# Patient Record
Sex: MALE | Race: WHITE | NOT HISPANIC OR LATINO | Employment: FULL TIME | URBAN - METROPOLITAN AREA
[De-identification: names, ages, dates, MRNs, and addresses within clinical notes are randomized per-mention and may not be internally consistent; named-entity substitution may affect disease eponyms.]

---

## 2017-01-02 ENCOUNTER — OFFICE VISIT (OUTPATIENT)
Dept: ENDOCRINOLOGY | Facility: CLINIC | Age: 42
End: 2017-01-02
Payer: COMMERCIAL

## 2017-01-02 VITALS
DIASTOLIC BLOOD PRESSURE: 66 MMHG | BODY MASS INDEX: 20.45 KG/M2 | SYSTOLIC BLOOD PRESSURE: 98 MMHG | HEART RATE: 98 BPM | WEIGHT: 134.92 LBS | HEIGHT: 68 IN

## 2017-01-02 DIAGNOSIS — E27.40 ADRENAL INSUFFICIENCY (H): Primary | ICD-10-CM

## 2017-01-02 PROCEDURE — 99205 OFFICE O/P NEW HI 60 MIN: CPT | Performed by: INTERNAL MEDICINE

## 2017-01-02 NOTE — NURSING NOTE
"Nico Morales's goals for this visit include: Cystis Fibrosis Diabetes  He requests these members of his care team be copied on today's visit information: YES    PCP: Jaye Machado    Referring Provider:  No referring provider defined for this encounter.    Chief Complaint   Patient presents with     Cystic Fibrosis     Diabetes       Initial Ht 1.727 m (5' 8\")  Wt 61.2 kg (134 lb 14.7 oz)  BMI 20.52 kg/m2 Estimated body mass index is 20.52 kg/(m^2) as calculated from the following:    Height as of this encounter: 1.727 m (5' 8\").    Weight as of this encounter: 61.2 kg (134 lb 14.7 oz).  BP completed using cuff size: regular    Do you need any medication refills at today's visit? NO    "

## 2017-01-02 NOTE — PROGRESS NOTES
CF Endocrinology Return Consultation:  Diabetes  :   Patient: Nico Morales MRN# [id number]   YOB: 1975 Age: 41 year old   Date of Visit: 1/2/2017  Dear Dr. Jaye Machado:    I had the pleasure of seeing your patient, Nico Morales in the [facility], [facility] , on 1/2/2017 for consultation regarding CFRD, adrenal insufficiency and low bone density.           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  Verbal overview and written information provided.          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     Priority: Medium     Encounter for long-term (current) use of antibiotics 05/20/2013     Priority: Medium     Prostatitis 05/20/2013     Priority: Medium     updating diagnosis code for icd10 cutover       Pseudomonas infection      Priority: Medium     Cystic fibrosis (H) 11/24/2010     Priority: Medium     Sweat Test   Date: 4/25/78    Lab: [facility]   Sample #1:  110mmol  Sample #2:  106mmol    Genetics  Date: 4/9/90  U369wkg/621+1G->T         CARDIOVASCULAR SCREENING; LDL GOAL LESS THAN 160 10/31/2010     Priority: Medium            HPI:   Nico is a 41 year old male with Cystic Fibrosis Related Diabetes Mellitus (CFRD).    I have reviewed the available past laboratory evaluations, imaging studies, and medical records available to me at this visit. I have reviewed  Nico'height and weight.    History was obtained from the patient and the medical record.  I have reviewed the notes of the  pulmonary care team.    I have read and interpreted the data from the patient glucose downloads..    Overall average: 119 mg/dL     Patient is currently testing 0-2 time per day.  Per our standard of care I am recommending and prescribing glucose testing 6 times per day.    A1c:  recent hemoglobin A1c: 8.3%  Previous two HbA1c results: A1C      8.3   12/15/2016  A1C      8.1   6/28/2016   Result was discussed at today's visit.     Current insulin regimen:   Injectable Insulin: Glargine (Lantus): 5 units  at bedtime   Novolog 1 unit / 15 gm of carb, 3-4 time  Daily. Novolog dose ranges b/w 3-7 units  Tube feed at night, 2 nights on 1 night off, tube feed take 2-3 hours to compelete    Insulin administration site(s): abd or thigh    Family history and social history were reviewed     He was also diagnosed with adrenal insufficiency based on inadequate response on a ACTH stim test. Adrenal insufficiency in thuoght to be related to  itraconazole and Symbicort use.  He is off itraconazole at this time. On prednisone 2.5 mg bid.     Noted to have significant decline in bone density on recent DEXA. On vitamin D and multivitmain supplements.           Past Medical History:     Past Medical History   Diagnosis Date     CF (cystic fibrosis) (H)      Exocrine pancreatic insufficiency      S/P gastrostomy (H) 2002     Nocardia infection      Pseudomonas infection             Past Surgical History:     Past Surgical History   Procedure Laterality Date     Gastrostomy tube  2002               Social History:     Social History     Social History Narrative    Kilo lives with wife in a house in [place], [place].  He works as a .        March 2016. He works independently as a . Works out 5x/week and walks the dog daily.        June 2016. No changes. Describes himself as a creature of habit.              Family History:     Family History   Problem Relation Age of Onset     HEART DISEASE Maternal  Grandmother      HEART DISEASE Maternal Grandfather      HEART DISEASE Paternal Grandmother      DIABETES No family hx of             Allergies:     Allergies   Allergen Reactions     Ceftazidime      Pulmozyme [Dornase Trever] Other (See Comments)     Chest pain and fever     Zosyn      Levaquin Rash             Medications:     Current Outpatient Rx   Name  Route  Sig  Dispense  Refill     amylase-lipase-protease (CREON) 24281 UNITS CPEP per EC capsule        Take 7-8 caps with meals (3 meals/day) and 2 caps with snacks (3 snacks/day)    900 capsule    0       blood glucose monitoring (MARTINA CONTOUR NEXT MONITOR) meter device kit        Use to test blood sugar 6 times daily or as directed.    1 kit    2       Patient needs to change to provider Ameer Rosa f ...       blood glucose monitoring (MARTINA CONTOUR NEXT) test strip        Use to test blood sugar 6 times daily or as directed.    200 each    2       Patient needs to change to provider Ameer Rosa f ...       blood glucose monitoring (MARTINA MICROLET) lancets        Use to test blood sugar 6 times daily or as directed.    2 Box    2       Patient needs to change to provider Ameer Rosa f ...       aztreonam lysine (CAYSTON) 75 MG SOLR    Nebulization    Take 1 mL (75 mg) by nebulization 3 times daily Alternating every other month with BETHANY.    84 mL    5       oseltamivir (TAMIFLU) 75 MG capsule    Oral    Take 1 capsule (75 mg) by mouth 2 times daily    10 capsule    1       rimantadine (FLUMADINE) 100 MG tablet    Oral    Take 1 tablet (100 mg) by mouth 2 times daily Take from Oct. 1 thru March 31.    60 tablet    5       sodium chloride inhalant 7 % NEBU        Nebulize 4mL twice daily    240 mL    11       insulin pen needle (BD OMI U/F) 32G X 4 MM        Use 6 daily or as directed.    200 each    11       insulin glargine (LANTUS SOLOSTAR) 100 UNIT/ML PEN        5 units daily    15 mL    6       tobramycin, PF, (BETHANY) 300 MG/5ML nebulizer solution     "Nebulization    Take 5 mLs (300 mg) by nebulization 2 times daily 28 days on, 28 days off.    280 mL    2       acetylcysteine (MUCOMYST) 10 % nebulizer solution        Nebulize 4ml qid    480 mL    6       sodium chloride, Inhalant, 0.9 % AERS    Inhalation    Inhale 3 mLs into the lungs 3 times daily    270 mL    11       insulin aspart (NOVOLOG PENFILL) 100 UNIT/ML Cartridge        1 per 30 grams of carbohydrate at lunch and supper  Patient taking differently: 1 per 15 grams of carbohydrate at lunch and supper    15 mL    12       Patient using ~25 units/day       budesonide-formoterol (SYMBICORT) 160-4.5 MCG/ACT inhaler    Inhalation    Inhale 2 puffs into the lungs 2 times daily    1 Inhaler    12       levalbuterol (XOPENEX HFA) 45 MCG/ACT inhaler    Inhalation    Inhale 2 puffs into the lungs every 6 hours as needed for shortness of breath / dyspnea    3 Inhaler    4       polyethylene glycol (MIRALAX) powder        Use 17 gms po tid x 3 days then do it daily x 1 month    442 g    1       levalbuterol (XOPENEX CONCENTRATE) 1.25 MG/0.5ML NEBU    Nebulization    Take 0.5 mLs (1.25 mg) by nebulization 4 times daily    120 each    11       [facility] INFUSION MANAGED PATIENT        Contact [facility] for patient specific medication information at [phone] on admission and discharge from the hospital.  Phones are answered 24 hours a day 7 days a week 365 days a year.    Providers - Choose \"CONTINUE HOME MED (no script)\" at discharge if patient treatment with home infusion will continue.               Nebulizers (EFLOW [facility] NEBULIZER HANDSET) MISC    Does not apply    1 each 3 times daily.    1 each    6       Supply as needed with [place] as [employer] handset       AMITRIPTYLINE HCL        30 mg At Bedtime.               Calcium Citrate-Vitamin D (CALCIUM CITRATE + D PO)    Oral    Take 1 tablet by mouth 2 times daily.               Ferrous Fumarate-Vitamin C (VITRON-C) 200-125 MG TABS    Oral    " "Take 1 tablet by mouth 2 times daily.               MEPHYTON 5 MG PO TABS    Oral    1 TABLET DAILY               MULTIVITAMIN OR    Oral    1 tablet daily               VITAMIN D 1000 UNIT PO CAPS    Oral    1 CAPSULE DAILY               VITAMIN E 400 UNIT PO CAPS    Oral    1 CAPSULE DAILY               ACE NOT PRESCRIBED, INTENTIONAL,    Does not apply    1 each continuous prn. ACE Inhibitor not prescribed due to Risk for drug interaction    0 each    0               Review of Systems:     Comprehensive ROS negative other than the symptoms noted above in the HPI.          Physical Exam:   Blood pressure 98/66, pulse 98, height 1.727 m (5' 8\"), weight 61.2 kg (134 lb 14.7 oz).  Normalized stature-for-age data available only for age 0 to 20 years.  Height: 5' 8\", Normalized stature-for-age data available only for age 0 to 20 years.  Weight: 134 lbs 14.74 oz, Normalized weight-for-age data available only for age 0 to 20 years.  BMI: Body mass index is 20.52 kg/(m^2)., Normalized BMI data available only for age 2 to 20 years.      CONSTITUTIONAL:   Awake, alert, and in no apparent distress.  HEAD: Normocephalic, without obvious abnormality.  EYES: Lids and lashes normal, sclera clear, conjunctiva normal.  ENT: external ears without lesions, nares clear, oral pharynx with moist mucus membranes.  NECK: Supple, symmetrical, trachea midline.  THYROID: symmetric, not enlarged and no tenderness.  HEMATOLOGIC/LYMPHATIC: No cervical lymphadenopathy.  LUNGS: No increased work of breathing, clear to auscultation  with good air entry  CARDIOVASCULAR: Regular rate and rhythm, no murmurs.  ABDOMEN: Soft, non-distended, non-tender, no masses palpated, no hepatosplenomegally.  NEUROLOGIC:No focal deficits noted.   PSYCHIATRIC: Cooperative, no agitation.  SKIN: Insulin administration sites intact without lipohypertrophy. No acanthosis nigricans.  MUSCULOSKELETAL:  Full range of motion noted.  Motor strength and tone are " normal.  FEET:  Normal, monofilament exam normal         Laboratory results:     TSH   Date Value Ref Range Status   12/15/2016 1.36 0.40 - 4.00 mU/L Final     TRIIODOTHYRONINE (T3)   Date Value Ref Range Status   06/25/2010 123 60 - 181 ng/dL Final     TESTOSTERONE TOTAL   Date Value Ref Range Status   12/15/2016 779 240 - 950 ng/dL Final     Comment:     This test was developed and its performance characteristics determined by the   [facility],  Special Chemistry Laboratory. It has   not been cleared or approved by the FDA. The laboratory is regulated under   CLIA   as qualified to perform high-complexity testing. This test is used for   clinical   purposes. It should not be regarded as investigational or for research.       CHOLESTEROL   Date Value Ref Range Status   12/15/2016 158 <200 mg/dL Final     ALBUMIN URINE MG/L   Date Value Ref Range Status   12/15/2016 55 mg/L Final     TRIGLYCERIDES   Date Value Ref Range Status   12/15/2016 173* <150 mg/dL Final     Comment:     Borderline high:  150-199 mg/dl   High:             200-499 mg/dl   Very high:       >499 mg/dl       HDL CHOLESTEROL   Date Value Ref Range Status   12/15/2016 32* >39 mg/dL Final     LDL CHOLESTEROL CALCULATED   Date Value Ref Range Status   12/15/2016 92 <100 mg/dL Final     Comment:     Desirable:       <100 mg/dl     CHOLESTEROL/HDL RATIO   Date Value Ref Range Status   06/26/2013 4.4 0.0 - 5.0 Final     NON HDL CHOLESTEROL   Date Value Ref Range Status   12/15/2016 126 <130 mg/dL Final     A1C      8.3   12/15/2016  A1C      8.1   6/28/2016  A1C      7.5   4/13/2016  A1C      7.1   3/8/2016  A1C      7.4   12/16/2015 No results found for this basename: hemoglobina1        CF  Diabetes Health Maintenance    Date of Diabetes Diagnosis: 12/2015    Date Last Eye Exam: Jan 2015     Date Last Dental Appointment: >2 years    Dates of Episodes Severe* Hypoglycemia (month/year, cumulative, ongoing, assess each visit):  never              *Severe=patient unconscious, seizure, unable to help self    Last 25-Vitamin D (every year): 33 on 12/2016    Last DXA, lowest Z-score (every 2 years): -2.6 right femoral necl       ?Bisphosphonates (yes/no):       Last Urine Microalbumin (every year):      No results found for: MICROALBUMIN     ENDO DIABETES Latest Ref Rng 12/15/2016   ALBUMIN URINE MG/L  55   ALBUMIN URINE MG/G CR 0 - 17 mg/g Cr 37.28 (H)     Last Testosterone:   TESTOSTERONE TOTAL   Date Value Ref Range Status   12/15/2016 779 240 - 950 ng/dL Final     Comment:     This test was developed and its performance characteristics determined by the   [facility],  Special Chemistry Laboratory. It has   not been cleared or approved by the FDA. The laboratory is regulated under   CLIA   as qualified to perform high-complexity testing. This test is used for   clinical   purposes. It should not be regarded as investigational or for research.        On testosterone therapy (yes/no)? No     Date of Last Diabetes Visit: June 2016         Assessment and Plan:   Nico is a 41 year old male with CFRD    CFRD: suboptimal control, not checking BG regularly, increase lantus to 7 units daily. Reports having low when dose was increased to 10 units in the past. Check BG regularly and send log for review b/w visits.  No reactive hypoglycemia     Diabetes is a complicated and dangerous illness which requires intensive monitoring and treatment to prevent both short-term and long-term consequences to various organs. Insulin therapy is life-saving, but is also associated with life-threatening toxicity (hypoglycemia).  Careful and continuous attention to balancing glucose levels, activity, diet and insulin dosage is necessary.    Low bone density: Vitamin D, TSH and testosterone were ok. Start pamidronate 30 mg IV every 3 months. Side effects including rare risk of ONJ and atypical fracture of femur reviewed.    Adrenal  insuffiencey:  Check AM cortisol/ACTH after hold evening dose of prednisone.       Patient Instructions   It was nice to see you today romina!    As we discussed today, diabetes control is not at goal    -Increase lantus to 7 units daily.    -Check glucose more frequently, this will help us with adjusting insulin dose.  -our nurse (Olivia) will contact you to schedule pamidronate infusion.  - as discussed have cortisol level checked in  the morning after holding the evening dose the night before.     Thank you for choosing the [facility], Department of Endocrinology. It has been a pleasure servicing you today. Please contact us at [phone] with any questions. If you need to speak to an Endocrinologist and it is after 5:00 pm or on a weekend, please call the On-Call Endocrinologist at [phone].          total time 60 minutes > 50% counseling patient on above and reviewing records.     RTC in 3 months    Thank you for allowing me to participate in the care of your patient.  Please do not hesitate to call with questions or concerns.    Sincerely,    JULIETA Tate  [facility]    TYLER REYES

## 2017-01-02 NOTE — Clinical Note
1/2/2017      RE: Nico Morales  [address] [facility] [address]  [facility] [address]     Dear Colleague,    Thank you for referring your patient, Nico Morales, to the [facility]. Please see a copy of my visit note below.    CF Endocrinology Return Consultation:  Diabetes  :   Patient: Nico Morales MRN# [id number]   YOB: 1975 Age: 41 year old   Date of Visit: 1/2/2017  Dear Dr. Jaye Machado:    I had the pleasure of seeing your patient, Nico Morales in the [facility], [facility] , on 1/2/2017 for consultation regarding CFRD, adrenal insufficiency and low bone density.           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  Verbal overview and written information provided.          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     Priority: Medium     Encounter for long-term (current) use of antibiotics 05/20/2013     Priority: Medium     Prostatitis 05/20/2013     Priority: Medium     updating diagnosis code for icd10 cutover       Pseudomonas infection      Priority: Medium     Cystic fibrosis (H) 11/24/2010     Priority: Medium     Sweat Test   Date: 4/25/78    Lab: [facility]   Sample #1:  110mmol  Sample #2:  106mmol    Genetics  Date: 4/9/90  [id number]+[id number]->T         CARDIOVASCULAR SCREENING; LDL GOAL LESS THAN 160 10/31/2010     Priority: Medium            HPI:   Nico is a 41 year old male with Cystic Fibrosis Related Diabetes Mellitus (CFRD).    I have reviewed the  available past laboratory evaluations, imaging studies, and medical records available to me at this visit. I have reviewed  Nico'height and weight.    History was obtained from the patient and the medical record.  I have reviewed the notes of the pulmonary care team.    I have read and interpreted the data from the patient glucose downloads..    Overall average: 119 mg/dL     Patient is currently testing 0-2 time per day.  Per our standard of care I am recommending and prescribing glucose testing 6 times per day.    A1c:  recent hemoglobin A1c: 8.3%  Previous two HbA1c results: A1C      8.3   12/15/2016  A1C      8.1   6/28/2016   Result was discussed at today's visit.     Current insulin regimen:   Injectable Insulin: Glargine (Lantus): 5 units  at bedtime   Novolog 1 unit / 15 gm of carb, 3-4 time  Daily. Novolog dose ranges b/w 3-7 units  Tube feed at night, 2 nights on 1 night off, tube feed take 2-3 hours to compelete    Insulin administration site(s): abd or thigh    Family history and social history were reviewed     He was also diagnosed with adrenal insufficiency based on inadequate response on a ACTH stim test. Adrenal insufficiency in thuoght to be related to  itraconazole and Symbicort use.  He is off itraconazole at this time. On prednisone 2.5 mg bid.     Noted to have significant decline in bone density on recent DEXA. On vitamin D and multivitmain supplements.           Past Medical History:     Past Medical History   Diagnosis Date     CF (cystic fibrosis) (H)      Exocrine pancreatic insufficiency      S/P gastrostomy (H) 2002     Nocardia infection      Pseudomonas infection             Past Surgical History:     Past Surgical History   Procedure Laterality Date     Gastrostomy tube  2002               Social History:     Social History     Social History Narrative    Kilo lives with wife in a house in [place], [place].  He works as a .        March 2016. He works independently as  a . Works out 5x/week and walks the dog daily.        June 2016. No changes. Describes himself as a creature of habit.              Family History:     Family History   Problem Relation Age of Onset     HEART DISEASE Maternal Grandmother      HEART DISEASE Maternal Grandfather      HEART DISEASE Paternal Grandmother      DIABETES No family hx of             Allergies:     Allergies   Allergen Reactions     Ceftazidime      Pulmozyme [Dornase Trever] Other (See Comments)     Chest pain and fever     Zosyn      Levaquin Rash             Medications:     Current Outpatient Rx   Name  Route  Sig  Dispense  Refill     amylase-lipase-protease (CREON) 76287 UNITS CPEP per EC capsule        Take 7-8 caps with meals (3 meals/day) and 2 caps with snacks (3 snacks/day)    900 capsule    0       blood glucose monitoring (MARTINA CONTOUR NEXT MONITOR) meter device kit        Use to test blood sugar 6 times daily or as directed.    1 kit    2       Patient needs to change to provider Ameepooja Lee f ...       blood glucose monitoring (MARTINA CONTOUR NEXT) test strip        Use to test blood sugar 6 times daily or as directed.    200 each    2       Patient needs to change to provider Ameer Rosa f ...       blood glucose monitoring (MARTINA MICROLET) lancets        Use to test blood sugar 6 times daily or as directed.    2 Box    2       Patient needs to change to provider Amvalerie Lee f ...       aztreonam lysine (CAYSTON) 75 MG SOLR    Nebulization    Take 1 mL (75 mg) by nebulization 3 times daily Alternating every other month with BETHANY.    84 mL    5       oseltamivir (TAMIFLU) 75 MG capsule    Oral    Take 1 capsule (75 mg) by mouth 2 times daily    10 capsule    1       rimantadine (FLUMADINE) 100 MG tablet    Oral    Take 1 tablet (100 mg) by mouth 2 times daily Take from Oct. 1 thru March 31.    60 tablet    5       sodium chloride inhalant 7 % NEBU        Nebulize 4mL twice daily    240 mL    11       insulin  "pen needle (BD OMI U/F) 32G X 4 MM        Use 6 daily or as directed.    200 each    11       insulin glargine (LANTUS SOLOSTAR) 100 UNIT/ML PEN        5 units daily    15 mL    6       tobramycin, PF, (BETHANY) 300 MG/5ML nebulizer solution    Nebulization    Take 5 mLs (300 mg) by nebulization 2 times daily 28 days on, 28 days off.    280 mL    2       acetylcysteine (MUCOMYST) 10 % nebulizer solution        Nebulize 4ml qid    480 mL    6       sodium chloride, Inhalant, 0.9 % AERS    Inhalation    Inhale 3 mLs into the lungs 3 times daily    270 mL    11       insulin aspart (NOVOLOG PENFILL) 100 UNIT/ML Cartridge        1 per 30 grams of carbohydrate at lunch and supper  Patient taking differently: 1 per 15 grams of carbohydrate at lunch and supper    15 mL    12       Patient using ~25 units/day       budesonide-formoterol (SYMBICORT) 160-4.5 MCG/ACT inhaler    Inhalation    Inhale 2 puffs into the lungs 2 times daily    1 Inhaler    12       levalbuterol (XOPENEX HFA) 45 MCG/ACT inhaler    Inhalation    Inhale 2 puffs into the lungs every 6 hours as needed for shortness of breath / dyspnea    3 Inhaler    4       polyethylene glycol (MIRALAX) powder        Use 17 gms po tid x 3 days then do it daily x 1 month    442 g    1       levalbuterol (XOPENEX CONCENTRATE) 1.25 MG/0.5ML NEBU    Nebulization    Take 0.5 mLs (1.25 mg) by nebulization 4 times daily    120 each    11       [facility] INFUSION MANAGED PATIENT        Contact [facility] for patient specific medication information at [phone] on admission and discharge from the hospital.  Phones are answered 24 hours a day 7 days a week 365 days a year.    Providers - Choose \"CONTINUE HOME MED (no script)\" at discharge if patient treatment with home infusion will continue.               Nebulizers (EFLOW [facility] NEBULIZER HANDSET) MISC    Does not apply    1 each 3 times daily.    1 each    6       Supply as needed with [place] as [employer] " "handset       AMITRIPTYLINE HCL        30 mg At Bedtime.               Calcium Citrate-Vitamin D (CALCIUM CITRATE + D PO)    Oral    Take 1 tablet by mouth 2 times daily.               Ferrous Fumarate-Vitamin C (VITRON-C) 200-125 MG TABS    Oral    Take 1 tablet by mouth 2 times daily.               MEPHYTON 5 MG PO TABS    Oral    1 TABLET DAILY               MULTIVITAMIN OR    Oral    1 tablet daily               VITAMIN D 1000 UNIT PO CAPS    Oral    1 CAPSULE DAILY               VITAMIN E 400 UNIT PO CAPS    Oral    1 CAPSULE DAILY               ACE NOT PRESCRIBED, INTENTIONAL,    Does not apply    1 each continuous prn. ACE Inhibitor not prescribed due to Risk for drug interaction    0 each    0               Review of Systems:     Comprehensive ROS negative other than the symptoms noted above in the HPI.          Physical Exam:   Blood pressure 98/66, pulse 98, height 1.727 m (5' 8\"), weight 61.2 kg (134 lb 14.7 oz).  Normalized stature-for-age data available only for age 0 to 20 years.  Height: 5' 8\", Normalized stature-for-age data available only for age 0 to 20 years.  Weight: 134 lbs 14.74 oz, Normalized weight-for-age data available only for age 0 to 20 years.  BMI: Body mass index is 20.52 kg/(m^2)., Normalized BMI data available only for age 2 to 20 years.      CONSTITUTIONAL:   Awake, alert, and in no apparent distress.  HEAD: Normocephalic, without obvious abnormality.  EYES: Lids and lashes normal, sclera clear, conjunctiva normal.  ENT: external ears without lesions, nares clear, oral pharynx with moist mucus membranes.  NECK: Supple, symmetrical, trachea midline.  THYROID: symmetric, not enlarged and no tenderness.  HEMATOLOGIC/LYMPHATIC: No cervical lymphadenopathy.  LUNGS: No increased work of breathing, clear to auscultation  with good air entry  CARDIOVASCULAR: Regular rate and rhythm, no murmurs.  ABDOMEN: Soft, non-distended, non-tender, no masses palpated, no " hepatosplenomegally.  NEUROLOGIC:No focal deficits noted.   PSYCHIATRIC: Cooperative, no agitation.  SKIN: Insulin administration sites intact without lipohypertrophy. No acanthosis nigricans.  MUSCULOSKELETAL:  Full range of motion noted.  Motor strength and tone are normal.  FEET:  Normal, monofilament exam normal         Laboratory results:     TSH   Date Value Ref Range Status   12/15/2016 1.36 0.40 - 4.00 mU/L Final     TRIIODOTHYRONINE (T3)   Date Value Ref Range Status   06/25/2010 123 60 - 181 ng/dL Final     TESTOSTERONE TOTAL   Date Value Ref Range Status   12/15/2016 779 240 - 950 ng/dL Final     Comment:     This test was developed and its performance characteristics determined by the   [facility],  Special Chemistry Laboratory. It has   not been cleared or approved by the FDA. The laboratory is regulated under   CLIA   as qualified to perform high-complexity testing. This test is used for   clinical   purposes. It should not be regarded as investigational or for research.       CHOLESTEROL   Date Value Ref Range Status   12/15/2016 158 <200 mg/dL Final     ALBUMIN URINE MG/L   Date Value Ref Range Status   12/15/2016 55 mg/L Final     TRIGLYCERIDES   Date Value Ref Range Status   12/15/2016 173* <150 mg/dL Final     Comment:     Borderline high:  150-199 mg/dl   High:             200-499 mg/dl   Very high:       >499 mg/dl       HDL CHOLESTEROL   Date Value Ref Range Status   12/15/2016 32* >39 mg/dL Final     LDL CHOLESTEROL CALCULATED   Date Value Ref Range Status   12/15/2016 92 <100 mg/dL Final     Comment:     Desirable:       <100 mg/dl     CHOLESTEROL/HDL RATIO   Date Value Ref Range Status   06/26/2013 4.4 0.0 - 5.0 Final     NON HDL CHOLESTEROL   Date Value Ref Range Status   12/15/2016 126 <130 mg/dL Final     A1C      8.3   12/15/2016  A1C      8.1   6/28/2016  A1C      7.5   4/13/2016  A1C      7.1   3/8/2016  A1C      7.4   12/16/2015 No results found for this  basename: hemoglobina1        CF  Diabetes Health Maintenance    Date of Diabetes Diagnosis: 12/2015    Date Last Eye Exam: Jan 2015     Date Last Dental Appointment: >2 years    Dates of Episodes Severe* Hypoglycemia (month/year, cumulative, ongoing, assess each visit): never              *Severe=patient unconscious, seizure, unable to help self    Last 25-Vitamin D (every year): 33 on 12/2016    Last DXA, lowest Z-score (every 2 years): -2.6 right femoral necl       ?Bisphosphonates (yes/no):       Last Urine Microalbumin (every year):      No results found for: MICROALBUMIN     ENDO DIABETES Latest Ref Rng 12/15/2016   ALBUMIN URINE MG/L  55   ALBUMIN URINE MG/G CR 0 - 17 mg/g Cr 37.28 (H)     Last Testosterone:   TESTOSTERONE TOTAL   Date Value Ref Range Status   12/15/2016 779 240 - 950 ng/dL Final     Comment:     This test was developed and its performance characteristics determined by the   [facility],  Special Chemistry Laboratory. It has   not been cleared or approved by the FDA. The laboratory is regulated under   CLIA   as qualified to perform high-complexity testing. This test is used for   clinical   purposes. It should not be regarded as investigational or for research.        On testosterone therapy (yes/no)? No     Date of Last Diabetes Visit: June 2016         Assessment and Plan:   Nico is a 41 year old male with CFRD    CFRD: suboptimal control, not checking BG regularly, increase lantus to 7 units daily. Reports having low when dose was increased to 10 units in the past. Check BG regularly and send log for review b/w visits.  No reactive hypoglycemia     Diabetes is a complicated and dangerous illness which requires intensive monitoring and treatment to prevent both short-term and long-term consequences to various organs. Insulin therapy is life-saving, but is also associated with life-threatening toxicity (hypoglycemia).  Careful and continuous attention to  balancing glucose levels, activity, diet and insulin dosage is necessary.    Low bone density: Vitamin D, TSH and testosterone were ok. Start pamidronate 30 mg IV every 3 months. Side effects including rare risk of ONJ and atypical fracture of femur reviewed.    Adrenal insuffiencey:  Check AM cortisol/ACTH after hold evening dose of prednisone.       Patient Instructions   It was nice to see you today romina!    As we discussed today, diabetes control is not at goal    -Increase lantus to 7 units daily.    -Check glucose more frequently, this will help us with adjusting insulin dose.  -our nurse (Olivia) will contact you to schedule pamidronate infusion.  - as discussed have cortisol level checked in  the morning after holding the evening dose the night before.     Thank you for choosing the [facility], Department of Endocrinology. It has been a pleasure servicing you today. Please contact us at [phone] with any questions. If you need to speak to an Endocrinologist and it is after 5:00 pm or on a weekend, please call the On-Call Endocrinologist at [phone].          total time 60 minutes > 50% counseling patient on above and reviewing records.     RTC in 3 months    Thank you for allowing me to participate in the care of your patient.  Please do not hesitate to call with questions or concerns.    Sincerely,    JULIETA Tate  [facility]    TYLER REYES              Again, thank you for allowing me to participate in the care of your patient.      Sincerely,    Diana Lee MD

## 2017-01-02 NOTE — MR AVS SNAPSHOT
After Visit Summary   1/2/2017    Nico Morales    MRN: [id number]           Patient Information     Date Of Birth          1975        Visit Information        Provider Department      1/2/2017 10:30 AM Diana Lee MD [facility]        Today's Diagnoses     Adrenal insufficiency (H)    -  1       Care Instructions    It was nice to see you today romina!    As we discussed today, diabetes control is not at goal    -Increase lantus to 7 units daily.    -Check glucose more frequently, this will help us with adjusting insulin dose.  -our nurse (Olivia) will contact you to schedule pamidronate infusion.  - as discussed have cortisol level checked in  the morning after holding the evening dose the night before.     Thank you for choosing the [facility] Department of Endocrinology. It has been a pleasure servicing you today. Please contact us at [phone] with any questions. If you need to speak to an Endocrinologist and it is after 5:00 pm or on a weekend, please call the On-Call Endocrinologist at [phone].              Follow-ups after your visit        Your next 10 appointments already scheduled     Mar 14, 2017 10:45 AM   Lab with  LAB    Health Lab ([facility])    [address] [place] [address] [id number] [phone]            Mar 14, 2017 11:00 AM   CF LOOP with  PFL CF   [facility] Pulmonary Function Testing ([facility])    [address] [place] [address] [id number] [phone]            Mar 14, 2017 11:20 AM   (Arrive by 11:05 AM)   RETURN CYSTIC FIBROSIS VISIT with Jaye Machado MD   [facility] ([facility])    [address] [place] [address] [id number] [phone]            Apr 10, 2017 11:15 AM   Return Visit with Diana Lee MD, [facility] NURSE   M  "[facility] ([facility])    [address] [address] [place] [id number] [phone]              Who to contact     If you have questions or need follow up information about today's clinic visit or your schedule please contact [facility] directly at [phone].  Normal or non-critical lab and imaging results will be communicated to you by MyChart, letter or phone within 4 business days after the clinic has received the results. If you do not hear from us within 7 days, please contact the clinic through [employer] or phone. If you have a critical or abnormal lab result, we will notify you by phone as soon as possible.  Submit refill requests through [employer] or call your pharmacy and they will forward the refill request to us. Please allow 3 business days for your refill to be completed.          Additional Information About Your Visit        [employer] Information     [employer] gives you secure access to your electronic health record. If you see a primary care provider, you can also send messages to your care team and make appointments. If you have questions, please call your primary care clinic.  If you do not have a primary care provider, please call [phone] and they will assist you.      [employer] is an electronic gateway that provides easy, online access to your medical records. With [employer], you can request a clinic appointment, read your test results, renew a prescription or communicate with your care team.     To access your existing account, please contact your [facility] or call [phone] for assistance.        Your Vitals Were     Pulse Height BMI (Body Mass Index)             98 1.727 m (5' 8\") 20.52 kg/m2          Blood Pressure from Last 3 Encounters:   01/02/17 98/66   12/15/16 105/73   12/01/16 106/72    Weight from Last 3 Encounters:   01/02/17 61.2 kg (134 lb 14.7 oz)   12/15/16 59.8 kg (131 lb 13.4 oz)   12/01/16 " 59.7 kg (131 lb 9.8 oz)              Today, you had the following     No orders found for display         Today's Medication Changes          These changes are accurate as of: 1/2/17 11:31 AM.  If you have any questions, ask your nurse or doctor.               These medicines have changed or have updated prescriptions.        Dose/Directions    insulin aspart 100 UNIT/ML injection   Commonly known as:  NovoLOG PENFILL   This may have changed:  additional instructions   Used for:  Diabetes mellitus related to CF (cystic fibrosis) (H)        1 per 30 grams of carbohydrate at lunch and supper   Quantity:  15 mL   Refills:  12                Primary Care Provider Office Phone # Fax #    Jaye Machado [phone]        PHYSICIANS [address] [facility] [address]  [facility] [address]        Thank you!     Thank you for choosing [facility]  for your care. Our goal is always to provide you with excellent care. Hearing back from our patients is one way we can continue to improve our services. Please take a few minutes to complete the written survey that you may receive in the mail after your visit with us. Thank you!             Your Updated Medication List - Protect others around you: Learn how to safely use, store and throw away your medicines at www.disposemymeds.org.          This list is accurate as of: 1/2/17 11:31 AM.  Always use your most recent med list.                   Brand Name Dispense Instructions for use    ACE NOT PRESCRIBED (INTENTIONAL)     0 each    1 each continuous prn. ACE Inhibitor not prescribed due to Risk for drug interaction       acetylcysteine 10 % injection    MUCOMYST    480 mL    Nebulize 4ml qid       AMITRIPTYLINE HCL      30 mg At Bedtime.       amylase-lipase-protease 63503 UNITS Cpep per EC capsule    CREON    900 capsule    Take 7-8 caps with meals (3 meals/day) and 2 caps with snacks (3 snacks/day)       aztreonam lysine 75 MG Solr    CAYSTON  "   84 mL    Take 1 mL (75 mg) by nebulization 3 times daily Alternating every other month with BETHANY.       blood glucose monitoring lancets     2 Box    Use to test blood sugar 6 times daily or as directed.       blood glucose monitoring meter device kit     1 kit    Use to test blood sugar 6 times daily or as directed.       blood glucose monitoring test strip    MARTINA CONTOUR NEXT    200 each    Use to test blood sugar 6 times daily or as directed.       budesonide-formoterol 160-4.5 MCG/ACT Inhaler    SYMBICORT    1 Inhaler    Inhale 2 puffs into the lungs 2 times daily       CALCIUM CITRATE + D PO      Take 1 tablet by mouth 2 times daily.       EFLOW SCF NEBULIZER HANDSET Misc     1 each    1 each 3 times daily.       [facility] HOME INFUSION MANAGED PATIENT      Contact [facility] for patient specific medication information at [phone] on admission and discharge from the hospital.  Phones are answered 24 hours a day 7 days a week 365 days a year.  Providers - Choose \"CONTINUE HOME MED (no script)\" at discharge if patient treatment with home infusion will continue.       insulin aspart 100 UNIT/ML injection    NovoLOG PENFILL    15 mL    1 per 30 grams of carbohydrate at lunch and supper       insulin glargine 100 UNIT/ML injection    LANTUS SOLOSTAR    15 mL    5 units daily       insulin pen needle 32G X 4 MM    BD OMI U/F    200 each    Use 6 daily or as directed.       levalbuterol 1.25 MG/0.5ML Nebu neb solution    XOPENEX CONCENTRATE    120 each    Take 0.5 mLs (1.25 mg) by nebulization 4 times daily       levalbuterol 45 MCG/ACT Inhaler    XOPENEX HFA    3 Inhaler    Inhale 2 puffs into the lungs every 6 hours as needed for shortness of breath / dyspnea       MEPHYTON 5 MG tablet   Generic drug:  phytonadione      1 TABLET DAILY       MULTIVITAMIN PO      1 tablet daily       oseltamivir 75 MG capsule    TAMIFLU    10 capsule    Take 1 capsule (75 mg) by mouth 2 times daily       " polyethylene glycol powder    MIRALAX    442 g    Use 17 gms po tid x 3 days then do it daily x 1 month       rimantadine 100 MG tablet    FLUMADINE    60 tablet    Take 1 tablet (100 mg) by mouth 2 times daily Take from Oct. 1 thru March 31.       * sodium chloride (Inhalant) 0.9 % Aers     270 mL    Inhale 3 mLs into the lungs 3 times daily       * sodium chloride inhalant 7 % Nebu neb solution     240 mL    Nebulize 4mL twice daily       tobramycin (PF) 300 MG/5ML neb solution    BETHANY    280 mL    Take 5 mLs (300 mg) by nebulization 2 times daily 28 days on, 28 days off.       vitamin D 1000 UNITS capsule      1 CAPSULE DAILY       vitamin E 400 UNIT capsule      1 CAPSULE DAILY       VITRON-C 200-125 MG Tabs   Generic drug:  ferrous fumarate 65 mg ([place]. FE)-Vitamin C 125 mg      Take 1 tablet by mouth 2 times daily.       * Notice:  This list has 2 medication(s) that are the same as other medications prescribed for you. Read the directions carefully, and ask your doctor or other care provider to review them with you.

## 2017-01-02 NOTE — PATIENT INSTRUCTIONS
It was nice to see you today romina!    As we discussed today, diabetes control is not at goal    -Increase lantus to 7 units daily.    -Check glucose more frequently, this will help us with adjusting insulin dose.  -our nurse (Olivia) will contact you to schedule pamidronate infusion.  - as discussed have cortisol level checked in  the morning after holding the evening dose the night before.     Thank you for choosing the [facility], Department of Endocrinology. It has been a pleasure servicing you today. Please contact us at [phone] with any questions. If you need to speak to an Endocrinologist and it is after 5:00 pm or on a weekend, please call the On-Call Endocrinologist at [phone].

## 2017-01-03 DIAGNOSIS — E08.9 DIABETES MELLITUS DUE TO CYSTIC FIBROSIS (H): Primary | ICD-10-CM

## 2017-01-03 DIAGNOSIS — E84.0 CYSTIC FIBROSIS WITH PULMONARY MANIFESTATIONS (H): Primary | ICD-10-CM

## 2017-01-03 DIAGNOSIS — E84.9 DIABETES MELLITUS DUE TO CYSTIC FIBROSIS (H): Primary | ICD-10-CM

## 2017-01-03 PROBLEM — M85.9 LOW BONE DENSITY: Status: ACTIVE | Noted: 2017-01-03

## 2017-01-05 DIAGNOSIS — E84.0 CYSTIC FIBROSIS WITH PULMONARY MANIFESTATIONS (H): Primary | ICD-10-CM

## 2017-01-06 DIAGNOSIS — E84.0 CYSTIC FIBROSIS WITH PULMONARY MANIFESTATIONS (H): Primary | ICD-10-CM

## 2017-01-06 RX ORDER — PANCRELIPASE 24000; 76000; 120000 [USP'U]/1; [USP'U]/1; [USP'U]/1
CAPSULE, DELAYED RELEASE PELLETS ORAL
Qty: 2700 CAPSULE | Refills: 3 | Status: SHIPPED | OUTPATIENT
Start: 2017-01-06 | End: 2017-01-09

## 2017-01-09 ENCOUNTER — TELEPHONE (OUTPATIENT)
Dept: ENDOCRINOLOGY | Facility: CLINIC | Age: 42
End: 2017-01-09

## 2017-01-09 DIAGNOSIS — E84.0 CYSTIC FIBROSIS WITH PULMONARY MANIFESTATIONS (H): Primary | ICD-10-CM

## 2017-01-09 RX ORDER — PANCRELIPASE 24000; 76000; 120000 [USP'U]/1; [USP'U]/1; [USP'U]/1
CAPSULE, DELAYED RELEASE PELLETS ORAL
Qty: 2700 CAPSULE | Refills: 3 | Status: SHIPPED | OUTPATIENT
Start: 2017-01-09 | End: 2018-01-30

## 2017-01-09 NOTE — TELEPHONE ENCOUNTER
Received staff message from Dr. Lee as follows:    Jeffrey Baker   I have ordered therapy plan for Pamidronate 30 mg IV every 3 months. Please help schedule.   thanks         Contacted patient to review. Patient verbalizes understanding and agrees to plan. Provided patient with [facility] direct number for scheduling. Patient will call back with any questions or concerns.         Olivia Moore RN  Endocrine Care Coordinator  [facility]

## 2017-01-10 DIAGNOSIS — E84.9 CF (CYSTIC FIBROSIS) (H): ICD-10-CM

## 2017-01-10 DIAGNOSIS — E84.9 CYSTIC FIBROSIS (H): Primary | ICD-10-CM

## 2017-01-10 DIAGNOSIS — E84.0 CYSTIC FIBROSIS WITH PULMONARY MANIFESTATIONS (H): ICD-10-CM

## 2017-01-10 RX ORDER — LEVALBUTEROL 1.25 MG/.5ML
1.25 SOLUTION, CONCENTRATE RESPIRATORY (INHALATION) 4 TIMES DAILY
Qty: 120 EACH | Refills: 11 | Status: SHIPPED | OUTPATIENT
Start: 2017-01-10 | End: 2017-01-11 | Stop reason: DRUGHIGH

## 2017-01-11 DIAGNOSIS — E84.0 CYSTIC FIBROSIS WITH PULMONARY MANIFESTATIONS (H): Primary | ICD-10-CM

## 2017-01-11 RX ORDER — LEVALBUTEROL INHALATION SOLUTION 1.25 MG/3ML
1 SOLUTION RESPIRATORY (INHALATION) 4 TIMES DAILY
Qty: 360 ML | Refills: 11 | COMMUNITY
Start: 2017-01-11 | End: 2018-02-12

## 2017-01-13 DIAGNOSIS — E84.0 CYSTIC FIBROSIS WITH PULMONARY MANIFESTATIONS (H): Primary | ICD-10-CM

## 2017-01-13 RX ORDER — TOBRAMYCIN INHALATION SOLUTION 300 MG/5ML
300 INHALANT RESPIRATORY (INHALATION) 2 TIMES DAILY
Qty: 280 ML | Refills: 2 | Status: SHIPPED | OUTPATIENT
Start: 2017-01-13 | End: 2017-09-06

## 2017-01-19 DIAGNOSIS — E84.9 CYSTIC FIBROSIS (H): Primary | ICD-10-CM

## 2017-01-19 RX ORDER — LEVALBUTEROL TARTRATE 45 UG/1
2 AEROSOL, METERED ORAL EVERY 6 HOURS PRN
Qty: 3 INHALER | Refills: 4 | Status: SHIPPED | OUTPATIENT
Start: 2017-01-19 | End: 2017-11-21

## 2017-03-02 ENCOUNTER — TELEPHONE (OUTPATIENT)
Dept: ENDOCRINOLOGY | Facility: CLINIC | Age: 42
End: 2017-03-02

## 2017-03-03 DIAGNOSIS — E27.40 ADRENAL INSUFFICIENCY (H): ICD-10-CM

## 2017-03-03 LAB
ACTH PLAS-MCNC: 47 PG/ML
CORTIS SERPL-MCNC: 13.9 UG/DL (ref 4–22)

## 2017-03-03 PROCEDURE — 82533 TOTAL CORTISOL: CPT | Performed by: INTERNAL MEDICINE

## 2017-03-03 PROCEDURE — 36415 COLL VENOUS BLD VENIPUNCTURE: CPT | Performed by: INTERNAL MEDICINE

## 2017-03-03 PROCEDURE — 82024 ASSAY OF ACTH: CPT | Performed by: INTERNAL MEDICINE

## 2017-03-14 ENCOUNTER — OFFICE VISIT (OUTPATIENT)
Dept: PULMONOLOGY | Facility: CLINIC | Age: 42
End: 2017-03-14
Attending: INTERNAL MEDICINE
Payer: COMMERCIAL

## 2017-03-14 VITALS
OXYGEN SATURATION: 95 % | BODY MASS INDEX: 20.31 KG/M2 | HEART RATE: 98 BPM | RESPIRATION RATE: 16 BRPM | SYSTOLIC BLOOD PRESSURE: 97 MMHG | DIASTOLIC BLOOD PRESSURE: 64 MMHG | WEIGHT: 133.6 LBS

## 2017-03-14 DIAGNOSIS — E84.9 DIABETES MELLITUS DUE TO CYSTIC FIBROSIS (H): ICD-10-CM

## 2017-03-14 DIAGNOSIS — E84.9 CYSTIC FIBROSIS (H): ICD-10-CM

## 2017-03-14 DIAGNOSIS — E84.9 CF (CYSTIC FIBROSIS) (H): ICD-10-CM

## 2017-03-14 DIAGNOSIS — E84.0 CYSTIC FIBROSIS WITH PULMONARY MANIFESTATIONS (H): ICD-10-CM

## 2017-03-14 DIAGNOSIS — E08.9 DIABETES MELLITUS DUE TO CYSTIC FIBROSIS (H): ICD-10-CM

## 2017-03-14 DIAGNOSIS — Z79.2 ENCOUNTER FOR LONG-TERM (CURRENT) USE OF ANTIBIOTICS: ICD-10-CM

## 2017-03-14 LAB
ALBUMIN SERPL-MCNC: 3 G/DL (ref 3.4–5)
ALBUMIN UR-MCNC: NEGATIVE MG/DL
ALP SERPL-CCNC: 199 U/L (ref 40–150)
ALT SERPL W P-5'-P-CCNC: 36 U/L (ref 0–70)
APPEARANCE UR: ABNORMAL
AST SERPL W P-5'-P-CCNC: 28 U/L (ref 0–45)
BILIRUB DIRECT SERPL-MCNC: <0.1 MG/DL (ref 0–0.2)
BILIRUB SERPL-MCNC: 0.3 MG/DL (ref 0.2–1.3)
BILIRUB UR QL STRIP: NEGATIVE
COLOR UR AUTO: ABNORMAL
EXPTIME-PRE: 17.22 SEC
FEF2575-%PRED-PRE: 10 %
FEF2575-PRE: 0.43 L/SEC
FEF2575-PRED: 3.94 L/SEC
FEFMAX-%PRED-PRE: 67 %
FEFMAX-PRE: 6.58 L/SEC
FEFMAX-PRED: 9.81 L/SEC
FEV1-%PRED-PRE: 37 %
FEV1-PRE: 1.51 L
FEV1FEV6-PRE: 51 %
FEV1FEV6-PRED: 82 %
FEV1FVC-PRE: 43 %
FEV1FVC-PRED: 81 %
FIFMAX-PRE: 4.65 L/SEC
FVC-%PRED-PRE: 70 %
FVC-PRE: 3.48 L
FVC-PRED: 4.96 L
GLUCOSE UR STRIP-MCNC: NEGATIVE MG/DL
HGB UR QL STRIP: NEGATIVE
IRON SERPL-MCNC: 30 UG/DL (ref 35–180)
KETONES UR STRIP-MCNC: NEGATIVE MG/DL
LEUKOCYTE ESTERASE UR QL STRIP: NEGATIVE
MUCOUS THREADS #/AREA URNS LPF: PRESENT /LPF
NITRATE UR QL: NEGATIVE
PH UR STRIP: 6 PH (ref 5–7)
PROT SERPL-MCNC: 8 G/DL (ref 6.8–8.8)
RBC #/AREA URNS AUTO: 1 /HPF (ref 0–2)
SP GR UR STRIP: 1.02 (ref 1–1.03)
URN SPEC COLLECT METH UR: ABNORMAL
UROBILINOGEN UR STRIP-MCNC: 0 MG/DL (ref 0–2)
WBC #/AREA URNS AUTO: <1 /HPF (ref 0–2)

## 2017-03-14 PROCEDURE — 80076 HEPATIC FUNCTION PANEL: CPT | Performed by: PHYSICIAN ASSISTANT

## 2017-03-14 PROCEDURE — 87186 SC STD MICRODIL/AGAR DIL: CPT | Performed by: INTERNAL MEDICINE

## 2017-03-14 PROCEDURE — 94375 RESPIRATORY FLOW VOLUME LOOP: CPT | Mod: ZF | Performed by: INTERNAL MEDICINE

## 2017-03-14 PROCEDURE — 36415 COLL VENOUS BLD VENIPUNCTURE: CPT | Performed by: PHYSICIAN ASSISTANT

## 2017-03-14 PROCEDURE — 87070 CULTURE OTHR SPECIMN AEROBIC: CPT | Performed by: INTERNAL MEDICINE

## 2017-03-14 PROCEDURE — 87077 CULTURE AEROBIC IDENTIFY: CPT | Performed by: INTERNAL MEDICINE

## 2017-03-14 PROCEDURE — 99212 OFFICE O/P EST SF 10 MIN: CPT | Mod: ZF

## 2017-03-14 PROCEDURE — 83540 ASSAY OF IRON: CPT | Performed by: PHYSICIAN ASSISTANT

## 2017-03-14 PROCEDURE — 81001 URINALYSIS AUTO W/SCOPE: CPT | Performed by: PHYSICIAN ASSISTANT

## 2017-03-14 RX ORDER — DOXYCYCLINE 100 MG/1
100 CAPSULE ORAL 2 TIMES DAILY
Qty: 60 CAPSULE | Refills: 1 | Status: SHIPPED | OUTPATIENT
Start: 2017-03-14 | End: 2017-04-26

## 2017-03-14 RX ORDER — AMITRIPTYLINE HYDROCHLORIDE 10 MG/1
10 TABLET ORAL AT BEDTIME
Qty: 90 TABLET | Refills: 3 | Status: SHIPPED | OUTPATIENT
Start: 2017-03-14 | End: 2018-01-13

## 2017-03-14 ASSESSMENT — PAIN SCALES - GENERAL: PAINLEVEL: NO PAIN (0)

## 2017-03-14 NOTE — MR AVS SNAPSHOT
After Visit Summary   3/14/2017    Nico Morales    MRN: [id number]           Patient Information     Date Of Birth          1975        Visit Information        Provider Department      3/14/2017 11:20 AM Jaye Machado MD [facility]        Today's Diagnoses     Cystic fibrosis (H)          Care Instructions    Cystic Fibrosis Self-Care Plan       MRN: [id number]   Clinic Date: March 14, 2017   Patient: Nico Morales     Annual Studies:   IGG   Date Value Ref Range Status   12/15/2016 1770 (H) 695 - 1620 mg/dL Final     Insulin   Date Value Ref Range Status   07/26/2011 30 mU/L Final     Comment:     Reference Range:  0-20  +120     There are no preventive care reminders to display for this patient.      Pulmonary Function Tests  FEV1: amount of air you can blow out in 1 second  FVC: total amount of air you can take in and blow out    Your Goals:         PFT Latest Ref Rng & Units 3/14/2017   FVC L 3.48   FEV1 L 1.51   FVC% % 70   FEV1% % 37          Airway Clearance: The Most Important Way to Keep Your Lungs Healthy  Vest Settings:    Hill-Rom Frequencies: 8, 9, 10 Pressure 10 Then, Frequencies 18, 19, 20 Pressure 6      RespirTech: Quick Start with Pressure of     Do each frequency for 5 minutes; Deflate vest after each frequency & cough 3 times before beginning the next setting.    Vest and Neb Therapy should be done 2 times/day.    Good Nutrition Can Improve Lung Function and Overall Health     Take ALL of your vitamins with food     Take 1/2 of your enzymes before EVERY meal/snack and the other 1/2 mid-meal/snack    Wt Readings from Last 3 Encounters:   03/14/17 60.6 kg (133 lb 9.6 oz)   01/02/17 61.2 kg (134 lb 14.7 oz)   12/15/16 59.8 kg (131 lb 13.4 oz)       Body mass index is 20.31 kg/(m^2).         National CF Foundation Recommendations for BMI in CF Adults: Women: at least 22 Men: at least 23        Controlling Blood Sugars Helps  Prevent Lung Infections & Improves Nutrition  Test blood sugar:     In the morning before eating (goal is )     2 hours after a meal (goal is less than 150)     When pre-meal glucose is greater than 150 add correction     At bedtime (if less than 100 eat a snack with 15 grams of carbohydrates  Last A1C Results:   Hemoglobin A1C   Date Value Ref Range Status   12/15/2016 8.3 (H) 4.3 - 6.0 % Final         If diabetic, measure A1C every 6 months. Goal: Under 7%    Staying Healthy    Research:  If you are interested in learning about research opportunities or have questions, please contact Temi Dodge at [phone] or [email or web address].      CF Foundation:  Compass is a personalized resource service to help you with the insurance, financial, legal and other issues you are facing.  It's free, confidential and available to anyone with CF.  Ask your  for more information or contact [employer] directly at 800-[facility] (964-5374) or compass@cff.org, or learn more at cff.org/compass.          RECOMMENDATIONS: Doxycycline one tablet twice daily for one month.  Go ahead and schedule the stim test.  Will have Ivory talk to you about bone health and calcium.  Will discuss the use of azithromycin at next visit.    YOUR GOAL: Stay well.      CF Nurse Line:  Jose David Henderson: [phone]   Felicitas Banuelos, RT: [phone]     Zeina Pierre: [phone] or Ivory Bonilla, Dieticians: [phone]   Lou Lassiter, Diabetes Nurse: [phone]      Lety Golden: [phone] or Marisa Urbina [phone], Social Workers   [email or web address]                 Follow-ups after your visit        Follow-up notes from your care team     Return 4 to 6 weeks.      Your next 10 appointments already scheduled     Apr 10, 2017 11:15 AM CDT   Return Visit with Diana Lee MD, [facility] ENDO NURSE   [facility] ([facility])    [address] [address] [place] [phone]    [phone]            Apr 26, 2017 11:00 AM CDT   CF LOOP with UC PFL CF   [facility] Pulmonary Function Testing ([facility])    [address] [address] [phone]            Apr 26, 2017 11:10 AM CDT   (Arrive by 10:55 AM)   RETURN CYSTIC FIBROSIS VISIT with Regina Herman PA-C   [facility] ([facility])    [address] [address] [phone]              Future tests that were ordered for you today     Open Future Orders        Priority Expected Expires Ordered    Cystic Fibrosis Culture Aerob Bacterial Routine  3/14/2018 3/14/2017            Who to contact     If you have questions or need follow up information about today's clinic visit or your schedule please contact [facility] directly at [phone].  Normal or non-critical lab and imaging results will be communicated to you by [employer], letter or phone within 4 business days after the clinic has received the results. If you do not hear from us within 7 days, please contact the clinic through [employer] or phone. If you have a critical or abnormal lab result, we will notify you by phone as soon as possible.  Submit refill requests through [employer] or call your pharmacy and they will forward the refill request to us. Please allow 3 business days for your refill to be completed.          Additional Information About Your Visit        [employer] Information     [employer] gives you secure access to your electronic health record. If you see a primary care provider, you can also send messages to your care team and make appointments. If you have questions, please call your primary care clinic.  If you do not have a primary care provider, please call [phone] and they will assist you.        Care EveryWhere ID     This is your Care EveryWhere ID. This could be used by other  organizations to access your [facility] medical records  [id number]        Your Vitals Were     Pulse Respirations Pulse Oximetry BMI (Body Mass Index)          98 16 95% 20.31 kg/m2         Blood Pressure from Last 3 Encounters:   03/14/17 97/64   01/02/17 98/66   12/15/16 105/73    Weight from Last 3 Encounters:   03/14/17 60.6 kg (133 lb 9.6 oz)   01/02/17 61.2 kg (134 lb 14.7 oz)   12/15/16 59.8 kg (131 lb 13.4 oz)              We Performed the Following     RESPIRATORY FLOW VOLUME LOOP          Today's Medication Changes          These changes are accurate as of: 3/14/17  2:59 PM.  If you have any questions, ask your nurse or doctor.               Start taking these medicines.        Dose/Directions    doxycycline 100 MG capsule   Commonly known as:  VIBRAMYCIN   Used for:  Cystic fibrosis (H)   Started by:  Jaye Machado MD        Dose:  100 mg   Take 1 capsule (100 mg) by mouth 2 times daily   Quantity:  60 capsule   Refills:  1         These medicines have changed or have updated prescriptions.        Dose/Directions    * amitriptyline 10 MG tablet   Commonly known as:  ELAVIL   This may have changed:  You were already taking a medication with the same name, and this prescription was added. Make sure you understand how and when to take each.   Used for:  Cystic fibrosis (H)   Changed by:  Jaye Machado MD        Dose:  10 mg   Take 1 tablet (10 mg) by mouth At Bedtime Take one to three tablets at bedtime.   Quantity:  90 tablet   Refills:  3       * AMITRIPTYLINE HCL   This may have changed:  Another medication with the same name was added. Make sure you understand how and when to take each.   Used for:  Cystic fibrosis (H)   Changed by:  Jaye Machado MD        Dose:  30 mg   30 mg At Bedtime.   Refills:  0       insulin aspart 100 UNIT/ML injection   Commonly known as:  NovoLOG PENFILL   This may have changed:  additional instructions   Used for:  Diabetes mellitus  related to CF (cystic fibrosis) (H)        1 per 30 grams of carbohydrate at lunch and supper   Quantity:  15 mL   Refills:  12       * Notice:  This list has 2 medication(s) that are the same as other medications prescribed for you. Read the directions carefully, and ask your doctor or other care provider to review them with you.         Where to get your medicines      These medications were sent to [facility] [facility] - [place], [place] - [address]  [address], [place] [address]     Phone:  [phone]     amitriptyline 10 MG tablet    doxycycline 100 MG capsule                Primary Care Provider Office Phone # Fax #    Jaye Machado [phone]        PHYSICIANS [address] [facility] [address]  [facility] [address]        Thank you!     Thank you for choosing [facility]  for your care. Our goal is always to provide you with excellent care. Hearing back from our patients is one way we can continue to improve our services. Please take a few minutes to complete the written survey that you may receive in the mail after your visit with us. Thank you!             Your Updated Medication List - Protect others around you: Learn how to safely use, store and throw away your medicines at www.disposemymeds.org.          This list is accurate as of: 3/14/17  2:59 PM.  Always use your most recent med list.                   Brand Name Dispense Instructions for use    ACE NOT PRESCRIBED (INTENTIONAL)     0 each    1 each continuous prn. ACE Inhibitor not prescribed due to Risk for drug interaction       acetylcysteine 10 % injection    MUCOMYST    480 mL    Nebulize 4ml qid       * amitriptyline 10 MG tablet    ELAVIL    90 tablet    Take 1 tablet (10 mg) by mouth At Bedtime Take one to three tablets at bedtime.       * AMITRIPTYLINE HCL      30 mg At Bedtime.       aztreonam lysine 75 MG Solr    CAYSTON    84 mL    Take 1 mL (75 mg) by nebulization  "3 times daily Alternating every other month with BETHANY.       blood glucose monitoring lancets     2 Box    Use to test blood sugar 6 times daily or as directed.       blood glucose monitoring meter device kit     1 kit    Use to test blood sugar 6 times daily or as directed.       blood glucose monitoring test strip    MARTINA CONTOUR NEXT    200 each    Use to test blood sugar 6 times daily or as directed.       budesonide-formoterol 160-4.5 MCG/ACT Inhaler    SYMBICORT    1 Inhaler    Inhale 2 puffs into the lungs 2 times daily       CALCIUM CITRATE + D PO      Take 1 tablet by mouth 2 times daily.       CREON 66475 UNITS Cpep per EC capsule   Generic drug:  amylase-lipase-protease     2700 capsule    Take 7-8 caps with meals (3 meals/day) and 2 caps with snacks (3 snacks/day)       doxycycline 100 MG capsule    VIBRAMYCIN    60 capsule    Take 1 capsule (100 mg) by mouth 2 times daily       EFLOW SCF NEBULIZER HANDSET Misc     1 each    1 each 3 times daily.       [facility] HOME INFUSION MANAGED PATIENT      Contact [facility] for patient specific medication information at [phone] on admission and discharge from the hospital.  Phones are answered 24 hours a day 7 days a week 365 days a year.  Providers - Choose \"CONTINUE HOME MED (no script)\" at discharge if patient treatment with home infusion will continue.       insulin aspart 100 UNIT/ML injection    NovoLOG PENFILL    15 mL    1 per 30 grams of carbohydrate at lunch and supper       insulin glargine 100 UNIT/ML injection    LANTUS SOLOSTAR    15 mL    5 units daily       insulin pen needle 32G X 4 MM    BD OMI U/F    200 each    Use 6 daily or as directed.       levalbuterol 1.25 MG/3ML neb solution    XOPENEX    360 mL    Take 3 mLs (1.25 mg) by nebulization 4 times daily       levalbuterol 45 MCG/ACT Inhaler    XOPENEX HFA    3 Inhaler    Inhale 2 puffs into the lungs every 6 hours as needed for shortness of breath / dyspnea       MEPHYTON " 5 MG tablet   Generic drug:  phytonadione      1 TABLET DAILY       MULTIVITAMIN PO      1 tablet daily       oseltamivir 75 MG capsule    TAMIFLU    10 capsule    Take 1 capsule (75 mg) by mouth 2 times daily       polyethylene glycol powder    MIRALAX    442 g    Use 17 gms po tid x 3 days then do it daily x 1 month       rimantadine 100 MG tablet    FLUMADINE    60 tablet    Take 1 tablet (100 mg) by mouth 2 times daily Take from Oct. 1 thru March 31.       * sodium chloride (Inhalant) 0.9 % Aers     270 mL    Inhale 3 mLs into the lungs 3 times daily       * sodium chloride inhalant 7 % Nebu neb solution     240 mL    Nebulize 4mL twice daily       tobramycin (PF) 300 MG/5ML neb solution    BETHANY    280 mL    Take 5 mLs (300 mg) by nebulization 2 times daily 28 days on, 28 days off.       vitamin D 1000 UNITS capsule      1 CAPSULE DAILY       vitamin E 400 UNIT capsule      1 CAPSULE DAILY       VITRON-C 200-125 MG Tabs   Generic drug:  ferrous fumarate 65 mg ([place]. FE)-Vitamin C 125 mg      Take 1 tablet by mouth 2 times daily.       * Notice:  This list has 4 medication(s) that are the same as other medications prescribed for you. Read the directions carefully, and ask your doctor or other care provider to review them with you.

## 2017-03-14 NOTE — PROGRESS NOTES
[facility]  March 14, 2017         Assessment and Plan:   Nico Morales is a 41 year old male with cystic fibrosis.    1. CF lung disease with severe obstruction:Kilo has had a decline in his pulmonary function but symptomatically does feel quite good.  He was disappointed by these numbers.  It certainly could be secondary to him having weaned his prednisone down to a much lower dose.  He is concerned with these numbers and would like to try and improve them.  He felt that he did very well on doxycycline and that it caused an even better improvement in the way he felt.  I have recommended that he reinitiate doxycycline 100 mg p.o. b.i.d. for 30 days.  He does continue to show evidence of Pseudomonas in his sputum.  He should continue to be aggressive with his bronchial drainage and nebulized therapies.   2.  Decreased bone mineral density.  Kilo was seen by Dr. Fulton in Endocrinology.  The plan is to initiate pamidronate.  We will rediscuss this at his next visit in 3 months.  For now, Kilo would like to optimize his vitamin D and calcium supplementation.    3.  Adrenal insufficiency.  Kilo had adrenal insufficiency as a complication of itraconazole and Symbicort use.  It is recommended that he repeat a cosyntropin stim test which will need to be arranged sometime in the next month.  He remains on prednisone 2.5 mg b.i.d. for now.   4.  Cystic fibrosis-related diabetes.  Kilo is currently again being followed by Endocrinology regarding this.    5.  Psychosocial.  Kilo is now  from his wife.  He reports that things between them are still difficult.  He reports, though, that overall his mood is improved and work is going well.  He continues to work as a .    6.  Headaches.  Kilo has a history of headaches with visual disturbance.  He remains on amitriptyline each night.  I have renewed this for him today and given that it  has been a chronic stable dose, I feel comfortable doing this.   7. Pancreatic Insufficiency:  The patient has no new symptoms consistent with worsening malabsorption.  The plan is to continue the present dose of pancreatic enzymes and vitamin supplementation.    Jaye Machado MD MPH   of Medicine  Pulmonary, Allergy, Critical Care and Sleep Medicine      Interval History:     Kilo is disappointed in her PFTs.  He has had little change in cough frequency and sputum volume.  He performs 2 to 3 vest therapies per day.       Review of Systems:     CONSTITUTIONAL: no fever, no chills, no change in weight, no change in energy, improvement in appetite    INTEGUMENTARY/SKIN: no rash, no obvious new lesions    ENT/MOUTH: no sore throat, no new sinus pain, no new nasal drainage, no hearing loss, no ear ringing     RESPIRATORY: see interval history    CV: no chest pain, no palpitations, no peripheral edema, no orthopnea, no PND    GI: no nausea, no vomiting, no change in stools, no fatty stools, no GERD, no abdominal pain    : no dysuria, no urinary frequency, no incontinence    MUSCULOSKELETAL: no myalgias, no arthralgias    ENDOCRINE: no excessive thirst, blood sugars with adequate control    NEURO:  No headache, no numbness, no tingling    SLEEP: no issues    PSYCHIATRIC: mood stable          Past Medical and Surgical History:     Past Medical History   Diagnosis Date     CF (cystic fibrosis) (H)      Exocrine pancreatic insufficiency      Nocardia infection      Pseudomonas infection      S/P gastrostomy (H) 2002     Past Surgical History   Procedure Laterality Date     Gastrostomy tube  2002           Family History:     Family History   Problem Relation Age of Onset     HEART DISEASE Maternal Grandmother      HEART DISEASE Maternal Grandfather      HEART DISEASE Paternal Grandmother      DIABETES No family hx of             Social History:     Social History     Social History     Marital  status:      Spouse name: N/A     Number of children: 0     Years of education: N/A     Occupational History     financial [employer]     Social History Main Topics     Smoking status: Never Smoker     Smokeless tobacco: Never Used     Alcohol use No     Drug use: No     Sexual activity: Yes     Partners: Female     Birth control/ protection: Pill     Other Topics Concern     Blood Transfusions No     Exercise No     Social History Narrative    Kilo lives with wife in a house in [place], [place].  He works as a .        March 2016. He works independently as a . Works out 5x/week and walks the dog daily.        June 2016. No changes. Describes himself as a creature of habit.            Medications:     Current Outpatient Prescriptions   Medication     doxycycline (VIBRAMYCIN) 100 MG capsule     amitriptyline (ELAVIL) 10 MG tablet     levalbuterol (XOPENEX HFA) 45 MCG/ACT Inhaler     tobramycin, PF, (BETHANY) 300 MG/5ML neb solution     levalbuterol (XOPENEX) 1.25 MG/3ML neb solution     CREON 87189 UNITS CPEP per EC capsule     blood glucose monitoring (MARTINA CONTOUR NEXT MONITOR) meter device kit     blood glucose monitoring (MARTINA CONTOUR NEXT) test strip     blood glucose monitoring (MARTINA MICROLET) lancets     aztreonam lysine (CAYSTON) 75 MG SOLR     oseltamivir (TAMIFLU) 75 MG capsule     rimantadine (FLUMADINE) 100 MG tablet     sodium chloride inhalant 7 % NEBU     insulin pen needle (BD OMI U/F) 32G X 4 MM     insulin glargine (LANTUS SOLOSTAR) 100 UNIT/ML PEN     acetylcysteine (MUCOMYST) 10 % nebulizer solution     sodium chloride, Inhalant, 0.9 % AERS     insulin aspart (NOVOLOG PENFILL) 100 UNIT/ML Cartridge     budesonide-formoterol (SYMBICORT) 160-4.5 MCG/ACT inhaler     polyethylene glycol (MIRALAX) powder     [facility] INFUSION MANAGED PATIENT     ACE NOT PRESCRIBED, INTENTIONAL,     Nebulizers ([facility] NEBULIZER HANDSET) MISC     AMITRIPTYLINE  HCL     Calcium Citrate-Vitamin D (CALCIUM CITRATE + D PO)     Ferrous Fumarate-Vitamin C (VITRON-C) 200-125 MG TABS     MEPHYTON 5 MG PO TABS     MULTIVITAMIN OR     VITAMIN D 1000 UNIT PO CAPS     VITAMIN E 400 UNIT PO CAPS     No current facility-administered medications for this visit.             Physical Exam:   BP 97/64 (BP Location: Right arm, Patient Position: Chair, Cuff Size: Adult Regular)  Pulse 98  Resp 16  Wt 60.6 kg (133 lb 9.6 oz)  SpO2 95%  BMI 20.31 kg/m2    Constitutional:   Awake, alert and in no apparent distress     Eyes:   nonicteric     ENT:   oral mucosa moist without lesions, normal tm bilaterally, bilateral mucosal edema and erythema     Neck:   Supple without supraclavicular or cervical lymphadenopathy     Lungs:   Good air flow.  No crackles. No rhonchi.  No wheezes.     Cardiovascular:   Normal S1 and S2.  RRR.  No murmur, gallop or rub.     Abdomen:   NABS, soft, nontender, nondistended.  g tube     Musculoskeletal:   No edema, digital clubbing present     Neurologic:   Alert and conversant.     Skin:   Warm, dry.  No rash on limited exam.             Data:   All laboratory and imaging data reviewed.    Cystic Fibrosis Culture  Specimen Description   Date Value Ref Range Status   03/14/2017 Sputum  Final   12/15/2016 Sputum  Final   12/01/2016 Sputum  Final    Culture Micro   Date Value Ref Range Status   03/14/2017 (A)  Final    Heavy growth Normal santi  Moderate growth Mucoid strain Non lactose fermenting gram negative rods  Culture in progress     12/15/2016 (A)  Final    Moderate growth Normal santi  Moderate growth Mucoid strain Pseudomonas aeruginosa     12/01/2016 (A)  Final    Moderate growth Normal santi  Moderate growth Pseudomonas aeruginosa, mucoid strain          Recent Results (from the past 168 hour(s))   Hepatic panel    Collection Time: 03/14/17 11:04 AM   Result Value Ref Range    Bilirubin Direct <0.1 0.0 - 0.2 mg/dL    Bilirubin Total 0.3 0.2 - 1.3 mg/dL     Albumin 3.0 (L) 3.4 - 5.0 g/dL    Protein Total 8.0 6.8 - 8.8 g/dL    Alkaline Phosphatase 199 (H) 40 - 150 U/L    ALT 36 0 - 70 U/L    AST 28 0 - 45 U/L   Iron    Collection Time: 03/14/17 11:04 AM   Result Value Ref Range    Iron 30 (L) 35 - 180 ug/dL   Routine UA with micro reflex to culture    Collection Time: 03/14/17 11:10 AM   Result Value Ref Range    Color Urine Shanika     Appearance Urine Slightly Cloudy     Glucose Urine Negative NEG mg/dL    Bilirubin Urine Negative NEG    Ketones Urine Negative NEG mg/dL    Specific Gravity Urine 1.024 1.003 - 1.035    Blood Urine Negative NEG    pH Urine 6.0 5.0 - 7.0 pH    Protein Albumin Urine Negative NEG mg/dL    Urobilinogen mg/dL 0.0 0.0 - 2.0 mg/dL    Nitrite Urine Negative NEG    Leukocyte Esterase Urine Negative NEG    Source Midstream Urine     WBC Urine <1 0 - 2 /HPF    RBC Urine 1 0 - 2 /HPF    Mucous Urine Present (A) NEG /LPF   General PFT Lab (Please always keep checked)    Collection Time: 03/14/17 11:18 AM   Result Value Ref Range    FVC-Pred 4.96 L    FVC-Pre 3.48 L    FVC-%Pred-Pre 70 %    FEV1-Pre 1.51 L    FEV1-%Pred-Pre 37 %    FEV1FVC-Pred 81 %    FEV1FVC-Pre 43 %    FEFMax-Pred 9.81 L/sec    FEFMax-Pre 6.58 L/sec    FEFMax-%Pred-Pre 67 %    FEF2575-Pred 3.94 L/sec    FEF2575-Pre 0.43 L/sec    ZIT4705-%Pred-Pre 10 %    ExpTime-Pre 17.22 sec    FIFMax-Pre 4.65 L/sec    FEV1FEV6-Pred 82 %    FEV1FEV6-Pre 51 %   Cystic Fibrosis Culture Aerob Bacterial    Collection Time: 03/14/17 12:00 PM   Result Value Ref Range    Specimen Description Sputum     Culture Micro (A)      Heavy growth Normal santi  Moderate growth Mucoid strain Non lactose fermenting gram negative rods  Culture in progress      Micro Report Status Pending        PFT: Severe obstructive lung disease.  When compared to 12/15/16, the FEV1 and FVC have decreased.  The decrease in FVC may represent air trapping v. restrictive physiology.  Lung volumes would be necessary to determine.

## 2017-03-14 NOTE — PATIENT INSTRUCTIONS
Cystic Fibrosis Self-Care Plan       MRN: [id number]   Clinic Date: March 14, 2017   Patient: Nico Morales     Annual Studies:   IGG   Date Value Ref Range Status   12/15/2016 1770 (H) 695 - 1620 mg/dL Final     Insulin   Date Value Ref Range Status   07/26/2011 30 mU/L Final     Comment:     Reference Range:  0-20  +120     There are no preventive care reminders to display for this patient.      Pulmonary Function Tests  FEV1: amount of air you can blow out in 1 second  FVC: total amount of air you can take in and blow out    Your Goals:         PFT Latest Ref Rng & Units 3/14/2017   FVC L 3.48   FEV1 L 1.51   FVC% % 70   FEV1% % 37          Airway Clearance: The Most Important Way to Keep Your Lungs Healthy  Vest Settings:    Hill-Rom Frequencies: 8, 9, 10 Pressure 10 Then, Frequencies 18, 19, 20 Pressure 6      RespirTech: Quick Start with Pressure of     Do each frequency for 5 minutes; Deflate vest after each frequency & cough 3 times before beginning the next setting.    Vest and Neb Therapy should be done 2 times/day.    Good Nutrition Can Improve Lung Function and Overall Health     Take ALL of your vitamins with food     Take 1/2 of your enzymes before EVERY meal/snack and the other 1/2 mid-meal/snack    Wt Readings from Last 3 Encounters:   03/14/17 60.6 kg (133 lb 9.6 oz)   01/02/17 61.2 kg (134 lb 14.7 oz)   12/15/16 59.8 kg (131 lb 13.4 oz)       Body mass index is 20.31 kg/(m^2).         National CF Foundation Recommendations for BMI in CF Adults: Women: at least 22 Men: at least 23        Controlling Blood Sugars Helps Prevent Lung Infections & Improves Nutrition  Test blood sugar:     In the morning before eating (goal is )     2 hours after a meal (goal is less than 150)     When pre-meal glucose is greater than 150 add correction     At bedtime (if less than 100 eat a snack with 15 grams of carbohydrates  Last A1C Results:   Hemoglobin A1C   Date Value Ref Range Status   12/15/2016  8.3 (H) 4.3 - 6.0 % Final         If diabetic, measure A1C every 6 months. Goal: Under 7%    Staying Healthy    Research:  If you are interested in learning about research opportunities or have questions, please contact Temi Dodge at [phone] or [email or web address].      CF Foundation:  Compass is a personalized resource service to help you with the insurance, financial, legal and other issues you are facing.  It's free, confidential and available to anyone with CF.  Ask your  for more information or contact Compass directly at 386-COMPASS ([phone]) or compass@cff.org, or learn more at cff.org/compass.          RECOMMENDATIONS: Doxycycline one tablet twice daily for one month.  Go ahead and schedule the stim test.  Will have Ivory talk to you about bone health and calcium.  Will discuss the use of azithromycin at next visit.    YOUR GOAL: Stay well.      CF Nurse Line:  Jose David Henderson: [phone]   Felicitas Banuelos, RT: [phone]     Zeina Pierre: [phone] or Ivory Bonilla, Dieticians: [phone]   Lou Lassiter, Diabetes Nurse: [phone]      Lety Golden: [phone] or Marisa Urbina [phone], Social Workers   [email or web address]

## 2017-03-14 NOTE — LETTER
3/14/2017       RE: Nico Morales  [address] [address]  [place] [address]     Dear Colleague,    Thank you for referring your patient, Nico Morales, to the [facility] at [facility]. Please see a copy of my visit note below.    Nutrition Note    Reason for Visit:  Calcium and vitamin D intake/status     Pt now under care of endocrinology for his suboptimal bone density (Dr. Lee), would like to discuss how he can also help his bone density from a nutrition perspective.  Currently he is taking Calcium Citrate 1 tablet BID and Vitamin D 1000 units.  His last vitamin D level was 33 on 12/15/16.  He is also on daily vitamin K replacement with 5 mg Mephyton.     Interventions/Recommendations:  1) Encouraged pt to continue with calcium supplementation in addition to good oral/enteral calcium intake.  Pt receives 500 mg of calcium from his daily TF (2 cans TwoCal HN), 300 mg from 1 carton Ensure Plus per day plus additional intake from 2-3 16-oz glasses of whole milk (1000 mg calcium or more).    2) As vitamin D level is low/normal we will increase his daily supplementation to 2000 units (by mouth).     Will follow.       Ivory Bonilla MS, RD, LD (pager [phone])  Cystic Fibrosis/Lung Transplant Dietitian      -Available Tues-Fri 8 AM-4:30 PM. On Mondays please contact pager [phone] (Zeina Pierre RD) and on weekends/holidays contact coverage dietitian at pager [phone] (inpatient use only).       [facility]  March 14, 2017         Assessment and Plan:   Nico Morales is a 41 year old male with cystic fibrosis.    1. CF lung disease with severe obstruction:Kilo has had a decline in his pulmonary function but symptomatically does feel quite good.  He was disappointed by these numbers.  It certainly could be secondary to him having weaned his prednisone down to a much  lower dose.  He is concerned with these numbers and would like to try and improve them.  He felt that he did very well on doxycycline and that it caused an even better improvement in the way he felt.  I have recommended that he reinitiate doxycycline 100 mg p.o. b.i.d. for 30 days.  He does continue to show evidence of Pseudomonas in his sputum.  He should continue to be aggressive with his bronchial drainage and nebulized therapies.   2.  Decreased bone mineral density.  Kilo was seen by Dr. Fulton in Endocrinology.  The plan is to initiate pamidronate.  We will rediscuss this at his next visit in 3 months.  For now, Kilo would like to optimize his vitamin D and calcium supplementation.    3.  Adrenal insufficiency.  Kilo had adrenal insufficiency as a complication of itraconazole and Symbicort use.  It is recommended that he repeat a cosyntropin stim test which will need to be arranged sometime in the next month.  He remains on prednisone 2.5 mg b.i.d. for now.   4.  Cystic fibrosis-related diabetes.  Kilo is currently again being followed by Endocrinology regarding this.    5.  Psychosocial.  Kilo is now  from his wife.  He reports that things between them are still difficult.  He reports, though, that overall his mood is improved and work is going well.  He continues to work as a .    6.  Headaches.  Kilo has a history of headaches with visual disturbance.  He remains on amitriptyline each night.  I have renewed this for him today and given that it has been a chronic stable dose, I feel comfortable doing this.   7. Pancreatic Insufficiency:  The patient has no new symptoms consistent with worsening malabsorption.  The plan is to continue the present dose of pancreatic enzymes and vitamin supplementation.    Jaye Machado MD MPH   of Medicine  Pulmonary, Allergy, Critical Care and Sleep Medicine      Interval History:     Kilo is disappointed in her PFTs.   He has had little change in cough frequency and sputum volume.  He performs 2 to 3 vest therapies per day.       Review of Systems:     CONSTITUTIONAL: no fever, no chills, no change in weight, no change in energy, improvement in appetite    INTEGUMENTARY/SKIN: no rash, no obvious new lesions    ENT/MOUTH: no sore throat, no new sinus pain, no new nasal drainage, no hearing loss, no ear ringing     RESPIRATORY: see interval history    CV: no chest pain, no palpitations, no peripheral edema, no orthopnea, no PND    GI: no nausea, no vomiting, no change in stools, no fatty stools, no GERD, no abdominal pain    : no dysuria, no urinary frequency, no incontinence    MUSCULOSKELETAL: no myalgias, no arthralgias    ENDOCRINE: no excessive thirst, blood sugars with adequate control    NEURO:  No headache, no numbness, no tingling    SLEEP: no issues    PSYCHIATRIC: mood stable          Past Medical and Surgical History:     Past Medical History   Diagnosis Date     CF (cystic fibrosis) (H)      Exocrine pancreatic insufficiency      Nocardia infection      Pseudomonas infection      S/P gastrostomy (H) 2002     Past Surgical History   Procedure Laterality Date     Gastrostomy tube  2002           Family History:     Family History   Problem Relation Age of Onset     HEART DISEASE Maternal Grandmother      HEART DISEASE Maternal Grandfather      HEART DISEASE Paternal Grandmother      DIABETES No family hx of             Social History:     Social History     Social History     Marital status:      Spouse name: N/A     Number of children: 0     Years of education: N/A     Occupational History     financial [employer]     Social History Main Topics     Smoking status: Never Smoker     Smokeless tobacco: Never Used     Alcohol use No     Drug use: No     Sexual activity: Yes     Partners: Female     Birth control/ protection: Pill     Other Topics Concern     Blood Transfusions No     Exercise No      Social History Narrative    Kilo lives with wife in a house in [place], [place].  He works as a .        March 2016. He works independently as a . Works out 5x/week and walks the dog daily.        June 2016. No changes. Describes himself as a creature of habit.            Medications:     Current Outpatient Prescriptions   Medication     doxycycline (VIBRAMYCIN) 100 MG capsule     amitriptyline (ELAVIL) 10 MG tablet     levalbuterol (XOPENEX HFA) 45 MCG/ACT Inhaler     tobramycin, PF, (BETHANY) 300 MG/5ML neb solution     levalbuterol (XOPENEX) 1.25 MG/3ML neb solution     CREON 11643 UNITS CPEP per EC capsule     blood glucose monitoring ([employer] CONTOUR NEXT MONITOR) meter device kit     blood glucose monitoring ([employer] CONTOUR NEXT) test strip     blood glucose monitoring (MARTINA MICROLET) lancets     aztreonam lysine (CAYSTON) 75 MG SOLR     oseltamivir (TAMIFLU) 75 MG capsule     rimantadine (FLUMADINE) 100 MG tablet     sodium chloride inhalant 7 % NEBU     insulin pen needle (BD OMI U/F) 32G X 4 MM     insulin glargine (LANTUS SOLOSTAR) 100 UNIT/ML PEN     acetylcysteine (MUCOMYST) 10 % nebulizer solution     sodium chloride, Inhalant, 0.9 % AERS     insulin aspart (NOVOLOG PENFILL) 100 UNIT/ML Cartridge     budesonide-formoterol (SYMBICORT) 160-4.5 MCG/ACT inhaler     polyethylene glycol (MIRALAX) powder     [facility]NAGED PATIENT     ACE NOT PRESCRIBED, INTENTIONAL,     Nebulizers ([employer] SCF NEBULIZER HANDSET) MISC     AMITRIPTYLINE HCL     Calcium Citrate-Vitamin D (CALCIUM CITRATE + D PO)     Ferrous Fumarate-Vitamin C (VITRON-C) 200-125 MG TABS     MEPHYTON 5 MG PO TABS     MULTIVITAMIN OR     VITAMIN D 1000 UNIT PO CAPS     VITAMIN E 400 UNIT PO CAPS     No current facility-administered medications for this visit.             Physical Exam:   BP 97/64 (BP Location: Right arm, Patient Position: Chair, Cuff Size: Adult Regular)  Pulse 98  Resp 16  Wt 60.6 kg (133  lb 9.6 oz)  SpO2 95%  BMI 20.31 kg/m2    Constitutional:   Awake, alert and in no apparent distress     Eyes:   nonicteric     ENT:   oral mucosa moist without lesions, normal tm bilaterally, bilateral mucosal edema and erythema     Neck:   Supple without supraclavicular or cervical lymphadenopathy     Lungs:   Good air flow.  No crackles. No rhonchi.  No wheezes.     Cardiovascular:   Normal S1 and S2.  RRR.  No murmur, gallop or rub.     Abdomen:   NABS, soft, nontender, nondistended.  g tube     Musculoskeletal:   No edema, digital clubbing present     Neurologic:   Alert and conversant.     Skin:   Warm, dry.  No rash on limited exam.             Data:   All laboratory and imaging data reviewed.    Cystic Fibrosis Culture  Specimen Description   Date Value Ref Range Status   03/14/2017 Sputum  Final   12/15/2016 Sputum  Final   12/01/2016 Sputum  Final    Culture Micro   Date Value Ref Range Status   03/14/2017 (A)  Final    Heavy growth Normal santi  Moderate growth Mucoid strain Non lactose fermenting gram negative rods  Culture in progress     12/15/2016 (A)  Final    Moderate growth Normal santi  Moderate growth Mucoid strain Pseudomonas aeruginosa     12/01/2016 (A)  Final    Moderate growth Normal santi  Moderate growth Pseudomonas aeruginosa, mucoid strain          Recent Results (from the past 168 hour(s))   Hepatic panel    Collection Time: 03/14/17 11:04 AM   Result Value Ref Range    Bilirubin Direct <0.1 0.0 - 0.2 mg/dL    Bilirubin Total 0.3 0.2 - 1.3 mg/dL    Albumin 3.0 (L) 3.4 - 5.0 g/dL    Protein Total 8.0 6.8 - 8.8 g/dL    Alkaline Phosphatase 199 (H) 40 - 150 U/L    ALT 36 0 - 70 U/L    AST 28 0 - 45 U/L   Iron    Collection Time: 03/14/17 11:04 AM   Result Value Ref Range    Iron 30 (L) 35 - 180 ug/dL   Routine UA with micro reflex to culture    Collection Time: 03/14/17 11:10 AM   Result Value Ref Range    Color Urine Shanika     Appearance Urine Slightly Cloudy     Glucose Urine Negative NEG  mg/dL    Bilirubin Urine Negative NEG    Ketones Urine Negative NEG mg/dL    Specific Gravity Urine 1.024 1.003 - 1.035    Blood Urine Negative NEG    pH Urine 6.0 5.0 - 7.0 pH    Protein Albumin Urine Negative NEG mg/dL    Urobilinogen mg/dL 0.0 0.0 - 2.0 mg/dL    Nitrite Urine Negative NEG    Leukocyte Esterase Urine Negative NEG    Source Midstream Urine     WBC Urine <1 0 - 2 /HPF    RBC Urine 1 0 - 2 /HPF    Mucous Urine Present (A) NEG /LPF   General PFT Lab (Please always keep checked)    Collection Time: 03/14/17 11:18 AM   Result Value Ref Range    FVC-Pred 4.96 L    FVC-Pre 3.48 L    FVC-%Pred-Pre 70 %    FEV1-Pre 1.51 L    FEV1-%Pred-Pre 37 %    FEV1FVC-Pred 81 %    FEV1FVC-Pre 43 %    FEFMax-Pred 9.81 L/sec    FEFMax-Pre 6.58 L/sec    FEFMax-%Pred-Pre 67 %    FEF2575-Pred 3.94 L/sec    FEF2575-Pre 0.43 L/sec    OTR5411-%Pred-Pre 10 %    ExpTime-Pre 17.22 sec    FIFMax-Pre 4.65 L/sec    FEV1FEV6-Pred 82 %    FEV1FEV6-Pre 51 %   Cystic Fibrosis Culture Aerob Bacterial    Collection Time: 03/14/17 12:00 PM   Result Value Ref Range    Specimen Description Sputum     Culture Micro (A)      Heavy growth Normal santi  Moderate growth Mucoid strain Non lactose fermenting gram negative rods  Culture in progress      Micro Report Status Pending        PFT: Severe obstructive lung disease.  When compared to 12/15/16, the FEV1 and FVC have decreased.  The decrease in FVC may represent air trapping v. restrictive physiology.  Lung volumes would be necessary to determine.      Again, thank you for allowing me to participate in the care of your patient.      Sincerely,    Jaye Machado MD

## 2017-03-14 NOTE — NURSING NOTE
Chief Complaint   Patient presents with     Cystic Fibrosis     Patient is being seen for CF follow up      Iris Wade CMA at 11:48 AM on 3/14/2017

## 2017-03-16 NOTE — PROGRESS NOTES
Nutrition Note    Reason for Visit:  Calcium and vitamin D intake/status     Pt now under care of endocrinology for his suboptimal bone density (Dr. Lee), would like to discuss how he can also help his bone density from a nutrition perspective.  Currently he is taking Calcium Citrate 1 tablet BID and Vitamin D 1000 units.  His last vitamin D level was 33 on 12/15/16.  He is also on daily vitamin K replacement with 5 mg Mephyton.     Interventions/Recommendations:  1) Encouraged pt to continue with calcium supplementation in addition to good oral/enteral calcium intake.  Pt receives 500 mg of calcium from his daily TF (2 cans TwoCal HN), 300 mg from 1 carton Ensure Plus per day plus additional intake from 2-3 16-oz glasses of whole milk (1000 mg calcium or more).    2) As vitamin D level is low/normal we will increase his daily supplementation to 2000 units (by mouth).     Will follow.       Ivory Bonilla MS, RD, LD (pager [phone])  Cystic Fibrosis/Lung Transplant Dietitian      -Available Tues-Fri 8 AM-4:30 PM. On Mondays please contact pager [phone] (Zeina Pierre RD) and on weekends/holidays contact coverage dietitian at pager [phone] (inpatient use only).

## 2017-03-16 NOTE — NURSING NOTE
Respiratory Therapist Note:    Vest    Brand: Hill-Rom - Model 105   Settings: Hill Rom: Frequencies 8, 9, 10 at pressure 10 then frequencies 18, 19, 20 at pressure 6.   Cough Pause: Yes. Frequency:  Duration:    Vest Garment Size: Adult Small   Last Fitting Date: 2017   Frequency of therapy: 14-18 times per week   Concerns: none    Alternative Airway Clearance:     Nebulized Medications   Bronchodilators: Xopenex   Mucolytic: 7% Hypertonic Saline   Antibiotics: BETHANY and [place]   Additional Inhaled Medications: 0.9% Normal Saline    Review Cleaning: Yes. Microwave    Education and Transition Information   Correct order of inhaled medications: Yes   Mechanism of Action of inhaled medications: Yes   Frequency of inhaled medications: Yes   Dosage of inhaled medications: Yes   Other:     Home Care   Nebulizer Compressor    Year Purchased: 2016   Home Care Company:     [facility], Phone: [phone], Fax: [phone]    Oxygen:     [facility]   Site:    Date Completed:     Other Comments:     Goals   Next Visit: keep up the good work   Next Year: as above

## 2017-03-21 LAB
BACTERIA SPEC CULT: ABNORMAL
MICRO REPORT STATUS: ABNORMAL
MICROORGANISM SPEC CULT: ABNORMAL
SPECIMEN SOURCE: ABNORMAL

## 2017-04-01 ENCOUNTER — APPOINTMENT (OUTPATIENT)
Dept: LAB | Facility: CLINIC | Age: 42
End: 2017-04-01
Attending: INTERNAL MEDICINE
Payer: COMMERCIAL

## 2017-04-10 ENCOUNTER — OFFICE VISIT (OUTPATIENT)
Dept: ENDOCRINOLOGY | Facility: CLINIC | Age: 42
End: 2017-04-10
Payer: COMMERCIAL

## 2017-04-10 ENCOUNTER — TELEPHONE (OUTPATIENT)
Dept: ENDOCRINOLOGY | Facility: CLINIC | Age: 42
End: 2017-04-10

## 2017-04-10 VITALS
HEART RATE: 97 BPM | BODY MASS INDEX: 20.13 KG/M2 | DIASTOLIC BLOOD PRESSURE: 68 MMHG | WEIGHT: 132.8 LBS | HEIGHT: 68 IN | SYSTOLIC BLOOD PRESSURE: 101 MMHG

## 2017-04-10 DIAGNOSIS — E27.40 ADRENAL INSUFFICIENCY (H): ICD-10-CM

## 2017-04-10 DIAGNOSIS — E84.9 DIABETES MELLITUS DUE TO CYSTIC FIBROSIS (H): Primary | ICD-10-CM

## 2017-04-10 DIAGNOSIS — E08.9 DIABETES MELLITUS DUE TO CYSTIC FIBROSIS (H): Primary | ICD-10-CM

## 2017-04-10 LAB — HBA1C MFR BLD: 7 % (ref 0–5.7)

## 2017-04-10 PROCEDURE — 36415 COLL VENOUS BLD VENIPUNCTURE: CPT | Performed by: INTERNAL MEDICINE

## 2017-04-10 PROCEDURE — 99213 OFFICE O/P EST LOW 20 MIN: CPT | Performed by: INTERNAL MEDICINE

## 2017-04-10 PROCEDURE — 83036 HEMOGLOBIN GLYCOSYLATED A1C: CPT | Performed by: INTERNAL MEDICINE

## 2017-04-10 RX ORDER — COSYNTROPIN 0.25 MG/ML
1 INJECTION, POWDER, FOR SOLUTION INTRAMUSCULAR; INTRAVENOUS ONCE
Status: CANCELLED | OUTPATIENT
Start: 2017-04-17 | End: 2017-04-17

## 2017-04-10 RX ORDER — PREDNISONE 5 MG/1
2.5 TABLET ORAL 2 TIMES DAILY
Qty: 30 TABLET | Refills: 3 | Status: SHIPPED | OUTPATIENT
Start: 2017-04-10 | End: 2017-07-05

## 2017-04-10 NOTE — LETTER
4/10/2017       RE: Nico Morales  [address] [address]  [facility] [address]     Dear Colleague,    Thank you for referring your patient, Nico Morales, to the [facility]. Please see a copy of my visit note below.    CF Endocrinology Return Consultation:  Diabetes  :   Patient: Nico Morales MRN# [id number]   YOB: 1975 Age: 41 year old   Date of Visit: 4/10/2017  Dear Dr. Jaye Machado:    I had the pleasure of seeing your patient, Nico Morales in the [facility], [facility] , on 1/2/2017 for consultation regarding CFRD, adrenal insufficiency and low bone density.           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  Verbal overview and written information provided.          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     Priority: Medium     Encounter for long-term (current) use of antibiotics 05/20/2013     Priority: Medium     Prostatitis 05/20/2013     Priority: Medium     updating diagnosis code for icd10 cutover       Pseudomonas infection      Priority: Medium     Cystic fibrosis (H) 11/24/2010     Priority: Medium     Sweat Test   Date: 4/25/78    Lab: [facility]   Sample #1:  110mmol  Sample #2:  106mmol    Genetics  Date: 4/9/90  [id number]/621+1G->T         CARDIOVASCULAR SCREENING; LDL GOAL LESS THAN 160 10/31/2010     Priority: Medium            HPI:    Nico is a 41 year old male with Cystic Fibrosis Related Diabetes Mellitus (CFRD).    I have reviewed the available past laboratory evaluations, imaging studies, and medical records available to me at this visit. I have reviewed  Nico'height and weight.    History was obtained from the patient and the medical record.  I have reviewed the notes of the pulmonary care team.    I have read and interpreted the data from the patient glucose downloads..    Overall average: 128 mg/dL SD 39     Patient is currently testing 0-2 time per day.      A1c:   hemoglobin A1c: 7%  Previous two HbA1c results: A1C      8.3   12/15/2016  A1C      8.1   6/28/2016   Result was discussed at today's visit.     Current insulin regimen:   Lantus 7 units daily at bedtime   Novolog 1 unit / 20 gm of carb, 3-4 time  Daily. Novolog dose ranges b/w 3- 5 units  Tube feed at night, 2 nights on 1 night off, tube feed take 2-3 hours to complete. Takes 3 units of novolog with TF     Insulin administration site(s): abd or thigh    Family history and social history were reviewed     He was also diagnosed with adrenal insufficiency based on inadequate response on a ACTH stim test. Adrenal insufficiency in thuoght to be related to  itraconazole and Symbicort use.  He is off itraconazole at this time. On prednisone 2.5 mg bid.  recent AM cortisol was 13.9   disappointed that he has not gained weight    Noted to have significant decline in bone density on recent DEXA. On vitamin D and multivitmain supplements. Recommended  Pamidronate  but has not started yet.           Past Medical History:     Past Medical History:   Diagnosis Date     CF (cystic fibrosis) (H)      Exocrine pancreatic insufficiency      Nocardia infection      Pseudomonas infection      S/P gastrostomy (H) 2002            Past Surgical History:     Past Surgical History:   Procedure Laterality Date     GASTROSTOMY TUBE  2002               Social History:     Social History      Social History Narrative    Kilo lives with wife in a house in [place], [place].  He works as a .        March 2016. He works independently as a . Works out 5x/week and walks the dog daily.        June 2016. No changes. Describes himself as a creature of habit.              Family History:     Family History   Problem Relation Age of Onset     HEART DISEASE Maternal Grandmother      HEART DISEASE Maternal Grandfather      HEART DISEASE Paternal Grandmother      DIABETES No family hx of             Allergies:     Allergies   Allergen Reactions     Ceftazidime      Pulmozyme [Dornase Trever] Other (See Comments)     Chest pain and fever     Zosyn      Levaquin Rash             Medications:     Current Outpatient Rx   Medication Sig Dispense Refill     doxycycline (VIBRAMYCIN) 100 MG capsule Take 1 capsule (100 mg) by mouth 2 times daily 60 capsule 1     amitriptyline (ELAVIL) 10 MG tablet Take 1 tablet (10 mg) by mouth At Bedtime Take one to three tablets at bedtime. 90 tablet 3     tobramycin, PF, (BETHANY) 300 MG/5ML neb solution Take 5 mLs (300 mg) by nebulization 2 times daily 28 days on, 28 days off. 280 mL 2     levalbuterol (XOPENEX) 1.25 MG/3ML neb solution Take 3 mLs (1.25 mg) by nebulization 4 times daily 360 mL 11     CREON 91876 UNITS CPEP per EC capsule Take 7-8 caps with meals (3 meals/day) and 2 caps with snacks (3 snacks/day) 2700 capsule 3     blood glucose monitoring (MARTINA CONTOUR NEXT MONITOR) meter device kit Use to test blood sugar 6 times daily or as directed. 1 kit 2     blood glucose monitoring (MARTINA CONTOUR NEXT) test strip Use to test blood sugar 6 times daily or as directed. 200 each 2     blood glucose monitoring (MARTINA MICROLET) lancets Use to test blood sugar 6 times daily or as directed. 2 Box 2     aztreonam lysine (CAYSTON) 75 MG SOLR Take 1 mL (75 mg) by nebulization 3 times daily Alternating every other month with BETHANY. 84 mL 5     sodium chloride  inhalant 7 % NEBU Nebulize 4mL twice daily 240 mL 11     insulin pen needle (BD OMI U/F) 32G X 4 MM Use 6 daily or as directed. 200 each 11     insulin glargine (LANTUS SOLOSTAR) 100 UNIT/ML PEN 5 units daily 15 mL 6     acetylcysteine (MUCOMYST) 10 % nebulizer solution Nebulize 4ml qid 480 mL 6     sodium chloride, Inhalant, 0.9 % AERS Inhale 3 mLs into the lungs 3 times daily 270 mL 11     insulin aspart (NOVOLOG PENFILL) 100 UNIT/ML Cartridge 1 per 30 grams of carbohydrate at lunch and supper (Patient taking differently: 1 per 15 grams of carbohydrate at lunch and supper) 15 mL 12     budesonide-formoterol (SYMBICORT) 160-4.5 MCG/ACT inhaler Inhale 2 puffs into the lungs 2 times daily 1 Inhaler 12     polyethylene glycol (MIRALAX) powder Use 17 gms po tid x 3 days then do it daily x 1 month 442 g 1     Nebulizers ([employer] SCF NEBULIZER HANDSET) MISC 1 each 3 times daily. 1 each 6     Calcium Citrate-Vitamin D (CALCIUM CITRATE + D PO) Take 1 tablet by mouth 2 times daily.       Ferrous Fumarate-Vitamin C (VITRON-C) 200-125 MG TABS Take 1 tablet by mouth 2 times daily.       MEPHYTON 5 MG PO TABS 1 TABLET DAILY       MULTIVITAMIN OR 1 tablet daily       VITAMIN D 1000 UNIT PO CAPS 1 CAPSULE DAILY       VITAMIN E 400 UNIT PO CAPS 1 CAPSULE DAILY       levalbuterol (XOPENEX HFA) 45 MCG/ACT Inhaler Inhale 2 puffs into the lungs every 6 hours as needed for shortness of breath / dyspnea (Patient not taking: Reported on 4/10/2017) 3 Inhaler 4     oseltamivir (TAMIFLU) 75 MG capsule Take 1 capsule (75 mg) by mouth 2 times daily (Patient not taking: Reported on 4/10/2017) 10 capsule 1     rimantadine (FLUMADINE) 100 MG tablet Take 1 tablet (100 mg) by mouth 2 times daily Take from Oct. 1 thru March 31. (Patient not taking: Reported on 4/10/2017) 60 tablet 5     [facility]ION MANAGED PATIENT Contact [facility] for patient specific medication information at [phone] on admission and discharge from  "the hospital.  Phones are answered 24 hours a day 7 days a week 365 days a year.    Providers - Choose \"CONTINUE HOME MED (no script)\" at discharge if patient treatment with home infusion will continue.       ACE NOT PRESCRIBED, INTENTIONAL, 1 each continuous prn. ACE Inhibitor not prescribed due to Risk for drug interaction 0 each 0     AMITRIPTYLINE HCL 30 mg At Bedtime Reported on 4/10/2017               Review of Systems:     Comprehensive ROS negative other than the symptoms noted above in the HPI.          Physical Exam:   Blood pressure 101/68, pulse 97, height 1.734 m (5' 8.26\"), weight 60.2 kg (132 lb 12.8 oz).  Normalized stature-for-age data not available for patients older than 20 years.  Height: 5' 8.258\", Normalized stature-for-age data not available for patients older than 20 years.  Weight: 132 lbs 12.8 oz, Normalized weight-for-age data not available for patients older than 20 years.  BMI: Body mass index is 20.04 kg/(m^2)., Normalized BMI data available only for age 0 to 20 years.      CONSTITUTIONAL:   Awake, alert, and in no apparent distress.  NEUROLOGIC:No focal deficits noted.   PSYCHIATRIC: Cooperative, no agitation.  MUSCULOSKELETAL:  Full range of motion noted.  Motor strength and tone are normal.          Laboratory results:     TSH   Date Value Ref Range Status   12/15/2016 1.36 0.40 - 4.00 mU/L Final     Triiodothyronine (T3)   Date Value Ref Range Status   06/25/2010 123 60 - 181 ng/dL Final     Testosterone Total   Date Value Ref Range Status   12/15/2016 779 240 - 950 ng/dL Final     Comment:     This test was developed and its performance characteristics determined by the   [facility],  Special Chemistry Laboratory. It has   not been cleared or approved by the FDA. The laboratory is regulated under   CLIA   as qualified to perform high-complexity testing. This test is used for   clinical   purposes. It should not be regarded as investigational or for " research.       Cholesterol   Date Value Ref Range Status   12/15/2016 158 <200 mg/dL Final     Albumin Urine mg/L   Date Value Ref Range Status   12/15/2016 55 mg/L Final     Triglycerides   Date Value Ref Range Status   12/15/2016 173 (H) <150 mg/dL Final     Comment:     Borderline high:  150-199 mg/dl   High:             200-499 mg/dl   Very high:       >499 mg/dl       HDL Cholesterol   Date Value Ref Range Status   12/15/2016 32 (L) >39 mg/dL Final     LDL Cholesterol Calculated   Date Value Ref Range Status   12/15/2016 92 <100 mg/dL Final     Comment:     Desirable:       <100 mg/dl     Cholesterol/HDL Ratio   Date Value Ref Range Status   06/26/2013 4.4 0.0 - 5.0 Final     Non HDL Cholesterol   Date Value Ref Range Status   12/15/2016 126 <130 mg/dL Final     A1C      8.3   12/15/2016  A1C      8.1   6/28/2016  A1C      7.5   4/13/2016  A1C      7.1   3/8/2016  A1C      7.4   12/16/2015 No results found for this basename: hemoglobina1          Diabetes Health Maintenance    Date of Diabetes Diagnosis: 12/2015    Date Last Eye Exam: Jan 2015     Date Last Dental Appointment: >2 years    Dates of Episodes Severe* Hypoglycemia (month/year, cumulative, ongoing, assess each visit): never              *Severe=patient unconscious, seizure, unable to help self    Last 25-Vitamin D (every year): 33 on 12/2016    Last DXA, lowest Z-score (every 2 years): -2.6 right femoral necl       ?Bisphosphonates (yes/no):       Last Urine Microalbumin (every year):      No results found for: MICROALBUMIN     ENDO DIABETES Latest Ref Rng 12/15/2016   ALBUMIN URINE MG/L  55   ALBUMIN URINE MG/G CR 0 - 17 mg/g Cr 37.28 (H)     Last Testosterone:   Testosterone Total   Date Value Ref Range Status   12/15/2016 779 240 - 950 ng/dL Final     Comment:     This test was developed and its performance characteristics determined by the   [facility],  Special Chemistry Laboratory. It has   not been cleared or  approved by the FDA. The laboratory is regulated under   CLIA   as qualified to perform high-complexity testing. This test is used for   clinical   purposes. It should not be regarded as investigational or for research.        On testosterone therapy (yes/no)? No     Date of Last Diabetes Visit: June 2016         Assessment and Plan:   Nico is a 41 year old male with CFRD    CFRD: improved control, continue current insulin regimen. Check BG more regularly     Diabetes is a complicated and dangerous illness which requires intensive monitoring and treatment to prevent both short-term and long-term consequences to various organs. Insulin therapy is life-saving, but is also associated with life-threatening toxicity (hypoglycemia).  Careful and continuous attention to balancing glucose levels, activity, diet and insulin dosage is necessary.    Low bone density: Vitamin D, TSH and testosterone were ok.  pamidronate 30 mg IV every 3 months was ordered. Patient has not scheduled infusion yet. Would like to wait until over all feeling better    Adrenal insuffiencey:  AM cortisol after holding evening dose of prednisone was 13.9. Proceed with 1 mcg ACTH stim test.     RTC in 3 months    Total time 20 min >50% counseling on above  Sincerely,    JULIETA Tate  [facility]    CC  TYLER DAVIS          Again, thank you for allowing me to participate in the care of your patient.      Sincerely,    Diana Lee MD

## 2017-04-10 NOTE — PROGRESS NOTES
CF Endocrinology Return Consultation:  Diabetes  :   Patient: Nico Morales MRN# [id number]   YOB: 1975 Age: 41 year old   Date of Visit: 4/10/2017  Dear Dr. Jaye Machado:    I had the pleasure of seeing your patient, Nico Morales in the CF Endocrinology Clinic, [facility] , on 1/2/2017 for consultation regarding CFRD, adrenal insufficiency and low bone density.           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  Verbal overview and written information provided.          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     Priority: Medium     Encounter for long-term (current) use of antibiotics 05/20/2013     Priority: Medium     Prostatitis 05/20/2013     Priority: Medium     updating diagnosis code for icd10 cutover       Pseudomonas infection      Priority: Medium     Cystic fibrosis (H) 11/24/2010     Priority: Medium     Sweat Test   Date: 4/25/78    Lab: [facility]   Sample #1:  110mmol  Sample #2:  106mmol    Genetics  Date: 4/9/90  [id number]+[id number]->T         CARDIOVASCULAR SCREENING; LDL GOAL LESS THAN 160 10/31/2010     Priority: Medium            HPI:   Nico is a 41 year old male with Cystic Fibrosis Related Diabetes Mellitus (CFRD).    I have reviewed the available past laboratory evaluations, imaging studies, and medical records available to me at this visit. I have reviewed  Nico'height and weight.    History was obtained from the patient and  the medical record.  I have reviewed the notes of the pulmonary care team.    I have read and interpreted the data from the patient glucose downloads..    Overall average: 128 mg/dL SD 39     Patient is currently testing 0-2 time per day.      A1c:   hemoglobin A1c: 7%  Previous two HbA1c results: A1C      8.3   12/15/2016  A1C      8.1   6/28/2016   Result was discussed at today's visit.     Current insulin regimen:   Lantus 7 units daily at bedtime   Novolog 1 unit / 20 gm of carb, 3-4 time  Daily. Novolog dose ranges b/w 3- 5 units  Tube feed at night, 2 nights on 1 night off, tube feed take 2-3 hours to complete. Takes 3 units of novolog with TF     Insulin administration site(s): abd or thigh    Family history and social history were reviewed     He was also diagnosed with adrenal insufficiency based on inadequate response on a ACTH stim test. Adrenal insufficiency in thuoght to be related to  itraconazole and Symbicort use.  He is off itraconazole at this time. On prednisone 2.5 mg bid.  recent AM cortisol was 13.9   disappointed that he has not gained weight    Noted to have significant decline in bone density on recent DEXA. On vitamin D and multivitmain supplements. Recommended  Pamidronate  but has not started yet.           Past Medical History:     Past Medical History:   Diagnosis Date     CF (cystic fibrosis) (H)      Exocrine pancreatic insufficiency      Nocardia infection      Pseudomonas infection      S/P gastrostomy (H) 2002            Past Surgical History:     Past Surgical History:   Procedure Laterality Date     GASTROSTOMY TUBE  2002               Social History:     Social History     Social History Narrative    Kilo lives with wife in a house in [place], [place].  He works as a .        March 2016. He works independently as a . Works out 5x/week and walks the dog daily.        June 2016. No changes. Describes himself as a creature of habit.              Family  History:     Family History   Problem Relation Age of Onset     HEART DISEASE Maternal Grandmother      HEART DISEASE Maternal Grandfather      HEART DISEASE Paternal Grandmother      DIABETES No family hx of             Allergies:     Allergies   Allergen Reactions     Ceftazidime      Pulmozyme [Dornase Trever] Other (See Comments)     Chest pain and fever     Zosyn      Levaquin Rash             Medications:     Current Outpatient Rx   Medication Sig Dispense Refill     doxycycline (VIBRAMYCIN) 100 MG capsule Take 1 capsule (100 mg) by mouth 2 times daily 60 capsule 1     amitriptyline (ELAVIL) 10 MG tablet Take 1 tablet (10 mg) by mouth At Bedtime Take one to three tablets at bedtime. 90 tablet 3     tobramycin, PF, (BETHANY) 300 MG/5ML neb solution Take 5 mLs (300 mg) by nebulization 2 times daily 28 days on, 28 days off. 280 mL 2     levalbuterol (XOPENEX) 1.25 MG/3ML neb solution Take 3 mLs (1.25 mg) by nebulization 4 times daily 360 mL 11     CREON 99826 UNITS CPEP per EC capsule Take 7-8 caps with meals (3 meals/day) and 2 caps with snacks (3 snacks/day) 2700 capsule 3     blood glucose monitoring (MARTINA CONTOUR NEXT MONITOR) meter device kit Use to test blood sugar 6 times daily or as directed. 1 kit 2     blood glucose monitoring (MARTINA CONTOUR NEXT) test strip Use to test blood sugar 6 times daily or as directed. 200 each 2     blood glucose monitoring (MARTINA MICROLET) lancets Use to test blood sugar 6 times daily or as directed. 2 Box 2     aztreonam lysine (CAYSTON) 75 MG SOLR Take 1 mL (75 mg) by nebulization 3 times daily Alternating every other month with BETHANY. 84 mL 5     sodium chloride inhalant 7 % NEBU Nebulize 4mL twice daily 240 mL 11     insulin pen needle (BD OMI U/F) 32G X 4 MM Use 6 daily or as directed. 200 each 11     insulin glargine (LANTUS SOLOSTAR) 100 UNIT/ML PEN 5 units daily 15 mL 6     acetylcysteine (MUCOMYST) 10 % nebulizer solution Nebulize 4ml qid 480 mL 6     sodium chloride,  "Inhalant, 0.9 % AERS Inhale 3 mLs into the lungs 3 times daily 270 mL 11     insulin aspart (NOVOLOG PENFILL) 100 UNIT/ML Cartridge 1 per 30 grams of carbohydrate at lunch and supper (Patient taking differently: 1 per 15 grams of carbohydrate at lunch and supper) 15 mL 12     budesonide-formoterol (SYMBICORT) 160-4.5 MCG/ACT inhaler Inhale 2 puffs into the lungs 2 times daily 1 Inhaler 12     polyethylene glycol (MIRALAX) powder Use 17 gms po tid x 3 days then do it daily x 1 month 442 g 1     Nebulizers (AdiCyte SCF NEBULIZER HANDSET) MISC 1 each 3 times daily. 1 each 6     Calcium Citrate-Vitamin D (CALCIUM CITRATE + D PO) Take 1 tablet by mouth 2 times daily.       Ferrous Fumarate-Vitamin C (VITRON-C) 200-125 MG TABS Take 1 tablet by mouth 2 times daily.       MEPHYTON 5 MG PO TABS 1 TABLET DAILY       MULTIVITAMIN OR 1 tablet daily       VITAMIN D 1000 UNIT PO CAPS 1 CAPSULE DAILY       VITAMIN E 400 UNIT PO CAPS 1 CAPSULE DAILY       levalbuterol (XOPENEX HFA) 45 MCG/ACT Inhaler Inhale 2 puffs into the lungs every 6 hours as needed for shortness of breath / dyspnea (Patient not taking: Reported on 4/10/2017) 3 Inhaler 4     oseltamivir (TAMIFLU) 75 MG capsule Take 1 capsule (75 mg) by mouth 2 times daily (Patient not taking: Reported on 4/10/2017) 10 capsule 1     rimantadine (FLUMADINE) 100 MG tablet Take 1 tablet (100 mg) by mouth 2 times daily Take from Oct. 1 thru March 31. (Patient not taking: Reported on 4/10/2017) 60 tablet 5     [place] HOME INFUSION MANAGED PATIENT Contact [facility] for patient specific medication information at [phone] on admission and discharge from the hospital.  Phones are answered 24 hours a day 7 days a week 365 days a year.    Providers - Choose \"CONTINUE HOME MED (no script)\" at discharge if patient treatment with home infusion will continue.       ACE NOT PRESCRIBED, INTENTIONAL, 1 each continuous prn. ACE Inhibitor not prescribed due to Risk for drug " "interaction 0 each 0     AMITRIPTYLINE HCL 30 mg At Bedtime Reported on 4/10/2017               Review of Systems:     Comprehensive ROS negative other than the symptoms noted above in the HPI.          Physical Exam:   Blood pressure 101/68, pulse 97, height 1.734 m (5' 8.26\"), weight 60.2 kg (132 lb 12.8 oz).  Normalized stature-for-age data not available for patients older than 20 years.  Height: 5' 8.258\", Normalized stature-for-age data not available for patients older than 20 years.  Weight: 132 lbs 12.8 oz, Normalized weight-for-age data not available for patients older than 20 years.  BMI: Body mass index is 20.04 kg/(m^2)., Normalized BMI data available only for age 0 to 20 years.      CONSTITUTIONAL:   Awake, alert, and in no apparent distress.  NEUROLOGIC:No focal deficits noted.   PSYCHIATRIC: Cooperative, no agitation.  MUSCULOSKELETAL:  Full range of motion noted.  Motor strength and tone are normal.          Laboratory results:     TSH   Date Value Ref Range Status   12/15/2016 1.36 0.40 - 4.00 mU/L Final     Triiodothyronine (T3)   Date Value Ref Range Status   06/25/2010 123 60 - 181 ng/dL Final     Testosterone Total   Date Value Ref Range Status   12/15/2016 779 240 - 950 ng/dL Final     Comment:     This test was developed and its performance characteristics determined by the   [facility],  Special Chemistry Laboratory. It has   not been cleared or approved by the FDA. The laboratory is regulated under   CLIA   as qualified to perform high-complexity testing. This test is used for   clinical   purposes. It should not be regarded as investigational or for research.       Cholesterol   Date Value Ref Range Status   12/15/2016 158 <200 mg/dL Final     Albumin Urine mg/L   Date Value Ref Range Status   12/15/2016 55 mg/L Final     Triglycerides   Date Value Ref Range Status   12/15/2016 173 (H) <150 mg/dL Final     Comment:     Borderline high:  150-199 mg/dl   High:       "       200-499 mg/dl   Very high:       >499 mg/dl       HDL Cholesterol   Date Value Ref Range Status   12/15/2016 32 (L) >39 mg/dL Final     LDL Cholesterol Calculated   Date Value Ref Range Status   12/15/2016 92 <100 mg/dL Final     Comment:     Desirable:       <100 mg/dl     Cholesterol/HDL Ratio   Date Value Ref Range Status   06/26/2013 4.4 0.0 - 5.0 Final     Non HDL Cholesterol   Date Value Ref Range Status   12/15/2016 126 <130 mg/dL Final     A1C      8.3   12/15/2016  A1C      8.1   6/28/2016  A1C      7.5   4/13/2016  A1C      7.1   3/8/2016  A1C      7.4   12/16/2015 No results found for this basename: hemoglobina1        CF  Diabetes Health Maintenance    Date of Diabetes Diagnosis: 12/2015    Date Last Eye Exam: Jan 2015     Date Last Dental Appointment: >2 years    Dates of Episodes Severe* Hypoglycemia (month/year, cumulative, ongoing, assess each visit): never              *Severe=patient unconscious, seizure, unable to help self    Last 25-Vitamin D (every year): 33 on 12/2016    Last DXA, lowest Z-score (every 2 years): -2.6 right femoral necl       ?Bisphosphonates (yes/no):       Last Urine Microalbumin (every year):      No results found for: MICROALBUMIN     ENDO DIABETES Latest Ref Rng 12/15/2016   ALBUMIN URINE MG/L  55   ALBUMIN URINE MG/G CR 0 - 17 mg/g Cr 37.28 (H)     Last Testosterone:   Testosterone Total   Date Value Ref Range Status   12/15/2016 779 240 - 950 ng/dL Final     Comment:     This test was developed and its performance characteristics determined by the   [facility],  Special Chemistry Laboratory. It has   not been cleared or approved by the FDA. The laboratory is regulated under   CLIA   as qualified to perform high-complexity testing. This test is used for   clinical   purposes. It should not be regarded as investigational or for research.        On testosterone therapy (yes/no)? No     Date of Last Diabetes Visit: June 2016          Assessment and Plan:   Nico is a 41 year old male with CFRD    CFRD: improved control, continue current insulin regimen. Check BG more regularly     Diabetes is a complicated and dangerous illness which requires intensive monitoring and treatment to prevent both short-term and long-term consequences to various organs. Insulin therapy is life-saving, but is also associated with life-threatening toxicity (hypoglycemia).  Careful and continuous attention to balancing glucose levels, activity, diet and insulin dosage is necessary.    Low bone density: Vitamin D, TSH and testosterone were ok.  pamidronate 30 mg IV every 3 months was ordered. Patient has not scheduled infusion yet. Would like to wait until over all feeling better    Adrenal insuffiencey:  AM cortisol after holding evening dose of prednisone was 13.9. Proceed with 1 mcg ACTH stim test.     RTC in 3 months    Total time 20 min >50% counseling on above  Sincerely,    JULIETA Tate  [facility]    TYLER REYES

## 2017-04-10 NOTE — MR AVS SNAPSHOT
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 For your convenience, we have several ways to send your blood sugars to your doctor for review.    - Send message directly to your doctor through My Chart.  Please ask the rooming staff if you would like to sign up for My Chart.  This is a fast and confidential way to send your information and communicate directly with your provider.   - Record readings and fax to [phone].  We have a template for you to use for your convenience.  - If you have a Medtronic pump, upload to [employer] and notify your provider of your username and password.   - Stop by the clinic with your meter for download.   - My Chart or call Meghan Smith, Diabetes Educator at [phone]  - Call the clinic and speak to one of the endocrine nurses to relay information on the telephone.  Elizabeth Baker, or Chyna at [phone].   -    Please call the on-call Endocrinologist at the [facility] for after       hours/weekend needs at [phone] Option #4.    Please note that you do not need to FAST if you are just having an A1C drawn. Please remember to ALWAYS bring your glucose meter with to your appointment. This data is very important for the management of your care.    Thank you!  Your [facility] Care Team                Follow-ups after your visit        Your next 10 appointments already scheduled     Apr 26, 2017 11:00 AM CDT   CF LOOP with  UC PFL CF   [facility] Pulmonary Function Testing ([facility])    [address] [address]   [phone]            Apr 26, 2017 11:10 AM CDT   (Arrive by 10:55 AM)   RETURN CYSTIC FIBROSIS VISIT with Regina Herman PA-C   [facility] ([facility])    [address] [place] [id number]   [phone]            Aug 14, 2017 10:20 AM CDT   Return Visit with Diana Lee MD, MG ENDO NURSE   [facility] ([facility])    [address] [place] [id number] [phone]              Future tests that were ordered for you today     Open Standing Orders        Priority Remaining Interval Expires Ordered    Hemoglobin A1c POCT Routine 3/4  4/10/2018 4/10/2017            Who to contact     If you have questions or need follow up information about today's clinic visit or your schedule please contact [facility] directly at [phone].  Normal or non-critical lab and imaging results will be communicated to you by MyChart, letter or phone within 4 business days after the clinic has received the results. If you do not hear from us within 7 days, please contact the clinic through [employer] or phone. If you have a critical or abnormal lab result, we will notify you by phone as soon as possible.  Submit refill requests through [employer] or call your pharmacy and they will forward the refill request to us. Please allow 3 business days for your refill to be completed.          Additional Information About Your Visit        MyChart Information     [employer] gives you secure access to your electronic health record. If you see a primary care provider, you can also send messages to your care team and make appointments. If you have questions, please call your primary care clinic.  If you do not have a primary care provider, please  "call [phone] and they will assist you.      [email or web address] is an electronic gateway that provides easy, online access to your medical records. With [email or web address], you can request a clinic appointment, read your test results, renew a prescription or communicate with your care team.     To access your existing account, please contact your [facility] or call [phone] for assistance.        Care EveryWhere ID     This is your Care EveryWhere ID. This could be used by other organizations to access your [place] medical records  [id number]        Your Vitals Were     Pulse Height BMI (Body Mass Index)             97 1.734 m (5' 8.26\") 20.04 kg/m2          Blood Pressure from Last 3 Encounters:   04/10/17 101/68   03/14/17 97/64   01/02/17 98/66    Weight from Last 3 Encounters:   04/10/17 60.2 kg (132 lb 12.8 oz)   03/14/17 60.6 kg (133 lb 9.6 oz)   01/02/17 61.2 kg (134 lb 14.7 oz)              We Performed the Following     Hemoglobin A1c POCT          Today's Medication Changes          These changes are accurate as of: 4/10/17 12:04 PM.  If you have any questions, ask your nurse or doctor.               Start taking these medicines.        Dose/Directions    predniSONE 5 MG tablet   Commonly known as:  DELTASONE   Used for:  Adrenal insufficiency (H)   Started by:  Diana Lee MD        Dose:  2.5 mg   Take 0.5 tablets (2.5 mg) by mouth 2 times daily   Quantity:  30 tablet   Refills:  3         These medicines have changed or have updated prescriptions.        Dose/Directions    insulin aspart 100 UNIT/ML injection   Commonly known as:  NovoLOG PENFILL   This may have changed:  additional instructions   Used for:  Diabetes mellitus related to CF (cystic fibrosis) (H)        1 per 30 grams of carbohydrate at lunch and supper   Quantity:  15 mL   Refills:  12            Where to get your medicines      These medications were sent to [facility] [address] [place] [facility] [place] - [address]  [address], [place] [address]     Phone:  [phone]     predniSONE 5 MG tablet                Primary Care Provider Office Phone # Fax #    Jaye Machado [phone]        [facility] [address] [facility] [address]  [facility] [address]        Thank you!     Thank you for choosing [facility]  for your care. Our goal is always to provide you with excellent care. Hearing back from our patients is one way we can continue to improve our services. Please take a few minutes to complete the written survey that you may receive in the mail after your visit with us. Thank you!             Your Updated Medication List - Protect others around you: Learn how to safely use, store and throw away your medicines at www.disposemymeds.org.          This list is accurate as of: 4/10/17 12:04 PM.  Always use your most recent med list.                   Brand Name Dispense Instructions for use    ACE NOT PRESCRIBED (INTENTIONAL)     0 each    1 each continuous prn. ACE Inhibitor not prescribed due to Risk for drug interaction       acetylcysteine 10 % injection    MUCOMYST    480 mL    Nebulize 4ml qid       * amitriptyline 10 MG tablet    ELAVIL    90 tablet    Take 1 tablet (10 mg) by mouth At Bedtime Take one to three tablets at bedtime.       * AMITRIPTYLINE HCL      30 mg At Bedtime Reported on 4/10/2017       aztreonam lysine 75 MG Solr    [facility]    84 mL    Take 1 mL (75 mg) by nebulization 3 times daily Alternating every other month with BETHANY.       blood glucose monitoring lancets     2 Box    Use to test blood sugar 6 times daily or as directed.       blood glucose monitoring meter device kit     1 kit    Use to test blood sugar 6 times daily or as directed.       blood glucose monitoring test strip    MARTINA CONTOUR NEXT    200 each    Use to test blood sugar 6 times daily or as directed.       budesonide-formoterol 160-4.5 MCG/ACT Inhaler    SYMBICORT    1  "Inhaler    Inhale 2 puffs into the lungs 2 times daily       CALCIUM CITRATE + D PO      Take 1 tablet by mouth 2 times daily.       CREON 30705 UNITS Cpep per EC capsule   Generic drug:  amylase-lipase-protease     2700 capsule    Take 7-8 caps with meals (3 meals/day) and 2 caps with snacks (3 snacks/day)       doxycycline 100 MG capsule    VIBRAMYCIN    60 capsule    Take 1 capsule (100 mg) by mouth 2 times daily       EFLOW SCF NEBULIZER HANDSET Misc     1 each    1 each 3 times daily.       [facility] HOME INFUSION MANAGED PATIENT      Contact [facility] for patient specific medication information at [phone] on admission and discharge from the hospital.  Phones are answered 24 hours a day 7 days a week 365 days a year.  Providers - Choose \"CONTINUE HOME MED (no script)\" at discharge if patient treatment with home infusion will continue.       insulin aspart 100 UNIT/ML injection    NovoLOG PENFILL    15 mL    1 per 30 grams of carbohydrate at lunch and supper       insulin glargine 100 UNIT/ML injection    LANTUS SOLOSTAR    15 mL    5 units daily       insulin pen needle 32G X 4 MM    BD OMI U/F    200 each    Use 6 daily or as directed.       levalbuterol 1.25 MG/3ML neb solution    XOPENEX    360 mL    Take 3 mLs (1.25 mg) by nebulization 4 times daily       levalbuterol 45 MCG/ACT Inhaler    XOPENEX HFA    3 Inhaler    Inhale 2 puffs into the lungs every 6 hours as needed for shortness of breath / dyspnea       MEPHYTON 5 MG tablet   Generic drug:  phytonadione      1 TABLET DAILY       MULTIVITAMIN PO      1 tablet daily       oseltamivir 75 MG capsule    TAMIFLU    10 capsule    Take 1 capsule (75 mg) by mouth 2 times daily       polyethylene glycol powder    MIRALAX    442 g    Use 17 gms po tid x 3 days then do it daily x 1 month       predniSONE 5 MG tablet    DELTASONE    30 tablet    Take 0.5 tablets (2.5 mg) by mouth 2 times daily       rimantadine 100 MG tablet    FLUMADINE    60 " tablet    Take 1 tablet (100 mg) by mouth 2 times daily Take from Oct. 1 thru March 31.       * sodium chloride (Inhalant) 0.9 % Aers     270 mL    Inhale 3 mLs into the lungs 3 times daily       * sodium chloride inhalant 7 % Nebu neb solution     240 mL    Nebulize 4mL twice daily       tobramycin (PF) 300 MG/5ML neb solution    BETHANY    280 mL    Take 5 mLs (300 mg) by nebulization 2 times daily 28 days on, 28 days off.       vitamin D 1000 UNITS capsule      1 CAPSULE DAILY       vitamin E 400 UNIT capsule      1 CAPSULE DAILY       VITRON-C 200-125 MG Tabs   Generic drug:  ferrous fumarate 65 mg ([place]. FE)-Vitamin C 125 mg      Take 1 tablet by mouth 2 times daily.       * Notice:  This list has 4 medication(s) that are the same as other medications prescribed for you. Read the directions carefully, and ask your doctor or other care provider to review them with you.

## 2017-04-10 NOTE — NURSING NOTE
"Nico Morales's goals for this visit include:   Chief Complaint   Patient presents with     Diabetes       He requests these members of his care team be copied on today's visit information: Jaye Machado      PCP: Jaye Machado    Referring Provider:  No referring provider defined for this encounter.    Chief Complaint   Patient presents with     Diabetes       Initial /68  Pulse 97  Ht 1.734 m (5' 8.26\")  Wt 60.2 kg (132 lb 12.8 oz)  BMI 20.04 kg/m2 Estimated body mass index is 20.04 kg/(m^2) as calculated from the following:    Height as of this encounter: 1.734 m (5' 8.26\").    Weight as of this encounter: 60.2 kg (132 lb 12.8 oz).  Medication Reconciliation: complete    Do you need any medication refills at today's visit? Yes    Chyna Myers LPN      "

## 2017-04-10 NOTE — TELEPHONE ENCOUNTER
Per Rosa Message:    I have placed therapy plan orders for 1 mcg ACTH stim test , please help schedule.     Left message for patient to return call.    Chyna Myers LPN  Adult Endocrinology  [facility]

## 2017-04-19 DIAGNOSIS — E08.9 DIABETES MELLITUS RELATED TO CF (CYSTIC FIBROSIS) (H): ICD-10-CM

## 2017-04-19 DIAGNOSIS — E84.8 DIABETES MELLITUS RELATED TO CF (CYSTIC FIBROSIS) (H): ICD-10-CM

## 2017-04-20 DIAGNOSIS — E84.9 CYSTIC FIBROSIS (H): ICD-10-CM

## 2017-04-20 DIAGNOSIS — E84.0 CYSTIC FIBROSIS OF THE LUNG (H): ICD-10-CM

## 2017-04-20 RX ORDER — BUDESONIDE AND FORMOTEROL FUMARATE DIHYDRATE 160; 4.5 UG/1; UG/1
2 AEROSOL RESPIRATORY (INHALATION) 2 TIMES DAILY
Qty: 1 INHALER | Refills: 12 | Status: SHIPPED | OUTPATIENT
Start: 2017-04-20 | End: 2018-05-08

## 2017-04-24 NOTE — PROGRESS NOTES
General acute hospital for [facility] and [facility]  April 26, 2017         Assessment and Plan:   Nico Morales is a 40 year old male with cystic fibrosis with severe obstruction, h/o A. Fumigatus on/off itraconazole for many years, CFRD, malnutrition on chronic enteral feeds, pancreatic exocrine insufficiency and adrenal insufficiency who is seen today in clinic for follow up. He was last seen in clinic in March and started on doxycycline for mild exacerbation.      1. Severe exacerbation of CF lung disease: increased dyspnea with minimal activity, chest tightness, fatigue and decreasing PFTs all consistent with exacerbation not responsive to oral antibiotics. Vesting 2-3 times/day. Previous cultures have grown out MDR Ps. A, also with Stenotrophomonas on recent culture, which is new for him. Plan katalina treat steno and escalate therapy to IV antibiotics to cover Ps A.  - Place PICC line and start meropenem and tobramycin (aim for lower trough as he has had issues with tinnitus in the distant past)  - Bactrim 1 tablet TID X 21 days  - Continue current nebulizers and vest therapy, increase to TID  - Hold [place] and aztreonam  - Consider initiating azithromycin (previous notes indicate incomplete RBBB on EKG) once exacerbation resolved     2. Eleved alkaline phophatase: since 4/15, however, has been on anti fungal therapy on/off since 2013. Alk phos decreased to 199.  - Monitor since starting meropenem     3. Exocrine pancreatic insufficiency with malnutrition on chronic enteral feeds: denies symptoms of malabsorption. Decreased appetite with illness weight has declined with BMI of 19.89. Continues with 2 cans of TF at night.  - Continue pancreatic enzymes and vitamin supplementation  - High protein and high calorie diet supplemented with nocturnal tube feeds     4. CFRD: AIC improved to 7.0 to 7.0 on 4/10/17. BS typically between  in the am. Following with Endocrine.  - Continue Lantus 5 U  in the am  - Carb coverage with 1 U/ 10 g carbs     5.  Adrenal insufficiency: likely a complication of using Symbicort with itraconazole. Has been more fatigued recently, TSH WNL. Sodium and potassium WNL, am cortisol normal.   - Continue prednisone 2.5 mg BID  - Cosyntropin stim test to be schduled    6. CF related low bone mineral density: Vitamin D, TSH and testosterone WNL, is supposed to start pamidronate.  - Pamidronate will be started once feeling better    7. Headaches: complicated by visual disturbances. No acute issues  - Continues on amytriptyline     RTC: in 3 weeks with routine cultures and spirometry  Annual studies due: December 2017  Preventative care: colonoscopy during 2017     Regina Herman PA-C  Pulmonary, Allergy, Critical Care and Sleep Medicine        Interval History:   Doxycycline helped with sputum production, but not tightness. Having tightness now, some shortness of breath with walking the dog. No wheezing. Normally, can walk the dog without breaks. Very productive cough, mainly green to yellow, no blood or streaking. No chest pain, no fever or chills. Fatigue worsened on doxycycline, has maybe improved a little bit since stopped medication. No new GI complaints.     Vesting 2-3 times/day, depending on work load at the office.            Review of Systems:   Please see HPI. Otherwise, complete 10 point ROS negative.           Past Medical and Surgical History:     Past Medical History:   Diagnosis Date     CF (cystic fibrosis) (H)      Exocrine pancreatic insufficiency      Nocardia infection      Pseudomonas infection      S/P gastrostomy (H) 2002     Past Surgical History:   Procedure Laterality Date     GASTROSTOMY TUBE  2002           Family History:     Family History   Problem Relation Age of Onset     HEART DISEASE Maternal Grandmother      HEART DISEASE Maternal Grandfather      HEART DISEASE Paternal Grandmother      DIABETES No family hx of             Social History:     Social  History     Social History     Marital status:      Spouse name: N/A     Number of children: 0     Years of education: N/A     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     Social History Main Topics     Smoking status: Never Smoker     Smokeless tobacco: Never Used     Alcohol use No     Drug use: No     Sexual activity: Yes     Partners: Female     Birth control/ protection: Pill     Other Topics Concern     Blood Transfusions No     Exercise No     Social History Narrative    Kilo lives with wife in a house in [place], [place].  He works as a .        March 2016. He works independently as a . Works out 5x/week and walks the dog daily.        June 2016. No changes. Describes himself as a creature of habit.            Medications:     Current Outpatient Prescriptions   Medication     cetirizine (ZYRTEC) 10 MG tablet     sulfamethoxazole-trimethoprim (BACTRIM DS/SEPTRA DS) 800-160 MG per tablet     budesonide-formoterol (SYMBICORT) 160-4.5 MCG/ACT Inhaler     insulin aspart (NOVOLOG PENFILL) 100 UNIT/ML injection     predniSONE (DELTASONE) 5 MG tablet     amitriptyline (ELAVIL) 10 MG tablet     levalbuterol (XOPENEX HFA) 45 MCG/ACT Inhaler     tobramycin, PF, (BETHANY) 300 MG/5ML neb solution     levalbuterol (XOPENEX) 1.25 MG/3ML neb solution     CREON 06185 UNITS CPEP per EC capsule     blood glucose monitoring (MARTINA CONTOUR NEXT MONITOR) meter device kit     blood glucose monitoring (MARTINA CONTOUR NEXT) test strip     blood glucose monitoring (MARTINA MICROLET) lancets     aztreonam lysine (CAYSTON) 75 MG SOLR     oseltamivir (TAMIFLU) 75 MG capsule     rimantadine (FLUMADINE) 100 MG tablet     sodium chloride inhalant 7 % NEBU     insulin pen needle (BD OMI U/F) 32G X 4 MM     insulin glargine (LANTUS SOLOSTAR) 100 UNIT/ML PEN     acetylcysteine (MUCOMYST) 10 % nebulizer solution     sodium chloride, Inhalant, 0.9 % AERS     polyethylene glycol (MIRALAX)  "powder     [facility]NAGED PATIENT     ACE NOT PRESCRIBED, INTENTIONAL,     Nebulizers (EFLOW SCF NEBULIZER HANDSET) MISC     AMITRIPTYLINE HCL     Calcium Citrate-Vitamin D (CALCIUM CITRATE + D PO)     Ferrous Fumarate-Vitamin C (VITRON-C) 200-125 MG TABS     MEPHYTON 5 MG PO TABS     MULTIVITAMIN OR     VITAMIN D 1000 UNIT PO CAPS     VITAMIN E 400 UNIT PO CAPS     No current facility-administered medications for this visit.             Physical Exam:   /68  Pulse 98  Temp 99.2  F (37.3  C) (Oral)  Resp 18  Ht 1.737 m (5' 8.39\")  Wt 60 kg (132 lb 4.4 oz)  SpO2 96%  BMI 19.89 kg/m2    GENERAL: alert, NAD  HEENT: NCAT, EOMI, anicteric sclera, no nasal edema or erythema; canals and TMs clear; no oral mucosal edema or erythema  Neck: no cervical or supraclavicular adenopathy  Respiratory: good air flow, no crackles, rhonchi or wheezing  CV: RRR, S1S2, no murmurs noted  Abdomen: normoactive BS, soft and non tender  Lymph: no edema, + digital clubbing  Neuro: AAO X 3, CN 2-12 grossly intact, 5/5 bilateral  strength and dorsiflexion  Psychiatric: normal affect, good eye contact  Skin: no rash, jaundice or lesions on limited exam         Data:   All laboratory and imaging data reviewed.      Cystic Fibrosis Culture  Specimen Description   Date Value Ref Range Status   03/14/2017 Sputum  Final   12/15/2016 Sputum  Final   12/01/2016 Sputum  Final    Culture Micro   Date Value Ref Range Status   03/14/2017 (A)  Final    Heavy growth Normal santi  Moderate growth Pseudomonas aeruginosa, mucoid strain  Light growth Stenotrophomonas maltophilia  Moderate growth Strain 2 Pseudomonas aeruginosa, mucoid strain     12/15/2016 (A)  Final    Moderate growth Normal santi  Moderate growth Mucoid strain Pseudomonas aeruginosa     12/01/2016 (A)  Final    Moderate growth Normal santi  Moderate growth Pseudomonas aeruginosa, mucoid strain          PFT interpretation:  Maneuver: valid and meets ATS " guidelines  Very severe obstruction with decreased FEV1 and FEV1/FVC  Compared to prior: FEV1 decreased 400 cc from Dec  The decrease in FVC may represent air trapping v. restrictive physiology.  Lung volumes would be necessary to determine.    severe: Increased vest/bronchodilator/execise, Oral: non-quinolone and Hosp admit/home IV (within 2 wks)

## 2017-04-26 ENCOUNTER — OFFICE VISIT (OUTPATIENT)
Dept: PULMONOLOGY | Facility: CLINIC | Age: 42
End: 2017-04-26
Attending: PHYSICIAN ASSISTANT
Payer: COMMERCIAL

## 2017-04-26 VITALS
TEMPERATURE: 99.2 F | HEIGHT: 68 IN | HEART RATE: 98 BPM | OXYGEN SATURATION: 96 % | RESPIRATION RATE: 18 BRPM | BODY MASS INDEX: 20.05 KG/M2 | SYSTOLIC BLOOD PRESSURE: 101 MMHG | WEIGHT: 132.28 LBS | DIASTOLIC BLOOD PRESSURE: 68 MMHG

## 2017-04-26 DIAGNOSIS — E84.0 CYSTIC FIBROSIS WITH PULMONARY MANIFESTATIONS (H): ICD-10-CM

## 2017-04-26 DIAGNOSIS — E84.0 CYSTIC FIBROSIS WITH PULMONARY MANIFESTATIONS (H): Primary | ICD-10-CM

## 2017-04-26 DIAGNOSIS — E84.9 CF (CYSTIC FIBROSIS) (H): ICD-10-CM

## 2017-04-26 DIAGNOSIS — E84.9 CYSTIC FIBROSIS (H): Primary | ICD-10-CM

## 2017-04-26 PROCEDURE — 87070 CULTURE OTHR SPECIMN AEROBIC: CPT | Performed by: INTERNAL MEDICINE

## 2017-04-26 PROCEDURE — 87116 MYCOBACTERIA CULTURE: CPT | Performed by: INTERNAL MEDICINE

## 2017-04-26 PROCEDURE — 87015 SPECIMEN INFECT AGNT CONCNTJ: CPT | Performed by: INTERNAL MEDICINE

## 2017-04-26 PROCEDURE — 99212 OFFICE O/P EST SF 10 MIN: CPT | Mod: ZF

## 2017-04-26 PROCEDURE — 87077 CULTURE AEROBIC IDENTIFY: CPT | Performed by: INTERNAL MEDICINE

## 2017-04-26 PROCEDURE — 87186 SC STD MICRODIL/AGAR DIL: CPT | Performed by: INTERNAL MEDICINE

## 2017-04-26 PROCEDURE — 87206 SMEAR FLUORESCENT/ACID STAI: CPT | Performed by: INTERNAL MEDICINE

## 2017-04-26 RX ORDER — CETIRIZINE HYDROCHLORIDE 10 MG/1
10 TABLET ORAL EVERY EVENING
Qty: 30 TABLET | Refills: 1 | Status: SHIPPED | OUTPATIENT
Start: 2017-04-26 | End: 2018-01-21

## 2017-04-26 RX ORDER — SULFAMETHOXAZOLE/TRIMETHOPRIM 800-160 MG
1 TABLET ORAL 3 TIMES DAILY
Qty: 21 TABLET | Refills: 0 | Status: SHIPPED | OUTPATIENT
Start: 2017-04-26 | End: 2017-07-05

## 2017-04-26 ASSESSMENT — PAIN SCALES - GENERAL: PAINLEVEL: NO PAIN (0)

## 2017-04-26 NOTE — LETTER
4/26/2017       RE: Nico Morales  [address]  [place] [address]     Dear Colleague,    Thank you for referring your patient, Nico Morales, to the [facility] at [facility]. Please see a copy of my visit note below.    [facility]  April 26, 2017         Assessment and Plan:   Nico Morales is a 40 year old male with cystic fibrosis with severe obstruction, h/o A. Fumigatus on/off itraconazole for many years, CFRD, malnutrition on chronic enteral feeds, pancreatic exocrine insufficiency and adrenal insufficiency who is seen today in clinic for follow up. He was last seen in clinic in March and started on doxycycline for mild exacerbation.      1. Severe exacerbation of CF lung disease: increased dyspnea with minimal activity, chest tightness, fatigue and decreasing PFTs all consistent with exacerbation not responsive to oral antibiotics. Vesting 2-3 times/day. Previous cultures have grown out MDR Ps. A, also with Stenotrophomonas on recent culture, which is new for him. Plan katalina treat steno and escalate therapy to IV antibiotics to cover Ps A.  - Place PICC line and start meropenem and tobramycin (aim for lower trough as he has had issues with tinnitus in the distant past)  - Bactrim 1 tablet TID X 21 days  - Continue current nebulizers and vest therapy, increase to TID  - Hold [place] and aztreonam  - Consider initiating azithromycin (previous notes indicate incomplete RBBB on EKG) once exacerbation resolved     2. Eleved alkaline phophatase: since 4/15, however, has been on anti fungal therapy on/off since 2013. Alk phos decreased to 199.  - Monitor since starting meropenem     3. Exocrine pancreatic insufficiency with malnutrition on chronic enteral feeds: denies symptoms of malabsorption. Decreased appetite with illness weight has declined with BMI of  19.89. Continues with 2 cans of TF at night.  - Continue pancreatic enzymes and vitamin supplementation  - High protein and high calorie diet supplemented with nocturnal tube feeds     4. CFRD: AIC improved to 7.0 to 7.0 on 4/10/17. BS typically between  in the am. Following with Endocrine.  - Continue Lantus 5 U in the am  - Carb coverage with 1 U/ 10 g carbs     5.  Adrenal insufficiency: likely a complication of using Symbicort with itraconazole. Has been more fatigued recently, TSH WNL. Sodium and potassium WNL, am cortisol normal.   - Continue prednisone 2.5 mg BID  - Cosyntropin stim test to be schduled    6. CF related low bone mineral density: Vitamin D, TSH and testosterone WNL, is supposed to start pamidronate.  - Pamidronate will be started once feeling better    7. Headaches: complicated by visual disturbances. No acute issues  - Continues on amytriptyline     RTC: in 3 weeks with routine cultures and spirometry  Annual studies due: December 2017  Preventative care: colonoscopy during 2017     Regina Herman PA-C  Pulmonary, Allergy, Critical Care and Sleep Medicine        Interval History:   Doxycycline helped with sputum production, but not tightness. Having tightness now, some shortness of breath with walking the dog. No wheezing. Normally, can walk the dog without breaks. Very productive cough, mainly green to yellow, no blood or streaking. No chest pain, no fever or chills. Fatigue worsened on doxycycline, has maybe improved a little bit since stopped medication. No new GI complaints.     Vesting 2-3 times/day, depending on work load at the office.            Review of Systems:   Please see HPI. Otherwise, complete 10 point ROS negative.           Past Medical and Surgical History:     Past Medical History:   Diagnosis Date     CF (cystic fibrosis) (H)      Exocrine pancreatic insufficiency      Nocardia infection      Pseudomonas infection      S/P gastrostomy (H) 2002     Past Surgical  History:   Procedure Laterality Date     GASTROSTOMY TUBE  2002           Family History:     Family History   Problem Relation Age of Onset     HEART DISEASE Maternal Grandmother      HEART DISEASE Maternal Grandfather      HEART DISEASE Paternal Grandmother      DIABETES No family hx of             Social History:     Social History     Social History     Marital status:      Spouse name: N/A     Number of children: 0     Years of education: N/A     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  He works as a .        March 2016. He works independently as a . Works out 5x/week and walks the dog daily.        June 2016. No changes. Describes himself as a creature of habit.            Medications:     Current Outpatient Prescriptions   Medication     cetirizine (ZYRTEC) 10 MG tablet     sulfamethoxazole-trimethoprim (BACTRIM DS/SEPTRA DS) 800-160 MG per tablet     budesonide-formoterol (SYMBICORT) 160-4.5 MCG/ACT Inhaler     insulin aspart (NOVOLOG PENFILL) 100 UNIT/ML injection     predniSONE (DELTASONE) 5 MG tablet     amitriptyline (ELAVIL) 10 MG tablet     levalbuterol (XOPENEX HFA) 45 MCG/ACT Inhaler     tobramycin, PF, (BETHANY) 300 MG/5ML neb solution     levalbuterol (XOPENEX) 1.25 MG/3ML neb solution     CREON 71014 UNITS CPEP per EC capsule     blood glucose monitoring (MARTINA CONTOUR NEXT MONITOR) meter device kit     blood glucose monitoring (MARTINA CONTOUR NEXT) test strip     blood glucose monitoring (MARTINA MICROLET) lancets     aztreonam lysine (CAYSTON) 75 MG SOLR     oseltamivir (TAMIFLU) 75  "MG capsule     rimantadine (FLUMADINE) 100 MG tablet     sodium chloride inhalant 7 % NEBU     insulin pen needle (BD OMI U/F) 32G X 4 MM     insulin glargine (LANTUS SOLOSTAR) 100 UNIT/ML PEN     acetylcysteine (MUCOMYST) 10 % nebulizer solution     sodium chloride, Inhalant, 0.9 % AERS     polyethylene glycol (MIRALAX) powder     [facility] MANAGED PATIENT     ACE NOT PRESCRIBED, INTENTIONAL,     Nebulizers (EFLOW SCF NEBULIZER HANDSET) MISC     AMITRIPTYLINE HCL     Calcium Citrate-Vitamin D (CALCIUM CITRATE + D PO)     Ferrous Fumarate-Vitamin C (VITRON-C) 200-125 MG TABS     MEPHYTON 5 MG PO TABS     MULTIVITAMIN OR     VITAMIN D 1000 UNIT PO CAPS     VITAMIN E 400 UNIT PO CAPS     No current facility-administered medications for this visit.             Physical Exam:   /68  Pulse 98  Temp 99.2  F (37.3  C) (Oral)  Resp 18  Ht 1.737 m (5' 8.39\")  Wt 60 kg (132 lb 4.4 oz)  SpO2 96%  BMI 19.89 kg/m2    GENERAL: alert, NAD  HEENT: NCAT, EOMI, anicteric sclera, no nasal edema or erythema; canals and TMs clear; no oral mucosal edema or erythema  Neck: no cervical or supraclavicular adenopathy  Respiratory: good air flow, no crackles, rhonchi or wheezing  CV: RRR, S1S2, no murmurs noted  Abdomen: normoactive BS, soft and non tender  Lymph: no edema, + digital clubbing  Neuro: AAO X 3, CN 2-12 grossly intact, 5/5 bilateral  strength and dorsiflexion  Psychiatric: normal affect, good eye contact  Skin: no rash, jaundice or lesions on limited exam         Data:   All laboratory and imaging data reviewed.      Cystic Fibrosis Culture  Specimen Description   Date Value Ref Range Status   03/14/2017 Sputum  Final   12/15/2016 Sputum  Final   12/01/2016 Sputum  Final    Culture Micro   Date Value Ref Range Status   03/14/2017 (A)  Final    Heavy growth Normal santi  Moderate growth Pseudomonas aeruginosa, mucoid strain  Light growth Stenotrophomonas maltophilia  Moderate growth Strain 2 " Pseudomonas aeruginosa, mucoid strain     12/15/2016 (A)  Final    Moderate growth Normal santi  Moderate growth Mucoid strain Pseudomonas aeruginosa     12/01/2016 (A)  Final    Moderate growth Normal santi  Moderate growth Pseudomonas aeruginosa, mucoid strain          PFT interpretation:  Maneuver: valid and meets ATS guidelines  Very severe obstruction with decreased FEV1 and FEV1/FVC  Compared to prior: FEV1 decreased 400 cc from Dec  The decrease in FVC may represent air trapping v. restrictive physiology.  Lung volumes would be necessary to determine.    severe: Increased vest/bronchodilator/execise, Oral: non-quinolone and Hosp admit/home IV (within 2 wks)          Again, thank you for allowing me to participate in the care of your patient.      Sincerely,    Regina Herman PA-C

## 2017-04-26 NOTE — NURSING NOTE
Chief Complaint   Patient presents with     Cystic Fibrosis     Follow up on Nico and his CF     Matt Nunes CMA at 11:12 AM on 4/26/2017

## 2017-04-26 NOTE — PATIENT INSTRUCTIONS
Cystic Fibrosis Self-Care Plan       Patient: Nico Morales   MRN: [id number]   Clinic Date: April 26, 2017     RECOMMENDATIONS:  1. Continue nebulizers and vest therapy, try and consistently do 3 vests/day.  2. Start Bactrim 1 tablet 3 times/day.  3. Start Zyrtec 10 mg at bedtime.  4. Hold gabo and aztreonam nebs on IV therapy  5. Will place and IV and start meropenem and tobramycin. Call the CF office if you notice ringing in your ears or changes in balance.     Annual Studies:   IGG   Date Value Ref Range Status   12/15/2016 1770 (H) 695 - 1620 mg/dL Final     Insulin   Date Value Ref Range Status   07/26/2011 30 mU/L Final     Comment:     Reference Range:  0-20  +120     There are no preventive care reminders to display for this patient.    Pulmonary Function Tests  FEV1: amount of air you can blow out in 1 second  FVC: total amount of air you can take in and blow out    Your Goals:         PFT Latest Ref Rng & Units 4/26/2017   FVC L 3.06   FEV1 L 1.36   FVC% % 61   FEV1% % 34          Airway Clearance: The Most Important Way to Keep Your Lungs Healthy  Vest Settings:    Hill-Rom Frequencies: 8, 9, 10 Pressure 10 Then, Frequencies 18, 19, 20 Pressure 6      RespirTech: Quick Start with Pressure of     Do each frequency for 5 minutes; Deflate vest after each frequency & cough 3 times before beginning the next setting.    Vest and Neb Therapy should be done 3 times/day.    Good Nutrition Can Improve Lung Function and Overall Health     Take ALL of your vitamins with food     Take 1/2 of your enzymes before EVERY meal/snack and the other 1/2 mid-meal/snack    Wt Readings from Last 3 Encounters:   04/26/17 60 kg (132 lb 4.4 oz)   04/10/17 60.2 kg (132 lb 12.8 oz)   03/14/17 60.6 kg (133 lb 9.6 oz)       Body mass index is 19.89 kg/(m^2).       National CF Foundation Recommendations for BMI in CF Adults: Women: at least 22 Men: at least 23        Controlling Blood Sugars Helps Prevent Lung Infections &  Improves Nutrition  Test blood sugar:     In the morning before eating (goal is )     2 hours after a meal (goal is less than 150)     When pre-meal glucose is greater than 150 add correction     At bedtime (if less than 100 eat a snack with 15 grams of carbohydrates  Last A1C Results:   Hemoglobin A1C   Date Value Ref Range Status   04/10/2017 7.0 % Final         If diabetic, measure A1C every 6 months. Goal is under 7%.    Staying Healthy    Research: If you are interested in learning about research opportunities or have questions, please contact Temi Dodge at [phone] or [email or web address].       Foundation: [employer] is a personalized resource service to help you with the insurance, financial, legal and other issues you are facing.  It's free, confidential and available to anyone with CF.  Ask your  for more information or contact [employer] directly at [facility] ([phone]) or compass@cff.org, or learn more at cff.org/compass.       CF Nurse Line:  Jose David Henderson: [phone]   Felicitas Banuelos, RT: [phone]     Ivory Bonilla and Zeina Pierre , Dieticians: [phone]   Laura Gauthier, Diabetes Nurse: [phone]    Lety Golden: [phone] or Marisa Urbina [phone], Social Workers   [email or web address]

## 2017-04-26 NOTE — MR AVS SNAPSHOT
After Visit Summary   4/26/2017    Nico Morales    MRN: [id number]           Patient Information     Date Of Birth          1975        Visit Information        Provider Department      4/26/2017 11:10 AM Regina Herman PA-C [email or web address]] for Lung Science and Health        Today's Diagnoses     Cystic fibrosis (H)    -  1      Care Instructions    Cystic Fibrosis Self-Care Plan       Patient: Nico Morales   MRN: [id number]   Clinic Date: April 26, 2017     RECOMMENDATIONS:  1. Continue nebulizers and vest therapy, try and consistently do 3 vests/day.  2. Start Bactrim 1 tablet 3 times/day.  3. Start Zyrtec 10 mg at bedtime.  4. Hold gabo and aztreonam nebs on IV therapy  5. Will place and IV and start meropenem and tobramycin. Call the CF office if you notice ringing in your ears or changes in balance.     Annual Studies:   IGG   Date Value Ref Range Status   12/15/2016 1770 (H) 695 - 1620 mg/dL Final     Insulin   Date Value Ref Range Status   07/26/2011 30 mU/L Final     Comment:     Reference Range:  0-20  +120     There are no preventive care reminders to display for this patient.    Pulmonary Function Tests  FEV1: amount of air you can blow out in 1 second  FVC: total amount of air you can take in and blow out    Your Goals:         PFT Latest Ref Rng & Units 4/26/2017   FVC L 3.06   FEV1 L 1.36   FVC% % 61   FEV1% % 34          Airway Clearance: The Most Important Way to Keep Your Lungs Healthy  Vest Settings:    Hill-Rom Frequencies: 8, 9, 10 Pressure 10 Then, Frequencies 18, 19, 20 Pressure 6      RespirTech: Quick Start with Pressure of     Do each frequency for 5 minutes; Deflate vest after each frequency & cough 3 times before beginning the next setting.    Vest and Neb Therapy should be done 3 times/day.    Good Nutrition Can Improve Lung Function and Overall Health     Take ALL of your vitamins with food     Take 1/2 of your enzymes before EVERY meal/snack and  the other 1/2 mid-meal/snack    Wt Readings from Last 3 Encounters:   04/26/17 60 kg (132 lb 4.4 oz)   04/10/17 60.2 kg (132 lb 12.8 oz)   03/14/17 60.6 kg (133 lb 9.6 oz)       Body mass index is 19.89 kg/(m^2).       National CF Foundation Recommendations for BMI in CF Adults: Women: at least 22 Men: at least 23        Controlling Blood Sugars Helps Prevent Lung Infections & Improves Nutrition  Test blood sugar:     In the morning before eating (goal is )     2 hours after a meal (goal is less than 150)     When pre-meal glucose is greater than 150 add correction     At bedtime (if less than 100 eat a snack with 15 grams of carbohydrates  Last A1C Results:   Hemoglobin A1C   Date Value Ref Range Status   04/10/2017 7.0 % Final         If diabetic, measure A1C every 6 months. Goal is under 7%.    Staying Healthy    Research: If you are interested in learning about research opportunities or have questions, please contact Temi Dodge at [phone] or [email or web address].      CF Foundation: Compass is a personalized resource service to help you with the insurance, financial, legal and other issues you are facing.  It's free, confidential and available to anyone with CF.  Ask your  for more information or contact Compass directly at 547-[facility] ([phone]) or compass@cff.org, or learn more at cff.org/compass.       CF Nurse Line:  Jose David Henderson: [phone]   Felicitas Banuelos, RT: [phone]     Ivory Pierre , Dieticians: [phone]   Laura Gauthier, Diabetes Nurse: [phone]    Lety Golden: [phone] or Marisa Urbina [phone], Social Workers   [email or web address]                        Follow-ups after your visit        Follow-up notes from your care team     Return in about 3 weeks (around 5/17/2017).      Your next 10 appointments already scheduled     Aug 14, 2017 10:20 AM CDT   Return Visit with Diana Lee MD, [facility] NURSE   [facility]  "[facility] ([facility])    [address] [address] [place] [address] [phone]              Who to contact     If you have questions or need follow up information about today's clinic visit or your schedule please contact [facility] FOR LUNG SCIENCE AND HEALTH directly at [phone].  Normal or non-critical lab and imaging results will be communicated to you by MyChart, letter or phone within 4 business days after the clinic has received the results. If you do not hear from us within 7 days, please contact the clinic through [employer] or phone. If you have a critical or abnormal lab result, we will notify you by phone as soon as possible.  Submit refill requests through [employer] or call your pharmacy and they will forward the refill request to us. Please allow 3 business days for your refill to be completed.          Additional Information About Your Visit        [employer]ona[employer]ink[employer] Information     [employer] gives you secure access to your electronic health record. If you see a primary care provider, you can also send messages to your care team and make appointments. If you have questions, please call your primary care clinic.  If you do not have a primary care provider, please call [phone] and they will assist you.        Care EveryWhere ID     This is your Care EveryWhere ID. This could be used by other organizations to access your [place] medical records  [id number]        Your Vitals Were     Pulse Temperature Respirations Height Pulse Oximetry BMI (Body Mass Index)    98 99.2  F (37.3  C) (Oral) 18 1.737 m (5' 8.39\") 96% 19.89 kg/m2       Blood Pressure from Last 3 Encounters:   04/26/17 101/68   04/10/17 101/68   03/14/17 97/64    Weight from Last 3 Encounters:   04/26/17 60 kg (132 lb 4.4 oz)   04/10/17 60.2 kg (132 lb 12.8 oz)   03/14/17 60.6 kg (133 lb 9.6 oz)              Today, you had the following     No orders found for display         Today's Medication Changes       "    These changes are accurate as of: 4/26/17 11:49 AM.  If you have any questions, ask your nurse or doctor.               Start taking these medicines.        Dose/Directions    cetirizine 10 MG tablet   Commonly known as:  zyrTEC   Used for:  Cystic fibrosis (H)   Started by:  Regina Herman PA-C        Dose:  10 mg   Take 1 tablet (10 mg) by mouth every evening   Quantity:  30 tablet   Refills:  1       sulfamethoxazole-trimethoprim 800-160 MG per tablet   Commonly known as:  BACTRIM DS/SEPTRA DS   Used for:  Cystic fibrosis (H)   Started by:  Regina Herman PA-C        Dose:  1 tablet   Take 1 tablet by mouth 3 times daily   Quantity:  21 tablet   Refills:  0         Stop taking these medicines if you haven't already. Please contact your care team if you have questions.     doxycycline 100 MG capsule   Commonly known as:  VIBRAMYCIN   Stopped by:  Regina Herman PA-C                Where to get your medicines      These medications were sent to [place] [address] [place] [facility] [address] [facility]  [address] [facility] [address]     Phone:  [phone]     cetirizine 10 MG tablet    sulfamethoxazole-trimethoprim 800-160 MG per tablet                Primary Care Provider Office Phone # Fax #    Jaye Machado [phone]        PH[facility]IC[facility]ANS [address] [place] [address]  [facility] [address]        Thank you!     Thank you for choosing [facility] FOR LUNG SCIENCE AND HEALTH  for your care. Our goal is always to provide you with excellent care. Hearing back from our patients is one way we can continue to improve our services. Please take a few minutes to complete the written survey that you may receive in the mail after your visit with us. Thank you!             Your Updated Medication List - Protect others around you: Learn how to safely use, store and throw away your medicines at www.disposemymeds.org.          This list is accurate as of: 4/26/17  "11:49 AM.  Always use your most recent med list.                   Brand Name Dispense Instructions for use    ACE NOT PRESCRIBED (INTENTIONAL)     0 each    1 each continuous prn. ACE Inhibitor not prescribed due to Risk for drug interaction       acetylcysteine 10 % injection    MUCOMYST    480 mL    Nebulize 4ml qid       * amitriptyline 10 MG tablet    ELAVIL    90 tablet    Take 1 tablet (10 mg) by mouth At Bedtime Take one to three tablets at bedtime.       * AMITRIPTYLINE HCL      30 mg At Bedtime Reported on 4/10/2017       aztreonam lysine 75 MG Solr    KERRIE    84 mL    Take 1 mL (75 mg) by nebulization 3 times daily Alternating every other month with BETHANY.       blood glucose monitoring lancets     2 Box    Use to test blood sugar 6 times daily or as directed.       blood glucose monitoring meter device kit     1 kit    Use to test blood sugar 6 times daily or as directed.       blood glucose monitoring test strip    MARTINA CONTOUR NEXT    200 each    Use to test blood sugar 6 times daily or as directed.       budesonide-formoterol 160-4.5 MCG/ACT Inhaler    SYMBICORT    1 Inhaler    Inhale 2 puffs into the lungs 2 times daily       CALCIUM CITRATE + D PO      Take 1 tablet by mouth 2 times daily.       cetirizine 10 MG tablet    zyrTEC    30 tablet    Take 1 tablet (10 mg) by mouth every evening       CREON 20742 UNITS Cpep per EC capsule   Generic drug:  amylase-lipase-protease     2700 capsule    Take 7-8 caps with meals (3 meals/day) and 2 caps with snacks (3 snacks/day)       EFLOW SCF NEBULIZER HANDSET Misc     1 each    1 each 3 times daily.       [facility] INFUSION MANAGED PATIENT      Contact [facility] for patient specific medication information at [phone] on admission and discharge from the hospital.  Phones are answered 24 hours a day 7 days a week 365 days a year.  Providers - Choose \"CONTINUE HOME MED (no script)\" at discharge if patient treatment with home infusion " will continue.       insulin aspart 100 UNIT/ML injection    NovoLOG PENFILL    15 mL    1 per 20 grams of carbohydrate at lunch and supper. Also use 3 units of Novolog at night with tube feeding.       insulin glargine 100 UNIT/ML injection    LANTUS SOLOSTAR    15 mL    5 units daily       insulin pen needle 32G X 4 MM    BD OMI U/F    200 each    Use 6 daily or as directed.       levalbuterol 1.25 MG/3ML neb solution    XOPENEX    360 mL    Take 3 mLs (1.25 mg) by nebulization 4 times daily       levalbuterol 45 MCG/ACT Inhaler    XOPENEX HFA    3 Inhaler    Inhale 2 puffs into the lungs every 6 hours as needed for shortness of breath / dyspnea       MEPHYTON 5 MG tablet   Generic drug:  phytonadione      1 TABLET DAILY       MULTIVITAMIN PO      1 tablet daily       oseltamivir 75 MG capsule    TAMIFLU    10 capsule    Take 1 capsule (75 mg) by mouth 2 times daily       polyethylene glycol powder    MIRALAX    442 g    Use 17 gms po tid x 3 days then do it daily x 1 month       predniSONE 5 MG tablet    DELTASONE    30 tablet    Take 0.5 tablets (2.5 mg) by mouth 2 times daily       rimantadine 100 MG tablet    FLUMADINE    60 tablet    Take 1 tablet (100 mg) by mouth 2 times daily Take from Oct. 1 thru March 31.       * sodium chloride (Inhalant) 0.9 % Aers     270 mL    Inhale 3 mLs into the lungs 3 times daily       * sodium chloride inhalant 7 % Nebu neb solution     240 mL    Nebulize 4mL twice daily       sulfamethoxazole-trimethoprim 800-160 MG per tablet    BACTRIM DS/SEPTRA DS    21 tablet    Take 1 tablet by mouth 3 times daily       tobramycin (PF) 300 MG/5ML neb solution    BETHANY    280 mL    Take 5 mLs (300 mg) by nebulization 2 times daily 28 days on, 28 days off.       vitamin D 1000 UNITS capsule      1 CAPSULE DAILY       vitamin E 400 UNIT capsule      1 CAPSULE DAILY       VITRON-C 200-125 MG Tabs   Generic drug:  ferrous fumarate 65 mg ([place]. FE)-Vitamin C 125 mg      Take 1 tablet by mouth 2  times daily.       * Notice:  This list has 4 medication(s) that are the same as other medications prescribed for you. Read the directions carefully, and ask your doctor or other care provider to review them with you.

## 2017-04-27 ENCOUNTER — HOSPITAL ENCOUNTER (OUTPATIENT)
Dept: VASCULAR ULTRASOUND | Facility: CLINIC | Age: 42
Discharge: HOME | End: 2017-04-27
Attending: PHYSICIAN ASSISTANT | Admitting: PHYSICIAN ASSISTANT
Payer: COMMERCIAL

## 2017-04-27 DIAGNOSIS — E84.0 CYSTIC FIBROSIS WITH PULMONARY MANIFESTATIONS (H): ICD-10-CM

## 2017-04-27 PROCEDURE — 27210195 ZZH KIT POWER PICC DOUBLE LUMEN

## 2017-04-27 PROCEDURE — 36569 INSJ PICC 5 YR+ W/O IMAGING: CPT

## 2017-04-27 PROCEDURE — 27210577 ZZ H INTRODUCER MICRO SET

## 2017-04-28 LAB
ACID FAST STN SPEC QL: NORMAL
MICRO REPORT STATUS: NORMAL
SPECIMEN SOURCE: NORMAL

## 2017-05-01 LAB
BACTERIA SPEC CULT: ABNORMAL
BUN SERPL-MCNC: 21 MG/DL (ref 7–30)
CREAT SERPL-MCNC: 0.94 MG/DL (ref 0.66–1.25)
GFR SERPL CREATININE-BSD FRML MDRD: 89 ML/MIN/1.7M2
MAGNESIUM SERPL-MCNC: 2.7 MG/DL (ref 1.6–2.3)
MICRO REPORT STATUS: ABNORMAL
MICROORGANISM SPEC CULT: ABNORMAL
MICROORGANISM SPEC CULT: ABNORMAL
PHOSPHATE SERPL-MCNC: 2.2 MG/DL (ref 2.5–4.5)
POTASSIUM SERPL-SCNC: 4.2 MMOL/L (ref 3.4–5.3)
SPECIMEN SOURCE: ABNORMAL
TOBRAMYCIN SERPL-MCNC: 6 MG/L

## 2017-05-01 PROCEDURE — 84520 ASSAY OF UREA NITROGEN: CPT | Performed by: INTERNAL MEDICINE

## 2017-05-01 PROCEDURE — 82565 ASSAY OF CREATININE: CPT | Performed by: INTERNAL MEDICINE

## 2017-05-01 PROCEDURE — 84132 ASSAY OF SERUM POTASSIUM: CPT | Performed by: INTERNAL MEDICINE

## 2017-05-01 PROCEDURE — 80200 ASSAY OF TOBRAMYCIN: CPT | Performed by: INTERNAL MEDICINE

## 2017-05-01 PROCEDURE — 83735 ASSAY OF MAGNESIUM: CPT | Performed by: INTERNAL MEDICINE

## 2017-05-01 PROCEDURE — 84100 ASSAY OF PHOSPHORUS: CPT | Performed by: INTERNAL MEDICINE

## 2017-05-04 LAB
TOBRAMYCIN SERPL-MCNC: 2 MG/L
TOBRAMYCIN SERPL-MCNC: 5.9 MG/L

## 2017-05-04 PROCEDURE — 80200 ASSAY OF TOBRAMYCIN: CPT | Mod: 91 | Performed by: INTERNAL MEDICINE

## 2017-05-08 LAB
BUN SERPL-MCNC: 25 MG/DL (ref 7–30)
CREAT SERPL-MCNC: 0.74 MG/DL (ref 0.66–1.25)
GFR SERPL CREATININE-BSD FRML MDRD: NORMAL ML/MIN/1.7M2
MAGNESIUM SERPL-MCNC: 2.7 MG/DL (ref 1.6–2.3)
PHOSPHATE SERPL-MCNC: 2.2 MG/DL (ref 2.5–4.5)
POTASSIUM SERPL-SCNC: 4.1 MMOL/L (ref 3.4–5.3)
TOBRAMYCIN SERPL-MCNC: 5.8 MG/L

## 2017-05-08 PROCEDURE — 83735 ASSAY OF MAGNESIUM: CPT | Performed by: INTERNAL MEDICINE

## 2017-05-08 PROCEDURE — 82565 ASSAY OF CREATININE: CPT | Performed by: INTERNAL MEDICINE

## 2017-05-08 PROCEDURE — 84132 ASSAY OF SERUM POTASSIUM: CPT | Performed by: INTERNAL MEDICINE

## 2017-05-08 PROCEDURE — 84520 ASSAY OF UREA NITROGEN: CPT | Performed by: INTERNAL MEDICINE

## 2017-05-08 PROCEDURE — 84100 ASSAY OF PHOSPHORUS: CPT | Performed by: INTERNAL MEDICINE

## 2017-05-08 PROCEDURE — 80200 ASSAY OF TOBRAMYCIN: CPT | Mod: 91 | Performed by: INTERNAL MEDICINE

## 2017-05-16 LAB
BUN SERPL-MCNC: 23 MG/DL (ref 7–30)
CREAT SERPL-MCNC: 0.73 MG/DL (ref 0.66–1.25)
EXPTIME-PRE: 14.6 SEC
FEF2575-%PRED-PRE: 11 %
FEF2575-PRE: 0.45 L/SEC
FEF2575-PRED: 3.93 L/SEC
FEFMAX-%PRED-PRE: 62 %
FEFMAX-PRE: 6.14 L/SEC
FEFMAX-PRED: 9.8 L/SEC
FEV1-%PRED-PRE: 34 %
FEV1-PRE: 1.36 L
FEV1FEV6-PRE: 51 %
FEV1FEV6-PRED: 82 %
FEV1FVC-PRE: 45 %
FEV1FVC-PRED: 81 %
FIFMAX-PRE: 4.65 L/SEC
FVC-%PRED-PRE: 61 %
FVC-PRE: 3.06 L
FVC-PRED: 4.96 L
GFR SERPL CREATININE-BSD FRML MDRD: NORMAL ML/MIN/1.7M2
MAGNESIUM SERPL-MCNC: 2.6 MG/DL (ref 1.6–2.3)
PHOSPHATE SERPL-MCNC: 2.9 MG/DL (ref 2.5–4.5)
POTASSIUM SERPL-SCNC: 4.3 MMOL/L (ref 3.4–5.3)
TOBRAMYCIN SERPL-MCNC: 5.8 MG/L

## 2017-05-16 PROCEDURE — 80200 ASSAY OF TOBRAMYCIN: CPT | Mod: 91 | Performed by: INTERNAL MEDICINE

## 2017-05-16 PROCEDURE — 84132 ASSAY OF SERUM POTASSIUM: CPT | Performed by: INTERNAL MEDICINE

## 2017-05-16 PROCEDURE — 84520 ASSAY OF UREA NITROGEN: CPT | Performed by: INTERNAL MEDICINE

## 2017-05-16 PROCEDURE — 83735 ASSAY OF MAGNESIUM: CPT | Performed by: INTERNAL MEDICINE

## 2017-05-16 PROCEDURE — 82565 ASSAY OF CREATININE: CPT | Performed by: INTERNAL MEDICINE

## 2017-05-16 PROCEDURE — 84100 ASSAY OF PHOSPHORUS: CPT | Performed by: INTERNAL MEDICINE

## 2017-05-17 ENCOUNTER — TELEPHONE (OUTPATIENT)
Dept: PHARMACY | Facility: CLINIC | Age: 42
End: 2017-05-17

## 2017-05-17 ENCOUNTER — OFFICE VISIT (OUTPATIENT)
Dept: PULMONOLOGY | Facility: CLINIC | Age: 42
End: 2017-05-17
Attending: PHYSICIAN ASSISTANT
Payer: COMMERCIAL

## 2017-05-17 VITALS
DIASTOLIC BLOOD PRESSURE: 67 MMHG | BODY MASS INDEX: 20.15 KG/M2 | TEMPERATURE: 98.6 F | SYSTOLIC BLOOD PRESSURE: 101 MMHG | OXYGEN SATURATION: 95 % | HEART RATE: 96 BPM | HEIGHT: 68 IN | WEIGHT: 132.94 LBS | RESPIRATION RATE: 18 BRPM

## 2017-05-17 DIAGNOSIS — E84.0 CYSTIC FIBROSIS WITH PULMONARY MANIFESTATIONS (H): ICD-10-CM

## 2017-05-17 DIAGNOSIS — E84.0 CYSTIC FIBROSIS WITH PULMONARY MANIFESTATIONS (H): Primary | ICD-10-CM

## 2017-05-17 DIAGNOSIS — B37.0 CANDIDIASIS OF MOUTH: Primary | ICD-10-CM

## 2017-05-17 DIAGNOSIS — E84.9 CF (CYSTIC FIBROSIS) (H): ICD-10-CM

## 2017-05-17 PROCEDURE — 99212 OFFICE O/P EST SF 10 MIN: CPT | Mod: ZF

## 2017-05-17 PROCEDURE — 87186 SC STD MICRODIL/AGAR DIL: CPT | Performed by: INTERNAL MEDICINE

## 2017-05-17 PROCEDURE — 87070 CULTURE OTHR SPECIMN AEROBIC: CPT | Performed by: INTERNAL MEDICINE

## 2017-05-17 PROCEDURE — 87077 CULTURE AEROBIC IDENTIFY: CPT | Performed by: INTERNAL MEDICINE

## 2017-05-17 RX ORDER — SULFAMETHOXAZOLE/TRIMETHOPRIM 800-160 MG
1 TABLET ORAL 3 TIMES DAILY
Qty: 63 TABLET | Refills: 0 | Status: SHIPPED | OUTPATIENT
Start: 2017-05-17 | End: 2017-06-07

## 2017-05-17 RX ORDER — NYSTATIN 100000/ML
500000 SUSPENSION, ORAL (FINAL DOSE FORM) ORAL 4 TIMES DAILY
Qty: 280 ML | Refills: 0 | Status: SHIPPED | OUTPATIENT
Start: 2017-05-17 | End: 2017-05-31

## 2017-05-17 ASSESSMENT — PAIN SCALES - GENERAL: PAINLEVEL: NO PAIN (0)

## 2017-05-17 NOTE — NURSING NOTE
Chief Complaint   Patient presents with     Cystic Fibrosis     Patient is being seen for CF follow up     Matt Nunes CMA at 11:58 AM on 5/17/2017

## 2017-05-17 NOTE — TELEPHONE ENCOUNTER
TORB to [facility] pharmacist - per Regina Herman PA-C add LFTs to weekly lab draw.    Patient has a history of elevated Alk Phos and is currently on meropenem so liver function should be monitored.

## 2017-05-17 NOTE — MR AVS SNAPSHOT
After Visit Summary   5/17/2017    Nico Morales    MRN: [id number]           Patient Information     Date Of Birth          1975        Visit Information        Provider Department      5/17/2017 12:00 PM Regina Herman PA-C [facility] Lung Science and Health        Today's Diagnoses     Candidiasis of mouth    -  1    CF (cystic fibrosis) (H)        Cystic fibrosis with pulmonary manifestations           Follow-ups after your visit        Your next 10 appointments already scheduled     May 30, 2017 10:50 AM CDT   (Arrive by 10:35 AM)   RETURN CYSTIC FIBROSIS VISIT with AFSANEH Chan [facility] Health ([facility])    [address] [facility]  [address] [facility] [address] [phone]            Aug 14, 2017 10:20 AM CDT   Return Visit with Diana Lee MD, [facility] NURSE   [facility] ([facility])    [address] [place] [address] [phone]              Future tests that were ordered for you today     Open Future Orders        Priority Expected Expires Ordered    Cystic Fibrosis Culture Aerob Bacterial Routine 5/24/2017 6/7/2017 5/17/2017    Spirometry, Breathing Capacity Routine 5/24/2017 6/7/2017 5/17/2017            Who to contact     If you have questions or need follow up information about today's clinic visit or your schedule please contact [facility] LUNG SCIENCE AND HEALTH directly at [phone].  Normal or non-critical lab and imaging results will be communicated to you by MyChart, letter or phone within 4 business days after the clinic has received the results. If you do not hear from us within 7 days, please contact the clinic through MyChart or phone. If you have a critical or abnormal lab result, we will notify you by phone as soon as possible.  Submit refill requests through [employer] or call your pharmacy and they will  "forward the refill request to us. Please allow 3 business days for your refill to be completed.          Additional Information About Your Visit        [employer] Information     [email or web address] gives you secure access to your electronic health record. If you see a primary care provider, you can also send messages to your care team and make appointments. If you have questions, please call your primary care clinic.  If you do not have a primary care provider, please call [phone] and they will assist you.        Care EveryWhere ID     This is your Care EveryWhere ID. This could be used by other organizations to access your [place] medical records  [id number]        Your Vitals Were     Pulse Temperature Respirations Height Pulse Oximetry BMI (Body Mass Index)    96 98.6  F (37  C) (Oral) 18 1.735 m (5' 8.3\") 95% 20.04 kg/m2       Blood Pressure from Last 3 Encounters:   05/17/17 101/67   04/26/17 101/68   04/10/17 101/68    Weight from Last 3 Encounters:   05/17/17 60.3 kg (132 lb 15 oz)   04/26/17 60 kg (132 lb 4.4 oz)   04/10/17 60.2 kg (132 lb 12.8 oz)              We Performed the Following     Cystic Fibrosis Culture Aerob Bacterial          Today's Medication Changes          These changes are accurate as of: 5/17/17 12:30 PM.  If you have any questions, ask your nurse or doctor.               Start taking these medicines.        Dose/Directions    nystatin 442870 UNIT/ML suspension   Commonly known as:  MYCOSTATIN   Used for:  Candidiasis of mouth, CF (cystic fibrosis) (H), Cystic fibrosis with pulmonary manifestations (H)   Started by:  Regina Herman PA-C        Dose:  289391 Units   Take 5 mLs (500,000 Units) by mouth 4 times daily for 14 days   Quantity:  280 mL   Refills:  0         These medicines have changed or have updated prescriptions.        Dose/Directions    * sulfamethoxazole-trimethoprim 800-160 MG per tablet   Commonly known as:  BACTRIM DS/SEPTRA DS   This may have changed:  Another " medication with the same name was added. Make sure you understand how and when to take each.   Used for:  Cystic fibrosis (H)   Changed by:  Regina Herman PA-C        Dose:  1 tablet   Take 1 tablet by mouth 3 times daily   Quantity:  21 tablet   Refills:  0       * sulfamethoxazole-trimethoprim 800-160 MG per tablet   Commonly known as:  BACTRIM DS/SEPTRA DS   This may have changed:  You were already taking a medication with the same name, and this prescription was added. Make sure you understand how and when to take each.   Used for:  CF (cystic fibrosis) (H), Cystic fibrosis with pulmonary manifestations (H), Candidiasis of mouth   Changed by:  Regina Herman PA-C        Dose:  1 tablet   Take 1 tablet by mouth 3 times daily for 21 days   Quantity:  63 tablet   Refills:  0       * Notice:  This list has 2 medication(s) that are the same as other medications prescribed for you. Read the directions carefully, and ask your doctor or other care provider to review them with you.         Where to get your medicines      These medications were sent to [place] [address] [place] [facility] [place] - [address]  [address] [address] [facility] [address]     Phone:  [phone]     nystatin 915409 UNIT/ML suspension    sulfamethoxazole-trimethoprim 800-160 MG per tablet                Primary Care Provider Office Phone # Fax #    Jaye Machado [phone]        [facility]IC[facility]ANS [address] [facility] [address]  [facility] [address]        Thank you!     Thank you for choosing [facility] FOR LUNG SCIENCE AND HEALTH  for your care. Our goal is always to provide you with excellent care. Hearing back from our patients is one way we can continue to improve our services. Please take a few minutes to complete the written survey that you may receive in the mail after your visit with us. Thank you!             Your Updated Medication List - Protect others around you: Learn how to safely use,  store and throw away your medicines at www.disposemymeds.org.          This list is accurate as of: 5/17/17 12:30 PM.  Always use your most recent med list.                   Brand Name Dispense Instructions for use    ACE NOT PRESCRIBED (INTENTIONAL)     0 each    1 each continuous prn. ACE Inhibitor not prescribed due to Risk for drug interaction       acetylcysteine 10 % injection    MUCOMYST    480 mL    Nebulize 4ml qid       * amitriptyline 10 MG tablet    ELAVIL    90 tablet    Take 1 tablet (10 mg) by mouth At Bedtime Take one to three tablets at bedtime.       * AMITRIPTYLINE HCL      30 mg At Bedtime Reported on 4/10/2017       aztreonam lysine 75 MG Solr    KERRIE    84 mL    Take 1 mL (75 mg) by nebulization 3 times daily Alternating every other month with BETHANY.       blood glucose monitoring lancets     2 Box    Use to test blood sugar 6 times daily or as directed.       blood glucose monitoring meter device kit     1 kit    Use to test blood sugar 6 times daily or as directed.       blood glucose monitoring test strip    MARTINA CONTOUR NEXT    200 each    Use to test blood sugar 6 times daily or as directed.       budesonide-formoterol 160-4.5 MCG/ACT Inhaler    SYMBICORT    1 Inhaler    Inhale 2 puffs into the lungs 2 times daily       CALCIUM CITRATE + D PO      Take 1 tablet by mouth 2 times daily.       cetirizine 10 MG tablet    zyrTEC    30 tablet    Take 1 tablet (10 mg) by mouth every evening       CREON 13224 UNITS Cpep per EC capsule   Generic drug:  amylase-lipase-protease     2700 capsule    Take 7-8 caps with meals (3 meals/day) and 2 caps with snacks (3 snacks/day)       EFLOW SCF NEBULIZER HANDSET Misc     1 each    1 each 3 times daily.       [facility] INFUSION MANAGED PATIENT      Contact [facility] for patient specific medication information at [phone] on admission and discharge from the hospital.  Phones are answered 24 hours a day 7 days a week 365 days a  "year.  Providers - Choose \"CONTINUE HOME MED (no script)\" at discharge if patient treatment with home infusion will continue.       insulin aspart 100 UNIT/ML injection    NovoLOG PENFILL    15 mL    1 per 20 grams of carbohydrate at lunch and supper. Also use 3 units of Novolog at night with tube feeding.       insulin glargine 100 UNIT/ML injection    LANTUS SOLOSTAR    15 mL    5 units daily       insulin pen needle 32G X 4 MM    BD OMI U/F    200 each    Use 6 daily or as directed.       levalbuterol 1.25 MG/3ML neb solution    XOPENEX    360 mL    Take 3 mLs (1.25 mg) by nebulization 4 times daily       levalbuterol 45 MCG/ACT Inhaler    XOPENEX HFA    3 Inhaler    Inhale 2 puffs into the lungs every 6 hours as needed for shortness of breath / dyspnea       MEPHYTON 5 MG tablet   Generic drug:  phytonadione      1 TABLET DAILY       MULTIVITAMIN PO      1 tablet daily       nystatin 276253 UNIT/ML suspension    MYCOSTATIN    280 mL    Take 5 mLs (500,000 Units) by mouth 4 times daily for 14 days       oseltamivir 75 MG capsule    TAMIFLU    10 capsule    Take 1 capsule (75 mg) by mouth 2 times daily       polyethylene glycol powder    MIRALAX    442 g    Use 17 gms po tid x 3 days then do it daily x 1 month       predniSONE 5 MG tablet    DELTASONE    30 tablet    Take 0.5 tablets (2.5 mg) by mouth 2 times daily       rimantadine 100 MG tablet    FLUMADINE    60 tablet    Take 1 tablet (100 mg) by mouth 2 times daily Take from Oct. 1 thru March 31.       * sodium chloride (Inhalant) 0.9 % Aers     270 mL    Inhale 3 mLs into the lungs 3 times daily       * sodium chloride inhalant 7 % Nebu neb solution     240 mL    Nebulize 4mL twice daily       * sulfamethoxazole-trimethoprim 800-160 MG per tablet    BACTRIM DS/SEPTRA DS    21 tablet    Take 1 tablet by mouth 3 times daily       * sulfamethoxazole-trimethoprim 800-160 MG per tablet    BACTRIM DS/SEPTRA DS    63 tablet    Take 1 tablet by mouth 3 times daily " for 21 days       tobramycin (PF) 300 MG/5ML neb solution    BETHANY    280 mL    Take 5 mLs (300 mg) by nebulization 2 times daily 28 days on, 28 days off.       vitamin D 1000 UNITS capsule      1 CAPSULE DAILY       vitamin E 400 UNIT capsule      1 CAPSULE DAILY       VITRON-C 200-125 MG Tabs   Generic drug:  ferrous fumarate 65 mg ([place]. FE)-Vitamin C 125 mg      Take 1 tablet by mouth 2 times daily.       * Notice:  This list has 6 medication(s) that are the same as other medications prescribed for you. Read the directions carefully, and ask your doctor or other care provider to review them with you.

## 2017-05-17 NOTE — LETTER
5/17/2017       RE: Nico Morales  [address] [address]  [place] [address]     Dear Colleague,    Thank you for referring your patient, Nico Morales, to the [facility] at [facility]. Please see a copy of my visit note below.    [facility]  May 17, 2017         Assessment and Plan:   Nico Morales is a 40 year old male with cystic fibrosis with severe obstruction, h/o A. Fumigatus on/off itraconazole for many years, CFRD, malnutrition on chronic enteral feeds, pancreatic exocrine insufficiency and adrenal insufficiency who is seen today in clinic for follow up.       1. Severe exacerbation of CF lung disease: improved symptoms, but still with dyspnea, productive cough and chest tightness above his baseline. Only received 21 doses of Bactrim, not 21 days. Vesting 2-3 times/day. PFTs improved nicely, still slightly below his previous best. Previous cultures have grown out MDR Ps. A and Stenotrophomonas on recent culture, which is new for him. Will continue to treat with IV and oral therapy until exacerbation resolves.   - Continue meropenem and tobramycin (aim for lower trough as he has had issues with tinnitus in the distant past)  - Restart Bactrim 1 tablet TID X 21 days  - Continue current nebulizers, Symbicort and vest therapy  - Hold [place] and aztreonam  - Consider initiating azithromycin (previous notes indicate incomplete RBBB on EKG) once exacerbation resolved      2. Eleved alkaline phophatase: since 4/15, however, has been on anti fungal therapy on/off since 2013. Alk phos decreased to 199. LFTs have not been monitored.  - LFTs weekly    3. Exocrine pancreatic insufficiency with malnutrition on chronic enteral feeds: denies symptoms of malabsorption. Weight has improved. Continues with 2 cans of TF at night.  - Continue pancreatic enzymes and vitamin  supplementation  - High protein and high calorie diet supplemented with nocturnal tube feeds      4. CFRD: AIC improved to 7.0 to 7.0 on 4/10/17. BS typically between  in the am. Following with Endocrine.  - Continue Lantus 5 U in the am  - Carb coverage with 1 U/ 10 g carbs      5.  Adrenal insufficiency: likely a complication of using Symbicort with itraconazole. Has been more fatigued recently, TSH WNL. Sodium and potassium WNL, am cortisol normal.   - Continue prednisone 2.5 mg BID  - Cosyntropin stim test will be scheduled after exacerbation     6. CF related low bone mineral density: Vitamin D, TSH and testosterone WNL, is supposed to start pamidronate.  - Pamidronate will be started once feeling better     7. Headaches: complicated by visual disturbances. No acute issues  - Continues on amytriptyline     RTC: in 2 weeks with routine cultures and spirometry  Annual studies due: December 2017  Preventative care: colonoscopy during 2017      Regina Herman PA-C  Pulmonary, Allergy, Critical Care and Sleep Medicine        Interval History:   Symtpoms have imoroved, still feel there is room to get better. Still short of breath with moderage activity, chest tightness improved, little worse today with humidity. No wheeze. Cough is less frequent and productive, but not yet back to baseline either. Appetite improved. No fever or chills, occasional night sweats. No GI complaints other than slightly looser stools.          Review of Systems:   Please see HPI. Otherwise, complete 10 point ROS negative.           Past Medical and Surgical History:     Past Medical History:   Diagnosis Date     CF (cystic fibrosis) (H)      Exocrine pancreatic insufficiency      Nocardia infection      Pseudomonas infection      S/P gastrostomy (H) 2002     Past Surgical History:   Procedure Laterality Date     GASTROSTOMY TUBE  2002           Family History:     Family History   Problem Relation Age of Onset     HEART DISEASE Maternal  Grandmother      HEART DISEASE Maternal Grandfather      HEART DISEASE Paternal Grandmother      DIABETES No family hx of             Social History:     Social History     Social History     Marital status:      Spouse name: N/A     Number of children: 0     Years of education: N/A     Occupational History     financial [employer]     Social History Main Topics     Smoking status: Never Smoker     Smokeless tobacco: Never Used     Alcohol use No     Drug use: No     Sexual activity: Yes     Partners: Female     Birth control/ protection: Pill     Other Topics Concern     Blood Transfusions No     Exercise No     Social History Narrative    Kilo lives with wife in a house in [place], [place].  He works as a .        March 2016. He works independently as a . Works out 5x/week and walks the dog daily.        June 2016. No changes. Describes himself as a creature of habit.            Medications:     Current Outpatient Prescriptions   Medication     nystatin (MYCOSTATIN) 134228 UNIT/ML suspension     sulfamethoxazole-trimethoprim (BACTRIM DS/SEPTRA DS) 800-160 MG per tablet     cetirizine (ZYRTEC) 10 MG tablet     sulfamethoxazole-trimethoprim (BACTRIM DS/SEPTRA DS) 800-160 MG per tablet     budesonide-formoterol (SYMBICORT) 160-4.5 MCG/ACT Inhaler     insulin aspart (NOVOLOG PENFILL) 100 UNIT/ML injection     predniSONE (DELTASONE) 5 MG tablet     amitriptyline (ELAVIL) 10 MG tablet     levalbuterol (XOPENEX HFA) 45 MCG/ACT Inhaler     tobramycin, PF, (BETHANY) 300 MG/5ML neb solution     levalbuterol (XOPENEX) 1.25 MG/3ML neb solution     CREON 88618 UNITS CPEP per EC capsule     blood glucose monitoring (MARTINA CONTOUR NEXT MONITOR) meter device kit     blood glucose monitoring (MARTINA CONTOUR NEXT) test strip     blood glucose monitoring (MARTINA MICROLET) lancets     aztreonam lysine (CAYSTON) 75 MG SOLR     oseltamivir (TAMIFLU) 75 MG capsule     rimantadine  "(FLUMADINE) 100 MG tablet     sodium chloride inhalant 7 % NEBU     insulin pen needle (BD OMI U/F) 32G X 4 MM     insulin glargine (LANTUS SOLOSTAR) 100 UNIT/ML PEN     acetylcysteine (MUCOMYST) 10 % nebulizer solution     sodium chloride, Inhalant, 0.9 % AERS     polyethylene glycol (MIRALAX) powder     [facility] MANAGED PATIENT     ACE NOT PRESCRIBED, INTENTIONAL,     Nebulizers (EFLOW SCF NEBULIZER HANDSET) MISC     AMITRIPTYLINE HCL     Calcium Citrate-Vitamin D (CALCIUM CITRATE + D PO)     Ferrous Fumarate-Vitamin C (VITRON-C) 200-125 MG TABS     MEPHYTON 5 MG PO TABS     MULTIVITAMIN OR     VITAMIN D 1000 UNIT PO CAPS     VITAMIN E 400 UNIT PO CAPS     No current facility-administered medications for this visit.             Physical Exam:   /67  Pulse 96  Temp 98.6  F (37  C) (Oral)  Resp 18  Ht 1.735 m (5' 8.3\")  Wt 60.3 kg (132 lb 15 oz)  SpO2 95%  BMI 20.04 kg/m2    GENERAL: alert, NAD  HEENT: NCAT, EOMI, anicteric sclera, no nasal edema or erythema; canals and TMs clear; no oral mucosal edema or erythema  Neck: no cervical or supraclavicular adenopathy  Respiratory: good air flow, no crackles or rhonchi, slight wheeze in left base  CV: RRR, S1S2, no murmurs noted  Abdomen: normoactive BS, soft   Lymph: no edema, + digital clubbing  Neuro: AAO X 3, CN 2-12 grossly intact  Psychiatric: normal affect, good eye contact  Skin: no rash, jaundice or lesions on limited exam         Data:   All laboratory and imaging data reviewed.      Cystic Fibrosis Culture  Specimen Description   Date Value Ref Range Status   04/26/2017 Sputum  Final   04/26/2017 Sputum  Final   04/26/2017 Sputum  Final    Culture Micro   Date Value Ref Range Status   04/26/2017 (A)  Final    Moderate growth Normal santi  Light growth Stenotrophomonas maltophilia  Moderate growth Pseudomonas aeruginosa, mucoid strain     04/26/2017   Final    Culture received and in progress.  Positive AFB results are called as soon " as   detected.  Final report to follow in 7 to 8 weeks.  Assayed at [facility] [address]     03/14/2017 (A)  Final    Heavy growth Normal santi  Moderate growth Pseudomonas aeruginosa, mucoid strain  Light growth Stenotrophomonas maltophilia  Moderate growth Strain 2 Pseudomonas aeruginosa, mucoid strain          PFT interpretation:  Maneuver: valid and meets ATS guidelines  Severe obstruction with decreased FEV1 and FEV1/FVC  Compared to prior: FEV1 improved to 1.60, but still below previous best of 1.79  The decrease in FVC may represent air trapping v. restrictive physiology.  Lung volumes would be necessary to determine.    severe: Increased vest/bronchodilator/execise, Oral: non-quinolone and Hosp admit/home IV (within 2 wks)          Again, thank you for allowing me to participate in the care of your patient.      Sincerely,    Regina Herman PA-C

## 2017-05-17 NOTE — PROGRESS NOTES
[facility]  May 17, 2017         Assessment and Plan:   Nico Morales is a 40 year old male with cystic fibrosis with severe obstruction, h/o A. Fumigatus on/off itraconazole for many years, CFRD, malnutrition on chronic enteral feeds, pancreatic exocrine insufficiency and adrenal insufficiency who is seen today in clinic for follow up.       1. Severe exacerbation of CF lung disease: improved symptoms, but still with dyspnea, productive cough and chest tightness above his baseline. Only received 21 doses of Bactrim, not 21 days. Vesting 2-3 times/day. PFTs improved nicely, still slightly below his previous best. Previous cultures have grown out MDR Ps. A and Stenotrophomonas on recent culture, which is new for him. Will continue to treat with IV and oral therapy until exacerbation resolves.   - Continue meropenem and tobramycin (aim for lower trough as he has had issues with tinnitus in the distant past)  - Restart Bactrim 1 tablet TID X 21 days  - Continue current nebulizers, Symbicort and vest therapy  - Hold [place] and aztreonam  - Consider initiating azithromycin (previous notes indicate incomplete RBBB on EKG) once exacerbation resolved      2. Eleved alkaline phophatase: since 4/15, however, has been on anti fungal therapy on/off since 2013. Alk phos decreased to 199. LFTs have not been monitored.  - LFTs weekly    3. Exocrine pancreatic insufficiency with malnutrition on chronic enteral feeds: denies symptoms of malabsorption. Weight has improved. Continues with 2 cans of TF at night.  - Continue pancreatic enzymes and vitamin supplementation  - High protein and high calorie diet supplemented with nocturnal tube feeds      4. CFRD: AIC improved to 7.0 to 7.0 on 4/10/17. BS typically between  in the am. Following with Endocrine.  - Continue Lantus 5 U in the am  - Carb coverage with 1 U/ 10 g carbs      5.  Adrenal insufficiency: likely a  complication of using Symbicort with itraconazole. Has been more fatigued recently, TSH WNL. Sodium and potassium WNL, am cortisol normal.   - Continue prednisone 2.5 mg BID  - Cosyntropin stim test will be scheduled after exacerbation     6. CF related low bone mineral density: Vitamin D, TSH and testosterone WNL, is supposed to start pamidronate.  - Pamidronate will be started once feeling better     7. Headaches: complicated by visual disturbances. No acute issues  - Continues on amytriptyline     RTC: in 2 weeks with routine cultures and spirometry  Annual studies due: December 2017  Preventative care: colonoscopy during 2017      Regina Herman PA-C  Pulmonary, Allergy, Critical Care and Sleep Medicine        Interval History:   Symtpoms have imoroved, still feel there is room to get better. Still short of breath with moderage activity, chest tightness improved, little worse today with humidity. No wheeze. Cough is less frequent and productive, but not yet back to baseline either. Appetite improved. No fever or chills, occasional night sweats. No GI complaints other than slightly looser stools.          Review of Systems:   Please see HPI. Otherwise, complete 10 point ROS negative.           Past Medical and Surgical History:     Past Medical History:   Diagnosis Date     CF (cystic fibrosis) (H)      Exocrine pancreatic insufficiency      Nocardia infection      Pseudomonas infection      S/P gastrostomy (H) 2002     Past Surgical History:   Procedure Laterality Date     GASTROSTOMY TUBE  2002           Family History:     Family History   Problem Relation Age of Onset     HEART DISEASE Maternal Grandmother      HEART DISEASE Maternal Grandfather      HEART DISEASE Paternal Grandmother      DIABETES No family hx of             Social History:     Social History     Social History     Marital status:      Spouse name: N/A     Number of children: 0     Years of education: N/A     Occupational History      [employer]     Social History Main Topics     Smoking status: Never Smoker     Smokeless tobacco: Never Used     Alcohol use No     Drug use: No     Sexual activity: Yes     Partners: Female     Birth control/ protection: Pill     Other Topics Concern     Blood Transfusions No     Exercise No     Social History Narrative    Kilo lives with wife in a house in [place], [place].  He works as a .        March 2016. He works independently as a . Works out 5x/week and walks the dog daily.        June 2016. No changes. Describes himself as a creature of habit.            Medications:     Current Outpatient Prescriptions   Medication     nystatin (MYCOSTATIN) 930736 UNIT/ML suspension     sulfamethoxazole-trimethoprim (BACTRIM DS/SEPTRA DS) 800-160 MG per tablet     cetirizine (ZYRTEC) 10 MG tablet     sulfamethoxazole-trimethoprim (BACTRIM DS/SEPTRA DS) 800-160 MG per tablet     budesonide-formoterol (SYMBICORT) 160-4.5 MCG/ACT Inhaler     insulin aspart (NOVOLOG PENFILL) 100 UNIT/ML injection     predniSONE (DELTASONE) 5 MG tablet     amitriptyline (ELAVIL) 10 MG tablet     levalbuterol (XOPENEX HFA) 45 MCG/ACT Inhaler     tobramycin, PF, (BETHANY) 300 MG/5ML neb solution     levalbuterol (XOPENEX) 1.25 MG/3ML neb solution     CREON 17406 UNITS CPEP per EC capsule     blood glucose monitoring (MARTINA CONTOUR NEXT MONITOR) meter device kit     blood glucose monitoring (MARTINA CONTOUR NEXT) test strip     blood glucose monitoring (MARTINA MICROLET) lancets     aztreonam lysine (CAYSTON) 75 MG SOLR     oseltamivir (TAMIFLU) 75 MG capsule     rimantadine (FLUMADINE) 100 MG tablet     sodium chloride inhalant 7 % NEBU     insulin pen needle (BD OMI U/F) 32G X 4 MM     insulin glargine (LANTUS SOLOSTAR) 100 UNIT/ML PEN     acetylcysteine (MUCOMYST) 10 % nebulizer solution     sodium chloride, Inhalant, 0.9 % AERS     polyethylene glycol (MIRALAX) powder     [facility]  "MANAGED PATIENT     ACE NOT PRESCRIBED, INTENTIONAL,     Nebulizers (EFLOW SCF NEBULIZER HANDSET) MISC     AMITRIPTYLINE HCL     Calcium Citrate-Vitamin D (CALCIUM CITRATE + D PO)     Ferrous Fumarate-Vitamin C (VITRON-C) 200-125 MG TABS     MEPHYTON 5 MG PO TABS     MULTIVITAMIN OR     VITAMIN D 1000 UNIT PO CAPS     VITAMIN E 400 UNIT PO CAPS     No current facility-administered medications for this visit.             Physical Exam:   /67  Pulse 96  Temp 98.6  F (37  C) (Oral)  Resp 18  Ht 1.735 m (5' 8.3\")  Wt 60.3 kg (132 lb 15 oz)  SpO2 95%  BMI 20.04 kg/m2    GENERAL: alert, NAD  HEENT: NCAT, EOMI, anicteric sclera, no nasal edema or erythema; canals and TMs clear; no oral mucosal edema or erythema  Neck: no cervical or supraclavicular adenopathy  Respiratory: good air flow, no crackles or rhonchi, slight wheeze in left base  CV: RRR, S1S2, no murmurs noted  Abdomen: normoactive BS, soft   Lymph: no edema, + digital clubbing  Neuro: AAO X 3, CN 2-12 grossly intact  Psychiatric: normal affect, good eye contact  Skin: no rash, jaundice or lesions on limited exam         Data:   All laboratory and imaging data reviewed.      Cystic Fibrosis Culture  Specimen Description   Date Value Ref Range Status   04/26/2017 Sputum  Final   04/26/2017 Sputum  Final   04/26/2017 Sputum  Final    Culture Micro   Date Value Ref Range Status   04/26/2017 (A)  Final    Moderate growth Normal santi  Light growth Stenotrophomonas maltophilia  Moderate growth Pseudomonas aeruginosa, mucoid strain     04/26/2017   Final    Culture received and in progress.  Positive AFB results are called as soon as   detected.  Final report to follow in 7 to 8 weeks.  Assayed at [facility] [address]     03/14/2017 (A)  Final    Heavy growth Normal santi  Moderate growth Pseudomonas aeruginosa, mucoid strain  Light growth Stenotrophomonas maltophilia  Moderate growth Strain 2 Pseudomonas aeruginosa, mucoid " strain          PFT interpretation:  Maneuver: valid and meets ATS guidelines  Severe obstruction with decreased FEV1 and FEV1/FVC  Compared to prior: FEV1 improved to 1.60, but still below previous best of 1.79  The decrease in FVC may represent air trapping v. restrictive physiology.  Lung volumes would be necessary to determine.    severe: Increased vest/bronchodilator/execise, Oral: non-quinolone and Hosp admit/home IV (within 2 wks)

## 2017-05-22 DIAGNOSIS — E84.0 CYSTIC FIBROSIS WITH PULMONARY MANIFESTATIONS (H): Primary | ICD-10-CM

## 2017-05-22 LAB
BUN SERPL-MCNC: 31 MG/DL (ref 7–30)
CREAT SERPL-MCNC: 0.97 MG/DL (ref 0.66–1.25)
GFR SERPL CREATININE-BSD FRML MDRD: 86 ML/MIN/1.7M2
MAGNESIUM SERPL-MCNC: 2.6 MG/DL (ref 1.6–2.3)
PHOSPHATE SERPL-MCNC: 2.1 MG/DL (ref 2.5–4.5)
POTASSIUM SERPL-SCNC: 4.4 MMOL/L (ref 3.4–5.3)
TOBRAMYCIN SERPL-MCNC: 8 MG/L

## 2017-05-22 PROCEDURE — 84132 ASSAY OF SERUM POTASSIUM: CPT | Performed by: INTERNAL MEDICINE

## 2017-05-22 PROCEDURE — 84520 ASSAY OF UREA NITROGEN: CPT | Performed by: INTERNAL MEDICINE

## 2017-05-22 PROCEDURE — 82565 ASSAY OF CREATININE: CPT | Performed by: INTERNAL MEDICINE

## 2017-05-22 PROCEDURE — 84100 ASSAY OF PHOSPHORUS: CPT | Performed by: INTERNAL MEDICINE

## 2017-05-22 PROCEDURE — 80200 ASSAY OF TOBRAMYCIN: CPT | Mod: 91 | Performed by: INTERNAL MEDICINE

## 2017-05-22 PROCEDURE — 83735 ASSAY OF MAGNESIUM: CPT | Performed by: INTERNAL MEDICINE

## 2017-05-23 ENCOUNTER — CARE COORDINATION (OUTPATIENT)
Dept: PULMONOLOGY | Facility: CLINIC | Age: 42
End: 2017-05-23

## 2017-05-23 LAB
BACTERIA SPEC CULT: ABNORMAL
MICRO REPORT STATUS: ABNORMAL
MICROORGANISM SPEC CULT: ABNORMAL
MICROORGANISM SPEC CULT: ABNORMAL
SPECIMEN SOURCE: ABNORMAL

## 2017-05-23 NOTE — PROGRESS NOTES
VM left with pt per Dr. Machado request to let him know that his creatinine is slightly elevated, but still WNL, and she would like him to push po fluids. We will repeat a BMP on Friday to ensure that it is not continuing to rise as he is on tobra, eduarda and po bactrim. Advised to call the CF office for questions or concerns. [facility] called with orders.

## 2017-05-26 LAB
ANION GAP SERPL CALCULATED.3IONS-SCNC: 9 MMOL/L (ref 3–14)
BUN SERPL-MCNC: 19 MG/DL (ref 7–30)
CALCIUM SERPL-MCNC: 8.9 MG/DL (ref 8.5–10.1)
CHLORIDE SERPL-SCNC: 102 MMOL/L (ref 94–109)
CO2 SERPL-SCNC: 27 MMOL/L (ref 20–32)
CREAT SERPL-MCNC: 0.96 MG/DL (ref 0.66–1.25)
GFR SERPL CREATININE-BSD FRML MDRD: 87 ML/MIN/1.7M2
GLUCOSE SERPL-MCNC: 136 MG/DL (ref 70–99)
POTASSIUM SERPL-SCNC: 4.3 MMOL/L (ref 3.4–5.3)
SODIUM SERPL-SCNC: 138 MMOL/L (ref 133–144)

## 2017-05-26 PROCEDURE — 80048 BASIC METABOLIC PNL TOTAL CA: CPT | Performed by: INTERNAL MEDICINE

## 2017-05-26 NOTE — PROGRESS NOTES
Call from Vel KennedyD at [facility]:    Notes that Kilo is complaining of ringing in his ears. Currently on 300 mg of Tobramycin Q24H. Dosing started at 200 mg Tobramycin Q24H.    PLAN:  Hold Tobramycin.   Reassess at clinic visit next Tuesday.

## 2017-05-29 NOTE — PROGRESS NOTES
[facility]  May 30, 2017         Assessment and Plan:   Nico Morales is a 40 year old male with cystic fibrosis with severe obstruction, h/o A. Fumigatus on/off itraconazole for many years, CFRD, malnutrition on chronic enteral feeds, pancreatic exocrine insufficiency and adrenal insufficiency who is seen today in clinic for follow up. He was last seen in clinic in March and started on doxycycline for mild exacerbation.       1. Severe exacerbation of CF lung disease: clinically at baseline, but feels he could still get his PFTs slightly higher for the summer. Dyspnea, tightness and fatigue all resolved. Very occasional streaking in his sputum.  Vesting 2-3 times/day. PFTs increased to close to recent best with FEV1 of 42%. Previous cultures have grown out MDR Ps. A and recently Stenotrophomonas. Stopped tobramycin on 5/26 secondary to tinnitus. At this time, patient would like to continue IV therapy X 1 more week while he completes his Bactrim.  - Complete meropenem in one week, complete course of Bactrim  - Continue current nebulizers, Symbicort and vest therapy  - Resume gabo nebs, (alternates with Cay[place])  - Resume azithromycin (QTc of 430 with normal QRS duration, does have stable incomplete RBBB, discussed with Tammy DIAZ and okay to resume)  - Consider initiating azithromycin (previous notes indicate incomplete RBBB on EKG) once exacerbation       2. Eleved alkaline phophatase: since 4/15, however, has been on anti fungal therapy on/off since 2013. Alk phos decreased to 199.     3. Exocrine pancreatic insufficiency with malnutrition on chronic enteral feeds: denies symptoms of malabsorption. Weight improved slightly. Continues with 2 cans of TF at night.  - Continue pancreatic enzymes and vitamin supplementation  - High protein and high calorie diet supplemented with nocturnal tube feeds      4. CFRD: AIC improved to 7.0 to 7.0 on 4/10/17. BS < 100 in  "the am.Following with Endocrine.  - Continue Lantus 5 U in the am  - Carb coverage with 1 U/ 10 g carbs      5.  Adrenal insufficiency: likely a complication of using Symbicort with itraconazole. Fatigue improved. TSH WNL. Sodium and potassium WNL, am cortisol normal.   - Continue prednisone 2.5 mg BID  - Cosyntropin stim test to be schduled     6. CF related low bone mineral density: Vitamin D, TSH and testosterone WNL, is supposed to start pamidronate.  - F/u with Endocrine    RTC: in 4 weeks with routine cultures and spirometry  Annual studies due: December 2017  Preventative care: colonoscopy during 2017      Regina Herman PA-C  Pulmonary, Allergy, Critical Care and Sleep Medicine        Interval History:   Symptom wise, patient feels his cough is decreased, less productive. Sputum is green/yellow, occasional streaks and specks. No further shortnes s of breath or tightness. Energy level is better, sleep is \"shaky\", problem is staying asleep. No nighttime cough. Ringing in his ears has resolved, mainly in the left. No GI complaints.          Review of Systems:   Please see HPI. Otherwise, complete 10 point ROS negative.           Past Medical and Surgical History:     Past Medical History:   Diagnosis Date     CF (cystic fibrosis) (H)      Exocrine pancreatic insufficiency      Nocardia infection      Pseudomonas infection      S/P gastrostomy (H) 2002     Past Surgical History:   Procedure Laterality Date     GASTROSTOMY TUBE  2002           Family History:     Family History   Problem Relation Age of Onset     HEART DISEASE Maternal Grandmother      HEART DISEASE Maternal Grandfather      HEART DISEASE Paternal Grandmother      DIABETES No family hx of             Social History:     Social History     Social History     Marital status:      Spouse name: N/A     Number of children: 0     Years of education: N/A     Occupational History     financial [employer]     Social History Main " Topics     Smoking status: Never Smoker     Smokeless tobacco: Never Used     Alcohol use No     Drug use: No     Sexual activity: Yes     Partners: Female     Birth control/ protection: Pill     Other Topics Concern     Blood Transfusions No     Exercise No     Social History Narrative    Kilo lives with wife in a house in [place], [place].  He works as a .        March 2016. He works independently as a . Works out 5x/week and walks the dog daily.        June 2016. No changes. Describes himself as a creature of habit.            Medications:     Current Outpatient Prescriptions   Medication     azithromycin (ZITHROMAX) 250 MG tablet     nystatin (MYCOSTATIN) 060029 UNIT/ML suspension     sulfamethoxazole-trimethoprim (BACTRIM DS/SEPTRA DS) 800-160 MG per tablet     cetirizine (ZYRTEC) 10 MG tablet     sulfamethoxazole-trimethoprim (BACTRIM DS/SEPTRA DS) 800-160 MG per tablet     budesonide-formoterol (SYMBICORT) 160-4.5 MCG/ACT Inhaler     insulin aspart (NOVOLOG PENFILL) 100 UNIT/ML injection     predniSONE (DELTASONE) 5 MG tablet     amitriptyline (ELAVIL) 10 MG tablet     levalbuterol (XOPENEX HFA) 45 MCG/ACT Inhaler     tobramycin, PF, (BETHANY) 300 MG/5ML neb solution     levalbuterol (XOPENEX) 1.25 MG/3ML neb solution     CREON 39200 UNITS CPEP per EC capsule     blood glucose monitoring (MARTINA CONTOUR NEXT MONITOR) meter device kit     blood glucose monitoring (MARTINA CONTOUR NEXT) test strip     blood glucose monitoring (MARTINA MICROLET) lancets     aztreonam lysine (CAYSTON) 75 MG SOLR     oseltamivir (TAMIFLU) 75 MG capsule     rimantadine (FLUMADINE) 100 MG tablet     sodium chloride inhalant 7 % NEBU     insulin pen needle (BD OMI U/F) 32G X 4 MM     insulin glargine (LANTUS SOLOSTAR) 100 UNIT/ML PEN     acetylcysteine (MUCOMYST) 10 % nebulizer solution     sodium chloride, Inhalant, 0.9 % AERS     polyethylene glycol (MIRALAX) powder     [facility] MANAGED PATIENT      ACE NOT PRESCRIBED, INTENTIONAL,     Nebulizers (EFLOW SCF NEBULIZER HANDSET) MISC     AMITRIPTYLINE HCL     Calcium Citrate-Vitamin D (CALCIUM CITRATE + D PO)     Ferrous Fumarate-Vitamin C (VITRON-C) 200-125 MG TABS     MEPHYTON 5 MG PO TABS     MULTIVITAMIN OR     VITAMIN D 1000 UNIT PO CAPS     VITAMIN E 400 UNIT PO CAPS     No current facility-administered medications for this visit.             Physical Exam:   BP 95/63 (BP Location: Right arm, Patient Position: Chair, Cuff Size: Adult Regular)  Pulse 89  Resp 16  Wt 60.8 kg (134 lb 0.6 oz)  SpO2 97%  BMI 20.2 kg/m2    GENERAL: alert, NAD  HEENT: NCAT, EOMI, anicteric sclera, drier blood in right nare; canals and TMs clear; no oral mucosal edema or erythema  Neck: no cervical or supraclavicular adenopathy  Respiratory: good air flow, no crackles, rhonchi or wheezing  CV: RRR, S1S2, no murmurs noted  Abdomen: normoactive BS, soft and non tender   Lymph: no edema, + digital clubbing  Neuro: AAO X 3, CN 2-12 grossly intact  Psychiatric: normal affect, good eye contact  Skin: no rash, jaundice or lesions on limited exam         Data:   All laboratory and imaging data reviewed.      Cystic Fibrosis Culture  Specimen Description   Date Value Ref Range Status   05/17/2017 Sputum  Final   04/26/2017 Sputum  Final   04/26/2017 Sputum  Final   04/26/2017 Sputum  Final    Culture Micro   Date Value Ref Range Status   05/17/2017 (A)  Final    Light growth Normal santi  Light growth Pseudomonas aeruginosa, mucoid strain  Light growth Strain 2 Pseudomonas aeruginosa, mucoid strain     04/26/2017 (A)  Final    Moderate growth Normal santi  Light growth Stenotrophomonas maltophilia  Moderate growth Pseudomonas aeruginosa, mucoid strain     04/26/2017   Final    Culture received and in progress.  Positive AFB results are called as soon as   detected.  Final report to follow in 7 to 8 weeks.  Assayed at "[facility] [employer] [address]          PFT  interpretation:  Maneuver: valid and meets ATS guidelines  Severe obstruction with decreased FEV1 and FEV1/FVC  Compared to prior: FEV1 is near his previous best (1.7)  The decrease in FVC may represent air trapping v. restrictive physiology.  Lung volumes would be necessary to determine.    severe: Increased vest/bronchodilator/execise, Oral: non-quinolone and Hosp admit/home IV (within 2 wks)

## 2017-05-30 ENCOUNTER — OFFICE VISIT (OUTPATIENT)
Dept: PULMONOLOGY | Facility: CLINIC | Age: 42
End: 2017-05-30
Attending: PHYSICIAN ASSISTANT
Payer: COMMERCIAL

## 2017-05-30 VITALS
DIASTOLIC BLOOD PRESSURE: 63 MMHG | BODY MASS INDEX: 20.2 KG/M2 | RESPIRATION RATE: 16 BRPM | HEART RATE: 89 BPM | OXYGEN SATURATION: 97 % | SYSTOLIC BLOOD PRESSURE: 95 MMHG | WEIGHT: 134.04 LBS

## 2017-05-30 DIAGNOSIS — E84.0 CYSTIC FIBROSIS WITH PULMONARY MANIFESTATIONS (H): ICD-10-CM

## 2017-05-30 DIAGNOSIS — E84.9 CF (CYSTIC FIBROSIS) (H): ICD-10-CM

## 2017-05-30 DIAGNOSIS — B37.0 CANDIDIASIS OF MOUTH: ICD-10-CM

## 2017-05-30 PROCEDURE — 93005 ELECTROCARDIOGRAM TRACING: CPT | Mod: ZF

## 2017-05-30 PROCEDURE — 99212 OFFICE O/P EST SF 10 MIN: CPT | Mod: 25,ZF

## 2017-05-30 PROCEDURE — 87186 SC STD MICRODIL/AGAR DIL: CPT | Performed by: INTERNAL MEDICINE

## 2017-05-30 PROCEDURE — 93010 ELECTROCARDIOGRAM REPORT: CPT | Mod: ZP | Performed by: INTERNAL MEDICINE

## 2017-05-30 PROCEDURE — 87070 CULTURE OTHR SPECIMN AEROBIC: CPT | Performed by: INTERNAL MEDICINE

## 2017-05-30 PROCEDURE — 87077 CULTURE AEROBIC IDENTIFY: CPT | Performed by: INTERNAL MEDICINE

## 2017-05-30 RX ORDER — AZITHROMYCIN 250 MG/1
250 TABLET, FILM COATED ORAL DAILY
Qty: 30 TABLET | Refills: 11 | Status: SHIPPED | OUTPATIENT
Start: 2017-05-30 | End: 2018-05-08

## 2017-05-30 ASSESSMENT — PAIN SCALES - GENERAL: PAINLEVEL: NO PAIN (0)

## 2017-05-30 NOTE — MR AVS SNAPSHOT
After Visit Summary   5/30/2017    Nico Morales    MRN: [id number]           Patient Information     Date Of Birth          1975        Visit Information        Provider Department      5/30/2017 10:50 AM Regina Herman PA-C [facility]        Today's Diagnoses     Cystic fibrosis with pulmonary manifestations (H)          Care Instructions    Cystic Fibrosis Self-Care Plan       Patient: Nico Morales   MRN: [id number]   Clinic Date: May 30, 2017     RECOMMENDATIONS:  1. Continue nebulizers and vest therapy.   2. We will do one more week of meropenem, then discontinue PICC line.  3. Complete course of Bactrim.  4. Resume gabo nebs, if you have increased ringing in your ears, stop the gabo nebs and start [place].    Annual Studies:   IGG   Date Value Ref Range Status   12/15/2016 1770 (H) 695 - 1620 mg/dL Final     Insulin   Date Value Ref Range Status   07/26/2011 30 mU/L Final     Comment:     Reference Range:  0-20  +120     There are no preventive care reminders to display for this patient.    Pulmonary Function Tests  FEV1: amount of air you can blow out in 1 second  FVC: total amount of air you can take in and blow out    Your Goals:         PFT Latest Ref Rng & Units 5/30/2017   FVC L 3.65   FEV1 L 1.68   FVC% % 73   FEV1% % 42          Airway Clearance: The Most Important Way to Keep Your Lungs Healthy  Vest Settings:    Hill-Rom Frequencies: 8, 9, 10 Pressure 10 Then, Frequencies 18, 19, 20 Pressure 6      RespirTech: Quick Start with Pressure of     Do each frequency for 5 minutes; Deflate vest after each frequency & cough 3 times before beginning the next setting.    Vest and Neb Therapy should be done 2-3 times/day.    Good Nutrition Can Improve Lung Function and Overall Health     Take ALL of your vitamins with food     Take 1/2 of your enzymes before EVERY meal/snack and the other 1/2 mid-meal/snack    Wt Readings from Last 3  Encounters:   05/30/17 60.8 kg (134 lb 0.6 oz)   05/17/17 60.3 kg (132 lb 15 oz)   04/26/17 60 kg (132 lb 4.4 oz)       Body mass index is 20.2 kg/(m^2).    National CF Foundation Recommendations for BMI in CF Adults: Women: at least 22 Men: at least 23        Controlling Blood Sugars Helps Prevent Lung Infections & Improves Nutrition  Test blood sugar:     In the morning before eating (goal is )     2 hours after a meal (goal is less than 150)     When pre-meal glucose is greater than 150 add correction     At bedtime (if less than 100 eat a snack with 15 grams of carbohydrates  Last A1C Results:   Hemoglobin A1C   Date Value Ref Range Status   04/10/2017 7.0 % Final         If diabetic, measure A1C every 6 months. Goal is under 7%.    Staying Healthy    Research: If you are interested in learning about research opportunities or have questions, please contact Temi Dodge at [phone] or [email or web address].      CF Foundation: Compass is a personalized resource service to help you with the insurance, financial, legal and other issues you are facing.  It's free, confidential and available to anyone with CF.  Ask your  for more information or contact Compass directly at [facility] ([phone]) or compass@cff.org, or learn more at cff.[email or web address].       CF Nurse Line:  Jose David Henderson: [phone]   Felicitas Banuelos, RT: [phone]     Ivory Bonilla and Zeina Pierre , Dieticians: [phone]   Laura Gauthier, Diabetes Nurse: [phone]    Lety Golden: [phone] or Marisa Urbina [phone], Social Workers   [email or web address]                          Follow-ups after your visit        Follow-up notes from your care team     Return in about 4 weeks (around 6/27/2017).      Your next 10 appointments already scheduled     Aug 14, 2017 10:20 AM CDT   Return Visit with Diana Lee MD, [facility] ENDO NURSE   [facility]    [id number]  [address] [place] [address] [phone] [phone]              Future tests that were ordered for you today     Open Future Orders        Priority Expected Expires Ordered    Cystic Fibrosis Culture Aerob Bacterial Routine 6/27/2017 7/11/2017 5/30/2017    Spirometry, Breathing Capacity Routine 6/27/2017 7/11/2017 5/30/2017            Who to contact     If you have questions or need follow up information about today's clinic visit or your schedule please contact [facility] directly at [phone].  Normal or non-critical lab and imaging results will be communicated to you by [employer], letter or phone within 4 business days after the clinic has received the results. If you do not hear from us within 7 days, please contact the clinic through [employer] or phone. If you have a critical or abnormal lab result, we will notify you by phone as soon as possible.  Submit refill requests through [employer] or call your pharmacy and they will forward the refill request to us. Please allow 3 business days for your refill to be completed.          Additional Information About Your Visit        Bill[employer]rinkhart Information     [employer] gives you secure access to your electronic health record. If you see a primary care provider, you can also send messages to your care team and make appointments. If you have questions, please call your primary care clinic.  If you do not have a primary care provider, please call [phone] and they will assist you.        Care EveryWhere ID     This is your Care EveryWhere ID. This could be used by other organizations to access your [facility] medical records  [id number]        Your Vitals Were     Pulse Respirations Pulse Oximetry BMI (Body Mass Index)          89 16 97% 20.2 kg/m2         Blood Pressure from Last 3 Encounters:   05/30/17 95/63   05/17/17 101/67   04/26/17 101/68    Weight from Last 3 Encounters:   05/30/17 60.8 kg (134 lb 0.6 oz)   05/17/17 60.3 kg (132 lb  15 oz)   04/26/17 60 kg (132 lb 4.4 oz)              We Performed the Following     Cystic Fibrosis Culture Aerob Bacterial     EKG 12-lead complete w/read - Clinics          Today's Medication Changes          These changes are accurate as of: 5/30/17 11:38 AM.  If you have any questions, ask your nurse or doctor.               Start taking these medicines.        Dose/Directions    azithromycin 250 MG tablet   Commonly known as:  ZITHROMAX   Used for:  Cystic fibrosis with pulmonary manifestations (H)   Started by:  Regina Herman PA-C        Dose:  250 mg   Take 1 tablet (250 mg) by mouth daily   Quantity:  30 tablet   Refills:  11            Where to get your medicines      These medications were sent to [place] [address] [facility] - [place] - [address]  [address] [place] [address]     Phone:  [phone]     azithromycin 250 MG tablet                Primary Care Provider Office Phone # Fax #    Jaye Machado [phone]        [facility] [address] [place] [address]  [facility] [address]        Thank you!     Thank you for choosing [facility]  for your care. Our goal is always to provide you with excellent care. Hearing back from our patients is one way we can continue to improve our services. Please take a few minutes to complete the written survey that you may receive in the mail after your visit with us. Thank you!             Your Updated Medication List - Protect others around you: Learn how to safely use, store and throw away your medicines at www.disposemymeds.org.          This list is accurate as of: 5/30/17 11:38 AM.  Always use your most recent med list.                   Brand Name Dispense Instructions for use    ACE NOT PRESCRIBED (INTENTIONAL)     0 each    1 each continuous prn. ACE Inhibitor not prescribed due to Risk for drug interaction       acetylcysteine 10 % injection    MUCOMYST    480 mL    Nebulize  "4ml qid       * amitriptyline 10 MG tablet    ELAVIL    90 tablet    Take 1 tablet (10 mg) by mouth At Bedtime Take one to three tablets at bedtime.       * AMITRIPTYLINE HCL      30 mg At Bedtime Reported on 4/10/2017       azithromycin 250 MG tablet    ZITHROMAX    30 tablet    Take 1 tablet (250 mg) by mouth daily       aztreonam lysine 75 MG Solr    CAYSTON    84 mL    Take 1 mL (75 mg) by nebulization 3 times daily Alternating every other month with BETHANY.       blood glucose monitoring lancets     2 Box    Use to test blood sugar 6 times daily or as directed.       blood glucose monitoring meter device kit     1 kit    Use to test blood sugar 6 times daily or as directed.       blood glucose monitoring test strip    MARTINA CONTOUR NEXT    200 each    Use to test blood sugar 6 times daily or as directed.       budesonide-formoterol 160-4.5 MCG/ACT Inhaler    SYMBICORT    1 Inhaler    Inhale 2 puffs into the lungs 2 times daily       CALCIUM CITRATE + D PO      Take 1 tablet by mouth 2 times daily.       cetirizine 10 MG tablet    zyrTEC    30 tablet    Take 1 tablet (10 mg) by mouth every evening       CREON 74796 UNITS Cpep per EC capsule   Generic drug:  amylase-lipase-protease     2700 capsule    Take 7-8 caps with meals (3 meals/day) and 2 caps with snacks (3 snacks/day)       EFLOW SCF NEBULIZER HANDSET Misc     1 each    1 each 3 times daily.       [facility] MANAGED PATIENT      Contact [facility] for patient specific medication information at [phone] on admission and discharge from the hospital.  Phones are answered 24 hours a day 7 days a week 365 days a year.  Providers - Choose \"CONTINUE HOME MED (no script)\" at discharge if patient treatment with home infusion will continue.       insulin aspart 100 UNIT/ML injection    NovoLOG PENFILL    15 mL    1 per 20 grams of carbohydrate at lunch and supper. Also use 3 units of Novolog at night with tube feeding.       insulin " glargine 100 UNIT/ML injection    LANTUS SOLOSTAR    15 mL    5 units daily       insulin pen needle 32G X 4 MM    BD OMI U/F    200 each    Use 6 daily or as directed.       levalbuterol 1.25 MG/3ML neb solution    XOPENEX    360 mL    Take 3 mLs (1.25 mg) by nebulization 4 times daily       levalbuterol 45 MCG/ACT Inhaler    XOPENEX HFA    3 Inhaler    Inhale 2 puffs into the lungs every 6 hours as needed for shortness of breath / dyspnea       MEPHYTON 5 MG tablet   Generic drug:  phytonadione      1 TABLET DAILY       MULTIVITAMIN PO      1 tablet daily       nystatin 038444 UNIT/ML suspension    MYCOSTATIN    280 mL    Take 5 mLs (500,000 Units) by mouth 4 times daily for 14 days       oseltamivir 75 MG capsule    TAMIFLU    10 capsule    Take 1 capsule (75 mg) by mouth 2 times daily       polyethylene glycol powder    MIRALAX    442 g    Use 17 gms po tid x 3 days then do it daily x 1 month       predniSONE 5 MG tablet    DELTASONE    30 tablet    Take 0.5 tablets (2.5 mg) by mouth 2 times daily       rimantadine 100 MG tablet    FLUMADINE    60 tablet    Take 1 tablet (100 mg) by mouth 2 times daily Take from Oct. 1 thru March 31.       * sodium chloride (Inhalant) 0.9 % Aers     270 mL    Inhale 3 mLs into the lungs 3 times daily       * sodium chloride inhalant 7 % Nebu neb solution     240 mL    Nebulize 4mL twice daily       * sulfamethoxazole-trimethoprim 800-160 MG per tablet    BACTRIM DS/SEPTRA DS    21 tablet    Take 1 tablet by mouth 3 times daily       * sulfamethoxazole-trimethoprim 800-160 MG per tablet    BACTRIM DS/SEPTRA DS    63 tablet    Take 1 tablet by mouth 3 times daily for 21 days       tobramycin (PF) 300 MG/5ML neb solution    BETHANY    280 mL    Take 5 mLs (300 mg) by nebulization 2 times daily 28 days on, 28 days off.       vitamin D 1000 UNITS capsule      1 CAPSULE DAILY       vitamin E 400 UNIT capsule      1 CAPSULE DAILY       VITRON-C 200-125 MG Tabs   Generic drug:  ferrous  fumarate 65 mg ([place]. FE)-Vitamin C 125 mg      Take 1 tablet by mouth 2 times daily.       * Notice:  This list has 6 medication(s) that are the same as other medications prescribed for you. Read the directions carefully, and ask your doctor or other care provider to review them with you.

## 2017-05-30 NOTE — NURSING NOTE
Chief Complaint   Patient presents with     Cystic Fibrosis     Patient is being seen for CF follow up      Iris Wade CMA at 10:59 AM on 5/30/2017

## 2017-05-30 NOTE — LETTER
5/30/2017       RE: Nico Morales  [address] [address]  [place] [address]     Dear Colleague,    Thank you for referring your patient, Nico Morales, to the [facility] at [facility]. Please see a copy of my visit note below.    [facility]  May 30, 2017         Assessment and Plan:   Nico Morales is a 40 year old male with cystic fibrosis with severe obstruction, h/o A. Fumigatus on/off itraconazole for many years, CFRD, malnutrition on chronic enteral feeds, pancreatic exocrine insufficiency and adrenal insufficiency who is seen today in clinic for follow up. He was last seen in clinic in March and started on doxycycline for mild exacerbation.       1. Severe exacerbation of CF lung disease: clinically at baseline, but feels he could still get his PFTs slightly higher for the summer. Dyspnea, tightness and fatigue all resolved. Very occasional streaking in his sputum.  Vesting 2-3 times/day. PFTs increased to close to recent best with FEV1 of 42%. Previous cultures have grown out MDR Ps. A and recently Stenotrophomonas. Stopped tobramycin on 5/26 secondary to tinnitus. At this time, patient would like to continue IV therapy X 1 more week while he completes his Bactrim.  - Complete meropenem in one week, complete course of Bactrim  - Continue current nebulizers, Symbicort and vest therapy  - Resume gabo nebs, (alternates with Cayston )  - Resume azithromycin (QTc of 430 with normal QRS duration, does have stable incomplete RBBB, discussed with Tammy DIAZ and okay to resume)  - Consider initiating azithromycin (previous notes indicate incomplete RBBB on EKG) once exacerbation       2. Eleved alkaline phophatase: since 4/15, however, has been on anti fungal therapy on/off since 2013. Alk phos decreased to 199.     3. Exocrine pancreatic insufficiency with malnutrition on  "chronic enteral feeds: denies symptoms of malabsorption. Weight improved slightly. Continues with 2 cans of TF at night.  - Continue pancreatic enzymes and vitamin supplementation  - High protein and high calorie diet supplemented with nocturnal tube feeds      4. CFRD: AIC improved to 7.0 to 7.0 on 4/10/17. BS  < 100 in the am.Following with Endocrine.  - Continue Lantus 5 U in the am  - Carb coverage with 1 U/ 10 g carbs      5.  Adrenal insufficiency: likely a complication of using Symbicort with itraconazole. Fatigue improved. TSH WNL. Sodium and potassium WNL, am cortisol normal.   - Continue prednisone 2.5 mg BID  - Cosyntropin stim test to be schduled     6. CF related low bone mineral density: Vitamin D, TSH and testosterone WNL, is supposed to start pamidronate.  - F/u with Endocrine    RTC: in 4 weeks with routine cultures and spirometry  Annual studies due: December 2017  Preventative care: colonoscopy during 2017      Regina Herman PA-C  Pulmonary, Allergy, Critical Care and Sleep Medicine        Interval History:   Symptom wise, patient feels his cough is decreased, less productive. Sputum is green/yellow, occasional streaks and specks. No further shortnes s of breath or tightness. Energy level is better, sleep is \"shaky\", problem is staying asleep. No nighttime cough. Ringing in his ears has resolved, mainly in the left. No GI complaints.          Review of Systems:   Please see HPI. Otherwise, complete 10 point ROS negative.           Past Medical and Surgical History:     Past Medical History:   Diagnosis Date     CF (cystic fibrosis) (H)      Exocrine pancreatic insufficiency      Nocardia infection      Pseudomonas infection      S/P gastrostomy (H) 2002     Past Surgical History:   Procedure Laterality Date     GASTROSTOMY TUBE  2002           Family History:     Family History   Problem Relation Age of Onset     HEART DISEASE Maternal Grandmother      HEART DISEASE Maternal Grandfather      HEART " DISEASE Paternal Grandmother      DIABETES No family hx of             Social History:     Social History     Social History     Marital status:      Spouse name: N/A     Number of children: 0     Years of education: N/A     Occupational History     financial [employer]     Social History Main Topics     Smoking status: Never Smoker     Smokeless tobacco: Never Used     Alcohol use No     Drug use: No     Sexual activity: Yes     Partners: Female     Birth control/ protection: Pill     Other Topics Concern     Blood Transfusions No     Exercise No     Social History Narrative    Kilo lives with wife in a house in [place], [place].  He works as a .        March 2016. He works independently as a . Works out 5x/week and walks the dog daily.        June 2016. No changes. Describes himself as a creature of habit.            Medications:     Current Outpatient Prescriptions   Medication     azithromycin (ZITHROMAX) 250 MG tablet     nystatin (MYCOSTATIN) 471494 UNIT/ML suspension     sulfamethoxazole-trimethoprim (BACTRIM DS/SEPTRA DS) 800-160 MG per tablet     cetirizine (ZYRTEC) 10 MG tablet     sulfamethoxazole-trimethoprim (BACTRIM DS/SEPTRA DS) 800-160 MG per tablet     budesonide-formoterol (SYMBICORT) 160-4.5 MCG/ACT Inhaler     insulin aspart (NOVOLOG PENFILL) 100 UNIT/ML injection     predniSONE (DELTASONE) 5 MG tablet     amitriptyline (ELAVIL) 10 MG tablet     levalbuterol (XOPENEX HFA) 45 MCG/ACT Inhaler     tobramycin, PF, (BETHANY) 300 MG/5ML neb solution     levalbuterol (XOPENEX) 1.25 MG/3ML neb solution     CREON 38427 UNITS CPEP per EC capsule     blood glucose monitoring (MARTINA CONTOUR NEXT MONITOR) meter device kit     blood glucose monitoring (MARTINA CONTOUR NEXT) test strip     blood glucose monitoring (MARTINA MICROLET) lancets     aztreonam lysine (CAYSTON) 75 MG SOLR     oseltamivir (TAMIFLU) 75 MG capsule     rimantadine (FLUMADINE) 100 MG tablet      sodium chloride inhalant 7 % NEBU     insulin pen needle (BD OMI U/F) 32G X 4 MM     insulin glargine (LANTUS SOLOSTAR) 100 UNIT/ML PEN     acetylcysteine (MUCOMYST) 10 % nebulizer solution     sodium chloride, Inhalant, 0.9 % AERS     polyethylene glycol (MIRALAX) powder     [place] HOME INFUSION MANAGED PATIENT     ACE NOT PRESCRIBED, INTENTIONAL,     Nebulizers (EFLOW SCF NEBULIZER HANDSET) MISC     AMITRIPTYLINE HCL     Calcium Citrate-Vitamin D (CALCIUM CITRATE + D PO)     Ferrous Fumarate-Vitamin C (VITRON-C) 200-125 MG TABS     MEPHYTON 5 MG PO TABS     MULTIVITAMIN OR     VITAMIN D 1000 UNIT PO CAPS     VITAMIN E 400 UNIT PO CAPS     No current facility-administered medications for this visit.             Physical Exam:   BP 95/63 (BP Location: Right arm, Patient Position: Chair, Cuff Size: Adult Regular)  Pulse 89  Resp 16  Wt 60.8 kg (134 lb 0.6 oz)  SpO2 97%  BMI 20.2 kg/m2    GENERAL: alert, NAD  HEENT: NCAT, EOMI, anicteric sclera, drier blood in right nare; canals and TMs clear; no oral mucosal edema or erythema  Neck: no cervical or supraclavicular adenopathy  Respiratory: good air flow, no crackles, rhonchi or wheezing  CV: RRR, S1S2, no murmurs noted  Abdomen: normoactive BS, soft and non tender   Lymph: no edema, + digital clubbing  Neuro: AAO X 3, CN 2-12 grossly intact  Psychiatric: normal affect, good eye contact  Skin: no rash, jaundice or lesions on limited exam         Data:   All laboratory and imaging data reviewed.      Cystic Fibrosis Culture  Specimen Description   Date Value Ref Range Status   05/17/2017 Sputum  Final   04/26/2017 Sputum  Final   04/26/2017 Sputum  Final   04/26/2017 Sputum  Final    Culture Micro   Date Value Ref Range Status   05/17/2017 (A)  Final    Light growth Normal santi  Light growth Pseudomonas aeruginosa, mucoid strain  Light growth Strain 2 Pseudomonas aeruginosa, mucoid strain     04/26/2017 (A)  Final    Moderate growth Normal santi  Light growth  Stenotrophomonas maltophilia  Moderate growth Pseudomonas aeruginosa, mucoid strain     04/26/2017   Final    Culture received and in progress.  Positive AFB results are called as soon as   detected.  Final report to follow in 7 to 8 weeks.  Assayed at [facility] [address]          PFT interpretation:  Maneuver: valid and meets ATS guidelines  Severe obstruction with decreased FEV1 and FEV1/FVC  Compared to prior: FEV1 is near his previous best (1.7)  The decrease in FVC may represent air trapping v. restrictive physiology.  Lung volumes would be necessary to determine.    severe: Increased vest/bronchodilator/execise, Oral: non-quinolone and Hosp admit/home IV (within 2 wks)        Again, thank you for allowing me to participate in the care of your patient.      Sincerely,    Regina Herman PA-C

## 2017-05-30 NOTE — PATIENT INSTRUCTIONS
Cystic Fibrosis Self-Care Plan       Patient: Nico Morales   MRN: [id number]   Clinic Date: May 30, 2017     RECOMMENDATIONS:  1. Continue nebulizers and vest therapy.   2. We will do one more week of meropenem, then discontinue PICC line.  3. Complete course of Bactrim.  4. Resume gabo nebs, if you have increased ringing in your ears, stop the gabo nebs and start Cayston.    Annual Studies:   IGG   Date Value Ref Range Status   12/15/2016 1770 (H) 695 - 1620 mg/dL Final     Insulin   Date Value Ref Range Status   07/26/2011 30 mU/L Final     Comment:     Reference Range:  0-20  +120     There are no preventive care reminders to display for this patient.    Pulmonary Function Tests  FEV1: amount of air you can blow out in 1 second  FVC: total amount of air you can take in and blow out    Your Goals:         PFT Latest Ref Rng & Units 5/30/2017   FVC L 3.65   FEV1 L 1.68   FVC% % 73   FEV1% % 42          Airway Clearance: The Most Important Way to Keep Your Lungs Healthy  Vest Settings:    Hill-Rom Frequencies: 8, 9, 10 Pressure 10 Then, Frequencies 18, 19, 20 Pressure 6      RespirTech: Quick Start with Pressure of     Do each frequency for 5 minutes; Deflate vest after each frequency & cough 3 times before beginning the next setting.    Vest and Neb Therapy should be done 2-3 times/day.    Good Nutrition Can Improve Lung Function and Overall Health     Take ALL of your vitamins with food     Take 1/2 of your enzymes before EVERY meal/snack and the other 1/2 mid-meal/snack    Wt Readings from Last 3 Encounters:   05/30/17 60.8 kg (134 lb 0.6 oz)   05/17/17 60.3 kg (132 lb 15 oz)   04/26/17 60 kg (132 lb 4.4 oz)       Body mass index is 20.2 kg/(m^2).    National CF Foundation Recommendations for BMI in CF Adults: Women: at least 22 Men: at least 23        Controlling Blood Sugars Helps Prevent Lung Infections & Improves Nutrition  Test blood sugar:     In the morning before eating (goal is )     2  hours after a meal (goal is less than 150)     When pre-meal glucose is greater than 150 add correction     At bedtime (if less than 100 eat a snack with 15 grams of carbohydrates  Last A1C Results:   Hemoglobin A1C   Date Value Ref Range Status   04/10/2017 7.0 % Final         If diabetic, measure A1C every 6 months. Goal is under 7%.    Staying Healthy    Research: If you are interested in learning about research opportunities or have questions, please contact Temi Dodge at [phone] or [email or web address].       Foundation: Compass is a personalized resource service to help you with the insurance, financial, legal and other issues you are facing.  It's free, confidential and available to anyone with CF.  Ask your  for more information or contact Compass directly at [facility] ([phone]) or compass@cff.org, or learn more at cff.org/compass.       CF Nurse Line:  Jose David Henderson: [phone]   Felicitas Banuelos, RT: [phone]     Ivory Bonilla and Zeina Pierre , Dieticians: [phone]   Laura Gauthier, Diabetes Nurse: [phone]    Lety Golden: [phone] or Marisa Urbina [phone], Social Workers   [email or web address]

## 2017-05-31 LAB — INTERPRETATION ECG - MUSE: NORMAL

## 2017-06-01 ENCOUNTER — HOME INFUSION (PRE-WILLOW HOME INFUSION) (OUTPATIENT)
Dept: PHARMACY | Facility: CLINIC | Age: 42
End: 2017-06-01

## 2017-06-01 LAB
BUN SERPL-MCNC: 20 MG/DL (ref 7–30)
CREAT SERPL-MCNC: 0.94 MG/DL (ref 0.66–1.25)
GFR SERPL CREATININE-BSD FRML MDRD: 88 ML/MIN/1.7M2
MAGNESIUM SERPL-MCNC: 2.4 MG/DL (ref 1.6–2.3)
PHOSPHATE SERPL-MCNC: 2.4 MG/DL (ref 2.5–4.5)
POTASSIUM SERPL-SCNC: 4.1 MMOL/L (ref 3.4–5.3)

## 2017-06-01 PROCEDURE — 82565 ASSAY OF CREATININE: CPT | Performed by: INTERNAL MEDICINE

## 2017-06-01 PROCEDURE — 84132 ASSAY OF SERUM POTASSIUM: CPT | Performed by: INTERNAL MEDICINE

## 2017-06-01 PROCEDURE — 84520 ASSAY OF UREA NITROGEN: CPT | Performed by: INTERNAL MEDICINE

## 2017-06-01 PROCEDURE — 83735 ASSAY OF MAGNESIUM: CPT | Performed by: INTERNAL MEDICINE

## 2017-06-01 PROCEDURE — 84100 ASSAY OF PHOSPHORUS: CPT | Performed by: INTERNAL MEDICINE

## 2017-06-02 ENCOUNTER — HOME INFUSION (PRE-WILLOW HOME INFUSION) (OUTPATIENT)
Dept: PHARMACY | Facility: CLINIC | Age: 42
End: 2017-06-02

## 2017-06-07 ENCOUNTER — HOME INFUSION (PRE-WILLOW HOME INFUSION) (OUTPATIENT)
Dept: PHARMACY | Facility: CLINIC | Age: 42
End: 2017-06-07

## 2017-06-13 NOTE — PROGRESS NOTES
This is a snapshot of the patient's [facility] medical record. For complete information or questions call [phone] or In [facility],  Home Infusion ([id number]).  [facility] Number:  [id number]

## 2017-06-14 LAB
EXPTIME-PRE: 16.08 SEC
FEF2575-%PRED-PRE: 14 %
FEF2575-PRE: 0.58 L/SEC
FEF2575-PRED: 3.93 L/SEC
FEFMAX-%PRED-PRE: 65 %
FEFMAX-PRE: 6.41 L/SEC
FEFMAX-PRED: 9.8 L/SEC
FEV1-%PRED-PRE: 39 %
FEV1-PRE: 1.6 L
FEV1FEV6-PRE: 51 %
FEV1FEV6-PRED: 82 %
FEV1FVC-PRE: 45 %
FEV1FVC-PRED: 81 %
FIFMAX-PRE: 5.2 L/SEC
FVC-%PRED-PRE: 72 %
FVC-PRE: 3.58 L
FVC-PRED: 4.96 L

## 2017-06-14 NOTE — PROGRESS NOTES
This is a snapshot of the patient's [facility] medical record. For complete information or questions call [phone] or In [facility],  Home Infusion ([id number]).  [facility] Number:  [id number]

## 2017-06-19 ENCOUNTER — INFUSION THERAPY VISIT (OUTPATIENT)
Dept: INFUSION THERAPY | Facility: CLINIC | Age: 42
End: 2017-06-19
Payer: COMMERCIAL

## 2017-06-19 VITALS
WEIGHT: 134.2 LBS | TEMPERATURE: 97.3 F | DIASTOLIC BLOOD PRESSURE: 59 MMHG | BODY MASS INDEX: 20.23 KG/M2 | RESPIRATION RATE: 16 BRPM | SYSTOLIC BLOOD PRESSURE: 85 MMHG | OXYGEN SATURATION: 97 %

## 2017-06-19 DIAGNOSIS — E27.40 ADRENAL INSUFFICIENCY (H): Primary | ICD-10-CM

## 2017-06-19 LAB
ANION GAP SERPL CALCULATED.3IONS-SCNC: 8 MMOL/L (ref 3–14)
CHLORIDE SERPL-SCNC: 109 MMOL/L (ref 94–109)
CO2 SERPL-SCNC: 27 MMOL/L (ref 20–32)
CORTICOSTER 1H P 250 UG ACTH SERPL-SCNC: 17.4 UG/DL
CORTICOSTER 30M P 250 UG ACTH SERPL-SCNC: 17.9 UG/DL
CORTICOSTER SERPL-MCNC: 7 UG/DL (ref 4–22)
POTASSIUM SERPL-SCNC: 3.7 MMOL/L (ref 3.4–5.3)
SODIUM SERPL-SCNC: 144 MMOL/L (ref 133–144)

## 2017-06-19 PROCEDURE — 82024 ASSAY OF ACTH: CPT | Performed by: INTERNAL MEDICINE

## 2017-06-19 PROCEDURE — 80051 ELECTROLYTE PANEL: CPT | Performed by: INTERNAL MEDICINE

## 2017-06-19 PROCEDURE — 84244 ASSAY OF RENIN: CPT | Mod: 90 | Performed by: INTERNAL MEDICINE

## 2017-06-19 PROCEDURE — 96374 THER/PROPH/DIAG INJ IV PUSH: CPT | Performed by: INTERNAL MEDICINE

## 2017-06-19 PROCEDURE — 99207 ZZC NO CHARGE LOS: CPT

## 2017-06-19 PROCEDURE — 82533 TOTAL CORTISOL: CPT | Mod: 59 | Performed by: INTERNAL MEDICINE

## 2017-06-19 PROCEDURE — 99000 SPECIMEN HANDLING OFFICE-LAB: CPT | Performed by: INTERNAL MEDICINE

## 2017-06-19 PROCEDURE — 80400 ACTH STIMULATION PANEL: CPT | Performed by: INTERNAL MEDICINE

## 2017-06-19 RX ORDER — COSYNTROPIN 0.25 MG/ML
1 INJECTION, POWDER, FOR SOLUTION INTRAMUSCULAR; INTRAVENOUS ONCE
Status: COMPLETED | OUTPATIENT
Start: 2017-06-19 | End: 2017-06-19

## 2017-06-19 RX ORDER — COSYNTROPIN 0.25 MG/ML
1 INJECTION, POWDER, FOR SOLUTION INTRAMUSCULAR; INTRAVENOUS ONCE
Status: CANCELLED | OUTPATIENT
Start: 2017-06-19 | End: 2017-06-19

## 2017-06-19 RX ADMIN — COSYNTROPIN 1 MCG: 0.25 INJECTION, POWDER, FOR SOLUTION INTRAMUSCULAR; INTRAVENOUS at 08:48

## 2017-06-19 NOTE — PROGRESS NOTES
Infusion Nursing Note:  Nico Morales presents today for ACTH stimulation test.    Patient seen by provider today: No   present during visit today: Not Applicable.    Note: N/A.    Intravenous Access:  Peripheral IV placed.    Treatment Conditions:  Not Applicable.      Post Infusion Assessment:  Patient tolerated infusion without incident.  Site patent and intact, free from redness, edema or discomfort.  No evidence of extravasations.  Access discontinued per protocol.    Discharge Plan:   Discharge instructions reviewed with: Patient.  Patient and/or family verbalized understanding of discharge instructions and all questions answered.  Patient discharged in stable condition accompanied by: self.  Departure Mode: Ambulatory.    Khushi Birch RN

## 2017-06-19 NOTE — MR AVS SNAPSHOT
After Visit Summary   6/19/2017    Nico Morales    MRN: [id number]           Patient Information     Date Of Birth          1975        Visit Information        Provider Department      6/19/2017 8:00 AM BAY 1 INFUSION [facility]        Today's Diagnoses     Adrenal insufficiency (H)    -  1       Follow-ups after your visit        Your next 10 appointments already scheduled     Jul 05, 2017  9:30 AM CDT   CF LOOP with UC PFL CF   [facility] Pulmonary Function Testing ([facility])    [address] [place] [address] [id number] [phone]            Jul 05, 2017  9:50 AM CDT   (Arrive by 9:35 AM)   RETURN CYSTIC FIBROSIS VISIT with Jaye Machado MD   [facility] for Lung Science and Health ([facility])    [address] [place] [address] [id number] [phone]            Aug 14, 2017 10:20 AM CDT   Return Visit with Diana Lee MD, MG ENDO NURSE   [facility] ([facility])    [address] [place] [id number] [phone]              Who to contact     If you have questions or need follow up information about today's clinic visit or your schedule please contact [facility] directly at [phone].  Normal or non-critical lab and imaging results will be communicated to you by MyChart, letter or phone within 4 business days after the clinic has received the results. If you do not hear from us within 7 days, please contact the clinic through MyChart or phone. If you have a critical or abnormal lab result, we will notify you by phone as soon as possible.  Submit refill requests through [employer] or call your pharmacy and they will forward the refill request to us. Please allow 3 business days for your refill to be completed.          Additional Information About Your Visit        MyChart Information     [employer]  gives you secure access to your electronic health record. If you see a primary care provider, you can also send messages to your care team and make appointments. If you have questions, please call your primary care clinic.  If you do not have a primary care provider, please call [phone] and they will assist you.      Hypercontext is an electronic gateway that provides easy, online access to your medical records. With Hypercontext, you can request a clinic appointment, read your test results, renew a prescription or communicate with your care team.     To access your existing account, please contact your [facility] or call [phone] for assistance.        Care EveryWhere ID     This is your Care EveryWhere ID. This could be used by other organizations to access your [facility] medical records  [id number]        Your Vitals Were     Temperature Respirations Pulse Oximetry BMI (Body Mass Index)          97.3  F (36.3  C) (Oral) 16 97% 20.23 kg/m2         Blood Pressure from Last 3 Encounters:   06/19/17 (!) 85/59   05/30/17 95/63   05/17/17 101/67    Weight from Last 3 Encounters:   06/19/17 60.9 kg (134 lb 3.2 oz)   05/30/17 60.8 kg (134 lb 0.6 oz)   05/17/17 60.3 kg (132 lb 15 oz)              We Performed the Following     Adrenal corticotropin     Cosyntropin stimulation study baseline     Cosyntropin stimulation study post 30     Cosyntropin stimulation study post 60     Electrolyte panel     Renin activity        Primary Care Provider Office Phone # Fax #    Jaye Machado [phone]        [facility] [address] [place] [address] 292  [facility] [address]        Thank you!     Thank you for choosing [facility]  for your care. Our goal is always to provide you with excellent care. Hearing back from our patients is one way we can continue to improve our services. Please take a few minutes to complete the written survey that you may receive in the  mail after your visit with us. Thank you!             Your Updated Medication List - Protect others around you: Learn how to safely use, store and throw away your medicines at www.disposemymeds.org.          This list is accurate as of: 6/19/17 11:39 AM.  Always use your most recent med list.                   Brand Name Dispense Instructions for use    ACE NOT PRESCRIBED (INTENTIONAL)     0 each    1 each continuous prn. ACE Inhibitor not prescribed due to Risk for drug interaction       acetylcysteine 10 % injection    MUCOMYST    480 mL    Nebulize 4ml qid       * amitriptyline 10 MG tablet    ELAVIL    90 tablet    Take 1 tablet (10 mg) by mouth At Bedtime Take one to three tablets at bedtime.       * AMITRIPTYLINE HCL      30 mg At Bedtime Reported on 4/10/2017       azithromycin 250 MG tablet    ZITHROMAX    30 tablet    Take 1 tablet (250 mg) by mouth daily       aztreonam lysine 75 MG Solr    CAYSTON    84 mL    Take 1 mL (75 mg) by nebulization 3 times daily Alternating every other month with BETHANY.       blood glucose monitoring lancets     2 Box    Use to test blood sugar 6 times daily or as directed.       blood glucose monitoring meter device kit     1 kit    Use to test blood sugar 6 times daily or as directed.       blood glucose monitoring test strip    MARTINA CONTOUR NEXT    200 each    Use to test blood sugar 6 times daily or as directed.       budesonide-formoterol 160-4.5 MCG/ACT Inhaler    SYMBICORT    1 Inhaler    Inhale 2 puffs into the lungs 2 times daily       CALCIUM CITRATE + D PO      Take 1 tablet by mouth 2 times daily.       cetirizine 10 MG tablet    zyrTEC    30 tablet    Take 1 tablet (10 mg) by mouth every evening       CREON 20618 UNITS Cpep per EC capsule   Generic drug:  amylase-lipase-protease     2700 capsule    Take 7-8 caps with meals (3 meals/day) and 2 caps with snacks (3 snacks/day)       EFLOW SCF NEBULIZER HANDSET Misc     1 each    1 each 3 times daily.       [facility]  "HOME INFUSION MANAGED PATIENT      Contact [facility] for patient specific medication information at [phone] on admission and discharge from the hospital.  Phones are answered 24 hours a day 7 days a week 365 days a year.  Providers - Choose \"CONTINUE HOME MED (no script)\" at discharge if patient treatment with home infusion will continue.       insulin aspart 100 UNIT/ML injection    NovoLOG PENFILL    15 mL    1 per 20 grams of carbohydrate at lunch and supper. Also use 3 units of Novolog at night with tube feeding.       insulin glargine 100 UNIT/ML injection    LANTUS SOLOSTAR    15 mL    5 units daily       insulin pen needle 32G X 4 MM    BD OMI U/F    200 each    Use 6 daily or as directed.       levalbuterol 1.25 MG/3ML neb solution    XOPENEX    360 mL    Take 3 mLs (1.25 mg) by nebulization 4 times daily       levalbuterol 45 MCG/ACT Inhaler    XOPENEX HFA    3 Inhaler    Inhale 2 puffs into the lungs every 6 hours as needed for shortness of breath / dyspnea       MEPHYTON 5 MG tablet   Generic drug:  phytonadione      1 TABLET DAILY       MULTIVITAMIN PO      1 tablet daily       oseltamivir 75 MG capsule    TAMIFLU    10 capsule    Take 1 capsule (75 mg) by mouth 2 times daily       polyethylene glycol powder    MIRALAX    442 g    Use 17 gms po tid x 3 days then do it daily x 1 month       predniSONE 5 MG tablet    DELTASONE    30 tablet    Take 0.5 tablets (2.5 mg) by mouth 2 times daily       rimantadine 100 MG tablet    FLUMADINE    60 tablet    Take 1 tablet (100 mg) by mouth 2 times daily Take from Oct. 1 thru March 31.       * sodium chloride (Inhalant) 0.9 % Aers     270 mL    Inhale 3 mLs into the lungs 3 times daily       * sodium chloride inhalant 7 % Nebu neb solution     240 mL    Nebulize 4mL twice daily       sulfamethoxazole-trimethoprim 800-160 MG per tablet    BACTRIM DS/SEPTRA DS    21 tablet    Take 1 tablet by mouth 3 times daily       tobramycin (PF) 300 MG/5ML " neb solution    BETHANY    280 mL    Take 5 mLs (300 mg) by nebulization 2 times daily 28 days on, 28 days off.       vitamin D 1000 UNITS capsule      1 CAPSULE DAILY       vitamin E 400 UNIT capsule      1 CAPSULE DAILY       VITRON-C 200-125 MG Tabs   Generic drug:  ferrous fumarate 65 mg ([place]. FE)-Vitamin C 125 mg      Take 1 tablet by mouth 2 times daily.       * Notice:  This list has 4 medication(s) that are the same as other medications prescribed for you. Read the directions carefully, and ask your doctor or other care provider to review them with you.

## 2017-06-20 LAB
ACTH PLAS-MCNC: 31 PG/ML
RENIN PLAS-CCNC: 2 NG/ML/H

## 2017-06-21 LAB
MICRO REPORT STATUS: NORMAL
MYCOBACTERIUM SPEC CULT: NORMAL
SPECIMEN SOURCE: NORMAL

## 2017-06-23 DIAGNOSIS — E84.9 CF (CYSTIC FIBROSIS) (H): Primary | ICD-10-CM

## 2017-06-23 LAB
EXPTIME-PRE: 13.66 SEC
FEF2575-%PRED-PRE: 15 %
FEF2575-PRE: 0.61 L/SEC
FEF2575-PRED: 3.93 L/SEC
FEFMAX-%PRED-PRE: 66 %
FEFMAX-PRE: 6.48 L/SEC
FEFMAX-PRED: 9.8 L/SEC
FEV1-%PRED-PRE: 42 %
FEV1-PRE: 1.68 L
FEV1FEV6-PRE: 52 %
FEV1FEV6-PRED: 82 %
FEV1FVC-PRE: 46 %
FEV1FVC-PRED: 81 %
FIFMAX-PRE: 4.91 L/SEC
FVC-%PRED-PRE: 73 %
FVC-PRE: 3.65 L
FVC-PRED: 4.96 L

## 2017-07-05 ENCOUNTER — OFFICE VISIT (OUTPATIENT)
Dept: PULMONOLOGY | Facility: CLINIC | Age: 42
End: 2017-07-05
Attending: INTERNAL MEDICINE
Payer: COMMERCIAL

## 2017-07-05 VITALS
TEMPERATURE: 98.4 F | SYSTOLIC BLOOD PRESSURE: 107 MMHG | WEIGHT: 135.14 LBS | OXYGEN SATURATION: 95 % | DIASTOLIC BLOOD PRESSURE: 72 MMHG | RESPIRATION RATE: 16 BRPM | BODY MASS INDEX: 20.48 KG/M2 | HEART RATE: 93 BPM | HEIGHT: 68 IN

## 2017-07-05 DIAGNOSIS — E84.0 CYSTIC FIBROSIS WITH PULMONARY MANIFESTATIONS (H): Primary | ICD-10-CM

## 2017-07-05 DIAGNOSIS — E84.0 CYSTIC FIBROSIS WITH PULMONARY MANIFESTATIONS (H): ICD-10-CM

## 2017-07-05 LAB
EXPTIME-PRE: 14.38 SEC
FEF2575-%PRED-PRE: 13 %
FEF2575-PRE: 0.53 L/SEC
FEF2575-PRED: 3.93 L/SEC
FEFMAX-%PRED-PRE: 65 %
FEFMAX-PRE: 6.44 L/SEC
FEFMAX-PRED: 9.8 L/SEC
FEV1-%PRED-PRE: 38 %
FEV1-PRE: 1.53 L
FEV1FEV6-PRE: 52 %
FEV1FEV6-PRED: 82 %
FEV1FVC-PRE: 46 %
FEV1FVC-PRED: 81 %
FIFMAX-PRE: 5.45 L/SEC
FVC-%PRED-PRE: 67 %
FVC-PRE: 3.33 L
FVC-PRED: 4.95 L

## 2017-07-05 PROCEDURE — 87070 CULTURE OTHR SPECIMN AEROBIC: CPT | Performed by: INTERNAL MEDICINE

## 2017-07-05 PROCEDURE — 87186 SC STD MICRODIL/AGAR DIL: CPT | Performed by: INTERNAL MEDICINE

## 2017-07-05 PROCEDURE — 94375 RESPIRATORY FLOW VOLUME LOOP: CPT | Mod: ZF | Performed by: INTERNAL MEDICINE

## 2017-07-05 PROCEDURE — 87077 CULTURE AEROBIC IDENTIFY: CPT | Performed by: INTERNAL MEDICINE

## 2017-07-05 PROCEDURE — 99212 OFFICE O/P EST SF 10 MIN: CPT | Mod: ZF

## 2017-07-05 RX ORDER — TOBRAMYCIN INHALATION SOLUTION 300 MG/5ML
300 INHALANT RESPIRATORY (INHALATION) 2 TIMES DAILY
Qty: 280 ML | Refills: 6 | Status: SHIPPED | OUTPATIENT
Start: 2017-07-05 | End: 2017-09-06

## 2017-07-05 ASSESSMENT — PAIN SCALES - GENERAL: PAINLEVEL: NO PAIN (0)

## 2017-07-05 NOTE — NURSING NOTE
Chief Complaint   Patient presents with     Cystic Fibrosis     Follow up on Kilo and his CF     Matt Nunes CMA at 10:01 AM on 7/5/2017

## 2017-07-05 NOTE — MR AVS SNAPSHOT
After Visit Summary   7/5/2017    Nico Morales    MRN: [id number]           Patient Information     Date Of Birth          1975        Visit Information        Provider Department      7/5/2017 9:50 AM Jaye Machado MD [facility]        Today's Diagnoses     Cystic fibrosis with pulmonary manifestations (H)    -  1      Care Instructions    Cystic Fibrosis Self-Care Plan       MRN: [id number]   Clinic Date: July 5, 2017   Patient: Nico Morales     Annual Studies:   IGG   Date Value Ref Range Status   12/15/2016 1770 (H) 695 - 1620 mg/dL Final     Insulin   Date Value Ref Range Status   07/26/2011 30 mU/L Final     Comment:     Reference Range:  0-20  +120     There are no preventive care reminders to display for this patient.      Pulmonary Function Tests  FEV1: amount of air you can blow out in 1 second  FVC: total amount of air you can take in and blow out    Your Goals:         PFT Latest Ref Rng & Units 7/5/2017   FVC L 3.33   FEV1 L 1.53   FVC% % 67   FEV1% % 38          Airway Clearance: The Most Important Way to Keep Your Lungs Healthy  Vest Settings:    Hill-Rom Frequencies: 8, 9, 10 Pressure 10 Then, Frequencies 18, 19, 20 Pressure 6      RespirTech: Quick Start with Pressure of     Do each frequency for 5 minutes; Deflate vest after each frequency & cough 3 times before beginning the next setting.    Vest and Neb Therapy should be done 3 times/day.    Good Nutrition Can Improve Lung Function and Overall Health     Take ALL of your vitamins with food     Take 1/2 of your enzymes before EVERY meal/snack and the other 1/2 mid-meal/snack    Wt Readings from Last 3 Encounters:   07/05/17 61.3 kg (135 lb 2.3 oz)   06/19/17 60.9 kg (134 lb 3.2 oz)   05/30/17 60.8 kg (134 lb 0.6 oz)       Body mass index is 20.32 kg/(m^2).         National CF Foundation Recommendations for BMI in CF Adults: Women: at least 22 Men: at least 23         Controlling Blood Sugars Helps Prevent Lung Infections & Improves Nutrition  Test blood sugar:     In the morning before eating (goal is )     2 hours after a meal (goal is less than 150)     When pre-meal glucose is greater than 150 add correction     At bedtime (if less than 100 eat a snack with 15 grams of carbohydrates  Last A1C Results:   Hemoglobin A1C   Date Value Ref Range Status   04/10/2017 7.0 % Final         If diabetic, measure A1C every 6 months. Goal: Under 7%    Staying Healthy    Research:  If you are interested in learning about research opportunities or have questions, please contact Temi Dodge at [phone] or [email or web address].      CF Foundation:  Compass is a personalized resource service to help you with the insurance, financial, legal and other issues you are facing.  It's free, confidential and available to anyone with CF.  Ask your  for more information or contact Compass directly at [facility] ([phone]) or compass@cff.org, or learn more at cff.org/compass.          RECOMMENDATIONS: I will look into brand name gabo.  Overnight oximetry in a couple weeks.  Great job taking care of yourself.    YOUR GOAL:  Enjoy the summer.      CF Nurse Line:  Jose David Henderson: [phone]   Felicitas Banuelos, RT: [phone]     Zeina Pierre: [phone] or Kishore Obrienicians: [phone]   Lou Lassiter, Diabetes Nurse: [phone]      Lety Golden: [phone] or Marisa Urbina [phone], Social Workers   [email or web address]                   Follow-ups after your visit        Follow-up notes from your care team     Return 2 months.      Your next 10 appointments already scheduled     Aug 14, 2017 10:20 AM CDT   Return Visit with Diana Lee MD, MG ENDO NURSE   [facility] ([facility])    [id number] [place] [id number]   [phone]            Aug 30, 2017  9:30 AM CDT   CF LOOP with TETO  "PFL CF   [facility]monary Function Testing ([facility])    [address] [address] [phone]            Aug 30, 2017  9:50 AM CDT   (Arrive by 9:35 AM)   RETURN CYSTIC FIBROSIS VISIT with Jaye Machado MD   [facility] ([facility])    [address] [address] [phone]              Who to contact     If you have questions or need follow up information about today's clinic visit or your schedule please contact [facility] directly at [phone].  Normal or non-critical lab and imaging results will be communicated to you by Leadjinihart, letter or phone within 4 business days after the clinic has received the results. If you do not hear from us within 7 days, please contact the clinic through [employer] or phone. If you have a critical or abnormal lab result, we will notify you by phone as soon as possible.  Submit refill requests through EyeEm or call your pharmacy and they will forward the refill request to us. Please allow 3 business days for your refill to be completed.          Additional Information About Your Visit        EyeEm Information     EyeEm gives you secure access to your electronic health record. If you see a primary care provider, you can also send messages to your care team and make appointments. If you have questions, please call your primary care clinic.  If you do not have a primary care provider, please call [phone] and they will assist you.        Care EveryWhere ID     This is your Care EveryWhere ID. This could be used by other organizations to access your [facility] medical records  [id number]        Your Vitals Were     Pulse Temperature Respirations Height Pulse Oximetry BMI (Body Mass Index)    93 98.4  F (36.9  C) (Oral) 16 1.737 m (5' 8.39\") 95% 20.32 kg/m2       Blood Pressure " from Last 3 Encounters:   07/05/17 107/72   06/19/17 (!) 85/59   05/30/17 95/63    Weight from Last 3 Encounters:   07/05/17 61.3 kg (135 lb 2.3 oz)   06/19/17 60.9 kg (134 lb 3.2 oz)   05/30/17 60.8 kg (134 lb 0.6 oz)              We Performed the Following     RESPIRATORY FLOW VOLUME LOOP          Today's Medication Changes          These changes are accurate as of: 7/5/17 11:09 AM.  If you have any questions, ask your nurse or doctor.               These medicines have changed or have updated prescriptions.        Dose/Directions    amitriptyline 10 MG tablet   Commonly known as:  ELAVIL   This may have changed:  Another medication with the same name was removed. Continue taking this medication, and follow the directions you see here.   Used for:  Cystic fibrosis (H)   Changed by:  Jaye Machado MD        Dose:  10 mg   Take 1 tablet (10 mg) by mouth At Bedtime Take one to three tablets at bedtime.   Quantity:  90 tablet   Refills:  3       * tobramycin (PF) 300 MG/5ML neb solution   Commonly known as:  BETHANY   This may have changed:  Another medication with the same name was added. Make sure you understand how and when to take each.   Used for:  Cystic fibrosis with pulmonary manifestations (H)   Changed by:  Nelda Madison RN        Dose:  300 mg   Take 5 mLs (300 mg) by nebulization 2 times daily 28 days on, 28 days off.   Quantity:  280 mL   Refills:  2       * tobramycin (PF) 300 MG/5ML neb solution   Commonly known as:  BETHANY   This may have changed:  You were already taking a medication with the same name, and this prescription was added. Make sure you understand how and when to take each.   Used for:  Cystic fibrosis with pulmonary manifestations (H)   Changed by:  Jaye Machado MD        Dose:  300 mg   Take 5 mLs (300 mg) by nebulization 2 times daily Alternate every 28 days with [place].   Quantity:  280 mL   Refills:  6       * Notice:  This list has 2 medication(s) that are  the same as other medications prescribed for you. Read the directions carefully, and ask your doctor or other care provider to review them with you.      Stop taking these medicines if you haven't already. Please contact your care team if you have questions.     predniSONE 5 MG tablet   Commonly known as:  DELTASONE   Stopped by:  Jaye Machado MD           rimantadine 100 MG tablet   Commonly known as:  FLUMADINE   Stopped by:  Jaye Machado MD           sulfamethoxazole-trimethoprim 800-160 MG per tablet   Commonly known as:  BACTRIM DS/SEPTRA DS   Stopped by:  Jaye Machado MD                Where to get your medicines      These medications were sent to [facility] [place], [place] - [address]  [address], [facility] [address]     Phone:  [phone]     tobramycin (PF) 300 MG/5ML neb solution                Primary Care Provider Office Phone # Fax #    Jaye Machado [phone]        [facility] [address] [place] [address]  [facility] [address]        Equal Access to Services     [facility]: Hadii aad ku hadasho Soomaali, waaxda luqadaha, qaybta kaalmada adeegyada, joe castellon . So [facility] [phone].    ATENCIÓN: Si habla español, tiene a monroe disposición servicios gratuitos de asistencia lingüística. [facility] [phone].    We comply with applicable federal civil rights laws and [place] laws. We do not discriminate on the basis of race, color, national origin, age, disability sex, sexual orientation or gender identity.            Thank you!     Thank you for choosing [facility]  for your care. Our goal is always to provide you with excellent care. Hearing back from our patients is one way we can continue to improve our services. Please take a few minutes to complete the written survey that you may receive in the mail after your  visit with us. Thank you!             Your Updated Medication List - Protect others around you: Learn how to safely use, store and throw away your medicines at www.disposemymeds.org.          This list is accurate as of: 7/5/17 11:09 AM.  Always use your most recent med list.                   Brand Name Dispense Instructions for use Diagnosis    ACE NOT PRESCRIBED (INTENTIONAL)     0 each    1 each continuous prn. ACE Inhibitor not prescribed due to Risk for drug interaction    Type I (juvenile type) diabetes mellitus without mention of complication, not stated as uncontrolled       acetylcysteine 10 % injection    MUCOMYST    480 mL    Nebulize 4ml qid    Cystic fibrosis with pulmonary manifestations (H)       amitriptyline 10 MG tablet    ELAVIL    90 tablet    Take 1 tablet (10 mg) by mouth At Bedtime Take one to three tablets at bedtime.    Cystic fibrosis (H)       azithromycin 250 MG tablet    ZITHROMAX    30 tablet    Take 1 tablet (250 mg) by mouth daily    Cystic fibrosis with pulmonary manifestations (H)       aztreonam lysine 75 MG Solr    CAYSTON    84 mL    Take 1 mL (75 mg) by nebulization 3 times daily Alternating every other month with BETHANY.    Cystic fibrosis with pulmonary manifestations (H)       blood glucose monitoring lancets     2 Box    Use to test blood sugar 6 times daily or as directed.    Diabetes mellitus related to CF (cystic fibrosis) (H)       blood glucose monitoring meter device kit     1 kit    Use to test blood sugar 6 times daily or as directed.    Diabetes mellitus related to CF (cystic fibrosis) (H)       blood glucose monitoring test strip    MARTINA CONTOUR NEXT    200 each    Use to test blood sugar 6 times daily or as directed.    Diabetes mellitus related to CF (cystic fibrosis) (H)       budesonide-formoterol 160-4.5 MCG/ACT Inhaler    SYMBICORT    1 Inhaler    Inhale 2 puffs into the lungs 2 times daily    Cystic fibrosis of the lung (H), Cystic fibrosis (H)       CALCIUM  "CITRATE + D PO      Take 1 tablet by mouth 2 times daily.    Nocardia infection, Cystic fibrosis with pulmonary manifestations (H)       cetirizine 10 MG tablet    zyrTEC    30 tablet    Take 1 tablet (10 mg) by mouth every evening    Cystic fibrosis (H)       CREON 00330 UNITS Cpep per EC capsule   Generic drug:  amylase-lipase-protease     2700 capsule    Take 7-8 caps with meals (3 meals/day) and 2 caps with snacks (3 snacks/day)    Cystic fibrosis with pulmonary manifestations (H)       EFLOW SCF NEBULIZER HANDSET Misc     1 each    1 each 3 times daily.    Nocardia infection       [facility] INFUSION MANAGED PATIENT      Contact [facility] for patient specific medication information at [phone] on admission and discharge from the hospital.  Phones are answered 24 hours a day 7 days a week 365 days a year.  Providers - Choose \"CONTINUE HOME MED (no script)\" at discharge if patient treatment with home infusion will continue.        insulin aspart 100 UNIT/ML injection    NovoLOG PENFILL    15 mL    1 per 20 grams of carbohydrate at lunch and supper. Also use 3 units of Novolog at night with tube feeding.    Diabetes mellitus related to CF (cystic fibrosis) (H)       insulin glargine 100 UNIT/ML injection    LANTUS SOLOSTAR    15 mL    5 units daily    Diabetes mellitus related to CF (cystic fibrosis) (H)       insulin pen needle 32G X 4 MM    BD OMI U/F    200 each    Use 6 daily or as directed.    Diabetes mellitus related to CF (cystic fibrosis) (H)       levalbuterol 1.25 MG/3ML neb solution    XOPENEX    360 mL    Take 3 mLs (1.25 mg) by nebulization 4 times daily        levalbuterol 45 MCG/ACT Inhaler    XOPENEX HFA    3 Inhaler    Inhale 2 puffs into the lungs every 6 hours as needed for shortness of breath / dyspnea    Cystic fibrosis (H)       MEPHYTON 5 MG tablet   Generic drug:  phytonadione      1 TABLET DAILY        MULTIVITAMIN PO      1 tablet daily        oseltamivir 75 MG " capsule    TAMIFLU    10 capsule    Take 1 capsule (75 mg) by mouth 2 times daily    Cystic fibrosis with pulmonary manifestations (H)       polyethylene glycol powder    MIRALAX    442 g    Use 17 gms po tid x 3 days then do it daily x 1 month    Cystic fibrosis with meconium ileus (H)       * sodium chloride (Inhalant) 0.9 % Aers     270 mL    Inhale 3 mLs into the lungs 3 times daily    Cystic fibrosis with pulmonary manifestations (H)       * sodium chloride inhalant 7 % Nebu neb solution     240 mL    Nebulize 4mL twice daily    Cystic fibrosis with pulmonary manifestations (H)       * tobramycin (PF) 300 MG/5ML neb solution    BETHANY    280 mL    Take 5 mLs (300 mg) by nebulization 2 times daily 28 days on, 28 days off.    Cystic fibrosis with pulmonary manifestations (H)       * tobramycin (PF) 300 MG/5ML neb solution    BETHANY    280 mL    Take 5 mLs (300 mg) by nebulization 2 times daily Alternate every 28 days with [place].    Cystic fibrosis with pulmonary manifestations (H)       vitamin D 1000 UNITS capsule      1 CAPSULE DAILY        vitamin E 400 UNIT capsule      1 CAPSULE DAILY        VITRON-C 200-125 MG Tabs   Generic drug:  ferrous fumarate 65 mg ([place]. FE)-Vitamin C 125 mg      Take 1 tablet by mouth 2 times daily.    Nocardia infection, Cystic fibrosis with pulmonary manifestations (H)       * Notice:  This list has 4 medication(s) that are the same as other medications prescribed for you. Read the directions carefully, and ask your doctor or other care provider to review them with you.

## 2017-07-05 NOTE — PROGRESS NOTES
Nutrition Note    Reason for Visit:  Follow-up on bone density treatment    Pt continues under the care of endocrine (Dr. Lee) for treatment of his low bone density.  It has been decided that he will begin treatment with Pamidronate likely in the fall.  Pt is somewhat anxious about this medication but plans to proceed. Today he would like to discuss his nutritional support.     Interventions/Recommendations:  Reviewed pt's nutrition plan of care for bone density support:  Calcium citrate supplementation, vitamin D supplementation, oral/TF sources of calcium and vitamin D, promotion of weight bearing activity.  No changes in dosing/schedule from previous visit, please see last note for details.  Reminded pt to take calcium supplements at least 2 hours away from his iron supplement for best absorption.       Ivory Bonilla MS, RD, LD (pager [phone])  Cystic Fibrosis/Lung Transplant Dietitian      -Available Tues-Fri 8 AM-4:30 PM. On Mondays please contact pager [phone] (Zeina Pierre RD) and on weekends/holidays contact coverage dietitian at pager [phone] (inpatient use only).

## 2017-07-05 NOTE — LETTER
7/5/2017       RE: Nico Morales  [address] [address]  [facility] [address]     Dear Colleague,    Thank you for referring your patient, Nico Morales, to the [facility] at [facility]. Please see a copy of my visit note below.    [facility]  July 5, 2017         Assessment and Plan:   Nico Morales is a 41 year old male with cystic fibrosis with severe obstruction, h/o A. Fumigatus on/off itraconazole for many years, CFRD, malnutrition on chronic enteral feeds, pancreatic exocrine insufficiency and h/o adrenal insufficiency who is seen today in clinic for follow up.        1. CF lung disease: Below baseline today due to recent viral illness, with decline in PFT. Describes some chest tightness, upper respiratory congestion and drainage and fatigue. Vesting 2-3 times/day.  Previous cultures have grown out MDR Ps. A and recently Stenotrophomonas.  Now off antibiotics except azithro for maintenance.    - Continue azithro--he does feel he needs an additional antibiotic at this time  - Continue current nebulizers, Symbicort and vest therapy  - Resume gabo nebs, (alternates with [place])--not tolerating generic gabo so will order brand name.  Gets chest tightness and increased cough      2. Eleved alkaline phophatase: since 4/15, longstanding.  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      3. Exocrine pancreatic insufficiency with malnutrition on chronic enteral feeds: denies symptoms of malabsorption. Continues with 2 cans of TF at night.  - Continue pancreatic enzymes and vitamin supplementation  - High protein and high calorie diet supplemented with nocturnal tube feeds      4. CFRD: AIC improved to 7.0 to 7.0 on 4/10/17. BS < 100 in the am.Following with Endocrine.  - Continue Lantus 5 U in the am  - Carb coverage with 1 U/ 10 g carbs      5.  Adrenal insufficiency: likely a  complication of using Symbicort with itraconazole. Fatigue improved. TSH WNL. Sodium and potassium WNL, am cortisol normal.   - now off prednisone  - Cosyntropin stim test 6/2017 was normal      6. CF related low bone mineral density: Vitamin D, TSH and testosterone WNL, is supposed to start pamidronate.  - will start pamidronate in the fall     7. Psychosocial:  COntinues to work in finance.  Now  as of May.  Building a [employer] in [place].    RTC: in 8 weeks with routine cultures and spirometry  Annual studies due: December 2017  Preventative care: colonoscopy during 2017    Jaye Machado MD MPH   of Medicine  Pulmonary, Allergy, Critical Care and Sleep Medicine      Interval History:     Kilo has increased cough and sputum production with recent viral illness.  Was fatigued but this is improving.  Denies shortness of breath.         Review of Systems:     CONSTITUTIONAL: no fever, no chills, no change in weight, recent decrease in energy but now improving, no change in appetite    INTEGUMENTARY/SKIN: no rash, no obvious new lesions    ENT/MOUTH: no sore throat, increased sinus pain and nasal drainage, no hearing loss, chronic ear ringing     RESPIRATORY: see interval history    CV: Recent anterior chest pain with viral illness--sore ripping pain, no palpitations, no peripheral edema, no orthopnea, no PND    GI: no nausea, no vomiting, no change in stools, no fatty stools, no GERD, no abdominal pain    : no dysuria, no urinary frequency    MUSCULOSKELETAL: no myalgias, no arthralgias    ENDOCRINE: no excessive thirst, blood sugars with adequate control    NEURO:  chronic headache, no numbness, no tingling    SLEEP: no issues    PSYCHIATRIC: mood stable          Past Medical and Surgical History:     Past Medical History:   Diagnosis Date     CF (cystic fibrosis) (H)      Exocrine pancreatic insufficiency      Nocardia infection      Pseudomonas infection      S/P  gastrostomy (H) 2002     Past Surgical History:   Procedure Laterality Date     GASTROSTOMY TUBE  2002           Family History:     Family History   Problem Relation Age of Onset     HEART DISEASE Maternal Grandmother      HEART DISEASE Maternal Grandfather      HEART DISEASE Paternal Grandmother      DIABETES No family hx of             Social History:     Social History     Social History     Marital status:      Spouse name: N/A     Number of children: 0     Years of education: N/A     Occupational History     financial [employer]     Social History Main Topics     Smoking status: Never Smoker     Smokeless tobacco: Never Used     Alcohol use No     Drug use: No     Sexual activity: Yes     Partners: Female     Birth control/ protection: Pill     Other Topics Concern     Blood Transfusions No     Exercise No     Social History Narrative    Kilo lives with wife in a house in [place], [place].  He works as a .        March 2016. He works independently as a . Works out 5x/week and walks the dog daily.        June 2016. No changes. Describes himself as a creature of habit.            Medications:     Current Outpatient Prescriptions   Medication     tobramycin, PF, (BETHANY) 300 MG/5ML neb solution     azithromycin (ZITHROMAX) 250 MG tablet     cetirizine (ZYRTEC) 10 MG tablet     budesonide-formoterol (SYMBICORT) 160-4.5 MCG/ACT Inhaler     insulin aspart (NOVOLOG PENFILL) 100 UNIT/ML injection     amitriptyline (ELAVIL) 10 MG tablet     levalbuterol (XOPENEX HFA) 45 MCG/ACT Inhaler     tobramycin, PF, (BETHANY) 300 MG/5ML neb solution     levalbuterol (XOPENEX) 1.25 MG/3ML neb solution     CREON 06958 UNITS CPEP per EC capsule     blood glucose monitoring (MARTINA CONTOUR NEXT MONITOR) meter device kit     blood glucose monitoring (MARTINA CONTOUR NEXT) test strip     blood glucose monitoring (MARTINA MICROLET) lancets     aztreonam lysine (CAYSTON) 75 MG SOLR      "oseltamivir (TAMIFLU) 75 MG capsule     sodium chloride inhalant 7 % NEBU     insulin pen needle (BD OMI U/F) 32G X 4 MM     insulin glargine (LANTUS SOLOSTAR) 100 UNIT/ML PEN     acetylcysteine (MUCOMYST) 10 % nebulizer solution     sodium chloride, Inhalant, 0.9 % AERS     polyethylene glycol (MIRALAX) powder     [facility] MANAGED PATIENT     ACE NOT PRESCRIBED, INTENTIONAL,     Nebulizers (EFLOW SCF NEBULIZER HANDSET) MISC     Calcium Citrate-Vitamin D (CALCIUM CITRATE + D PO)     Ferrous Fumarate-Vitamin C (VITRON-C) 200-125 MG TABS     MEPHYTON 5 MG PO TABS     MULTIVITAMIN OR     VITAMIN D 1000 UNIT PO CAPS     VITAMIN E 400 UNIT PO CAPS     No current facility-administered medications for this visit.             Physical Exam:   /72  Pulse 93  Temp 98.4  F (36.9  C) (Oral)  Resp 16  Ht 1.737 m (5' 8.39\")  Wt 61.3 kg (135 lb 2.3 oz)  SpO2 95%  BMI 20.32 kg/m2    Constitutional:   Awake, alert and in no apparent distress     Eyes:   nonicteric     ENT:   oral mucosa moist without lesions, normal tm bilaterally, bilateral mucosa normal     Neck:   Supple without supraclavicular or cervical lymphadenopathy     Lungs:   Good air flow.  rare crackles. No rhonchi.  rare wheezes.     Cardiovascular:   Normal S1 and S2.  RRR.  No murmur, gallop or rub.     Abdomen:   NABS, soft, nontender, nondistended.  g tube     Musculoskeletal:   No edema, digital clubbing present     Neurologic:   Alert and conversant.     Skin:   Warm, dry.  No rash on limited exam.             Data:   All laboratory and imaging data reviewed.    Cystic Fibrosis Culture  Specimen Description   Date Value Ref Range Status   07/05/2017 Sputum  Final   05/30/2017 Sputum  Final   05/17/2017 Sputum  Final    Culture Micro   Date Value Ref Range Status   07/05/2017 (A)  Final    Moderate growth Normal santi  Light growth Pseudomonas species, mucoid strain  Light growth Stenotrophomonas maltophilia  Light growth Pseudomonas " fluorescens putida group     05/30/2017 (A)  Final    Moderate growth Normal santi  Light growth Pseudomonas species, mucoid strain  Light growth Strain 2 Pseudomonas species, mucoid strain  Light growth Stenotrophomonas maltophilia     05/17/2017 (A)  Final    Light growth Normal santi  Light growth Pseudomonas aeruginosa, mucoid strain  Light growth Strain 2 Pseudomonas aeruginosa, mucoid strain          Recent Results (from the past 168 hour(s))   General PFT Lab (Please always keep checked)    Collection Time: 07/05/17  9:47 AM   Result Value Ref Range    FVC-Pred 4.95 L    FVC-Pre 3.33 L    FVC-%Pred-Pre 67 %    FEV1-Pre 1.53 L    FEV1-%Pred-Pre 38 %    FEV1FVC-Pred 81 %    FEV1FVC-Pre 46 %    FEFMax-Pred 9.80 L/sec    FEFMax-Pre 6.44 L/sec    FEFMax-%Pred-Pre 65 %    FEF2575-Pred 3.93 L/sec    FEF2575-Pre 0.53 L/sec    CUQ8967-%Pred-Pre 13 %    ExpTime-Pre 14.38 sec    FIFMax-Pre 5.45 L/sec    FEV1FEV6-Pred 82 %    FEV1FEV6-Pre 52 %   Cystic Fibrosis Culture Aerob Bacterial    Collection Time: 07/05/17 10:30 AM   Result Value Ref Range    Specimen Description Sputum     Culture Micro (A)      Moderate growth Normal santi  Light growth Pseudomonas species, mucoid strain  Light growth Stenotrophomonas maltophilia  Light growth Pseudomonas fluorescens putida group      Micro Report Status Pending     Organism: Light growth Stenotrophomonas maltophilia     Organism: Light growth Pseudomonas fluorescens putida group     Organism: Light growth Pseudomonas species, mucoid strain        Susceptibility    Light growth stenotrophomonas maltophilia (denisha gram negative panel) -  (no method available)     AMIKACIN Value in next row  ug/mL      >32.0 No interp available     AZTREONAM Value in next row  ug/mL      >16.0 No interp available     CEFEPIME Value in next row  ug/mL      >16.0 No interp available     CEFOTAXIME Value in next row  ug/mL      >32.0 No interp available     CEFTAZIDIME >16.0 Resistant  ug/mL      CIPROFLOXACIN Value in next row  ug/mL      >2.0 No interp available     GENTAMICIN Value in next row  ug/mL      >8.0 No interp available     IMIPENEM Value in next row  ug/mL      >8.0 No interp available     LEVOFLOXACIN <=1.0 Susceptible  ug/mL     MEROPENEM Value in next row  ug/mL      >8.0 No interp available     PIPERACILLIN Value in next row  ug/mL      >64.0 No interp available     Piperacillin/Tazo Value in next row  ug/mL      >64.0 No interp available     TETRACYCLINE Value in next row  ug/mL      >8.0 No interp available     TOBRAMYCIN Value in next row  ug/mL      >8.0 No interp available     Trimethoprim/Sulfa Value in next row  ug/mL      <=2.0/38.0 Susceptible    Light growth pseudomonas fluorescens putida group (denisha gram negative panel) -  (no method available)     AMIKACIN <=4.0 Susceptible  ug/mL     AZTREONAM <=4.0 Susceptible  ug/mL     CEFEPIME 8.0 Susceptible  ug/mL     CEFOTAXIME 8.0 Susceptible  ug/mL     CEFTAZIDIME <=1.0 Susceptible  ug/mL     CIPROFLOXACIN <=1.0 Susceptible  ug/mL     GENTAMICIN 2.0 Susceptible  ug/mL     IMIPENEM <=1.0 Susceptible  ug/mL     LEVOFLOXACIN 2.0 Susceptible  ug/mL     MEROPENEM 2.0 Susceptible  ug/mL     PIPERACILLIN <=16.0 Susceptible  ug/mL     TETRACYCLINE >8.0 Resistant  ug/mL     Trimethoprim/Sulfa >2.0/38.0 Resistant  ug/mL     Piperacillin/Tazo <=8.0 Susceptible  ug/mL     TOBRAMYCIN <=1.0 Susceptible  ug/mL    Light growth pseudomonas species, mucoid strain (denisha gram negative panel) -  (no method available)     AMIKACIN 8.0 Susceptible  ug/mL     AZTREONAM <=4.0 Susceptible  ug/mL     CEFEPIME 8.0 Susceptible  ug/mL     CEFOTAXIME >32.0 Resistant  ug/mL     CEFTAZIDIME 4.0 Susceptible  ug/mL     CIPROFLOXACIN 1.0 Susceptible  ug/mL     GENTAMICIN <=1.0 Susceptible  ug/mL     IMIPENEM 4.0 Intermediate  ug/mL     LEVOFLOXACIN 2.0 Susceptible  ug/mL     MEROPENEM 2.0 Susceptible  ug/mL     PIPERACILLIN <=16.0 Susceptible  ug/mL     Piperacillin/Tazo  16.0 Susceptible  ug/mL     TETRACYCLINE 8.0 Intermediate  ug/mL     TOBRAMYCIN 2.0 Susceptible  ug/mL     Trimethoprim/Sulfa >2.0/38.0 Resistant  ug/mL       PFT: Severe obstructive lung disease.  When compared to 5/30/2017, the FEV1 and FVC have decreased.  The decrease in FVC may represent air trapping v. restrictive physiology.  Lung volumes would be necessary to determine.      Nutrition Note    Reason for Visit:  Follow-up on bone density treatment    Pt continues under the care of endocrine (Dr. Lee) for treatment of his low bone density.  It has been decided that he will begin treatment with Pamidronate likely in the fall.  Pt is somewhat anxious about this medication but plans to proceed. Today he would like to discuss his nutritional support.     Interventions/Recommendations:  Reviewed pt's nutrition plan of care for bone density support:  Calcium citrate supplementation, vitamin D supplementation, oral/TF sources of calcium and vitamin D, promotion of weight bearing activity.  No changes in dosing/schedule from previous visit, please see last note for details.  Reminded pt to take calcium supplements at least 2 hours away from his iron supplement for best absorption.       Ivory Bonilla MS, RD, LD (pager [phone])  Cystic Fibrosis/Lung Transplant Dietitian      -Available Tues-Fri 8 AM-4:30 PM. On Mondays please contact pager [phone] (Zeina Pierre RD) and on weekends/holidays contact coverage dietitian at pager [phone] (inpatient use only).       Again, thank you for allowing me to participate in the care of your patient.      Sincerely,    Jaye Machado MD

## 2017-07-05 NOTE — PATIENT INSTRUCTIONS
Cystic Fibrosis Self-Care Plan       MRN: [id number]   Clinic Date: July 5, 2017   Patient: Nico Morales     Annual Studies:   IGG   Date Value Ref Range Status   12/15/2016 1770 (H) 695 - 1620 mg/dL Final     Insulin   Date Value Ref Range Status   07/26/2011 30 mU/L Final     Comment:     Reference Range:  0-20  +120     There are no preventive care reminders to display for this patient.      Pulmonary Function Tests  FEV1: amount of air you can blow out in 1 second  FVC: total amount of air you can take in and blow out    Your Goals:         PFT Latest Ref Rng & Units 7/5/2017   FVC L 3.33   FEV1 L 1.53   FVC% % 67   FEV1% % 38          Airway Clearance: The Most Important Way to Keep Your Lungs Healthy  Vest Settings:    Hill-Rom Frequencies: 8, 9, 10 Pressure 10 Then, Frequencies 18, 19, 20 Pressure 6      RespirTech: Quick Start with Pressure of     Do each frequency for 5 minutes; Deflate vest after each frequency & cough 3 times before beginning the next setting.    Vest and Neb Therapy should be done 3 times/day.    Good Nutrition Can Improve Lung Function and Overall Health     Take ALL of your vitamins with food     Take 1/2 of your enzymes before EVERY meal/snack and the other 1/2 mid-meal/snack    Wt Readings from Last 3 Encounters:   07/05/17 61.3 kg (135 lb 2.3 oz)   06/19/17 60.9 kg (134 lb 3.2 oz)   05/30/17 60.8 kg (134 lb 0.6 oz)       Body mass index is 20.32 kg/(m^2).         National CF Foundation Recommendations for BMI in CF Adults: Women: at least 22 Men: at least 23        Controlling Blood Sugars Helps Prevent Lung Infections & Improves Nutrition  Test blood sugar:     In the morning before eating (goal is )     2 hours after a meal (goal is less than 150)     When pre-meal glucose is greater than 150 add correction     At bedtime (if less than 100 eat a snack with 15 grams of carbohydrates  Last A1C Results:   Hemoglobin A1C   Date Value Ref Range Status   04/10/2017 7.0 %  Final         If diabetic, measure A1C every 6 months. Goal: Under 7%    Staying Healthy    Research:  If you are interested in learning about research opportunities or have questions, please contact Temi Dodge at [phone] or [email or web address].      CF Foundation:  Compass is a personalized resource service to help you with the insurance, financial, legal and other issues you are facing.  It's free, confidential and available to anyone with CF.  Ask your  for more information or contact Compass directly at 109-COMPASS (877-4685) or compass@cff.org, or learn more at cff.org/compass.          RECOMMENDATIONS: I will look into brand name gabo.  Overnight oximetry in a couple weeks.  Great job taking care of yourself.    YOUR GOAL:  Enjoy the summer.      CF Nurse Line:  Jose David Henderson: [phone]   Felicitas Banuelos, RT: [phone]     Zeina Pierre: [phone] or Ivory Bonilla, Dieticians: [phone]   Lou Lassiter, Diabetes Nurse: [phone]      Lety Golden: [phone] or Marisa Urbina [phone], Social Workers   [email or web address]

## 2017-07-05 NOTE — PROGRESS NOTES
[facility]  July 5, 2017         Assessment and Plan:   Nico Morales is a 41 year old male with cystic fibrosis with severe obstruction, h/o A. Fumigatus on/off itraconazole for many years, CFRD, malnutrition on chronic enteral feeds, pancreatic exocrine insufficiency and h/o adrenal insufficiency who is seen today in clinic for follow up.        1. CF lung disease: Below baseline today due to recent viral illness, with decline in PFT. Describes some chest tightness, upper respiratory congestion and drainage and fatigue. Vesting 2-3 times/day.  Previous cultures have grown out MDR Ps. A and recently Stenotrophomonas. Now off antibiotics except azithro for maintenance.    - Continue azithro--he does feel he needs an additional antibiotic at this time  - Continue current nebulizers, Symbicort and vest therapy  - Resume gabo nebs, (alternates with [place])--not tolerating generic gabo so will order brand name.  Gets chest tightness and increased cough      2. Eleved alkaline phophatase: since 4/15, longstanding.  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      3. Exocrine pancreatic insufficiency with malnutrition on chronic enteral feeds: denies symptoms of malabsorption. Continues with 2 cans of TF at night.  - Continue pancreatic enzymes and vitamin supplementation  - High protein and high calorie diet supplemented with nocturnal tube feeds      4. CFRD: AIC improved to 7.0 to 7.0 on 4/10/17. BS < 100 in the am.Following with Endocrine.  - Continue Lantus 5 U in the am  - Carb coverage with 1 U/ 10 g carbs      5.  Adrenal insufficiency: likely a complication of using Symbicort with itraconazole. Fatigue improved. TSH WNL. Sodium and potassium WNL, am cortisol normal.   - now off prednisone  - Cosyntropin stim test 6/2017 was normal      6. CF related low bone mineral density: Vitamin D, TSH and testosterone WNL, is supposed to start pamidronate.  - will  start pamidronate in the fall     7. Psychosocial:  COntinues to work in finance.  Now  as of May.  Building a [employer] in [place].    RTC: in 8 weeks with routine cultures and spirometry  Annual studies due: December 2017  Preventative care: colonoscopy during 2017    Jaye Machado MD MPH   of Medicine  Pulmonary, Allergy, Critical Care and Sleep Medicine      Interval History:     Kilo has increased cough and sputum production with recent viral illness.  Was fatigued but this is improving.  Denies shortness of breath.         Review of Systems:     CONSTITUTIONAL: no fever, no chills, no change in weight, recent decrease in energy but now improving, no change in appetite    INTEGUMENTARY/SKIN: no rash, no obvious new lesions    ENT/MOUTH: no sore throat, increased sinus pain and nasal drainage, no hearing loss, chronic ear ringing     RESPIRATORY: see interval history    CV: Recent anterior chest pain with viral illness--sore ripping pain, no palpitations, no peripheral edema, no orthopnea, no PND    GI: no nausea, no vomiting, no change in stools, no fatty stools, no GERD, no abdominal pain    : no dysuria, no urinary frequency    MUSCULOSKELETAL: no myalgias, no arthralgias    ENDOCRINE: no excessive thirst, blood sugars with adequate control    NEURO:  chronic headache, no numbness, no tingling    SLEEP: no issues    PSYCHIATRIC: mood stable          Past Medical and Surgical History:     Past Medical History:   Diagnosis Date     CF (cystic fibrosis) (H)      Exocrine pancreatic insufficiency      Nocardia infection      Pseudomonas infection      S/P gastrostomy (H) 2002     Past Surgical History:   Procedure Laterality Date     GASTROSTOMY TUBE  2002           Family History:     Family History   Problem Relation Age of Onset     HEART DISEASE Maternal Grandmother      HEART DISEASE Maternal Grandfather      HEART DISEASE Paternal Grandmother      DIABETES No family  hx of             Social History:     Social History     Social History     Marital status:      Spouse name: N/A     Number of children: 0     Years of education: N/A     Occupational History     financial [employer]     Social History Main Topics     Smoking status: Never Smoker     Smokeless tobacco: Never Used     Alcohol use No     Drug use: No     Sexual activity: Yes     Partners: Female     Birth control/ protection: Pill     Other Topics Concern     Blood Transfusions No     Exercise No     Social History Narrative    Kilo lives with wife in a house in [place], [place].  He works as a .        March 2016. He works independently as a . Works out 5x/week and walks the dog daily.        June 2016. No changes. Describes himself as a creature of habit.            Medications:     Current Outpatient Prescriptions   Medication     tobramycin, PF, (BETHANY) 300 MG/5ML neb solution     azithromycin (ZITHROMAX) 250 MG tablet     cetirizine (ZYRTEC) 10 MG tablet     budesonide-formoterol (SYMBICORT) 160-4.5 MCG/ACT Inhaler     insulin aspart (NOVOLOG PENFILL) 100 UNIT/ML injection     amitriptyline (ELAVIL) 10 MG tablet     levalbuterol (XOPENEX HFA) 45 MCG/ACT Inhaler     tobramycin, PF, (BETHANY) 300 MG/5ML neb solution     levalbuterol (XOPENEX) 1.25 MG/3ML neb solution     CREON 65921 UNITS CPEP per EC capsule     blood glucose monitoring (MARTINA CONTOUR NEXT MONITOR) meter device kit     blood glucose monitoring (MARTINA CONTOUR NEXT) test strip     blood glucose monitoring (MARTINA MICROLET) lancets     aztreonam lysine (CAYSTON) 75 MG SOLR     oseltamivir (TAMIFLU) 75 MG capsule     sodium chloride inhalant 7 % NEBU     insulin pen needle (BD OMI U/F) 32G X 4 MM     insulin glargine (LANTUS SOLOSTAR) 100 UNIT/ML PEN     acetylcysteine (MUCOMYST) 10 % nebulizer solution     sodium chloride, Inhalant, 0.9 % AERS     polyethylene glycol (MIRALAX) powder     [facility]  "INFUSION MANAGED PATIENT     ACE NOT PRESCRIBED, INTENTIONAL,     Nebulizers (EFLOW SCF NEBULIZER HANDSET) MISC     Calcium Citrate-Vitamin D (CALCIUM CITRATE + D PO)     Ferrous Fumarate-Vitamin C (VITRON-C) 200-125 MG TABS     MEPHYTON 5 MG PO TABS     MULTIVITAMIN OR     VITAMIN D 1000 UNIT PO CAPS     VITAMIN E 400 UNIT PO CAPS     No current facility-administered medications for this visit.             Physical Exam:   /72  Pulse 93  Temp 98.4  F (36.9  C) (Oral)  Resp 16  Ht 1.737 m (5' 8.39\")  Wt 61.3 kg (135 lb 2.3 oz)  SpO2 95%  BMI 20.32 kg/m2    Constitutional:   Awake, alert and in no apparent distress     Eyes:   nonicteric     ENT:   oral mucosa moist without lesions, normal tm bilaterally, bilateral mucosa normal     Neck:   Supple without supraclavicular or cervical lymphadenopathy     Lungs:   Good air flow.  rare crackles. No rhonchi.  rare wheezes.     Cardiovascular:   Normal S1 and S2.  RRR.  No murmur, gallop or rub.     Abdomen:   NABS, soft, nontender, nondistended.  g tube     Musculoskeletal:   No edema, digital clubbing present     Neurologic:   Alert and conversant.     Skin:   Warm, dry.  No rash on limited exam.             Data:   All laboratory and imaging data reviewed.    Cystic Fibrosis Culture  Specimen Description   Date Value Ref Range Status   07/05/2017 Sputum  Final   05/30/2017 Sputum  Final   05/17/2017 Sputum  Final    Culture Micro   Date Value Ref Range Status   07/05/2017 (A)  Final    Moderate growth Normal santi  Light growth Pseudomonas species, mucoid strain  Light growth Stenotrophomonas maltophilia  Light growth Pseudomonas fluorescens putida group     05/30/2017 (A)  Final    Moderate growth Normal santi  Light growth Pseudomonas species, mucoid strain  Light growth Strain 2 Pseudomonas species, mucoid strain  Light growth Stenotrophomonas maltophilia     05/17/2017 (A)  Final    Light growth Normal santi  Light growth Pseudomonas aeruginosa, mucoid " strain  Light growth Strain 2 Pseudomonas aeruginosa, mucoid strain          Recent Results (from the past 168 hour(s))   General PFT Lab (Please always keep checked)    Collection Time: 07/05/17  9:47 AM   Result Value Ref Range    FVC-Pred 4.95 L    FVC-Pre 3.33 L    FVC-%Pred-Pre 67 %    FEV1-Pre 1.53 L    FEV1-%Pred-Pre 38 %    FEV1FVC-Pred 81 %    FEV1FVC-Pre 46 %    FEFMax-Pred 9.80 L/sec    FEFMax-Pre 6.44 L/sec    FEFMax-%Pred-Pre 65 %    FEF2575-Pred 3.93 L/sec    FEF2575-Pre 0.53 L/sec    AHS3760-%Pred-Pre 13 %    ExpTime-Pre 14.38 sec    FIFMax-Pre 5.45 L/sec    FEV1FEV6-Pred 82 %    FEV1FEV6-Pre 52 %   Cystic Fibrosis Culture Aerob Bacterial    Collection Time: 07/05/17 10:30 AM   Result Value Ref Range    Specimen Description Sputum     Culture Micro (A)      Moderate growth Normal santi  Light growth Pseudomonas species, mucoid strain  Light growth Stenotrophomonas maltophilia  Light growth Pseudomonas fluorescens putida group      Micro Report Status Pending     Organism: Light growth Stenotrophomonas maltophilia     Organism: Light growth Pseudomonas fluorescens putida group     Organism: Light growth Pseudomonas species, mucoid strain        Susceptibility    Light growth stenotrophomonas maltophilia (denisha gram negative panel) -  (no method available)     AMIKACIN Value in next row  ug/mL      >32.0 No interp available     AZTREONAM Value in next row  ug/mL      >16.0 No interp available     CEFEPIME Value in next row  ug/mL      >16.0 No interp available     CEFOTAXIME Value in next row  ug/mL      >32.0 No interp available     CEFTAZIDIME >16.0 Resistant  ug/mL     CIPROFLOXACIN Value in next row  ug/mL      >2.0 No interp available     GENTAMICIN Value in next row  ug/mL      >8.0 No interp available     IMIPENEM Value in next row  ug/mL      >8.0 No interp available     LEVOFLOXACIN <=1.0 Susceptible  ug/mL     MEROPENEM Value in next row  ug/mL      >8.0 No interp available     PIPERACILLIN  Value in next row  ug/mL      >64.0 No interp available     Piperacillin/Tazo Value in next row  ug/mL      >64.0 No interp available     TETRACYCLINE Value in next row  ug/mL      >8.0 No interp available     TOBRAMYCIN Value in next row  ug/mL      >8.0 No interp available     Trimethoprim/Sulfa Value in next row  ug/mL      <=2.0/38.0 Susceptible    Light growth pseudomonas fluorescens putida group (denisha gram negative panel) -  (no method available)     AMIKACIN <=4.0 Susceptible  ug/mL     AZTREONAM <=4.0 Susceptible  ug/mL     CEFEPIME 8.0 Susceptible  ug/mL     CEFOTAXIME 8.0 Susceptible  ug/mL     CEFTAZIDIME <=1.0 Susceptible  ug/mL     CIPROFLOXACIN <=1.0 Susceptible  ug/mL     GENTAMICIN 2.0 Susceptible  ug/mL     IMIPENEM <=1.0 Susceptible  ug/mL     LEVOFLOXACIN 2.0 Susceptible  ug/mL     MEROPENEM 2.0 Susceptible  ug/mL     PIPERACILLIN <=16.0 Susceptible  ug/mL     TETRACYCLINE >8.0 Resistant  ug/mL     Trimethoprim/Sulfa >2.0/38.0 Resistant  ug/mL     Piperacillin/Tazo <=8.0 Susceptible  ug/mL     TOBRAMYCIN <=1.0 Susceptible  ug/mL    Light growth pseudomonas species, mucoid strain (denisha gram negative panel) -  (no method available)     AMIKACIN 8.0 Susceptible  ug/mL     AZTREONAM <=4.0 Susceptible  ug/mL     CEFEPIME 8.0 Susceptible  ug/mL     CEFOTAXIME >32.0 Resistant  ug/mL     CEFTAZIDIME 4.0 Susceptible  ug/mL     CIPROFLOXACIN 1.0 Susceptible  ug/mL     GENTAMICIN <=1.0 Susceptible  ug/mL     IMIPENEM 4.0 Intermediate  ug/mL     LEVOFLOXACIN 2.0 Susceptible  ug/mL     MEROPENEM 2.0 Susceptible  ug/mL     PIPERACILLIN <=16.0 Susceptible  ug/mL     Piperacillin/Tazo 16.0 Susceptible  ug/mL     TETRACYCLINE 8.0 Intermediate  ug/mL     TOBRAMYCIN 2.0 Susceptible  ug/mL     Trimethoprim/Sulfa >2.0/38.0 Resistant  ug/mL       PFT: Severe obstructive lung disease.  When compared to 5/30/2017, the FEV1 and FVC have decreased.  The decrease in FVC may represent air trapping v. restrictive physiology.   Lung volumes would be necessary to determine.

## 2017-07-31 NOTE — PROGRESS NOTES
This is a recent snapshot of the patient's [facility] medical record.  For current drug dose and complete information and questions, call [phone] or In Basket pool, [facility] home infusion ([id number])  CSN Number:  [id number]

## 2017-08-14 ENCOUNTER — OFFICE VISIT (OUTPATIENT)
Dept: ENDOCRINOLOGY | Facility: CLINIC | Age: 42
End: 2017-08-14
Payer: COMMERCIAL

## 2017-08-14 VITALS
WEIGHT: 135 LBS | SYSTOLIC BLOOD PRESSURE: 110 MMHG | DIASTOLIC BLOOD PRESSURE: 67 MMHG | HEART RATE: 100 BPM | HEIGHT: 68 IN | BODY MASS INDEX: 20.46 KG/M2

## 2017-08-14 DIAGNOSIS — E84.9 DIABETES MELLITUS DUE TO CYSTIC FIBROSIS (H): ICD-10-CM

## 2017-08-14 DIAGNOSIS — E08.9 DIABETES MELLITUS RELATED TO CF (CYSTIC FIBROSIS) (H): ICD-10-CM

## 2017-08-14 DIAGNOSIS — E08.9 DIABETES MELLITUS DUE TO CYSTIC FIBROSIS (H): ICD-10-CM

## 2017-08-14 DIAGNOSIS — E84.8 DIABETES MELLITUS RELATED TO CF (CYSTIC FIBROSIS) (H): ICD-10-CM

## 2017-08-14 LAB — HBA1C MFR BLD: 6.1 % (ref 0–5.7)

## 2017-08-14 PROCEDURE — 83036 HEMOGLOBIN GLYCOSYLATED A1C: CPT | Performed by: INTERNAL MEDICINE

## 2017-08-14 PROCEDURE — 99213 OFFICE O/P EST LOW 20 MIN: CPT | Performed by: INTERNAL MEDICINE

## 2017-08-14 PROCEDURE — 36415 COLL VENOUS BLD VENIPUNCTURE: CPT | Performed by: INTERNAL MEDICINE

## 2017-08-14 NOTE — NURSING NOTE
"Nico Morales's goals for this visit include: Follow up CF/Diabetes  He requests these members of his care team be copied on today's visit information: YES    PCP: Jaye aMchado    Referring Provider:  No referring provider defined for this encounter.    Chief Complaint   Patient presents with     Diabetes     Cystic Fibrosis       Initial Ht 1.734 m (5' 8.25\")  Wt 61.2 kg (135 lb)  BMI 20.38 kg/m2 Estimated body mass index is 20.38 kg/(m^2) as calculated from the following:    Height as of this encounter: 1.734 m (5' 8.25\").    Weight as of this encounter: 61.2 kg (135 lb).  Medication Reconciliation: complete    Do you need any medication refills at today's visit? YES    "

## 2017-08-14 NOTE — PROGRESS NOTES
CF Endocrinology Return Consultation:  Diabetes  :   Patient: Nico Morales MRN# [id number]   YOB: 1975 Age: 41 year old   Date of Visit: 8/14/2017  Dear Dr. Jaye Machado:    I had the pleasure of seeing your patient, Nico Morales in the [facility], [facility] , on 8/14/2017 for a return consultation regarding CFRD, adrenal insufficiency and low bone density.           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  Verbal overview and written information provided.          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     Priority: Medium     Encounter for long-term (current) use of antibiotics 05/20/2013     Priority: Medium     Prostatitis 05/20/2013     Priority: Medium     updating diagnosis code for icd10 cutover       Pseudomonas infection      Priority: Medium     Cystic fibrosis (H) 11/24/2010     Priority: Medium     Sweat Test   Date: 4/25/78    Lab: [facility]   Sample #1:  110mmol  Sample #2:  106mmol    Genetics  Date: 4/9/90  [id number]+[id number]->T         CARDIOVASCULAR SCREENING; LDL GOAL LESS THAN 160 10/31/2010     Priority: Medium            HPI:   Nico is a 41 year old male with Cystic Fibrosis Related Diabetes Mellitus (CFRD).    I have reviewed the available past laboratory evaluations, imaging studies, and medical records available to me at this visit. I have reviewed   Nico'height and weight.    History was obtained from the patient and the medical record.  I have reviewed the notes of the pulmonary care team.    I have read and interpreted the data from the patient glucose downloads..    Overall average: 114 mg/dL SD 30     Patient is currently testing 0-2 time per day.      Does not check regularly but check 3-4 times daily for the 3-4 days    A1c:   hemoglobin A1c: 6%   Result was discussed at today's visit.     Current insulin regimen:   Lantus 6 units daily at bedtime   Novolog 1 unit / 20 gm of carb, 3-4 time  Daily. Novolog dose ranges b/w 3- 5 units  Tube feed at night, 2 nights on 1 night off, tube feed take 2-3 hours to complete. Takes 3 units of novolog with TF     Insulin administration site(s): abd or thigh    Family history and social history were reviewed     He was also diagnosed with adrenal insufficiency based on inadequate response on a ACTH stim test. Adrenal insufficiency is thought to be related to  itraconazole and Symbicort use.  He is off itraconazole at this time. Recent ACTH stim test was normal and he has been off prednisone.    Noted to have significant decline in bone density on recent DEXA. On vitamin D and multivitmain supplements. Recommended  Pamidronate  but has not started yet.     Over all feels well, no acute complaints today  Weight is stable          Past Medical History:     Past Medical History:   Diagnosis Date     CF (cystic fibrosis) (H)      Exocrine pancreatic insufficiency      Nocardia infection      Pseudomonas infection      S/P gastrostomy (H) 2002            Past Surgical History:     Past Surgical History:   Procedure Laterality Date     GASTROSTOMY TUBE  2002               Social History:     Social History     Social History Narrative    Kilo lives with wife in a house in [place], [place].  He works as a .        March 2016. He works independently as a . Works out 5x/week and walks the dog  daily.        June 2016. No changes. Describes himself as a creature of habit.              Family History:     Family History   Problem Relation Age of Onset     HEART DISEASE Maternal Grandmother      HEART DISEASE Maternal Grandfather      HEART DISEASE Paternal Grandmother      DIABETES No family hx of             Allergies:     Allergies   Allergen Reactions     Ceftazidime      Pulmozyme [Dornase Trever] Other (See Comments)     Chest pain and fever     Zosyn Hives     Levaquin Rash             Medications:     Current Outpatient Rx   Medication Sig Dispense Refill     tobramycin, PF, (BETHANY) 300 MG/5ML neb solution Take 5 mLs (300 mg) by nebulization 2 times daily Alternate every 28 days with Javi. 280 mL 6     azithromycin (ZITHROMAX) 250 MG tablet Take 1 tablet (250 mg) by mouth daily 30 tablet 11     cetirizine (ZYRTEC) 10 MG tablet Take 1 tablet (10 mg) by mouth every evening 30 tablet 1     budesonide-formoterol (SYMBICORT) 160-4.5 MCG/ACT Inhaler Inhale 2 puffs into the lungs 2 times daily 1 Inhaler 12     insulin aspart (NOVOLOG PENFILL) 100 UNIT/ML injection 1 per 20 grams of carbohydrate at lunch and supper. Also use 3 units of Novolog at night with tube feeding. 15 mL 12     amitriptyline (ELAVIL) 10 MG tablet Take 1 tablet (10 mg) by mouth At Bedtime Take one to three tablets at bedtime. 90 tablet 3     levalbuterol (XOPENEX HFA) 45 MCG/ACT Inhaler Inhale 2 puffs into the lungs every 6 hours as needed for shortness of breath / dyspnea 3 Inhaler 4     tobramycin, PF, (BETHANY) 300 MG/5ML neb solution Take 5 mLs (300 mg) by nebulization 2 times daily 28 days on, 28 days off. 280 mL 2     levalbuterol (XOPENEX) 1.25 MG/3ML neb solution Take 3 mLs (1.25 mg) by nebulization 4 times daily 360 mL 11     CREON 06113 UNITS CPEP per EC capsule Take 7-8 caps with meals (3 meals/day) and 2 caps with snacks (3 snacks/day) 2700 capsule 3     blood glucose monitoring (Digital Folio CONTOUR NEXT MONITOR) meter device kit Use  "to test blood sugar 6 times daily or as directed. 1 kit 2     blood glucose monitoring (MARTINA CONTOUR NEXT) test strip Use to test blood sugar 6 times daily or as directed. 200 each 2     blood glucose monitoring (MARTINA MICROLET) lancets Use to test blood sugar 6 times daily or as directed. 2 Box 2     aztreonam lysine (CAYSTON) 75 MG SOLR Take 1 mL (75 mg) by nebulization 3 times daily Alternating every other month with BETHANY. 84 mL 5     sodium chloride inhalant 7 % NEBU Nebulize 4mL twice daily 240 mL 11     insulin pen needle (BD OMI U/F) 32G X 4 MM Use 6 daily or as directed. 200 each 11     insulin glargine (LANTUS SOLOSTAR) 100 UNIT/ML PEN 5 units daily (Patient taking differently: 6 units daily) 15 mL 6     acetylcysteine (MUCOMYST) 10 % nebulizer solution Nebulize 4ml qid 480 mL 6     sodium chloride, Inhalant, 0.9 % AERS Inhale 3 mLs into the lungs 3 times daily 270 mL 11     polyethylene glycol (MIRALAX) powder Use 17 gms po tid x 3 days then do it daily x 1 month 442 g 1     Nebulizers (EFLOW SCF NEBULIZER HANDSET) MISC 1 each 3 times daily. 1 each 6     Calcium Citrate-Vitamin D (CALCIUM CITRATE + D PO) Take 1 tablet by mouth 2 times daily.       Ferrous Fumarate-Vitamin C (VITRON-C) 200-125 MG TABS Take 1 tablet by mouth 2 times daily.       MEPHYTON 5 MG PO TABS 1 TABLET DAILY       MULTIVITAMIN OR 1 tablet daily       VITAMIN D 1000 UNIT PO CAPS 1 CAPSULE DAILY       VITAMIN E 400 UNIT PO CAPS 1 CAPSULE DAILY       oseltamivir (TAMIFLU) 75 MG capsule Take 1 capsule (75 mg) by mouth 2 times daily (Patient not taking: Reported on 8/14/2017) 10 capsule 1     [place] INFUSION MANAGED PATIENT Contact [facility] for patient specific medication information at [phone] on admission and discharge from the hospital.  Phones are answered 24 hours a day 7 days a week 365 days a year.    Providers - Choose \"CONTINUE HOME MED (no script)\" at discharge if patient treatment with home infusion " "will continue.       ACE NOT PRESCRIBED, INTENTIONAL, 1 each continuous prn. ACE Inhibitor not prescribed due to Risk for drug interaction 0 each 0             Review of Systems:     Comprehensive ROS negative other than the symptoms noted above in the HPI.          Physical Exam:   Blood pressure 110/67, pulse 100, height 1.734 m (5' 8.25\"), weight 61.2 kg (135 lb).  Normalized stature-for-age data not available for patients older than 20 years.  Height: 5' 8.25\", Normalized stature-for-age data not available for patients older than 20 years.  Weight: 135 lbs 0 oz, Normalized weight-for-age data not available for patients older than 20 years.  BMI: Body mass index is 20.38 kg/(m^2)., Normalized BMI data available only for age 0 to 20 years.      CONSTITUTIONAL:   Awake, alert, and in no apparent distress.  Neck:no goiter  Lungs:CTA b/l  Heart:RRR  Abd: soft NT  NEUROLOGIC:No focal deficits noted.   PSYCHIATRIC: Cooperative, no agitation.  MUSCULOSKELETAL:  Full range of motion noted.  Motor strength and tone are normal.          Laboratory results:     TSH   Date Value Ref Range Status   12/15/2016 1.36 0.40 - 4.00 mU/L Final     Triiodothyronine (T3)   Date Value Ref Range Status   06/25/2010 123 60 - 181 ng/dL Final     Testosterone Total   Date Value Ref Range Status   12/15/2016 779 240 - 950 ng/dL Final     Comment:     This test was developed and its performance characteristics determined by the   [facility],  Special Chemistry Laboratory. It has   not been cleared or approved by the FDA. The laboratory is regulated under   CLIA   as qualified to perform high-complexity testing. This test is used for   clinical   purposes. It should not be regarded as investigational or for research.       Cholesterol   Date Value Ref Range Status   12/15/2016 158 <200 mg/dL Final     Albumin Urine mg/L   Date Value Ref Range Status   12/15/2016 55 mg/L Final     Triglycerides   Date Value Ref " Range Status   12/15/2016 173 (H) <150 mg/dL Final     Comment:     Borderline high:  150-199 mg/dl   High:             200-499 mg/dl   Very high:       >499 mg/dl       HDL Cholesterol   Date Value Ref Range Status   12/15/2016 32 (L) >39 mg/dL Final     LDL Cholesterol Calculated   Date Value Ref Range Status   12/15/2016 92 <100 mg/dL Final     Comment:     Desirable:       <100 mg/dl     Cholesterol/HDL Ratio   Date Value Ref Range Status   06/26/2013 4.4 0.0 - 5.0 Final     Non HDL Cholesterol   Date Value Ref Range Status   12/15/2016 126 <130 mg/dL Final     A1C      8.3   12/15/2016  A1C      8.1   6/28/2016  A1C      7.5   4/13/2016  A1C      7.1   3/8/2016  A1C      7.4   12/16/2015 No results found for this basename: hemoglobina1        CF  Diabetes Health Maintenance    Date of Diabetes Diagnosis: 12/2015    Date Last Eye Exam: Jan 2015     Date Last Dental Appointment: >2 years    Dates of Episodes Severe* Hypoglycemia (month/year, cumulative, ongoing, assess each visit): never              *Severe=patient unconscious, seizure, unable to help self    Last 25-Vitamin D (every year): 33 on 12/2016    Last DXA, lowest Z-score (every 2 years): -2.6 right femoral neck       ?Bisphosphonates (yes/no):       Last Urine Microalbumin (every year):      No results found for: MICROALBUMIN     ENDO DIABETES Latest Ref Rng 12/15/2016   ALBUMIN URINE MG/L  55   ALBUMIN URINE MG/G CR 0 - 17 mg/g Cr 37.28 (H)     Last Testosterone:   Testosterone Total   Date Value Ref Range Status   12/15/2016 779 240 - 950 ng/dL Final     Comment:     This test was developed and its performance characteristics determined by the   [facility],  Special Chemistry Laboratory. It has   not been cleared or approved by the FDA. The laboratory is regulated under   CLIA   as qualified to perform high-complexity testing. This test is used for   clinical   purposes. It should not be regarded as investigational or for  research.        On testosterone therapy (yes/no)? No     Date of Last Diabetes Visit: June 2016         Assessment and Plan:   Nico is a 41 year old male with CFRD    CFRD: good control, continue current insulin regimen.    Diabetes is a complicated and dangerous illness which requires intensive monitoring and treatment to prevent both short-term and long-term consequences to various organs. Insulin therapy is life-saving, but is also associated with life-threatening toxicity (hypoglycemia).  Careful and continuous attention to balancing glucose levels, activity, diet and insulin dosage is necessary.    Low bone density: Vitamin D, TSH and testosterone were ok.  pamidronate 30 mg IV every 3 months was ordered. Patient has not scheduled infusion yet. Would like to wait until after summer    Adrenal insuffiencey:  1 mcg ACTH stim test was normal, off prednisone    RTC in 3 months    JULIETA Tate    [facility]    CC  TYLER DAVIS

## 2017-08-14 NOTE — PATIENT INSTRUCTIONS
Sending blood sugars to your provider at [facility]:  We want to help you with your diabetes management, which often requires frequent adjustments to your therapy. For your convenience, we have several ways to send your blood sugars to your doctor for review.    - Send message directly to your doctor through My Chart.  Please ask the rooming staff if you would like to sign up for My Chart.  This is a fast and confidential way to send your information and communicate directly with your provider.   - Record readings and fax to [phone].  We have a template for you to use for your convenience.  - If you have a Medtronic pump, upload to [employer] and notify your provider of your username and password.   - Stop by the clinic with your meter for download.   - My Chart or call Meghan Smith, Diabetes Educator at [phone]  - Call the clinic and speak to one of the endocrine nurses to relay information on the telephone.  Elizabeth Baker, or Chyna at [phone].   -    Please call the on-call Endocrinologist at the [facility] for after       hours/weekend needs at [phone] Option #4.    Please note that you do not need to FAST if you are just having an A1C drawn. Please remember to ALWAYS bring your glucose meter with to your appointment. This data is very important for the management of your care.    Thank you!  Your [facility] Diabetes Care Team

## 2017-08-14 NOTE — MR AVS SNAPSHOT
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 For your convenience, we have several ways to send your blood sugars to your doctor for review.    - Send message directly to your doctor through My Chart.  Please ask the rooming staff if you would like to sign up for My Chart.  This is a fast and confidential way to send your information and communicate directly with your provider.   - Record readings and fax to [phone].  We have a template for you to use for your convenience.  - If you have a Medtronic pump, upload to [employer] and notify your provider of your username and password.   - Stop by the clinic with your meter for download.   - My Chart or call Meghan Smith, Diabetes Educator at [phone]  - Call the clinic and speak to one of the endocrine nurses to relay information on the telephone.  Elizabeth Baker or Chyna at [phone].   -    Please call the on-call Endocrinologist at the [facility] for after       hours/weekend needs at [phone] Option #4.    Please note that you do not need to FAST if you are just having an A1C drawn. Please remember to ALWAYS bring your glucose meter with to your appointment. This data is very important for the management of your care.    Thank you!  Your [facility] Care Team                Follow-ups after your visit        Your next 10 appointments already scheduled     Aug 30, 2017   9:30 AM CDT   CF LOOP with UC PFL CF   [facility] ([facility])    [address] [id number] [phone]            Aug 30, 2017  9:50 AM CDT   (Arrive by 9:35 AM)   RETURN CYSTIC FIBROSIS VISIT with Jaye Machado MD   [facility] ([facility])    [address] [place] [id number]   [phone]            Feb 26, 2018 10:15 AM CST   Return Visit with Diana Lee MD, MG ENDO NURSE   [facility]    [address] [place] [id number] [phone]              Who to contact     If you have questions or need follow up information about today's clinic visit or your schedule please contact [facility] directly at [phone].  Normal or non-critical lab and imaging results will be communicated to you by Openbrav[employer]art, letter or phone within 4 business days after the clinic has received the results. If you do not hear from us within 7 days, please contact the clinic through Openbrav[employer]art or phone. If you have a critical or abnormal lab result, we will notify you by phone as soon as possible.  Submit refill requests through Flowline or call your pharmacy and they will forward the refill request to us. Please allow 3 business days for your refill to be completed.          Additional Information About Your Visit        [employer] Information     Flowline gives you secure access to your electronic health record. If you see a primary care provider, you can also send messages to your care team and make appointments. If you have questions, please call your primary care clinic.  If you do not have a primary care provider, please call [phone] and they will assist you.      Flowline is an electronic gateway that provides easy, online access to your medical records. With Flowline, you  "can request a clinic appointment, read your test results, renew a prescription or communicate with your care team.     To access your existing account, please contact your [facility] or call [phone] for assistance.        Care EveryWhere ID     This is your Care EveryWhere ID. This could be used by other organizations to access your [facility] medical records  [id number]        Your Vitals Were     Pulse Height BMI (Body Mass Index)             100 1.734 m (5' 8.25\") 20.38 kg/m2          Blood Pressure from Last 3 Encounters:   08/14/17 110/67   07/05/17 107/72   06/19/17 (!) 85/59    Weight from Last 3 Encounters:   08/14/17 61.2 kg (135 lb)   07/05/17 61.3 kg (135 lb 2.3 oz)   06/19/17 60.9 kg (134 lb 3.2 oz)              We Performed the Following     Hemoglobin A1c POCT          Today's Medication Changes          These changes are accurate as of: 8/14/17 12:11 PM.  If you have any questions, ask your nurse or doctor.               These medicines have changed or have updated prescriptions.        Dose/Directions    insulin glargine 100 UNIT/ML injection   Commonly known as:  LANTUS SOLOSTAR   This may have changed:  additional instructions   Used for:  Diabetes mellitus related to CF (cystic fibrosis) (H)   Changed by:  Diana Lee MD        6 units daily   Quantity:  15 mL   Refills:  6            Where to get your medicines      These medications were sent to [place] [address] [facility] - [place], [place] - [address]  [address] [address] [facility] [address]     Phone:  [phone]     insulin glargine 100 UNIT/ML injection                Primary Care Provider Office Phone # Fax #    Jaye Maki Machado [phone]       [address] [address]        Equal Access to Services     TOÑA RAMIREZ AH: Vernell willamso Roque, waaxda luqadaha, qaybta kaalmada adeegyada, joe mack. So wa " [phone].    ATENCIÓN: Si brittany andrews, tiene a monroe disposición servicios gratuitos de asistencia lingüística. Jennie barclay [phone].    We comply with applicable federal civil rights laws and [place] laws. We do not discriminate on the basis of race, color, national origin, age, disability sex, sexual orientation or gender identity.            Thank you!     Thank you for choosing [facility]  for your care. Our goal is always to provide you with excellent care. Hearing back from our patients is one way we can continue to improve our services. Please take a few minutes to complete the written survey that you may receive in the mail after your visit with us. Thank you!             Your Updated Medication List - Protect others around you: Learn how to safely use, store and throw away your medicines at www.disposemymeds.org.          This list is accurate as of: 8/14/17 12:11 PM.  Always use your most recent med list.                   Brand Name Dispense Instructions for use Diagnosis    ACE NOT PRESCRIBED (INTENTIONAL)     0 each    1 each continuous prn. ACE Inhibitor not prescribed due to Risk for drug interaction    Type I (juvenile type) diabetes mellitus without mention of complication, not stated as uncontrolled       acetylcysteine 10 % injection    MUCOMYST    480 mL    Nebulize 4ml qid    Cystic fibrosis with pulmonary manifestations (H)       amitriptyline 10 MG tablet    ELAVIL    90 tablet    Take 1 tablet (10 mg) by mouth At Bedtime Take one to three tablets at bedtime.    Cystic fibrosis (H)       azithromycin 250 MG tablet    ZITHROMAX    30 tablet    Take 1 tablet (250 mg) by mouth daily    Cystic fibrosis with pulmonary manifestations (H)       aztreonam lysine 75 MG Solr    CAYSTON    84 mL    Take 1 mL (75 mg) by nebulization 3 times daily Alternating every other month with BETHANY.    Cystic fibrosis with pulmonary manifestations (H)       blood glucose monitoring lancets      "2 Box    Use to test blood sugar 6 times daily or as directed.    Diabetes mellitus related to CF (cystic fibrosis) (H)       blood glucose monitoring meter device kit     1 kit    Use to test blood sugar 6 times daily or as directed.    Diabetes mellitus related to CF (cystic fibrosis) (H)       blood glucose monitoring test strip    MARTINA CONTOUR NEXT    200 each    Use to test blood sugar 6 times daily or as directed.    Diabetes mellitus related to CF (cystic fibrosis) (H)       budesonide-formoterol 160-4.5 MCG/ACT Inhaler    SYMBICORT    1 Inhaler    Inhale 2 puffs into the lungs 2 times daily    Cystic fibrosis of the lung (H), Cystic fibrosis (H)       CALCIUM CITRATE + D PO      Take 1 tablet by mouth 2 times daily.    Nocardia infection, Cystic fibrosis with pulmonary manifestations (H)       cetirizine 10 MG tablet    zyrTEC    30 tablet    Take 1 tablet (10 mg) by mouth every evening    Cystic fibrosis (H)       CREON 40507 UNITS Cpep per EC capsule   Generic drug:  amylase-lipase-protease     2700 capsule    Take 7-8 caps with meals (3 meals/day) and 2 caps with snacks (3 snacks/day)    Cystic fibrosis with pulmonary manifestations (H)       EFLOW SCF NEBULIZER HANDSET Misc     1 each    1 each 3 times daily.    Nocardia infection       [place] [facility]NAGED PATIENT      Contact [facility] for patient specific medication information at [phone] on admission and discharge from the hospital.  Phones are answered 24 hours a day 7 days a week 365 days a year.  Providers - Choose \"CONTINUE HOME MED (no script)\" at discharge if patient treatment with home infusion will continue.        insulin aspart 100 UNIT/ML injection    NovoLOG PENFILL    15 mL    1 per 20 grams of carbohydrate at lunch and supper. Also use 3 units of Novolog at night with tube feeding.    Diabetes mellitus related to CF (cystic fibrosis) (H)       insulin glargine 100 UNIT/ML injection    LANTUS SOLOSTAR    15 " mL    6 units daily    Diabetes mellitus related to CF (cystic fibrosis) (H)       insulin pen needle 32G X 4 MM    BD OMI U/F    200 each    Use 6 daily or as directed.    Diabetes mellitus related to CF (cystic fibrosis) (H)       levalbuterol 1.25 MG/3ML neb solution    XOPENEX    360 mL    Take 3 mLs (1.25 mg) by nebulization 4 times daily        levalbuterol 45 MCG/ACT Inhaler    XOPENEX HFA    3 Inhaler    Inhale 2 puffs into the lungs every 6 hours as needed for shortness of breath / dyspnea    Cystic fibrosis (H)       MEPHYTON 5 MG tablet   Generic drug:  phytonadione      1 TABLET DAILY        MULTIVITAMIN PO      1 tablet daily        oseltamivir 75 MG capsule    TAMIFLU    10 capsule    Take 1 capsule (75 mg) by mouth 2 times daily    Cystic fibrosis with pulmonary manifestations (H)       polyethylene glycol powder    MIRALAX    442 g    Use 17 gms po tid x 3 days then do it daily x 1 month    Cystic fibrosis with meconium ileus (H)       * sodium chloride (Inhalant) 0.9 % Aers     270 mL    Inhale 3 mLs into the lungs 3 times daily    Cystic fibrosis with pulmonary manifestations (H)       * sodium chloride inhalant 7 % Nebu neb solution     240 mL    Nebulize 4mL twice daily    Cystic fibrosis with pulmonary manifestations (H)       * tobramycin (PF) 300 MG/5ML neb solution    BETHANY    280 mL    Take 5 mLs (300 mg) by nebulization 2 times daily 28 days on, 28 days off.    Cystic fibrosis with pulmonary manifestations (H)       * tobramycin (PF) 300 MG/5ML neb solution    BETHANY    280 mL    Take 5 mLs (300 mg) by nebulization 2 times daily [facility]    Cystic fibrosis with pulmonary manifestations (H)       vitamin D 1000 UNITS capsule      1 CAPSULE DAILY        vitamin E 400 UNIT capsule      1 CAPSULE DAILY        VITRON-C 200-125 MG Tabs   Generic drug:  ferrous fumarate 65 mg ([place]. FE)-Vitamin C 125 mg      Take 1 tablet by mouth 2 times daily.    Nocardia infection,  Cystic fibrosis with pulmonary manifestations (H)       * Notice:  This list has 4 medication(s) that are the same as other medications prescribed for you. Read the directions carefully, and ask your doctor or other care provider to review them with you.

## 2017-08-14 NOTE — LETTER
8/14/2017      RE: Nico Morales  [address]  [facility] [address]     Dear Colleague,    Thank you for referring your patient, Nico Morales, to the [facility]. Please see a copy of my visit note below.    CF Endocrinology Return Consultation:  Diabetes  :   Patient: Nico Morales MRN# [id number]   YOB: 1975 Age: 41 year old   Date of Visit: 8/14/2017  Dear Dr. Jaye Machado:    I had the pleasure of seeing your patient, Nico Morales in the CF Endocrinology Clinic, [facility] , on 8/14/2017 for a return consultation regarding CFRD, adrenal insufficiency and low bone density.           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  Verbal overview and written information provided.          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     Priority: Medium     Encounter for long-term (current) use of antibiotics 05/20/2013     Priority: Medium     Prostatitis 05/20/2013     Priority: Medium     updating diagnosis code for icd10 cutover       Pseudomonas infection      Priority: Medium     Cystic fibrosis (H) 11/24/2010     Priority: Medium     Sweat Test   Date: 4/25/78    Lab: [facility]   Sample #1:  110mmol  Sample #2:  106mmol    Genetics  Date: 4/9/90  [id number]/621+1G->T         CARDIOVASCULAR SCREENING; LDL GOAL LESS THAN 160 10/31/2010     Priority: Medium             HPI:   Nico is a 41 year old male with Cystic Fibrosis Related Diabetes Mellitus (CFRD).    I have reviewed the available past laboratory evaluations, imaging studies, and medical records available to me at this visit. I have reviewed  Nico'height and weight.    History was obtained from the patient and the medical record.  I have reviewed the notes of the pulmonary care team.    I have read and interpreted the data from the patient glucose downloads..    Overall average: 114 mg/dL SD 30     Patient is currently testing 0-2 time per day.      Does not check regularly but check 3-4 times daily for the 3-4 days    A1c:   hemoglobin A1c: 6%   Result was discussed at today's visit.     Current insulin regimen:   Lantus 6 units daily at bedtime   Novolog 1 unit / 20 gm of carb, 3-4 time  Daily. Novolog dose ranges b/w 3- 5 units  Tube feed at night, 2 nights on 1 night off, tube feed take 2-3 hours to complete. Takes 3 units of novolog with TF     Insulin administration site(s): abd or thigh    Family history and social history were reviewed     He was also diagnosed with adrenal insufficiency based on inadequate response on a ACTH stim test. Adrenal insufficiency is thought to be related to  itraconazole and Symbicort use.  He is off itraconazole at this time. Recent ACTH stim test was normal and he has been off prednisone.    Noted to have significant decline in bone density on recent DEXA. On vitamin D and multivitmain supplements. Recommended  Pamidronate  but has not started yet.     Over all feels well, no acute complaints today  Weight is stable          Past Medical History:     Past Medical History:   Diagnosis Date     CF (cystic fibrosis) (H)      Exocrine pancreatic insufficiency      Nocardia infection      Pseudomonas infection      S/P gastrostomy (H) 2002            Past Surgical History:     Past Surgical History:   Procedure Laterality Date     GASTROSTOMY TUBE  2002                Social History:     Social History     Social History Narrative    Kilo lives with wife in a house in [place], [place].  He works as a .        March 2016. He works independently as a . Works out 5x/week and walks the dog daily.        June 2016. No changes. Describes himself as a creature of habit.              Family History:     Family History   Problem Relation Age of Onset     HEART DISEASE Maternal Grandmother      HEART DISEASE Maternal Grandfather      HEART DISEASE Paternal Grandmother      DIABETES No family hx of             Allergies:     Allergies   Allergen Reactions     Ceftazidime      Pulmozyme [Dornase Trever] Other (See Comments)     Chest pain and fever     Zosyn Hives     Levaquin Rash             Medications:     Current Outpatient Rx   Medication Sig Dispense Refill     tobramycin, PF, (BETHANY) 300 MG/5ML neb solution Take 5 mLs (300 mg) by nebulization 2 times daily Alternate every 28 days with Javi. 280 mL 6     azithromycin (ZITHROMAX) 250 MG tablet Take 1 tablet (250 mg) by mouth daily 30 tablet 11     cetirizine (ZYRTEC) 10 MG tablet Take 1 tablet (10 mg) by mouth every evening 30 tablet 1     budesonide-formoterol (SYMBICORT) 160-4.5 MCG/ACT Inhaler Inhale 2 puffs into the lungs 2 times daily 1 Inhaler 12     insulin aspart (NOVOLOG PENFILL) 100 UNIT/ML injection 1 per 20 grams of carbohydrate at lunch and supper. Also use 3 units of Novolog at night with tube feeding. 15 mL 12     amitriptyline (ELAVIL) 10 MG tablet Take 1 tablet (10 mg) by mouth At Bedtime Take one to three tablets at bedtime. 90 tablet 3     levalbuterol (XOPENEX HFA) 45 MCG/ACT Inhaler Inhale 2 puffs into the lungs every 6 hours as needed for shortness of breath / dyspnea 3 Inhaler 4     tobramycin, PF, (BETHANY) 300 MG/5ML neb solution Take 5 mLs (300 mg) by nebulization 2 times daily 28 days on, 28 days off. 280 mL 2     levalbuterol (XOPENEX) 1.25 MG/3ML neb solution Take 3 mLs (1.25 mg)  by nebulization 4 times daily 360 mL 11     CREON 65824 UNITS CPEP per EC capsule Take 7-8 caps with meals (3 meals/day) and 2 caps with snacks (3 snacks/day) 2700 capsule 3     blood glucose monitoring (MARTINA CONTOUR NEXT MONITOR) meter device kit Use to test blood sugar 6 times daily or as directed. 1 kit 2     blood glucose monitoring (MARTINA CONTOUR NEXT) test strip Use to test blood sugar 6 times daily or as directed. 200 each 2     blood glucose monitoring (MARTINA MICROLET) lancets Use to test blood sugar 6 times daily or as directed. 2 Box 2     aztreonam lysine (CAYSTON) 75 MG SOLR Take 1 mL (75 mg) by nebulization 3 times daily Alternating every other month with BETHANY. 84 mL 5     sodium chloride inhalant 7 % NEBU Nebulize 4mL twice daily 240 mL 11     insulin pen needle (BD OMI U/F) 32G X 4 MM Use 6 daily or as directed. 200 each 11     insulin glargine (LANTUS SOLOSTAR) 100 UNIT/ML PEN 5 units daily (Patient taking differently: 6 units daily) 15 mL 6     acetylcysteine (MUCOMYST) 10 % nebulizer solution Nebulize 4ml qid 480 mL 6     sodium chloride, Inhalant, 0.9 % AERS Inhale 3 mLs into the lungs 3 times daily 270 mL 11     polyethylene glycol (MIRALAX) powder Use 17 gms po tid x 3 days then do it daily x 1 month 442 g 1     Nebulizers (EFLOW SCF NEBULIZER HANDSET) MISC 1 each 3 times daily. 1 each 6     Calcium Citrate-Vitamin D (CALCIUM CITRATE + D PO) Take 1 tablet by mouth 2 times daily.       Ferrous Fumarate-Vitamin C (VITRON-C) 200-125 MG TABS Take 1 tablet by mouth 2 times daily.       MEPHYTON 5 MG PO TABS 1 TABLET DAILY       MULTIVITAMIN OR 1 tablet daily       VITAMIN D 1000 UNIT PO CAPS 1 CAPSULE DAILY       VITAMIN E 400 UNIT PO CAPS 1 CAPSULE DAILY       oseltamivir (TAMIFLU) 75 MG capsule Take 1 capsule (75 mg) by mouth 2 times daily (Patient not taking: Reported on 8/14/2017) 10 capsule 1     [place] HOME INFUSION MANAGED PATIENT Contact [facility] for patient specific  "medication information at [phone] on admission and discharge from the hospital.  Phones are answered 24 hours a day 7 days a week 365 days a year.    Providers - Choose \"CONTINUE HOME MED (no script)\" at discharge if patient treatment with home infusion will continue.       ACE NOT PRESCRIBED, INTENTIONAL, 1 each continuous prn. ACE Inhibitor not prescribed due to Risk for drug interaction 0 each 0             Review of Systems:     Comprehensive ROS negative other than the symptoms noted above in the HPI.          Physical Exam:   Blood pressure 110/67, pulse 100, height 1.734 m (5' 8.25\"), weight 61.2 kg (135 lb).  Normalized stature-for-age data not available for patients older than 20 years.  Height: 5' 8.25\", Normalized stature-for-age data not available for patients older than 20 years.  Weight: 135 lbs 0 oz, Normalized weight-for-age data not available for patients older than 20 years.  BMI: Body mass index is 20.38 kg/(m^2)., Normalized BMI data available only for age 0 to 20 years.      CONSTITUTIONAL:   Awake, alert, and in no apparent distress.  Neck:no goiter  Lungs:CTA b/l  Heart:RRR  Abd: soft NT  NEUROLOGIC:No focal deficits noted.   PSYCHIATRIC: Cooperative, no agitation.  MUSCULOSKELETAL:  Full range of motion noted.  Motor strength and tone are normal.          Laboratory results:     TSH   Date Value Ref Range Status   12/15/2016 1.36 0.40 - 4.00 mU/L Final     Triiodothyronine (T3)   Date Value Ref Range Status   06/25/2010 123 60 - 181 ng/dL Final     Testosterone Total   Date Value Ref Range Status   12/15/2016 779 240 - 950 ng/dL Final     Comment:     This test was developed and its performance characteristics determined by the   [facility],  Special Chemistry Laboratory. It has   not been cleared or approved by the FDA. The laboratory is regulated under   CLIA   as qualified to perform high-complexity testing. This test is used for   clinical   purposes. It " should not be regarded as investigational or for research.       Cholesterol   Date Value Ref Range Status   12/15/2016 158 <200 mg/dL Final     Albumin Urine mg/L   Date Value Ref Range Status   12/15/2016 55 mg/L Final     Triglycerides   Date Value Ref Range Status   12/15/2016 173 (H) <150 mg/dL Final     Comment:     Borderline high:  150-199 mg/dl   High:             200-499 mg/dl   Very high:       >499 mg/dl       HDL Cholesterol   Date Value Ref Range Status   12/15/2016 32 (L) >39 mg/dL Final     LDL Cholesterol Calculated   Date Value Ref Range Status   12/15/2016 92 <100 mg/dL Final     Comment:     Desirable:       <100 mg/dl     Cholesterol/HDL Ratio   Date Value Ref Range Status   06/26/2013 4.4 0.0 - 5.0 Final     Non HDL Cholesterol   Date Value Ref Range Status   12/15/2016 126 <130 mg/dL Final     A1C      8.3   12/15/2016  A1C      8.1   6/28/2016  A1C      7.5   4/13/2016  A1C      7.1   3/8/2016  A1C      7.4   12/16/2015 No results found for this basename: hemoglobina1        CF  Diabetes Health Maintenance    Date of Diabetes Diagnosis: 12/2015    Date Last Eye Exam: Jan 2015     Date Last Dental Appointment: >2 years    Dates of Episodes Severe* Hypoglycemia (month/year, cumulative, ongoing, assess each visit): never              *Severe=patient unconscious, seizure, unable to help self    Last 25-Vitamin D (every year): 33 on 12/2016    Last DXA, lowest Z-score (every 2 years): -2.6 right femoral neck       ?Bisphosphonates (yes/no):       Last Urine Microalbumin (every year):      No results found for: MICROALBUMIN     ENDO DIABETES Latest Ref Rng 12/15/2016   ALBUMIN URINE MG/L  55   ALBUMIN URINE MG/G CR 0 - 17 mg/g Cr 37.28 (H)     Last Testosterone:   Testosterone Total   Date Value Ref Range Status   12/15/2016 779 240 - 950 ng/dL Final     Comment:     This test was developed and its performance characteristics determined by the   [facility],  Special  Chemistry Laboratory. It has   not been cleared or approved by the FDA. The laboratory is regulated under   CLIA   as qualified to perform high-complexity testing. This test is used for   clinical   purposes. It should not be regarded as investigational or for research.        On testosterone therapy (yes/no)? No     Date of Last Diabetes Visit: June 2016         Assessment and Plan:   Nico is a 41 year old male with CFRD    CFRD: good control, continue current insulin regimen.    Diabetes is a complicated and dangerous illness which requires intensive monitoring and treatment to prevent both short-term and long-term consequences to various organs. Insulin therapy is life-saving, but is also associated with life-threatening toxicity (hypoglycemia).  Careful and continuous attention to balancing glucose levels, activity, diet and insulin dosage is necessary.    Low bone density: Vitamin D, TSH and testosterone were ok.  pamidronate 30 mg IV every 3 months was ordered. Patient has not scheduled infusion yet. Would like to wait until after summer    Adrenal insuffiencey:  1 mcg ACTH stim test was normal, off prednisone    RTC in 3 months    JULIETA Tate    [facility]    TYLER REYES        Again, thank you for allowing me to participate in the care of your patient.      Sincerely,    Diana Lee MD

## 2017-08-16 DIAGNOSIS — E84.11 CYSTIC FIBROSIS WITH MECONIUM ILEUS (H): ICD-10-CM

## 2017-08-16 DIAGNOSIS — E84.0 CYSTIC FIBROSIS WITH PULMONARY MANIFESTATIONS (H): ICD-10-CM

## 2017-08-16 RX ORDER — ACETYLCYSTEINE 100 MG/ML
SOLUTION ORAL; RESPIRATORY (INHALATION)
Qty: 480 ML | Refills: 6 | Status: SHIPPED | OUTPATIENT
Start: 2017-08-16 | End: 2018-02-12

## 2017-08-16 RX ORDER — POLYETHYLENE GLYCOL 3350 17 G/17G
POWDER, FOR SOLUTION ORAL
Qty: 510 G | Refills: 3 | Status: SHIPPED | OUTPATIENT
Start: 2017-08-16 | End: 2018-09-17

## 2017-08-18 ENCOUNTER — TRANSFERRED RECORDS (OUTPATIENT)
Dept: HEALTH INFORMATION MANAGEMENT | Facility: CLINIC | Age: 42
End: 2017-08-18

## 2017-08-24 DIAGNOSIS — E84.9 CF (CYSTIC FIBROSIS) (H): Primary | ICD-10-CM

## 2017-08-27 DIAGNOSIS — E84.0 CYSTIC FIBROSIS WITH PULMONARY MANIFESTATIONS (H): ICD-10-CM

## 2017-08-28 RX ORDER — SODIUM CHLORIDE FOR INHALATION 7 %
VIAL, NEBULIZER (ML) INHALATION
Qty: 240 ML | Refills: 5 | Status: SHIPPED | OUTPATIENT
Start: 2017-08-28 | End: 2018-05-16

## 2017-08-30 ENCOUNTER — ALLIED HEALTH/NURSE VISIT (OUTPATIENT)
Dept: CARE COORDINATION | Facility: CLINIC | Age: 42
End: 2017-08-30

## 2017-08-30 ENCOUNTER — OFFICE VISIT (OUTPATIENT)
Dept: PULMONOLOGY | Facility: CLINIC | Age: 42
End: 2017-08-30
Attending: INTERNAL MEDICINE
Payer: COMMERCIAL

## 2017-08-30 ENCOUNTER — HOSPITAL ENCOUNTER (OUTPATIENT)
Dept: PHYSICAL THERAPY | Facility: CLINIC | Age: 42
Setting detail: THERAPIES SERIES
End: 2017-08-30
Attending: INTERNAL MEDICINE
Payer: COMMERCIAL

## 2017-08-30 VITALS
HEART RATE: 101 BPM | DIASTOLIC BLOOD PRESSURE: 78 MMHG | WEIGHT: 137.79 LBS | SYSTOLIC BLOOD PRESSURE: 113 MMHG | OXYGEN SATURATION: 96 % | HEIGHT: 68 IN | BODY MASS INDEX: 20.88 KG/M2 | TEMPERATURE: 98.5 F | RESPIRATION RATE: 18 BRPM

## 2017-08-30 DIAGNOSIS — E84.0 CYSTIC FIBROSIS WITH PULMONARY MANIFESTATIONS (H): ICD-10-CM

## 2017-08-30 DIAGNOSIS — Z13.9 RISK AND FUNCTIONAL ASSESSMENT: Primary | ICD-10-CM

## 2017-08-30 DIAGNOSIS — E84.0 CYSTIC FIBROSIS WITH PULMONARY MANIFESTATIONS (H): Primary | ICD-10-CM

## 2017-08-30 LAB
EXPTIME-PRE: 13.19 SEC
FEF2575-%PRED-PRE: 10 %
FEF2575-PRE: 0.4 L/SEC
FEF2575-PRED: 3.92 L/SEC
FEFMAX-%PRED-PRE: 62 %
FEFMAX-PRE: 6.1 L/SEC
FEFMAX-PRED: 9.8 L/SEC
FEV1-%PRED-PRE: 28 %
FEV1-PRE: 1.14 L
FEV1FEV6-PRE: 48 %
FEV1FEV6-PRED: 82 %
FEV1FVC-PRE: 41 %
FEV1FVC-PRED: 81 %
FIFMAX-PRE: 4.75 L/SEC
FVC-%PRED-PRE: 56 %
FVC-PRE: 2.77 L
FVC-PRED: 4.95 L

## 2017-08-30 PROCEDURE — 87186 SC STD MICRODIL/AGAR DIL: CPT | Performed by: INTERNAL MEDICINE

## 2017-08-30 PROCEDURE — 87077 CULTURE AEROBIC IDENTIFY: CPT | Performed by: INTERNAL MEDICINE

## 2017-08-30 PROCEDURE — 99212 OFFICE O/P EST SF 10 MIN: CPT | Mod: ZF

## 2017-08-30 PROCEDURE — 87070 CULTURE OTHR SPECIMN AEROBIC: CPT | Performed by: INTERNAL MEDICINE

## 2017-08-30 PROCEDURE — 40000185 ZZHC STATISTIC PT OUTPT VISIT

## 2017-08-30 RX ORDER — PREDNISONE 10 MG/1
TABLET ORAL
Qty: 10 TABLET | Refills: 0 | Status: SHIPPED | OUTPATIENT
Start: 2017-08-30 | End: 2017-09-06

## 2017-08-30 RX ORDER — SULFAMETHOXAZOLE/TRIMETHOPRIM 800-160 MG
1 TABLET ORAL 2 TIMES DAILY
Qty: 28 TABLET | Refills: 0 | Status: SHIPPED | OUTPATIENT
Start: 2017-08-30 | End: 2017-10-11

## 2017-08-30 ASSESSMENT — PAIN SCALES - GENERAL: PAINLEVEL: NO PAIN (0)

## 2017-08-30 NOTE — NURSING NOTE
Chief Complaint   Patient presents with     Cystic Fibrosis     Follow up on Kilo and his CF     Matt Nunes CMA at 10:01 AM on 8/30/2017

## 2017-08-30 NOTE — PATIENT INSTRUCTIONS
Cystic Fibrosis Self-Care Plan       MRN: [id number]   Clinic Date: August 30, 2017   Patient: Nico Morales     Annual Studies:   IGG   Date Value Ref Range Status   12/15/2016 1770 (H) 695 - 1620 mg/dL Final     Insulin   Date Value Ref Range Status   07/26/2011 30 mU/L Final     Comment:     Reference Range:  0-20  +120     There are no preventive care reminders to display for this patient.      Pulmonary Function Tests  FEV1: amount of air you can blow out in 1 second  FVC: total amount of air you can take in and blow out    Your Goals:         PFT Latest Ref Rng & Units 8/30/2017   FVC L 2.77   FEV1 L 1.14   FVC% % 56   FEV1% % 28          Airway Clearance: The Most Important Way to Keep Your Lungs Healthy  Vest Settings:    Hill-Rom Frequencies: 8, 9, 10 Pressure 10 Then, Frequencies 18, 19, 20 Pressure 6      RespirTech: Quick Start with Pressure of     Do each frequency for 5 minutes; Deflate vest after each frequency & cough 3 times before beginning the next setting.    Vest and Neb Therapy should be done 3 times/day.    Good Nutrition Can Improve Lung Function and Overall Health     Take ALL of your vitamins with food     Take 1/2 of your enzymes before EVERY meal/snack and the other 1/2 mid-meal/snack    Wt Readings from Last 3 Encounters:   08/30/17 62.5 kg (137 lb 12.6 oz)   08/14/17 61.2 kg (135 lb)   07/05/17 61.3 kg (135 lb 2.3 oz)       Body mass index is 20.71 kg/(m^2).         National CF Foundation Recommendations for BMI in CF Adults: Women: at least 22 Men: at least 23        Controlling Blood Sugars Helps Prevent Lung Infections & Improves Nutrition  Test blood sugar:     In the morning before eating (goal is )     2 hours after a meal (goal is less than 150)     When pre-meal glucose is greater than 150 add correction     At bedtime (if less than 100 eat a snack with 15 grams of carbohydrates  Last A1C Results:   Hemoglobin A1C   Date Value Ref Range Status   08/14/2017 6.1 %  Final         If diabetic, measure A1C every 6 months. Goal: Under 7%    Staying Healthy    Research:  If you are interested in learning about research opportunities or have questions, please contact Temi Dodge at [phone] or [email or web address].      CF Foundation:  Compass is a personalized resource service to help you with the insurance, financial, legal and other issues you are facing.  It's free, confidential and available to anyone with CF.  Ask your  for more information or contact Compass directly at 058-COMPASS ([phone]) or compass@cff.org, or learn more at cff.org/compass.          RECOMMENDATIONS: Stop gabo.  Prednisone 20 mg for 2 days and 10 mg for 3 days.  Bactrim one tablet twice daily.  Follow up in one week.    YOUR GOAL:  Less chest tightness.      CF Nurse Line:  Jose David Henderson: [phone]   Felicitas Banuelos, RT: [phone]     Zeina Pierre: [phone] or Ivory Bonilla, Dieticians: [phone]   Lou Lassiter, Diabetes Nurse: [phone]      Lety Golden: [phone] or Marisa Urbina [phone], Social Workers   [email or web address]

## 2017-08-30 NOTE — MR AVS SNAPSHOT
After Visit Summary   8/30/2017    Nico Morales    MRN: [id number]           Patient Information     Date Of Birth          1975        Visit Information        Provider Department      8/30/2017  9:50 AM Maxine Rueda PT [facility] Physical Therapy Outpatient Cystic Fibrosis Clinic         Follow-ups after your visit        Your next 10 appointments already scheduled     Sep 06, 2017 11:30 AM CDT   PFT VISIT with TETO PFL D   [facility] Pulmonary Function Testing ([facility])    [address] [place] [address] [id number] [phone]            Sep 06, 2017 11:50 AM CDT   (Arrive by 11:35 AM)   RETURN CYSTIC FIBROSIS VISIT with Jaye Machado MD   [facility] ([facility])    [address] [place] [address] [id number] [phone]            Feb 26, 2018 10:15 AM CST   Return Visit with Diana Lee MD, MG ENDO NURSE   [facility] ([facility])    [address] [place] [id number] [phone]              Who to contact     If you have questions or need follow up information about today's clinic visit or your schedule please contact [facility] PHYSICAL [facility] OUTPATIENT CYSTIC FIBROSIS CLINIC directly at [phone].  Normal or non-critical lab and imaging results will be communicated to you by MyChart, letter or phone within 4 business days after the clinic has received the results. If you do not hear from us within 7 days, please contact the clinic through MyChart or phone. If you have a critical or abnormal lab result, we will notify you by phone as soon as possible.  Submit refill requests through [employer] or call your pharmacy and they will forward the refill request to us. Please allow 3 business days for your refill to be completed.          Additional Information About Your Visit        MyChart Information      [employer] gives you secure access to your electronic health record. If you see a primary care provider, you can also send messages to your care team and make appointments. If you have questions, please call your primary care clinic.  If you do not have a primary care provider, please call [phone] and they will assist you.        Care EveryWhere ID     This is your Care EveryWhere ID. This could be used by other organizations to access your [place] medical records  [id number]         Blood Pressure from Last 3 Encounters:   08/30/17 113/78   08/14/17 110/67   07/05/17 107/72    Weight from Last 3 Encounters:   08/30/17 62.5 kg (137 lb 12.6 oz)   08/14/17 61.2 kg (135 lb)   07/05/17 61.3 kg (135 lb 2.3 oz)              Today, you had the following     No orders found for display         Today's Medication Changes      Start taking these medicines.        Dose/Directions    predniSONE 10 MG tablet   Commonly known as:  DELTASONE   Used for:  Cystic fibrosis with pulmonary manifestations (H)        Take 2 tablets for 2 days followed by one tablet for 3 days.   Quantity:  10 tablet   Refills:  0       sulfamethoxazole-trimethoprim 800-160 MG per tablet   Commonly known as:  BACTRIM DS/SEPTRA DS   Used for:  Cystic fibrosis with pulmonary manifestations (H)        Dose:  1 tablet   Take 1 tablet by mouth 2 times daily   Quantity:  28 tablet   Refills:  0            Where to get your medicines      These medications were sent to [facility] [address] [facility] - [place], [place] - [address]  [address] [address] [facility], [facility] [address]     Phone:  [phone]     predniSONE 10 MG tablet    sulfamethoxazole-trimethoprim 800-160 MG per tablet                Primary Care Provider Office Phone # Fax #    Jaye Machado [phone]       [address] [address]        Equal Access to Services     TOÑA RAMIREZ AH: Vernell Nevarez, easton disla, martinez manjarrez  joe douglassbandar judiearmando rossaan ahMarilin Ponce [facility] [phone].    ATENCIÓN: Si lindala darryl, tiene a monroe disposición servicios gratuitos de asistencia lingüística. Jennie al [phone].    We comply with applicable federal civil rights laws and [place] laws. We do not discriminate on the basis of race, color, national origin, age, disability sex, sexual orientation or gender identity.            Thank you!     Thank you for choosing [facility] PHYSICAL THERAPY OUTPATIENT CYSTIC FIBROSIS CLINIC  for your care. Our goal is always to provide you with excellent care. Hearing back from our patients is one way we can continue to improve our services. Please take a few minutes to complete the written survey that you may receive in the mail after your visit with us. Thank you!             Your Updated Medication List - Protect others around you: Learn how to safely use, store and throw away your medicines at www.disposemymeds.org.      TAKE these medications        Brand Name Dispense Instructions for use Diagnosis    blood glucose monitoring lancets     2 Box    Use to test blood sugar 6 times daily or as directed.    Diabetes mellitus related to CF (cystic fibrosis) (H)       blood glucose monitoring meter device kit     1 kit    Use to test blood sugar 6 times daily or as directed.    Diabetes mellitus related to CF (cystic fibrosis) (H)       blood glucose monitoring test strip    MARTINA CONTOUR NEXT    200 each    Use to test blood sugar 6 times daily or as directed.    Diabetes mellitus related to CF (cystic fibrosis) (H)       EFLOW SCF NEBULIZER HANDSET Misc     1 each    1 each 3 times daily.    Nocardia infection       insulin pen needle 32G X 4 MM    BD OMI U/F    200 each    Use 6 daily or as directed.    Diabetes mellitus related to CF (cystic fibrosis) (H)         ASK your doctor about these medications        Brand Name Dispense Instructions for use Diagnosis    ACE NOT PRESCRIBED (INTENTIONAL)  "    0 each    1 each continuous prn. ACE Inhibitor not prescribed due to Risk for drug interaction    Type I (juvenile type) diabetes mellitus without mention of complication, not stated as uncontrolled       acetylcysteine 10 % injection    MUCOMYST    480 mL    Nebulize 4ml qid    Cystic fibrosis with pulmonary manifestations (H)       amitriptyline 10 MG tablet    ELAVIL    90 tablet    Take 1 tablet (10 mg) by mouth At Bedtime Take one to three tablets at bedtime.    Cystic fibrosis (H)       azithromycin 250 MG tablet    ZITHROMAX    30 tablet    Take 1 tablet (250 mg) by mouth daily    Cystic fibrosis with pulmonary manifestations (H)       aztreonam lysine 75 MG Solr    CAYSTON    84 mL    Take 1 mL (75 mg) by nebulization 3 times daily Alternating every other month with BETHANY.    Cystic fibrosis with pulmonary manifestations (H)       budesonide-formoterol 160-4.5 MCG/ACT Inhaler    SYMBICORT    1 Inhaler    Inhale 2 puffs into the lungs 2 times daily    Cystic fibrosis of the lung (H), Cystic fibrosis (H)       CALCIUM CITRATE + D PO      Take 1 tablet by mouth 2 times daily.    Nocardia infection, Cystic fibrosis with pulmonary manifestations (H)       cetirizine 10 MG tablet    zyrTEC    30 tablet    Take 1 tablet (10 mg) by mouth every evening    Cystic fibrosis (H)       CREON 48102 UNITS Cpep per EC capsule   Generic drug:  amylase-lipase-protease     2700 capsule    Take 7-8 caps with meals (3 meals/day) and 2 caps with snacks (3 snacks/day)    Cystic fibrosis with pulmonary manifestations (H)       [facility] INFUSION MANAGED PATIENT      Contact [facility] for patient specific medication information at [phone] on admission and discharge from the hospital.  Phones are answered 24 hours a day 7 days a week 365 days a year.  Providers - Choose \"CONTINUE HOME MED (no script)\" at discharge if patient treatment with home infusion will continue.        insulin aspart 100 UNIT/ML " injection    NovoLOG PENFILL    15 mL    1 per 20 grams of carbohydrate at lunch and supper. Also use 3 units of Novolog at night with tube feeding.    Diabetes mellitus related to CF (cystic fibrosis) (H)       insulin glargine 100 UNIT/ML injection    LANTUS SOLOSTAR    15 mL    6 units daily    Diabetes mellitus related to CF (cystic fibrosis) (H)       levalbuterol 1.25 MG/3ML neb solution    XOPENEX    360 mL    Take 3 mLs (1.25 mg) by nebulization 4 times daily        levalbuterol 45 MCG/ACT Inhaler    XOPENEX HFA    3 Inhaler    Inhale 2 puffs into the lungs every 6 hours as needed for shortness of breath / dyspnea    Cystic fibrosis (H)       MEPHYTON 5 MG tablet   Generic drug:  phytonadione      1 TABLET DAILY        MULTIVITAMIN PO      1 tablet daily        oseltamivir 75 MG capsule    TAMIFLU    10 capsule    Take 1 capsule (75 mg) by mouth 2 times daily    Cystic fibrosis with pulmonary manifestations (H)       polyethylene glycol powder    MIRALAX    510 g    Use 17 gms po 1-3 times daily as needed.    Cystic fibrosis with meconium ileus (H)       predniSONE 10 MG tablet    DELTASONE    10 tablet    Take 2 tablets for 2 days followed by one tablet for 3 days.    Cystic fibrosis with pulmonary manifestations (H)       * sodium chloride (Inhalant) 0.9 % Aers     270 mL    Inhale 3 mLs into the lungs 3 times daily    Cystic fibrosis with pulmonary manifestations (H)       * sodium chloride inhalant 7 % Nebu neb solution     240 mL    NEBULIZE 4ML TWICE DAILY    Cystic fibrosis with pulmonary manifestations (H)       sulfamethoxazole-trimethoprim 800-160 MG per tablet    BACTRIM DS/SEPTRA DS    28 tablet    Take 1 tablet by mouth 2 times daily    Cystic fibrosis with pulmonary manifestations (H)       * tobramycin (PF) 300 MG/5ML neb solution    BETHANY    280 mL    Take 5 mLs (300 mg) by nebulization 2 times daily 28 days on, 28 days off.    Cystic fibrosis with pulmonary manifestations (H)       *  tobramycin (PF) 300 MG/5ML neb solution    BETHANY    280 mL    Take 5 mLs (300 mg) by nebulization 2 times daily Alternate every 28 days with Javi.    Cystic fibrosis with pulmonary manifestations (H)       vitamin D 1000 UNITS capsule      1 CAPSULE DAILY        vitamin E 400 UNIT capsule      1 CAPSULE DAILY        VITRON-C 200-125 MG Tabs   Generic drug:  ferrous fumarate 65 mg ([place]. FE)-Vitamin C 125 mg      Take 1 tablet by mouth 2 times daily.    Nocardia infection, Cystic fibrosis with pulmonary manifestations (H)       * Notice:  This list has 4 medication(s) that are the same as other medications prescribed for you. Read the directions carefully, and ask your doctor or other care provider to review them with you.

## 2017-08-30 NOTE — PROGRESS NOTES
[facility]  August 30, 2017         Assessment and Plan:   Nico Morales is a 41 year old male with cystic fibrosis.    1. CF lung disease with severe obstruction:  Kilo has had a sharp decline in his pulmonary function but relates it directly to his use of BETHANY.  He says on the first day of BETHANY he continues to struggle with chest tightness and fatigue.  The chest tightness does improve during the course, but the fatigue continues to last through the 2-week course.  I suspect given these symptoms and his overall wellness that this is truly the case.  At this time, I have recommended that we begin prednisone at 20 mg for 2 days followed by 10 mg a day for 3 days.  He will also begin Bactrim therapy 1 double strength b.i.d. for 7 days' time.  I would like to follow up with him in 1 week's time.  If he has not had resolution of these symptoms or return of his pulmonary function, we would be more aggressive with his treatment.  He should otherwise continue on his bronchial drainage and nebulized therapies.  We will discontinue the BETHANY and I placed in his chart that it has known side effects for him.   2.  Nutritional failure.  Kilo does continue to use his tube feeds 5-7 nights a week.  He is tolerating these well without difficulty.    3.  Cystic fibrosis-related diabetes.  Kilo has very good control.  He is aware to follow his blood sugars while on prednisone.    4.  Nighttime oxygen use.  Kilo previously was felt to be a candidate for nighttime oxygen use.  A recent overnight oximetry did show evidence of good saturation through the night.  It would be would be okay to remove the oxygen from his home.    5.  Psychosocial.  Kilo continues to work in finance.  He reports that things are going well.  His dog, Jefry, is also doing well.   6. Pancreatic Insufficiency:  The patient has no new symptoms consistent with worsening malabsorption.  The plan  is to continue the present dose of pancreatic enzymes and vitamin supplementation.    Jaye Machado MD MPH   of Medicine  Pulmonary, Allergy, Critical Care and Sleep Medicine      Interval History:     Kilo does continue to produce sputum that is dark gray in color.  His cough frequency and sputum volume are slightly increased.  He is performing 3 vest therapies per day.  He denies any shortness of breath at rest and has a little bit of decrease in activity tolerance since having difficulty with the BETHANY.          Review of Systems:     CONSTITUTIONAL: no fever, no chills, no change in weight, decrease in energy, no change in appetite    INTEGUMENTARY/SKIN: no rash, no obvious new lesions    ENT/MOUTH: no sore throat, no new sinus pain, no new nasal drainage, taking zyrtec, no hearing loss, chronic ear ringing     RESPIRATORY: see interval history    CV: no chest pain, some chest tightness, no palpitations, no peripheral edema, no orthopnea, no PND    GI: no nausea, no vomiting, no change in stools, no fatty stools, no GERD, no abdominal pain    : no dysuria, no urinary frequency    MUSCULOSKELETAL: no myalgias, no arthralgias    ENDOCRINE: no excessive thirst, blood sugars with adequate control    NEURO:  No headache, no numbness, no tingling    SLEEP: no issues    PSYCHIATRIC: mood stable          Past Medical and Surgical History:     Past Medical History:   Diagnosis Date     CF (cystic fibrosis) (H)      Exocrine pancreatic insufficiency      Nocardia infection      Pseudomonas infection      S/P gastrostomy (H) 2002     Past Surgical History:   Procedure Laterality Date     GASTROSTOMY TUBE  2002           Family History:     Family History   Problem Relation Age of Onset     HEART DISEASE Maternal Grandmother      HEART DISEASE Maternal Grandfather      HEART DISEASE Paternal Grandmother      DIABETES No family hx of             Social History:     Social History     Social History      Marital status:      Spouse name: N/A     Number of children: 0     Years of education: N/A     Occupational History     financial [employer]     Social History Main Topics     Smoking status: Never Smoker     Smokeless tobacco: Never Used     Alcohol use No     Drug use: No     Sexual activity: Yes     Partners: Female     Birth control/ protection: Pill     Other Topics Concern     Blood Transfusions No     Exercise No     Social History Narrative    Kilo lives with wife in a house in [place], [place].  He works as a .        March 2016. He works independently as a . Works out 5x/week and walks the dog daily.        June 2016. No changes. Describes himself as a creature of habit.            Medications:     Current Outpatient Prescriptions   Medication     predniSONE (DELTASONE) 10 MG tablet     sulfamethoxazole-trimethoprim (BACTRIM DS/SEPTRA DS) 800-160 MG per tablet     sodium chloride inhalant 7 % NEBU neb solution     acetylcysteine (MUCOMYST) 10 % injection     polyethylene glycol (MIRALAX) powder     insulin glargine (LANTUS SOLOSTAR) 100 UNIT/ML injection     tobramycin, PF, (BETHANY) 300 MG/5ML neb solution     azithromycin (ZITHROMAX) 250 MG tablet     cetirizine (ZYRTEC) 10 MG tablet     budesonide-formoterol (SYMBICORT) 160-4.5 MCG/ACT Inhaler     insulin aspart (NOVOLOG PENFILL) 100 UNIT/ML injection     amitriptyline (ELAVIL) 10 MG tablet     levalbuterol (XOPENEX HFA) 45 MCG/ACT Inhaler     tobramycin, PF, (BETHANY) 300 MG/5ML neb solution     levalbuterol (XOPENEX) 1.25 MG/3ML neb solution     CREON 60261 UNITS CPEP per EC capsule     blood glucose monitoring (MARTINA CONTOUR NEXT MONITOR) meter device kit     blood glucose monitoring (MARTINA CONTOUR NEXT) test strip     blood glucose monitoring (MARTINA MICROLET) lancets     aztreonam lysine (CAYSTON) 75 MG SOLR     oseltamivir (TAMIFLU) 75 MG capsule     insulin pen needle (BD OMI U/F) 32G X 4 MM      "sodium chloride, Inhalant, 0.9 % AERS     [facility] INFUSION MANAGED PATIENT     ACE NOT PRESCRIBED, INTENTIONAL,     Nebulizers (EFLOW SCF NEBULIZER HANDSET) MISC     Calcium Citrate-Vitamin D (CALCIUM CITRATE + D PO)     Ferrous Fumarate-Vitamin C (VITRON-C) 200-125 MG TABS     MEPHYTON 5 MG PO TABS     MULTIVITAMIN OR     VITAMIN D 1000 UNIT PO CAPS     VITAMIN E 400 UNIT PO CAPS     No current facility-administered medications for this visit.             Physical Exam:   /78  Pulse 101  Temp 98.5  F (36.9  C)  Resp 18  Ht 1.737 m (5' 8.39\")  Wt 62.5 kg (137 lb 12.6 oz)  SpO2 96%  BMI 20.71 kg/m2    Constitutional:   Awake, alert and in no apparent distress     Eyes:   nonicteric     ENT:   oral mucosa moist without lesions, normal tm bilaterally, bilateral mucosal edema      Neck:   Supple without supraclavicular or cervical lymphadenopathy     Lungs:   Good air flow.  No crackles. patchy rhonchi.  No wheezes.     Cardiovascular:   Normal S1 and S2.  Tachycardic RR.  No murmur, gallop or rub.     Abdomen:   NABS, soft, nontender, nondistended.  No HSM. gtube     Musculoskeletal:   No edema, digital clubbing present     Neurologic:   Alert and conversant.     Skin:   Warm, dry.  No rash on limited exam.             Data:   All laboratory and imaging data reviewed.    Cystic Fibrosis Culture  Specimen Description   Date Value Ref Range Status   07/05/2017 Sputum  Final   05/30/2017 Sputum  Final   05/17/2017 Sputum  Final    Culture Micro   Date Value Ref Range Status   07/05/2017 (A)  Final    Moderate growth Normal santi  Light growth Pseudomonas aeruginosa, mucoid strain  Light growth Stenotrophomonas maltophilia  Light growth Pseudomonas fluorescens putida group     05/30/2017 (A)  Final    Moderate growth Normal santi  Light growth Pseudomonas species, mucoid strain  Light growth Strain 2 Pseudomonas species, mucoid strain  Light growth Stenotrophomonas maltophilia     05/17/2017 (A)  Final "    Light growth Normal santi  Light growth Pseudomonas aeruginosa, mucoid strain  Light growth Strain 2 Pseudomonas aeruginosa, mucoid strain          Recent Results (from the past 168 hour(s))   General PFT Lab (Please always keep checked)    Collection Time: 08/30/17  9:47 AM   Result Value Ref Range    FVC-Pred 4.95 L    FVC-Pre 2.77 L    FVC-%Pred-Pre 56 %    FEV1-Pre 1.14 L    FEV1-%Pred-Pre 28 %    FEV1FVC-Pred 81 %    FEV1FVC-Pre 41 %    FEFMax-Pred 9.80 L/sec    FEFMax-Pre 6.10 L/sec    FEFMax-%Pred-Pre 62 %    FEF2575-Pred 3.92 L/sec    FEF2575-Pre 0.40 L/sec    CRS3671-%Pred-Pre 10 %    ExpTime-Pre 13.19 sec    FIFMax-Pre 4.75 L/sec    FEV1FEV6-Pred 82 %    FEV1FEV6-Pre 48 %     PFT: Severe obstructive lung disease.  When compared to 7/5/2017, the FEV1 and FVC have decreased.  The decrease in FVC may represent air trapping v. restrictive physiology.  Lung volumes would be necessary to determine.

## 2017-08-30 NOTE — MR AVS SNAPSHOT
After Visit Summary   8/30/2017    Nico Morales    MRN: [id number]           Patient Information     Date Of Birth          1975        Visit Information        Provider Department      8/30/2017 9:50 AM Jaye Machado MD [facility]        Today's Diagnoses     Cystic fibrosis with pulmonary manifestations (H)    -  1      Care Instructions    Cystic Fibrosis Self-Care Plan       MRN: [id number]   Clinic Date: August 30, 2017   Patient: Nico Morales     Annual Studies:   IGG   Date Value Ref Range Status   12/15/2016 1770 (H) 695 - 1620 mg/dL Final     Insulin   Date Value Ref Range Status   07/26/2011 30 mU/L Final     Comment:     Reference Range:  0-20  +120     There are no preventive care reminders to display for this patient.      Pulmonary Function Tests  FEV1: amount of air you can blow out in 1 second  FVC: total amount of air you can take in and blow out    Your Goals:         PFT Latest Ref Rng & Units 8/30/2017   FVC L 2.77   FEV1 L 1.14   FVC% % 56   FEV1% % 28          Airway Clearance: The Most Important Way to Keep Your Lungs Healthy  Vest Settings:    Hill-Rom Frequencies: 8, 9, 10 Pressure 10 Then, Frequencies 18, 19, 20 Pressure 6      RespirTech: Quick Start with Pressure of     Do each frequency for 5 minutes; Deflate vest after each frequency & cough 3 times before beginning the next setting.    Vest and Neb Therapy should be done 3 times/day.    Good Nutrition Can Improve Lung Function and Overall Health     Take ALL of your vitamins with food     Take 1/2 of your enzymes before EVERY meal/snack and the other 1/2 mid-meal/snack    Wt Readings from Last 3 Encounters:   08/30/17 62.5 kg (137 lb 12.6 oz)   08/14/17 61.2 kg (135 lb)   07/05/17 61.3 kg (135 lb 2.3 oz)       Body mass index is 20.71 kg/(m^2).         National CF Foundation Recommendations for BMI in CF Adults: Women: at least 22 Men: at least 23         Controlling Blood Sugars Helps Prevent Lung Infections & Improves Nutrition  Test blood sugar:     In the morning before eating (goal is )     2 hours after a meal (goal is less than 150)     When pre-meal glucose is greater than 150 add correction     At bedtime (if less than 100 eat a snack with 15 grams of carbohydrates  Last A1C Results:   Hemoglobin A1C   Date Value Ref Range Status   08/14/2017 6.1 % Final         If diabetic, measure A1C every 6 months. Goal: Under 7%    Staying Healthy    Research:  If you are interested in learning about research opportunities or have questions, please contact Temi Dodge at [phone] or [email or web address].      CF Foundation:  Compass is a personalized resource service to help you with the insurance, financial, legal and other issues you are facing.  It's free, confidential and available to anyone with CF.  Ask your  for more information or contact Compass directly at [facility] ([phone]) or compass@cff.org, or learn more at cff.org/compass.          RECOMMENDATIONS: Stop gabo.  Prednisone 20 mg for 2 days and 10 mg for 3 days.  Bactrim one tablet twice daily.  Follow up in one week.    YOUR GOAL:  Less chest tightness.      CF Nurse Line:  Jose David Henderson: [phone]   Felicitas Banuelos, RT: [phone]     Zeina Pierre: [phone] or Kishore Obrienicians: [phone]   Lou Lassiter, Diabetes Nurse: [phone]      Lety Golden: [phone] or Marisa Urbina [phone], Social Workers   [email or web address]                   Follow-ups after your visit        Follow-up notes from your care team     Return  1 week at noon--ok to book.      Your next 10 appointments already scheduled     Sep 06, 2017 11:30 AM CDT   PFT VISIT with TETO ISABEL [facility] ([facility])    [address] [address] [phone]            Sep 06, 2017  "11:50 AM CDT   (Arrive by 11:35 AM)   RETURN CYSTIC FIBROSIS VISIT with Jaye Machado MD   [facility] ([facility])    [address]  [address] [facility] [address] [phone]            Feb 26, 2018 10:15 AM CST   Return Visit with Diana Lee MD, MG ENDO NURSE   [facility] ([facility])    [address] [place] [address] [phone]              Who to contact     If you have questions or need follow up information about today's clinic visit or your schedule please contact [facility] directly at [phone].  Normal or non-critical lab and imaging results will be communicated to you by Zinchhart, letter or phone within 4 business days after the clinic has received the results. If you do not hear from us within 7 days, please contact the clinic through Zinchhart or phone. If you have a critical or abnormal lab result, we will notify you by phone as soon as possible.  Submit refill requests through RxAdvance or call your pharmacy and they will forward the refill request to us. Please allow 3 business days for your refill to be completed.          Additional Information About Your Visit        RxAdvance Information     RxAdvance gives you secure access to your electronic health record. If you see a primary care provider, you can also send messages to your care team and make appointments. If you have questions, please call your primary care clinic.  If you do not have a primary care provider, please call [phone] and they will assist you.        Care EveryWhere ID     This is your Care EveryWhere ID. This could be used by other organizations to access your [place] medical records  [id number]        Your Vitals Were     Pulse Temperature Respirations Height Pulse Oximetry BMI (Body Mass Index)    101 98.5  F (36.9  C) 18 1.737 m (5' 8.39\") 96% " 20.71 kg/m2       Blood Pressure from Last 3 Encounters:   08/30/17 113/78   08/14/17 110/67   07/05/17 107/72    Weight from Last 3 Encounters:   08/30/17 62.5 kg (137 lb 12.6 oz)   08/14/17 61.2 kg (135 lb)   07/05/17 61.3 kg (135 lb 2.3 oz)              Today, you had the following     No orders found for display         Today's Medication Changes          These changes are accurate as of: 8/30/17 11:38 AM.  If you have any questions, ask your nurse or doctor.               Start taking these medicines.        Dose/Directions    predniSONE 10 MG tablet   Commonly known as:  DELTASONE   Used for:  Cystic fibrosis with pulmonary manifestations (H)   Started by:  Jaye Machado MD        Take 2 tablets for 2 days followed by one tablet for 3 days.   Quantity:  10 tablet   Refills:  0       sulfamethoxazole-trimethoprim 800-160 MG per tablet   Commonly known as:  BACTRIM DS/SEPTRA DS   Used for:  Cystic fibrosis with pulmonary manifestations (H)   Started by:  Jaye Machado MD        Dose:  1 tablet   Take 1 tablet by mouth 2 times daily   Quantity:  28 tablet   Refills:  0            Where to get your medicines      These medications were sent to [place] [address] [place] [facility] [place] - [address]  [address] [facility] [place] [address]     Phone:  [phone]     predniSONE 10 MG tablet    sulfamethoxazole-trimethoprim 800-160 MG per tablet                Primary Care Provider Office Phone # Fax #    Jaye Machado [phone]       [address] [place] [address]        Equal Access to Services     [facility] AH: Hadii favio ku hadasho Soomaali, waaxda luqadaha, qaybta kaalmada adeegyada, joe mack. So [facility] [phone].    ATENCIÓN: Si habla español, tiene a monroe disposición servicios gratuitos de asistencia lingüística. Llame al [phone].    We comply with applicable federal civil rights laws and  [place] laws. We do not discriminate on the basis of race, color, national origin, age, disability sex, sexual orientation or gender identity.            Thank you!     Thank you for choosing [facility]  for your care. Our goal is always to provide you with excellent care. Hearing back from our patients is one way we can continue to improve our services. Please take a few minutes to complete the written survey that you may receive in the mail after your visit with us. Thank you!             Your Updated Medication List - Protect others around you: Learn how to safely use, store and throw away your medicines at www.disposemymeds.org.          This list is accurate as of: 8/30/17 11:38 AM.  Always use your most recent med list.                   Brand Name Dispense Instructions for use Diagnosis    ACE NOT PRESCRIBED (INTENTIONAL)     0 each    1 each continuous prn. ACE Inhibitor not prescribed due to Risk for drug interaction    Type I (juvenile type) diabetes mellitus without mention of complication, not stated as uncontrolled       acetylcysteine 10 % injection    MUCOMYST    480 mL    Nebulize 4ml qid    Cystic fibrosis with pulmonary manifestations (H)       amitriptyline 10 MG tablet    ELAVIL    90 tablet    Take 1 tablet (10 mg) by mouth At Bedtime Take one to three tablets at bedtime.    Cystic fibrosis (H)       azithromycin 250 MG tablet    ZITHROMAX    30 tablet    Take 1 tablet (250 mg) by mouth daily    Cystic fibrosis with pulmonary manifestations (H)       aztreonam lysine 75 MG Solr    CAYSTON    84 mL    Take 1 mL (75 mg) by nebulization 3 times daily Alternating every other month with BETHANY.    Cystic fibrosis with pulmonary manifestations (H)       blood glucose monitoring lancets     2 Box    Use to test blood sugar 6 times daily or as directed.    Diabetes mellitus related to CF (cystic fibrosis) (H)       blood glucose monitoring meter device kit     1 kit     "Use to test blood sugar 6 times daily or as directed.    Diabetes mellitus related to CF (cystic fibrosis) (H)       blood glucose monitoring test strip    MARTINA CONTOUR NEXT    200 each    Use to test blood sugar 6 times daily or as directed.    Diabetes mellitus related to CF (cystic fibrosis) (H)       budesonide-formoterol 160-4.5 MCG/ACT Inhaler    SYMBICORT    1 Inhaler    Inhale 2 puffs into the lungs 2 times daily    Cystic fibrosis of the lung (H), Cystic fibrosis (H)       CALCIUM CITRATE + D PO      Take 1 tablet by mouth 2 times daily.    Nocardia infection, Cystic fibrosis with pulmonary manifestations (H)       cetirizine 10 MG tablet    zyrTEC    30 tablet    Take 1 tablet (10 mg) by mouth every evening    Cystic fibrosis (H)       CREON 71077 UNITS Cpep per EC capsule   Generic drug:  amylase-lipase-protease     2700 capsule    Take 7-8 caps with meals (3 meals/day) and 2 caps with snacks (3 snacks/day)    Cystic fibrosis with pulmonary manifestations (H)       EFLOW SCF NEBULIZER HANDSET Misc     1 each    1 each 3 times daily.    Nocardia infection       [facility] INFUSION MANAGED PATIENT      Contact [facility] for patient specific medication information at [phone] on admission and discharge from the hospital.  Phones are answered 24 hours a day 7 days a week 365 days a year.  Providers - Choose \"CONTINUE HOME MED (no script)\" at discharge if patient treatment with home infusion will continue.        insulin aspart 100 UNIT/ML injection    NovoLOG PENFILL    15 mL    1 per 20 grams of carbohydrate at lunch and supper. Also use 3 units of Novolog at night with tube feeding.    Diabetes mellitus related to CF (cystic fibrosis) (H)       insulin glargine 100 UNIT/ML injection    LANTUS SOLOSTAR    15 mL    6 units daily    Diabetes mellitus related to CF (cystic fibrosis) (H)       insulin pen needle 32G X 4 MM    BD OMI U/F    200 each    Use 6 daily or as directed.    " Diabetes mellitus related to CF (cystic fibrosis) (H)       levalbuterol 1.25 MG/3ML neb solution    XOPENEX    360 mL    Take 3 mLs (1.25 mg) by nebulization 4 times daily        levalbuterol 45 MCG/ACT Inhaler    XOPENEX HFA    3 Inhaler    Inhale 2 puffs into the lungs every 6 hours as needed for shortness of breath / dyspnea    Cystic fibrosis (H)       MEPHYTON 5 MG tablet   Generic drug:  phytonadione      1 TABLET DAILY        MULTIVITAMIN PO      1 tablet daily        oseltamivir 75 MG capsule    TAMIFLU    10 capsule    Take 1 capsule (75 mg) by mouth 2 times daily    Cystic fibrosis with pulmonary manifestations (H)       polyethylene glycol powder    MIRALAX    510 g    Use 17 gms po 1-3 times daily as needed.    Cystic fibrosis with meconium ileus (H)       predniSONE 10 MG tablet    DELTASONE    10 tablet    Take 2 tablets for 2 days followed by one tablet for 3 days.    Cystic fibrosis with pulmonary manifestations (H)       * sodium chloride (Inhalant) 0.9 % Aers     270 mL    Inhale 3 mLs into the lungs 3 times daily    Cystic fibrosis with pulmonary manifestations (H)       * sodium chloride inhalant 7 % Nebu neb solution     240 mL    NEBULIZE 4ML TWICE DAILY    Cystic fibrosis with pulmonary manifestations (H)       sulfamethoxazole-trimethoprim 800-160 MG per tablet    BACTRIM DS/SEPTRA DS    28 tablet    Take 1 tablet by mouth 2 times daily    Cystic fibrosis with pulmonary manifestations (H)       * tobramycin (PF) 300 MG/5ML neb solution    BETHANY    280 mL    Take 5 mLs (300 mg) by nebulization 2 times daily 28 days on, 28 days off.    Cystic fibrosis with pulmonary manifestations (H)       * tobramycin (PF) 300 MG/5ML neb solution    BETHANY    280 mL    Take 5 mLs (300 mg) by nebulization 2 times daily Alternate every 28 days with Jaiv.    Cystic fibrosis with pulmonary manifestations (H)       vitamin D 1000 UNITS capsule      1 CAPSULE DAILY        vitamin E 400 UNIT capsule      1 CAPSULE  DAILY        VITRON-C 200-125 MG Tabs   Generic drug:  ferrous fumarate 65 mg ([place]. FE)-Vitamin C 125 mg      Take 1 tablet by mouth 2 times daily.    Nocardia infection, Cystic fibrosis with pulmonary manifestations (H)       * Notice:  This list has 4 medication(s) that are the same as other medications prescribed for you. Read the directions carefully, and ask your doctor or other care provider to review them with you.

## 2017-08-30 NOTE — LETTER
8/30/2017       RE: Nico Morales  [address]  [place] [address]     Dear Colleague,    Thank you for referring your patient, Nico Morales, to the [facility] at [facility]. Please see a copy of my visit note below.    [facility]  August 30, 2017         Assessment and Plan:   Nico Morales is a 41 year old male with cystic fibrosis.    1. CF lung disease with severe obstruction:  iKlo has had a sharp decline in his pulmonary function but relates it directly to his use of BETHANY.  He says on the first day of BETHANY he continues to struggle with chest tightness and fatigue.  The chest tightness does improve during the course, but the fatigue continues to last through the 2-week course.  I suspect given these symptoms and his overall wellness that this is truly the case.  At this time, I have recommended that we begin prednisone at 20 mg for 2 days followed by 10 mg a day for 3 days.  He will also begin Bactrim therapy 1 double strength b.i.d. for 7 days' time.  I would like to follow up with him in 1 week's time.  If he has not had resolution of these symptoms or return of his pulmonary function, we would be more aggressive with his treatment.  He should otherwise continue on his bronchial drainage and nebulized therapies.  We will discontinue the BETHANY and I placed in his chart that it has known side effects for him.   2.  Nutritional failure.  Kilo does continue to use his tube feeds 5-7 nights a week.  He is tolerating these well without difficulty.    3.  Cystic fibrosis-related diabetes.  Kilo has very good control.  He is aware to follow his blood sugars while on prednisone.    4.  Nighttime oxygen use.  Kilo previously was felt to be a candidate for nighttime oxygen use.  A recent overnight oximetry did show evidence of good saturation through the night.   It would be would be okay to remove the oxygen from his home.    5.  Psychosocial.  Kilo continues to work in finance.  He reports that things are going well.  His dog, Jefry, is also doing well.   6. Pancreatic Insufficiency:  The patient has no new symptoms consistent with worsening malabsorption.  The plan is to continue the present dose of pancreatic enzymes and vitamin supplementation.    Jaye Machado MD MPH   of Medicine  Pulmonary, Allergy, Critical Care and Sleep Medicine      Interval History:     Kilo does continue to produce sputum that is dark gray in color.  His cough frequency and sputum volume are slightly increased.  He is performing 3 vest therapies per day.  He denies any shortness of breath at rest and has a little bit of decrease in activity tolerance since having difficulty with the BETHANY.          Review of Systems:     CONSTITUTIONAL: no fever, no chills, no change in weight, decrease in energy, no change in appetite    INTEGUMENTARY/SKIN: no rash, no obvious new lesions    ENT/MOUTH: no sore throat, no new sinus pain, no new nasal drainage, taking zyrtec, no hearing loss, chronic ear ringing     RESPIRATORY: see interval history    CV: no chest pain, some chest tightness, no palpitations, no peripheral edema, no orthopnea, no PND    GI: no nausea, no vomiting, no change in stools, no fatty stools, no GERD, no abdominal pain    : no dysuria, no urinary frequency    MUSCULOSKELETAL: no myalgias, no arthralgias    ENDOCRINE: no excessive thirst, blood sugars with adequate control    NEURO:  No headache, no numbness, no tingling    SLEEP: no issues    PSYCHIATRIC: mood stable          Past Medical and Surgical History:     Past Medical History:   Diagnosis Date     CF (cystic fibrosis) (H)      Exocrine pancreatic insufficiency      Nocardia infection      Pseudomonas infection      S/P gastrostomy (H) 2002     Past Surgical History:   Procedure Laterality Date      GASTROSTOMY TUBE  2002           Family History:     Family History   Problem Relation Age of Onset     HEART DISEASE Maternal Grandmother      HEART DISEASE Maternal Grandfather      HEART DISEASE Paternal Grandmother      DIABETES No family hx of             Social History:     Social History     Social History     Marital status:      Spouse name: N/A     Number of children: 0     Years of education: N/A     Occupational History     financial [employer]     Social History Main Topics     Smoking status: Never Smoker     Smokeless tobacco: Never Used     Alcohol use No     Drug use: No     Sexual activity: Yes     Partners: Female     Birth control/ protection: Pill     Other Topics Concern     Blood Transfusions No     Exercise No     Social History Narrative    Kilo lives with wife in a house in [place], [place].  He works as a .        March 2016. He works independently as a . Works out 5x/week and walks the dog daily.        June 2016. No changes. Describes himself as a creature of habit.            Medications:     Current Outpatient Prescriptions   Medication     predniSONE (DELTASONE) 10 MG tablet     sulfamethoxazole-trimethoprim (BACTRIM DS/SEPTRA DS) 800-160 MG per tablet     sodium chloride inhalant 7 % NEBU neb solution     acetylcysteine (MUCOMYST) 10 % injection     polyethylene glycol (MIRALAX) powder     insulin glargine (LANTUS SOLOSTAR) 100 UNIT/ML injection     tobramycin, PF, (BETHANY) 300 MG/5ML neb solution     azithromycin (ZITHROMAX) 250 MG tablet     cetirizine (ZYRTEC) 10 MG tablet     budesonide-formoterol (SYMBICORT) 160-4.5 MCG/ACT Inhaler     insulin aspart (NOVOLOG PENFILL) 100 UNIT/ML injection     amitriptyline (ELAVIL) 10 MG tablet     levalbuterol (XOPENEX HFA) 45 MCG/ACT Inhaler     tobramycin, PF, (BETHANY) 300 MG/5ML neb solution     levalbuterol (XOPENEX) 1.25 MG/3ML neb solution     CREON 39992 UNITS CPEP per EC capsule     blood  "glucose monitoring (MARTINA CONTOUR NEXT MONITOR) meter device kit     blood glucose monitoring (MARTINA CONTOUR NEXT) test strip     blood glucose monitoring (MARTINA MICROLET) lancets     aztreonam lysine (CAYSTON) 75 MG SOLR     oseltamivir (TAMIFLU) 75 MG capsule     insulin pen needle (BD OMI U/F) 32G X 4 MM     sodium chloride, Inhalant, 0.9 % AERS     [facility] INFUSION MANAGED PATIENT     ACE NOT PRESCRIBED, INTENTIONAL,     Nebulizers (EFLOW SCF NEBULIZER HANDSET) MISC     Calcium Citrate-Vitamin D (CALCIUM CITRATE + D PO)     Ferrous Fumarate-Vitamin C (VITRON-C) 200-125 MG TABS     MEPHYTON 5 MG PO TABS     MULTIVITAMIN OR     VITAMIN D 1000 UNIT PO CAPS     VITAMIN E 400 UNIT PO CAPS     No current facility-administered medications for this visit.             Physical Exam:   /78  Pulse 101  Temp 98.5  F (36.9  C)  Resp 18  Ht 1.737 m (5' 8.39\")  Wt 62.5 kg (137 lb 12.6 oz)  SpO2 96%  BMI 20.71 kg/m2    Constitutional:   Awake, alert and in no apparent distress     Eyes:   nonicteric     ENT:   oral mucosa moist without lesions, normal tm bilaterally, bilateral mucosal edema      Neck:   Supple without supraclavicular or cervical lymphadenopathy     Lungs:   Good air flow.  No crackles. patchy rhonchi.  No wheezes.     Cardiovascular:   Normal S1 and S2.  Tachycardic RR.  No murmur, gallop or rub.     Abdomen:   NABS, soft, nontender, nondistended.  No HSM. gtube     Musculoskeletal:   No edema, digital clubbing present     Neurologic:   Alert and conversant.     Skin:   Warm, dry.  No rash on limited exam.             Data:   All laboratory and imaging data reviewed.    Cystic Fibrosis Culture  Specimen Description   Date Value Ref Range Status   07/05/2017 Sputum  Final   05/30/2017 Sputum  Final   05/17/2017 Sputum  Final    Culture Micro   Date Value Ref Range Status   07/05/2017 (A)  Final    Moderate growth Normal santi  Light growth Pseudomonas aeruginosa, mucoid strain  Light growth " Stenotrophomonas maltophilia  Light growth Pseudomonas fluorescens putida group     05/30/2017 (A)  Final    Moderate growth Normal santi  Light growth Pseudomonas species, mucoid strain  Light growth Strain 2 Pseudomonas species, mucoid strain  Light growth Stenotrophomonas maltophilia     05/17/2017 (A)  Final    Light growth Normal santi  Light growth Pseudomonas aeruginosa, mucoid strain  Light growth Strain 2 Pseudomonas aeruginosa, mucoid strain          Recent Results (from the past 168 hour(s))   General PFT Lab (Please always keep checked)    Collection Time: 08/30/17  9:47 AM   Result Value Ref Range    FVC-Pred 4.95 L    FVC-Pre 2.77 L    FVC-%Pred-Pre 56 %    FEV1-Pre 1.14 L    FEV1-%Pred-Pre 28 %    FEV1FVC-Pred 81 %    FEV1FVC-Pre 41 %    FEFMax-Pred 9.80 L/sec    FEFMax-Pre 6.10 L/sec    FEFMax-%Pred-Pre 62 %    FEF2575-Pred 3.92 L/sec    FEF2575-Pre 0.40 L/sec    NIX1224-%Pred-Pre 10 %    ExpTime-Pre 13.19 sec    FIFMax-Pre 4.75 L/sec    FEV1FEV6-Pred 82 %    FEV1FEV6-Pre 48 %     PFT: Severe obstructive lung disease.  When compared to 7/5/2017, the FEV1 and FVC have decreased.  The decrease in FVC may represent air trapping v. restrictive physiology.  Lung volumes would be necessary to determine.      Again, thank you for allowing me to participate in the care of your patient.      Sincerely,    Jaye Machado MD

## 2017-08-31 NOTE — PROGRESS NOTES
08/30/17 1100   Quick Adds   Type of Visit (CF Screen only)   General Information   Referring Physician Jaye Machado MD   Orders Evaluate and Treat as Indicated   Order Date 08/24/17   Medical Diagnosis Cystic Fibrosis   Surgical/Medical history reviewed Yes   Pertinent history of current problem (include personal factors and/or comorbidities that impact the POC) PT: Pt presents with fair posture,  pt reports no difficulties, concerns or pain. Educated pt on importance and role of PT in CF clinic.  PT available to provide education and treatment for aerobic conditioning and strengthening HEP, postural reeducation to improve pulmonary function, incontinence treatment and prevention, pain managment associated with CF and strategies to reduce effects of osteoporosis.  Goal of PT is to increase quality of life, improve pulmonary function and decrease hospitalizations. Pt educated on personalized care plan PT would address with pt, Pt declined need for PT at this time. Pt reports could be interested in the future possibly but not overly concerned re posture at this time.   Prior level of function comment PT: Pt is mod I with mobility, reports e lifts weights 3x/week and walks dog 5-7x/week. Pt works as , in investing.   General Information Comments PT:Pt agreeable to PT CF screen. Pt reports no incontinence of urine. Educated pt on PT's ability to provide treatment and education to improve incontinence and lessen the effects on daily activities and mobility. Pt verbalized understanding of available treatment and declined the need to address this with PT at this time.   Fall Risk Screen   Fall screen completed by PT   Per patient - Fall 2 or more times in past year? No   Per patient - Fall with injury in past year? No   Is patient a fall risk? No   Pain   Patient currently in pain No   Pain comments PT: Pt reports he has achy joints at times, related to weather often. States OTC  medication helps.   Cognitive Status Examination   Orientation orientation to person, place and time   Level of Consciousness alert   Follows Commands and Answers Questions 100% of the time   Personal Safety and Judgment intact   Memory intact   Posture   Posture Comments PT: Pt demonstrates fair posture with minimal shoulder protraction and no scapular winging. Pt's thoracic spine is in neutral alignment and cervical spine is in neutral alignment.   Range of Motion (ROM)   ROM Comment PT:Pt able to demonstrate full shoulder forward flexion, while still maintaining neutral spinal positioning, and with no pain. Pt achieves forward bend with finger tips 25cm from floor.   Clinical Impression   Criteria for Skilled Therapeutic Interventions Met patient/family refuse skilled intervention at this time  (Pt declined need at this time, maybe in future)   Risk & Benefits of therapy have been explained Yes   Patient, Family & other staff in agreement with plan of care Yes

## 2017-09-01 ASSESSMENT — ANXIETY QUESTIONNAIRES
2. NOT BEING ABLE TO STOP OR CONTROL WORRYING: NOT AT ALL
6. BECOMING EASILY ANNOYED OR IRRITABLE: NOT AT ALL
GAD7 TOTAL SCORE: 0
1. FEELING NERVOUS, ANXIOUS, OR ON EDGE: NOT AT ALL
5. BEING SO RESTLESS THAT IT IS HARD TO SIT STILL: NOT AT ALL
3. WORRYING TOO MUCH ABOUT DIFFERENT THINGS: NOT AT ALL
IF YOU CHECKED OFF ANY PROBLEMS ON THIS QUESTIONNAIRE, HOW DIFFICULT HAVE THESE PROBLEMS MADE IT FOR YOU TO DO YOUR WORK, TAKE CARE OF THINGS AT HOME, OR GET ALONG WITH OTHER PEOPLE: NOT DIFFICULT AT ALL
7. FEELING AFRAID AS IF SOMETHING AWFUL MIGHT HAPPEN: NOT AT ALL

## 2017-09-01 ASSESSMENT — PATIENT HEALTH QUESTIONNAIRE - PHQ9
SUM OF ALL RESPONSES TO PHQ QUESTIONS 1-9: 1
5. POOR APPETITE OR OVEREATING: NOT AT ALL
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
## 2017-09-02 ASSESSMENT — ANXIETY QUESTIONNAIRES: GAD7 TOTAL SCORE: 0

## 2017-09-06 ENCOUNTER — OFFICE VISIT (OUTPATIENT)
Dept: PULMONOLOGY | Facility: CLINIC | Age: 42
End: 2017-09-06
Attending: INTERNAL MEDICINE
Payer: COMMERCIAL

## 2017-09-06 VITALS
RESPIRATION RATE: 18 BRPM | WEIGHT: 137 LBS | BODY MASS INDEX: 20.76 KG/M2 | HEIGHT: 68 IN | DIASTOLIC BLOOD PRESSURE: 68 MMHG | HEART RATE: 94 BPM | OXYGEN SATURATION: 94 % | SYSTOLIC BLOOD PRESSURE: 100 MMHG

## 2017-09-06 DIAGNOSIS — E84.0 CYSTIC FIBROSIS WITH PULMONARY MANIFESTATIONS (H): Primary | ICD-10-CM

## 2017-09-06 DIAGNOSIS — K86.81 EXOCRINE PANCREATIC INSUFFICIENCY: ICD-10-CM

## 2017-09-06 DIAGNOSIS — E84.9 CYSTIC FIBROSIS (H): Primary | ICD-10-CM

## 2017-09-06 DIAGNOSIS — E84.9 DIABETES MELLITUS DUE TO CYSTIC FIBROSIS (H): ICD-10-CM

## 2017-09-06 DIAGNOSIS — E08.9 DIABETES MELLITUS DUE TO CYSTIC FIBROSIS (H): ICD-10-CM

## 2017-09-06 DIAGNOSIS — E84.0 CYSTIC FIBROSIS WITH PULMONARY MANIFESTATIONS (H): ICD-10-CM

## 2017-09-06 LAB
BACTERIA SPEC CULT: NORMAL
EXPTIME-PRE: 15.34 SEC
FEF2575-%PRED-PRE: 12 %
FEF2575-PRE: 0.49 L/SEC
FEF2575-PRED: 3.92 L/SEC
FEFMAX-%PRED-PRE: 67 %
FEFMAX-PRE: 6.58 L/SEC
FEFMAX-PRED: 9.8 L/SEC
FEV1-%PRED-PRE: 39 %
FEV1-PRE: 1.56 L
FEV1FEV6-PRE: 52 %
FEV1FEV6-PRED: 82 %
FEV1FVC-PRE: 44 %
FEV1FVC-PRED: 81 %
FIFMAX-PRE: 4.76 L/SEC
FVC-%PRED-PRE: 71 %
FVC-PRE: 3.52 L
FVC-PRED: 4.95 L
SPECIMEN SOURCE: NORMAL

## 2017-09-06 PROCEDURE — 94375 RESPIRATORY FLOW VOLUME LOOP: CPT | Mod: ZF | Performed by: INTERNAL MEDICINE

## 2017-09-06 PROCEDURE — 87015 SPECIMEN INFECT AGNT CONCNTJ: CPT | Performed by: INTERNAL MEDICINE

## 2017-09-06 PROCEDURE — 87116 MYCOBACTERIA CULTURE: CPT | Performed by: INTERNAL MEDICINE

## 2017-09-06 PROCEDURE — 87186 SC STD MICRODIL/AGAR DIL: CPT | Performed by: INTERNAL MEDICINE

## 2017-09-06 PROCEDURE — 87070 CULTURE OTHR SPECIMN AEROBIC: CPT | Performed by: INTERNAL MEDICINE

## 2017-09-06 PROCEDURE — 97803 MED NUTRITION INDIV SUBSEQ: CPT | Mod: ZF | Performed by: DIETITIAN, REGISTERED

## 2017-09-06 PROCEDURE — 87102 FUNGUS ISOLATION CULTURE: CPT | Performed by: INTERNAL MEDICINE

## 2017-09-06 PROCEDURE — 87077 CULTURE AEROBIC IDENTIFY: CPT | Performed by: INTERNAL MEDICINE

## 2017-09-06 PROCEDURE — 99212 OFFICE O/P EST SF 10 MIN: CPT | Mod: ZF

## 2017-09-06 PROCEDURE — 87206 SMEAR FLUORESCENT/ACID STAI: CPT | Performed by: INTERNAL MEDICINE

## 2017-09-06 ASSESSMENT — PAIN SCALES - GENERAL: PAINLEVEL: NO PAIN (0)

## 2017-09-06 NOTE — PATIENT INSTRUCTIONS
Cystic Fibrosis Self-Care Plan       MRN: [id number]   Clinic Date: September 6, 2017   Patient: Nico Morales     Annual Studies:   IGG   Date Value Ref Range Status   12/15/2016 1770 (H) 695 - 1620 mg/dL Final     Insulin   Date Value Ref Range Status   07/26/2011 30 mU/L Final     Comment:     Reference Range:  0-20  +120     There are no preventive care reminders to display for this patient.      Pulmonary Function Tests  FEV1: amount of air you can blow out in 1 second  FVC: total amount of air you can take in and blow out    Your Goals:         PFT Latest Ref Rng & Units 9/6/2017   FVC L 3.52   FEV1 L 1.56   FVC% % 71   FEV1% % 39          Airway Clearance: The Most Important Way to Keep Your Lungs Healthy  Vest Settings:    Hill-Rom Frequencies: 8, 9, 10 Pressure 10 Then, Frequencies 18, 19, 20 Pressure 6      RespirTech: Quick Start with Pressure of     Do each frequency for 5 minutes; Deflate vest after each frequency & cough 3 times before beginning the next setting.    Vest and Neb Therapy should be done 3 times/day.    Good Nutrition Can Improve Lung Function and Overall Health     Take ALL of your vitamins with food     Take 1/2 of your enzymes before EVERY meal/snack and the other 1/2 mid-meal/snack    Wt Readings from Last 3 Encounters:   09/06/17 62.1 kg (137 lb)   08/30/17 62.5 kg (137 lb 12.6 oz)   08/14/17 61.2 kg (135 lb)       Body mass index is 20.59 kg/(m^2).         National CF Foundation Recommendations for BMI in CF Adults: Women: at least 22 Men: at least 23        Controlling Blood Sugars Helps Prevent Lung Infections & Improves Nutrition  Test blood sugar:     In the morning before eating (goal is )     2 hours after a meal (goal is less than 150)     When pre-meal glucose is greater than 150 add correction     At bedtime (if less than 100 eat a snack with 15 grams of carbohydrates  Last A1C Results:   Hemoglobin A1C   Date Value Ref Range Status   08/14/2017 6.1 % Final          If diabetic, measure A1C every 6 months. Goal: Under 7%    Staying Healthy    Research:  If you are interested in learning about research opportunities or have questions, please contact Temi Dodge at [phone] or [email or web address].      CF Foundation:  Compass is a personalized resource service to help you with the insurance, financial, legal and other issues you are facing.  It's free, confidential and available to anyone with CF.  Ask your  for more information or contact Compass directly at 372-COMPASS ([phone]) or compass@cff.org, or learn more at cff.org/compass.          RECOMMENDATIONS: I am glad things are so much better.  Finish the antibiotic as prescribed.  I will get back to you once there is a final culture to decide on rotating oral agent.    YOUR GOAL:  Enjoy the fall.      CF Nurse Line:  Jose David Henderson: [phone]   Felicitas Banuelos, RT: [phone]     Zeina Pierre: [phone] or Ivory Bonilla, Dieticians: [phone]   Lou Lassiter, Diabetes Nurse: [phone]      Lety Golden: [phone] or Marisa Urbina [phone], Social Workers   [email or web address]

## 2017-09-06 NOTE — NURSING NOTE
Chief Complaint   Patient presents with     Cystic Fibrosis     1 week follow up on Nico and his CF     Matt Nunes CMA at 11:52 AM on 9/6/2017

## 2017-09-06 NOTE — LETTER
9/6/2017       RE: Nico Morales  [address] [address]  [place] [address]     Dear Colleague,    Thank you for referring your patient, Nico Morales, to the [facility] at [facility]. Please see a copy of my visit note below.    [facility]  September 6, 2017         Assessment and Plan:   Nico Morales is a 41 year old male with cystic fibrosis.    1. CF lung disease with severe obstruction:  Kilo presents for 1-week followup and showed evidence of excellent improvement in his pulmonary function.  He reports that within a day of stopping the BETHANY and initiating prednisone and antibiotic that he felt improved.  He is no longer tired or short of breath.  At this time, I have recommended that he remain off BETHANY potentially lifelong.  He will complete his course of both prednisone and antibiotics.  We did discuss that it would now be appropriate to alternate an antibiotic with his inhalational [place].  I will add Bactrim sensitivities to his current culture to see if this was an appropriate antipseudomonal agent for him.  He should otherwise continue to be aggressive with his bronchial drainage and nebulized therapy.   2.  Cystic fibrosis-related diabetes.  Kilo describes excellent control.   3.  Nutritional failure.  Kilo does continue on his tube feeds most nights per week.  He has maintained a very stable weight.   4.  Musculoskeletal.  Kilo had been having some arthritis pain that has improved with the use of prednisone.  We will follow this for now.   5.  Psychosocial.  Kilo reports that things are going well at work.  He is recently  and lives in an apartment with his dog, Jefry.  Overall, his mood is quite stable.   6. Pancreatic Insufficiency:  The patient has no new symptoms consistent with worsening malabsorption.  The plan is to continue the  present dose of pancreatic enzymes and vitamin supplementation.    Jaye Machado MD MPH   of Medicine  Pulmonary, Allergy, Critical Care and Sleep Medicine      Interval History:     Kilo does continue to produce sputum that is stable in color.  His cough frequency and sputum volume are improved.  His activity tolerance is improved.  He is performing 3 vest therapies per day.          Review of Systems:     CONSTITUTIONAL: no fever, no chills, no change in weight, improvment in energy, no change in appetite    INTEGUMENTARY/SKIN: no rash, no obvious new lesions    ENT/MOUTH: no sore throat, no new sinus pain, no new nasal drainage, no hearing loss, chronic ear ringing     RESPIRATORY: see interval history    CV: no chest pain, no palpitations, no peripheral edema, no orthopnea, no PND    GI: no nausea, no vomiting, increased in stools on antibitotic, no fatty stools, no GERD, no abdominal pain    : no dysuria, no urinary frequency    MUSCULOSKELETAL: improved myalgias and arthralgias    ENDOCRINE: no excessive thirst, blood sugars with adequate control    NEURO:  No headache, no numbness, no tingling    SLEEP: no issues    PSYCHIATRIC: mood stable          Past Medical and Surgical History:     Past Medical History:   Diagnosis Date     CF (cystic fibrosis) (H)      Exocrine pancreatic insufficiency      Nocardia infection      Pseudomonas infection      S/P gastrostomy (H) 2002     Past Surgical History:   Procedure Laterality Date     GASTROSTOMY TUBE  2002           Family History:     Family History   Problem Relation Age of Onset     HEART DISEASE Maternal Grandmother      HEART DISEASE Maternal Grandfather      HEART DISEASE Paternal Grandmother      DIABETES No family hx of             Social History:     Social History     Social History     Marital status:      Spouse name: N/A     Number of children: 0     Years of education: N/A     Occupational History     financial  [employer]     Social History Main Topics     Smoking status: Never Smoker     Smokeless tobacco: Never Used     Alcohol use No     Drug use: No     Sexual activity: Yes     Partners: Female     Birth control/ protection: Pill     Other Topics Concern     Blood Transfusions No     Exercise No     Social History Narrative    Kilo lives with wife in a house in [place], [place].  He works as a .        March 2016. He works independently as a . Works out 5x/week and walks the dog daily.        June 2016. No changes. Describes himself as a creature of habit.            Medications:     Current Outpatient Prescriptions   Medication     sulfamethoxazole-trimethoprim (BACTRIM DS/SEPTRA DS) 800-160 MG per tablet     sodium chloride inhalant 7 % NEBU neb solution     acetylcysteine (MUCOMYST) 10 % injection     polyethylene glycol (MIRALAX) powder     insulin glargine (LANTUS SOLOSTAR) 100 UNIT/ML injection     azithromycin (ZITHROMAX) 250 MG tablet     cetirizine (ZYRTEC) 10 MG tablet     budesonide-formoterol (SYMBICORT) 160-4.5 MCG/ACT Inhaler     insulin aspart (NOVOLOG PENFILL) 100 UNIT/ML injection     amitriptyline (ELAVIL) 10 MG tablet     levalbuterol (XOPENEX HFA) 45 MCG/ACT Inhaler     levalbuterol (XOPENEX) 1.25 MG/3ML neb solution     CREON 91480 UNITS CPEP per EC capsule     blood glucose monitoring (MARTINA CONTOUR NEXT MONITOR) meter device kit     blood glucose monitoring (MARTINA CONTOUR NEXT) test strip     blood glucose monitoring (MARTINA MICROLET) lancets     aztreonam lysine (CAYSTON) 75 MG SOLR     oseltamivir (TAMIFLU) 75 MG capsule     insulin pen needle (BD OMI U/F) 32G X 4 MM     sodium chloride, Inhalant, 0.9 % AERS     [place] [facility] INFUSION MANAGED PATIENT     ACE NOT PRESCRIBED, INTENTIONAL,     Nebulizers ([facility] NEBULIZER HANDSET) MISC     Calcium Citrate-Vitamin D (CALCIUM CITRATE + D PO)     Ferrous Fumarate-Vitamin C (VITRON-C) 200-125 MG TABS      "MEPHYTON 5 MG PO TABS     MULTIVITAMIN OR     VITAMIN D 1000 UNIT PO CAPS     VITAMIN E 400 UNIT PO CAPS     No current facility-administered medications for this visit.             Physical Exam:   /68  Pulse 94  Resp 18  Ht 1.737 m (5' 8.4\")  Wt 62.1 kg (137 lb)  SpO2 94%  BMI 20.59 kg/m2    Constitutional:   Awake, alert and in no apparent distress     Eyes:   nonicteric     ENT:   oral mucosa moist without lesions, normal tm bilaterally, bilateral mucosal edema      Neck:   Supple without supraclavicular or cervical lymphadenopathy     Lungs:   Good air flow.  No crackles. No rhonchi.  No wheezes.     Cardiovascular:   Normal S1 and S2.  RRR.  No murmur, gallop or rub.     Abdomen:   NABS, soft, nontender, nondistended.  No HSM. gtube     Musculoskeletal:   No edema, digital clubbing present     Neurologic:   Alert and conversant.     Skin:   Warm, dry.  No rash on limited exam.             Data:   All laboratory and imaging data reviewed.    Cystic Fibrosis Culture  Specimen Description   Date Value Ref Range Status   09/06/2017 Sputum  Final   09/06/2017 Sputum  Final   09/06/2017 Sputum  Final    Culture Micro   Date Value Ref Range Status   09/06/2017 Heavy growth  Normal santi to date    Preliminary   09/06/2017   Preliminary    Susceptibility testing requested by  KAMINI Jordan in CF clinic, to Bactrim on anything that grows 9/7/17 SJ     09/06/2017 No growth after 22 hours  Preliminary        Recent Results (from the past 168 hour(s))   Cystic Fibrosis Culture Aerob Bacterial    Collection Time: 09/06/17 11:30 AM   Result Value Ref Range    Specimen Description Sputum     Culture Micro Canceled, Test credited     Culture Micro Duplicate request     Culture Micro See accession [id number].    General PFT Lab (Please always keep checked)    Collection Time: 09/06/17 11:36 AM   Result Value Ref Range    FVC-Pred 4.95 L    FVC-Pre 3.52 L    FVC-%Pred-Pre 71 %    FEV1-Pre 1.56 L    FEV1-%Pred-Pre 39 %    " FEV1FVC-Pred 81 %    FEV1FVC-Pre 44 %    FEFMax-Pred 9.80 L/sec    FEFMax-Pre 6.58 L/sec    FEFMax-%Pred-Pre 67 %    FEF2575-Pred 3.92 L/sec    FEF2575-Pre 0.49 L/sec    MVX5947-%Pred-Pre 12 %    ExpTime-Pre 15.34 sec    FIFMax-Pre 4.76 L/sec    FEV1FEV6-Pred 82 %    FEV1FEV6-Pre 52 %   Cystic Fibrosis Culture Aerob Bacterial    Collection Time: 09/06/17 12:25 PM   Result Value Ref Range    Specimen Description Sputum     Culture Micro Heavy growth  Normal santi to date       Culture Micro       Susceptibility testing requested by  KAMINI Jordan in CF clinic, to Bactrim on anything that grows 9/7/17 SJ     Nocardia culture    Collection Time: 09/06/17 12:25 PM   Result Value Ref Range    Specimen Description Sputum     Culture Micro No growth after 22 hours        PFT: Severe obstructive lung disease.  When compared to 8/30/2017, the FEV1 and FVC have increased.  The decrease in FVC may represent air trapping v. restrictive physiology.  Lung volumes would be necessary to determine.      CF Annual Nutrition Assessment    Reason for Assessment  Assessed during Dr. Machado clinic r/t increased nutrition risk with diagnosis of CF per protocol.    Nutrition Significant PMH  Severe Lung Disease   Pancreatic Insufficient   CFRD  G-tube for nutrition support (actively on TF) - uses [facility]    Social Assessment  Living situation: Living by himself in an apartment with his dog, he is currently in process of building a house.   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  Food Insecurity:  None    Anthropometric Assessment  Height: 173.7 cm  IBW based on BMI 22 for females and 23 for males per CF Foundation recs: 69.5 kg  Today's Weight: 62.1 kg (actual weight)   %IBW: 89%   Body mass index is 20.59 kg/(m^2).   Current weight is considered: Normal BMI; however, not a CF goal BMI     Wt Readings from Last 10 Encounters:   09/06/17 62.1 kg (137 lb)   08/30/17 62.5 kg (137 lb  12.6 oz)   17 61.2 kg (135 lb)   17 61.3 kg (135 lb 2.3 oz)   17 60.9 kg (134 lb 3.2 oz)   17 60.8 kg (134 lb 0.6 oz)   17 60.3 kg (132 lb 15 oz)   17 60 kg (132 lb 4.4 oz)   04/10/17 60.2 kg (132 lb 12.8 oz)   17 60.6 kg (133 lb 9.6 oz)       Weight Status: Excellent progress with weight gain over the past year while he continues to do regular TF support in addition to oral intake.  In the past 6 months he has gained ~2 kg overall (3% improvement in total body weight).  Feels well.  Throughout his life he has had trouble gaining/maintaining weight so this successful weight gain is motivating to him.     Physical Activity/Exercise:  Has been trying to exercise a bit more lately, also takes his dog on regular walks during the day.     MALNUTRITION  Patient does not meet 2 of the criteria necessary for diagnosing malnutrition.    Pancreatic Enzymes  Brand:  Creon [id number]  Dosin-8 with meals, 2 with snacks/supplements, 3 at the beginning and 2 at the end of TF cycle (total per day: 30 on average)    Total daily amount providing 46927 units lipase/kg which is slightly above the recommended range per CF Foundation to inhibit fibrosing colonopathy, however this includes TF dose.     Are you taking enzymes as recommended: Yes   Signs of Malabsorption:  No  Enzyme Program:  [facility]    Diet History and Assessment  Diet Preferences/Allergies/Intolerances: Regular    Intake Recall/Comments:  Eats three meals per day and supplement, tends to not snack.  Patient has been chronically unable to meet calorie/nutrient needs orally and thus has had to supplement his oral diet with TF support in order to maintain/gain weight.     Calcium: 2-3 servings dairy per day, also takes calcium/D supplement with additional vitamin D   Supplements:  Yes, obtains these from [facility] or buys PRN. Has also used protein powders to make homemade shakes.   Tube Feeding:  Has a G tube and has been  infusing TF atleast 5 nights a week (uses gravity drip, no pump). Current Home TF Regimen: Two Javier HN - 2 cans/day - provides 474 ml with 950 kcal, 40 gm protein, 104 gm CHO, 2.4 gm fiber, 43 gm fat, 332 ml free water.  See above for enzyme regimen for TF. Current PERT regimen with TF provides 2791 lipase units/gm of total fat in TF which is within the recommended range.    Estimated Energy and Protein Needs  Estimation based on weight maintenance and gain with Severe lung disease and pancreatic insufficient.    BEE: ~1490 kcals                           3500 - 3850 kcals/day 200-225% BEE (+ 500 kcals/day for weight gain)   g protein/day 1.5-2 g/kg    Laboratory Assessment    Vitamin A   Date Value Ref Range Status   12/15/2016 0.39  Final     Comment:     Reference range: 0.30 to 1.20  Unit: mg/L       Vitamin D Deficiency screening   Date Value Ref Range Status   12/15/2016 33 20 - 75 ug/L Final     Comment:     Season, race, dietary intake, and treatment affect the concentration of   25-hydroxy-Vitamin D. Values may decrease during winter months and increase   during summer months. Values 20-29 ug/L may indicate Vitamin D insufficiency   and values <20 ug/L may indicate Vitamin D deficiency.   Vitamin D determination is routinely performed by an immunoassay specific for   25 hydroxyvitamin D3.  If an individual is on vitamin D2 (ergocalciferol)   supplementation, please specify 25 OH vitamin D2 and D3 level determination   by   LCMSMS test [id number].       Vitamin E   Date Value Ref Range Status   12/15/2016 20.0 (H)  Final     Comment:     Reference range: 5.5 to 18.0  Unit: mg/L  (Note)  Test developed and characteristics determined by [employer]. See Compliance Statement B: [email or web address]       Iron   Date Value Ref Range Status   03/14/2017 30 (L) 35 - 180 ug/dL Final     Cholesterol   Date Value Ref Range Status   12/15/2016 158 <200 mg/dL Final     Triglycerides   Date Value Ref Range Status    12/15/2016 173 (H) <150 mg/dL Final     Comment:     Borderline high:  150-199 mg/dl   High:             200-499 mg/dl   Very high:       >499 mg/dl       HDL Cholesterol   Date Value Ref Range Status   12/15/2016 32 (L) >39 mg/dL Final     LDL Cholesterol Calculated   Date Value Ref Range Status   12/15/2016 92 <100 mg/dL Final     Comment:     Desirable:       <100 mg/dl     VLDL-Cholesterol   Date Value Ref Range Status   06/26/2013 13 0 - 30 mg/dL Final     Cholesterol/HDL Ratio   Date Value Ref Range Status   06/26/2013 4.4 0.0 - 5.0 Final     DEXA: (December 2016) Showed significant bone loss - pt is actively seeing Dr. Lee to discuss possible start of pamidronate.  He may start this medication at the [facility] this fall.  He's a little apprehensive about this treatment due to pain/side effects.    Current Vitamin/Mineral Prescriptions: Multivitamin (Centrum), Vitamin D 2000+ International Units, Vitamin E 400 International Units, Calcium with D 1 tablet twice daily and Vitron-C 1 tablet twice daily.    Comments:  Has been very compliant with calcium/vitamin D changes that we discussed in his last 2 visits to support bone health.     CF Related Diabetes Evaluation  Hgb A1C: 6.1%   Date: 8/14/17  FSBG range: Not discussed today  Insulin: Yes    Insulin Regimen: Novolog 1 unit:30 g carb, Lantus 6 units  Carbohydrate Counting: Yes       NUTRITION DIAGNOSIS  Impaired nutrient utilization r/t CF related diabetes, pancreatic insufficiency and CF hypermetabolism AEB requires PERT, insulin therapy and high kcal/pro intake through TF + diet to maintain/improve nutrition and health status.    INTERVENTIONS/RECOMMENDATIONS  Encouraged maintenance of current nutrition plan including 3 meals per day, 1-2 supplements per day and nightly TF using 2 cans TwoCal HN.  Reviewed vitamin/mineral supplement plan and reminded pt to take calcium and iron supplements spaced out by at least 2 hours.   Reviewed enzyme, insulin and vitamin/mineral supplement prescriptions to ensure accuracy and understanding.  Plan for annual studies in December.     Patient Understanding: Excellent  Expected Compliance: Excellent  Follow-Up Plans: Per protocol    GOALS:  1) Weight maintenance vs further gain to BMI of 23 kg/m2.   2) 100% compliance with prescribed enzymes, vitamin/mineral supplements and insulin therapy.     FOLLOW-UP/MONITORING:  Visit patient within 12 month(s) for annual follow-up, sooner by request or in response to acute weight/nutrition changes.    Time Spent In Face-to-Face Patient Interactions: 15 minutes      Ivory Bonilla MS, RD, LD (pager [phone])  Cystic Fibrosis/Lung Transplant Dietitian      -Available Tues-Fri 8 AM-4:30 PM. On Mondays please contact pager [phone] (Zeina Pierre RD) and on weekends/holidays contact coverage dietitian at pager [phone] (inpatient use only).     Again, thank you for allowing me to participate in the care of your patient.      Sincerely,    Jaye Machado MD

## 2017-09-06 NOTE — MR AVS SNAPSHOT
After Visit Summary   9/6/2017    Nico Morales    MRN: [id number]           Patient Information     Date Of Birth          1975        Visit Information        Provider Department      9/6/2017 11:50 AM Jaye Machado MD [facility]        Today's Diagnoses     Cystic fibrosis with pulmonary manifestations (H)    -  1      Care Instructions    Cystic Fibrosis Self-Care Plan       MRN: [id number]   Clinic Date: September 6, 2017   Patient: Nico Morales     Annual Studies:   IGG   Date Value Ref Range Status   12/15/2016 1770 (H) 695 - 1620 mg/dL Final     Insulin   Date Value Ref Range Status   07/26/2011 30 mU/L Final     Comment:     Reference Range:  0-20  +120     There are no preventive care reminders to display for this patient.      Pulmonary Function Tests  FEV1: amount of air you can blow out in 1 second  FVC: total amount of air you can take in and blow out    Your Goals:         PFT Latest Ref Rng & Units 9/6/2017   FVC L 3.52   FEV1 L 1.56   FVC% % 71   FEV1% % 39          Airway Clearance: The Most Important Way to Keep Your Lungs Healthy  Vest Settings:    Hill-Rom Frequencies: 8, 9, 10 Pressure 10 Then, Frequencies 18, 19, 20 Pressure 6      RespirTech: Quick Start with Pressure of     Do each frequency for 5 minutes; Deflate vest after each frequency & cough 3 times before beginning the next setting.    Vest and Neb Therapy should be done 3 times/day.    Good Nutrition Can Improve Lung Function and Overall Health     Take ALL of your vitamins with food     Take 1/2 of your enzymes before EVERY meal/snack and the other 1/2 mid-meal/snack    Wt Readings from Last 3 Encounters:   09/06/17 62.1 kg (137 lb)   08/30/17 62.5 kg (137 lb 12.6 oz)   08/14/17 61.2 kg (135 lb)       Body mass index is 20.59 kg/(m^2).         National CF Foundation Recommendations for BMI in CF Adults: Women: at least 22 Men: at least 23        Controlling  Blood Sugars Helps Prevent Lung Infections & Improves Nutrition  Test blood sugar:     In the morning before eating (goal is )     2 hours after a meal (goal is less than 150)     When pre-meal glucose is greater than 150 add correction     At bedtime (if less than 100 eat a snack with 15 grams of carbohydrates  Last A1C Results:   Hemoglobin A1C   Date Value Ref Range Status   08/14/2017 6.1 % Final         If diabetic, measure A1C every 6 months. Goal: Under 7%    Staying Healthy    Research:  If you are interested in learning about research opportunities or have questions, please contact Temi Dodge at [phone] or [email or web address].      CF Foundation:  Compass is a personalized resource service to help you with the insurance, financial, legal and other issues you are facing.  It's free, confidential and available to anyone with CF.  Ask your  for more information or contact Compass directly at [facility] ([phone]) or compass@cff.org, or learn more at cff.org/compass.          RECOMMENDATIONS: I am glad things are so much better.  Finish the antibiotic as prescribed.  I will get back to you once there is a final culture to decide on rotating oral agent.    YOUR GOAL:  Enjoy the fall.      CF Nurse Line:  Jose David Henderson: [phone]   Felicitas Banuelos, RT: [phone]     Zeina Pierre: [phone] or Ivory Bonilla, Kishoreicians: [phone]   Lou Lassiter, Diabetes Nurse: [phone]      Lety Golden: [phone] or Marisa Urbina [phone], Social Workers   [email or web address]                   Follow-ups after your visit        Follow-up notes from your care team     Return 4 to 6 weeks.      Your next 10 appointments already scheduled     Oct 11, 2017 11:30 AM CDT   CF LOOP with [facility] Pulmonary Function Testing ([facility])    [address] [address] [phone]            Oct 11,  2017 12:00 PM CDT   (Arrive by 11:45 AM)   RETURN CYSTIC FIBROSIS VISIT with Regina Herman PA-C   [facility] ([facility])    [address]  [address] [facility] [id number] [phone]            Feb 26, 2018 10:15 AM CST   Return Visit with Diana Lee MD, MG ENDO NURSE   [facility] ([facility])    [address] [place] [address] [phone]              Future tests that were ordered for you today     Open Future Orders        Priority Expected Expires Ordered    Cystic Fibrosis Culture Aerob Bacterial Routine  9/6/2018 9/6/2017            Who to contact     If you have questions or need follow up information about today's clinic visit or your schedule please contact [facility] directly at [phone].  Normal or non-critical lab and imaging results will be communicated to you by ValueFirst Messaginghart, letter or phone within 4 business days after the clinic has received the results. If you do not hear from us within 7 days, please contact the clinic through JournallyMet or phone. If you have a critical or abnormal lab result, we will notify you by phone as soon as possible.  Submit refill requests through Zenring or call your pharmacy and they will forward the refill request to us. Please allow 3 business days for your refill to be completed.          Additional Information About Your Visit        ValueFirst [email or web address] Information     Zenring gives you secure access to your electronic health record. If you see a primary care provider, you can also send messages to your care team and make appointments. If you have questions, please call your primary care clinic.  If you do not have a primary care provider, please call [phone] and they will assist you.        Care EveryWhere ID     This is your Care EveryWhere ID. This could be used by other organizations to access your  "[place] medical records  [id number]        Your Vitals Were     Pulse Respirations Height Pulse Oximetry BMI (Body Mass Index)       94 18 1.737 m (5' 8.4\") 94% 20.59 kg/m2        Blood Pressure from Last 3 Encounters:   09/06/17 100/68   08/30/17 113/78   08/14/17 110/67    Weight from Last 3 Encounters:   09/06/17 62.1 kg (137 lb)   08/30/17 62.5 kg (137 lb 12.6 oz)   08/14/17 61.2 kg (135 lb)              We Performed the Following     Cystic Fibrosis Culture Aerob Bacterial     RESPIRATORY FLOW VOLUME LOOP          Today's Medication Changes          These changes are accurate as of: 9/6/17  1:07 PM.  If you have any questions, ask your nurse or doctor.               Stop taking these medicines if you haven't already. Please contact your care team if you have questions.     predniSONE 10 MG tablet   Commonly known as:  DELTASONE   Stopped by:  Jaye Machado MD           tobramycin (PF) 300 MG/5ML neb solution   Commonly known as:  BETHANY   Stopped by:  Jaye Machado MD                    Primary Care Provider Office Phone # Fax #    Jaye Machado [phone]       [address] [place] [id number]        Equal Access to Services     TOÑA RAMIREZ AH: Hadii aad ku hadasho Soomaali, waaxda luqadaha, qaybta kaalmada adeegyada, waxgrady iversonin hayaan simi mack. So [facility] [phone].    ATENCIÓN: Si habla español, tiene a monroe disposición servicios gratuitos de asistencia lingüística. Llame al [phone].    We comply with applicable federal civil rights laws and [place] laws. We do not discriminate on the basis of race, color, national origin, age, disability sex, sexual orientation or gender identity.            Thank you!     Thank you for choosing [facility]  for your care. Our goal is always to provide you with excellent care. Hearing back from our patients is one way we can continue to improve our " services. Please take a few minutes to complete the written survey that you may receive in the mail after your visit with us. Thank you!             Your Updated Medication List - Protect others around you: Learn how to safely use, store and throw away your medicines at www.disposemymeds.org.          This list is accurate as of: 9/6/17  1:07 PM.  Always use your most recent med list.                   Brand Name Dispense Instructions for use Diagnosis    ACE NOT PRESCRIBED (INTENTIONAL)     0 each    1 each continuous prn. ACE Inhibitor not prescribed due to Risk for drug interaction    Type I (juvenile type) diabetes mellitus without mention of complication, not stated as uncontrolled       acetylcysteine 10 % injection    MUCOMYST    480 mL    Nebulize 4ml qid    Cystic fibrosis with pulmonary manifestations (H)       amitriptyline 10 MG tablet    ELAVIL    90 tablet    Take 1 tablet (10 mg) by mouth At Bedtime Take one to three tablets at bedtime.    Cystic fibrosis (H)       azithromycin 250 MG tablet    ZITHROMAX    30 tablet    Take 1 tablet (250 mg) by mouth daily    Cystic fibrosis with pulmonary manifestations (H)       aztreonam lysine 75 MG Solr    [facility]    84 mL    Take 1 mL (75 mg) by nebulization 3 times daily Alternating every other month with BETHANY.    Cystic fibrosis with pulmonary manifestations (H)       blood glucose monitoring lancets     2 Box    Use to test blood sugar 6 times daily or as directed.    Diabetes mellitus related to CF (cystic fibrosis) (H)       blood glucose monitoring meter device kit     1 kit    Use to test blood sugar 6 times daily or as directed.    Diabetes mellitus related to CF (cystic fibrosis) (H)       blood glucose monitoring test strip    MARTINA CONTOUR NEXT    200 each    Use to test blood sugar 6 times daily or as directed.    Diabetes mellitus related to CF (cystic fibrosis) (H)       budesonide-formoterol 160-4.5 MCG/ACT Inhaler    SYMBICORT    1 Inhaler     "Inhale 2 puffs into the lungs 2 times daily    Cystic fibrosis of the lung (H), Cystic fibrosis (H)       CALCIUM CITRATE + D PO      Take 1 tablet by mouth 2 times daily.    Nocardia infection, Cystic fibrosis with pulmonary manifestations (H)       cetirizine 10 MG tablet    zyrTEC    30 tablet    Take 1 tablet (10 mg) by mouth every evening    Cystic fibrosis (H)       CREON 36853 UNITS Cpep per EC capsule   Generic drug:  amylase-lipase-protease     2700 capsule    Take 7-8 caps with meals (3 meals/day) and 2 caps with snacks (3 snacks/day)    Cystic fibrosis with pulmonary manifestations (H)       EFLOW SCF NEBULIZER HANDSET Misc     1 each    1 each 3 times daily.    Nocardia infection       [place] HOME INFUSION MANAGED PATIENT      Contact [facility] for patient specific medication information at [phone] on admission and discharge from the hospital.  Phones are answered 24 hours a day 7 days a week 365 days a year.  Providers - Choose \"CONTINUE HOME MED (no script)\" at discharge if patient treatment with home infusion will continue.        insulin aspart 100 UNIT/ML injection    NovoLOG PENFILL    15 mL    1 per 20 grams of carbohydrate at lunch and supper. Also use 3 units of Novolog at night with tube feeding.    Diabetes mellitus related to CF (cystic fibrosis) (H)       insulin glargine 100 UNIT/ML injection    LANTUS SOLOSTAR    15 mL    6 units daily    Diabetes mellitus related to CF (cystic fibrosis) (H)       insulin pen needle 32G X 4 MM    BD OMI U/F    200 each    Use 6 daily or as directed.    Diabetes mellitus related to CF (cystic fibrosis) (H)       levalbuterol 1.25 MG/3ML neb solution    XOPENEX    360 mL    Take 3 mLs (1.25 mg) by nebulization 4 times daily        levalbuterol 45 MCG/ACT Inhaler    XOPENEX HFA    3 Inhaler    Inhale 2 puffs into the lungs every 6 hours as needed for shortness of breath / dyspnea    Cystic fibrosis (H)       MEPHYTON 5 MG tablet "   Generic drug:  phytonadione      1 TABLET DAILY        MULTIVITAMIN PO      1 tablet daily        oseltamivir 75 MG capsule    TAMIFLU    10 capsule    Take 1 capsule (75 mg) by mouth 2 times daily    Cystic fibrosis with pulmonary manifestations (H)       polyethylene glycol powder    MIRALAX    510 g    Use 17 gms po 1-3 times daily as needed.    Cystic fibrosis with meconium ileus (H)       * sodium chloride (Inhalant) 0.9 % Aers     270 mL    Inhale 3 mLs into the lungs 3 times daily    Cystic fibrosis with pulmonary manifestations (H)       * sodium chloride inhalant 7 % Nebu neb solution     240 mL    NEBULIZE 4ML TWICE DAILY    Cystic fibrosis with pulmonary manifestations (H)       sulfamethoxazole-trimethoprim 800-160 MG per tablet    BACTRIM DS/SEPTRA DS    28 tablet    Take 1 tablet by mouth 2 times daily    Cystic fibrosis with pulmonary manifestations (H)       vitamin D 1000 UNITS capsule      1 CAPSULE DAILY        vitamin E 400 UNIT capsule      1 CAPSULE DAILY        VITRON-C 200-125 MG Tabs   Generic drug:  ferrous fumarate 65 mg (Habematolel. FE)-Vitamin C 125 mg      Take 1 tablet by mouth 2 times daily.    Nocardia infection, Cystic fibrosis with pulmonary manifestations (H)       * Notice:  This list has 2 medication(s) that are the same as other medications prescribed for you. Read the directions carefully, and ask your doctor or other care provider to review them with you.

## 2017-09-07 LAB
BACTERIA SPEC CULT: ABNORMAL
SPECIMEN SOURCE: ABNORMAL

## 2017-09-07 NOTE — PROGRESS NOTES
[facility]  September 6, 2017         Assessment and Plan:   Nico Morales is a 41 year old male with cystic fibrosis.    1. CF lung disease with severe obstruction:  Kilo presents for 1-week followup and showed evidence of excellent improvement in his pulmonary function.  He reports that within a day of stopping the BETHANY and initiating prednisone and antibiotic that he felt improved.  He is no longer tired or short of breath.  At this time, I have recommended that he remain off BETHANY potentially lifelong.  He will complete his course of both prednisone and antibiotics.  We did discuss that it would now be appropriate to alternate an antibiotic with his inhalational [place].  I will add Bactrim sensitivities to his current culture to see if this was an appropriate antipseudomonal agent for him.  He should otherwise continue to be aggressive with his bronchial drainage and nebulized therapy.   2.  Cystic fibrosis-related diabetes.  Kilo describes excellent control.   3.  Nutritional failure.  Kilo does continue on his tube feeds most nights per week.  He has maintained a very stable weight.   4.  Musculoskeletal.  Klio had been having some arthritis pain that has improved with the use of prednisone.  We will follow this for now.   5.  Psychosocial.  Kilo reports that things are going well at work.  He is recently  and lives in an apartment with his dog, Jefry.  Overall, his mood is quite stable.   6. Pancreatic Insufficiency:  The patient has no new symptoms consistent with worsening malabsorption.  The plan is to continue the present dose of pancreatic enzymes and vitamin supplementation.    Jaye Machado MD MPH   of Medicine  Pulmonary, Allergy, Critical Care and Sleep Medicine      Interval History:     Kilo does continue to produce sputum that is stable in color.  His cough frequency and sputum volume are  improved.  His activity tolerance is improved.  He is performing 3 vest therapies per day.          Review of Systems:     CONSTITUTIONAL: no fever, no chills, no change in weight, improvment in energy, no change in appetite    INTEGUMENTARY/SKIN: no rash, no obvious new lesions    ENT/MOUTH: no sore throat, no new sinus pain, no new nasal drainage, no hearing loss, chronic ear ringing     RESPIRATORY: see interval history    CV: no chest pain, no palpitations, no peripheral edema, no orthopnea, no PND    GI: no nausea, no vomiting, increased in stools on antibitotic, no fatty stools, no GERD, no abdominal pain    : no dysuria, no urinary frequency    MUSCULOSKELETAL: improved myalgias and arthralgias    ENDOCRINE: no excessive thirst, blood sugars with adequate control    NEURO:  No headache, no numbness, no tingling    SLEEP: no issues    PSYCHIATRIC: mood stable          Past Medical and Surgical History:     Past Medical History:   Diagnosis Date     CF (cystic fibrosis) (H)      Exocrine pancreatic insufficiency      Nocardia infection      Pseudomonas infection      S/P gastrostomy (H) 2002     Past Surgical History:   Procedure Laterality Date     GASTROSTOMY TUBE  2002           Family History:     Family History   Problem Relation Age of Onset     HEART DISEASE Maternal Grandmother      HEART DISEASE Maternal Grandfather      HEART DISEASE Paternal Grandmother      DIABETES No family hx of             Social History:     Social History     Social History     Marital status:      Spouse name: N/A     Number of children: 0     Years of education: N/A     Occupational History     financial [employer]     Social History Main Topics     Smoking status: Never Smoker     Smokeless tobacco: Never Used     Alcohol use No     Drug use: No     Sexual activity: Yes     Partners: Female     Birth control/ protection: Pill     Other Topics Concern     Blood Transfusions No     Exercise No  "    Social History Narrative    Kilo lives with wife in a house in [place], [place].  He works as a .        March 2016. He works independently as a . Works out 5x/week and walks the dog daily.        June 2016. No changes. Describes himself as a creature of habit.            Medications:     Current Outpatient Prescriptions   Medication     sulfamethoxazole-trimethoprim (BACTRIM DS/SEPTRA DS) 800-160 MG per tablet     sodium chloride inhalant 7 % NEBU neb solution     acetylcysteine (MUCOMYST) 10 % injection     polyethylene glycol (MIRALAX) powder     insulin glargine (LANTUS SOLOSTAR) 100 UNIT/ML injection     azithromycin (ZITHROMAX) 250 MG tablet     cetirizine (ZYRTEC) 10 MG tablet     budesonide-formoterol (SYMBICORT) 160-4.5 MCG/ACT Inhaler     insulin aspart (NOVOLOG PENFILL) 100 UNIT/ML injection     amitriptyline (ELAVIL) 10 MG tablet     levalbuterol (XOPENEX HFA) 45 MCG/ACT Inhaler     levalbuterol (XOPENEX) 1.25 MG/3ML neb solution     CREON 99704 UNITS CPEP per EC capsule     blood glucose monitoring (MARTINA CONTOUR NEXT MONITOR) meter device kit     blood glucose monitoring (MARTINA CONTOUR NEXT) test strip     blood glucose monitoring (MARTINA MICROLET) lancets     aztreonam lysine (CAYSTON) 75 MG SOLR     oseltamivir (TAMIFLU) 75 MG capsule     insulin pen needle (BD OMI U/F) 32G X 4 MM     sodium chloride, Inhalant, 0.9 % AERS     [place] [facility] INF[facility]ION MANAGED PATIENT     ACE NOT PRESCRIBED, INTENTIONAL,     Nebulizers ([facility] NEBULIZER HANDSET) MISC     Calcium Citrate-Vitamin D (CALCIUM CITRATE + D PO)     Ferrous Fumarate-Vitamin C (VITRON-C) 200-125 MG TABS     MEPHYTON 5 MG PO TABS     MULTIVITAMIN OR     VITAMIN D 1000 UNIT PO CAPS     VITAMIN E 400 UNIT PO CAPS     No current facility-administered medications for this visit.             Physical Exam:   /68  Pulse 94  Resp 18  Ht 1.737 m (5' 8.4\")  Wt 62.1 kg (137 lb)  SpO2 94%  BMI 20.59 " kg/m2    Constitutional:   Awake, alert and in no apparent distress     Eyes:   nonicteric     ENT:   oral mucosa moist without lesions, normal tm bilaterally, bilateral mucosal edema      Neck:   Supple without supraclavicular or cervical lymphadenopathy     Lungs:   Good air flow.  No crackles. No rhonchi.  No wheezes.     Cardiovascular:   Normal S1 and S2.  RRR.  No murmur, gallop or rub.     Abdomen:   NABS, soft, nontender, nondistended.  No HSM. gtube     Musculoskeletal:   No edema, digital clubbing present     Neurologic:   Alert and conversant.     Skin:   Warm, dry.  No rash on limited exam.             Data:   All laboratory and imaging data reviewed.    Cystic Fibrosis Culture  Specimen Description   Date Value Ref Range Status   09/06/2017 Sputum  Final   09/06/2017 Sputum  Final   09/06/2017 Sputum  Final    Culture Micro   Date Value Ref Range Status   09/06/2017 Heavy growth  Normal santi to date    Preliminary   09/06/2017   Preliminary    Susceptibility testing requested by  KAMINI Jordan in CF clinic, to Bactrim on anything that grows 9/7/17 SJ     09/06/2017 No growth after 22 hours  Preliminary        Recent Results (from the past 168 hour(s))   Cystic Fibrosis Culture Aerob Bacterial    Collection Time: 09/06/17 11:30 AM   Result Value Ref Range    Specimen Description Sputum     Culture Micro Canceled, Test credited     Culture Micro Duplicate request     Culture Micro See accession [id number].    General PFT Lab (Please always keep checked)    Collection Time: 09/06/17 11:36 AM   Result Value Ref Range    FVC-Pred 4.95 L    FVC-Pre 3.52 L    FVC-%Pred-Pre 71 %    FEV1-Pre 1.56 L    FEV1-%Pred-Pre 39 %    FEV1FVC-Pred 81 %    FEV1FVC-Pre 44 %    FEFMax-Pred 9.80 L/sec    FEFMax-Pre 6.58 L/sec    FEFMax-%Pred-Pre 67 %    FEF2575-Pred 3.92 L/sec    FEF2575-Pre 0.49 L/sec    JJU7735-%Pred-Pre 12 %    ExpTime-Pre 15.34 sec    FIFMax-Pre 4.76 L/sec    FEV1FEV6-Pred 82 %    FEV1FEV6-Pre 52 %   Cystic  Fibrosis Culture Aerob Bacterial    Collection Time: 09/06/17 12:25 PM   Result Value Ref Range    Specimen Description Sputum     Culture Micro Heavy growth  Normal santi to date       Culture Micro       Susceptibility testing requested by  KAMINI Jordan in CF clinic, to Bactrim on anything that grows 9/7/17 SJ     Nocardia culture    Collection Time: 09/06/17 12:25 PM   Result Value Ref Range    Specimen Description Sputum     Culture Micro No growth after 22 hours        PFT: Severe obstructive lung disease.  When compared to 8/30/2017, the FEV1 and FVC have increased.  The decrease in FVC may represent air trapping v. restrictive physiology.  Lung volumes would be necessary to determine.

## 2017-09-07 NOTE — PROGRESS NOTES
CF Annual Nutrition Assessment    Reason for Assessment  Assessed during Dr. Machado clinic r/t increased nutrition risk with diagnosis of CF per protocol.    Nutrition Significant PMH  Severe Lung Disease   Pancreatic Insufficient   CFRD  G-tube for nutrition support (actively on TF) - uses [facility]    Social Assessment  Living situation: Living by himself in an apartment with his dog, he is currently in process of building a house.   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  Food Insecurity:  None    Anthropometric Assessment  Height: 173.7 cm  IBW based on BMI 22 for females and 23 for males per CF Foundation recs: 69.5 kg  Today's Weight: 62.1 kg (actual weight)   %IBW: 89%   Body mass index is 20.59 kg/(m^2).   Current weight is considered: Normal BMI; however, not a CF goal BMI     Wt Readings from Last 10 Encounters:   17 62.1 kg (137 lb)   17 62.5 kg (137 lb 12.6 oz)   17 61.2 kg (135 lb)   17 61.3 kg (135 lb 2.3 oz)   17 60.9 kg (134 lb 3.2 oz)   17 60.8 kg (134 lb 0.6 oz)   17 60.3 kg (132 lb 15 oz)   17 60 kg (132 lb 4.4 oz)   04/10/17 60.2 kg (132 lb 12.8 oz)   17 60.6 kg (133 lb 9.6 oz)       Weight Status: Excellent progress with weight gain over the past year while he continues to do regular TF support in addition to oral intake.  In the past 6 months he has gained ~2 kg overall (3% improvement in total body weight).  Feels well.  Throughout his life he has had trouble gaining/maintaining weight so this successful weight gain is motivating to him.     Physical Activity/Exercise:  Has been trying to exercise a bit more lately, also takes his dog on regular walks during the day.     MALNUTRITION  Patient does not meet 2 of the criteria necessary for diagnosing malnutrition.    Pancreatic Enzymes  Brand:  Creon [id number]  Dosin-8 with meals, 2 with snacks/supplements, 3 at the beginning and 2  at the end of TF cycle (total per day: 30 on average)    Total daily amount providing 55631 units lipase/kg which is slightly above the recommended range per CF Foundation to inhibit fibrosing colonopathy, however this includes TF dose.     Are you taking enzymes as recommended: Yes   Signs of Malabsorption:  No  Enzyme Program:  [facility]    Diet History and Assessment  Diet Preferences/Allergies/Intolerances: Regular    Intake Recall/Comments:  Eats three meals per day and supplement, tends to not snack.  Patient has been chronically unable to meet calorie/nutrient needs orally and thus has had to supplement his oral diet with TF support in order to maintain/gain weight.     Calcium: 2-3 servings dairy per day, also takes calcium/D supplement with additional vitamin D   Supplements:  Yes, obtains these from [facility] or buys PRN. Has also used protein powders to make homemade shakes.   Tube Feeding:  Has a G tube and has been infusing TF atleast 5 nights a week (uses gravity drip, no pump). Current Home TF Regimen: Two Javier HN - 2 cans/day - provides 474 ml with 950 kcal, 40 gm protein, 104 gm CHO, 2.4 gm fiber, 43 gm fat, 332 ml free water.  See above for enzyme regimen for TF. Current PERT regimen with TF provides 2791 lipase units/gm of total fat in TF which is within the recommended range.    Estimated Energy and Protein Needs  Estimation based on weight maintenance and gain with Severe lung disease and pancreatic insufficient.    BEE: ~1490 kcals                           3500 - 3850 kcals/day 200-225% BEE (+ 500 kcals/day for weight gain)   g protein/day 1.5-2 g/kg    Laboratory Assessment    Vitamin A   Date Value Ref Range Status   12/15/2016 0.39  Final     Comment:     Reference range: 0.30 to 1.20  Unit: mg/L       Vitamin D Deficiency screening   Date Value Ref Range Status   12/15/2016 33 20 - 75 ug/L Final     Comment:     Season, race, dietary intake, and treatment affect the  concentration of   25-hydroxy-Vitamin D. Values may decrease during winter months and increase   during summer months. Values 20-29 ug/L may indicate Vitamin D insufficiency   and values <20 ug/L may indicate Vitamin D deficiency.   Vitamin D determination is routinely performed by an immunoassay specific for   25 hydroxyvitamin D3.  If an individual is on vitamin D2 (ergocalciferol)   supplementation, please specify 25 OH vitamin D2 and D3 level determination   by   LCMSMS test VITD23.       Vitamin E   Date Value Ref Range Status   12/15/2016 20.0 (H)  Final     Comment:     Reference range: 5.5 to 18.0  Unit: mg/L  (Note)  Test developed and characteristics determined by [facility]. See Compliance Statement B: [email or web address] [email or web address]       Iron   Date Value Ref Range Status   03/14/2017 30 (L) 35 - 180 ug/dL Final     Cholesterol   Date Value Ref Range Status   12/15/2016 158 <200 mg/dL Final     Triglycerides   Date Value Ref Range Status   12/15/2016 173 (H) <150 mg/dL Final     Comment:     Borderline high:  150-199 mg/dl   High:             200-499 mg/dl   Very high:       >499 mg/dl       HDL Cholesterol   Date Value Ref Range Status   12/15/2016 32 (L) >39 mg/dL Final     LDL Cholesterol Calculated   Date Value Ref Range Status   12/15/2016 92 <100 mg/dL Final     Comment:     Desirable:       <100 mg/dl     VLDL-Cholesterol   Date Value Ref Range Status   06/26/2013 13 0 - 30 mg/dL Final     Cholesterol/HDL Ratio   Date Value Ref Range Status   06/26/2013 4.4 0.0 - 5.0 Final     DEXA: (December 2016) Showed significant bone loss - pt is actively seeing Dr. Lee to discuss possible start of pamidronate.  He may start this medication at the [facility] this fall.  He's a little apprehensive about this treatment due to pain/side effects.    Current Vitamin/Mineral Prescriptions: Multivitamin (Centrum), Vitamin D 2000+ International Units, Vitamin E 400 International Units, Calcium  with D 1 tablet twice daily and Vitron-C 1 tablet twice daily.    Comments:  Has been very compliant with calcium/vitamin D changes that we discussed in his last 2 visits to support bone health.     CF Related Diabetes Evaluation  Hgb A1C: 6.1%   Date: 8/14/17  FSBG range: Not discussed today  Insulin: Yes    Insulin Regimen: Novolog 1 unit:30 g carb, Lantus 6 units  Carbohydrate Counting: Yes       NUTRITION DIAGNOSIS  Impaired nutrient utilization r/t CF related diabetes, pancreatic insufficiency and CF hypermetabolism AEB requires PERT, insulin therapy and high kcal/pro intake through TF + diet to maintain/improve nutrition and health status.    INTERVENTIONS/RECOMMENDATIONS  Encouraged maintenance of current nutrition plan including 3 meals per day, 1-2 supplements per day and nightly TF using 2 cans TwoCal HN.  Reviewed vitamin/mineral supplement plan and reminded pt to take calcium and iron supplements spaced out by at least 2 hours.  Reviewed enzyme, insulin and vitamin/mineral supplement prescriptions to ensure accuracy and understanding.  Plan for annual studies in December.     Patient Understanding: Excellent  Expected Compliance: Excellent  Follow-Up Plans: Per protocol    GOALS:  1) Weight maintenance vs further gain to BMI of 23 kg/m2.   2) 100% compliance with prescribed enzymes, vitamin/mineral supplements and insulin therapy.     FOLLOW-UP/MONITORING:  Visit patient within 12 month(s) for annual follow-up, sooner by request or in response to acute weight/nutrition changes.    Time Spent In Face-to-Face Patient Interactions: 15 minutes      Ivory Bonilla MS, RD, LD (pager [phone])  Cystic Fibrosis/Lung Transplant Dietitian      -Available Tues-Fri 8 AM-4:30 PM. On Mondays please contact pager [phone] (Zeina Pierre RD) and on weekends/holidays contact coverage dietitian at pager [phone] (inpatient use only).

## 2017-09-08 LAB
ACID FAST STN SPEC QL: NORMAL
ACID FAST STN SPEC QL: NORMAL
SPECIMEN SOURCE: NORMAL

## 2017-09-12 LAB
BACTERIA SPEC CULT: ABNORMAL
SPECIMEN SOURCE: ABNORMAL

## 2017-09-25 DIAGNOSIS — E84.8 DIABETES MELLITUS RELATED TO CF (CYSTIC FIBROSIS) (H): ICD-10-CM

## 2017-09-25 DIAGNOSIS — E08.9 DIABETES MELLITUS RELATED TO CF (CYSTIC FIBROSIS) (H): ICD-10-CM

## 2017-09-25 NOTE — TELEPHONE ENCOUNTER
Message  Received: Today       Nelda Madison, Jordyn Wadsworth RN                     Needs refills on ultra fine pen needles and accu check lancets to [facility] in [place].     Thanks   Nelda Madison RN   Adult Cystic Fibrosis Coordinator

## 2017-09-26 ENCOUNTER — TELEPHONE (OUTPATIENT)
Dept: ENDOCRINOLOGY | Facility: CLINIC | Age: 42
End: 2017-09-26

## 2017-09-26 DIAGNOSIS — E08.9 DIABETES MELLITUS RELATED TO CF (CYSTIC FIBROSIS) (H): Primary | ICD-10-CM

## 2017-09-26 DIAGNOSIS — E84.8 DIABETES MELLITUS RELATED TO CF (CYSTIC FIBROSIS) (H): Primary | ICD-10-CM

## 2017-09-27 ENCOUNTER — CARE COORDINATION (OUTPATIENT)
Dept: PULMONOLOGY | Facility: CLINIC | Age: 42
End: 2017-09-27

## 2017-09-27 DIAGNOSIS — E84.9 CF (CYSTIC FIBROSIS) (H): Primary | ICD-10-CM

## 2017-09-27 RX ORDER — DOXYCYCLINE HYCLATE 100 MG
100 TABLET ORAL 2 TIMES DAILY
Qty: 28 TABLET | Refills: 5 | Status: SHIPPED | OUTPATIENT
Start: 2017-09-27 | End: 2017-10-11

## 2017-09-27 NOTE — PROGRESS NOTES
[facility]  September 27, 2017    Jaye Machado    CF Provider: Jaye Machado MD    Caller: Patient     Clinical information:  Nico Morales called to discuss alternating Cayston with an oral AB.    Plan: Plan per Dr. Machado, alternate Cayston with Doxycycline 100 mg twice daily every 14 days.      Call back with any new or worsening symptoms/concerns.    Caller verbalized understanding of plan and agrees with advice given.

## 2017-09-28 DIAGNOSIS — E84.0 CYSTIC FIBROSIS WITH PULMONARY MANIFESTATIONS (H): Primary | ICD-10-CM

## 2017-09-28 RX ORDER — RIMANTADINE HCL 100 MG
100 TABLET ORAL 2 TIMES DAILY
Qty: 60 TABLET | Refills: 6 | Status: SHIPPED | OUTPATIENT
Start: 2017-09-28 | End: 2018-04-18

## 2017-10-04 LAB
BACTERIA SPEC CULT: NORMAL
SPECIMEN SOURCE: NORMAL

## 2017-10-09 NOTE — PROGRESS NOTES
[facility]  October 11, 2017         Assessment and Plan:   Nico Morales is a 40 year old male with cystic fibrosis with severe obstruction, h/o A. Fumigatus on/off itraconazole for many years, CFRD, malnutrition on chronic enteral feeds, pancreatic exocrine insufficiency and adrenal insufficiency who is seen today in clinic for follow up.        1. CF lung disease: feels back to baseline after stopping gabo nebs at the last clinic follow up. Vesting 2 times/day. Notes that the doxycycline is causing headaches and joint pain and would like to get off the medication. Does note he was on coli nebs many years ago and feels like he did well on them; cannot recall why he stopped the nebs.  PFTs today changed from prior, below his recent best. Previous cultures have grown out MDR Ps. A and recently Stenotrophomonas.   - Continue current nebulizers, Symbicort and vest therapy  - About to start Cayston, would like to stop doxycycline  - Will do test dose of coli in the next 1-2 weeks  - Continue azithromycin  - Recommended to start rimantidine, especially since he is declining and influenza vaccine      2. Eleved alkaline phophatase: since 4/15, however, has been on anti fungal therapy on/off since 2013. Alk phos decreased to 199.      3. Exocrine pancreatic insufficiency with malnutrition on chronic enteral feeds: denies symptoms of malabsorption. Weight improved. Continues with 2 cans of TF at night.  - Continue pancreatic enzymes and vitamin supplementation  - High protein and high calorie diet supplemented with nocturnal tube feeds      4. CFRD: AIC improved 6.1. 0 to 7.0 on 4/10/17. BS in the am in the 90s, 2 hour post prandial 150s.   - Continue Lantus and carb coverage  - F/u with [facility] as scheduled      5. CF related low bone mineral density: Vitamin D, TSH and testosterone WNL, is supposed to start pamidronate, needs to schedule  - Schedule pamidronate  "infusion   - F/u with Endocrine     RTC: in 6 weeks with routine cultures and spirometry  Annual studies due: December 2017 (ordered for next f/u)  Preventative care: colonoscopy during 2017--needs to schedule  Influenza vaccine: declined      Regina Herman PA-C  Pulmonary, Allergy, Critical Care and Sleep Medicine        Interval History:   Had a scratching throat on Monday, feels a litte \"off\" since then. No fever or chills, no sinus pain or congestion. No chest congestion, cough is baseline, no blood. No tightness or wheezing. No GI complaints. Vestint TID.          Review of Systems:   Please see HPI. Otherwise, complete 10 point ROS negative.           Past Medical and Surgical History:     Past Medical History:   Diagnosis Date     CF (cystic fibrosis) (H)      Exocrine pancreatic insufficiency      Nocardia infection      Pseudomonas infection      S/P gastrostomy (H) 2002     Past Surgical History:   Procedure Laterality Date     GASTROSTOMY TUBE  2002           Family History:     Family History   Problem Relation Age of Onset     HEART DISEASE Maternal Grandmother      HEART DISEASE Maternal Grandfather      HEART DISEASE Paternal Grandmother      DIABETES No family hx of             Social History:     Social History     Social History     Marital status:      Spouse name: N/A     Number of children: 0     Years of education: N/A     Occupational History     financial [employer]     Social History Main Topics     Smoking status: Never Smoker     Smokeless tobacco: Never Used     Alcohol use No     Drug use: No     Sexual activity: Yes     Partners: Female     Birth control/ protection: Pill     Other Topics Concern     Blood Transfusions No     Exercise No     Social History Narrative    Kilo lives with wife in a house in [place], [place].  He works as a .        March 2016. He works independently as a . Works out 5x/week and walks the dog daily.        " June 2016. No changes. Describes himself as a creature of habit.            Medications:     Current Outpatient Prescriptions   Medication     aztreonam lysine (CAYSTON) 75 MG SOLR     clotrimazole 10 MG richard     doxycycline (VIBRA-TABS) 100 MG tablet     blood glucose (NO BRAND SPECIFIED) lancets standard     blood glucose monitoring (MARTINA CONTOUR NEXT) test strip     insulin pen needle (BD OMI U/F) 32G X 4 MM     sodium chloride inhalant 7 % NEBU neb solution     acetylcysteine (MUCOMYST) 10 % injection     polyethylene glycol (MIRALAX) powder     insulin glargine (LANTUS SOLOSTAR) 100 UNIT/ML injection     azithromycin (ZITHROMAX) 250 MG tablet     cetirizine (ZYRTEC) 10 MG tablet     budesonide-formoterol (SYMBICORT) 160-4.5 MCG/ACT Inhaler     insulin aspart (NOVOLOG PENFILL) 100 UNIT/ML injection     amitriptyline (ELAVIL) 10 MG tablet     levalbuterol (XOPENEX HFA) 45 MCG/ACT Inhaler     levalbuterol (XOPENEX) 1.25 MG/3ML neb solution     CREON 70371 UNITS CPEP per EC capsule     blood glucose monitoring (MARTINA CONTOUR NEXT MONITOR) meter device kit     blood glucose monitoring (MARTINA MICROLET) lancets     sodium chloride, Inhalant, 0.9 % AERS     [facility]NAGED PATIENT     ACE NOT PRESCRIBED, INTENTIONAL,     Nebulizers (EFLOW SCF NEBULIZER HANDSET) MISC     Calcium Citrate-Vitamin D (CALCIUM CITRATE + D PO)     Ferrous Fumarate-Vitamin C (VITRON-C) 200-125 MG TABS     MEPHYTON 5 MG PO TABS     MULTIVITAMIN OR     VITAMIN D 1000 UNIT PO CAPS     VITAMIN E 400 UNIT PO CAPS     rimantadine (FLUMADINE) 100 MG tablet     oseltamivir (TAMIFLU) 75 MG capsule     No current facility-administered medications for this visit.             Physical Exam:   BP 99/64 (BP Location: Right arm, Patient Position: Chair, Cuff Size: Adult Regular)  Pulse 103  Resp 16  SpO2 95%    GENERAL: alert, NAD  HEENT: NCAT, EOMI, anicteric sclera, no nasal edema or erythema; canals and TMs clear; whitish/yellow  plaque on tongue  Neck: no cervical or supraclavicular adenopathy  Respiratory: good air flow, few scattered crackles in bases  CV: RRR, S1S2, no murmurs noted  Abdomen: normoactive BS, soft and non tender  Lymph: no edema, + digital clubbing  Neuro: AAO X 3, CN 2-12 grossly intact, 5/5 bilateral  strength and dorsiflexion  Psychiatric: normal affect, good eye contact  Skin: no rash, jaundice or lesions on limited exam         Data:   All laboratory and imaging data reviewed.      Cystic Fibrosis Culture  Specimen Description   Date Value Ref Range Status   09/06/2017 Sputum  Final   09/06/2017 Sputum  Final   09/06/2017 Sputum  Final   09/06/2017 Sputum  Final   09/06/2017 Sputum  Final    Culture Micro   Date Value Ref Range Status   09/06/2017 Heavy growth  Normal santi    Final   09/06/2017 (A)  Final    Light growth  Pseudomonas aeruginosa, mucoid strain     09/06/2017   Final    Susceptibility testing requested by  KAMINI Jordan in CF clinic, to Bactrim on anything that grows 9/7/17 SJ     09/06/2017 (A)  Final    Light growth  Strain 2  Pseudomonas aeruginosa, mucoid strain     09/06/2017 No growth after 4 weeks  Final        PFT interpretation:  Maneuver: valid and meets ATS guidelines  Severe obstruction with decreased FEV1/FVC and FEV1  Compared to prior: FEV1 of 1.58 is below his recent best, but stable from last test  The decrease in FVC may represent air trapping v. restrictive physiology.  Lung volumes would be necessary to determine.

## 2017-10-11 ENCOUNTER — OFFICE VISIT (OUTPATIENT)
Dept: PULMONOLOGY | Facility: CLINIC | Age: 42
End: 2017-10-11
Attending: PHYSICIAN ASSISTANT
Payer: COMMERCIAL

## 2017-10-11 VITALS
DIASTOLIC BLOOD PRESSURE: 64 MMHG | OXYGEN SATURATION: 95 % | RESPIRATION RATE: 16 BRPM | HEART RATE: 103 BPM | SYSTOLIC BLOOD PRESSURE: 99 MMHG

## 2017-10-11 DIAGNOSIS — K86.81 EXOCRINE PANCREATIC INSUFFICIENCY: Primary | ICD-10-CM

## 2017-10-11 DIAGNOSIS — E84.0 CYSTIC FIBROSIS WITH PULMONARY MANIFESTATIONS (H): ICD-10-CM

## 2017-10-11 DIAGNOSIS — E84.0 CYSTIC FIBROSIS WITH PULMONARY MANIFESTATIONS (H): Primary | ICD-10-CM

## 2017-10-11 DIAGNOSIS — B37.0 THRUSH: ICD-10-CM

## 2017-10-11 DIAGNOSIS — E08.9 DIABETES MELLITUS DUE TO CYSTIC FIBROSIS (H): ICD-10-CM

## 2017-10-11 DIAGNOSIS — E84.9 DIABETES MELLITUS DUE TO CYSTIC FIBROSIS (H): ICD-10-CM

## 2017-10-11 LAB
EXPTIME-PRE: 15.32 SEC
FEF2575-%PRED-PRE: 14 %
FEF2575-PRE: 0.55 L/SEC
FEF2575-PRED: 3.91 L/SEC
FEFMAX-%PRED-PRE: 63 %
FEFMAX-PRE: 6.23 L/SEC
FEFMAX-PRED: 9.79 L/SEC
FEV1-%PRED-PRE: 39 %
FEV1-PRE: 1.58 L
FEV1FEV6-PRE: 49 %
FEV1FEV6-PRED: 82 %
FEV1FVC-PRE: 43 %
FEV1FVC-PRED: 81 %
FIFMAX-PRE: 4.28 L/SEC
FVC-%PRED-PRE: 74 %
FVC-PRE: 3.69 L
FVC-PRED: 4.95 L

## 2017-10-11 PROCEDURE — 87070 CULTURE OTHR SPECIMN AEROBIC: CPT | Performed by: INTERNAL MEDICINE

## 2017-10-11 PROCEDURE — 87186 SC STD MICRODIL/AGAR DIL: CPT | Performed by: INTERNAL MEDICINE

## 2017-10-11 PROCEDURE — 87077 CULTURE AEROBIC IDENTIFY: CPT | Performed by: INTERNAL MEDICINE

## 2017-10-11 RX ORDER — CLOTRIMAZOLE 10 MG/1
1 LOZENGE ORAL
Qty: 70 EACH | Refills: 0 | Status: SHIPPED | OUTPATIENT
Start: 2017-10-11 | End: 2018-01-21

## 2017-10-11 ASSESSMENT — PAIN SCALES - GENERAL: PAINLEVEL: NO PAIN (0)

## 2017-10-11 NOTE — PATIENT INSTRUCTIONS
Cystic Fibrosis Self-Care Plan       Patient: Nico Morales   MRN: [id number]   Clinic Date: October 11, 2017     RECOMMENDATIONS:  1. Continue nebulizers and vest therapy.   2. Take rimantidine twice/day X 2 weeks to see if you tolerate it. If so, stay on it!  3. We will call you to get into the infusion center for coli nebs. If tolerating, we will alternate 2 weeks on/off coli and Cayston.     Annual Studies:   IGG   Date Value Ref Range Status   12/15/2016 1770 (H) 695 - 1620 mg/dL Final     Insulin   Date Value Ref Range Status   07/26/2011 30 mU/L Final     Comment:     Reference Range:  0-20  +120     There are no preventive care reminders to display for this patient.    Pulmonary Function Tests  FEV1: amount of air you can blow out in 1 second  FVC: total amount of air you can take in and blow out    Your Goals:         PFT Latest Ref Rng & Units 10/11/2017   FVC L 3.69   FEV1 L 1.58   FVC% % 74   FEV1% % 39          Airway Clearance: The Most Important Way to Keep Your Lungs Healthy  Vest Settings:    Hill-Rom Frequencies: 8, 9, 10 Pressure 10 Then, Frequencies 18, 19, 20 Pressure 6      RespirTech: Quick Start with Pressure of     Do each frequency for 5 minutes; Deflate vest after each frequency & cough 3 times before beginning the next setting.    Vest and Neb Therapy should be done 2-3 times/day.    Good Nutrition Can Improve Lung Function and Overall Health     Take ALL of your vitamins with food     Take 1/2 of your enzymes before EVERY meal/snack and the other 1/2 mid-meal/snack    Wt Readings from Last 3 Encounters:   09/06/17 62.1 kg (137 lb)   08/30/17 62.5 kg (137 lb 12.6 oz)   08/14/17 61.2 kg (135 lb)       There is no height or weight on file to calculate BMI.         National CF Foundation Recommendations for BMI in CF Adults: Women: at least 22 Men: at least 23        Controlling Blood Sugars Helps Prevent Lung Infections & Improves Nutrition  Test blood sugar:     In the morning  before eating (goal is )     2 hours after a meal (goal is less than 150)     When pre-meal glucose is greater than 150 add correction     At bedtime (if less than 100 eat a snack with 15 grams of carbohydrates  Last A1C Results:   Hemoglobin A1C   Date Value Ref Range Status   08/14/2017 6.1 % Final         If diabetic, measure A1C every 6 months. Goal is under 7%.    Staying Healthy    Research: If you are interested in learning about research opportunities or have questions, please contact Temi Dodge at [phone] or [email or web address].       Foundation: [employer] is a personalized resource service to help you with the insurance, financial, legal and other issues you are facing.  It's free, confidential and available to anyone with CF.  Ask your  for more information or contact [employer] directly at [address] ([phone]) or compass@cff.org, or learn more at cff.org/compass.       CF Nurse Line:  Jose David Henderson: [phone]   Felicitas Banuelos, RT: [phone]     Ivory Bonilla and Zeina Pierre , Dieticians: [phone]   Laura Gauthier, Diabetes Nurse: [phone]    Lety Goldne: [phone] or Marisa Urbina [phone], Social Workers   [email or web address]

## 2017-10-11 NOTE — MR AVS SNAPSHOT
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 Continue nebulizers and vest therapy.   2. Take rimantidine twice/day X 2 weeks to see if you tolerate it. If so, stay on it!  3. We will call you to get into the infusion center for coli nebs. If tolerating, we will alternate 2 weeks on/off coli and Cayston.     Annual Studies:   IGG   Date Value Ref Range Status   12/15/2016 1770 (H) 695 - 1620 mg/dL Final     Insulin   Date Value Ref Range Status   07/26/2011 30 mU/L Final     Comment:     Reference Range:  0-20  +120     There are no preventive care reminders to display for this patient.    Pulmonary Function Tests  FEV1: amount of air you can blow out in 1 second  FVC: total amount of air you can take in and blow out    Your Goals:         PFT Latest Ref Rng & Units 10/11/2017   FVC L 3.69   FEV1 L 1.58   FVC% % 74   FEV1% % 39          Airway Clearance: The Most Important Way to Keep Your Lungs Healthy  Vest Settings:    Hill-Rom Frequencies: 8, 9, 10 Pressure 10 Then, Frequencies 18, 19, 20 Pressure 6      RespirTech: Quick Start with Pressure of     Do each frequency for 5 minutes; Deflate vest after each frequency & cough 3 times before beginning the next setting.    Vest and Neb Therapy should be done 2-3 times/day.    Good Nutrition Can Improve Lung Function and Overall Health     Take ALL of your vitamins with  food     Take 1/2 of your enzymes before EVERY meal/snack and the other 1/2 mid-meal/snack    Wt Readings from Last 3 Encounters:   09/06/17 62.1 kg (137 lb)   08/30/17 62.5 kg (137 lb 12.6 oz)   08/14/17 61.2 kg (135 lb)       There is no height or weight on file to calculate BMI.         National CF Foundation Recommendations for BMI in CF Adults: Women: at least 22 Men: at least 23        Controlling Blood Sugars Helps Prevent Lung Infections & Improves Nutrition  Test blood sugar:     In the morning before eating (goal is )     2 hours after a meal (goal is less than 150)     When pre-meal glucose is greater than 150 add correction     At bedtime (if less than 100 eat a snack with 15 grams of carbohydrates  Last A1C Results:   Hemoglobin A1C   Date Value Ref Range Status   08/14/2017 6.1 % Final         If diabetic, measure A1C every 6 months. Goal is under 7%.    Staying Healthy    Research: If you are interested in learning about research opportunities or have questions, please contact Temi Dodge at [phone] or [email or web address].      CF Foundation: Compass is a personalized resource service to help you with the insurance, financial, legal and other issues you are facing.  It's free, confidential and available to anyone with CF.  Ask your  for more information or contact Compass directly at [facility] ([phone]) or compass@cff.org, or learn more at [email or web address].[email or web address].       CF Nurse Line:  Jose David [place]: [phone]   Felicitas Banuelos, RT: [phone]     Ivory Bonilla and Zeina Pierre , Dieticians: [phone]   Laura Gauthier, Diabetes Nurse: [phone]    Lety Golden: [phone] or Marisa Urbina [phone], Social Workers   [email or web address]                          Follow-ups after your visit        Follow-up notes from your care team     Return in about 6 weeks (around 11/22/2017).      Your next 10 appointments already scheduled     Mar 05, 2018 10:15  AM CST   Return Visit with Diana Lee MD, [facility] ENDO NURSE   [facility] ([facility])    [address] [address] [place] [id number] [phone]              Future tests that were ordered for you today     Open Future Orders        Priority Expected Expires Ordered    CBC with platelets differential Routine 11/15/2017 11/29/2017 10/11/2017    Erythrocyte sedimentation rate auto Routine 11/15/2017 11/29/2017 10/11/2017    Routine UA with microscopic Routine 11/15/2017 11/29/2017 10/11/2017    Albumin Random Urine Quantitative with Creat Ratio Routine 11/15/2017 11/29/2017 10/11/2017    Nocardia culture Routine 11/15/2017 11/29/2017 10/11/2017    Fungus Culture, non-blood Routine 11/15/2017 11/29/2017 10/11/2017    AFB Stain Non Blood Routine 11/15/2017 11/29/2017 10/11/2017    AFB Culture Non Blood Routine 11/15/2017 11/29/2017 10/11/2017    Cystic Fibrosis Culture Aerob Bacterial Routine 11/15/2017 11/29/2017 10/11/2017    Spirometry, Breathing Capacity Routine 11/15/2017 11/29/2017 10/11/2017    X-ray Chest 2 vws* Routine 11/15/2017 11/29/2017 10/11/2017    ALT Routine 11/15/2017 11/29/2017 10/11/2017    Basic metabolic panel Routine 11/15/2017 11/29/2017 10/11/2017    Lipid Profile Routine 11/15/2017 11/29/2017 10/11/2017    Albumin level Routine 11/15/2017 11/29/2017 10/11/2017    Alkaline phosphatase Routine 11/15/2017 11/29/2017 10/11/2017    AST Routine 11/15/2017 11/29/2017 10/11/2017    Iron Routine 11/15/2017 11/29/2017 10/11/2017    GGT Routine 11/15/2017 11/29/2017 10/11/2017    Hemoglobin A1c Routine 11/15/2017 11/29/2017 10/11/2017    IgA Routine 11/15/2017 11/29/2017 10/11/2017    IgG Routine 11/15/2017 11/29/2017 10/11/2017    IgM Routine 11/15/2017 11/29/2017 10/11/2017    IgE Routine 11/15/2017 11/29/2017 10/11/2017    INR Routine 11/15/2017 11/29/2017 10/11/2017    Magnesium Routine 11/15/2017 11/29/2017 10/11/2017    Phosphorus Routine 11/15/2017  11/29/2017 10/11/2017    Testosterone total  Routine 11/15/2017 11/29/2017 10/11/2017    Protein total Routine 11/15/2017 11/29/2017 10/11/2017    TSH with free T4 reflex Routine 11/15/2017 11/29/2017 10/11/2017    Vitamin A Routine 11/15/2017 11/29/2017 10/11/2017    Vitamin E Routine 11/15/2017 11/29/2017 10/11/2017    Vitamin D Deficiency Routine 11/15/2017 11/29/2017 10/11/2017            Who to contact     If you have questions or need follow up information about today's clinic visit or your schedule please contact [facility] directly at [phone].  Normal or non-critical lab and imaging results will be communicated to you by Havsjo Delikatesserhart, letter or phone within 4 business days after the clinic has received the results. If you do not hear from us within 7 days, please contact the clinic through Havsjo Delikatesserhart or phone. If you have a critical or abnormal lab result, we will notify you by phone as soon as possible.  Submit refill requests through trivago or call your pharmacy and they will forward the refill request to us. Please allow 3 business days for your refill to be completed.          Additional Information About Your Visit        trivago Information     trivago gives you secure access to your electronic health record. If you see a primary care provider, you can also send messages to your care team and make appointments. If you have questions, please call your primary care clinic.  If you do not have a primary care provider, please call [phone] and they will assist you.        Care EveryWhere ID     This is your Care EveryWhere ID. This could be used by other organizations to access your [place] medical records  [id number]        Your Vitals Were     Pulse Respirations Pulse Oximetry             103 16 95%          Blood Pressure from Last 3 Encounters:   10/11/17 99/64   09/06/17 100/68   08/30/17 113/78    Weight from Last 3 Encounters:   09/06/17 62.1 kg (137 lb)    08/30/17 62.5 kg (137 lb 12.6 oz)   08/14/17 61.2 kg (135 lb)                 Today's Medication Changes          These changes are accurate as of: 10/11/17 12:43 PM.  If you have any questions, ask your nurse or doctor.               These medicines have changed or have updated prescriptions.        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 Please contact your care team if you have questions.     sulfamethoxazole-trimethoprim 800-160 MG per tablet   Commonly known as:  BACTRIM DS/SEPTRA DS   Stopped by:  Regina Herman PA-C                    Primary Care Provider Office Phone # Fax #    Jaye Maki Machado [phone]       [address] [place] [address]        Equal Access to Services     TOÑA RAMIREZ AH: Hadii favio willamso [place], waaxda luqadaha, qaybta kaalmada ademagoda, joe mack. So [facility] [phone].    ATENCIÓN: Si habla español, tiene a monroe disposición servicios gratuitos de asistencia lingüística. Llame al [phone].    We comply with applicable federal civil rights laws and [place] laws. We do not discriminate on the basis of race, color, national origin, age, disability, sex, sexual orientation, or gender identity.            Thank you!     Thank you for choosing [facility]  for your care. Our goal is always to provide you with excellent care. Hearing back from our patients is one way we can continue to improve our services. Please take a few minutes to  complete the written survey that you may receive in the mail after your visit with us. Thank you!             Your Updated Medication List - Protect others around you: Learn how to safely use, store and throw away your medicines at www.disposemymeds.org.          This list is accurate as of: 10/11/17 12:43 PM.  Always use your most recent med list.                   Brand Name Dispense Instructions for use Diagnosis    ACE NOT PRESCRIBED (INTENTIONAL)     0 each    1 each continuous prn. ACE Inhibitor not prescribed due to Risk for drug interaction    Type I (juvenile type) diabetes mellitus without mention of complication, not stated as uncontrolled       acetylcysteine 10 % injection    MUCOMYST    480 mL    Nebulize 4ml qid    Cystic fibrosis with pulmonary manifestations (H)       amitriptyline 10 MG tablet    ELAVIL    90 tablet    Take 1 tablet (10 mg) by mouth At Bedtime Take one to three tablets at bedtime.    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    Diabetes mellitus related to CF (cystic fibrosis) (H)       blood glucose monitoring meter device kit     1 kit    Use to test blood sugar 6 times daily or as directed.    Diabetes mellitus related to CF (cystic fibrosis) (H)       blood glucose monitoring test strip    MARTINA CONTOUR NEXT    200 each    Use  "to test blood sugar 6 times daily or as directed.    Diabetes mellitus related to CF (cystic fibrosis) (H)       budesonide-formoterol 160-4.5 MCG/ACT Inhaler    SYMBICORT    1 Inhaler    Inhale 2 puffs into the lungs 2 times daily    Cystic fibrosis of the lung (H), Cystic fibrosis (H)       CALCIUM CITRATE + D PO      Take 1 tablet by mouth 2 times daily.    Nocardia infection, Cystic fibrosis with pulmonary manifestations (H)       cetirizine 10 MG tablet    zyrTEC    30 tablet    Take 1 tablet (10 mg) by mouth every evening    Cystic fibrosis (H)       CREON 42325-50536 UNITS Cpep per EC capsule   Generic drug:  amylase-lipase-protease     2700 capsule    Take 7-8 caps with meals (3 meals/day) and 2 caps with snacks (3 snacks/day)    Cystic fibrosis with pulmonary manifestations (H)       doxycycline 100 MG tablet    VIBRA-TABS    28 tablet    Take 1 tablet (100 mg) by mouth 2 times daily for 14 days Alternate every 14 days with Cay[place]. 14 days on, 14 days off.    CF (cystic fibrosis) (H)       EFLOW SCF NEBULIZER HANDSET Misc     1 each    1 each 3 times daily.    Nocardia infection       [facility] HOME INFUSION MANAGED PATIENT      Contact [facility] for patient specific medication information at [phone] on admission and discharge from the hospital.  Phones are answered 24 hours a day 7 days a week 365 days a year.  Providers - Choose \"CONTINUE HOME MED (no script)\" at discharge if patient treatment with home infusion will continue.        insulin aspart 100 UNIT/ML injection    NovoLOG PENFILL    15 mL    1 per 20 grams of carbohydrate at lunch and supper. Also use 3 units of Novolog at night with tube feeding.    Diabetes mellitus related to CF (cystic fibrosis) (H)       insulin glargine 100 UNIT/ML injection    LANTUS SOLOSTAR    15 mL    6 units daily    Diabetes mellitus related to CF (cystic fibrosis) (H)       insulin pen needle 32G X 4 MM    BD OMI U/F    200 each    Use 6 daily or " as directed.    Diabetes mellitus related to CF (cystic fibrosis) (H)       levalbuterol 1.25 MG/3ML neb solution    XOPENEX    360 mL    Take 3 mLs (1.25 mg) by nebulization 4 times daily        levalbuterol 45 MCG/ACT Inhaler    XOPENEX HFA    3 Inhaler    Inhale 2 puffs into the lungs every 6 hours as needed for shortness of breath / dyspnea    Cystic fibrosis (H)       MEPHYTON 5 MG tablet   Generic drug:  phytonadione      1 TABLET DAILY        MULTIVITAMIN PO      1 tablet daily        oseltamivir 75 MG capsule    TAMIFLU    10 capsule    Take 1 capsule (75 mg) by mouth 2 times daily    Cystic fibrosis with pulmonary manifestations (H)       polyethylene glycol powder    MIRALAX    510 g    Use 17 gms po 1-3 times daily as needed.    Cystic fibrosis with meconium ileus (H)       rimantadine 100 MG tablet    FLUMADINE    60 tablet    Take 1 tablet (100 mg) by mouth 2 times daily Take during flu season Oct 1st-March 31st    Cystic fibrosis with pulmonary manifestations (H)       * sodium chloride (Inhalant) 0.9 % Aers     270 mL    Inhale 3 mLs into the lungs 3 times daily    Cystic fibrosis with pulmonary manifestations (H)       * sodium chloride inhalant 7 % Nebu neb solution     240 mL    NEBULIZE 4ML TWICE DAILY    Cystic fibrosis with pulmonary manifestations (H)       vitamin D 1000 UNITS capsule      1 CAPSULE DAILY        vitamin E 400 UNIT capsule      1 CAPSULE DAILY        VITRON-C 200-125 MG Tabs   Generic drug:  ferrous fumarate 65 mg (Sun'aq. FE)-Vitamin C 125 mg      Take 1 tablet by mouth 2 times daily.    Nocardia infection, Cystic fibrosis with pulmonary manifestations (H)       * Notice:  This list has 2 medication(s) that are the same as other medications prescribed for you. Read the directions carefully, and ask your doctor or other care provider to review them with you.

## 2017-10-11 NOTE — LETTER
10/11/2017       RE: Nico Morales  [address]  [place] [address]     Dear Colleague,    Thank you for referring your patient, Nico Morales, to the [facility] at [facility]. Please see a copy of my visit note below.    [facility]  October 11, 2017         Assessment and Plan:   Nico Morales is a 40 year old male with cystic fibrosis with severe obstruction, h/o A. Fumigatus on/off itraconazole for many years, CFRD, malnutrition on chronic enteral feeds, pancreatic exocrine insufficiency and adrenal insufficiency who is seen today in clinic for follow up.        1. CF lung disease: feels back to baseline after stopping gabo nebs at the last clinic follow up. Vesting 2 times/day. Notes that the doxycycline is causing headaches and joint pain and would like to get off the medication. Does note he was on coli nebs many years ago and feels like he did well on them; cannot recall why he stopped the nebs.  PFTs today changed from prior, below his recent best. Previous cultures have grown out MDR Ps. A and recently Stenotrophomonas.   - Continue current nebulizers, Symbicort and vest therapy  - About to start Cayston, would like to stop doxycycline  - Will do test dose of coli in the next 1-2 weeks  - Continue azithromycin  - Recommended to start rimantidine, especially since he is declining and influenza vaccine      2. Eleved alkaline phophatase: since 4/15, however, has been on anti fungal therapy on/off since 2013. Alk phos decreased to 199.      3. Exocrine pancreatic insufficiency with malnutrition on chronic enteral feeds: denies symptoms of malabsorption. Weight improved. Continues with 2 cans of TF at night.  - Continue pancreatic enzymes and vitamin supplementation  - High protein and high calorie diet supplemented with nocturnal tube feeds      4. CFRD:  "AIC improved 6.1. 0 to 7.0 on 4/10/17. BS in the am in the 90s, 2 hour post prandial 150s.   - Continue Lantus and carb coverage  - F/u with [facility] as scheduled      5. CF related low bone mineral density: Vitamin D, TSH and testosterone WNL, is supposed to start pamidronate, needs to schedule  - Schedule pamidronate infusion   - F/u with Endocrine     RTC: in 6 weeks with routine cultures and spirometry  Annual studies due: December 2017 (ordered for next f/u)  Preventative care: colonoscopy during 2017--needs to schedule  Influenza vaccine: declined      Regina Herman PA-C  Pulmonary, Allergy, Critical Care and Sleep Medicine        Interval History:   Had a scratching throat on Monday, feels a litte \"off\" since then. No fever or chills, no sinus pain or congestion. No chest congestion, cough is baseline, no blood. No tightness or wheezing. No GI complaints. Vestint TID.          Review of Systems:   Please see HPI. Otherwise, complete 10 point ROS negative.           Past Medical and Surgical History:     Past Medical History:   Diagnosis Date     CF (cystic fibrosis) (H)      Exocrine pancreatic insufficiency      Nocardia infection      Pseudomonas infection      S/P gastrostomy (H) 2002     Past Surgical History:   Procedure Laterality Date     GASTROSTOMY TUBE  2002           Family History:     Family History   Problem Relation Age of Onset     HEART DISEASE Maternal Grandmother      HEART DISEASE Maternal Grandfather      HEART DISEASE Paternal Grandmother      DIABETES No family hx of             Social History:     Social History     Social History     Marital status:      Spouse name: N/A     Number of children: 0     Years of education: N/A     Occupational History     financial [employer]     Social History Main Topics     Smoking status: Never Smoker     Smokeless tobacco: Never Used     Alcohol use No     Drug use: No     Sexual activity: Yes     Partners: Female     Birth " control/ protection: Pill     Other Topics Concern     Blood Transfusions No     Exercise No     Social History Narrative    Kilo lives with wife in a house in [place], [place].  He works as a .        March 2016. He works independently as a . Works out 5x/week and walks the dog daily.        June 2016. No changes. Describes himself as a creature of habit.            Medications:     Current Outpatient Prescriptions   Medication     aztreonam lysine (CAYSTON) 75 MG SOLR     clotrimazole 10 MG richard     doxycycline (VIBRA-TABS) 100 MG tablet     blood glucose (NO BRAND SPECIFIED) lancets standard     blood glucose monitoring (MARTINA CONTOUR NEXT) test strip     insulin pen needle (BD OMI U/F) 32G X 4 MM     sodium chloride inhalant 7 % NEBU neb solution     acetylcysteine (MUCOMYST) 10 % injection     polyethylene glycol (MIRALAX) powder     insulin glargine (LANTUS SOLOSTAR) 100 UNIT/ML injection     azithromycin (ZITHROMAX) 250 MG tablet     cetirizine (ZYRTEC) 10 MG tablet     budesonide-formoterol (SYMBICORT) 160-4.5 MCG/ACT Inhaler     insulin aspart (NOVOLOG PENFILL) 100 UNIT/ML injection     amitriptyline (ELAVIL) 10 MG tablet     levalbuterol (XOPENEX HFA) 45 MCG/ACT Inhaler     levalbuterol (XOPENEX) 1.25 MG/3ML neb solution     CREON 38320 UNITS CPEP per EC capsule     blood glucose monitoring (MARTINA CONTOUR NEXT MONITOR) meter device kit     blood glucose monitoring (MARTINA MICROLET) lancets     sodium chloride, Inhalant, 0.9 % AERS     [facility] HOME INFUSION MANAGED PATIENT     ACE NOT PRESCRIBED, INTENTIONAL,     Nebulizers (EFLOW SCF NEBULIZER HANDSET) MISC     Calcium Citrate-Vitamin D (CALCIUM CITRATE + D PO)     Ferrous Fumarate-Vitamin C (VITRON-C) 200-125 MG TABS     MEPHYTON 5 MG PO TABS     MULTIVITAMIN OR     VITAMIN D 1000 UNIT PO CAPS     VITAMIN E 400 UNIT PO CAPS     rimantadine (FLUMADINE) 100 MG tablet     oseltamivir (TAMIFLU) 75 MG capsule     No current  facility-administered medications for this visit.             Physical Exam:   BP 99/64 (BP Location: Right arm, Patient Position: Chair, Cuff Size: Adult Regular)  Pulse 103  Resp 16  SpO2 95%    GENERAL: alert, NAD  HEENT: NCAT, EOMI, anicteric sclera, no nasal edema or erythema; canals and TMs clear; whitish/yellow plaque on tongue  Neck: no cervical or supraclavicular adenopathy  Respiratory: good air flow, few scattered crackles in bases  CV: RRR, S1S2, no murmurs noted  Abdomen: normoactive BS, soft and non tender  Lymph: no edema, + digital clubbing  Neuro: AAO X 3, CN 2-12 grossly intact, 5/5 bilateral  strength and dorsiflexion  Psychiatric: normal affect, good eye contact  Skin: no rash, jaundice or lesions on limited exam         Data:   All laboratory and imaging data reviewed.      Cystic Fibrosis Culture  Specimen Description   Date Value Ref Range Status   09/06/2017 Sputum  Final   09/06/2017 Sputum  Final   09/06/2017 Sputum  Final   09/06/2017 Sputum  Final   09/06/2017 Sputum  Final    Culture Micro   Date Value Ref Range Status   09/06/2017 Heavy growth  Normal santi    Final   09/06/2017 (A)  Final    Light growth  Pseudomonas aeruginosa, mucoid strain     09/06/2017   Final    Susceptibility testing requested by  KAMINI Jordan in CF clinic, to Bactrim on anything that grows 9/7/17 SJ     09/06/2017 (A)  Final    Light growth  Strain 2  Pseudomonas aeruginosa, mucoid strain     09/06/2017 No growth after 4 weeks  Final        PFT interpretation:  Maneuver: valid and meets ATS guidelines  Severe obstruction with decreased FEV1/FVC and FEV1  Compared to prior: FEV1 of 1.58 is below his recent best, but stable from last test  The decrease in FVC may represent air trapping v. restrictive physiology.  Lung volumes would be necessary to determine.    Again, thank you for allowing me to participate in the care of your patient.      Sincerely,    Regina Herman PA-C

## 2017-10-16 LAB
BACTERIA SPEC CULT: ABNORMAL
BACTERIA SPEC CULT: ABNORMAL
SPECIMEN SOURCE: ABNORMAL

## 2017-10-27 ENCOUNTER — TELEPHONE (OUTPATIENT)
Dept: PULMONOLOGY | Facility: CLINIC | Age: 42
End: 2017-10-27

## 2017-10-27 DIAGNOSIS — E84.0 CYSTIC FIBROSIS WITH PULMONARY MANIFESTATIONS (H): Primary | ICD-10-CM

## 2017-10-27 RX ORDER — SULFAMETHOXAZOLE/TRIMETHOPRIM 800-160 MG
1 TABLET ORAL 2 TIMES DAILY
Qty: 42 TABLET | Refills: 0 | Status: CANCELLED | COMMUNITY
Start: 2017-10-27

## 2017-10-27 NOTE — TELEPHONE ENCOUNTER
[facility]  October 27, 2017    Jaye Machado    CF Provider: Jaye Machado MD    Caller: Patient     Clinical information:  Nico Morales called to report that he has completed a course of Cayston. Restarted Doxycycline because he has not received his shipment of Coli yet, however, the Doxycycline has been giving him headaches. Wondering if it would be possible to get Bactrim until his Coli arrives.    Plan:   Discussed with Dr Machado:  Bactrim DS, one BID x21 days. Script called to pharmacy in [place].  Call back with any new or worsening symptoms/concerns.    Caller verbalized understanding of plan and agrees with advice given.

## 2017-10-30 ENCOUNTER — OFFICE VISIT (OUTPATIENT)
Dept: NURSING | Facility: CLINIC | Age: 42
End: 2017-10-30
Payer: COMMERCIAL

## 2017-10-30 DIAGNOSIS — E84.9 CF (CYSTIC FIBROSIS) (H): Primary | ICD-10-CM

## 2017-10-30 PROCEDURE — 94640 AIRWAY INHALATION TREATMENT: CPT

## 2017-10-30 PROCEDURE — 99207 ZZC DROP WITH A PROCEDURE: CPT

## 2017-10-30 NOTE — MR AVS SNAPSHOT
After Visit Summary   10/30/2017    Nico Morales    MRN: [id number]           Patient Information     Date Of Birth          1975        Visit Information        Provider Department      10/30/2017 2:00 PM PFT LAB [facility]        Today's Diagnoses     CF (cystic fibrosis) (H)    -  1       Follow-ups after your visit        Your next 10 appointments already scheduled     Nov 22, 2017 10:00 AM CST   CF LOOP with UC PFL CF   [facility] Pulmonary Function Testing ([facility])    [address] [place] [phone]   [phone]            Nov 22, 2017 10:30 AM CST   (Arrive by 10:15 AM)   RETURN CYSTIC FIBROSIS VISIT with Jaye Machado MD   [facility] ([facility])    [address] [place] [id number]   [phone]            Mar 05, 2018 10:15 AM CST   Return Visit with Diana Lee MD, MG ENDO NURSE   [facility] ([facility])    [address] [place] [id number] [phone]              Who to contact     If you have questions or need follow up information about today's clinic visit or your schedule please contact [facility] directly at [phone].  Normal or non-critical lab and imaging results will be communicated to you by MyChart, letter or phone within 4 business days after the clinic has received the results. If you do not hear from us within 7 days, please contact the clinic through MyChart or phone. If you have a critical or abnormal lab result, we will notify you by phone as soon as possible.  Submit refill requests through HotGrinds or call your pharmacy and they will forward the refill request to us. Please allow 3 business days for your refill to be completed.          Additional Information About Your Visit        MyChart Information     KitOrdert  gives you secure access to your electronic health record. If you see a primary care provider, you can also send messages to your care team and make appointments. If you have questions, please call your primary care clinic.  If you do not have a primary care provider, please call [phone] and they will assist you.      RF Code is an electronic gateway that provides easy, online access to your medical records. With RF Code, you can request a clinic appointment, read your test results, renew a prescription or communicate with your care team.     To access your existing account, please contact your [facility] or call [phone] for assistance.        Care EveryWhere ID     This is your Care EveryWhere ID. This could be used by other organizations to access your [facility] records  [id number]         Blood Pressure from Last 3 Encounters:   10/11/17 99/64   09/06/17 100/68   08/30/17 113/78    Weight from Last 3 Encounters:   09/06/17 62.1 kg (137 lb)   08/30/17 62.5 kg (137 lb 12.6 oz)   08/14/17 61.2 kg (135 lb)              We Performed the Following     INHALATION/NEBULIZER TREATMENT, INITIAL        Primary Care Provider Office Phone # Fax #    Jaye Machado [phone]       [address] [place] [id number]        Equal Access to Services     TOÑA RAMIREZ : Hadii aad ku hadasho Soomaali, waaxda luqadaha, qaybta kaalmada adeegyada, joe mack. So [facility] [phone].    ATENCIÓN: Si habla español, tiene a monroe disposición servicios gratuitos de asistencia lingüística. Llame al [phone].    We comply with applicable federal civil rights laws and [place] laws. We do not discriminate on the basis of race, color, national origin, age, disability, sex, sexual orientation, or gender identity.            Thank you!     Thank you for choosing [facility]  for your care. Our goal is always  to provide you with excellent care. Hearing back from our patients is one way we can continue to improve our services. Please take a few minutes to complete the written survey that you may receive in the mail after your visit with us. Thank you!             Your Updated Medication List - Protect others around you: Learn how to safely use, store and throw away your medicines at www.disposemymeds.org.          This list is accurate as of: 10/30/17  3:18 PM.  Always use your most recent med list.                   Brand Name Dispense Instructions for use Diagnosis    ACE NOT PRESCRIBED (INTENTIONAL)     0 each    1 each continuous prn. ACE Inhibitor not prescribed due to Risk for drug interaction    Type I (juvenile type) diabetes mellitus without mention of complication, not stated as uncontrolled       acetylcysteine 10 % injection    MUCOMYST    480 mL    Nebulize 4ml qid    Cystic fibrosis with pulmonary manifestations (H)       amitriptyline 10 MG tablet    ELAVIL    90 tablet    Take 1 tablet (10 mg) by mouth At Bedtime Take one to three tablets at bedtime.    Cystic fibrosis (H)       azithromycin 250 MG tablet    ZITHROMAX    30 tablet    Take 1 tablet (250 mg) by mouth daily    Cystic fibrosis with pulmonary manifestations (H)       aztreonam lysine 75 MG Solr    CAYSTON    84 mL    Take 1 mL (75 mg) by nebulization 3 times daily    Cystic fibrosis with pulmonary manifestations (H), Exocrine pancreatic insufficiency, 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)       blood glucose lancets standard    no brand specified    540 each    Whatever Lancets that go with device, Test 6 times daily    Diabetes mellitus related to CF (cystic fibrosis) (H)       blood glucose monitoring lancets     2 Box    Use to test blood sugar 6 times daily or as directed.    Diabetes mellitus related to CF (cystic fibrosis) (H)       blood glucose monitoring meter device kit     1 kit    Use to test blood sugar 6 times daily or as  "directed.    Diabetes mellitus related to CF (cystic fibrosis) (H)       blood glucose monitoring test strip    MARTINA CONTOUR NEXT    200 each    Use to test blood sugar 6 times daily or as directed.    Diabetes mellitus related to CF (cystic fibrosis) (H)       budesonide-formoterol 160-4.5 MCG/ACT Inhaler    SYMBICORT    1 Inhaler    Inhale 2 puffs into the lungs 2 times daily    Cystic fibrosis of the lung (H), Cystic fibrosis (H)       CALCIUM CITRATE + D PO      Take 1 tablet by mouth 2 times daily.    Nocardia infection, Cystic fibrosis with pulmonary manifestations (H)       cetirizine 10 MG tablet    zyrTEC    30 tablet    Take 1 tablet (10 mg) by mouth every evening    Cystic fibrosis (H)       clotrimazole 10 MG megan     70 each    Take 1 Megan (10 mg) by mouth 5 times daily    Thrush       CREON [address]1709-[address] UNITS Cpep per EC capsule   Generic drug:  amylase-lipase-protease     2700 capsule    Take 7-8 caps with meals (3 meals/day) and 2 caps with snacks (3 snacks/day)    Cystic fibrosis with pulmonary manifestations (H)       EFLOW SCF NEBULIZER HANDSET Misc     1 each    1 each 3 times daily.    Nocardia infection       [facility] MANAGED PATIENT      Contact [facility] for patient specific medication information at [phone] on admission and discharge from the hospital.  Phones are answered 24 hours a day 7 days a week 365 days a year.  Providers - Choose \"CONTINUE HOME MED (no script)\" at discharge if patient treatment with home infusion will continue.        insulin aspart 100 UNIT/ML injection    NovoLOG PENFILL    15 mL    1 per 20 grams of carbohydrate at lunch and supper. Also use 3 units of Novolog at night with tube feeding.    Diabetes mellitus related to CF (cystic fibrosis) (H)       insulin glargine 100 UNIT/ML injection    LANTUS SOLOSTAR    15 mL    6 units daily    Diabetes mellitus related to CF (cystic fibrosis) (H)       insulin pen needle 32G X 4 MM    " BD OMI U/F    200 each    Use 6 daily or as directed.    Diabetes mellitus related to CF (cystic fibrosis) (H)       levalbuterol 1.25 MG/3ML neb solution    XOPENEX    360 mL    Take 3 mLs (1.25 mg) by nebulization 4 times daily        levalbuterol 45 MCG/ACT Inhaler    XOPENEX HFA    3 Inhaler    Inhale 2 puffs into the lungs every 6 hours as needed for shortness of breath / dyspnea    Cystic fibrosis (H)       MEPHYTON 5 MG tablet   Generic drug:  phytonadione      1 TABLET DAILY        MULTIVITAMIN PO      1 tablet daily        oseltamivir 75 MG capsule    TAMIFLU    10 capsule    Take 1 capsule (75 mg) by mouth 2 times daily    Cystic fibrosis with pulmonary manifestations (H)       polyethylene glycol powder    MIRALAX    510 g    Use 17 gms po 1-3 times daily as needed.    Cystic fibrosis with meconium ileus (H)       rimantadine 100 MG tablet    FLUMADINE    60 tablet    Take 1 tablet (100 mg) by mouth 2 times daily Take during flu season Oct 1st-March 31st    Cystic fibrosis with pulmonary manifestations (H)       * sodium chloride (Inhalant) 0.9 % Aers     270 mL    Inhale 3 mLs into the lungs 3 times daily    Cystic fibrosis with pulmonary manifestations (H)       * sodium chloride inhalant 7 % Nebu neb solution     240 mL    NEBULIZE 4ML TWICE DAILY    Cystic fibrosis with pulmonary manifestations (H)       vitamin D 1000 UNITS capsule      1 CAPSULE DAILY        vitamin E 400 UNIT capsule      1 CAPSULE DAILY        VITRON-C 200-125 MG Tabs   Generic drug:  ferrous fumarate 65 mg ([place]. FE)-Vitamin C 125 mg      Take 1 tablet by mouth 2 times daily.    Nocardia infection, Cystic fibrosis with pulmonary manifestations (H)       * Notice:  This list has 2 medication(s) that are the same as other medications prescribed for you. Read the directions carefully, and ask your doctor or other care provider to review them with you.

## 2017-10-30 NOTE — PROGRESS NOTES
Nebulized Colistin Note:    VS prior to Neb: /67, HR 62, SPO2 97% on RA, BS: some crackles L > R, Good air entry throughout.   Albuterol neb, unit dose, (NDC # 0003-0104-44) administered first with hand held neb.  Then, administered 150 mg Colistin (NDC# [address]-4) mixed with 4 ml Sterile Water via a Nidiana Neb.   VS after neb: /79, HR 86, SPO2 97% on RA, BS similar to prior to Colistin neb.  Patient had large cough production of green thick mucus during treatment stating that this is normal for him.   No adverse affects noted at this time.

## 2017-11-01 LAB
MYCOBACTERIUM SPEC CULT: NORMAL
MYCOBACTERIUM SPEC CULT: NORMAL
SPECIMEN SOURCE: NORMAL

## 2017-11-03 DIAGNOSIS — E84.0 CYSTIC FIBROSIS WITH PULMONARY MANIFESTATIONS (H): Primary | ICD-10-CM

## 2017-11-06 DIAGNOSIS — E08.9 DIABETES MELLITUS DUE TO CYSTIC FIBROSIS (H): ICD-10-CM

## 2017-11-06 DIAGNOSIS — E84.0 CYSTIC FIBROSIS WITH PULMONARY MANIFESTATIONS (H): ICD-10-CM

## 2017-11-06 DIAGNOSIS — K86.81 EXOCRINE PANCREATIC INSUFFICIENCY: ICD-10-CM

## 2017-11-06 DIAGNOSIS — E84.9 DIABETES MELLITUS DUE TO CYSTIC FIBROSIS (H): ICD-10-CM

## 2017-11-06 RX ORDER — NEBULIZER
1 EACH MISCELLANEOUS DAILY
Qty: 1 EACH | Refills: 1 | Status: SHIPPED | OUTPATIENT
Start: 2017-11-06 | End: 2021-06-30

## 2017-11-21 ENCOUNTER — OFFICE VISIT (OUTPATIENT)
Dept: PULMONOLOGY | Facility: CLINIC | Age: 42
End: 2017-11-21
Attending: INTERNAL MEDICINE
Payer: COMMERCIAL

## 2017-11-21 VITALS
BODY MASS INDEX: 20.95 KG/M2 | WEIGHT: 138.23 LBS | HEIGHT: 68 IN | RESPIRATION RATE: 18 BRPM | OXYGEN SATURATION: 95 % | DIASTOLIC BLOOD PRESSURE: 69 MMHG | HEART RATE: 98 BPM | SYSTOLIC BLOOD PRESSURE: 109 MMHG

## 2017-11-21 DIAGNOSIS — E84.9 CYSTIC FIBROSIS (H): Primary | ICD-10-CM

## 2017-11-21 DIAGNOSIS — E84.0 CYSTIC FIBROSIS WITH PULMONARY MANIFESTATIONS (H): ICD-10-CM

## 2017-11-21 DIAGNOSIS — E84.9 CYSTIC FIBROSIS (H): ICD-10-CM

## 2017-11-21 LAB
EXPTIME-PRE: 14.39 SEC
FEF2575-%PRED-PRE: 13 %
FEF2575-PRE: 0.54 L/SEC
FEF2575-PRED: 3.91 L/SEC
FEFMAX-%PRED-PRE: 62 %
FEFMAX-PRE: 6.1 L/SEC
FEFMAX-PRED: 9.79 L/SEC
FEV1-%PRED-PRE: 39 %
FEV1-PRE: 1.57 L
FEV1FEV6-PRE: 51 %
FEV1FEV6-PRED: 82 %
FEV1FVC-PRE: 44 %
FEV1FVC-PRED: 81 %
FIFMAX-PRE: 4.44 L/SEC
FVC-%PRED-PRE: 72 %
FVC-PRE: 3.59 L
FVC-PRED: 4.95 L

## 2017-11-21 PROCEDURE — 94375 RESPIRATORY FLOW VOLUME LOOP: CPT | Mod: ZF | Performed by: INTERNAL MEDICINE

## 2017-11-21 PROCEDURE — 87186 SC STD MICRODIL/AGAR DIL: CPT | Performed by: INTERNAL MEDICINE

## 2017-11-21 PROCEDURE — 87070 CULTURE OTHR SPECIMN AEROBIC: CPT | Performed by: INTERNAL MEDICINE

## 2017-11-21 PROCEDURE — 87077 CULTURE AEROBIC IDENTIFY: CPT | Performed by: INTERNAL MEDICINE

## 2017-11-21 PROCEDURE — 99212 OFFICE O/P EST SF 10 MIN: CPT | Mod: ZF

## 2017-11-21 RX ORDER — LEVALBUTEROL TARTRATE 45 UG/1
2 AEROSOL, METERED ORAL EVERY 6 HOURS PRN
Qty: 3 INHALER | Refills: 4 | Status: SHIPPED | OUTPATIENT
Start: 2017-11-21 | End: 2018-09-18

## 2017-11-21 RX ORDER — SULFAMETHOXAZOLE/TRIMETHOPRIM 800-160 MG
1 TABLET ORAL 2 TIMES DAILY
Qty: 60 TABLET | Refills: 3 | Status: SHIPPED | OUTPATIENT
Start: 2017-11-21 | End: 2018-03-07

## 2017-11-21 ASSESSMENT — PAIN SCALES - GENERAL: PAINLEVEL: NO PAIN (0)

## 2017-11-21 NOTE — LETTER
11/21/2017       RE: Nico Morales  [address]  [place] [address]     Dear Colleague,    Thank you for referring your patient, Nico Morales, to the [facility] at [facility]. Please see a copy of my visit note below.    [facility]  November 21, 2017         Assessment and Plan:   Nico Morales is a 41 year old male with cystic fibrosis.    1. CF lung disease with severe obstruction:   Since last being seen, Kilo did try inhalational Coly-Mycin.  This was complicated by chest tightness and hemoptysis.  He discontinued this medication and had resolution of these symptoms.  Additionally, his hemoptysis resolved.  He did initiate Bactrim therapy per our recommendation and felt well on this.  I have now recommended that he rotate 2 weeks of Cayston with 2 weeks of Bactrim therapy.  He is very willing to do this.  Our other options for inhalational antibiotics are limited secondary to his intolerance.  Fortunately, he has very stable pulmonary function and symptoms.  He does remain quite adherent with his bronchial drainage and nebulized therapies.    2.  Cystic fibrosis-related diabetes.  Kilo has excellent control.   3.  Nutritional failure.  Kilo does continue on tube feeds at night.  He is maintaining a stable weight.   4.  Myalgias.  Kilo does describe some back pain and knee pain that occurred since last being seen.  It is unclear if this was related to his medications when he was doing doxy and rimantadine together.  They have since resolved.   5.  Influenza prophylaxis.  Kilo stopped rimantadine because of some complicating symptoms.  He is willing to restart it at one per day.    6.  Psychosocial.  Kilo continues to work in finance.  He is building a new home and plans to move in at the end of December.  He is very excited about this.   7. Pancreatic  Insufficiency:  The patient has no new symptoms consistent with worsening malabsorption.  The plan is to continue the present dose of pancreatic enzymes and vitamin supplementation.    Jaye Machado MD MPH   of Medicine  Pulmonary, Allergy, Critical Care and Sleep Medicine      Interval History:     Kilo reports now that his cough frequency and sputum volume remain stable.  His activity tolerance is good.  He is performing 2-3 vest therapies per day.          Review of Systems:     CONSTITUTIONAL: no fever, no chills, no change in weight, no change in energy, no change in appetite    INTEGUMENTARY/SKIN: no rash, no obvious new lesions    ENT/MOUTH: no sore throat, no new sinus pain, no new nasal drainage     RESPIRATORY: see interval history    CV: posterior chest pain, no palpitations, no peripheral edema, no orthopnea, no PND    GI: no nausea, no vomiting, no change in stools, no fatty stools, no GERD, no abdominal pain    : no dysuria, no urinary frequency    MUSCULOSKELETAL: no myalgias, left knee arthralgias    ENDOCRINE: no excessive thirst, blood sugars with adequate control    NEURO:  No headache, no numbness, no tingling    SLEEP: no issues    PSYCHIATRIC: mood stable          Past Medical and Surgical History:     Past Medical History:   Diagnosis Date     CF (cystic fibrosis) (H)      Exocrine pancreatic insufficiency      Nocardia infection      Pseudomonas infection      S/P gastrostomy (H) 2002     Past Surgical History:   Procedure Laterality Date     GASTROSTOMY TUBE  2002           Family History:     Family History   Problem Relation Age of Onset     HEART DISEASE Maternal Grandmother      HEART DISEASE Maternal Grandfather      HEART DISEASE Paternal Grandmother      DIABETES No family hx of             Social History:     Social History     Social History     Marital status:      Spouse name: N/A     Number of children: 0     Years of education: N/A      Occupational History     financial [employer]     Social History Main Topics     Smoking status: Never Smoker     Smokeless tobacco: Never Used     Alcohol use No     Drug use: No     Sexual activity: Yes     Partners: Female     Birth control/ protection: Pill     Other Topics Concern     Blood Transfusions No     Exercise No     Social History Narrative    Kilo lives with wife in a house in [place], [place].  He works as a .        March 2016. He works independently as a . Works out 5x/week and walks the dog daily.        June 2016. No changes. Describes himself as a creature of habit.            Medications:     Current Outpatient Prescriptions   Medication     sulfamethoxazole-trimethoprim (BACTRIM DS/SEPTRA DS) 800-160 MG per tablet     levalbuterol (XOPENEX HFA) 45 MCG/ACT Inhaler     Respiratory Therapy Supplies (KRIS ALTERA NEBULIZER HANDSET) MISC     aztreonam lysine (CAYSTON) 75 MG SOLR     clotrimazole 10 MG richard     rimantadine (FLUMADINE) 100 MG tablet     blood glucose (NO BRAND SPECIFIED) lancets standard     blood glucose monitoring (MARTINA CONTOUR NEXT) test strip     insulin pen needle (BD OMI U/F) 32G X 4 MM     sodium chloride inhalant 7 % NEBU neb solution     acetylcysteine (MUCOMYST) 10 % injection     polyethylene glycol (MIRALAX) powder     insulin glargine (LANTUS SOLOSTAR) 100 UNIT/ML injection     azithromycin (ZITHROMAX) 250 MG tablet     cetirizine (ZYRTEC) 10 MG tablet     budesonide-formoterol (SYMBICORT) 160-4.5 MCG/ACT Inhaler     insulin aspart (NOVOLOG PENFILL) 100 UNIT/ML injection     amitriptyline (ELAVIL) 10 MG tablet     levalbuterol (XOPENEX) 1.25 MG/3ML neb solution     CREON 88511 UNITS CPEP per EC capsule     blood glucose monitoring (MARTINA CONTOUR NEXT MONITOR) meter device kit     blood glucose monitoring (MARTINA MICROLET) lancets     oseltamivir (TAMIFLU) 75 MG capsule     sodium chloride, Inhalant, 0.9 % AERS      "[place] HOME INF[facility]ION MANAGED PATIENT     ACE NOT PRESCRIBED, INTENTIONAL,     Nebulizers (EFLOW SCF NEBULIZER HANDSET) MISC     Calcium Citrate-Vitamin D (CALCIUM CITRATE + D PO)     Ferrous Fumarate-Vitamin C (VITRON-C) 200-125 MG TABS     MEPHYTON 5 MG PO TABS     MULTIVITAMIN OR     VITAMIN D 1000 UNIT PO CAPS     VITAMIN E 400 UNIT PO CAPS     [DISCONTINUED] levalbuterol (XOPENEX HFA) 45 MCG/ACT Inhaler     No current facility-administered medications for this visit.             Physical Exam:   /69  Pulse 98  Resp 18  Ht 1.737 m (5' 8.39\")  Wt 62.7 kg (138 lb 3.7 oz)  SpO2 95%  BMI 20.78 kg/m2    Constitutional:   Awake, alert and in no apparent distress     Eyes:   nonicteric     ENT:   oral mucosa moist without lesions, normal tm bilaterally, bilateral mucosa normal     Neck:   Supple without supraclavicular or cervical lymphadenopathy     Lungs:   Good air flow.  No crackles. No rhonchi.  No wheezes.     Cardiovascular:   Normal S1 and S2.  RRR.  No murmur, gallop or rub.     Abdomen:   NABS, soft, nontender, nondistended.  No HSM. gtube     Musculoskeletal:   No edema, digital clubbing present     Neurologic:   Alert and conversant.     Skin:   Warm, dry.  No rash on limited exam.             Data:   All laboratory and imaging data reviewed.    Cystic Fibrosis Culture  Specimen Description   Date Value Ref Range Status   10/11/2017 Sputum  Final   09/06/2017 Sputum  Final   09/06/2017 Sputum  Final    Culture Micro   Date Value Ref Range Status   10/11/2017 Moderate growth  Normal santi    Final   10/11/2017 (A)  Final    Moderate growth  Pseudomonas aeruginosa, mucoid strain     09/06/2017 Heavy growth  Normal santi    Final   09/06/2017 (A)  Final    Light growth  Pseudomonas aeruginosa, mucoid strain     09/06/2017   Final    Susceptibility testing requested by  KAMINI Jordan in CF clinic, to Bactrim on anything that grows 9/7/17 SJ     09/06/2017 (A)  Final    Light growth  Strain " 2  Pseudomonas aeruginosa, mucoid strain     09/06/2017 No growth after 4 weeks  Final        Recent Results (from the past 168 hour(s))   General PFT Lab (Please always keep checked)    Collection Time: 11/21/17 12:50 PM   Result Value Ref Range    FVC-Pred 4.95 L    FVC-Pre 3.59 L    FVC-%Pred-Pre 72 %    FEV1-Pre 1.57 L    FEV1-%Pred-Pre 39 %    FEV1FVC-Pred 81 %    FEV1FVC-Pre 44 %    FEFMax-Pred 9.79 L/sec    FEFMax-Pre 6.10 L/sec    FEFMax-%Pred-Pre 62 %    FEF2575-Pred 3.91 L/sec    FEF2575-Pre 0.54 L/sec    LSN9556-%Pred-Pre 13 %    ExpTime-Pre 14.39 sec    FIFMax-Pre 4.44 L/sec    FEV1FEV6-Pred 82 %    FEV1FEV6-Pre 51 %       PFT: Severe obstructive lung disease.  When compared to 10/11/2017, the FEV1 and FVC have little change.  The decrease in FVC may represent air trapping v. restrictive physiology.  Lung volumes would be necessary to determine.      Again, thank you for allowing me to participate in the care of your patient.      Sincerely,    Jaye Machado MD

## 2017-11-21 NOTE — NURSING NOTE
Chief Complaint   Patient presents with     Cystic Fibrosis     Follow up on Nico and his CF     Matt Nunes CMA at 1:08 PM on 11/21/2017

## 2017-11-21 NOTE — PATIENT INSTRUCTIONS
Cystic Fibrosis Self-Care Plan       RECOMMENDATIONS: Agree with rotating bactrim and cayston.  I will look into the gentamicin.  Rimantadine once daily.  Annual studies at [place].    YOUR GOAL:  Enjoy the Holidays!      CF Nurse Line:  Bailee Henderson and Nelda: [phone]   Felicitas Banuelos, RT: [phone]     Zeina Pierre: [phone] or Ivory Bonilla, Dieticians: [phone]   Jordyn Rudolph, Diabetes Nurse: [phone]     Mary Ritter: [phone] or Marisa Urbina [phone], Social Workers   [email or web address]     MRN: [id number]   Clinic Date: November 21, 2017   Patient: Nico Morales     Annual Studies:   IGG   Date Value Ref Range Status   12/15/2016 1770 (H) 695 - 1620 mg/dL Final     Insulin   Date Value Ref Range Status   07/26/2011 30 mU/L Final     Comment:     Reference Range:  0-20  +120     There are no preventive care reminders to display for this patient.      Pulmonary Function Tests  FEV1: amount of air you can blow out in 1 second  FVC: total amount of air you can take in and blow out    Your Goals:         PFT Latest Ref Rng & Units 11/21/2017   FVC L 3.59   FEV1 L 1.57   FVC% % 72   FEV1% % 39          Airway Clearance: The Most Important Way to Keep Your Lungs Healthy  Vest Settings:    Hill-Rom Frequencies: 8, 9, 10 Pressure 10 Then, Frequencies 18, 19, 20 Pressure 6      RespirTech: Quick Start with Pressure of     Do each frequency for 5 minutes; Deflate vest after each frequency & cough 3 times before beginning the next setting.    Vest and Neb Therapy should be done 3 times/day.    Good Nutrition Can Improve Lung Function and Overall Health     Take ALL of your vitamins with food     Take 1/2 of your enzymes before EVERY meal/snack and the other 1/2 mid-meal/snack    Wt Readings from Last 3 Encounters:   09/06/17 62.1 kg (137 lb)   08/30/17 62.5 kg (137 lb 12.6 oz)   08/14/17 61.2 kg (135 lb)       There is no height or weight on file to calculate BMI.          National CF Foundation Recommendations for BMI in CF Adults: Women: at least 22 Men: at least 23        Controlling Blood Sugars Helps Prevent Lung Infections & Improves Nutrition  Test blood sugar:     In the morning before eating (goal is )     2 hours after a meal (goal is less than 150)     When pre-meal glucose is greater than 150 add correction     At bedtime (if less than 100 eat a snack with 15 grams of carbohydrates  Last A1C Results:   Hemoglobin A1C   Date Value Ref Range Status   08/14/2017 6.1 % Final         If diabetic, measure A1C every 6 months. Goal: Under 7%    Staying Healthy    Research:  If you are interested in learning about research opportunities or have questions, please contact Temi Dodge at [phone] or [email or web address].      CF Foundation:  Compass is a personalized resource service to help you with the insurance, financial, legal and other issues you are facing.  It's free, confidential and available to anyone with CF.  Ask your  for more information or contact Compass directly at 449-VAXOUYP ([phone]) or compass@cff.org, or learn more at cff.org/compass.

## 2017-11-21 NOTE — PROGRESS NOTES
[facility]  November 21, 2017         Assessment and Plan:   Nico Morales is a 41 year old male with cystic fibrosis.    1. CF lung disease with severe obstruction:   Since last being seen, Kilo did try inhalational Coly-Mycin.  This was complicated by chest tightness and hemoptysis.  He discontinued this medication and had resolution of these symptoms.  Additionally, his hemoptysis resolved.  He did initiate Bactrim therapy per our recommendation and felt well on this.  I have now recommended that he rotate 2 weeks of [place] with 2 weeks of Bactrim therapy.  He is very willing to do this.  Our other options for inhalational antibiotics are limited secondary to his intolerance.  Fortunately, he has very stable pulmonary function and symptoms.  He does remain quite adherent with his bronchial drainage and nebulized therapies.    2.  Cystic fibrosis-related diabetes.  Kilo has excellent control.   3.  Nutritional failure.  Kilo does continue on tube feeds at night.  He is maintaining a stable weight.   4.  Myalgias.  Kilo does describe some back pain and knee pain that occurred since last being seen.  It is unclear if this was related to his medications when he was doing doxy and rimantadine together.  They have since resolved.   5.  Influenza prophylaxis.  Kilo stopped rimantadine because of some complicating symptoms.  He is willing to restart it at one per day.    6.  Psychosocial.  Kilo continues to work in finance.  He is building a new home and plans to move in at the end of December.  He is very excited about this.   7. Pancreatic Insufficiency:  The patient has no new symptoms consistent with worsening malabsorption.  The plan is to continue the present dose of pancreatic enzymes and vitamin supplementation.    Jaye Machado MD MPH   of Medicine  Pulmonary, Allergy, Critical Care and Sleep Medicine      Interval  History:     Kilo reports now that his cough frequency and sputum volume remain stable.  His activity tolerance is good.  He is performing 2-3 vest therapies per day.          Review of Systems:     CONSTITUTIONAL: no fever, no chills, no change in weight, no change in energy, no change in appetite    INTEGUMENTARY/SKIN: no rash, no obvious new lesions    ENT/MOUTH: no sore throat, no new sinus pain, no new nasal drainage     RESPIRATORY: see interval history    CV: posterior chest pain, no palpitations, no peripheral edema, no orthopnea, no PND    GI: no nausea, no vomiting, no change in stools, no fatty stools, no GERD, no abdominal pain    : no dysuria, no urinary frequency    MUSCULOSKELETAL: no myalgias, left knee arthralgias    ENDOCRINE: no excessive thirst, blood sugars with adequate control    NEURO:  No headache, no numbness, no tingling    SLEEP: no issues    PSYCHIATRIC: mood stable          Past Medical and Surgical History:     Past Medical History:   Diagnosis Date     CF (cystic fibrosis) (H)      Exocrine pancreatic insufficiency      Nocardia infection      Pseudomonas infection      S/P gastrostomy (H) 2002     Past Surgical History:   Procedure Laterality Date     GASTROSTOMY TUBE  2002           Family History:     Family History   Problem Relation Age of Onset     HEART DISEASE Maternal Grandmother      HEART DISEASE Maternal Grandfather      HEART DISEASE Paternal Grandmother      DIABETES No family hx of             Social History:     Social History     Social History     Marital status:      Spouse name: N/A     Number of children: 0     Years of education: N/A     Occupational History     [employer]     Social History Main Topics     Smoking status: Never Smoker     Smokeless tobacco: Never Used     Alcohol use No     Drug use: No     Sexual activity: Yes     Partners: Female     Birth control/ protection: Pill     Other Topics Concern     Blood  Transfusions No     Exercise No     Social History Narrative    Kilo lives with wife in a house in [place], [place].  He works as a .        March 2016. He works independently as a . Works out 5x/week and walks the dog daily.        June 2016. No changes. Describes himself as a creature of habit.            Medications:     Current Outpatient Prescriptions   Medication     sulfamethoxazole-trimethoprim (BACTRIM DS/SEPTRA DS) 800-160 MG per tablet     levalbuterol (XOPENEX HFA) 45 MCG/ACT Inhaler     Respiratory Therapy Supplies (KRIS ALTERA NEBULIZER HANDSET) MISC     aztreonam lysine (CAYSTON) 75 MG SOLR     clotrimazole 10 MG richard     rimantadine (FLUMADINE) 100 MG tablet     blood glucose (NO BRAND SPECIFIED) lancets standard     blood glucose monitoring (MARTINA CONTOUR NEXT) test strip     insulin pen needle (BD OMI U/F) 32G X 4 MM     sodium chloride inhalant 7 % NEBU neb solution     acetylcysteine (MUCOMYST) 10 % injection     polyethylene glycol (MIRALAX) powder     insulin glargine (LANTUS SOLOSTAR) 100 UNIT/ML injection     azithromycin (ZITHROMAX) 250 MG tablet     cetirizine (ZYRTEC) 10 MG tablet     budesonide-formoterol (SYMBICORT) 160-4.5 MCG/ACT Inhaler     insulin aspart (NOVOLOG PENFILL) 100 UNIT/ML injection     amitriptyline (ELAVIL) 10 MG tablet     levalbuterol (XOPENEX) 1.25 MG/3ML neb solution     CREON 45116 UNITS CPEP per EC capsule     blood glucose monitoring (MARTINA CONTOUR NEXT MONITOR) meter device kit     blood glucose monitoring (MARTINA MICROLET) lancets     oseltamivir (TAMIFLU) 75 MG capsule     sodium chloride, Inhalant, 0.9 % AERS     [place] [facility]NAGED PATIENT     ACE NOT PRESCRIBED, INTENTIONAL,     Nebulizers (EFLOW SCF NEBULIZER HANDSET) MISC     Calcium Citrate-Vitamin D (CALCIUM CITRATE + D PO)     Ferrous Fumarate-Vitamin C (VITRON-C) 200-125 MG TABS     MEPHYTON 5 MG PO TABS     MULTIVITAMIN OR     VITAMIN D 1000 UNIT PO CAPS      "VITAMIN E 400 UNIT PO CAPS     [DISCONTINUED] levalbuterol (XOPENEX HFA) 45 MCG/ACT Inhaler     No current facility-administered medications for this visit.             Physical Exam:   /69  Pulse 98  Resp 18  Ht 1.737 m (5' 8.39\")  Wt 62.7 kg (138 lb 3.7 oz)  SpO2 95%  BMI 20.78 kg/m2    Constitutional:   Awake, alert and in no apparent distress     Eyes:   nonicteric     ENT:   oral mucosa moist without lesions, normal tm bilaterally, bilateral mucosa normal     Neck:   Supple without supraclavicular or cervical lymphadenopathy     Lungs:   Good air flow.  No crackles. No rhonchi.  No wheezes.     Cardiovascular:   Normal S1 and S2.  RRR.  No murmur, gallop or rub.     Abdomen:   NABS, soft, nontender, nondistended.  No HSM. gtube     Musculoskeletal:   No edema, digital clubbing present     Neurologic:   Alert and conversant.     Skin:   Warm, dry.  No rash on limited exam.             Data:   All laboratory and imaging data reviewed.    Cystic Fibrosis Culture  Specimen Description   Date Value Ref Range Status   10/11/2017 Sputum  Final   09/06/2017 Sputum  Final   09/06/2017 Sputum  Final    Culture Micro   Date Value Ref Range Status   10/11/2017 Moderate growth  Normal santi    Final   10/11/2017 (A)  Final    Moderate growth  Pseudomonas aeruginosa, mucoid strain     09/06/2017 Heavy growth  Normal santi    Final   09/06/2017 (A)  Final    Light growth  Pseudomonas aeruginosa, mucoid strain     09/06/2017   Final    Susceptibility testing requested by  KAMINI Jordan in CF clinic, to Bactrim on anything that grows 9/7/17 [facility]     09/06/2017 (A)  Final    Light growth  Strain 2  Pseudomonas aeruginosa, mucoid strain     09/06/2017 No growth after 4 weeks  Final        Recent Results (from the past 168 hour(s))   General PFT Lab (Please always keep checked)    Collection Time: 11/21/17 12:50 PM   Result Value Ref Range    FVC-Pred 4.95 L    FVC-Pre 3.59 L    FVC-%Pred-Pre 72 %    FEV1-Pre 1.57 L    " FEV1-%Pred-Pre 39 %    FEV1FVC-Pred 81 %    FEV1FVC-Pre 44 %    FEFMax-Pred 9.79 L/sec    FEFMax-Pre 6.10 L/sec    FEFMax-%Pred-Pre 62 %    FEF2575-Pred 3.91 L/sec    FEF2575-Pre 0.54 L/sec    PZN0448-%Pred-Pre 13 %    ExpTime-Pre 14.39 sec    FIFMax-Pre 4.44 L/sec    FEV1FEV6-Pred 82 %    FEV1FEV6-Pre 51 %       PFT: Severe obstructive lung disease.  When compared to 10/11/2017, the FEV1 and FVC have little change.  The decrease in FVC may represent air trapping v. restrictive physiology.  Lung volumes would be necessary to determine.

## 2017-11-21 NOTE — MR AVS SNAPSHOT
After Visit Summary   11/21/2017    Nico Morales    MRN: [id number]           Patient Information     Date Of Birth          1975        Visit Information        Provider Department      11/21/2017 1:00 PM Jaye Machado MD [facility]        Today's Diagnoses     Cystic fibrosis with pulmonary manifestations (H)        Cystic fibrosis (H)          Care Instructions    Cystic Fibrosis Self-Care Plan       RECOMMENDATIONS: Agree with rotating bactrim and cayston.  I will look into the gentamicin.  Rimantadine once daily.  Annual studies at [place].    YOUR GOAL:  Enjoy the Holidays!      CF Nurse Line:  Jose David Henderson: [phone]   Felicitas Banuelos, RT: [phone]     Zeina Pierre: [phone] or Ivory Bonilla Dieticians: [phone]   Jordyn Rudolph, Diabetes Nurse: [phone]     Mary Ritter: [phone] or Marisa Urbina [phone], Social Workers   [email or web address]     MRN: [id number]   Clinic Date: November 21, 2017   Patient: Nico Morales     Annual Studies:   IGG   Date Value Ref Range Status   12/15/2016 1770 (H) 695 - 1620 mg/dL Final     Insulin   Date Value Ref Range Status   07/26/2011 30 mU/L Final     Comment:     Reference Range:  0-20  +120     There are no preventive care reminders to display for this patient.      Pulmonary Function Tests  FEV1: amount of air you can blow out in 1 second  FVC: total amount of air you can take in and blow out    Your Goals:         PFT Latest Ref Rng & Units 11/21/2017   FVC L 3.59   FEV1 L 1.57   FVC% % 72   FEV1% % 39          Airway Clearance: The Most Important Way to Keep Your Lungs Healthy  Vest Settings:    Hill-Rom Frequencies: 8, 9, 10 Pressure 10 Then, Frequencies 18, 19, 20 Pressure 6      RespirTech: Quick Start with Pressure of     Do each frequency for 5 minutes; Deflate vest after each frequency & cough 3 times before beginning the next  setting.    Vest and Neb Therapy should be done 3 times/day.    Good Nutrition Can Improve Lung Function and Overall Health     Take ALL of your vitamins with food     Take 1/2 of your enzymes before EVERY meal/snack and the other 1/2 mid-meal/snack    Wt Readings from Last 3 Encounters:   09/06/17 62.1 kg (137 lb)   08/30/17 62.5 kg (137 lb 12.6 oz)   08/14/17 61.2 kg (135 lb)       There is no height or weight on file to calculate BMI.         National CF Foundation Recommendations for BMI in CF Adults: Women: at least 22 Men: at least 23        Controlling Blood Sugars Helps Prevent Lung Infections & Improves Nutrition  Test blood sugar:     In the morning before eating (goal is )     2 hours after a meal (goal is less than 150)     When pre-meal glucose is greater than 150 add correction     At bedtime (if less than 100 eat a snack with 15 grams of carbohydrates  Last A1C Results:   Hemoglobin A1C   Date Value Ref Range Status   08/14/2017 6.1 % Final         If diabetic, measure A1C every 6 months. Goal: Under 7%    Staying Healthy    Research:  If you are interested in learning about research opportunities or have questions, please contact Temi Dodge at [phone] or [email or web address].      CF Foundation:  Compass is a personalized resource service to help you with the insurance, financial, legal and other issues you are facing.  It's free, confidential and available to anyone with CF.  Ask your  for more information or contact Compass directly at [facility] ([phone]) or compass@cff.org, or learn more at cff.org/compass.                        Follow-ups after your visit        Follow-up notes from your care team     Return in about 3 months (around 2/21/2018).      Your next 10 appointments already scheduled     Mar 05, 2018 10:15 AM CST   Return Visit [facility] Lee MD, [facility]    [address] [place] [address]  Adela GALLARDO [address]   [phone]              Future tests that were ordered for you today     Open Future Orders        Priority Expected Expires Ordered    Protein total Routine 12/21/2017 11/21/2018 11/21/2017    TSH with free T4 reflex Routine 12/21/2017 11/21/2018 11/21/2017    Vitamin A Routine 12/21/2017 11/21/2018 11/21/2017    Vitamin E Routine 12/21/2017 11/21/2018 11/21/2017    Vitamin D Deficiency Routine 12/21/2017 11/21/2018 11/21/2017    CBC with platelets differential Routine 12/21/2017 11/21/2018 11/21/2017    Erythrocyte sedimentation rate auto Routine 12/21/2017 11/21/2018 11/21/2017    Routine UA with microscopic Routine 12/21/2017 11/21/2018 11/21/2017    Albumin Random Urine Quantitative with Creat Ratio Routine 12/21/2017 11/21/2018 11/21/2017    X-ray Chest 2 vws* Routine 12/21/2017 11/21/2018 11/21/2017    ALT Routine 12/21/2017 11/21/2018 11/21/2017    Basic metabolic panel Routine 12/21/2017 11/21/2018 11/21/2017    Lipid Profile Routine 12/21/2017 11/21/2018 11/21/2017    Albumin level Routine 12/21/2017 11/21/2018 11/21/2017    Alkaline phosphatase Routine 12/21/2017 11/21/2018 11/21/2017    AST Routine 12/21/2017 11/21/2018 11/21/2017    Iron Routine 12/21/2017 11/21/2018 11/21/2017    GGT Routine 12/21/2017 11/21/2018 11/21/2017    Hemoglobin A1c Routine 12/21/2017 11/21/2018 11/21/2017    IgA Routine 12/21/2017 11/21/2018 11/21/2017    IgG Routine 12/21/2017 11/21/2018 11/21/2017    IgM Routine 12/21/2017 11/21/2018 11/21/2017    IgE Routine 12/21/2017 11/21/2018 11/21/2017    INR Routine 12/21/2017 11/21/2018 11/21/2017    Magnesium Routine 12/21/2017 11/21/2018 11/21/2017    Phosphorus Routine 12/21/2017 11/21/2018 11/21/2017    Testosterone total  Routine 12/21/2017 11/21/2018 11/21/2017    Cystic Fibrosis Culture Aerob Bacterial Routine  11/21/2018 11/21/2017            Who to contact     If you have questions or need follow up information about today's clinic visit or your  "schedule please contact [facility] directly at [phone].  Normal or non-critical lab and imaging results will be communicated to you by SurIDxhart, letter or phone within 4 business days after the clinic has received the results. If you do not hear from us within 7 days, please contact the clinic through Attenext or phone. If you have a critical or abnormal lab result, we will notify you by phone as soon as possible.  Submit refill requests through MeeGenius or call your pharmacy and they will forward the refill request to us. Please allow 3 business days for your refill to be completed.          Additional Information About Your Visit        MeeGenius Information     MeeGenius gives you secure access to your electronic health record. If you see a primary care provider, you can also send messages to your care team and make appointments. If you have questions, please call your primary care clinic.  If you do not have a primary care provider, please call [phone] and they will assist you.        Care EveryWhere ID     This is your Care EveryWhere ID. This could be used by other organizations to access your [facility] medical records  [id number]        Your Vitals Were     Pulse Respirations Height Pulse Oximetry BMI (Body Mass Index)       98 18 1.737 m (5' 8.39\") 95% 20.78 kg/m2        Blood Pressure from Last 3 Encounters:   11/21/17 109/69   10/11/17 99/64   09/06/17 100/68    Weight from Last 3 Encounters:   11/21/17 62.7 kg (138 lb 3.7 oz)   09/06/17 62.1 kg (137 lb)   08/30/17 62.5 kg (137 lb 12.6 oz)              We Performed the Following     Cystic Fibrosis Culture Aerob Bacterial     RESPIRATORY FLOW VOLUME LOOP          Today's Medication Changes          These changes are accurate as of: 11/21/17  7:18 PM.  If you have any questions, ask your nurse or doctor.               Start taking these medicines.        Dose/Directions    sulfamethoxazole-trimethoprim 800-160 MG per " tablet   Commonly known as:  BACTRIM DS/SEPTRA DS   Used for:  Cystic fibrosis with pulmonary manifestations (H)   Started by:  Jaye Machado MD        Dose:  1 tablet   Take 1 tablet by mouth 2 times daily   Quantity:  60 tablet   Refills:  3         Stop taking these medicines if you haven't already. Please contact your care team if you have questions.     colistimethate/colistin-base activity 150 mg/2mL Solr neb solution   Commonly known as:  COLYMYCIN   Stopped by:  Jaye Machado MD                Where to get your medicines      These medications were sent to [facility] [address] [facility] - [place], [place] - [address]  [address] [address] [facility], [facility] [address]     Phone:  [phone]     levalbuterol 45 MCG/ACT Inhaler    sulfamethoxazole-trimethoprim 800-160 MG per tablet                Primary Care Provider Office Phone # Fax #    Jaye Machado [phone]       [facility] [address] [place] [id number]        Equal Access to Services     [facility]: Hadii aad ku hadasho Soomaali, waaxda luqadaha, qaybta kaalmada adeegyada, waxay kisohrin haysarayn simi castellon . So [facility] [phone].    ATENCIÓN: Si habla español, tiene a monroe disposición servicios gratuitos de asistencia lingüística. [facility] [phone].    We comply with applicable federal civil rights laws and [place] laws. We do not discriminate on the basis of race, color, national origin, age, disability, sex, sexual orientation, or gender identity.            Thank you!     Thank you for choosing [facility]  for your care. Our goal is always to provide you with excellent care. Hearing back from our patients is one way we can continue to improve our services. Please take a few minutes to complete the written survey that you may receive in the mail after your visit with us. Thank you!             Your Updated Medication List - Protect  others around you: Learn how to safely use, store and throw away your medicines at www.disposemymeds.org.          This list is accurate as of: 11/21/17  7:18 PM.  Always use your most recent med list.                   Brand Name Dispense Instructions for use Diagnosis    ACE NOT PRESCRIBED (INTENTIONAL)     0 each    1 each continuous prn. ACE Inhibitor not prescribed due to Risk for drug interaction    Type I (juvenile type) diabetes mellitus without mention of complication, not stated as uncontrolled       acetylcysteine 10 % injection    MUCOMYST    480 mL    Nebulize 4ml qid    Cystic fibrosis with pulmonary manifestations (H)       amitriptyline 10 MG tablet    ELAVIL    90 tablet    Take 1 tablet (10 mg) by mouth At Bedtime Take one to three tablets at bedtime.    Cystic fibrosis (H)       azithromycin 250 MG tablet    ZITHROMAX    30 tablet    Take 1 tablet (250 mg) by mouth daily    Cystic fibrosis with pulmonary manifestations (H)       aztreonam lysine 75 MG Solr    CAYSTON    84 mL    Take 1 mL (75 mg) by nebulization 3 times daily    Cystic fibrosis with pulmonary manifestations (H), Exocrine pancreatic insufficiency, 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)       blood glucose lancets standard    no brand specified    540 each    Whatever Lancets that go with device, Test 6 times daily    Diabetes mellitus related to CF (cystic fibrosis) (H)       blood glucose monitoring lancets     2 Box    Use to test blood sugar 6 times daily or as directed.    Diabetes mellitus related to CF (cystic fibrosis) (H)       blood glucose monitoring meter device kit     1 kit    Use to test blood sugar 6 times daily or as directed.    Diabetes mellitus related to CF (cystic fibrosis) (H)       blood glucose monitoring test strip    MARTINA CONTOUR NEXT    200 each    Use to test blood sugar 6 times daily or as directed.    Diabetes mellitus related to CF (cystic fibrosis) (H)       budesonide-formoterol 160-4.5  "MCG/ACT Inhaler    SYMBICORT    1 Inhaler    Inhale 2 puffs into the lungs 2 times daily    Cystic fibrosis of the lung (H), Cystic fibrosis (H)       CALCIUM CITRATE + D PO      Take 1 tablet by mouth 2 times daily.    Nocardia infection, Cystic fibrosis with pulmonary manifestations (H)       cetirizine 10 MG tablet    zyrTEC    30 tablet    Take 1 tablet (10 mg) by mouth every evening    Cystic fibrosis (H)       clotrimazole 10 MG megan     70 each    Take 1 Megan (10 mg) by mouth 5 times daily    Thrush       CREON 01992-92074 UNITS Cpep per EC capsule   Generic drug:  amylase-lipase-protease     2700 capsule    Take 7-8 caps with meals (3 meals/day) and 2 caps with snacks (3 snacks/day)    Cystic fibrosis with pulmonary manifestations (H)       EFLOW SCF NEBULIZER HANDSET Misc     1 each    1 each 3 times daily.    Nocardia infection       [facility] INFUSION MANAGED PATIENT      Contact [facility] for patient specific medication information at [phone] on admission and discharge from the hospital.  Phones are answered 24 hours a day 7 days a week 365 days a year.  Providers - Choose \"CONTINUE HOME MED (no script)\" at discharge if patient treatment with home infusion will continue.        insulin aspart 100 UNIT/ML injection    NovoLOG PENFILL    15 mL    1 per 20 grams of carbohydrate at lunch and supper. Also use 3 units of Novolog at night with tube feeding.    Diabetes mellitus related to CF (cystic fibrosis) (H)       insulin glargine 100 UNIT/ML injection    LANTUS SOLOSTAR    15 mL    6 units daily    Diabetes mellitus related to CF (cystic fibrosis) (H)       insulin pen needle 32G X 4 MM    BD OMI U/F    200 each    Use 6 daily or as directed.    Diabetes mellitus related to CF (cystic fibrosis) (H)       levalbuterol 1.25 MG/3ML neb solution    XOPENEX    360 mL    Take 3 mLs (1.25 mg) by nebulization 4 times daily        levalbuterol 45 MCG/ACT Inhaler    XOPENEX HFA    3 " Inhaler    Inhale 2 puffs into the lungs every 6 hours as needed for shortness of breath / dyspnea    Cystic fibrosis (H)       MEPHYTON 5 MG tablet   Generic drug:  phytonadione      1 TABLET DAILY        MULTIVITAMIN PO      1 tablet daily        oseltamivir 75 MG capsule    TAMIFLU    10 capsule    Take 1 capsule (75 mg) by mouth 2 times daily    Cystic fibrosis with pulmonary manifestations (H)       KRIS ALTERA NEBULIZER HANDSET Misc     1 each    1 each daily    Cystic fibrosis with pulmonary manifestations (H), Exocrine pancreatic insufficiency, 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)       polyethylene glycol powder    MIRALAX    510 g    Use 17 gms po 1-3 times daily as needed.    Cystic fibrosis with meconium ileus (H)       rimantadine 100 MG tablet    FLUMADINE    60 tablet    Take 1 tablet (100 mg) by mouth 2 times daily Take during flu season Oct 1st-March 31st    Cystic fibrosis with pulmonary manifestations (H)       * sodium chloride (Inhalant) 0.9 % Aers     270 mL    Inhale 3 mLs into the lungs 3 times daily    Cystic fibrosis with pulmonary manifestations (H)       * sodium chloride inhalant 7 % Nebu neb solution     240 mL    NEBULIZE 4ML TWICE DAILY    Cystic fibrosis with pulmonary manifestations (H)       sulfamethoxazole-trimethoprim 800-160 MG per tablet    BACTRIM DS/SEPTRA DS    60 tablet    Take 1 tablet by mouth 2 times daily    Cystic fibrosis with pulmonary manifestations (H)       vitamin D 1000 UNITS capsule      1 CAPSULE DAILY        vitamin E 400 UNIT capsule      1 CAPSULE DAILY        VITRON-C 200-125 MG Tabs   Generic drug:  ferrous fumarate 65 mg ([place]. FE)-Vitamin C 125 mg      Take 1 tablet by mouth 2 times daily.    Nocardia infection, Cystic fibrosis with pulmonary manifestations (H)       * Notice:  This list has 2 medication(s) that are the same as other medications prescribed for you. Read the directions carefully, and ask your doctor or other care provider to  review them with you.

## 2017-11-27 LAB
BACTERIA SPEC CULT: ABNORMAL
SPECIMEN SOURCE: ABNORMAL

## 2018-01-13 DIAGNOSIS — E84.9 CYSTIC FIBROSIS (H): ICD-10-CM

## 2018-01-15 RX ORDER — AMITRIPTYLINE HYDROCHLORIDE 10 MG/1
TABLET ORAL
Qty: 90 TABLET | Refills: 3 | Status: SHIPPED | OUTPATIENT
Start: 2018-01-15 | End: 2018-02-12

## 2018-01-21 ENCOUNTER — APPOINTMENT (OUTPATIENT)
Dept: GENERAL RADIOLOGY | Facility: CLINIC | Age: 43
End: 2018-01-21
Attending: EMERGENCY MEDICINE
Payer: COMMERCIAL

## 2018-01-21 ENCOUNTER — TELEPHONE (OUTPATIENT)
Dept: PULMONOLOGY | Facility: CLINIC | Age: 43
End: 2018-01-21

## 2018-01-21 ENCOUNTER — HOSPITAL ENCOUNTER (INPATIENT)
Facility: CLINIC | Age: 43
LOS: 2 days | Discharge: HOME HEALTH | End: 2018-01-23
Attending: EMERGENCY MEDICINE | Admitting: HOSPITALIST
Payer: COMMERCIAL

## 2018-01-21 DIAGNOSIS — J10.1 INFLUENZA DUE TO INFLUENZA VIRUS, TYPE A, HUMAN: ICD-10-CM

## 2018-01-21 DIAGNOSIS — J10.1 INFLUENZA A: ICD-10-CM

## 2018-01-21 DIAGNOSIS — E84.0 CYSTIC FIBROSIS WITH PULMONARY MANIFESTATIONS (H): ICD-10-CM

## 2018-01-21 PROBLEM — J11.1 INFLUENZA: Status: ACTIVE | Noted: 2018-01-21

## 2018-01-21 LAB
ALBUMIN SERPL-MCNC: 2.9 G/DL (ref 3.4–5)
ALBUMIN SERPL-MCNC: 3.1 G/DL (ref 3.4–5)
ALP SERPL-CCNC: 131 U/L (ref 40–150)
ALP SERPL-CCNC: 144 U/L (ref 40–150)
ALT SERPL W P-5'-P-CCNC: 23 U/L (ref 0–70)
ALT SERPL W P-5'-P-CCNC: 24 U/L (ref 0–70)
ANION GAP SERPL CALCULATED.3IONS-SCNC: 7 MMOL/L (ref 3–14)
ANION GAP SERPL CALCULATED.3IONS-SCNC: 8 MMOL/L (ref 3–14)
AST SERPL W P-5'-P-CCNC: 23 U/L (ref 0–45)
AST SERPL W P-5'-P-CCNC: 24 U/L (ref 0–45)
BASOPHILS # BLD AUTO: 0 10E9/L (ref 0–0.2)
BASOPHILS # BLD AUTO: 0 10E9/L (ref 0–0.2)
BASOPHILS NFR BLD AUTO: 0.2 %
BASOPHILS NFR BLD AUTO: 0.2 %
BILIRUB SERPL-MCNC: 0.2 MG/DL (ref 0.2–1.3)
BILIRUB SERPL-MCNC: 0.2 MG/DL (ref 0.2–1.3)
BUN SERPL-MCNC: 13 MG/DL (ref 7–30)
BUN SERPL-MCNC: 14 MG/DL (ref 7–30)
CALCIUM SERPL-MCNC: 8.2 MG/DL (ref 8.5–10.1)
CALCIUM SERPL-MCNC: 8.2 MG/DL (ref 8.5–10.1)
CHLORIDE SERPL-SCNC: 101 MMOL/L (ref 94–109)
CHLORIDE SERPL-SCNC: 102 MMOL/L (ref 94–109)
CO2 BLDCOV-SCNC: 28 MMOL/L (ref 21–28)
CO2 SERPL-SCNC: 26 MMOL/L (ref 20–32)
CO2 SERPL-SCNC: 28 MMOL/L (ref 20–32)
CREAT SERPL-MCNC: 0.91 MG/DL (ref 0.66–1.25)
CREAT SERPL-MCNC: 0.96 MG/DL (ref 0.66–1.25)
DIFFERENTIAL METHOD BLD: ABNORMAL
DIFFERENTIAL METHOD BLD: ABNORMAL
EOSINOPHIL # BLD AUTO: 0 10E9/L (ref 0–0.7)
EOSINOPHIL # BLD AUTO: 0 10E9/L (ref 0–0.7)
EOSINOPHIL NFR BLD AUTO: 0 %
EOSINOPHIL NFR BLD AUTO: 0 %
ERYTHROCYTE [DISTWIDTH] IN BLOOD BY AUTOMATED COUNT: 15.6 % (ref 10–15)
ERYTHROCYTE [DISTWIDTH] IN BLOOD BY AUTOMATED COUNT: 15.9 % (ref 10–15)
FLUAV+FLUBV AG SPEC QL: NEGATIVE
FLUAV+FLUBV AG SPEC QL: POSITIVE
GFR SERPL CREATININE-BSD FRML MDRD: 86 ML/MIN/1.7M2
GFR SERPL CREATININE-BSD FRML MDRD: >90 ML/MIN/1.7M2
GLUCOSE BLDC GLUCOMTR-MCNC: 109 MG/DL (ref 70–99)
GLUCOSE BLDC GLUCOMTR-MCNC: 83 MG/DL (ref 70–99)
GLUCOSE SERPL-MCNC: 102 MG/DL (ref 70–99)
GLUCOSE SERPL-MCNC: 94 MG/DL (ref 70–99)
HBA1C MFR BLD: 5.9 % (ref 4.3–6)
HCT VFR BLD AUTO: 41 % (ref 40–53)
HCT VFR BLD AUTO: 42.5 % (ref 40–53)
HGB BLD-MCNC: 12.8 G/DL (ref 13.3–17.7)
HGB BLD-MCNC: 13.1 G/DL (ref 13.3–17.7)
IMM GRANULOCYTES # BLD: 0 10E9/L (ref 0–0.4)
IMM GRANULOCYTES # BLD: 0 10E9/L (ref 0–0.4)
IMM GRANULOCYTES NFR BLD: 0.2 %
IMM GRANULOCYTES NFR BLD: 0.3 %
INR PPP: 1.14 (ref 0.86–1.14)
LACTATE BLD-SCNC: 1.1 MMOL/L (ref 0.7–2.1)
LYMPHOCYTES # BLD AUTO: 1 10E9/L (ref 0.8–5.3)
LYMPHOCYTES # BLD AUTO: 2 10E9/L (ref 0.8–5.3)
LYMPHOCYTES NFR BLD AUTO: 19 %
LYMPHOCYTES NFR BLD AUTO: 8.7 %
MAGNESIUM SERPL-MCNC: 2.3 MG/DL (ref 1.6–2.3)
MCH RBC QN AUTO: 27.1 PG (ref 26.5–33)
MCH RBC QN AUTO: 27.4 PG (ref 26.5–33)
MCHC RBC AUTO-ENTMCNC: 30.8 G/DL (ref 31.5–36.5)
MCHC RBC AUTO-ENTMCNC: 31.2 G/DL (ref 31.5–36.5)
MCV RBC AUTO: 88 FL (ref 78–100)
MCV RBC AUTO: 88 FL (ref 78–100)
MONOCYTES # BLD AUTO: 1.7 10E9/L (ref 0–1.3)
MONOCYTES # BLD AUTO: 2.2 10E9/L (ref 0–1.3)
MONOCYTES NFR BLD AUTO: 15.1 %
MONOCYTES NFR BLD AUTO: 21.2 %
NEUTROPHILS # BLD AUTO: 6.2 10E9/L (ref 1.6–8.3)
NEUTROPHILS # BLD AUTO: 8.3 10E9/L (ref 1.6–8.3)
NEUTROPHILS NFR BLD AUTO: 59.3 %
NEUTROPHILS NFR BLD AUTO: 75.8 %
NRBC # BLD AUTO: 0 10*3/UL
NRBC # BLD AUTO: 0 10*3/UL
NRBC BLD AUTO-RTO: 0 /100
NRBC BLD AUTO-RTO: 0 /100
PCO2 BLDV: 42 MM HG (ref 40–50)
PH BLDV: 7.43 PH (ref 7.32–7.43)
PHOSPHATE SERPL-MCNC: 3.4 MG/DL (ref 2.5–4.5)
PLATELET # BLD AUTO: 252 10E9/L (ref 150–450)
PLATELET # BLD AUTO: 292 10E9/L (ref 150–450)
PLATELET # BLD EST: ABNORMAL 10*3/UL
PO2 BLDV: 18 MM HG (ref 25–47)
POTASSIUM SERPL-SCNC: 3.8 MMOL/L (ref 3.4–5.3)
POTASSIUM SERPL-SCNC: 4 MMOL/L (ref 3.4–5.3)
PROT SERPL-MCNC: 7.9 G/DL (ref 6.8–8.8)
RBC # BLD AUTO: 4.67 10E12/L (ref 4.4–5.9)
RBC # BLD AUTO: 4.83 10E12/L (ref 4.4–5.9)
SAO2 % BLDV FROM PO2: 27 %
SODIUM SERPL-SCNC: 135 MMOL/L (ref 133–144)
SODIUM SERPL-SCNC: 137 MMOL/L (ref 133–144)
SPECIMEN SOURCE: ABNORMAL
WBC # BLD AUTO: 10.5 10E9/L (ref 4–11)
WBC # BLD AUTO: 10.9 10E9/L (ref 4–11)

## 2018-01-21 PROCEDURE — 36415 COLL VENOUS BLD VENIPUNCTURE: CPT | Performed by: HOSPITALIST

## 2018-01-21 PROCEDURE — 82247 BILIRUBIN TOTAL: CPT | Performed by: HOSPITALIST

## 2018-01-21 PROCEDURE — 84450 TRANSFERASE (AST) (SGOT): CPT | Performed by: HOSPITALIST

## 2018-01-21 PROCEDURE — 87186 SC STD MICRODIL/AGAR DIL: CPT | Performed by: EMERGENCY MEDICINE

## 2018-01-21 PROCEDURE — 87077 CULTURE AEROBIC IDENTIFY: CPT | Performed by: EMERGENCY MEDICINE

## 2018-01-21 PROCEDURE — 25000132 ZZH RX MED GY IP 250 OP 250 PS 637: Performed by: STUDENT IN AN ORGANIZED HEALTH CARE EDUCATION/TRAINING PROGRAM

## 2018-01-21 PROCEDURE — 84134 ASSAY OF PREALBUMIN: CPT | Performed by: HOSPITALIST

## 2018-01-21 PROCEDURE — 94640 AIRWAY INHALATION TREATMENT: CPT

## 2018-01-21 PROCEDURE — 99285 EMERGENCY DEPT VISIT HI MDM: CPT | Mod: Z6 | Performed by: EMERGENCY MEDICINE

## 2018-01-21 PROCEDURE — 87633 RESP VIRUS 12-25 TARGETS: CPT | Performed by: EMERGENCY MEDICINE

## 2018-01-21 PROCEDURE — 99285 EMERGENCY DEPT VISIT HI MDM: CPT | Mod: 25 | Performed by: EMERGENCY MEDICINE

## 2018-01-21 PROCEDURE — 00000146 ZZHCL STATISTIC GLUCOSE BY METER IP

## 2018-01-21 PROCEDURE — 83605 ASSAY OF LACTIC ACID: CPT

## 2018-01-21 PROCEDURE — 93010 ELECTROCARDIOGRAM REPORT: CPT | Performed by: INTERNAL MEDICINE

## 2018-01-21 PROCEDURE — 80053 COMPREHEN METABOLIC PANEL: CPT | Performed by: EMERGENCY MEDICINE

## 2018-01-21 PROCEDURE — 25000132 ZZH RX MED GY IP 250 OP 250 PS 637: Performed by: EMERGENCY MEDICINE

## 2018-01-21 PROCEDURE — 87070 CULTURE OTHR SPECIMN AEROBIC: CPT | Performed by: EMERGENCY MEDICINE

## 2018-01-21 PROCEDURE — 83036 HEMOGLOBIN GLYCOSYLATED A1C: CPT | Performed by: STUDENT IN AN ORGANIZED HEALTH CARE EDUCATION/TRAINING PROGRAM

## 2018-01-21 PROCEDURE — 80069 RENAL FUNCTION PANEL: CPT | Performed by: HOSPITALIST

## 2018-01-21 PROCEDURE — 94640 AIRWAY INHALATION TREATMENT: CPT | Mod: 76

## 2018-01-21 PROCEDURE — 12000008 ZZH R&B INTERMEDIATE UMMC

## 2018-01-21 PROCEDURE — 85610 PROTHROMBIN TIME: CPT | Performed by: HOSPITALIST

## 2018-01-21 PROCEDURE — 25000125 ZZHC RX 250: Performed by: HOSPITALIST

## 2018-01-21 PROCEDURE — 96374 THER/PROPH/DIAG INJ IV PUSH: CPT | Performed by: EMERGENCY MEDICINE

## 2018-01-21 PROCEDURE — 99223 1ST HOSP IP/OBS HIGH 75: CPT | Mod: AI | Performed by: HOSPITALIST

## 2018-01-21 PROCEDURE — 71046 X-RAY EXAM CHEST 2 VIEWS: CPT

## 2018-01-21 PROCEDURE — 83735 ASSAY OF MAGNESIUM: CPT | Performed by: HOSPITALIST

## 2018-01-21 PROCEDURE — 85025 COMPLETE CBC W/AUTO DIFF WBC: CPT | Performed by: EMERGENCY MEDICINE

## 2018-01-21 PROCEDURE — 82803 BLOOD GASES ANY COMBINATION: CPT

## 2018-01-21 PROCEDURE — 87804 INFLUENZA ASSAY W/OPTIC: CPT | Performed by: EMERGENCY MEDICINE

## 2018-01-21 PROCEDURE — 25000132 ZZH RX MED GY IP 250 OP 250 PS 637: Performed by: HOSPITALIST

## 2018-01-21 PROCEDURE — 85025 COMPLETE CBC W/AUTO DIFF WBC: CPT | Performed by: HOSPITALIST

## 2018-01-21 PROCEDURE — 40000275 ZZH STATISTIC RCP TIME EA 10 MIN

## 2018-01-21 PROCEDURE — 25000128 H RX IP 250 OP 636: Performed by: EMERGENCY MEDICINE

## 2018-01-21 PROCEDURE — 93005 ELECTROCARDIOGRAM TRACING: CPT

## 2018-01-21 PROCEDURE — 84460 ALANINE AMINO (ALT) (SGPT): CPT | Performed by: HOSPITALIST

## 2018-01-21 PROCEDURE — 25000131 ZZH RX MED GY IP 250 OP 636 PS 637: Performed by: STUDENT IN AN ORGANIZED HEALTH CARE EDUCATION/TRAINING PROGRAM

## 2018-01-21 PROCEDURE — 84075 ASSAY ALKALINE PHOSPHATASE: CPT | Performed by: HOSPITALIST

## 2018-01-21 RX ORDER — ACETAMINOPHEN 325 MG/1
975 TABLET ORAL ONCE
Status: COMPLETED | OUTPATIENT
Start: 2018-01-21 | End: 2018-01-21

## 2018-01-21 RX ORDER — MEROPENEM 1 G/1
1 INJECTION, POWDER, FOR SOLUTION INTRAVENOUS EVERY 8 HOURS
Status: DISCONTINUED | OUTPATIENT
Start: 2018-01-21 | End: 2018-01-22 | Stop reason: ALTCHOICE

## 2018-01-21 RX ORDER — POLYETHYLENE GLYCOL 3350 17 G/17G
17 POWDER, FOR SOLUTION ORAL DAILY PRN
Status: DISCONTINUED | OUTPATIENT
Start: 2018-01-21 | End: 2018-01-23 | Stop reason: HOSPADM

## 2018-01-21 RX ORDER — DEXTROSE MONOHYDRATE 25 G/50ML
25-50 INJECTION, SOLUTION INTRAVENOUS
Status: DISCONTINUED | OUTPATIENT
Start: 2018-01-21 | End: 2018-01-23 | Stop reason: HOSPADM

## 2018-01-21 RX ORDER — HEPARIN SODIUM,PORCINE 10 UNIT/ML
2-5 VIAL (ML) INTRAVENOUS
Status: DISCONTINUED | OUTPATIENT
Start: 2018-01-21 | End: 2018-01-23 | Stop reason: HOSPADM

## 2018-01-21 RX ORDER — AZITHROMYCIN 250 MG/1
500 TABLET, FILM COATED ORAL DAILY
Status: DISCONTINUED | OUTPATIENT
Start: 2018-01-21 | End: 2018-01-23 | Stop reason: HOSPADM

## 2018-01-21 RX ORDER — VITAMIN E 268 MG
400 CAPSULE ORAL DAILY
Status: DISCONTINUED | OUTPATIENT
Start: 2018-01-21 | End: 2018-01-23 | Stop reason: HOSPADM

## 2018-01-21 RX ORDER — AZITHROMYCIN 250 MG/1
500 TABLET, FILM COATED ORAL ONCE
Status: DISCONTINUED | OUTPATIENT
Start: 2018-01-21 | End: 2018-01-21

## 2018-01-21 RX ORDER — AMOXICILLIN 250 MG
2 CAPSULE ORAL DAILY PRN
Status: DISCONTINUED | OUTPATIENT
Start: 2018-01-21 | End: 2018-01-23 | Stop reason: HOSPADM

## 2018-01-21 RX ORDER — LEVALBUTEROL INHALATION SOLUTION 1.25 MG/3ML
1 SOLUTION RESPIRATORY (INHALATION) 4 TIMES DAILY
Status: DISCONTINUED | OUTPATIENT
Start: 2018-01-21 | End: 2018-01-23 | Stop reason: HOSPADM

## 2018-01-21 RX ORDER — LEVALBUTEROL TARTRATE 45 UG/1
2 AEROSOL, METERED ORAL EVERY 6 HOURS PRN
Status: DISCONTINUED | OUTPATIENT
Start: 2018-01-21 | End: 2018-01-23 | Stop reason: HOSPADM

## 2018-01-21 RX ORDER — ACETYLCYSTEINE 100 MG/ML
4 SOLUTION ORAL; RESPIRATORY (INHALATION) 4 TIMES DAILY
Status: DISCONTINUED | OUTPATIENT
Start: 2018-01-21 | End: 2018-01-23 | Stop reason: HOSPADM

## 2018-01-21 RX ORDER — ONDANSETRON 4 MG/1
4-8 TABLET, ORALLY DISINTEGRATING ORAL EVERY 8 HOURS PRN
Status: DISCONTINUED | OUTPATIENT
Start: 2018-01-21 | End: 2018-01-23 | Stop reason: HOSPADM

## 2018-01-21 RX ORDER — AMITRIPTYLINE HYDROCHLORIDE 10 MG/1
10 TABLET ORAL AT BEDTIME
Status: DISCONTINUED | OUTPATIENT
Start: 2018-01-21 | End: 2018-01-23 | Stop reason: HOSPADM

## 2018-01-21 RX ORDER — PHYTONADIONE 5 MG/1
5 TABLET ORAL DAILY
Status: DISCONTINUED | OUTPATIENT
Start: 2018-01-21 | End: 2018-01-23 | Stop reason: HOSPADM

## 2018-01-21 RX ORDER — OSELTAMIVIR PHOSPHATE 75 MG/1
75 CAPSULE ORAL 2 TIMES DAILY
Status: DISCONTINUED | OUTPATIENT
Start: 2018-01-21 | End: 2018-01-23 | Stop reason: HOSPADM

## 2018-01-21 RX ORDER — SULFAMETHOXAZOLE/TRIMETHOPRIM 800-160 MG
1 TABLET ORAL 2 TIMES DAILY
Status: DISCONTINUED | OUTPATIENT
Start: 2018-01-21 | End: 2018-01-23 | Stop reason: HOSPADM

## 2018-01-21 RX ORDER — LIDOCAINE 40 MG/G
CREAM TOPICAL
Status: DISCONTINUED | OUTPATIENT
Start: 2018-01-21 | End: 2018-01-23 | Stop reason: HOSPADM

## 2018-01-21 RX ORDER — NICOTINE POLACRILEX 4 MG
15-30 LOZENGE BUCCAL
Status: DISCONTINUED | OUTPATIENT
Start: 2018-01-21 | End: 2018-01-23 | Stop reason: HOSPADM

## 2018-01-21 RX ORDER — OSELTAMIVIR PHOSPHATE 75 MG/1
75 CAPSULE ORAL ONCE
Status: COMPLETED | OUTPATIENT
Start: 2018-01-21 | End: 2018-01-21

## 2018-01-21 RX ORDER — SODIUM CHLORIDE FOR INHALATION 7 %
4 VIAL, NEBULIZER (ML) INHALATION 2 TIMES DAILY
Status: DISCONTINUED | OUTPATIENT
Start: 2018-01-21 | End: 2018-01-23 | Stop reason: HOSPADM

## 2018-01-21 RX ORDER — ACETAMINOPHEN 325 MG/1
325-650 TABLET ORAL EVERY 6 HOURS PRN
Status: DISCONTINUED | OUTPATIENT
Start: 2018-01-21 | End: 2018-01-23 | Stop reason: HOSPADM

## 2018-01-21 RX ORDER — AZTREONAM 1 G/1
1 INJECTION, POWDER, LYOPHILIZED, FOR SOLUTION INTRAMUSCULAR; INTRAVENOUS 3 TIMES DAILY
Status: DISCONTINUED | OUTPATIENT
Start: 2018-01-21 | End: 2018-01-22

## 2018-01-21 RX ADMIN — AMITRIPTYLINE HYDROCHLORIDE 10 MG: 10 TABLET, FILM COATED ORAL at 22:24

## 2018-01-21 RX ADMIN — VITAMIN E CAP 400 UNIT 400 UNITS: 400 CAP at 20:10

## 2018-01-21 RX ADMIN — CHOLECALCIFEROL TAB 10 MCG (400 UNIT) 400 UNITS: 10 TAB at 20:10

## 2018-01-21 RX ADMIN — OSELTAMIVIR PHOSPHATE 75 MG: 75 CAPSULE ORAL at 20:11

## 2018-01-21 RX ADMIN — LEVALBUTEROL HYDROCHLORIDE 1.25 MG: 1.25 SOLUTION RESPIRATORY (INHALATION) at 21:04

## 2018-01-21 RX ADMIN — ACETAMINOPHEN 975 MG: 325 TABLET, FILM COATED ORAL at 13:26

## 2018-01-21 RX ADMIN — SULFAMETHOXAZOLE AND TRIMETHOPRIM 1 TABLET: 800; 160 TABLET ORAL at 20:11

## 2018-01-21 RX ADMIN — PANCRELIPASE 168000 UNITS: 24000; 76000; 120000 CAPSULE, DELAYED RELEASE PELLETS ORAL at 18:09

## 2018-01-21 RX ADMIN — INSULIN GLARGINE 6 UNITS: 100 INJECTION, SOLUTION SUBCUTANEOUS at 23:53

## 2018-01-21 RX ADMIN — ACETYLCYSTEINE 4 ML: 100 SOLUTION ORAL; RESPIRATORY (INHALATION) at 21:14

## 2018-01-21 RX ADMIN — MEROPENEM 1 G: 1 INJECTION, POWDER, FOR SOLUTION INTRAVENOUS at 15:28

## 2018-01-21 RX ADMIN — PHYTONADIONE 5 MG: 5 TABLET ORAL at 20:11

## 2018-01-21 RX ADMIN — MEROPENEM 1 G: 1 INJECTION, POWDER, FOR SOLUTION INTRAVENOUS at 22:24

## 2018-01-21 RX ADMIN — OSELTAMIVIR PHOSPHATE 75 MG: 75 CAPSULE ORAL at 15:23

## 2018-01-21 ASSESSMENT — ENCOUNTER SYMPTOMS
SHORTNESS OF BREATH: 0
ABDOMINAL PAIN: 0
COUGH: 1
FEVER: 1

## 2018-01-21 NOTE — IP AVS SNAPSHOT
MRN:[id number]                      After Visit Summary   1/21/2018    Nico Morales    MRN: [id number]           Thank you!     Thank you for choosing [facility] for your care. Our goal is always to provide you with excellent care. Hearing back from our patients is one way we can continue to improve our services. Please take a few minutes to complete the written survey that you may receive in the mail after you visit with us. Thank you!        Patient Information     Date Of Birth          1975        Designated Caregiver       Most Recent Value    Caregiver    Will someone help with your care after discharge? no      About your hospital stay     You were admitted on:  January 21, 2018 You last received care in the:  [address] [facility]    You were discharged on:  January 23, 2018        Reason for your hospital stay       Admitted for influenza A and concern for acute flare of cystic fibrosis                  Who to Call     For medical emergencies, please call 911.  For non-urgent questions about your medical care, please call your primary care provider or clinic, [phone]          Attending Provider     Provider Specialty    Hesham Jacob MD Emergency Medicine    Lucina Tomas MD Internal Medicine    Alex Negron MD Internal Medicine       Primary Care Provider Office Phone # Fax #    Jaye Maki Machado [phone]      After Care Instructions     Activity       Your activity upon discharge: activity as tolerated            Diet       Follow this diet upon discharge: Orders Placed This Encounter      Room Service      Adult Formula Bolus Feeding: BID TwoCal HN; Route: Gastrostomy; 1; Can(s); Medication - Tube Feeding Flush Frequency: At least 15-30 mL water before and after medication administration and with tube clogging; Give both cans at night, if pt desires...      High Kcal/High Protein Diet, ADULT            Discharge  Instructions       You will be on meropenem 2 g every 8 hours, make sure infusion is over 2 hours each time. You will be following up Pulmonary clinic in 2-3 weeks. Continue all other medications as previously prescribed with addition to Tamiflu to complete total 5 day course.            IV access       **Ordering Provider MUST call/page Care Coordinator/ to discuss arranging this service**    You are going home with the following vascular access device: PICC.            Tubes and drains       You are going home with the following tubes or drains: feeding tube G-Tube.   Tube cares per hospital or home care instructions                  Follow-up Appointments     Adult [facility] Follow-up and recommended labs and tests       Follow up with primary care provider, Jaye Machado, within 14-21 days for hospital follow- up. To be determined by the Pulmonary team.    Appointments on [place] and/or [facility] (with [facility] or [facility] provider or service). Call [phone] if you haven't heard regarding these appointments within 7 days of discharge.                  Your next 10 appointments already scheduled     Mar 05, 2018 10:15 AM CST   Return Visit with Diana Lee MD, MG ENDO NURSE   [facility] ([facility])    [address] [place] [id number] [phone]            Mar 07, 2018 10:30 AM CST   CF LOOP with  PFL CF   [facility] Pulmonary Function Testing ([facility])    [address]  3rd Floor  [facility] [address] [phone]            Mar 07, 2018 11:10 AM CST   (Arrive by 10:55 AM)   RETURN CYSTIC FIBROSIS VISIT with Jaye Machado MD   [facility] ([facility])    [address]  [address] [place] [id number] [phone]              Additional Services     Home infusion referral       Resumption of home  infusion services:    Your provider has referred you to: [facility] Phone: [phone] Fax: [phone]    Local Address (if different from home address): N/A    Anticipated Length of Therapy: per MD orders    Cycled Enteral Nutrition:  Two Javier HN - 2 cans/day    Home Infusion Pharmacist to adjust therapy based on labs and clinical assessments: Yes    Home Infusion Pharmacist to order labs based on therapy type and clinical assessments: Yes    Agency Staff to assess nursing needs for Infusion Therapy.            Home infusion referral       Your provider has referred you to: [facility]: [facility] [phone]   [email or web address]    Local Address (if different from home address): N/A    Anticipated Length of Therapy: per MD orders    IV Antibiotics:  IV Meropenem 2gm every 8 hours infused over 2 hours    Home Infusion Pharmacist to adjust therapy based on labs and clinical assessments: Yes    Labs:  May draw labs from Venous Catheter: Yes  Home Infusion Pharmacist to order labs based on therapy type and clinical assessments: Yes  Labs: BMP with Mag and Phos weekly. Next lab draw on 1/29/18.  Call/Fax Lab Results to: Please fax lab results to the CF office at [phone].     Agency Staff to assess nursing needs for Infusion Therapy.    Access Device Management:  IV Access Type: PICC  Flush with Heparin and Normal Saline IVP PRN and routine site care (per agency protocol) to maintain access device? Pt has valved PICC line                  Further instructions from your care team         Cystic Fibrosis Hospital Discharge Instructions    Diagnosis:  CF Pulmonary Exacerbation & +Influenza A    Major Tests and Procedures:  Imaging: Chest Xray x2   Consults: Pulmonology  Procedure: Single Lumen PICC Placement    Sputum Culture Results:  All cultures:    Recent Labs  Lab 01/21/18  1538   CULT Moderate growthNormal santi  Light growthMucoid strainNon lactose  fermenting gram negative rods*  Culture in progress       Nutrition:  National CF Foundation Recommendations for BMI in CF Adults: Women: at least 22 Men: at least 23   Your BMI: Body mass index is 20.45 kg/(m^2).  Your weight:   Vitals:    18 1207 18 1800   Weight: 62.7 kg (138 lb 3.7 oz) 61.7 kg (136 lb)       Good Nutrition Can Improve Lung Function and Overall Health    Take ALL of your vitamins with food     Take 1/2 of your enzymes before EVERY meal/snack and the other 1/2 mid-meal/snack    Diet:  High Calorie/ High Protein      Annual Studies:  Annual studies are important to monitor your lung, bone, and nutrition health.    Generally annual studies are done when you are well.  You and your provider will determine when it is appropriate to schedule your annual studies.        Activity:  Activity as tolerated and instructed by physical therapy    Airway Clearance: The Most Important Way to Keep Your Lungs Healthy  Vest 3-4 X per day  [facility], 9, 10 @ pressure of 10 for 5 minutes each                           18, 19, 20 @ pressure of 6 for 5 minutes each  Deflate vest after each 5 minutes and cough 3 times    Pulmonary Function Tests:   FEV1: amount of air you can blow out in 1 second   FVC: total amount of air you can take in and blow out    Admission ()  FEV1  1.37L or 34% and  FVC 3.53L or 71%      Home Care:  IV antibiotics per [facility].  Continue antibiotics until you are re-evaluated in the clinic  Home Antibiotic Schedule:  Meropenem 2g every 8 hrs: : 8pm,  6 am, 2 pm, 10 pm       Labs:  BMP, Mag and Phos Q week, first draw Monday.  Fax results to the CF office @ [phone]    Symptoms to call your physician about:    Chest Pain    Changes in sputum    Blood in sputum    Increased shortness of breath    Nausea/Vomiting    Diarrhea/Constipation    Rash    Joint Pain    Temperature higher than 100.5    Who to call:  Monday - Friday from 8-3:30 call the  CF Office @ [phone]  After hours call [phone] and have the  page the on-call pulmonologist.   Respiratory Therapist, Felicitas Banuelos @ [phone]   Social Work: Marisa Urbina @ [phone] // Mary Ritter @ [phone]  Dietitian: Please call the CF office at [phone] and ask them to contact the CF Dietician.   Diabetes Nurse: [phone]  KJ: [phone]    Keila () has been notified of your recent hospital discharge.  If your appointment is not already scheduled by the time of your discharge, she will contact you when she finds a follow up appointment slot.  If you have any questions for her regarding your follow up appointment, please feel free to call her: [phone].    Pending Results     Date and Time Order Name Status Description    1/21/2018 1430 Cystic Fibrosis Culture Aerob Bacterial Preliminary             Statement of Approval     Ordered          01/23/18 1147  I have reviewed and agree with all the recommendations and orders detailed in this document.  EFFECTIVE NOW     Approved and electronically signed by:  Zahida Ashford MD             Admission Information     Date & Time Provider Department Dept. Phone    1/21/2018 Alex Negron MD [address] [facility] [phone]      Your Vitals Were     Blood Pressure Pulse Temperature Respirations Weight Pulse Oximetry    105/65 (BP Location: Right arm) 92 97.7  F (36.5  C) (Oral) 18 61.7 kg (136 lb) 98%    BMI (Body Mass Index)                   20.45 kg/m2           MyChart Information     [employer] gives you secure access to your electronic health record. If you see a primary care provider, you can also send messages to your care team and make appointments. If you have questions, please call your primary care clinic.  If you do not have a primary care provider, please call [phone] and they will assist you.        Care EveryWhere ID     This is your Care EveryWhere ID. This could be used by  other organizations to access your [facility] medical records  [id number]        Equal Access to Services     TOÑA RAMIREZ : [place]ell Nevarez, easton disla, martinez hartmawill douglass, joe dimasmary bethsadiq mack. So [facility] [phone].    ATENCIÓN: Si habla español, tiene a monroe disposición servicios gratuitos de asistencia lingüística. Llame al [phone].    We comply with applicable federal civil rights laws and [place] laws. We do not discriminate on the basis of race, color, national origin, age, disability, sex, sexual orientation, or gender identity.               Review of your medicines      START taking        Dose / Directions    meropenem 2 g   Indication:  Cystic Fibrosis   Used for:  Cystic fibrosis with pulmonary manifestations (H)        Dose:  2 g   Inject 2 g into the vein every 8 hours   Quantity:  100 g   Refills:  1         CONTINUE these medicines which may have CHANGED, or have new prescriptions. If we are uncertain of the size of tablets/capsules you have at home, strength may be listed as something that might have changed.        Dose / Directions    acetylcysteine 10 % nebulizer solution   Commonly known as:  MUCOMYST   This may have changed:  additional instructions   Used for:  Cystic fibrosis with pulmonary manifestations (H)        Nebulize 4ml qid   Quantity:  480 mL   Refills:  6       amitriptyline 10 MG tablet   Commonly known as:  ELAVIL   This may have changed:  See the new instructions.   Used for:  Cystic fibrosis (H)        TAKE ONE TO THREE TABLETS BY MOUTH AT BEDTIME.   Quantity:  90 tablet   Refills:  3       sodium chloride inhalant 7 % Nebu neb solution   This may have changed:  See the new instructions.   Used for:  Cystic fibrosis with pulmonary manifestations (H)        NEBULIZE 4ML TWICE DAILY   Quantity:  240 mL   Refills:  5         CONTINUE these medicines which have NOT CHANGED        Dose / Directions    ACE NOT PRESCRIBED  (INTENTIONAL)   Used for:  Type I (juvenile type) diabetes mellitus without mention of complication, not stated as uncontrolled        Dose:  1 each   1 each continuous prn. 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   Quantity:  2 Box   Refills:  2       blood glucose monitoring meter device kit   Used for:  Diabetes mellitus related to CF (cystic fibrosis) (H)        Use to test blood sugar 6 times daily or as directed.   Quantity:  1 kit   Refills:  2       blood glucose monitoring test strip   Commonly known as:  MARTINA CONTOUR NEXT   Used for:  Diabetes mellitus related to CF (cystic fibrosis) (H)        Use to test blood sugar 6 times daily or as directed.   Quantity:  200 each   Refills:  2       budesonide-formoterol 160-4.5 MCG/ACT Inhaler   Commonly known as:  SYMBICORT   Used for:  Cystic fibrosis of the lung (H), Cystic fibrosis (H)        Dose:  2 puff   Inhale 2 puffs into the lungs 2 times daily   Quantity:  1 Inhaler  "  Refills:  12       CALCIUM PO        Dose:  1 tablet   Take 1 tablet by mouth 2 times daily Strength unknown.   Refills:  0       CREON 66690-42969 UNITS Cpep per EC capsule   Used for:  Cystic fibrosis with pulmonary manifestations (H)   Generic drug:  amylase-lipase-protease        Take 7-8 caps with meals (3 meals/day) and 2 caps with snacks (3 snacks/day)   Quantity:  2700 capsule   Refills:  3       EFLOW SCF NEBULIZER HANDSET Misc   Used for:  Nocardia infection        Dose:  1 each   1 each 3 times daily.   Quantity:  1 each   Refills:  6       [facility] HOME INFUSION MANAGED PATIENT        Contact [facility] for patient specific medication information at [phone] on admission and discharge from the hospital.  Phones are answered 24 hours a day 7 days a week 365 days a year.  Providers - Choose \"CONTINUE HOME MED (no script)\" at discharge if patient treatment with home infusion will continue.   Refills:  0       insulin aspart 100 UNIT/ML injection   Commonly known as:  NovoLOG PENFILL   Used for:  Diabetes mellitus related to CF (cystic fibrosis) (H)        1 per 20 grams of carbohydrate at lunch and supper. Also use 3 units of Novolog at night with tube feeding.   Quantity:  15 mL   Refills:  12       insulin glargine 100 UNIT/ML injection   Commonly known as:  LANTUS SOLOSTAR   Used for:  Diabetes mellitus related to CF (cystic fibrosis) (H)        6 units daily   Quantity:  15 mL   Refills:  6       insulin pen needle 32G X 4 MM   Commonly known as:  BD OMI U/F   Used for:  Diabetes mellitus related to CF (cystic fibrosis) (H)        Use 6 daily or as directed.   Quantity:  200 each   Refills:  11       levalbuterol 1.25 MG/3ML neb solution   Commonly known as:  XOPENEX        Dose:  1 ampule   Take 3 mLs (1.25 mg) by nebulization 4 times daily   Quantity:  360 mL   Refills:  11       levalbuterol 45 MCG/ACT Inhaler   Commonly known as:  XOPENEX HFA   Used for:  Cystic fibrosis (H)     "    Dose:  2 puff   Inhale 2 puffs into the lungs every 6 hours as needed for shortness of breath / dyspnea   Quantity:  3 Inhaler   Refills:  4       MEPHYTON 5 MG tablet   Generic drug:  phytonadione        Take 1 TABLET by mouth DAILY.   Refills:  0       MULTIVITAMIN PO        Take 1 tablet by mouth daily. Includes 5,000 units vitamin D and 400 units vitamin E. Per pt, formulated for CF pts.   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   Quantity:  510 g   Refills:  3       rimantadine 100 MG tablet   Commonly known as:  FLUMADINE   Used for:  Cystic fibrosis with pulmonary manifestations (H)        Dose:  100 mg   Take 1 tablet (100 mg) by mouth 2 times daily Take during flu season Oct 1st-March 31st   Quantity:  60 tablet   Refills:  6       sulfamethoxazole-trimethoprim 800-160 MG per tablet   Commonly known as:  BACTRIM DS/SEPTRA DS   Used for:  Cystic fibrosis with pulmonary manifestations (H)        Dose:  1 tablet   Take 1 tablet by mouth 2 times daily   Quantity:  60 tablet   Refills:  3       VITRON-C 200-125 MG Tabs   Used for:  Nocardia infection, Cystic fibrosis with pulmonary manifestations (H)   Generic drug:  ferrous fumarate 65 mg (Jackson. FE)-Vitamin C 125 mg        Dose:  1 tablet   Take 1 tablet by mouth daily   Refills:  0            Where to get your medicines      These medications were  sent to [facility] - [place], [place] - [address]  [address], [facility] [address]     Phone:  [phone]     oseltamivir 75 MG capsule         Some of these will need a paper prescription and others can be bought over the counter. Ask your nurse if you have questions.     Bring a paper prescription for each of these medications     meropenem 2 g               ANTIBIOTIC INSTRUCTION     You've Been Prescribed an Antibiotic - Now What?  Your healthcare team thinks that you or your loved one might have an infection. Some infections can be treated with antibiotics, which are powerful, life-saving drugs. Like all medications, antibiotics have side effects and should only be used when necessary. There are some important things you should know about your antibiotic treatment.      Your healthcare team may run tests before you start taking an antibiotic.    Your team may take samples (e.g., from your blood, urine or other areas) to run tests to look for bacteria. These test can be important to determine if you need an antibiotic at all and, if you do, which antibiotic will work best.      Within a few days, your healthcare team might change or even stop your antibiotic.    Your team may start you on an antibiotic while they are working to find out what is making you sick.    Your team might change your antibiotic because test results show that a different antibiotic would be better to treat your infection.    In some cases, once your team has more information, they learn that you do not need an antibiotic at all. They may find out that you don't have an infection, or that the antibiotic you're taking won't work against your infection. For example, an infection caused by a virus can't be treated with antibiotics. Staying on an antibiotic when you don't need it is more likely to be harmful than helpful.      You may experience side effects from your antibiotic.    Like all medications,  antibiotics have side effects. Some of these can be serious.    Let you healthcare team know if you have any known allergies when you are admitted to the hospital.    One significant side effect of nearly all antibiotics is the risk of severe and sometimes deadly diarrhea caused by Clostridium difficile (C. Difficile). This occurs when a person takes antibiotics because some good germs are destroyed. Antibiotic use allows C. diificile to take over, putting patients at high risk for this serious infection.    As a patient or caregiver, it is important to understand your or your loved one's antibiotic treatment. It is especially important for caregivers to speak up when patients can't speak for themselves. Here are some important questions to ask your healthcare team.    What infection is this antibiotic treating and how do you know I have that infection?    What side effects might occur from this antibiotic?    How long will I need to take this antibiotic?    Is it safe to take this antibiotic with other medications or supplements (e.g., vitamins) that I am taking?     Are there any special directions I need to know about taking this antibiotic? For example, should I take it with food?    How will I be monitored to know whether my infection is responding to the antibiotic?    What tests may help to make sure the right antibiotic is prescribed for me?      Information provided by:  www.cdc.gov/getsmart  U.S. Department of Health and Human Services  Centers for disease Control and Prevention  National Center for Emerging and Zoonotic Infectious Diseases  Division of Healthcare Quality Promotion         Protect others around you: Learn how to safely use, store and throw away your medicines at www.disposemymeds.org.             Medication List: This is a list of all your medications and when to take them. Check marks below indicate your daily home schedule. Keep this list as a reference.      Medications            Morning Afternoon Evening Bedtime As Needed    ACE NOT PRESCRIBED (INTENTIONAL)   1 each continuous prn. ACE Inhibitor not prescribed due to Risk for drug interaction                                acetylcysteine 10 % nebulizer solution   Commonly known as:  MUCOMYST   Nebulize 4ml qid   Last time this was given:  4 mLs on 1/23/2018  8:43 AM                                amitriptyline 10 MG tablet   Commonly known as:  ELAVIL   TAKE ONE TO THREE TABLETS BY MOUTH AT BEDTIME.   Last time this was given:  10 mg on 1/22/2018 10:45 PM                                azithromycin 250 MG tablet   Commonly known as:  ZITHROMAX   Take 1 tablet (250 mg) by mouth daily   Last time this was given:  500 mg on 1/23/2018  9:09 AM                                aztreonam lysine 75 MG Solr   Commonly known as:  CAYSTON   Take 1 mL (75 mg) by nebulization 3 times daily                                blood glucose lancets standard   Commonly known as:  no brand specified   Whatever Lancets that go with device, Test 6 times daily                                blood glucose monitoring lancets   Use to test blood sugar 6 times daily or as directed.                                blood glucose monitoring meter device kit   Use to test blood sugar 6 times daily or as directed.                                blood glucose monitoring test strip   Commonly known as:  [employer] CONTOUR NEXT   Use to test blood sugar 6 times daily or as directed.                                budesonide-formoterol 160-4.5 MCG/ACT Inhaler   Commonly known as:  SYMBICORT   Inhale 2 puffs into the lungs 2 times daily                                CALCIUM PO   Take 1 tablet by mouth 2 times daily Strength unknown.                                CREON 58491-42948 UNITS Cpep per EC capsule   Take 7-8 caps with meals (3 meals/day) and 2 caps with snacks (3 snacks/day)   Last time this was given:  192,000 Units on 1/22/2018  6:30 PM   Generic drug:   "amylase-lipase-protease                                EFLOW SCF NEBULIZER HANDSET Misc   1 each 3 times daily.                                [facility] HOME INFUSION MANAGED PATIENT   Contact [facility] for patient specific medication information at [phone] on admission and discharge from the hospital.  Phones are answered 24 hours a day 7 days a week 365 days a year.  Providers - Choose \"CONTINUE HOME MED (no script)\" at discharge if patient treatment with home infusion will continue.                                insulin aspart 100 UNIT/ML injection   Commonly known as:  NovoLOG PENFILL   1 per 20 grams of carbohydrate at lunch and supper. Also use 3 units of Novolog at night with tube feeding.                                insulin glargine 100 UNIT/ML injection   Commonly known as:  LANTUS SOLOSTAR   6 units daily   Last time this was given:  6 Units on 1/22/2018 10:45 PM                                insulin pen needle 32G X 4 MM   Commonly known as:  BD OMI U/F   Use 6 daily or as directed.                                levalbuterol 1.25 MG/3ML neb solution   Commonly known as:  XOPENEX   Take 3 mLs (1.25 mg) by nebulization 4 times daily   Last time this was given:  1.25 mg on 1/23/2018  8:43 AM                                levalbuterol 45 MCG/ACT Inhaler   Commonly known as:  XOPENEX HFA   Inhale 2 puffs into the lungs every 6 hours as needed for shortness of breath / dyspnea                                MEPHYTON 5 MG tablet   Take 1 TABLET by mouth DAILY.   Last time this was given:  5 mg on 1/23/2018  9:09 AM   Generic drug:  phytonadione                                meropenem 2 g   Inject 2 g into the vein every 8 hours   Last time this was given:  3,000 mg on 1/23/2018  1:57 AM                                MULTIVITAMIN PO   Take 1 tablet by mouth daily. Includes 5,000 units vitamin D and 400 units vitamin E. Per pt, formulated for CF pts.                                " oseltamivir 75 MG capsule   Commonly known as:  TAMIFLU   Take 1 capsule (75 mg) by mouth 2 times daily   Last time this was given:  75 mg on 1/23/2018  9:09 AM                                KRSI ALTERA NEBULIZER HANDSET Misc   1 each daily                                polyethylene glycol powder   Commonly known as:  MIRALAX   Use 17 gms po 1-3 times daily as needed.                                rimantadine 100 MG tablet   Commonly known as:  FLUMADINE   Take 1 tablet (100 mg) by mouth 2 times daily Take during flu season Oct 1st-March 31st                                sodium chloride inhalant 7 % Nebu neb solution   NEBULIZE 4ML TWICE DAILY   Last time this was given:  4 mLs on 1/22/2018  8:41 PM                                sulfamethoxazole-trimethoprim 800-160 MG per tablet   Commonly known as:  BACTRIM DS/SEPTRA DS   Take 1 tablet by mouth 2 times daily   Last time this was given:  1 tablet on 1/23/2018  9:09 AM                                VITRON-C 200-125 MG Tabs   Take 1 tablet by mouth daily   Generic drug:  ferrous fumarate 65 mg ([place]. FE)-Vitamin C 125 mg

## 2018-01-21 NOTE — TELEPHONE ENCOUNTER
Mr. Morales called to report increased chest tightness and cold symptoms after a trip to [place].  He also endorses a fever to 102.  He has been taking rimantidine daily.     Due to fever, I recommended that Mr. Morales present to the ER for further evaluation.     Patient was signed out to our ER.     Monie Brown MD  Pulmonary and Critical Care Fellow  Pager [phone]

## 2018-01-21 NOTE — ED PROVIDER NOTES
History     Chief Complaint   Patient presents with     Fever     HPI  Nico Morales is a 42 year old male who has a history of cystic fibrosis with primarily pulmonary disease.  He returned from a vacation in [place] yesterday.  He was therefore 3 days and felt that his cough was worsening and dyspnea was worsening due to increased pollution.  He started developing a more productive cough with a different taste consistent with prior infections since yesterday.  He noticed that he had a fever today 102 with some tightness in the left upper chest where his breathing is tighter since doing his neb this morning.  He denies any severe dyspnea and does not have any body aches.  He does have a mild sore throat nasal congestion.  He has not had any sick contacts other than multiple people were coughing on the flight back yesterday.    I have reviewed the Medications, Allergies, Past Medical and Surgical History, and Social History in the Epic system.    Review of Systems   Constitutional: Positive for fever.   Respiratory: Positive for cough. Negative for shortness of breath.    Cardiovascular: Negative for chest pain.   Gastrointestinal: Negative for abdominal pain.   All other systems reviewed and are negative.      Physical Exam   BP: 104/62  Heart Rate: 108  Temp: 102.5  F (39.2  C)  Resp: 16  Weight: 62.7 kg (138 lb 3.7 oz)  SpO2: 98 %      Physical Exam   Constitutional: No distress.   HENT:   Head: Atraumatic.   Mouth/Throat: Oropharynx is clear and moist. No oropharyngeal exudate, posterior oropharyngeal edema or posterior oropharyngeal erythema.   Eyes: Pupils are equal, round, and reactive to light. No scleral icterus.   Neck: Neck supple.   Cardiovascular: Normal heart sounds and intact distal pulses.    Pulmonary/Chest: Effort normal. No tachypnea. No respiratory distress. He has decreased breath sounds (Diffusely). He has no wheezes. He has rhonchi (Scattered, intermittent). He has no rales.   Abdominal:  Soft. Bowel sounds are normal. There is no tenderness.   Musculoskeletal: He exhibits no edema or tenderness.   Skin: Skin is warm. No rash noted. He is not diaphoretic.       ED Course     ED Course     Procedures             Critical Care time:  none             Labs Ordered and Resulted from Time of ED Arrival Up to the Time of Departure from the ED   COMPREHENSIVE METABOLIC PANEL - Abnormal; Notable for the following:        Result Value    Glucose 102 (*)     Calcium 8.2 (*)     Albumin 3.1 (*)     All other components within normal limits   CBC WITH PLATELETS DIFFERENTIAL - Abnormal; Notable for the following:     Hemoglobin 13.1 (*)     MCHC 30.8 (*)     RDW 15.6 (*)     Absolute Monocytes 1.7 (*)     All other components within normal limits   ISTAT  GASES LACTATE ASHLEY POCT - Abnormal; Notable for the following:     PO2 Venous 18 (*)     All other components within normal limits   INFLUENZA A/B ANTIGEN - Abnormal; Notable for the following:     Influenza A Positive (*)     All other components within normal limits   PULSE OXIMETRY NURSING   CARDIAC CONTINUOUS MONITORING   ISTAT CG4 GASES LACTATE ASHLEY NURSING POCT   PERIPHERAL IV CATHETER   RESPIRATORY VIRUS PANEL BY PCR   CYSTIC FIBROSIS CULTURE AEROB BACTERIAL            Assessments & Plan (with Medical Decision Making)   The patient has worsening infiltrates in the left upper lung field on chest x-ray.  He has a positive influenza A and worsening CF symptoms consistent with a CF exacerbation.  His most recent cultures show Pseudomonas which is resistant to multiple antibiotics.  I spoke with the pulmonary service who recommends admission for IV meropenem until we can establish a PICC line and outpatient treatment.  He was febrile and tachycardic on arrival and is improving with Tylenol.  He does not appear toxic or requiring respiratory support at this time.  He will be admitted to the internal medicine service with pulmonary consultation.    I have reviewed  the nursing notes.    I have reviewed the findings, diagnosis, plan and need for follow up with the patient.    New Prescriptions    No medications on file       Final diagnoses:   Cystic fibrosis with pulmonary manifestations (H)   Influenza A       1/21/2018   [facility], [place], EMERGENCY DEPARTMENT     Hesham Jacob MD  01/21/18 9437

## 2018-01-21 NOTE — H&P
[facility], [place]    Internal Medicine History and Physical - [facility] Service       Date of Admission:  1/21/2018    Chief Complaint   Febrile productive cough    Hx obtained from pt and chart review.       History of Present Illness   Nico Morales is a 42 year old man w/ PMx of CF w/ severe obstruction complicated by MDRO pseudomonas, diabetes, and pancreatic insufficiency who presents with acute onset productive cough starting 2 days ago. Febrile to 102 F with some chest tightness and mild hemoptysis (small streak in sputum). Had associated sore throat, and headaches. Presented to ED and found to have positive rapid influenza test. No chest pain, nausea/vomiting, diarrhea, myalgias, or rash/skin changes.     Of note, pt was traveling in [place] until yesterday. Spent all of his time around hotel in [facility]; no splunking or rural exploration. Initially thought pollution exacerbated his cough. No sick contacts except for other travelers on planes.     Given acetaminophen and feeling better in ED. Pulmonology recommending meropenem, double azithromycin dose, and PICC placement.     Review of Systems   The 10 point Review of Systems is negative other than noted in the HPI or here.     Past Medical History    I have reviewed this patient's medical history and updated it with pertinent information if needed.   Past Medical History:   Diagnosis Date     CF (cystic fibrosis) (H)      Exocrine pancreatic insufficiency      Nocardia infection      Pseudomonas infection      S/P gastrostomy (H) 2002       Past Surgical History   I have reviewed this patient's surgical history and updated it with pertinent information if needed.  Past Surgical History:   Procedure Laterality Date     GASTROSTOMY TUBE  2002        Social History   Social History   Substance Use Topics     Smoking status: Never Smoker     Smokeless tobacco: Never Used     Alcohol use No       Family History   I have reviewed  "this patient's family history and updated it with pertinent information if needed.   Family History   Problem Relation Age of Onset     HEART DISEASE Maternal Grandmother      HEART DISEASE Maternal Grandfather      HEART DISEASE Paternal Grandmother      DIABETES No family hx of        Prior to Admission Medications   Prior to Admission Medications   Prescriptions Last Dose Informant Patient Reported? Taking?   ACE NOT PRESCRIBED, INTENTIONAL,   No No   Si each continuous prn. ACE Inhibitor not prescribed due to Risk for drug interaction   CREON 34031 UNITS CPEP per EC capsule   No No   Sig: Take 7-8 caps with meals (3 meals/day) and 2 caps with snacks (3 snacks/day)   Calcium Citrate-Vitamin D (CALCIUM CITRATE + D PO)   Yes No   Sig: Take 1 tablet by mouth 2 times daily.   [facility] HOME INFUSION MANAGED PATIENT   Yes No   Sig: Contact [facility] for patient specific medication information at [phone] on admission and discharge from the hospital.  Phones are answered 24 hours a day 7 days a week 365 days a year.    Providers - Choose \"CONTINUE HOME MED (no script)\" at discharge if patient treatment with home infusion will continue.   Ferrous Fumarate-Vitamin C (VITRON-C) 200-125 MG TABS   Yes No   Sig: Take 1 tablet by mouth 2 times daily.   MEPHYTON 5 MG PO TABS   Yes No   Si TABLET DAILY   MULTIVITAMIN OR   Yes No   Si tablet daily   Nebulizers (EFLOW SCF NEBULIZER HANDSET) MISC   No No   Si each 3 times daily.   Respiratory Therapy Supplies (KRIS ALTERA NEBULIZER HANDSET) MISC   No No   Si each daily   VITAMIN D 1000 UNIT PO CAPS   Yes No   Si CAPSULE DAILY   VITAMIN E 400 UNIT PO CAPS   Yes No   Si CAPSULE DAILY   acetylcysteine (MUCOMYST) 10 % injection   No No   Sig: Nebulize 4ml qid   amitriptyline (ELAVIL) 10 MG tablet 2018 at Unknown time  No Yes   Sig: TAKE ONE TO THREE TABLETS BY MOUTH AT BEDTIME.   azithromycin (ZITHROMAX) 250 MG tablet 2018 at " Unknown time  No Yes   Sig: Take 1 tablet (250 mg) by mouth daily   aztreonam lysine (CAYSTON) 75 MG SOLR 2018 at Unknown time  No Yes   Sig: Take 1 mL (75 mg) by nebulization 3 times daily   blood glucose (NO BRAND SPECIFIED) lancets standard   No No   Sig: Whatever Lancets that go with device, Test 6 times daily   blood glucose monitoring (MARTINA CONTOUR NEXT MONITOR) meter device kit   No No   Sig: Use to test blood sugar 6 times daily or as directed.   blood glucose monitoring (MARTINA CONTOUR NEXT) test strip   No No   Sig: Use to test blood sugar 6 times daily or as directed.   blood glucose monitoring (MARTINA MICROLET) lancets   No No   Sig: Use to test blood sugar 6 times daily or as directed.   budesonide-formoterol (SYMBICORT) 160-4.5 MCG/ACT Inhaler 2018 at Unknown time  No Yes   Sig: Inhale 2 puffs into the lungs 2 times daily   cetirizine (ZYRTEC) 10 MG tablet   No No   Sig: Take 1 tablet (10 mg) by mouth every evening   clotrimazole 10 MG megan   No No   Sig: Take 1 Megan (10 mg) by mouth 5 times daily   insulin aspart (NOVOLOG PENFILL) 100 UNIT/ML injection 2018 at Unknown time  No Yes   Si per 20 grams of carbohydrate at lunch and supper. Also use 3 units of Novolog at night with tube feeding.   insulin glargine (LANTUS SOLOSTAR) 100 UNIT/ML injection 2018 at Unknown time  No Yes   Si units daily   insulin pen needle (BD OMI U/F) 32G X 4 MM   No No   Sig: Use 6 daily or as directed.   levalbuterol (XOPENEX HFA) 45 MCG/ACT Inhaler   No No   Sig: Inhale 2 puffs into the lungs every 6 hours as needed for shortness of breath / dyspnea   levalbuterol (XOPENEX) 1.25 MG/3ML neb solution   Yes No   Sig: Take 3 mLs (1.25 mg) by nebulization 4 times daily   oseltamivir (TAMIFLU) 75 MG capsule   No No   Sig: Take 1 capsule (75 mg) by mouth 2 times daily   polyethylene glycol (MIRALAX) powder   No No   Sig: Use 17 gms po 1-3 times daily as needed.   rimantadine (FLUMADINE) 100 MG  tablet 1/20/2018 at Unknown time  No Yes   Sig: Take 1 tablet (100 mg) by mouth 2 times daily Take during flu season Oct 1st-March 31st   sodium chloride inhalant 7 % NEBU neb solution   No No   Sig: NEBULIZE 4ML TWICE DAILY   sodium chloride, Inhalant, 0.9 % AERS   No No   Sig: Inhale 3 mLs into the lungs 3 times daily   sulfamethoxazole-trimethoprim (BACTRIM DS/SEPTRA DS) 800-160 MG per tablet 1/20/2018 at Unknown time  No Yes   Sig: Take 1 tablet by mouth 2 times daily      Facility-Administered Medications: None     Allergies   Allergies   Allergen Reactions     Pulmozyme [Dornase Trever] Other (See Comments)     Chest pain and fever     Tobramycin Fatigue     Trouble with ear ringing, shortness of breath, little clinical response.     Zosyn Hives     Ceftazidime Rash     Levaquin Rash       Physical Exam   Vital Signs: Temp: 100.2  F (37.9  C) Temp src: Oral BP: 109/65   Heart Rate: 113 Resp: 28 SpO2: 96 % O2 Device: None (Room air)    Weight: 138 lbs 3.65 oz    General Appearance: pt sitting in ED stretcher in street clothes; no acute distress  HEENT: non-icteric conjunctiva; normocephalic and atraumatic  Respiratory: some cough during exam; normal work of breathing; symmetric breath sounds to bases; popping crackles in L anterior upper lung field; no wheezes, crackles, or rhonchi in posterior lung fields   Cardiovascular: normal S1/S2 w/ regular rhythm; no S3/S4, murmurs, clicks, or rubs  GI: +gastric tube port in place, no surrounding drainage or erythema; soft, non-distended, and non-tender  Skin: no rashes or skin changes over torso, neck, arms, or face  Musculoskeletal: no muscle wasting; no inflamed joints of the upper extremity  Neurologic: awake, alert, and oriented x4; answers questions appropriately; able to move all extremities spontaneously  Psychiatric: normal mood with appropriate affect     Assessment & Plan   Nico JACINTO Andrew is a 42 year old man w/ PMx of CF w/ severe obstruction complicated by  MDRO pseudomonas, diabetes, and pancreatic insufficiency who presents with acute onset productive cough w/ fevers found to have influenza on rapid testing.     #Influenza  #CF pulmonary exacerbation  #Cough  Pt screened in the ED and found to be positive for influenza A. RSV panel still in progress. Previous CF cultures have grown resistant pseudomonas in past. Pt tolerate meropenem, per pulm.   - oseltamivir 75mg BID  - meropenem  - increase azithromycin dosing 250mg --> 500mg daily x3 days  - inpatient PFTs  - follow-up CF respiratory culture  - follow-up respiratory viral panel  - PICC for antibiotic infusion  - supportive care: IVF and respiratory support as needed  - acetaminophen PRN  - pulmonology following; will follow-up w/ recs     #CF-related diabetes  - 1 unit aspart:20 g CHO carb ratio at lunch and dinner  - 3 units aspart w/ evening tube feeds  - 3 units glargine daily; can adjust pending inpatient control     #CF-related pancreatic insufficiency  - continue outpatient Creon  - continue vitamin D, E, K supplementation    #Malnutrition  Pt takes 2 cans TwoCal HN on most evenings during the week.   - TwoCal HN 2 cans qhs PRN (as pt desires)    Diet:  Regular  Fluids: no IVF at this time, pt to drink ad cee; bolus IVF as needd  DVT Prophylaxis: Enoxaparin (Lovenox) SQ  Code Status: Full Code    Disposition Plan   Expected discharge: 2 - 3 days; recommended to prior living arrangement once antibiotic plan established and influenza is adequately treated.      Entered: Kalyan Brooks 01/21/2018, 3:57 PM   Information in the above section will display in the discharge planner report.    The patient was seen and discussed with Dr. Tomas.    Kalyan Brooks MD  Internal Medicine PGY-1  [phone]      Please see sticky note for cross cover information      Data   I have reviewed the relevant labs and imaging in [facility].

## 2018-01-21 NOTE — ED NOTES
[facility], [place]   ED Nurse to Floor Handoff     Nico Morales is a 42 year old male who speaks English and lives alone,  in a home  They arrived in the ED by ambulance from home    ED Chief Complaint: Fever    ED Dx;   Final diagnoses:   Cystic fibrosis with pulmonary manifestations (H)   Influenza A         Needed?: No    Allergies:   Allergies   Allergen Reactions     Pulmozyme [Dornase Trever] Other (See Comments)     Chest pain and fever     Tobramycin Fatigue     Trouble with ear ringing, shortness of breath, little clinical response.     Zosyn Hives     Ceftazidime Rash     Levaquin Rash   .  Past Medical Hx:   Past Medical History:   Diagnosis Date     CF (cystic fibrosis) (H)      Exocrine pancreatic insufficiency      Nocardia infection      Pseudomonas infection      S/P gastrostomy (H) 2002      Baseline Mental status: WDL  Current Mental Status changes: at basesline    Infection: Yes  Sepsis suspected: No  Isolation type: Contact, Droplet     Activity level - Baseline/Home:  Independent  Activity Level - Current:   Independent    Bariatric equipment needed?: No    In the ED these meds were given:   Medications   meropenem (MERREM) 1 g in 20 mL SWFI for IVP Vial (1 g Intravenous Given 1/21/18 1528)   acetaminophen (TYLENOL) tablet 975 mg (975 mg Oral Given 1/21/18 1326)   oseltamivir (TAMIFLU) capsule 75 mg (75 mg Oral Given 1/21/18 1523)       Drips running?  No    Home pump or pre-existing LDA's present? Yes    Labs results:   Labs Ordered and Resulted from Time of ED Arrival Up to the Time of Departure from the ED   COMPREHENSIVE METABOLIC PANEL - Abnormal; Notable for the following:        Result Value    Glucose 102 (*)     Calcium 8.2 (*)     Albumin 3.1 (*)     All other components within normal limits   CBC WITH PLATELETS DIFFERENTIAL - Abnormal; Notable for the following:     Hemoglobin 13.1 (*)     MCHC 30.8 (*)     RDW 15.6 (*)     Absolute Monocytes  1.7 (*)     All other components within normal limits   ISTAT  GASES LACTATE ASHLEY POCT - Abnormal; Notable for the following:     PO2 Venous 18 (*)     All other components within normal limits   INFLUENZA A/B ANTIGEN - Abnormal; Notable for the following:     Influenza A Positive (*)     All other components within normal limits   PULSE OXIMETRY NURSING   CARDIAC CONTINUOUS MONITORING   ISTAT CG4 GASES LACTATE ASHLEY NURSING POCT   PERIPHERAL IV CATHETER   RESPIRATORY VIRUS PANEL BY PCR   CYSTIC FIBROSIS CULTURE AEROB BACTERIAL       Imaging Studies:   Recent Results (from the past 24 hour(s))   XR Chest 2 Views    Narrative    Exam: XR CHEST 2 VW, 1/21/2018 1:16 PM    Indication: cough, cf;     Comparison: Radiograph 12/15/2016    Findings:   PA and lateral views of the chest. Interval removal of left upper  extremity PICC. The trachea is midline. The cardiomediastinal  silhouette is within normal limits. Bilateral upper lobe predominant,  reticular and bronchiectatic, consistent with patient's history of  cystic fibrosis. These have progressed since 12/15/2016 exam.      Impression    Impression: Chronic changes of cystic fibrosis, with superimposed  opacities most significant in the perihilar regions and right upper  lobe. This is concerning for exacerbation or infection.    I have personally reviewed the examination and initial interpretation  and I agree with the findings.    JELANI CERDA       Recent vital signs:   BP 91/60  Pulse 92  Temp 100.2  F (37.9  C) (Oral)  Resp 16  Wt 62.7 kg (138 lb 3.7 oz)  SpO2 95%  BMI 20.78 kg/m2    Cardiac Rhythm: Tachycardia  Pt needs tele? No  Skin/wound Issues: G-tube. Capped. R PIV    Code Status: see chart      Pain control: good    Nausea control: pt had none    Abnormal labs/tests/findings requiring intervention: Influenza A positive, see chest xray    Family present during ED course? No   Family Comments/Social Situation comments: pt single    Tasks needing  completion: [place] plans to start Nebs on the floor    Ivory Goldberg RN  [facility]-- [phone] ED  [phone] ED

## 2018-01-21 NOTE — IP AVS SNAPSHOT
[address] [address]    Phone:  [phone]                                       After Visit Summary   1/21/2018    Nico Morales    MRN: [id number]           After Visit Summary Signature Page     I have received my discharge instructions, and my questions have been answered. I have discussed any challenges I see with this plan with the nurse or doctor.    ..........................................................................................................................................  Patient/Patient Representative Signature      ..........................................................................................................................................  Patient Representative Print Name and Relationship to Patient    ..................................................               ................................................  Date                                            Time    ..........................................................................................................................................  Reviewed by Signature/Title    ...................................................              ..............................................  Date                                                            Time

## 2018-01-21 NOTE — CONSULTS
[facility]    Pulmonary, Allergy, Critical Care and Sleep Medicine    Initial Consultation  01/21/2018    Nico Morales MRN# [id number]   Age: 42 year old YOB: 1975     Date of Admission: 1/21/2018  Reason for Consultation: CF pulmonary exacerbation  Requesting Team: [facility]    Primary care provider: Jaye Machado     Assessment and Recommendations:    Nico Morales is a 42 year old male with a history of cystic fibrosis with severe obstruction who presents with pulmonary exacerbation and influenza A.  Due to fevers, worsening respiratory symptoms, and multiple antibiotic intolerances, he requires inpatient admission.     CF Pulmonary exacerbation  Influenza A:  Micro: current CF culture is pending, in the past has grown Pseudomonas x2, both of which are quite resistant  Abx: intolerant to several antimicrobials, generally tolerates meropenem  - continue home nebulizers and inhalers  - continue chronic azithromycin, increase to 500mg x3 days, then reduce to 250mg daily  - pt alternates between cayston nebulizers and bactrim, continue current bactrim  - continuous infusion meropenem  - scheduled PFTs while inpatient  - repeat CF sputum cultures, respiratory viral panel pending  - anticipate short hospital stay once final antimicrobial regimen is determined    CF related diabetes:  Very well controlled  - continue home insulin regimen, adjust as needed    CF related pancreatic insufficiency:   - continue pancreatic enzymes  - continue home vitamins      Thank you for this consult.  Pulmonary will continue to follow with you.  Dr. Russell is the fellow and Dr. Garcia is staff starting Monday 1/22.     Seen and discussed with Dr Vishnu Brown MD  Pulmonary and Critical Care Fellow   Pager [phone]    Chief Complaint and History of Present Illness:    CC:  Increased cough, sputum production, and fever    HPI:     Mr. Nico Morales is a very  javier 43yo male with a history of CF with severe obstruction (FEV1 39% predicted), CF related diabetes (well controlled), and pancreatic insufficiency who presents with increased coughing, sputum production, and fever.     Mr. Morales reports that he was on a trip to [facility], and generally felt well during this time.  He was also able to vest 3 times daily while there.  He uses levalbuterol + NAC nebs, and also alternates bactrim/cayston nebs every 2 weeks. He notes that he was exposed to increased air pollution, and that several people were coughing on the flight home.      Today he reports a fever of 102, chest tightness and increased chest congestion.  He also endorses increased sputum production but no hemoptysis.  He denies abdominal pain, nausea, vomiting, diarrhea, or constipation.      In the ER, the patient was found to be positive for influenza A.        Review of Systems:  Complete 12 point ROS negative unless mentioned in HPI  Histories, Prior to Admission Medications, Allergies:    Past Medical History:  Past Medical History:   Diagnosis Date     CF (cystic fibrosis) (H)      Exocrine pancreatic insufficiency      Nocardia infection      Pseudomonas infection      S/P gastrostomy (H) 2002       Past Surgical History:  Past Surgical History:   Procedure Laterality Date     GASTROSTOMY TUBE  2002       Past Social History:  Social History     Social History     Marital status: Single     Spouse name: N/A     Number of children: 0     Years of education: N/A     Occupational History     financial [employer]     Social History Main Topics     Smoking status: Never Smoker     Smokeless tobacco: Never Used     Alcohol use No     Drug use: No     Sexual activity: Yes     Partners: Female     Birth control/ protection: Pill     Other Topics Concern     Blood Transfusions No     Exercise No     Social History Narrative    Kilo lives with wife in a house in [place], [place].  He works as a  .        March 2016. He works independently as a . Works out 5x/week and walks the dog daily.        June 2016. No changes. Describes himself as a creature of habit.     Family History:  Family History   Problem Relation Age of Onset     HEART DISEASE Maternal Grandmother      HEART DISEASE Maternal Grandfather      HEART DISEASE Paternal Grandmother      DIABETES No family hx of        Medications:  Current Facility-Administered Medications   Medication     meropenem (MERREM) 1 g in 20 mL SWFI for IVP Vial     Current Outpatient Prescriptions   Medication     amitriptyline (ELAVIL) 10 MG tablet     sulfamethoxazole-trimethoprim (BACTRIM DS/SEPTRA DS) 800-160 MG per tablet     aztreonam lysine (CAYSTON) 75 MG SOLR     rimantadine (FLUMADINE) 100 MG tablet     insulin glargine (LANTUS SOLOSTAR) 100 UNIT/ML injection     azithromycin (ZITHROMAX) 250 MG tablet     budesonide-formoterol (SYMBICORT) 160-4.5 MCG/ACT Inhaler     insulin aspart (NOVOLOG PENFILL) 100 UNIT/ML injection     levalbuterol (XOPENEX HFA) 45 MCG/ACT Inhaler     Respiratory Therapy Supplies (KRIS ALTERA NEBULIZER HANDSET) MISC     clotrimazole 10 MG richard     blood glucose (NO BRAND SPECIFIED) lancets standard     blood glucose monitoring (MARTINA CONTOUR NEXT) test strip     insulin pen needle (BD OMI U/F) 32G X 4 MM     sodium chloride inhalant 7 % NEBU neb solution     acetylcysteine (MUCOMYST) 10 % injection     polyethylene glycol (MIRALAX) powder     cetirizine (ZYRTEC) 10 MG tablet     levalbuterol (XOPENEX) 1.25 MG/3ML neb solution     CREON 46740 UNITS CPEP per EC capsule     blood glucose monitoring (MARTINA CONTOUR NEXT MONITOR) meter device kit     blood glucose monitoring (MARTINA MICROLET) lancets     oseltamivir (TAMIFLU) 75 MG capsule     sodium chloride, Inhalant, 0.9 % AERS     [facility] INFUSION MANAGED PATIENT     ACE NOT PRESCRIBED, INTENTIONAL,     Nebulizers (EFLOW SCF NEBULIZER HANDSET) MISC      Calcium Citrate-Vitamin D (CALCIUM CITRATE + D PO)     Ferrous Fumarate-Vitamin C (VITRON-C) 200-125 MG TABS     MEPHYTON 5 MG PO TABS     MULTIVITAMIN OR     VITAMIN D 1000 UNIT PO CAPS     VITAMIN E 400 UNIT PO CAPS           Allergies:     Allergies   Allergen Reactions     Pulmozyme [Dornase Trever] Other (See Comments)     Chest pain and fever     Tobramycin Fatigue     Trouble with ear ringing, shortness of breath, little clinical response.     Zosyn Hives     Ceftazidime Rash     Levaquin Rash       Physical Exam:    Temp:  [102.5  F (39.2  C)] 102.5  F (39.2  C)  Heart Rate:  [104-126] 109  Resp:  [12-51] 15  BP: (104-109)/(62-65) 109/65  SpO2:  [46 %-100 %] 93 %  No intake or output data in the 24 hours ending 01/21/18 1420    General: laying in bed in NAD  HEENT: anicteric, moist mucosa  Neck: no palpable lymphadenopathy, no JVD noted  Chest: CTAB, no wheezing  Cardiac: RRR no murmurs  Abdomen: Soft, flat, non tender, active BS  Extremities: No LE Edema  Neuro: A&Ox3, no focal deficits   Skin: no rash noted    Laboratory, imaging, and microbiologic data:    All laboratory and imaging data reviewed.    Notable for:   Positive influenza A  CXR with significant worsening in the ARIANNE

## 2018-01-21 NOTE — PHARMACY-ADMISSION MEDICATION HISTORY
"Admission medication history interview status for the 1/21/2018 admission is complete. See Epic admission navigator for allergy information, pharmacy, prior to admission medications and immunization status.     Medication history interview sources:  Patient    Changes made to PTA medication list (reason)  Added:   -calcium tablets (per pt)  Deleted:   -calcium + vitamin D tablets (Per pt, calcium now a stand-alone supplement and vitamin D incorporated into multivitamin.)  -cetirizine tablets (Per pt, had been prescribed this medication for possible allergies but \"it didn't turn out to be allergies.\")  -clotrimazole richard (Per pt, \"don't need\" this medication.)  -sodium chloride 0.9% aerosol inhalation (Per pt, use 7% neb solution instead.)  -vitamin D capsules (Per pt, now included in daily multivitamin.)  -vitamin E capsules (Per pt, now included in daily multivitamin.)  Changed:   -acetylcysteine neb: Nebulize 4mL qid --> Nebulize 4mL up to 4 times daily. (per pt)  -amitriptyline tablets: Take one to three tablets by mouth at bedtime --> Take one tablet by mouth at bedtime. (per pt)  -ferrous fumarate-vitamin C tablets: Take 1 tablet by mouth 2 times daily --> Take 1 tablet by mouth daily. (Per pt, \"iron levels [were] better\" so prescriber decreased dosing frequency from 2 times daily to once daily.)  -Mephyton tablets: 1 tablet daily --> Take 1 TABLET by mouth DAILY. (per pt)  -multivitamin tablet: 1 tablet daily --> Take 1 tablet by mouth daily. Includes 5,000 units vitamin D and 400 units vitamin E. Per pt, formulated for CF pts. (per pt)  -sodium chloride 7% neb solution: Nebulize 4mL twice daily --> Nebulize 4mL daily. (per pt)    Additional medication history information (including reliability of information, actions taken by pharmacist):  -Pt was able to confirm medication dosing regimens, last doses taken.  *aztreonam neb & Bactrim DS tablet: Per pt, rotate these two medications every 14 days; pt \"would " "have started aztreonam today\" (1/21/18).  -levalbuterol neb solution: Per pt, on average will take this medication 3 times daily.  *Tamiflu: Per pt, did have home supply but had not taken any doses yet. Was confirmed influenza A upon inpatient visit today, was given dose in ED.  -rimantidine: Per pt, prescribed this medication every year (confirmed 10/1 to 3/31 duration) for prophylaxis.  -Pt denied other medications, prescription and over-the-counter.  -Allergies confirmed, per pt.  -Home pharmacy updated, per pt.  -Per pt, no influenza immunization received this season.    Prior to Admission medications    Medication Sig Last Dose Taking? Auth Provider   CALCIUM PO Take 1 tablet by mouth 2 times daily Strength unknown. 1/20/2018 at Unknown time Yes Unknown, Entered By History   amitriptyline (ELAVIL) 10 MG tablet TAKE ONE TO THREE TABLETS BY MOUTH AT BEDTIME.  Patient taking differently: TAKE ONE TABLET BY MOUTH AT BEDTIME. 1/20/2018 at PM Yes Jaye Machado MD   sulfamethoxazole-trimethoprim (BACTRIM DS/SEPTRA DS) 800-160 MG per tablet Take 1 tablet by mouth 2 times daily 1/20/2018 at Unknown time Yes Jaye Machado MD   aztreonam lysine (CAYSTON) 75 MG SOLR Take 1 mL (75 mg) by nebulization 3 times daily 1/6/2018 at Unknown time Yes Regina Herman PA-C   rimantadine (FLUMADINE) 100 MG tablet Take 1 tablet (100 mg) by mouth 2 times daily Take during flu season Oct 1st-March 31st 1/21/2018 at AM - 1 dose Yes Jaye Machado MD   sodium chloride inhalant 7 % NEBU neb solution NEBULIZE 4ML TWICE DAILY  Patient taking differently: NEBULIZE 4ML DAILY 1/21/2018 at AM Yes Jaye Machado MD   acetylcysteine (MUCOMYST) 10 % injection Nebulize 4ml qid  Patient taking differently: Nebulize 4mL up to 4 times daily. 1/20/2018 at Unknown time Yes Jaye Machado MD   polyethylene glycol (MIRALAX) powder Use 17 gms po 1-3 times daily as needed. Past Month at " Unknown time Yes Jaye Machado MD   insulin glargine (LANTUS SOLOSTAR) 100 UNIT/ML injection 6 units daily 1/20/2018 at PM Yes Diana Lee MD   azithromycin (ZITHROMAX) 250 MG tablet Take 1 tablet (250 mg) by mouth daily 1/21/2018 at inpatient dose  250mg + loading dose (none at home today) Yes Regina Herman PA-C   budesonide-formoterol (SYMBICORT) 160-4.5 MCG/ACT Inhaler Inhale 2 puffs into the lungs 2 times daily 1/21/2018 at AM Yes Jaye Machado MD   insulin aspart (NOVOLOG PENFILL) 100 UNIT/ML injection 1 per 20 grams of carbohydrate at lunch and supper. Also use 3 units of Novolog at night with tube feeding. 1/20/2018 at Unknown time Yes Diana eLe MD   levalbuterol (XOPENEX) 1.25 MG/3ML neb solution Take 3 mLs (1.25 mg) by nebulization 4 times daily 1/21/2018 at 1100 Yes Jaye Machado MD   CREON 14448 UNITS CPEP per EC capsule Take 7-8 caps with meals (3 meals/day) and 2 caps with snacks (3 snacks/day) 1/21/2018 at 5 capsules so far Yes Jaye Machado MD   Ferrous Fumarate-Vitamin C (VITRON-C) 200-125 MG TABS Take 1 tablet by mouth daily  1/20/2018 at Unknown time Yes Reported, Patient   MEPHYTON 5 MG PO TABS Take 1 TABLET by mouth DAILY. 1/20/2018 at Unknown time Yes Jaye Machado MD   MULTIVITAMIN OR Take 1 tablet by mouth daily. Includes 5,000 units vitamin D and 400 units vitamin E. Per pt, formulated for CF pts. 1/20/2018 at Unknown time Yes Reported, Patient   levalbuterol (XOPENEX HFA) 45 MCG/ACT Inhaler Inhale 2 puffs into the lungs every 6 hours as needed for shortness of breath / dyspnea   Jaye Machado MD   Respiratory Therapy Supplies (KRIS ALTERA NEBULIZER HANDSET) MISC 1 each daily   Regina Herman PA-C   blood glucose (NO BRAND SPECIFIED) lancets standard Whatever Lancets that go with device, Test 6 times daily   Diana Lee MD   blood glucose monitoring (MARTINA CONTOUR NEXT) test  "strip Use to test blood sugar 6 times daily or as directed.   Diana Lee MD   insulin pen needle (BD OMI U/F) 32G X 4 MM Use 6 daily or as directed.   Diana Lee MD   blood glucose monitoring (MARTINA CONTOUR NEXT MONITOR) meter device kit Use to test blood sugar 6 times daily or as directed.   Alice Mancuso MD   blood glucose monitoring (MARTINA MICROLET) lancets Use to test blood sugar 6 times daily or as directed.   Alice Mancuso MD   oseltamivir (TAMIFLU) 75 MG capsule Take 1 capsule (75 mg) by mouth 2 times daily not started yet at home at inpatient dose administered 1/21/18  Jaye Machado MD   [facility] INFUSION MANAGED PATIENT Contact [facility] for patient specific medication information at [phone] on admission and discharge from the hospital.  Phones are answered 24 hours a day 7 days a week 365 days a year.    Providers - Choose \"CONTINUE HOME MED (no script)\" at discharge if patient treatment with home infusion will continue.  at TwoCal per G tube  Unknown, Entered By History   ACE NOT PRESCRIBED, INTENTIONAL, 1 each continuous prn. ACE Inhibitor not prescribed due to Risk for drug interaction   Tia Thorne PA-C   Nebulizers (EFLOW SCF NEBULIZER HANDSET) MISC 1 each 3 times daily.   Jaye Machado MD         Medication history completed by: Ginger Guthrie    "

## 2018-01-21 NOTE — ED NOTES
Pt comes in with c/o chest tightness and fevers. Hx CF. Chest tightness began a few days ago while travelling in [place], fevers began last night when he arrived back in [place]. Alert and oriented.

## 2018-01-22 ENCOUNTER — APPOINTMENT (OUTPATIENT)
Dept: GENERAL RADIOLOGY | Facility: CLINIC | Age: 43
End: 2018-01-22
Attending: INTERNAL MEDICINE
Payer: COMMERCIAL

## 2018-01-22 DIAGNOSIS — E84.0 CYSTIC FIBROSIS WITH PULMONARY MANIFESTATIONS (H): Primary | ICD-10-CM

## 2018-01-22 LAB
ANION GAP SERPL CALCULATED.3IONS-SCNC: 8 MMOL/L (ref 3–14)
BUN SERPL-MCNC: 15 MG/DL (ref 7–30)
CALCIUM SERPL-MCNC: 8.1 MG/DL (ref 8.5–10.1)
CHLORIDE SERPL-SCNC: 100 MMOL/L (ref 94–109)
CO2 SERPL-SCNC: 26 MMOL/L (ref 20–32)
CREAT SERPL-MCNC: 0.86 MG/DL (ref 0.66–1.25)
ERYTHROCYTE [DISTWIDTH] IN BLOOD BY AUTOMATED COUNT: 15.8 % (ref 10–15)
FLUAV H1 2009 PAND RNA SPEC QL NAA+PROBE: NEGATIVE
FLUAV H1 RNA SPEC QL NAA+PROBE: NEGATIVE
FLUAV H3 RNA SPEC QL NAA+PROBE: POSITIVE
FLUAV RNA SPEC QL NAA+PROBE: POSITIVE
FLUBV RNA SPEC QL NAA+PROBE: NEGATIVE
GFR SERPL CREATININE-BSD FRML MDRD: >90 ML/MIN/1.7M2
GLUCOSE BLDC GLUCOMTR-MCNC: 111 MG/DL (ref 70–99)
GLUCOSE BLDC GLUCOMTR-MCNC: 170 MG/DL (ref 70–99)
GLUCOSE BLDC GLUCOMTR-MCNC: 174 MG/DL (ref 70–99)
GLUCOSE SERPL-MCNC: 91 MG/DL (ref 70–99)
HADV DNA SPEC QL NAA+PROBE: NEGATIVE
HADV DNA SPEC QL NAA+PROBE: NEGATIVE
HCT VFR BLD AUTO: 40.8 % (ref 40–53)
HGB BLD-MCNC: 12.6 G/DL (ref 13.3–17.7)
HMPV RNA SPEC QL NAA+PROBE: NEGATIVE
HPIV1 RNA SPEC QL NAA+PROBE: NEGATIVE
HPIV2 RNA SPEC QL NAA+PROBE: NEGATIVE
HPIV3 RNA SPEC QL NAA+PROBE: NEGATIVE
INTERPRETATION ECG - MUSE: NORMAL
LACTATE BLD-SCNC: 0.7 MMOL/L (ref 0.7–2)
MAGNESIUM SERPL-MCNC: 2.2 MG/DL (ref 1.6–2.3)
MCH RBC QN AUTO: 27.2 PG (ref 26.5–33)
MCHC RBC AUTO-ENTMCNC: 30.9 G/DL (ref 31.5–36.5)
MCV RBC AUTO: 88 FL (ref 78–100)
MICROBIOLOGIST REVIEW: ABNORMAL
PHOSPHATE SERPL-MCNC: 3.5 MG/DL (ref 2.5–4.5)
PLATELET # BLD AUTO: 279 10E9/L (ref 150–450)
POTASSIUM SERPL-SCNC: 4.1 MMOL/L (ref 3.4–5.3)
PREALB SERPL IA-MCNC: 13 MG/DL (ref 15–45)
RBC # BLD AUTO: 4.64 10E12/L (ref 4.4–5.9)
RHINOVIRUS RNA SPEC QL NAA+PROBE: NEGATIVE
RSV RNA SPEC QL NAA+PROBE: NEGATIVE
RSV RNA SPEC QL NAA+PROBE: NEGATIVE
SODIUM SERPL-SCNC: 135 MMOL/L (ref 133–144)
SPECIMEN SOURCE: ABNORMAL
WBC # BLD AUTO: 13.8 10E9/L (ref 4–11)

## 2018-01-22 PROCEDURE — 25000128 H RX IP 250 OP 636: Performed by: HOSPITALIST

## 2018-01-22 PROCEDURE — 83605 ASSAY OF LACTIC ACID: CPT | Performed by: INTERNAL MEDICINE

## 2018-01-22 PROCEDURE — 94669 MECHANICAL CHEST WALL OSCILL: CPT

## 2018-01-22 PROCEDURE — 25000128 H RX IP 250 OP 636: Performed by: EMERGENCY MEDICINE

## 2018-01-22 PROCEDURE — 94640 AIRWAY INHALATION TREATMENT: CPT | Mod: 76

## 2018-01-22 PROCEDURE — 80048 BASIC METABOLIC PNL TOTAL CA: CPT | Performed by: HOSPITALIST

## 2018-01-22 PROCEDURE — 84100 ASSAY OF PHOSPHORUS: CPT | Performed by: HOSPITALIST

## 2018-01-22 PROCEDURE — 12000008 ZZH R&B INTERMEDIATE UMMC

## 2018-01-22 PROCEDURE — 25000132 ZZH RX MED GY IP 250 OP 250 PS 637: Performed by: HOSPITALIST

## 2018-01-22 PROCEDURE — 25000132 ZZH RX MED GY IP 250 OP 250 PS 637: Performed by: STUDENT IN AN ORGANIZED HEALTH CARE EDUCATION/TRAINING PROGRAM

## 2018-01-22 PROCEDURE — 94640 AIRWAY INHALATION TREATMENT: CPT

## 2018-01-22 PROCEDURE — 99233 SBSQ HOSP IP/OBS HIGH 50: CPT | Mod: GC | Performed by: HOSPITALIST

## 2018-01-22 PROCEDURE — 36569 INSJ PICC 5 YR+ W/O IMAGING: CPT

## 2018-01-22 PROCEDURE — 25000125 ZZHC RX 250: Performed by: HOSPITALIST

## 2018-01-22 PROCEDURE — 25000125 ZZHC RX 250: Performed by: STUDENT IN AN ORGANIZED HEALTH CARE EDUCATION/TRAINING PROGRAM

## 2018-01-22 PROCEDURE — 40000275 ZZH STATISTIC RCP TIME EA 10 MIN

## 2018-01-22 PROCEDURE — 83735 ASSAY OF MAGNESIUM: CPT | Performed by: HOSPITALIST

## 2018-01-22 PROCEDURE — 27210577 ZZ H INTRODUCER MICRO SET

## 2018-01-22 PROCEDURE — 71045 X-RAY EXAM CHEST 1 VIEW: CPT

## 2018-01-22 PROCEDURE — 85027 COMPLETE CBC AUTOMATED: CPT | Performed by: HOSPITALIST

## 2018-01-22 PROCEDURE — 25000131 ZZH RX MED GY IP 250 OP 636 PS 637: Performed by: STUDENT IN AN ORGANIZED HEALTH CARE EDUCATION/TRAINING PROGRAM

## 2018-01-22 PROCEDURE — 94375 RESPIRATORY FLOW VOLUME LOOP: CPT | Mod: ZF

## 2018-01-22 PROCEDURE — 27210533 ZZH VEST CHEST PERCUSSION

## 2018-01-22 PROCEDURE — 36415 COLL VENOUS BLD VENIPUNCTURE: CPT | Performed by: INTERNAL MEDICINE

## 2018-01-22 PROCEDURE — C1751 CATH, INF, PER/CENT/MIDLINE: HCPCS

## 2018-01-22 PROCEDURE — 00000146 ZZHCL STATISTIC GLUCOSE BY METER IP

## 2018-01-22 RX ADMIN — FLUTICASONE FUROATE AND VILANTEROL TRIFENATATE 1 PUFF: 200; 25 POWDER RESPIRATORY (INHALATION) at 09:32

## 2018-01-22 RX ADMIN — PANCRELIPASE 192000 UNITS: 24000; 76000; 120000 CAPSULE, DELAYED RELEASE PELLETS ORAL at 18:30

## 2018-01-22 RX ADMIN — LEVALBUTEROL HYDROCHLORIDE 1.25 MG: 1.25 SOLUTION RESPIRATORY (INHALATION) at 08:57

## 2018-01-22 RX ADMIN — SULFAMETHOXAZOLE AND TRIMETHOPRIM 1 TABLET: 800; 160 TABLET ORAL at 20:24

## 2018-01-22 RX ADMIN — INSULIN ASPART 5 UNITS: 100 INJECTION, SOLUTION INTRAVENOUS; SUBCUTANEOUS at 18:27

## 2018-01-22 RX ADMIN — AZTREONAM 1 G: 1 INJECTION, POWDER, LYOPHILIZED, FOR SOLUTION INTRAMUSCULAR; INTRAVENOUS at 09:00

## 2018-01-22 RX ADMIN — PANCRELIPASE 192000 UNITS: 24000; 76000; 120000 CAPSULE, DELAYED RELEASE PELLETS ORAL at 11:27

## 2018-01-22 RX ADMIN — LEVALBUTEROL HYDROCHLORIDE 1.25 MG: 1.25 SOLUTION RESPIRATORY (INHALATION) at 16:45

## 2018-01-22 RX ADMIN — INSULIN GLARGINE 6 UNITS: 100 INJECTION, SOLUTION SUBCUTANEOUS at 22:45

## 2018-01-22 RX ADMIN — ACETAMINOPHEN 650 MG: 325 TABLET, FILM COATED ORAL at 22:53

## 2018-01-22 RX ADMIN — ACETYLCYSTEINE 4 ML: 100 SOLUTION ORAL; RESPIRATORY (INHALATION) at 08:57

## 2018-01-22 RX ADMIN — MEROPENEM 3 G: 1 INJECTION, POWDER, FOR SOLUTION INTRAVENOUS at 14:25

## 2018-01-22 RX ADMIN — LEVALBUTEROL HYDROCHLORIDE 1.25 MG: 1.25 SOLUTION RESPIRATORY (INHALATION) at 20:41

## 2018-01-22 RX ADMIN — OSELTAMIVIR PHOSPHATE 75 MG: 75 CAPSULE ORAL at 20:24

## 2018-01-22 RX ADMIN — LEVALBUTEROL HYDROCHLORIDE 1.25 MG: 1.25 SOLUTION RESPIRATORY (INHALATION) at 12:47

## 2018-01-22 RX ADMIN — AMITRIPTYLINE HYDROCHLORIDE 10 MG: 10 TABLET, FILM COATED ORAL at 22:45

## 2018-01-22 RX ADMIN — OSELTAMIVIR PHOSPHATE 75 MG: 75 CAPSULE ORAL at 09:33

## 2018-01-22 RX ADMIN — ACETAMINOPHEN 650 MG: 325 TABLET, FILM COATED ORAL at 00:50

## 2018-01-22 RX ADMIN — ACETYLCYSTEINE 4 ML: 100 SOLUTION ORAL; RESPIRATORY (INHALATION) at 12:47

## 2018-01-22 RX ADMIN — Medication 4 ML: at 12:46

## 2018-01-22 RX ADMIN — ACETYLCYSTEINE 4 ML: 100 SOLUTION ORAL; RESPIRATORY (INHALATION) at 16:45

## 2018-01-22 RX ADMIN — Medication 4 ML: at 20:41

## 2018-01-22 RX ADMIN — AZITHROMYCIN 500 MG: 250 TABLET, FILM COATED ORAL at 09:32

## 2018-01-22 RX ADMIN — PHYTONADIONE 5 MG: 5 TABLET ORAL at 09:33

## 2018-01-22 RX ADMIN — MEROPENEM 1 G: 1 INJECTION, POWDER, FOR SOLUTION INTRAVENOUS at 06:05

## 2018-01-22 RX ADMIN — LIDOCAINE HYDROCHLORIDE 2 ML: 10 INJECTION, SOLUTION INFILTRATION; PERINEURAL at 10:29

## 2018-01-22 RX ADMIN — SULFAMETHOXAZOLE AND TRIMETHOPRIM 1 TABLET: 800; 160 TABLET ORAL at 09:33

## 2018-01-22 RX ADMIN — MEROPENEM 2 G: 1 INJECTION, POWDER, FOR SOLUTION INTRAVENOUS at 13:27

## 2018-01-22 RX ADMIN — VITAMIN E CAP 400 UNIT 400 UNITS: 400 CAP at 09:33

## 2018-01-22 RX ADMIN — CHOLECALCIFEROL TAB 10 MCG (400 UNIT) 400 UNITS: 10 TAB at 09:32

## 2018-01-22 RX ADMIN — ACETAMINOPHEN 650 MG: 325 TABLET, FILM COATED ORAL at 09:33

## 2018-01-22 RX ADMIN — ACETYLCYSTEINE 4 ML: 100 SOLUTION ORAL; RESPIRATORY (INHALATION) at 20:41

## 2018-01-22 NOTE — PROGRESS NOTES
Care Coordinator Progress Note     Admission Date/Time:  1/21/2018  Attending MD:  Lucina Tomas MD     Data  Chart reviewed, discussed with interdisciplinary team.   Patient was admitted for:    Cystic fibrosis with pulmonary manifestations (H)  Influenza A  Influenza due to influenza virus, type A, human.    Concerns with insurance coverage for discharge needs: None.  Current Living Situation: Patient lives alone.  Support System: Involved  Services Involved: DME  Transportation: Family or Friend will provide  Barriers to Discharge: medical clearance    Coordination of Care and Referrals: Provided patient/family with options for Home Infusion.        Assessment  Pt is a 42 year old male with PMH significant for CF with severe obstruction c/b MDRO pseudomonas, diabetes, and pancreatic insufficiency who presented with acute onset productive cough with fevers and found to have influenza. Per MD team, pt will discharge on IV antibiotics. Spoke with Alley Hendricks CF RN Care Coordinator, who stated that pt has used [facility] in the past and would like to use them again for IV antibiotics. Referral made. Pt also on cycled tube feeds at baseline and gets supplies through [employer] (phone: [phone]). Resumption orders placed.      Plan  Anticipated Discharge Date:  1/23/18  Anticipated Discharge Plan:  Discharge to home with home infusion for IV antibiotics and resumption of cycled tube feeds    Ailyn Allen, KAMINI

## 2018-01-22 NOTE — PLAN OF CARE
Problem: Patient Care Overview  Goal: Plan of Care/Patient Progress Review  /67 (BP Location: Right arm)  Pulse 92  Temp 102.2  F (39  C) (Oral)  Resp 24  Wt 62.7 kg (138 lb 3.7 oz)  SpO2 94%  BMI 20.78 kg/m2. Increased temp noted, sepsis protocol triggered, on-call resident notified, lactic acid drawn and tylenol administered. Lactic acid at 0.7. Increased sputum/greenish from strong productive cough noted. Denying any pain. Tube feeding 2cans with gravity administration per patient. Insulin administered. Voiding adequately. Continue to monitor current status.

## 2018-01-22 NOTE — PLAN OF CARE
Problem: Patient Care Overview  Goal: Plan of Care/Patient Progress Review  Outcome: Improving  [id number]:    Reason for admission: CF with influenza A   Vitals: VSS on RA ex febrile at start of shift - PRN Tylenol given x1, currently afebrile  Activity: up ad cee  Pain: no c/o pain  Neuro: WNL  Cardiac: WNL  Respiratory: diminished with upper lobe crackles, c/o dyspnea on exertion, good sputum production, QID nebs  GI/: voiding spontaneously, PEG tube clamped (nocturnal TF), last BM today  Diet: high denzel/protein diet with CHO counts for insulin, good appetite, nocturnal TF (2 cans)  Lines: L single lumen PICC running continuous Merrem infusion   Wounds: none  Labs/imaging: WBC 13.8    New changes this shift: PICC placement, PFTs completed, IV abx changed from intermittent to continuous merrem infusion    Plan: continue IV abx, dc home when stable    Continue to monitor and follow POC

## 2018-01-22 NOTE — PLAN OF CARE
Problem: Patient Care Overview  Goal: Plan of Care/Patient Progress Review  Outcome: No Change  Pt arrived from ED at 17:30. Admitted for productive cough, CF exacerbation, & influenza A. Contact & droplet precautions in place. A&Ox4, VSS on RA. Productive, frequent cough. Up independently in room. Right PIV SL'd. PEG tube CDI, pt uses for TF over night, independent with cares, receives 2 cans of TF with 3 units of Novolog. Pt request to have PRN inhaler at bedside, pt reports getting up nightly around 0300 & needing it, ordered up from pharmacy, awaiting for it to come up. Voiding without difficulty, no BM on this shift. Tolerating high kcal/protein diet & PO fluids. CXR completed this evening. Vascular consult ordered for potential PICC placement tomorrow. Continue to monitor & follow POC.

## 2018-01-22 NOTE — PROGRESS NOTES
[facility], [place]    Internal [facility]    Main Plans for Today   Increased meropenem to continuous dosing  Continue oseltamivir   Pulm following    Assessment & Plan   Nico Morales is a 42 year old man w/ PMx of CF w/ severe obstruction complicated by MDRO pseudomonas, diabetes, and pancreatic insufficiency who presents with acute onset productive cough w/ fevers found to have influenza on rapid testing.      #Influenza  #CF pulmonary exacerbation  #Cough  Pt screened in the ED and found to be positive for influenza A. RSV panel still in progress. Previous CF cultures have grown resistant pseudomonas in past. Pt tolerate meropenem, per pulm.   - oseltamivir 75mg BID  - meropenem  - increase azithromycin dosing 250mg --> 500mg daily x3 days  - inpatient PFTs  - follow-up CF respiratory culture  - follow-up respiratory viral panel  - PICC for antibiotic infusion  - supportive care: IVF and respiratory support as needed  - acetaminophen PRN  - pulmonology following; will follow-up w/ recs      #CF-related diabetes  - 1 unit aspart:20 g CHO carb ratio at lunch and dinner  - 3 units aspart w/ evening tube feeds  - 3 units glargine daily; can adjust pending inpatient control   - sensitive SSI     #CF-related pancreatic insufficiency  - continue outpatient Creon  - continue vitamin D, E, K supplementation     #Malnutrition  Pt takes 2 cans TwoCal HN on most evenings during the week.   - TwoCal HN 2 cans qhs PRN (as pt desires)     Diet: High Kcal, high protein diet  Fluids: no IVF at this time, pt to drink ad cee; bolus IVF as needd  DVT Prophylaxis: Enoxaparin (Lovenox) SQ  Code Status: Full Code    Disposition Plan   Expected discharge: 2 - 3 days; recommended to prior living arrangement once antibiotic plan established and influenza is adequately treated.      Entered: Kalyan Brooks 01/22/2018, 2:23 PM   Information in the above section will display in  the discharge planner report.      The patient's care was discussed with the Attending Physician, Dr. Lucina Tomas.    Kalyan Brooks MD  Internal Medicine PGY-1  [phone]    Please see sticky note for cross cover information    Interval History   Pt had fever overnight, but feels okay this morning. Some cough today. No dyspnea or chest pain. Able to eat and drink normally. Mouth a little dry. No other concern.     4 System ROS negative except for the above.       Physical Exam   Vital Signs: Temp: 98.7  F (37.1  C) Temp src: Oral BP: 106/56 Pulse: 92 Heart Rate: 110 Resp: 18 SpO2: 94 % O2 Device: None (Room air)    Weight: 138 lbs 3.65 oz    General Appearance: lying in bed, sleeping initially; no acute distress  HEENT: non-icteric conjunctiva; normocephalic and atraumatic  Respiratory: some cough during exam; normal work of breathing; symmetric breath sounds to bases; no wheezes, crackles, or rhonchi in posterior lung fields   Cardiovascular: normal S1/S2 w/ regular rhythm; no S3/S4, murmurs, clicks, or rubs  GI: +gastric tube port in place, no surrounding drainage or erythema; soft, non-distended, and non-tender  Skin: no rashes or skin changes over torso, neck, arms, or face  Musculoskeletal: no muscle wasting; no inflamed joints of the upper extremity  Neurologic: awake, alert, and oriented x4; answers questions appropriately; able to move all extremities spontaneously  Psychiatric: normal mood with appropriate affect         Data   Labs, Micro, and Imaging reviewed in EPIC.

## 2018-01-23 ENCOUNTER — CARE COORDINATION (OUTPATIENT)
Dept: CARE COORDINATION | Facility: CLINIC | Age: 43
End: 2018-01-23

## 2018-01-23 ENCOUNTER — HOME INFUSION (PRE-WILLOW HOME INFUSION) (OUTPATIENT)
Dept: PHARMACY | Facility: CLINIC | Age: 43
End: 2018-01-23

## 2018-01-23 VITALS
SYSTOLIC BLOOD PRESSURE: 105 MMHG | HEART RATE: 92 BPM | DIASTOLIC BLOOD PRESSURE: 65 MMHG | BODY MASS INDEX: 20.45 KG/M2 | OXYGEN SATURATION: 98 % | RESPIRATION RATE: 18 BRPM | WEIGHT: 136 LBS | TEMPERATURE: 97.7 F

## 2018-01-23 LAB — GLUCOSE BLDC GLUCOMTR-MCNC: 99 MG/DL (ref 70–99)

## 2018-01-23 PROCEDURE — 40000802 ZZH SITE CHECK

## 2018-01-23 PROCEDURE — 25000132 ZZH RX MED GY IP 250 OP 250 PS 637: Performed by: HOSPITALIST

## 2018-01-23 PROCEDURE — 94640 AIRWAY INHALATION TREATMENT: CPT

## 2018-01-23 PROCEDURE — 25000125 ZZHC RX 250: Performed by: HOSPITALIST

## 2018-01-23 PROCEDURE — 94669 MECHANICAL CHEST WALL OSCILL: CPT

## 2018-01-23 PROCEDURE — 00000146 ZZHCL STATISTIC GLUCOSE BY METER IP

## 2018-01-23 PROCEDURE — 40000275 ZZH STATISTIC RCP TIME EA 10 MIN

## 2018-01-23 PROCEDURE — 25000128 H RX IP 250 OP 636: Performed by: HOSPITALIST

## 2018-01-23 PROCEDURE — 25000132 ZZH RX MED GY IP 250 OP 250 PS 637: Performed by: STUDENT IN AN ORGANIZED HEALTH CARE EDUCATION/TRAINING PROGRAM

## 2018-01-23 PROCEDURE — 99238 HOSP IP/OBS DSCHRG MGMT 30/<: CPT | Mod: GC | Performed by: INTERNAL MEDICINE

## 2018-01-23 RX ORDER — OSELTAMIVIR PHOSPHATE 75 MG/1
75 CAPSULE ORAL 2 TIMES DAILY
Qty: 5 CAPSULE | Refills: 1 | Status: SHIPPED | OUTPATIENT
Start: 2018-01-23 | End: 2018-02-12

## 2018-01-23 RX ADMIN — SULFAMETHOXAZOLE AND TRIMETHOPRIM 1 TABLET: 800; 160 TABLET ORAL at 09:09

## 2018-01-23 RX ADMIN — FLUTICASONE FUROATE AND VILANTEROL TRIFENATATE 1 PUFF: 200; 25 POWDER RESPIRATORY (INHALATION) at 09:09

## 2018-01-23 RX ADMIN — AZITHROMYCIN 500 MG: 250 TABLET, FILM COATED ORAL at 09:09

## 2018-01-23 RX ADMIN — VITAMIN E CAP 400 UNIT 400 UNITS: 400 CAP at 09:09

## 2018-01-23 RX ADMIN — ACETYLCYSTEINE 4 ML: 100 SOLUTION ORAL; RESPIRATORY (INHALATION) at 08:43

## 2018-01-23 RX ADMIN — CHOLECALCIFEROL TAB 10 MCG (400 UNIT) 400 UNITS: 10 TAB at 09:09

## 2018-01-23 RX ADMIN — LEVALBUTEROL HYDROCHLORIDE 1.25 MG: 1.25 SOLUTION RESPIRATORY (INHALATION) at 08:43

## 2018-01-23 RX ADMIN — MEROPENEM 3 G: 1 INJECTION, POWDER, FOR SOLUTION INTRAVENOUS at 01:57

## 2018-01-23 RX ADMIN — PHYTONADIONE 5 MG: 5 TABLET ORAL at 09:09

## 2018-01-23 RX ADMIN — OSELTAMIVIR PHOSPHATE 75 MG: 75 CAPSULE ORAL at 09:09

## 2018-01-23 NOTE — PLAN OF CARE
Problem: Patient Care Overview  Goal: Plan of Care/Patient Progress Review  Outcome: Adequate for Discharge Date Met: 01/23/18  VSS, afebrile. Denies pain. Crackles to lungs. Tolerating vesting. Appetite good. Up ad cee. IV antibiotics infusing. Discharge orders reviewed with patient. Verbalized understanding. All belongings left with patient. Power Port saline locked. Pt walked independently to front door.

## 2018-01-23 NOTE — PROGRESS NOTES
Home Infusion  Kilo is discharging today and will be going home on IV meropenem q 8 hrs with each dose over 2 hours.. He has been on service with [facility] several times in the past and has self administered IV abx via HP device.    Met with Kilo at bedside to assess needs for dc.   Reviewed administration method for IV eduarda and offered review/teach.  Kilo stated he remembers how to administer and does not need a review. Extension tubing added to PICC.  Line flushed well and had good blood return.  Delivery of med and supplies will be to patient's home later today and nurse  will contact pt to set up home nursing visit.  [facility] contact information provided.      Jordyn CARR  [facility] Liaison  [phone] pager

## 2018-01-23 NOTE — PLAN OF CARE
Problem: Patient Care Overview  Goal: Plan of Care/Patient Progress Review  Outcome: Improving  Vital signs:  Temp: 97.4  F (36.3  C) Temp src: Oral BP: 100/62   Heart Rate: 90 Resp: 18 SpO2: 96 % O2 Device: None (Room air)       VSS, Tmax 100.1, decreased following tylenol. Denies pain, SOB and nausea. Continuous meropenem drip infusing at 8.8 mL/hr on ice. Pt up independently in room. Voiding, not saving. G-tube clamped, TF's not completed last night per pt. Sleeping between cares.

## 2018-01-23 NOTE — DISCHARGE INSTRUCTIONS
Cystic Fibrosis Hospital Discharge Instructions    Diagnosis:  CF Pulmonary Exacerbation & +Influenza A    Major Tests and Procedures:  Imaging: Chest Xray x2   Consults: Pulmonology  Procedure: Single Lumen PICC Placement    Sputum Culture Results:  All cultures:    Recent Labs  Lab 18  1538   CULT Moderate growthNormal santi  Light growthMucoid strainNon lactose fermenting gram negative rods*  Culture in progress       Nutrition:  National CF Foundation Recommendations for BMI in CF Adults: Women: at least 22 Men: at least 23   Your BMI: Body mass index is 20.45 kg/(m^2).  Your weight:   Vitals:    18 1207 18 1800   Weight: 62.7 kg (138 lb 3.7 oz) 61.7 kg (136 lb)       Good Nutrition Can Improve Lung Function and Overall Health    Take ALL of your vitamins with food     Take 1/2 of your enzymes before EVERY meal/snack and the other 1/2 mid-meal/snack    Diet:  High Calorie/ High Protein      Annual Studies:  Annual studies are important to monitor your lung, bone, and nutrition health.    Generally annual studies are done when you are well.  You and your provider will determine when it is appropriate to schedule your annual studies.        Activity:  Activity as tolerated and instructed by physical therapy    Airway Clearance: The Most Important Way to Keep Your Lungs Healthy  Vest 3-4 X per day  [facility] [place], 9, 10 @ pressure of 10 for 5 minutes each                           18, 19, 20 @ pressure of 6 for 5 minutes each  Deflate vest after each 5 minutes and cough 3 times    Pulmonary Function Tests:   FEV1: amount of air you can blow out in 1 second   FVC: total amount of air you can take in and blow out    Admission ()  FEV1  1.37L or 34% and  FVC 3.53L or 71%      Home Care:  IV antibiotics per [facility].  Continue antibiotics until you are re-evaluated in the clinic  Home Antibiotic Schedule:  Meropenem 2g every 8 hrs: : 8pm,  6 am, 2 pm, 10 pm        Labs:  BMP, Mag and Phos Q week, first draw Monday.  Fax results to the CF office @ [phone]    Symptoms to call your physician about:    Chest Pain    Changes in sputum    Blood in sputum    Increased shortness of breath    Nausea/Vomiting    Diarrhea/Constipation    Rash    Joint Pain    Temperature higher than 100.5    Who to call:  Monday - Friday from 8-3:30 call the CF Office @ [phone]  After hours call [phone] and have the  page the on-call pulmonologist.   Respiratory Therapist, Felicitas Banuelos @ [phone]   Social Work: Marisa Urbina @ [phone] // Mray Ritter @ [phone]  Dietitian: Please call the CF office at [phone] and ask them to contact the CF Dietician.   Diabetes Nurse: [phone]  KJ: [phone]    Keila () has been notified of your recent hospital discharge.  If your appointment is not already scheduled by the time of your discharge, she will contact you when she finds a follow up appointment slot.  If you have any questions for her regarding your follow up appointment, please feel free to call her: [phone].

## 2018-01-23 NOTE — DISCHARGE SUMMARY
[facility], [place]    Internal Medicine Discharge Summary- Bhakti Service    Date of Admission:  1/21/2018  Date of Discharge:  1/23/2018 12:59 PM  Discharging Attending Provider: Alex Negrno  Discharge Team: Bhakti 3    Discharge Diagnoses   Cystic Fibrosis  Influenza A  Cystic-fibrosis related diabetes mellitus  Cystic-fibrosis related pancreatic insufficiency    Follow-ups Needed After Discharge   - Follow up with CF clinic in 2-3 weeks to see how Pt is doing after hospital discharge    Hospital Course   Nico Morales is a 42 year old man w/ PMx of CF w/ severe obstruction complicated by MDRO pseudomonas, diabetes, and pancreatic insufficiency who presents with acute onset productive cough w/ fevers found to have influenza on rapid testing.       # Influenza A  #CF Exacerbation  Pt screened in the ED and found to be positive for influenza A. Previous CF cultures have grown resistant pseudomonas in past. Pulmonary consulted during admission.  - oseltamivir 75mg BID to complete 5-day course  - meropenem 2g Q8H through PICC  - Continued PTA azithromycin 250mg daily (on 500 mg daily while inpt)  - F/u in CF clinic in 2-3 weeks      # CF-related diabetes  - Continued PTA insulin regimen      # CF-related pancreatic insufficiency  - continued PTA Creon, vitamin D, E, K supplementation      # Chronic Calorie-Deficient Mild Malnutrition  Pt takes 2 cans TwoCal HN on most evenings during the week.   - TwoCal HN 2 cans qhs PRN (as pt desires)    Consultations This Hospital Stay   VASCULAR ACCESS ADULT IP CONSULT  PHYSICAL THERAPY ADULT IP CONSULT  PULMONARY CF/TRANSPLANT ADULT IP CONSULT  MEDICATION HISTORY IP PHARMACY CONSULT     Code Status   Full Code     The patient was discussed with staff attending, Dr. Negron, who agrees with the above assessment and plan.    Zahida Ashford MD  PGY-3, Internal Medicine  Pager:  [phone]  ______________________________________________________________________    Physical Exam   Vital Signs: Temp: 97.7  F (36.5  C) Temp src: Oral BP: 105/65   Heart Rate: 80 Resp: 18 SpO2: 98 % O2 Device: None (Room air)    Weight: 136 lbs 0 oz  General Appearance: lying in bed, alert,  no acute distress  Respiratory: some cough during exam, normal work of breathing, symmetric breath sounds to bases, no wheezes, crackles, or rhonchi in posterior lung fields   Cardiovascular: RRR, no m/r/g  GI: +gastric tube port in place, no surrounding drainage or erythema, soft, non-distended, and non-tender  Skin: warm and dry  Musculoskeletal: no peripheral edema  Neurologic: alert and oriented x4, answers questions appropriately, able to move all extremities spontaneously    Significant Results and Procedures   Most Recent 3 CBC's:  Recent Labs   Lab Test  01/22/18   0731  01/21/18   1752  01/21/18   1301   WBC  13.8*  10.5  10.9   HGB  12.6*  12.8*  13.1*   MCV  88  88  88   PLT  279  252  292   Most Recent 3 BMP's:  Recent Labs   Lab Test  01/22/18   0731  01/21/18   1752  01/21/18   1301   NA  135  137  135   POTASSIUM  4.1  4.0  3.8   CHLORIDE  100  102  101   CO2  26  28  26   BUN  15  13  14   CR  0.86  0.96  0.91   ANIONGAP  8  7  8   CLAUDIA  8.1*  8.2*  8.2*   GLC  91  94  102*       ,   Results for orders placed or performed during the hospital encounter of 01/21/18   XR Chest 2 Views    Narrative    Exam: XR CHEST 2 VW, 1/21/2018 1:16 PM    Indication: cough, cf;     Comparison: Radiograph 12/15/2016    Findings:   PA and lateral views of the chest. Interval removal of left upper  extremity PICC. The trachea is midline. The cardiomediastinal  silhouette is within normal limits. Bilateral upper lobe predominant,  reticular and bronchiectatic, consistent with patient's history of  cystic fibrosis. These have progressed since 12/15/2016 exam.      Impression    Impression: Chronic changes of cystic fibrosis, with  superimposed  opacities most significant in the perihilar regions and right upper  lobe. This is concerning for exacerbation or infection.    I have personally reviewed the examination and initial interpretation  and I agree with the findings.    JELANI CERDA   XR Chest 1 View    Narrative    Exam:  Chest X-ray 1/22/2018 10:48 AM    History: RN placed PICC - verify tip placement;     Comparison: 1/21/2018    Findings: PA chest radiograph is obtained. Interval placement of left  arm PICC with tip projecting over the cavoatrial junction. Partially  visualized gastrostomy tube. Cardiomediastinal silhouette is  unchanged. Trachea is midline. Bilateral upper lobe predominant  bronchiectasis and patchy airspace opacities, stable to slightly  increased compared to prior. No pneumothorax or pleural effusion. The  upper abdomen is unremarkable. No acute bony abnormalities.       Impression    Impression:   1.  Left arm PICC with tip projecting over the cavoatrial junction.  2.  Bronchiectasis and patchy bilateral airspace opacities more  pronounced in the upper lobes, slightly increased compared to prior,  consistent with acute exacerbation/infection superimposed on chronic  cystic fibrosis changes.    I have personally reviewed the examination and initial interpretation  and I agree with the findings.    KIM ROY MD       Pending Results   These results will be followed up by Dr. Machado  Unresulted Labs Ordered in the Past 30 Days of this Admission     Date and Time Order Name Status Description    1/21/2018 1430 Cystic Fibrosis Culture Aerob Bacterial Preliminary              Primary Care Physician   Jaye Machado    Discharge Disposition   Discharged to home  Condition at discharge: Stable    Discharge Orders     Home infusion referral     Home infusion referral     Reason for your hospital stay   Admitted for influenza A and concern for acute flare of cystic fibrosis     Adult [facility] Follow-up and  recommended labs and tests   Follow up with primary care provider, Jaye Machado, within 14-21 days for hospital follow- up. To be determined by the Pulmonary team.    Appointments on [place] and/or [facility] (with [facility] or [facility] provider or service). Call [phone] if you haven't heard regarding these appointments within 7 days of discharge.     Activity   Your activity upon discharge: activity as tolerated     Tubes and drains   You are going home with the following tubes or drains: feeding tube G-Tube.   Tube cares per hospital or home care instructions     IV access   **Ordering Provider MUST call/page Care Coordinator/ to discuss arranging this service**    You are going home with the following vascular access device: PICC.     Discharge Instructions   You will be on meropenem 2 g every 8 hours, make sure infusion is over 2 hours each time. You will be following up Pulmonary clinic in 2-3 weeks. Continue all other medications as previously prescribed with addition to Tamiflu to complete total 5 day course.     Full Code     Diet   Follow this diet upon discharge: Orders Placed This Encounter     Room Service     Adult Formula Bolus Feeding: BID TwoCal HN; Route: Gastrostomy; 1; Can(s); Medication - Tube Feeding Flush Frequency: At least 15-30 mL water before and after medication administration and with tube clogging; Give both cans at night, if pt desires...     High Kcal/High Protein Diet, ADULT       Discharge Medications   Current Discharge Medication List      START taking these medications    Details   meropenem 2 g Inject 2 g into the vein every 8 hours  Qty: 100 g, Refills: 1    Comments: Please infuse over 2 hours  Associated Diagnoses: Cystic fibrosis with pulmonary manifestations (H)         CONTINUE these medications which have CHANGED    Details   oseltamivir (TAMIFLU) 75 MG capsule Take 1 capsule (75 mg) by mouth 2 times daily  Qty: 5 capsule, Refills: 1     Associated Diagnoses: Cystic fibrosis with pulmonary manifestations (H)         CONTINUE these medications which have NOT CHANGED    Details   CALCIUM PO Take 1 tablet by mouth 2 times daily Strength unknown.      amitriptyline (ELAVIL) 10 MG tablet TAKE ONE TO THREE TABLETS BY MOUTH AT BEDTIME.  Qty: 90 tablet, Refills: 3    Associated Diagnoses: Cystic fibrosis (H)      sulfamethoxazole-trimethoprim (BACTRIM DS/SEPTRA DS) 800-160 MG per tablet Take 1 tablet by mouth 2 times daily  Qty: 60 tablet, Refills: 3    Associated Diagnoses: Cystic fibrosis with pulmonary manifestations (H)      aztreonam lysine (CAYSTON) 75 MG SOLR Take 1 mL (75 mg) by nebulization 3 times daily  Qty: 84 mL, Refills: 5    Associated Diagnoses: Cystic fibrosis with pulmonary manifestations (H); Exocrine pancreatic insufficiency; 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)      rimantadine (FLUMADINE) 100 MG tablet Take 1 tablet (100 mg) by mouth 2 times daily Take during flu season Oct 1st-March 31st  Qty: 60 tablet, Refills: 6    Associated Diagnoses: Cystic fibrosis with pulmonary manifestations (H)      sodium chloride inhalant 7 % NEBU neb solution NEBULIZE 4ML TWICE DAILY  Qty: 240 mL, Refills: 5    Associated Diagnoses: Cystic fibrosis with pulmonary manifestations (H)      acetylcysteine (MUCOMYST) 10 % injection Nebulize 4ml qid  Qty: 480 mL, Refills: 6    Associated Diagnoses: Cystic fibrosis with pulmonary manifestations (H)      polyethylene glycol (MIRALAX) powder Use 17 gms po 1-3 times daily as needed.  Qty: 510 g, Refills: 3    Associated Diagnoses: Cystic fibrosis with meconium ileus (H)      insulin glargine (LANTUS SOLOSTAR) 100 UNIT/ML injection 6 units daily  Qty: 15 mL, Refills: 6    Associated Diagnoses: Diabetes mellitus related to CF (cystic fibrosis) (H)      azithromycin (ZITHROMAX) 250 MG tablet Take 1 tablet (250 mg) by mouth daily  Qty: 30 tablet, Refills: 11    Associated Diagnoses: Cystic fibrosis with  pulmonary manifestations (H)      budesonide-formoterol (SYMBICORT) 160-4.5 MCG/ACT Inhaler Inhale 2 puffs into the lungs 2 times daily  Qty: 1 Inhaler, Refills: 12    Associated Diagnoses: Cystic fibrosis of the lung (H); Cystic fibrosis (H)      insulin aspart (NOVOLOG PENFILL) 100 UNIT/ML injection 1 per 20 grams of carbohydrate at lunch and supper. Also use 3 units of Novolog at night with tube feeding.  Qty: 15 mL, Refills: 12    Associated Diagnoses: Diabetes mellitus related to CF (cystic fibrosis) (H)      levalbuterol (XOPENEX) 1.25 MG/3ML neb solution Take 3 mLs (1.25 mg) by nebulization 4 times daily  Qty: 360 mL, Refills: 11      CREON 69261 UNITS CPEP per EC capsule Take 7-8 caps with meals (3 meals/day) and 2 caps with snacks (3 snacks/day)  Qty: 2700 capsule, Refills: 3    Associated Diagnoses: Cystic fibrosis with pulmonary manifestations (H)      Ferrous Fumarate-Vitamin C (VITRON-C) 200-125 MG TABS Take 1 tablet by mouth daily     Associated Diagnoses: Nocardia infection; Cystic fibrosis with pulmonary manifestations (H)      MEPHYTON 5 MG PO TABS Take 1 TABLET by mouth DAILY.      MULTIVITAMIN OR Take 1 tablet by mouth daily. Includes 5,000 units vitamin D and 400 units vitamin E. Per pt, formulated for CF pts.      levalbuterol (XOPENEX HFA) 45 MCG/ACT Inhaler Inhale 2 puffs into the lungs every 6 hours as needed for shortness of breath / dyspnea  Qty: 3 Inhaler, Refills: 4    Associated Diagnoses: Cystic fibrosis (H)      Respiratory Therapy Supplies (KRIS ALTERA NEBULIZER HANDSET) MISC 1 each daily  Qty: 1 each, Refills: 1    Associated Diagnoses: Cystic fibrosis with pulmonary manifestations (H); Exocrine pancreatic insufficiency; 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)      blood glucose (NO BRAND SPECIFIED) lancets standard Whatever Lancets that go with device, Test 6 times daily  Qty: 540 each, Refills: 3    Associated Diagnoses: Diabetes mellitus related to CF (cystic fibrosis) (H)     "  blood glucose monitoring (MARTINA CONTOUR NEXT) test strip Use to test blood sugar 6 times daily or as directed.  Qty: 200 each, Refills: 2    Associated Diagnoses: Diabetes mellitus related to CF (cystic fibrosis) (H)      insulin pen needle (BD OMI U/F) 32G X 4 MM Use 6 daily or as directed.  Qty: 200 each, Refills: 11    Associated Diagnoses: Diabetes mellitus related to CF (cystic fibrosis) (H)      blood glucose monitoring (MARTINA CONTOUR NEXT MONITOR) meter device kit Use to test blood sugar 6 times daily or as directed.  Qty: 1 kit, Refills: 2    Comments: Patient needs to change to provider Viral Lee for his adult CFRD.  Last refills by Dr. Mancuso  Associated Diagnoses: Diabetes mellitus related to CF (cystic fibrosis) (H)      blood glucose monitoring (MARTINA MICROLET) lancets Use to test blood sugar 6 times daily or as directed.  Qty: 2 Box, Refills: 2    Comments: Patient needs to change to provider Viral Lee for his adult CFRD.  Last refills by Dr. Mancuso  Associated Diagnoses: Diabetes mellitus related to CF (cystic fibrosis) (H)      !! [facility] MANAGED PATIENT Contact [facility] for patient specific medication information at [phone] on admission and discharge from the hospital.  Phones are answered 24 hours a day 7 days a week 365 days a year.    Providers - Choose \"CONTINUE HOME MED (no script)\" at discharge if patient treatment with home infusion will continue.      !! ACE NOT PRESCRIBED, INTENTIONAL, 1 each continuous prn. ACE Inhibitor not prescribed due to Risk for drug interaction  Qty: 0 each, Refills: 0    Associated Diagnoses: Type I (juvenile type) diabetes mellitus without mention of complication, not stated as uncontrolled      Nebulizers (EFLOW SCF NEBULIZER HANDSET) MISC 1 each 3 times daily.  Qty: 1 each, Refills: 6    Comments: Supply as needed with [place] as [employer] handset  Associated Diagnoses: Nocardia infection       !! - Potential duplicate " medications found. Please discuss with provider.        Allergies   Allergies   Allergen Reactions     Pulmozyme [Dornase Trever] Other (See Comments)     Chest pain and fever     Tobramycin Fatigue     Trouble with ear ringing, shortness of breath, little clinical response.     Zosyn Hives     Ceftazidime Rash     Levaquin Rash

## 2018-01-23 NOTE — PROGRESS NOTES
[facility]    Pulmonary, Allergy, Critical Care and Sleep Medicine    Pulmonary Consult           Assessment and Plan:   42 year old male with a history of cystic fibrosis with severe obstruction who presents with pulmonary exacerbation and influenza A.  Due to fevers, worsening respiratory symptoms, and multiple antibiotic intolerances, he requires inpatient admission.      # CF Pulmonary exacerbation, # Influenza A infection:  Micro: current CF culture is pending, in the past has grown Pseudomonas x2, both of which are quite resistant  Abx: intolerant to several antimicrobials, generally tolerates meropenem.  Clincally improved. He feels better already  FEV1 34% ( baseline 39%).     I discussed outpatient CF provider Dr. Perry about discharge plan.   D/C home with meropenem IV with PICC. Patient will be contacted about the CF culture result.     - Discharge today 2g IV Q8h meropenem in outpatient setting   - continue home nebulizers and inhalers  - continue chronic azithromycin 250mg daily  - pt alternates between [place] nebulizers and bactrim, continue current bactrim  -outpatient appointment with Dr. Perry in 3 weeks.      CF related diabetes:  Very well controlled  - continue home insulin regimen, adjust as needed     CF related pancreatic insufficiency:   - continue pancreatic enzymes  - continue home vitamins     Deepthi Russell MD  Pulmonary Critical Care Fellow  [phone]        Interval History:     Improved symptom, less sputum production .            Review of Systems:   C: negative for fever, chills, change in weight  INTEGUMENTARY/SKIN: no rash or obvious new lesions  ENT/MOUTH: no sore throat, new sinus pain or nasal drainage  RESP: see interval history  CV: negative for chest pain, palpitations or peripheral edema  GI: no nausea, vomiting, change in stools  : no dysuria  MUSCULOSKELETAL: no myalgias, arthralgias  ENDOCRINE: blood sugars with adequate  control  PSYCHIATRIC: mood stable          Medications:       meropenem continuous infusion  3 g Intravenous Q12H     insulin aspart  1-3 Units Subcutaneous TID AC     insulin aspart  1-3 Units Subcutaneous At Bedtime     acetylcysteine  4 mL Nebulization 4x Daily     amitriptyline  10 mg Oral At Bedtime     azithromycin  500 mg Oral Daily     fluticasone-vilanterol  1 puff Inhalation Daily     amylase-lipase-protease  7-8 capsule Oral TID w/meals     levalbuterol  1 ampule Nebulization 4x Daily     sodium chloride inhalant  4 mL Nebulization BID     enoxaparin  40 mg Subcutaneous Q24H     sulfamethoxazole-trimethoprim  1 tablet Oral BID     oseltamivir  75 mg Oral BID     insulin aspart   Subcutaneous Daily with lunch     insulin aspart   Subcutaneous Daily with supper     cholecalciferol  400 Units Oral Daily     vitamin E  400 Units Oral Daily     phytonadione  5 mg Oral Daily     insulin aspart  3 Units Subcutaneous At Bedtime     insulin glargine  6 Units Subcutaneous At Bedtime     - MEDICATION INSTRUCTIONS -, levalbuterol, acetaminophen, ondansetron, polyethylene glycol, senna-docusate, lidocaine 4%, heparin lock flush, glucose **OR** dextrose **OR** glucagon, insulin aspart         Physical Exam:   Temp:  [97.4  F (36.3  C)-100.1  F (37.8  C)] 97.4  F (36.3  C)  Heart Rate:  [90-92] 90  Resp:  [16-18] 18  BP: (100-110)/(60-62) 100/62  SpO2:  [95 %-96 %] 96 %    Intake/Output Summary (Last 24 hours) at 01/23/18 0828  Last data filed at 01/22/18 2000   Gross per 24 hour   Intake              820 ml   Output                0 ml   Net              820 ml     Constitutional:   Awake, alert and in no apparent distress   Eyes:   nonicteric   ENT:    oral mucosa moist without lesions   Neck:   Supple without supraclavicular or cervical lymphadenopathy   Lungs:   Good air flow.  No crackles. No rhonchi.  No wheezes.   Cardiovascular:   Normal S1 and S2.  RRR.  No murmur, gallop or rub.   Abdomen:   NABS, soft,  nontender, nondistended.  No HSM.   Musculoskeletal:   No edema   Neurologic:   Alert and conversant.   Skin:   Warm, dry.  No rash on limited exam.             Data:   All laboratory and imaging data reviewed.    Data:  CMP  Recent Labs  Lab 01/22/18  0731 01/21/18  1752 01/21/18  1301    137 135   POTASSIUM 4.1 4.0 3.8   CHLORIDE 100 102 101   CO2 26 28 26   ANIONGAP 8 7 8   GLC 91 94 102*   BUN 15 13 14   CR 0.86 0.96 0.91   GFRESTIMATED >90 86 >90   GFRESTBLACK >90 >90 >90   CLAUDIA 8.1* 8.2* 8.2*   MAG 2.2 2.3  --    PHOS 3.5 3.4  --    PROTTOTAL  --   --  7.9   ALBUMIN  --  2.9* 3.1*   BILITOTAL  --  0.2 0.2   ALKPHOS  --  131 144   AST  --  23 24   ALT  --  24 23     CBC  Recent Labs  Lab 01/22/18  0731 01/21/18  1752 01/21/18  1301   WBC 13.8* 10.5 10.9   RBC 4.64 4.67 4.83   HGB 12.6* 12.8* 13.1*   HCT 40.8 41.0 42.5   MCV 88 88 88   MCH 27.2 27.4 27.1   MCHC 30.9* 31.2* 30.8*   RDW 15.8* 15.9* 15.6*    252 292     INR  Recent Labs  Lab 01/21/18  1752   INR 1.14     Arterial Blood GasNo lab results found in last 7 days.  Urine Studies  Recent Labs   Lab Test  03/14/17   1110  12/15/16   1153  12/16/15   0843  06/26/13   0855  05/20/13   1030   URINEPH  6.0  7.0  5.5  6.5  6.5   NITRITE  Negative  Negative  Negative  Negative  Negative   LEUKEST  Negative  Negative  Negative  Negative  Negative   WBCU  <1  2  O - 2  O - 2  1     CMV viral loads  No lab results found.  No results found for: CMQNT, CMVQ  EBV viral loads   No lab results found.  No results found for: EBVDN, EBRES, EBVDN, EBVSP, EBVPC, EBVPCR  Respiratory Virus Testing    No results found for: RS, FLUAG

## 2018-01-24 ENCOUNTER — HOME INFUSION (PRE-WILLOW HOME INFUSION) (OUTPATIENT)
Dept: PHARMACY | Facility: CLINIC | Age: 43
End: 2018-01-24

## 2018-01-24 NOTE — PROGRESS NOTES
This is a recent snapshot of the patient's [facility] medical record.  For current drug dose and complete information and questions, call [phone] or In Basket pool, fv home infusion ([id number])  CSN Number:  [id number]

## 2018-01-25 ENCOUNTER — CARE COORDINATION (OUTPATIENT)
Dept: MEDSURG UNIT | Facility: CLINIC | Age: 43
End: 2018-01-25

## 2018-01-25 NOTE — PROGRESS NOTES
This is a recent snapshot of the patient's [facility] medical record.  For current drug dose and complete information and questions, call [phone] or In Basket pool, fv home infusion ([id number])  CSN Number:  [id number]

## 2018-01-25 NOTE — PROGRESS NOTES
Cystic Fibrosis Follow Up Phone Call    Chief complaint:  ID#[id number] Clinic Care Coordination - Post Hospital   Discharge Date: 1/23 .    Attempted to contact patient for post-hospital follow up call. No answer, voicemail was left instructing patient to contact writer ([phone]) or CF Office ([phone]) with any questions or concerns.

## 2018-01-26 ENCOUNTER — HOME INFUSION (PRE-WILLOW HOME INFUSION) (OUTPATIENT)
Dept: PHARMACY | Facility: CLINIC | Age: 43
End: 2018-01-26

## 2018-01-26 DIAGNOSIS — E84.9 CYSTIC FIBROSIS (H): ICD-10-CM

## 2018-01-26 DIAGNOSIS — E84.9 CF (CYSTIC FIBROSIS) (H): ICD-10-CM

## 2018-01-26 DIAGNOSIS — E84.0 CYSTIC FIBROSIS WITH PULMONARY MANIFESTATIONS (H): ICD-10-CM

## 2018-01-26 LAB
BACTERIA SPEC CULT: ABNORMAL
SPECIMEN SOURCE: ABNORMAL

## 2018-01-26 RX ORDER — LEVALBUTEROL INHALATION SOLUTION 1.25 MG/3ML
SOLUTION RESPIRATORY (INHALATION)
Qty: 360 ML | Refills: 11 | Status: SHIPPED | OUTPATIENT
Start: 2018-01-26 | End: 2019-02-04

## 2018-01-29 ENCOUNTER — HOME INFUSION (PRE-WILLOW HOME INFUSION) (OUTPATIENT)
Dept: PHARMACY | Facility: CLINIC | Age: 43
End: 2018-01-29

## 2018-01-29 LAB
ANION GAP SERPL CALCULATED.3IONS-SCNC: 5 MMOL/L (ref 3–14)
BUN SERPL-MCNC: 29 MG/DL (ref 7–30)
CALCIUM SERPL-MCNC: 8.7 MG/DL (ref 8.5–10.1)
CHLORIDE SERPL-SCNC: 103 MMOL/L (ref 94–109)
CO2 SERPL-SCNC: 30 MMOL/L (ref 20–32)
CREAT SERPL-MCNC: 0.91 MG/DL (ref 0.66–1.25)
GFR SERPL CREATININE-BSD FRML MDRD: >90 ML/MIN/1.7M2
GLUCOSE SERPL-MCNC: 100 MG/DL (ref 70–99)
MAGNESIUM SERPL-MCNC: 2.6 MG/DL (ref 1.6–2.3)
PHOSPHATE SERPL-MCNC: 2.6 MG/DL (ref 2.5–4.5)
POTASSIUM SERPL-SCNC: 5.3 MMOL/L (ref 3.4–5.3)
SODIUM SERPL-SCNC: 138 MMOL/L (ref 133–144)

## 2018-01-29 PROCEDURE — 84100 ASSAY OF PHOSPHORUS: CPT | Performed by: INTERNAL MEDICINE

## 2018-01-29 PROCEDURE — 80048 BASIC METABOLIC PNL TOTAL CA: CPT | Performed by: INTERNAL MEDICINE

## 2018-01-29 PROCEDURE — 83735 ASSAY OF MAGNESIUM: CPT | Performed by: INTERNAL MEDICINE

## 2018-01-29 NOTE — PROGRESS NOTES
This is a recent snapshot of the patient's [facility] medical record.  For current drug dose and complete information and questions, call [phone] or In Basket pool, fv home infusion ([id number])  CSN Number:  [id number]

## 2018-01-30 ENCOUNTER — HOME INFUSION (PRE-WILLOW HOME INFUSION) (OUTPATIENT)
Dept: PHARMACY | Facility: CLINIC | Age: 43
End: 2018-01-30

## 2018-01-30 DIAGNOSIS — E84.0 CYSTIC FIBROSIS WITH PULMONARY MANIFESTATIONS (H): ICD-10-CM

## 2018-01-30 LAB
EXPTIME-PRE: 16.22 SEC
FEF2575-%PRED-PRE: 10 %
FEF2575-PRE: 0.41 L/SEC
FEF2575-PRED: 3.9 L/SEC
FEFMAX-%PRED-PRE: 53 %
FEFMAX-PRE: 5.21 L/SEC
FEFMAX-PRED: 9.79 L/SEC
FEV1-%PRED-PRE: 34 %
FEV1-PRE: 1.37 L
FEV1FEV6-PRE: 48 %
FEV1FEV6-PRED: 82 %
FEV1FVC-PRE: 39 %
FEV1FVC-PRED: 81 %
FIFMAX-PRE: 4.23 L/SEC
FVC-%PRED-PRE: 71 %
FVC-PRE: 3.53 L
FVC-PRED: 4.94 L

## 2018-01-30 RX ORDER — PANCRELIPASE 24000; 76000; 120000 [USP'U]/1; [USP'U]/1; [USP'U]/1
CAPSULE, DELAYED RELEASE PELLETS ORAL
Qty: 2700 CAPSULE | Refills: 2 | Status: SHIPPED | OUTPATIENT
Start: 2018-01-30 | End: 2018-10-30

## 2018-01-30 NOTE — PROGRESS NOTES
This is a recent snapshot of the patient's [facility] medical record.  For current drug dose and complete information and questions, call [phone] or In Basket pool, fv home infusion ([id number])  CSN Number:  [id number]

## 2018-01-31 NOTE — PROGRESS NOTES
This is a recent snapshot of the patient's [facility] medical record.  For current drug dose and complete information and questions, call [phone] or In Basket pool, fv home infusion ([id number])  CSN Number:  [id number]

## 2018-02-05 ENCOUNTER — HOME INFUSION (PRE-WILLOW HOME INFUSION) (OUTPATIENT)
Dept: PHARMACY | Facility: CLINIC | Age: 43
End: 2018-02-05

## 2018-02-05 LAB
ANION GAP SERPL CALCULATED.3IONS-SCNC: 5 MMOL/L (ref 3–14)
BUN SERPL-MCNC: 19 MG/DL (ref 7–30)
CALCIUM SERPL-MCNC: 8.7 MG/DL (ref 8.5–10.1)
CHLORIDE SERPL-SCNC: 100 MMOL/L (ref 94–109)
CO2 SERPL-SCNC: 31 MMOL/L (ref 20–32)
CREAT SERPL-MCNC: 0.75 MG/DL (ref 0.66–1.25)
GFR SERPL CREATININE-BSD FRML MDRD: >90 ML/MIN/1.7M2
GLUCOSE SERPL-MCNC: 76 MG/DL (ref 70–99)
MAGNESIUM SERPL-MCNC: 2.5 MG/DL (ref 1.6–2.3)
PHOSPHATE SERPL-MCNC: 1.9 MG/DL (ref 2.5–4.5)
POTASSIUM SERPL-SCNC: 5.2 MMOL/L (ref 3.4–5.3)
SODIUM SERPL-SCNC: 136 MMOL/L (ref 133–144)

## 2018-02-05 PROCEDURE — 84100 ASSAY OF PHOSPHORUS: CPT | Performed by: INTERNAL MEDICINE

## 2018-02-05 PROCEDURE — 80048 BASIC METABOLIC PNL TOTAL CA: CPT | Performed by: INTERNAL MEDICINE

## 2018-02-05 PROCEDURE — 83735 ASSAY OF MAGNESIUM: CPT | Performed by: INTERNAL MEDICINE

## 2018-02-06 ENCOUNTER — HOME INFUSION (PRE-WILLOW HOME INFUSION) (OUTPATIENT)
Dept: PHARMACY | Facility: CLINIC | Age: 43
End: 2018-02-06

## 2018-02-06 NOTE — PROGRESS NOTES
This is a recent snapshot of the patient's [facility] medical record.  For current drug dose and complete information and questions, call [phone] or In Basket pool, fv home infusion ([id number])  CSN Number:  [id number]

## 2018-02-07 NOTE — PROGRESS NOTES
This is a recent snapshot of the patient's [facility] medical record.  For current drug dose and complete information and questions, call [phone] or In Basket pool, fv home infusion ([id number])  CSN Number:  [id number]

## 2018-02-12 ENCOUNTER — TELEPHONE (OUTPATIENT)
Dept: PHARMACY | Facility: CLINIC | Age: 43
End: 2018-02-12

## 2018-02-12 ENCOUNTER — OFFICE VISIT (OUTPATIENT)
Dept: PULMONOLOGY | Facility: CLINIC | Age: 43
End: 2018-02-12
Attending: PHYSICIAN ASSISTANT
Payer: COMMERCIAL

## 2018-02-12 ENCOUNTER — HOME INFUSION (PRE-WILLOW HOME INFUSION) (OUTPATIENT)
Dept: PHARMACY | Facility: CLINIC | Age: 43
End: 2018-02-12

## 2018-02-12 VITALS
HEIGHT: 68 IN | HEART RATE: 100 BPM | OXYGEN SATURATION: 97 % | BODY MASS INDEX: 20.31 KG/M2 | DIASTOLIC BLOOD PRESSURE: 69 MMHG | WEIGHT: 134.04 LBS | SYSTOLIC BLOOD PRESSURE: 109 MMHG | RESPIRATION RATE: 15 BRPM

## 2018-02-12 DIAGNOSIS — E84.9 CYSTIC FIBROSIS (H): ICD-10-CM

## 2018-02-12 DIAGNOSIS — Z79.2 ENCOUNTER FOR LONG-TERM (CURRENT) USE OF ANTIBIOTICS: Primary | ICD-10-CM

## 2018-02-12 DIAGNOSIS — E84.8 DIABETES MELLITUS RELATED TO CF (CYSTIC FIBROSIS) (H): ICD-10-CM

## 2018-02-12 DIAGNOSIS — B37.0 THRUSH: ICD-10-CM

## 2018-02-12 DIAGNOSIS — E84.0 CYSTIC FIBROSIS WITH PULMONARY MANIFESTATIONS (H): Primary | ICD-10-CM

## 2018-02-12 DIAGNOSIS — E08.9 DIABETES MELLITUS RELATED TO CF (CYSTIC FIBROSIS) (H): ICD-10-CM

## 2018-02-12 DIAGNOSIS — E84.0 CYSTIC FIBROSIS WITH PULMONARY MANIFESTATIONS (H): ICD-10-CM

## 2018-02-12 PROCEDURE — 87186 SC STD MICRODIL/AGAR DIL: CPT | Performed by: INTERNAL MEDICINE

## 2018-02-12 PROCEDURE — 87070 CULTURE OTHR SPECIMN AEROBIC: CPT | Performed by: INTERNAL MEDICINE

## 2018-02-12 PROCEDURE — G0463 HOSPITAL OUTPT CLINIC VISIT: HCPCS | Mod: ZF

## 2018-02-12 PROCEDURE — 87077 CULTURE AEROBIC IDENTIFY: CPT | Performed by: INTERNAL MEDICINE

## 2018-02-12 RX ORDER — NYSTATIN 100000/ML
500000 SUSPENSION, ORAL (FINAL DOSE FORM) ORAL 4 TIMES DAILY
Qty: 140 ML | Refills: 0 | Status: SHIPPED | OUTPATIENT
Start: 2018-02-12 | End: 2018-02-19

## 2018-02-12 RX ORDER — PREDNISONE 20 MG/1
20 TABLET ORAL DAILY
Qty: 5 TABLET | Refills: 0 | Status: SHIPPED | OUTPATIENT
Start: 2018-02-12 | End: 2018-02-17

## 2018-02-12 RX ORDER — AMITRIPTYLINE HYDROCHLORIDE 10 MG/1
TABLET ORAL
Qty: 90 TABLET | Refills: 3 | COMMUNITY
Start: 2018-02-12 | End: 2019-02-21

## 2018-02-12 RX ORDER — ACETYLCYSTEINE 100 MG/ML
SOLUTION ORAL; RESPIRATORY (INHALATION)
Qty: 480 ML | Refills: 6 | Status: SHIPPED | OUTPATIENT
Start: 2018-02-12 | End: 2019-11-08

## 2018-02-12 ASSESSMENT — PAIN SCALES - GENERAL: PAINLEVEL: NO PAIN (0)

## 2018-02-12 NOTE — MR AVS SNAPSHOT
After Visit Summary   2/12/2018    Nico Morales    MRN: [id number]           Patient Information     Date Of Birth          1975        Visit Information        Provider Department      2/12/2018 10:30 AM Regina Herman PA-C [facility]        Today's Diagnoses     Encounter for long-term (current) use of antibiotics    -  1    Cystic fibrosis with pulmonary manifestations (H)        Cystic fibrosis (H)        Diabetes mellitus related to CF (cystic fibrosis) (H)        Thrush          Care Instructions    Cystic Fibrosis Self-Care Plan       Patient: Nico Mroales   MRN: [id number]   Clinic Date: February 12, 2018     RECOMMENDATIONS:  1. Continue nebulizers and vest therapy.   2. Continue IV meropenem.  3. Stop Bactrim and resume [place].  4. Prednisone 20 mg daily X 5 days.  5. We will look into Mucomyst.   6. F/u with Dr. Lee as scheduled.    Annual Studies:   IGG   Date Value Ref Range Status   12/15/2016 1770 (H) 695 - 1620 mg/dL Final     Insulin   Date Value Ref Range Status   07/26/2011 30 mU/L Final     Comment:     Reference Range:  0-20  +120     There are no preventive care reminders to display for this patient.    Pulmonary Function Tests  FEV1: amount of air you can blow out in 1 second  FVC: total amount of air you can take in and blow out    Your Goals:         PFT Latest Ref Rng & Units 1/22/2018   FVC L 3.53   FEV1 L 1.37   FVC% % 71   FEV1% % 34          Airway Clearance: The Most Important Way to Keep Your Lungs Healthy  Vest Settings:    Hill-Rom Frequencies: 8, 9, 10 Pressure 10 Then, Frequencies 18, 19, 20 Pressure 6      RespirTech: Quick Start with Pressure of     Do each frequency for 5 minutes; Deflate vest after each frequency & cough 3 times before beginning the next setting.    Vest and Neb Therapy should be done 2-3 times/day.    Good Nutrition Can Improve Lung Function and Overall Health     Take ALL of your  vitamins with food     Take 1/2 of your enzymes before EVERY meal/snack and the other 1/2 mid-meal/snack    Wt Readings from Last 3 Encounters:   01/22/18 61.7 kg (136 lb)   11/21/17 62.7 kg (138 lb 3.7 oz)   09/06/17 62.1 kg (137 lb)       There is no height or weight on file to calculate BMI.         National CF Foundation Recommendations for BMI in CF Adults: Women: at least 22 Men: at least 23        Controlling Blood Sugars Helps Prevent Lung Infections & Improves Nutrition  Test blood sugar:     In the morning before eating (goal is )     2 hours after a meal (goal is less than 150)     When pre-meal glucose is greater than 150 add correction     At bedtime (if less than 100 eat a snack with 15 grams of carbohydrates  Last A1C Results:   Hemoglobin A1C   Date Value Ref Range Status   01/21/2018 5.9 4.3 - 6.0 % Final         If diabetic, measure A1C every 6 months. Goal is under 7%.    Staying Healthy    Research: If you are interested in learning about research opportunities or have questions, please contact Temi Dodge at [phone] or [email or web address].      CF Foundation: Compass is a personalized resource service to help you with the insurance, financial, legal and other issues you are facing.  It's free, confidential and available to anyone with CF.  Ask your  for more information or contact Compass directly at 986-[facility] ([phone]) or compass@cff.org, or learn more at cff.org/compass.       CF Nurse Line:  Jose David Henderson: [phone]   Felicitas Banuelos, RT: [phone]     Ivory Pierre , Dieticians: [phone]     Jordyn Rudolph, Diabetes Nurse: [phone]    Mary Amato: [phone] or Marisa Urbina [phone], Social Workers   [email or web address]            Follow-ups after your visit        Follow-up notes from your care team     Return in about 2 weeks (around 2/26/2018).      Your next 10 appointments already scheduled     Mar 05,  2018 10:15 AM CST   Return Visit with Diana Lee MD, MG ENDO NURSE   [facility] ([facility]    [address] [address] [place] [address]   [phone]            Mar 07, 2018 10:30 AM CST   CF LOOP with UC PFL CF   [facility] Pulmonary Function Testing ([facility])    [address]  3rd Floor  [facility] [address] [phone]            Mar 07, 2018 11:10 AM CST   (Arrive by 10:55 AM)   RETURN CYSTIC FIBROSIS VISIT with Jaye Machado MD   [facility] ([facility])    [address]  [address]  [facility] [address] [phone]              Future tests that were ordered for you today     Open Future Orders        Priority Expected Expires Ordered    Cystic Fibrosis Culture Aerob Bacterial Routine 2/19/2018 3/5/2018 2/12/2018    Spirometry, Breathing Capacity Routine 2/19/2018 3/5/2018 2/12/2018            Who to contact     If you have questions or need follow up information about today's clinic visit or your schedule please contact [facility] directly at [phone].  Normal or non-critical lab and imaging results will be communicated to you by Mixgarhart, letter or phone within 4 business days after the clinic has received the results. If you do not hear from us within 7 days, please contact the clinic through Mixgarhart or phone. If you have a critical or abnormal lab result, we will notify you by phone as soon as possible.  Submit refill requests through [employer] or call your pharmacy and they will forward the refill request to us. Please allow 3 business days for your refill to be completed.          Additional Information About Your Visit        [employer] Information     [employer] gives you secure access to your electronic health record. If you see a primary care provider, you can also send messages to your care team and  "make appointments. If you have questions, please call your primary care clinic.  If you do not have a primary care provider, please call [phone] and they will assist you.        Care EveryWhere ID     This is your Care EveryWhere ID. This could be used by other organizations to access your [facility] medical records  [id number]        Your Vitals Were     Pulse Respirations Height Pulse Oximetry BMI (Body Mass Index)       100 15 1.727 m (5' 8\") 97% 20.38 kg/m2        Blood Pressure from Last 3 Encounters:   02/12/18 109/69   01/23/18 105/65   11/21/17 109/69    Weight from Last 3 Encounters:   02/12/18 60.8 kg (134 lb 0.6 oz)   01/22/18 61.7 kg (136 lb)   11/21/17 62.7 kg (138 lb 3.7 oz)              We Performed the Following     Cystic Fibrosis Culture Aerob Bacterial          Today's Medication Changes          These changes are accurate as of 2/12/18 11:36 AM.  If you have any questions, ask your nurse or doctor.               Start taking these medicines.        Dose/Directions    nystatin 884734 UNIT/ML suspension   Commonly known as:  MYCOSTATIN   Used for:  Thrush, Cystic fibrosis with pulmonary manifestations (H), Encounter for long-term (current) use of antibiotics, Cystic fibrosis (H), Diabetes mellitus related to CF (cystic fibrosis) (H)   Started by:  Regina Herman PA-C        Dose:  263774 Units   Take 5 mLs (500,000 Units) by mouth 4 times daily for 7 days   Quantity:  140 mL   Refills:  0       predniSONE 20 MG tablet   Commonly known as:  DELTASONE   Used for:  Cystic fibrosis with pulmonary manifestations (H), Encounter for long-term (current) use of antibiotics, Cystic fibrosis (H), Diabetes mellitus related to CF (cystic fibrosis) (H), Thrush   Started by:  Regina Herman PA-C        Dose:  20 mg   Take 1 tablet (20 mg) by mouth daily for 5 days   Quantity:  5 tablet   Refills:  0         These medicines have changed or have updated prescriptions.        Dose/Directions    " acetylcysteine 10 % nebulizer solution   Commonly known as:  MUCOMYST   This may have changed:  additional instructions   Used for:  Cystic fibrosis with pulmonary manifestations (H)        Nebulize 4ml qid   Quantity:  480 mL   Refills:  6       amitriptyline 10 MG tablet   Commonly known as:  ELAVIL   This may have changed:  See the new instructions.   Used for:  Cystic fibrosis (H), Cystic fibrosis with pulmonary manifestations (H), Encounter for long-term (current) use of antibiotics, Diabetes mellitus related to CF (cystic fibrosis) (H), Thrush   Changed by:  Regina Herman PA-C        TAKE ONE TABLET BY MOUTH AT BEDTIME.   Quantity:  90 tablet   Refills:  3       levalbuterol 1.25 MG/3ML neb solution   Commonly known as:  XOPENEX   This may have changed:  Another medication with the same name was removed. Continue taking this medication, and follow the directions you see here.   Used for:  Cystic fibrosis (H), CF (cystic fibrosis) (H), Cystic fibrosis with pulmonary manifestations (H)   Changed by:  Regina Herman PA-C        INHALE 1 VIAL BY MOUTH INTO THE LUNGS VIA NEBULIZATION 4 TIMES DAILY   Quantity:  360 mL   Refills:  11       NovoLOG PENFILL 100 UNIT/ML injection   This may have changed:  additional instructions   Used for:  Diabetes mellitus related to CF (cystic fibrosis) (H), Cystic fibrosis with pulmonary manifestations (H), Encounter for long-term (current) use of antibiotics, Cystic fibrosis (H), Thrush   Generic drug:  insulin aspart   Changed by:  Regina Herman PA-C        1 per 20 grams of carbohydrate with meals and snacks. Also use 3 units of Novolog at night with tube feeding.   Quantity:  15 mL   Refills:  12       sodium chloride inhalant 7 % Nebu neb solution   This may have changed:  See the new instructions.   Used for:  Cystic fibrosis with pulmonary manifestations (H)        NEBULIZE 4ML TWICE DAILY   Quantity:  240 mL   Refills:  5         Stop taking these  medicines if you haven't already. Please contact your care team if you have questions.     oseltamivir 75 MG capsule   Commonly known as:  TAMIFLU   Stopped by:  Regina Herman PA-C                Where to get your medicines      These medications were sent to [facility] [facility] - [place], [place] - [address]  [address], [place] [address]     Phone:  [phone]     nystatin 744129 UNIT/ML suspension    predniSONE 20 MG tablet                Primary Care Provider Office Phone # Fax #    Jaye Maki Machado [phone]       [facility] [address] [place] [address]        Equal Access to Services     TOÑA RAMIREZ : Hadii favio willamso [place], waaxda naif, qaybta kaalmada domingoda, joe castellon . So [facility] [phone].    ATENCIÓN: Si habla español, tiene a monroe disposición servicios gratuitos de asistencia lingüística. Llame al [phone].    We comply with applicable federal civil rights laws and [place] laws. We do not discriminate on the basis of race, color, national origin, age, disability, sex, sexual orientation, or gender identity.            Thank you!     Thank you for choosing [facility]  for your care. Our goal is always to provide you with excellent care. Hearing back from our patients is one way we can continue to improve our services. Please take a few minutes to complete the written survey that you may receive in the mail after your visit with us. Thank you!             Your Updated Medication List - Protect others around you: Learn how to safely use, store and throw away your medicines at www.disposemymeds.org.          This list is accurate as of 2/12/18 11:36 AM.  Always use your most recent med list.                   Brand Name Dispense Instructions for use Diagnosis    ACE NOT PRESCRIBED (INTENTIONAL)     0 each    1 each continuous prn. ACE Inhibitor  not prescribed due to Risk for drug interaction    Type I (juvenile type) diabetes mellitus without mention of complication, not stated as uncontrolled       acetylcysteine 10 % nebulizer solution    MUCOMYST    480 mL    Nebulize 4ml qid    Cystic fibrosis with pulmonary manifestations (H)       amitriptyline 10 MG tablet    ELAVIL    90 tablet    TAKE ONE TABLET BY MOUTH AT BEDTIME.    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    Diabetes mellitus related to CF (cystic fibrosis) (H)       blood glucose monitoring meter device kit     1 kit    Use to test blood sugar 6 times daily or as directed.    Diabetes mellitus related to CF (cystic fibrosis) (H)       blood glucose monitoring test strip    MARTINA CONTOUR NEXT    200 each    Use to test blood sugar 6 times daily or as directed.    Diabetes mellitus related to CF (cystic fibrosis) (H)       budesonide-formoterol 160-4.5 MCG/ACT Inhaler    SYMBICORT    1 Inhaler    Inhale 2 puffs into the lungs 2 times daily    Cystic fibrosis of the lung (H), Cystic fibrosis (H)       CALCIUM PO      Take 1 tablet by mouth 2 times daily Strength unknown.         "CREON [address]954 UNITS Cpep per EC capsule   Generic drug:  amylase-lipase-protease     2700 capsule    TAKE 7-8 CAPS WITH MEALS (3 MEALS/DAY) AND 2 CAPS WITH SNACKS (3 SNACKS/DAY)    Cystic fibrosis with pulmonary manifestations (H)       EFLOW SCF NEBULIZER HANDSET Misc     1 each    1 each 3 times daily.    Nocardia infection       [facility] INFUSION MANAGED PATIENT      Contact [facility] for patient specific medication information at [phone] on admission and discharge from the hospital.  Phones are answered 24 hours a day 7 days a week 365 days a year.  Providers - Choose \"CONTINUE HOME MED (no script)\" at discharge if patient treatment with home infusion will continue.        insulin glargine 100 UNIT/ML injection    LANTUS SOLOSTAR    15 mL    6 units daily    Diabetes mellitus related to CF (cystic fibrosis) (H)       insulin pen needle 32G X 4 MM    BD OMI U/F    200 each    Use 6 daily or as directed.    Diabetes mellitus related to CF (cystic fibrosis) (H)       levalbuterol 1.25 MG/3ML neb solution    XOPENEX    360 mL    INHALE 1 VIAL BY MOUTH INTO THE LUNGS VIA NEBULIZATION 4 TIMES DAILY    Cystic fibrosis (H), CF (cystic fibrosis) (H), Cystic fibrosis with pulmonary manifestations (H)       levalbuterol 45 MCG/ACT Inhaler    XOPENEX HFA    3 Inhaler    Inhale 2 puffs into the lungs every 6 hours as needed for shortness of breath / dyspnea    Cystic fibrosis (H)       MEPHYTON 5 MG tablet   Generic drug:  phytonadione      Take 1 TABLET by mouth DAILY.        meropenem 2 g     100 g    Inject 2 g into the vein every 8 hours    Cystic fibrosis with pulmonary manifestations (H)       MULTIVITAMIN PO      Take 1 tablet by mouth daily. Includes 5,000 units vitamin D and 400 units vitamin E. Per pt, formulated for CF pts.        NovoLOG PENFILL 100 UNIT/ML injection   Generic drug:  insulin aspart     15 mL    1 per 20 grams of carbohydrate with meals and snacks. Also use 3 units of " Novolog at night with tube feeding.    Diabetes mellitus related to CF (cystic fibrosis) (H), Cystic fibrosis with pulmonary manifestations (H), Encounter for long-term (current) use of antibiotics, Cystic fibrosis (H), Thrush       nystatin 019515 UNIT/ML suspension    MYCOSTATIN    140 mL    Take 5 mLs (500,000 Units) by mouth 4 times daily for 7 days    Thrush, Cystic fibrosis with pulmonary manifestations (H), Encounter for long-term (current) use of antibiotics, Cystic fibrosis (H), Diabetes mellitus related to CF (cystic fibrosis) (H)       KRIS ALTERA NEBULIZER HANDSET Misc     1 each    1 each daily    Cystic fibrosis with pulmonary manifestations (H), Exocrine pancreatic insufficiency, 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)       polyethylene glycol powder    MIRALAX    510 g    Use 17 gms po 1-3 times daily as needed.    Cystic fibrosis with meconium ileus (H)       predniSONE 20 MG tablet    DELTASONE    5 tablet    Take 1 tablet (20 mg) by mouth daily for 5 days    Cystic fibrosis with pulmonary manifestations (H), Encounter for long-term (current) use of antibiotics, Cystic fibrosis (H), Diabetes mellitus related to CF (cystic fibrosis) (H), Thrush       rimantadine 100 MG tablet    FLUMADINE    60 tablet    Take 1 tablet (100 mg) by mouth 2 times daily Take during flu season Oct 1st-March 31st    Cystic fibrosis with pulmonary manifestations (H)       sodium chloride inhalant 7 % Nebu neb solution     240 mL    NEBULIZE 4ML TWICE DAILY    Cystic fibrosis with pulmonary manifestations (H)       sulfamethoxazole-trimethoprim 800-160 MG per tablet    BACTRIM DS/SEPTRA DS    60 tablet    Take 1 tablet by mouth 2 times daily    Cystic fibrosis with pulmonary manifestations (H)       VITRON-C 200-125 MG Tabs   Generic drug:  ferrous fumarate 65 mg ([place]. FE)-Vitamin C 125 mg      Take 1 tablet by mouth daily    Nocardia infection, Cystic fibrosis with pulmonary manifestations (H)

## 2018-02-12 NOTE — PATIENT INSTRUCTIONS
Cystic Fibrosis Self-Care Plan       Patient: Nico Mroales   MRN: [id number]   Clinic Date: February 12, 2018     RECOMMENDATIONS:  1. Continue nebulizers and vest therapy.   2. Continue IV meropenem.  3. Stop Bactrim and resume [place].  4. Prednisone 20 mg daily X 5 days.  5. We will look into Mucomyst.   6. F/u with Dr. Lee as scheduled.    Annual Studies:   IGG   Date Value Ref Range Status   12/15/2016 1770 (H) 695 - 1620 mg/dL Final     Insulin   Date Value Ref Range Status   07/26/2011 30 mU/L Final     Comment:     Reference Range:  0-20  +120     There are no preventive care reminders to display for this patient.    Pulmonary Function Tests  FEV1: amount of air you can blow out in 1 second  FVC: total amount of air you can take in and blow out    Your Goals:         PFT Latest Ref Rng & Units 1/22/2018   FVC L 3.53   FEV1 L 1.37   FVC% % 71   FEV1% % 34          Airway Clearance: The Most Important Way to Keep Your Lungs Healthy  Vest Settings:    Hill-Rom Frequencies: 8, 9, 10 Pressure 10 Then, Frequencies 18, 19, 20 Pressure 6      RespirTech: Quick Start with Pressure of     Do each frequency for 5 minutes; Deflate vest after each frequency & cough 3 times before beginning the next setting.    Vest and Neb Therapy should be done 2-3 times/day.    Good Nutrition Can Improve Lung Function and Overall Health     Take ALL of your vitamins with food     Take 1/2 of your enzymes before EVERY meal/snack and the other 1/2 mid-meal/snack    Wt Readings from Last 3 Encounters:   01/22/18 61.7 kg (136 lb)   11/21/17 62.7 kg (138 lb 3.7 oz)   09/06/17 62.1 kg (137 lb)       There is no height or weight on file to calculate BMI.         National CF Foundation Recommendations for BMI in CF Adults: Women: at least 22 Men: at least 23        Controlling Blood Sugars Helps Prevent Lung Infections & Improves Nutrition  Test blood sugar:     In the morning before eating (goal is )     2 hours after a meal  (goal is less than 150)     When pre-meal glucose is greater than 150 add correction     At bedtime (if less than 100 eat a snack with 15 grams of carbohydrates  Last A1C Results:   Hemoglobin A1C   Date Value Ref Range Status   01/21/2018 5.9 4.3 - 6.0 % Final         If diabetic, measure A1C every 6 months. Goal is under 7%.    Staying Healthy    Research: If you are interested in learning about research opportunities or have questions, please contact Temi Dodge at [phone] or [email or web address].       Foundation: Compass is a personalized resource service to help you with the insurance, financial, legal and other issues you are facing.  It's free, confidential and available to anyone with CF.  Ask your  for more information or contact Compass directly at 902-[facility] ([phone]) or compass@cff.org, or learn more at cff.org/compass.       CF Nurse Line:  Jose David Henderson: [phone]   Felicitas Banuelos, RT: [phone]     Ivory Pierre , Dieticians: [phone]     Jordyn Rudolph, Diabetes Nurse: [phone]    Mary Amato: [phone] or Marisa Urbina [phone], Social Workers   [email or web address]

## 2018-02-12 NOTE — PROGRESS NOTES
[facility]  February 12, 2018         Assessment and Plan:   Nico Morales is a 42 year old male with cystic fibrosis with severe obstruction, h/o A. Fumigatus on/off itraconazole for many years, CFRD, malnutrition on chronic enteral feeds, pancreatic exocrine insufficiency and adrenal insufficiency who is seen today for post discharge follow up. He was admitted 1/21-1/23 for severe exacerbation of CF secondary to influenza A in the setting of no influenza vaccine. He was discharged on meropenem.       1. Moderate exacerbation of CF lung disease:secondary to influenza A. Finally feeling a bit better since Saturday, not back to his baseline. Main complaint is of chest tightness. Notes he's been unable to get Mucomyst due to shortage. Vesting TID. PFTs improved today, but still 3% below his recent baseline.  Previous cultures have grown out MDR Ps. A and Stenotrophomonas. Will continue to treat for exacerbation.  - Continue IV meropenem  - Stop Bactrim and resume inhaled Cayston (typically alternates with Bactrim)  - Prednisone burst 20 mg daily X 5 days  - Continue current nebulizers, Symbicort and vest therapy  - Continue azithromycin  - On rimantidine      2. Exocrine pancreatic insufficiency with malnutrition on chronic enteral feeds: denies symptoms of malabsorption. Weight is down slightly. Continues with 2 cans of TF at night.  - Continue pancreatic enzymes and vitamin supplementation  - On enteral feeds    3. CFRD: AIC of 5.9 on 1/21/18. BS in the am in the 90s-100s.  - Continue Lantus and carb coverage  - F/u with Rosa as scheduled      4. CF related low bone mineral density: Vitamin D, TSH and testosterone WNL, is deciding whether or not he wants to start on pamidronate.  - F/u with Dr. Lee as scheduled      RTC: in 2 weeks with routine cultures and spirometry  Annual studies due: December 2017--overdue  Preventative care: colonoscopy during  2017--overdue      Regina Herman PA-C  Pulmonary, Allergy, Critical Care and Sleep Medicine        Interval History:   Since discharge from the hospital, patient has been sleeping a lot, noticed improved energy and improved breathing since Saturday. Coughing has improved with less production, but not at baseline. Sputum is greenish/yellow. No streaking or blood. No fever or chills. No new sinus congestion. No congestion, some tightness, occasional wheezing. Shortness of breath with lifting and moving. Vesting TID. No nausea or diarrhea.          Review of Systems:   Please see HPI. Otherwise, complete 10 point ROS negative.           Past Medical and Surgical History:     Past Medical History:   Diagnosis Date     CF (cystic fibrosis) (H)      Exocrine pancreatic insufficiency      Nocardia infection      Pseudomonas infection      S/P gastrostomy (H) 2002     Past Surgical History:   Procedure Laterality Date     GASTROSTOMY TUBE  2002     PICC INSERTION Left 01/22/2018    4Fr SL BioFlo PICC, 46cm (5cm external) in the L basilic vein w/ tip in the low SVC           Family History:     Family History   Problem Relation Age of Onset     HEART DISEASE Maternal Grandmother      HEART DISEASE Maternal Grandfather      HEART DISEASE Paternal Grandmother      DIABETES No family hx of             Social History:     Social History     Social History     Marital status: Single     Spouse name: N/A     Number of children: 0     Years of education: N/A     Occupational History     financial [employer]     Social History Main Topics     Smoking status: Never Smoker     Smokeless tobacco: Never Used     Alcohol use No     Drug use: No     Sexual activity: Yes     Partners: Female     Birth control/ protection: Pill     Other Topics Concern     Blood Transfusions No     Exercise No     Social History Narrative    Kilo lives with wife in a house in [place], [place].  He works as a .        March 2016.  He works independently as a . Works out 5x/week and walks the dog daily.        June 2016. No changes. Describes himself as a creature of habit.            Medications:     Current Outpatient Prescriptions   Medication     amitriptyline (ELAVIL) 10 MG tablet     insulin aspart (NOVOLOG PENFILL) 100 UNIT/ML injection     nystatin (MYCOSTATIN) 407751 UNIT/ML suspension     predniSONE (DELTASONE) 20 MG tablet     acetylcysteine (MUCOMYST) 10 % nebulizer solution     CREON 01165-37121 UNITS CPEP per EC capsule     levalbuterol (XOPENEX) 1.25 MG/3ML neb solution     meropenem 2 g     CALCIUM PO     sulfamethoxazole-trimethoprim (BACTRIM DS/SEPTRA DS) 800-160 MG per tablet     levalbuterol (XOPENEX HFA) 45 MCG/ACT Inhaler     Respiratory Therapy Supplies (KRIS ALTERA NEBULIZER HANDSET) MISC     aztreonam lysine (CAYSTON) 75 MG SOLR     rimantadine (FLUMADINE) 100 MG tablet     blood glucose (NO BRAND SPECIFIED) lancets standard     blood glucose monitoring (MARTINA CONTOUR NEXT) test strip     insulin pen needle (BD OMI U/F) 32G X 4 MM     sodium chloride inhalant 7 % NEBU neb solution     polyethylene glycol (MIRALAX) powder     insulin glargine (LANTUS SOLOSTAR) 100 UNIT/ML injection     azithromycin (ZITHROMAX) 250 MG tablet     budesonide-formoterol (SYMBICORT) 160-4.5 MCG/ACT Inhaler     blood glucose monitoring (MARTINA CONTOUR NEXT MONITOR) meter device kit     blood glucose monitoring (MARTINA MICROLET) lancets     [facility]NA[facility]ED PATIENT     ACE NOT PRESCRIBED, INTENTIONAL,     Nebulizers (EFLOW SCF NEBULIZER HANDSET) MISC     Ferrous Fumarate-Vitamin C (VITRON-C) 200-125 MG TABS     MEPHYTON 5 MG PO TABS     MULTIVITAMIN OR     [DISCONTINUED] amitriptyline (ELAVIL) 10 MG tablet     [DISCONTINUED] insulin aspart (NOVOLOG PENFILL) 100 UNIT/ML injection     [DISCONTINUED] levalbuterol (XOPENEX) 1.25 MG/3ML neb solution     No current facility-administered medications for this visit.   "           Physical Exam:   /69  Pulse 100  Resp 15  Ht 1.727 m (5' 8\")  Wt 60.8 kg (134 lb 0.6 oz)  SpO2 97%  BMI 20.38 kg/m2    GENERAL: alert, NAD  HEENT: NCAT, EOMI, anicteric sclera, dried blood in right nare; canals and TMs clear; no oral mucosal edema or erythema  Neck: no cervical or supraclavicular adenopathy  Respiratory: moderate air flow, no crackles, rhonchi or wheezing  CV: RRR, S1S2, no murmurs noted  Abdomen: normoactive BS, soft and non tender   Lymph: no edema, + digital clubbing  Neuro: AAO X 3, CN 2-12 grossly intact  Psychiatric: normal affect, good eye contact  Skin: no rash, jaundice or lesions on limited exam         Data:   All laboratory and imaging data reviewed.      Cystic Fibrosis Culture  Specimen Description   Date Value Ref Range Status   01/21/2018 Sputum  Final   11/21/2017 Sputum  Final   10/11/2017 Sputum  Final    Culture Micro   Date Value Ref Range Status   01/21/2018 Moderate growth  Normal santi    Final   01/21/2018 (A)  Final    Light growth  Pseudomonas aeruginosa, mucoid strain     01/21/2018 (A)  Final    Light growth  Strain 2  Pseudomonas aeruginosa, mucoid strain          PFT interpretation:  Maneuver: valid and meets ATS guidelines  Severe obstruction with decreased FEV1 and FEV1/FVC  Compared to prior: FEV1 of 1.44 is below his baseline (1.5-1.6)  The decrease in FVC may represent air trapping v. restrictive physiology.  Lung volumes would be necessary to determine.    moderate: Increased vest/bronchodilator/execise, Inhaled antibiotics only, Hosp admit/home IV (within 2 wks) and Prednisone        "

## 2018-02-12 NOTE — TELEPHONE ENCOUNTER
Problem: Test claim     Medication: Mucomyst     Pharmacy: [facility]    Intervention: Consultation with provider or CF team members    Result: Issue resolved    Additional Information:    Patient can fill this medication with [facility] with $0 co-pay.

## 2018-02-12 NOTE — LETTER
2/12/2018       RE: Nico Morales  [address]  [facility] [address]     Dear Colleague,    Thank you for referring your patient, Nico Morales, to the [facility] at [facility]. Please see a copy of my visit note below.    [facility]  February 12, 2018         Assessment and Plan:   Nico Morales is a 42 year old male with cystic fibrosis with severe obstruction, h/o A. Fumigatus on/off itraconazole for many years, CFRD, malnutrition on chronic enteral feeds, pancreatic exocrine insufficiency and adrenal insufficiency who is seen today for post discharge follow up. He was admitted 1/21-1/23 for severe exacerbation of CF secondary to influenza A in the setting of no influenza vaccine. He was discharged on meropenem.       1. Moderate exacerbation of CF lung disease:secondary to influenza A. Finally feeling a bit better since Saturday, not back to his baseline. Main complaint is of chest tightness. Notes he's been unable to get Mucomyst due to shortage. Vesting TID. PFTs improved today, but still 3% below his recent baseline.  Previous cultures have grown out MDR Ps. A and Stenotrophomonas. Will continue to treat for exacerbation.  - Continue IV meropenem  - Stop Bactrim and resume inhaled Cayston (typically alternates with Bactrim)  - Prednisone burst 20 mg daily X 5 days  - Continue current nebulizers, Symbicort and vest therapy  - Continue azithromycin  - On rimantidine      2. Exocrine pancreatic insufficiency with malnutrition on chronic enteral feeds: denies symptoms of malabsorption. Weight is down slightly. Continues with 2 cans of TF at night.  - Continue pancreatic enzymes and vitamin supplementation  - On enteral feeds    3. CFRD: AIC of 5.9 on 1/21/18. BS in the am in the 90s-100s.  - Continue Lantus and carb coverage  - F/u with [facility] as scheduled      4. CF related  low bone mineral density: Vitamin D, TSH and testosterone WNL, is deciding whether or not he wants to start on pamidronate.  - F/u with Dr. Lee as scheduled      RTC: in 2 weeks with routine cultures and spirometry  Annual studies due: December 2017--overdue  Preventative care: colonoscopy during 2017--overdue      Regina Herman PA-C  Pulmonary, Allergy, Critical Care and Sleep Medicine        Interval History:   Since discharge from the hospital, patient has been sleeping a lot, noticed improved energy and improved breathing since Saturday. Coughing has improved with less production, but not at baseline. Sputum is greenish/yellow. No streaking or blood. No fever or chills. No new sinus congestion. No congestion, some tightness, occasional wheezing. Shortness of breath with lifting and moving. Vesting TID. No nausea or diarrhea.          Review of Systems:   Please see HPI. Otherwise, complete 10 point ROS negative.           Past Medical and Surgical History:     Past Medical History:   Diagnosis Date     CF (cystic fibrosis) (H)      Exocrine pancreatic insufficiency      Nocardia infection      Pseudomonas infection      S/P gastrostomy (H) 2002     Past Surgical History:   Procedure Laterality Date     GASTROSTOMY TUBE  2002     PICC INSERTION Left 01/22/2018    4Fr SL BioFlo PICC, 46cm (5cm external) in the L basilic vein w/ tip in the low SVC           Family History:     Family History   Problem Relation Age of Onset     HEART DISEASE Maternal Grandmother      HEART DISEASE Maternal Grandfather      HEART DISEASE Paternal Grandmother      DIABETES No family hx of             Social History:     Social History     Social History     Marital status: Single     Spouse name: N/A     Number of children: 0     Years of education: N/A     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     Social History Main Topics     Smoking status: Never Smoker     Smokeless tobacco: Never Used     Alcohol use No      Drug use: No     Sexual activity: Yes     Partners: Female     Birth control/ protection: Pill     Other Topics Concern     Blood Transfusions No     Exercise No     Social History Narrative    Kilo lives with wife in a house in [place], [place].  He works as a .        March 2016. He works independently as a . Works out 5x/week and walks the dog daily.        June 2016. No changes. Describes himself as a creature of habit.            Medications:     Current Outpatient Prescriptions   Medication     amitriptyline (ELAVIL) 10 MG tablet     insulin aspart (NOVOLOG PENFILL) 100 UNIT/ML injection     nystatin (MYCOSTATIN) 904456 UNIT/ML suspension     predniSONE (DELTASONE) 20 MG tablet     acetylcysteine (MUCOMYST) 10 % nebulizer solution     CREON 77216-66322 UNITS CPEP per EC capsule     levalbuterol (XOPENEX) 1.25 MG/3ML neb solution     meropenem 2 g     CALCIUM PO     sulfamethoxazole-trimethoprim (BACTRIM DS/SEPTRA DS) 800-160 MG per tablet     levalbuterol (XOPENEX HFA) 45 MCG/ACT Inhaler     Respiratory Therapy Supplies (KRIS ALTERA NEBULIZER HANDSET) MISC     aztreonam lysine (CAYSTON) 75 MG SOLR     rimantadine (FLUMADINE) 100 MG tablet     blood glucose (NO BRAND SPECIFIED) lancets standard     blood glucose monitoring (MARTINA CONTOUR NEXT) test strip     insulin pen needle (BD OMI U/F) 32G X 4 MM     sodium chloride inhalant 7 % NEBU neb solution     polyethylene glycol (MIRALAX) powder     insulin glargine (LANTUS SOLOSTAR) 100 UNIT/ML injection     azithromycin (ZITHROMAX) 250 MG tablet     budesonide-formoterol (SYMBICORT) 160-4.5 MCG/ACT Inhaler     blood glucose monitoring (MARTINA CONTOUR NEXT MONITOR) meter device kit     blood glucose monitoring (MARTINA MICROLET) lancets     [facility] MANAGED PATIENT     ACE NOT PRESCRIBED, INTENTIONAL,     Nebulizers (EFLOW SCF NEBULIZER HANDSET) MISC     Ferrous Fumarate-Vitamin C (VITRON-C) 200-125 MG TABS     MEPHYTON  "5 MG PO TABS     MULTIVITAMIN OR     [DISCONTINUED] amitriptyline (ELAVIL) 10 MG tablet     [DISCONTINUED] insulin aspart (NOVOLOG PENFILL) 100 UNIT/ML injection     [DISCONTINUED] levalbuterol (XOPENEX) 1.25 MG/3ML neb solution     No current facility-administered medications for this visit.             Physical Exam:   /69  Pulse 100  Resp 15  Ht 1.727 m (5' 8\")  Wt 60.8 kg (134 lb 0.6 oz)  SpO2 97%  BMI 20.38 kg/m2    GENERAL: alert, NAD  HEENT: NCAT, EOMI, anicteric sclera, dried blood in right nare; canals and TMs clear; no oral mucosal edema or erythema  Neck: no cervical or supraclavicular adenopathy  Respiratory: moderate air flow, no crackles, rhonchi or wheezing  CV: RRR, S1S2, no murmurs noted  Abdomen: normoactive BS, soft and non tender   Lymph: no edema, + digital clubbing  Neuro: AAO X 3, CN 2-12 grossly intact  Psychiatric: normal affect, good eye contact  Skin: no rash, jaundice or lesions on limited exam         Data:   All laboratory and imaging data reviewed.      Cystic Fibrosis Culture  Specimen Description   Date Value Ref Range Status   01/21/2018 Sputum  Final   11/21/2017 Sputum  Final   10/11/2017 Sputum  Final    Culture Micro   Date Value Ref Range Status   01/21/2018 Moderate growth  Normal santi    Final   01/21/2018 (A)  Final    Light growth  Pseudomonas aeruginosa, mucoid strain     01/21/2018 (A)  Final    Light growth  Strain 2  Pseudomonas aeruginosa, mucoid strain          PFT interpretation:  Maneuver: valid and meets ATS guidelines  Severe obstruction with decreased FEV1 and FEV1/FVC  Compared to prior: FEV1 of 1.44 is below his baseline (1.5-1.6)  The decrease in FVC may represent air trapping v. restrictive physiology.  Lung volumes would be necessary to determine.    moderate: Increased vest/bronchodilator/execise, Inhaled antibiotics only, Hosp admit/home IV (within 2 wks) and Prednisone          Respiratory Therapist Note:    Vest    Brand: Hill-Rom - Model " "105, Respirtech   Settings: Hill Rom: Frequencies 8, 9, 10 at pressure 10 then frequencies 18, 19, 20 at pressure 6, Quickstart @100%   Cough Pause: Yes.    Vest Garment Size: Adult Small   Last Fitting Date:    Frequency of therapy: 3 times per day   Concerns: May need longer Respirtech garment. Instructed on fit, will assess per self, and bring in if concerns.     Alternative Airway Clearance: Aerobika prn    Nebulized Medications   Bronchodilators: Xopenex   Mucolytic: Mucomyst and 7% Hypertonic Saline   Antibiotics: Cayston   Additional Inhaled Medications: MDI Symbicort BID, Xopenex 1-2 puffs as needed     Review Cleaning: Yes. Countertop (Microwave) bottle sterilizer.    Education and Transition Information   Correct order of inhaled medications: Yes   Mechanism of Action of inhaled medications: Yes   Frequency of inhaled medications: Yes   Dosage of inhaled medications: Yes   Other: Hypertonic am, mid day and pm with mucomyst    Home Care   Nebulizer Compressor    Year Purchased: 2017   Home Care Company:     [facility], Phone: [phone], Fax: [phone]    Pulmonary Rehab   Site: unknown   Date Completed: \"years ago\"    Other Comments:  Mucomyst order placed to [facility] due to supply issues. No other issues at this time.       Again, thank you for allowing me to participate in the care of your patient.      Sincerely,    Regina Herman PA-C  "

## 2018-02-12 NOTE — PROGRESS NOTES
"Respiratory Therapist Note:    Vest    Brand: Hill-Rom - Model 105, Respirtech   Settings: Hill Rom: Frequencies 8, 9, 10 at pressure 10 then frequencies 18, 19, 20 at pressure 6, Quickstart @100%   Cough Pause: Yes.    Vest Garment Size: Adult Small   Last Fitting Date:    Frequency of therapy: 3 times per day   Concerns: May need longer Respirtech garment. Instructed on fit, will assess per self, and bring in if concerns.     Alternative Airway Clearance: Aerobika prn    Nebulized Medications   Bronchodilators: Xopenex   Mucolytic: Mucomyst and 7% Hypertonic Saline   Antibiotics: Cayston   Additional Inhaled Medications: MDI Symbicort BID, Xopenex 1-2 puffs as needed     Review Cleaning: Yes. Countertop (Microwave) bottle sterilizer.    Education and Transition Information   Correct order of inhaled medications: Yes   Mechanism of Action of inhaled medications: Yes   Frequency of inhaled medications: Yes   Dosage of inhaled medications: Yes   Other: Hypertonic am, mid day and pm with mucomyst    Home Care   Nebulizer Compressor    Year Purchased: 2017   Home Care Company:     [facility], Phone: [phone], Fax: [phone]    Pulmonary Rehab   Site: unknown   Date Completed: \"years ago\"    Other Comments:  Mucomyst order placed to [facility] due to supply issues. No other issues at this time.         "

## 2018-02-13 ENCOUNTER — HOME INFUSION (PRE-WILLOW HOME INFUSION) (OUTPATIENT)
Dept: PHARMACY | Facility: CLINIC | Age: 43
End: 2018-02-13

## 2018-02-13 LAB
ANION GAP SERPL CALCULATED.3IONS-SCNC: 8 MMOL/L (ref 3–14)
BUN SERPL-MCNC: 25 MG/DL (ref 7–30)
CALCIUM SERPL-MCNC: 8.6 MG/DL (ref 8.5–10.1)
CHLORIDE SERPL-SCNC: 102 MMOL/L (ref 94–109)
CO2 SERPL-SCNC: 28 MMOL/L (ref 20–32)
CREAT SERPL-MCNC: 0.8 MG/DL (ref 0.66–1.25)
EXPTIME-PRE: 12.71 SEC
FEF2575-%PRED-PRE: 13 %
FEF2575-PRE: 0.52 L/SEC
FEF2575-PRED: 3.9 L/SEC
FEFMAX-%PRED-PRE: 66 %
FEFMAX-PRE: 6.5 L/SEC
FEFMAX-PRED: 9.79 L/SEC
FEV1-%PRED-PRE: 36 %
FEV1-PRE: 1.44 L
FEV1FEV6-PRE: 46 %
FEV1FEV6-PRED: 82 %
FEV1FVC-PRE: 40 %
FEV1FVC-PRED: 81 %
FIFMAX-PRE: 4.92 L/SEC
FVC-%PRED-PRE: 72 %
FVC-PRE: 3.6 L
FVC-PRED: 4.94 L
GFR SERPL CREATININE-BSD FRML MDRD: >90 ML/MIN/1.7M2
GLUCOSE SERPL-MCNC: 188 MG/DL (ref 70–99)
MAGNESIUM SERPL-MCNC: 2.6 MG/DL (ref 1.6–2.3)
PHOSPHATE SERPL-MCNC: 2.5 MG/DL (ref 2.5–4.5)
POTASSIUM SERPL-SCNC: 4.5 MMOL/L (ref 3.4–5.3)
SODIUM SERPL-SCNC: 138 MMOL/L (ref 133–144)

## 2018-02-13 PROCEDURE — 80048 BASIC METABOLIC PNL TOTAL CA: CPT | Performed by: PHYSICIAN ASSISTANT

## 2018-02-13 PROCEDURE — 84100 ASSAY OF PHOSPHORUS: CPT | Performed by: PHYSICIAN ASSISTANT

## 2018-02-13 PROCEDURE — 83735 ASSAY OF MAGNESIUM: CPT | Performed by: PHYSICIAN ASSISTANT

## 2018-02-14 ENCOUNTER — HOME INFUSION (PRE-WILLOW HOME INFUSION) (OUTPATIENT)
Dept: PHARMACY | Facility: CLINIC | Age: 43
End: 2018-02-14

## 2018-02-14 NOTE — PROGRESS NOTES
This is a recent snapshot of the patient's [facility] medical record.  For current drug dose and complete information and questions, call [phone] or In Basket pool, fv home infusion ([id number])  CSN Number:  [id number]

## 2018-02-15 NOTE — PROGRESS NOTES
This is a recent snapshot of the patient's [facility] medical record.  For current drug dose and complete information and questions, call [phone] or In Basket pool, fv home infusion ([id number])  CSN Number:  [id number]

## 2018-02-17 LAB
BACTERIA SPEC CULT: ABNORMAL
SPECIMEN SOURCE: ABNORMAL

## 2018-02-19 ENCOUNTER — HOME INFUSION (PRE-WILLOW HOME INFUSION) (OUTPATIENT)
Dept: PHARMACY | Facility: CLINIC | Age: 43
End: 2018-02-19

## 2018-02-19 LAB
ANION GAP SERPL CALCULATED.3IONS-SCNC: 3 MMOL/L (ref 3–14)
BUN SERPL-MCNC: 24 MG/DL (ref 7–30)
CALCIUM SERPL-MCNC: 9 MG/DL (ref 8.5–10.1)
CHLORIDE SERPL-SCNC: 102 MMOL/L (ref 94–109)
CO2 SERPL-SCNC: 33 MMOL/L (ref 20–32)
CREAT SERPL-MCNC: 0.68 MG/DL (ref 0.66–1.25)
GFR SERPL CREATININE-BSD FRML MDRD: >90 ML/MIN/1.7M2
GLUCOSE SERPL-MCNC: 93 MG/DL (ref 70–99)
MAGNESIUM SERPL-MCNC: 2.5 MG/DL (ref 1.6–2.3)
PHOSPHATE SERPL-MCNC: 2.4 MG/DL (ref 2.5–4.5)
POTASSIUM SERPL-SCNC: 4.5 MMOL/L (ref 3.4–5.3)
SODIUM SERPL-SCNC: 138 MMOL/L (ref 133–144)

## 2018-02-19 PROCEDURE — 80048 BASIC METABOLIC PNL TOTAL CA: CPT | Performed by: INTERNAL MEDICINE

## 2018-02-19 PROCEDURE — 84100 ASSAY OF PHOSPHORUS: CPT | Performed by: INTERNAL MEDICINE

## 2018-02-19 PROCEDURE — 83735 ASSAY OF MAGNESIUM: CPT | Performed by: INTERNAL MEDICINE

## 2018-02-20 ENCOUNTER — HOME INFUSION (PRE-WILLOW HOME INFUSION) (OUTPATIENT)
Dept: PHARMACY | Facility: CLINIC | Age: 43
End: 2018-02-20

## 2018-02-20 NOTE — PROGRESS NOTES
This is a recent snapshot of the patient's [facility] medical record.  For current drug dose and complete information and questions, call [phone] or In [facility] pool, fv home infusion ([id number])  CSN Number:  [id number]

## 2018-02-21 NOTE — PROGRESS NOTES
This is a recent snapshot of the patient's [facility] medical record.  For current drug dose and complete information and questions, call [phone] or In Basket pool, fv home infusion ([id number])  CSN Number:  [id number]

## 2018-02-22 NOTE — PROGRESS NOTES
This is a recent snapshot of the patient's [facility] medical record.  For current drug dose and complete information and questions, call [phone] or In Basket pool, fv home infusion ([id number])  CSN Number:  [id number]

## 2018-02-26 ENCOUNTER — HOME INFUSION (PRE-WILLOW HOME INFUSION) (OUTPATIENT)
Dept: PHARMACY | Facility: CLINIC | Age: 43
End: 2018-02-26

## 2018-02-26 LAB
ANION GAP SERPL CALCULATED.3IONS-SCNC: 5 MMOL/L (ref 3–14)
BUN SERPL-MCNC: 22 MG/DL (ref 7–30)
CALCIUM SERPL-MCNC: 8.6 MG/DL (ref 8.5–10.1)
CHLORIDE SERPL-SCNC: 102 MMOL/L (ref 94–109)
CO2 SERPL-SCNC: 31 MMOL/L (ref 20–32)
CREAT SERPL-MCNC: 0.74 MG/DL (ref 0.66–1.25)
GFR SERPL CREATININE-BSD FRML MDRD: >90 ML/MIN/1.7M2
GLUCOSE SERPL-MCNC: 164 MG/DL (ref 70–99)
MAGNESIUM SERPL-MCNC: 2.5 MG/DL (ref 1.6–2.3)
PHOSPHATE SERPL-MCNC: 2.3 MG/DL (ref 2.5–4.5)
POTASSIUM SERPL-SCNC: 4.8 MMOL/L (ref 3.4–5.3)
SODIUM SERPL-SCNC: 138 MMOL/L (ref 133–144)

## 2018-02-26 PROCEDURE — 84100 ASSAY OF PHOSPHORUS: CPT | Performed by: INTERNAL MEDICINE

## 2018-02-26 PROCEDURE — 80048 BASIC METABOLIC PNL TOTAL CA: CPT | Performed by: INTERNAL MEDICINE

## 2018-02-26 PROCEDURE — 83735 ASSAY OF MAGNESIUM: CPT | Performed by: INTERNAL MEDICINE

## 2018-02-27 ENCOUNTER — HOME INFUSION (PRE-WILLOW HOME INFUSION) (OUTPATIENT)
Dept: PHARMACY | Facility: CLINIC | Age: 43
End: 2018-02-27

## 2018-02-27 NOTE — PROGRESS NOTES
This is a recent snapshot of the patient's [facility] medical record.  For current drug dose and complete information and questions, call [phone] or In Basket pool, fv home infusion ([id number])  CSN Number:  [id number]

## 2018-02-28 ENCOUNTER — HOME INFUSION (PRE-WILLOW HOME INFUSION) (OUTPATIENT)
Dept: PHARMACY | Facility: CLINIC | Age: 43
End: 2018-02-28

## 2018-03-02 NOTE — PROGRESS NOTES
This is a recent snapshot of the patient's [facility] medical record.  For current drug dose and complete information and questions, call [phone] or In Basket pool, fv home infusion ([id number])  CSN Number:  [id number]

## 2018-03-05 ENCOUNTER — OFFICE VISIT (OUTPATIENT)
Dept: ENDOCRINOLOGY | Facility: CLINIC | Age: 43
End: 2018-03-05
Payer: COMMERCIAL

## 2018-03-05 ENCOUNTER — HOME INFUSION (PRE-WILLOW HOME INFUSION) (OUTPATIENT)
Dept: PHARMACY | Facility: CLINIC | Age: 43
End: 2018-03-05

## 2018-03-05 VITALS
BODY MASS INDEX: 20.85 KG/M2 | OXYGEN SATURATION: 96 % | HEIGHT: 68 IN | SYSTOLIC BLOOD PRESSURE: 99 MMHG | DIASTOLIC BLOOD PRESSURE: 65 MMHG | WEIGHT: 137.57 LBS | HEART RATE: 90 BPM

## 2018-03-05 DIAGNOSIS — E84.9 DIABETES MELLITUS DUE TO CYSTIC FIBROSIS (H): Primary | ICD-10-CM

## 2018-03-05 DIAGNOSIS — E08.9 DIABETES MELLITUS DUE TO CYSTIC FIBROSIS (H): Primary | ICD-10-CM

## 2018-03-05 LAB
ANION GAP SERPL CALCULATED.3IONS-SCNC: 6 MMOL/L (ref 3–14)
BUN SERPL-MCNC: 23 MG/DL (ref 7–30)
CALCIUM SERPL-MCNC: 9.1 MG/DL (ref 8.5–10.1)
CHLORIDE SERPL-SCNC: 107 MMOL/L (ref 94–109)
CO2 SERPL-SCNC: 27 MMOL/L (ref 20–32)
CREAT SERPL-MCNC: 0.74 MG/DL (ref 0.66–1.25)
GFR SERPL CREATININE-BSD FRML MDRD: >90 ML/MIN/1.7M2
GLUCOSE SERPL-MCNC: 74 MG/DL (ref 70–99)
MAGNESIUM SERPL-MCNC: 2.5 MG/DL (ref 1.6–2.3)
PHOSPHATE SERPL-MCNC: 2.9 MG/DL (ref 2.5–4.5)
POTASSIUM SERPL-SCNC: 4.8 MMOL/L (ref 3.4–5.3)
SODIUM SERPL-SCNC: 140 MMOL/L (ref 133–144)

## 2018-03-05 PROCEDURE — 80048 BASIC METABOLIC PNL TOTAL CA: CPT | Performed by: INTERNAL MEDICINE

## 2018-03-05 PROCEDURE — 99214 OFFICE O/P EST MOD 30 MIN: CPT | Performed by: INTERNAL MEDICINE

## 2018-03-05 PROCEDURE — 84100 ASSAY OF PHOSPHORUS: CPT | Performed by: INTERNAL MEDICINE

## 2018-03-05 PROCEDURE — 83735 ASSAY OF MAGNESIUM: CPT | Performed by: INTERNAL MEDICINE

## 2018-03-05 NOTE — PROGRESS NOTES
CF Endocrinology Return Consultation:  Diabetes  :   Patient: Nico Morales MRN# [id number]   YOB: 1975 Age: 42 year old   Date of Visit: 3/5/2018    Dear Dr. Jaye Machado:    I had the pleasure of seeing your patient, Nico Morales in the [facility] Endocrinology Clinic, [facility], for a return consultation regarding CFRD and low bone density.           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  Verbal overview and written information provided.          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     Priority: Medium     Encounter for long-term (current) use of antibiotics 05/20/2013     Priority: Medium     Prostatitis 05/20/2013     Priority: Medium     updating diagnosis code for icd10 cutover       Pseudomonas infection      Priority: Medium     Cystic fibrosis (H) 11/24/2010     Priority: Medium     Sweat Test   Date: 4/25/78    Lab: [facility]   Sample #1:  110mmol  Sample #2:  106mmol    Genetics  Date: 4/9/90  [id number]         CARDIOVASCULAR SCREENING; LDL GOAL LESS THAN 160 10/31/2010     Priority: Medium            HPI:   Nico is a 42 year old male with Cystic Fibrosis Related Diabetes Mellitus (CFRD).    I have reviewed the available past laboratory evaluations, imaging studies, and medical records available to me at this visit. I have  reviewed  Ncio'height and weight.    History was obtained from the patient and the medical record.  I have reviewed the notes of the pulmonary care team.    I have read and interpreted the data from the patient glucose downloads..    Overall average: 121 mg/dL SD 30     Patient is currently testing 0-2 time per day.      Does not check regularly but checked 3-4 times daily for the last 2-3 days    A1c:  hemoglobin A1c: 5.9%   Result was discussed at today's visit.     Current insulin regimen:   Lantus 6 units daily at bedtime   Novolog 1 unit / 20 gm of carb, 3-4 times daily. Novolog dose ranges b/w 3- 5 units  Tube feed at night, using on most nights, tube feed take 2-3 hours to complete. Takes 3 units of novolog with TF     Insulin administration site(s): abd or thigh    Family history and social history were reviewed     He was also diagnosed with adrenal insufficiency based on inadequate response on a ACTH stim test. Adrenal insufficiency is thought to be related to  itraconazole and Symbicort use.  last ACTH stim test was normal and he has been off chronic prednisone.    Noted to have significant decline in bone density on last DEXA. On vitamin D and multivitmain supplements.  Pamidronate was recommended, but has not started yet.     Over all feels well, no acute complaints today  Weight is stable, feels frustrated that he is not bale to gain weight          Past Medical History:     Past Medical History:   Diagnosis Date     CF (cystic fibrosis) (H)      Exocrine pancreatic insufficiency      Nocardia infection      Pseudomonas infection      S/P gastrostomy (H) 2002            Past Surgical History:     Past Surgical History:   Procedure Laterality Date     GASTROSTOMY TUBE  2002     PICC INSERTION Left 01/22/2018    4Fr SL BioFlo PICC, 46cm (5cm external) in the L basilic vein w/ tip in the low SVC               Social History:     Social History     Social History Narrative    Kilo lives with wife in a  house in [place], [place].  He works as a .        March 2016. He works independently as a . Works out 5x/week and walks the dog daily.        June 2016. No changes. Describes himself as a creature of habit.              Family History:     Family History   Problem Relation Age of Onset     HEART DISEASE Maternal Grandmother      HEART DISEASE Maternal Grandfather      HEART DISEASE Paternal Grandmother      DIABETES No family hx of             Allergies:     Allergies   Allergen Reactions     Pulmozyme [Dornase Trever] Other (See Comments)     Chest pain and fever     Tobramycin Fatigue     Trouble with ear ringing, shortness of breath, little clinical response.     Zosyn Hives     Ceftazidime Rash     Levaquin Rash             Medications:     Current Outpatient Rx   Medication Sig Dispense Refill     amitriptyline (ELAVIL) 10 MG tablet TAKE ONE TABLET BY MOUTH AT BEDTIME. 90 tablet 3     insulin aspart (NOVOLOG PENFILL) 100 UNIT/ML injection 1 per 20 grams of carbohydrate with meals and snacks. Also use 3 units of Novolog at night with tube feeding. 15 mL 12     acetylcysteine (MUCOMYST) 10 % nebulizer solution Nebulize 4ml qid 480 mL 6     CREON 51871-28479 UNITS CPEP per EC capsule TAKE 7-8 CAPS WITH MEALS (3 MEALS/DAY) AND 2 CAPS WITH SNACKS (3 SNACKS/DAY) 2700 capsule 2     levalbuterol (XOPENEX) 1.25 MG/3ML neb solution INHALE 1 VIAL BY MOUTH INTO THE LUNGS VIA NEBULIZATION 4 TIMES DAILY 360 mL 11     meropenem 2 g Inject 2 g into the vein every 8 hours 100 g 1     CALCIUM PO Take 1 tablet by mouth 2 times daily Strength unknown.       levalbuterol (XOPENEX HFA) 45 MCG/ACT Inhaler Inhale 2 puffs into the lungs every 6 hours as needed for shortness of breath / dyspnea 3 Inhaler 4     Respiratory Therapy Supplies (KRIS ALTERA NEBULIZER HANDSET) MISC 1 each daily 1 each 1     aztreonam lysine (CAYSTON) 75 MG SOLR Take 1 mL (75 mg) by nebulization 3 times daily 84 mL 5     rimantadine  (FLUMADINE) 100 MG tablet Take 1 tablet (100 mg) by mouth 2 times daily Take during flu season Oct 1st-March 31st 60 tablet 6     blood glucose (NO BRAND SPECIFIED) lancets standard Whatever Lancets that go with device, Test 6 times daily 540 each 3     blood glucose monitoring (MARTINA CONTOUR NEXT) test strip Use to test blood sugar 6 times daily or as directed. 200 each 2     insulin pen needle (BD OMI U/F) 32G X 4 MM Use 6 daily or as directed. 200 each 11     sodium chloride inhalant 7 % NEBU neb solution NEBULIZE 4ML TWICE DAILY (Patient taking differently: NEBULIZE 4ML DAILY) 240 mL 5     polyethylene glycol (MIRALAX) powder Use 17 gms po 1-3 times daily as needed. 510 g 3     insulin glargine (LANTUS SOLOSTAR) 100 UNIT/ML injection 6 units daily 15 mL 6     azithromycin (ZITHROMAX) 250 MG tablet Take 1 tablet (250 mg) by mouth daily 30 tablet 11     budesonide-formoterol (SYMBICORT) 160-4.5 MCG/ACT Inhaler Inhale 2 puffs into the lungs 2 times daily 1 Inhaler 12     blood glucose monitoring (MARTINA CONTOUR NEXT MONITOR) meter device kit Use to test blood sugar 6 times daily or as directed. 1 kit 2     blood glucose monitoring (MARTINA MICROLET) lancets Use to test blood sugar 6 times daily or as directed. 2 Box 2     Nebulizers (EFAquaspy SCF NEBULIZER HANDSET) MISC 1 each 3 times daily. 1 each 6     Ferrous Fumarate-Vitamin C (VITRON-C) 200-125 MG TABS Take 1 tablet by mouth daily        MEPHYTON 5 MG PO TABS Take 1 TABLET by mouth DAILY.       MULTIVITAMIN OR Take 1 tablet by mouth daily. Includes 5,000 units vitamin D and 400 units vitamin E. Per pt, formulated for CF pts.       sulfamethoxazole-trimethoprim (BACTRIM DS/SEPTRA DS) 800-160 MG per tablet Take 1 tablet by mouth 2 times daily (Patient not taking: Reported on 3/5/2018) 60 tablet 3     [facility]NA[facility]ED PATIENT Contact [facility] for patient specific medication information at [phone] on admission and discharge from the  "hospital.  Phones are answered 24 hours a day 7 days a week 365 days a year.    Providers - Choose \"CONTINUE HOME MED (no script)\" at discharge if patient treatment with home infusion will continue.       ACE NOT PRESCRIBED, INTENTIONAL, 1 each continuous prn. ACE Inhibitor not prescribed due to Risk for drug interaction 0 each 0             Review of Systems:     Comprehensive ROS negative other than the symptoms noted above in the HPI.          Physical Exam:   Blood pressure 99/65, pulse 90, height 1.727 m (5' 8\"), weight 62.4 kg (137 lb 9.1 oz), SpO2 96 %.  Normalized stature-for-age data not available for patients older than 20 years.  Height: 5' 8\", Normalized stature-for-age data not available for patients older than 20 years.  Weight: 137 lbs 9.07 oz, Normalized weight-for-age data not available for patients older than 20 years.  BMI: Body mass index is 20.92 kg/(m^2)., Normalized BMI data available only for age 0 to 20 years.      CONSTITUTIONAL:   Awake, alert, and in no apparent distress.  Neck:no goiter  Lungs:CTA b/l  Heart:RRR  Abd: soft NT  NEUROLOGIC:No focal deficits noted. DTR brisk knee b/l   PSYCHIATRIC: Cooperative, no agitation.  MUSCULOSKELETAL:  Full range of motion noted.  Motor strength and tone are normal.          Laboratory results:     TSH   Date Value Ref Range Status   12/15/2016 1.36 0.40 - 4.00 mU/L Final     Triiodothyronine (T3)   Date Value Ref Range Status   06/25/2010 123 60 - 181 ng/dL Final     Testosterone Total   Date Value Ref Range Status   12/15/2016 779 240 - 950 ng/dL Final     Comment:     This test was developed and its performance characteristics determined by the   [facility],  Special Chemistry Laboratory. It has   not been cleared or approved by the FDA. The laboratory is regulated under   CLIA   as qualified to perform high-complexity testing. This test is used for   clinical   purposes. It should not be regarded as investigational or " for research.       Cholesterol   Date Value Ref Range Status   12/15/2016 158 <200 mg/dL Final     Albumin Urine mg/L   Date Value Ref Range Status   12/15/2016 55 mg/L Final     Triglycerides   Date Value Ref Range Status   12/15/2016 173 (H) <150 mg/dL Final     Comment:     Borderline high:  150-199 mg/dl   High:             200-499 mg/dl   Very high:       >499 mg/dl       HDL Cholesterol   Date Value Ref Range Status   12/15/2016 32 (L) >39 mg/dL Final     LDL Cholesterol Calculated   Date Value Ref Range Status   12/15/2016 92 <100 mg/dL Final     Comment:     Desirable:       <100 mg/dl     Cholesterol/HDL Ratio   Date Value Ref Range Status   06/26/2013 4.4 0.0 - 5.0 Final     Non HDL Cholesterol   Date Value Ref Range Status   12/15/2016 126 <130 mg/dL Final     A1C      8.3   12/15/2016  A1C      8.1   6/28/2016  A1C      7.5   4/13/2016  A1C      7.1   3/8/2016  A1C      7.4   12/16/2015 No results found for this basename: hemoglobina1          Diabetes Health Maintenance    Date of Diabetes Diagnosis: 12/2015    Date Last Eye Exam: Jan 2015     Date Last Dental Appointment: >2 years    Dates of Episodes Severe* Hypoglycemia (month/year, cumulative, ongoing, assess each visit): never              *Severe=patient unconscious, seizure, unable to help self    Last 25-Vitamin D (every year): 33 on 12/2016    Last DXA, lowest Z-score (every 2 years): -2.6 right femoral neck       ?Bisphosphonates (yes/no):       Last Urine Microalbumin (every year):      No results found for: MICROALBUMIN     ENDO DIABETES Latest Ref Rng 12/15/2016   ALBUMIN URINE MG/L  55   ALBUMIN URINE MG/G CR 0 - 17 mg/g Cr 37.28 (H)     Last Testosterone:   Testosterone Total   Date Value Ref Range Status   12/15/2016 779 240 - 950 ng/dL Final     Comment:     This test was developed and its performance characteristics determined by the   [facility],  Special Chemistry Laboratory. It has   not been cleared  or approved by the FDA. The laboratory is regulated under   CLIA   as qualified to perform high-complexity testing. This test is used for   clinical   purposes. It should not be regarded as investigational or for research.        On testosterone therapy (yes/no)? No     Date of Last Diabetes Visit: June 2016         Assessment and Plan:   Nico is a 42 year old male with CFRD    CFRD: good control, continue current insulin regimen.  Urine micro albumin was positive on last labs, recheck with next draw     Diabetes is a complicated and dangerous illness which requires intensive monitoring and treatment to prevent both short-term and long-term consequences to various organs. Insulin therapy is life-saving, but is also associated with life-threatening toxicity (hypoglycemia).  Careful and continuous attention to balancing glucose levels, activity, diet and insulin dosage is necessary.    Low bone density:  pamidronate 30 mg IV every 3 months was ordered. Patient has not scheduled infusion yet.   Check Vitamin D with next draw     Adrenal insuffiencey:  1 mcg ACTH stim test was normal, off chronic prednisone    RTC in 4-6 months    JULIETA Tate    [facility]    TYLER REYES

## 2018-03-05 NOTE — NURSING NOTE
"Nico Morales's goals for this visit include: Follow up CF/Diabetes  He requests these members of his care team be copied on today's visit information: YES    PCP: Jaye Machado    Referring Provider:  No referring provider defined for this encounter.    Chief Complaint   Patient presents with     Cystic Fibrosis     Diabetes       Initial Ht 1.727 m (5' 8\")  Wt 62.4 kg (137 lb 9.1 oz)  BMI 20.92 kg/m2 Estimated body mass index is 20.92 kg/(m^2) as calculated from the following:    Height as of this encounter: 1.727 m (5' 8\").    Weight as of this encounter: 62.4 kg (137 lb 9.1 oz).  Medication Reconciliation: complete    Do you need any medication refills at today's visit? NO    "

## 2018-03-05 NOTE — MR AVS SNAPSHOT
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)    -  1       Follow-ups after your visit        Your next 10 appointments already scheduled     Mar 07, 2018 10:30 AM CST   CF LOOP with UC PFL CF   [facility] Pulmonary Function Testing ([facility])    [address]  3rd Floor  [facility] [address]   [phone]            Mar 07, 2018 11:10 AM CST   (Arrive by 10:55 AM)   RETURN CYSTIC FIBROSIS VISIT with Jaye Machado MD   [facility] ([facility])    [address] [place] [address]  [address] [place] [address]   [phone]            Aug 06, 2018  3:45 PM CDT   Return Visit with Diana Lee MD, MG GARCIA NURSE   [facility] ([facility])    [address] [place] [id number] [phone]              Who to contact     If you have questions or need follow up information about today's clinic visit or your schedule please contact [facility] directly at [phone].  Normal or non-critical lab and imaging results will be communicated to you by MyChart, letter or phone within 4 business days after the clinic has received the results. If you do not hear from us within 7 days, please contact the clinic through MyChart or phone. If you have a critical or abnormal lab result, we will notify you by phone as soon as possible.  Submit refill requests through [employer] or call your pharmacy and they will forward the refill request to us. Please allow 3 business days for your refill to be completed.          Additional Information About Your  "Visit        [employer] Information     [facility] gives you secure access to your electronic health record. If you see a primary care provider, you can also send messages to your care team and make appointments. If you have questions, please call your primary care clinic.  If you do not have a primary care provider, please call [phone] and they will assist you.      [facility] is an electronic gateway that provides easy, online access to your medical records. With [facility], you can request a clinic appointment, read your test results, renew a prescription or communicate with your care team.     To access your existing account, please contact your [facility] or call [phone] for assistance.        Care EveryWhere ID     This is your Care EveryWhere ID. This could be used by other organizations to access your [place] medical records  [id number]        Your Vitals Were     Pulse Height Pulse Oximetry BMI (Body Mass Index)          90 1.727 m (5' 8\") 96% 20.92 kg/m2         Blood Pressure from Last 3 Encounters:   03/05/18 99/65   02/12/18 109/69   01/23/18 105/65    Weight from Last 3 Encounters:   03/05/18 62.4 kg (137 lb 9.1 oz)   02/12/18 60.8 kg (134 lb 0.6 oz)   01/22/18 61.7 kg (136 lb)              Today, you had the following     No orders found for display         Today's Medication Changes          These changes are accurate as of 3/5/18 11:26 AM.  If you have any questions, ask your nurse or doctor.               These medicines have changed or have updated prescriptions.        Dose/Directions    sodium chloride inhalant 7 % Nebu neb solution   This may have changed:  See the new instructions.   Used for:  Cystic fibrosis with pulmonary manifestations (H)        NEBULIZE 4ML TWICE DAILY   Quantity:  240 mL   Refills:  5                Primary Care Provider Office Phone # Fax #    Jaye Machado [phone]       [facility] [facility] [address] [place] [address] " [facility] [address]        Equal Access to Services     TOÑA RAMIREZ : Hadii aad ku hadblancamaninder Nevarez, wakirbyda naif, qasusannahjoe sheikh. So [facility] [phone].    ATENCIÓN: Si habla español, tiene a monroe disposición servicios gratuitos de asistencia lingüística. Stevename al [phone].    We comply with applicable federal civil rights laws and [place] laws. We do not discriminate on the basis of race, color, national origin, age, disability, sex, sexual orientation, or gender identity.            Thank you!     Thank you for choosing [facility]  for your care. Our goal is always to provide you with excellent care. Hearing back from our patients is one way we can continue to improve our services. Please take a few minutes to complete the written survey that you may receive in the mail after your visit with us. Thank you!             Your Updated Medication List - Protect others around you: Learn how to safely use, store and throw away your medicines at www.disposemymeds.org.          This list is accurate as of 3/5/18 11:26 AM.  Always use your most recent med list.                   Brand Name Dispense Instructions for use Diagnosis    ACE NOT PRESCRIBED (INTENTIONAL)     0 each    1 each continuous prn. ACE Inhibitor not prescribed due to Risk for drug interaction    Type I (juvenile type) diabetes mellitus without mention of complication, not stated as uncontrolled       acetylcysteine 10 % nebulizer solution    MUCOMYST    480 mL    Nebulize 4ml qid    Cystic fibrosis with pulmonary manifestations (H)       amitriptyline 10 MG tablet    ELAVIL    90 tablet    TAKE ONE TABLET BY MOUTH AT BEDTIME.    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    Diabetes mellitus related to CF (cystic fibrosis) (H)       blood glucose monitoring meter device kit     1 kit    Use to test blood sugar 6 times daily or as directed.    Diabetes mellitus related to CF (cystic fibrosis) (H)       blood glucose monitoring test strip    MARTINA CONTOUR NEXT    200 each    Use to test blood sugar 6 times daily or as directed.    Diabetes mellitus related to CF (cystic fibrosis) (H)       budesonide-formoterol 160-4.5 MCG/ACT Inhaler    SYMBICORT    1 Inhaler    Inhale 2 puffs into the lungs 2 times daily    Cystic fibrosis of the lung (H), Cystic fibrosis (H)       CALCIUM PO      Take 1 tablet by mouth 2 times daily Strength unknown.        CREON 33134-93351 UNITS Cpep per EC capsule   Generic drug:  amylase-lipase-protease     2700 capsule    TAKE 7-8 CAPS WITH MEALS (3 MEALS/DAY) AND 2 CAPS WITH SNACKS (3 SNACKS/DAY)    Cystic fibrosis with pulmonary manifestations (H)       EFLOW SCF NEBULIZER HANDSET Misc     1 each    1 each 3 times daily.    Nocardia infection       [facility] MANAGED PATIENT      Contact [facility] for patient specific medication information at [phone] on admission and discharge from the hospital.  Phones are answered 24 hours a day 7 days a week 365 days a year.  Providers - Choose \"CONTINUE HOME MED (no " "script)\" at discharge if patient treatment with home infusion will continue.        insulin glargine 100 UNIT/ML injection    LANTUS SOLOSTAR    15 mL    6 units daily    Diabetes mellitus related to CF (cystic fibrosis) (H)       insulin pen needle 32G X 4 MM    BD OMI U/F    200 each    Use 6 daily or as directed.    Diabetes mellitus related to CF (cystic fibrosis) (H)       levalbuterol 1.25 MG/3ML neb solution    XOPENEX    360 mL    INHALE 1 VIAL BY MOUTH INTO THE LUNGS VIA NEBULIZATION 4 TIMES DAILY    Cystic fibrosis (H), CF (cystic fibrosis) (H), Cystic fibrosis with pulmonary manifestations (H)       levalbuterol 45 MCG/ACT Inhaler    XOPENEX HFA    3 Inhaler    Inhale 2 puffs into the lungs every 6 hours as needed for shortness of breath / dyspnea    Cystic fibrosis (H)       MEPHYTON 5 MG tablet   Generic drug:  phytonadione      Take 1 TABLET by mouth DAILY.        meropenem 2 g     100 g    Inject 2 g into the vein every 8 hours    Cystic fibrosis with pulmonary manifestations (H)       MULTIVITAMIN PO      Take 1 tablet by mouth daily. Includes 5,000 units vitamin D and 400 units vitamin E. Per pt, formulated for CF pts.        NovoLOG PENFILL 100 UNIT/ML injection   Generic drug:  insulin aspart     15 mL    1 per 20 grams of carbohydrate with meals and snacks. Also use 3 units of Novolog at night with tube feeding.    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    Cystic fibrosis with meconium ileus (H)       rimantadine 100 MG tablet    FLUMADINE    60 tablet    " Take 1 tablet (100 mg) by mouth 2 times daily Take during flu season Oct 1st-March 31st    Cystic fibrosis with pulmonary manifestations (H)       sodium chloride inhalant 7 % Nebu neb solution     240 mL    NEBULIZE 4ML TWICE DAILY    Cystic fibrosis with pulmonary manifestations (H)       sulfamethoxazole-trimethoprim 800-160 MG per tablet    BACTRIM DS/SEPTRA DS    60 tablet    Take 1 tablet by mouth 2 times daily    Cystic fibrosis with pulmonary manifestations (H)       VITRON-C 200-125 MG Tabs   Generic drug:  ferrous fumarate 65 mg ([place]. FE)-Vitamin C 125 mg      Take 1 tablet by mouth daily    Nocardia infection, Cystic fibrosis with pulmonary manifestations (H)

## 2018-03-05 NOTE — LETTER
3/5/2018         RE: Nico Morales  [address]  [place] [facility] [address]        Dear Colleague,    Thank you for referring your patient, Nico Morales, to the [facility]. Please see a copy of my visit note below.    CF Endocrinology Return Consultation:  Diabetes  :   Patient: Nico Morales MRN# [id number]   YOB: 1975 Age: 42 year old   Date of Visit: 3/5/2018    Dear Dr. Jaye Machado:    I had the pleasure of seeing your patient, Nico Morales in the CF Endocrinology Clinic, [facility], for a return consultation regarding CFRD and low bone density.           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  Verbal overview and written information provided.          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     Priority: Medium     Encounter for long-term (current) use of antibiotics 05/20/2013     Priority: Medium     Prostatitis 05/20/2013     Priority: Medium     updating diagnosis code for icd10 cutover       Pseudomonas infection      Priority: Medium     Cystic fibrosis (H) 11/24/2010     Priority: Medium     Sweat Test   Date: 4/25/78    Lab: [facility]   Sample #1:  110mmol  Sample #2:  106mmol    Genetics  Date: 4/9/90  [id number]/621+1G->T         CARDIOVASCULAR SCREENING; LDL GOAL LESS THAN 160 10/31/2010     Priority:  Medium            HPI:   Nico is a 42 year old male with Cystic Fibrosis Related Diabetes Mellitus (CFRD).    I have reviewed the available past laboratory evaluations, imaging studies, and medical records available to me at this visit. I have reviewed  Nico'height and weight.    History was obtained from the patient and the medical record.  I have reviewed the notes of the pulmonary care team.    I have read and interpreted the data from the patient glucose downloads..    Overall average: 121 mg/dL SD 30     Patient is currently testing 0-2 time per day.      Does not check regularly but checked 3-4 times daily for the last 2-3 days    A1c:  hemoglobin A1c: 5.9%   Result was discussed at today's visit.     Current insulin regimen:   Lantus 6 units daily at bedtime   Novolog 1 unit / 20 gm of carb, 3-4 times daily. Novolog dose ranges b/w 3- 5 units  Tube feed at night, using on most nights, tube feed take 2-3 hours to complete. Takes 3 units of novolog with TF     Insulin administration site(s): abd or thigh    Family history and social history were reviewed     He was also diagnosed with adrenal insufficiency based on inadequate response on a ACTH stim test. Adrenal insufficiency is thought to be related to  itraconazole and Symbicort use.  last ACTH stim test was normal and he has been off chronic prednisone.    Noted to have significant decline in bone density on last DEXA. On vitamin D and multivitmain supplements.  Pamidronate was recommended, but has not started yet.     Over all feels well, no acute complaints today  Weight is stable, feels frustrated that he is not bale to gain weight          Past Medical History:     Past Medical History:   Diagnosis Date     CF (cystic fibrosis) (H)      Exocrine pancreatic insufficiency      Nocardia infection      Pseudomonas infection      S/P gastrostomy (H) 2002            Past Surgical History:     Past Surgical History:   Procedure Laterality Date      GASTROSTOMY TUBE  2002     PICC INSERTION Left 01/22/2018    4Fr SL BioFlo PICC, 46cm (5cm external) in the L basilic vein w/ tip in the low SVC               Social History:     Social History     Social History Narrative    Kilo lives with wife in a house in [place], [place].  He works as a .        March 2016. He works independently as a . Works out 5x/week and walks the dog daily.        June 2016. No changes. Describes himself as a creature of habit.              Family History:     Family History   Problem Relation Age of Onset     HEART DISEASE Maternal Grandmother      HEART DISEASE Maternal Grandfather      HEART DISEASE Paternal Grandmother      DIABETES No family hx of             Allergies:     Allergies   Allergen Reactions     Pulmozyme [Dornase Trever] Other (See Comments)     Chest pain and fever     Tobramycin Fatigue     Trouble with ear ringing, shortness of breath, little clinical response.     Zosyn Hives     Ceftazidime Rash     Levaquin Rash             Medications:     Current Outpatient Rx   Medication Sig Dispense Refill     amitriptyline (ELAVIL) 10 MG tablet TAKE ONE TABLET BY MOUTH AT BEDTIME. 90 tablet 3     insulin aspart (NOVOLOG PENFILL) 100 UNIT/ML injection 1 per 20 grams of carbohydrate with meals and snacks. Also use 3 units of Novolog at night with tube feeding. 15 mL 12     acetylcysteine (MUCOMYST) 10 % nebulizer solution Nebulize 4ml qid 480 mL 6     CREON [address]4814-36892 UNITS CPEP per EC capsule TAKE 7-8 CAPS WITH MEALS (3 MEALS/DAY) AND 2 CAPS WITH SNACKS (3 SNACKS/DAY) 2700 capsule 2     levalbuterol (XOPENEX) 1.25 MG/3ML neb solution INHALE 1 VIAL BY MOUTH INTO THE LUNGS VIA NEBULIZATION 4 TIMES DAILY 360 mL 11     meropenem 2 g Inject 2 g into the vein every 8 hours 100 g 1     CALCIUM PO Take 1 tablet by mouth 2 times daily Strength unknown.       levalbuterol (XOPENEX HFA) 45 MCG/ACT Inhaler Inhale 2 puffs into the lungs every 6 hours as needed  for shortness of breath / dyspnea 3 Inhaler 4     Respiratory Therapy Supplies (KRIS ALTERA NEBULIZER HANDSET) MISC 1 each daily 1 each 1     aztreonam lysine (CAYSTON) 75 MG SOLR Take 1 mL (75 mg) by nebulization 3 times daily 84 mL 5     rimantadine (FLUMADINE) 100 MG tablet Take 1 tablet (100 mg) by mouth 2 times daily Take during flu season Oct 1st-March 31st 60 tablet 6     blood glucose (NO BRAND SPECIFIED) lancets standard Whatever Lancets that go with device, Test 6 times daily 540 each 3     blood glucose monitoring (MARTINA CONTOUR NEXT) test strip Use to test blood sugar 6 times daily or as directed. 200 each 2     insulin pen needle (BD OMI U/F) 32G X 4 MM Use 6 daily or as directed. 200 each 11     sodium chloride inhalant 7 % NEBU neb solution NEBULIZE 4ML TWICE DAILY (Patient taking differently: NEBULIZE 4ML DAILY) 240 mL 5     polyethylene glycol (MIRALAX) powder Use 17 gms po 1-3 times daily as needed. 510 g 3     insulin glargine (LANTUS SOLOSTAR) 100 UNIT/ML injection 6 units daily 15 mL 6     azithromycin (ZITHROMAX) 250 MG tablet Take 1 tablet (250 mg) by mouth daily 30 tablet 11     budesonide-formoterol (SYMBICORT) 160-4.5 MCG/ACT Inhaler Inhale 2 puffs into the lungs 2 times daily 1 Inhaler 12     blood glucose monitoring (MARTINA CONTOUR NEXT MONITOR) meter device kit Use to test blood sugar 6 times daily or as directed. 1 kit 2     blood glucose monitoring (MARTINA MICROLET) lancets Use to test blood sugar 6 times daily or as directed. 2 Box 2     Nebulizers (EFLOW SCF NEBULIZER HANDSET) MISC 1 each 3 times daily. 1 each 6     Ferrous Fumarate-Vitamin C (VITRON-C) 200-125 MG TABS Take 1 tablet by mouth daily        MEPHYTON 5 MG PO TABS Take 1 TABLET by mouth DAILY.       MULTIVITAMIN OR Take 1 tablet by mouth daily. Includes 5,000 units vitamin D and 400 units vitamin E. Per pt, formulated for CF pts.       sulfamethoxazole-trimethoprim (BACTRIM DS/SEPTRA DS) 800-160 MG per tablet Take 1 tablet  "by mouth 2 times daily (Patient not taking: Reported on 3/5/2018) 60 tablet 3     [place] [facility] MANAGED PATIENT Contact [facility] for patient specific medication information at [phone] on admission and discharge from the hospital.  Phones are answered 24 hours a day 7 days a week 365 days a year.    Providers - Choose \"CONTINUE HOME MED (no script)\" at discharge if patient treatment with home infusion will continue.       ACE NOT PRESCRIBED, INTENTIONAL, 1 each continuous prn. ACE Inhibitor not prescribed due to Risk for drug interaction 0 each 0             Review of Systems:     Comprehensive ROS negative other than the symptoms noted above in the HPI.          Physical Exam:   Blood pressure 99/65, pulse 90, height 1.727 m (5' 8\"), weight 62.4 kg (137 lb 9.1 oz), SpO2 96 %.  Normalized stature-for-age data not available for patients older than 20 years.  Height: 5' 8\", Normalized stature-for-age data not available for patients older than 20 years.  Weight: 137 lbs 9.07 oz, Normalized weight-for-age data not available for patients older than 20 years.  BMI: Body mass index is 20.92 kg/(m^2)., Normalized BMI data available only for age 0 to 20 years.      CONSTITUTIONAL:   Awake, alert, and in no apparent distress.  Neck:no goiter  Lungs:CTA b/l  Heart:RRR  Abd: soft NT  NEUROLOGIC:No focal deficits noted. DTR brisk knee b/l   PSYCHIATRIC: Cooperative, no agitation.  MUSCULOSKELETAL:  Full range of motion noted.  Motor strength and tone are normal.          Laboratory results:     TSH   Date Value Ref Range Status   12/15/2016 1.36 0.40 - 4.00 mU/L Final     Triiodothyronine (T3)   Date Value Ref Range Status   06/25/2010 123 60 - 181 ng/dL Final     Testosterone Total   Date Value Ref Range Status   12/15/2016 779 240 - 950 ng/dL Final     Comment:     This test was developed and its performance characteristics determined by the   [facility],  Special " Chemistry Laboratory. It has   not been cleared or approved by the FDA. The laboratory is regulated under   CLIA   as qualified to perform high-complexity testing. This test is used for   clinical   purposes. It should not be regarded as investigational or for research.       Cholesterol   Date Value Ref Range Status   12/15/2016 158 <200 mg/dL Final     Albumin Urine mg/L   Date Value Ref Range Status   12/15/2016 55 mg/L Final     Triglycerides   Date Value Ref Range Status   12/15/2016 173 (H) <150 mg/dL Final     Comment:     Borderline high:  150-199 mg/dl   High:             200-499 mg/dl   Very high:       >499 mg/dl       HDL Cholesterol   Date Value Ref Range Status   12/15/2016 32 (L) >39 mg/dL Final     LDL Cholesterol Calculated   Date Value Ref Range Status   12/15/2016 92 <100 mg/dL Final     Comment:     Desirable:       <100 mg/dl     Cholesterol/HDL Ratio   Date Value Ref Range Status   06/26/2013 4.4 0.0 - 5.0 Final     Non HDL Cholesterol   Date Value Ref Range Status   12/15/2016 126 <130 mg/dL Final     A1C      8.3   12/15/2016  A1C      8.1   6/28/2016  A1C      7.5   4/13/2016  A1C      7.1   3/8/2016  A1C      7.4   12/16/2015 No results found for this basename: hemoglobina1        CF  Diabetes Health Maintenance    Date of Diabetes Diagnosis: 12/2015    Date Last Eye Exam: Jan 2015     Date Last Dental Appointment: >2 years    Dates of Episodes Severe* Hypoglycemia (month/year, cumulative, ongoing, assess each visit): never              *Severe=patient unconscious, seizure, unable to help self    Last 25-Vitamin D (every year): 33 on 12/2016    Last DXA, lowest Z-score (every 2 years): -2.6 right femoral neck       ?Bisphosphonates (yes/no):       Last Urine Microalbumin (every year):      No results found for: MICROALBUMIN     ENDO DIABETES Latest Ref Rng 12/15/2016   ALBUMIN URINE MG/L  55   ALBUMIN URINE MG/G CR 0 - 17 mg/g Cr 37.28 (H)     Last Testosterone:   Testosterone Total   Date  Value Ref Range Status   12/15/2016 779 240 - 950 ng/dL Final     Comment:     This test was developed and its performance characteristics determined by the   [facility],  Special Chemistry Laboratory. It has   not been cleared or approved by the FDA. The laboratory is regulated under   CLIA   as qualified to perform high-complexity testing. This test is used for   clinical   purposes. It should not be regarded as investigational or for research.        On testosterone therapy (yes/no)? No     Date of Last Diabetes Visit: June 2016         Assessment and Plan:   Nico is a 42 year old male with CFRD    CFRD: good control, continue current insulin regimen.  Urine micro albumin was positive on last labs, recheck with next draw     Diabetes is a complicated and dangerous illness which requires intensive monitoring and treatment to prevent both short-term and long-term consequences to various organs. Insulin therapy is life-saving, but is also associated with life-threatening toxicity (hypoglycemia).  Careful and continuous attention to balancing glucose levels, activity, diet and insulin dosage is necessary.    Low bone density:  pamidronate 30 mg IV every 3 months was ordered. Patient has not scheduled infusion yet.   Check Vitamin D with next draw     Adrenal insuffiencey:  1 mcg ACTH stim test was normal, off chronic prednisone    RTC in 4-6 months    JULIETA Tate    [facility]    TYLER REYES      Again, thank you for allowing me to participate in the care of your patient.        Sincerely,        Diana Lee MD

## 2018-03-06 ENCOUNTER — HOME INFUSION (PRE-WILLOW HOME INFUSION) (OUTPATIENT)
Dept: PHARMACY | Facility: CLINIC | Age: 43
End: 2018-03-06

## 2018-03-07 ENCOUNTER — OFFICE VISIT (OUTPATIENT)
Dept: PULMONOLOGY | Facility: CLINIC | Age: 43
End: 2018-03-07
Attending: INTERNAL MEDICINE
Payer: COMMERCIAL

## 2018-03-07 ENCOUNTER — HOME INFUSION (PRE-WILLOW HOME INFUSION) (OUTPATIENT)
Dept: PHARMACY | Facility: CLINIC | Age: 43
End: 2018-03-07

## 2018-03-07 VITALS
WEIGHT: 135.36 LBS | RESPIRATION RATE: 16 BRPM | OXYGEN SATURATION: 97 % | HEIGHT: 68 IN | DIASTOLIC BLOOD PRESSURE: 66 MMHG | HEART RATE: 90 BPM | SYSTOLIC BLOOD PRESSURE: 102 MMHG | BODY MASS INDEX: 20.52 KG/M2

## 2018-03-07 DIAGNOSIS — E84.0 CYSTIC FIBROSIS WITH PULMONARY MANIFESTATIONS (H): ICD-10-CM

## 2018-03-07 DIAGNOSIS — E84.0 CYSTIC FIBROSIS WITH PULMONARY MANIFESTATIONS (H): Primary | ICD-10-CM

## 2018-03-07 LAB
EXPTIME-PRE: 15.53 SEC
FEF2575-%PRED-PRE: 12 %
FEF2575-PRE: 0.51 L/SEC
FEF2575-PRED: 3.89 L/SEC
FEFMAX-%PRED-PRE: 63 %
FEFMAX-PRE: 6.22 L/SEC
FEFMAX-PRED: 9.78 L/SEC
FEV1-%PRED-PRE: 39 %
FEV1-PRE: 1.59 L
FEV1FEV6-PRE: 49 %
FEV1FEV6-PRED: 82 %
FEV1FVC-PRE: 41 %
FEV1FVC-PRED: 80 %
FIFMAX-PRE: 4.86 L/SEC
FVC-%PRED-PRE: 78 %
FVC-PRE: 3.86 L
FVC-PRED: 4.94 L

## 2018-03-07 PROCEDURE — G0463 HOSPITAL OUTPT CLINIC VISIT: HCPCS | Mod: 25

## 2018-03-07 PROCEDURE — 87077 CULTURE AEROBIC IDENTIFY: CPT | Performed by: INTERNAL MEDICINE

## 2018-03-07 PROCEDURE — 87206 SMEAR FLUORESCENT/ACID STAI: CPT | Performed by: INTERNAL MEDICINE

## 2018-03-07 PROCEDURE — 87070 CULTURE OTHR SPECIMN AEROBIC: CPT | Performed by: INTERNAL MEDICINE

## 2018-03-07 PROCEDURE — 94375 RESPIRATORY FLOW VOLUME LOOP: CPT | Mod: ZF | Performed by: INTERNAL MEDICINE

## 2018-03-07 PROCEDURE — 87116 MYCOBACTERIA CULTURE: CPT | Performed by: INTERNAL MEDICINE

## 2018-03-07 PROCEDURE — 87186 SC STD MICRODIL/AGAR DIL: CPT | Performed by: INTERNAL MEDICINE

## 2018-03-07 PROCEDURE — 87015 SPECIMEN INFECT AGNT CONCNTJ: CPT | Performed by: INTERNAL MEDICINE

## 2018-03-07 RX ORDER — DOXYCYCLINE 100 MG/1
100 CAPSULE ORAL 2 TIMES DAILY
Qty: 60 CAPSULE | Refills: 2 | Status: SHIPPED | OUTPATIENT
Start: 2018-03-07 | End: 2018-07-16

## 2018-03-07 ASSESSMENT — PAIN SCALES - GENERAL: PAINLEVEL: NO PAIN (0)

## 2018-03-07 NOTE — PROGRESS NOTES
[facility]  March 7, 2018         Assessment and Plan:   iNco Moralse is a 42 year old male with cystic fibrosis.    1. CF lung disease with severe obstruction:  Kilo reports that he felt he did recover from his recent hospitalization for influenza.  He has remained on IV antibiotics since that time.  Unfortunately, he did have a complicating viral illness and upper respiratory tract infection in the interim.  We did discuss that today might not be the appropriate day to discontinue his IV antibiotics and he is certainly in agreement.  He does continue to show evidence of Pseudomonas in his sputum.  At this time, we will continue on his IV meropenem.  He should continue to be aggressive with his bronchial drainage and nebulized therapy.  Kilo is also doing inhalational [place] and he will now go to doxycycline.    2.  Cystic fibrosis-related diabetes.  Kilo shows evidence of excellent control.  He is followed closely by Dr. Lee.   3.  Nutritional failure.  Kilo is trying very hard to get his BMI greater than 20.  He does do tube feeds most nights.  He does do dietary supplements as well as eat.  He asked me to have Ivory Bonilla, our dietitian, discuss with him further options.  I will have Ivory contact him directly.    4.  Psychosocial.  Kilo does continue to work as a .  He reports that work is going very well.  He does live in his own home with his dog.    5.  Advanced care planning.  Kilo and I did discuss advanced care planning again.  We have discussed in the past different issues related to transplant and progression of his illness.  He does feel that he is well informed.   6. Pancreatic Insufficiency:  The patient has no new symptoms consistent with worsening malabsorption.  The plan is to continue the present dose of pancreatic enzymes and vitamin supplementation.    Pulmonary exacerbation:  moderate: Hosp admit/home IV  (within 2 wks)    Jaye Machado MD MPH   of Medicine  Pulmonary, Allergy, Critical Care and Sleep Medicine      Interval History:     Kilo does continue to produce sputum that is green in color.  His cough frequency and sputum volume are unchanged.  His activity tolerance is improving.  He is performing 3 vest therapies per day.          Review of Systems:     CONSTITUTIONAL: no fever, no chills, no change in weight, no change in energy, no change in appetite    INTEGUMENTARY/SKIN: no rash, no obvious new lesions    ENT/MOUTH: no sore throat, no new sinus pain, no new nasal drainage     RESPIRATORY: see interval history    CV: no chest pain, no palpitations, no peripheral edema, no orthopnea, no PND    GI: no nausea, no vomiting, no change in stools, no fatty stools, no GERD, no abdominal pain    : no dysuria, no urinary frequency    MUSCULOSKELETAL: no myalgias, no arthralgias    ENDOCRINE: no excessive thirst, blood sugars with adequate control    NEURO:  No headache, no numbness, no tingling    SLEEP: no issues    PSYCHIATRIC: mood stable          Past Medical and Surgical History:     Past Medical History:   Diagnosis Date     CF (cystic fibrosis) (H)      Exocrine pancreatic insufficiency      Nocardia infection      Pseudomonas infection      S/P gastrostomy (H) 2002     Past Surgical History:   Procedure Laterality Date     GASTROSTOMY TUBE  2002     PICC INSERTION Left 01/22/2018    4Fr SL BioFlo PICC, 46cm (5cm external) in the L basilic vein w/ tip in the low SVC           Family History:     Family History   Problem Relation Age of Onset     HEART DISEASE Maternal Grandmother      HEART DISEASE Maternal Grandfather      HEART DISEASE Paternal Grandmother      DIABETES No family hx of             Social History:     Social History     Social History     Marital status: Single     Spouse name: N/A     Number of children: 0     Years of education: N/A     Occupational History      [employer]     Social History Main Topics     Smoking status: Never Smoker     Smokeless tobacco: Never Used     Alcohol use No     Drug use: No     Sexual activity: Yes     Partners: Female     Birth control/ protection: Pill     Other Topics Concern     Blood Transfusions No     Exercise No     Social History Narrative    Kilo lives with wife in a house in [place], [place].  He works as a .        March 2016. He works independently as a . Works out 5x/week and walks the dog daily.        June 2016. No changes. Describes himself as a creature of habit.            Medications:     Current Outpatient Prescriptions   Medication     doxycycline (VIBRAMYCIN) 100 MG capsule     amitriptyline (ELAVIL) 10 MG tablet     insulin aspart (NOVOLOG PENFILL) 100 UNIT/ML injection     acetylcysteine (MUCOMYST) 10 % nebulizer solution     CREON 30211-02586 UNITS CPEP per EC capsule     levalbuterol (XOPENEX) 1.25 MG/3ML neb solution     meropenem 2 g     CALCIUM PO     levalbuterol (XOPENEX HFA) 45 MCG/ACT Inhaler     Respiratory Therapy Supplies (KRIS ALTERA NEBULIZER HANDSET) MISC     aztreonam lysine (CAYSTON) 75 MG SOLR     rimantadine (FLUMADINE) 100 MG tablet     blood glucose (NO BRAND SPECIFIED) lancets standard     blood glucose monitoring (MARTINA CONTOUR NEXT) test strip     insulin pen needle (BD OMI U/F) 32G X 4 MM     sodium chloride inhalant 7 % NEBU neb solution     polyethylene glycol (MIRALAX) powder     insulin glargine (LANTUS SOLOSTAR) 100 UNIT/ML injection     azithromycin (ZITHROMAX) 250 MG tablet     budesonide-formoterol (SYMBICORT) 160-4.5 MCG/ACT Inhaler     blood glucose monitoring (MARTINA CONTOUR NEXT MONITOR) meter device kit     blood glucose monitoring (MARTINA MICROLET) lancets     [facility]NAGED PATIENT     ACE NOT PRESCRIBED, INTENTIONAL,     Nebulizers (EFLOW SCF NEBULIZER HANDSET) MISC     Ferrous Fumarate-Vitamin C (VITRON-C)  "200-125 MG TABS     MEPHYTON 5 MG PO TABS     MULTIVITAMIN OR     No current facility-administered medications for this visit.             Physical Exam:   /66  Pulse 90  Resp 16  Ht 1.727 m (5' 8\")  Wt 61.4 kg (135 lb 5.8 oz)  SpO2 97%  BMI 20.58 kg/m2    Constitutional:   Awake, alert and in no apparent distress     Eyes:   nonicteric     ENT:   oral mucosa moist without lesions, normal tm bilaterally, bilateral mucosal edema      Neck:   Supple without supraclavicular or cervical lymphadenopathy     Lungs:   fair air flow.  No crackles. scattered rhonchi.  No wheezes.     Cardiovascular:   Normal S1 and S2.  RRR.  No murmur, gallop or rub.     Abdomen:   NABS, soft, nontender, nondistended.  g tube     Musculoskeletal:   No edema, digital clubbing present     Neurologic:   Alert and conversant.     Skin:   Warm, dry.  No rash on limited exam.             Data:   All laboratory and imaging data reviewed.    Cystic Fibrosis Culture  Specimen Description   Date Value Ref Range Status   02/12/2018 Sputum  Final   01/21/2018 Sputum  Final   11/21/2017 Sputum  Final    Culture Micro   Date Value Ref Range Status   02/12/2018 Light growth  Normal santi    Final   02/12/2018 (A)  Final    Light growth  Pseudomonas aeruginosa, mucoid strain     02/12/2018 (A)  Final    Light growth  Strain 2  Pseudomonas aeruginosa, mucoid strain          Recent Results (from the past 168 hour(s))   Basic metabolic panel    Collection Time: 03/05/18 12:45 PM   Result Value Ref Range    Sodium 140 133 - 144 mmol/L    Potassium 4.8 3.4 - 5.3 mmol/L    Chloride 107 94 - 109 mmol/L    Carbon Dioxide 27 20 - 32 mmol/L    Anion Gap 6 3 - 14 mmol/L    Glucose 74 70 - 99 mg/dL    Urea Nitrogen 23 7 - 30 mg/dL    Creatinine 0.74 0.66 - 1.25 mg/dL    GFR Estimate >90 >60 mL/min/1.7m2    GFR Estimate If Black >90 >60 mL/min/1.7m2    Calcium 9.1 8.5 - 10.1 mg/dL   Magnesium    Collection Time: 03/05/18 12:45 PM   Result Value Ref Range    " Magnesium 2.5 (H) 1.6 - 2.3 mg/dL   Phosphorus    Collection Time: 03/05/18 12:45 PM   Result Value Ref Range    Phosphorus 2.9 2.5 - 4.5 mg/dL   General PFT Lab (Please always keep checked)    Collection Time: 03/07/18 10:58 AM   Result Value Ref Range    FVC-Pred 4.94 L    FVC-Pre 3.86 L    FVC-%Pred-Pre 78 %    FEV1-Pre 1.59 L    FEV1-%Pred-Pre 39 %    FEV1FVC-Pred 80 %    FEV1FVC-Pre 41 %    FEFMax-Pred 9.78 L/sec    FEFMax-Pre 6.22 L/sec    FEFMax-%Pred-Pre 63 %    FEF2575-Pred 3.89 L/sec    FEF2575-Pre 0.51 L/sec    VIG4953-%Pred-Pre 12 %    ExpTime-Pre 15.53 sec    FIFMax-Pre 4.86 L/sec    FEV1FEV6-Pred 82 %    FEV1FEV6-Pre 49 %       PFT: Severe obstructive lung disease.  When compared to 2/12/2018, the FEV1 and FVC have increased.  The decrease in FVC may represent air trapping v. restrictive physiology.  Lung volumes would be necessary to determine.

## 2018-03-07 NOTE — LETTER
3/7/2018       RE: Nico Morales  [address]  [facility] [address]     Dear Colleague,    Thank you for referring your patient, Nico Morales, to the [facility] at [facility]. Please see a copy of my visit note below.    [facility]  March 7, 2018         Assessment and Plan:   Nico Morales is a 42 year old male with cystic fibrosis.    1. CF lung disease with severe obstruction:  Kilo reports that he felt he did recover from his recent hospitalization for influenza.  He has remained on IV antibiotics since that time.  Unfortunately, he did have a complicating viral illness and upper respiratory tract infection in the interim.  We did discuss that today might not be the appropriate day to discontinue his IV antibiotics and he is certainly in agreement.  He does continue to show evidence of Pseudomonas in his sputum.  At this time, we will continue on his IV meropenem.  He should continue to be aggressive with his bronchial drainage and nebulized therapy.  Kilo is also doing inhalational [place] and he will now go to doxycycline.    2.  Cystic fibrosis-related diabetes.  Kilo shows evidence of excellent control.  He is followed closely by Dr. Lee.   3.  Nutritional failure.  Kilo is trying very hard to get his BMI greater than 20.  He does do tube feeds most nights.  He does do dietary supplements as well as eat.  He asked me to have Ivory Bonilla, our dietitian, discuss with him further options.  I will have Ivory contact him directly.    4.  Psychosocial.  Kilo does continue to work as a .  He reports that work is going very well.  He does live in his own home with his dog.    5.  Advanced care planning.  Kilo and I did discuss advanced care planning again.  We have discussed in the past different issues related to transplant and progression of his  illness.  He does feel that he is well informed.   6. Pancreatic Insufficiency:  The patient has no new symptoms consistent with worsening malabsorption.  The plan is to continue the present dose of pancreatic enzymes and vitamin supplementation.    Pulmonary exacerbation:  moderate: Hosp admit/home IV (within 2 wks)    Jaye Machado MD MPH   of Medicine  Pulmonary, Allergy, Critical Care and Sleep Medicine      Interval History:     Kilo does continue to produce sputum that is green in color.  His cough frequency and sputum volume are unchanged.  His activity tolerance is improving.  He is performing 3 vest therapies per day.          Review of Systems:     CONSTITUTIONAL: no fever, no chills, no change in weight, no change in energy, no change in appetite    INTEGUMENTARY/SKIN: no rash, no obvious new lesions    ENT/MOUTH: no sore throat, no new sinus pain, no new nasal drainage     RESPIRATORY: see interval history    CV: no chest pain, no palpitations, no peripheral edema, no orthopnea, no PND    GI: no nausea, no vomiting, no change in stools, no fatty stools, no GERD, no abdominal pain    : no dysuria, no urinary frequency    MUSCULOSKELETAL: no myalgias, no arthralgias    ENDOCRINE: no excessive thirst, blood sugars with adequate control    NEURO:  No headache, no numbness, no tingling    SLEEP: no issues    PSYCHIATRIC: mood stable          Past Medical and Surgical History:     Past Medical History:   Diagnosis Date     CF (cystic fibrosis) (H)      Exocrine pancreatic insufficiency      Nocardia infection      Pseudomonas infection      S/P gastrostomy (H) 2002     Past Surgical History:   Procedure Laterality Date     GASTROSTOMY TUBE  2002     PICC INSERTION Left 01/22/2018    4Fr SL BioFlo PICC, 46cm (5cm external) in the L basilic vein w/ tip in the low SVC           Family History:     Family History   Problem Relation Age of Onset     HEART DISEASE Maternal Grandmother       HEART DISEASE Maternal Grandfather      HEART DISEASE Paternal Grandmother      DIABETES No family hx of             Social History:     Social History     Social History     Marital status: Single     Spouse name: N/A     Number of children: 0     Years of education: N/A     Occupational History     financial [employer]     Social History Main Topics     Smoking status: Never Smoker     Smokeless tobacco: Never Used     Alcohol use No     Drug use: No     Sexual activity: Yes     Partners: Female     Birth control/ protection: Pill     Other Topics Concern     Blood Transfusions No     Exercise No     Social History Narrative    Kilo lives with wife in a house in [place], [place].  He works as a .        March 2016. He works independently as a . Works out 5x/week and walks the dog daily.        June 2016. No changes. Describes himself as a creature of habit.            Medications:     Current Outpatient Prescriptions   Medication     doxycycline (VIBRAMYCIN) 100 MG capsule     amitriptyline (ELAVIL) 10 MG tablet     insulin aspart (NOVOLOG PENFILL) 100 UNIT/ML injection     acetylcysteine (MUCOMYST) 10 % nebulizer solution     CREON 59023-25009 UNITS CPEP per EC capsule     levalbuterol (XOPENEX) 1.25 MG/3ML neb solution     meropenem 2 g     CALCIUM PO     levalbuterol (XOPENEX HFA) 45 MCG/ACT Inhaler     Respiratory Therapy Supplies (KRIS ALTERA NEBULIZER HANDSET) MISC     aztreonam lysine (CAYSTON) 75 MG SOLR     rimantadine (FLUMADINE) 100 MG tablet     blood glucose (NO BRAND SPECIFIED) lancets standard     blood glucose monitoring (MARTINA CONTOUR NEXT) test strip     insulin pen needle (BD OMI U/F) 32G X 4 MM     sodium chloride inhalant 7 % NEBU neb solution     polyethylene glycol (MIRALAX) powder     insulin glargine (LANTUS SOLOSTAR) 100 UNIT/ML injection     azithromycin (ZITHROMAX) 250 MG tablet     budesonide-formoterol (SYMBICORT) 160-4.5 MCG/ACT  "Inhaler     blood glucose monitoring (MARTINA CONTOUR NEXT MONITOR) meter device kit     blood glucose monitoring (MARTINA MICROLET) lancets     [facility] MANAGED PATIENT     ACE NOT PRESCRIBED, INTENTIONAL,     Nebulizers (EFLOW SCF NEBULIZER HANDSET) MISC     Ferrous Fumarate-Vitamin C (VITRON-C) 200-125 MG TABS     MEPHYTON 5 MG PO TABS     MULTIVITAMIN OR     No current facility-administered medications for this visit.             Physical Exam:   /66  Pulse 90  Resp 16  Ht 1.727 m (5' 8\")  Wt 61.4 kg (135 lb 5.8 oz)  SpO2 97%  BMI 20.58 kg/m2    Constitutional:   Awake, alert and in no apparent distress     Eyes:   nonicteric     ENT:   oral mucosa moist without lesions, normal tm bilaterally, bilateral mucosal edema      Neck:   Supple without supraclavicular or cervical lymphadenopathy     Lungs:   fair air flow.  No crackles. scattered rhonchi.  No wheezes.     Cardiovascular:   Normal S1 and S2.  RRR.  No murmur, gallop or rub.     Abdomen:   NABS, soft, nontender, nondistended.  g tube     Musculoskeletal:   No edema, digital clubbing present     Neurologic:   Alert and conversant.     Skin:   Warm, dry.  No rash on limited exam.             Data:   All laboratory and imaging data reviewed.    Cystic Fibrosis Culture  Specimen Description   Date Value Ref Range Status   02/12/2018 Sputum  Final   01/21/2018 Sputum  Final   11/21/2017 Sputum  Final    Culture Micro   Date Value Ref Range Status   02/12/2018 Light growth  Normal santi    Final   02/12/2018 (A)  Final    Light growth  Pseudomonas aeruginosa, mucoid strain     02/12/2018 (A)  Final    Light growth  Strain 2  Pseudomonas aeruginosa, mucoid strain          Recent Results (from the past 168 hour(s))   Basic metabolic panel    Collection Time: 03/05/18 12:45 PM   Result Value Ref Range    Sodium 140 133 - 144 mmol/L    Potassium 4.8 3.4 - 5.3 mmol/L    Chloride 107 94 - 109 mmol/L    Carbon Dioxide 27 20 - 32 mmol/L    Anion " Gap 6 3 - 14 mmol/L    Glucose 74 70 - 99 mg/dL    Urea Nitrogen 23 7 - 30 mg/dL    Creatinine 0.74 0.66 - 1.25 mg/dL    GFR Estimate >90 >60 mL/min/1.7m2    GFR Estimate If Black >90 >60 mL/min/1.7m2    Calcium 9.1 8.5 - 10.1 mg/dL   Magnesium    Collection Time: 03/05/18 12:45 PM   Result Value Ref Range    Magnesium 2.5 (H) 1.6 - 2.3 mg/dL   Phosphorus    Collection Time: 03/05/18 12:45 PM   Result Value Ref Range    Phosphorus 2.9 2.5 - 4.5 mg/dL   General PFT Lab (Please always keep checked)    Collection Time: 03/07/18 10:58 AM   Result Value Ref Range    FVC-Pred 4.94 L    FVC-Pre 3.86 L    FVC-%Pred-Pre 78 %    FEV1-Pre 1.59 L    FEV1-%Pred-Pre 39 %    FEV1FVC-Pred 80 %    FEV1FVC-Pre 41 %    FEFMax-Pred 9.78 L/sec    FEFMax-Pre 6.22 L/sec    FEFMax-%Pred-Pre 63 %    FEF2575-Pred 3.89 L/sec    FEF2575-Pre 0.51 L/sec    [id number]-%Pred-Pre 12 %    ExpTime-Pre 15.53 sec    FIFMax-Pre 4.86 L/sec    FEV1FEV6-Pred 82 %    FEV1FEV6-Pre 49 %       PFT: Severe obstructive lung disease.  When compared to 2/12/2018, the FEV1 and FVC have increased.  The decrease in FVC may represent air trapping v. restrictive physiology.  Lung volumes would be necessary to determine.      Again, thank you for allowing me to participate in the care of your patient.      Sincerely,    Jaye Machado MD

## 2018-03-07 NOTE — PATIENT INSTRUCTIONS
Cystic Fibrosis Self-Care Plan    RECOMMENDATIONS: Continue meropenem.  Doxycycline twice daily for 14 days.  Allergy referral to evaluate medication allergies.  Ivory to call about shajay.  Keila to schedule further out. [phone]    YOUR GOAL:  Stay well and enjoy the spring.      CF Nurse line:          Tobi Henderson Nelda   [phone]            CF Resp Therapist: Felicitas Banuelos            [phone]   CF Dietitians:           Zeina Pierre            [phone]                       Ivory Bonilla                       [phone]   CF Diabetes Nurse: Jordyn Rudolph             [phone]    CF Social Workers:  Mary Ritter             [phone]                Marisa Urbina            [phone]  CF Pharmacist:        Tammy Morgan                              [phone]  [email or web address]         MRN: [id number]   Clinic Date: March 7, 2018   Patient: Nico Morales     Annual Studies:   IGG   Date Value Ref Range Status   12/15/2016 1770 (H) 695 - 1620 mg/dL Final     Insulin   Date Value Ref Range Status   07/26/2011 30 mU/L Final     Comment:     Reference Range:  0-20  +120     There are no preventive care reminders to display for this patient.      Pulmonary Function Tests  FEV1: amount of air you can blow out in 1 second  FVC: total amount of air you can take in and blow out    Your Goals:         PFT Latest Ref Rng & Units 3/7/2018   FVC L 3.86   FEV1 L 1.59   FVC% % 78   FEV1% % 39          Airway Clearance: The Most Important Way to Keep Your Lungs Healthy  Vest Settings:    Hill-Rom Frequencies: 8, 9, 10 Pressure 10 Then, Frequencies 18, 19, 20 Pressure 6      RespirTech: Quick Start with Pressure of     Do each frequency for 5 minutes; Deflate vest after each frequency & cough 3 times before beginning the next setting.    Vest and Neb Therapy should be done 3 times/day.    Good Nutrition Can Improve Lung Function and Overall Health     Take ALL of your vitamins with  food     Take 1/2 of your enzymes before EVERY meal/snack and the other 1/2 mid-meal/snack    Wt Readings from Last 3 Encounters:   03/07/18 61.4 kg (135 lb 5.8 oz)   03/05/18 62.4 kg (137 lb 9.1 oz)   02/12/18 60.8 kg (134 lb 0.6 oz)       Body mass index is 20.58 kg/(m^2).         National CF Foundation Recommendations for BMI in CF Adults: Women: at least 22 Men: at least 23        Controlling Blood Sugars Helps Prevent Lung Infections & Improves Nutrition  Test blood sugar:     In the morning before eating (goal is )     2 hours after a meal (goal is less than 150)     When pre-meal glucose is greater than 150 add correction     At bedtime (if less than 100 eat a snack with 15 grams of carbohydrates  Last A1C Results:   Hemoglobin A1C   Date Value Ref Range Status   01/21/2018 5.9 4.3 - 6.0 % Final         If diabetic, measure A1C every 6 months. Goal: Under 7%    Staying Healthy    Research:  If you are interested in learning about research opportunities or have questions, please contact the CF Research Team at [phone] or [email or web address].      CF Foundation:  Compass is a personalized resource service to help you with the insurance, financial, legal and other issues you are facing.  It's free, confidential and available to anyone with CF.  Ask your  for more information or contact Compass directly at 550-COMPASS ([phone]) or compass@cff.org, or learn more at [email or web address].[email or web address].

## 2018-03-07 NOTE — MR AVS SNAPSHOT
After Visit Summary   3/7/2018    Nico Morales    MRN: [id number]           Patient Information     Date Of Birth          1975        Visit Information        Provider Department      3/7/2018 11:10 AM Jaye Machado MD [facility]        Today's Diagnoses     Cystic fibrosis with pulmonary manifestations (H)          Care Instructions    Cystic Fibrosis Self-Care Plan    RECOMMENDATIONS: Continue meropenem.  Doxycycline twice daily for 14 days.  Allergy referral to evaluate medication allergies.  Ivory to call about shakes.  Keila to schedule further out. [phone]    YOUR GOAL:  Stay well and enjoy the spring.      CF Nurse line:          Bailee Henderson and Nelda   [phone]            CF Resp Therapist: Felicitas Banuelos            [phone]   CF Dietitians:           Zeina Pierre            [phone]                       Ivory Bonilla                       [phone]   CF Diabetes Nurse: Jordyn Rudolph             [phone]    CF Social Workers:  Mary Ritter             [phone]                Marisa Urbina            [phone]  CF Pharmacist:        Tammy Morgan                              [phone]  [email or web address]         MRN: [id number]   Clinic Date: March 7, 2018   Patient: Nico Morales     Annual Studies:   IGG   Date Value Ref Range Status   12/15/2016 1770 (H) 695 - 1620 mg/dL Final     Insulin   Date Value Ref Range Status   07/26/2011 30 mU/L Final     Comment:     Reference Range:  0-20  +120     There are no preventive care reminders to display for this patient.      Pulmonary Function Tests  FEV1: amount of air you can blow out in 1 second  FVC: total amount of air you can take in and blow out    Your Goals:         PFT Latest Ref Rng & Units 3/7/2018   FVC L 3.86   FEV1 L 1.59   FVC% % 78   FEV1% % 39          Airway Clearance: The Most Important Way to Keep Your Lungs Healthy  Vest Settings:     Hill-Rom Frequencies: 8, 9, 10 Pressure 10 Then, Frequencies 18, 19, 20 Pressure 6      RespirTech: Quick Start with Pressure of     Do each frequency for 5 minutes; Deflate vest after each frequency & cough 3 times before beginning the next setting.    Vest and Neb Therapy should be done 3 times/day.    Good Nutrition Can Improve Lung Function and Overall Health     Take ALL of your vitamins with food     Take 1/2 of your enzymes before EVERY meal/snack and the other 1/2 mid-meal/snack    Wt Readings from Last 3 Encounters:   03/07/18 61.4 kg (135 lb 5.8 oz)   03/05/18 62.4 kg (137 lb 9.1 oz)   02/12/18 60.8 kg (134 lb 0.6 oz)       Body mass index is 20.58 kg/(m^2).         National CF Foundation Recommendations for BMI in CF Adults: Women: at least 22 Men: at least 23        Controlling Blood Sugars Helps Prevent Lung Infections & Improves Nutrition  Test blood sugar:     In the morning before eating (goal is )     2 hours after a meal (goal is less than 150)     When pre-meal glucose is greater than 150 add correction     At bedtime (if less than 100 eat a snack with 15 grams of carbohydrates  Last A1C Results:   Hemoglobin A1C   Date Value Ref Range Status   01/21/2018 5.9 4.3 - 6.0 % Final         If diabetic, measure A1C every 6 months. Goal: Under 7%    Staying Healthy    Research:  If you are interested in learning about research opportunities or have questions, please contact the CF Research Team at [phone] or [email or web address].      CF Foundation:  Compass is a personalized resource service to help you with the insurance, financial, legal and other issues you are facing.  It's free, confidential and available to anyone with CF.  Ask your  for more information or contact Compass directly at 273-COMPASS ([phone]) or compass@cff.org, or learn more at cff.org/compass.                             Follow-ups after your visit        Additional Services     ALLERGY/ASTHMA ADULT  REFERRAL       Your provider has referred you to: [facility]/Asthma[facility] - [phone]  [email or web address]    Please be aware that coverage of these services is subject to the terms and limitations of your health insurance plan.  Call member services at your health plan with any benefit or coverage questions.      Please bring the following with you to your appointment:    (1) Any X-Rays, CTs or MRIs which have been performed.  Contact the facility where they were done to arrange for  prior to your scheduled appointment.    (2) List of current medications  (3) This referral request   (4) Any documents/labs given to you for this referral                  Follow-up notes from your care team     Return 3 to 4 weeks, then every 6 weeks.      Your next 10 appointments already scheduled     Aug 06, 2018  3:45 PM CDT   Return Visit with Diana Lee MD, MG ENDO NURSE   [facility] ([facility])    [address] [place] [id number] [phone]              Future tests that were ordered for you today     Open Future Orders        Priority Expected Expires Ordered    Cystic Fibrosis Culture Aerob Bacterial Routine  3/7/2019 3/7/2018            Who to contact     If you have questions or need follow up information about today's clinic visit or your schedule please contact [facility] directly at [phone].  Normal or non-critical lab and imaging results will be communicated to you by MyChart, letter or phone within 4 business days after the clinic has received the results. If you do not hear from us within 7 days, please contact the clinic through MyChart or phone. If you have a critical or abnormal lab result, we will notify you by phone as soon as possible.  Submit refill requests through [employer] or call your pharmacy and they will forward the refill request to us. Please  "allow 3 business days for your refill to be completed.          Additional Information About Your Visit        [employer] Information     [employer] gives you secure access to your electronic health record. If you see a primary care provider, you can also send messages to your care team and make appointments. If you have questions, please call your primary care clinic.  If you do not have a primary care provider, please call [phone] and they will assist you.        Care EveryWhere ID     This is your Care EveryWhere ID. This could be used by other organizations to access your [place] medical records  [id number]        Your Vitals Were     Pulse Respirations Height Pulse Oximetry BMI (Body Mass Index)       90 16 1.727 m (5' 8\") 97% 20.58 kg/m2        Blood Pressure from Last 3 Encounters:   03/07/18 102/66   03/05/18 99/65   02/12/18 109/69    Weight from Last 3 Encounters:   03/07/18 61.4 kg (135 lb 5.8 oz)   03/05/18 62.4 kg (137 lb 9.1 oz)   02/12/18 60.8 kg (134 lb 0.6 oz)              We Performed the Following     AFB Culture Non Blood     AFB Stain Non Blood     ALLERGY/ASTHMA ADULT REFERRAL     Cystic Fibrosis Culture Aerob Bacterial     RESPIRATORY FLOW VOLUME LOOP          Today's Medication Changes          These changes are accurate as of 3/7/18 12:22 PM.  If you have any questions, ask your nurse or doctor.               Start taking these medicines.        Dose/Directions    doxycycline 100 MG capsule   Commonly known as:  VIBRAMYCIN   Used for:  Cystic fibrosis with pulmonary manifestations (H)   Started by:  Jaye Machado MD        Dose:  100 mg   Take 1 capsule (100 mg) by mouth 2 times daily   Quantity:  60 capsule   Refills:  2         These medicines have changed or have updated prescriptions.        Dose/Directions    sodium chloride inhalant 7 % Nebu neb solution   This may have changed:  See the new instructions.   Used for:  Cystic fibrosis with pulmonary manifestations (H)     "    NEBULIZE 4ML TWICE DAILY   Quantity:  240 mL   Refills:  5         Stop taking these medicines if you haven't already. Please contact your care team if you have questions.     sulfamethoxazole-trimethoprim 800-160 MG per tablet   Commonly known as:  BACTRIM DS/SEPTRA DS   Stopped by:  Jaye Machado MD                Where to get your medicines      These medications were sent to [place] [address] [facility] - [place], [place] - [address]  [address] [facility], [place] [address]     Phone:  [phone]     doxycycline 100 MG capsule                Primary Care Provider Office Phone # Fax #    Jaye Machado [phone]       [facility] [address] [place] [address]        Equal Access to Services     TOÑA RAMIREZ : Hadii favio buckley hadasho Soomaali, waaxda luqadaha, qaybta kaalmada adeegyada, joe iversonin dee castellon . So [facility] [phone].    ATENCIÓN: Si habla español, tiene a monroe disposición servicios gratuitos de asistencia lingüística. [facility] [phone].    We comply with applicable federal civil rights laws and [place] laws. We do not discriminate on the basis of race, color, national origin, age, disability, sex, sexual orientation, or gender identity.            Thank you!     Thank you for choosing [facility]  for your care. Our goal is always to provide you with excellent care. Hearing back from our patients is one way we can continue to improve our services. Please take a few minutes to complete the written survey that you may receive in the mail after your visit with us. Thank you!             Your Updated Medication List - Protect others around you: Learn how to safely use, store and throw away your medicines at www.disposemymeds.org.          This list is accurate as of 3/7/18 12:22 PM.  Always use your most recent med list.                   Brand Name Dispense Instructions for use  Diagnosis    ACE NOT PRESCRIBED (INTENTIONAL)     0 each    1 each continuous prn. ACE Inhibitor not prescribed due to Risk for drug interaction    Type I (juvenile type) diabetes mellitus without mention of complication, not stated as uncontrolled       acetylcysteine 10 % nebulizer solution    MUCOMYST    480 mL    Nebulize 4ml qid    Cystic fibrosis with pulmonary manifestations (H)       amitriptyline 10 MG tablet    ELAVIL    90 tablet    TAKE ONE TABLET BY MOUTH AT BEDTIME.    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    Diabetes mellitus related to CF (cystic fibrosis) (H)       blood glucose monitoring meter device kit     1 kit    Use to test blood sugar 6 times daily or as directed.    Diabetes mellitus related to CF (cystic fibrosis) (H)       blood glucose monitoring test strip    MARTINA CONTOUR NEXT    200 each    Use to test blood sugar 6 times daily or as directed.    Diabetes mellitus related to CF (cystic fibrosis) (H)       budesonide-formoterol 160-4.5 MCG/ACT Inhaler    SYMBICORT    1 Inhaler    Inhale 2 puffs into the lungs 2 times daily    Cystic fibrosis of the lung (H), Cystic  "fibrosis (H)       CALCIUM PO      Take 1 tablet by mouth 2 times daily Strength unknown.        CREON [address] UNITS Cpep per EC capsule   Generic drug:  amylase-lipase-protease     2700 capsule    TAKE 7-8 CAPS WITH MEALS (3 MEALS/DAY) AND 2 CAPS WITH SNACKS (3 SNACKS/DAY)    Cystic fibrosis with pulmonary manifestations (H)       doxycycline 100 MG capsule    VIBRAMYCIN    60 capsule    Take 1 capsule (100 mg) by mouth 2 times daily    Cystic fibrosis with pulmonary manifestations (H)       EFLOW SCF NEBULIZER HANDSET Misc     1 each    1 each 3 times daily.    Nocardia infection       [place]NAGED PATIENT      Contact [facility] for patient specific medication information at [phone] on admission and discharge from the hospital.  Phones are answered 24 hours a day 7 days a week 365 days a year.  Providers - Choose \"CONTINUE HOME MED (no script)\" at discharge if patient treatment with home infusion will continue.        insulin glargine 100 UNIT/ML injection    LANTUS SOLOSTAR    15 mL    6 units daily    Diabetes mellitus related to CF (cystic fibrosis) (H)       insulin pen needle 32G X 4 MM    BD OMI U/F    200 each    Use 6 daily or as directed.    Diabetes mellitus related to CF (cystic fibrosis) (H)       levalbuterol 1.25 MG/3ML neb solution    XOPENEX    360 mL    INHALE 1 VIAL BY MOUTH INTO THE LUNGS VIA NEBULIZATION 4 TIMES DAILY    Cystic fibrosis (H), CF (cystic fibrosis) (H), Cystic fibrosis with pulmonary manifestations (H)       levalbuterol 45 MCG/ACT Inhaler    XOPENEX HFA    3 Inhaler    Inhale 2 puffs into the lungs every 6 hours as needed for shortness of breath / dyspnea    Cystic fibrosis (H)       MEPHYTON 5 MG tablet   Generic drug:  phytonadione      Take 1 TABLET by mouth DAILY.        meropenem 2 g     100 g    Inject 2 g into the vein every 8 hours    Cystic fibrosis with pulmonary manifestations (H)       MULTIVITAMIN PO      Take 1 tablet by " mouth daily. Includes 5,000 units vitamin D and 400 units vitamin E. Per pt, formulated for CF pts.        NovoLOG PENFILL 100 UNIT/ML injection   Generic drug:  insulin aspart     15 mL    1 per 20 grams of carbohydrate with meals and snacks. Also use 3 units of Novolog at night with tube feeding.    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    Cystic fibrosis with meconium ileus (H)       rimantadine 100 MG tablet    FLUMADINE    60 tablet    Take 1 tablet (100 mg) by mouth 2 times daily Take during flu season Oct 1st-March 31st    Cystic fibrosis with pulmonary manifestations (H)       sodium chloride inhalant 7 % Nebu neb solution     240 mL    NEBULIZE 4ML TWICE DAILY    Cystic fibrosis with pulmonary manifestations (H)       VITRON-C 200-125 MG Tabs   Generic drug:  ferrous fumarate 65 mg (Sycuan. FE)-Vitamin C 125 mg      Take 1 tablet by mouth daily    Nocardia infection, Cystic fibrosis with pulmonary manifestations (H)

## 2018-03-08 NOTE — NURSING NOTE
Respiratory Therapist Note:    Vest    Brand: Hill-Rom - Model 105   Settings: Hill Rom: Frequencies 8, 9, 10 at pressure 10 then frequencies 18, 19, 20 at pressure 6.   Cough Pause: Yes. Frequency:    Vest Garment Size: Adult Small   Last Fitting Date: 2018   Frequency of therapy: 21 times per week   Concerns: none    Alternative Airway Clearance: Aerobika    Nebulized Medications   Bronchodilators: Xopenex   Mucolytic: Pulmozyme and 7% Hypertonic Saline   Antibiotics: [place]   Additional Inhaled Medications: ICS    Review Cleaning: Yes. Countertop bottle sterilizer.    Education and Transition Information   Correct order of inhaled medications: Yes   Mechanism of Action of inhaled medications: Yes   Frequency of inhaled medications: Yes   Dosage of inhaled medications: Yes   Other:     Home Care   Nebulizer Compressor    Year Purchased: 2018   Home Care Company:     [facility], Phone: [phone], Fax: [phone]    Oxygen:     P[facility] Rehab   Site:  Date Completed:     Other Comments:     Goals   Next Visit: keep up the good work   Next Year: as above

## 2018-03-10 LAB
ACID FAST STN SPEC QL: NORMAL
SPECIMEN SOURCE: NORMAL

## 2018-03-12 ENCOUNTER — HOME INFUSION (PRE-WILLOW HOME INFUSION) (OUTPATIENT)
Dept: PHARMACY | Facility: CLINIC | Age: 43
End: 2018-03-12

## 2018-03-12 LAB
ANION GAP SERPL CALCULATED.3IONS-SCNC: 6 MMOL/L (ref 3–14)
BUN SERPL-MCNC: 21 MG/DL (ref 7–30)
CALCIUM SERPL-MCNC: 8.8 MG/DL (ref 8.5–10.1)
CHLORIDE SERPL-SCNC: 105 MMOL/L (ref 94–109)
CO2 SERPL-SCNC: 30 MMOL/L (ref 20–32)
CREAT SERPL-MCNC: 0.79 MG/DL (ref 0.66–1.25)
GFR SERPL CREATININE-BSD FRML MDRD: >90 ML/MIN/1.7M2
GLUCOSE SERPL-MCNC: 150 MG/DL (ref 70–99)
MAGNESIUM SERPL-MCNC: 2.6 MG/DL (ref 1.6–2.3)
PHOSPHATE SERPL-MCNC: 2.4 MG/DL (ref 2.5–4.5)
POTASSIUM SERPL-SCNC: 4.5 MMOL/L (ref 3.4–5.3)
SODIUM SERPL-SCNC: 141 MMOL/L (ref 133–144)

## 2018-03-12 PROCEDURE — 84100 ASSAY OF PHOSPHORUS: CPT | Performed by: INTERNAL MEDICINE

## 2018-03-12 PROCEDURE — 83735 ASSAY OF MAGNESIUM: CPT | Performed by: INTERNAL MEDICINE

## 2018-03-12 PROCEDURE — 80048 BASIC METABOLIC PNL TOTAL CA: CPT | Performed by: INTERNAL MEDICINE

## 2018-03-12 NOTE — PROGRESS NOTES
This is a recent snapshot of the patient's [facility] medical record.  For current drug dose and complete information and questions, call [phone] or In [facility] pool, fv home infusion ([id number])  CSN Number:  [id number]

## 2018-03-13 ENCOUNTER — HOME INFUSION (PRE-WILLOW HOME INFUSION) (OUTPATIENT)
Dept: PHARMACY | Facility: CLINIC | Age: 43
End: 2018-03-13

## 2018-03-13 LAB
BACTERIA SPEC CULT: ABNORMAL
SPECIMEN SOURCE: ABNORMAL

## 2018-03-13 NOTE — PROGRESS NOTES
This is a recent snapshot of the patient's [facility] medical record.  For current drug dose and complete information and questions, call [phone] or In Basket pool, fv home infusion ([id number])  CSN Number:  [id number]

## 2018-03-15 ENCOUNTER — TELEPHONE (OUTPATIENT)
Dept: NUTRITION | Facility: CLINIC | Age: 43
End: 2018-03-15

## 2018-03-15 NOTE — TELEPHONE ENCOUNTER
Nutrition Note    Corresponded with pt via phone concerning a message he left 3/14 regarding use of weight gain supplements and their safety.  Pt has purchased and is using a product called Serious Mass Lakshmi (brand: [employer] Nutrition).  When mixed as prescribed one shake provides 1640 calories, 291 g CHO with 56 g of this coming from sugar.  He has started one shake per day in addition to meals and nightly TF in order to promote weight gain.  He has discussed the use of this product with both Dr. Machado and Dr. Lee and has received their approval for continued use.    Spoke with pt about safe use of high calorie nutrition products including appropriate enzyme and insulin dosing, monitoring GI and other symptoms and information regarding use of creatine.  Advised Kilo to do these shakes only once per day and to always mix it according to the product guidelines.  Encouraged him to contact the CF office with any new concerns or questions, otherwise we will see him for follow-up via clinic per protocol.      Ivory Bonilla MS, RD, LD (pager [phone])  Cystic Fibrosis/Lung Transplant Dietitian      -Available Tues-Fri 8 AM-4:30 PM. On Mondays please contact pager [phone] (Zeina Pierre RD) and on weekends/holidays contact coverage dietitian at pager [phone] (inpatient use only).

## 2018-03-16 NOTE — PROGRESS NOTES
This is a recent snapshot of the patient's [facility] medical record.  For current drug dose and complete information and questions, call [phone] or In Basket pool, fv home infusion ([id number])  CSN Number:  [id number]

## 2018-03-19 ENCOUNTER — HOME INFUSION (PRE-WILLOW HOME INFUSION) (OUTPATIENT)
Dept: PHARMACY | Facility: CLINIC | Age: 43
End: 2018-03-19

## 2018-03-19 LAB
ANION GAP SERPL CALCULATED.3IONS-SCNC: 6 MMOL/L (ref 3–14)
BUN SERPL-MCNC: 25 MG/DL (ref 7–30)
CALCIUM SERPL-MCNC: 8.4 MG/DL (ref 8.5–10.1)
CHLORIDE SERPL-SCNC: 103 MMOL/L (ref 94–109)
CO2 SERPL-SCNC: 30 MMOL/L (ref 20–32)
CREAT SERPL-MCNC: 0.76 MG/DL (ref 0.66–1.25)
GFR SERPL CREATININE-BSD FRML MDRD: >90 ML/MIN/1.7M2
GLUCOSE SERPL-MCNC: 148 MG/DL (ref 70–99)
MAGNESIUM SERPL-MCNC: 2.7 MG/DL (ref 1.6–2.3)
PHOSPHATE SERPL-MCNC: 2.4 MG/DL (ref 2.5–4.5)
POTASSIUM SERPL-SCNC: 4.6 MMOL/L (ref 3.4–5.3)
SODIUM SERPL-SCNC: 139 MMOL/L (ref 133–144)

## 2018-03-19 PROCEDURE — 83735 ASSAY OF MAGNESIUM: CPT | Performed by: INTERNAL MEDICINE

## 2018-03-19 PROCEDURE — 84100 ASSAY OF PHOSPHORUS: CPT | Performed by: INTERNAL MEDICINE

## 2018-03-19 PROCEDURE — 80048 BASIC METABOLIC PNL TOTAL CA: CPT | Performed by: INTERNAL MEDICINE

## 2018-03-20 ENCOUNTER — HOME INFUSION (PRE-WILLOW HOME INFUSION) (OUTPATIENT)
Dept: PHARMACY | Facility: CLINIC | Age: 43
End: 2018-03-20

## 2018-03-20 NOTE — PROGRESS NOTES
This is a recent snapshot of the patient's [facility] medical record.  For current drug dose and complete information and questions, call [phone] or In Basket pool, fv home infusion ([id number])  CSN Number:  [id number]

## 2018-03-26 ENCOUNTER — HOME INFUSION (PRE-WILLOW HOME INFUSION) (OUTPATIENT)
Dept: PHARMACY | Facility: CLINIC | Age: 43
End: 2018-03-26

## 2018-03-26 LAB
ANION GAP SERPL CALCULATED.3IONS-SCNC: 6 MMOL/L (ref 3–14)
BUN SERPL-MCNC: 22 MG/DL (ref 7–30)
CALCIUM SERPL-MCNC: 8.8 MG/DL (ref 8.5–10.1)
CHLORIDE SERPL-SCNC: 105 MMOL/L (ref 94–109)
CO2 SERPL-SCNC: 28 MMOL/L (ref 20–32)
CREAT SERPL-MCNC: 0.8 MG/DL (ref 0.66–1.25)
GFR SERPL CREATININE-BSD FRML MDRD: >90 ML/MIN/1.7M2
GLUCOSE SERPL-MCNC: 131 MG/DL (ref 70–99)
MAGNESIUM SERPL-MCNC: 2.4 MG/DL (ref 1.6–2.3)
PHOSPHATE SERPL-MCNC: 2.8 MG/DL (ref 2.5–4.5)
POTASSIUM SERPL-SCNC: 4.8 MMOL/L (ref 3.4–5.3)
SODIUM SERPL-SCNC: 139 MMOL/L (ref 133–144)

## 2018-03-26 PROCEDURE — 83735 ASSAY OF MAGNESIUM: CPT | Performed by: INTERNAL MEDICINE

## 2018-03-26 PROCEDURE — 80048 BASIC METABOLIC PNL TOTAL CA: CPT | Performed by: INTERNAL MEDICINE

## 2018-03-26 PROCEDURE — 84100 ASSAY OF PHOSPHORUS: CPT | Performed by: INTERNAL MEDICINE

## 2018-03-27 ENCOUNTER — HOME INFUSION (PRE-WILLOW HOME INFUSION) (OUTPATIENT)
Dept: PHARMACY | Facility: CLINIC | Age: 43
End: 2018-03-27

## 2018-03-27 NOTE — PROGRESS NOTES
This is a recent snapshot of the patient's [facility] medical record.  For current drug dose and complete information and questions, call [phone] or In Basket pool, fv home infusion ([id number])  CSN Number:  [id number]

## 2018-03-28 NOTE — PROGRESS NOTES
This is a recent snapshot of the patient's [facility] medical record.  For current drug dose and complete information and questions, call [phone] or In Basket pool, fv home infusion ([id number])  CSN Number:  [id number]

## 2018-04-02 ENCOUNTER — OFFICE VISIT (OUTPATIENT)
Dept: PULMONOLOGY | Facility: CLINIC | Age: 43
End: 2018-04-02
Attending: INTERNAL MEDICINE
Payer: COMMERCIAL

## 2018-04-02 VITALS
DIASTOLIC BLOOD PRESSURE: 66 MMHG | RESPIRATION RATE: 18 BRPM | HEART RATE: 92 BPM | WEIGHT: 137.79 LBS | BODY MASS INDEX: 20.88 KG/M2 | SYSTOLIC BLOOD PRESSURE: 98 MMHG | OXYGEN SATURATION: 95 % | HEIGHT: 68 IN

## 2018-04-02 DIAGNOSIS — E84.0 CYSTIC FIBROSIS WITH PULMONARY MANIFESTATIONS (H): ICD-10-CM

## 2018-04-02 DIAGNOSIS — E84.0 CYSTIC FIBROSIS WITH PULMONARY MANIFESTATIONS (H): Primary | ICD-10-CM

## 2018-04-02 LAB
EXPTIME-PRE: 16.05 SEC
FEF2575-%PRED-PRE: 14 %
FEF2575-PRE: 0.57 L/SEC
FEF2575-PRED: 3.89 L/SEC
FEFMAX-%PRED-PRE: 66 %
FEFMAX-PRE: 6.48 L/SEC
FEFMAX-PRED: 9.78 L/SEC
FEV1-%PRED-PRE: 41 %
FEV1-PRE: 1.65 L
FEV1FEV6-PRE: 51 %
FEV1FEV6-PRED: 82 %
FEV1FVC-PRE: 44 %
FEV1FVC-PRED: 80 %
FIFMAX-PRE: 4.8 L/SEC
FVC-%PRED-PRE: 75 %
FVC-PRE: 3.75 L
FVC-PRED: 4.94 L

## 2018-04-02 PROCEDURE — 87186 SC STD MICRODIL/AGAR DIL: CPT | Performed by: INTERNAL MEDICINE

## 2018-04-02 PROCEDURE — 87070 CULTURE OTHR SPECIMN AEROBIC: CPT | Performed by: INTERNAL MEDICINE

## 2018-04-02 PROCEDURE — G0463 HOSPITAL OUTPT CLINIC VISIT: HCPCS | Mod: ZF

## 2018-04-02 PROCEDURE — 87077 CULTURE AEROBIC IDENTIFY: CPT | Performed by: INTERNAL MEDICINE

## 2018-04-02 ASSESSMENT — PAIN SCALES - GENERAL: PAINLEVEL: NO PAIN (0)

## 2018-04-02 NOTE — LETTER
4/2/2018       RE: Nico Morales  [address]  [facility] [address]     Dear Colleague,    Thank you for referring your patient, Nico Morales, to the [facility] at [facility]. Please see a copy of my visit note below.    Reason for Visit  Nico Morales is a 42 year old year old male who is being seen for Cystic Fibrosis (Follow up on Kilo and his PICC line and CF)    CF HPI  The patient was seen and examined by Rico Lua   The patient was admitted in late January for influenza as noted previously.  He has been on meropenem since then.  At his last clinic visit in early March, he was still not feeling back to baseline and had developed another upper respiratory infection.  In light of this, his IV antibiotics were continued.  Today, the patient feels overall he has improved but still is experiencing 1-2 days of intermittent chest tightness.  He does not recall this being an issue in the past.  Today is an average day.  His sputum volume is slightly below baseline.  Sputum color is his baseline yellow to green.  He has had some slight blood streaking in the past 2 days.  He notes slight increase in exertional dyspnea compared to baseline when climbing stairs to his loft.  He is using his vest consistently 3 times a day.  He is consistently completing his IV antibiotics.  Last visit he was also started on doxycycline.    Current Outpatient Prescriptions   Medication     doxycycline (VIBRAMYCIN) 100 MG capsule     amitriptyline (ELAVIL) 10 MG tablet     insulin aspart (NOVOLOG PENFILL) 100 UNIT/ML injection     acetylcysteine (MUCOMYST) 10 % nebulizer solution     CREON 87780-72375 UNITS CPEP per EC capsule     levalbuterol (XOPENEX) 1.25 MG/3ML neb solution     meropenem 2 g     CALCIUM PO     levalbuterol (XOPENEX HFA) 45 MCG/ACT Inhaler     Respiratory Therapy Supplies (KRIS ALTERA NEBULIZER HANDSET) MISC     aztreonam lysine  (CAYSTON) 75 MG SOLR     rimantadine (FLUMADINE) 100 MG tablet     blood glucose (NO BRAND SPECIFIED) lancets standard     blood glucose monitoring (MARTINA CONTOUR NEXT) test strip     insulin pen needle (BD OMI U/F) 32G X 4 MM     sodium chloride inhalant 7 % NEBU neb solution     polyethylene glycol (MIRALAX) powder     insulin glargine (LANTUS SOLOSTAR) 100 UNIT/ML injection     azithromycin (ZITHROMAX) 250 MG tablet     budesonide-formoterol (SYMBICORT) 160-4.5 MCG/ACT Inhaler     blood glucose monitoring (MARTINA CONTOUR NEXT MONITOR) meter device kit     blood glucose monitoring (MARTINA MICROLET) lancets     [facility] INFUSION MANAGED PATIENT     ACE NOT PRESCRIBED, INTENTIONAL,     Nebulizers (Thermalin Diabetes NEBULIZER HANDSET) MISC     Ferrous Fumarate-Vitamin C (VITRON-C) 200-125 MG TABS     MEPHYTON 5 MG PO TABS     MULTIVITAMIN OR     No current facility-administered medications for this visit.      Allergies   Allergen Reactions     Pulmozyme [Dornase Trever] Other (See Comments)     Chest pain and fever     Tobramycin Fatigue     Trouble with ear ringing, shortness of breath, little clinical response.     Zosyn Hives     Ceftazidime Rash     Levaquin Rash     Past Medical History:   Diagnosis Date     CF (cystic fibrosis) (H)      Exocrine pancreatic insufficiency      Nocardia infection      Pseudomonas infection      S/P gastrostomy (H) 2002       Past Surgical History:   Procedure Laterality Date     GASTROSTOMY TUBE  2002     PICC INSERTION Left 01/22/2018    4Fr SL BioFlo PICC, 46cm (5cm external) in the L basilic vein w/ tip in the low SVC       Social History     Social History     Marital status: Single     Spouse name: N/A     Number of children: 0     Years of education: N/A     Occupational History     financial [employer]     Social History Main Topics     Smoking status: Never Smoker     Smokeless tobacco: Never Used     Alcohol use No     Drug use: No     Sexual activity: Yes      "Partners: Female     Birth control/ protection: Pill     Other Topics Concern     Blood Transfusions No     Exercise No     Social History Narrative    Kilo lives with wife in a house in [place], [place].  He works as a .        March 2016. He works independently as a . Works out 5x/week and walks the dog daily.        June 2016. No changes. Describes himself as a creature of habit.       ROS Pulmonary  Constitutional: Negative for fever chills sweats.  Cardiovascular: No upper extremity edema.  Skin: No pain erythema or drainage from his PICC site.  GI: No diarrhea constipation grease in his stools or abdominal pain.  He is using his tube feeds 5-6 times per week using 2 cans per night.  Endocrine: Continued well-controlled blood sugars.  Rarely does he have a low blood sugar.  A complete ROS was otherwise negative except as noted in the HPI.  BP 98/66  Pulse 92  Resp 18  Ht 1.737 m (5' 8.39\")  Wt 62.5 kg (137 lb 12.6 oz)  SpO2 95%  BMI 20.71 kg/m2  Exam:   GENERAL APPEARANCE: Well developed, well nourished, alert, and in no apparent distress.  EYES: anicteric  HENT: Nasal mucosa with no edema and no hyperemia. No nasal polyps.  Small amount of white discharge on the right.   EARS: Canals clear, TMs normal  MOUTH: Oral mucosa is moist, without any lesions, no tonsillar enlargement, no oropharyngeal exudate.  NECK: supple, no masses, no thyromegaly.  LYMPHATICS: No significant  cervical, or supraclavicular nodes.  RESP:  good air flow throughout. No rhonchi.  Minimal end expiratory wheeze right posterior base.  Anterior inspiratory crackles mostly on the left.  Bronchial breath sounds on the left.  CV: Normal S1, S2, regular rhythm, normal rate. No murmur.  No rub. No gallop. No LE edema.  No upper extremity edema associated with PICC line  ABDOMEN:  Bowel sounds normal, soft, nontender, no HSM or masses.   MS: extremities normal. No cyanosis.  SKIN: no rash on limited exam  NEURO: " Mentation intact, speech normal, normal gait and stance  PSYCH: mentation appears normal. and affect normal/bright  Results:  Recent Results (from the past 168 hour(s))   General PFT Lab (Please always keep checked)    Collection Time: 04/02/18  2:06 PM   Result Value Ref Range    FVC-Pred 4.94 L    FVC-Pre 3.75 L    FVC-%Pred-Pre 75 %    FEV1-Pre 1.65 L    FEV1-%Pred-Pre 41 %    FEV1FVC-Pred 80 %    FEV1FVC-Pre 44 %    FEFMax-Pred 9.78 L/sec    FEFMax-Pre 6.48 L/sec    FEFMax-%Pred-Pre 66 %    FEF2575-Pred 3.89 L/sec    FEF2575-Pre 0.57 L/sec    BKN8314-%Pred-Pre 14 %    ExpTime-Pre 16.05 sec    FIFMax-Pre 4.80 L/sec    FEV1FEV6-Pred 82 %    FEV1FEV6-Pre 51 %     Spirometry interpretation: Personally reviewed.  Valid maneuver.  Severe obstruction.  Values are slightly improved for FEV1.  He is within his baseline of the past couple years.    Sputum culture from last visit reviewed.  He grew 2 strains of resistant Pseudomonas.      Assessment and plan:   1.  Moderate exacerbation of severe cystic fibrosis lung disease: Triggered by viral infections initially.  Overall much improved and is near his baseline.  Unclear reason for intermittent chest tightness.  Multiple antibiotic allergies limit treatment options.  Resistant organisms.  We discussed the possibility of discontinuing IV antibiotics but the patient, based on prior experience, feels he is best off with continuing on meropenem.  Given his limited physiologic reserve, this is reasonable.  However, will limit this to 2 weeks and if doing okay at that point favor stopping IV antibiotics.  Could consider keeping his PICC line in for a few days or week after discontinuation.  He will complete his current course of doxycycline.  Will follow up on today's pending culture and consider additional oral abx depending on results.  He will be resuming aztreonam nebs beginning tomorrow.  He will continue vest therapy 3 times a day.  He is scheduled to see Dr. Anderson in  allergy clinic in order to assess whether some of his prior recorded antibiotic allergies were true allergies.  2.  Pancreatic exocrine insufficiency with malnutrition: The patient has adequate enzyme replacement by history.  His weight is up about 2-1/2 pounds with a BMI today of 20.7.  Encouraged him to continue using tube feeds at night that his current pace of 5-6 times per week.  3.  Cystic fibrosis related diabetes: He continues to have good control despite having an exacerbation.  Infrequent low values.  Ongoing follow-up in Dr. Lee's clinic.  The patient will follow up in 2 weeks.  Home health care notified of extension of antibiotics.       Rico Lua

## 2018-04-02 NOTE — MR AVS SNAPSHOT
After Visit Summary   4/2/2018    Nico Morales    MRN: [id number]           Patient Information     Date Of Birth          1975        Visit Information        Provider Department      4/2/2018 2:20 PM Rico Lua MD [facility]        Today's Diagnoses     Cystic fibrosis with pulmonary manifestations (H)    -  1       Follow-ups after your visit        Follow-up notes from your care team     Return in about 2 weeks (around 4/16/2018).      Your next 10 appointments already scheduled     Apr 18, 2018 11:30 AM CDT   CF LOOP with UC PFL [facility] Pulmonary Function Testing ([facility])    [address]  [address] [facility] [address]   [phone]            Apr 18, 2018 12:00 PM CDT   (Arrive by 11:45 AM)   RETURN CYSTIC FIBROSIS VISIT with Regina Herman PA-C   [facility] ([facility])    [address] [place] [address]  [address] [place] [address]   [phone]            May 14, 2018  1:35 PM CDT   (Arrive by 1:20 PM)   New Allergy with Severo Anderson MD   [facility] ([facility])    [address] [place] [address]  [address] [place] [id number] [phone]           Do not take anti-histamines or Zantac for seven day prior to your appointment.            May 22, 2018  2:30 PM CDT   CF LOOP with UC PFL CF   [facility] Pulmonary Function Testing ([facility])    [address] [address]   [phone]            May 22, 2018  3:00 PM CDT   (Arrive by 2:45 PM)   RETURN CYSTIC FIBROSIS VISIT with Rico Lua MD   [facility] ([facility])    [address] [place] [address]  [address] [place] [address]   [phone]            Jun 26, 2018 10:00 AM CDT   CF LOOP with UC  PFL CF   [facility] Pulmonary Function Testing ([facility])    [address] [facility]  [address] [facility] [address]   [phone]            Jun 26, 2018 10:40 AM CDT   (Arrive by 10:25 AM)   RETURN CYSTIC FIBROSIS VISIT with Jaye Machado MD   [facility] Lung [facility] Health ([facility])    [address]  [address] [place] [address]   [phone]            Aug 06, 2018  3:45 PM CDT   Return Visit with Diana Lee MD, MG ENDO NURSE   [facility] ([facility])    [address] [place] [address]   [phone]            Aug 08, 2018 10:30 AM CDT   CF LOOP with UC PFL CF   [facility] Pulmonary Function Testing ([facility])    [address]  [address] [facility] [id number] [phone]              Future tests that were ordered for you today     Open Future Orders        Priority Expected Expires Ordered    RESPIRATORY FLOW VOLUME LOOP Routine 4/16/2018 5/2/2018 4/2/2018            Who to contact     If you have questions or need follow up information about today's clinic visit or your schedule please contact [facility] HE[facility] directly at [phone].  Normal or non-critical lab and imaging results will be communicated to you by VaxInnatehart, letter or phone within 4 business days after the clinic has received the results. If you do not hear from us within 7 days, please contact the clinic through VaxInnatehart or phone. If you have a critical or abnormal lab result, we will notify you by phone as soon as possible.  Submit refill requests through TownSquared or call your pharmacy and they will forward the refill request to us. Please allow 3 business days for your refill to be completed.          Additional Information About Your Visit        TownSquared Information     [employer]ared gives you secure access to your electronic  "health record. If you see a primary care provider, you can also send messages to your care team and make appointments. If you have questions, please call your primary care clinic.  If you do not have a primary care provider, please call [phone] and they will assist you.        Care EveryWhere ID     This is your Care EveryWhere ID. This could be used by other organizations to access your [facility] medical records  [id number]        Your Vitals Were     Pulse Respirations Height Pulse Oximetry BMI (Body Mass Index)       92 18 1.737 m (5' 8.39\") 95% 20.71 kg/m2        Blood Pressure from Last 3 Encounters:   04/02/18 98/66   03/07/18 102/66   03/05/18 99/65    Weight from Last 3 Encounters:   04/02/18 62.5 kg (137 lb 12.6 oz)   03/07/18 61.4 kg (135 lb 5.8 oz)   03/05/18 62.4 kg (137 lb 9.1 oz)                 Today's Medication Changes          These changes are accurate as of 4/2/18  4:59 PM.  If you have any questions, ask your nurse or doctor.               These medicines have changed or have updated prescriptions.        Dose/Directions    sodium chloride inhalant 7 % Nebu neb solution   This may have changed:  See the new instructions.   Used for:  Cystic fibrosis with pulmonary manifestations (H)        NEBULIZE 4ML TWICE DAILY   Quantity:  240 mL   Refills:  5                Primary Care Provider Office Phone # Fax #    Jaye Maki Machado [phone]       [facility] [address] [place] [address]        Equal Access to Services     TOÑA RAMIREZ : Hadvaibhav colin [place], waaxda luqadaha, qaybta kaalmajoe giron. So [facility] [phone].    ATENCIÓN: Si habla español, tiene a monroe disposición servicios gratuitos de asistencia lingüística. Llame al [phone].    We comply with applicable federal civil rights laws and [place] laws. We do not discriminate on the basis of race, color, national origin, age, " disability, sex, sexual orientation, or gender identity.            Thank you!     Thank you for choosing [facility]  for your care. Our goal is always to provide you with excellent care. Hearing back from our patients is one way we can continue to improve our services. Please take a few minutes to complete the written survey that you may receive in the mail after your visit with us. Thank you!             Your Updated Medication List - Protect others around you: Learn how to safely use, store and throw away your medicines at www.disposemymeds.org.          This list is accurate as of 4/2/18  4:59 PM.  Always use your most recent med list.                   Brand Name Dispense Instructions for use Diagnosis    ACE NOT PRESCRIBED (INTENTIONAL)     0 each    1 each continuous prn. ACE Inhibitor not prescribed due to Risk for drug interaction    Type I (juvenile type) diabetes mellitus without mention of complication, not stated as uncontrolled       acetylcysteine 10 % nebulizer solution    MUCOMYST    480 mL    Nebulize 4ml qid    Cystic fibrosis with pulmonary manifestations (H)       amitriptyline 10 MG tablet    ELAVIL    90 tablet    TAKE ONE TABLET BY MOUTH AT BEDTIME.    Cystic fibrosis (H), Cystic fibrosis with pulmonary manifestations (H), Encounter for long-term (current) use of antibiotics, Diabetes mellitus related to CF (cystic fibrosis) (H), Thrush       azithromycin 250 MG tablet    ZITHROMAX    30 tablet    Take 1 tablet (250 mg) by mouth daily    Cystic fibrosis with pulmonary manifestations (H)       aztreonam lysine 75 MG Solr    CAYSTON    84 mL    Take 1 mL (75 mg) by nebulization 3 times daily    Cystic fibrosis with pulmonary manifestations (H), Exocrine pancreatic insufficiency, 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)       blood glucose lancets standard    no brand specified    540 each    Whatever Lancets that go with device, Test 6 times daily     "Diabetes mellitus related to CF (cystic fibrosis) (H)       blood glucose monitoring lancets     2 Box    Use to test blood sugar 6 times daily or as directed.    Diabetes mellitus related to CF (cystic fibrosis) (H)       blood glucose monitoring meter device kit     1 kit    Use to test blood sugar 6 times daily or as directed.    Diabetes mellitus related to CF (cystic fibrosis) (H)       blood glucose monitoring test strip    MARTINA CONTOUR NEXT    200 each    Use to test blood sugar 6 times daily or as directed.    Diabetes mellitus related to CF (cystic fibrosis) (H)       budesonide-formoterol 160-4.5 MCG/ACT Inhaler    SYMBICORT    1 Inhaler    Inhale 2 puffs into the lungs 2 times daily    Cystic fibrosis of the lung (H), Cystic fibrosis (H)       CALCIUM PO      Take 1 tablet by mouth 2 times daily Strength unknown.        CREON 50110-17104 UNITS Cpep per EC capsule   Generic drug:  amylase-lipase-protease     2700 capsule    TAKE 7-8 CAPS WITH MEALS (3 MEALS/DAY) AND 2 CAPS WITH SNACKS (3 SNACKS/DAY)    Cystic fibrosis with pulmonary manifestations (H)       doxycycline 100 MG capsule    VIBRAMYCIN    60 capsule    Take 1 capsule (100 mg) by mouth 2 times daily    Cystic fibrosis with pulmonary manifestations (H)       EFLOW SCF NEBULIZER HANDSET Misc     1 each    1 each 3 times daily.    Nocardia infection       [facility] INFUSION MANAGED PATIENT      Contact [facility] for patient specific medication information at [phone] on admission and discharge from the hospital.  Phones are answered 24 hours a day 7 days a week 365 days a year.  Providers - Choose \"CONTINUE HOME MED (no script)\" at discharge if patient treatment with home infusion will continue.        insulin glargine 100 UNIT/ML injection    LANTUS SOLOSTAR    15 mL    6 units daily    Diabetes mellitus related to CF (cystic fibrosis) (H)       insulin pen needle 32G X 4 MM    BD OMI U/F    200 each    Use 6 daily or as " directed.    Diabetes mellitus related to CF (cystic fibrosis) (H)       levalbuterol 1.25 MG/3ML neb solution    XOPENEX    360 mL    INHALE 1 VIAL BY MOUTH INTO THE LUNGS VIA NEBULIZATION 4 TIMES DAILY    Cystic fibrosis (H), CF (cystic fibrosis) (H), Cystic fibrosis with pulmonary manifestations (H)       levalbuterol 45 MCG/ACT Inhaler    XOPENEX HFA    3 Inhaler    Inhale 2 puffs into the lungs every 6 hours as needed for shortness of breath / dyspnea    Cystic fibrosis (H)       MEPHYTON 5 MG tablet   Generic drug:  phytonadione      Take 1 TABLET by mouth DAILY.        meropenem 2 g     100 g    Inject 2 g into the vein every 8 hours    Cystic fibrosis with pulmonary manifestations (H)       MULTIVITAMIN PO      Take 1 tablet by mouth daily. Includes 5,000 units vitamin D and 400 units vitamin E. Per pt, formulated for CF pts.        NovoLOG PENFILL 100 UNIT/ML injection   Generic drug:  insulin aspart     15 mL    1 per 20 grams of carbohydrate with meals and snacks. Also use 3 units of Novolog at night with tube feeding.    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    Cystic fibrosis with meconium ileus (H)       rimantadine 100 MG tablet    FLUMADINE    60 tablet    Take 1 tablet (100 mg) by mouth 2 times daily Take during flu season Oct 1st-March 31st    Cystic fibrosis with pulmonary manifestations (H)       sodium chloride inhalant 7 % Nebu neb solution     240 mL    NEBULIZE 4ML TWICE DAILY    Cystic fibrosis with pulmonary manifestations (H)       VITRON-C 200-125  MG Tabs   Generic drug:  ferrous fumarate 65 mg ([place]. FE)-Vitamin C 125 mg      Take 1 tablet by mouth daily    Nocardia infection, Cystic fibrosis with pulmonary manifestations (H)

## 2018-04-02 NOTE — PROGRESS NOTES
Reason for Visit  Nico Morales is a 42 year old year old male who is being seen for Cystic Fibrosis (Follow up on Kilo and his PICC line and CF)    CF HPI  The patient was seen and examined by Rico Lua   The patient was admitted in late January for influenza as noted previously.  He has been on meropenem since then.  At his last clinic visit in early March, he was still not feeling back to baseline and had developed another upper respiratory infection.  In light of this, his IV antibiotics were continued.  Today, the patient feels overall he has improved but still is experiencing 1-2 days of intermittent chest tightness.  He does not recall this being an issue in the past.  Today is an average day.  His sputum volume is slightly below baseline.  Sputum color is his baseline yellow to green.  He has had some slight blood streaking in the past 2 days.  He notes slight increase in exertional dyspnea compared to baseline when climbing stairs to his loft.  He is using his vest consistently 3 times a day.  He is consistently completing his IV antibiotics.  Last visit he was also started on doxycycline.    Current Outpatient Prescriptions   Medication     doxycycline (VIBRAMYCIN) 100 MG capsule     amitriptyline (ELAVIL) 10 MG tablet     insulin aspart (NOVOLOG PENFILL) 100 UNIT/ML injection     acetylcysteine (MUCOMYST) 10 % nebulizer solution     CREON 26986-63609 UNITS CPEP per EC capsule     levalbuterol (XOPENEX) 1.25 MG/3ML neb solution     meropenem 2 g     CALCIUM PO     levalbuterol (XOPENEX HFA) 45 MCG/ACT Inhaler     Respiratory Therapy Supplies (KRIS ALTERA NEBULIZER HANDSET) MISC     aztreonam lysine (CAYSTON) 75 MG SOLR     rimantadine (FLUMADINE) 100 MG tablet     blood glucose (NO BRAND SPECIFIED) lancets standard     blood glucose monitoring (MARTINA CONTOUR NEXT) test strip     insulin pen needle (BD OMI U/F) 32G X 4 MM     sodium chloride inhalant 7 % NEBU neb solution     polyethylene  glycol (MIRALAX) powder     insulin glargine (LANTUS SOLOSTAR) 100 UNIT/ML injection     azithromycin (ZITHROMAX) 250 MG tablet     budesonide-formoterol (SYMBICORT) 160-4.5 MCG/ACT Inhaler     blood glucose monitoring (MARTINA CONTOUR NEXT MONITOR) meter device kit     blood glucose monitoring (MARTINA MICROLET) lancets     [facility] PATIENT     ACE NOT PRESCRIBED, INTENTIONAL,     Nebulizers (EFLOW SCF NEBULIZER HANDSET) MISC     Ferrous Fumarate-Vitamin C (VITRON-C) 200-125 MG TABS     MEPHYTON 5 MG PO TABS     MULTIVITAMIN OR     No current facility-administered medications for this visit.      Allergies   Allergen Reactions     Pulmozyme [Dornase Trever] Other (See Comments)     Chest pain and fever     Tobramycin Fatigue     Trouble with ear ringing, shortness of breath, little clinical response.     Zosyn Hives     Ceftazidime Rash     Levaquin Rash     Past Medical History:   Diagnosis Date     CF (cystic fibrosis) (H)      Exocrine pancreatic insufficiency      Nocardia infection      Pseudomonas infection      S/P gastrostomy (H) 2002       Past Surgical History:   Procedure Laterality Date     GASTROSTOMY TUBE  2002     PICC INSERTION Left 01/22/2018    4Fr SL BioFlo PICC, 46cm (5cm external) in the L basilic vein w/ tip in the low SVC       Social History     Social History     Marital status: Single     Spouse name: N/A     Number of children: 0     Years of education: N/A     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     Social History Main Topics     Smoking status: Never Smoker     Smokeless tobacco: Never Used     Alcohol use No     Drug use: No     Sexual activity: Yes     Partners: Female     Birth control/ protection: Pill     Other Topics Concern     Blood Transfusions No     Exercise No     Social History Narrative    Kilo lives with wife in a house in [place], [place].  He works as a .        March 2016. He works independently as a financial  "planner. Works out 5x/week and walks the dog daily.        June 2016. No changes. Describes himself as a creature of habit.       ROS Pulmonary  Constitutional: Negative for fever chills sweats.  Cardiovascular: No upper extremity edema.  Skin: No pain erythema or drainage from his PICC site.  GI: No diarrhea constipation grease in his stools or abdominal pain.  He is using his tube feeds 5-6 times per week using 2 cans per night.  Endocrine: Continued well-controlled blood sugars.  Rarely does he have a low blood sugar.  A complete ROS was otherwise negative except as noted in the HPI.  BP 98/66  Pulse 92  Resp 18  Ht 1.737 m (5' 8.39\")  Wt 62.5 kg (137 lb 12.6 oz)  SpO2 95%  BMI 20.71 kg/m2  Exam:   GENERAL APPEARANCE: Well developed, well nourished, alert, and in no apparent distress.  EYES: anicteric  HENT: Nasal mucosa with no edema and no hyperemia. No nasal polyps.  Small amount of white discharge on the right.   EARS: Canals clear, TMs normal  MOUTH: Oral mucosa is moist, without any lesions, no tonsillar enlargement, no oropharyngeal exudate.  NECK: supple, no masses, no thyromegaly.  LYMPHATICS: No significant  cervical, or supraclavicular nodes.  RESP:  good air flow throughout. No rhonchi.  Minimal end expiratory wheeze right posterior base.  Anterior inspiratory crackles mostly on the left.  Bronchial breath sounds on the left.  CV: Normal S1, S2, regular rhythm, normal rate. No murmur.  No rub. No gallop. No LE edema.  No upper extremity edema associated with PICC line  ABDOMEN:  Bowel sounds normal, soft, nontender, no HSM or masses.   MS: extremities normal. No cyanosis.  SKIN: no rash on limited exam  NEURO: Mentation intact, speech normal, normal gait and stance  PSYCH: mentation appears normal. and affect normal/bright  Results:  Recent Results (from the past 168 hour(s))   General PFT Lab (Please always keep checked)    Collection Time: 04/02/18  2:06 PM   Result Value Ref Range    FVC-Pred " 4.94 L    FVC-Pre 3.75 L    FVC-%Pred-Pre 75 %    FEV1-Pre 1.65 L    FEV1-%Pred-Pre 41 %    FEV1FVC-Pred 80 %    FEV1FVC-Pre 44 %    FEFMax-Pred 9.78 L/sec    FEFMax-Pre 6.48 L/sec    FEFMax-%Pred-Pre 66 %    FEF2575-Pred 3.89 L/sec    FEF2575-Pre 0.57 L/sec    TVZ0271-%Pred-Pre 14 %    ExpTime-Pre 16.05 sec    FIFMax-Pre 4.80 L/sec    FEV1FEV6-Pred 82 %    FEV1FEV6-Pre 51 %     Spirometry interpretation: Personally reviewed.  Valid maneuver.  Severe obstruction.  Values are slightly improved for FEV1.  He is within his baseline of the past couple years.    Sputum culture from last visit reviewed.  He grew 2 strains of resistant Pseudomonas.      Assessment and plan:   1.  Moderate exacerbation of severe cystic fibrosis lung disease: Triggered by viral infections initially.  Overall much improved and is near his baseline.  Unclear reason for intermittent chest tightness.  Multiple antibiotic allergies limit treatment options.  Resistant organisms.  We discussed the possibility of discontinuing IV antibiotics but the patient, based on prior experience, feels he is best off with continuing on meropenem.  Given his limited physiologic reserve, this is reasonable.  However, will limit this to 2 weeks and if doing okay at that point favor stopping IV antibiotics.  Could consider keeping his PICC line in for a few days or week after discontinuation.  He will complete his current course of doxycycline.  Will follow up on today's pending culture and consider additional oral abx depending on results.  He will be resuming aztreonam nebs beginning tomorrow.  He will continue vest therapy 3 times a day.  He is scheduled to see Dr. Anderson in allergy clinic in order to assess whether some of his prior recorded antibiotic allergies were true allergies.  2.  Pancreatic exocrine insufficiency with malnutrition: The patient has adequate enzyme replacement by history.  His weight is up about 2-1/2 pounds with a BMI today of 20.7.   Encouraged him to continue using tube feeds at night that his current pace of 5-6 times per week.  3.  Cystic fibrosis related diabetes: He continues to have good control despite having an exacerbation.  Infrequent low values.  Ongoing follow-up in Dr. Lee's clinic.  The patient will follow up in 2 weeks.  Home health care notified of extension of antibiotics.       Rico Lua

## 2018-04-02 NOTE — NURSING NOTE
Chief Complaint   Patient presents with     Cystic Fibrosis     Follow up on Kilo and his PICC line and CF     aMtt Nunes CMA at 2:26 PM on 4/2/2018

## 2018-04-03 ENCOUNTER — HOME INFUSION (PRE-WILLOW HOME INFUSION) (OUTPATIENT)
Dept: PHARMACY | Facility: CLINIC | Age: 43
End: 2018-04-03

## 2018-04-03 LAB
ANION GAP SERPL CALCULATED.3IONS-SCNC: 8 MMOL/L (ref 3–14)
BUN SERPL-MCNC: 27 MG/DL (ref 7–30)
CALCIUM SERPL-MCNC: 9.2 MG/DL (ref 8.5–10.1)
CHLORIDE SERPL-SCNC: 104 MMOL/L (ref 94–109)
CO2 SERPL-SCNC: 28 MMOL/L (ref 20–32)
CREAT SERPL-MCNC: 0.74 MG/DL (ref 0.66–1.25)
GFR SERPL CREATININE-BSD FRML MDRD: >90 ML/MIN/1.7M2
GLUCOSE SERPL-MCNC: 85 MG/DL (ref 70–99)
MAGNESIUM SERPL-MCNC: 2.3 MG/DL (ref 1.6–2.3)
PHOSPHATE SERPL-MCNC: 2.9 MG/DL (ref 2.5–4.5)
POTASSIUM SERPL-SCNC: 4.7 MMOL/L (ref 3.4–5.3)
SODIUM SERPL-SCNC: 140 MMOL/L (ref 133–144)

## 2018-04-03 PROCEDURE — 84100 ASSAY OF PHOSPHORUS: CPT | Performed by: INTERNAL MEDICINE

## 2018-04-03 PROCEDURE — 83735 ASSAY OF MAGNESIUM: CPT | Performed by: INTERNAL MEDICINE

## 2018-04-03 PROCEDURE — 80048 BASIC METABOLIC PNL TOTAL CA: CPT | Performed by: INTERNAL MEDICINE

## 2018-04-03 NOTE — PROGRESS NOTES
This is a recent snapshot of the patient's [facility] medical record.  For current drug dose and complete information and questions, call [phone] or In Basket pool, fv home infusion ([id number])  CSN Number:  [id number]

## 2018-04-04 ENCOUNTER — HOME INFUSION (PRE-WILLOW HOME INFUSION) (OUTPATIENT)
Dept: PHARMACY | Facility: CLINIC | Age: 43
End: 2018-04-04

## 2018-04-04 NOTE — PROGRESS NOTES
This is a recent snapshot of the patient's [facility] medical record.  For current drug dose and complete information and questions, call [phone] or In Basket pool, fv home infusion ([id number])  CSN Number:  [id number]

## 2018-04-05 NOTE — PROGRESS NOTES
This is a recent snapshot of the patient's [facility] medical record.  For current drug dose and complete information and questions, call [phone] or In Basket pool, fv home infusion ([id number])  CSN Number:  [id number]

## 2018-04-09 ENCOUNTER — HOME INFUSION (PRE-WILLOW HOME INFUSION) (OUTPATIENT)
Dept: PHARMACY | Facility: CLINIC | Age: 43
End: 2018-04-09

## 2018-04-09 LAB
ANION GAP SERPL CALCULATED.3IONS-SCNC: 7 MMOL/L (ref 3–14)
BUN SERPL-MCNC: 25 MG/DL (ref 7–30)
CALCIUM SERPL-MCNC: 9.1 MG/DL (ref 8.5–10.1)
CHLORIDE SERPL-SCNC: 103 MMOL/L (ref 94–109)
CO2 SERPL-SCNC: 32 MMOL/L (ref 20–32)
CREAT SERPL-MCNC: 0.8 MG/DL (ref 0.66–1.25)
GFR SERPL CREATININE-BSD FRML MDRD: >90 ML/MIN/1.7M2
GLUCOSE SERPL-MCNC: 141 MG/DL (ref 70–99)
MAGNESIUM SERPL-MCNC: 2.5 MG/DL (ref 1.6–2.3)
PHOSPHATE SERPL-MCNC: 2.9 MG/DL (ref 2.5–4.5)
POTASSIUM SERPL-SCNC: 4.7 MMOL/L (ref 3.4–5.3)
SODIUM SERPL-SCNC: 142 MMOL/L (ref 133–144)

## 2018-04-09 PROCEDURE — 80048 BASIC METABOLIC PNL TOTAL CA: CPT | Performed by: INTERNAL MEDICINE

## 2018-04-09 PROCEDURE — 84100 ASSAY OF PHOSPHORUS: CPT | Performed by: INTERNAL MEDICINE

## 2018-04-09 PROCEDURE — 83735 ASSAY OF MAGNESIUM: CPT | Performed by: INTERNAL MEDICINE

## 2018-04-10 ENCOUNTER — HOME INFUSION (PRE-WILLOW HOME INFUSION) (OUTPATIENT)
Dept: PHARMACY | Facility: CLINIC | Age: 43
End: 2018-04-10

## 2018-04-10 LAB
BACTERIA SPEC CULT: ABNORMAL
SPECIMEN SOURCE: ABNORMAL

## 2018-04-10 NOTE — PROGRESS NOTES
This is a recent snapshot of the patient's [facility] medical record.  For current drug dose and complete information and questions, call [phone] or In [facility] pool, fv home infusion ([id number])  CSN Number:  [id number]

## 2018-04-12 NOTE — PROGRESS NOTES
This is a recent snapshot of the patient's [facility] medical record.  For current drug dose and complete information and questions, call [phone] or In Basket pool, fv home infusion ([id number])  CSN Number:  [id number]

## 2018-04-13 NOTE — PROGRESS NOTES
This is a recent snapshot of the patient's [facility] medical record.  For current drug dose and complete information and questions, call [phone] or In [facility] pool, fv home infusion ([id number])  CSN Number:  [id number]

## 2018-04-16 ENCOUNTER — HOME INFUSION (PRE-WILLOW HOME INFUSION) (OUTPATIENT)
Dept: PHARMACY | Facility: CLINIC | Age: 43
End: 2018-04-16

## 2018-04-16 LAB
ANION GAP SERPL CALCULATED.3IONS-SCNC: 10 MMOL/L (ref 3–14)
BUN SERPL-MCNC: 25 MG/DL (ref 7–30)
CALCIUM SERPL-MCNC: 9.1 MG/DL (ref 8.5–10.1)
CHLORIDE SERPL-SCNC: 102 MMOL/L (ref 94–109)
CO2 SERPL-SCNC: 29 MMOL/L (ref 20–32)
CREAT SERPL-MCNC: 0.9 MG/DL (ref 0.66–1.25)
GFR SERPL CREATININE-BSD FRML MDRD: >90 ML/MIN/1.7M2
GLUCOSE SERPL-MCNC: 194 MG/DL (ref 70–99)
MAGNESIUM SERPL-MCNC: 2.4 MG/DL (ref 1.6–2.3)
PHOSPHATE SERPL-MCNC: 2.6 MG/DL (ref 2.5–4.5)
POTASSIUM SERPL-SCNC: 4.8 MMOL/L (ref 3.4–5.3)
SODIUM SERPL-SCNC: 141 MMOL/L (ref 133–144)

## 2018-04-16 PROCEDURE — 84100 ASSAY OF PHOSPHORUS: CPT | Performed by: INTERNAL MEDICINE

## 2018-04-16 PROCEDURE — 80048 BASIC METABOLIC PNL TOTAL CA: CPT | Performed by: INTERNAL MEDICINE

## 2018-04-16 PROCEDURE — 83735 ASSAY OF MAGNESIUM: CPT | Performed by: INTERNAL MEDICINE

## 2018-04-16 NOTE — PROGRESS NOTES
[facility]  April 18, 2018         Assessment and Plan:   Nico Morales is a 42 year old male with cystic fibrosis with severe obstruction, h/o A. Fumigatus on/off itraconazole for many years, CFRD, malnutrition on chronic enteral feeds, pancreatic exocrine insufficiency and adrenal insufficiency who is seen today for follow up. He was admitted 1/21-1/23 for severe exacerbation of CF secondary to influenza A in the setting of no influenza vaccine and has been on IV antibiotics since that time.       1. Moderate exacerbation of CF lung disease: secondary to influenza A. Finally feels back to baseline with improved productive cough and energy. Had some streaking when he started [place], nothing since. No hemoptysis.  Vesting TID. PFTs improved today to hear his recent best.  Previous cultures have grown out MDR Ps. A and Stenotrophomonas. Feel his exacerbation has finally resolved, will stop meropenem and pull PICC line.   - On inhaled Cayston (alternating with doxycycline)  - Continue current nebulizers, Symbicort and vest therapy  - Continue azithromycin      2. Exocrine pancreatic insufficiency with malnutrition on chronic enteral feeds: denies symptoms of malabsorption. Weight is back to previous baseline, has increased his supplement use during the day. Continues with 2 cans of TF at night.  - Continue enteral feeds, pancreatic enzymes and vitamin supplementation    3. CFRD: AIC of 5.9 on 1/21/18. BS are controlled.   - Continue Lantus and carb coverage  - F/u with Rosa as scheduled      4. CF related low bone mineral density: vitamin D, TSH and testosterone WNL, is planning to start pamidronate, but would like to do so after his colonoscopy.  - F/u with Dr. Lee as scheduled    5. Allergies: to multiple medications that would be beneficial to his CF  - F/u with Justin scheduled for 5/14    6. Oral candidiasis: on tongue, likely secondary to  antibiotics. Should improve now that antibiotics have been discontinued.   - Nystatin s/s X 7 days      RTC: in 2 months with routine cultures and spirometry  Annual studies due: December 2017--ordered for [place]  Preventative care: colonoscopy during 2017--overdue, will schedule      Regina Herman PA-C  Pulmonary, Allergy, Critical Care and Sleep Medicine         Interval History:   Feeling good, symptoms have mainly resolved. Cough is better, near baseline, less productive. Mucous is green/yellow. Some streaking when he started [place]ston on Sunday. No congestion or tightness. No fever of chills, no sinus congestions. No GI, stools are stable. No diarrhea or loose stools. Energy is baseline.          Review of Systems:   Please see HPI. Otherwise, complete 10 point ROS negative.           Past Medical and Surgical History:     Past Medical History:   Diagnosis Date     CF (cystic fibrosis) (H)      Exocrine pancreatic insufficiency      Nocardia infection      Pseudomonas infection      S/P gastrostomy (H) 2002     Past Surgical History:   Procedure Laterality Date     GASTROSTOMY TUBE  2002     PICC INSERTION Left 01/22/2018    4Fr SL BioFlo PICC, 46cm (5cm external) in the L basilic vein w/ tip in the low SVC           Family History:     Family History   Problem Relation Age of Onset     HEART DISEASE Maternal Grandmother      HEART DISEASE Maternal Grandfather      HEART DISEASE Paternal Grandmother      DIABETES No family hx of             Social History:     Social History     Social History     Marital status: Single     Spouse name: N/A     Number of children: 0     Years of education: N/A     Occupational History     financial [employer]     Social History Main Topics     Smoking status: Never Smoker     Smokeless tobacco: Never Used     Alcohol use No     Drug use: No     Sexual activity: Yes     Partners: Female     Birth control/ protection: Pill     Other Topics Concern     Blood  "Transfusions No     Exercise No     Social History Narrative    Kilo lives with wife in a house in [place], [place].  He works as a .        March 2016. He works independently as a . Works out 5x/week and walks the dog daily.        June 2016. No changes. Describes himself as a creature of habit.            Medications:     Current Outpatient Prescriptions   Medication     ACE NOT PRESCRIBED, INTENTIONAL,     acetylcysteine (MUCOMYST) 10 % nebulizer solution     amitriptyline (ELAVIL) 10 MG tablet     azithromycin (ZITHROMAX) 250 MG tablet     aztreonam lysine (CAYSTON) 75 MG SOLR     blood glucose (NO BRAND SPECIFIED) lancets standard     blood glucose monitoring (7fgame CONTOUR NEXT MONITOR) meter device kit     blood glucose monitoring (MARTINA CONTOUR NEXT) test strip     blood glucose monitoring (MARTINA MICROLET) lancets     budesonide-formoterol (SYMBICORT) 160-4.5 MCG/ACT Inhaler     CALCIUM PO     CREON 98889-81192 UNITS CPEP per EC capsule     doxycycline (VIBRAMYCIN) 100 MG capsule     [facility] MANAGED PATIENT     Ferrous Fumarate-Vitamin C (VITRON-C) 200-125 MG TABS     insulin aspart (NOVOLOG PENFILL) 100 UNIT/ML injection     insulin glargine (LANTUS SOLOSTAR) 100 UNIT/ML injection     insulin pen needle (BD OMI U/F) 32G X 4 MM     levalbuterol (XOPENEX HFA) 45 MCG/ACT Inhaler     levalbuterol (XOPENEX) 1.25 MG/3ML neb solution     MEPHYTON 5 MG PO TABS     MULTIVITAMIN OR     Nebulizers (EFLOW SCF NEBULIZER HANDSET) MISC     nystatin (MYCOSTATIN) 428739 UNIT/ML suspension     polyethylene glycol (MIRALAX) powder     Respiratory Therapy Supplies (KRIS ALTERA NEBULIZER HANDSET) MISC     sodium chloride inhalant 7 % NEBU neb solution     No current facility-administered medications for this visit.             Physical Exam:   BP 97/65  Pulse 98  Resp 17  Ht 1.73 m (5' 8.11\")  Wt 62.9 kg (138 lb 10.7 oz)  SpO2 96%  BMI 21.02 kg/m2    GENERAL: alert, NAD  HEENT: " NCAT, EOMI, anicteric sclera, no nasal edema or erythema; canals and TMs clear; yellow plaque on tongue  Neck: no cervical or supraclavicular adenopathy  Respiratory: good air flow, no crackles, rhonchi or wheezing  CV: RRR, S1S2, no murmurs noted  Abdomen: normoactive BS, soft and non tender   Lymph: no edema, + digital clubbing  Neuro: AAO X 3, CN 2-12 grossly intact  Psychiatric: normal affect, good eye contact  Skin: no rash, jaundice or lesions on limited exam         Data:   All laboratory and imaging data reviewed.      Cystic Fibrosis Culture  Specimen Description   Date Value Ref Range Status   04/02/2018 Sputum  Final   03/07/2018 Sputum  Final   03/07/2018 Sputum  Final   03/07/2018 Sputum  Final    Culture Micro   Date Value Ref Range Status   04/02/2018 Moderate growth  Normal santi    Final   04/02/2018 (A)  Final    Heavy growth  Pseudomonas aeruginosa, mucoid strain     04/02/2018 (A)  Final    Heavy growth  Strain 2  Pseudomonas aeruginosa, mucoid strain          PFT interpretation:  Maneuver: valid and meets ATS guidelines  Severe obstruction with decreased FEV1 and FEV1/FVC  Compared to prior: FEV1 of 1.69 is 40 cc above prior  The decrease in FVC may represent air trapping v. restrictive physiology.  Lung volumes would be necessary to determine.

## 2018-04-17 ENCOUNTER — HOME INFUSION (PRE-WILLOW HOME INFUSION) (OUTPATIENT)
Dept: PHARMACY | Facility: CLINIC | Age: 43
End: 2018-04-17

## 2018-04-17 NOTE — PROGRESS NOTES
This is a recent snapshot of the patient's [facility] medical record.  For current drug dose and complete information and questions, call [phone] or In Basket pool, fv home infusion ([id number])  CSN Number:  [id number]

## 2018-04-18 ENCOUNTER — OFFICE VISIT (OUTPATIENT)
Dept: PULMONOLOGY | Facility: CLINIC | Age: 43
End: 2018-04-18
Attending: PHYSICIAN ASSISTANT
Payer: COMMERCIAL

## 2018-04-18 ENCOUNTER — TELEPHONE (OUTPATIENT)
Dept: GASTROENTEROLOGY | Facility: CLINIC | Age: 43
End: 2018-04-18

## 2018-04-18 ENCOUNTER — HOME INFUSION (PRE-WILLOW HOME INFUSION) (OUTPATIENT)
Dept: PHARMACY | Facility: CLINIC | Age: 43
End: 2018-04-18

## 2018-04-18 VITALS
WEIGHT: 138.67 LBS | OXYGEN SATURATION: 96 % | HEART RATE: 98 BPM | SYSTOLIC BLOOD PRESSURE: 97 MMHG | DIASTOLIC BLOOD PRESSURE: 65 MMHG | HEIGHT: 68 IN | RESPIRATION RATE: 17 BRPM | BODY MASS INDEX: 21.02 KG/M2

## 2018-04-18 DIAGNOSIS — E84.0 CYSTIC FIBROSIS WITH PULMONARY MANIFESTATIONS (H): Primary | ICD-10-CM

## 2018-04-18 DIAGNOSIS — E84.9 CF (CYSTIC FIBROSIS) (H): Primary | ICD-10-CM

## 2018-04-18 DIAGNOSIS — E84.0 CYSTIC FIBROSIS WITH PULMONARY MANIFESTATIONS (H): ICD-10-CM

## 2018-04-18 DIAGNOSIS — J11.1 INFLUENZA: ICD-10-CM

## 2018-04-18 DIAGNOSIS — E84.9 DIABETES MELLITUS DUE TO CYSTIC FIBROSIS (H): ICD-10-CM

## 2018-04-18 DIAGNOSIS — E08.9 DIABETES MELLITUS DUE TO CYSTIC FIBROSIS (H): ICD-10-CM

## 2018-04-18 PROCEDURE — 87206 SMEAR FLUORESCENT/ACID STAI: CPT | Performed by: INTERNAL MEDICINE

## 2018-04-18 PROCEDURE — 87186 SC STD MICRODIL/AGAR DIL: CPT | Performed by: INTERNAL MEDICINE

## 2018-04-18 PROCEDURE — 87102 FUNGUS ISOLATION CULTURE: CPT | Performed by: PHYSICIAN ASSISTANT

## 2018-04-18 PROCEDURE — 87116 MYCOBACTERIA CULTURE: CPT | Performed by: INTERNAL MEDICINE

## 2018-04-18 PROCEDURE — 87015 SPECIMEN INFECT AGNT CONCNTJ: CPT | Performed by: INTERNAL MEDICINE

## 2018-04-18 PROCEDURE — 87070 CULTURE OTHR SPECIMN AEROBIC: CPT | Performed by: INTERNAL MEDICINE

## 2018-04-18 PROCEDURE — G0463 HOSPITAL OUTPT CLINIC VISIT: HCPCS | Mod: ZF

## 2018-04-18 PROCEDURE — 87077 CULTURE AEROBIC IDENTIFY: CPT | Performed by: INTERNAL MEDICINE

## 2018-04-18 RX ORDER — NYSTATIN 100000/ML
500000 SUSPENSION, ORAL (FINAL DOSE FORM) ORAL 4 TIMES DAILY
Qty: 140 ML | Refills: 0 | Status: SHIPPED | OUTPATIENT
Start: 2018-04-18 | End: 2018-04-25

## 2018-04-18 ASSESSMENT — PAIN SCALES - GENERAL: PAINLEVEL: NO PAIN (0)

## 2018-04-18 NOTE — PROGRESS NOTES
This is a recent snapshot of the patient's [facility] medical record.  For current drug dose and complete information and questions, call [phone] or In Basket pool, fv home infusion ([id number])  CSN Number:  [id number]

## 2018-04-18 NOTE — PATIENT INSTRUCTIONS
Cystic Fibrosis Self-Care Plan       Patient: Nico Morales   MRN: [id number]   Clinic Date: April 18, 2018     RECOMMENDATIONS:  1. Continue nebulizers and vest therapy.   2. Nystatin 4 times/day X 7 days.     Annual Studies:   IGG   Date Value Ref Range Status   12/15/2016 1770 (H) 695 - 1620 mg/dL Final     Insulin   Date Value Ref Range Status   07/26/2011 30 mU/L Final     Comment:     Reference Range:  0-20  +120     There are no preventive care reminders to display for this patient.    Pulmonary Function Tests  FEV1: amount of air you can blow out in 1 second  FVC: total amount of air you can take in and blow out    Your Goals:         PFT Latest Ref Rng & Units 4/18/2018   FVC L 3.71   FEV1 L 1.69   FVC% % 75   FEV1% % 42          Airway Clearance: The Most Important Way to Keep Your Lungs Healthy  Vest Settings:    Hill-Rom Frequencies: 8, 9, 10 Pressure 10 Then, Frequencies 18, 19, 20 Pressure 6      RespirTech: Quick Start with Pressure of     Do each frequency for 5 minutes; Deflate vest after each frequency & cough 3 times before beginning the next setting.    Vest and Neb Therapy should be done 3 times/day.    Good Nutrition Can Improve Lung Function and Overall Health     Take ALL of your vitamins with food     Take 1/2 of your enzymes before EVERY meal/snack and the other 1/2 mid-meal/snack    Wt Readings from Last 3 Encounters:   04/18/18 62.9 kg (138 lb 10.7 oz)   04/02/18 62.5 kg (137 lb 12.6 oz)   03/07/18 61.4 kg (135 lb 5.8 oz)       Body mass index is 21.02 kg/(m^2).         National CF Foundation Recommendations for BMI in CF Adults: Women: at least 22 Men: at least 23        Controlling Blood Sugars Helps Prevent Lung Infections & Improves Nutrition  Test blood sugar:     In the morning before eating (goal is )     2 hours after a meal (goal is less than 150)     When pre-meal glucose is greater than 150 add correction     At bedtime (if less than 100 eat a snack with 15 grams  of carbohydrates  Last A1C Results:   Hemoglobin A1C   Date Value Ref Range Status   01/21/2018 5.9 4.3 - 6.0 % Final         If diabetic, measure A1C every 6 months. Goal is under 7%.    Staying Healthy    Research: If you are interested in learning about research opportunities or have questions, please contact Temi Dodge at [phone] or [email or web address].       Foundation: Compass is a personalized resource service to help you with the insurance, financial, legal and other issues you are facing.  It's free, confidential and available to anyone with CF.  Ask your  for more information or contact Compass directly at 474-COMPASS (085-6532) or compass@cff.org, or learn more at cff.org/compass.       CF Nurse Line:  Jose David Henderson: [phone]   Felicitas Banuelos, RT: [phone]     Ivory Bonilla and Zeina Pierre , Dieticians: [phone]     Jordyn Rudolph, Diabetes Nurse: [phone]    Mary Amato: [phone] or Marisa Urbina [phone], Social Workers   [email or web address]

## 2018-04-18 NOTE — NURSING NOTE
Chief Complaint   Patient presents with     Cystic Fibrosis     Patient is being seen for CF follow up      Iris Wade CMA at 11:43 AM on 4/18/2018

## 2018-04-18 NOTE — NURSING NOTE
Nico Morales comes into clinic today at the request of Regina Herman PA-C for CF f/u.     Pt PICC line removed. Site Clean,dry and intact. No evidence of infection. PICC length 46 cm. No bleeding after removal. Site cleaned, covered with sterile gauze and secured with Coban. Pt advised to leave dressing on for 24 hours and call CF office with any questions or concerns.      This service provided today was under the supervising provider of the day Regina Herman PA-C who was available if needed.    Ayana Manning

## 2018-04-18 NOTE — MR AVS SNAPSHOT
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 Continue nebulizers and vest therapy.   2. Nystatin 4 times/day X 7 days.     Annual Studies:   IGG   Date Value Ref Range Status   12/15/2016 1770 (H) 695 - 1620 mg/dL Final     Insulin   Date Value Ref Range Status   07/26/2011 30 mU/L Final     Comment:     Reference Range:  0-20  +120     There are no preventive care reminders to display for this patient.    Pulmonary Function Tests  FEV1: amount of air you can blow out in 1 second  FVC: total amount of air you can take in and blow out    Your Goals:         PFT Latest Ref Rng & Units 4/18/2018   FVC L 3.71   FEV1 L 1.69   FVC% % 75   FEV1% % 42          Airway Clearance: The Most Important Way to Keep Your Lungs Healthy  Vest Settings:    Hill-Rom Frequencies: 8, 9, 10 Pressure 10 Then, Frequencies 18, 19, 20 Pressure 6      RespirTech: Quick Start with Pressure of     Do each frequency for 5 minutes; Deflate vest after each frequency & cough 3 times before beginning the next setting.    Vest and Neb Therapy should be done 3 times/day.    Good Nutrition Can Improve Lung Function and Overall Health     Take ALL of your vitamins with food     Take 1/2 of your enzymes before EVERY meal/snack and the other 1/2 mid-meal/snack    Wt Readings from Last 3 Encounters:   04/18/18 62.9 kg (138 lb 10.7 oz)   04/02/18 62.5 kg (137  lb 12.6 oz)   03/07/18 61.4 kg (135 lb 5.8 oz)       Body mass index is 21.02 kg/(m^2).         National CF Foundation Recommendations for BMI in CF Adults: Women: at least 22 Men: at least 23        Controlling Blood Sugars Helps Prevent Lung Infections & Improves Nutrition  Test blood sugar:     In the morning before eating (goal is )     2 hours after a meal (goal is less than 150)     When pre-meal glucose is greater than 150 add correction     At bedtime (if less than 100 eat a snack with 15 grams of carbohydrates  Last A1C Results:   Hemoglobin A1C   Date Value Ref Range Status   01/21/2018 5.9 4.3 - 6.0 % Final         If diabetic, measure A1C every 6 months. Goal is under 7%.    Staying Healthy    Research: If you are interested in learning about research opportunities or have questions, please contact Temi Dodge at [phone] or [email or web address].      CF Foundation: Compass is a personalized resource service to help you with the insurance, financial, legal and other issues you are facing.  It's free, confidential and available to anyone with CF.  Ask your  for more information or contact Compass directly at [facility] ([phone]) or compass@cff.org, or learn more at cff.org/compass.       CF Nurse Line:  Jose David Henderson: [phone]   Felicitas Banuelos, RT: [phone]     Ivory Bonilla and Zeina Pierre , Dieticians: [phone]     Jordyn Rudolph, Diabetes Nurse: [phone]    Mary Amato: [phone] or Marisa Urbina [phone], Social Workers   [email or web address]            Follow-ups after your visit        Additional Services     GASTROENTEROLOGY ADULT REF PROCEDURE ONLY [facility] [phone]       Please schedule with Dr. Bowman as patient has CF.                  Follow-up notes from your care team     Return in about 2 months (around 6/18/2018).      Your next 10 appointments already scheduled     May 14, 2018  1:35 PM CDT   (Arrive by 1:20  PM)   New Allergy with Severo Anderson MD   [facility] ([facility] [place])    [address] [place] [address]  [address] [place] [address]   [phone]           Do not take anti-histamines or Zantac for seven day prior to your appointment.            May 22, 2018  2:30 PM CDT   CF LOOP with UC PFL CF   [facility] ([facility])    [address] [place] [address]  3rd [facility] [address]   [phone]            May 22, 2018  3:00 PM CDT   (Arrive by 2:45 PM)   RETURN CYSTIC FIBROSIS VISIT with Rico Lua MD   [facility] ([facility])    [address] [place] [address]  [address] [place] [address]   [phone]            Jun 26, 2018 10:00 AM CDT   CF LOOP with UC PFL CF   [facility] ([facility])    [address] [place] [address]   [phone]            Jun 26, 2018 10:40 AM CDT   (Arrive by 10:25 AM)   RETURN CYSTIC FIBROSIS VISIT with Jaye Machado MD   [facility] ([facility])    [address] [place] [address]  [address] [place] [address]   [phone]            Aug 06, 2018  3:45 PM CDT   Return Visit with Diana Lee MD, MG ENDO NURSE   [facility] ([facility])    [address] [place] [address]   [phone]            Aug 08, 2018 10:30 AM CDT   CF LOOP with UC PFL CF   [facility] ([facility])    [address] [place] [address]   [phone]            Aug 08, 2018 11:10 AM CDT   (Arrive by 10:55 AM)   RETURN CYSTIC FIBROSIS VISIT with Jaye Machado MD   [facility] ([facility])    [facility]  [address]  [address]  [facility] [address]   [phone]            Sep 18, 2018 11:00 AM CDT   CF LOOP with UC PFL CF   [facility] Function Testing ([facility])    [address]  3rd Floor  [facility] [address]   [phone]              Future tests that were ordered for you today     Open Future Orders        Priority Expected Expires Ordered    Albumin Random Urine Quantitative with Creat Ratio Routine 4/18/2018 5/18/2018 4/18/2018    Fungus Culture, non-blood Routine 4/18/2018 5/18/2018 4/18/2018    X-ray Chest 2 vws* Routine 4/18/2018 5/18/2018 4/18/2018    AFB Stain Non Blood Add-On 4/18/2018 5/18/2018 4/18/2018    AFB Culture Non Blood Add-On 4/18/2018 5/18/2018 4/18/2018    ALT Routine 4/18/2018 5/18/2018 4/18/2018    Basic metabolic panel Routine 4/18/2018 5/18/2018 4/18/2018    Lipid Profile Routine 4/18/2018 5/18/2018 4/18/2018    Albumin level Routine 4/18/2018 5/18/2018 4/18/2018    Alkaline phosphatase Routine 4/18/2018 5/18/2018 4/18/2018    AST Routine 4/18/2018 5/18/2018 4/18/2018    Iron Routine 4/18/2018 5/18/2018 4/18/2018    GGT Routine 4/18/2018 5/18/2018 4/18/2018    Hemoglobin A1c Routine 4/18/2018 5/18/2018 4/18/2018    IgA Routine 4/18/2018 5/18/2018 4/18/2018    IgG Routine 4/18/2018 5/18/2018 4/18/2018    IgM Routine 4/18/2018 5/18/2018 4/18/2018    IgE Routine 4/18/2018 5/18/2018 4/18/2018    INR Routine 4/18/2018 5/18/2018 4/18/2018    Magnesium Routine 4/18/2018 5/18/2018 4/18/2018    Phosphorus Routine 4/18/2018 5/18/2018 4/18/2018    Testosterone total  Routine 4/18/2018 5/18/2018 4/18/2018    Protein total Routine 4/18/2018 5/18/2018 4/18/2018    TSH with free T4 reflex Routine 4/18/2018 5/18/2018 4/18/2018    Vitamin A Routine 4/18/2018 5/18/2018 4/18/2018    Vitamin E Routine 4/18/2018 5/18/2018 4/18/2018    Vitamin D Deficiency Routine 4/18/2018 5/18/2018 4/18/2018    CBC with platelets differential Routine 4/18/2018  "5/18/2018 4/18/2018    Erythrocyte sedimentation rate auto Routine 4/18/2018 5/18/2018 4/18/2018    Routine UA with microscopic Routine 4/18/2018 5/18/2018 4/18/2018            Who to contact     If you have questions or need follow up information about today's clinic visit or your schedule please contact [facility] directly at [phone].  Normal or non-critical lab and imaging results will be communicated to you by Next Point[employer]art, letter or phone within 4 business days after the clinic has received the results. If you do not hear from us within 7 days, please contact the clinic through [employer] or phone. If you have a critical or abnormal lab result, we will notify you by phone as soon as possible.  Submit refill requests through [employer] or call your pharmacy and they will forward the refill request to us. Please allow 3 business days for your refill to be completed.          Additional Information About Your Visit        [employer] Information     [employer] gives you secure access to your electronic health record. If you see a primary care provider, you can also send messages to your care team and make appointments. If you have questions, please call your primary care clinic.  If you do not have a primary care provider, please call [phone] and they will assist you.        Care EveryWhere ID     This is your Care EveryWhere ID. This could be used by other organizations to access your [place] medical records  [id number]        Your Vitals Were     Pulse Respirations Height Pulse Oximetry BMI (Body Mass Index)       98 17 1.73 m (5' 8.11\") 96% 21.02 kg/m2        Blood Pressure from Last 3 Encounters:   04/18/18 97/65   04/02/18 98/66   03/07/18 102/66    Weight from Last 3 Encounters:   04/18/18 62.9 kg (138 lb 10.7 oz)   04/02/18 62.5 kg (137 lb 12.6 oz)   03/07/18 61.4 kg (135 lb 5.8 oz)              We Performed the Following     AFB Culture Non Blood     AFB Stain Non Blood     " Cystic Fibrosis Culture Aerob Bacterial     GASTROENTEROLOGY ADULT REF PROCEDURE ONLY [facility] [phone]     Nocardia culture          Today's Medication Changes          These changes are accurate as of 4/18/18 11:59 PM.  If you have any questions, ask your nurse or doctor.               Start taking these medicines.        Dose/Directions    nystatin [id number] UNIT/ML suspension   Commonly known as:  MYCOSTATIN   Used for:  CF (cystic fibrosis) (H), 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), Influenza, Cystic fibrosis with pulmonary manifestations (H)   Started by:  Regina Herman PA-C        Dose:  [id number] Units   Take 5 mLs (500,000 Units) by mouth 4 times daily for 7 days   Quantity:  140 mL   Refills:  0         These medicines have changed or have updated prescriptions.        Dose/Directions    sodium chloride inhalant 7 % Nebu neb solution   This may have changed:  See the new instructions.   Used for:  Cystic fibrosis with pulmonary manifestations (H)        NEBULIZE 4ML TWICE DAILY   Quantity:  240 mL   Refills:  5         Stop taking these medicines if you haven't already. Please contact your care team if you have questions.     meropenem 2 g   Stopped by:  Regina Herman PA-C           rimantadine 100 MG tablet   Commonly known as:  FLUMADINE   Stopped by:  eRgina Herman PA-C                Where to get your medicines      These medications were sent to [facility] [address] [facility] - [place], [place] - [address]  [address], [facility] [address]     Phone:  [phone]     nystatin [id number] UNIT/ML suspension                Primary Care Provider Office Phone # Fax #    Jaye Machado [phone]       [facility] [address] [place] [address]        Equal Access to Services     TOÑA RAMIREZ AH: Vernell Nevarez, easton disla, joe ha. So [facility]  [phone].    ATENCIÓN: Si brittany andrews, tiene a monroe disposición servicios gratuitos de asistencia lingüística. Jennie barclay [phone].    We comply with applicable federal civil rights laws and [place] laws. We do not discriminate on the basis of race, color, national origin, age, disability, sex, sexual orientation, or gender identity.            Thank you!     Thank you for choosing [facility]  for your care. Our goal is always to provide you with excellent care. Hearing back from our patients is one way we can continue to improve our services. Please take a few minutes to complete the written survey that you may receive in the mail after your visit with us. Thank you!             Your Updated Medication List - Protect others around you: Learn how to safely use, store and throw away your medicines at www.disposemymeds.org.          This list is accurate as of 4/18/18 11:59 PM.  Always use your most recent med list.                   Brand Name Dispense Instructions for use Diagnosis    ACE NOT PRESCRIBED (INTENTIONAL)     0 each    1 each continuous prn. ACE Inhibitor not prescribed due to Risk for drug interaction    Type I (juvenile type) diabetes mellitus without mention of complication, not stated as uncontrolled       acetylcysteine 10 % nebulizer solution    MUCOMYST    480 mL    Nebulize 4ml qid    Cystic fibrosis with pulmonary manifestations (H)       amitriptyline 10 MG tablet    ELAVIL    90 tablet    TAKE ONE TABLET BY MOUTH AT BEDTIME.    Cystic fibrosis (H), Cystic fibrosis with pulmonary manifestations (H), Encounter for long-term (current) use of antibiotics, Diabetes mellitus related to CF (cystic fibrosis) (H), Thrush       azithromycin 250 MG tablet    ZITHROMAX    30 tablet    Take 1 tablet (250 mg) by mouth daily    Cystic fibrosis with pulmonary manifestations (H)       aztreonam lysine 75 MG Solr    CAYSTON    84 mL    Take 1 mL (75 mg) by nebulization 3  "times daily    Cystic fibrosis with pulmonary manifestations (H), Exocrine pancreatic insufficiency, 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)       blood glucose lancets standard    no brand specified    540 each    Whatever Lancets that go with device, Test 6 times daily    Diabetes mellitus related to CF (cystic fibrosis) (H)       blood glucose monitoring lancets     2 Box    Use to test blood sugar 6 times daily or as directed.    Diabetes mellitus related to CF (cystic fibrosis) (H)       blood glucose monitoring meter device kit     1 kit    Use to test blood sugar 6 times daily or as directed.    Diabetes mellitus related to CF (cystic fibrosis) (H)       blood glucose monitoring test strip    MARTINA CONTOUR NEXT    200 each    Use to test blood sugar 6 times daily or as directed.    Diabetes mellitus related to CF (cystic fibrosis) (H)       budesonide-formoterol 160-4.5 MCG/ACT Inhaler    SYMBICORT    1 Inhaler    Inhale 2 puffs into the lungs 2 times daily    Cystic fibrosis of the lung (H), Cystic fibrosis (H)       CALCIUM PO      Take 1 tablet by mouth 2 times daily Strength unknown.        CREON 00874-71551 units Cpep per EC capsule   Generic drug:  amylase-lipase-protease     2700 capsule    TAKE 7-8 CAPS WITH MEALS (3 MEALS/DAY) AND 2 CAPS WITH SNACKS (3 SNACKS/DAY)    Cystic fibrosis with pulmonary manifestations (H)       doxycycline 100 MG capsule    VIBRAMYCIN    60 capsule    Take 1 capsule (100 mg) by mouth 2 times daily    Cystic fibrosis with pulmonary manifestations (H)       EFLOW SCF NEBULIZER HANDSET Misc     1 each    1 each 3 times daily.    Nocardia infection       [facility] INFUSION MANAGED PATIENT      Contact [facility] for patient specific medication information at [phone] on admission and discharge from the hospital.  Phones are answered 24 hours a day 7 days a week 365 days a year.  Providers - Choose \"CONTINUE HOME MED (no script)\" at discharge if " patient treatment with home infusion will continue.        insulin glargine 100 UNIT/ML injection    LANTUS SOLOSTAR    15 mL    6 units daily    Diabetes mellitus related to CF (cystic fibrosis) (H)       insulin pen needle 32G X 4 MM    BD OMI U/F    200 each    Use 6 daily or as directed.    Diabetes mellitus related to CF (cystic fibrosis) (H)       levalbuterol 1.25 MG/3ML neb solution    XOPENEX    360 mL    INHALE 1 VIAL BY MOUTH INTO THE LUNGS VIA NEBULIZATION 4 TIMES DAILY    Cystic fibrosis (H), CF (cystic fibrosis) (H), Cystic fibrosis with pulmonary manifestations (H)       levalbuterol 45 MCG/ACT Inhaler    XOPENEX HFA    3 Inhaler    Inhale 2 puffs into the lungs every 6 hours as needed for shortness of breath / dyspnea    Cystic fibrosis (H)       MEPHYTON 5 MG tablet   Generic drug:  phytonadione      Take 1 TABLET by mouth DAILY.        MULTIVITAMIN PO      Take 1 tablet by mouth daily. Includes 5,000 units vitamin D and 400 units vitamin E. Per pt, formulated for CF pts.        NovoLOG PENFILL 100 UNIT/ML injection   Generic drug:  insulin aspart     15 mL    1 per 20 grams of carbohydrate with meals and snacks. Also use 3 units of Novolog at night with tube feeding.    Diabetes mellitus related to CF (cystic fibrosis) (H), Cystic fibrosis with pulmonary manifestations (H), Encounter for long-term (current) use of antibiotics, Cystic fibrosis (H), Thrush       nystatin [id number] UNIT/ML suspension    MYCOSTATIN    140 mL    Take 5 mLs (500,000 Units) by mouth 4 times daily for 7 days    CF (cystic fibrosis) (H), 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), Influenza, Cystic fibrosis with pulmonary manifestations (H)       KRIS ALTERA NEBULIZER HANDSET Misc     1 each    1 each daily    Cystic fibrosis with pulmonary manifestations (H), Exocrine pancreatic insufficiency, 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)       polyethylene glycol powder    MIRALAX    510 g    Use 17 gms po 1-3 times daily  as needed.    Cystic fibrosis with meconium ileus (H)       sodium chloride inhalant 7 % Nebu neb solution     240 mL    NEBULIZE 4ML TWICE DAILY    Cystic fibrosis with pulmonary manifestations (H)       VITRON-C 200-125 MG Tabs   Generic drug:  ferrous fumarate 65 mg ([place]. FE)-Vitamin C 125 mg      Take 1 tablet by mouth daily    Nocardia infection, Cystic fibrosis with pulmonary manifestations (H)

## 2018-04-18 NOTE — LETTER
4/18/2018       RE: Nico Morales  [address]  [facility] [address]     Dear Colleague,    Thank you for referring your patient, Nico Morales, to the [facility] at [facility]. Please see a copy of my visit note below.    [facility]  April 18, 2018         Assessment and Plan:   Nico Morales is a 42 year old male with cystic fibrosis with severe obstruction, h/o A. Fumigatus on/off itraconazole for many years, CFRD, malnutrition on chronic enteral feeds, pancreatic exocrine insufficiency and adrenal insufficiency who is seen today for follow up. He was admitted 1/21-1/23 for severe exacerbation of CF secondary to influenza A in the setting of no influenza vaccine and has been on IV antibiotics since that time.       1. Moderate exacerbation of CF lung disease: secondary to influenza A. Finally feels back to baseline with improved productive cough and energy. Had some streaking when he started Cayston, nothing since. No hemoptysis.  Vesting TID. PFTs improved today to hear his recent best.  Previous cultures have grown out MDR Ps. A and Stenotrophomonas. Feel his exacerbation has finally resolved, will stop meropenem and pull PICC line.   - On inhaled Cayston (alternating with doxycycline)  - Continue current nebulizers, Symbicort and vest therapy  - Continue azithromycin      2. Exocrine pancreatic insufficiency with malnutrition on chronic enteral feeds: denies symptoms of malabsorption. Weight is back to previous baseline, has increased his supplement use during the day. Continues with 2 cans of TF at night.  - Continue enteral feeds, pancreatic enzymes and vitamin supplementation    3. CFRD: AIC of 5.9 on 1/21/18. BS are controlled.   - Continue Lantus and carb coverage  - F/u with Moheet as scheduled      4. CF related low bone mineral density: vitamin D, TSH and  testosterone WNL, is planning to start pamidronate, but would like to do so after his colonoscopy.  - F/u with Dr. Lee as scheduled    5. Allergies: to multiple medications that would be beneficial to his CF  - F/u with Anderson scheduled for 5/14    6. Oral candidiasis: on tongue, likely secondary to antibiotics. Should improve now that antibiotics have been discontinued.   - Nystatin s/s X 7 days      RTC: in 2 months with routine cultures and spirometry  Annual studies due: December 2017--ordered for [facility] care: colonoscopy during 2017--overdue, will schedule      Regina Herman PA-C  Pulmonary, Allergy, Critical Care and Sleep Medicine         Interval History:   Feeling good, symptoms have mainly resolved. Cough is better, near baseline, less productive. Mucous is green/yellow. Some streaking when he started Cayston on Sunday. No congestion or tightness. No fever of chills, no sinus congestions. No GI, stools are stable. No diarrhea or loose stools. Energy is baseline.          Review of Systems:   Please see HPI. Otherwise, complete 10 point ROS negative.           Past Medical and Surgical History:     Past Medical History:   Diagnosis Date     CF (cystic fibrosis) (H)      Exocrine pancreatic insufficiency      Nocardia infection      Pseudomonas infection      S/P gastrostomy (H) 2002     Past Surgical History:   Procedure Laterality Date     GASTROSTOMY TUBE  2002     PICC INSERTION Left 01/22/2018    4Fr SL BioFlo PICC, 46cm (5cm external) in the L basilic vein w/ tip in the low SVC           Family History:     Family History   Problem Relation Age of Onset     HEART DISEASE Maternal Grandmother      HEART DISEASE Maternal Grandfather      HEART DISEASE Paternal Grandmother      DIABETES No family hx of             Social History:     Social History     Social History     Marital status: Single     Spouse name: N/A     Number of children: 0     Years of education: N/A     Occupational  History     financial [employer]     Social History Main Topics     Smoking status: Never Smoker     Smokeless tobacco: Never Used     Alcohol use No     Drug use: No     Sexual activity: Yes     Partners: Female     Birth control/ protection: Pill     Other Topics Concern     Blood Transfusions No     Exercise No     Social History Narrative    Kilo lives with wife in a house in [place], [place].  He works as a .        March 2016. He works independently as a . Works out 5x/week and walks the dog daily.        June 2016. No changes. Describes himself as a creature of habit.            Medications:     Current Outpatient Prescriptions   Medication     ACE NOT PRESCRIBED, INTENTIONAL,     acetylcysteine (MUCOMYST) 10 % nebulizer solution     amitriptyline (ELAVIL) 10 MG tablet     azithromycin (ZITHROMAX) 250 MG tablet     aztreonam lysine (CAYSTON) 75 MG SOLR     blood glucose (NO BRAND SPECIFIED) lancets standard     blood glucose monitoring (MARTINA CONTOUR NEXT MONITOR) meter device kit     blood glucose monitoring (MARTINA CONTOUR NEXT) test strip     blood glucose monitoring (MARTINA MICROLET) lancets     budesonide-formoterol (SYMBICORT) 160-4.5 MCG/ACT Inhaler     CALCIUM PO     CREON 78658-52831 UNITS CPEP per EC capsule     doxycycline (VIBRAMYCIN) 100 MG capsule     [place]NAGED PATIENT     Ferrous Fumarate-Vitamin C (VITRON-C) 200-125 MG TABS     insulin aspart (NOVOLOG PENFILL) 100 UNIT/ML injection     insulin glargine (LANTUS SOLOSTAR) 100 UNIT/ML injection     insulin pen needle (BD OMI U/F) 32G X 4 MM     levalbuterol (XOPENEX HFA) 45 MCG/ACT Inhaler     levalbuterol (XOPENEX) 1.25 MG/3ML neb solution     MEPHYTON 5 MG PO TABS     MULTIVITAMIN OR     Nebulizers (EFLOW SCF NEBULIZER HANDSET) MISC     nystatin (MYCOSTATIN) 779070 UNIT/ML suspension     polyethylene glycol (MIRALAX) powder     Respiratory Therapy Supplies (KRIS ALTERA NEBULIZER  "HANDSET) MISC     sodium chloride inhalant 7 % NEBU neb solution     No current facility-administered medications for this visit.             Physical Exam:   BP 97/65  Pulse 98  Resp 17  Ht 1.73 m (5' 8.11\")  Wt 62.9 kg (138 lb 10.7 oz)  SpO2 96%  BMI 21.02 kg/m2    GENERAL: alert, NAD  HEENT: NCAT, EOMI, anicteric sclera, no nasal edema or erythema; canals and TMs clear; yellow plaque on tongue  Neck: no cervical or supraclavicular adenopathy  Respiratory: good air flow, no crackles, rhonchi or wheezing  CV: RRR, S1S2, no murmurs noted  Abdomen: normoactive BS, soft and non tender   Lymph: no edema, + digital clubbing  Neuro: AAO X 3, CN 2-12 grossly intact  Psychiatric: normal affect, good eye contact  Skin: no rash, jaundice or lesions on limited exam         Data:   All laboratory and imaging data reviewed.      Cystic Fibrosis Culture  Specimen Description   Date Value Ref Range Status   04/02/2018 Sputum  Final   03/07/2018 Sputum  Final   03/07/2018 Sputum  Final   03/07/2018 Sputum  Final    Culture Micro   Date Value Ref Range Status   04/02/2018 Moderate growth  Normal santi    Final   04/02/2018 (A)  Final    Heavy growth  Pseudomonas aeruginosa, mucoid strain     04/02/2018 (A)  Final    Heavy growth  Strain 2  Pseudomonas aeruginosa, mucoid strain          PFT interpretation:  Maneuver: valid and meets ATS guidelines  Severe obstruction with decreased FEV1 and FEV1/FVC  Compared to prior: FEV1 of 1.69 is 40 cc above prior  The decrease in FVC may represent air trapping v. restrictive physiology.  Lung volumes would be necessary to determine.        Again, thank you for allowing me to participate in the care of your patient.      Sincerely,    Regina Herman PA-C      "

## 2018-04-20 LAB
ACID FAST STN SPEC QL: NORMAL
ACID FAST STN SPEC QL: NORMAL
SPECIMEN SOURCE: NORMAL

## 2018-04-20 NOTE — PROGRESS NOTES
This is a recent snapshot of the patient's [facility] medical record.  For current drug dose and complete information and questions, call [phone] or In Basket pool, [facility] home infusion ([id number])  CSN Number:  [id number]

## 2018-04-24 ENCOUNTER — TELEPHONE (OUTPATIENT)
Dept: GASTROENTEROLOGY | Facility: CLINIC | Age: 43
End: 2018-04-24

## 2018-04-24 LAB
BACTERIA SPEC CULT: ABNORMAL
SPECIMEN SOURCE: ABNORMAL

## 2018-04-25 ENCOUNTER — TELEPHONE (OUTPATIENT)
Dept: GASTROENTEROLOGY | Facility: CLINIC | Age: 43
End: 2018-04-25

## 2018-04-25 LAB
EXPTIME-PRE: 14.7 SEC
FEF2575-%PRED-PRE: 17 %
FEF2575-PRE: 0.67 L/SEC
FEF2575-PRED: 3.89 L/SEC
FEFMAX-%PRED-PRE: 63 %
FEFMAX-PRE: 6.18 L/SEC
FEFMAX-PRED: 9.78 L/SEC
FEV1-%PRED-PRE: 42 %
FEV1-PRE: 1.69 L
FEV1FEV6-PRE: 51 %
FEV1FEV6-PRED: 82 %
FEV1FVC-PRE: 46 %
FEV1FVC-PRED: 80 %
FIFMAX-PRE: 4.97 L/SEC
FVC-%PRED-PRE: 75 %
FVC-PRE: 3.71 L
FVC-PRED: 4.94 L

## 2018-04-25 NOTE — TELEPHONE ENCOUNTER
Numerous attempts made to contact patient via telephone without success.  Syntaxin message sent requesting pt contact Scheduling office to schedule ordered procedure. Contact information provided.     Ramona Knox RN  [facility]

## 2018-04-26 DIAGNOSIS — E84.8 DIABETES MELLITUS RELATED TO CF (CYSTIC FIBROSIS) (H): ICD-10-CM

## 2018-04-26 DIAGNOSIS — E08.9 DIABETES MELLITUS RELATED TO CF (CYSTIC FIBROSIS) (H): ICD-10-CM

## 2018-05-02 LAB
MYCOBACTERIUM SPEC CULT: NORMAL
MYCOBACTERIUM SPEC CULT: NORMAL
SPECIMEN SOURCE: NORMAL

## 2018-05-02 NOTE — TELEPHONE ENCOUNTER
FUTURE VISIT INFORMATION      FUTURE VISIT INFORMATION:    Date: 5.14.18    Time: 1:35 PM    Location: [facility]  REFERRAL INFORMATION:    Referring provider:  Dr. Machado    Referring providers clinic:  [facility]    Reason for visit/diagnosis  Drug allergies    RECORDS REQUESTED FROM:       Clinic name Comments Records Status Imaging Status   Pulmonary Clinic  Received N/A                                   RECORDS STATUS      RECORDS RECEIVED FROM: [facility]   DATE RECEIVED: 5.14.18   NOTES STATUS DETAILS   OFFICE NOTE from referring provider Internal 3.7.18 Dr. Machado   OFFICE NOTE from other specialist N/A    DISCHARGE SUMMARY from hospital N/A    DISCHARGE REPORT from the ER N/A    OPERATIVE REPORT N/A    MEDICATION LIST Internal    IMAGING  (NEED IMAGES AND REPORTS)     CT SCAN N/A    CHEST XRAY (CXR) N/A    TESTS     PULMONARY FUNCTION TESTING (PFT) N/A    FLOW LOOP VOLUME (FVL) N/A    CYSTIC FIBROSIS     CF SPUTUM CULTURE N/A

## 2018-05-03 ENCOUNTER — RADIANT APPOINTMENT (OUTPATIENT)
Dept: GENERAL RADIOLOGY | Facility: CLINIC | Age: 43
End: 2018-05-03
Attending: PHYSICIAN ASSISTANT
Payer: COMMERCIAL

## 2018-05-03 DIAGNOSIS — E84.9 CF (CYSTIC FIBROSIS) (H): ICD-10-CM

## 2018-05-03 DIAGNOSIS — J11.1 INFLUENZA: ICD-10-CM

## 2018-05-03 DIAGNOSIS — E84.9 DIABETES MELLITUS DUE TO CYSTIC FIBROSIS (H): ICD-10-CM

## 2018-05-03 DIAGNOSIS — E08.9 DIABETES MELLITUS DUE TO CYSTIC FIBROSIS (H): ICD-10-CM

## 2018-05-03 DIAGNOSIS — E84.0 CYSTIC FIBROSIS WITH PULMONARY MANIFESTATIONS (H): ICD-10-CM

## 2018-05-03 LAB
ALBUMIN SERPL-MCNC: 3.3 G/DL (ref 3.4–5)
ALBUMIN UR-MCNC: NEGATIVE MG/DL
ALP SERPL-CCNC: 204 U/L (ref 40–150)
ALT SERPL W P-5'-P-CCNC: 35 U/L (ref 0–70)
AMORPH CRY #/AREA URNS HPF: ABNORMAL /HPF
ANION GAP SERPL CALCULATED.3IONS-SCNC: 5 MMOL/L (ref 3–14)
APPEARANCE UR: CLEAR
AST SERPL W P-5'-P-CCNC: 30 U/L (ref 0–45)
BASOPHILS # BLD AUTO: 0.1 10E9/L (ref 0–0.2)
BASOPHILS NFR BLD AUTO: 0.4 %
BILIRUB UR QL STRIP: NEGATIVE
BUN SERPL-MCNC: 21 MG/DL (ref 7–30)
CALCIUM SERPL-MCNC: 9 MG/DL (ref 8.5–10.1)
CHLORIDE SERPL-SCNC: 104 MMOL/L (ref 94–109)
CHOLEST SERPL-MCNC: 124 MG/DL
CO2 SERPL-SCNC: 31 MMOL/L (ref 20–32)
COLOR UR AUTO: YELLOW
CREAT SERPL-MCNC: 0.82 MG/DL (ref 0.66–1.25)
CREAT UR-MCNC: 103 MG/DL
DIFFERENTIAL METHOD BLD: ABNORMAL
EOSINOPHIL # BLD AUTO: 0.2 10E9/L (ref 0–0.7)
EOSINOPHIL NFR BLD AUTO: 1.1 %
ERYTHROCYTE [DISTWIDTH] IN BLOOD BY AUTOMATED COUNT: 17.2 % (ref 10–15)
ERYTHROCYTE [SEDIMENTATION RATE] IN BLOOD BY WESTERGREN METHOD: 21 MM/H (ref 0–15)
GFR SERPL CREATININE-BSD FRML MDRD: >90 ML/MIN/1.7M2
GGT SERPL-CCNC: 57 U/L (ref 0–75)
GLUCOSE SERPL-MCNC: 68 MG/DL (ref 70–99)
GLUCOSE UR STRIP-MCNC: NEGATIVE MG/DL
HBA1C MFR BLD: 6.3 % (ref 0–5.6)
HCT VFR BLD AUTO: 39.5 % (ref 40–53)
HDLC SERPL-MCNC: 31 MG/DL
HGB BLD-MCNC: 12.5 G/DL (ref 13.3–17.7)
HGB UR QL STRIP: NEGATIVE
HYALINE CASTS #/AREA URNS LPF: ABNORMAL /LPF
IMM GRANULOCYTES # BLD: 0.1 10E9/L (ref 0–0.4)
IMM GRANULOCYTES NFR BLD: 0.4 %
INR PPP: 1.01 (ref 0.86–1.14)
IRON SERPL-MCNC: 32 UG/DL (ref 35–180)
KETONES UR STRIP-MCNC: NEGATIVE MG/DL
LDLC SERPL CALC-MCNC: 61 MG/DL
LEUKOCYTE ESTERASE UR QL STRIP: NEGATIVE
LYMPHOCYTES # BLD AUTO: 2.3 10E9/L (ref 0.8–5.3)
LYMPHOCYTES NFR BLD AUTO: 14.8 %
MAGNESIUM SERPL-MCNC: 2.1 MG/DL (ref 1.6–2.3)
MCH RBC QN AUTO: 28 PG (ref 26.5–33)
MCHC RBC AUTO-ENTMCNC: 31.6 G/DL (ref 31.5–36.5)
MCV RBC AUTO: 88 FL (ref 78–100)
MICROALBUMIN UR-MCNC: 17 MG/L
MICROALBUMIN/CREAT UR: 16.89 MG/G CR (ref 0–17)
MONOCYTES # BLD AUTO: 1.3 10E9/L (ref 0–1.3)
MONOCYTES NFR BLD AUTO: 8.2 %
NEUTROPHILS # BLD AUTO: 11.8 10E9/L (ref 1.6–8.3)
NEUTROPHILS NFR BLD AUTO: 75.1 %
NITRATE UR QL: NEGATIVE
NONHDLC SERPL-MCNC: 93 MG/DL
PH UR STRIP: 6.5 PH (ref 5–7)
PHOSPHATE SERPL-MCNC: 4.1 MG/DL (ref 2.5–4.5)
PLATELET # BLD AUTO: 364 10E9/L (ref 150–450)
POTASSIUM SERPL-SCNC: 4.4 MMOL/L (ref 3.4–5.3)
PROT SERPL-MCNC: 8.3 G/DL (ref 6.8–8.8)
RBC # BLD AUTO: 4.47 10E12/L (ref 4.4–5.9)
RBC #/AREA URNS AUTO: ABNORMAL /HPF
SODIUM SERPL-SCNC: 140 MMOL/L (ref 133–144)
SOURCE: ABNORMAL
SP GR UR STRIP: 1.02 (ref 1–1.03)
TRIGL SERPL-MCNC: 162 MG/DL
TSH SERPL DL<=0.005 MIU/L-ACNC: 0.96 MU/L (ref 0.4–4)
UROBILINOGEN UR STRIP-MCNC: NORMAL MG/DL (ref 0–2)
WBC # BLD AUTO: 15.8 10E9/L (ref 4–11)
WBC #/AREA URNS AUTO: ABNORMAL /HPF

## 2018-05-03 PROCEDURE — 84460 ALANINE AMINO (ALT) (SGPT): CPT | Performed by: PHYSICIAN ASSISTANT

## 2018-05-03 PROCEDURE — 83036 HEMOGLOBIN GLYCOSYLATED A1C: CPT | Performed by: PHYSICIAN ASSISTANT

## 2018-05-03 PROCEDURE — 99000 SPECIMEN HANDLING OFFICE-LAB: CPT | Performed by: PHYSICIAN ASSISTANT

## 2018-05-03 PROCEDURE — 80069 RENAL FUNCTION PANEL: CPT | Performed by: PHYSICIAN ASSISTANT

## 2018-05-03 PROCEDURE — 85652 RBC SED RATE AUTOMATED: CPT | Performed by: PHYSICIAN ASSISTANT

## 2018-05-03 PROCEDURE — 82977 ASSAY OF GGT: CPT | Performed by: PHYSICIAN ASSISTANT

## 2018-05-03 PROCEDURE — 71046 X-RAY EXAM CHEST 2 VIEWS: CPT

## 2018-05-03 PROCEDURE — 83735 ASSAY OF MAGNESIUM: CPT | Performed by: PHYSICIAN ASSISTANT

## 2018-05-03 PROCEDURE — 84403 ASSAY OF TOTAL TESTOSTERONE: CPT | Performed by: PHYSICIAN ASSISTANT

## 2018-05-03 PROCEDURE — 84075 ASSAY ALKALINE PHOSPHATASE: CPT | Performed by: PHYSICIAN ASSISTANT

## 2018-05-03 PROCEDURE — 82785 ASSAY OF IGE: CPT | Performed by: PHYSICIAN ASSISTANT

## 2018-05-03 PROCEDURE — 82043 UR ALBUMIN QUANTITATIVE: CPT | Performed by: PHYSICIAN ASSISTANT

## 2018-05-03 PROCEDURE — 84155 ASSAY OF PROTEIN SERUM: CPT | Performed by: PHYSICIAN ASSISTANT

## 2018-05-03 PROCEDURE — 36415 COLL VENOUS BLD VENIPUNCTURE: CPT | Performed by: PHYSICIAN ASSISTANT

## 2018-05-03 PROCEDURE — 82306 VITAMIN D 25 HYDROXY: CPT | Performed by: PHYSICIAN ASSISTANT

## 2018-05-03 PROCEDURE — 84446 ASSAY OF VITAMIN E: CPT | Mod: 90 | Performed by: PHYSICIAN ASSISTANT

## 2018-05-03 PROCEDURE — 83540 ASSAY OF IRON: CPT | Performed by: PHYSICIAN ASSISTANT

## 2018-05-03 PROCEDURE — 85025 COMPLETE CBC W/AUTO DIFF WBC: CPT | Performed by: PHYSICIAN ASSISTANT

## 2018-05-03 PROCEDURE — 85610 PROTHROMBIN TIME: CPT | Performed by: PHYSICIAN ASSISTANT

## 2018-05-03 PROCEDURE — 80061 LIPID PANEL: CPT | Performed by: PHYSICIAN ASSISTANT

## 2018-05-03 PROCEDURE — 81001 URINALYSIS AUTO W/SCOPE: CPT | Performed by: PHYSICIAN ASSISTANT

## 2018-05-03 PROCEDURE — 84590 ASSAY OF VITAMIN A: CPT | Mod: 90 | Performed by: PHYSICIAN ASSISTANT

## 2018-05-03 PROCEDURE — 82784 ASSAY IGA/IGD/IGG/IGM EACH: CPT | Performed by: PHYSICIAN ASSISTANT

## 2018-05-03 PROCEDURE — 84450 TRANSFERASE (AST) (SGOT): CPT | Performed by: PHYSICIAN ASSISTANT

## 2018-05-03 PROCEDURE — 84443 ASSAY THYROID STIM HORMONE: CPT | Performed by: PHYSICIAN ASSISTANT

## 2018-05-04 LAB
DEPRECATED CALCIDIOL+CALCIFEROL SERPL-MC: 54 UG/L (ref 20–75)
IGA SERPL-MCNC: 195 MG/DL (ref 70–380)
IGG SERPL-MCNC: 1770 MG/DL (ref 695–1620)
IGM SERPL-MCNC: 99 MG/DL (ref 60–265)

## 2018-05-05 LAB — TESTOST SERPL-MCNC: 445 NG/DL (ref 240–950)

## 2018-05-07 LAB
A-TOCOPHEROL VIT E SERPL-MCNC: 15.1 MG/L (ref 5.5–18)
ANNOTATION COMMENT IMP: NORMAL
BETA+GAMMA TOCOPHEROL SERPL-MCNC: 0.4 MG/L (ref 0–6)
IGE SERPL-ACNC: 33 KIU/L (ref 0–114)
RETINYL PALMITATE SERPL-MCNC: 0.08 MG/L (ref 0–0.1)
VIT A SERPL-MCNC: 0.4 MG/L (ref 0.3–1.2)

## 2018-05-08 DIAGNOSIS — E84.9 CYSTIC FIBROSIS (H): ICD-10-CM

## 2018-05-08 DIAGNOSIS — E84.0 CYSTIC FIBROSIS OF THE LUNG (H): ICD-10-CM

## 2018-05-08 DIAGNOSIS — E84.0 CYSTIC FIBROSIS WITH PULMONARY MANIFESTATIONS (H): ICD-10-CM

## 2018-05-08 RX ORDER — BUDESONIDE AND FORMOTEROL FUMARATE DIHYDRATE 160; 4.5 UG/1; UG/1
AEROSOL RESPIRATORY (INHALATION)
Qty: 10.2 INHALER | Refills: 12 | Status: SHIPPED | OUTPATIENT
Start: 2018-05-08 | End: 2019-05-11

## 2018-05-08 RX ORDER — AZITHROMYCIN 250 MG/1
250 TABLET, FILM COATED ORAL DAILY
Qty: 30 TABLET | Refills: 11 | Status: SHIPPED | OUTPATIENT
Start: 2018-05-08 | End: 2019-01-18

## 2018-05-14 ENCOUNTER — OFFICE VISIT (OUTPATIENT)
Dept: PULMONOLOGY | Facility: CLINIC | Age: 43
End: 2018-05-14
Attending: ALLERGY & IMMUNOLOGY
Payer: COMMERCIAL

## 2018-05-14 ENCOUNTER — PRE VISIT (OUTPATIENT)
Dept: PULMONOLOGY | Facility: CLINIC | Age: 43
End: 2018-05-14

## 2018-05-14 ENCOUNTER — TELEPHONE (OUTPATIENT)
Dept: GASTROENTEROLOGY | Facility: CLINIC | Age: 43
End: 2018-05-14

## 2018-05-14 VITALS
OXYGEN SATURATION: 96 % | SYSTOLIC BLOOD PRESSURE: 107 MMHG | HEART RATE: 95 BPM | DIASTOLIC BLOOD PRESSURE: 68 MMHG | RESPIRATION RATE: 16 BRPM

## 2018-05-14 DIAGNOSIS — Z88.1 DRUG ALLERGY, ANTIBIOTIC: ICD-10-CM

## 2018-05-14 DIAGNOSIS — E84.0 CYSTIC FIBROSIS WITH PULMONARY MANIFESTATIONS (H): Primary | ICD-10-CM

## 2018-05-14 DIAGNOSIS — Z12.11 ENCOUNTER FOR SCREENING COLONOSCOPY: Primary | ICD-10-CM

## 2018-05-14 PROCEDURE — G0463 HOSPITAL OUTPT CLINIC VISIT: HCPCS | Mod: ZF

## 2018-05-14 RX ORDER — BISACODYL 5 MG/1
15 TABLET, DELAYED RELEASE ORAL ONCE
Qty: 2 TABLET | Refills: 0 | Status: SHIPPED | OUTPATIENT
Start: 2018-05-14 | End: 2019-02-21

## 2018-05-14 ASSESSMENT — PAIN SCALES - GENERAL: PAINLEVEL: NO PAIN (0)

## 2018-05-14 NOTE — NURSING NOTE
Chief Complaint   Patient presents with     Allergy Consult     Patient is being seen for consultation of drug allergies      Iris Wade CMA at 1:32 PM on 5/14/2018

## 2018-05-14 NOTE — LETTER
5/14/2018       RE: Nico Morales  [address]  [facility] [address]     Dear Colleague,    Thank you for referring your patient, Nico Morales, to the [facility]G SCIENCE [facility] HE[facility] at [facility]. Please see a copy of my visit note below.    Reason for Visit  Nico Morales is a 42 year old male who is referred by Jaye Machado for drug allergies.    Allergy HPI  Tobramycin IV with Meropenem and no increase in PFTs and Tobramycin nebs seemed to worsen PFTs.  He would notice the same day that it would worsen his breathing.  No other symptoms with it.  No hives.  He has used used Tobramycin off and on and over the last 20 years and he has started to note the drop in PFT over the last year.  Colimycin seemed to cause coughing up blood.  Tomasyn about 8 years ago full body rash.  That was within a few days.  Rash was splotchy skin.  He went off it and cleared after a few days.  He has never had a rash that widespread.  In 2001 Ceftazadime given with Levaquin at the same time and rash more localized to the skin.  He doesn't remember much but does not feel that it was a severe reaction. Uncertain if he has had other cephalosporins or amoxicillin products.    The patient was seen and examined by Severo Murdock MD   Current Outpatient Prescriptions   Medication     ACE NOT PRESCRIBED, INTENTIONAL,     acetylcysteine (MUCOMYST) 10 % nebulizer solution     amitriptyline (ELAVIL) 10 MG tablet     azithromycin (ZITHROMAX) 250 MG tablet     aztreonam lysine (CAYSTON) 75 MG SOLR     bisacodyl (DULCOLAX) 5 MG EC tablet     blood glucose (NO BRAND SPECIFIED) lancets standard     blood glucose monitoring (MARTINA CONTOUR NEXT MONITOR) meter device kit     blood glucose monitoring (MARTINA CONTOUR NEXT) test strip     blood glucose monitoring (MARTINA MICROLET) lancets     CALCIUM PO     CREON 77694-64021 UNITS CPEP per EC capsule     doxycycline (VIBRAMYCIN)  100 MG capsule     [place] [facility]NAGED PATIENT     Ferrous Fumarate-Vitamin C (VITRON-C) 200-125 MG TABS     insulin aspart (NOVOLOG PENFILL) 100 UNIT/ML injection     insulin aspart (NOVOLOG PENFILL) 100 UNIT/ML injection     insulin glargine (LANTUS SOLOSTAR) 100 UNIT/ML injection     insulin pen needle (BD OMI U/F) 32G X 4 MM     levalbuterol (XOPENEX HFA) 45 MCG/ACT Inhaler     levalbuterol (XOPENEX) 1.25 MG/3ML neb solution     magnesium citrate solution     MEPHYTON 5 MG PO TABS     MULTIVITAMIN OR     Nebulizers (EFLOW SCF NEBULIZER HANDSET) MISC     polyethylene glycol (GOLYTELY/NULYTELY) 236 g suspension     polyethylene glycol (MIRALAX) powder     Respiratory Therapy Supplies (KRIS ALTERA NEBULIZER HANDSET) MISC     sodium chloride inhalant 7 % NEBU neb solution     SYMBICORT 160-4.5 MCG/ACT Inhaler     No current facility-administered medications for this visit.      Allergies   Allergen Reactions     Pulmozyme [Dornase Trever] Other (See Comments)     Chest pain and fever     Tobramycin Fatigue     Trouble with ear ringing, shortness of breath, little clinical response.     Zosyn Hives     Ceftazidime Rash     Levaquin Rash     Social History     Social History     Marital status: Single     Spouse name: N/A     Number of children: 0     Years of education: N/A     Occupational History     financial [employer]     Social History Main Topics     Smoking status: Never Smoker     Smokeless tobacco: Never Used     Alcohol use No     Drug use: No     Sexual activity: Yes     Partners: Female     Birth control/ protection: Pill     Other Topics Concern     Blood Transfusions No     Exercise No     Social History Narrative    Kilo lives with wife in a house in [place], [place].  He works as a .        March 2016. He works independently as a . Works out 5x/week and walks the dog daily.        June 2016. No changes. Describes himself as a creature of habit.      Past Medical History:   Diagnosis Date     CF (cystic fibrosis) (H)      Exocrine pancreatic insufficiency      Nocardia infection      Pseudomonas infection      S/P gastrostomy (H) 2002     Past Surgical History:   Procedure Laterality Date     GASTROSTOMY TUBE  2002     PICC INSERTION Left 01/22/2018    4Fr SL BioFlo PICC, 46cm (5cm external) in the L basilic vein w/ tip in the low SVC     Family History   Problem Relation Age of Onset     HEART DISEASE Maternal Grandmother      HEART DISEASE Maternal Grandfather      HEART DISEASE Paternal Grandmother      DIABETES No family hx of          ROS   A complete ROS was otherwise negative except as noted in the HPI and the end of the note.  /68 (BP Location: Right arm, Patient Position: Chair, Cuff Size: Adult Regular)  Pulse 95  Resp 16  SpO2 96%  Exam:   GENERAL APPEARANCE: Well developed, well nourished, alert, and in no apparent distress.  EYES: PERRL, EOMI, conjunctiva clear non-injected  HENT: Nasal mucosa with no edema and no discharge. No nasal polyps.  No facial tenderness.  EARS: Canals clear, TMs normal  MOUTH: Oral mucosa is moist, without any lesions, no tonsillar enlargement, no oropharyngeal exudate.  NECK: Supple, no masses, no thyromegaly.  LYMPHATICS: No significant cervical, or supraclavicular nodes.  RESP: Good air flow throughout.  No crackles. No rhonchi. No wheezes.  CV: Normal S1, S2, regular rhythm, normal rate. No murmur.  No rub. No gallop. No LE edema.   MS: Extremities normal. No clubbing. No cyanosis.  SKIN: No rashes noted  NEURO: Speech normal, normal strength and tone, normal gait and stance  PSYCH: Normal mentation, orientation to person, place, and time.  Results:      Assessment and plan: Nico has CF and has had multiple drug reactions. I explained the testing to drug allergies is not 100% accurate and a lot of the decision making starts with a detailed history of his reactions.    Rash with ceftazidime and  Levaquin.  He has tolerated Cipro before.    We should test to Ceftazidime 100 mg/ml( but at 1/10th concentration), Penicillin, and Levofloxacin 25 mg/ml (but at 10-3 dilution).  William seemed to cause worsening lung function but based on history unlikely to be anaphylaxis.  He could use Colimycin again as a trial but he has had increased cough with it.   I do not feel that testing to these agents would change anything.  Tobramycin 80mg/2ml (1/10th concentration).      Again, thank you for allowing me to participate in the care of your patient.      Sincerely,    Severo Murdock MD

## 2018-05-14 NOTE — MR AVS SNAPSHOT
After Visit Summary   5/14/2018    Nico Morales    MRN: [id number]           Patient Information     Date Of Birth          1975        Visit Information        Provider Department      5/14/2018 1:35 PM Severo Anderson MD [facility]        Today's Diagnoses     Cystic fibrosis with pulmonary manifestations (H)    -  1    Drug allergy, antibiotic           Follow-ups after your visit        Your next 10 appointments already scheduled     May 21, 2018   Procedure with Margarito Bowman MD   [facility] [place], [facility] ([facility], [facility])    [address] [address]   [phone]           The [facility] is located on the corner of [address] and [facility] on the [facility]. It is easily accessible from virtually any point in the [facility] [place] [facility] area, via [address] and [address].            May 22, 2018  2:30 PM CDT   CF LOOP with UC PFL [facility] Pulmonary Function Testing ([facility])    [address] [place] [address]  3rd Floor  [facility] [address]   [phone]            May 22, 2018  3:00 PM CDT   (Arrive by 2:45 PM)   RETURN CYSTIC FIBROSIS VISIT with Rico Lua MD   [facility] ([facility])    [address] [place] [address]  [address] [place] [address]   [phone]            Jun 18, 2018  9:35 AM CDT   (Arrive by 9:20 AM)   Return Allergy with Severo Anderson MD   [facility] ([facility])    [address] [place] [address]  [address] [place] [address]   [phone]           Do not take anti-histamines or Zantac for seven day prior to your appointment.            Jun 26, 2018 10:00 AM CDT   CF LOOP with UC PFL [facility] Pulmonary Function Testing ([facility])    [address] [place] [address]  [facility]  3rd [facility] [address]   [phone]            Jun 26, 2018 10:40 AM CDT   (Arrive by 10:25 AM)   RETURN CYSTIC FIBROSIS VISIT with Jaye Machado MD   [facility] ([facility])    [address] [place] [address]  [address] [place] [address]   [phone]            Aug 06, 2018  3:45 PM CDT   Return Visit with Diana Lee MD, MG ENDO NURSE   [facility] ([facility])    [address] [place] [address]   [phone]            Aug 08, 2018 10:30 AM CDT   CF LOOP with  PFL CF   [facility] Pulmonary Function Testing ([facility])    [address] [place] [address]   [phone]            Aug 08, 2018 11:10 AM CDT   (Arrive by 10:55 AM)   RETURN CYSTIC FIBROSIS VISIT with Jaye Machdao MD   [facility] ([facility])    [address] [place] [address]  [address] [place] [address]   [phone]              Who to contact     If you have questions or need follow up information about today's clinic visit or your schedule please contact [facility] directly at [phone].  Normal or non-critical lab and imaging results will be communicated to you by MyChart, letter or phone within 4 business days after the clinic has received the results. If you do not hear from us within 7 days, please contact the clinic through MedPlastshart or phone. If you have a critical or abnormal lab result, we will notify you by phone as soon as possible.  Submit refill requests through [employer] or call your pharmacy and they will forward the refill request to us. Please allow 3 business days for your refill to be completed.          Additional Information About Your Visit        MedPlastshart Information     [employer] gives you secure access to your electronic  "health record. If you see a primary care provider, you can also send messages to your care team and make appointments. If you have questions, please call your primary care clinic.  If you do not have a primary care provider, please call [phone] and they will assist you.        Care EveryWhere ID     This is your Care EveryWhere ID. This could be used by other organizations to access your [place] medical records  [id number]        Your Vitals Were     Pulse Respirations Pulse Oximetry             95 16 96%          Blood Pressure from Last 3 Encounters:   05/14/18 107/68   04/18/18 97/65   04/02/18 98/66    Weight from Last 3 Encounters:   04/18/18 62.9 kg (138 lb 10.7 oz)   04/02/18 62.5 kg (137 lb 12.6 oz)   03/07/18 61.4 kg (135 lb 5.8 oz)              Today, you had the following     No orders found for display         Today's Medication Changes          These changes are accurate as of 5/14/18 11:59 PM.  If you have any questions, ask your nurse or doctor.               Start taking these medicines.        Dose/Directions    bisacodyl 5 MG EC tablet   Commonly known as:  DULCOLAX   Used for:  Encounter for screening colonoscopy   Started by:  Margarito Bowman MD        Dose:  15 mg   Take 3 tablets (15 mg) by mouth once for 1 dose Refer to \"Getting Ready for a Colonoscopy\" instruction handout   Quantity:  2 tablet   Refills:  0       magnesium citrate solution   Used for:  Encounter for screening colonoscopy   Started by:  Margarito Bowman MD        Dose:  296 mL   Take 296 mLs by mouth See Admin Instructions Refer to the \"Getting Ready for a Colonoscopy\" patient handout   Quantity:  296 mL   Refills:  0       polyethylene glycol 236 g suspension   Commonly known as:  GoLYTELY/NuLYTELY   Used for:  Encounter for screening colonoscopy   Started by:  Margarito Bowman MD        Dose:  4 L   Take 4,000 mLs (4 L) by mouth once for 1 dose Refer to \"Getting Ready for a Colonoscopy\" instruction handout "   Quantity:  8000 mL   Refills:  0         These medicines have changed or have updated prescriptions.        Dose/Directions    sodium chloride inhalant 7 % Nebu neb solution   This may have changed:  See the new instructions.   Used for:  Cystic fibrosis with pulmonary manifestations (H)        NEBULIZE 4ML TWICE DAILY   Quantity:  240 mL   Refills:  5            Where to get your medicines      These medications were sent to [place] [facility] [id number] [facility] - [place], [place] - [address]  [address], [place] [address]     Phone:  [phone]     bisacodyl 5 MG EC tablet    magnesium citrate solution    polyethylene glycol 236 g suspension                Primary Care Provider Office Phone # Fax #    Jaye Maki Machado [phone]       [address] [place] [address]        Equal Access to Services     TOÑA RAMIREZ : Vernell willamso [place], waaxda luqadaha, qaybta kaalmada adeegyada, joe castellon . So [facility] [phone].    ATENCIÓN: Si habla español, tiene a monroe disposición servicios gratuitos de asistencia lingüística. [facility] [phone].    We comply with applicable federal civil rights laws and [place] laws. We do not discriminate on the basis of race, color, national origin, age, disability, sex, sexual orientation, or gender identity.            Thank you!     Thank you for choosing [facility]  for your care. Our goal is always to provide you with excellent care. Hearing back from our patients is one way we can continue to improve our services. Please take a few minutes to complete the written survey that you may receive in the mail after your visit with us. Thank you!             Your Updated Medication List - Protect others around you: Learn how to safely use, store and throw away your medicines at www.disposemymeds.org.          This list is accurate as of 5/14/18 11:59 PM.  Always use your  "most recent med list.                   Brand Name Dispense Instructions for use Diagnosis    ACE NOT PRESCRIBED (INTENTIONAL)     0 each    1 each continuous prn. ACE Inhibitor not prescribed due to Risk for drug interaction    Type I (juvenile type) diabetes mellitus without mention of complication, not stated as uncontrolled       acetylcysteine 10 % nebulizer solution    MUCOMYST    480 mL    Nebulize 4ml qid    Cystic fibrosis with pulmonary manifestations (H)       amitriptyline 10 MG tablet    ELAVIL    90 tablet    TAKE ONE TABLET BY MOUTH AT BEDTIME.    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    Diabetes mellitus related to CF (cystic fibrosis) (H)       blood glucose monitoring meter device kit     1 kit    Use to test blood sugar 6 times daily or as directed.    Diabetes mellitus related to CF (cystic fibrosis) (H)       blood glucose monitoring test strip    MARTINA CONTOUR NEXT    " "200 each    Use to test blood sugar 6 times daily or as directed.    Diabetes mellitus related to CF (cystic fibrosis) (H)       CALCIUM PO      Take 1 tablet by mouth 2 times daily Strength unknown.        COMPOUNDED NON-CONTROLLED SUBSTANCE - PHARMACY TO MIX COMPOUNDED MEDICATION    CMPD RX    1 Box    For testing need about 0.5 cc of each Ceftazidime 100 mg/ml( but at 1/10 concentration) Levofloxacin 25 mg/ml (but at 1/1000 dilution).   1:10 dilution Penicillin G (initial concentration 500,000 units) 1:100 dilution Penicillin G (initial concentration 500,000 units) 0.2 cc pre-pen in syringe with intradermal needle  500 mg Amoxacillin for oral use    Drug allergy, antibiotic       CREON [address] units Cpep per EC capsule   Generic drug:  amylase-lipase-protease     2700 capsule    TAKE 7-8 CAPS WITH MEALS (3 MEALS/DAY) AND 2 CAPS WITH SNACKS (3 SNACKS/DAY)    Cystic fibrosis with pulmonary manifestations (H)       doxycycline 100 MG capsule    VIBRAMYCIN    60 capsule    Take 1 capsule (100 mg) by mouth 2 times daily    Cystic fibrosis with pulmonary manifestations (H)       EFLOW SCF NEBULIZER HANDSET Misc     1 each    1 each 3 times daily.    Nocardia infection       [place] [facility]NA[facility]ED PATIENT      Contact [facility] for patient specific medication information at [phone] on admission and discharge from the hospital.  Phones are answered 24 hours a day 7 days a week 365 days a year.  Providers - Choose \"CONTINUE HOME MED (no script)\" at discharge if patient treatment with home infusion will continue.        insulin glargine 100 UNIT/ML injection    LANTUS SOLOSTAR    15 mL    6 units daily    Diabetes mellitus related to CF (cystic fibrosis) (H)       insulin pen needle 32G X 4 MM    BD OMI U/F    200 each    Use 6 daily or as directed.    Diabetes mellitus related to CF (cystic fibrosis) (H)       levalbuterol 1.25 MG/3ML neb solution    XOPENEX    360 mL    INHALE 1 VIAL BY " "MOUTH INTO THE LUNGS VIA NEBULIZATION 4 TIMES DAILY    Cystic fibrosis (H), CF (cystic fibrosis) (H), Cystic fibrosis with pulmonary manifestations (H)       levalbuterol 45 MCG/ACT Inhaler    XOPENEX HFA    3 Inhaler    Inhale 2 puffs into the lungs every 6 hours as needed for shortness of breath / dyspnea    Cystic fibrosis (H)       magnesium citrate solution     296 mL    Take 296 mLs by mouth See Admin Instructions Refer to the \"Getting Ready for a Colonoscopy\" patient handout    Encounter for screening colonoscopy       MEPHYTON 5 MG tablet   Generic drug:  phytonadione      Take 1 TABLET by mouth DAILY.        MULTIVITAMIN PO      Take 1 tablet by mouth daily. Includes 5,000 units vitamin D and 400 units vitamin E. Per pt, formulated for CF pts.        * NovoLOG PENFILL 100 UNIT/ML injection   Generic drug:  insulin aspart     15 mL    1 per 20 grams of carbohydrate with meals and snacks. Also use 3 units of Novolog at night with tube feeding.    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 FE)-Vitamin C 125 mg      Take 1 tablet by mouth daily    Nocardia infection, Cystic fibrosis with pulmonary manifestations (H)       * Notice:  This list has 2 medication(s) that are the same as other medications prescribed for you. Read the directions carefully, and ask your doctor or other care provider to review them with you.

## 2018-05-14 NOTE — TELEPHONE ENCOUNTER
Patient scheduled for colonoscopy     Indication for procedure. CF    Referring Provider. Jaye Machado MD    ? No     Arrival time verified? Patient instructed to arrive at 1200 pm for new appt time of 1300     Facility location verified? [address] [facility], 1st floor.     Instructions given regarding prep and procedure. Denied prep review. Transportation policy reviewed and verbalized understanding.     Prep Type CF prep     Are you taking any anticoagulants or blood thinners? Denies     Instructions given? Sent via My Chart     Electronic implanted devices? Denies     Pre procedure teaching completed? Yes    Transportation from procedure? Yes, mother     H&P / Pre op physical completed? N/A    Stanley Lewis RN

## 2018-05-14 NOTE — PROGRESS NOTES
Reason for Visit  Nico Morales is a 42 year old male who is referred by Jaye Machado for drug allergies.    Allergy HPI  Tobramycin IV with Meropenem and no increase in PFTs and Tobramycin nebs seemed to worsen PFTs.  He would notice the same day that it would worsen his breathing.  No other symptoms with it.  No hives.  He has used used Tobramycin off and on and over the last 20 years and he has started to note the drop in PFT over the last year.  Colimycin seemed to cause coughing up blood.  Rob about 8 years ago full body rash.  That was within a few days.  Rash was splotchy skin.  He went off it and cleared after a few days.  He has never had a rash that widespread.  In 2001 Ceftazadime given with Levaquin at the same time and rash more localized to the skin.  He doesn't remember much but does not feel that it was a severe reaction. Uncertain if he has had other cephalosporins or amoxicillin products.    The patient was seen and examined by Severo Murdock MD   Current Outpatient Prescriptions   Medication     ACE NOT PRESCRIBED, INTENTIONAL,     acetylcysteine (MUCOMYST) 10 % nebulizer solution     amitriptyline (ELAVIL) 10 MG tablet     azithromycin (ZITHROMAX) 250 MG tablet     aztreonam lysine (CAYSTON) 75 MG SOLR     bisacodyl (DULCOLAX) 5 MG EC tablet     blood glucose (NO BRAND SPECIFIED) lancets standard     blood glucose monitoring (MARTINA CONTOUR NEXT MONITOR) meter device kit     blood glucose monitoring (MARTINA CONTOUR NEXT) test strip     blood glucose monitoring (MARTINA MICROLET) lancets     CALCIUM PO     CREON [address] UNITS CPEP per EC capsule     doxycycline (VIBRAMYCIN) 100 MG capsule     [facility] MANAGED PATIENT     Ferrous Fumarate-Vitamin C (VITRON-C) 200-125 MG TABS     insulin aspart (NOVOLOG PENFILL) 100 UNIT/ML injection     insulin aspart (NOVOLOG PENFILL) 100 UNIT/ML injection     insulin glargine (LANTUS SOLOSTAR) 100 UNIT/ML injection      insulin pen needle (BD OMI U/F) 32G X 4 MM     levalbuterol (XOPENEX HFA) 45 MCG/ACT Inhaler     levalbuterol (XOPENEX) 1.25 MG/3ML neb solution     magnesium citrate solution     MEPHYTON 5 MG PO TABS     MULTIVITAMIN OR     Nebulizers (EFLOW SCF NEBULIZER HANDSET) MISC     polyethylene glycol (GOLYTELY/NULYTELY) 236 g suspension     polyethylene glycol (MIRALAX) powder     Respiratory Therapy Supplies (KRIS ALTERA NEBULIZER HANDSET) MISC     sodium chloride inhalant 7 % NEBU neb solution     SYMBICORT 160-4.5 MCG/ACT Inhaler     No current facility-administered medications for this visit.      Allergies   Allergen Reactions     Pulmozyme [Dornase Trever] Other (See Comments)     Chest pain and fever     Tobramycin Fatigue     Trouble with ear ringing, shortness of breath, little clinical response.     Zosyn Hives     Ceftazidime Rash     Levaquin Rash     Social History     Social History     Marital status: Single     Spouse name: N/A     Number of children: 0     Years of education: N/A     Occupational History     financial [employer]     Social History Main Topics     Smoking status: Never Smoker     Smokeless tobacco: Never Used     Alcohol use No     Drug use: No     Sexual activity: Yes     Partners: Female     Birth control/ protection: Pill     Other Topics Concern     Blood Transfusions No     Exercise No     Social History Narrative    Kilo lives with wife in a house in [place], [place].  He works as a .        March 2016. He works independently as a . Works out 5x/week and walks the dog daily.        June 2016. No changes. Describes himself as a creature of habit.     Past Medical History:   Diagnosis Date     CF (cystic fibrosis) (H)      Exocrine pancreatic insufficiency      Nocardia infection      Pseudomonas infection      S/P gastrostomy (H) 2002     Past Surgical History:   Procedure Laterality Date     GASTROSTOMY TUBE  2002     PICC INSERTION Left  01/22/2018    4Fr SL BioFlo PICC, 46cm (5cm external) in the L basilic vein w/ tip in the low SVC     Family History   Problem Relation Age of Onset     HEART DISEASE Maternal Grandmother      HEART DISEASE Maternal Grandfather      HEART DISEASE Paternal Grandmother      DIABETES No family hx of          ROS   A complete ROS was otherwise negative except as noted in the HPI and the end of the note.  /68 (BP Location: Right arm, Patient Position: Chair, Cuff Size: Adult Regular)  Pulse 95  Resp 16  SpO2 96%  Exam:   GENERAL APPEARANCE: Well developed, well nourished, alert, and in no apparent distress.  EYES: PERRL, EOMI, conjunctiva clear non-injected  HENT: Nasal mucosa with no edema and no discharge. No nasal polyps.  No facial tenderness.  EARS: Canals clear, TMs normal  MOUTH: Oral mucosa is moist, without any lesions, no tonsillar enlargement, no oropharyngeal exudate.  NECK: Supple, no masses, no thyromegaly.  LYMPHATICS: No significant cervical, or supraclavicular nodes.  RESP: Good air flow throughout.  No crackles. No rhonchi. No wheezes.  CV: Normal S1, S2, regular rhythm, normal rate. No murmur.  No rub. No gallop. No LE edema.   MS: Extremities normal. No clubbing. No cyanosis.  SKIN: No rashes noted  NEURO: Speech normal, normal strength and tone, normal gait and stance  PSYCH: Normal mentation, orientation to person, place, and time.  Results:      Assessment and plan: Nico has CF and has had multiple drug reactions. I explained the testing to drug allergies is not 100% accurate and a lot of the decision making starts with a detailed history of his reactions.    Rash with ceftazidime and Levaquin.  He has tolerated Cipro before.    We should test to Ceftazidime 100 mg/ml( but at 1/10th concentration), Penicillin, and Levofloxacin 25 mg/ml (but at 10-3 dilution).  William seemed to cause worsening lung function but based on history unlikely to be anaphylaxis.  He could use Colimycin again as  a trial but he has had increased cough with it.   I do not feel that testing to these agents would change anything.  Tobramycin 80mg/2ml (1/10th concentration).

## 2018-05-16 DIAGNOSIS — E84.0 CYSTIC FIBROSIS WITH PULMONARY MANIFESTATIONS (H): ICD-10-CM

## 2018-05-16 LAB
BACTERIA SPEC CULT: NORMAL
SPECIMEN SOURCE: NORMAL

## 2018-05-17 RX ORDER — SODIUM CHLORIDE FOR INHALATION 7 %
VIAL, NEBULIZER (ML) INHALATION
Qty: 240 ML | Refills: 5 | Status: SHIPPED | OUTPATIENT
Start: 2018-05-17 | End: 2018-11-13

## 2018-05-17 NOTE — PROGRESS NOTES
This is a recent snapshot of the patient's [facility] medical record.  For current drug dose and complete information and questions, call [phone] or In Basket pool, [facility] home infusion ([id number])  CSN Number:  [id number]

## 2018-05-21 ENCOUNTER — HOSPITAL ENCOUNTER (OUTPATIENT)
Facility: CLINIC | Age: 43
Discharge: HOME | End: 2018-05-21
Attending: INTERNAL MEDICINE | Admitting: INTERNAL MEDICINE
Payer: COMMERCIAL

## 2018-05-21 VITALS
RESPIRATION RATE: 18 BRPM | SYSTOLIC BLOOD PRESSURE: 103 MMHG | DIASTOLIC BLOOD PRESSURE: 71 MMHG | OXYGEN SATURATION: 96 %

## 2018-05-21 LAB
COLONOSCOPY: NORMAL
GLUCOSE BLDC GLUCOMTR-MCNC: 113 MG/DL (ref 70–99)

## 2018-05-21 PROCEDURE — 45380 COLONOSCOPY AND BIOPSY: CPT | Performed by: INTERNAL MEDICINE

## 2018-05-21 PROCEDURE — 88305 TISSUE EXAM BY PATHOLOGIST: CPT | Performed by: INTERNAL MEDICINE

## 2018-05-21 PROCEDURE — 99152 MOD SED SAME PHYS/QHP 5/>YRS: CPT | Performed by: INTERNAL MEDICINE

## 2018-05-21 PROCEDURE — G0500 MOD SEDAT ENDO SERVICE >5YRS: HCPCS | Performed by: INTERNAL MEDICINE

## 2018-05-21 PROCEDURE — 25000128 H RX IP 250 OP 636: Performed by: INTERNAL MEDICINE

## 2018-05-21 PROCEDURE — 82962 GLUCOSE BLOOD TEST: CPT

## 2018-05-21 RX ORDER — FENTANYL CITRATE 50 UG/ML
INJECTION, SOLUTION INTRAMUSCULAR; INTRAVENOUS PRN
Status: DISCONTINUED | OUTPATIENT
Start: 2018-05-21 | End: 2018-05-21 | Stop reason: HOSPADM

## 2018-05-21 RX ORDER — ONDANSETRON 2 MG/ML
4 INJECTION INTRAMUSCULAR; INTRAVENOUS
Status: DISCONTINUED | OUTPATIENT
Start: 2018-05-21 | End: 2018-05-21 | Stop reason: HOSPADM

## 2018-05-21 RX ORDER — LIDOCAINE 40 MG/G
CREAM TOPICAL
Status: DISCONTINUED | OUTPATIENT
Start: 2018-05-21 | End: 2018-05-21 | Stop reason: HOSPADM

## 2018-05-21 NOTE — LETTER
May 25, 2018      Nico Morales                                                                [id number]  [place] [address]          Dear Mr. Morales,    The histopathology of the polyp tissue removed during your colonoscopy is attached.  The results are consistent with the endoscopic impression.  I recommend the next exam to be done in 3 years.    Sincerely,    Margarito Bowman MD  Results for orders placed or performed during the hospital encounter of 05/21/18   COLONOSCOPY   Result Value Ref Range    COLONOSCOPY       [facility], [address] [place] [address] [phone]     Endoscopy Department  _______________________________________________________________________________  Patient Name: Nico Morales           Procedure Date: 5/21/2018 12:53 PM  MRN: [id number]                       Account Number: [id number]  YOB: 1975             Admit Type: Outpatient  Age: 42                               Room:  #2  Gender: Male                          Note Status: Addendum  Attending MD: Margarito Bowman MD   Total Sedation Time:   _______________________________________________________________________________     Procedure:           Colonoscopy  Indications:         Screening for colorectal malignant neoplasm (per CFF/AGA                        guidelines for screening in cystic fibrosis).  Providers:           Margarito Bowman MD, Jenn Costa RN  Patient Profile:     This is a 42 year old male with CF.  Referring MD:        Regina Herman MD  Medi cines:           Midazolam 3 mg IV, Fentanyl 150 micrograms IV  Complications:       No immediate complications.  _______________________________________________________________________________  Procedure:           Pre-Anesthesia Assessment:                       - Airway Examination: normal oropharyngeal airway and                        neck mobility.                       - Respiratory Examination: clear  to auscultation.                       - ASA Grade Assessment: II - A patient with mild                        systemic disease.                       - After reviewing the risks and benefits, the patient                        was deemed in satisfactory condition to undergo the                        procedure.                       - The anesthesia plan was to use moderate                        sedation/analgesia (conscious sedation).                       - Immediately prior to administration of medications,                        the patient was re-assessed for adequacy to rec eive                        sedatives.                       - Sedation was administered by an endoscopy nurse. The                        sedation level attained was moderate.                       - The heart rate, respiratory rate, oxygen saturations,                        blood pressure, adequacy of pulmonary ventilation, and                        response to care were monitored throughout the procedure.                       - The physical status of the patient was re-assessed                        after the procedure.                       After obtaining informed consent, the colonoscope was                        passed under direct vision. Throughout the procedure,                        the patient's blood pressure, pulse, and oxygen                        saturations were monitored continuously. The Colonoscope                        was introduced through the anus and advanced to the                        terminal ileum. The colonoscopy was performed without                         difficulty. The patient tolerated the procedure well.                        The quality of the bowel preparation was good. The                        quality of the bowel preparation was evaluated using the                        BBPS ([place] Bowel Preparation Scale) with scores of:                        Right Colon = 2 (minor amount of  residual staining,                        small fragments of stool and/or opaque liquid, but                        mucosa seen well), Transverse Colon = 3 (entire mucosa                        seen well with no residual staining, small fragments of                        stool or opaque liquid) and Left Colon = 3 (entire                        mucosa seen well with no residual staining, small                        fragments of stool or opaque liquid). The total BBPS                        score equals 8. The quality of the bowel preparation was                        excellent.                                                                                    Findings:       A 7 mm polyp was found in the hepatic flexure. The polyp was        pedunculated.       Normal mucosa was found in the entire colon. Biopsies were taken with a        cold forceps for histology.                                                                                   Impression:          - One 7 mm polyp at the hepatic flexure.                       - Normal mucosa in the entire examined colon. Biopsied.  Recommendation:      - Repeat colonoscopy in 3 years for surveillance.                                                                                     Signed Electronically by Margarito Bowman MD  _____________________  Margarito Bowman MD  5/21/2018 1:35:26 PM  I was physically present for the entire viewing portion of the exam.  __________________________  Signature of teaching physician  Igor/Cora Bowman MD  Number of Addenda: 1    Note Initiated On: 5/21/2018 12:53 PM  Scope In:  Scope Out:  __________________________ _____________________________________________________    Addendum Number: 1   Addendum Date: 5/21/2018 1:36:50 PM           Conscious sedation was administered under direct monitoring and        supervision by Dr. Bowman for 17 minutes.        Signed Electronically by Margarito Bowman  "MD  _____________________  Venus Sheppard MD  5/21/2018 1:37:38 PM  I was physically present for the entire viewing portion of the exam.  __________________________  Signature of teaching physician  Igor/Cora Sheppard MD     Glucose by meter   Result Value Ref Range    Glucose 113 (H) 70 - 99 mg/dL   Surgical pathology exam   Result Value Ref Range    Copath Report       Patient Name: BERENICE FLORES  MR#: [id number]  Specimen #: [id number]  Collected: 5/21/2018  Received: 5/21/2018  Reported: 5/22/2018 11:20  Ordering Phy(s): VENUS SHEPPARD    For improved result formatting, select 'View Enhanced Report Format' under   Linked Documents section.    SPECIMEN(S):  A: Hepatic flexure polyp  B: Colon biopsy, ascending    FINAL DIAGNOSIS:  A. COLON, HEPATIC FLEXURE, POLYP, POLYPECTOMY:  - Tubular adenoma  - Negative for high-grade dysplasia    B. COLON, ASCENDING, BIOPSY:  - Colonic mucosa with no significant histologic abnormality    I have personally reviewed all specimens and/or slides, including the   listed special stains, and used them  with my medical judgement to determine or confirm the final diagnosis.    Electronically signed out by:    Krystal Tolbert M.D., [facility]    CLINICAL HISTORY:  42-year-old male with cystic fibrosis  GROSS:  A:  The specimen is received in formalin with proper patient   identification, labeled \"hepatic flexure polyp\" .  The specimen consists of a 1.1 x 0.5 x 0.4 cm tan soft polyp.  The margin   is inked black and it is trisected.  Also received are three additional segments of tan soft tissue ranging   from 0.2-0.4 cm in greatest dimension.  The specimen is entirely submitted in cassette A1.    B:  The specimen is received in formalin with proper patient   identification, labeled \"right/ascending biopsy\".   The specimen consists of two segments of tan soft tissue ranging from   0.4-0.7 cm in greatest dimension.  The  specimen is entirely submitted in cassette B1. " (Dictated by: Sherri YEPEZ ASCP 5/21/2018 04:59 PM)    MICROSCOPIC:  Microscopic examination was performed.    CPT Codes:  A: 95915-UT6  B: 78477-AJ4    TESTING LAB LOCATION:  [facility], [address]   [address]  [phone]    COLLECTION SITE:  Client: [facility]  Location: JANESSA JOHNSON)    Resident  [id number]

## 2018-05-21 NOTE — OR NURSING
Procedure: Colonoscopy with biopsies and polypectomy  Sedation: Conscious  O2: 2L  Tolerated Procedure: Well  Patient returned to recovery room in stable condition with KAMINI Costa RN

## 2018-05-22 LAB — COPATH REPORT: NORMAL

## 2018-06-08 NOTE — PROGRESS NOTES
This is a recent snapshot of the patient's [facility] medical record.  For current drug dose and complete information and questions, call [phone] or In Basket pool, [facility] ([id number])  CSN Number:  [id number]

## 2018-06-13 LAB
MYCOBACTERIUM SPEC CULT: NORMAL
MYCOBACTERIUM SPEC CULT: NORMAL
SPECIMEN SOURCE: NORMAL

## 2018-06-18 ENCOUNTER — OFFICE VISIT (OUTPATIENT)
Dept: PULMONOLOGY | Facility: CLINIC | Age: 43
End: 2018-06-18
Attending: ALLERGY & IMMUNOLOGY
Payer: COMMERCIAL

## 2018-06-18 VITALS
DIASTOLIC BLOOD PRESSURE: 69 MMHG | HEART RATE: 87 BPM | WEIGHT: 140 LBS | BODY MASS INDEX: 20.73 KG/M2 | SYSTOLIC BLOOD PRESSURE: 111 MMHG | OXYGEN SATURATION: 96 % | HEIGHT: 69 IN

## 2018-06-18 DIAGNOSIS — Z88.1 DRUG ALLERGY, ANTIBIOTIC: Primary | ICD-10-CM

## 2018-06-18 DIAGNOSIS — E84.0 CYSTIC FIBROSIS WITH PULMONARY MANIFESTATIONS (H): ICD-10-CM

## 2018-06-18 PROCEDURE — 95076 INGEST CHALLENGE INI 120 MIN: CPT | Mod: ZF | Performed by: ALLERGY & IMMUNOLOGY

## 2018-06-18 PROCEDURE — G0463 HOSPITAL OUTPT CLINIC VISIT: HCPCS | Mod: ZF

## 2018-06-18 PROCEDURE — 95018 ALL TSTG PERQ&IQ DRUGS/BIOL: CPT | Mod: ZF | Performed by: ALLERGY & IMMUNOLOGY

## 2018-06-18 ASSESSMENT — PAIN SCALES - GENERAL: PAINLEVEL: NO PAIN (0)

## 2018-06-18 NOTE — PROGRESS NOTES
Reason for Visit  Nico Morales is a 42 year old male who is here for follow-up for drug allergies.    Allergy HPI  He is feeling well today and no concerns.  Breathing is good today and no rashes.  No new drug reactions.   He is not on antihistamines.      The patient was seen and examined by Severo Murdock MD   Current Outpatient Prescriptions   Medication     ACE NOT PRESCRIBED, INTENTIONAL,     acetylcysteine (MUCOMYST) 10 % nebulizer solution     amitriptyline (ELAVIL) 10 MG tablet     azithromycin (ZITHROMAX) 250 MG tablet     aztreonam lysine (CAYSTON) 75 MG SOLR     blood glucose (NO BRAND SPECIFIED) lancets standard     blood glucose monitoring (MARTINA CONTOUR NEXT MONITOR) meter device kit     blood glucose monitoring (MARTINA CONTOUR NEXT) test strip     blood glucose monitoring (MARTINA MICROLET) lancets     CALCIUM PO     COMPOUNDED NON-CONTROLLED SUBSTANCE (CMPD RX) - PHARMACY TO MIX COMPOUNDED MEDICATION     CREON [address]0769-638[address] UNITS CPEP per EC capsule     doxycycline (VIBRAMYCIN) 100 MG capsule     [facility] INFUSION MANAGED PATIENT     Ferrous Fumarate-Vitamin C (VITRON-C) 200-125 MG TABS     insulin aspart (NOVOLOG PENFILL) 100 UNIT/ML injection     insulin aspart (NOVOLOG PENFILL) 100 UNIT/ML injection     insulin glargine (LANTUS SOLOSTAR) 100 UNIT/ML injection     insulin pen needle (BD OMI U/F) 32G X 4 MM     levalbuterol (XOPENEX HFA) 45 MCG/ACT Inhaler     levalbuterol (XOPENEX) 1.25 MG/3ML neb solution     magnesium citrate solution     MEPHYTON 5 MG PO TABS     MULTIVITAMIN OR     Nebulizers (EFLOW SCF NEBULIZER HANDSET) MISC     polyethylene glycol (MIRALAX) powder     Respiratory Therapy Supplies (KRIS ALTERA NEBULIZER HANDSET) MISC     sodium chloride inhalant 7 % NEBU neb solution     SYMBICORT 160-4.5 MCG/ACT Inhaler     No current facility-administered medications for this visit.      Allergies   Allergen Reactions     Pulmozyme [Dornase Trever] Other (See Comments)     Chest  "pain and fever     Tobramycin Fatigue     Trouble with ear ringing, shortness of breath, little clinical response.     Zosyn Hives     Ceftazidime Rash     Levaquin Rash     Social History     Social History     Marital status: Single     Spouse name: N/A     Number of children: 0     Years of education: N/A     Occupational History     financial [employer]     Social History Main Topics     Smoking status: Never Smoker     Smokeless tobacco: Never Used     Alcohol use No     Drug use: No     Sexual activity: Yes     Partners: Female     Birth control/ protection: Pill     Other Topics Concern     Blood Transfusions No     Exercise No     Social History Narrative    Kilo lives with wife in a house in [place], [place].  He works as a .        March 2016. He works independently as a . Works out 5x/week and walks the dog daily.        June 2016. No changes. Describes himself as a creature of habit.     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  /69  Pulse 87  Ht 1.753 m (5' 9\")  Wt 63.5 kg (140 lb)  SpO2 96%  BMI 20.67 kg/m2  Exam: "   GENERAL APPEARANCE: Well developed, well nourished, alert, and in no apparent distress.  EYES: EOMI, conjunctiva clear non-injected  HENT: no discharge. MOUTH: Oral mucosa is moist, without any lesions, no tonsillar enlargement, no oropharyngeal exudate.  RESP: Good air flow throughout.  No crackles. No rhonchi. No wheezes.  CV: Normal S1, S2, regular rhythm, normal rate. No murmur.  No rub. No gallop. No LE edema.   MS: Extremities normal. No clubbing. No cyanosis.  SKIN: No rashes noted.  NEURO: Speech normal, normal strength and tone, normal gait and stance  PSYCH: Normal mentation, orientation to person, place, and time.  Results: Intradermal  4. Pre-pen negative  3. Penicillin 5,000 units negative  1. Levofloxacin 0.025 mg/ml negative  2. Ceftazadime 4mg/ml negative      Assessment and plan: All drugs placed via IgE mediated skin testing negative.  He will read them at 48 and 72 hours and if concerning will notify us.  If no reactions there is a low risk of IgE mediated reactions but I can't rule out all drug reactions and he can try the above meds as needed.  At 10:45 he was given 50 mg amoxicillin PO.    No reactions so at 11:08 450 mg amoxicillin was given PO.  No abnormalities so discharged at 11:53 a1.m.

## 2018-06-18 NOTE — LETTER
6/18/2018       RE: Nico Morales  [address]  [facility] [address]     Dear Colleague,    Thank you for referring your patient, Nico Morales, to the [facility] at [facility]. Please see a copy of my visit note below.    Reason for Visit  Nico Morales is a 42 year old male who is here for follow-up for drug allergies.    Allergy HPI  He is feeling well today and no concerns.  Breathing is good today and no rashes.  No new drug reactions.   He is not on antihistamines.      The patient was seen and examined by Severo Murdock MD   Current Outpatient Prescriptions   Medication     ACE NOT PRESCRIBED, INTENTIONAL,     acetylcysteine (MUCOMYST) 10 % nebulizer solution     amitriptyline (ELAVIL) 10 MG tablet     azithromycin (ZITHROMAX) 250 MG tablet     aztreonam lysine (CAYSTON) 75 MG SOLR     blood glucose (NO BRAND SPECIFIED) lancets standard     blood glucose monitoring (MARTINA CONTOUR NEXT MONITOR) meter device kit     blood glucose monitoring (MARTINA CONTOUR NEXT) test strip     blood glucose monitoring (MARTINA MICROLET) lancets     CALCIUM PO     COMPOUNDED NON-CONTROLLED SUBSTANCE (CMPD RX) - PHARMACY TO MIX COMPOUNDED MEDICATION     CREON 28190-07122 UNITS CPEP per EC capsule     doxycycline (VIBRAMYCIN) 100 MG capsule     [facility] MANAGED PATIENT     Ferrous Fumarate-Vitamin C (VITRON-C) 200-125 MG TABS     insulin aspart (NOVOLOG PENFILL) 100 UNIT/ML injection     insulin aspart (NOVOLOG PENFILL) 100 UNIT/ML injection     insulin glargine (LANTUS SOLOSTAR) 100 UNIT/ML injection     insulin pen needle (BD OMI U/F) 32G X 4 MM     levalbuterol (XOPENEX HFA) 45 MCG/ACT Inhaler     levalbuterol (XOPENEX) 1.25 MG/3ML neb solution     magnesium citrate solution     MEPHYTON 5 MG PO TABS     MULTIVITAMIN OR     Nebulizers (EFLOW SCF NEBULIZER HANDSET) MISC     polyethylene glycol (MIRALAX) powder     Respiratory  Therapy Supplies (KRIS ALTERA NEBULIZER HANDSET) MISC     sodium chloride inhalant 7 % NEBU neb solution     SYMBICORT 160-4.5 MCG/ACT Inhaler     No current facility-administered medications for this visit.      Allergies   Allergen Reactions     Pulmozyme [Dornase Trever] Other (See Comments)     Chest pain and fever     Tobramycin Fatigue     Trouble with ear ringing, shortness of breath, little clinical response.     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  He works as a .        March 2016. He works independently as a . Works out 5x/week and walks the dog daily.        June 2016. No changes. Describes himself as a creature of habit.     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  /69  Pulse 87  Ht 1.753 m (5' 9\")  Wt 63.5 kg (140 lb)  SpO2 96%  BMI 20.67 kg/m2  Exam:   GENERAL APPEARANCE: Well developed, well nourished, alert, and in no apparent distress.  EYES: EOMI, conjunctiva clear non-injected  HENT: no discharge. MOUTH: Oral mucosa is moist, without any lesions, no tonsillar enlargement, no oropharyngeal exudate.  RESP: Good air flow throughout.  No crackles. No rhonchi. No wheezes.  CV: Normal S1, S2, regular rhythm, normal rate. No murmur.  No rub. No gallop. No LE edema.   MS: Extremities normal. No clubbing. No cyanosis.  SKIN: No rashes noted.  NEURO: Speech normal, normal strength and tone, normal gait and stance  PSYCH: Normal mentation, orientation to person, place, and time.  Results: Intradermal  4. Pre-pen negative  3. Penicillin 5,000 units negative  1. Levofloxacin 0.025 mg/ml negative  2. Ceftazadime 4mg/ml negative      Assessment and plan: All drugs placed via IgE mediated skin testing negative.  He will read them at 48 and 72 hours and if concerning will notify us.  If no reactions there is a low risk of IgE mediated reactions but I can't rule out all drug reactions and he can try the above meds as needed.  At 10:45 he was given 50 mg amoxicillin PO.    No reactions so at 11:08 450 mg amoxicillin was given PO.  No abnormalities so discharged at 11:53 a1.m.                Again, thank you for allowing me to participate in the care of your patient.      Sincerely,    Severo Murdock MD      "

## 2018-06-18 NOTE — MR AVS SNAPSHOT
After Visit Summary   6/18/2018    Nico Morales    MRN: [id number]           Patient Information     Date Of Birth          1975        Visit Information        Provider Department      6/18/2018 9:35 AM Severo Anderson MD [facility]        Today's Diagnoses     Drug allergy, antibiotic    -  1    Cystic fibrosis with pulmonary manifestations (H)          Care Instructions    Pre-pen negative  Penicillin 5,000 units negative  Levofloxacin 0.025 mg/ml negative  Ceftazadime 4mg/ml negative      Assessment and plan: All drugs placed via IgE mediated skin testing negative.  He will read them at 48 and 72 hours and if concerning will notify us.  If no reactions there is a low risk of IgE mediated reactions but I can't rule out all drug reactions and he can try the above meds as needed.          Follow-ups after your visit        Your next 10 appointments already scheduled     Jun 26, 2018 10:00 AM CDT   CF LOOP with UC PFL CF   [facility] Function Testing ([facility])    [address] [place] [address]   [phone]            Jun 26, 2018 10:40 AM CDT   (Arrive by 10:25 AM)   RETURN CYSTIC FIBROSIS VISIT with Jaye Machado MD   [facility] ([facility])    [address] [place] [address]   [phone]            Aug 06, 2018  3:45 PM CDT   Return Visit with Diana Lee MD, MG ENDO NURSE   [facility] ([facility])    [address] [place] [address]   [phone]            Aug 08, 2018 10:30 AM CDT   CF LOOP with UC PFL CF   [facility] ([facility])    [address] [place] [address]   [phone]            Aug 08, 2018 11:10 AM CDT   (Arrive by 10:55 AM)   RETURN  CYSTIC FIBROSIS VISIT with Jaye Machado MD   [facility] ([facility])    [address]  [address]  [facility] [address]   [phone]            Sep 18, 2018 11:00 AM CDT   CF LOOP with UC PFL CF   [facility] Pulmonary Function Testing ([facility])    [address]  3rd Floor  [facility] [address]   [phone]            Sep 18, 2018 11:20 AM CDT   (Arrive by 11:05 AM)   RETURN CYSTIC FIBROSIS VISIT with Jaye Machado MD   [facility] ([facility])    [address]  [address]  [facility] [address]   [phone]            Oct 30, 2018 11:00 AM CDT   CF LOOP with UC PFL CF   [facility] Function Testing ([facility])    [address]  3rd Floor  [facility] [address]   [phone]            Oct 30, 2018 11:20 AM CDT   (Arrive by 11:05 AM)   RETURN CYSTIC FIBROSIS VISIT with Jaye Machado MD   [facility] ([facility])    [address]  [address]  [facility] [address]   [phone]              Who to contact     If you have questions or need follow up information about today's clinic visit or your schedule please contact [facility] directly at [phone].  Normal or non-critical lab and imaging results will be communicated to you by MyChart, letter or phone within 4 business days after the clinic has received the results. If you do not hear from us within 7 days, please contact the clinic through MyChart or phone. If you have a critical or abnormal lab result, we will notify you by phone as soon as possible.  Submit refill requests through [employer] or call your pharmacy and they will forward the refill request to us. Please allow 3 business days for your refill to  "be completed.          Additional Information About Your Visit        [employer] Information     [employer] gives you secure access to your electronic health record. If you see a primary care provider, you can also send messages to your care team and make appointments. If you have questions, please call your primary care clinic.  If you do not have a primary care provider, please call [phone] and they will assist you.        Care EveryWhere ID     This is your Care EveryWhere ID. This could be used by other organizations to access your [facility] medical records  [id number]        Your Vitals Were     Pulse Height Pulse Oximetry BMI (Body Mass Index)          87 1.753 m (5' 9\") 96% 20.67 kg/m2         Blood Pressure from Last 3 Encounters:   06/18/18 111/69   05/21/18 103/71   05/14/18 107/68    Weight from Last 3 Encounters:   06/18/18 63.5 kg (140 lb)   04/18/18 62.9 kg (138 lb 10.7 oz)   04/02/18 62.5 kg (137 lb 12.6 oz)              We Performed the Following     Ingestion challenge test-up to 120 minutes     Intradermal drug or biological test with number of tests to be specified        Primary Care Provider Office Phone # Fax #    Jaye Maki Machado [phone]       [address] [place], [place] [id number]        Equal Access to Services     TOÑA RAMIREZ : Hadii favio ku hadasho Soomaali, waaxda luqadaha, qaybta kaalmada adeegyada, joe mack. So [facility] [phone].    ATENCIÓN: Si habla español, tiene a monroe disposición servicios gratuitos de asistencia lingüística. Llame al [phone].    We comply with applicable federal civil rights laws and [place] laws. We do not discriminate on the basis of race, color, national origin, age, disability, sex, sexual orientation, or gender identity.            Thank you!     Thank you for choosing [facility]  for your care. Our goal is always to provide you with excellent care. " Hearing back from our patients is one way we can continue to improve our services. Please take a few minutes to complete the written survey that you may receive in the mail after your visit with us. Thank you!             Your Updated Medication List - Protect others around you: Learn how to safely use, store and throw away your medicines at www.disposemymeds.org.          This list is accurate as of 6/18/18 12:29 PM.  Always use your most recent med list.                   Brand Name Dispense Instructions for use Diagnosis    ACE NOT PRESCRIBED (INTENTIONAL)     0 each    1 each continuous prn. ACE Inhibitor not prescribed due to Risk for drug interaction    Type I (juvenile type) diabetes mellitus without mention of complication, not stated as uncontrolled       acetylcysteine 10 % nebulizer solution    MUCOMYST    480 mL    Nebulize 4ml qid    Cystic fibrosis with pulmonary manifestations (H)       amitriptyline 10 MG tablet    ELAVIL    90 tablet    TAKE ONE TABLET BY MOUTH AT BEDTIME.    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    Diabetes mellitus related to CF (cystic fibrosis) (H)        "blood glucose monitoring meter device kit     1 kit    Use to test blood sugar 6 times daily or as directed.    Diabetes mellitus related to CF (cystic fibrosis) (H)       blood glucose monitoring test strip    MARTINA CONTOUR NEXT    200 each    Use to test blood sugar 6 times daily or as directed.    Diabetes mellitus related to CF (cystic fibrosis) (H)       CALCIUM PO      Take 1 tablet by mouth 2 times daily Strength unknown.        COMPOUNDED NON-CONTROLLED SUBSTANCE - PHARMACY TO MIX COMPOUNDED MEDICATION    CMPD RX    1 Box    For testing need about 0.5 cc of each Ceftazidime 100 mg/ml( but at 1/10 concentration) Levofloxacin 25 mg/ml (but at 1/1000 dilution).   1:10 dilution Penicillin G (initial concentration 500,000 units) 1:100 dilution Penicillin G (initial concentration 500,000 units) 0.2 cc pre-pen in syringe with intradermal needle  500 mg Amoxacillin for oral use    Drug allergy, antibiotic       CREON 33130-26183 units Cpep per EC capsule   Generic drug:  amylase-lipase-protease     2700 capsule    TAKE 7-8 CAPS WITH MEALS (3 MEALS/DAY) AND 2 CAPS WITH SNACKS (3 SNACKS/DAY)    Cystic fibrosis with pulmonary manifestations (H)       doxycycline 100 MG capsule    VIBRAMYCIN    60 capsule    Take 1 capsule (100 mg) by mouth 2 times daily    Cystic fibrosis with pulmonary manifestations (H)       EFLOW SCF NEBULIZER HANDSET Misc     1 each    1 each 3 times daily.    Nocardia infection       [facility] INFUSION MANAGED PATIENT      Contact [facility] for patient specific medication information at [phone] on admission and discharge from the hospital.  Phones are answered 24 hours a day 7 days a week 365 days a year.  Providers - Choose \"CONTINUE HOME MED (no script)\" at discharge if patient treatment with home infusion will continue.        insulin glargine 100 UNIT/ML injection    LANTUS SOLOSTAR    15 mL    6 units daily    Diabetes mellitus related to CF (cystic fibrosis) (H)    " "   insulin pen needle 32G X 4 MM    BD OMI U/F    200 each    Use 6 daily or as directed.    Diabetes mellitus related to CF (cystic fibrosis) (H)       levalbuterol 1.25 MG/3ML neb solution    XOPENEX    360 mL    INHALE 1 VIAL BY MOUTH INTO THE LUNGS VIA NEBULIZATION 4 TIMES DAILY    Cystic fibrosis (H), CF (cystic fibrosis) (H), Cystic fibrosis with pulmonary manifestations (H)       levalbuterol 45 MCG/ACT Inhaler    XOPENEX HFA    3 Inhaler    Inhale 2 puffs into the lungs every 6 hours as needed for shortness of breath / dyspnea    Cystic fibrosis (H)       magnesium citrate solution     296 mL    Take 296 mLs by mouth See Admin Instructions Refer to the \"Getting Ready for a Colonoscopy\" patient handout    Encounter for screening colonoscopy       MEPHYTON 5 MG tablet   Generic drug:  phytonadione      Take 1 TABLET by mouth DAILY.        MULTIVITAMIN PO      Take 1 tablet by mouth daily. Includes 5,000 units vitamin D and 400 units vitamin E. Per pt, formulated for CF pts.        * NovoLOG PENFILL 100 UNIT/ML injection   Generic drug:  insulin aspart     15 mL    1 per 20 grams of carbohydrate with meals and snacks. Also use 3 units of Novolog at night with tube feeding.    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 FE)-Vitamin C 125 mg      Take 1 tablet by mouth daily    Nocardia infection, Cystic fibrosis with pulmonary manifestations (H)       * Notice:  This list has 2 medication(s) that are the same as other medications prescribed for you. Read the directions carefully, and ask your doctor or other care provider to review them with you.

## 2018-06-18 NOTE — PATIENT INSTRUCTIONS
Pre-pen negative  Penicillin 5,000 units negative  Levofloxacin 0.025 mg/ml negative  Ceftazadime 4mg/ml negative      Assessment and plan: All drugs placed via IgE mediated skin testing negative.  He will read them at 48 and 72 hours and if concerning will notify us.  If no reactions there is a low risk of IgE mediated reactions but I can't rule out all drug reactions and he can try the above meds as needed.

## 2018-06-26 ENCOUNTER — OFFICE VISIT (OUTPATIENT)
Dept: PULMONOLOGY | Facility: CLINIC | Age: 43
End: 2018-06-26
Attending: INTERNAL MEDICINE
Payer: COMMERCIAL

## 2018-06-26 VITALS
HEART RATE: 94 BPM | SYSTOLIC BLOOD PRESSURE: 109 MMHG | WEIGHT: 144.18 LBS | RESPIRATION RATE: 17 BRPM | OXYGEN SATURATION: 95 % | HEIGHT: 69 IN | BODY MASS INDEX: 21.36 KG/M2 | DIASTOLIC BLOOD PRESSURE: 73 MMHG

## 2018-06-26 DIAGNOSIS — E84.0 CYSTIC FIBROSIS WITH PULMONARY MANIFESTATIONS (H): ICD-10-CM

## 2018-06-26 DIAGNOSIS — E84.9 CYSTIC FIBROSIS (H): Primary | ICD-10-CM

## 2018-06-26 LAB
EXPTIME-PRE: 13.82 SEC
FEF2575-%PRED-PRE: 15 %
FEF2575-PRE: 0.62 L/SEC
FEF2575-PRED: 3.88 L/SEC
FEFMAX-%PRED-PRE: 70 %
FEFMAX-PRE: 6.86 L/SEC
FEFMAX-PRED: 9.78 L/SEC
FEV1-%PRED-PRE: 43 %
FEV1-PRE: 1.71 L
FEV1FEV6-PRE: 51 %
FEV1FEV6-PRED: 82 %
FEV1FVC-PRE: 45 %
FEV1FVC-PRED: 79 %
FIFMAX-PRE: 4.95 L/SEC
FVC-%PRED-PRE: 77 %
FVC-PRE: 3.83 L
FVC-PRED: 4.93 L

## 2018-06-26 PROCEDURE — 87077 CULTURE AEROBIC IDENTIFY: CPT | Performed by: INTERNAL MEDICINE

## 2018-06-26 PROCEDURE — 87186 SC STD MICRODIL/AGAR DIL: CPT | Performed by: INTERNAL MEDICINE

## 2018-06-26 PROCEDURE — G0463 HOSPITAL OUTPT CLINIC VISIT: HCPCS | Mod: ZF

## 2018-06-26 PROCEDURE — 87070 CULTURE OTHR SPECIMN AEROBIC: CPT | Performed by: INTERNAL MEDICINE

## 2018-06-26 PROCEDURE — 94375 RESPIRATORY FLOW VOLUME LOOP: CPT | Mod: ZF | Performed by: INTERNAL MEDICINE

## 2018-06-26 ASSESSMENT — PAIN SCALES - GENERAL: PAINLEVEL: NO PAIN (0)

## 2018-06-26 NOTE — PROGRESS NOTES
Nutrition Note    Reason for Visit:  Low Iron, Weight Gain    Pt completed annual study labs 5/3/18, significant for persistently low serum iron level.  He is currently taking 1 tablet per day of Vitron-C, previously on twice per day but this was reduced at some point in time, he cannot recall the reason. He is willing to increase back to twice daily. Although prescribed, insurance does not cover this medication and pt pays out-of-pocket.  He is not concerned about the increase in cost.     Regarding weight, pt has been working on gaining weight at home and is seeing some success. Weight today up to ~144# from 140# since last visit, he is pleased with this progress.  He's added in extra snacks in the late evening and moved his dinner to earlier in the day to help with fullness.  Good progress so far, he is looking forward to getting >145#.  Will follow.     Interventions/Recommendations:  Medication list updated with new Vitron-C dose.  Will follow pt for further support with weight gain and nutrition care planning at CF clinic visits.       Ivory Bonilla MS, RD, LD (pager [phone])  Cystic Fibrosis/Lung Transplant Dietitian      -Available Mon-Thurs 8 AM-4:30 PM. On Fridays please contact pager [phone] (Zeina Pierre RD) and on weekends/holidays contact coverage dietitian at pager [phone] (inpatient use only).

## 2018-06-26 NOTE — PROGRESS NOTES
[facility]  June 26, 2018         Assessment and Plan:   Nico Morales is a 42 year old male with cystic fibrosis.    1. CF lung disease with severe obstruction:  Kilo has shown evidence of stability in his pulmonary function as well as his pulmonary symptomatology.  He continues to show evidence of Pseudomonas in his sputum.  At this time, I would recommend:    --  That he continue [place], on 2 weeks, off 2 weeks, alternating with 2 weeks of doxycycline.     --  He should continue on his vest and nebulized therapies.      2.  Nutritional failure.  Kilo has shown evidence of weight stability.  He does remain on tube feeds 5 nights per week.      3.  Low iron level.  Kilo has had trouble getting his iron level up on once daily replacement.  There is consideration of increasing to twice daily.      4.  Low bone mineral density.  Kilo is due to initiate pamidronate.  He is willing to do this at this time.  I will arrange to have it done in [place].      5.  Cystic fibrosis-related diabetes.  Kilo describes good control.  He is followed regularly by Dr. Lee.      6.  Health care maintenance.  Kilo recently underwent a colonoscopy and tolerated this procedure well.      7.  Psychosocial.  Kilo continues to work as a .  He reports that work is going well.  He reports that he is working getting furniture for his new home and is happy with how it is turning out.     8. Pancreatic Insufficiency:  The patient has no new symptoms consistent with worsening malabsorption.    - continue the present dose of pancreatic enzymes  - continue vitamin supplementation.    Jaye Machado MD MPH   of Medicine  Pulmonary, Allergy, Critical Care and Sleep Medicine      Interval History:     Kilo reports that his sputum is green/yellow in color.  His cough frequency and sputum volume are stable.  He denies any trouble with  shortness of breath and is performing 3-4 vest therapies per day.          Review of Systems:     CONSTITUTIONAL: no fever, no chills, no change in weight, no change in energy, no change in appetite    INTEGUMENTARY/SKIN: no rash, no obvious new lesions    ENT/MOUTH: no sore throat, no new sinus pain, no new nasal drainage, no hearing loss, no ear ringing     RESPIRATORY: see interval history    CV: no chest pain, no palpitations, no peripheral edema, no orthopnea, no PND    GI: no nausea, no vomiting, no change in stools, no fatty stools, no GERD, no abdominal pain    : no dysuria, no urinary frequency    MUSCULOSKELETAL: no myalgias, no arthralgias    ENDOCRINE: no excessive thirst, blood sugars with adequate control    NEURO:  No headache, no numbness, no tingling    SLEEP: no issues    PSYCHIATRIC: mood stable          Past Medical and Surgical History:     Past Medical History:   Diagnosis Date     CF (cystic fibrosis) (H)      Exocrine pancreatic insufficiency      Nocardia infection      Pseudomonas infection      S/P gastrostomy (H) 2002     Past Surgical History:   Procedure Laterality Date     COLONOSCOPY N/A 5/21/2018    Procedure: COMBINED COLONOSCOPY, SINGLE OR MULTIPLE BIOPSY/POLYPECTOMY BY BIOPSY;  Cystic fibrosis, 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;  Surgeon: Margarito Bowman MD;  Location: [facility]     GASTROSTOMY TUBE  2002     PICC INSERTION Left 01/22/2018    4Fr SL BioFlo PICC, 46cm (5cm external) in the L basilic vein w/ tip in the low SVC           Family History:     Family History   Problem Relation Age of Onset     HEART DISEASE Maternal Grandmother      HEART DISEASE Maternal Grandfather      HEART DISEASE Paternal Grandmother      Diabetes No family hx of             Social History:     Social History     Social History     Marital status: Single     Spouse name: N/A     Number of children: 0     Years of education: N/A     Occupational History     financial [employer]      Social History Main Topics     Smoking status: Never Smoker     Smokeless tobacco: Never Used     Alcohol use No     Drug use: No     Sexual activity: Yes     Partners: Female     Birth control/ protection: Pill     Other Topics Concern     Blood Transfusions No     Exercise No     Social History Narrative    Kilo lives with wife in a house in [place], [place].  He works as a .        March 2016. He works independently as a . Works out 5x/week and walks the dog daily.        June 2016. No changes. Describes himself as a creature of habit.            Medications:     Current Outpatient Prescriptions   Medication     ACE NOT PRESCRIBED, INTENTIONAL,     acetaminophen-codeine (TYLENOL #3) 300-30 MG per tablet     acetylcysteine (MUCOMYST) 10 % nebulizer solution     amitriptyline (ELAVIL) 10 MG tablet     azithromycin (ZITHROMAX) 250 MG tablet     aztreonam lysine (CAYSTON) 75 MG SOLR     blood glucose (NO BRAND SPECIFIED) lancets standard     blood glucose monitoring (MARTINA CONTOUR NEXT MONITOR) meter device kit     blood glucose monitoring (MARTINA CONTOUR NEXT) test strip     blood glucose monitoring (MARTINA MICROLET) lancets     CALCIUM PO     COMPOUNDED NON-CONTROLLED SUBSTANCE (CMPD RX) - PHARMACY TO MIX COMPOUNDED MEDICATION     CREON 10644-08470 UNITS CPEP per EC capsule     doxycycline (VIBRAMYCIN) 100 MG capsule     [facility] INFUSION MANAGED PATIENT     Ferrous Fumarate-Vitamin C (VITRON-C) 200-125 MG TABS     insulin aspart (NOVOLOG PENFILL) 100 UNIT/ML injection     insulin aspart (NOVOLOG PENFILL) 100 UNIT/ML injection     insulin glargine (LANTUS SOLOSTAR) 100 UNIT/ML injection     insulin pen needle (BD OMI U/F) 32G X 4 MM     levalbuterol (XOPENEX HFA) 45 MCG/ACT Inhaler     levalbuterol (XOPENEX) 1.25 MG/3ML neb solution     magnesium citrate solution     MEPHYTON 5 MG PO TABS     MULTIVITAMIN OR     Nebulizers (EFLOW SCF NEBULIZER HANDSET) MISC     polyethylene  "glycol (MIRALAX) powder     Respiratory Therapy Supplies (KRIS ALTERA NEBULIZER HANDSET) MISC     sodium chloride inhalant 7 % NEBU neb solution     SYMBICORT 160-4.5 MCG/ACT Inhaler     No current facility-administered medications for this visit.             Physical Exam:   /73 (BP Location: Right arm, Patient Position: Sitting, Cuff Size: Adult Regular)  Pulse 94  Resp 17  Ht 1.753 m (5' 9\")  Wt 65.4 kg (144 lb 2.9 oz)  SpO2 95%  BMI 21.29 kg/m2    Constitutional:   Awake, alert and in no apparent distress     Eyes:   nonicteric     ENT:   oral mucosa moist without lesions, normal tm bilaterally, bilateral mucosa normal     Neck:   Supple without supraclavicular or cervical lymphadenopathy     Lungs:   Good air flow.  No crackles. biapical rhonchi.  No wheezes.     Cardiovascular:   Normal S1 and S2.  RRR.  No murmur, gallop or rub.     Abdomen:   NABS, soft, nontender, nondistended.  g tube     Musculoskeletal:   No edema, digital clubbing present     Neurologic:   Alert and conversant.     Skin:   Warm, dry.  No rash on limited exam.             Data:   All laboratory and imaging data reviewed.    Cystic Fibrosis Culture  Specimen Description   Date Value Ref Range Status   06/26/2018 Sputum  Final   04/18/2018 Sputum  Final   04/18/2018 Sputum  Final   04/18/2018 Sputum  Final   04/18/2018 Sputum  Final    Culture Micro   Date Value Ref Range Status   06/26/2018 Heavy growth  Normal santi to date    Preliminary   04/18/2018 Moderate growth  Normal santi    Final   04/18/2018 (A)  Final    Light growth  Pseudomonas aeruginosa, mucoid strain     04/18/2018 (A)  Final    Light growth  Delftia acidovorans ([place])  Identification obtained by MALDI-TOF mass spectrometry research use only database. Test   characteristics determined and verified by the Infectious Diseases Diagnostic Laboratory   ([facility]) [place], [place].     04/18/2018 Light growth  Aspergillus fumigatus   (A)  Final   04/18/2018 Culture " negative for acid fast bacilli  Final   04/18/2018   Final    Assayed at [facility] [address] [place] [address] [phone]   04/18/2018 No growth after 4 weeks  Final        Recent Results (from the past 168 hour(s))   General PFT Lab (Please always keep checked)    Collection Time: 06/26/18 10:15 AM   Result Value Ref Range    FVC-Pred 4.93 L    FVC-Pre 3.83 L    FVC-%Pred-Pre 77 %    FEV1-Pre 1.71 L    FEV1-%Pred-Pre 43 %    FEV1FVC-Pred 79 %    FEV1FVC-Pre 45 %    FEFMax-Pred 9.78 L/sec    FEFMax-Pre 6.86 L/sec    FEFMax-%Pred-Pre 70 %    FEF2575-Pred 3.88 L/sec    FEF2575-Pre 0.62 L/sec    HMU1497-%Pred-Pre 15 %    ExpTime-Pre 13.82 sec    FIFMax-Pre 4.95 L/sec    FEV1FEV6-Pred 82 %    FEV1FEV6-Pre 51 %   Cystic Fibrosis Culture Aerob Bacterial    Collection Time: 06/26/18 10:30 AM   Result Value Ref Range    Specimen Description Sputum     Culture Micro Heavy growth  Normal santi to date          PFT: Severe obstructive lung disease.  When compared to 4/18/2018, the FEV1 and FVC have little change.  The decrease in FVC may represent air trapping v. restrictive physiology.  Lung volumes would be necessary to determine.    Pulmonary exacerbation: absent

## 2018-06-26 NOTE — PATIENT INSTRUCTIONS
Cystic Fibrosis Self-Care Plan       MRN: [id number]   Clinic Date: June 26, 2018   Patient: Nico Morales     RECOMMENDATIONS:   I will have the office work with you on the pamidronate infusion.  Will provide tylenol #3 if you have bone pain.  Will work on getting you the script.  Great job with colonoscopy.    YOUR GOAL:   Enjoy the summer!    CF Nurse Line:  Bailee Henderson and Nelda: [phone]   Felicitas Banuelos, RT: [phone]     Zeina Pierre: [phone] or Ivory Bonilla, Dieticians: [phone]   Jordyn Rudolph, Diabetes Nurse: [phone]      Mary Ritter: [phone] or Marisa Urbina [phone], Social Workers   [email or web address]    Annual Studies:   IGG   Date Value Ref Range Status   05/03/2018 1770 (H) 695 - 1620 mg/dL Final     Insulin   Date Value Ref Range Status   07/26/2011 30 mU/L Final     Comment:     Reference Range:  0-20  +120     There are no preventive care reminders to display for this patient.      Pulmonary Function Tests  FEV1: amount of air you can blow out in 1 second  FVC: total amount of air you can take in and blow out    Your Goals:         PFT Latest Ref Rng & Units 6/26/2018   FVC L 3.83   FEV1 L 1.71   FVC% % 77   FEV1% % 43          Airway Clearance: The Most Important Way to Keep Your Lungs Healthy  Vest Settings:    Hill-Rom Frequencies: 8, 9, 10 Pressure 10 Then, Frequencies 18, 19, 20 Pressure 6      RespirTech: Quick Start with Pressure of     Do each frequency for 5 minutes; Deflate vest after each frequency & cough 3 times before beginning the next setting.    Vest and Neb Therapy should be done 2-3 times/day.    Good Nutrition Can Improve Lung Function and Overall Health     Take ALL of your vitamins with food     Take 1/2 of your enzymes before EVERY meal/snack and the other 1/2 mid-meal/snack    Wt Readings from Last 3 Encounters:   06/18/18 63.5 kg (140 lb)   04/18/18 62.9 kg (138 lb 10.7 oz)   04/02/18 62.5 kg (137 lb 12.6 oz)       There  is no height or weight on file to calculate BMI.         National CF Foundation Recommendations for BMI in CF Adults: Women: at least 22 Men: at least 23        Controlling Blood Sugars Helps Prevent Lung Infections & Improves Nutrition  Test blood sugar:     In the morning before eating (goal is )     2 hours after a meal (goal is less than 150)     When pre-meal glucose is greater than 150 add correction     At bedtime (if less than 100 eat a snack with 15 grams of carbohydrates  Last A1C Results:   Hemoglobin A1C   Date Value Ref Range Status   05/03/2018 6.3 (H) 0 - 5.6 % Final     Comment:     Normal <5.7% Prediabetes 5.7-6.4%  Diabetes 6.5% or higher - adopted from ADA   consensus guidelines.           If diabetic, measure A1C every 6 months. Goal: Under 7%    Staying Healthy    Research:  If you are interested in learning about research opportunities or have questions, please contact Temi Dodge at [phone] or [email or web address].      CF Foundation:  Compass is a personalized resource service to help you with the insurance, financial, legal and other issues you are facing.  It's free, confidential and available to anyone with CF.  Ask your  for more information or contact Compass directly at [phone]-COMPASS ([phone]) or compass@cff.org, or learn more at cff.org/compass.

## 2018-06-26 NOTE — MR AVS SNAPSHOT
After Visit Summary   6/26/2018    Nico Morales    MRN: [id number]           Patient Information     Date Of Birth          1975        Visit Information        Provider Department      6/26/2018 10:40 AM Jaye Machado MD [facility]        Today's Diagnoses     Cystic fibrosis with pulmonary manifestations (H)          Care Instructions    Cystic Fibrosis Self-Care Plan       MRN: [id number]   Clinic Date: June 26, 2018   Patient: Nico Morales     RECOMMENDATIONS:   I will have the office work with you on the pamidronate infusion.  Will provide tylenol #3 if you have bone pain.  Will work on getting you the script.  Great job with colonoscopy.    YOUR GOAL:   Enjoy the summer!    CF Nurse Line:  Jose David Henderson: [phone]   Felicitas Banuelos, RT: [phone]     Zeina Pierre: [phone] or Ivory Bonilla, Dieticians: [phone]   Jordyn Rudolph, Diabetes Nurse: [phone]      Mary Ritter: [phone] or Marisa Urbina [phone], Social Workers   [email or web address]    Annual Studies:   IGG   Date Value Ref Range Status   05/03/2018 1770 (H) 695 - 1620 mg/dL Final     Insulin   Date Value Ref Range Status   07/26/2011 30 mU/L Final     Comment:     Reference Range:  0-20  +120     There are no preventive care reminders to display for this patient.      Pulmonary Function Tests  FEV1: amount of air you can blow out in 1 second  FVC: total amount of air you can take in and blow out    Your Goals:         PFT Latest Ref Rng & Units 6/26/2018   FVC L 3.83   FEV1 L 1.71   FVC% % 77   FEV1% % 43          Airway Clearance: The Most Important Way to Keep Your Lungs Healthy  Vest Settings:    Hill-Rom Frequencies: 8, 9, 10 Pressure 10 Then, Frequencies 18, 19, 20 Pressure 6      RespirTech: Quick Start with Pressure of     Do each frequency for 5 minutes; Deflate vest after each frequency & cough 3 times before beginning  the next setting.    Vest and Neb Therapy should be done 2-3 times/day.    Good Nutrition Can Improve Lung Function and Overall Health     Take ALL of your vitamins with food     Take 1/2 of your enzymes before EVERY meal/snack and the other 1/2 mid-meal/snack    Wt Readings from Last 3 Encounters:   06/18/18 63.5 kg (140 lb)   04/18/18 62.9 kg (138 lb 10.7 oz)   04/02/18 62.5 kg (137 lb 12.6 oz)       There is no height or weight on file to calculate BMI.         National CF Foundation Recommendations for BMI in CF Adults: Women: at least 22 Men: at least 23        Controlling Blood Sugars Helps Prevent Lung Infections & Improves Nutrition  Test blood sugar:     In the morning before eating (goal is )     2 hours after a meal (goal is less than 150)     When pre-meal glucose is greater than 150 add correction     At bedtime (if less than 100 eat a snack with 15 grams of carbohydrates  Last A1C Results:   Hemoglobin A1C   Date Value Ref Range Status   05/03/2018 6.3 (H) 0 - 5.6 % Final     Comment:     Normal <5.7% Prediabetes 5.7-6.4%  Diabetes 6.5% or higher - adopted from ADA   consensus guidelines.           If diabetic, measure A1C every 6 months. Goal: Under 7%    Staying Healthy    Research:  If you are interested in learning about research opportunities or have questions, please contact Temi Dodge at [phone] or [email or web address].      CF Foundation:  Compass is a personalized resource service to help you with the insurance, financial, legal and other issues you are facing.  It's free, confidential and available to anyone with CF.  Ask your  for more information or contact Compass directly at [phone]-COMPASS ([phone]) or compass@cff.org, or learn more at cff.org/compass.                             Follow-ups after your visit        Follow-up notes from your care team     Return as scheduled.      Your next 10 appointments already scheduled     Aug 06, 2018  3:45 PM CDT   Return Visit  with Diana Lee MD, MG ENDO NURSE   [facility] ([facility])    [id number] [place] [address]   [phone]            Aug 08, 2018 10:30 AM CDT   CF LOOP with UC PFL CF   [facility] ([facility])    [address]  [address] [facility] [address]   [phone]            Aug 08, 2018 11:10 AM CDT   (Arrive by 10:55 AM)   RETURN CYSTIC FIBROSIS VISIT with Jaye Machado MD   [facility] ([facility])    [address]  [address]  [facility] [address]   [phone]            Sep 18, 2018 11:00 AM CDT   CF LOOP with UC PFL CF   [facility] ([facility])    [address]  [address] [facility] [address]   [phone]            Sep 18, 2018 11:20 AM CDT   (Arrive by 11:05 AM)   RETURN CYSTIC FIBROSIS VISIT with Jaye Machado MD   [facility] ([facility])    [address]  [address]  [facility] [address]   [phone]            Oct 30, 2018 11:00 AM CDT   CF LOOP with UC PFL CF   [facility] ([facility])    [address]  [address] [facility] [address]   [phone]            Oct 30, 2018 11:20 AM CDT   (Arrive by 11:05 AM)   RETURN CYSTIC FIBROSIS VISIT with Jaye Machado MD   [facility] ([facility])    [address]  [address]  [facility] [address]   [phone]            Dec 11, 2018 11:00 AM CST   CF LOOP with UC PFL CF   [facility] ([facility])    [address]  3rd Floor  [facility] [address]   [phone]            Dec 11, 2018 11:20 AM CST   (Arrive by 11:05 AM)  "  RETURN CYSTIC FIBROSIS VISIT with Jaye Machado MD   [facility] ([facility])    [address]  [address] [place] [address] [phone]              Who to contact     If you have questions or need follow up information about today's clinic visit or your schedule please contact [facility] directly at [phone].  Normal or non-critical lab and imaging results will be communicated to you by Runnerhart, letter or phone within 4 business days after the clinic has received the results. If you do not hear from us within 7 days, please contact the clinic through Wifi Onlinet or phone. If you have a critical or abnormal lab result, we will notify you by phone as soon as possible.  Submit refill requests through Spotify or call your pharmacy and they will forward the refill request to us. Please allow 3 business days for your refill to be completed.          Additional Information About Your Visit        RunnerharYu Rong Information     Spotify gives you secure access to your electronic health record. If you see a primary care provider, you can also send messages to your care team and make appointments. If you have questions, please call your primary care clinic.  If you do not have a primary care provider, please call [phone] and they will assist you.        Care EveryWhere ID     This is your Care EveryWhere ID. This could be used by other organizations to access your [facility] medical records  [id number]        Your Vitals Were     Pulse Respirations Height Pulse Oximetry BMI (Body Mass Index)       94 17 1.753 m (5' 9\") 95% 21.29 kg/m2        Blood Pressure from Last 3 Encounters:   06/26/18 109/73   06/18/18 111/69   05/21/18 103/71    Weight from Last 3 Encounters:   06/26/18 65.4 kg (144 lb 2.9 oz)   06/18/18 63.5 kg (140 lb)   04/18/18 62.9 kg (138 lb 10.7 oz)              We Performed the Following     " Cystic Fibrosis Culture Aerob Bacterial     RESPIRATORY FLOW VOLUME LOOP          Today's Medication Changes          These changes are accurate as of 6/26/18 11:59 PM.  If you have any questions, ask your nurse or doctor.               Start taking these medicines.        Dose/Directions    acetaminophen-codeine 300-30 MG per tablet   Commonly known as:  TYLENOL #3   Used for:  Cystic fibrosis with pulmonary manifestations (H)   Started by:  Jaye Machado MD        Dose:  1-2 tablet   Take 1-2 tablets by mouth every 6 hours as needed for severe pain   Quantity:  6 tablet   Refills:  0            Where to get your medicines      Some of these will need a paper prescription and others can be bought over the counter.  Ask your nurse if you have questions.     Bring a paper prescription for each of these medications     acetaminophen-codeine 300-30 MG per tablet               Information about OPIOIDS     PRESCRIPTION OPIOIDS: WHAT YOU NEED TO KNOW   We gave you an opioid (narcotic) pain medicine. It is important to manage your pain, but opioids are not always the best choice. You should first try all the other options your care team gave you. Take this medicine for as short a time (and as few doses) as possible.     These medicines have risks:    DO NOT drive when on new or higher doses of pain medicine. These medicines can affect your alertness and reaction times, and you could be arrested for driving under the influence (DUI). If you need to use opioids long-term, talk to your care team about driving.    DO NOT operate heave machinery    DO NOT do any other dangerous activities while taking these medicines.     DO NOT drink any alcohol while taking these medicines.      If the opioid prescribed includes acetaminophen, DO NOT take with any other medicines that contain acetaminophen. Read all labels carefully. Look for the word  acetaminophen  or  Tylenol.  Ask your pharmacist if you have questions or are  unsure.    You can get addicted to pain medicines, especially if you have a history of addiction (chemical, alcohol or substance dependence). Talk to your care team about ways to reduce this risk.    Store your pills in a secure place, locked if possible. We will not replace any lost or stolen medicine. If you don t finish your medicine, please throw away (dispose) as directed by your pharmacist. The [place] Pollution Control Agency has more information about safe disposal: [email or web address].[email or web address]/living-green/managing-unwanted-medications.     All opioids tend to cause constipation. Drink plenty of water and eat foods that have a lot of fiber, such as fruits, vegetables, prune juice, apple juice and high-fiber cereal. Take a laxative (Miralax, milk of magnesia, Colace, Senna) if you don t move your bowels at least every other day.          Primary Care Provider Office Phone # Fax #    Jaye Machado [phone]       [address] [address]        Equal Access to Services     [facility]: Hadii aad ku hadasho Soomaali, waaxda luqadaha, qaybta kaalmada adeegyawill, waxgrady castellon . So [facility] [phone].    ATENCIÓN: Si habla español, tiene a monroe disposición servicios gratuitos de asistencia lingüística. Llame al [phone].    We comply with applicable federal civil rights laws and [place] laws. We do not discriminate on the basis of race, color, national origin, age, disability, sex, sexual orientation, or gender identity.            Thank you!     Thank you for choosing [facility]  for your care. Our goal is always to provide you with excellent care. Hearing back from our patients is one way we can continue to improve our services. Please take a few minutes to complete the written survey that you may receive in the mail after your visit with us. Thank you!             Your Updated Medication List -  Protect others around you: Learn how to safely use, store and throw away your medicines at www.disposemymeds.org.          This list is accurate as of 6/26/18 11:59 PM.  Always use your most recent med list.                   Brand Name Dispense Instructions for use Diagnosis    ACE NOT PRESCRIBED (INTENTIONAL)     0 each    1 each continuous prn. ACE Inhibitor not prescribed due to Risk for drug interaction    Type I (juvenile type) diabetes mellitus without mention of complication, not stated as uncontrolled       acetaminophen-codeine 300-30 MG per tablet    TYLENOL #3    6 tablet    Take 1-2 tablets by mouth every 6 hours as needed for severe pain    Cystic fibrosis with pulmonary manifestations (H)       acetylcysteine 10 % nebulizer solution    MUCOMYST    480 mL    Nebulize 4ml qid    Cystic fibrosis with pulmonary manifestations (H)       amitriptyline 10 MG tablet    ELAVIL    90 tablet    TAKE ONE TABLET BY MOUTH AT BEDTIME.    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    Diabetes mellitus related to CF (cystic fibrosis) (H)       blood glucose monitoring meter device kit     1 kit    Use  "to test blood sugar 6 times daily or as directed.    Diabetes mellitus related to CF (cystic fibrosis) (H)       blood glucose monitoring test strip    MARTINA CONTOUR NEXT    200 each    Use to test blood sugar 6 times daily or as directed.    Diabetes mellitus related to CF (cystic fibrosis) (H)       CALCIUM PO      Take 1 tablet by mouth 2 times daily Strength unknown.        COMPOUNDED NON-CONTROLLED SUBSTANCE - PHARMACY TO MIX COMPOUNDED MEDICATION    CMPD RX    1 Box    For testing need about 0.5 cc of each Ceftazidime 100 mg/ml( but at 1/10 concentration) Levofloxacin 25 mg/ml (but at 1/1000 dilution).   1:10 dilution Penicillin G (initial concentration 500,000 units) 1:100 dilution Penicillin G (initial concentration 500,000 units) 0.2 cc pre-pen in syringe with intradermal needle  500 mg Amoxacillin for oral use    Drug allergy, antibiotic       CREON [address]43-[address]32 units Cpep per EC capsule   Generic drug:  amylase-lipase-protease     2700 capsule    TAKE 7-8 CAPS WITH MEALS (3 MEALS/DAY) AND 2 CAPS WITH SNACKS (3 SNACKS/DAY)    Cystic fibrosis with pulmonary manifestations (H)       doxycycline 100 MG capsule    VIBRAMYCIN    60 capsule    Take 1 capsule (100 mg) by mouth 2 times daily    Cystic fibrosis with pulmonary manifestations (H)       EFLOW SCF NEBULIZER HANDSET Misc     1 each    1 each 3 times daily.    Nocardia infection       [facility] INFUSION MANAGED PATIENT      Contact [facility] for patient specific medication information at [phone] on admission and discharge from the hospital.  Phones are answered 24 hours a day 7 days a week 365 days a year.  Providers - Choose \"CONTINUE HOME MED (no script)\" at discharge if patient treatment with home infusion will continue.        insulin glargine 100 UNIT/ML injection    LANTUS SOLOSTAR    15 mL    6 units daily    Diabetes mellitus related to CF (cystic fibrosis) (H)       insulin pen needle 32G X 4 MM    BD OMI U/F    200 each " "   Use 6 daily or as directed.    Diabetes mellitus related to CF (cystic fibrosis) (H)       levalbuterol 1.25 MG/3ML neb solution    XOPENEX    360 mL    INHALE 1 VIAL BY MOUTH INTO THE LUNGS VIA NEBULIZATION 4 TIMES DAILY    Cystic fibrosis (H), CF (cystic fibrosis) (H), Cystic fibrosis with pulmonary manifestations (H)       levalbuterol 45 MCG/ACT Inhaler    XOPENEX HFA    3 Inhaler    Inhale 2 puffs into the lungs every 6 hours as needed for shortness of breath / dyspnea    Cystic fibrosis (H)       magnesium citrate solution     296 mL    Take 296 mLs by mouth See Admin Instructions Refer to the \"Getting Ready for a Colonoscopy\" patient handout    Encounter for screening colonoscopy       MEPHYTON 5 MG tablet   Generic drug:  phytonadione      Take 1 TABLET by mouth DAILY.        MULTIVITAMIN PO      Take 1 tablet by mouth daily. Includes 5,000 units vitamin D and 400 units vitamin E. Per pt, formulated for CF pts.        * NovoLOG PENFILL 100 UNIT/ML injection   Generic drug:  insulin aspart     15 mL    1 per 20 grams of carbohydrate with meals and snacks. Also use 3 units of Novolog at night with tube feeding.    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    Cystic fibrosis with meconium ileus (H)       sodium chloride inhalant 7 % " Nebu neb solution     240 mL    NEBULIZE 4ML TWICE DAILY    Cystic fibrosis with pulmonary manifestations (H)       SYMBICORT 160-4.5 MCG/ACT Inhaler   Generic drug:  budesonide-formoterol     10.2 Inhaler    INHALE 2 PUFFS BY MOUTH INTO THE LUNGS TWICE DAILY    Cystic fibrosis of the lung (H), Cystic fibrosis (H)       VITRON-C 200-125 MG Tabs   Generic drug:  ferrous fumarate 65 mg ([place]. FE)-Vitamin C 125 mg      Take 1 tablet by mouth 2 times daily (with meals)    Nocardia infection, Cystic fibrosis with pulmonary manifestations (H)       * Notice:  This list has 2 medication(s) that are the same as other medications prescribed for you. Read the directions carefully, and ask your doctor or other care provider to review them with you.

## 2018-06-26 NOTE — LETTER
6/26/2018       RE: Nico Morales  [address]  [facility] [address]     Dear Colleague,    Thank you for referring your patient, Nico Morales, to the [facility] at General acute hospital. Please see a copy of my visit note below.    [facility]  June 26, 2018         Assessment and Plan:   Nico Morales is a 42 year old male with cystic fibrosis.    1. CF lung disease with severe obstruction:  Kilo has shown evidence of stability in his pulmonary function as well as his pulmonary symptomatology.  He continues to show evidence of Pseudomonas in his sputum.  At this time, I would recommend:    --  That he continue [place], on 2 weeks, off 2 weeks, alternating with 2 weeks of doxycycline.     --  He should continue on his vest and nebulized therapies.      2.  Nutritional failure.  Kilo has shown evidence of weight stability.  He does remain on tube feeds 5 nights per week.      3.  Low iron level.  Kilo has had trouble getting his iron level up on once daily replacement.  There is consideration of increasing to twice daily.      4.  Low bone mineral density.  Kilo is due to initiate pamidronate.  He is willing to do this at this time.  I will arrange to have it done in [place].      5.  Cystic fibrosis-related diabetes.  Kilo describes good control.  He is followed regularly by Dr. Lee.      6.  Health care maintenance.  Kilo recently underwent a colonoscopy and tolerated this procedure well.      7.  Psychosocial.  Kilo continues to work as a .  He reports that work is going well.  He reports that he is working getting furniture for his new home and is happy with how it is turning out.     8. Pancreatic Insufficiency:  The patient has no new symptoms consistent with worsening malabsorption.    - continue the present dose of pancreatic enzymes  - continue  vitamin supplementation.    Jaye Machado MD MPH   of Medicine  Pulmonary, Allergy, Critical Care and Sleep Medicine      Interval History:     Kilo reports that his sputum is green/yellow in color.  His cough frequency and sputum volume are stable.  He denies any trouble with shortness of breath and is performing 3-4 vest therapies per day.          Review of Systems:     CONSTITUTIONAL: no fever, no chills, no change in weight, no change in energy, no change in appetite    INTEGUMENTARY/SKIN: no rash, no obvious new lesions    ENT/MOUTH: no sore throat, no new sinus pain, no new nasal drainage, no hearing loss, no ear ringing     RESPIRATORY: see interval history    CV: no chest pain, no palpitations, no peripheral edema, no orthopnea, no PND    GI: no nausea, no vomiting, no change in stools, no fatty stools, no GERD, no abdominal pain    : no dysuria, no urinary frequency    MUSCULOSKELETAL: no myalgias, no arthralgias    ENDOCRINE: no excessive thirst, blood sugars with adequate control    NEURO:  No headache, no numbness, no tingling    SLEEP: no issues    PSYCHIATRIC: mood stable          Past Medical and Surgical History:     Past Medical History:   Diagnosis Date     CF (cystic fibrosis) (H)      Exocrine pancreatic insufficiency      Nocardia infection      Pseudomonas infection      S/P gastrostomy (H) 2002     Past Surgical History:   Procedure Laterality Date     COLONOSCOPY N/A 5/21/2018    Procedure: COMBINED COLONOSCOPY, SINGLE OR MULTIPLE BIOPSY/POLYPECTOMY BY BIOPSY;  Cystic fibrosis, 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;  Surgeon: Margarito Bowman MD;  Location: [facility]     GASTROSTOMY TUBE  2002     PICC INSERTION Left 01/22/2018    4Fr SL BioFlo PICC, 46cm (5cm external) in the L basilic vein w/ tip in the low SVC           Family History:     Family History   Problem Relation Age of Onset     HEART DISEASE Maternal Grandmother      HEART DISEASE Maternal Grandfather       HEART DISEASE Paternal Grandmother      Diabetes No family hx of             Social History:     Social History     Social History     Marital status: Single     Spouse name: N/A     Number of children: 0     Years of education: N/A     Occupational History     financial [employer]     Social History Main Topics     Smoking status: Never Smoker     Smokeless tobacco: Never Used     Alcohol use No     Drug use: No     Sexual activity: Yes     Partners: Female     Birth control/ protection: Pill     Other Topics Concern     Blood Transfusions No     Exercise No     Social History Narrative    Kilo lives with wife in a house in [place], [place].  He works as a .        March 2016. He works independently as a . Works out 5x/week and walks the dog daily.        June 2016. No changes. Describes himself as a creature of habit.            Medications:     Current Outpatient Prescriptions   Medication     ACE NOT PRESCRIBED, INTENTIONAL,     acetaminophen-codeine (TYLENOL #3) 300-30 MG per tablet     acetylcysteine (MUCOMYST) 10 % nebulizer solution     amitriptyline (ELAVIL) 10 MG tablet     azithromycin (ZITHROMAX) 250 MG tablet     aztreonam lysine (CAYSTON) 75 MG SOLR     blood glucose (NO BRAND SPECIFIED) lancets standard     blood glucose monitoring (MARTINA CONTOUR NEXT MONITOR) meter device kit     blood glucose monitoring (MARTINA CONTOUR NEXT) test strip     blood glucose monitoring (MARTINA MICROLET) lancets     CALCIUM PO     COMPOUNDED NON-CONTROLLED SUBSTANCE (CMPD RX) - PHARMACY TO MIX COMPOUNDED MEDICATION     CREON 86225-28537 UNITS CPEP per EC capsule     doxycycline (VIBRAMYCIN) 100 MG capsule     [facility] INFUSION MANAGED PATIENT     Ferrous Fumarate-Vitamin C (VITRON-C) 200-125 MG TABS     insulin aspart (NOVOLOG PENFILL) 100 UNIT/ML injection     insulin aspart (NOVOLOG PENFILL) 100 UNIT/ML injection     insulin glargine (LANTUS SOLOSTAR) 100 UNIT/ML injection  "    insulin pen needle (BD OMI U/F) 32G X 4 MM     levalbuterol (XOPENEX HFA) 45 MCG/ACT Inhaler     levalbuterol (XOPENEX) 1.25 MG/3ML neb solution     magnesium citrate solution     MEPHYTON 5 MG PO TABS     MULTIVITAMIN OR     Nebulizers (EFLOW SCF NEBULIZER HANDSET) MISC     polyethylene glycol (MIRALAX) powder     Respiratory Therapy Supplies (KRIS ALTERA NEBULIZER HANDSET) MISC     sodium chloride inhalant 7 % NEBU neb solution     SYMBICORT 160-4.5 MCG/ACT Inhaler     No current facility-administered medications for this visit.             Physical Exam:   /73 (BP Location: Right arm, Patient Position: Sitting, Cuff Size: Adult Regular)  Pulse 94  Resp 17  Ht 1.753 m (5' 9\")  Wt 65.4 kg (144 lb 2.9 oz)  SpO2 95%  BMI 21.29 kg/m2    Constitutional:   Awake, alert and in no apparent distress     Eyes:   nonicteric     ENT:   oral mucosa moist without lesions, normal tm bilaterally, bilateral mucosa normal     Neck:   Supple without supraclavicular or cervical lymphadenopathy     Lungs:   Good air flow.  No crackles. biapical rhonchi.  No wheezes.     Cardiovascular:   Normal S1 and S2.  RRR.  No murmur, gallop or rub.     Abdomen:   NABS, soft, nontender, nondistended.  g tube     Musculoskeletal:   No edema, digital clubbing present     Neurologic:   Alert and conversant.     Skin:   Warm, dry.  No rash on limited exam.             Data:   All laboratory and imaging data reviewed.    Cystic Fibrosis Culture  Specimen Description   Date Value Ref Range Status   06/26/2018 Sputum  Final   04/18/2018 Sputum  Final   04/18/2018 Sputum  Final   04/18/2018 Sputum  Final   04/18/2018 Sputum  Final    Culture Micro   Date Value Ref Range Status   06/26/2018 Heavy growth  Normal santi to date    Preliminary   04/18/2018 Moderate growth  Normal santi    Final   04/18/2018 (A)  Final    Light growth  Pseudomonas aeruginosa, mucoid strain     04/18/2018 (A)  Final    Light growth  Delftia acidovorans " ([place])  Identification obtained by MALDI-TOF mass spectrometry research use only database. Test   characteristics determined and verified by the [facility]   ([facility]) [place], [place].     04/18/2018 Light growth  Aspergillus fumigatus   (A)  Final   04/18/2018 Culture negative for acid fast bacilli  Final   04/18/2018   Final    Assayed at [facility] [address] [place] [address] [phone]   04/18/2018 No growth after 4 weeks  Final        Recent Results (from the past 168 hour(s))   General PFT Lab (Please always keep checked)    Collection Time: 06/26/18 10:15 AM   Result Value Ref Range    FVC-Pred 4.93 L    FVC-Pre 3.83 L    FVC-%Pred-Pre 77 %    FEV1-Pre 1.71 L    FEV1-%Pred-Pre 43 %    FEV1FVC-Pred 79 %    FEV1FVC-Pre 45 %    FEFMax-Pred 9.78 L/sec    FEFMax-Pre 6.86 L/sec    FEFMax-%Pred-Pre 70 %    FEF2575-Pred 3.88 L/sec    FEF2575-Pre 0.62 L/sec    VCP5336-%Pred-Pre 15 %    ExpTime-Pre 13.82 sec    FIFMax-Pre 4.95 L/sec    FEV1FEV6-Pred 82 %    FEV1FEV6-Pre 51 %   Cystic Fibrosis Culture Aerob Bacterial    Collection Time: 06/26/18 10:30 AM   Result Value Ref Range    Specimen Description Sputum     Culture Micro Heavy growth  Normal santi to date          PFT: Severe obstructive lung disease.  When compared to 4/18/2018, the FEV1 and FVC have little change.  The decrease in FVC may represent air trapping v. restrictive physiology.  Lung volumes would be necessary to determine.    Pulmonary exacerbation: absent        Nutrition Note    Reason for Visit:  Low Iron, Weight Gain    Pt completed annual study labs 5/3/18, significant for persistently low serum iron level.  He is currently taking 1 tablet per day of Vitron-C, previously on twice per day but this was reduced at some point in time, he cannot recall the reason. He is willing to increase back to twice daily. Although prescribed, insurance does not cover this medication and pt pays out-of-pocket.   He is not concerned about the increase in cost.     Regarding weight, pt has been working on gaining weight at home and is seeing some success. Weight today up to ~144# from 140# since last visit, he is pleased with this progress.  He's added in extra snacks in the late evening and moved his dinner to earlier in the day to help with fullness.  Good progress so far, he is looking forward to getting >145#.  Will follow.     Interventions/Recommendations:  Medication list updated with new Vitron-C dose.  Will follow pt for further support with weight gain and nutrition care planning at CF clinic visits.       Ivory Boinlla MS, RD, LD (pager [phone])  Cystic Fibrosis/Lung Transplant Dietitian      -Available Mon-Thurs 8 AM-4:30 PM. On Fridays please contact pager [phone] (Zeina Pierre RD) and on weekends/holidays contact coverage dietitian at pager [phone] (inpatient use only).       Again, thank you for allowing me to participate in the care of your patient.      Sincerely,    Jaye Machado MD

## 2018-06-28 ENCOUNTER — TELEPHONE (OUTPATIENT)
Dept: PULMONOLOGY | Facility: CLINIC | Age: 43
End: 2018-06-28

## 2018-06-28 NOTE — TELEPHONE ENCOUNTER
Received call from [facility] to authorize 50PSI nebulizer compressor. Verbal order from Dr DAVID Machado given.

## 2018-07-01 LAB
BACTERIA SPEC CULT: ABNORMAL
SPECIMEN SOURCE: ABNORMAL

## 2018-07-02 ENCOUNTER — CARE COORDINATION (OUTPATIENT)
Dept: PULMONOLOGY | Facility: CLINIC | Age: 43
End: 2018-07-02

## 2018-07-02 DIAGNOSIS — E84.0 CYSTIC FIBROSIS WITH PULMONARY MANIFESTATIONS (H): Primary | ICD-10-CM

## 2018-07-02 RX ORDER — SULFAMETHOXAZOLE/TRIMETHOPRIM 800-160 MG
1 TABLET ORAL 3 TIMES DAILY
Qty: 42 TABLET | Refills: 0 | Status: SHIPPED | OUTPATIENT
Start: 2018-07-02 | End: 2019-02-21

## 2018-07-02 NOTE — PROGRESS NOTES
Pt called per Dr. Machado request as he is growing MSSA and Steno and she would like to treat him.  Pt is agreeable to the plan, bactrim called into local pharmacy.  Pt asked to hold doxy while taking bactrim and to use extra sun screen.

## 2018-07-16 DIAGNOSIS — E84.0 CYSTIC FIBROSIS WITH PULMONARY MANIFESTATIONS (H): ICD-10-CM

## 2018-07-16 RX ORDER — DOXYCYCLINE 100 MG/1
CAPSULE ORAL
Qty: 60 CAPSULE | Refills: 2 | Status: SHIPPED | OUTPATIENT
Start: 2018-07-16 | End: 2018-11-24

## 2018-08-06 ENCOUNTER — OFFICE VISIT (OUTPATIENT)
Dept: ENDOCRINOLOGY | Facility: CLINIC | Age: 43
End: 2018-08-06
Payer: COMMERCIAL

## 2018-08-06 VITALS
SYSTOLIC BLOOD PRESSURE: 106 MMHG | HEART RATE: 88 BPM | DIASTOLIC BLOOD PRESSURE: 69 MMHG | OXYGEN SATURATION: 97 % | HEIGHT: 69 IN | BODY MASS INDEX: 20.9 KG/M2 | WEIGHT: 141.09 LBS

## 2018-08-06 DIAGNOSIS — E08.9 DIABETES MELLITUS DUE TO CYSTIC FIBROSIS (H): ICD-10-CM

## 2018-08-06 DIAGNOSIS — E84.9 DIABETES MELLITUS DUE TO CYSTIC FIBROSIS (H): ICD-10-CM

## 2018-08-06 LAB — HBA1C MFR BLD: 6.5 % (ref 0–5.7)

## 2018-08-06 PROCEDURE — 36415 COLL VENOUS BLD VENIPUNCTURE: CPT | Performed by: INTERNAL MEDICINE

## 2018-08-06 PROCEDURE — 99214 OFFICE O/P EST MOD 30 MIN: CPT | Performed by: INTERNAL MEDICINE

## 2018-08-06 PROCEDURE — 83036 HEMOGLOBIN GLYCOSYLATED A1C: CPT | Performed by: INTERNAL MEDICINE

## 2018-08-06 NOTE — MR AVS SNAPSHOT
After Visit Summary   8/6/2018    Nico Morales    MRN: [id number]           Patient Information     Date Of Birth          1975        Visit Information        Provider Department      8/6/2018 3:45 PM Diana Lee MD; MG ENDO NURSE [facility]        Today's Diagnoses     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)          Care Instructions    Please call [phone] to schedule with Dr. Lee at the [facility]          Follow-ups after your visit        Follow-up notes from your care team     Return in about 6 months (around 2/6/2019).      Your next 10 appointments already scheduled     Aug 08, 2018 10:30 AM CDT   CF LOOP with UC PFL CF   [facility] Pulmonary Function Testing ([facility])    [address]  [address] [facility] [address]   [phone]            Aug 08, 2018 11:10 AM CDT   (Arrive by 10:55 AM)   RETURN CYSTIC FIBROSIS VISIT with Jaye Machado MD   [facility] ([facility])    [address] [place] [address]   [phone]            Sep 18, 2018 11:00 AM CDT   CF LOOP with UC PFL CF   [facility] Pulmonary Function Testing ([facility])    [address] [place] [address]  [address] [facility] [address]   [phone]            Sep 18, 2018 11:20 AM CDT   (Arrive by 11:05 AM)   RETURN CYSTIC FIBROSIS VISIT with Jaye Machado MD   [facility] ([facility])    [address] [place] [address]   [phone]            Oct 30, 2018 11:00 AM CDT   CF LOOP with UC PFL CF   [facility] Pulmonary Function Testing ([facility])    [address] [place] [address]   [phone]            Oct 30, 2018 11:20 AM CDT   (Arrive by 11:05 AM)   RETURN CYSTIC FIBROSIS VISIT with Jaye  Maki Machado MD   [facility] ([facility])    [address]  [address]  [facility] [address]   [phone]            Dec 11, 2018 11:00 AM CST   CF LOOP with UC PFL CF   [facility] Pulmonary Function Testing ([facility])    [address]  3rd Floor  [facility] [address]   [phone]            Dec 11, 2018 11:20 AM CST   (Arrive by 11:05 AM)   RETURN CYSTIC FIBROSIS VISIT with Jaye Machado MD   [facility] ([facility])    [address]  [address]  [facility] [address]   [phone]              Who to contact     If you have questions or need follow up information about today's clinic visit or your schedule please contact [facility] directly at [phone].  Normal or non-critical lab and imaging results will be communicated to you by [facility], letter or phone within 4 business days after the clinic has received the results. If you do not hear from us within 7 days, please contact the clinic through [employer] or phone. If you have a critical or abnormal lab result, we will notify you by phone as soon as possible.  Submit refill requests through [employer] or call your pharmacy and they will forward the refill request to us. Please allow 3 business days for your refill to be completed.          Additional Information About Your Visit        [employer] Information     [employer] gives you secure access to your electronic health record. If you see a primary care provider, you can also send messages to your care team and make appointments. If you have questions, please call your primary care clinic.  If you do not have a primary care provider, please call [phone] and they will assist you.      [employer] is an electronic gateway that provides easy, online access to your medical records. With [employer], you can request a  "clinic appointment, read your test results, renew a prescription or communicate with your care team.     To access your existing account, please contact your [facility] or call [phone] for assistance.        Care EveryWhere ID     This is your Care EveryWhere ID. This could be used by other organizations to access your [place] medical records  [id number]        Your Vitals Were     Pulse Height Pulse Oximetry BMI (Body Mass Index)          88 1.753 m (5' 9\") 97% 20.84 kg/m2         Blood Pressure from Last 3 Encounters:   08/06/18 106/69   06/26/18 109/73   06/18/18 111/69    Weight from Last 3 Encounters:   08/06/18 64 kg (141 lb 1.5 oz)   06/26/18 65.4 kg (144 lb 2.9 oz)   06/18/18 63.5 kg (140 lb)              We Performed the Following     Hemoglobin A1c POCT        Primary Care Provider Office Phone # Fax #    Jaye Machado [phone]       [address] [place] [address]        Equal Access to Services     [facility] : Hadii favio ku hadasho [place], waaxda luqadaha, qaybta kaalmada adegriselda, joe mack. So [facility] [phone].    ATENCIÓN: Si habla español, tiene a monroe disposición servicios gratuitos de asistencia lingüística. [facility] [phone].    We comply with applicable federal civil rights laws and [place] laws. We do not discriminate on the basis of race, color, national origin, age, disability, sex, sexual orientation, or gender identity.            Thank you!     Thank you for choosing [facility]  for your care. Our goal is always to provide you with excellent care. Hearing back from our patients is one way we can continue to improve our services. Please take a few minutes to complete the written survey that you may receive in the mail after your visit with us. Thank you!             Your Updated Medication List - Protect others around you: Learn how to safely use, " store and throw away your medicines at www.disposemymeds.org.          This list is accurate as of 8/6/18  4:45 PM.  Always use your most recent med list.                   Brand Name Dispense Instructions for use Diagnosis    ACE NOT PRESCRIBED (INTENTIONAL)     0 each    1 each continuous prn. ACE Inhibitor not prescribed due to Risk for drug interaction    Type I (juvenile type) diabetes mellitus without mention of complication, not stated as uncontrolled       acetaminophen-codeine 300-30 MG per tablet    TYLENOL #3    6 tablet    Take 1-2 tablets by mouth every 6 hours as needed for severe pain    Cystic fibrosis with pulmonary manifestations (H)       acetylcysteine 10 % nebulizer solution    MUCOMYST    480 mL    Nebulize 4ml qid    Cystic fibrosis with pulmonary manifestations (H)       amitriptyline 10 MG tablet    ELAVIL    90 tablet    TAKE ONE TABLET BY MOUTH AT BEDTIME.    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    Diabetes mellitus related to CF (cystic fibrosis) (H)       blood glucose monitoring meter device kit     1 kit    Use to test blood sugar 6 times daily or as directed.     "Diabetes mellitus related to CF (cystic fibrosis) (H)       blood glucose monitoring test strip    MARTINA CONTOUR NEXT    200 each    Use to test blood sugar 6 times daily or as directed.    Diabetes mellitus related to CF (cystic fibrosis) (H)       CALCIUM PO      Take 1 tablet by mouth 2 times daily Strength unknown.        COMPOUNDED NON-CONTROLLED SUBSTANCE - PHARMACY TO MIX COMPOUNDED MEDICATION    CMPD RX    1 Box    For testing need about 0.5 cc of each Ceftazidime 100 mg/ml( but at 1/10 concentration) Levofloxacin 25 mg/ml (but at 1/1000 dilution).   1:10 dilution Penicillin G (initial concentration 500,000 units) 1:100 dilution Penicillin G (initial concentration 500,000 units) 0.2 cc pre-pen in syringe with intradermal needle  500 mg Amoxacillin for oral use    Drug allergy, antibiotic       CREON 37658-92443 units Cpep per EC capsule   Generic drug:  amylase-lipase-protease     2700 capsule    TAKE 7-8 CAPS WITH MEALS (3 MEALS/DAY) AND 2 CAPS WITH SNACKS (3 SNACKS/DAY)    Cystic fibrosis with pulmonary manifestations (H)       doxycycline 100 MG capsule    VIBRAMYCIN    60 capsule    TAKE 1 CAPSULE BY MOUTH TWICE DAILY    Cystic fibrosis with pulmonary manifestations (H)       EFLOW SCF NEBULIZER HANDSET Misc     1 each    1 each 3 times daily.    Nocardia infection       [facility] INFUSION MANAGED PATIENT      Contact [facility] for patient specific medication information at [phone] on admission and discharge from the hospital.  Phones are answered 24 hours a day 7 days a week 365 days a year.  Providers - Choose \"CONTINUE HOME MED (no script)\" at discharge if patient treatment with home infusion will continue.        insulin aspart 100 UNIT/ML injection    NovoLOG PENFILL    30 mL    Inject 1 unit per 20G CHO at lunch and supper and 3 units at night with tube feedings, up to 20 units daily    Diabetes mellitus related to CF (cystic fibrosis) (H)       insulin glargine 100 UNIT/ML " "injection    LANTUS SOLOSTAR    15 mL    6 units daily    Diabetes mellitus related to CF (cystic fibrosis) (H)       insulin pen needle 32G X 4 MM    BD OMI U/F    200 each    Use 6 daily or as directed.    Diabetes mellitus related to CF (cystic fibrosis) (H)       levalbuterol 1.25 MG/3ML neb solution    XOPENEX    360 mL    INHALE 1 VIAL BY MOUTH INTO THE LUNGS VIA NEBULIZATION 4 TIMES DAILY    Cystic fibrosis (H), CF (cystic fibrosis) (H), Cystic fibrosis with pulmonary manifestations (H)       levalbuterol 45 MCG/ACT Inhaler    XOPENEX HFA    3 Inhaler    Inhale 2 puffs into the lungs every 6 hours as needed for shortness of breath / dyspnea    Cystic fibrosis (H)       magnesium citrate solution     296 mL    Take 296 mLs by mouth See Admin Instructions Refer to the \"Getting Ready for a Colonoscopy\" patient handout    Encounter for screening colonoscopy       MEPHYTON 5 MG tablet   Generic drug:  phytonadione      Take 1 TABLET by mouth DAILY.        MULTIVITAMIN PO      Take 1 tablet by mouth daily. Includes 5,000 units vitamin D and 400 units vitamin E. Per pt, formulated for CF pts.        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    Cystic fibrosis with meconium ileus (H)       sodium chloride inhalant 7 % Nebu neb solution     240 mL    NEBULIZE 4ML TWICE DAILY    Cystic fibrosis with pulmonary manifestations (H)       SYMBICORT 160-4.5 MCG/ACT Inhaler   Generic drug:  budesonide-formoterol     10.2 Inhaler    INHALE 2 PUFFS BY MOUTH INTO THE LUNGS TWICE DAILY    Cystic fibrosis of the lung (H), Cystic fibrosis (H)       VITRON-C 200-125 MG Tabs   Generic drug:  ferrous fumarate 65 mg ([place]. FE)-Vitamin C 125 mg      Take 1 tablet by mouth 2 times daily " (with meals)    Nocardia infection, Cystic fibrosis with pulmonary manifestations (H)

## 2018-08-06 NOTE — PROGRESS NOTES
CF Endocrinology Return Consultation:  Diabetes  :   Patient: Nico Morales MRN# [id number]   YOB: 1975 Age: 42 year old   Date of Visit: 8/6/2018    Dear Dr. Jaye Machado:    I had the pleasure of seeing your patient, Nico Morales in the [facility] Endocrinology Clinic, [facility], for a return consultation regarding CFRD and low bone density.           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  Verbal overview and written information provided.          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     Priority: Medium     Encounter for long-term (current) use of antibiotics 05/20/2013     Priority: Medium     Prostatitis 05/20/2013     Priority: Medium     updating diagnosis code for icd10 cutover       Pseudomonas infection      Priority: Medium     Cystic fibrosis (H) 11/24/2010     Priority: Medium     Sweat Test   Date: 4/25/78    Lab: [facility]   Sample #1:  110mmol  Sample #2:  106mmol    Genetics  Date: 4/9/90  [id number]/621+1G->T         CARDIOVASCULAR SCREENING; LDL GOAL LESS THAN 160 10/31/2010     Priority: Medium            HPI:   Nico is a 42 year old male with Cystic Fibrosis Related Diabetes Mellitus (CFRD).    I have reviewed the available past laboratory evaluations, imaging studies, and medical records available to me at this visit. I have  reviewed  Nico'height and weight.    History was obtained from the patient and the medical record.  I have reviewed the notes of the pulmonary care team.    Did not bring glucose meter today. Reports BG has been in target range   low BG is rare     A1c:  hemoglobin A1c: 6.5%   Result was discussed at today's visit.     Current insulin regimen:   Lantus 6 units daily at bedtime   Novolog 1 unit / 20 gm of carb, 3-4 times daily. Novolog dose ranges b/w 3- 5 units  Tube feed at night, using on most nights, tube feed take 2-3 hours to complete. Takes 3 units of novolog with TF     Insulin administration site(s): abd or thigh    Family history and social history were reviewed     He was also diagnosed with adrenal insufficiency based on inadequate response on a ACTH stim test. Adrenal insufficiency is thought to be related to  itraconazole and Symbicort use.  last ACTH stim test was normal and he has been off chronic prednisone.    Noted to have significant decline in bone density on last DEXA. On vitamin D and multivitmain supplements.  Pamidronate was recommended, planning to get first dose this month.     Over all feels well, no acute complaints today  Lost 3-4 lbs , attributes to recent 'stomach flu'          Past Medical History:     Past Medical History:   Diagnosis Date     CF (cystic fibrosis) (H)      Exocrine pancreatic insufficiency      Nocardia infection      Pseudomonas infection      S/P gastrostomy (H) 2002            Past Surgical History:     Past Surgical History:   Procedure Laterality Date     COLONOSCOPY N/A 5/21/2018    Procedure: COMBINED COLONOSCOPY, SINGLE OR MULTIPLE BIOPSY/POLYPECTOMY BY BIOPSY;  Cystic fibrosis, 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;  Surgeon: Margarito Bowman MD;  Location: [facility]     GASTROSTOMY TUBE  2002     PICC INSERTION Left 01/22/2018    4Fr SL BioFlo PICC, 46cm (5cm external) in the L basilic vein w/ tip in the low SVC               Social History:     Social History      Social History Narrative    Kilo lives with wife in a house in [place], [place].  He works as a .        March 2016. He works independently as a . Works out 5x/week and walks the dog daily.        June 2016. No changes. Describes himself as a creature of habit.              Family History:     Family History   Problem Relation Age of Onset     HEART DISEASE Maternal Grandmother      HEART DISEASE Maternal Grandfather      HEART DISEASE Paternal Grandmother      Diabetes No family hx of             Allergies:     Allergies   Allergen Reactions     Pulmozyme [Dornase Trever] Other (See Comments)     Chest pain and fever     Tobramycin Fatigue     Trouble with ear ringing, shortness of breath, little clinical response.     Zosyn Hives     Ceftazidime Rash     Levaquin Rash             Medications:     Current Outpatient Rx   Medication Sig Dispense Refill     acetylcysteine (MUCOMYST) 10 % nebulizer solution Nebulize 4ml qid 480 mL 6     amitriptyline (ELAVIL) 10 MG tablet TAKE ONE TABLET BY MOUTH AT BEDTIME. 90 tablet 3     azithromycin (ZITHROMAX) 250 MG tablet Take 1 tablet (250 mg) by mouth daily 30 tablet 11     aztreonam lysine (CAYSTON) 75 MG SOLR Take 1 mL (75 mg) by nebulization 3 times daily 84 mL 5     blood glucose monitoring (MARTINA CONTOUR NEXT MONITOR) meter device kit Use to test blood sugar 6 times daily or as directed. 1 kit 2     blood glucose monitoring (MARTINA CONTOUR NEXT) test strip Use to test blood sugar 6 times daily or as directed. 200 each 2     blood glucose monitoring (MARTINA MICROLET) lancets Use to test blood sugar 6 times daily or as directed. 2 Box 2     CALCIUM PO Take 1 tablet by mouth 2 times daily Strength unknown.       COMPOUNDED NON-CONTROLLED SUBSTANCE (CMPD RX) - PHARMACY TO MIX COMPOUNDED MEDICATION For testing need about 0.5 cc of each  Ceftazidime 100 mg/ml( but at 1/10 concentration)  Levofloxacin 25 mg/ml (but at 1/1000 dilution).     1:10  "dilution Penicillin G (initial concentration 500,000 units)  1:100 dilution Penicillin G (initial concentration 500,000 units)  0.2 cc pre-pen in syringe with intradermal needle   500 mg Amoxacillin for oral use 1 Box 1     CREON 36429-86823 UNITS CPEP per EC capsule TAKE 7-8 CAPS WITH MEALS (3 MEALS/DAY) AND 2 CAPS WITH SNACKS (3 SNACKS/DAY) 2700 capsule 2     doxycycline (VIBRAMYCIN) 100 MG capsule TAKE 1 CAPSULE BY MOUTH TWICE DAILY 60 capsule 2     Ferrous Fumarate-Vitamin C (VITRON-C) 200-125 MG TABS Take 1 tablet by mouth 2 times daily (with meals)       insulin glargine (LANTUS SOLOSTAR) 100 UNIT/ML injection 6 units daily 15 mL 6     insulin pen needle (BD OMI U/F) 32G X 4 MM Use 6 daily or as directed. 200 each 11     levalbuterol (XOPENEX HFA) 45 MCG/ACT Inhaler Inhale 2 puffs into the lungs every 6 hours as needed for shortness of breath / dyspnea 3 Inhaler 4     levalbuterol (XOPENEX) 1.25 MG/3ML neb solution INHALE 1 VIAL BY MOUTH INTO THE LUNGS VIA NEBULIZATION 4 TIMES DAILY 360 mL 11     magnesium citrate solution Take 296 mLs by mouth See Admin Instructions Refer to the \"Getting Ready for a Colonoscopy\" patient handout 296 mL 0     MEPHYTON 5 MG PO TABS Take 1 TABLET by mouth DAILY.       MULTIVITAMIN OR Take 1 tablet by mouth daily. Includes 5,000 units vitamin D and 400 units vitamin E. Per pt, formulated for CF pts.       Nebulizers (EFLOW SCF NEBULIZER HANDSET) MISC 1 each 3 times daily. 1 each 6     polyethylene glycol (MIRALAX) powder Use 17 gms po 1-3 times daily as needed. 510 g 3     Respiratory Therapy Supplies (KRIS ALTERA NEBULIZER HANDSET) MISC 1 each daily 1 each 1     sodium chloride inhalant 7 % NEBU neb solution NEBULIZE 4ML TWICE DAILY 240 mL 5     SYMBICORT 160-4.5 MCG/ACT Inhaler INHALE 2 PUFFS BY MOUTH INTO THE LUNGS TWICE DAILY 10.2 Inhaler 12     ACE NOT PRESCRIBED, INTENTIONAL, 1 each continuous prn. ACE Inhibitor not prescribed due to Risk for drug interaction 0 each 0     " "acetaminophen-codeine (TYLENOL #3) 300-30 MG per tablet Take 1-2 tablets by mouth every 6 hours as needed for severe pain (Patient not taking: Reported on 8/6/2018) 6 tablet 0     blood glucose (NO BRAND SPECIFIED) lancets standard Whatever Lancets that go with device, Test 6 times daily 540 each 3     [facility] INFUSION MANAGED PATIENT Contact [facility] for patient specific medication information at [phone] on admission and discharge from the hospital.  Phones are answered 24 hours a day 7 days a week 365 days a year.    Providers - Choose \"CONTINUE HOME MED (no script)\" at discharge if patient treatment with home infusion will continue.       insulin aspart (NOVOLOG PENFILL) 100 UNIT/ML injection Inject 1 unit per 20G CHO at lunch and supper and 3 units at night with tube feedings, up to 20 units daily 30 mL 3     [DISCONTINUED] insulin aspart (NOVOLOG PENFILL) 100 UNIT/ML injection 1 per 20 grams of carbohydrate with meals and snacks. Also use 3 units of Novolog at night with tube feeding. 15 mL 12             Review of Systems:     Comprehensive ROS negative other than the symptoms noted above in the HPI.          Physical Exam:   Blood pressure 106/69, pulse 88, height 1.753 m (5' 9\"), weight 64 kg (141 lb 1.5 oz), SpO2 97 %.  Normalized stature-for-age data not available for patients older than 20 years.  Height: 5' 9\", Normalized stature-for-age data not available for patients older than 20 years.  Weight: 141 lbs 1.51 oz, Normalized weight-for-age data not available for patients older than 20 years.  BMI: Body mass index is 20.84 kg/(m^2)., Normalized BMI data available only for age 0 to 20 years.      CONSTITUTIONAL:   Awake, alert, and in no apparent distress.  Neck:no goiter  Lungs:CTA b/l  Heart:RRR  Abd: soft NT  NEUROLOGIC:No focal deficits noted. DTR brisk knee b/l   PSYCHIATRIC: Cooperative, no agitation.  MUSCULOSKELETAL:   No LE edema         Laboratory results:     TSH "   Date Value Ref Range Status   05/03/2018 0.96 0.40 - 4.00 mU/L Final     Triiodothyronine (T3)   Date Value Ref Range Status   06/25/2010 123 60 - 181 ng/dL Final     Testosterone Total   Date Value Ref Range Status   05/03/2018 445 240 - 950 ng/dL Final     Comment:     This test was developed and its performance characteristics determined by the   [facility],  Special Chemistry Laboratory. It has   not been cleared or approved by the FDA. The laboratory is regulated under   CLIA as qualified to perform high-complexity testing. This test is used for   clinical purposes. It should not be regarded as investigational or for   research.       Cholesterol   Date Value Ref Range Status   05/03/2018 124 <200 mg/dL Final     Albumin Urine mg/L   Date Value Ref Range Status   05/03/2018 17 mg/L Final     Triglycerides   Date Value Ref Range Status   05/03/2018 162 (H) <150 mg/dL Final     Comment:     Borderline high:  150-199 mg/dl  High:             200-499 mg/dl  Very high:       >499 mg/dl  Non Fasting       HDL Cholesterol   Date Value Ref Range Status   05/03/2018 31 (L) >39 mg/dL Final     LDL Cholesterol Calculated   Date Value Ref Range Status   05/03/2018 61 <100 mg/dL Final     Comment:     Desirable:       <100 mg/dl     Cholesterol/HDL Ratio   Date Value Ref Range Status   06/26/2013 4.4 0.0 - 5.0 Final     Non HDL Cholesterol   Date Value Ref Range Status   05/03/2018 93 <130 mg/dL Final     A1C      8.3   12/15/2016  A1C      8.1   6/28/2016  A1C      7.5   4/13/2016  A1C      7.1   3/8/2016  A1C      7.4   12/16/2015 No results found for this basename: hemoglobina1          Diabetes Health Maintenance    Date of Diabetes Diagnosis: 12/2015    Date Last Eye Exam: Jan 2015     Date Last Dental Appointment: >2 years    Dates of Episodes Severe* Hypoglycemia (month/year, cumulative, ongoing, assess each visit): never              *Severe=patient unconscious, seizure, unable to  help self    Last 25-Vitamin D (every year): 54     Last DXA, lowest Z-score (every 2 years): -2.6 right femoral neck       ?Bisphosphonates (yes/no):       Last Urine Microalbumin (every year):      No results found for: MICROALBUMIN   Results for NICO FLORES (MRN [id number]) as of 8/6/2018 16:20   Ref. Range 5/3/2018 17:03   Albumin Urine mg/g Cr Latest Ref Range: 0 - 17 mg/g Cr 16.89   Albumin Urine mg/L Latest Units: mg/L 17       Last Testosterone:   Testosterone Total   Date Value Ref Range Status   05/03/2018 445 240 - 950 ng/dL Final     Comment:     This test was developed and its performance characteristics determined by the   [facility],  Special Chemistry Laboratory. It has   not been cleared or approved by the FDA. The laboratory is regulated under   CLIA as qualified to perform high-complexity testing. This test is used for   clinical purposes. It should not be regarded as investigational or for   research.        On testosterone therapy (yes/no)? No            Assessment and Plan:   Nico is a 42 year old male with CFRD    CFRD: good control, continue current insulin regimen.  Recent urine micro albumin was normal     Low bone density:  pamidronate 30 mg IV every 3 months was ordered. Patient has not scheduled infusion yet.   Check Vitamin D with next draw     Adrenal insuffiencey:  1 mcg ACTH stim test was normal, off chronic prednisone    RTC in 4-6 months    JULIETA Tate    [facility]    TYLER REYES

## 2018-08-06 NOTE — NURSING NOTE
"Nico Morales's goals for this visit include: Follow up CF-Diabetes  He requests these members of his care team be copied on today's visit information: YES    PCP: Jaye Machado    Referring Provider:  No referring provider defined for this encounter.    Ht 1.753 m (5' 9\")  Wt 64 kg (141 lb 1.5 oz)  BMI 20.84 kg/m2    Do you need any medication refills at today's visit? NO    "

## 2018-08-06 NOTE — PATIENT INSTRUCTIONS
Please call [phone] to schedule with Dr. Lee at the [facility] [place]

## 2018-08-06 NOTE — LETTER
8/6/2018         RE: Nico Morales  [address]  [facility] [address]        Dear Colleague,    Thank you for referring your patient, Nico Morales, to the [facility]. Please see a copy of my visit note below.    CF Endocrinology Return Consultation:  Diabetes  :   Patient: Nico Morales MRN# [id number]   YOB: 1975 Age: 42 year old   Date of Visit: 8/6/2018    Dear Dr. Jaye Machado:    I had the pleasure of seeing your patient, Nico Morales in the CF Endocrinology Clinic, [facility], for a return consultation regarding CFRD and low bone density.           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  Verbal overview and written information provided.          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     Priority: Medium     Encounter for long-term (current) use of antibiotics 05/20/2013     Priority: Medium     Prostatitis 05/20/2013     Priority: Medium     updating diagnosis code for icd10 cutover       Pseudomonas infection      Priority: Medium     Cystic fibrosis (H) 11/24/2010     Priority: Medium     Sweat Test   Date: 4/25/78    Lab: [facility]   Sample #1:  110mmol  Sample #2:  106mmol    Genetics  Date: 4/9/90  [id number]->T         CARDIOVASCULAR SCREENING; LDL GOAL LESS THAN 160 10/31/2010     Priority:  Medium            HPI:   Nico is a 42 year old male with Cystic Fibrosis Related Diabetes Mellitus (CFRD).    I have reviewed the available past laboratory evaluations, imaging studies, and medical records available to me at this visit. I have reviewed  Nico'height and weight.    History was obtained from the patient and the medical record.  I have reviewed the notes of the pulmonary care team.    Did not bring glucose meter today. Reports BG has been in target range   low BG is rare     A1c:  hemoglobin A1c: 6.5%   Result was discussed at today's visit.     Current insulin regimen:   Lantus 6 units daily at bedtime   Novolog 1 unit / 20 gm of carb, 3-4 times daily. Novolog dose ranges b/w 3- 5 units  Tube feed at night, using on most nights, tube feed take 2-3 hours to complete. Takes 3 units of novolog with TF     Insulin administration site(s): abd or thigh    Family history and social history were reviewed     He was also diagnosed with adrenal insufficiency based on inadequate response on a ACTH stim test. Adrenal insufficiency is thought to be related to  itraconazole and Symbicort use.  last ACTH stim test was normal and he has been off chronic prednisone.    Noted to have significant decline in bone density on last DEXA. On vitamin D and multivitmain supplements.  Pamidronate was recommended, planning to get first dose this month.     Over all feels well, no acute complaints today  Lost 3-4 lbs , attributes to recent 'stomach flu'          Past Medical History:     Past Medical History:   Diagnosis Date     CF (cystic fibrosis) (H)      Exocrine pancreatic insufficiency      Nocardia infection      Pseudomonas infection      S/P gastrostomy (H) 2002            Past Surgical History:     Past Surgical History:   Procedure Laterality Date     COLONOSCOPY N/A 5/21/2018    Procedure: COMBINED COLONOSCOPY, SINGLE OR MULTIPLE BIOPSY/POLYPECTOMY BY BIOPSY;  Cystic fibrosis, 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;   Surgeon: Margarito Bowman MD;  Location: [facility]     GASTROSTOMY TUBE  2002     PICC INSERTION Left 01/22/2018    4Fr SL BioFlo PICC, 46cm (5cm external) in the L basilic vein w/ tip in the low SVC               Social History:     Social History     Social History Narrative    Kilo lives with wife in a house in [place], [place].  He works as a .        March 2016. He works independently as a . Works out 5x/week and walks the dog daily.        June 2016. No changes. Describes himself as a creature of habit.              Family History:     Family History   Problem Relation Age of Onset     HEART DISEASE Maternal Grandmother      HEART DISEASE Maternal Grandfather      HEART DISEASE Paternal Grandmother      Diabetes No family hx of             Allergies:     Allergies   Allergen Reactions     Pulmozyme [Dornase Trever] Other (See Comments)     Chest pain and fever     Tobramycin Fatigue     Trouble with ear ringing, shortness of breath, little clinical response.     Zosyn Hives     Ceftazidime Rash     Levaquin Rash             Medications:     Current Outpatient Rx   Medication Sig Dispense Refill     acetylcysteine (MUCOMYST) 10 % nebulizer solution Nebulize 4ml qid 480 mL 6     amitriptyline (ELAVIL) 10 MG tablet TAKE ONE TABLET BY MOUTH AT BEDTIME. 90 tablet 3     azithromycin (ZITHROMAX) 250 MG tablet Take 1 tablet (250 mg) by mouth daily 30 tablet 11     aztreonam lysine (CAYSTON) 75 MG SOLR Take 1 mL (75 mg) by nebulization 3 times daily 84 mL 5     blood glucose monitoring (MARTINA CONTOUR NEXT MONITOR) meter device kit Use to test blood sugar 6 times daily or as directed. 1 kit 2     blood glucose monitoring (MARTINA CONTOUR NEXT) test strip Use to test blood sugar 6 times daily or as directed. 200 each 2     blood glucose monitoring (MARTINA MICROLET) lancets Use to test blood sugar 6 times daily or as directed. 2 Box 2     CALCIUM PO Take 1 tablet by mouth 2 times daily Strength  "unknown.       COMPOUNDED NON-CONTROLLED SUBSTANCE (CMPD RX) - PHARMACY TO MIX COMPOUNDED MEDICATION For testing need about 0.5 cc of each  Ceftazidime 100 mg/ml( but at 1/10 concentration)  Levofloxacin 25 mg/ml (but at 1/1000 dilution).     1:10 dilution Penicillin G (initial concentration 500,000 units)  1:100 dilution Penicillin G (initial concentration 500,000 units)  0.2 cc pre-pen in syringe with intradermal needle   500 mg Amoxacillin for oral use 1 Box 1     CREON 05896-87553 UNITS CPEP per EC capsule TAKE 7-8 CAPS WITH MEALS (3 MEALS/DAY) AND 2 CAPS WITH SNACKS (3 SNACKS/DAY) 2700 capsule 2     doxycycline (VIBRAMYCIN) 100 MG capsule TAKE 1 CAPSULE BY MOUTH TWICE DAILY 60 capsule 2     Ferrous Fumarate-Vitamin C (VITRON-C) 200-125 MG TABS Take 1 tablet by mouth 2 times daily (with meals)       insulin glargine (LANTUS SOLOSTAR) 100 UNIT/ML injection 6 units daily 15 mL 6     insulin pen needle (BD OMI U/F) 32G X 4 MM Use 6 daily or as directed. 200 each 11     levalbuterol (XOPENEX HFA) 45 MCG/ACT Inhaler Inhale 2 puffs into the lungs every 6 hours as needed for shortness of breath / dyspnea 3 Inhaler 4     levalbuterol (XOPENEX) 1.25 MG/3ML neb solution INHALE 1 VIAL BY MOUTH INTO THE LUNGS VIA NEBULIZATION 4 TIMES DAILY 360 mL 11     magnesium citrate solution Take 296 mLs by mouth See Admin Instructions Refer to the \"Getting Ready for a Colonoscopy\" patient handout 296 mL 0     MEPHYTON 5 MG PO TABS Take 1 TABLET by mouth DAILY.       MULTIVITAMIN OR Take 1 tablet by mouth daily. Includes 5,000 units vitamin D and 400 units vitamin E. Per pt, formulated for CF pts.       Nebulizers (EFLOW SCF NEBULIZER HANDSET) MISC 1 each 3 times daily. 1 each 6     polyethylene glycol (MIRALAX) powder Use 17 gms po 1-3 times daily as needed. 510 g 3     Respiratory Therapy Supplies (KRIS ALTERA NEBULIZER HANDSET) MISC 1 each daily 1 each 1     sodium chloride inhalant 7 % NEBU neb solution NEBULIZE 4ML TWICE DAILY " "240 mL 5     SYMBICORT 160-4.5 MCG/ACT Inhaler INHALE 2 PUFFS BY MOUTH INTO THE LUNGS TWICE DAILY 10.2 Inhaler 12     ACE NOT PRESCRIBED, INTENTIONAL, 1 each continuous prn. ACE Inhibitor not prescribed due to Risk for drug interaction 0 each 0     acetaminophen-codeine (TYLENOL #3) 300-30 MG per tablet Take 1-2 tablets by mouth every 6 hours as needed for severe pain (Patient not taking: Reported on 8/6/2018) 6 tablet 0     blood glucose (NO BRAND SPECIFIED) lancets standard Whatever Lancets that go with device, Test 6 times daily 540 each 3     [facility] INFUSION MANAGED PATIENT Contact [facility] for patient specific medication information at [phone] on admission and discharge from the hospital.  Phones are answered 24 hours a day 7 days a week 365 days a year.    Providers - Choose \"CONTINUE HOME MED (no script)\" at discharge if patient treatment with home infusion will continue.       insulin aspart (NOVOLOG PENFILL) 100 UNIT/ML injection Inject 1 unit per 20G CHO at lunch and supper and 3 units at night with tube feedings, up to 20 units daily 30 mL 3     [DISCONTINUED] insulin aspart (NOVOLOG PENFILL) 100 UNIT/ML injection 1 per 20 grams of carbohydrate with meals and snacks. Also use 3 units of Novolog at night with tube feeding. 15 mL 12             Review of Systems:     Comprehensive ROS negative other than the symptoms noted above in the HPI.          Physical Exam:   Blood pressure 106/69, pulse 88, height 1.753 m (5' 9\"), weight 64 kg (141 lb 1.5 oz), SpO2 97 %.  Normalized stature-for-age data not available for patients older than 20 years.  Height: 5' 9\", Normalized stature-for-age data not available for patients older than 20 years.  Weight: 141 lbs 1.51 oz, Normalized weight-for-age data not available for patients older than 20 years.  BMI: Body mass index is 20.84 kg/(m^2)., Normalized BMI data available only for age 0 to 20 years.      CONSTITUTIONAL:   Awake, alert, and in " no apparent distress.  Neck:no goiter  Lungs:CTA b/l  Heart:RRR  Abd: soft NT  NEUROLOGIC:No focal deficits noted. DTR brisk knee b/l   PSYCHIATRIC: Cooperative, no agitation.  MUSCULOSKELETAL:   No LE edema         Laboratory results:     TSH   Date Value Ref Range Status   05/03/2018 0.96 0.40 - 4.00 mU/L Final     Triiodothyronine (T3)   Date Value Ref Range Status   06/25/2010 123 60 - 181 ng/dL Final     Testosterone Total   Date Value Ref Range Status   05/03/2018 445 240 - 950 ng/dL Final     Comment:     This test was developed and its performance characteristics determined by the   [facility],  Special Chemistry Laboratory. It has   not been cleared or approved by the FDA. The laboratory is regulated under   CLIA as qualified to perform high-complexity testing. This test is used for   clinical purposes. It should not be regarded as investigational or for   research.       Cholesterol   Date Value Ref Range Status   05/03/2018 124 <200 mg/dL Final     Albumin Urine mg/L   Date Value Ref Range Status   05/03/2018 17 mg/L Final     Triglycerides   Date Value Ref Range Status   05/03/2018 162 (H) <150 mg/dL Final     Comment:     Borderline high:  150-199 mg/dl  High:             200-499 mg/dl  Very high:       >499 mg/dl  Non Fasting       HDL Cholesterol   Date Value Ref Range Status   05/03/2018 31 (L) >39 mg/dL Final     LDL Cholesterol Calculated   Date Value Ref Range Status   05/03/2018 61 <100 mg/dL Final     Comment:     Desirable:       <100 mg/dl     Cholesterol/HDL Ratio   Date Value Ref Range Status   06/26/2013 4.4 0.0 - 5.0 Final     Non HDL Cholesterol   Date Value Ref Range Status   05/03/2018 93 <130 mg/dL Final     A1C      8.3   12/15/2016  A1C      8.1   6/28/2016  A1C      7.5   4/13/2016  A1C      7.1   3/8/2016  A1C      7.4   12/16/2015 No results found for this basename: hemoglobina1        CF  Diabetes Health Maintenance    Date of Diabetes Diagnosis:  12/2015    Date Last Eye Exam: Jan 2015     Date Last Dental Appointment: >2 years    Dates of Episodes Severe* Hypoglycemia (month/year, cumulative, ongoing, assess each visit): never              *Severe=patient unconscious, seizure, unable to help self    Last 25-Vitamin D (every year): 54     Last DXA, lowest Z-score (every 2 years): -2.6 right femoral neck       ?Bisphosphonates (yes/no):       Last Urine Microalbumin (every year):      No results found for: MICROALBUMIN   Results for BERENICE FLORES (MRN [id number]) as of 8/6/2018 16:20   Ref. Range 5/3/2018 17:03   Albumin Urine mg/g Cr Latest Ref Range: 0 - 17 mg/g Cr 16.89   Albumin Urine mg/L Latest Units: mg/L 17       Last Testosterone:   Testosterone Total   Date Value Ref Range Status   05/03/2018 445 240 - 950 ng/dL Final     Comment:     This test was developed and its performance characteristics determined by the   [facility],  Special Chemistry Laboratory. It has   not been cleared or approved by the FDA. The laboratory is regulated under   CLIA as qualified to perform high-complexity testing. This test is used for   clinical purposes. It should not be regarded as investigational or for   research.        On testosterone therapy (yes/no)? No            Assessment and Plan:   Berenice is a 42 year old male with CFRD    CFRD: good control, continue current insulin regimen.  Recent urine micro albumin was normal     Low bone density:  pamidronate 30 mg IV every 3 months was ordered. Patient has not scheduled infusion yet.   Check Vitamin D with next draw     Adrenal insuffiencey:  1 mcg ACTH stim test was normal, off chronic prednisone    RTC in 4-6 months    JULIETA Tate    [facility]    TYLER REYES    Again, thank you for allowing me to participate in the care of your patient.        Sincerely,        Diana Lee MD

## 2018-08-08 ENCOUNTER — OFFICE VISIT (OUTPATIENT)
Dept: PULMONOLOGY | Facility: CLINIC | Age: 43
End: 2018-08-08
Attending: INTERNAL MEDICINE
Payer: COMMERCIAL

## 2018-08-08 ENCOUNTER — ALLIED HEALTH/NURSE VISIT (OUTPATIENT)
Dept: CARE COORDINATION | Facility: CLINIC | Age: 43
End: 2018-08-08

## 2018-08-08 VITALS
HEART RATE: 95 BPM | DIASTOLIC BLOOD PRESSURE: 65 MMHG | WEIGHT: 141.54 LBS | BODY MASS INDEX: 20.96 KG/M2 | OXYGEN SATURATION: 96 % | RESPIRATION RATE: 17 BRPM | SYSTOLIC BLOOD PRESSURE: 102 MMHG | HEIGHT: 69 IN

## 2018-08-08 DIAGNOSIS — E84.0 CYSTIC FIBROSIS WITH PULMONARY MANIFESTATIONS (H): Primary | ICD-10-CM

## 2018-08-08 DIAGNOSIS — Z13.9 RISK AND FUNCTIONAL ASSESSMENT: Primary | ICD-10-CM

## 2018-08-08 DIAGNOSIS — E84.0 CYSTIC FIBROSIS WITH PULMONARY MANIFESTATIONS (H): ICD-10-CM

## 2018-08-08 LAB
EXPTIME-PRE: 16.58 SEC
FEF2575-%PRED-PRE: 13 %
FEF2575-PRE: 0.54 L/SEC
FEF2575-PRED: 3.87 L/SEC
FEFMAX-%PRED-PRE: 70 %
FEFMAX-PRE: 6.87 L/SEC
FEFMAX-PRED: 9.77 L/SEC
FEV1-%PRED-PRE: 39 %
FEV1-PRE: 1.59 L
FEV1FEV6-PRE: 49 %
FEV1FEV6-PRED: 82 %
FEV1FVC-PRE: 43 %
FEV1FVC-PRED: 79 %
FIFMAX-PRE: 4.59 L/SEC
FVC-%PRED-PRE: 75 %
FVC-PRE: 3.73 L
FVC-PRED: 4.93 L

## 2018-08-08 PROCEDURE — 90715 TDAP VACCINE 7 YRS/> IM: CPT | Mod: ZF | Performed by: INTERNAL MEDICINE

## 2018-08-08 PROCEDURE — 87186 SC STD MICRODIL/AGAR DIL: CPT | Performed by: INTERNAL MEDICINE

## 2018-08-08 PROCEDURE — 87070 CULTURE OTHR SPECIMN AEROBIC: CPT | Performed by: INTERNAL MEDICINE

## 2018-08-08 PROCEDURE — 90471 IMMUNIZATION ADMIN: CPT | Mod: ZF

## 2018-08-08 PROCEDURE — 87077 CULTURE AEROBIC IDENTIFY: CPT | Performed by: INTERNAL MEDICINE

## 2018-08-08 PROCEDURE — G0463 HOSPITAL OUTPT CLINIC VISIT: HCPCS

## 2018-08-08 PROCEDURE — 25000128 H RX IP 250 OP 636: Mod: ZF | Performed by: INTERNAL MEDICINE

## 2018-08-08 PROCEDURE — 94375 RESPIRATORY FLOW VOLUME LOOP: CPT | Mod: ZF | Performed by: INTERNAL MEDICINE

## 2018-08-08 RX ADMIN — TETANUS TOXOID, REDUCED DIPHTHERIA TOXOID AND ACELLULAR PERTUSSIS VACCINE, ADSORBED 0.5 ML: 5; 2.5; 8; 8; 2.5 SUSPENSION INTRAMUSCULAR at 11:55

## 2018-08-08 ASSESSMENT — ENCOUNTER SYMPTOMS
HEMOPTYSIS: 0
DECREASED APPETITE: 1
VOMITING: 1
BLOOD IN STOOL: 0
SPUTUM PRODUCTION: 1
HALLUCINATIONS: 0
WHEEZING: 0
BLOATING: 0
NAUSEA: 1
CHILLS: 0
HEARTBURN: 0
ALTERED TEMPERATURE REGULATION: 0
JAUNDICE: 0
DIARRHEA: 1
POSTURAL DYSPNEA: 0
SHORTNESS OF BREATH: 1
CONSTIPATION: 0
RECTAL PAIN: 0
DYSPNEA ON EXERTION: 1
WEIGHT GAIN: 0
WEIGHT LOSS: 1
COUGH: 1
BOWEL INCONTINENCE: 0
POLYPHAGIA: 0
FATIGUE: 1
POLYDIPSIA: 0
FEVER: 0
COUGH DISTURBING SLEEP: 0
INCREASED ENERGY: 1
SNORES LOUDLY: 1
NIGHT SWEATS: 1
ABDOMINAL PAIN: 1

## 2018-08-08 ASSESSMENT — PAIN SCALES - GENERAL: PAINLEVEL: NO PAIN (0)

## 2018-08-08 NOTE — PROGRESS NOTES
[facility]  August 8, 2018         Assessment and Plan:   Nico Morales is a 42 year old male with cystic fibrosis.    1. CF lung disease with severe obstruction:  Kilo was placed on Bactrim therapy after his last visit after growing out Stenotrophomonas.  He reports that it was a difficult antibiotic course for him because of some GI side effects, as well as fatigue.  This was followed by a probable episode of gastroenteritis.  He does feel that he is fully recovered from this event.  He does feel that his lungs are returning to near to baseline.  At this time, I would recommend:   --  That he continue on his present bronchial drainage therapy.   --  That he continue on his present nebulized therapy.     --  If he were to have Stenotrophomonas again, I would be hesitant to pursue treatment given his overall stability, but he knows to contact me if symptoms were to worsen.     2.  Recent gastroenteritis.  Kilo does describe 2 weeks of feeling like he had the stomach flu.  He had 3-4 days of loose stools followed by 2 weeks of fatigue.  He had some night sweats, poor appetite and weight loss.  He feels he is now in recovery mode.  We will follow these symptoms for now.     3.  Nutritional failure.  Kilo does remain on tube feeds 5 nights per week and is showing evidence of weight gain.     4.  Decreased bone mineral density.  Kilo was due to initiate pamidronate therapy.  He had to postpone the treatment because of his gastroenteritis.     5.  Cystic fibrosis-related diabetes.  Kilo has shown evidence of excellent control.     6.  Advanced care planning visit.  Kilo and I discussed having an ACP visit sometime in the next 6 months.  He is agreeable to this plan.  I will ask the office and Mary Ritter, our , to arrange this with him.     7.  Health care maintenance.  Kilo is due for a Tdap vaccination.  We will perform the pneumococcal  vaccine at his next visit.     8. Pancreatic Insufficiency:  The patient has no new symptoms consistent with worsening malabsorption.    - continue the present dose of pancreatic enzymes  - continue vitamin supplementation.    HCM: due 5/2019  Immunizations: TDAP 8/8/2018, pneumococcal vaccine at next visit  Colonoscopy: 5/21/2018, repeat 2021    Jaye Machado MD MPH  Associate Professor of Medicine  Pulmonary, Allergy, Critical Care and Sleep Medicine      Interval History:     Kilo is producing sputum that is yellow-green in color.  His cough frequency and sputum volume are slightly above baseline.  His activity tolerance remains stable.  He is performing 3 vest therapies per day.          Review of Systems:     All questionnaires were reviewed by me today with the patient.  Review of Systems     Constitutional:  Positive for weight loss, fatigue, decreased appetite, night sweats and increased energy. Negative for fever, chills, weight gain, recent stressors, height loss, post-operative complications, incisional pain, hallucinations, hyperactivity and confused.   HENT:  Negative for ear pain, hearing loss, tinnitus, nosebleeds, trouble swallowing, hoarse voice, mouth sores, sore throat, ear discharge, tooth pain, gum tenderness, taste disturbance, smell disturbance, hearing aid, bleeding gums, dry mouth, sinus pain, sinus congestion and neck mass.    Eyes:  Negative for double vision, pain, redness, eye pain, decreased vision, eye watering, eye bulging, eye dryness, flashing lights, spots, floaters, strabismus, tunnel vision, jaundice and eye irritation.   Respiratory:   Positive for cough, sputum production, shortness of breath, snores loudly and dyspnea on exertion. Negative for hemoptysis, wheezing, sleep disturbances due to breathing, cough disturbing sleep and postural dyspnea.    Cardiovascular:  Positive for dyspnea on exertion. Negative for chest pain, palpitations, orthopnea, claudication, leg  swelling, fingers/toes turn blue, hypertension, hypotension, syncope, history of heart murmur, chest pain on exertion, chest pain at rest, pacemaker, few scattered varicosities, leg pain, sleep disturbances due to breathing, tachycardia, light-headedness, exercise intolerance and edema.   Gastrointestinal:  Positive for nausea, vomiting, abdominal pain, diarrhea and change in stool. Negative for heartburn, constipation, blood in stool, melena, rectal pain, bloating, bowel incontinence, jaundice and coffee ground emesis.   Genitourinary:  Negative for bladder incontinence, dysuria, urgency, hematuria, flank pain, difficulty urinating, nocturia, voiding less frequently, scrotal pain, ulcerations, penile discharge, male genitourinary complaint and reduced libido.   Musculoskeletal:  Negative for myalgias, back pain, joint swelling, arthralgias, stiffness, muscle cramps, neck pain, bone pain, muscle weakness and fracture.   Skin:  Negative for nail changes, itching, poor wound healing, rash, hair changes, skin changes, acne, warts, poor wound healing, scarring, flaky skin, Raynaud's phenomenon, sensitivity to sunlight and skin thickening.   Neurological:  Negative for dizziness, tingling, tremors, speech change, seizures, loss of consciousness, weakness, light-headedness, numbness, headaches, disturbances in coordination, extremity numbness, memory loss, difficulty walking and paralysis.   Endo/Heme:  Negative for anemia, swollen glands and bruises/bleeds easily.   Psychiatric/Behavioral:  Negative for depression, hallucinations, memory loss, decreased concentration, mood swings and panic attacks.    Endocrine:  Negative for altered temperature regulation, polyphagia, polydipsia, unwanted hair growth and change in facial hair.         Past Medical and Surgical History:     Past Medical History:   Diagnosis Date     CF (cystic fibrosis) (H)      Exocrine pancreatic insufficiency      Nocardia infection      Pseudomonas  infection      S/P gastrostomy (H) 2002     Past Surgical History:   Procedure Laterality Date     COLONOSCOPY N/A 5/21/2018    Procedure: COMBINED COLONOSCOPY, SINGLE OR MULTIPLE BIOPSY/POLYPECTOMY BY BIOPSY;  Cystic fibrosis, 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;  Surgeon: Margarito Bowman MD;  Location: [facility]     GASTROSTOMY TUBE  2002     PICC INSERTION Left 01/22/2018    4Fr SL BioFlo PICC, 46cm (5cm external) in the L basilic vein w/ tip in the low SVC         Family History:     Family History   Problem Relation Age of Onset     HEART DISEASE Maternal Grandmother      HEART DISEASE Maternal Grandfather      HEART DISEASE Paternal Grandmother      Diabetes No family hx of           Social History:     Social History     Social History     Marital status: Single     Spouse name: N/A     Number of children: 0     Years of education: N/A     Occupational History     [employer]ancial [employer]     Social History Main Topics     Smoking status: Never Smoker     Smokeless tobacco: Never Used     Alcohol use No     Drug use: No     Sexual activity: Yes     Partners: Female     Birth control/ protection: Pill     Other Topics Concern     Blood Transfusions No     Exercise No     Social History Narrative    Kilo lives with wife in a house in [place], [place].  He works as a .        March 2016. He works independently as a . Works out 5x/week and walks the dog daily.        June 2016. No changes. Describes himself as a creature of habit.          Medications:     Current Outpatient Prescriptions   Medication     ACE NOT PRESCRIBED, INTENTIONAL,     acetylcysteine (MUCOMYST) 10 % nebulizer solution     amitriptyline (ELAVIL) 10 MG tablet     azithromycin (ZITHROMAX) 250 MG tablet     aztreonam lysine (CAYSTON) 75 MG SOLR     blood glucose (NO BRAND SPECIFIED) lancets standard     blood glucose monitoring (MARTINA CONTOUR NEXT MONITOR) meter device kit     blood glucose monitoring (MARTINA  "CONTOUR NEXT) test strip     blood glucose monitoring (MARTINA MICROLET) lancets     CALCIUM PO     COMPOUNDED NON-CONTROLLED SUBSTANCE (CMPD RX) - PHARMACY TO MIX COMPOUNDED MEDICATION     CREON 72429-57927 UNITS CPEP per EC capsule     doxycycline (VIBRAMYCIN) 100 MG capsule     [facility] PATIENT     Ferrous Fumarate-Vitamin C (VITRON-C) 200-125 MG TABS     insulin aspart (NOVOLOG PENFILL) 100 UNIT/ML injection     insulin glargine (LANTUS SOLOSTAR) 100 UNIT/ML injection     insulin pen needle (BD OMI U/F) 32G X 4 MM     levalbuterol (XOPENEX HFA) 45 MCG/ACT Inhaler     levalbuterol (XOPENEX) 1.25 MG/3ML neb solution     MEPHYTON 5 MG PO TABS     MULTIVITAMIN OR     Nebulizers (EFLOW SCF NEBULIZER HANDSET) MISC     polyethylene glycol (MIRALAX) powder     Respiratory Therapy Supplies (KRIS ALTERA NEBULIZER HANDSET) MISC     sodium chloride inhalant 7 % NEBU neb solution     SYMBICORT 160-4.5 MCG/ACT Inhaler     acetaminophen-codeine (TYLENOL #3) 300-30 MG per tablet     No current facility-administered medications for this visit.           Physical Exam:   /65  Pulse 95  Resp 17  Ht 1.753 m (5' 9\")  Wt 64.2 kg (141 lb 8.6 oz)  SpO2 96%  BMI 20.9 kg/m2    Constitutional:   Awake, alert and in no apparent distress     Eyes:   nonicteric     ENT:   oral mucosa moist without lesions, normal tm bilaterally, bilateral mucosa normal     Neck:   Supple without supraclavicular or cervical lymphadenopathy     Lungs:   fair air flow.  No crackles. ++ rhonchi.  No wheezes.     Cardiovascular:   Normal S1 and S2.  RRR.  No murmur, gallop or rub.     Abdomen:   NABS, soft, nontender, nondistended.  g tube.     Musculoskeletal:   No edema, digital clubbing present     Neurologic:   Alert and conversant.     Skin:   Warm, dry.  No rash on limited exam.             Data:   All laboratory and imaging data reviewed.    Cystic Fibrosis Culture  Specimen Description   Date Value Ref Range Status "   08/08/2018 Sputum  Final   06/26/2018 Sputum  Final   04/18/2018 Sputum  Final   04/18/2018 Sputum  Final   04/18/2018 Sputum  Final   04/18/2018 Sputum  Final    Culture Micro   Date Value Ref Range Status   08/08/2018 Heavy growth  Normal santi    Preliminary   08/08/2018 Culture in progress  Preliminary   06/26/2018 Heavy growth  Normal santi    Final   06/26/2018 (A)  Final    Moderate growth  Pseudomonas aeruginosa, mucoid strain     06/26/2018 (A)  Final    Moderate growth  Strain 2  Pseudomonas aeruginosa, mucoid strain     06/26/2018 Light growth  Stenotrophomonas maltophilia   (A)  Final   06/26/2018 Light growth  Staphylococcus aureus   (A)  Final        Recent Results (from the past 168 hour(s))   Hemoglobin A1c POCT    Collection Time: 08/06/18 12:00 AM   Result Value Ref Range    Hemoglobin A1C 6.5 %   General PFT Lab (Please always keep checked)    Collection Time: 08/08/18 10:47 AM   Result Value Ref Range    FVC-Pred 4.93 L    FVC-Pre 3.73 L    FVC-%Pred-Pre 75 %    FEV1-Pre 1.59 L    FEV1-%Pred-Pre 39 %    FEV1FVC-Pred 79 %    FEV1FVC-Pre 43 %    FEFMax-Pred 9.77 L/sec    FEFMax-Pre 6.87 L/sec    FEFMax-%Pred-Pre 70 %    FEF2575-Pred 3.87 L/sec    FEF2575-Pre 0.54 L/sec    RWH0866-%Pred-Pre 13 %    ExpTime-Pre 16.58 sec    FIFMax-Pre 4.59 L/sec    FEV1FEV6-Pred 82 %    FEV1FEV6-Pre 49 %   Cystic Fibrosis Culture Aerob Bacterial    Collection Time: 08/08/18 10:55 AM   Result Value Ref Range    Specimen Description Sputum     Culture Micro Heavy growth  Normal santi       Culture Micro Culture in progress        PFT: Severe obstructive lung disease.  When compared to 6/26/2018, the FEV1 and FVC have little change.  The decrease in FVC may represent air trapping v. restrictive physiology.  Lung volumes would be necessary to determine.    Pulmonary exacerbation: absent

## 2018-08-08 NOTE — LETTER
8/8/2018       RE: Nico Morales  [address]  [facility] [address]     Dear Colleague,    Thank you for referring your patient, Nico Morales, to the [facility] at [facility]. Please see a copy of my visit note below.    [facility]  August 8, 2018         Assessment and Plan:   Nico Morales is a 42 year old male with cystic fibrosis.    1. CF lung disease with severe obstruction:  Kilo was placed on Bactrim therapy after his last visit after growing out Stenotrophomonas.  He reports that it was a difficult antibiotic course for him because of some GI side effects, as well as fatigue.  This was followed by a probable episode of gastroenteritis.  He does feel that he is fully recovered from this event.  He does feel that his lungs are returning to near to baseline.  At this time, I would recommend:   --  That he continue on his present bronchial drainage therapy.   --  That he continue on his present nebulized therapy.     --  If he were to have Stenotrophomonas again, I would be hesitant to pursue treatment given his overall stability, but he knows to contact me if symptoms were to worsen.     2.  Recent gastroenteritis.  Kilo does describe 2 weeks of feeling like he had the stomach flu.  He had 3-4 days of loose stools followed by 2 weeks of fatigue.  He had some night sweats, poor appetite and weight loss.  He feels he is now in recovery mode.  We will follow these symptoms for now.     3.  Nutritional failure.  Kilo does remain on tube feeds 5 nights per week and is showing evidence of weight gain.     4.  Decreased bone mineral density.  Kilo was due to initiate pamidronate therapy.  He had to postpone the treatment because of his gastroenteritis.     5.  Cystic fibrosis-related diabetes.  Kilo has shown evidence of excellent control.     6.  Advanced care planning  visit.  Kilo and I discussed having an ACP visit sometime in the next 6 months.  He is agreeable to this plan.  I will ask the office and Mary Ritter, our , to arrange this with him.     7.  Health care maintenance.  Kilo is due for a Tdap vaccination.  We will perform the pneumococcal vaccine at his next visit.     8. Pancreatic Insufficiency:  The patient has no new symptoms consistent with worsening malabsorption.    - continue the present dose of pancreatic enzymes  - continue vitamin supplementation.    HCM: due 5/2019  Immunizations: TDAP 8/8/2018, pneumococcal vaccine at next visit  Colonoscopy: 5/21/2018, repeat 2021    Jaye Machado MD MPH  Associate Professor of Medicine  Pulmonary, Allergy, Critical Care and Sleep Medicine      Interval History:     Kilo is producing sputum that is yellow-green in color.  His cough frequency and sputum volume are slightly above baseline.  His activity tolerance remains stable.  He is performing 3 vest therapies per day.          Review of Systems:     All questionnaires were reviewed by me today with the patient.  Review of Systems     Constitutional:  Positive for weight loss, fatigue, decreased appetite, night sweats and increased energy. Negative for fever, chills, weight gain, recent stressors, height loss, post-operative complications, incisional pain, hallucinations, hyperactivity and confused.   HENT:  Negative for ear pain, hearing loss, tinnitus, nosebleeds, trouble swallowing, hoarse voice, mouth sores, sore throat, ear discharge, tooth pain, gum tenderness, taste disturbance, smell disturbance, hearing aid, bleeding gums, dry mouth, sinus pain, sinus congestion and neck mass.    Eyes:  Negative for double vision, pain, redness, eye pain, decreased vision, eye watering, eye bulging, eye dryness, flashing lights, spots, floaters, strabismus, tunnel vision, jaundice and eye irritation.   Respiratory:   Positive for cough, sputum production,  shortness of breath, snores loudly and dyspnea on exertion. Negative for hemoptysis, wheezing, sleep disturbances due to breathing, cough disturbing sleep and postural dyspnea.    Cardiovascular:  Positive for dyspnea on exertion. Negative for chest pain, palpitations, orthopnea, claudication, leg swelling, fingers/toes turn blue, hypertension, hypotension, syncope, history of heart murmur, chest pain on exertion, chest pain at rest, pacemaker, few scattered varicosities, leg pain, sleep disturbances due to breathing, tachycardia, light-headedness, exercise intolerance and edema.   Gastrointestinal:  Positive for nausea, vomiting, abdominal pain, diarrhea and change in stool. Negative for heartburn, constipation, blood in stool, melena, rectal pain, bloating, bowel incontinence, jaundice and coffee ground emesis.   Genitourinary:  Negative for bladder incontinence, dysuria, urgency, hematuria, flank pain, difficulty urinating, nocturia, voiding less frequently, scrotal pain, ulcerations, penile discharge, male genitourinary complaint and reduced libido.   Musculoskeletal:  Negative for myalgias, back pain, joint swelling, arthralgias, stiffness, muscle cramps, neck pain, bone pain, muscle weakness and fracture.   Skin:  Negative for nail changes, itching, poor wound healing, rash, hair changes, skin changes, acne, warts, poor wound healing, scarring, flaky skin, Raynaud's phenomenon, sensitivity to sunlight and skin thickening.   Neurological:  Negative for dizziness, tingling, tremors, speech change, seizures, loss of consciousness, weakness, light-headedness, numbness, headaches, disturbances in coordination, extremity numbness, memory loss, difficulty walking and paralysis.   Endo/Heme:  Negative for anemia, swollen glands and bruises/bleeds easily.   Psychiatric/Behavioral:  Negative for depression, hallucinations, memory loss, decreased concentration, mood swings and panic attacks.    Endocrine:  Negative for  altered temperature regulation, polyphagia, polydipsia, unwanted hair growth and change in facial hair.         Past Medical and Surgical History:     Past Medical History:   Diagnosis Date     CF (cystic fibrosis) (H)      Exocrine pancreatic insufficiency      Nocardia infection      Pseudomonas infection      S/P gastrostomy (H) 2002     Past Surgical History:   Procedure Laterality Date     COLONOSCOPY N/A 5/21/2018    Procedure: COMBINED COLONOSCOPY, SINGLE OR MULTIPLE BIOPSY/POLYPECTOMY BY BIOPSY;  Cystic fibrosis, 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;  Surgeon: Margarito Bowman MD;  Location: [facility]     GASTROSTOMY TUBE  2002     PICC INSERTION Left 01/22/2018    4Fr SL BioFlo PICC, 46cm (5cm external) in the L basilic vein w/ tip in the low SVC         Family History:     Family History   Problem Relation Age of Onset     HEART DISEASE Maternal Grandmother      HEART DISEASE Maternal Grandfather      HEART DISEASE Paternal Grandmother      Diabetes No family hx of           Social History:     Social History     Social History     Marital status: Single     Spouse name: N/A     Number of children: 0     Years of education: N/A     Occupational History     financial [employer]     Social History Main Topics     Smoking status: Never Smoker     Smokeless tobacco: Never Used     Alcohol use No     Drug use: No     Sexual activity: Yes     Partners: Female     Birth control/ protection: Pill     Other Topics Concern     Blood Transfusions No     Exercise No     Social History Narrative    Kilo lives with wife in a house in [place], [place].  He works as a .        March 2016. He works independently as a . Works out 5x/week and walks the dog daily.        June 2016. No changes. Describes himself as a creature of habit.          Medications:     Current Outpatient Prescriptions   Medication     ACE NOT PRESCRIBED, INTENTIONAL,     acetylcysteine (MUCOMYST) 10 % nebulizer  "solution     amitriptyline (ELAVIL) 10 MG tablet     azithromycin (ZITHROMAX) 250 MG tablet     aztreonam lysine (CAYSTON) 75 MG SOLR     blood glucose (NO BRAND SPECIFIED) lancets standard     blood glucose monitoring (MARTIAN CONTOUR NEXT MONITOR) meter device kit     blood glucose monitoring (MARTINA CONTOUR NEXT) test strip     blood glucose monitoring (MARTINA MICROLET) lancets     CALCIUM PO     COMPOUNDED NON-CONTROLLED SUBSTANCE (CMPD RX) - PHARMACY TO MIX COMPOUNDED MEDICATION     CREON 64915-14130 UNITS CPEP per EC capsule     doxycycline (VIBRAMYCIN) 100 MG capsule     [facility]NAGED PATIENT     Ferrous Fumarate-Vitamin C (VITRON-C) 200-125 MG TABS     insulin aspart (NOVOLOG PENFILL) 100 UNIT/ML injection     insulin glargine (LANTUS SOLOSTAR) 100 UNIT/ML injection     insulin pen needle (BD OMI U/F) 32G X 4 MM     levalbuterol (XOPENEX HFA) 45 MCG/ACT Inhaler     levalbuterol (XOPENEX) 1.25 MG/3ML neb solution     MEPHYTON 5 MG PO TABS     MULTIVITAMIN OR     Nebulizers (EFLOW SCF NEBULIZER HANDSET) MISC     polyethylene glycol (MIRALAX) powder     Respiratory Therapy Supplies (KRIS ALTERA NEBULIZER HANDSET) MISC     sodium chloride inhalant 7 % NEBU neb solution     SYMBICORT 160-4.5 MCG/ACT Inhaler     acetaminophen-codeine (TYLENOL #3) 300-30 MG per tablet     No current facility-administered medications for this visit.           Physical Exam:   /65  Pulse 95  Resp 17  Ht 1.753 m (5' 9\")  Wt 64.2 kg (141 lb 8.6 oz)  SpO2 96%  BMI 20.9 kg/m2    Constitutional:   Awake, alert and in no apparent distress     Eyes:   nonicteric     ENT:   oral mucosa moist without lesions, normal tm bilaterally, bilateral mucosa normal     Neck:   Supple without supraclavicular or cervical lymphadenopathy     Lungs:   fair air flow.  No crackles. ++ rhonchi.  No wheezes.     Cardiovascular:   Normal S1 and S2.  RRR.  No murmur, gallop or rub.     Abdomen:   NABS, soft, nontender, nondistended.  g " tube.     Musculoskeletal:   No edema, digital clubbing present     Neurologic:   Alert and conversant.     Skin:   Warm, dry.  No rash on limited exam.             Data:   All laboratory and imaging data reviewed.    Cystic Fibrosis Culture  Specimen Description   Date Value Ref Range Status   08/08/2018 Sputum  Final   06/26/2018 Sputum  Final   04/18/2018 Sputum  Final   04/18/2018 Sputum  Final   04/18/2018 Sputum  Final   04/18/2018 Sputum  Final    Culture Micro   Date Value Ref Range Status   08/08/2018 Heavy growth  Normal santi    Preliminary   08/08/2018 Culture in progress  Preliminary   06/26/2018 Heavy growth  Normal santi    Final   06/26/2018 (A)  Final    Moderate growth  Pseudomonas aeruginosa, mucoid strain     06/26/2018 (A)  Final    Moderate growth  Strain 2  Pseudomonas aeruginosa, mucoid strain     06/26/2018 Light growth  Stenotrophomonas maltophilia   (A)  Final   06/26/2018 Light growth  Staphylococcus aureus   (A)  Final        Recent Results (from the past 168 hour(s))   Hemoglobin A1c POCT    Collection Time: 08/06/18 12:00 AM   Result Value Ref Range    Hemoglobin A1C 6.5 %   General PFT Lab (Please always keep checked)    Collection Time: 08/08/18 10:47 AM   Result Value Ref Range    FVC-Pred 4.93 L    FVC-Pre 3.73 L    FVC-%Pred-Pre 75 %    FEV1-Pre 1.59 L    FEV1-%Pred-Pre 39 %    FEV1FVC-Pred 79 %    FEV1FVC-Pre 43 %    FEFMax-Pred 9.77 L/sec    FEFMax-Pre 6.87 L/sec    FEFMax-%Pred-Pre 70 %    FEF2575-Pred 3.87 L/sec    FEF2575-Pre 0.54 L/sec    [id number]-%Pred-Pre 13 %    ExpTime-Pre 16.58 sec    FIFMax-Pre 4.59 L/sec    FEV1FEV6-Pred 82 %    FEV1FEV6-Pre 49 %   Cystic Fibrosis Culture Aerob Bacterial    Collection Time: 08/08/18 10:55 AM   Result Value Ref Range    Specimen Description Sputum     Culture Micro Heavy growth  Normal santi       Culture Micro Culture in progress        PFT: Severe obstructive lung disease.  When compared to 6/26/2018, the FEV1 and FVC have little  change.  The decrease in FVC may represent air trapping v. restrictive physiology.  Lung volumes would be necessary to determine.    Pulmonary exacerbation: absent        Again, thank you for allowing me to participate in the care of your patient.      Sincerely,    Jaye Machado MD

## 2018-08-08 NOTE — MR AVS SNAPSHOT
After Visit Summary   8/8/2018    Nico Morales    MRN: [id number]           Patient Information     Date Of Birth          1975        Visit Information        Provider Department      8/8/2018 11:10 AM Jaye Machado MD [facility]        Today's Diagnoses     Cystic fibrosis with pulmonary manifestations (H)          Care Instructions    Cystic Fibrosis Self-Care Plan       MRN: [id number]   Clinic Date: August 8, 2018   Patient: Nico Morales     RECOMMENDATIONS:   TDAP vaccine today.  If you grow out stenotrophomonas again, I will not treat unless you develop symptoms.  I will have Keila call on ACP visit planning.    YOUR GOAL:  Stay well and enjoy the end of summer.    CF Nurse Line:  Jose David Henderson: [phone]   Felicitas Banuelos, RT: [phone]     Zeina Pierre: [phone] or Ivory Bonilla, Dieticians: [phone]   Jordyn Rudolph, Diabetes Nurse: [phone]      Mary Ritter: [phone] or Marisa Urbina [phone], Social Workers   [email or web address]    Annual Studies:   IGG   Date Value Ref Range Status   05/03/2018 1770 (H) 695 - 1620 mg/dL Final     Insulin   Date Value Ref Range Status   07/26/2011 30 mU/L Final     Comment:     Reference Range:  0-20  +120     There are no preventive care reminders to display for this patient.      Pulmonary Function Tests  FEV1: amount of air you can blow out in 1 second  FVC: total amount of air you can take in and blow out    Your Goals:         PFT Latest Ref Rng & Units 8/8/2018   FVC L 3.73   FEV1 L 1.59   FVC% % 75   FEV1% % 39          Airway Clearance: The Most Important Way to Keep Your Lungs Healthy  Vest Settings:    Hill-Rom Frequencies: 8, 9, 10 Pressure 10 Then, Frequencies 18, 19, 20 Pressure 6      RespirTech: Quick Start with Pressure of     Do each frequency for 5 minutes; Deflate vest after each frequency & cough 3 times before beginning the next  setting.    Vest and Neb Therapy should be done 3 times/day.    Good Nutrition Can Improve Lung Function and Overall Health     Take ALL of your vitamins with food     Take 1/2 of your enzymes before EVERY meal/snack and the other 1/2 mid-meal/snack    Wt Readings from Last 3 Encounters:   08/08/18 64.2 kg (141 lb 8.6 oz)   08/06/18 64 kg (141 lb 1.5 oz)   06/26/18 65.4 kg (144 lb 2.9 oz)       Body mass index is 20.9 kg/(m^2).         National CF Foundation Recommendations for BMI in CF Adults: Women: at least 22 Men: at least 23        Controlling Blood Sugars Helps Prevent Lung Infections & Improves Nutrition  Test blood sugar:     In the morning before eating (goal is )     2 hours after a meal (goal is less than 150)     When pre-meal glucose is greater than 150 add correction     At bedtime (if less than 100 eat a snack with 15 grams of carbohydrates  Last A1C Results:   Hemoglobin A1C   Date Value Ref Range Status   08/06/2018 6.5 % Final         If diabetic, measure A1C every 6 months. Goal: Under 7%    Staying Healthy    Research:  If you are interested in learning about research opportunities or have questions, please contact Temi Dodge at [phone] or [email or web address].      CF Foundation:  Compass is a personalized resource service to help you with the insurance, financial, legal and other issues you are facing.  It's free, confidential and available to anyone with CF.  Ask your  for more information or contact Compass directly at 652-HEBBSOM ([phone]) or compass@cff.org, or learn more at cff.org/compass.                             Follow-ups after your visit        Follow-up notes from your care team     Return in about 3 months (around 11/8/2018).      Your next 10 appointments already scheduled     Sep 18, 2018 11:00 AM CDT   CF LOOP with TETO PFL [facility] Pulmonary Function Testing ([facility])    [address]  [address]   [phone]            Sep 18, 2018 11:20 AM CDT   (Arrive by 11:05 AM)   RETURN CYSTIC FIBROSIS VISIT with Jaye Machado MD   [facility] ([facility])    [address] [facility]  [address] [place] [address]   [phone]            Oct 30, 2018 11:00 AM CDT   CF LOOP with UC PFL CF   [facility] Pulmonary Function Testing ([facility])    [address] [place] [address]  3rd [facility] [address]   [phone]            Oct 30, 2018 11:20 AM CDT   (Arrive by 11:05 AM)   RETURN CYSTIC FIBROSIS VISIT with Jaye Machado MD   [facility] ([facility])    [address] [place] [address]  [address] [place] [address]   [phone]            Dec 11, 2018 11:00 AM CST   CF LOOP with UC PFL CF   [facility] Pulmonary Function Testing ([facility])    [address] [place] [address]   [phone]            Dec 11, 2018 11:20 AM CST   (Arrive by 11:05 AM)   RETURN CYSTIC FIBROSIS VISIT with Jaye Machado MD   [facility] ([facility])    [address] [place] [address]  [address] [place] [address]   [phone]              Who to contact     If you have questions or need follow up information about today's clinic visit or your schedule please contact [facility] directly at [phone].  Normal or non-critical lab and imaging results will be communicated to you by MyChart, letter or phone within 4 business days after the clinic has received the results. If you do not hear from us within 7 days, please contact the clinic through MyChart or phone. If you have a critical or abnormal lab result, we will notify you by phone as soon as possible.  Submit refill requests through [employer] or call your pharmacy  "and they will forward the refill request to us. Please allow 3 business days for your refill to be completed.          Additional Information About Your Visit        [employer] Information     [employer] gives you secure access to your electronic health record. If you see a primary care provider, you can also send messages to your care team and make appointments. If you have questions, please call your primary care clinic.  If you do not have a primary care provider, please call [phone] and they will assist you.        Care EveryWhere ID     This is your Care EveryWhere ID. This could be used by other organizations to access your [facility] medical records  [id number]        Your Vitals Were     Pulse Respirations Height Pulse Oximetry BMI (Body Mass Index)       95 17 1.753 m (5' 9\") 96% 20.9 kg/m2        Blood Pressure from Last 3 Encounters:   08/08/18 102/65   08/06/18 106/69   06/26/18 109/73    Weight from Last 3 Encounters:   08/08/18 64.2 kg (141 lb 8.6 oz)   08/06/18 64 kg (141 lb 1.5 oz)   06/26/18 65.4 kg (144 lb 2.9 oz)              We Performed the Following     Cystic Fibrosis Culture Aerob Bacterial     RESPIRATORY FLOW VOLUME LOOP          Today's Medication Changes          These changes are accurate as of 8/8/18 11:59 PM.  If you have any questions, ask your nurse or doctor.               Stop taking these medicines if you haven't already. Please contact your care team if you have questions.     magnesium citrate solution   Stopped by:  Jaye Machado MD                    Primary Care Provider Office Phone # Fax #    Jaye Machado [phone]       [address] [facility] [address]        Equal Access to Services     [facility]: Hadii favio Nevarez, waaxda luqadaha, qaybta kaalmada joe douglass. So [facility] [phone].    ATENCIÓN: Si habla español, tiene a monroe disposición servicios gratuitos de " asistencia lingüística. Jennie al [phone].    We comply with applicable federal civil rights laws and [place] laws. We do not discriminate on the basis of race, color, national origin, age, disability, sex, sexual orientation, or gender identity.            Thank you!     Thank you for choosing [facility]  for your care. Our goal is always to provide you with excellent care. Hearing back from our patients is one way we can continue to improve our services. Please take a few minutes to complete the written survey that you may receive in the mail after your visit with us. Thank you!             Your Updated Medication List - Protect others around you: Learn how to safely use, store and throw away your medicines at www.disposemymeds.org.          This list is accurate as of 8/8/18 11:59 PM.  Always use your most recent med list.                   Brand Name Dispense Instructions for use Diagnosis    ACE NOT PRESCRIBED (INTENTIONAL)     0 each    1 each continuous prn. ACE Inhibitor not prescribed due to Risk for drug interaction    Type I (juvenile type) diabetes mellitus without mention of complication, not stated as uncontrolled       acetaminophen-codeine 300-30 MG per tablet    TYLENOL #3    6 tablet    Take 1-2 tablets by mouth every 6 hours as needed for severe pain    Cystic fibrosis with pulmonary manifestations (H)       acetylcysteine 10 % nebulizer solution    MUCOMYST    480 mL    Nebulize 4ml qid    Cystic fibrosis with pulmonary manifestations (H)       amitriptyline 10 MG tablet    ELAVIL    90 tablet    TAKE ONE TABLET BY MOUTH AT BEDTIME.    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    Diabetes mellitus related to CF (cystic fibrosis) (H)       blood glucose monitoring meter device kit     1 kit    Use to test blood sugar 6 times daily or as directed.    Diabetes mellitus related to CF (cystic fibrosis) (H)       blood glucose monitoring test strip    MARTINA CONTOUR NEXT    200 each    Use to test blood sugar 6 times daily or as directed.    Diabetes mellitus related to CF (cystic fibrosis) (H)       CALCIUM PO      Take 1 tablet by mouth 2 times daily Strength unknown.        COMPOUNDED NON-CONTROLLED SUBSTANCE - PHARMACY TO MIX COMPOUNDED MEDICATION    CMPD RX    1 Box    For testing need about 0.5 cc of each Ceftazidime 100 mg/ml( but at 1/10 concentration) Levofloxacin 25 mg/ml (but at 1/1000 dilution).   1:10 dilution Penicillin G (initial concentration 500,000 units) 1:100 dilution Penicillin G (initial concentration 500,000 units) 0.2 cc pre-pen in syringe with intradermal needle  500 mg Amoxacillin for oral use    Drug allergy, antibiotic       CREON [address]93-94177 units Cpep per EC capsule   Generic drug:  amylase-lipase-protease     2700 capsule    TAKE 7-8 CAPS WITH MEALS (3 MEALS/DAY) AND 2 CAPS WITH SNACKS (3 SNACKS/DAY)    Cystic fibrosis with pulmonary manifestations (H)       doxycycline 100 MG capsule    VIBRAMYCIN    60 capsule    TAKE 1 CAPSULE BY MOUTH TWICE DAILY    Cystic fibrosis with pulmonary manifestations (H)       EFLOW SCF  "NEBULIZER HANDSET Misc     1 each    1 each 3 times daily.    Nocardia infection       [place] HOME INFUSION MANAGED PATIENT      Contact [facility] for patient specific medication information at [phone] on admission and discharge from the hospital.  Phones are answered 24 hours a day 7 days a week 365 days a year.  Providers - Choose \"CONTINUE HOME MED (no script)\" at discharge if patient treatment with home infusion will continue.        insulin aspart 100 UNIT/ML injection    NovoLOG PENFILL    30 mL    Inject 1 unit per 20G CHO at lunch and supper and 3 units at night with tube feedings, up to 20 units daily    Diabetes mellitus related to CF (cystic fibrosis) (H)       insulin glargine 100 UNIT/ML injection    LANTUS SOLOSTAR    15 mL    6 units daily    Diabetes mellitus related to CF (cystic fibrosis) (H)       insulin pen needle 32G X 4 MM    BD OMI U/F    200 each    Use 6 daily or as directed.    Diabetes mellitus related to CF (cystic fibrosis) (H)       levalbuterol 1.25 MG/3ML neb solution    XOPENEX    360 mL    INHALE 1 VIAL BY MOUTH INTO THE LUNGS VIA NEBULIZATION 4 TIMES DAILY    Cystic fibrosis (H), CF (cystic fibrosis) (H), Cystic fibrosis with pulmonary manifestations (H)       levalbuterol 45 MCG/ACT Inhaler    XOPENEX HFA    3 Inhaler    Inhale 2 puffs into the lungs every 6 hours as needed for shortness of breath / dyspnea    Cystic fibrosis (H)       MEPHYTON 5 MG tablet   Generic drug:  phytonadione      Take 1 TABLET by mouth DAILY.        MULTIVITAMIN PO      Take 1 tablet by mouth daily. Includes 5,000 units vitamin D and 400 units vitamin E. Per pt, formulated for CF pts.        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 "    Cystic fibrosis with meconium ileus (H)       sodium chloride inhalant 7 % Nebu neb solution     240 mL    NEBULIZE 4ML TWICE DAILY    Cystic fibrosis with pulmonary manifestations (H)       SYMBICORT 160-4.5 MCG/ACT Inhaler   Generic drug:  budesonide-formoterol     10.2 Inhaler    INHALE 2 PUFFS BY MOUTH INTO THE LUNGS TWICE DAILY    Cystic fibrosis of the lung (H), Cystic fibrosis (H)       VITRON-C 200-125 MG Tabs   Generic drug:  ferrous fumarate 65 mg ([place]. FE)-Vitamin C 125 mg      Take 1 tablet by mouth 2 times daily (with meals)    Nocardia infection, Cystic fibrosis with pulmonary manifestations (H)

## 2018-08-08 NOTE — PATIENT INSTRUCTIONS
Cystic Fibrosis Self-Care Plan       MRN: [id number]   Clinic Date: August 8, 2018   Patient: Nico Morales     RECOMMENDATIONS:   TDAP vaccine today.  If you grow out stenotrophomonas again, I will not treat unless you develop symptoms.  I will have Keila call on ACP visit planning.    YOUR GOAL:  Stay well and enjoy the end of summer.    CF Nurse Line:  Bailee Henderson and Nelda: [phone]   Felicitas Banuelos, RT: [phone]     Zeina Pierre: [phone] or Ivory Bonilla, Dieticians: [phone]   Jordyn Rudolph, Diabetes Nurse: [phone]      Mary Ritter: [phone] or Marisa Urbina [phone], Social Workers   [email or web address]    Annual Studies:   IGG   Date Value Ref Range Status   05/03/2018 1770 (H) 695 - 1620 mg/dL Final     Insulin   Date Value Ref Range Status   07/26/2011 30 mU/L Final     Comment:     Reference Range:  0-20  +120     There are no preventive care reminders to display for this patient.      Pulmonary Function Tests  FEV1: amount of air you can blow out in 1 second  FVC: total amount of air you can take in and blow out    Your Goals:         PFT Latest Ref Rng & Units 8/8/2018   FVC L 3.73   FEV1 L 1.59   FVC% % 75   FEV1% % 39          Airway Clearance: The Most Important Way to Keep Your Lungs Healthy  Vest Settings:    Hill-Rom Frequencies: 8, 9, 10 Pressure 10 Then, Frequencies 18, 19, 20 Pressure 6      RespirTech: Quick Start with Pressure of     Do each frequency for 5 minutes; Deflate vest after each frequency & cough 3 times before beginning the next setting.    Vest and Neb Therapy should be done 3 times/day.    Good Nutrition Can Improve Lung Function and Overall Health     Take ALL of your vitamins with food     Take 1/2 of your enzymes before EVERY meal/snack and the other 1/2 mid-meal/snack    Wt Readings from Last 3 Encounters:   08/08/18 64.2 kg (141 lb 8.6 oz)   08/06/18 64 kg (141 lb 1.5 oz)   06/26/18 65.4 kg (144 lb 2.9 oz)       Body mass  index is 20.9 kg/(m^2).         National CF Foundation Recommendations for BMI in CF Adults: Women: at least 22 Men: at least 23        Controlling Blood Sugars Helps Prevent Lung Infections & Improves Nutrition  Test blood sugar:     In the morning before eating (goal is )     2 hours after a meal (goal is less than 150)     When pre-meal glucose is greater than 150 add correction     At bedtime (if less than 100 eat a snack with 15 grams of carbohydrates  Last A1C Results:   Hemoglobin A1C   Date Value Ref Range Status   08/06/2018 6.5 % Final         If diabetic, measure A1C every 6 months. Goal: Under 7%    Staying Healthy    Research:  If you are interested in learning about research opportunities or have questions, please contact Temi Dodge at [phone] or [email or web address].      CF Foundation:  Compass is a personalized resource service to help you with the insurance, financial, legal and other issues you are facing.  It's free, confidential and available to anyone with CF.  Ask your  for more information or contact Compass directly at 722-ERNCARS ([phone]) or compass@cff.org, or learn more at cff.org/compass.

## 2018-08-09 ASSESSMENT — ENCOUNTER SYMPTOMS
ABDOMINAL PAIN: 1
PANIC: 0
BACK PAIN: 0
RECTAL PAIN: 0
ALTERED TEMPERATURE REGULATION: 0
FATIGUE: 1
JAUNDICE: 0
EXTREMITY NUMBNESS: 0
SINUS PAIN: 0
ORTHOPNEA: 0
LEG PAIN: 0
HEADACHES: 0
NIGHT SWEATS: 1
NECK MASS: 0
DOUBLE VISION: 0
SMELL DISTURBANCE: 0
DECREASED CONCENTRATION: 0
SPEECH CHANGE: 0
VOMITING: 1
INSOMNIA: 0
PALPITATIONS: 0
SINUS CONGESTION: 0
POLYPHAGIA: 0
SWOLLEN GLANDS: 0
CHILLS: 0
HALLUCINATIONS: 0
EXERCISE INTOLERANCE: 0
DYSURIA: 0
TACHYCARDIA: 0
POOR WOUND HEALING: 0
HOARSE VOICE: 0
MUSCLE CRAMPS: 0
FEVER: 0
LEG SWELLING: 0
HEMOPTYSIS: 0
DYSPNEA ON EXERTION: 1
WHEEZING: 0
ARTHRALGIAS: 0
EYE PAIN: 0
LOSS OF CONSCIOUSNESS: 0
EYE WATERING: 0
NAIL CHANGES: 0
SYNCOPE: 0
FLANK PAIN: 0
MEMORY LOSS: 0
NUMBNESS: 0
WEIGHT GAIN: 0
BLOOD IN STOOL: 0
BRUISES/BLEEDS EASILY: 0
TROUBLE SWALLOWING: 0
DIZZINESS: 0
COUGH: 1
DIARRHEA: 1
PARALYSIS: 0
MYALGIAS: 0
DEPRESSION: 0
SHORTNESS OF BREATH: 1
EYE REDNESS: 0
DISTURBANCES IN COORDINATION: 0
WEAKNESS: 0
JOINT SWELLING: 0
HYPERTENSION: 0
TREMORS: 0
DECREASED APPETITE: 1
CONSTIPATION: 0
COUGH DISTURBING SLEEP: 0
POSTURAL DYSPNEA: 0
WEIGHT LOSS: 1
LIGHT-HEADEDNESS: 0
SEIZURES: 0
BLOATING: 0
NAUSEA: 1
CLAUDICATION: 0
SNORES LOUDLY: 1
POLYDIPSIA: 0
TINGLING: 0
NERVOUS/ANXIOUS: 0
MUSCLE WEAKNESS: 0
NECK PAIN: 0
DIFFICULTY URINATING: 0
SKIN CHANGES: 0
TASTE DISTURBANCE: 0
HEARTBURN: 0
INCREASED ENERGY: 1
SORE THROAT: 0
STIFFNESS: 0
HEMATURIA: 0
EYE IRRITATION: 0
HYPOTENSION: 0
SLEEP DISTURBANCES DUE TO BREATHING: 0
SPUTUM PRODUCTION: 1
BOWEL INCONTINENCE: 0

## 2018-08-09 ASSESSMENT — ANXIETY QUESTIONNAIRES
GAD7 TOTAL SCORE: 0
2. NOT BEING ABLE TO STOP OR CONTROL WORRYING: NOT AT ALL
7. FEELING AFRAID AS IF SOMETHING AWFUL MIGHT HAPPEN: NOT AT ALL
3. WORRYING TOO MUCH ABOUT DIFFERENT THINGS: NOT AT ALL
5. BEING SO RESTLESS THAT IT IS HARD TO SIT STILL: NOT AT ALL
6. BECOMING EASILY ANNOYED OR IRRITABLE: NOT AT ALL
1. FEELING NERVOUS, ANXIOUS, OR ON EDGE: NOT AT ALL
IF YOU CHECKED OFF ANY PROBLEMS ON THIS QUESTIONNAIRE, HOW DIFFICULT HAVE THESE PROBLEMS MADE IT FOR YOU TO DO YOUR WORK, TAKE CARE OF THINGS AT HOME, OR GET ALONG WITH OTHER PEOPLE: NOT DIFFICULT AT ALL

## 2018-08-09 ASSESSMENT — PATIENT HEALTH QUESTIONNAIRE - PHQ9: 5. POOR APPETITE OR OVEREATING: NOT AT ALL

## 2018-08-09 NOTE — PROGRESS NOTES
"[facility], Adult Program   Annual Psychosocial Assessment     Presenting information:   Nico \"Kilo\" Andrew is a 42-year-old male with cystic fibrosis, presenting in clinic today for a routine appointment with Dr. Machado.  Met with Kilo to complete an updated annual psychosocial assessment.     Living Situation:   Kilo lives by himself (with his 5 y.o English Bulldog Jefry) in a house in [place].  He recently designed and built this home and moved in last December.  The layout of the home includes main level living with a bonus room upstairs where he does all of his treatments, he enjoys that this is a separate space.  He is really enjoying his new home and denies any concerns regarding his living situation.    Family Constellation:   Kilo grew up in [place] and was raised by his biological parents (Epifanio and Honey). He has one older sister (Rosa). His parents recently moved to [place], they are retired and he reports that they are in good health and enjoy travel. Rosa is  with two children and lives in [place].  Kilo reports that he sees his parents, his sister and her family regularly.     Kilo is , does not have children and is not currently in a significant relationship.      Social Support:   Kilo reports good social support. He has positive family relationships and draws additional support from friends, co-workers, and his [employer] community.  He reports that he has a 2nd cousin (twice removed) that also has CF, they keep in touch and do discuss questions and experiences regarding CF with each other.      Adjustment to Illness:   Kilo was diagnosed with CF at 18 months, he reports that the diagnosis came after he wasn't gaining weight.  He received his pediatric care through the [facility] and experienced few complications while growing up.  He recalls that his parents took very good care of him, he was not hospitalized until his " "20's for CF related issues.      Kilo feels his current health status is currently \"pretty steady\" and hasn't noticed significant changes in his health over the past year, he has remained stable.  Clinically, he has severe lung disease, pancreatic insufficiency, nutritional problems and CF related diabetes.  He has a history of nutritional failure with g-tube placement, which he uses it 4-5 nights per week.  He generally completes 3 vest treatments per day and denies any systematic barriers to adherence or impairment caused by pulmonary symptoms. Kilo gets exercise from walking his dog and exercising with equipment he has at home.  Kilo appears to be dedicated to maintaining a healthy lifestyle and is very discipline with good adherence to his medical care.  He feels he is managing his cares well given he is 42 years old.    Kilo reports that he is an \"open book\" when he comes to openness regarding his CF diagnosis.      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  He reports that he has HCD forms at home and plans to complete it.  In the meantime, he stated that he is  comfortable having his next of kin (his parents) make decisions on his behalf if he were unable to make decisions for himself.       Advance Care Planning:  Having an upcoming ACP visit was discussed today as Kilo's PFT's are less than 50.  He was open to this and plans to schedule, ACP visit agenda and handouts provided.      Education:   Kilo completed a Bachelor's Degree in Biology and Math from [employer] in [place].      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 He enjoys his work and has built a solid clientele base. His work hours are variable but flexible, and he works " "close to home and has a private office space. He has 2 long-term disability policies purchased through financial planning associations. Kilo has health insurance through his work.      Mental Health:   Kilo describes his current mood as \"good, it's been fine\" and denies any symptoms indicative of past or current mood, anxiety, or any other mental health disorder. He reports low stress levels overall and describes himself as \"laid-back.\"  He typically sabiha with stress by taking his dog on a walk. Mindy/spirituality is important to him, he identifies as Restorationism (non-Uatsdin) and is a member at [facility] and attends groups at another [employer] as well. During and after his divorce he did participate in separation and divorce care group through his [employer]oroastrianism which he found very helpful.      Kilo completed the following screens today:  VANE-7 Score: 0, indicating minimal symptoms of anxiety and described as not difficult at all in daily functioning.    PHQ-9 Score: 2, indicating minimal symptoms of depression and described as not difficult at all in daily functioning.       Kilo agreeebwith the scores and interpretation of screens above.  He denied having additional symptoms not addressed on these screens.      Chemical Health:   Kilo denies the use of tobacco, marijuana or illicit drugs.  He does not drink alcohol and denies any history of chemical dependency issues.           Finances:   Kilo has income wages and denies any financial concerns.     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          Recreation/Leisure Interests:   Kilo enjoys having out with friends, family, going out for Sushi, hiking and being outdoors.  He enjoys spending time with his dog.      INTERVENTION:   - Psychosocial assessment  -Mental health screening  -HCD review    ASSESSMENT:   Kilo was open " in his responses and he appeared to be in good spirits.  He feels that things are going well for him and he is happy with the currently status of his health.  He reports having a strong support system through family, friends and viridiana community.  He continues to demonstrate excellent adherence with managing his health care needs.  He appears to be stable from a psychosocial perspective with access to relevant supports and resources.  No concerns noted/expressed today, recommend f/u as outlined below.    PLAN OF INTERVENTION:   -Kilo plans to schedule an ACP visit in the next 6 months.  -Complete psychosocial assessment annually.   -Continue to follow for any psychosocial needs as they arise.    KACI Acosta, [facility]  Adult Cystic Fibrosis   Ph: [phone]  Pager: [phone]

## 2018-08-10 ASSESSMENT — ANXIETY QUESTIONNAIRES: GAD7 TOTAL SCORE: 0

## 2018-08-10 ASSESSMENT — PATIENT HEALTH QUESTIONNAIRE - PHQ9: SUM OF ALL RESPONSES TO PHQ QUESTIONS 1-9: 2

## 2018-08-14 LAB
BACTERIA SPEC CULT: ABNORMAL
SPECIMEN SOURCE: ABNORMAL

## 2018-08-31 DIAGNOSIS — E08.9 DIABETES MELLITUS RELATED TO CF (CYSTIC FIBROSIS) (H): ICD-10-CM

## 2018-08-31 DIAGNOSIS — E84.8 DIABETES MELLITUS RELATED TO CF (CYSTIC FIBROSIS) (H): ICD-10-CM

## 2018-08-31 RX ORDER — INSULIN GLARGINE 100 [IU]/ML
INJECTION, SOLUTION SUBCUTANEOUS
Qty: 15 ML | Refills: 1 | Status: SHIPPED | OUTPATIENT
Start: 2018-08-31 | End: 2018-12-05

## 2018-09-17 DIAGNOSIS — E84.11 CYSTIC FIBROSIS WITH MECONIUM ILEUS (H): ICD-10-CM

## 2018-09-17 RX ORDER — POLYETHYLENE GLYCOL 3350 17 G/17G
POWDER, FOR SOLUTION ORAL
Qty: 527 G | Refills: 1 | Status: SHIPPED | OUTPATIENT
Start: 2018-09-17 | End: 2019-06-18

## 2018-09-18 ENCOUNTER — OFFICE VISIT (OUTPATIENT)
Dept: PHARMACY | Facility: CLINIC | Age: 43
End: 2018-09-18
Payer: COMMERCIAL

## 2018-09-18 ENCOUNTER — OFFICE VISIT (OUTPATIENT)
Dept: PULMONOLOGY | Facility: CLINIC | Age: 43
End: 2018-09-18
Attending: INTERNAL MEDICINE
Payer: COMMERCIAL

## 2018-09-18 VITALS
RESPIRATION RATE: 17 BRPM | HEART RATE: 98 BPM | OXYGEN SATURATION: 96 % | HEIGHT: 69 IN | SYSTOLIC BLOOD PRESSURE: 108 MMHG | WEIGHT: 145.72 LBS | DIASTOLIC BLOOD PRESSURE: 74 MMHG | BODY MASS INDEX: 21.58 KG/M2

## 2018-09-18 DIAGNOSIS — E84.9 CYSTIC FIBROSIS (H): ICD-10-CM

## 2018-09-18 DIAGNOSIS — E08.9 DIABETES MELLITUS DUE TO CYSTIC FIBROSIS (H): ICD-10-CM

## 2018-09-18 DIAGNOSIS — E84.0 CYSTIC FIBROSIS WITH PULMONARY MANIFESTATIONS (H): Primary | ICD-10-CM

## 2018-09-18 DIAGNOSIS — G43.909 MIGRAINE: ICD-10-CM

## 2018-09-18 DIAGNOSIS — G43.709 CHRONIC MIGRAINE WITHOUT AURA WITHOUT STATUS MIGRAINOSUS, NOT INTRACTABLE: ICD-10-CM

## 2018-09-18 DIAGNOSIS — E84.0 CYSTIC FIBROSIS WITH PULMONARY MANIFESTATIONS (H): ICD-10-CM

## 2018-09-18 DIAGNOSIS — E84.9 DIABETES MELLITUS DUE TO CYSTIC FIBROSIS (H): ICD-10-CM

## 2018-09-18 DIAGNOSIS — E84.9 CYSTIC FIBROSIS (H): Primary | ICD-10-CM

## 2018-09-18 DIAGNOSIS — K86.81 EXOCRINE PANCREATIC INSUFFICIENCY: ICD-10-CM

## 2018-09-18 LAB
EXPTIME-PRE: 15.9 SEC
FEF2575-%PRED-PRE: 15 %
FEF2575-PRE: 0.61 L/SEC
FEF2575-PRED: 3.87 L/SEC
FEFMAX-%PRED-PRE: 65 %
FEFMAX-PRE: 6.35 L/SEC
FEFMAX-PRED: 9.77 L/SEC
FEV1-%PRED-PRE: 42 %
FEV1-PRE: 1.67 L
FEV1FEV6-PRE: 50 %
FEV1FEV6-PRED: 82 %
FEV1FVC-PRE: 44 %
FEV1FVC-PRED: 79 %
FIFMAX-PRE: 4.76 L/SEC
FVC-%PRED-PRE: 77 %
FVC-PRE: 3.8 L
FVC-PRED: 4.93 L

## 2018-09-18 PROCEDURE — 87077 CULTURE AEROBIC IDENTIFY: CPT | Performed by: INTERNAL MEDICINE

## 2018-09-18 PROCEDURE — 90732 PPSV23 VACC 2 YRS+ SUBQ/IM: CPT | Mod: ZF | Performed by: INTERNAL MEDICINE

## 2018-09-18 PROCEDURE — 94375 RESPIRATORY FLOW VOLUME LOOP: CPT | Mod: ZF | Performed by: INTERNAL MEDICINE

## 2018-09-18 PROCEDURE — G0009 ADMIN PNEUMOCOCCAL VACCINE: HCPCS | Mod: ZF

## 2018-09-18 PROCEDURE — 87070 CULTURE OTHR SPECIMN AEROBIC: CPT | Performed by: INTERNAL MEDICINE

## 2018-09-18 PROCEDURE — 99605 MTMS BY PHARM NP 15 MIN: CPT | Performed by: PHARMACIST

## 2018-09-18 PROCEDURE — G0463 HOSPITAL OUTPT CLINIC VISIT: HCPCS

## 2018-09-18 PROCEDURE — 87186 SC STD MICRODIL/AGAR DIL: CPT | Performed by: INTERNAL MEDICINE

## 2018-09-18 PROCEDURE — 25000128 H RX IP 250 OP 636: Mod: ZF | Performed by: INTERNAL MEDICINE

## 2018-09-18 RX ORDER — LEVALBUTEROL TARTRATE 45 UG/1
2 AEROSOL, METERED ORAL EVERY 6 HOURS PRN
Qty: 5 INHALER | Refills: 4 | Status: SHIPPED | OUTPATIENT
Start: 2018-09-18 | End: 2019-02-15

## 2018-09-18 RX ADMIN — PNEUMOCOCCAL VACCINE POLYVALENT 0.5 ML
25; 25; 25; 25; 25; 25; 25; 25; 25; 25; 25; 25; 25; 25; 25; 25; 25; 25; 25; 25; 25; 25; 25 INJECTION, SOLUTION INTRAMUSCULAR; SUBCUTANEOUS at 12:27

## 2018-09-18 ASSESSMENT — PAIN SCALES - GENERAL: PAINLEVEL: NO PAIN (0)

## 2018-09-18 NOTE — PROGRESS NOTES
[facility]  September 18, 2018         Assessment and Plan:   Nico Morales is a 42 year old male with cystic fibrosis.    1. CF lung disease with severe obstruction: Kilo has shown evidence of stability in pulmonary function as well as his pulmonary symptomatology.  He continues to show evidence of Pseudomonas in his sputum.  He also grew out staph.  At this time I would recommend:   -- That he continue on his present bronchial drainage therapy.     -- That he continue on his nebulized therapies.     -- He should continue to alternate 2 weeks doxycycline and 2 weeks of [place] as his maintenance antibiotic therapy.     2.  Cystic fibrosis-related diabetes.  Kilo has excellent control.     3.  Decreased bone mineral density.  Kilo has not pursued pamidronate, but hopes to soon.     4.  Psychosocial.  Kilo does continue to work as a .  I am interested in pursuing an advanced care planning visit with him and his support system.  We will try to arrange that in the next 6 months.     5.  Tinnitus.  He has a problem with ear ringing.  We did discuss going to Audiology to have this more completely evaluated.  He is agreeable to this plan.     6.  Nutrition.  Kilo does continue to use for himself with tube feeds each night.  He is showing evidence of a nice weight gain.     7. Pancreatic Insufficiency:  The patient has no new symptoms consistent with worsening malabsorption.    - continue the present dose of pancreatic enzymes  - continue vitamin supplementation.    HCM: 5/2019  Immunizations: prevnar 9/2019  Colonoscopy: 8/2021    Jaye Machado MD MPH  Associate Professor of Medicine  Pulmonary, Allergy, Critical Care and Sleep Medicine      Interval History:     Kilo does continue to produce sputum that is green/yellow in color.  His cough frequency and sputum volume are unchanged.  His activity tolerance remains good.  He does 3-4  bronchial drainage therapies each day.          Review of Systems:     CONSTITUTIONAL: no fever, no chills, increase in weight, no change in energy, no change in appetite    INTEGUMENTARY/SKIN: no rash, no obvious new lesions    ENT/MOUTH: no sore throat, no new sinus pain, no new nasal drainage, no hearing loss, chronic ear ringing     RESPIRATORY: see interval history    CV: no chest pain, no palpitations, no peripheral edema, no orthopnea, no PND    GI: no nausea, no vomiting, no change in stools, no fatty stools, no GERD, no abdominal pain    : no dysuria, no urinary frequency    MUSCULOSKELETAL: no myalgias, no arthralgias    ENDOCRINE: no excessive thirst, blood sugars with adequate control    NEURO:  No headache, no numbness, no tingling    SLEEP: no issues    PSYCHIATRIC: mood stable          Past Medical and Surgical History:     Past Medical History:   Diagnosis Date     CF (cystic fibrosis) (H)      Exocrine pancreatic insufficiency      Nocardia infection      Pseudomonas infection      S/P gastrostomy (H) 2002     Past Surgical History:   Procedure Laterality Date     COLONOSCOPY N/A 5/21/2018    Procedure: COMBINED COLONOSCOPY, SINGLE OR MULTIPLE BIOPSY/POLYPECTOMY BY BIOPSY;  Cystic fibrosis, 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;  Surgeon: Margarito Bowman MD;  Location: [facility]     GASTROSTOMY TUBE  2002     PICC INSERTION Left 01/22/2018    4Fr SL BioFlo PICC, 46cm (5cm external) in the L basilic vein w/ tip in the low SVC           Family History:     Family History   Problem Relation Age of Onset     HEART DISEASE Maternal Grandmother      HEART DISEASE Maternal Grandfather      HEART DISEASE Paternal Grandmother      Diabetes No family hx of             Social History:     Social History     Social History     Marital status: Single     Spouse name: N/A     Number of children: 0     Years of education: N/A     Occupational History     [employer]     Social History Main Topics      Smoking status: Never Smoker     Smokeless tobacco: Never Used     Alcohol use No     Drug use: No     Sexual activity: Yes     Partners: Female     Birth control/ protection: Pill     Other Topics Concern     Blood Transfusions No     Exercise No     Social History Narrative    Kilo lives with wife in a house in [place], [place].  He works as a .        March 2016. He works independently as a . Works out 5x/week and walks the dog daily.        June 2016. No changes. Describes himself as a creature of habit.            Medications:     Current Outpatient Prescriptions   Medication     ACE NOT PRESCRIBED, INTENTIONAL,     acetylcysteine (MUCOMYST) 10 % nebulizer solution     amitriptyline (ELAVIL) 10 MG tablet     azithromycin (ZITHROMAX) 250 MG tablet     aztreonam lysine (CAYSTON) 75 MG SOLR     blood glucose (NO BRAND SPECIFIED) lancets standard     blood glucose monitoring (MARTINA CONTOUR NEXT MONITOR) meter device kit     blood glucose monitoring (MARTINA CONTOUR NEXT) test strip     blood glucose monitoring (MARTINA MICROLET) lancets     CALCIUM PO     COMPOUNDED NON-CONTROLLED SUBSTANCE (CMPD RX) - PHARMACY TO MIX COMPOUNDED MEDICATION     CREON 29272-15606 UNITS CPEP per EC capsule     doxycycline (VIBRAMYCIN) 100 MG capsule     [facility] INFUSION MANAGED PATIENT     Ferrous Fumarate-Vitamin C (VITRON-C) 200-125 MG TABS     insulin aspart (NOVOLOG PENFILL) 100 UNIT/ML injection     insulin pen needle (BD OMI U/F) 32G X 4 MM     LANTUS SOLOSTAR 100 UNIT/ML soln     levalbuterol (XOPENEX HFA) 45 MCG/ACT Inhaler     levalbuterol (XOPENEX) 1.25 MG/3ML neb solution     MEPHYTON 5 MG PO TABS     MULTIVITAMIN OR     Nebulizers (EFLOW SCF NEBULIZER HANDSET) MISC     polyethylene glycol (MIRALAX/GLYCOLAX) powder     Respiratory Therapy Supplies (KRIS ALTERA NEBULIZER HANDSET) MISC     sodium chloride inhalant 7 % NEBU neb solution     SYMBICORT 160-4.5 MCG/ACT Inhaler      "acetaminophen-codeine (TYLENOL #3) 300-30 MG per tablet     [DISCONTINUED] levalbuterol (XOPENEX HFA) 45 MCG/ACT Inhaler     No current facility-administered medications for this visit.             Physical Exam:   /74  Pulse 98  Resp 17  Ht 1.753 m (5' 9\")  Wt 66.1 kg (145 lb 11.6 oz)  SpO2 96%  BMI 21.52 kg/m2    Constitutional:   Awake, alert and in no apparent distress     Eyes:   nonicteric     ENT:   oral mucosa moist without lesions, normal tm bilaterally, bilateral mucosa normal     Neck:   Supple without supraclavicular or cervical lymphadenopathy     Lungs:   Good air flow.  No crackles. biapical rhonchi.  No wheezes.     Cardiovascular:   Normal S1 and S2.  RRR.  No murmur, gallop or rub.     Abdomen:   NABS, soft, nontender, nondistended.  g tube     Musculoskeletal:   No edema, digital clubbing present     Neurologic:   Alert and conversant.     Skin:   Warm, dry.  No rash on limited exam.             Data:   All laboratory and imaging data reviewed.    Cystic Fibrosis Culture  Specimen Description   Date Value Ref Range Status   08/08/2018 Sputum  Final   06/26/2018 Sputum  Final   04/18/2018 Sputum  Final   04/18/2018 Sputum  Final   04/18/2018 Sputum  Final   04/18/2018 Sputum  Final    Culture Micro   Date Value Ref Range Status   08/08/2018 Heavy growth  Normal santi    Final   08/08/2018 (A)  Final    Moderate growth  Pseudomonas aeruginosa, mucoid strain     08/08/2018 (A)  Final    Moderate growth  Strain 2  Pseudomonas aeruginosa, mucoid strain     08/08/2018 (A)  Final    Light growth  Staphylococcus pseudintermedius  Identification obtained by MALDI-TOF mass spectrometry research use only database. Test   characteristics determined and verified by the Infect[facility] Diagnostic [facility]   ([facility]) [place], [place].          Recent Results (from the past 168 hour(s))   General PFT Lab (Please always keep checked)    Collection Time: 09/18/18 11:15 AM   Result Value Ref Range    " FVC-Pred 4.93 L    FVC-Pre 3.80 L    FVC-%Pred-Pre 77 %    FEV1-Pre 1.67 L    FEV1-%Pred-Pre 42 %    FEV1FVC-Pred 79 %    FEV1FVC-Pre 44 %    FEFMax-Pred 9.77 L/sec    FEFMax-Pre 6.35 L/sec    FEFMax-%Pred-Pre 65 %    FEF2575-Pred 3.87 L/sec    FEF2575-Pre 0.61 L/sec    YMN5661-%Pred-Pre 15 %    ExpTime-Pre 15.90 sec    FIFMax-Pre 4.76 L/sec    FEV1FEV6-Pred 82 %    FEV1FEV6-Pre 50 %       PFT: Severe obstructive lung disease.  When compared to 8/8/18, the FEV1 and FVC have increased.  The decrease in FVC may represent air trapping v. restrictive physiology.  Lung volumes would be necessary to determine.    Pulmonary exacerbation: absent

## 2018-09-18 NOTE — PATIENT INSTRUCTIONS
Cystic Fibrosis Self-Care Plan    RECOMMENDATIONS:   Audiology referral.  Schedule your pamidronate.  We will arrange the ACP visit with you.  Continue present antibiotic rotation.  Pneumonia vaccine today.    YOUR GOAL:  Enjoy the fall!      CF Nurse line:          Jose David Henderson   [phone]            CF Resp Therapist: Felicitas Banuelos            [phone]   CF Dietitians:           Zeina Pierre            [phone]                       Ivory Bonilla                       [phone]   CF Diabetes Nurse: Jordyn Rudolph             [phone]    CF Social Workers:  Mary Ritter             [phone]                Marisa Urbina            [phone]  CF Pharmacist:        Tammy Morgan                              [phone]  [email or web address]         MRN: [id number]   Clinic Date: September 18, 2018   Patient: Nico Morales     Annual Studies:   IGG   Date Value Ref Range Status   05/03/2018 1770 (H) 695 - 1620 mg/dL Final     Insulin   Date Value Ref Range Status   07/26/2011 30 mU/L Final     Comment:     Reference Range:  0-20  +120     There are no preventive care reminders to display for this patient.      Pulmonary Function Tests  FEV1: amount of air you can blow out in 1 second  FVC: total amount of air you can take in and blow out    Your Goals:         PFT Latest Ref Rng & Units 9/18/2018   FVC L 3.80   FEV1 L 1.67   FVC% % 77   FEV1% % 42          Airway Clearance: The Most Important Way to Keep Your Lungs Healthy  Vest Settings:    Hill-Rom Frequencies: 8, 9, 10 Pressure 10 Then, Frequencies 18, 19, 20 Pressure 6      RespirTech: Quick Start with Pressure of     Do each frequency for 5 minutes; Deflate vest after each frequency & cough 3 times before beginning the next setting.    Vest and Neb Therapy should be done 3-4 times/day.    Good Nutrition Can Improve Lung Function and Overall Health     Take ALL of your vitamins with food     Take 1/2 of your enzymes before  EVERY meal/snack and the other 1/2 mid-meal/snack    Wt Readings from Last 3 Encounters:   09/18/18 66.1 kg (145 lb 11.6 oz)   08/08/18 64.2 kg (141 lb 8.6 oz)   08/06/18 64 kg (141 lb 1.5 oz)       Body mass index is 21.52 kg/(m^2).         National CF Foundation Recommendations for BMI in CF Adults: Women: at least 22 Men: at least 23        Controlling Blood Sugars Helps Prevent Lung Infections & Improves Nutrition  Test blood sugar:     In the morning before eating (goal is )     2 hours after a meal (goal is less than 150)     When pre-meal glucose is greater than 150 add correction     At bedtime (if less than 100 eat a snack with 15 grams of carbohydrates  Last A1C Results:   Hemoglobin A1C   Date Value Ref Range Status   08/06/2018 6.5 % Final         If diabetic, measure A1C every 6 months. Goal: Under 7%    Staying Healthy    Research:  If you are interested in learning about research opportunities or have questions, please contact the CF Research Team at [phone] or [email or web address].      CF Foundation:  Compass is a personalized resource service to help you with the insurance, financial, legal and other issues you are facing.  It's free, confidential and available to anyone with CF.  Ask your  for more information or contact Compass directly at 113-COMPASS (491-3969) or compass@cff.org, or learn more at cff.[email or web address].

## 2018-09-18 NOTE — LETTER
9/18/2018       RE: Nico Morales  [address]  [facility] [address]     Dear Colleague,    Thank you for referring your patient, Nico Morales, to the [facility] at [facility]. Please see a copy of my visit note below.    [facility]  September 18, 2018         Assessment and Plan:   Nico Morales is a 42 year old male with cystic fibrosis.    1. CF lung disease with severe obstruction: Kilo has shown evidence of stability in pulmonary function as well as his pulmonary symptomatology.  He continues to show evidence of Pseudomonas in his sputum.  He also grew out staph.  At this time I would recommend:   -- That he continue on his present bronchial drainage therapy.     -- That he continue on his nebulized therapies.     -- He should continue to alternate 2 weeks doxycycline and 2 weeks of Cayston as his maintenance antibiotic therapy.     2.  Cystic fibrosis-related diabetes.  Kilo has excellent control.     3.  Decreased bone mineral density.  Kilo has not pursued pamidronate, but hopes to soon.     4.  Psychosocial.  Kilo does continue to work as a .  I am interested in pursuing an advanced care planning visit with him and his support system.  We will try to arrange that in the next 6 months.     5.  Tinnitus.  He has a problem with ear ringing.  We did discuss going to Audiology to have this more completely evaluated.  He is agreeable to this plan.     6.  Nutrition.  Kilo does continue to use for himself with tube feeds each night.  He is showing evidence of a nice weight gain.     7. Pancreatic Insufficiency:  The patient has no new symptoms consistent with worsening malabsorption.    - continue the present dose of pancreatic enzymes  - continue vitamin supplementation.    HCM: 5/2019  Immunizations: prevnar 9/2019  Colonoscopy: 8/2021    Jaye CHEATHAM  MD Carter MPH  Associate Professor of Medicine  Pulmonary, Allergy, Critical Care and Sleep Medicine      Interval History:     Kilo does continue to produce sputum that is green/yellow in color.  His cough frequency and sputum volume are unchanged.  His activity tolerance remains good.  He does 3-4 bronchial drainage therapies each day.          Review of Systems:     CONSTITUTIONAL: no fever, no chills, increase in weight, no change in energy, no change in appetite    INTEGUMENTARY/SKIN: no rash, no obvious new lesions    ENT/MOUTH: no sore throat, no new sinus pain, no new nasal drainage, no hearing loss, chronic ear ringing     RESPIRATORY: see interval history    CV: no chest pain, no palpitations, no peripheral edema, no orthopnea, no PND    GI: no nausea, no vomiting, no change in stools, no fatty stools, no GERD, no abdominal pain    : no dysuria, no urinary frequency    MUSCULOSKELETAL: no myalgias, no arthralgias    ENDOCRINE: no excessive thirst, blood sugars with adequate control    NEURO:  No headache, no numbness, no tingling    SLEEP: no issues    PSYCHIATRIC: mood stable          Past Medical and Surgical History:     Past Medical History:   Diagnosis Date     CF (cystic fibrosis) (H)      Exocrine pancreatic insufficiency      Nocardia infection      Pseudomonas infection      S/P gastrostomy (H) 2002     Past Surgical History:   Procedure Laterality Date     COLONOSCOPY N/A 5/21/2018    Procedure: COMBINED COLONOSCOPY, SINGLE OR MULTIPLE BIOPSY/POLYPECTOMY BY BIOPSY;  Cystic fibrosis, 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;  Surgeon: Margarito Bowman MD;  Location: [facility]     GASTROSTOMY TUBE  2002     PICC INSERTION Left 01/22/2018    4Fr SL BioFlo PICC, 46cm (5cm external) in the L basilic vein w/ tip in the low SVC           Family History:     Family History   Problem Relation Age of Onset     HEART DISEASE Maternal Grandmother      HEART DISEASE Maternal Grandfather      HEART DISEASE Paternal  Grandmother      Diabetes No family hx of             Social History:     Social History     Social History     Marital status: Single     Spouse name: N/A     Number of children: 0     Years of education: N/A     Occupational History     financial [employer]     Social History Main Topics     Smoking status: Never Smoker     Smokeless tobacco: Never Used     Alcohol use No     Drug use: No     Sexual activity: Yes     Partners: Female     Birth control/ protection: Pill     Other Topics Concern     Blood Transfusions No     Exercise No     Social History Narrative    Kilo lives with wife in a house in [place], [place].  He works as a .        March 2016. He works independently as a . Works out 5x/week and walks the dog daily.        June 2016. No changes. Describes himself as a creature of habit.            Medications:     Current Outpatient Prescriptions   Medication     ACE NOT PRESCRIBED, INTENTIONAL,     acetylcysteine (MUCOMYST) 10 % nebulizer solution     amitriptyline (ELAVIL) 10 MG tablet     azithromycin (ZITHROMAX) 250 MG tablet     aztreonam lysine (CAYSTON) 75 MG SOLR     blood glucose (NO BRAND SPECIFIED) lancets standard     blood glucose monitoring (MARTINA CONTOUR NEXT MONITOR) meter device kit     blood glucose monitoring (MARTINA CONTOUR NEXT) test strip     blood glucose monitoring (MARTINA MICROLET) lancets     CALCIUM PO     COMPOUNDED NON-CONTROLLED SUBSTANCE (CMPD RX) - PHARMACY TO MIX COMPOUNDED MEDICATION     CREON 48594-18479 UNITS CPEP per EC capsule     doxycycline (VIBRAMYCIN) 100 MG capsule     [facility] INFUSION MANAGED PATIENT     Ferrous Fumarate-Vitamin C (VITRON-C) 200-125 MG TABS     insulin aspart (NOVOLOG PENFILL) 100 UNIT/ML injection     insulin pen needle (BD OMI U/F) 32G X 4 MM     LANTUS SOLOSTAR 100 UNIT/ML soln     levalbuterol (XOPENEX HFA) 45 MCG/ACT Inhaler     levalbuterol (XOPENEX) 1.25 MG/3ML neb solution     MEPHYTON 5  "MG PO TABS     MULTIVITAMIN OR     Nebulizers (EFLOW SCF NEBULIZER HANDSET) MISC     polyethylene glycol (MIRALAX/GLYCOLAX) powder     Respiratory Therapy Supplies (KRIS ALTERA NEBULIZER HANDSET) MISC     sodium chloride inhalant 7 % NEBU neb solution     SYMBICORT 160-4.5 MCG/ACT Inhaler     acetaminophen-codeine (TYLENOL #3) 300-30 MG per tablet     [DISCONTINUED] levalbuterol (XOPENEX HFA) 45 MCG/ACT Inhaler     No current facility-administered medications for this visit.             Physical Exam:   /74  Pulse 98  Resp 17  Ht 1.753 m (5' 9\")  Wt 66.1 kg (145 lb 11.6 oz)  SpO2 96%  BMI 21.52 kg/m2    Constitutional:   Awake, alert and in no apparent distress     Eyes:   nonicteric     ENT:   oral mucosa moist without lesions, normal tm bilaterally, bilateral mucosa normal     Neck:   Supple without supraclavicular or cervical lymphadenopathy     Lungs:   Good air flow.  No crackles. biapical rhonchi.  No wheezes.     Cardiovascular:   Normal S1 and S2.  RRR.  No murmur, gallop or rub.     Abdomen:   NABS, soft, nontender, nondistended.  g tube     Musculoskeletal:   No edema, digital clubbing present     Neurologic:   Alert and conversant.     Skin:   Warm, dry.  No rash on limited exam.             Data:   All laboratory and imaging data reviewed.    Cystic Fibrosis Culture  Specimen Description   Date Value Ref Range Status   08/08/2018 Sputum  Final   06/26/2018 Sputum  Final   04/18/2018 Sputum  Final   04/18/2018 Sputum  Final   04/18/2018 Sputum  Final   04/18/2018 Sputum  Final    Culture Micro   Date Value Ref Range Status   08/08/2018 Heavy growth  Normal santi    Final   08/08/2018 (A)  Final    Moderate growth  Pseudomonas aeruginosa, mucoid strain     08/08/2018 (A)  Final    Moderate growth  Strain 2  Pseudomonas aeruginosa, mucoid strain     08/08/2018 (A)  Final    Light growth  Staphylococcus pseudintermedius  Identification obtained by MALDI-TOF mass spectrometry research use only " database. Test   characteristics determined and verified by the Infectious Diseases Diagnostic Laboratory   ([facility]) [place], [place].          Recent Results (from the past 168 hour(s))   General PFT Lab (Please always keep checked)    Collection Time: 09/18/18 11:15 AM   Result Value Ref Range    FVC-Pred 4.93 L    FVC-Pre 3.80 L    FVC-%Pred-Pre 77 %    FEV1-Pre 1.67 L    FEV1-%Pred-Pre 42 %    FEV1FVC-Pred 79 %    FEV1FVC-Pre 44 %    FEFMax-Pred 9.77 L/sec    FEFMax-Pre 6.35 L/sec    FEFMax-%Pred-Pre 65 %    FEF2575-Pred 3.87 L/sec    FEF2575-Pre 0.61 L/sec    IWB6948-%Pred-Pre 15 %    ExpTime-Pre 15.90 sec    FIFMax-Pre 4.76 L/sec    FEV1FEV6-Pred 82 %    FEV1FEV6-Pre 50 %       PFT: Severe obstructive lung disease.  When compared to 8/8/18, the FEV1 and FVC have increased.  The decrease in FVC may represent air trapping v. restrictive physiology.  Lung volumes would be necessary to determine.    Pulmonary exacerbation: absent        Again, thank you for allowing me to participate in the care of your patient.      Sincerely,    Jaye Machado MD

## 2018-09-18 NOTE — MR AVS SNAPSHOT
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)        Migraine        Chronic migraine without aura without status migrainosus, not intractable           Follow-ups after your visit        Your next 10 appointments already scheduled     Oct 30, 2018 11:00 AM CDT   CF LOOP with UC PFL CF   [facility] Pulmonary Function Testing ([facility])    [address]  3rd Floor  [facility] [address]   [phone]            Oct 30, 2018 11:20 AM CDT   (Arrive by 11:05 AM)   RETURN CYSTIC FIBROSIS VISIT with Jaye Machado MD   [facility] ([facility])    [address]  [address]  [facility] [address]   [phone]            Dec 11, 2018 11:00 AM CST   CF LOOP with UC PFL [facility] Pulmonary Function Testing ([facility])    [address]  [address]  [facility] [address]   [phone]            Dec 11, 2018 11:20 AM CST   (Arrive by 11:05 AM)   RETURN CYSTIC FIBROSIS VISIT with Jaye Machado MD   [facility] ([facility])    [address]  [address]  [facility] [address]   [phone]              Future tests that were ordered for you today     Open Future Orders        Priority Expected Expires Ordered    Cystic Fibrosis Culture Aerob Bacterial Routine  9/18/2019 9/18/2018            Who to contact     If you have questions or need follow up information about  today's clinic visit or your schedule please contact [facility]IBROS[facility]ULT [facility] directly at [phone].  Normal or non-critical lab and imaging results will be communicated to you by MyChart, letter or phone within 4 business days after the clinic has received the results. If you do not hear from us within 7 days, please contact the clinic through Techtiumhart or phone. If you have a critical or abnormal lab result, we will notify you by phone as soon as possible.  Submit refill requests through Mamaherb or call your pharmacy and they will forward the refill request to us. Please allow 3 business days for your refill to be completed.          Additional Information About Your Visit        Mamaherb Information     M[employer]herb gives you secure access to your electronic health record. If you see a primary care provider, you can also send messages to your care team and make appointments. If you have questions, please call your primary care clinic.  If you do not have a primary care provider, please call [phone] and they will assist you.        Care EveryWhere ID     This is your Care EveryWhere ID. This could be used by other organizations to access your [facility] medical records  [id number]         Blood Pressure from Last 3 Encounters:   09/18/18 108/74   08/08/18 102/65   08/06/18 106/69    Weight from Last 3 Encounters:   09/18/18 145 lb 11.6 oz (66.1 kg)   08/08/18 141 lb 8.6 oz (64.2 kg)   08/06/18 141 lb 1.5 oz (64 kg)              Today, you had the following     No orders found for display         Where to get your medicines      These medications were sent to [place] [address] [place] [facility] [place] - [address] [facility]  [address] [place] [address]     Phone:  [phone]     levalbuterol 45 MCG/ACT Inhaler          Primary Care Provider Office Phone # Fax #    Jaye Machado [phone]       [address] [facility] [address]         Equal Access to Services     [facility] Center[facility] : Hadii favio buckley imermaninder Roque, waikrbyda luqadaha, qaybta kadarvinjoe giron. So [facility] [phone].    ATENCIÓN: Si habla español, tiene a monroe disposición servicios gratuitos de asistencia lingüística. Stevename al [phone].    We comply with applicable federal civil rights laws and [place] laws. We do not discriminate on the basis of race, color, national origin, age, disability, sex, sexual orientation, or gender identity.            Thank you!     Thank you for choosing [facility]STIC FIBROSIS C[facility]ULT [facility]  for your care. Our goal is always to provide you with excellent care. Hearing back from our patients is one way we can continue to improve our services. Please take a few minutes to complete the written survey that you may receive in the mail after your visit with us. Thank you!             Your Updated Medication List - Protect others around you: Learn how to safely use, store and throw away your medicines at www.disposemymeds.org.          This list is accurate as of 9/18/18 11:59 PM.  Always use your most recent med list.                   Brand Name Dispense Instructions for use Diagnosis    ACE NOT PRESCRIBED (INTENTIONAL)     0 each    1 each continuous prn. ACE Inhibitor not prescribed due to Risk for drug interaction    Type I (juvenile type) diabetes mellitus without mention of complication, not stated as uncontrolled       acetaminophen-codeine 300-30 MG per tablet    TYLENOL #3    6 tablet    Take 1-2 tablets by mouth every 6 hours as needed for severe pain    Cystic fibrosis with pulmonary manifestations (H)       acetylcysteine 10 % nebulizer solution    MUCOMYST    480 mL    Nebulize 4ml qid    Cystic fibrosis with pulmonary manifestations (H)       amitriptyline 10 MG tablet    ELAVIL    90 tablet    TAKE ONE TABLET BY MOUTH AT BEDTIME.    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    Diabetes mellitus related to CF (cystic fibrosis) (H)       blood glucose monitoring meter device kit     1 kit    Use to test blood sugar 6 times daily or as directed.    Diabetes mellitus related to CF (cystic fibrosis) (H)       blood glucose monitoring test strip    MARTINA CONTOUR NEXT    200 each    Use to test blood sugar 6 times daily or as directed.    Diabetes mellitus related to CF (cystic fibrosis) (H)       CALCIUM PO      Take 1 tablet by mouth 2 times daily Strength unknown.        COMPOUNDED NON-CONTROLLED SUBSTANCE - PHARMACY TO MIX COMPOUNDED MEDICATION    CMPD RX    1 Box    For testing need about 0.5 cc of each Ceftazidime 100 mg/ml( but at 1/10 concentration) Levofloxacin 25 mg/ml (but at 1/1000 dilution).   1:10 dilution Penicillin G (initial concentration 500,000 units) 1:100 dilution Penicillin G (initial concentration 500,000 units) 0.2 cc pre-pen in syringe with intradermal needle  500 mg Amoxacillin for oral use    Drug allergy, antibiotic       CREON 30324-05872 units Cpep per EC capsule   Generic drug:  amylase-lipase-protease     2700 capsule    TAKE 7-8 CAPS WITH MEALS  "(3 MEALS/DAY) AND 2 CAPS WITH SNACKS (3 SNACKS/DAY)    Cystic fibrosis with pulmonary manifestations (H)       doxycycline 100 MG capsule    VIBRAMYCIN    60 capsule    TAKE 1 CAPSULE BY MOUTH TWICE DAILY    Cystic fibrosis with pulmonary manifestations (H)       EFLOW SCF NEBULIZER HANDSET Misc     1 each    1 each 3 times daily.    Nocardia infection       [facility] INFUSION MANAGED PATIENT      Contact [facility] for patient specific medication information at [phone] on admission and discharge from the hospital.  Phones are answered 24 hours a day 7 days a week 365 days a year.  Providers - Choose \"CONTINUE HOME MED (no script)\" at discharge if patient treatment with home infusion will continue.        insulin aspart 100 UNIT/ML injection    NovoLOG PENFILL    30 mL    Inject 1 unit per 20G CHO at lunch and supper and 3 units at night with tube feedings, up to 20 units daily    Diabetes mellitus related to CF (cystic fibrosis) (H)       insulin pen needle 32G X 4 MM    BD OMI U/F    200 each    Use 6 daily or as directed.    Diabetes mellitus related to CF (cystic fibrosis) (H)       LANTUS SOLOSTAR 100 UNIT/ML injection   Generic drug:  insulin glargine     15 mL    INJECT 6 UNITS SUBCUETANEOUSLY ONCE DAILY    Diabetes mellitus related to CF (cystic fibrosis) (H)       levalbuterol 1.25 MG/3ML neb solution    XOPENEX    360 mL    INHALE 1 VIAL BY MOUTH INTO THE LUNGS VIA NEBULIZATION 4 TIMES DAILY    Cystic fibrosis (H), CF (cystic fibrosis) (H), Cystic fibrosis with pulmonary manifestations (H)       levalbuterol 45 MCG/ACT Inhaler    XOPENEX HFA    5 Inhaler    Inhale 2 puffs into the lungs every 6 hours as needed for shortness of breath / dyspnea    Cystic fibrosis (H)       MEPHYTON 5 MG tablet   Generic drug:  phytonadione      Take 1 TABLET by mouth DAILY.        MULTIVITAMIN PO      Take 1 tablet by mouth daily. Includes 5,000 units vitamin D and 400 units vitamin E. Per pt, " formulated for CF pts.        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    Cystic fibrosis with meconium ileus (H)       sodium chloride inhalant 7 % Nebu neb solution     240 mL    NEBULIZE 4ML TWICE DAILY    Cystic fibrosis with pulmonary manifestations (H)       SYMBICORT 160-4.5 MCG/ACT Inhaler   Generic drug:  budesonide-formoterol     10.2 Inhaler    INHALE 2 PUFFS BY MOUTH INTO THE LUNGS TWICE DAILY    Cystic fibrosis of the lung (H), Cystic fibrosis (H)       VITRON-C 200-125 MG Tabs   Generic drug:  ferrous fumarate 65 mg ([place]. FE)-Vitamin C 125 mg      Take 1 tablet by mouth 2 times daily (with meals)    Nocardia infection, Cystic fibrosis with pulmonary manifestations (H)

## 2018-09-18 NOTE — PROGRESS NOTES
SUBJECTIVE/OBJECTIVE:                           Nico Morales is a 42 year old male coming in for routine clinic visit.  His/her primary pulmonologist is Dr. Machado.    Allergies/ADRs: Reviewed in Epic  Tobacco: No tobacco use  Alcohol: not currently using  PMH: Reviewed in Epic  CF Genotype: [id number]/621+1G->T     Medication Adherence  The patient misses their medication 0-1 times per week.    Patient is responsible for his/her own medications.   Patient estimated adherence level: %  Pharmacy MPR is not available  Barriers to medication adherence include: no issues identified  Facilitators to medication adherence include: schedule/routine    Medication Access  Number of pharmacies used: 2  The patient fills specialty prescriptions ([place]) at [facility] and other medications at a local [facility]  Medication access barriers: no issues reported by patient.  Has the patient been offered to fill at [facility]? Yes - however the patient is restricted to filling their prescriptions at a different pharmacy.  Programs or coupons used: none    CF  Inhaled medications   Bronchodilator: levalbuterol   Mucolytic: acetylcysteine and hypertonic saline  Antibiotic: Cayston (alternates every 2 weeks with oral doxycycline)  Other: Symbicort inhaler  Oral medications   Azithromycin: taking   CFTR modulator: not indicated   Other: doxycycline 100 mg BID (alternates every 2 weeks with inhaled Cay[place]).  He does notice he has restless leg  type symptoms while on the doxycycline but states it is tolerable and is not concerning to him   Pulmonary symptoms are stable  PFTs are increased  Current FEV1 42%  Cultures: sputum cultures grow Staph and Pseudomonas  Current exacerbation: no    Pancreatic Insufficiency/Nutrition: Pancreatic enzyme replacement with Creon 99012 units.  Patient is taking 7-8 capsules with meals and 2 capsules with snacks.    Acid Reducer: none  Bowel regimen: Miralax  Weight and BMI are increased  (intentional, using tube feeds at night for weight gain)  Vitamins include: multivitamin, calcium, Vitron-C, Mephyton daily    Lab Results   Component Value Date    VITDT 54 05/03/2018    VITDT 33 12/15/2016       CFRD: Using Lantus 6 units at bedtime and Novolog for carb counting, 1 unit:20 gm CHO.  Last A1c was 6.5 on 8/6/18 which indicates good control.  Managed by CF endocrinologist.    Migraine: Well controlled with amitriptyline 10 mg at bedtime and avoiding triggers (bright lights)    PFTs:      Weight/BMI:      ASSESSMENT:                             Current medications were reviewed today.     Medication Adherence: no issues identified    Medication Access: no issues identified    CF: Stable.     Pancreatic Insufficiency/Nutrition: Stable.    CFRD:   Stable.    Migraines: Stable    PLAN:                            Continue current medications and keep up the good work!    I spent 15 minutes with this patient today.      Will follow up in 1 year or sooner if needed.    The patient was provided a summary of these recommendations in the AVS from CF care team visit.    Tammy Morgan PharmD  CF Medication Therapy Management Pharmacist  [facility]  [phone]

## 2018-09-18 NOTE — MR AVS SNAPSHOT
After Visit Summary   9/18/2018    Nico Morales    MRN: [id number]           Patient Information     Date Of Birth          1975        Visit Information        Provider Department      9/18/2018 11:20 AM Jaye Machado MD [facility]        Today's Diagnoses     Cystic fibrosis with pulmonary manifestations (H)        Cystic fibrosis (H)          Care Instructions    Cystic Fibrosis Self-Care Plan    RECOMMENDATIONS:   Audiology referral.  Schedule your pamidronate.  We will arrange the ACP visit with you.  Continue present antibiotic rotation.  Pneumonia vaccine today.    YOUR GOAL:  Enjoy the fall!      CF Nurse line:          Jose David Henderson   [phone]            CF Resp Therapist: Felicitas Banuelos            [phone]   CF Dietitians:           Zeina Pierre            [phone]                       Ivory Bonilla                       [phone]   CF Diabetes Nurse: Jordyn Rudolph             [phone]    CF Social Workers:  Mary Ritter             [phone]                Marisa Urbina            [phone]  CF Pharmacist:        Tammy Morgan                              [phone]  [email or web address]         MRN: [id number]   Clinic Date: September 18, 2018   Patient: Nico Morales     Annual Studies:   IGG   Date Value Ref Range Status   05/03/2018 1770 (H) 695 - 1620 mg/dL Final     Insulin   Date Value Ref Range Status   07/26/2011 30 mU/L Final     Comment:     Reference Range:  0-20  +120     There are no preventive care reminders to display for this patient.      Pulmonary Function Tests  FEV1: amount of air you can blow out in 1 second  FVC: total amount of air you can take in and blow out    Your Goals:         PFT Latest Ref Rng & Units 9/18/2018   FVC L 3.80   FEV1 L 1.67   FVC% % 77   FEV1% % 42          Airway Clearance: The Most Important Way to Keep Your Lungs Healthy  Vest Settings:    [place]  Frequencies: 8, 9, 10 Pressure 10 Then, Frequencies 18, 19, 20 Pressure 6      RespirTech: Quick Start with Pressure of     Do each frequency for 5 minutes; Deflate vest after each frequency & cough 3 times before beginning the next setting.    Vest and Neb Therapy should be done 3-4 times/day.    Good Nutrition Can Improve Lung Function and Overall Health     Take ALL of your vitamins with food     Take 1/2 of your enzymes before EVERY meal/snack and the other 1/2 mid-meal/snack    Wt Readings from Last 3 Encounters:   09/18/18 66.1 kg (145 lb 11.6 oz)   08/08/18 64.2 kg (141 lb 8.6 oz)   08/06/18 64 kg (141 lb 1.5 oz)       Body mass index is 21.52 kg/(m^2).         National CF Foundation Recommendations for BMI in CF Adults: Women: at least 22 Men: at least 23        Controlling Blood Sugars Helps Prevent Lung Infections & Improves Nutrition  Test blood sugar:     In the morning before eating (goal is )     2 hours after a meal (goal is less than 150)     When pre-meal glucose is greater than 150 add correction     At bedtime (if less than 100 eat a snack with 15 grams of carbohydrates  Last A1C Results:   Hemoglobin A1C   Date Value Ref Range Status   08/06/2018 6.5 % Final         If diabetic, measure A1C every 6 months. Goal: Under 7%    Staying Healthy    Research:  If you are interested in learning about research opportunities or have questions, please contact the CF Research Team at [phone] or [email or web address].      CF Foundation:  Compass is a personalized resource service to help you with the insurance, financial, legal and other issues you are facing.  It's free, confidential and available to anyone with CF.  Ask your  for more information or contact Compass directly at 086-COMPASS ([phone]) or compass@cff.org, or learn more at cff.org/compass.                             Follow-ups after your visit        Additional Services     AUDIOLOGY ADULT REFERRAL       Your provider  has referred you to: [facility] [phone]   [email or web address]    Specialty Testing:  Audiogram w/Tymps and Reflexes (Comprehensive Audiology Evaluation)                  Follow-up notes from your care team     Return as scheduled .      Your next 10 appointments already scheduled     Oct 30, 2018 11:00 AM CDT   CF LOOP with [facility] PFL CF   [facility] Pulmonary Function Testing ([facility])    [address]  [address] [facility] [id number] [phone]            Oct 30, 2018 11:20 AM CDT   (Arrive by 11:05 AM)   RETURN CYSTIC FIBROSIS VISIT with Jaye Machado MD   [facility] ([facility])    [address] [place] [address]  [address] [place] [id number] [phone]            Dec 11, 2018 11:00 AM CST   CF LOOP with UC PFL CF   [facility] Pulmonary Function Testing ([facility])    [address] [id number] [phone]            Dec 11, 2018 11:20 AM CST   (Arrive by 11:05 AM)   RETURN CYSTIC FIBROSIS VISIT with Jaye Machado MD   [facility] ([facility])    [address] [place] [address]  [address] [place] [id number] [phone]              Who to contact     If you have questions or need follow up information about today's clinic visit or your schedule please contact [facility] directly at [phone].  Normal or non-critical lab and imaging results will be communicated to you by MyChart, letter or phone within 4 business days after the clinic has received the results. If you do not hear from us within 7 days, please contact the clinic through MyChart or phone. If you have a critical or abnormal lab result, we will notify you by phone as soon as  "possible.  Submit refill requests through [employer] or call your pharmacy and they will forward the refill request to us. Please allow 3 business days for your refill to be completed.          Additional Information About Your Visit        [employer] Information     [employer] gives you secure access to your electronic health record. If you see a primary care provider, you can also send messages to your care team and make appointments. If you have questions, please call your primary care clinic.  If you do not have a primary care provider, please call [phone] and they will assist you.        Care EveryWhere ID     This is your Care EveryWhere ID. This could be used by other organizations to access your [facility] medical records  [id number]        Your Vitals Were     Pulse Respirations Height Pulse Oximetry BMI (Body Mass Index)       98 17 1.753 m (5' 9\") 96% 21.52 kg/m2        Blood Pressure from Last 3 Encounters:   09/18/18 108/74   08/08/18 102/65   08/06/18 106/69    Weight from Last 3 Encounters:   09/18/18 66.1 kg (145 lb 11.6 oz)   08/08/18 64.2 kg (141 lb 8.6 oz)   08/06/18 64 kg (141 lb 1.5 oz)              We Performed the Following     AUDIOLOGY ADULT REFERRAL     Cystic Fibrosis Culture Aerob Bacterial     Cystic Fibrosis Culture Aerob Bacterial     RESPIRATORY FLOW VOLUME LOOP          Where to get your medicines      These medications were sent to [facility] [address] [facility] - [place], [place] - [address]  [address], [place] [address]     Phone:  [phone]     levalbuterol 45 MCG/ACT Inhaler          Primary Care Provider Office Phone # Fax #    Jaye Machado [phone]       [facility] [address]        Equal Access to Services     HENRY RAMIREZ : Vernell Nevarez, easton disla, joe ha. So [facility] [phone].    ATENCIÓN: Si habla español, tiene a monroe disposición " servicios gratuitos de asistencia lingüística. Jennie barclay [phone].    We comply with applicable federal civil rights laws and [place] laws. We do not discriminate on the basis of race, color, national origin, age, disability, sex, sexual orientation, or gender identity.            Thank you!     Thank you for choosing [facility]  for your care. Our goal is always to provide you with excellent care. Hearing back from our patients is one way we can continue to improve our services. Please take a few minutes to complete the written survey that you may receive in the mail after your visit with us. Thank you!             Your Updated Medication List - Protect others around you: Learn how to safely use, store and throw away your medicines at www.disposemymeds.org.          This list is accurate as of 9/18/18  4:16 PM.  Always use your most recent med list.                   Brand Name Dispense Instructions for use Diagnosis    ACE NOT PRESCRIBED (INTENTIONAL)     0 each    1 each continuous prn. ACE Inhibitor not prescribed due to Risk for drug interaction    Type I (juvenile type) diabetes mellitus without mention of complication, not stated as uncontrolled       acetaminophen-codeine 300-30 MG per tablet    TYLENOL #3    6 tablet    Take 1-2 tablets by mouth every 6 hours as needed for severe pain    Cystic fibrosis with pulmonary manifestations (H)       acetylcysteine 10 % nebulizer solution    MUCOMYST    480 mL    Nebulize 4ml qid    Cystic fibrosis with pulmonary manifestations (H)       amitriptyline 10 MG tablet    ELAVIL    90 tablet    TAKE ONE TABLET BY MOUTH AT BEDTIME.    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    Diabetes mellitus related to CF (cystic fibrosis) (H)       blood glucose monitoring meter device kit     1 kit    Use to test blood sugar 6 times daily or as directed.    Diabetes mellitus related to CF (cystic fibrosis) (H)       blood glucose monitoring test strip    MARTINA CONTOUR NEXT    200 each    Use to test blood sugar 6 times daily or as directed.    Diabetes mellitus related to CF (cystic fibrosis) (H)       CALCIUM PO      Take 1 tablet by mouth 2 times daily Strength unknown.        COMPOUNDED NON-CONTROLLED SUBSTANCE - PHARMACY TO MIX COMPOUNDED MEDICATION    CMPD RX    1 Box    For testing need about 0.5 cc of each Ceftazidime 100 mg/ml( but at 1/10 concentration) Levofloxacin 25 mg/ml (but at 1/1000 dilution).   1:10 dilution Penicillin G (initial concentration 500,000 units) 1:100 dilution Penicillin G (initial concentration 500,000 units) 0.2 cc pre-pen in syringe with intradermal needle  500 mg Amoxacillin for oral use    Drug allergy, antibiotic       CREON 61764-90143 units Cpep per EC capsule   Generic drug:  amylase-lipase-protease     2700 capsule    TAKE 7-8 CAPS WITH MEALS (3 MEALS/DAY) AND 2 CAPS WITH SNACKS (3 SNACKS/DAY)    Cystic fibrosis with pulmonary manifestations (H)       doxycycline 100 MG capsule    VIBRAMYCIN    60 capsule    TAKE 1 CAPSULE BY MOUTH TWICE DAILY    Cystic fibrosis with pulmonary manifestations  "(H)       EFLOW SCF NEBULIZER HANDSET Misc     1 each    1 each 3 times daily.    Nocardia infection       [place] HOME INFUSION MANAGED PATIENT      Contact [facility] for patient specific medication information at [phone] on admission and discharge from the hospital.  Phones are answered 24 hours a day 7 days a week 365 days a year.  Providers - Choose \"CONTINUE HOME MED (no script)\" at discharge if patient treatment with home infusion will continue.        insulin aspart 100 UNIT/ML injection    NovoLOG PENFILL    30 mL    Inject 1 unit per 20G CHO at lunch and supper and 3 units at night with tube feedings, up to 20 units daily    Diabetes mellitus related to CF (cystic fibrosis) (H)       insulin pen needle 32G X 4 MM    BD OMI U/F    200 each    Use 6 daily or as directed.    Diabetes mellitus related to CF (cystic fibrosis) (H)       LANTUS SOLOSTAR 100 UNIT/ML injection   Generic drug:  insulin glargine     15 mL    INJECT 6 UNITS SUBCUETANEOUSLY ONCE DAILY    Diabetes mellitus related to CF (cystic fibrosis) (H)       levalbuterol 1.25 MG/3ML neb solution    XOPENEX    360 mL    INHALE 1 VIAL BY MOUTH INTO THE LUNGS VIA NEBULIZATION 4 TIMES DAILY    Cystic fibrosis (H), CF (cystic fibrosis) (H), Cystic fibrosis with pulmonary manifestations (H)       levalbuterol 45 MCG/ACT Inhaler    XOPENEX HFA    5 Inhaler    Inhale 2 puffs into the lungs every 6 hours as needed for shortness of breath / dyspnea    Cystic fibrosis (H)       MEPHYTON 5 MG tablet   Generic drug:  phytonadione      Take 1 TABLET by mouth DAILY.        MULTIVITAMIN PO      Take 1 tablet by mouth daily. Includes 5,000 units vitamin D and 400 units vitamin E. Per pt, formulated for CF pts.        KRIS ALTERA NEBULIZER HANDSET Misc     1 each    1 each daily    Cystic fibrosis with pulmonary manifestations (H), Exocrine pancreatic insufficiency, 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)       polyethylene glycol powder "    MIRALAX/GLYCOLAX    527 g    MIX AND TAKE 17 GRAMS BY MOUTH 1-3 TIMES DAILY AS NEEDED.    Cystic fibrosis with meconium ileus (H)       sodium chloride inhalant 7 % Nebu neb solution     240 mL    NEBULIZE 4ML TWICE DAILY    Cystic fibrosis with pulmonary manifestations (H)       SYMBICORT 160-4.5 MCG/ACT Inhaler   Generic drug:  budesonide-formoterol     10.2 Inhaler    INHALE 2 PUFFS BY MOUTH INTO THE LUNGS TWICE DAILY    Cystic fibrosis of the lung (H), Cystic fibrosis (H)       VITRON-C 200-125 MG Tabs   Generic drug:  ferrous fumarate 65 mg ([place]. FE)-Vitamin C 125 mg      Take 1 tablet by mouth 2 times daily (with meals)    Nocardia infection, Cystic fibrosis with pulmonary manifestations (H)

## 2018-09-24 LAB
BACTERIA SPEC CULT: ABNORMAL
SPECIMEN SOURCE: ABNORMAL

## 2018-10-02 DIAGNOSIS — E84.9 CF (CYSTIC FIBROSIS) (H): Primary | ICD-10-CM

## 2018-10-02 RX ORDER — OSELTAMIVIR PHOSPHATE 75 MG/1
75 CAPSULE ORAL 2 TIMES DAILY
Qty: 10 CAPSULE | Refills: 1 | Status: SHIPPED | OUTPATIENT
Start: 2018-10-02 | End: 2019-10-15

## 2018-10-02 RX ORDER — RIMANTADINE HCL 100 MG
100 TABLET ORAL 2 TIMES DAILY
Qty: 60 TABLET | Refills: 5 | Status: SHIPPED | OUTPATIENT
Start: 2018-10-02 | End: 2019-04-16

## 2018-10-18 DIAGNOSIS — E84.8 DIABETES MELLITUS RELATED TO CF (CYSTIC FIBROSIS) (H): ICD-10-CM

## 2018-10-18 DIAGNOSIS — E08.9 DIABETES MELLITUS RELATED TO CF (CYSTIC FIBROSIS) (H): ICD-10-CM

## 2018-10-19 RX ORDER — PEN NEEDLE, DIABETIC 32GX 5/32"
NEEDLE, DISPOSABLE MISCELLANEOUS
Qty: 200 EACH | Refills: 5 | Status: SHIPPED | OUTPATIENT
Start: 2018-10-19 | End: 2020-03-23

## 2018-10-30 ENCOUNTER — OFFICE VISIT (OUTPATIENT)
Dept: PULMONOLOGY | Facility: CLINIC | Age: 43
End: 2018-10-30
Attending: INTERNAL MEDICINE
Payer: COMMERCIAL

## 2018-10-30 ENCOUNTER — PATIENT OUTREACH (OUTPATIENT)
Dept: CARE COORDINATION | Facility: CLINIC | Age: 43
End: 2018-10-30

## 2018-10-30 VITALS
WEIGHT: 147.05 LBS | SYSTOLIC BLOOD PRESSURE: 108 MMHG | HEIGHT: 69 IN | HEART RATE: 92 BPM | BODY MASS INDEX: 21.78 KG/M2 | OXYGEN SATURATION: 97 % | DIASTOLIC BLOOD PRESSURE: 75 MMHG | RESPIRATION RATE: 17 BRPM

## 2018-10-30 DIAGNOSIS — E84.9 CYSTIC FIBROSIS (H): ICD-10-CM

## 2018-10-30 DIAGNOSIS — E84.9 CYSTIC FIBROSIS (H): Primary | ICD-10-CM

## 2018-10-30 DIAGNOSIS — E84.0 CYSTIC FIBROSIS WITH PULMONARY MANIFESTATIONS (H): ICD-10-CM

## 2018-10-30 LAB
EXPTIME-PRE: 16.74 SEC
FEF2575-%PRED-PRE: 13 %
FEF2575-PRE: 0.52 L/SEC
FEF2575-PRED: 3.86 L/SEC
FEFMAX-%PRED-PRE: 64 %
FEFMAX-PRE: 6.3 L/SEC
FEFMAX-PRED: 9.77 L/SEC
FEV1-%PRED-PRE: 38 %
FEV1-PRE: 1.54 L
FEV1FEV6-PRE: 48 %
FEV1FEV6-PRED: 82 %
FEV1FVC-PRE: 40 %
FEV1FVC-PRED: 79 %
FIFMAX-PRE: 4.94 L/SEC
FVC-%PRED-PRE: 77 %
FVC-PRE: 3.81 L
FVC-PRED: 4.92 L

## 2018-10-30 PROCEDURE — 87070 CULTURE OTHR SPECIMN AEROBIC: CPT | Performed by: INTERNAL MEDICINE

## 2018-10-30 PROCEDURE — 87107 FUNGI IDENTIFICATION MOLD: CPT | Performed by: INTERNAL MEDICINE

## 2018-10-30 PROCEDURE — G0463 HOSPITAL OUTPT CLINIC VISIT: HCPCS | Mod: ZF

## 2018-10-30 PROCEDURE — 87077 CULTURE AEROBIC IDENTIFY: CPT | Performed by: INTERNAL MEDICINE

## 2018-10-30 PROCEDURE — 87186 SC STD MICRODIL/AGAR DIL: CPT | Performed by: INTERNAL MEDICINE

## 2018-10-30 RX ORDER — AMOXICILLIN AND CLAVULANATE POTASSIUM 500; 125 MG/1; MG/1
1 TABLET, FILM COATED ORAL 2 TIMES DAILY
Qty: 20 TABLET | Refills: 0 | Status: SHIPPED | OUTPATIENT
Start: 2018-10-30 | End: 2018-12-06

## 2018-10-30 ASSESSMENT — PAIN SCALES - GENERAL: PAINLEVEL: NO PAIN (0)

## 2018-10-30 NOTE — PROGRESS NOTES
[facility]  Center for [facility]  October 30, 2018         Assessment and Plan:   Nico Morales is a 42 year old male with cystic fibrosis.    1. CF lung disease with severe obstruction:  Kilo reports that he feels a little bit more tightness today.  He says this often happens when he is not on [place] therapy.  He feels when he is on his Doxy 2 weeks that he feels he coughs but does notice the tightness.  He is still tolerating his rotation of 2 weeks [place] and 2 weeks' Doxy.  He does have a slight decline in his pulmonary function today and upon review of his recent sputum, it did again show evidence of Staph and pseudomonas.  Kilo and I discussed treatment options today and considering a course of antibiotics.  His allergy test showed that he is not allergic to amoxicillin.  I recommend at this time:   -- He begin Augmentin 500 mg p.o. b.i.d.   -- He continue to be aggressive with his vest and neb therapies.     -- If he were not to have a significant clinical response to the antibiotics, will begin prednisone at 10 mg daily.  He will contact us if he feels he must take that route.      2.  CF related diabetes.  Kilo has evidence of good control.       3.  Nutrition.  Kilo reports he is pleased with his weight gain.  He has now cut down on his tube feeds to only 2-3 nights per weeks because he is able to maintain his weight without it.     4.  Psychosocial.  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  He is now in a new relationship.  He lives in a home with his dog.       5.  Decreased bone mineral density.  Kilo is due to get his first dose of pamidronate in December.  I have provided him with some Tylenol with codeine to take if he were develop any bone pain.     6. Pancreatic Insufficiency:  The patient has no new symptoms consistent with worsening malabsorption.    - continue the present dose of pancreatic enzymes  - continue vitamin  supplementation.    HCM:5/2019  Immunizations: declined flu  Colonoscopy: 2018    Jaye Machado MD MPH  Associate Professor of Medicine  Pulmonary, Allergy, Critical Care and Sleep Medicine      Interval History:     Kilo does report that he continues to produce sputum that is green-yellow in color.  Cough frequency and sputum volume have their ususal variability.  His activity tolerance remains stable.  He does perform 3-4 vest therapies per day.          Review of Systems:     CONSTITUTIONAL: no fever, no chills, no change in weight, no change in energy, no change in appetite    INTEGUMENTARY/SKIN: no rash, no obvious new lesions    ENT/MOUTH: no sore throat, no new sinus pain, no new nasal drainage, no hearing loss, no ear ringing     RESPIRATORY: see interval history    CV: no chest pain, no palpitations, no peripheral edema, no orthopnea, no PND    GI: no nausea, no vomiting, no change in stools, no fatty stools, no GERD, no abdominal pain    : no dysuria, no urinary frequency    MUSCULOSKELETAL: no myalgias, no arthralgias    ENDOCRINE: no excessive thirst, blood sugars with adequate control    NEURO:  No headache, no numbness, no tingling    SLEEP: no issues    PSYCHIATRIC: mood stable          Past Medical and Surgical History:     Past Medical History:   Diagnosis Date     CF (cystic fibrosis) (H)      Exocrine pancreatic insufficiency      Nocardia infection      Pseudomonas infection      S/P gastrostomy (H) 2002     Past Surgical History:   Procedure Laterality Date     COLONOSCOPY N/A 5/21/2018    Procedure: COMBINED COLONOSCOPY, SINGLE OR MULTIPLE BIOPSY/POLYPECTOMY BY BIOPSY;  Cystic fibrosis, 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;  Surgeon: Margarito Bowman MD;  Location: [facility]     GASTROSTOMY TUBE  2002     PICC INSERTION Left 01/22/2018    4Fr SL BioFlo PICC, 46cm (5cm external) in the L basilic vein w/ tip in the low SVC         Family History:     Family History   Problem Relation Age of Onset      HEART DISEASE Maternal Grandmother      HEART DISEASE Maternal Grandfather      HEART DISEASE Paternal Grandmother      Diabetes No family hx of           Social History:     Social History     Social History     Marital status: Single     Spouse name: N/A     Number of children: 0     Years of education: N/A     Occupational History     financial [employer]     Social History Main Topics     Smoking status: Never Smoker     Smokeless tobacco: Never Used     Alcohol use No     Drug use: No     Sexual activity: Yes     Partners: Female     Birth control/ protection: Pill     Other Topics Concern     Blood Transfusions No     Exercise No     Social History Narrative    Kilo lives with wife in a house in [place], [place].  He works as a .        March 2016. He works independently as a . Works out 5x/week and walks the dog daily.        June 2016. No changes. Describes himself as a creature of habit.          Medications:     Current Outpatient Prescriptions   Medication     ACE NOT PRESCRIBED, INTENTIONAL,     acetylcysteine (MUCOMYST) 10 % nebulizer solution     amitriptyline (ELAVIL) 10 MG tablet     amoxicillin-clavulanate (AUGMENTIN) 500-125 MG per tablet     amylase-lipase-protease (CREON) 65277-17536 units CPEP per EC capsule     azithromycin (ZITHROMAX) 250 MG tablet     aztreonam lysine (CAYSTON) 75 MG SOLR     BD OMI U/F 32G X 4 MM insulin pen needle     blood glucose (NO BRAND SPECIFIED) lancets standard     blood glucose monitoring (MARTINA CONTOUR NEXT MONITOR) meter device kit     blood glucose monitoring (MARTINA CONTOUR NEXT) test strip     blood glucose monitoring (MARTINA MICROLET) lancets     CALCIUM PO     COMPOUNDED NON-CONTROLLED SUBSTANCE (CMPD RX) - PHARMACY TO MIX COMPOUNDED MEDICATION     doxycycline (VIBRAMYCIN) 100 MG capsule     [facility] INFUSION MANAGED PATIENT     Ferrous Fumarate-Vitamin C (VITRON-C) 200-125 MG TABS     insulin aspart  "(NOVOLOG PENFILL) 100 UNIT/ML injection     LANTUS SOLOSTAR 100 UNIT/ML soln     levalbuterol (XOPENEX HFA) 45 MCG/ACT Inhaler     levalbuterol (XOPENEX) 1.25 MG/3ML neb solution     MEPHYTON 5 MG PO TABS     MULTIVITAMIN OR     Nebulizers (EFLOW SCF NEBULIZER HANDSET) MISC     oseltamivir (TAMIFLU) 75 MG capsule     polyethylene glycol (MIRALAX/GLYCOLAX) powder     Respiratory Therapy Supplies (KRIS ALTERA NEBULIZER HANDSET) MISC     rimantadine (FLUMADINE) 100 MG tablet     sodium chloride inhalant 7 % NEBU neb solution     SYMBICORT 160-4.5 MCG/ACT Inhaler     acetaminophen-codeine (TYLENOL #3) 300-30 MG per tablet     [DISCONTINUED] CREON 68893-58257 UNITS CPEP per EC capsule     No current facility-administered medications for this visit.           Physical Exam:   /75  Pulse 92  Resp 17  Ht 1.753 m (5' 9\")  Wt 66.7 kg (147 lb 0.8 oz)  SpO2 97%  BMI 21.72 kg/m2    Constitutional:   Awake, alert and in no apparent distress     Eyes:   nonicteric     ENT:   oral mucosa moist without lesions, normal tm bilaterally, bilateral mucosa normal     Neck:   Supple without supraclavicular or cervical lymphadenopathy     Lungs:   Good air flow.  No crackles. No rhonchi.  No wheezes.     Cardiovascular:   Normal S1 and S2.  RRR.  No murmur, gallop or rub.     Abdomen:   NABS, soft, nontender, nondistended.  g tube     Musculoskeletal:   No edema, digital clubbing present     Neurologic:   Alert and conversant.     Skin:   Warm, dry.  No rash on limited exam.             Data:   All laboratory and imaging data reviewed.    Cystic Fibrosis Culture  Specimen Description   Date Value Ref Range Status   09/18/2018 Sputum  Final   08/08/2018 Sputum  Final   06/26/2018 Sputum  Final    Culture Micro   Date Value Ref Range Status   09/18/2018 Moderate growth  Normal santi    Final   09/18/2018 (A)  Final    Light growth  Pseudomonas aeruginosa, mucoid strain     09/18/2018 (A)  Final    Light growth  Strain " 2  Pseudomonas aeruginosa, mucoid strain     09/18/2018 (A)  Final    Light growth  Staphylococcus pseudintermedius  Identification obtained by MALDI-TOF mass spectrometry research use only database. Test   characteristics determined and verified by the Infectious Diseases Diagnostic Laboratory   ([facility]) [place], [place].          Recent Results (from the past 168 hour(s))   General PFT Lab (Please always keep checked)    Collection Time: 10/30/18 11:19 AM   Result Value Ref Range    FVC-Pred 4.92 L    FVC-Pre 3.81 L    FVC-%Pred-Pre 77 %    FEV1-Pre 1.54 L    FEV1-%Pred-Pre 38 %    FEV1FVC-Pred 79 %    FEV1FVC-Pre 40 %    FEFMax-Pred 9.77 L/sec    FEFMax-Pre 6.30 L/sec    FEFMax-%Pred-Pre 64 %    FEF2575-Pred 3.86 L/sec    FEF2575-Pre 0.52 L/sec    SJA0535-%Pred-Pre 13 %    ExpTime-Pre 16.74 sec    FIFMax-Pre 4.94 L/sec    FEV1FEV6-Pred 82 %    FEV1FEV6-Pre 48 %       PFT: Severe obstructive lung disease.  When compared to 9/18/2018, the FEV1 and FVC have little change.  The decrease in FVC may represent air trapping v. restrictive physiology.  Lung volumes would be necessary to determine.    Pulmonary exacerbation: mild: Oral: non-quinolone

## 2018-10-30 NOTE — LETTER
10/30/2018       RE: Nico Morales  [address]  [facility] [address]     Dear Colleague,    Thank you for referring your patient, Nico Morales, to the [facility] at [facility]. Please see a copy of my visit note below.    [facility]  October 30, 2018         Assessment and Plan:   Nico Morales is a 42 year old male with cystic fibrosis.    1. CF lung disease with severe obstruction:  Kilo reports that he feels a little bit more tightness today.  He says this often happens when he is not on Cayston therapy.  He feels when he is on his Doxy 2 weeks that he feels he coughs but does notice the tightness.  He is still tolerating his rotation of 2 weeks Cayston and 2 weeks' Doxy.  He does have a slight decline in his pulmonary function today and upon review of his recent sputum, it did again show evidence of Staph and pseudomonas.  Kilo and I discussed treatment options today and considering a course of antibiotics.  His allergy test showed that he is not allergic to amoxicillin.  I recommend at this time:   -- He begin Augmentin 500 mg p.o. b.i.d.   -- He continue to be aggressive with his vest and neb therapies.     -- If he were not to have a significant clinical response to the antibiotics, will begin prednisone at 10 mg daily.  He will contact us if he feels he must take that route.      2.  CF related diabetes.  Kilo has evidence of good control.       3.  Nutrition.  Kilo reports he is pleased with his weight gain.  He has now cut down on his tube feeds to only 2-3 nights per weeks because he is able to maintain his weight without it.     4.  Psychosocial.  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  He is now in a new relationship.  He lives in a home with his dog.       5.  Decreased bone mineral density.  Kilo is due to get his first dose of pamidronate in  December.  I have provided him with some Tylenol with codeine to take if he were develop any bone pain.     6. Pancreatic Insufficiency:  The patient has no new symptoms consistent with worsening malabsorption.    - continue the present dose of pancreatic enzymes  - continue vitamin supplementation.    HCM:5/2019  Immunizations: declined flu  Colonoscopy: 2018    Jaye Machado MD MPH  Associate Professor of Medicine  Pulmonary, Allergy, Critical Care and Sleep Medicine      Interval History:     Kilo does report that he continues to produce sputum that is green-yellow in color.  Cough frequency and sputum volume have their ususal variability.  His activity tolerance remains stable.  He does perform 3-4 vest therapies per day.          Review of Systems:     CONSTITUTIONAL: no fever, no chills, no change in weight, no change in energy, no change in appetite    INTEGUMENTARY/SKIN: no rash, no obvious new lesions    ENT/MOUTH: no sore throat, no new sinus pain, no new nasal drainage, no hearing loss, no ear ringing     RESPIRATORY: see interval history    CV: no chest pain, no palpitations, no peripheral edema, no orthopnea, no PND    GI: no nausea, no vomiting, no change in stools, no fatty stools, no GERD, no abdominal pain    : no dysuria, no urinary frequency    MUSCULOSKELETAL: no myalgias, no arthralgias    ENDOCRINE: no excessive thirst, blood sugars with adequate control    NEURO:  No headache, no numbness, no tingling    SLEEP: no issues    PSYCHIATRIC: mood stable          Past Medical and Surgical History:     Past Medical History:   Diagnosis Date     CF (cystic fibrosis) (H)      Exocrine pancreatic insufficiency      Nocardia infection      Pseudomonas infection      S/P gastrostomy (H) 2002     Past Surgical History:   Procedure Laterality Date     COLONOSCOPY N/A 5/21/2018    Procedure: COMBINED COLONOSCOPY, SINGLE OR MULTIPLE BIOPSY/POLYPECTOMY BY BIOPSY;  Cystic fibrosis, Diabetes mellitus  due to CF;  Surgeon: Margarito Bowman MD;  Location: [facility]     GASTROSTOMY TUBE  2002     PICC INSERTION Left 01/22/2018    4Fr SL BioFlo PICC, 46cm (5cm external) in the L basilic vein w/ tip in the low SVC         Family History:     Family History   Problem Relation Age of Onset     HEART DISEASE Maternal Grandmother      HEART DISEASE Maternal Grandfather      HEART DISEASE Paternal Grandmother      Diabetes No family hx of           Social History:     Social History     Social History     Marital status: Single     Spouse name: N/A     Number of children: 0     Years of education: N/A     Occupational History     financial [employer]     Social History Main Topics     Smoking status: Never Smoker     Smokeless tobacco: Never Used     Alcohol use No     Drug use: No     Sexual activity: Yes     Partners: Female     Birth control/ protection: Pill     Other Topics Concern     Blood Transfusions No     Exercise No     Social History Narrative    Kilo lives with wife in a house in [place], [place].  He works as a .        March 2016. He works independently as a . Works out 5x/week and walks the dog daily.        June 2016. No changes. Describes himself as a creature of habit.          Medications:     Current Outpatient Prescriptions   Medication     ACE NOT PRESCRIBED, INTENTIONAL,     acetylcysteine (MUCOMYST) 10 % nebulizer solution     amitriptyline (ELAVIL) 10 MG tablet     amoxicillin-clavulanate (AUGMENTIN) 500-125 MG per tablet     amylase-lipase-protease (CREON) 61489-72325 units CPEP per EC capsule     azithromycin (ZITHROMAX) 250 MG tablet     aztreonam lysine (CAYSTON) 75 MG SOLR     BD OMI U/F 32G X 4 MM insulin pen needle     blood glucose (NO BRAND SPECIFIED) lancets standard     blood glucose monitoring (MARTINA CONTOUR NEXT MONITOR) meter device kit     blood glucose monitoring (MARTINA CONTOUR NEXT) test strip     blood glucose monitoring (MARTINA  "MICROLET) lancets     CALCIUM PO     COMPOUNDED NON-CONTROLLED SUBSTANCE (CMPD RX) - PHARMACY TO MIX COMPOUNDED MEDICATION     doxycycline (VIBRAMYCIN) 100 MG capsule     [facility] MANAGED PATIENT     Ferrous Fumarate-Vitamin C (VITRON-C) 200-125 MG TABS     insulin aspart (NOVOLOG PENFILL) 100 UNIT/ML injection     LANTUS SOLOSTAR 100 UNIT/ML soln     levalbuterol (XOPENEX HFA) 45 MCG/ACT Inhaler     levalbuterol (XOPENEX) 1.25 MG/3ML neb solution     MEPHYTON 5 MG PO TABS     MULTIVITAMIN OR     Nebulizers (EFLOW SCF NEBULIZER HANDSET) MISC     oseltamivir (TAMIFLU) 75 MG capsule     polyethylene glycol (MIRALAX/GLYCOLAX) powder     Respiratory Therapy Supplies (KRIS ALTERA NEBULIZER HANDSET) MISC     rimantadine (FLUMADINE) 100 MG tablet     sodium chloride inhalant 7 % NEBU neb solution     SYMBICORT 160-4.5 MCG/ACT Inhaler     acetaminophen-codeine (TYLENOL #3) 300-30 MG per tablet     [DISCONTINUED] CREON 69656-39359 UNITS CPEP per EC capsule     No current facility-administered medications for this visit.           Physical Exam:   /75  Pulse 92  Resp 17  Ht 1.753 m (5' 9\")  Wt 66.7 kg (147 lb 0.8 oz)  SpO2 97%  BMI 21.72 kg/m2    Constitutional:   Awake, alert and in no apparent distress     Eyes:   nonicteric     ENT:   oral mucosa moist without lesions, normal tm bilaterally, bilateral mucosa normal     Neck:   Supple without supraclavicular or cervical lymphadenopathy     Lungs:   Good air flow.  No crackles. No rhonchi.  No wheezes.     Cardiovascular:   Normal S1 and S2.  RRR.  No murmur, gallop or rub.     Abdomen:   NABS, soft, nontender, nondistended.  g tube     Musculoskeletal:   No edema, digital clubbing present     Neurologic:   Alert and conversant.     Skin:   Warm, dry.  No rash on limited exam.             Data:   All laboratory and imaging data reviewed.    Cystic Fibrosis Culture  Specimen Description   Date Value Ref Range Status   09/18/2018 Sputum  Final "   08/08/2018 Sputum  Final   06/26/2018 Sputum  Final    Culture Micro   Date Value Ref Range Status   09/18/2018 Moderate growth  Normal santi    Final   09/18/2018 (A)  Final    Light growth  Pseudomonas aeruginosa, mucoid strain     09/18/2018 (A)  Final    Light growth  Strain 2  Pseudomonas aeruginosa, mucoid strain     09/18/2018 (A)  Final    Light growth  Staphylococcus pseudintermedius  Identification obtained by MALDI-TOF mass spectrometry research use only database. Test   characteristics determined and verified by the Infectious Diseases Diagnostic Laboratory   ([facility]) [place], [place].          Recent Results (from the past 168 hour(s))   General PFT Lab (Please always keep checked)    Collection Time: 10/30/18 11:19 AM   Result Value Ref Range    FVC-Pred 4.92 L    FVC-Pre 3.81 L    FVC-%Pred-Pre 77 %    FEV1-Pre 1.54 L    FEV1-%Pred-Pre 38 %    FEV1FVC-Pred 79 %    FEV1FVC-Pre 40 %    FEFMax-Pred 9.77 L/sec    FEFMax-Pre 6.30 L/sec    FEFMax-%Pred-Pre 64 %    FEF2575-Pred 3.86 L/sec    FEF2575-Pre 0.52 L/sec    HAC1022-%Pred-Pre 13 %    ExpTime-Pre 16.74 sec    FIFMax-Pre 4.94 L/sec    FEV1FEV6-Pred 82 %    FEV1FEV6-Pre 48 %       PFT: Severe obstructive lung disease.  When compared to 9/18/2018, the FEV1 and FVC have little change.  The decrease in FVC may represent air trapping v. restrictive physiology.  Lung volumes would be necessary to determine.    Pulmonary exacerbation: mild: Oral: non-quinolone        Again, thank you for allowing me to participate in the care of your patient.      Sincerely,    Jaye Machado MD

## 2018-10-30 NOTE — TELEPHONE ENCOUNTER
Refill sent per protocol to patient preferred pharmacy.    Signed Prescriptions:                        Disp   Refills    amylase-lipase-protease (CREON) [address]*2700 c*2        Sig: TAKE 7-8 CAPS WITH MEALS (3 MEALS/DAY) AND 2 CAPS           WITH SNACKS (3 SNACKS/DAY)  Authorizing Provider: TYLER DAVIS  Ordering User: ZAINAB CHEEMA

## 2018-10-30 NOTE — MR AVS SNAPSHOT
MRN:[id number]                      After Visit Summary   10/30/2018    Nico Morales    MRN: [id number]           Visit Information        Provider Department      10/30/2018 11:20 AM Jaye Machado MD [facility]        Your next 10 appointments already scheduled     Dec 06, 2018  1:00 PM CST   LAB with LAB ONC [facility] ([facility])    [address] [place] [id number]   [phone]           Please do not eat 10-12 hours before your appointment if you are coming in fasting for labs on lipids, cholesterol, or glucose (sugar). This does not apply to pregnant women. Water, hot tea and black coffee (with nothing added) are okay. Do not drink other fluids, diet soda or chew gum.            Dec 06, 2018  1:30 PM CST   Level 3 with BAY 7 INFUSION   [facility] ([facility])    [address] [place] [id number] [phone]            Dec 11, 2018 11:00 AM CST   CF LOOP with  PFL CF   [facility] Pulmonary Function Testing ([facility])    [address]  [facility] [id number] [phone]            Dec 11, 2018 11:20 AM CST   (Arrive by 11:05 AM)   RETURN CYSTIC FIBROSIS VISIT with Jaye Machado MD   [facility] ([facility])    [address] [place] [address]  [address] [place] [id number] [phone]              Care Instructions    Cystic Fibrosis Self-Care Plan    RECOMMENDATIONS:   Augmentin one tablet twice daily for 10 days.  Call if you want to start prednisone at 10 mg.  Get Healthy U on line for work outs.  Keila [phone]    YOUR GOAL:  Stay well.  Enjoy the Holidays!!      CF Nurse line:          Jose David Henderson   [phone]            CF Resp Therapist: Felicitas Banuelos            [phone]   CF Dietitians:            Zeina Ignacio            [phone]                       Ivory Bonilla                       [phone]   CF Diabetes Nurse: Jordyn Rudolph             [phone]    CF Social Workers:  Mary Ritter             [phone]                Marisa Urbnia            [phone]  CF Pharmacist:        Tammy Morgan                              [phone]  [email or web address]         MRN: [id number]   Clinic Date: October 30, 2018   Patient: Nico Morales     Annual Studies:   IGG   Date Value Ref Range Status   05/03/2018 1770 (H) 695 - 1620 mg/dL Final     Insulin   Date Value Ref Range Status   07/26/2011 30 mU/L Final     Comment:     Reference Range:  0-20  +120     There are no preventive care reminders to display for this patient.      Pulmonary Function Tests  FEV1: amount of air you can blow out in 1 second  FVC: total amount of air you can take in and blow out    Your Goals:         PFT Latest Ref Rng & Units 10/30/2018   FVC L 3.81   FEV1 L 1.54   FVC% % 77   FEV1% % 38          Airway Clearance: The Most Important Way to Keep Your Lungs Healthy  Vest Settings:    Hill-Rom Frequencies: 8, 9, 10 Pressure 10 Then, Frequencies 18, 19, 20 Pressure 6      RespirTech: Quick Start with Pressure of     Do each frequency for 5 minutes; Deflate vest after each frequency & cough 3 times before beginning the next setting.    Vest and Neb Therapy should be done 3 times/day.    Good Nutrition Can Improve Lung Function and Overall Health     Take ALL of your vitamins with food     Take 1/2 of your enzymes before EVERY meal/snack and the other 1/2 mid-meal/snack    Wt Readings from Last 3 Encounters:   10/30/18 66.7 kg (147 lb 0.8 oz)   09/18/18 66.1 kg (145 lb 11.6 oz)   08/08/18 64.2 kg (141 lb 8.6 oz)       Body mass index is 21.72 kg/(m^2).         National CF Foundation Recommendations for BMI in CF Adults: Women: at least 22 Men: at least 23        Controlling Blood Sugars Helps Prevent Lung  Infections & Improves Nutrition  Test blood sugar:     In the morning before eating (goal is )     2 hours after a meal (goal is less than 150)     When pre-meal glucose is greater than 150 add correction     At bedtime (if less than 100 eat a snack with 15 grams of carbohydrates  Last A1C Results:   Hemoglobin A1C   Date Value Ref Range Status   08/06/2018 6.5 % Final         If diabetic, measure A1C every 6 months. Goal: Under 7%    Staying Healthy    Research:  If you are interested in learning about research opportunities or have questions, please contact the CF Research Team at [phone] or [email or web address].      CF Foundation:  Compass is a personalized resource service to help you with the insurance, financial, legal and other issues you are facing.  It's free, confidential and available to anyone with CF.  Ask your  for more information or contact Compass directly at 898-COMPASS ([phone]) or compass@cff.org, or learn more at cff.org/compass.                            [email or web address] Information     [employer] gives you secure access to your electronic health record. If you see a primary care provider, you can also send messages to your care team and make appointments. If you have questions, please call your primary care clinic.  If you do not have a primary care provider, please call [phone] and they will assist you.        Care EveryWhere ID     This is your Care EveryWhere ID. This could be used by other organizations to access your [facility] medical records  [id number]        Equal Access to Services     TOÑA RAMIREZ : Hadii favio Nevarez, waaxda naif, qaybta ashishalmajoe giron. So [facility] [phone].    ATENCIÓN: Si habla español, tiene a monroe disposición servicios gratuitos de asistencia lingüística. Llame al [phone].    We comply with applicable federal civil rights laws and [place] laws. We do not discriminate on the basis  of race, color, national origin, age, disability, sex, sexual orientation, or gender identity.

## 2018-11-04 LAB
BACTERIA SPEC CULT: ABNORMAL
SPECIMEN SOURCE: ABNORMAL

## 2018-11-06 DIAGNOSIS — E08.9 DIABETES MELLITUS DUE TO CYSTIC FIBROSIS (H): ICD-10-CM

## 2018-11-06 DIAGNOSIS — K86.81 EXOCRINE PANCREATIC INSUFFICIENCY: ICD-10-CM

## 2018-11-06 DIAGNOSIS — E84.0 CYSTIC FIBROSIS WITH PULMONARY MANIFESTATIONS (H): ICD-10-CM

## 2018-11-06 DIAGNOSIS — E84.9 DIABETES MELLITUS DUE TO CYSTIC FIBROSIS (H): ICD-10-CM

## 2018-11-13 DIAGNOSIS — E84.0 CYSTIC FIBROSIS WITH PULMONARY MANIFESTATIONS (H): ICD-10-CM

## 2018-11-13 DIAGNOSIS — E84.0 CYSTIC FIBROSIS WITH PULMONARY MANIFESTATIONS (H): Primary | ICD-10-CM

## 2018-11-13 RX ORDER — PREDNISONE 10 MG/1
10 TABLET ORAL DAILY
Qty: 14 TABLET | Refills: 0 | Status: SHIPPED | OUTPATIENT
Start: 2018-11-13 | End: 2019-02-21

## 2018-11-13 RX ORDER — SODIUM CHLORIDE FOR INHALATION 7 %
VIAL, NEBULIZER (ML) INHALATION
Qty: 240 ML | Refills: 5 | Status: SHIPPED | OUTPATIENT
Start: 2018-11-13 | End: 2019-05-15

## 2018-11-24 DIAGNOSIS — E84.0 CYSTIC FIBROSIS WITH PULMONARY MANIFESTATIONS (H): ICD-10-CM

## 2018-11-26 RX ORDER — DOXYCYCLINE 100 MG/1
CAPSULE ORAL
Qty: 60 CAPSULE | Refills: 2 | Status: SHIPPED | OUTPATIENT
Start: 2018-11-26 | End: 2019-05-28

## 2018-12-05 ENCOUNTER — MYC MEDICAL ADVICE (OUTPATIENT)
Dept: ENDOCRINOLOGY | Facility: CLINIC | Age: 43
End: 2018-12-05

## 2018-12-05 DIAGNOSIS — E08.9 DIABETES MELLITUS RELATED TO CF (CYSTIC FIBROSIS) (H): ICD-10-CM

## 2018-12-05 DIAGNOSIS — E84.8 DIABETES MELLITUS RELATED TO CF (CYSTIC FIBROSIS) (H): ICD-10-CM

## 2018-12-05 NOTE — TELEPHONE ENCOUNTER
Faxed refill request received from   [facility] [facility] - [place], [place] - [address]  [address] [facility] [place] [address]  Phone: [phone] Fax: [phone].   Medication Requested: Lantus solostar  Directions: inject 6 units subcutaneous daily  Quantity: 15  Last Office Visit: 8/6/18  Next Appointment Scheduled for: None - message sent to patient that he is due for follow up.    Rosa Thorne CMA  Adult Endocrinology  [facility]

## 2018-12-06 ENCOUNTER — INFUSION THERAPY VISIT (OUTPATIENT)
Dept: INFUSION THERAPY | Facility: CLINIC | Age: 43
End: 2018-12-06
Payer: COMMERCIAL

## 2018-12-06 VITALS
TEMPERATURE: 98.4 F | RESPIRATION RATE: 16 BRPM | BODY MASS INDEX: 21.91 KG/M2 | SYSTOLIC BLOOD PRESSURE: 100 MMHG | OXYGEN SATURATION: 97 % | WEIGHT: 148.4 LBS | DIASTOLIC BLOOD PRESSURE: 63 MMHG | HEART RATE: 84 BPM

## 2018-12-06 DIAGNOSIS — K86.81 EXOCRINE PANCREATIC INSUFFICIENCY: ICD-10-CM

## 2018-12-06 DIAGNOSIS — E84.9 CYSTIC FIBROSIS (H): ICD-10-CM

## 2018-12-06 DIAGNOSIS — E84.0 CYSTIC FIBROSIS WITH PULMONARY MANIFESTATIONS (H): ICD-10-CM

## 2018-12-06 DIAGNOSIS — M85.9 LOW BONE DENSITY: Primary | ICD-10-CM

## 2018-12-06 LAB
CALCIUM SERPL-MCNC: 9 MG/DL (ref 8.5–10.1)
CREAT SERPL-MCNC: 0.78 MG/DL (ref 0.66–1.25)
GFR SERPL CREATININE-BSD FRML MDRD: >90 ML/MIN/1.7M2

## 2018-12-06 PROCEDURE — 96365 THER/PROPH/DIAG IV INF INIT: CPT | Performed by: INTERNAL MEDICINE

## 2018-12-06 PROCEDURE — 36415 COLL VENOUS BLD VENIPUNCTURE: CPT | Performed by: INTERNAL MEDICINE

## 2018-12-06 PROCEDURE — 82310 ASSAY OF CALCIUM: CPT | Performed by: INTERNAL MEDICINE

## 2018-12-06 PROCEDURE — 99207 ZZC NO CHARGE LOS: CPT

## 2018-12-06 PROCEDURE — 82565 ASSAY OF CREATININE: CPT | Performed by: INTERNAL MEDICINE

## 2018-12-06 PROCEDURE — 96366 THER/PROPH/DIAG IV INF ADDON: CPT | Performed by: INTERNAL MEDICINE

## 2018-12-06 ASSESSMENT — PAIN SCALES - GENERAL: PAINLEVEL: NO PAIN (0)

## 2018-12-06 NOTE — PROGRESS NOTES
Infusion Nursing Note:  Nico Morales presents today for his first Aredia infusion.    Patient seen by provider today: No   present during visit today: Not Applicable.    Note: Oriented patient to the department, including how/when to use calllight.  Provided patient with verbal information on intended effects of Aredia, as well as side effects.  Patient states his provider also gave him written information on Aredia, which he has at home.  Patient states all questions were answered to his satisfaction.    Intravenous Access:  Peripheral IV placed.    Treatment Conditions:  Lab Results   Component Value Date     05/03/2018                   Lab Results   Component Value Date    POTASSIUM 4.4 05/03/2018           Lab Results   Component Value Date    MAG 2.1 05/03/2018            Lab Results   Component Value Date    CR 0.78 12/06/2018                   Lab Results   Component Value Date    CLAUDIA 9.0 12/06/2018                Lab Results   Component Value Date    BILITOTAL 0.2 01/21/2018           Lab Results   Component Value Date    ALBUMIN 3.3 05/03/2018                    Lab Results   Component Value Date    ALT 35 05/03/2018           Lab Results   Component Value Date    AST 30 05/03/2018       Results reviewed, labs MET treatment parameters, ok to proceed with treatment.      Post Infusion Assessment:  Patient tolerated infusion without incident.  Blood return noted pre and post infusion.  Site patent and intact, free from redness, edema or discomfort.  No evidence of extravasations.  Access discontinued per protocol.  Encouraged patient to drink lots of fluids today and tomorrow to protect his kidneys.    Discharge Plan:   Patient states he will call to schedule his next infusion in 3 months.  Patient discharged in stable condition accompanied by: self.  Departure Mode: Ambulatory.    Suzanna Abreu RN

## 2018-12-06 NOTE — MR AVS SNAPSHOT
After Visit Summary   12/6/2018    Nico Morales    MRN: [id number]           Patient Information     Date Of Birth          1975        Visit Information        Provider Department      12/6/2018 1:30 PM [address]        Today's Diagnoses     Low bone density    -  1    Exocrine pancreatic insufficiency        Cystic fibrosis with pulmonary manifestations (H)        Cystic fibrosis (H)           Follow-ups after your visit        Your next 10 appointments already scheduled     Dec 11, 2018 11:00 AM CST   CF LOOP with UC PFL CF   [facility] Pulmonary Function Testing ([facility])    [address] [facility] [address]  [address] [facility] [address]   [phone]            Dec 11, 2018 11:20 AM CST   (Arrive by 11:05 AM)   RETURN CYSTIC FIBROSIS VISIT with Jaye Machado MD   [facility] ([facility])    [address]  [address] [place] [address]   [phone]            Feb 05, 2019  9:00 AM CST   CF LOOP with UC PFL [facility] Pulmonary Function Testing ([facility])    [address] [address]   [phone]            Feb 05, 2019  9:20 AM CST   (Arrive by 9:05 AM)   RETURN CYSTIC FIBROSIS VISIT with Jaye Machado MD   [facility] ([facility])    [address] [facility]  [address] [place] [address]   [phone]            Feb 19, 2019 11:00 AM CST   CF LOOP with UC PFL [facility] Pulmonary Function Testing ([facility])    [address] [address]   [phone]            Feb 19, 2019 11:20 AM CST   (Arrive by 11:05 AM)   RETURN CYSTIC FIBROSIS VISIT with Jaye Machado MD   [facility] ([facility])    [address] [facility]  [address]  [facility] [address]   [phone]            Apr 16, 2019 11:00 AM CDT   CF LOOP with UC PFL CF   [facility] Pulmonary Function Testing ([facility])    [address] [facility]  [address]rd [facility] [address]   [phone]            Apr 16, 2019 11:20 AM CDT   (Arrive by 11:05 AM)   RETURN CYSTIC FIBROSIS VISIT with Jaye Machado MD   [facility] ([facility])    [address] [place] [address]  [address]  [facility] [address]   [phone]            Jun 18, 2019 11:00 AM CDT   CF LOOP with UC PFL CF   [facility] Pulmonary Function Testing ([facility])    [address] [place] [address]  [address]rd [facility] [address]   [phone]            Jun 18, 2019 11:20 AM CDT   (Arrive by 11:05 AM)   RETURN CYSTIC FIBROSIS VISIT with Jaye Machado MD   [facility] ([facility])    [address] [place] [address]   [phone]              Who to contact     If you have questions or need follow up information about today's clinic visit or your schedule please contact [facility] directly at [phone].  Normal or non-critical lab and imaging results will be communicated to you by MyChart, letter or phone within 4 business days after the clinic has received the results. If you do not hear from us within 7 days, please contact the clinic through [employer] or phone. If you have a critical or abnormal lab result, we will notify you by phone as soon as possible.  Submit refill requests through [employer] or call your pharmacy and they will forward the refill request to us. Please allow 3 business days for your refill to be completed.          Additional Information About Your Visit        [employer] Information     [employer] gives you secure access to your electronic  health record. If you see a primary care provider, you can also send messages to your care team and make appointments. If you have questions, please call your primary care clinic.  If you do not have a primary care provider, please call [phone] and they will assist you.      [employer] is an electronic gateway that provides easy, online access to your medical records. With [employer], you can request a clinic appointment, read your test results, renew a prescription or communicate with your care team.     To access your existing account, please contact your [facility] or call [phone] for assistance.        Care EveryWhere ID     This is your Care EveryWhere ID. This could be used by other organizations to access your [facility] medical records  [id number]        Your Vitals Were     Pulse Temperature Respirations Pulse Oximetry BMI (Body Mass Index)       84 98.4  F (36.9  C) (Oral) 16 97% 21.91 kg/m2        Blood Pressure from Last 3 Encounters:   12/06/18 100/63   10/30/18 108/75   09/18/18 108/74    Weight from Last 3 Encounters:   12/06/18 67.3 kg (148 lb 6.4 oz)   10/30/18 66.7 kg (147 lb 0.8 oz)   09/18/18 66.1 kg (145 lb 11.6 oz)              Today, you had the following     No orders found for display       Primary Care Provider Office Phone # Fax #    Jaye Machado [phone]       [address] [address]        Equal Access to Services     TOÑA RAMIREZ AH: Hadii aad ku hadasho Soomaali, waaxda luqadaha, qaybta kaalmada adeegyada, joe iversonin haysarayn simi mack. So [facility] [phone].    ATENCIÓN: Si habla español, tiene a monroe disposición servicios gratuitos de asistencia lingüística. Llame al [phone].    We comply with applicable federal civil rights laws and [place] laws. We do not discriminate on the basis of race, color, national origin, age, disability, sex, sexual orientation, or gender identity.             Thank you!     Thank you for choosing [facility]  for your care. Our goal is always to provide you with excellent care. Hearing back from our patients is one way we can continue to improve our services. Please take a few minutes to complete the written survey that you may receive in the mail after your visit with us. Thank you!             Your Updated Medication List - Protect others around you: Learn how to safely use, store and throw away your medicines at www.disposemymeds.org.          This list is accurate as of 12/6/18  5:08 PM.  Always use your most recent med list.                   Brand Name Dispense Instructions for use Diagnosis    ACE NOT PRESCRIBED (INTENTIONAL)     0 each    1 each continuous prn. ACE Inhibitor not prescribed due to Risk for drug interaction    Type I (juvenile type) diabetes mellitus without mention of complication, not stated as uncontrolled       acetaminophen-codeine 300-30 MG tablet    TYLENOL #3    6 tablet    Take 1-2 tablets by mouth every 6 hours as needed for severe pain    Cystic fibrosis with pulmonary manifestations (H)       acetylcysteine 10 % nebulizer solution    MUCOMYST    480 mL    Nebulize 4ml qid    Cystic fibrosis with pulmonary manifestations (H)       amitriptyline 10 MG tablet    ELAVIL    90 tablet    TAKE ONE TABLET BY MOUTH AT BEDTIME.    Cystic fibrosis (H), Cystic fibrosis with pulmonary manifestations (H), Encounter for long-term (current) use of antibiotics, Diabetes mellitus related to CF (cystic fibrosis) (H), Thrush       amylase-lipase-protease [address]8075-53[address] units Cpep per EC capsule    CREON    2700 capsule    TAKE 7-8 CAPS WITH MEALS (3 MEALS/DAY) AND 2 CAPS WITH SNACKS (3 SNACKS/DAY)    Cystic fibrosis with pulmonary manifestations (H)       azithromycin 250 MG tablet    ZITHROMAX    30 tablet    Take 1 tablet (250 mg) by mouth daily    Cystic fibrosis with pulmonary manifestations (H)       aztreonam lysine 75 MG Solr     "CAYSTON    84 mL    Take 1 mL (75 mg) by nebulization 3 times daily    Cystic fibrosis with pulmonary manifestations (H), Exocrine pancreatic insufficiency, 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)       BD OMI U/F 32G X 4 MM miscellaneous   Generic drug:  insulin pen needle     200 each    USE 6 DAILY OR AS DIRECTED.    Diabetes mellitus related to CF (cystic fibrosis) (H)       blood glucose lancets standard    NO BRAND SPECIFIED    540 each    Whatever Lancets that go with device, Test 6 times daily    Diabetes mellitus related to CF (cystic fibrosis) (H)       blood glucose monitoring lancets     2 Box    Use to test blood sugar 6 times daily or as directed.    Diabetes mellitus related to CF (cystic fibrosis) (H)       blood glucose monitoring meter device kit     1 kit    Use to test blood sugar 6 times daily or as directed.    Diabetes mellitus related to CF (cystic fibrosis) (H)       blood glucose monitoring test strip    MARTINA CONTOUR NEXT    200 each    Use to test blood sugar 6 times daily or as directed.    Diabetes mellitus related to CF (cystic fibrosis) (H)       CALCIUM PO      Take 1 tablet by mouth 2 times daily Strength unknown.        doxycycline hyclate 100 MG capsule    VIBRAMYCIN    60 capsule    TAKE 1 CAPSULE BY MOUTH TWICE DAILY    Cystic fibrosis with pulmonary manifestations (H)       EFLOW SCF NEBULIZER HANDSET Misc     1 each    1 each 3 times daily.    Nocardia infection       [place] HOME INFUSION MANAGED PATIENT      Contact [facility] for patient specific medication information at [phone] on admission and discharge from the hospital.  Phones are answered 24 hours a day 7 days a week 365 days a year.  Providers - Choose \"CONTINUE HOME MED (no script)\" at discharge if patient treatment with home infusion will continue.        insulin aspart 100 UNIT/ML cartridge    NovoLOG PENFILL    30 mL    Inject 1 unit per 20G CHO at lunch and supper and 3 units at night " with tube feedings, up to 20 units daily    Diabetes mellitus related to CF (cystic fibrosis) (H)       insulin glargine 100 UNIT/ML pen    LANTUS SOLOSTAR    15 mL    INJECT 6 UNITS SUBCUETANEOUSLY ONCE DAILY    Diabetes mellitus related to CF (cystic fibrosis) (H)       levalbuterol 1.25 MG/3ML neb solution    XOPENEX    360 mL    INHALE 1 VIAL BY MOUTH INTO THE LUNGS VIA NEBULIZATION 4 TIMES DAILY    Cystic fibrosis (H), CF (cystic fibrosis) (H), Cystic fibrosis with pulmonary manifestations (H)       levalbuterol 45 MCG/ACT inhaler    XOPENEX HFA    5 Inhaler    Inhale 2 puffs into the lungs every 6 hours as needed for shortness of breath / dyspnea    Cystic fibrosis (H)       MEPHYTON 5 MG tablet   Generic drug:  phytonadione      Take 1 TABLET by mouth DAILY.        MULTIVITAMIN PO      Take 1 tablet by mouth daily. Includes 5,000 units vitamin D and 400 units vitamin E. Per pt, formulated for CF pts.        oseltamivir 75 MG capsule    TAMIFLU    10 capsule    Take 1 capsule (75 mg) by mouth 2 times daily    CF (cystic fibrosis) (H)       KRIS ALTERA NEBULIZER HANDSET Misc     1 each    1 each daily    Cystic fibrosis with pulmonary manifestations (H), Exocrine pancreatic insufficiency, 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)       polyethylene glycol powder    MIRALAX/GLYCOLAX    527 g    MIX AND TAKE 17 GRAMS BY MOUTH 1-3 TIMES DAILY AS NEEDED.    Cystic fibrosis with meconium ileus (H)       rimantadine 100 MG tablet    FLUMADINE    60 tablet    Take 1 tablet (100 mg) by mouth 2 times daily Take from Oct. 1 thru March 31.    CF (cystic fibrosis) (H)       sodium chloride inhalant 7 % Nebu neb solution     240 mL    NEBULIZE 4ML TWICE DAILY    Cystic fibrosis with pulmonary manifestations (H)       SYMBICORT 160-4.5 MCG/ACT Inhaler   Generic drug:  budesonide-formoterol     10.2 Inhaler    INHALE 2 PUFFS BY MOUTH INTO THE LUNGS TWICE DAILY    Cystic fibrosis of the lung (H), Cystic fibrosis (H)        VITRON-C 200-125 MG Tabs   Generic drug:  ferrous fumarate 65 mg ([place]. FE)-Vitamin C 125 mg      Take 1 tablet by mouth 2 times daily (with meals)    Nocardia infection, Cystic fibrosis with pulmonary manifestations (H)

## 2018-12-11 ENCOUNTER — OFFICE VISIT (OUTPATIENT)
Dept: PULMONOLOGY | Facility: CLINIC | Age: 43
End: 2018-12-11
Attending: INTERNAL MEDICINE
Payer: COMMERCIAL

## 2018-12-11 VITALS
DIASTOLIC BLOOD PRESSURE: 75 MMHG | WEIGHT: 146.39 LBS | HEIGHT: 68 IN | RESPIRATION RATE: 17 BRPM | BODY MASS INDEX: 22.19 KG/M2 | OXYGEN SATURATION: 98 % | HEART RATE: 91 BPM | SYSTOLIC BLOOD PRESSURE: 115 MMHG

## 2018-12-11 DIAGNOSIS — E84.0 CYSTIC FIBROSIS WITH PULMONARY MANIFESTATIONS (H): ICD-10-CM

## 2018-12-11 DIAGNOSIS — E84.9 CYSTIC FIBROSIS (H): Primary | ICD-10-CM

## 2018-12-11 LAB
EXPTIME-PRE: 15.1 SEC
FEF2575-%PRED-PRE: 14 %
FEF2575-PRE: 0.57 L/SEC
FEF2575-PRED: 3.86 L/SEC
FEFMAX-%PRED-PRE: 66 %
FEFMAX-PRE: 6.47 L/SEC
FEFMAX-PRED: 9.76 L/SEC
FEV1-%PRED-PRE: 39 %
FEV1-PRE: 1.57 L
FEV1FEV6-PRE: 49 %
FEV1FEV6-PRED: 82 %
FEV1FVC-PRE: 42 %
FEV1FVC-PRED: 79 %
FIFMAX-PRE: 5.08 L/SEC
FVC-%PRED-PRE: 75 %
FVC-PRE: 3.72 L
FVC-PRED: 4.92 L

## 2018-12-11 PROCEDURE — 87186 SC STD MICRODIL/AGAR DIL: CPT | Performed by: INTERNAL MEDICINE

## 2018-12-11 PROCEDURE — 94375 RESPIRATORY FLOW VOLUME LOOP: CPT | Mod: ZF | Performed by: INTERNAL MEDICINE

## 2018-12-11 PROCEDURE — G0463 HOSPITAL OUTPT CLINIC VISIT: HCPCS | Mod: ZF

## 2018-12-11 PROCEDURE — 87070 CULTURE OTHR SPECIMN AEROBIC: CPT | Performed by: INTERNAL MEDICINE

## 2018-12-11 PROCEDURE — 87077 CULTURE AEROBIC IDENTIFY: CPT | Performed by: INTERNAL MEDICINE

## 2018-12-11 ASSESSMENT — PAIN SCALES - GENERAL: PAINLEVEL: NO PAIN (0)

## 2018-12-11 ASSESSMENT — MIFFLIN-ST. JEOR: SCORE: 1543.5

## 2018-12-11 NOTE — PROGRESS NOTES
[facility]  December 11, 2018         Assessment and Plan:   Nico Morales is a 42 year old male with cystic fibrosis.    1. CF lung disease with severe obstruction: Kilo reports that he did receive his first dose pamidronate that resulted in fevers and flu-like illness with some increased chest tightness in addition to other systemic symptoms.  Fortunately Kilo reports that these symptoms are improving.  Kilo also continues to show evidence of pseudomonas and Staph in his sputum.  He also had light growth of Aspergillus.  At this time I would recommend:   -- That if he were to have recurrent Staph in his sputum I would consider another course of Augmentin which he feels he had a good response to.   -- That he continue his present bronchial drainage and nebulized therapy.   -- In the past we have had to treat his Aspergillus.  We will follow this for now and look for symptom change that might prompt treatment.     2.  Cystic fibrosis-related diabetes.  Kilo describes excellent control of his blood sugars.     3.  Nutritional failure.  Kilo remains on tube feeds during the week.  He has shown evidence of good weight maintenance.       4.  Psychosocial.  Kilo continues to work as a .  He recently traveled on vacation to [place] and enjoyed this very much.     5.  Advanced care plan.  Kilo and I in the past have talked about doing an ACP visit.  He has been hesitant to pursue this.     6.  Low bone mineral density.  Kilo had his first dose of pamidronate which resulted in systemic symptoms including fevers to 101 the day after, as well as diffuse myalgias and arthralgias.  These symptoms have now resolved.  He has not had significant bone pain, although if he were to push hard on his long bones he does notice some mild tenderness.     7. Pancreatic Insufficiency:  The patient has no new symptoms consistent with worsening  malabsorption.    - continue the present dose of pancreatic enzymes  - continue vitamin supplementation.    HCM: 5/2019  Immunizations: declined flu at last visit  Colonoscopy: 2021    Jaye Machado MD MPH  Associate Professor of Medicine  Pulmonary, Allergy, Critical Care and Sleep Medicine      Interval History:     Kilo reports that there is no change in his cough frequency and sputum volume.  Again he has had some mild chest tightness recently, but it is returning to normal.  His activity tolerance remains stable and he continues to do dog walking.  He is performing 3-4 vest therapies per day.          Review of Systems:     CONSTITUTIONAL: ++ fever and chills with pamidronate, no change in weight, no change in energy, no change in appetite    INTEGUMENTARY/SKIN: no rash, no obvious new lesions    ENT/MOUTH: no sore throat, no new sinus pain, no new nasal drainage, no hearing loss, no ear ringing     RESPIRATORY: see interval history    CV: ++ chest tight, rare palpitations, no peripheral edema, no orthopnea, no PND    GI: ++ nausea with cough, no vomiting, no change in stools, no fatty stools, no GERD, no abdominal pain    : no dysuria, no urinary frequency    MUSCULOSKELETAL: flu-like with pamidronate    ENDOCRINE: no excessive thirst, blood sugars with adequate control    NEURO:  No headache, no numbness, no tingling    SLEEP: no issues    PSYCHIATRIC: mood stable          Past Medical and Surgical History:     Past Medical History:   Diagnosis Date     CF (cystic fibrosis) (H)      Exocrine pancreatic insufficiency      Nocardia infection      Pseudomonas infection      S/P gastrostomy (H) 2002     Past Surgical History:   Procedure Laterality Date     COLONOSCOPY N/A 5/21/2018    Procedure: COMBINED COLONOSCOPY, SINGLE OR MULTIPLE BIOPSY/POLYPECTOMY BY BIOPSY;  Cystic fibrosis, 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;  Surgeon: Margarito Bowman MD;  Location:  GI     GASTROSTOMY TUBE  2002     PICC  INSERTION Left 01/22/2018    4Fr SL BioFlo PICC, 46cm (5cm external) in the L basilic vein w/ tip in the low SVC           Family History:     Family History   Problem Relation Age of Onset     Heart Disease Maternal Grandmother      Heart Disease Maternal Grandfather      Heart Disease Paternal Grandmother      Diabetes No family hx of             Social History:     Social History     Socioeconomic History     Marital status: Single     Spouse name: Not on file     Number of children: 0     Years of education: Not on file     Highest education level: Not on file   Social Needs     Financial resource strain: Not on file     Food insecurity - worry: Not on file     Food insecurity - inability: Not on file     Transportation needs - medical: Not on file     Transportation needs - non-medical: Not on file   Occupational History     Occupation: financial [employer]     Employer: [employer]   Tobacco Use     Smoking status: Never Smoker     Smokeless tobacco: Never Used   Substance and Sexual Activity     Alcohol use: No     Alcohol/week: 0.0 oz     Drug use: No     Sexual activity: Yes     Partners: Female     Birth control/protection: Pill   Other Topics Concern     Parent/sibling w/ CABG, MI or angioplasty before 65F 55M? Not Asked      Service Not Asked     Blood Transfusions No     Caffeine Concern Not Asked     Occupational Exposure Not Asked     Hobby Hazards Not Asked     Sleep Concern Not Asked     Stress Concern Not Asked     Weight Concern Not Asked     Special Diet Not Asked     Back Care Not Asked     Exercise No     Bike Helmet Not Asked     Seat Belt Not Asked     Self-Exams Not Asked   Social History Narrative    Kilo lives with wife in a house in [place], [place].  He works as a .        March 2016. He works independently as a . Works out 5x/week and walks the dog daily.        June 2016. No changes. Describes himself as a creature of habit.             "Medications:     Current Outpatient Medications   Medication     ACE NOT PRESCRIBED, INTENTIONAL,     acetaminophen-codeine (TYLENOL #3) 300-30 MG per tablet     acetylcysteine (MUCOMYST) 10 % nebulizer solution     amitriptyline (ELAVIL) 10 MG tablet     amylase-lipase-protease (CREON) 04859-36[address] units CPEP per EC capsule     azithromycin (ZITHROMAX) 250 MG tablet     aztreonam lysine (CAYSTON) 75 MG SOLR     BD OMI U/F 32G X 4 MM insulin pen needle     blood glucose (NO BRAND SPECIFIED) lancets standard     blood glucose monitoring (MARTINA CONTOUR NEXT MONITOR) meter device kit     blood glucose monitoring (MARTINA CONTOUR NEXT) test strip     blood glucose monitoring (MARTINA MICROLET) lancets     CALCIUM PO     doxycycline (VIBRAMYCIN) 100 MG capsule     [place] [facility] INFUSION MANAGED PATIENT     Ferrous Fumarate-Vitamin C (VITRON-C) 200-125 MG TABS     insulin aspart (NOVOLOG PENFILL) 100 UNIT/ML injection     insulin glargine (LANTUS SOLOSTAR PEN) 100 UNIT/ML pen     levalbuterol (XOPENEX HFA) 45 MCG/ACT Inhaler     levalbuterol (XOPENEX) 1.25 MG/3ML neb solution     MEPHYTON 5 MG PO TABS     MULTIVITAMIN OR     Nebulizers (EFLOW SCF NEBULIZER HANDSET) MISC     oseltamivir (TAMIFLU) 75 MG capsule     polyethylene glycol (MIRALAX/GLYCOLAX) powder     Respiratory Therapy Supplies (KRIS ALTERA NEBULIZER HANDSET) MISC     rimantadine (FLUMADINE) 100 MG tablet     sodium chloride inhalant 7 % NEBU neb solution     SYMBICORT 160-4.5 MCG/ACT Inhaler     No current facility-administered medications for this visit.             Physical Exam:   /75   Pulse 91   Resp 17   Ht 1.727 m (5' 8\")   Wt 66.4 kg (146 lb 6.2 oz)   SpO2 98%   BMI 22.26 kg/m      Constitutional:   Awake, alert and in no apparent distress     Eyes:   nonicteric     ENT:   oral mucosa moist without lesions, normal tm bilaterally, bilateral mucosal edema      Neck:   Supple without supraclavicular or cervical lymphadenopathy     Lungs:   fair " air flow.  No crackles. No rhonchi.  No wheezes.     Cardiovascular:   Normal S1 and S2.  RRR.  No murmur, gallop or rub.     Abdomen:   NABS, soft, nontender, nondistended.  g tube     Musculoskeletal:   No edema, digital clubbing present     Neurologic:   Alert and conversant.     Skin:   Warm, dry.  No rash on limited exam.             Data:   All laboratory and imaging data reviewed.    Cystic Fibrosis Culture  Specimen Description   Date Value Ref Range Status   10/30/2018 Sputum  Final   09/18/2018 Sputum  Final   08/08/2018 Sputum  Final    Culture Micro   Date Value Ref Range Status   10/30/2018 Moderate growth  Normal santi    Final   10/30/2018 Light growth  Staphylococcus pseudintermedius   (A)  Final   10/30/2018 (A)  Final    Light growth  Pseudomonas aeruginosa, mucoid strain     10/30/2018 (A)  Final    Heavy growth  Strain 2  Pseudomonas aeruginosa, mucoid strain     10/30/2018 Light growth  Aspergillus fumigatus   (A)  Final        Recent Results (from the past 168 hour(s))   Creatinine    Collection Time: 12/06/18  1:10 PM   Result Value Ref Range    Creatinine 0.78 0.66 - 1.25 mg/dL    GFR Estimate >90 >60 mL/min/1.7m2    GFR Estimate If Black >90 >60 mL/min/1.7m2   Calcium    Collection Time: 12/06/18  1:10 PM   Result Value Ref Range    Calcium 9.0 8.5 - 10.1 mg/dL   General PFT Lab (Please always keep checked)    Collection Time: 12/11/18 11:28 AM   Result Value Ref Range    FVC-Pred 4.92 L    FVC-Pre 3.72 L    FVC-%Pred-Pre 75 %    FEV1-Pre 1.57 L    FEV1-%Pred-Pre 39 %    FEV1FVC-Pred 79 %    FEV1FVC-Pre 42 %    FEFMax-Pred 9.76 L/sec    FEFMax-Pre 6.47 L/sec    FEFMax-%Pred-Pre 66 %    FEF2575-Pred 3.86 L/sec    FEF2575-Pre 0.57 L/sec    PVF4113-%Pred-Pre 14 %    ExpTime-Pre 15.10 sec    FIFMax-Pre 5.08 L/sec    FEV1FEV6-Pred 82 %    FEV1FEV6-Pre 49 %       PFT: Severe obstructive lung disease.  When compared to 10/30/2018, the FEV1 and FVC have little change.  The decrease in FVC may  represent air trapping v. restrictive physiology.  Lung volumes would be necessary to determine.    Pulmonary exacerbation: absent

## 2018-12-11 NOTE — PATIENT INSTRUCTIONS
Cystic Fibrosis Self-Care Plan    RECOMMENDATIONS:   Great job.  Keep up the good work!    YOUR GOAL:  Enjoy the Holidays.      CF Nurse line:          Jose David Henderson   [phone]            CF Resp Therapist: Felicitas Banuelos            [phone]   CF Dietitians:           Zeina Pierre            [phone]                       Ivory Bonilla                       [phone]   CF Diabetes Nurse: Jordyn Rudolph             [phone]    CF Social Workers:  Mary Ritter             [phone]                Marisa Urbina            [phone]  CF Pharmacist:        Tammy Morgan                              [phone]  [email or web address]         MRN: [id number]   Clinic Date: December 11, 2018   Patient: Nico Morales     Annual Studies:   IGG   Date Value Ref Range Status   05/03/2018 1,770 (H) 695 - 1,620 mg/dL Final     Insulin   Date Value Ref Range Status   07/26/2011 30 mU/L Final     Comment:     Reference Range:  0-20  +120     There are no preventive care reminders to display for this patient.      Pulmonary Function Tests  FEV1: amount of air you can blow out in 1 second  FVC: total amount of air you can take in and blow out    Your Goals:         PFT Latest Ref Rng & Units 12/11/2018   FVC L 3.72   FEV1 L 1.57   FVC% % 75   FEV1% % 39          Airway Clearance: The Most Important Way to Keep Your Lungs Healthy  Vest Settings:    Hill-Rom Frequencies: 8, 9, 10 Pressure 10 Then, Frequencies 18, 19, 20 Pressure 6      RespirTech: Quick Start with Pressure of     Do each frequency for 5 minutes; Deflate vest after each frequency & cough 3 times before beginning the next setting.    Vest and Neb Therapy should be done 3 times/day.    Good Nutrition Can Improve Lung Function and Overall Health     Take ALL of your vitamins with food     Take 1/2 of your enzymes before EVERY meal/snack and the other 1/2 mid-meal/snack    Wt Readings from Last 3 Encounters:   12/11/18 66.4 kg (146 lb  6.2 oz)   12/06/18 67.3 kg (148 lb 6.4 oz)   10/30/18 66.7 kg (147 lb 0.8 oz)       Body mass index is 22.26 kg/m .         National CF Foundation Recommendations for BMI in CF Adults: Women: at least 22 Men: at least 23        Controlling Blood Sugars Helps Prevent Lung Infections & Improves Nutrition  Test blood sugar:     In the morning before eating (goal is )     2 hours after a meal (goal is less than 150)     When pre-meal glucose is greater than 150 add correction     At bedtime (if less than 100 eat a snack with 15 grams of carbohydrates  Last A1C Results:   Hemoglobin A1C   Date Value Ref Range Status   08/06/2018 6.5 % Final         If diabetic, measure A1C every 6 months. Goal: Under 7%    Staying Healthy    Research:  If you are interested in learning about research opportunities or have questions, please contact the CF Research Team at [phone] or [email or web address].      CF Foundation:  Compass is a personalized resource service to help you with the insurance, financial, legal and other issues you are facing.  It's free, confidential and available to anyone with CF.  Ask your  for more information or contact Compass directly at 401-COMPASS ([phone]) or compass@cff.org, or learn more at cff.org/compass.

## 2018-12-11 NOTE — LETTER
12/11/2018       RE: Nico Morales  [address]  [facility] [address]     Dear Colleague,    Thank you for referring your patient, Nico Morales, to the [facility] at [facility]. Please see a copy of my visit note below.    [facility]  December 11, 2018         Assessment and Plan:   Nico Morales is a 42 year old male with cystic fibrosis.    1. CF lung disease with severe obstruction: Kilo reports that he did receive his first dose pamidronate that resulted in fevers and flu-like illness with some increased chest tightness in addition to other systemic symptoms.  Fortunately Kilo reports that these symptoms are improving.  Kilo also continues to show evidence of pseudomonas and Staph in his sputum.  He also had light growth of Aspergillus.  At this time I would recommend:   -- That if he were to have recurrent Staph in his sputum I would consider another course of Augmentin which he feels he had a good response to.   -- That he continue his present bronchial drainage and nebulized therapy.   -- In the past we have had to treat his Aspergillus.  We will follow this for now and look for symptom change that might prompt treatment.     2.  Cystic fibrosis-related diabetes.  Kilo describes excellent control of his blood sugars.     3.  Nutritional failure.  Kilo remains on tube feeds during the week.  He has shown evidence of good weight maintenance.       4.  Psychosocial.  Kilo continues to work as a .  He recently traveled on vacation to [place] and enjoyed this very much.     5.  Advanced care plan.  Kilo and I in the past have talked about doing an ACP visit.  He has been hesitant to pursue this.     6.  Low bone mineral density.  Kilo had his first dose of pamidronate which resulted in systemic symptoms including fevers to 101 the day after, as  well as diffuse myalgias and arthralgias.  These symptoms have now resolved.  He has not had significant bone pain, although if he were to push hard on his long bones he does notice some mild tenderness.     7. Pancreatic Insufficiency:  The patient has no new symptoms consistent with worsening malabsorption.    - continue the present dose of pancreatic enzymes  - continue vitamin supplementation.    HCM: 5/2019  Immunizations: declined flu at last visit  Colonoscopy: 2021    Jaye Machado MD MPH  Associate Professor of Medicine  Pulmonary, Allergy, Critical Care and Sleep Medicine      Interval History:     Kilo reports that there is no change in his cough frequency and sputum volume.  Again he has had some mild chest tightness recently, but it is returning to normal.  His activity tolerance remains stable and he continues to do dog walking.  He is performing 3-4 vest therapies per day.          Review of Systems:     CONSTITUTIONAL: ++ fever and chills with pamidronate, no change in weight, no change in energy, no change in appetite    INTEGUMENTARY/SKIN: no rash, no obvious new lesions    ENT/MOUTH: no sore throat, no new sinus pain, no new nasal drainage, no hearing loss, no ear ringing     RESPIRATORY: see interval history    CV: ++ chest tight, rare palpitations, no peripheral edema, no orthopnea, no PND    GI: ++ nausea with cough, no vomiting, no change in stools, no fatty stools, no GERD, no abdominal pain    : no dysuria, no urinary frequency    MUSCULOSKELETAL: flu-like with pamidronate    ENDOCRINE: no excessive thirst, blood sugars with adequate control    NEURO:  No headache, no numbness, no tingling    SLEEP: no issues    PSYCHIATRIC: mood stable          Past Medical and Surgical History:     Past Medical History:   Diagnosis Date     CF (cystic fibrosis) (H)      Exocrine pancreatic insufficiency      Nocardia infection      Pseudomonas infection      S/P gastrostomy (H) 2002     Past  Surgical History:   Procedure Laterality Date     COLONOSCOPY N/A 5/21/2018    Procedure: COMBINED COLONOSCOPY, SINGLE OR MULTIPLE BIOPSY/POLYPECTOMY BY BIOPSY;  Cystic fibrosis, 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;  Surgeon: Margarito Bowman MD;  Location: [facility]     GASTROSTOMY TUBE  2002     PICC INSERTION Left 01/22/2018    4Fr SL BioFlo PICC, 46cm (5cm external) in the L basilic vein w/ tip in the low SVC           Family History:     Family History   Problem Relation Age of Onset     Heart Disease Maternal Grandmother      Heart Disease Maternal Grandfather      Heart Disease Paternal Grandmother      Diabetes No family hx of             Social History:     Social History     Socioeconomic History     Marital status: Single     Spouse name: Not on file     Number of children: 0     Years of education: Not on file     Highest education level: Not on file   Social Needs     Financial resource strain: Not on file     Food insecurity - worry: Not on file     Food insecurity - inability: Not on file     Transportation needs - medical: Not on file     Transportation needs - non-medical: Not on file   Occupational History     Occupation: financial palnner     Employer: [employer]   Tobacco Use     Smoking status: Never Smoker     Smokeless tobacco: Never Used   Substance and Sexual Activity     Alcohol use: No     Alcohol/week: 0.0 oz     Drug use: No     Sexual activity: Yes     Partners: Female     Birth control/protection: Pill   Other Topics Concern     Parent/sibling w/ CABG, MI or angioplasty before 65F 55M? Not Asked      Service Not Asked     Blood Transfusions No     Caffeine Concern Not Asked     Occupational Exposure Not Asked     Hobby Hazards Not Asked     Sleep Concern Not Asked     Stress Concern Not Asked     Weight Concern Not Asked     Special Diet Not Asked     Back Care Not Asked     Exercise No     Bike Helmet Not Asked     Seat Belt Not Asked     Self-Exams Not Asked   Social History  "Jass Boateng lives with wife in a house in [place], [place].  He works as a .        March 2016. He works independently as a . Works out 5x/week and walks the dog daily.        June 2016. No changes. Describes himself as a creature of habit.            Medications:     Current Outpatient Medications   Medication     ACE NOT PRESCRIBED, INTENTIONAL,     acetaminophen-codeine (TYLENOL #3) 300-30 MG per tablet     acetylcysteine (MUCOMYST) 10 % nebulizer solution     amitriptyline (ELAVIL) 10 MG tablet     amylase-lipase-protease (CREON) 96080-28235 units CPEP per EC capsule     azithromycin (ZITHROMAX) 250 MG tablet     aztreonam lysine (CAYSTON) 75 MG SOLR     BD OMI U/F 32G X 4 MM insulin pen needle     blood glucose (NO BRAND SPECIFIED) lancets standard     blood glucose monitoring (MARTINA CONTOUR NEXT MONITOR) meter device kit     blood glucose monitoring (MARTINA CONTOUR NEXT) test strip     blood glucose monitoring (MARTINA MICROLET) lancets     CALCIUM PO     doxycycline (VIBRAMYCIN) 100 MG capsule     [facility] INFUSION MANAGED PATIENT     Ferrous Fumarate-Vitamin C (VITRON-C) 200-125 MG TABS     insulin aspart (NOVOLOG PENFILL) 100 UNIT/ML injection     insulin glargine (LANTUS SOLOSTAR PEN) 100 UNIT/ML pen     levalbuterol (XOPENEX HFA) 45 MCG/ACT Inhaler     levalbuterol (XOPENEX) 1.25 MG/3ML neb solution     MEPHYTON 5 MG PO TABS     MULTIVITAMIN OR     Nebulizers (EFLOW SCF NEBULIZER HANDSET) MISC     oseltamivir (TAMIFLU) 75 MG capsule     polyethylene glycol (MIRALAX/GLYCOLAX) powder     Respiratory Therapy Supplies (KRIS ALTERA NEBULIZER HANDSET) MISC     rimantadine (FLUMADINE) 100 MG tablet     sodium chloride inhalant 7 % NEBU neb solution     SYMBICORT 160-4.5 MCG/ACT Inhaler     No current facility-administered medications for this visit.             Physical Exam:   /75   Pulse 91   Resp 17   Ht 1.727 m (5' 8\")   Wt 66.4 kg (146 lb 6.2 oz)   SpO2 98%  "  BMI 22.26 kg/m       Constitutional:   Awake, alert and in no apparent distress     Eyes:   nonicteric     ENT:   oral mucosa moist without lesions, normal tm bilaterally, bilateral mucosal edema      Neck:   Supple without supraclavicular or cervical lymphadenopathy     Lungs:   fair air flow.  No crackles. No rhonchi.  No wheezes.     Cardiovascular:   Normal S1 and S2.  RRR.  No murmur, gallop or rub.     Abdomen:   NABS, soft, nontender, nondistended.  g tube     Musculoskeletal:   No edema, digital clubbing present     Neurologic:   Alert and conversant.     Skin:   Warm, dry.  No rash on limited exam.             Data:   All laboratory and imaging data reviewed.    Cystic Fibrosis Culture  Specimen Description   Date Value Ref Range Status   10/30/2018 Sputum  Final   09/18/2018 Sputum  Final   08/08/2018 Sputum  Final    Culture Micro   Date Value Ref Range Status   10/30/2018 Moderate growth  Normal santi    Final   10/30/2018 Light growth  Staphylococcus pseudintermedius   (A)  Final   10/30/2018 (A)  Final    Light growth  Pseudomonas aeruginosa, mucoid strain     10/30/2018 (A)  Final    Heavy growth  Strain 2  Pseudomonas aeruginosa, mucoid strain     10/30/2018 Light growth  Aspergillus fumigatus   (A)  Final        Recent Results (from the past 168 hour(s))   Creatinine    Collection Time: 12/06/18  1:10 PM   Result Value Ref Range    Creatinine 0.78 0.66 - 1.25 mg/dL    GFR Estimate >90 >60 mL/min/1.7m2    GFR Estimate If Black >90 >60 mL/min/1.7m2   Calcium    Collection Time: 12/06/18  1:10 PM   Result Value Ref Range    Calcium 9.0 8.5 - 10.1 mg/dL   General PFT Lab (Please always keep checked)    Collection Time: 12/11/18 11:28 AM   Result Value Ref Range    FVC-Pred 4.92 L    FVC-Pre 3.72 L    FVC-%Pred-Pre 75 %    FEV1-Pre 1.57 L    FEV1-%Pred-Pre 39 %    FEV1FVC-Pred 79 %    FEV1FVC-Pre 42 %    FEFMax-Pred 9.76 L/sec    FEFMax-Pre 6.47 L/sec    FEFMax-%Pred-Pre 66 %    FEF2575-Pred 3.86 L/sec     FEF2575-Pre 0.57 L/sec    RKT8946-%Pred-Pre 14 %    ExpTime-Pre 15.10 sec    FIFMax-Pre 5.08 L/sec    FEV1FEV6-Pred 82 %    FEV1FEV6-Pre 49 %       PFT: Severe obstructive lung disease.  When compared to 10/30/2018, the FEV1 and FVC have little change.  The decrease in FVC may represent air trapping v. restrictive physiology.  Lung volumes would be necessary to determine.    Pulmonary exacerbation: absent        Again, thank you for allowing me to participate in the care of your patient.      Sincerely,    Jaye Machado MD

## 2018-12-16 LAB
BACTERIA SPEC CULT: ABNORMAL
SPECIMEN SOURCE: ABNORMAL

## 2018-12-20 ENCOUNTER — CARE COORDINATION (OUTPATIENT)
Dept: PULMONOLOGY | Facility: CLINIC | Age: 43
End: 2018-12-20

## 2018-12-20 NOTE — PROGRESS NOTES
VM left with pt per Dr. Machado request to let him know that he is growing Staph again in his sputum culture and that Dr. Machado would like to treat it with Augmentin or if he prefers, a different oral antibiotic. Pt advised to call the CF office.

## 2018-12-21 DIAGNOSIS — E84.9 CYSTIC FIBROSIS (H): Primary | ICD-10-CM

## 2018-12-21 RX ORDER — AMOXICILLIN AND CLAVULANATE POTASSIUM 500; 125 MG/1; MG/1
1 TABLET, FILM COATED ORAL 2 TIMES DAILY
Qty: 28 TABLET | Refills: 0 | Status: SHIPPED | OUTPATIENT
Start: 2018-12-21 | End: 2019-02-21

## 2019-01-08 ENCOUNTER — TELEPHONE (OUTPATIENT)
Dept: PULMONOLOGY | Facility: CLINIC | Age: 44
End: 2019-01-08

## 2019-01-08 DIAGNOSIS — E84.0 CYSTIC FIBROSIS WITH PULMONARY MANIFESTATIONS (H): ICD-10-CM

## 2019-01-08 DIAGNOSIS — E08.9 DIABETES MELLITUS DUE TO CYSTIC FIBROSIS (H): ICD-10-CM

## 2019-01-08 DIAGNOSIS — K86.81 EXOCRINE PANCREATIC INSUFFICIENCY: ICD-10-CM

## 2019-01-08 DIAGNOSIS — E84.9 DIABETES MELLITUS DUE TO CYSTIC FIBROSIS (H): ICD-10-CM

## 2019-01-08 NOTE — TELEPHONE ENCOUNTER
PA Initiation    Medication: aztreonam lysine (CAYSTON) 75 MG SOLR (pending-INSURANCE CHANGE)  Insurance Company: [employer] - Phone [phone] Fax [phone]  Pharmacy Filling the Rx: [facility] [place] - [email or web address]  Filling Pharmacy Phone:    Filling Pharmacy Fax:    Start Date: 1/8/2019

## 2019-01-08 NOTE — TELEPHONE ENCOUNTER
Prior Authorization Specialty Medication Request    Medication/Dose: [place]  ICD code (if different than what is on RX):  E84.0  Previously Tried and Failed:  Multiple antibiotic allergies: BETHANY, Levaquin, Zosyn, Ceftaz, Bactrim    Important Lab Values:   Rationale:     Insurance Name: NEW INSURANCE:   [employer]  Insurance ID:  [id number]   Group [id number]   rx [id number]  Insurance Phone Number: [phone]    Pharmacy Information (if different than what is on RX)  Name:  [facility]  Phone:

## 2019-01-09 NOTE — TELEPHONE ENCOUNTER
Prior Authorization Approval    Authorization Effective Date: 1/8/2019  Authorization Expiration Date: 1/8/2020  Medication: aztreonam lysine (CAYSTON) 75 MG SOLR (APPROVED-INSURANCE CHANGE)  Approved Dose/Quantity: 84 PER 28 DAYS  Reference #:     Insurance Company: [employer] - Phone [phone] Fax [phone]  Expected CoPay:       CoPay Card Available:      Foundation Assistance Needed:    Which Pharmacy is filling the prescription (Not needed for infusion/clinic administered): [facility] [place], [place] - [address] [place] [address]  Pharmacy Notified: No  Patient Notified: Yes

## 2019-01-09 NOTE — TELEPHONE ENCOUNTER
Received fax from [facility] requesting additional information in order to approve [facility]. Filled out and faxed back with chart notes.

## 2019-01-10 DIAGNOSIS — E08.9 DIABETES MELLITUS DUE TO CYSTIC FIBROSIS (H): ICD-10-CM

## 2019-01-10 DIAGNOSIS — E84.0 CYSTIC FIBROSIS WITH PULMONARY MANIFESTATIONS (H): ICD-10-CM

## 2019-01-10 DIAGNOSIS — E84.9 DIABETES MELLITUS DUE TO CYSTIC FIBROSIS (H): ICD-10-CM

## 2019-01-10 DIAGNOSIS — K86.81 EXOCRINE PANCREATIC INSUFFICIENCY: ICD-10-CM

## 2019-01-16 ENCOUNTER — TELEPHONE (OUTPATIENT)
Dept: PULMONOLOGY | Facility: CLINIC | Age: 44
End: 2019-01-16

## 2019-01-16 NOTE — TELEPHONE ENCOUNTER
PA Initiation    Medication: azithromycin (ZITHROMAX) 250 MG tablet  (pending)  Insurance Company: [employer] - Phone [phone] Fax [phone]  Pharmacy Filling the Rx:    Filling Pharmacy Phone:    Filling Pharmacy Fax:    Start Date: 1/16/2019

## 2019-01-18 DIAGNOSIS — E84.0 CYSTIC FIBROSIS WITH PULMONARY MANIFESTATIONS (H): ICD-10-CM

## 2019-01-18 RX ORDER — AZITHROMYCIN 250 MG/1
250 TABLET, FILM COATED ORAL DAILY
Qty: 30 TABLET | Refills: 11 | Status: SHIPPED | OUTPATIENT
Start: 2019-01-18 | End: 2020-02-13

## 2019-01-18 NOTE — TELEPHONE ENCOUNTER
Prior Authorization Approval    Authorization Effective Date: 1/16/2019  Authorization Expiration Date: 1/16/2020  Medication: azithromycin (ZITHROMAX) 250 MG tablet  (APPROVED)  Approved Dose/Quantity: 30 PER 30 DAYS  Reference #:     Insurance Company: [employer] - Phone [phone] Fax [phone]  Expected CoPay:       CoPay Card Available:      Foundation Assistance Needed:    Which Pharmacy is filling the prescription (Not needed for infusion/clinic administered): [facility] [address] [place] [facility] - [place] - [address] [facility]  Pharmacy Notified: No  Patient Notified: No

## 2019-01-21 DIAGNOSIS — E84.9 CYSTIC FIBROSIS (H): ICD-10-CM

## 2019-01-22 RX ORDER — AMITRIPTYLINE HYDROCHLORIDE 10 MG/1
TABLET ORAL
Qty: 90 TABLET | Refills: 3 | Status: SHIPPED | OUTPATIENT
Start: 2019-01-22 | End: 2020-05-26

## 2019-02-04 DIAGNOSIS — E84.0 CYSTIC FIBROSIS WITH PULMONARY MANIFESTATIONS (H): ICD-10-CM

## 2019-02-04 DIAGNOSIS — E84.9 CF (CYSTIC FIBROSIS) (H): ICD-10-CM

## 2019-02-04 DIAGNOSIS — E84.9 CYSTIC FIBROSIS (H): ICD-10-CM

## 2019-02-05 RX ORDER — LEVALBUTEROL INHALATION SOLUTION 1.25 MG/3ML
SOLUTION RESPIRATORY (INHALATION)
Qty: 360 ML | Refills: 7 | Status: SHIPPED | OUTPATIENT
Start: 2019-02-05 | End: 2019-11-07

## 2019-02-15 DIAGNOSIS — E84.9 CYSTIC FIBROSIS (H): ICD-10-CM

## 2019-02-15 RX ORDER — LEVALBUTEROL TARTRATE 45 UG/1
2 AEROSOL, METERED ORAL EVERY 6 HOURS PRN
Qty: 3 INHALER | Refills: 3 | Status: SHIPPED | OUTPATIENT
Start: 2019-02-15 | End: 2020-02-14

## 2019-02-21 ENCOUNTER — OFFICE VISIT (OUTPATIENT)
Dept: PULMONOLOGY | Facility: CLINIC | Age: 44
End: 2019-02-21
Attending: INTERNAL MEDICINE
Payer: COMMERCIAL

## 2019-02-21 VITALS
WEIGHT: 145.28 LBS | DIASTOLIC BLOOD PRESSURE: 69 MMHG | OXYGEN SATURATION: 96 % | HEART RATE: 96 BPM | SYSTOLIC BLOOD PRESSURE: 104 MMHG | BODY MASS INDEX: 22.02 KG/M2 | HEIGHT: 68 IN

## 2019-02-21 DIAGNOSIS — E84.9 CYSTIC FIBROSIS (H): Primary | ICD-10-CM

## 2019-02-21 DIAGNOSIS — E84.0 CYSTIC FIBROSIS WITH PULMONARY MANIFESTATIONS (H): Primary | ICD-10-CM

## 2019-02-21 LAB
EXPTIME-PRE: 15.22 SEC
FEF2575-%PRED-PRE: 14 %
FEF2575-PRE: 0.54 L/SEC
FEF2575-PRED: 3.86 L/SEC
FEFMAX-%PRED-PRE: 65 %
FEFMAX-PRE: 6.35 L/SEC
FEFMAX-PRED: 9.76 L/SEC
FEV1-%PRED-PRE: 39 %
FEV1-PRE: 1.58 L
FEV1FEV6-PRE: 50 %
FEV1FEV6-PRED: 82 %
FEV1FVC-PRE: 43 %
FEV1FVC-PRED: 81 %
FIFMAX-PRE: 5.28 L/SEC
FVC-%PRED-PRE: 74 %
FVC-PRE: 3.69 L
FVC-PRED: 4.92 L

## 2019-02-21 PROCEDURE — G0463 HOSPITAL OUTPT CLINIC VISIT: HCPCS

## 2019-02-21 PROCEDURE — 87077 CULTURE AEROBIC IDENTIFY: CPT | Performed by: INTERNAL MEDICINE

## 2019-02-21 PROCEDURE — 87070 CULTURE OTHR SPECIMN AEROBIC: CPT | Performed by: INTERNAL MEDICINE

## 2019-02-21 PROCEDURE — 87186 SC STD MICRODIL/AGAR DIL: CPT | Performed by: INTERNAL MEDICINE

## 2019-02-21 RX ORDER — CIPROFLOXACIN 750 MG/1
750 TABLET, FILM COATED ORAL 2 TIMES DAILY
Qty: 42 TABLET | Refills: 0 | Status: SHIPPED | OUTPATIENT
Start: 2019-02-21 | End: 2019-04-16

## 2019-02-21 ASSESSMENT — PAIN SCALES - GENERAL: PAINLEVEL: NO PAIN (0)

## 2019-02-21 ASSESSMENT — MIFFLIN-ST. JEOR: SCORE: 1539.63

## 2019-02-21 NOTE — LETTER
2/21/2019     RE: Nico Morales  [address]  [facility] [address]     Dear Colleague,    Thank you for referring your patient, Nico Morales, to the [facility] at [facility]. Please see a copy of my visit note below.    [facility]  February 21, 2019         Assessment and Plan:   Nico Morales is a 43 year old male with cystic fibrosis.    1. CF lung disease with mild obstruction:  Kilo reports that he is feeling okay.  He did get a viral illness at the end of December and has not felt his breathing has returned to baseline.  He was treated with a course of Augmentin for a staph that regrew, had some resolution, but not complete resolution of his symptomatology.  In addition to his staph Kilo has shown evidence of Pseudomonas.  We discussed today trying a course of antipseudomonal treatment.  He has several antibiotic intolerances, but he does report he is able to take ciprofloxacin.  I have recommended today:   -- That he begin ciprofloxacin 750 mg p.o. b.i.d. for 21 days.   -- He should hold his azithromycin while on ciprofloxacin.    -- He should continue to be aggressive with his bronchial drainage and nebulized therapy.   -- He should contact me if his symptoms do not improve within 1 week's time.     2.  Cystic fibrosis-related diabetes.  Kilo reports good control of his blood sugars.     3.  Nutritional failure.  Kilo has maintained excellent and stable weight.  He does continue to do G-tube feeds at night during the week.     4.  Low bone mineral density.  Kilo has had 1 dose of pamidronate and is due for his second one in March.  The first one was complicated by fevers and fatigue, but no bone pain.  He did not find these symptoms prohibitive as he feels comfortable with doing his next dose.     5.  Ear popping.  Kilo reports some ear fullness that has occurred  since recent travel.  He says overall it is improving, but it is taking some time for this ear fullness to completely resolve.  I anticipate with a course of antibiotics that this may be helpful.  However, on exam today he showed no evidence of otitis media.  Certainly there can be some fluid in his eustachian tube that needs to resolve.     6.  Psychosocial.  Kilo does continue to work as a .  He is in his own home.  He reports a recent travel to [place].  In general he reports things are going well.     7. Pancreatic Insufficiency:  The patient has no new symptoms consistent with worsening malabsorption.    - continue the present dose of pancreatic enzymes  - continue vitamin supplementation.    HCM: 5/2019  Immunizations: needs prevnar  Colonoscopy: 2021    Jaye Machado MD MPH  Associate Professor of Medicine  Pulmonary, Allergy, Critical Care and Sleep Medicine      Interval History:     Kilo reports that his sputum is yellow-green in color.  He does feel that his cough frequency and sputum volume are above baseline.  He does do 4 vests per day and 3 nebulized therapies.  He also feels that his activity tolerance has dropped off as well.          Review of Systems:     CONSTITUTIONAL: no fever, no chills, occasional night sweat,  no change in weight, no change in energy, no change in appetite    INTEGUMENTARY/SKIN: no rash, no obvious new lesions    ENT/MOUTH: no sore throat, no new sinus pain, no new nasal drainage, no hearing loss, ++ ear ringing     RESPIRATORY: see interval history    CV: no chest pain, no palpitations    GI: no nausea, no vomiting, no change in stools, no fatty stools, no GERD, no abdominal pain    : no dysuria, no urinary frequency    MUSCULOSKELETAL: no myalgias, no arthralgias    ENDOCRINE: no excessive thirst, blood sugars with adequate control    NEURO:  No headache, no numbness, no tingling    SLEEP: no issues    PSYCHIATRIC: mood stable          Past  Medical and Surgical History:     Past Medical History:   Diagnosis Date     CF (cystic fibrosis) (H)      Exocrine pancreatic insufficiency      Nocardia infection      Pseudomonas infection      S/P gastrostomy (H) 2002     Past Surgical History:   Procedure Laterality Date     COLONOSCOPY N/A 5/21/2018    Procedure: COMBINED COLONOSCOPY, SINGLE OR MULTIPLE BIOPSY/POLYPECTOMY BY BIOPSY;  Cystic fibrosis, 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;  Surgeon: Margarito Bowman MD;  Location: [facility]     GASTROSTOMY TUBE  2002     PICC INSERTION Left 01/22/2018    4Fr SL BioFlo PICC, 46cm (5cm external) in the L basilic vein w/ tip in the low SVC           Family History:     Family History   Problem Relation Age of Onset     Heart Disease Maternal Grandmother      Heart Disease Maternal Grandfather      Heart Disease Paternal Grandmother      Diabetes No family hx of             Social History:     Social History     Socioeconomic History     Marital status: Single     Spouse name: Not on file     Number of children: 0     Years of education: Not on file     Highest education level: Not on file   Occupational History     Occupation: financial palnner     Employer: [employer]   Social Needs     Financial resource strain: Not on file     Food insecurity:     Worry: Not on file     Inability: Not on file     Transportation needs:     Medical: Not on file     Non-medical: Not on file   Tobacco Use     Smoking status: Never Smoker     Smokeless tobacco: Never Used   Substance and Sexual Activity     Alcohol use: No     Alcohol/week: 0.0 oz     Drug use: No     Sexual activity: Yes     Partners: Female     Birth control/protection: Pill   Lifestyle     Physical activity:     Days per week: Not on file     Minutes per session: Not on file     Stress: Not on file   Relationships     Social connections:     Talks on phone: Not on file     Gets together: Not on file     Attends Mandaeism service: Not on file     Active member of  club or organization: Not on file     Attends meetings of clubs or organizations: Not on file     Relationship status: Not on file     Intimate partner violence:     Fear of current or ex partner: Not on file     Emotionally abused: Not on file     Physically abused: Not on file     Forced sexual activity: Not on file   Other Topics Concern     Parent/sibling w/ CABG, MI or angioplasty before 65F 55M? Not Asked      Service Not Asked     Blood Transfusions No     Caffeine Concern Not Asked     Occupational Exposure Not Asked     Hobby Hazards Not Asked     Sleep Concern Not Asked     Stress Concern Not Asked     Weight Concern Not Asked     Special Diet Not Asked     Back Care Not Asked     Exercise No     Bike Helmet Not Asked     Seat Belt Not Asked     Self-Exams Not Asked   Social History Narrative    Kilo lives with wife in a house in [place], [place].  He works as a .        March 2016. He works independently as a . Works out 5x/week and walks the dog daily.        June 2016. No changes. Describes himself as a creature of habit.            Medications:     Current Outpatient Medications   Medication     ACE NOT PRESCRIBED, INTENTIONAL,     acetaminophen-codeine (TYLENOL #3) 300-30 MG per tablet     acetylcysteine (MUCOMYST) 10 % nebulizer solution     amitriptyline (ELAVIL) 10 MG tablet     amylase-lipase-protease (CREON) 13770-17105 units CPEP per EC capsule     azithromycin (ZITHROMAX) 250 MG tablet     aztreonam lysine (CAYSTON) 75 MG SOLR     BD OMI U/F 32G X 4 MM insulin pen needle     blood glucose (NO BRAND SPECIFIED) lancets standard     blood glucose monitoring (MARTINA CONTOUR NEXT MONITOR) meter device kit     blood glucose monitoring (MARTINA CONTOUR NEXT) test strip     blood glucose monitoring (MARTINA MICROLET) lancets     CALCIUM PO     ciprofloxacin (CIPRO) 750 MG tablet     doxycycline (VIBRAMYCIN) 100 MG capsule     [facility]NAGED PATIENT      "Ferrous Fumarate-Vitamin C (VITRON-C) 200-125 MG TABS     insulin aspart (NOVOLOG PENFILL) 100 UNIT/ML injection     insulin glargine (LANTUS SOLOSTAR PEN) 100 UNIT/ML pen     levalbuterol (XOPENEX HFA) 45 MCG/ACT inhaler     levalbuterol (XOPENEX) 1.25 MG/3ML neb solution     MEPHYTON 5 MG PO TABS     MULTIVITAMIN OR     Nebulizers (EFLOW SCF NEBULIZER HANDSET) MISC     oseltamivir (TAMIFLU) 75 MG capsule     polyethylene glycol (MIRALAX/GLYCOLAX) powder     Respiratory Therapy Supplies (KRIS ALTERA NEBULIZER HANDSET) MISC     rimantadine (FLUMADINE) 100 MG tablet     sodium chloride inhalant 7 % NEBU neb solution     SYMBICORT 160-4.5 MCG/ACT Inhaler     No current facility-administered medications for this visit.             Physical Exam:   /69 (BP Location: Right arm, Cuff Size: Adult Regular)   Pulse 96   Ht 1.737 m (5' 8.39\")   Wt 65.9 kg (145 lb 4.5 oz)   SpO2 96%   BMI 21.84 kg/m       Constitutional:   Awake, alert and in no apparent distress     Eyes:   nonicteric     ENT:   oral mucosa moist without lesions, normal tm bilaterally, bilateral normal mucosa     Neck:   Supple without supraclavicular or cervical lymphadenopathy     Lungs:   Good air flow.  No crackles. No rhonchi.  No wheezes.     Cardiovascular:   Normal S1 and S2.  RRR.  No murmur, gallop or rub.     Abdomen:   NABS, soft, nontender, nondistended.       Musculoskeletal:   No edema, digital clubbing present     Neurologic:   Alert and conversant.     Skin:   Warm, dry.  No rash on limited exam.             Data:   All laboratory and imaging data reviewed.    Cystic Fibrosis Culture  Specimen Description   Date Value Ref Range Status   12/11/2018 Sputum  Final   10/30/2018 Sputum  Final   09/18/2018 Sputum  Final    Culture Micro   Date Value Ref Range Status   12/11/2018 Heavy growth  Normal santi    Final   12/11/2018 Light growth  Staphylococcus pseudintermedius   (A)  Final   12/11/2018 (A)  Final    Moderate " growth  Pseudomonas aeruginosa, mucoid strain     12/11/2018 (A)  Final    Heavy growth  Strain 2  Pseudomonas aeruginosa, mucoid strain          Recent Results (from the past 168 hour(s))   General PFT Lab (Please always keep checked)    Collection Time: 02/21/19 12:16 PM   Result Value Ref Range    FVC-Pred 4.92 L    FVC-Pre 3.69 L    FVC-%Pred-Pre 74 %    FEV1-Pre 1.58 L    FEV1-%Pred-Pre 39 %    FEV1FVC-Pred 81 %    FEV1FVC-Pre 43 %    FEFMax-Pred 9.76 L/sec    FEFMax-Pre 6.35 L/sec    FEFMax-%Pred-Pre 65 %    FEF2575-Pred 3.86 L/sec    FEF2575-Pre 0.54 L/sec    EPP9469-%Pred-Pre 14 %    ExpTime-Pre 15.22 sec    FIFMax-Pre 5.28 L/sec    FEV1FEV6-Pred 82 %    FEV1FEV6-Pre 50 %       PFT: Severe obstructive lung disease.  When compared to 12/11/2018, the FEV1 and FVC have little change.  The decrease in FVC may represent air trapping v. restrictive physiology.  Lung volumes would be necessary to determine.    Pulmonary exacerbation: mild: Oral quinolone    Again, thank you for allowing me to participate in the care of your patient.      Sincerely,    Jaye Machado MD

## 2019-02-21 NOTE — PATIENT INSTRUCTIONS
Cystic Fibrosis Self-Care Plan    RECOMMENDATIONS:   Begin ciprofloxacin 750 mg--one tablet twice daily for 21 days.  Watch for any joint and tendon tenderness, rash while on drug.  Stop the drug and call if this occurs.  Hold azithromycin while on ciprofloxacin.    YOUR GOAL:  Get better and enjoy the snow!!      CF Nurse line:          Tobi Henderson Nelda   [phone]            CF Resp Therapist: Felicitas Banuelos            [phone]   CF Dietitians:           Zeina Pierre            [phone]                       Ivory Bonilla                       [phone]   CF Diabetes Nurse: Jordyn Rudolph             [phone]    CF Social Workers:  Mary Ritter             [phone]                Marisa Urbina            [phone]  CF Pharmacist:        Tammy Morgan                              [phone]  [email or web address]         MRN: [id number]   Clinic Date: February 21, 2019   Patient: Nico Morales     Annual Studies:   IGG   Date Value Ref Range Status   05/03/2018 1,770 (H) 695 - 1,620 mg/dL Final     Insulin   Date Value Ref Range Status   07/26/2011 30 mU/L Final     Comment:     Reference Range:  0-20  +120     There are no preventive care reminders to display for this patient.      Pulmonary Function Tests  FEV1: amount of air you can blow out in 1 second  FVC: total amount of air you can take in and blow out    Your Goals:         PFT Latest Ref Rng & Units 2/21/2019   FVC L 3.69   FEV1 L 1.58   FVC% % 74   FEV1% % 39          Airway Clearance: The Most Important Way to Keep Your Lungs Healthy  Vest Settings:    Hill-Rom Frequencies: 8, 9, 10 Pressure 10 Then, Frequencies 18, 19, 20 Pressure 6      RespirTech: Quick Start with Pressure of     Do each frequency for 5 minutes; Deflate vest after each frequency & cough 3 times before beginning the next setting.    Vest and Neb Therapy should be done 3-4 times/day.    Good Nutrition Can Improve Lung Function and Overall  Health     Take ALL of your vitamins with food     Take 1/2 of your enzymes before EVERY meal/snack and the other 1/2 mid-meal/snack    Wt Readings from Last 3 Encounters:   02/21/19 65.9 kg (145 lb 4.5 oz)   12/11/18 66.4 kg (146 lb 6.2 oz)   12/06/18 67.3 kg (148 lb 6.4 oz)       Body mass index is 21.84 kg/m .         National CF Foundation Recommendations for BMI in CF Adults: Women: at least 22 Men: at least 23        Controlling Blood Sugars Helps Prevent Lung Infections & Improves Nutrition  Test blood sugar:     In the morning before eating (goal is )     2 hours after a meal (goal is less than 150)     When pre-meal glucose is greater than 150 add correction     At bedtime (if less than 100 eat a snack with 15 grams of carbohydrates  Last A1C Results:   Hemoglobin A1C   Date Value Ref Range Status   08/06/2018 6.5 % Final         If diabetic, measure A1C every 6 months. Goal: Under 7%    Staying Healthy    Research:  If you are interested in learning about research opportunities or have questions, please contact the CF Research Team at [phone] or [email or web address].      CF Foundation:  Compass is a personalized resource service to help you with the insurance, financial, legal and other issues you are facing.  It's free, confidential and available to anyone with CF.  Ask your  for more information or contact Compass directly at 700-COMPASS (400-9736) or compass@cff.org, or learn more at [email or web address].[email or web address].

## 2019-02-21 NOTE — NURSING NOTE
Chief Complaint   Patient presents with     Follow Up     cf     Vitals were taken and medications were reconciled.     PHUONG Thurman  12:42 PM

## 2019-02-21 NOTE — PROGRESS NOTES
[facility]  [facility]  February 21, 2019         Assessment and Plan:   Nico Morales is a 43 year old male with cystic fibrosis.    1. CF lung disease with mild obstruction:  Kilo reports that he is feeling okay.  He did get a viral illness at the end of December and has not felt his breathing has returned to baseline.  He was treated with a course of Augmentin for a staph that regrew, had some resolution, but not complete resolution of his symptomatology.  In addition to his staph Kilo has shown evidence of Pseudomonas.  We discussed today trying a course of antipseudomonal treatment.  He has several antibiotic intolerances, but he does report he is able to take ciprofloxacin.  I have recommended today:   -- That he begin ciprofloxacin 750 mg p.o. b.i.d. for 21 days.   -- He should hold his azithromycin while on ciprofloxacin.    -- He should continue to be aggressive with his bronchial drainage and nebulized therapy.   -- He should contact me if his symptoms do not improve within 1 week's time.     2.  Cystic fibrosis-related diabetes.  Kilo reports good control of his blood sugars.     3.  Nutritional failure.  Kilo has maintained excellent and stable weight.  He does continue to do G-tube feeds at night during the week.     4.  Low bone mineral density.  Kilo has had 1 dose of pamidronate and is due for his second one in March.  The first one was complicated by fevers and fatigue, but no bone pain.  He did not find these symptoms prohibitive as he feels comfortable with doing his next dose.     5.  Ear popping.  Kilo reports some ear fullness that has occurred since recent travel.  He says overall it is improving, but it is taking some time for this ear fullness to completely resolve.  I anticipate with a course of antibiotics that this may be helpful.  However, on exam today he showed no evidence of otitis media.  Certainly there can be some fluid in his  eustachian tube that needs to resolve.     6.  Psychosocial.  Kiol does continue to work as a .  He is in his own home.  He reports a recent travel to [place].  In general he reports things are going well.     7. Pancreatic Insufficiency:  The patient has no new symptoms consistent with worsening malabsorption.    - continue the present dose of pancreatic enzymes  - continue vitamin supplementation.    HCM: 5/2019  Immunizations: needs prevnar  Colonoscopy: 2021    Jaye Machado MD MPH  Associate Professor of Medicine  Pulmonary, Allergy, Critical Care and Sleep Medicine      Interval History:     Kilo reports that his sputum is yellow-green in color.  He does feel that his cough frequency and sputum volume are above baseline.  He does do 4 vests per day and 3 nebulized therapies.  He also feels that his activity tolerance has dropped off as well.          Review of Systems:     CONSTITUTIONAL: no fever, no chills, occasional night sweat,  no change in weight, no change in energy, no change in appetite    INTEGUMENTARY/SKIN: no rash, no obvious new lesions    ENT/MOUTH: no sore throat, no new sinus pain, no new nasal drainage, no hearing loss, ++ ear ringing     RESPIRATORY: see interval history    CV: no chest pain, no palpitations    GI: no nausea, no vomiting, no change in stools, no fatty stools, no GERD, no abdominal pain    : no dysuria, no urinary frequency    MUSCULOSKELETAL: no myalgias, no arthralgias    ENDOCRINE: no excessive thirst, blood sugars with adequate control    NEURO:  No headache, no numbness, no tingling    SLEEP: no issues    PSYCHIATRIC: mood stable          Past Medical and Surgical History:     Past Medical History:   Diagnosis Date     CF (cystic fibrosis) (H)      Exocrine pancreatic insufficiency      Nocardia infection      Pseudomonas infection      S/P gastrostomy (H) 2002     Past Surgical History:   Procedure Laterality Date     COLONOSCOPY N/A  5/21/2018    Procedure: COMBINED COLONOSCOPY, SINGLE OR MULTIPLE BIOPSY/POLYPECTOMY BY BIOPSY;  Cystic fibrosis, 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;  Surgeon: Margarito Bowman MD;  Location: [facility]     GASTROSTOMY TUBE  2002     PICC INSERTION Left 01/22/2018    4Fr SL BioFlo PICC, 46cm (5cm external) in the L basilic vein w/ tip in the low SVC           Family History:     Family History   Problem Relation Age of Onset     Heart Disease Maternal Grandmother      Heart Disease Maternal Grandfather      Heart Disease Paternal Grandmother      Diabetes No family hx of             Social History:     Social History     Socioeconomic History     Marital status: Single     Spouse name: Not on file     Number of children: 0     Years of education: Not on file     Highest education level: Not on file   Occupational History     Occupation: financial palnner     Employer: [employer]   Social Needs     Financial resource strain: Not on file     Food insecurity:     Worry: Not on file     Inability: Not on file     Transportation needs:     Medical: Not on file     Non-medical: Not on file   Tobacco Use     Smoking status: Never Smoker     Smokeless tobacco: Never Used   Substance and Sexual Activity     Alcohol use: No     Alcohol/week: 0.0 oz     Drug use: No     Sexual activity: Yes     Partners: Female     Birth control/protection: Pill   Lifestyle     Physical activity:     Days per week: Not on file     Minutes per session: Not on file     Stress: Not on file   Relationships     Social connections:     Talks on phone: Not on file     Gets together: Not on file     Attends Christian service: Not on file     Active member of club or organization: Not on file     Attends meetings of clubs or organizations: Not on file     Relationship status: Not on file     Intimate partner violence:     Fear of current or ex partner: Not on file     Emotionally abused: Not on file     Physically abused: Not on file     Forced sexual  activity: Not on file   Other Topics Concern     Parent/sibling w/ CABG, MI or angioplasty before 65F 55M? Not Asked      Service Not Asked     Blood Transfusions No     Caffeine Concern Not Asked     Occupational Exposure Not Asked     Hobby Hazards Not Asked     Sleep Concern Not Asked     Stress Concern Not Asked     Weight Concern Not Asked     Special Diet Not Asked     Back Care Not Asked     Exercise No     Bike Helmet Not Asked     Seat Belt Not Asked     Self-Exams Not Asked   Social History Narrative    Kilo lives with wife in a house in [place], [place].  He works as a .        March 2016. He works independently as a . Works out 5x/week and walks the dog daily.        June 2016. No changes. Describes himself as a creature of habit.            Medications:     Current Outpatient Medications   Medication     ACE NOT PRESCRIBED, INTENTIONAL,     acetaminophen-codeine (TYLENOL #3) 300-30 MG per tablet     acetylcysteine (MUCOMYST) 10 % nebulizer solution     amitriptyline (ELAVIL) 10 MG tablet     amylase-lipase-protease (CREON) 56107-07155 units CPEP per EC capsule     azithromycin (ZITHROMAX) 250 MG tablet     aztreonam lysine (CAYSTON) 75 MG SOLR     BD OMI U/F 32G X 4 MM insulin pen needle     blood glucose (NO BRAND SPECIFIED) lancets standard     blood glucose monitoring (MARTINA CONTOUR NEXT MONITOR) meter device kit     blood glucose monitoring (MARTINA CONTOUR NEXT) test strip     blood glucose monitoring (MARTINA MICROLET) lancets     CALCIUM PO     ciprofloxacin (CIPRO) 750 MG tablet     doxycycline (VIBRAMYCIN) 100 MG capsule     [facility] INFUSION MANAGED PATIENT     Ferrous Fumarate-Vitamin C (VITRON-C) 200-125 MG TABS     insulin aspart (NOVOLOG PENFILL) 100 UNIT/ML injection     insulin glargine (LANTUS SOLOSTAR PEN) 100 UNIT/ML pen     levalbuterol (XOPENEX HFA) 45 MCG/ACT inhaler     levalbuterol (XOPENEX) 1.25 MG/3ML neb solution     MEPHYTON 5 MG PO TABS  "    MULTIVITAMIN OR     Nebulizers (EFLOW SCF NEBULIZER HANDSET) MISC     oseltamivir (TAMIFLU) 75 MG capsule     polyethylene glycol (MIRALAX/GLYCOLAX) powder     Respiratory Therapy Supplies (KRIS ALTERA NEBULIZER HANDSET) MISC     rimantadine (FLUMADINE) 100 MG tablet     sodium chloride inhalant 7 % NEBU neb solution     SYMBICORT 160-4.5 MCG/ACT Inhaler     No current facility-administered medications for this visit.             Physical Exam:   /69 (BP Location: Right arm, Cuff Size: Adult Regular)   Pulse 96   Ht 1.737 m (5' 8.39\")   Wt 65.9 kg (145 lb 4.5 oz)   SpO2 96%   BMI 21.84 kg/m      Constitutional:   Awake, alert and in no apparent distress     Eyes:   nonicteric     ENT:   oral mucosa moist without lesions, normal tm bilaterally, bilateral normal mucosa     Neck:   Supple without supraclavicular or cervical lymphadenopathy     Lungs:   Good air flow.  No crackles. No rhonchi.  No wheezes.     Cardiovascular:   Normal S1 and S2.  RRR.  No murmur, gallop or rub.     Abdomen:   NABS, soft, nontender, nondistended.       Musculoskeletal:   No edema, digital clubbing present     Neurologic:   Alert and conversant.     Skin:   Warm, dry.  No rash on limited exam.             Data:   All laboratory and imaging data reviewed.    Cystic Fibrosis Culture  Specimen Description   Date Value Ref Range Status   12/11/2018 Sputum  Final   10/30/2018 Sputum  Final   09/18/2018 Sputum  Final    Culture Micro   Date Value Ref Range Status   12/11/2018 Heavy growth  Normal santi    Final   12/11/2018 Light growth  Staphylococcus pseudintermedius   (A)  Final   12/11/2018 (A)  Final    Moderate growth  Pseudomonas aeruginosa, mucoid strain     12/11/2018 (A)  Final    Heavy growth  Strain 2  Pseudomonas aeruginosa, mucoid strain          Recent Results (from the past 168 hour(s))   General PFT Lab (Please always keep checked)    Collection Time: 02/21/19 12:16 PM   Result Value Ref Range    FVC-Pred 4.92 L "    FVC-Pre 3.69 L    FVC-%Pred-Pre 74 %    FEV1-Pre 1.58 L    FEV1-%Pred-Pre 39 %    FEV1FVC-Pred 81 %    FEV1FVC-Pre 43 %    FEFMax-Pred 9.76 L/sec    FEFMax-Pre 6.35 L/sec    FEFMax-%Pred-Pre 65 %    FEF2575-Pred 3.86 L/sec    FEF2575-Pre 0.54 L/sec    VEW9899-%Pred-Pre 14 %    ExpTime-Pre 15.22 sec    FIFMax-Pre 5.28 L/sec    FEV1FEV6-Pred 82 %    FEV1FEV6-Pre 50 %       PFT: Severe obstructive lung disease.  When compared to 12/11/2018, the FEV1 and FVC have little change.  The decrease in FVC may represent air trapping v. restrictive physiology.  Lung volumes would be necessary to determine.    Pulmonary exacerbation: mild: Oral quinolone

## 2019-02-26 LAB
BACTERIA SPEC CULT: ABNORMAL
SPECIMEN SOURCE: ABNORMAL

## 2019-03-28 ENCOUNTER — INFUSION THERAPY VISIT (OUTPATIENT)
Dept: INFUSION THERAPY | Facility: CLINIC | Age: 44
End: 2019-03-28
Payer: COMMERCIAL

## 2019-03-28 VITALS
RESPIRATION RATE: 18 BRPM | DIASTOLIC BLOOD PRESSURE: 72 MMHG | SYSTOLIC BLOOD PRESSURE: 109 MMHG | TEMPERATURE: 98.7 F | HEART RATE: 88 BPM | OXYGEN SATURATION: 97 % | WEIGHT: 147.2 LBS | BODY MASS INDEX: 22.13 KG/M2

## 2019-03-28 DIAGNOSIS — M85.9 LOW BONE DENSITY: Primary | ICD-10-CM

## 2019-03-28 DIAGNOSIS — E84.9 CYSTIC FIBROSIS (H): ICD-10-CM

## 2019-03-28 DIAGNOSIS — E84.0 CYSTIC FIBROSIS WITH PULMONARY MANIFESTATIONS (H): ICD-10-CM

## 2019-03-28 DIAGNOSIS — E84.0 CYSTIC FIBROSIS WITH PULMONARY MANIFESTATIONS (H): Primary | ICD-10-CM

## 2019-03-28 DIAGNOSIS — M85.9 LOW BONE DENSITY: ICD-10-CM

## 2019-03-28 DIAGNOSIS — K86.81 EXOCRINE PANCREATIC INSUFFICIENCY: ICD-10-CM

## 2019-03-28 LAB
CALCIUM SERPL-MCNC: 9.1 MG/DL (ref 8.5–10.1)
CREAT SERPL-MCNC: 0.79 MG/DL (ref 0.66–1.25)
GFR SERPL CREATININE-BSD FRML MDRD: >90 ML/MIN/{1.73_M2}

## 2019-03-28 PROCEDURE — 99207 ZZC NO CHARGE LOS: CPT

## 2019-03-28 PROCEDURE — 82310 ASSAY OF CALCIUM: CPT | Performed by: INTERNAL MEDICINE

## 2019-03-28 PROCEDURE — 36415 COLL VENOUS BLD VENIPUNCTURE: CPT | Performed by: INTERNAL MEDICINE

## 2019-03-28 PROCEDURE — 96366 THER/PROPH/DIAG IV INF ADDON: CPT | Performed by: NURSE PRACTITIONER

## 2019-03-28 PROCEDURE — 96365 THER/PROPH/DIAG IV INF INIT: CPT | Performed by: NURSE PRACTITIONER

## 2019-03-28 PROCEDURE — 82565 ASSAY OF CREATININE: CPT | Performed by: INTERNAL MEDICINE

## 2019-03-28 ASSESSMENT — PAIN SCALES - GENERAL: PAINLEVEL: NO PAIN (0)

## 2019-03-28 NOTE — PROGRESS NOTES
Infusion Nursing Note:  Nico Morales presents today for Aredia infusion.    Patient seen by provider today: No   present during visit today: Not Applicable.    Note: Patient states he had a low grade fever, and some aches for a couple days, after his first infusion of Aredia in Dec 2018.  States he tolerated the first infusion well, other than that.    Reminded patient to drink extra water today/tomorrow to protect his kidneys.    Intravenous Access:  Peripheral IV placed.    Treatment Conditions:  Lab Results   Component Value Date     05/03/2018                   Lab Results   Component Value Date    POTASSIUM 4.4 05/03/2018           Lab Results   Component Value Date    MAG 2.1 05/03/2018            Lab Results   Component Value Date    CR 0.79 03/28/2019                   Lab Results   Component Value Date    CLAUDIA 9.1 03/28/2019                Lab Results   Component Value Date    BILITOTAL 0.2 01/21/2018           Lab Results   Component Value Date    ALBUMIN 3.3 05/03/2018                    Lab Results   Component Value Date    ALT 35 05/03/2018           Lab Results   Component Value Date    AST 30 05/03/2018       Results reviewed, labs MET treatment parameters, ok to proceed with treatment.    Patient is taking Calcium and Vitamin D.    Post Infusion Assessment:  Patient tolerated infusion without incident.  Blood return noted pre and post infusion.  Site patent and intact, free from redness, edema or discomfort.  No evidence of extravasations.  Access discontinued per protocol.     Discharge Plan:   Patient will return in 3 months for next appointment.   Patient discharged in stable condition accompanied by: self.  Departure Mode: Ambulatory.    Suzanna Abreu RN

## 2019-04-16 ENCOUNTER — OFFICE VISIT (OUTPATIENT)
Dept: PULMONOLOGY | Facility: CLINIC | Age: 44
End: 2019-04-16
Attending: INTERNAL MEDICINE
Payer: COMMERCIAL

## 2019-04-16 VITALS
OXYGEN SATURATION: 97 % | HEIGHT: 68 IN | WEIGHT: 143.52 LBS | SYSTOLIC BLOOD PRESSURE: 107 MMHG | HEART RATE: 102 BPM | BODY MASS INDEX: 21.75 KG/M2 | RESPIRATION RATE: 16 BRPM | DIASTOLIC BLOOD PRESSURE: 72 MMHG

## 2019-04-16 DIAGNOSIS — E84.9 CYSTIC FIBROSIS (H): Primary | ICD-10-CM

## 2019-04-16 DIAGNOSIS — E84.0 CYSTIC FIBROSIS WITH PULMONARY MANIFESTATIONS (H): Primary | ICD-10-CM

## 2019-04-16 LAB
EXPTIME-PRE: 14.98 SEC
FEF2575-%PRED-PRE: 14 %
FEF2575-PRE: 0.55 L/SEC
FEF2575-PRED: 3.86 L/SEC
FEFMAX-%PRED-PRE: 70 %
FEFMAX-PRE: 6.87 L/SEC
FEFMAX-PRED: 9.76 L/SEC
FEV1-%PRED-PRE: 39 %
FEV1-PRE: 1.56 L
FEV1FEV6-PRE: 47 %
FEV1FEV6-PRED: 82 %
FEV1FVC-PRE: 40 %
FEV1FVC-PRED: 81 %
FIFMAX-PRE: 5.59 L/SEC
FVC-%PRED-PRE: 78 %
FVC-PRE: 3.89 L
FVC-PRED: 4.92 L

## 2019-04-16 PROCEDURE — 87070 CULTURE OTHR SPECIMN AEROBIC: CPT | Performed by: INTERNAL MEDICINE

## 2019-04-16 PROCEDURE — 87206 SMEAR FLUORESCENT/ACID STAI: CPT | Performed by: INTERNAL MEDICINE

## 2019-04-16 PROCEDURE — 87015 SPECIMEN INFECT AGNT CONCNTJ: CPT | Performed by: INTERNAL MEDICINE

## 2019-04-16 PROCEDURE — G0463 HOSPITAL OUTPT CLINIC VISIT: HCPCS | Mod: ZF

## 2019-04-16 PROCEDURE — 87077 CULTURE AEROBIC IDENTIFY: CPT | Performed by: INTERNAL MEDICINE

## 2019-04-16 PROCEDURE — 87116 MYCOBACTERIA CULTURE: CPT | Performed by: INTERNAL MEDICINE

## 2019-04-16 PROCEDURE — 87186 SC STD MICRODIL/AGAR DIL: CPT | Performed by: INTERNAL MEDICINE

## 2019-04-16 ASSESSMENT — PAIN SCALES - GENERAL: PAINLEVEL: NO PAIN (0)

## 2019-04-16 ASSESSMENT — MIFFLIN-ST. JEOR: SCORE: 1531.69

## 2019-04-16 NOTE — PROGRESS NOTES
[facility]  [facility]  April 16, 2019         Assessment and Plan:   Nico Morales is a 43 year old male with cystic fibrosis.    1. CF lung disease with severe obstruction:  Kilo reports that he is doing fairly well.  From a pulmonary point of view, he remains quite consistent with his nebs.  He does continue to show evident of Pseudomonas in his sputum.  Kilo does report that about 10 days into both his doxycycline course or his [place] course he does notice worsening symptomatology.  He does already rotate these medications every 2 weeks.  He is interested in knowing if there is another medication that would be appropriate to put into the rotation.  He has been intolerant of coly and BETHANY.  He is allergic to penicillin and cephalosporins so those are not options for either oral or inhalational use.  We did discuss the possibility of amikacin.  I will review this with my team to see if others have used it has antipseudomonal treatment by inhalation. At this time, I would recommend:   -- That he continue on his present bronchial drainage therapy.   -- He will continue to rotate doxycycline with inhalational [place].     2.  Cystic fibrosis-related diabetes:  Kilo reports good control of his diabetes.  He is followed by Dr. Lee.     3.  Decreased bone mineral density.  Kilo had a second dose of pamidronate.  He did report he did not have as high of fever.  He did, however, have a fair amount of fatigue.     4.  Nutritional failure secondary to chronic illness.  Kilo does have a G-tube in place.  He does do his tube feeds 5-6 nights per week.     5.  Psychosocial.  Kilo does continue to work full time.  He does have a dog, Jefry, who he brings to work.  He reports that Jefry is doing well.     6. Pancreatic Insufficiency:  The patient has no new symptoms consistent with worsening malabsorption.    - continue the present dose of pancreatic enzymes  -  continue vitamin supplementation.    HCM: due 5/2019  Immunizations: UTD  Colonoscopy: due 2021    Jaye Machado MD MPH  Associate Professor of Medicine  Pulmonary, Allergy, Critical Care and Sleep Medicine      Interval History:     Kilo does report that his sputum is yellow-green in color.  His cough frequency and sputum volume are unchanged.  His activity tolerance remains stable.  He does perform 3-4 vest therapies per day.          Review of Systems:     CONSTITUTIONAL: no fever, no chills, no change in weight, no change in energy, no change in appetite    INTEGUMENTARY/SKIN: no rash, no obvious new lesions    ENT/MOUTH: no sore throat, no new sinus pain, no new nasal drainage, no hearing loss, chronic ear ringing     RESPIRATORY: see interval history    CV: no chest pain, no palpitations    GI: no nausea, no vomiting, no change in stools, no fatty stools, no GERD, no abdominal pain    : negative    MUSCULOSKELETAL: no myalgias, no arthralgias    ENDOCRINE: no excessive thirst, blood sugars with adequate control    NEURO:  No headache, no numbness, no tingling    SLEEP: variable    PSYCHIATRIC: mood stable          Past Medical and Surgical History:     Past Medical History:   Diagnosis Date     CF (cystic fibrosis) (H)      Exocrine pancreatic insufficiency      Nocardia infection      Pseudomonas infection      S/P gastrostomy (H) 2002     Past Surgical History:   Procedure Laterality Date     COLONOSCOPY N/A 5/21/2018    Procedure: COMBINED COLONOSCOPY, SINGLE OR MULTIPLE BIOPSY/POLYPECTOMY BY BIOPSY;  Cystic fibrosis, 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;  Surgeon: Margarito Bowman MD;  [facility]: [facility]     GASTROSTOMY TUBE  2002     PICC INSERTION Left 01/22/2018    4Fr SL BioFlo PICC, 46cm (5cm external) in the L basilic vein w/ tip in the low SVC           Family History:     Family History   Problem Relation Age of Onset     Heart Disease Maternal Grandmother      Heart Disease Maternal Grandfather       Heart Disease Paternal Grandmother      Diabetes No family hx of             Social History:     Social History     Socioeconomic History     Marital status: Single     Spouse name: Not on file     Number of children: 0     Years of education: Not on file     Highest education level: Not on file   Occupational History     Occupation: financial palnner     Employer: [employer]   Social Needs     Financial resource strain: Not on file     Food insecurity:     Worry: Not on file     Inability: Not on file     Transportation needs:     Medical: Not on file     Non-medical: Not on file   Tobacco Use     Smoking status: Never Smoker     Smokeless tobacco: Never Used   Substance and Sexual Activity     Alcohol use: No     Alcohol/week: 0.0 oz     Drug use: No     Sexual activity: Yes     Partners: Female     Birth control/protection: Pill   Lifestyle     Physical activity:     Days per week: Not on file     Minutes per session: Not on file     Stress: Not on file   Relationships     Social connections:     Talks on phone: Not on file     Gets together: Not on file     Attends Scientology service: Not on file     Active member of club or organization: Not on file     Attends meetings of clubs or organizations: Not on file     Relationship status: Not on file     Intimate partner violence:     Fear of current or ex partner: Not on file     Emotionally abused: Not on file     Physically abused: Not on file     Forced sexual activity: Not on file   Other Topics Concern     Parent/sibling w/ CABG, MI or angioplasty before 65F 55M? Not Asked      Service Not Asked     Blood Transfusions No     Caffeine Concern Not Asked     Occupational Exposure Not Asked     Hobby Hazards Not Asked     Sleep Concern Not Asked     Stress Concern Not Asked     Weight Concern Not Asked     Special Diet Not Asked     Back Care Not Asked     Exercise No     Bike Helmet Not Asked     Seat Belt Not Asked     Self-Exams Not Asked  "  Social History Narrative    Kilo lives with wife in a house in [place], [place].  He works as a .        March 2016. He works independently as a . Works out 5x/week and walks the dog daily.        June 2016. No changes. Describes himself as a creature of habit.            Medications:     Current Outpatient Medications   Medication     ACE NOT PRESCRIBED, INTENTIONAL,     acetaminophen-codeine (TYLENOL #3) 300-30 MG per tablet     acetylcysteine (MUCOMYST) 10 % nebulizer solution     amitriptyline (ELAVIL) 10 MG tablet     amylase-lipase-protease (CREON) 96130-83105 units CPEP per EC capsule     azithromycin (ZITHROMAX) 250 MG tablet     aztreonam lysine (CAYSTON) 75 MG SOLR     BD OMI U/F 32G X 4 MM insulin pen needle     blood glucose (NO BRAND SPECIFIED) lancets standard     blood glucose monitoring (MARTINA CONTOUR NEXT MONITOR) meter device kit     blood glucose monitoring (MARTINA CONTOUR NEXT) test strip     blood glucose monitoring (MARTNIA MICROLET) lancets     CALCIUM PO     doxycycline (VIBRAMYCIN) 100 MG capsule     [facility] INFUSION MANAGED PATIENT     Ferrous Fumarate-Vitamin C (VITRON-C) 200-125 MG TABS     insulin aspart (NOVOLOG PENFILL) 100 UNIT/ML injection     insulin glargine (LANTUS SOLOSTAR PEN) 100 UNIT/ML pen     levalbuterol (XOPENEX HFA) 45 MCG/ACT inhaler     levalbuterol (XOPENEX) 1.25 MG/3ML neb solution     MEPHYTON 5 MG PO TABS     MULTIVITAMIN OR     Nebulizers (EFLOW SCF NEBULIZER HANDSET) MISC     polyethylene glycol (MIRALAX/GLYCOLAX) powder     Respiratory Therapy Supplies (KRIS ALTERA NEBULIZER HANDSET) MISC     sodium chloride inhalant 7 % NEBU neb solution     SYMBICORT 160-4.5 MCG/ACT Inhaler     oseltamivir (TAMIFLU) 75 MG capsule     No current facility-administered medications for this visit.             Physical Exam:   /72   Pulse 102   Resp 16   Ht 1.737 m (5' 8.39\")   Wt 65.1 kg (143 lb 8.3 oz)   SpO2 97%   BMI 21.57 kg/m  "     Constitutional:   Awake, alert and in no apparent distress     Eyes:   nonicteric     ENT:   oral mucosa moist without lesions, normal tm bilaterally, bilateral mucosa normal     Neck:   Supple without supraclavicular or cervical lymphadenopathy     Lungs:   Good air flow.  No crackles. No rhonchi.  No wheezes.     Cardiovascular:   Normal S1 and S2.  RRR.  No murmur, gallop or rub.     Abdomen:   NABS, soft, nontender, nondistended.  g tube     Musculoskeletal:   No edema, digital clubbing present     Neurologic:   Alert and conversant.     Skin:   Warm, dry.  No rash on limited exam.             Data:   All laboratory and imaging data reviewed.    Cystic Fibrosis Culture  Specimen Description   Date Value Ref Range Status   04/16/2019 Sputum  Final   04/16/2019 Sputum  Final   04/16/2019 Sputum  Final    Culture Micro   Date Value Ref Range Status   04/16/2019 Moderate growth  Normal santi    Preliminary   04/16/2019 (A)  Preliminary    Light growth  Mucoid strain  Non lactose fermenting gram negative rods     04/16/2019 Culture in progress  Preliminary   04/16/2019   Preliminary    Culture received and in progress.  Positive AFB results are called as soon as detected.    Final report to follow in 7 to 8 weeks.     04/16/2019   Preliminary    Assayed at [facility] [address] [place] [id number] [phone]        Recent Results (from the past 168 hour(s))   General PFT Lab (Please always keep checked)    Collection Time: 04/16/19 11:11 AM   Result Value Ref Range    FVC-Pred 4.92 L    FVC-Pre 3.89 L    FVC-%Pred-Pre 78 %    FEV1-Pre 1.56 L    FEV1-%Pred-Pre 39 %    FEV1FVC-Pred 81 %    FEV1FVC-Pre 40 %    FEFMax-Pred 9.76 L/sec    FEFMax-Pre 6.87 L/sec    FEFMax-%Pred-Pre 70 %    FEF2575-Pred 3.86 L/sec    FEF2575-Pre 0.55 L/sec    OVX7770-%Pred-Pre 14 %    ExpTime-Pre 14.98 sec    FIFMax-Pre 5.59 L/sec    FEV1FEV6-Pred 82 %    FEV1FEV6-Pre 47 %   Cystic Fibrosis Culture Aerob Bacterial     Collection Time: 04/16/19 11:25 AM   Result Value Ref Range    Specimen Description Sputum     Culture Micro Moderate growth  Normal santi       Culture Micro (A)      Light growth  Mucoid strain  Non lactose fermenting gram negative rods      Culture Micro Culture in progress    AFB Stain Non Blood    Collection Time: 04/16/19 11:25 AM   Result Value Ref Range    Specimen Description Sputum     AFB Stain Negative for acid fast bacteria     AFB Stain       Assayed at [facility] [address] [place] [address] [phone]   AFB Culture Non Blood    Collection Time: 04/16/19 11:25 AM   Result Value Ref Range    Specimen Description Sputum     Culture Micro       Culture received and in progress.  Positive AFB results are called as soon as detected.    Final report to follow in 7 to 8 weeks.      Culture Micro       Assayed at [facility] [address] [place] [address] [phone]       PFT: Severe obstructive lung disease.  When compared to 2/21/2019, the FEV1 and FVC have little change.  The decrease in FVC may represent air trapping v. restrictive physiology.  Lung volumes would be necessary to determine.    Pulmonary exacerbation: absent

## 2019-04-16 NOTE — LETTER
4/16/2019       RE: Nico Morales  [address]  [facility] [address]     Dear Colleague,    Thank you for referring your patient, Nico Morales, to the [facility] at [facility]. Please see a copy of my visit note below.    [facility]  April 16, 2019         Assessment and Plan:   Nico Morales is a 43 year old male with cystic fibrosis.    1. CF lung disease with severe obstruction:  Kilo reports that he is doing fairly well.  From a pulmonary point of view, he remains quite consistent with his nebs.  He does continue to show evident of Pseudomonas in his sputum.  Kilo does report that about 10 days into both his doxycycline course or his Cayston course he does notice worsening symptomatology.  He does already rotate these medications every 2 weeks.  He is interested in knowing if there is another medication that would be appropriate to put into the rotation.  He has been intolerant of coly and BETHANY.  He is allergic to penicillin and cephalosporins so those are not options for either oral or inhalational use.  We did discuss the possibility of amikacin.  I will review this with my team to see if others have used it has antipseudomonal treatment by inhalation. At this time, I would recommend:   -- That he continue on his present bronchial drainage therapy.   -- He will continue to rotate doxycycline with inhalational Cayston.     2.  Cystic fibrosis-related diabetes:  Kilo reports good control of his diabetes.  He is followed by Dr. Lee.     3.  Decreased bone mineral density.  Kilo had a second dose of pamidronate.  He did report he did not have as high of fever.  He did, however, have a fair amount of fatigue.     4.  Nutritional failure secondary to chronic illness.  Kilo does have a G-tube in place.  He does do his tube feeds 5-6 nights per week.     5.  Psychosocial.   Kilo does continue to work full time.  He does have a dog, Jefry, who he brings to work.  He reports that Jefry is doing well.     6. Pancreatic Insufficiency:  The patient has no new symptoms consistent with worsening malabsorption.    - continue the present dose of pancreatic enzymes  - continue vitamin supplementation.    HCM: due 5/2019  Immunizations: UTD  Colonoscopy: due 2021    Jaye Machado MD MPH  Associate Professor of Medicine  Pulmonary, Allergy, Critical Care and Sleep Medicine      Interval History:     Kilo does report that his sputum is yellow-green in color.  His cough frequency and sputum volume are unchanged.  His activity tolerance remains stable.  He does perform 3-4 vest therapies per day.          Review of Systems:     CONSTITUTIONAL: no fever, no chills, no change in weight, no change in energy, no change in appetite    INTEGUMENTARY/SKIN: no rash, no obvious new lesions    ENT/MOUTH: no sore throat, no new sinus pain, no new nasal drainage, no hearing loss, chronic ear ringing     RESPIRATORY: see interval history    CV: no chest pain, no palpitations    GI: no nausea, no vomiting, no change in stools, no fatty stools, no GERD, no abdominal pain    : negative    MUSCULOSKELETAL: no myalgias, no arthralgias    ENDOCRINE: no excessive thirst, blood sugars with adequate control    NEURO:  No headache, no numbness, no tingling    SLEEP: variable    PSYCHIATRIC: mood stable          Past Medical and Surgical History:     Past Medical History:   Diagnosis Date     CF (cystic fibrosis) (H)      Exocrine pancreatic insufficiency      Nocardia infection      Pseudomonas infection      S/P gastrostomy (H) 2002     Past Surgical History:   Procedure Laterality Date     COLONOSCOPY N/A 5/21/2018    Procedure: COMBINED COLONOSCOPY, SINGLE OR MULTIPLE BIOPSY/POLYPECTOMY BY BIOPSY;  Cystic fibrosis, 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;  Surgeon: Margarito Bowman MD;  Location:  GI      GASTROSTOMY TUBE  2002     PICC INSERTION Left 01/22/2018    4Fr SL BioFlo PICC, 46cm (5cm external) in the L basilic vein w/ tip in the low SVC           Family History:     Family History   Problem Relation Age of Onset     Heart Disease Maternal Grandmother      Heart Disease Maternal Grandfather      Heart Disease Paternal Grandmother      Diabetes No family hx of             Social History:     Social History     Socioeconomic History     Marital status: Single     Spouse name: Not on file     Number of children: 0     Years of education: Not on file     Highest education level: Not on file   Occupational History     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     Employer: [employer]   Social Needs     Financial resource strain: Not on file     Food insecurity:     Worry: Not on file     Inability: Not on file     Transportation needs:     Medical: Not on file     Non-medical: Not on file   Tobacco Use     Smoking status: Never Smoker     Smokeless tobacco: Never Used   Substance and Sexual Activity     Alcohol use: No     Alcohol/week: 0.0 oz     Drug use: No     Sexual activity: Yes     Partners: Female     Birth control/protection: Pill   Lifestyle     Physical activity:     Days per week: Not on file     Minutes per session: Not on file     Stress: Not on file   Relationships     Social connections:     Talks on phone: Not on file     Gets together: Not on file     Attends Druze service: Not on file     Active member of club or organization: Not on file     Attends meetings of clubs or organizations: Not on file     Relationship status: Not on file     Intimate partner violence:     Fear of current or ex partner: Not on file     Emotionally abused: Not on file     Physically abused: Not on file     Forced sexual activity: Not on file   Other Topics Concern     Parent/sibling w/ CABG, MI or angioplasty before 65F 55M? Not Asked      Service Not Asked     Blood Transfusions No     Caffeine Concern Not  Asked     Occupational Exposure Not Asked     Hobby Hazards Not Asked     Sleep Concern Not Asked     Stress Concern Not Asked     Weight Concern Not Asked     Special Diet Not Asked     Back Care Not Asked     Exercise No     Bike Helmet Not Asked     Seat Belt Not Asked     Self-Exams Not Asked   Social History Narrative    Kilo lives with wife in a house in [place], [place].  He works as a .        March 2016. He works independently as a . Works out 5x/week and walks the dog daily.        June 2016. No changes. Describes himself as a creature of habit.            Medications:     Current Outpatient Medications   Medication     ACE NOT PRESCRIBED, INTENTIONAL,     acetaminophen-codeine (TYLENOL #3) 300-30 MG per tablet     acetylcysteine (MUCOMYST) 10 % nebulizer solution     amitriptyline (ELAVIL) 10 MG tablet     amylase-lipase-protease (CREON) 98489-09054 units CPEP per EC capsule     azithromycin (ZITHROMAX) 250 MG tablet     aztreonam lysine (CAYSTON) 75 MG SOLR     BD OMI U/F 32G X 4 MM insulin pen needle     blood glucose (NO BRAND SPECIFIED) lancets standard     blood glucose monitoring (MARTINA CONTOUR NEXT MONITOR) meter device kit     blood glucose monitoring (MARTINA CONTOUR NEXT) test strip     blood glucose monitoring (MARTINA MICROLET) lancets     CALCIUM PO     doxycycline (VIBRAMYCIN) 100 MG capsule     [facility] INFUSION MANAGED PATIENT     Ferrous Fumarate-Vitamin C (VITRON-C) 200-125 MG TABS     insulin aspart (NOVOLOG PENFILL) 100 UNIT/ML injection     insulin glargine (LANTUS SOLOSTAR PEN) 100 UNIT/ML pen     levalbuterol (XOPENEX HFA) 45 MCG/ACT inhaler     levalbuterol (XOPENEX) 1.25 MG/3ML neb solution     MEPHYTON 5 MG PO TABS     MULTIVITAMIN OR     Nebulizers (EFLOW SCF NEBULIZER HANDSET) MISC     polyethylene glycol (MIRALAX/GLYCOLAX) powder     Respiratory Therapy Supplies (KRIS ALTERA NEBULIZER HANDSET) MISC     sodium chloride inhalant 7 % NEBU neb  "solution     SYMBICORT 160-4.5 MCG/ACT Inhaler     oseltamivir (TAMIFLU) 75 MG capsule     No current facility-administered medications for this visit.             Physical Exam:   /72   Pulse 102   Resp 16   Ht 1.737 m (5' 8.39\")   Wt 65.1 kg (143 lb 8.3 oz)   SpO2 97%   BMI 21.57 kg/m       Constitutional:   Awake, alert and in no apparent distress     Eyes:   nonicteric     ENT:   oral mucosa moist without lesions, normal tm bilaterally, bilateral mucosa normal     Neck:   Supple without supraclavicular or cervical lymphadenopathy     Lungs:   Good air flow.  No crackles. No rhonchi.  No wheezes.     Cardiovascular:   Normal S1 and S2.  RRR.  No murmur, gallop or rub.     Abdomen:   NABS, soft, nontender, nondistended.  g tube     Musculoskeletal:   No edema, digital clubbing present     Neurologic:   Alert and conversant.     Skin:   Warm, dry.  No rash on limited exam.             Data:   All laboratory and imaging data reviewed.    Cystic Fibrosis Culture  Specimen Description   Date Value Ref Range Status   04/16/2019 Sputum  Final   04/16/2019 Sputum  Final   04/16/2019 Sputum  Final    Culture Micro   Date Value Ref Range Status   04/16/2019 Moderate growth  Normal santi    Preliminary   04/16/2019 (A)  Preliminary    Light growth  Mucoid strain  Non lactose fermenting gram negative rods     04/16/2019 Culture in progress  Preliminary   04/16/2019   Preliminary    Culture received and in progress.  Positive AFB results are called as soon as detected.    Final report to follow in 7 to 8 weeks.     04/16/2019   Preliminary    Assayed at [facility], [facility] [address] [place] [id number] [phone]        Recent Results (from the past 168 hour(s))   General PFT Lab (Please always keep checked)    Collection Time: 04/16/19 11:11 AM   Result Value Ref Range    FVC-Pred 4.92 L    FVC-Pre 3.89 L    FVC-%Pred-Pre 78 %    FEV1-Pre 1.56 L    FEV1-%Pred-Pre 39 %    FEV1FVC-Pred 81 %    FEV1FVC-Pre " 40 %    FEFMax-Pred 9.76 L/sec    FEFMax-Pre 6.87 L/sec    FEFMax-%Pred-Pre 70 %    FEF2575-Pred 3.86 L/sec    FEF2575-Pre 0.55 L/sec    NCL3815-%Pred-Pre 14 %    ExpTime-Pre 14.98 sec    FIFMax-Pre 5.59 L/sec    FEV1FEV6-Pred 82 %    FEV1FEV6-Pre 47 %   Cystic Fibrosis Culture Aerob Bacterial    Collection Time: 04/16/19 11:25 AM   Result Value Ref Range    Specimen Description Sputum     Culture Micro Moderate growth  Normal santi       Culture Micro (A)      Light growth  Mucoid strain  Non lactose fermenting gram negative rods      Culture Micro Culture in progress    AFB Stain Non Blood    Collection Time: 04/16/19 11:25 AM   Result Value Ref Range    Specimen Description Sputum     AFB Stain Negative for acid fast bacteria     AFB Stain       Assayed at [facility] [address] [place] [address] [phone]   AFB Culture Non Blood    Collection Time: 04/16/19 11:25 AM   Result Value Ref Range    Specimen Description Sputum     Culture Micro       Culture received and in progress.  Positive AFB results are called as soon as detected.    Final report to follow in 7 to 8 weeks.      Culture Micro       Assayed at [facility] [address] [place] [address] [phone]       PFT: Severe obstructive lung disease.  When compared to 2/21/2019, the FEV1 and FVC have little change.  The decrease in FVC may represent air trapping v. restrictive physiology.  Lung volumes would be necessary to determine.    Pulmonary exacerbation: absent        Again, thank you for allowing me to participate in the care of your patient.      Sincerely,    Jaye Machado MD

## 2019-04-16 NOTE — PATIENT INSTRUCTIONS
Cystic Fibrosis Self-Care Plan    RECOMMENDATIONS:   Annual studies labs when you are able at [place].  I will look into the timing of the DEXA.  Will look into the use of Amikacin for alternative therapy.    YOUR GOAL:  Enjoy the end of spring early summer.      CF Nurse line:          Olu Henderson   [phone]            CF Resp Therapist: Felicitas Banuelos            [phone]   CF Dietitians:           Zeina Pierre            [phone]                       Ivory Bonilla                       [phone]   CF Diabetes Nurse: Jordyn Rudolph             [phone]    CF Social Workers:  Mary Ritter             [phone]                Marisa Urbina            [phone]  CF Pharmacist:        Tammy Morgan                              [phone]  [email or web address]         MRN: [id number]   Clinic Date: April 16, 2019   Patient: Nico Morales     Annual Studies:   IGG   Date Value Ref Range Status   05/03/2018 1,770 (H) 695 - 1,620 mg/dL Final     Insulin   Date Value Ref Range Status   07/26/2011 30 mU/L Final     Comment:     Reference Range:  0-20  +120     There are no preventive care reminders to display for this patient.      Pulmonary Function Tests  FEV1: amount of air you can blow out in 1 second  FVC: total amount of air you can take in and blow out    Your Goals:         PFT Latest Ref Rng & Units 4/16/2019   FVC L 3.89   FEV1 L 1.56   FVC% % 78   FEV1% % 39          Airway Clearance: The Most Important Way to Keep Your Lungs Healthy  Vest Settings:    Hill-Rom Frequencies: 8, 9, 10 Pressure 10 Then, Frequencies 18, 19, 20 Pressure 6      RespirTech: Quick Start with Pressure of     Do each frequency for 5 minutes; Deflate vest after each frequency & cough 3 times before beginning the next setting.    Vest and Neb Therapy should be done 2 times/day.    Good Nutrition Can Improve Lung Function and Overall Health     Take ALL of your vitamins with food     Take 1/2  of your enzymes before EVERY meal/snack and the other 1/2 mid-meal/snack    Wt Readings from Last 3 Encounters:   04/16/19 65.1 kg (143 lb 8.3 oz)   03/28/19 66.8 kg (147 lb 3.2 oz)   02/21/19 65.9 kg (145 lb 4.5 oz)       Body mass index is 21.57 kg/m .         National CF Foundation Recommendations for BMI in CF Adults: Women: at least 22 Men: at least 23        Controlling Blood Sugars Helps Prevent Lung Infections & Improves Nutrition  Test blood sugar:     In the morning before eating (goal is )     2 hours after a meal (goal is less than 150)     When pre-meal glucose is greater than 150 add correction     At bedtime (if less than 100 eat a snack with 15 grams of carbohydrates  Last A1C Results:   Hemoglobin A1C   Date Value Ref Range Status   08/06/2018 6.5 % Final         If diabetic, measure A1C every 6 months. Goal: Under 7%    Staying Healthy    Research:  If you are interested in learning about research opportunities or have questions, please contact the CF Research Team at [phone] or [email or web address].      CF Foundation:  Compass is a personalized resource service to help you with the insurance, financial, legal and other issues you are facing.  It's free, confidential and available to anyone with CF.  Ask your  for more information or contact Compass directly at 450-COMPASS ([phone]) or compass@cff.org, or learn more at [email or web address].[email or web address].

## 2019-04-18 LAB
ACID FAST STN SPEC QL: NORMAL
ACID FAST STN SPEC QL: NORMAL
SPECIMEN SOURCE: NORMAL

## 2019-04-22 LAB
BACTERIA SPEC CULT: ABNORMAL
SPECIMEN SOURCE: ABNORMAL

## 2019-04-23 NOTE — PROGRESS NOTES
Respiratory Therapist Note:    Vest    Brand: Hill-Rom - traditional  & Respirtech   Settings: Hill Rom: Frequencies 8, 9, 10 at pressure 10 then frequencies 18, 19, 20 at pressure 6.  & Respirtech quick start at 100%   Cough Pause: Yes. 80% of time   Vest Garment Size:  HR-Adult Medium- patient unsure. Respirtech garment is not the recommended length. I will order his current size, add 4 inches through respirtech.    Last Fitting Date: Due as needed   Frequency of therapy: 26 times per week. Patient using Respirtech device for mid day therapies at work.    Concerns: none. He does not miss therapy.    Exercise: walks 20 minutes 4 days per week.       Nebulized Medications   Bronchodilators: Xopenex   Mucolytic: Mucomyst and 7% Hypertonic Saline   Antibiotics: Cayston   Additional Inhaled Medications: ICS (symbicort) and MDI- xopenex using 2p 3 times daily at work for mid day therapy and in the middle of the night when he wakes up.    Spacer Use: yes    Review Cleaning: Yes. Countertop bottle sterilizer.    Education and Transition Information   Correct order of inhaled medications: Yes   Mechanism of Action of inhaled medications: Yes   Frequency of inhaled medications: Yes   Dosage of inhaled medications: Yes   Other: NA    Home Care:   Nebulizer Cups (Brand/Type):  [place] cups &50 psi machine, & black small machine, all working.   Nebulizer Compressor    Year Purchased: unsure   Home Care Company:     [facility], Phone: [phone], Fax: [phone]        Plan of Care and Goals for next visit: Get proper fit Respirtech vest. Keep up all your hard work on airway clearance, you are doing an exceptional job!

## 2019-05-11 DIAGNOSIS — E84.9 CYSTIC FIBROSIS (H): ICD-10-CM

## 2019-05-11 DIAGNOSIS — E84.0 CYSTIC FIBROSIS OF THE LUNG (H): ICD-10-CM

## 2019-05-13 ENCOUNTER — ALLIED HEALTH/NURSE VISIT (OUTPATIENT)
Dept: PULMONOLOGY | Facility: CLINIC | Age: 44
End: 2019-05-13
Attending: INTERNAL MEDICINE
Payer: COMMERCIAL

## 2019-05-13 DIAGNOSIS — E84.0 CYSTIC FIBROSIS WITH PULMONARY MANIFESTATIONS (H): Primary | ICD-10-CM

## 2019-05-13 RX ORDER — BUDESONIDE AND FORMOTEROL FUMARATE DIHYDRATE 160; 4.5 UG/1; UG/1
AEROSOL RESPIRATORY (INHALATION)
Qty: 10.2 INHALER | Refills: 11 | Status: SHIPPED | OUTPATIENT
Start: 2019-05-13 | End: 2020-05-08

## 2019-05-13 NOTE — PROGRESS NOTES
Respiratory Therapy Eden Note:    Patient was seen in clinic today for Eden fitting appointment.    Patient currently uses Hill-Andel and Respirtech devices for airway clearance three times daily. He has significant bronchiectasis throughout chest (confirmed by Chest CT 1/2015), but significantly worse disease in left upper lobe and lingula. The Eden device allows for not only treatment to entire lung, but is able to provide localized therapy to specific lung fields (such as left upper lobe). His current devices do not allow localized treatment. Additionally, the patient travels frequently for work and pleasure (about 8 times yearly),and the decreased size and weight of the monarch will allow for ease of use with travel.     Patient wore the device in clinic for 30 minutes, with multiple settings and total lung and localized therapy provided. He tolerated the device well. We also discussed the ordering and insurance process, concerns and questions, and warranty information.     This patient has an implanted G-tube.    He wished to precede with order at this time. Order will be signed by primary pulmonologist and sent to [facility] to begin process.

## 2019-05-15 DIAGNOSIS — E84.0 CYSTIC FIBROSIS WITH PULMONARY MANIFESTATIONS (H): ICD-10-CM

## 2019-05-16 RX ORDER — SODIUM CHLORIDE FOR INHALATION 7 %
VIAL, NEBULIZER (ML) INHALATION
Qty: 240 ML | Refills: 5 | Status: SHIPPED | OUTPATIENT
Start: 2019-05-16 | End: 2019-11-12

## 2019-05-28 DIAGNOSIS — E84.0 CYSTIC FIBROSIS WITH PULMONARY MANIFESTATIONS (H): ICD-10-CM

## 2019-05-28 RX ORDER — DOXYCYCLINE 100 MG/1
CAPSULE ORAL
Qty: 60 CAPSULE | Refills: 2 | Status: SHIPPED | OUTPATIENT
Start: 2019-05-28 | End: 2019-10-25

## 2019-06-12 DIAGNOSIS — E84.0 CYSTIC FIBROSIS WITH PULMONARY MANIFESTATIONS (H): ICD-10-CM

## 2019-06-12 LAB
ALBUMIN SERPL-MCNC: 3.3 G/DL (ref 3.4–5)
ALBUMIN UR-MCNC: NEGATIVE MG/DL
ALP SERPL-CCNC: 141 U/L (ref 40–150)
ALT SERPL W P-5'-P-CCNC: 23 U/L (ref 0–70)
ANION GAP SERPL CALCULATED.3IONS-SCNC: 5 MMOL/L (ref 3–14)
APPEARANCE UR: CLEAR
AST SERPL W P-5'-P-CCNC: 19 U/L (ref 0–45)
BACTERIA #/AREA URNS HPF: ABNORMAL /HPF
BASOPHILS # BLD AUTO: 0.1 10E9/L (ref 0–0.2)
BASOPHILS NFR BLD AUTO: 0.6 %
BILIRUB UR QL STRIP: NEGATIVE
BUN SERPL-MCNC: 17 MG/DL (ref 7–30)
CALCIUM SERPL-MCNC: 9.2 MG/DL (ref 8.5–10.1)
CHLORIDE SERPL-SCNC: 102 MMOL/L (ref 94–109)
CHOLEST SERPL-MCNC: 131 MG/DL
CO2 SERPL-SCNC: 31 MMOL/L (ref 20–32)
COLOR UR AUTO: YELLOW
CREAT SERPL-MCNC: 0.8 MG/DL (ref 0.66–1.25)
CREAT UR-MCNC: 164 MG/DL
DIFFERENTIAL METHOD BLD: NORMAL
EOSINOPHIL # BLD AUTO: 0.1 10E9/L (ref 0–0.7)
EOSINOPHIL NFR BLD AUTO: 1.4 %
ERYTHROCYTE [DISTWIDTH] IN BLOOD BY AUTOMATED COUNT: 13.5 % (ref 10–15)
ERYTHROCYTE [SEDIMENTATION RATE] IN BLOOD BY WESTERGREN METHOD: 20 MM/H (ref 0–15)
GFR SERPL CREATININE-BSD FRML MDRD: >90 ML/MIN/{1.73_M2}
GGT SERPL-CCNC: 40 U/L (ref 0–75)
GLUCOSE SERPL-MCNC: 141 MG/DL (ref 70–99)
GLUCOSE UR STRIP-MCNC: NEGATIVE MG/DL
HCT VFR BLD AUTO: 44.8 % (ref 40–53)
HDLC SERPL-MCNC: 38 MG/DL
HGB BLD-MCNC: 14.2 G/DL (ref 13.3–17.7)
HGB UR QL STRIP: NEGATIVE
IMM GRANULOCYTES # BLD: 0 10E9/L (ref 0–0.4)
IMM GRANULOCYTES NFR BLD: 0.4 %
INR PPP: 0.97 (ref 0.86–1.14)
IRON SERPL-MCNC: 35 UG/DL (ref 35–180)
KETONES UR STRIP-MCNC: NEGATIVE MG/DL
LDLC SERPL CALC-MCNC: 77 MG/DL
LEUKOCYTE ESTERASE UR QL STRIP: NEGATIVE
LYMPHOCYTES # BLD AUTO: 2.2 10E9/L (ref 0.8–5.3)
LYMPHOCYTES NFR BLD AUTO: 26.7 %
MAGNESIUM SERPL-MCNC: 2.3 MG/DL (ref 1.6–2.3)
MCH RBC QN AUTO: 27.3 PG (ref 26.5–33)
MCHC RBC AUTO-ENTMCNC: 31.7 G/DL (ref 31.5–36.5)
MCV RBC AUTO: 86 FL (ref 78–100)
MICROALBUMIN UR-MCNC: 11 MG/L
MICROALBUMIN/CREAT UR: 6.95 MG/G CR (ref 0–17)
MONOCYTES # BLD AUTO: 0.9 10E9/L (ref 0–1.3)
MONOCYTES NFR BLD AUTO: 10.9 %
MUCOUS THREADS #/AREA URNS LPF: PRESENT /LPF
MYCOBACTERIUM SPEC CULT: NORMAL
MYCOBACTERIUM SPEC CULT: NORMAL
NEUTROPHILS # BLD AUTO: 5 10E9/L (ref 1.6–8.3)
NEUTROPHILS NFR BLD AUTO: 60 %
NITRATE UR QL: NEGATIVE
NONHDLC SERPL-MCNC: 93 MG/DL
PH UR STRIP: 6.5 PH (ref 5–7)
PHOSPHATE SERPL-MCNC: 3.3 MG/DL (ref 2.5–4.5)
PLATELET # BLD AUTO: 333 10E9/L (ref 150–450)
POTASSIUM SERPL-SCNC: 4.2 MMOL/L (ref 3.4–5.3)
PROT SERPL-MCNC: 8.6 G/DL (ref 6.8–8.8)
RBC # BLD AUTO: 5.21 10E12/L (ref 4.4–5.9)
RBC #/AREA URNS AUTO: ABNORMAL /HPF
SODIUM SERPL-SCNC: 138 MMOL/L (ref 133–144)
SOURCE: ABNORMAL
SP GR UR STRIP: 1.02 (ref 1–1.03)
SPECIMEN SOURCE: NORMAL
TRIGL SERPL-MCNC: 79 MG/DL
TSH SERPL DL<=0.005 MIU/L-ACNC: 1.04 MU/L (ref 0.4–4)
UROBILINOGEN UR STRIP-MCNC: NORMAL MG/DL (ref 0–2)
WBC # BLD AUTO: 8.3 10E9/L (ref 4–11)
WBC #/AREA URNS AUTO: ABNORMAL /HPF

## 2019-06-12 PROCEDURE — 81001 URINALYSIS AUTO W/SCOPE: CPT | Performed by: INTERNAL MEDICINE

## 2019-06-12 PROCEDURE — 82784 ASSAY IGA/IGD/IGG/IGM EACH: CPT | Performed by: INTERNAL MEDICINE

## 2019-06-12 PROCEDURE — 84450 TRANSFERASE (AST) (SGOT): CPT | Performed by: INTERNAL MEDICINE

## 2019-06-12 PROCEDURE — 84460 ALANINE AMINO (ALT) (SGPT): CPT | Performed by: INTERNAL MEDICINE

## 2019-06-12 PROCEDURE — 84446 ASSAY OF VITAMIN E: CPT | Mod: 90 | Performed by: INTERNAL MEDICINE

## 2019-06-12 PROCEDURE — 82043 UR ALBUMIN QUANTITATIVE: CPT | Performed by: INTERNAL MEDICINE

## 2019-06-12 PROCEDURE — 99000 SPECIMEN HANDLING OFFICE-LAB: CPT | Performed by: INTERNAL MEDICINE

## 2019-06-12 PROCEDURE — 80061 LIPID PANEL: CPT | Performed by: INTERNAL MEDICINE

## 2019-06-12 PROCEDURE — 85025 COMPLETE CBC W/AUTO DIFF WBC: CPT | Performed by: INTERNAL MEDICINE

## 2019-06-12 PROCEDURE — 84443 ASSAY THYROID STIM HORMONE: CPT | Performed by: INTERNAL MEDICINE

## 2019-06-12 PROCEDURE — 83735 ASSAY OF MAGNESIUM: CPT | Performed by: INTERNAL MEDICINE

## 2019-06-12 PROCEDURE — 82785 ASSAY OF IGE: CPT | Performed by: INTERNAL MEDICINE

## 2019-06-12 PROCEDURE — 83036 HEMOGLOBIN GLYCOSYLATED A1C: CPT | Performed by: INTERNAL MEDICINE

## 2019-06-12 PROCEDURE — 82977 ASSAY OF GGT: CPT | Performed by: INTERNAL MEDICINE

## 2019-06-12 PROCEDURE — 80069 RENAL FUNCTION PANEL: CPT | Performed by: INTERNAL MEDICINE

## 2019-06-12 PROCEDURE — 85652 RBC SED RATE AUTOMATED: CPT | Performed by: INTERNAL MEDICINE

## 2019-06-12 PROCEDURE — 84075 ASSAY ALKALINE PHOSPHATASE: CPT | Performed by: INTERNAL MEDICINE

## 2019-06-12 PROCEDURE — 84403 ASSAY OF TOTAL TESTOSTERONE: CPT | Performed by: INTERNAL MEDICINE

## 2019-06-12 PROCEDURE — 82306 VITAMIN D 25 HYDROXY: CPT | Performed by: INTERNAL MEDICINE

## 2019-06-12 PROCEDURE — 36415 COLL VENOUS BLD VENIPUNCTURE: CPT | Performed by: INTERNAL MEDICINE

## 2019-06-12 PROCEDURE — 85610 PROTHROMBIN TIME: CPT | Performed by: INTERNAL MEDICINE

## 2019-06-12 PROCEDURE — 84155 ASSAY OF PROTEIN SERUM: CPT | Performed by: INTERNAL MEDICINE

## 2019-06-12 PROCEDURE — 83540 ASSAY OF IRON: CPT | Performed by: INTERNAL MEDICINE

## 2019-06-12 PROCEDURE — 84590 ASSAY OF VITAMIN A: CPT | Mod: 90 | Performed by: INTERNAL MEDICINE

## 2019-06-13 LAB
HBA1C MFR BLD: 6.5 % (ref 0–5.6)
IGA SERPL-MCNC: 278 MG/DL (ref 70–380)
IGG SERPL-MCNC: 1780 MG/DL (ref 695–1620)
IGM SERPL-MCNC: 99 MG/DL (ref 60–265)

## 2019-06-14 LAB
DEPRECATED CALCIDIOL+CALCIFEROL SERPL-MC: 48 UG/L (ref 20–75)
IGE SERPL-ACNC: 39 KIU/L (ref 0–114)
TESTOST SERPL-MCNC: 418 NG/DL (ref 240–950)

## 2019-06-15 LAB
A-TOCOPHEROL VIT E SERPL-MCNC: 13.5 MG/L (ref 5.5–18)
ANNOTATION COMMENT IMP: NORMAL
BETA+GAMMA TOCOPHEROL SERPL-MCNC: 0.2 MG/L (ref 0–6)
RETINYL PALMITATE SERPL-MCNC: <0.02 MG/L (ref 0–0.1)
VIT A SERPL-MCNC: 0.43 MG/L (ref 0.3–1.2)

## 2019-06-18 ENCOUNTER — OFFICE VISIT (OUTPATIENT)
Dept: PULMONOLOGY | Facility: CLINIC | Age: 44
End: 2019-06-18
Attending: INTERNAL MEDICINE
Payer: COMMERCIAL

## 2019-06-18 VITALS
RESPIRATION RATE: 16 BRPM | TEMPERATURE: 98.2 F | WEIGHT: 143.3 LBS | HEART RATE: 92 BPM | HEIGHT: 68 IN | DIASTOLIC BLOOD PRESSURE: 70 MMHG | BODY MASS INDEX: 21.72 KG/M2 | OXYGEN SATURATION: 97 % | SYSTOLIC BLOOD PRESSURE: 103 MMHG

## 2019-06-18 DIAGNOSIS — K86.81 EXOCRINE PANCREATIC INSUFFICIENCY: ICD-10-CM

## 2019-06-18 DIAGNOSIS — E08.9 DIABETES MELLITUS DUE TO CYSTIC FIBROSIS (H): ICD-10-CM

## 2019-06-18 DIAGNOSIS — M85.9 LOW BONE DENSITY: ICD-10-CM

## 2019-06-18 DIAGNOSIS — E84.9 DIABETES MELLITUS DUE TO CYSTIC FIBROSIS (H): ICD-10-CM

## 2019-06-18 DIAGNOSIS — E84.9 CYSTIC FIBROSIS (H): Primary | ICD-10-CM

## 2019-06-18 DIAGNOSIS — E84.11 CYSTIC FIBROSIS WITH MECONIUM ILEUS (H): ICD-10-CM

## 2019-06-18 LAB
EXPTIME-PRE: 15.37 SEC
FEF2575-%PRED-PRE: 13 %
FEF2575-PRE: 0.53 L/SEC
FEF2575-PRED: 3.86 L/SEC
FEFMAX-%PRED-PRE: 64 %
FEFMAX-PRE: 6.34 L/SEC
FEFMAX-PRED: 9.76 L/SEC
FEV1-%PRED-PRE: 37 %
FEV1-PRE: 1.49 L
FEV1FEV6-PRE: 47 %
FEV1FEV6-PRED: 82 %
FEV1FVC-PRE: 41 %
FEV1FVC-PRED: 81 %
FIFMAX-PRE: 4.54 L/SEC
FVC-%PRED-PRE: 74 %
FVC-PRE: 3.66 L
FVC-PRED: 4.92 L

## 2019-06-18 PROCEDURE — 87070 CULTURE OTHR SPECIMN AEROBIC: CPT | Performed by: INTERNAL MEDICINE

## 2019-06-18 PROCEDURE — 87077 CULTURE AEROBIC IDENTIFY: CPT | Performed by: INTERNAL MEDICINE

## 2019-06-18 PROCEDURE — G0463 HOSPITAL OUTPT CLINIC VISIT: HCPCS | Mod: ZF

## 2019-06-18 PROCEDURE — 87186 SC STD MICRODIL/AGAR DIL: CPT | Performed by: INTERNAL MEDICINE

## 2019-06-18 PROCEDURE — 97803 MED NUTRITION INDIV SUBSEQ: CPT | Mod: XU | Performed by: DIETITIAN, REGISTERED

## 2019-06-18 RX ORDER — POLYETHYLENE GLYCOL 3350 17 G/17G
1 POWDER, FOR SOLUTION ORAL DAILY
Qty: 1 BOTTLE | Refills: 3 | Status: SHIPPED | OUTPATIENT
Start: 2019-06-18 | End: 2023-05-16

## 2019-06-18 ASSESSMENT — MIFFLIN-ST. JEOR: SCORE: 1530.63

## 2019-06-18 ASSESSMENT — PAIN SCALES - GENERAL: PAINLEVEL: NO PAIN (0)

## 2019-06-18 NOTE — PROGRESS NOTES
[facility]  June 18, 2019         Assessment and Plan:   Nico Morales is a 43 year old male with cystic fibrosis.    1. CF lung disease with severe obstruction:  Kilo reports that he has been feeling good.  He has had little in the way of changes in his pulmonary symptomatology.  He does continue to show evidence of Pseudomonas in his sputum.  He does feel that his rotation of oral doxycycline every 2 weeks with inhalational aztreonam has provided good stability.  At this time, I have recommended Kilo:   -- That he continue to do vest therapy 4 times per day.   -- He will also continue an inhalational and oral antibiotic rotation.     2.  Gastrointestinal.  Kilo describes some recent constipation.  It does sound like he might have had some distal intestinal obstructive syndrome.  He was able to use MiraLax at home and have resolution of symptoms.     3.  Cystic fibrosis-related diabetes.  Kilo describes excellent control and has evidence of this by his A1C.     4.  Osteoporosis.  Kilo remains on every 3-month pamidronate, which he is tolerating well.     5.  Nutrition.  Kilo has nutritional failure secondary to chronic illness.  He does continue to do tube feeds every 4-6 nights per week.     6.  Psychosocial.  Kilo reports that work is going well.  He is looking forward to a fun summer.     7. Pancreatic Insufficiency:  The patient has no new symptoms consistent with worsening malabsorption.    - continue the present dose of pancreatic enzymes  - continue vitamin supplementation.    Jaye Machado MD MPH  Associate Professor of Medicine  Pulmonary, Allergy, Critical Care and Sleep Medicine      Interval History:     Kilo does report that his cough frequency and sputum volume are unchanged.  His sputum is usually yellow to green in color.  He is performing 4 vest therapies per day.          Review of Systems:     CONSTITUTIONAL: no fever, no  chills, no change in weight, no change in energy, no change in appetite    INTEGUMENTARY/SKIN: no rash, no obvious new lesions    ENT/MOUTH: no sore throat, no new sinus pain, no new nasal drainage, no hearing loss, chronic ear ringing     RESPIRATORY: see interval history    CV: no chest pain, no palpitations, no peripheral edema, no orthopnea, no PND    GI: no nausea, no vomiting, no change in stools, no fatty stools, no GERD, no abdominal pain    : no dysuria, no urinary frequency    MUSCULOSKELETAL: no myalgias, no arthralgias    ENDOCRINE: no excessive thirst, blood sugars with adequate control    NEURO:  No headache, no numbness, no tingling    SLEEP: no issues    PSYCHIATRIC: mood stable          Past Medical and Surgical History:     Past Medical History:   Diagnosis Date     CF (cystic fibrosis) (H)      Exocrine pancreatic insufficiency      Nocardia infection      Pseudomonas infection      S/P gastrostomy (H) 2002     Past Surgical History:   Procedure Laterality Date     COLONOSCOPY N/A 5/21/2018    Procedure: COMBINED COLONOSCOPY, SINGLE OR MULTIPLE BIOPSY/POLYPECTOMY BY BIOPSY;  Cystic fibrosis, 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;  Surgeon: Margarito Bowman MD;  Location: [facility]     GASTROSTOMY TUBE  2002     PICC INSERTION Left 01/22/2018    4Fr SL BioFlo PICC, 46cm (5cm external) in the L basilic vein w/ tip in the low SVC           Family History:     Family History   Problem Relation Age of Onset     Heart Disease Maternal Grandmother      Heart Disease Maternal Grandfather      Heart Disease Paternal Grandmother      Diabetes No family hx of             Social History:     Social History     Socioeconomic History     Marital status: Single     Spouse name: Not on file     Number of children: 0     Years of education: Not on file     Highest education level: Not on file   Occupational History     Occupation: financial palnner     Employer: [employer]   Social Needs     Financial resource  strain: Not on file     Food insecurity:     Worry: Not on file     Inability: Not on file     Transportation needs:     Medical: Not on file     Non-medical: Not on file   Tobacco Use     Smoking status: Never Smoker     Smokeless tobacco: Never Used   Substance and Sexual Activity     Alcohol use: No     Alcohol/week: 0.0 oz     Drug use: No     Sexual activity: Yes     Partners: Female     Birth control/protection: Pill   Lifestyle     Physical activity:     Days per week: Not on file     Minutes per session: Not on file     Stress: Not on file   Relationships     Social connections:     Talks on phone: Not on file     Gets together: Not on file     Attends Druze service: Not on file     Active member of club or organization: Not on file     Attends meetings of clubs or organizations: Not on file     Relationship status: Not on file     Intimate partner violence:     Fear of current or ex partner: Not on file     Emotionally abused: Not on file     Physically abused: Not on file     Forced sexual activity: Not on file   Other Topics Concern     Parent/sibling w/ CABG, MI or angioplasty before 65F 55M? Not Asked      Service Not Asked     Blood Transfusions No     Caffeine Concern Not Asked     Occupational Exposure Not Asked     Hobby Hazards Not Asked     Sleep Concern Not Asked     Stress Concern Not Asked     Weight Concern Not Asked     Special Diet Not Asked     Back Care Not Asked     Exercise No     Bike Helmet Not Asked     Seat Belt Not Asked     Self-Exams Not Asked   Social History Narrative    Kilo lives with wife in a house in [place], [place].  He works as a .        March 2016. He works independently as a . Works out 5x/week and walks the dog daily.        June 2016. No changes. Describes himself as a creature of habit.            Medications:     Current Outpatient Medications   Medication     acetylcysteine (MUCOMYST) 10 % nebulizer solution      "amitriptyline (ELAVIL) 10 MG tablet     amylase-lipase-protease (CREON) 79883-57052 units CPEP per EC capsule     azithromycin (ZITHROMAX) 250 MG tablet     aztreonam lysine (CAYSTON) 75 MG SOLR     BD OMI U/F 32G X 4 MM insulin pen needle     blood glucose (NO BRAND SPECIFIED) lancets standard     blood glucose monitoring (MARTINA CONTOUR NEXT MONITOR) meter device kit     blood glucose monitoring (MARTINA CONTOUR NEXT) test strip     blood glucose monitoring (MARTINA MICROLET) lancets     CALCIUM PO     doxycycline hyclate (VIBRAMYCIN) 100 MG capsule     Ferrous Fumarate-Vitamin C (VITRON-C) 200-125 MG TABS     insulin aspart (NOVOLOG PENFILL) 100 UNIT/ML injection     insulin glargine (LANTUS SOLOSTAR PEN) 100 UNIT/ML pen     levalbuterol (XOPENEX HFA) 45 MCG/ACT inhaler     levalbuterol (XOPENEX) 1.25 MG/3ML neb solution     MEPHYTON 5 MG PO TABS     MULTIVITAMIN OR     Nebulizers (EFLOW SCF NEBULIZER HANDSET) MISC     oseltamivir (TAMIFLU) 75 MG capsule     polyethylene glycol (MIRALAX/GLYCOLAX) powder     Respiratory Therapy Supplies (KRIS ALTERA NEBULIZER HANDSET) MISC     sodium chloride inhalant 7 % NEBU neb solution     SYMBICORT 160-4.5 MCG/ACT Inhaler     ACE NOT PRESCRIBED, INTENTIONAL,     acetaminophen-codeine (TYLENOL #3) 300-30 MG per tablet     [facility] INFUSION MANAGED PATIENT     No current facility-administered medications for this visit.             Physical Exam:   /70 (BP Location: Right arm, Patient Position: Chair, Cuff Size: Adult Regular)   Pulse 92   Temp 98.2  F (36.8  C) (Oral)   Resp 16   Ht 1.737 m (5' 8.39\")   Wt 65 kg (143 lb 4.8 oz)   SpO2 97%   BMI 21.54 kg/m      Constitutional:   Awake, alert and in no apparent distress     Eyes:   nonicteric     ENT:   oral mucosa moist without lesions, normal tm bilaterally, bilateral mucosa normal     Neck:   Supple without supraclavicular or cervical lymphadenopathy     Lungs:   Good air flow.  No crackles. No rhonchi.  No " wheezes.     Cardiovascular:   Normal S1 and S2.  RRR.  No murmur, gallop or rub.     Abdomen:   NABS, soft, nontender, nondistended.  g tube     Musculoskeletal:   No edema, digital clubbing present     Neurologic:   Alert and conversant.     Skin:   Warm, dry.  No rash on limited exam.             Data:   All laboratory and imaging data reviewed.    Cystic Fibrosis Culture  Specimen Description   Date Value Ref Range Status   04/16/2019 Sputum  Final   04/16/2019 Sputum  Final   04/16/2019 Sputum  Final    Culture Micro   Date Value Ref Range Status   04/16/2019 Moderate growth  Normal santi    Final   04/16/2019 (A)  Final    Light growth  Pseudomonas aeruginosa, mucoid strain     04/16/2019 (A)  Final    Light growth  Strain 2  Pseudomonas aeruginosa, mucoid strain     04/16/2019 Culture negative for acid fast bacilli  Final   04/16/2019   Final    Assayed at [facility] [address] [place] [address] [phone]        Recent Results (from the past 168 hour(s))   Erythrocyte sedimentation rate auto    Collection Time: 06/12/19 11:58 AM   Result Value Ref Range    Sed Rate 20 (H) 0 - 15 mm/h   CBC with platelets differential    Collection Time: 06/12/19 11:58 AM   Result Value Ref Range    WBC 8.3 4.0 - 11.0 10e9/L    RBC Count 5.21 4.4 - 5.9 10e12/L    Hemoglobin 14.2 13.3 - 17.7 g/dL    Hematocrit 44.8 40.0 - 53.0 %    MCV 86 78 - 100 fl    MCH 27.3 26.5 - 33.0 pg    MCHC 31.7 31.5 - 36.5 g/dL    RDW 13.5 10.0 - 15.0 %    Platelet Count 333 150 - 450 10e9/L    % Neutrophils 60.0 %    % Lymphocytes 26.7 %    % Monocytes 10.9 %    % Eosinophils 1.4 %    % Basophils 0.6 %    % Immature Granulocytes 0.4 %    Absolute Neutrophil 5.0 1.6 - 8.3 10e9/L    Absolute Lymphocytes 2.2 0.8 - 5.3 10e9/L    Absolute Monocytes 0.9 0.0 - 1.3 10e9/L    Absolute Eosinophils 0.1 0.0 - 0.7 10e9/L    Absolute Basophils 0.1 0.0 - 0.2 10e9/L    Abs Immature Granulocytes 0.0 0 - 0.4 10e9/L    Diff Method Automated Method     Vitamin D Deficiency    Collection Time: 06/12/19 11:58 AM   Result Value Ref Range    Vitamin D Deficiency screening 48 20 - 75 ug/L   Vitamin E    Collection Time: 06/12/19 11:58 AM   Result Value Ref Range    Vitamin E 13.5 5.5 - 18.0 mg/L    Vitamin E Gamma 0.2 0.0 - 6.0 mg/L   Vitamin A    Collection Time: 06/12/19 11:58 AM   Result Value Ref Range    Vitamin A 0.43 0.30 - 1.20 mg/L    Retinol Palmitate <0.02 0.00 - 0.10 mg/L    Vitamin A Interp Normal    TSH with free T4 reflex    Collection Time: 06/12/19 11:58 AM   Result Value Ref Range    TSH 1.04 0.40 - 4.00 mU/L   Protein total    Collection Time: 06/12/19 11:58 AM   Result Value Ref Range    Protein Total 8.6 6.8 - 8.8 g/dL   Testosterone total     Collection Time: 06/12/19 11:58 AM   Result Value Ref Range    Testosterone Total 418 240 - 950 ng/dL   Phosphorus    Collection Time: 06/12/19 11:58 AM   Result Value Ref Range    Phosphorus 3.3 2.5 - 4.5 mg/dL   Magnesium    Collection Time: 06/12/19 11:58 AM   Result Value Ref Range    Magnesium 2.3 1.6 - 2.3 mg/dL   INR    Collection Time: 06/12/19 11:58 AM   Result Value Ref Range    INR 0.97 0.86 - 1.14   IgE    Collection Time: 06/12/19 11:58 AM   Result Value Ref Range    IGE 39 0 - 114 KIU/L   IgM    Collection Time: 06/12/19 11:58 AM   Result Value Ref Range    IGM 99 60 - 265 mg/dL   IgG    Collection Time: 06/12/19 11:58 AM   Result Value Ref Range    IGG 1,780 (H) 695 - 1,620 mg/dL   IgA    Collection Time: 06/12/19 11:58 AM   Result Value Ref Range     70 - 380 mg/dL   Hemoglobin A1c    Collection Time: 06/12/19 11:58 AM   Result Value Ref Range    Hemoglobin A1C 6.5 (H) 0 - 5.6 %   GGT    Collection Time: 06/12/19 11:58 AM   Result Value Ref Range    GGT 40 0 - 75 U/L   Iron    Collection Time: 06/12/19 11:58 AM   Result Value Ref Range    Iron 35 35 - 180 ug/dL   AST    Collection Time: 06/12/19 11:58 AM   Result Value Ref Range    AST 19 0 - 45 U/L   Alkaline phosphatase     Collection Time: 06/12/19 11:58 AM   Result Value Ref Range    Alkaline Phosphatase 141 40 - 150 U/L   Albumin level    Collection Time: 06/12/19 11:58 AM   Result Value Ref Range    Albumin 3.3 (L) 3.4 - 5.0 g/dL   Lipid Profile    Collection Time: 06/12/19 11:58 AM   Result Value Ref Range    Cholesterol 131 <200 mg/dL    Triglycerides 79 <150 mg/dL    HDL Cholesterol 38 (L) >39 mg/dL    LDL Cholesterol Calculated 77 <100 mg/dL    Non HDL Cholesterol 93 <130 mg/dL   Basic metabolic panel    Collection Time: 06/12/19 11:58 AM   Result Value Ref Range    Sodium 138 133 - 144 mmol/L    Potassium 4.2 3.4 - 5.3 mmol/L    Chloride 102 94 - 109 mmol/L    Carbon Dioxide 31 20 - 32 mmol/L    Anion Gap 5 3 - 14 mmol/L    Glucose 141 (H) 70 - 99 mg/dL    Urea Nitrogen 17 7 - 30 mg/dL    Creatinine 0.80 0.66 - 1.25 mg/dL    GFR Estimate >90 >60 mL/min/[1.73_m2]    GFR Estimate If Black >90 >60 mL/min/[1.73_m2]    Calcium 9.2 8.5 - 10.1 mg/dL   ALT    Collection Time: 06/12/19 11:58 AM   Result Value Ref Range    ALT 23 0 - 70 U/L   Albumin Random Urine Quantitative with Creat Ratio    Collection Time: 06/12/19 12:07 PM   Result Value Ref Range    Creatinine Urine 164 mg/dL    Albumin Urine mg/L 11 mg/L    Albumin Urine mg/g Cr 6.95 0 - 17 mg/g Cr   Routine UA with microscopic    Collection Time: 06/12/19 12:07 PM   Result Value Ref Range    Color Urine Yellow     Appearance Urine Clear     Glucose Urine Negative NEG^Negative mg/dL    Bilirubin Urine Negative NEG^Negative    Ketones Urine Negative NEG^Negative mg/dL    Specific Gravity Urine 1.022 1.003 - 1.035    Blood Urine Negative NEG^Negative    pH Urine 6.5 5.0 - 7.0 pH    Protein Albumin Urine Negative NEG^Negative mg/dL    Urobilinogen mg/dL Normal 0.0 - 2.0 mg/dL    Nitrite Urine Negative NEG^Negative    Leukocyte Esterase Urine Negative NEG^Negative    Source Midstream Urine     WBC Urine 0 - 5 OTO5^0 - 5 /HPF    RBC Urine O - 2 OTO2^O - 2 /HPF    Bacteria Urine Few  (A) NEG^Negative /HPF    Mucous Urine Present (A) NEG^Negative /LPF   General PFT Lab (Please always keep checked)    Collection Time: 06/18/19 11:08 AM   Result Value Ref Range    FVC-Pred 4.92 L    FVC-Pre 3.66 L    FVC-%Pred-Pre 74 %    FEV1-Pre 1.49 L    FEV1-%Pred-Pre 37 %    FEV1FVC-Pred 81 %    FEV1FVC-Pre 41 %    FEFMax-Pred 9.76 L/sec    FEFMax-Pre 6.34 L/sec    FEFMax-%Pred-Pre 64 %    FEF2575-Pred 3.86 L/sec    FEF2575-Pre 0.53 L/sec    YLJ5127-%Pred-Pre 13 %    ExpTime-Pre 15.37 sec    FIFMax-Pre 4.54 L/sec    FEV1FEV6-Pred 82 %    FEV1FEV6-Pre 47 %       PFT: Severe obstructive lung disease.  When compared to 4/16/2019, the FEV1 and FVC have little change.  The decrease in FVC may represent air trapping v. restrictive physiology.  Lung volumes would be necessary to determine.    Pulmonary exacerbation: absent

## 2019-06-18 NOTE — PATIENT INSTRUCTIONS
Cystic Fibrosis Self-Care Plan    RECOMMENDATIONS:   Keep up the good work.  Agree with the continue rotation of oral and inhalational antibiotic.  Try the miralax one capful daily.  Titrate dose as needed.    YOUR GOAL:  Enjoy the summer!!      [facility] [place] Nurse line:  Marquise HendersonSandoval    [phone]     [facility] [place] Fax Number:      [phone]         Cystic fibrosis Respiratory Therapist:   Felicitas Banuelos              [phone]   Cystic fibrosis Dietitians:              Zeina Pierre              [phone]                            Ivory Bonilla                        [phone]   Cystic fibrosis Diabetes Nurse:    Jordyn Rudolph   [phone]    Cystic fibrosis Social Workers:     Mary Ritter               [phone]                     Louann Suarez               [phone]  Cystic fibrosis Pharmacist:           Tammy Morgan                               [phone]         Ml Tilley   [phone]  Cystic Fibrosis Genetic Counselor:   Pretty Pulliam    [phone]    [facility] [place] website:  [email or web address]         MRN: [id number]   Clinic Date: June 18, 2019   Patient: Nico Morales     Annual Studies:   IGG   Date Value Ref Range Status   06/12/2019 1,780 (H) 695 - 1,620 mg/dL Final     Insulin   Date Value Ref Range Status   07/26/2011 30 mU/L Final     Comment:     Reference Range:  0-20  +120     There are no preventive care reminders to display for this patient.    Pulmonary Function Tests  FEV1: amount of air you can blow out in 1 second  FVC: total amount of air you can take in and blow out    Your Goals:         PFT Latest Ref Rng & Units 6/18/2019   FVC L 3.66   FEV1 L 1.49   FVC% % 74   FEV1% % 37          Airway Clearance: The Most Important Way to Keep Your Lungs Healthy  Vest Settings:    Hill-Rom Frequencies: 8, 9, 10 Pressure 10 Then, Frequencies 18, 19, 20 Pressure 6      RespirTech: Quick Start with  Pressure of     Do each frequency for 5 minutes; Deflate vest after each frequency & cough 3 times before beginning the next setting.    Vest and Neb Therapy should be done 4 times/day.    Good Nutrition Can Improve Lung Function and Overall Health     Take ALL of your vitamins with food     Take 1/2 of your enzymes before EVERY meal/snack and the other 1/2 mid-meal/snack    Wt Readings from Last 3 Encounters:   06/18/19 65 kg (143 lb 4.8 oz)   04/16/19 65.1 kg (143 lb 8.3 oz)   03/28/19 66.8 kg (147 lb 3.2 oz)       Body mass index is 21.54 kg/m .         National CF Foundation Recommendations for BMI in CF Adults: Women: at least 22 Men: at least 23        Controlling Blood Sugars Helps Prevent Lung Infections & Improves Nutrition  Test blood sugar:     In the morning before eating (goal is )     2 hours after a meal (goal is less than 150)     When pre-meal glucose is greater than 150 add correction     At bedtime (if less than 100 eat a snack with 15 grams of carbohydrates  Last A1C Results:   Hemoglobin A1C   Date Value Ref Range Status   06/12/2019 6.5 (H) 0 - 5.6 % Final     Comment:     Normal <5.7% Prediabetes 5.7-6.4%  Diabetes 6.5% or higher - adopted from ADA   consensus guidelines.           If diabetic, measure A1C every 6 months. Goal: Under 7%    Staying Healthy    Research:  If you are interested in learning about research opportunities or have questions, please contact the CF Research Team at [phone] or [email or web address].      CF Foundation:  Compass is a personalized resource service to help you with the insurance, financial, legal and other issues you are facing.  It's free, confidential and available to anyone with CF.  Ask your  for more information or contact Compass directly at 084-COMPASS ([phone]) or compass@cff.org, or learn more at cff.[email or web address].

## 2019-06-18 NOTE — LETTER
6/18/2019       RE: Nico Morales  [address]  [facility] [address]     Dear Colleague,    Thank you for referring your patient, Nico Morales, to the [facility] at [facility]. Please see a copy of my visit note below.    [facility]  June 18, 2019         Assessment and Plan:   Nico Morales is a 43 year old male with cystic fibrosis.    1. CF lung disease with severe obstruction:  Kilo reports that he has been feeling good.  He has had little in the way of changes in his pulmonary symptomatology.  He does continue to show evidence of Pseudomonas in his sputum.  He does feel that his rotation of oral doxycycline every 2 weeks with inhalational aztreonam has provided good stability.  At this time, I have recommended Kilo:   -- That he continue to do vest therapy 4 times per day.   -- He will also continue an inhalational and oral antibiotic rotation.     2.  Gastrointestinal.  Kilo describes some recent constipation.  It does sound like he might have had some distal intestinal obstructive syndrome.  He was able to use MiraLax at home and have resolution of symptoms.     3.  Cystic fibrosis-related diabetes.  Kilo describes excellent control and has evidence of this by his A1C.     4.  Osteoporosis.  Kilo remains on every 3-month pamidronate, which he is tolerating well.     5.  Nutrition.  Kilo has nutritional failure secondary to chronic illness.  He does continue to do tube feeds every 4-6 nights per week.     6.  Psychosocial.  Kilo reports that work is going well.  He is looking forward to a fun summer.     7. Pancreatic Insufficiency:  The patient has no new symptoms consistent with worsening malabsorption.    - continue the present dose of pancreatic enzymes  - continue vitamin supplementation.    Jaye Machado MD MPH   of  Medicine  Pulmonary, Allergy, Critical Care and Sleep Medicine      Interval History:     Kilo does report that his cough frequency and sputum volume are unchanged.  His sputum is usually yellow to green in color.  He is performing 4 vest therapies per day.          Review of Systems:     CONSTITUTIONAL: no fever, no chills, no change in weight, no change in energy, no change in appetite    INTEGUMENTARY/SKIN: no rash, no obvious new lesions    ENT/MOUTH: no sore throat, no new sinus pain, no new nasal drainage, no hearing loss, chronic ear ringing     RESPIRATORY: see interval history    CV: no chest pain, no palpitations, no peripheral edema, no orthopnea, no PND    GI: no nausea, no vomiting, no change in stools, no fatty stools, no GERD, no abdominal pain    : no dysuria, no urinary frequency    MUSCULOSKELETAL: no myalgias, no arthralgias    ENDOCRINE: no excessive thirst, blood sugars with adequate control    NEURO:  No headache, no numbness, no tingling    SLEEP: no issues    PSYCHIATRIC: mood stable          Past Medical and Surgical History:     Past Medical History:   Diagnosis Date     CF (cystic fibrosis) (H)      Exocrine pancreatic insufficiency      Nocardia infection      Pseudomonas infection      S/P gastrostomy (H) 2002     Past Surgical History:   Procedure Laterality Date     COLONOSCOPY N/A 5/21/2018    Procedure: COMBINED COLONOSCOPY, SINGLE OR MULTIPLE BIOPSY/POLYPECTOMY BY BIOPSY;  Cystic fibrosis, 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;  Surgeon: Margarito Bowman MD;  Location: [facility]     GASTROSTOMY TUBE  2002     PICC INSERTION Left 01/22/2018    4Fr SL BioFlo PICC, 46cm (5cm external) in the L basilic vein w/ tip in the low SVC           Family History:     Family History   Problem Relation Age of Onset     Heart Disease Maternal Grandmother      Heart Disease Maternal Grandfather      Heart Disease Paternal Grandmother      Diabetes No family hx of             Social History:     Social  History     Socioeconomic History     Marital status: Single     Spouse name: Not on file     Number of children: 0     Years of education: Not on file     Highest education level: Not on file   Occupational History     Occupation: financial palnner     Employer: [employer]   Social Needs     Financial resource strain: Not on file     Food insecurity:     Worry: Not on file     Inability: Not on file     Transportation needs:     Medical: Not on file     Non-medical: Not on file   Tobacco Use     Smoking status: Never Smoker     Smokeless tobacco: Never Used   Substance and Sexual Activity     Alcohol use: No     Alcohol/week: 0.0 oz     Drug use: No     Sexual activity: Yes     Partners: Female     Birth control/protection: Pill   Lifestyle     Physical activity:     Days per week: Not on file     Minutes per session: Not on file     Stress: Not on file   Relationships     Social connections:     Talks on phone: Not on file     Gets together: Not on file     Attends Latter-day service: Not on file     Active member of club or organization: Not on file     Attends meetings of clubs or organizations: Not on file     Relationship status: Not on file     Intimate partner violence:     Fear of current or ex partner: Not on file     Emotionally abused: Not on file     Physically abused: Not on file     Forced sexual activity: Not on file   Other Topics Concern     Parent/sibling w/ CABG, MI or angioplasty before 65F 55M? Not Asked      Service Not Asked     Blood Transfusions No     Caffeine Concern Not Asked     Occupational Exposure Not Asked     Hobby Hazards Not Asked     Sleep Concern Not Asked     Stress Concern Not Asked     Weight Concern Not Asked     Special Diet Not Asked     Back Care Not Asked     Exercise No     Bike Helmet Not Asked     Seat Belt Not Asked     Self-Exams Not Asked   Social History Narrative    Kilo lives with wife in a house in [place], [place].  He works as a .  "       March 2016. He works independently as a . Works out 5x/week and walks the dog daily.        June 2016. No changes. Describes himself as a creature of habit.            Medications:     Current Outpatient Medications   Medication     acetylcysteine (MUCOMYST) 10 % nebulizer solution     amitriptyline (ELAVIL) 10 MG tablet     amylase-lipase-protease (CREON) 15386-82917 units CPEP per EC capsule     azithromycin (ZITHROMAX) 250 MG tablet     aztreonam lysine (CAYSTON) 75 MG SOLR     BD OMI U/F 32G X 4 MM insulin pen needle     blood glucose (NO BRAND SPECIFIED) lancets standard     blood glucose monitoring (Burpple CONTOUR NEXT MONITOR) meter device kit     blood glucose monitoring (MARTINA CONTOUR NEXT) test strip     blood glucose monitoring (MARTINA MICROLET) lancets     CALCIUM PO     doxycycline hyclate (VIBRAMYCIN) 100 MG capsule     Ferrous Fumarate-Vitamin C (VITRON-C) 200-125 MG TABS     insulin aspart (NOVOLOG PENFILL) 100 UNIT/ML injection     insulin glargine (LANTUS SOLOSTAR PEN) 100 UNIT/ML pen     levalbuterol (XOPENEX HFA) 45 MCG/ACT inhaler     levalbuterol (XOPENEX) 1.25 MG/3ML neb solution     MEPHYTON 5 MG PO TABS     MULTIVITAMIN OR     Nebulizers (EFLOW SCF NEBULIZER HANDSET) MISC     oseltamivir (TAMIFLU) 75 MG capsule     polyethylene glycol (MIRALAX/GLYCOLAX) powder     Respiratory Therapy Supplies (KRIS ALTERA NEBULIZER HANDSET) MISC     sodium chloride inhalant 7 % NEBU neb solution     SYMBICORT 160-4.5 MCG/ACT Inhaler     ACE NOT PRESCRIBED, INTENTIONAL,     acetaminophen-codeine (TYLENOL #3) 300-30 MG per tablet     [facility] INFUSION MANAGED PATIENT     No current facility-administered medications for this visit.             Physical Exam:   /70 (BP Location: Right arm, Patient Position: Chair, Cuff Size: Adult Regular)   Pulse 92   Temp 98.2  F (36.8  C) (Oral)   Resp 16   Ht 1.737 m (5' 8.39\")   Wt 65 kg (143 lb 4.8 oz)   SpO2 97%   BMI 21.54 kg/m   "     Constitutional:   Awake, alert and in no apparent distress     Eyes:   nonicteric     ENT:   oral mucosa moist without lesions, normal tm bilaterally, bilateral mucosa normal     Neck:   Supple without supraclavicular or cervical lymphadenopathy     Lungs:   Good air flow.  No crackles. No rhonchi.  No wheezes.     Cardiovascular:   Normal S1 and S2.  RRR.  No murmur, gallop or rub.     Abdomen:   NABS, soft, nontender, nondistended.  g tube     Musculoskeletal:   No edema, digital clubbing present     Neurologic:   Alert and conversant.     Skin:   Warm, dry.  No rash on limited exam.             Data:   All laboratory and imaging data reviewed.    Cystic Fibrosis Culture  Specimen Description   Date Value Ref Range Status   04/16/2019 Sputum  Final   04/16/2019 Sputum  Final   04/16/2019 Sputum  Final    Culture Micro   Date Value Ref Range Status   04/16/2019 Moderate growth  Normal santi    Final   04/16/2019 (A)  Final    Light growth  Pseudomonas aeruginosa, mucoid strain     04/16/2019 (A)  Final    Light growth  Strain 2  Pseudomonas aeruginosa, mucoid strain     04/16/2019 Culture negative for acid fast bacilli  Final   04/16/2019   Final    Assayed at [facility], [address] [place] [address] [phone]        Recent Results (from the past 168 hour(s))   Erythrocyte sedimentation rate auto    Collection Time: 06/12/19 11:58 AM   Result Value Ref Range    Sed Rate 20 (H) 0 - 15 mm/h   CBC with platelets differential    Collection Time: 06/12/19 11:58 AM   Result Value Ref Range    WBC 8.3 4.0 - 11.0 10e9/L    RBC Count 5.21 4.4 - 5.9 10e12/L    Hemoglobin 14.2 13.3 - 17.7 g/dL    Hematocrit 44.8 40.0 - 53.0 %    MCV 86 78 - 100 fl    MCH 27.3 26.5 - 33.0 pg    MCHC 31.7 31.5 - 36.5 g/dL    RDW 13.5 10.0 - 15.0 %    Platelet Count 333 150 - 450 10e9/L    % Neutrophils 60.0 %    % Lymphocytes 26.7 %    % Monocytes 10.9 %    % Eosinophils 1.4 %    % Basophils 0.6 %    % Immature  Granulocytes 0.4 %    Absolute Neutrophil 5.0 1.6 - 8.3 10e9/L    Absolute Lymphocytes 2.2 0.8 - 5.3 10e9/L    Absolute Monocytes 0.9 0.0 - 1.3 10e9/L    Absolute Eosinophils 0.1 0.0 - 0.7 10e9/L    Absolute Basophils 0.1 0.0 - 0.2 10e9/L    Abs Immature Granulocytes 0.0 0 - 0.4 10e9/L    Diff Method Automated Method    Vitamin D Deficiency    Collection Time: 06/12/19 11:58 AM   Result Value Ref Range    Vitamin D Deficiency screening 48 20 - 75 ug/L   Vitamin E    Collection Time: 06/12/19 11:58 AM   Result Value Ref Range    Vitamin E 13.5 5.5 - 18.0 mg/L    Vitamin E Gamma 0.2 0.0 - 6.0 mg/L   Vitamin A    Collection Time: 06/12/19 11:58 AM   Result Value Ref Range    Vitamin A 0.43 0.30 - 1.20 mg/L    Retinol Palmitate <0.02 0.00 - 0.10 mg/L    Vitamin A Interp Normal    TSH with free T4 reflex    Collection Time: 06/12/19 11:58 AM   Result Value Ref Range    TSH 1.04 0.40 - 4.00 mU/L   Protein total    Collection Time: 06/12/19 11:58 AM   Result Value Ref Range    Protein Total 8.6 6.8 - 8.8 g/dL   Testosterone total     Collection Time: 06/12/19 11:58 AM   Result Value Ref Range    Testosterone Total 418 240 - 950 ng/dL   Phosphorus    Collection Time: 06/12/19 11:58 AM   Result Value Ref Range    Phosphorus 3.3 2.5 - 4.5 mg/dL   Magnesium    Collection Time: 06/12/19 11:58 AM   Result Value Ref Range    Magnesium 2.3 1.6 - 2.3 mg/dL   INR    Collection Time: 06/12/19 11:58 AM   Result Value Ref Range    INR 0.97 0.86 - 1.14   IgE    Collection Time: 06/12/19 11:58 AM   Result Value Ref Range    IGE 39 0 - 114 KIU/L   IgM    Collection Time: 06/12/19 11:58 AM   Result Value Ref Range    IGM 99 60 - 265 mg/dL   IgG    Collection Time: 06/12/19 11:58 AM   Result Value Ref Range    IGG 1,780 (H) 695 - 1,620 mg/dL   IgA    Collection Time: 06/12/19 11:58 AM   Result Value Ref Range     70 - 380 mg/dL   Hemoglobin A1c    Collection Time: 06/12/19 11:58 AM   Result Value Ref Range    Hemoglobin A1C 6.5 (H) 0  - 5.6 %   GGT    Collection Time: 06/12/19 11:58 AM   Result Value Ref Range    GGT 40 0 - 75 U/L   Iron    Collection Time: 06/12/19 11:58 AM   Result Value Ref Range    Iron 35 35 - 180 ug/dL   AST    Collection Time: 06/12/19 11:58 AM   Result Value Ref Range    AST 19 0 - 45 U/L   Alkaline phosphatase    Collection Time: 06/12/19 11:58 AM   Result Value Ref Range    Alkaline Phosphatase 141 40 - 150 U/L   Albumin level    Collection Time: 06/12/19 11:58 AM   Result Value Ref Range    Albumin 3.3 (L) 3.4 - 5.0 g/dL   Lipid Profile    Collection Time: 06/12/19 11:58 AM   Result Value Ref Range    Cholesterol 131 <200 mg/dL    Triglycerides 79 <150 mg/dL    HDL Cholesterol 38 (L) >39 mg/dL    LDL Cholesterol Calculated 77 <100 mg/dL    Non HDL Cholesterol 93 <130 mg/dL   Basic metabolic panel    Collection Time: 06/12/19 11:58 AM   Result Value Ref Range    Sodium 138 133 - 144 mmol/L    Potassium 4.2 3.4 - 5.3 mmol/L    Chloride 102 94 - 109 mmol/L    Carbon Dioxide 31 20 - 32 mmol/L    Anion Gap 5 3 - 14 mmol/L    Glucose 141 (H) 70 - 99 mg/dL    Urea Nitrogen 17 7 - 30 mg/dL    Creatinine 0.80 0.66 - 1.25 mg/dL    GFR Estimate >90 >60 mL/min/[1.73_m2]    GFR Estimate If Black >90 >60 mL/min/[1.73_m2]    Calcium 9.2 8.5 - 10.1 mg/dL   ALT    Collection Time: 06/12/19 11:58 AM   Result Value Ref Range    ALT 23 0 - 70 U/L   Albumin Random Urine Quantitative with Creat Ratio    Collection Time: 06/12/19 12:07 PM   Result Value Ref Range    Creatinine Urine 164 mg/dL    Albumin Urine mg/L 11 mg/L    Albumin Urine mg/g Cr 6.95 0 - 17 mg/g Cr   Routine UA with microscopic    Collection Time: 06/12/19 12:07 PM   Result Value Ref Range    Color Urine Yellow     Appearance Urine Clear     Glucose Urine Negative NEG^Negative mg/dL    Bilirubin Urine Negative NEG^Negative    Ketones Urine Negative NEG^Negative mg/dL    Specific Gravity Urine 1.022 1.003 - 1.035    Blood Urine Negative NEG^Negative    pH Urine 6.5 5.0 - 7.0  pH    Protein Albumin Urine Negative NEG^Negative mg/dL    Urobilinogen mg/dL Normal 0.0 - 2.0 mg/dL    Nitrite Urine Negative NEG^Negative    Leukocyte Esterase Urine Negative NEG^Negative    Source Midstream Urine     WBC Urine 0 - 5 OTO5^0 - 5 /HPF    RBC Urine O - 2 OTO2^O - 2 /HPF    Bacteria Urine Few (A) NEG^Negative /HPF    Mucous Urine Present (A) NEG^Negative /LPF   General PFT Lab (Please always keep checked)    Collection Time: 06/18/19 11:08 AM   Result Value Ref Range    FVC-Pred 4.92 L    FVC-Pre 3.66 L    FVC-%Pred-Pre 74 %    FEV1-Pre 1.49 L    FEV1-%Pred-Pre 37 %    FEV1FVC-Pred 81 %    FEV1FVC-Pre 41 %    FEFMax-Pred 9.76 L/sec    FEFMax-Pre 6.34 L/sec    FEFMax-%Pred-Pre 64 %    FEF2575-Pred 3.86 L/sec    FEF2575-Pre 0.53 L/sec    RAN6011-%Pred-Pre 13 %    ExpTime-Pre 15.37 sec    FIFMax-Pre 4.54 L/sec    FEV1FEV6-Pred 82 %    FEV1FEV6-Pre 47 %       PFT: Severe obstructive lung disease.  When compared to 4/16/2019, the FEV1 and FVC have little change.  The decrease in FVC may represent air trapping v. restrictive physiology.  Lung volumes would be necessary to determine.    Pulmonary exacerbation: absent        Again, thank you for allowing me to participate in the care of your patient.      Sincerely,    Jaye Machado MD

## 2019-06-20 NOTE — PROGRESS NOTES
CF Annual Nutrition Assessment    Reason for Assessment  Assessed during Dr. Jaye Machado' clinic r/t increased nutrition risk with diagnosis of CF per protocol.    Nutrition Significant PMH  Severe Lung Disease   Pancreatic Insufficient   CF-Related Diabetes  G-tube for nutrition support (actively on TF) - uses [employer], switched from [email or web address] in 2019 due to insurance coverage    Social Assessment  Living situation: Living by himself in a house with his dog.   Work/School/Disability: Works full-time as a financial/residential planner in a private firm.  Food Insecurity:  None    Anthropometric Assessment  Height: 173.7 cm  IBW based on BMI 22 for females and 23 for males per CF Foundation recs: 69.5 kg  Today's Weight: 65 kg (actual weight)   %IBW: 94%   Body mass index is 21.54 kg/m .   Current weight is considered: Normal BMI; however, not a CF goal BMI.    Wt Readings from Last 10 Encounters:   19 65 kg (143 lb 4.8 oz)   19 65.1 kg (143 lb 8.3 oz)   19 66.8 kg (147 lb 3.2 oz)   19 65.9 kg (145 lb 4.5 oz)   18 66.4 kg (146 lb 6.2 oz)   18 67.3 kg (148 lb 6.4 oz)   10/30/18 66.7 kg (147 lb 0.8 oz)   18 66.1 kg (145 lb 11.6 oz)   18 64.2 kg (141 lb 8.6 oz)   18 64 kg (141 lb 1.5 oz)       Weight Status: Excellent progress with maintaining his weight gain while he continues to do regular TF support in addition to oral intake. Feels well.  Throughout his life he has had trouble gaining/maintaining weight so this successful weight gain is motivating to him.     Physical Activity/Exercise:  Has been trying to exercise a bit more lately, also takes his dog on regular walks during the day.     MALNUTRITION  Patient does not meet 2 of the criteria necessary for diagnosing malnutrition.    Pancreatic Enzymes  Brand:  Creon [id number]  Dosin-8 with meals, 2 with snacks/supplements, 3 at the beginning and 2 at the end of TF cycle (total per day: 30 on  average)    Total daily amount providing 31607 units lipase/kg which is slightly above the recommended range per CF Foundation to inhibit fibrosing colonopathy, however this includes TF dose.     Are you taking enzymes as recommended: Yes   Signs of Malabsorption:  No  Enzyme Program:   [facility] - remains active    Diet History and Assessment  Diet Preferences/Allergies/Intolerances: Regular    Intake Recall/Comments:  Eats three meals per day and supplement, tends to not snack.  Patient has been chronically unable to meet calorie/nutrient needs orally and thus has had to supplement his oral diet with TF support in order to maintain/gain weight.  He is also trying to do extra protein shakes in the morning. Pt reports that he is easily able to afford food, shops for himself and also prepares his own meals and snacks.  Although this is not his favorite task he is motivated to improve and maintain his nutritional gains through oral intake + TF support.     Calcium: 2-3 servings dairy per day, also takes calcium/D supplement with additional vitamin D   Supplements:  Yes, obtains these from [facility] or buys PRN. Has also used protein powders to make homemade shakes.   Tube Feeding:  Has a G tube and has been infusing TF atleast 5 nights a week (uses gravity drip, no pump). Current Home TF Regimen: Two Javier HN - 2 cans/day - provides 474 ml with 950 kcal, 40 gm protein, 104 gm CHO, 2.4 gm fiber, 43 gm fat, 332 ml free water.  See above for enzyme regimen for TF. Current PERT regimen with TF provides 2791 lipase units/gm of total fat in TF which is within the recommended range.      -Pt reports transition from [facility] to [facility] has gone well.  No problems currently.     Estimated Energy and Protein Needs  Estimation based on weight maintenance and gain with Severe lung disease and pancreatic insufficient.    BEE: ~1490 kcals                           3500 - 3850 kcals/day 200-225% BEE (+ 500 kcals/day for  weight gain)   g protein/day 1.5-2 g/kg    Laboratory Assessment    Vitamin A   Date Value Ref Range Status   06/12/2019 0.43 0.30 - 1.20 mg/L Final     Vitamin D Deficiency screening   Date Value Ref Range Status   06/12/2019 48 20 - 75 ug/L Final     Comment:     Season, race, dietary intake, and treatment affect the concentration of   25-hydroxy-Vitamin D. Values may decrease during winter months and increase   during summer months. Values 20-29 ug/L may indicate Vitamin D insufficiency   and values <20 ug/L may indicate Vitamin D deficiency.  Vitamin D determination is routinely performed by an immunoassay specific for   25 hydroxyvitamin D3.  If an individual is on vitamin D2 (ergocalciferol)   supplementation, please specify 25 OH vitamin D2 and D3 level determination by   LCMSMS test [id number].       Vitamin E   Date Value Ref Range Status   06/12/2019 13.5 5.5 - 18.0 mg/L Final     Comment:     (Note)  Test developed and characteristics determined by [facility]. See Compliance Statement B: [employer]/CS       Iron   Date Value Ref Range Status   06/12/2019 35 35 - 180 ug/dL Final     Cholesterol   Date Value Ref Range Status   06/12/2019 131 <200 mg/dL Final     Triglycerides   Date Value Ref Range Status   06/12/2019 79 <150 mg/dL Final     Comment:     Fasting specimen     HDL Cholesterol   Date Value Ref Range Status   06/12/2019 38 (L) >39 mg/dL Final     LDL Cholesterol Calculated   Date Value Ref Range Status   06/12/2019 77 <100 mg/dL Final     Comment:     Desirable:       <100 mg/dl     VLDL-Cholesterol   Date Value Ref Range Status   06/26/2013 13 0 - 30 mg/dL Final     Cholesterol/HDL Ratio   Date Value Ref Range Status   06/26/2013 4.4 0.0 - 5.0 Final     DEXA: (December 2016) Showed significant bone loss - pt is seeing Dr. Lee, now on every 3 month pamidronate. He reports no problems with these infusions.    Current Vitamin/Mineral Prescriptions: ADEK multivitamin (pt unsure of  brand but likely MVW), Vitamin D 2000+ International Units, Vitamin E 400 International Units, Calcium with D 1 tablet twice daily and Vitron-C 1 tablet twice daily.    Comments:  Has been very compliant with calcium/vitamin D changes that we discussed in his last several visits to support bone health.  Obtains ADEK from [facility].     CF Related Diabetes Evaluation  Hgb A1C: 6.5%   Date: 6/12/19  FSBG range: Not discussed today  Insulin: Yes    Insulin Regimen: Novolog 1 unit:20 g carb, Lantus 6 units  Carbohydrate Counting: Yes     NUTRITION DIAGNOSIS  Impaired nutrient utilization r/t CF related diabetes, pancreatic insufficiency and CF hypermetabolism AEB requires PERT, insulin therapy and high kcal/pro intake through TF + diet to maintain/improve nutrition and health status.  INTERVENTIONS/RECOMMENDATIONS  Encouraged maintenance of current nutrition plan including 3 meals per day, 1-2 supplements per day and nightly TF using 2 cans TwoCal HN.  Reviewed vitamin/mineral supplement plan and reminded pt to take calcium and iron supplements spaced out by at least 2 hours.  Reviewed enzyme, insulin and vitamin/mineral supplement prescriptions to ensure accuracy and understanding.      Patient Understanding: Excellent  Expected Compliance: Excellent  Follow-Up Plans: Per protocol    GOALS:  1) Weight maintenance vs further gain to BMI of 23 kg/m2.   2) 100% compliance with prescribed enzymes, vitamin/mineral supplements and insulin therapy.     FOLLOW-UP/MONITORING:  Visit patient within 12 month(s) for annual follow-up, sooner by request or in response to acute weight/nutrition changes.    Time Spent In Face-to-Face Patient Interactions: 15 minutes      Ivory Bonilla MS, RD, LD (pager [phone])  Cystic Fibrosis/Lung Transplant Dietitian      -Available Mon-Thurs 8 AM-4:30 PM. On Fridays please contact pager [phone] (Zeina Pierre RD) and on weekends/holidays contact coverage dietitian at pager [phone] (beatris  use only).

## 2019-06-23 LAB
BACTERIA SPEC CULT: ABNORMAL
SPECIMEN SOURCE: ABNORMAL

## 2019-06-24 DIAGNOSIS — E84.8 DIABETES MELLITUS RELATED TO CF (CYSTIC FIBROSIS) (H): ICD-10-CM

## 2019-06-24 DIAGNOSIS — E08.9 DIABETES MELLITUS RELATED TO CF (CYSTIC FIBROSIS) (H): ICD-10-CM

## 2019-06-25 NOTE — TELEPHONE ENCOUNTER
Last Office Visit: 8/6/18  Future Appt: NONE    Left message for patient to return call.    Elizabeth Fernandes LPN  Clinic Coordinator  Adult Endocrinology and Pediatric Specialty Clinics  [facility]

## 2019-06-25 NOTE — TELEPHONE ENCOUNTER
Patient left message on clinic voicemail. He is scheduled for follow up with Dr. Lee on 8/13 at [facility].    Refill completed.    Elizabeth Fernandes LPN  Diabetes Clinic Coordinator   Adult Endocrinology and Pediatric Specialty Clinics  [facility]

## 2019-07-22 ENCOUNTER — INFUSION THERAPY VISIT (OUTPATIENT)
Dept: INFUSION THERAPY | Facility: CLINIC | Age: 44
End: 2019-07-22
Payer: COMMERCIAL

## 2019-07-22 VITALS
RESPIRATION RATE: 18 BRPM | OXYGEN SATURATION: 97 % | HEART RATE: 81 BPM | BODY MASS INDEX: 22.01 KG/M2 | SYSTOLIC BLOOD PRESSURE: 93 MMHG | WEIGHT: 146.4 LBS | TEMPERATURE: 98.7 F | DIASTOLIC BLOOD PRESSURE: 57 MMHG

## 2019-07-22 DIAGNOSIS — E84.0 CYSTIC FIBROSIS WITH PULMONARY MANIFESTATIONS (H): Primary | ICD-10-CM

## 2019-07-22 DIAGNOSIS — M85.9 LOW BONE DENSITY: ICD-10-CM

## 2019-07-22 DIAGNOSIS — K86.81 EXOCRINE PANCREATIC INSUFFICIENCY: ICD-10-CM

## 2019-07-22 DIAGNOSIS — E84.9 CYSTIC FIBROSIS (H): ICD-10-CM

## 2019-07-22 DIAGNOSIS — M85.9 LOW BONE DENSITY: Primary | ICD-10-CM

## 2019-07-22 DIAGNOSIS — E84.0 CYSTIC FIBROSIS WITH PULMONARY MANIFESTATIONS (H): ICD-10-CM

## 2019-07-22 LAB
CALCIUM SERPL-MCNC: 9.3 MG/DL (ref 8.5–10.1)
CREAT SERPL-MCNC: 0.77 MG/DL (ref 0.66–1.25)
GFR SERPL CREATININE-BSD FRML MDRD: >90 ML/MIN/{1.73_M2}

## 2019-07-22 PROCEDURE — 82310 ASSAY OF CALCIUM: CPT | Performed by: INTERNAL MEDICINE

## 2019-07-22 PROCEDURE — 82565 ASSAY OF CREATININE: CPT | Performed by: INTERNAL MEDICINE

## 2019-07-22 PROCEDURE — 96366 THER/PROPH/DIAG IV INF ADDON: CPT | Performed by: NURSE PRACTITIONER

## 2019-07-22 PROCEDURE — 96365 THER/PROPH/DIAG IV INF INIT: CPT | Performed by: NURSE PRACTITIONER

## 2019-07-22 PROCEDURE — 36415 COLL VENOUS BLD VENIPUNCTURE: CPT | Performed by: INTERNAL MEDICINE

## 2019-07-22 PROCEDURE — 99207 ZZC NO CHARGE NURSE ONLY: CPT

## 2019-07-22 ASSESSMENT — PAIN SCALES - GENERAL: PAINLEVEL: NO PAIN (0)

## 2019-07-24 ENCOUNTER — TELEPHONE (OUTPATIENT)
Dept: PULMONOLOGY | Facility: CLINIC | Age: 44
End: 2019-07-24

## 2019-07-25 DIAGNOSIS — E84.0 CYSTIC FIBROSIS WITH PULMONARY MANIFESTATIONS (H): Primary | ICD-10-CM

## 2019-07-25 RX ORDER — NEBULIZER
1 EACH MISCELLANEOUS 3 TIMES DAILY
Qty: 1 EACH | Refills: 6 | Status: SHIPPED | OUTPATIENT
Start: 2019-07-25 | End: 2021-06-30

## 2019-08-12 ASSESSMENT — ENCOUNTER SYMPTOMS
HEMOPTYSIS: 0
WHEEZING: 0
POSTURAL DYSPNEA: 0
SPUTUM PRODUCTION: 1
COUGH: 1
SNORES LOUDLY: 0
SHORTNESS OF BREATH: 1
COUGH DISTURBING SLEEP: 1
DYSPNEA ON EXERTION: 1

## 2019-08-13 ENCOUNTER — ALLIED HEALTH/NURSE VISIT (OUTPATIENT)
Dept: EDUCATION SERVICES | Facility: CLINIC | Age: 44
End: 2019-08-13
Payer: COMMERCIAL

## 2019-08-13 ENCOUNTER — ALLIED HEALTH/NURSE VISIT (OUTPATIENT)
Dept: CARE COORDINATION | Facility: CLINIC | Age: 44
End: 2019-08-13

## 2019-08-13 ENCOUNTER — OFFICE VISIT (OUTPATIENT)
Dept: ENDOCRINOLOGY | Facility: CLINIC | Age: 44
End: 2019-08-13
Attending: INTERNAL MEDICINE
Payer: COMMERCIAL

## 2019-08-13 ENCOUNTER — OFFICE VISIT (OUTPATIENT)
Dept: PULMONOLOGY | Facility: CLINIC | Age: 44
End: 2019-08-13
Attending: INTERNAL MEDICINE
Payer: COMMERCIAL

## 2019-08-13 VITALS
DIASTOLIC BLOOD PRESSURE: 74 MMHG | OXYGEN SATURATION: 97 % | TEMPERATURE: 98.6 F | WEIGHT: 146 LBS | SYSTOLIC BLOOD PRESSURE: 110 MMHG | HEIGHT: 68 IN | BODY MASS INDEX: 22.13 KG/M2 | RESPIRATION RATE: 18 BRPM | HEART RATE: 81 BPM

## 2019-08-13 VITALS
SYSTOLIC BLOOD PRESSURE: 110 MMHG | BODY MASS INDEX: 22.13 KG/M2 | WEIGHT: 146 LBS | HEIGHT: 68 IN | OXYGEN SATURATION: 97 % | TEMPERATURE: 98.6 F | DIASTOLIC BLOOD PRESSURE: 74 MMHG | RESPIRATION RATE: 18 BRPM | HEART RATE: 81 BPM

## 2019-08-13 DIAGNOSIS — E84.9 CYSTIC FIBROSIS (H): Primary | ICD-10-CM

## 2019-08-13 DIAGNOSIS — Z13.9 RISK AND FUNCTIONAL ASSESSMENT: Primary | ICD-10-CM

## 2019-08-13 DIAGNOSIS — E08.9 DIABETES MELLITUS RELATED TO CF (CYSTIC FIBROSIS) (H): Primary | ICD-10-CM

## 2019-08-13 DIAGNOSIS — E84.8 DIABETES MELLITUS RELATED TO CF (CYSTIC FIBROSIS) (H): Primary | ICD-10-CM

## 2019-08-13 DIAGNOSIS — E84.9 CF (CYSTIC FIBROSIS) (H): Primary | ICD-10-CM

## 2019-08-13 LAB
EXPTIME-PRE: 14.6 SEC
FEF2575-%PRED-PRE: 13 %
FEF2575-PRE: 0.52 L/SEC
FEF2575-PRED: 3.86 L/SEC
FEFMAX-%PRED-PRE: 68 %
FEFMAX-PRE: 6.68 L/SEC
FEFMAX-PRED: 9.76 L/SEC
FEV1-%PRED-PRE: 37 %
FEV1-PRE: 1.49 L
FEV1FEV6-PRE: 48 %
FEV1FEV6-PRED: 82 %
FEV1FVC-PRE: 42 %
FEV1FVC-PRED: 81 %
FIFMAX-PRE: 4.58 L/SEC
FVC-%PRED-PRE: 72 %
FVC-PRE: 3.56 L
FVC-PRED: 4.92 L

## 2019-08-13 PROCEDURE — 87186 SC STD MICRODIL/AGAR DIL: CPT | Performed by: INTERNAL MEDICINE

## 2019-08-13 PROCEDURE — G0463 HOSPITAL OUTPT CLINIC VISIT: HCPCS | Mod: ZF

## 2019-08-13 PROCEDURE — 87077 CULTURE AEROBIC IDENTIFY: CPT | Performed by: INTERNAL MEDICINE

## 2019-08-13 PROCEDURE — G0463 HOSPITAL OUTPT CLINIC VISIT: HCPCS | Mod: ZF,27

## 2019-08-13 PROCEDURE — 87070 CULTURE OTHR SPECIMN AEROBIC: CPT | Performed by: INTERNAL MEDICINE

## 2019-08-13 ASSESSMENT — MIFFLIN-ST. JEOR
SCORE: 1542.88
SCORE: 1542.88

## 2019-08-13 ASSESSMENT — PAIN SCALES - GENERAL
PAINLEVEL: NO PAIN (0)
PAINLEVEL: NO PAIN (0)

## 2019-08-13 NOTE — PROGRESS NOTES
CF Endocrinology Return Consultation:  Diabetes  :   Patient: Nico Morales MRN# [id number]   YOB: 1975 Age: 43 year old   Date of Visit: 8/13/2019    Dear Dr. Jaye Machado:    I had the pleasure of seeing your patient, Nico Morales in the [facility], [facility], for a return consultation regarding CFRD and low bone density.           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  Verbal overview and written information provided.          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     Priority: Medium     Encounter for long-term (current) use of antibiotics 05/20/2013     Priority: Medium     Prostatitis 05/20/2013     Priority: Medium     updating diagnosis code for icd10 cutover       Pseudomonas infection      Priority: Medium     Cystic fibrosis (H) 11/24/2010     Priority: Medium     Sweat Test   Date: 4/25/78    Lab: [facility]   Sample #1:  110mmol  Sample #2:  106mmol    Genetics  Date: 4/9/90  [id number]+[id number]->T         CARDIOVASCULAR SCREENING; LDL GOAL LESS THAN 160 10/31/2010     Priority: Medium            HPI:   Nico is a 42 year old male with Cystic Fibrosis Related Diabetes Mellitus (CFRD).    I have reviewed the available past laboratory evaluations, imaging studies, and medical records available to me at this visit. I have  reviewed  Nico'height and weight.    History was obtained from the patient and the medical record.  I have reviewed the notes of the pulmonary care team.      He is doing well overall.  I saw him about 1 year ago.  He denies any acute issues over the last year.  He has been happy with his diabetes regimen.  He reports hypoglycemia is rare.  He had 2 episodes of nocturnal hypoglycemia about 1 month ago.  These occurred on nights when he does not use the tube feed.  Blood glucose was in 50s and woke him up from sleep.  He could not explain the reason behind the low blood glucose.  He did not adjust his insulin therapy and has had no further recurrence.  Denies symptoms of low blood sugar during the day.  His fasting blood glucose range between 96 -112.  Does not check blood glucose during the day very often.    Started on pamidronate in December 2018.  Has received 3 doses so far.  Denies any muscle ache or pains after the IV bisphosphonate infusions.  Reports that he was febrile briefly after the first 2 doses however did not have any problems after the third dose.    A1c:  hemoglobin A1c: 6.5%   Result was discussed at today's visit.     Current insulin regimen:   Lantus 6 units daily at bedtime   Novolog 1 unit / 20 gm of carb, ~2 times daily. Novolog dose ranges b/w 2-5 units  Tube feed at night, 4-5 nights/week, tube feed take 2-3 hours to complete. Takes 4 units of novolog with TF     Insulin administration site(s): abd or thigh          Past Medical History:     Past Medical History:   Diagnosis Date     CF (cystic fibrosis) (H)      Exocrine pancreatic insufficiency      Nocardia infection      Pseudomonas infection      S/P gastrostomy (H) 2002            Past Surgical History:     Past Surgical History:   Procedure Laterality Date     COLONOSCOPY N/A 5/21/2018    Procedure: COMBINED COLONOSCOPY, SINGLE OR MULTIPLE BIOPSY/POLYPECTOMY BY BIOPSY;  Cystic fibrosis, 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;  Surgeon:  Margarito Bowman MD;  Location:  GI     GASTROSTOMY TUBE  2002     PICC INSERTION Left 01/22/2018    4Fr SL BioFlo PICC, 46cm (5cm external) in the L basilic vein w/ tip in the low SVC               Social History:     Social History     Social History Narrative    Kilo lives with wife in a house in [place], [place].  He works as a .        March 2016. He works independently as a . Works out 5x/week and walks the dog daily.        June 2016. No changes. Describes himself as a creature of habit.              Family History:     Family History   Problem Relation Age of Onset     Heart Disease Maternal Grandmother      Heart Disease Maternal Grandfather      Heart Disease Paternal Grandmother      Diabetes No family hx of             Allergies:     Allergies   Allergen Reactions     Pulmozyme [Dornase Trever] Other (See Comments)     Chest pain and fever     Tobramycin Fatigue     Trouble with ear ringing, shortness of breath, little clinical response.     Zosyn Hives     Bactrim [Sulfamethoxazole W/Trimethoprim]      Fatigue that limits treatment course     Ceftazidime Rash     Levaquin Rash             Medications:     Current Outpatient Rx   Medication Sig Dispense Refill     ACE NOT PRESCRIBED, INTENTIONAL, 1 each continuous prn. ACE Inhibitor not prescribed due to Risk for drug interaction 0 each 0     acetylcysteine (MUCOMYST) 10 % nebulizer solution Nebulize 4ml qid 480 mL 6     amitriptyline (ELAVIL) 10 MG tablet TAKE ONE TO THREE TABLETS BY MOUTH AT BEDTIME. 90 tablet 3     amylase-lipase-protease (CREON) 08972-44399 units CPEP per EC capsule TAKE 7-8 CAPS WITH MEALS (3 MEALS/DAY) AND 2 CAPS WITH SNACKS (3 SNACKS/DAY) 2700 capsule 2     azithromycin (ZITHROMAX) 250 MG tablet Take 1 tablet (250 mg) by mouth daily 30 tablet 11     aztreonam lysine (CAYSTON) 75 MG SOLR Take 1 mL (75 mg) by nebulization 3 times daily 28 days on 28 days off 84 mL 5     BD OMI U/F 32G X 4 MM insulin  "pen needle USE 6 DAILY OR AS DIRECTED. 200 each 5     blood glucose (NO BRAND SPECIFIED) lancets standard Whatever Lancets that go with device, Test 6 times daily 540 each 3     blood glucose monitoring (MARTINA CONTOUR NEXT MONITOR) meter device kit Use to test blood sugar 6 times daily or as directed. 1 kit 2     blood glucose monitoring (MARTINA CONTOUR NEXT) test strip Use to test blood sugar 6 times daily or as directed. 200 each 2     blood glucose monitoring (MARTINA MICROLET) lancets Use to test blood sugar 6 times daily or as directed. 2 Box 2     CALCIUM PO Take 1 tablet by mouth 2 times daily Strength unknown.       doxycycline hyclate (VIBRAMYCIN) 100 MG capsule TAKE 1 CAPSULE BY MOUTH TWICE DAILY 60 capsule 2     [facility] MANAGED PATIENT Contact [facility] for patient specific medication information at [phone] on admission and discharge from the hospital.  Phones are answered 24 hours a day 7 days a week 365 days a year.    Providers - Choose \"CONTINUE HOME MED (no script)\" at discharge if patient treatment with home infusion will continue.       Ferrous Fumarate-Vitamin C (VITRON-C) 200-125 MG TABS Take 1 tablet by mouth 2 times daily (with meals)       insulin aspart (NOVOLOG PENFILL) 100 UNIT/ML cartridge INJECT 1 UNIT PER 20G CARB AT LUNCH AND SUPPER 3 UNITS AT NIGHT WITH TUBE FEEDINGS MAX 20UNITS DAILY 30 mL 1     insulin glargine (LANTUS SOLOSTAR PEN) 100 UNIT/ML pen INJECT 6 UNITS SUBCUETANEOUSLY ONCE DAILY 15 mL 2     levalbuterol (XOPENEX HFA) 45 MCG/ACT inhaler Inhale 2 puffs into the lungs every 6 hours as needed for shortness of breath / dyspnea 3 Inhaler 3     levalbuterol (XOPENEX) 1.25 MG/3ML neb solution INHALE 1 VIAL BY MOUTH INTO THE LUNGS VIA NEBULIZATION 4 TIMES DAILY 360 mL 7     MEPHYTON 5 MG PO TABS Take 1 TABLET by mouth DAILY.       MULTIVITAMIN OR Take 1 tablet by mouth daily. Includes 5,000 units vitamin D and 400 units vitamin E. Per pt, formulated " "for CF pts.       Nebulizers (EFLOW SCF NEBULIZER HANDSET) MISC 1 each 3 times daily. 1 each 6     oseltamivir (TAMIFLU) 75 MG capsule Take 1 capsule (75 mg) by mouth 2 times daily 10 capsule 1     polyethylene glycol (MIRALAX/GLYCOLAX) powder Take 17 g (1 capful) by mouth daily 1 Bottle 3     Respiratory Therapy Supplies (KRIS ALTERA NEBULIZER HANDSET) MISC 1 Product 3 times daily With [place] nebs 1 each 6     Respiratory Therapy Supplies (KRIS ALTERA NEBULIZER HANDSET) MISC 1 each daily 1 each 1     sodium chloride inhalant 7 % NEBU neb solution NEBULIZE 4ML TWICE DAILY 240 mL 5     SYMBICORT 160-4.5 MCG/ACT Inhaler INHALE 2 PUFFS BY MOUTH INTO THE LUNGS TWICE DAILY 10.2 Inhaler 11     acetaminophen-codeine (TYLENOL #3) 300-30 MG per tablet Take 1-2 tablets by mouth every 6 hours as needed for severe pain (Patient not taking: Reported on 8/13/2019) 6 tablet 0             Review of Systems:     Comprehensive ROS negative other than the symptoms noted above in the HPI.          Physical Exam:   Blood pressure 110/74, pulse 81, temperature 98.6  F (37  C), temperature source Oral, resp. rate 18, height 1.737 m (5' 8.39\"), weight 66.2 kg (146 lb), SpO2 97 %.  Blood pressure percentiles are not available for patients who are 18 years or older.  Height: 5' 8.386\", Normalized stature-for-age data not available for patients older than 20 years.  Weight: 146 lbs 0 oz, Normalized weight-for-age data not available for patients older than 20 years.  BMI: Body mass index is 21.95 kg/m ., Normalized BMI data available only for age 0 to 20 years.      CONSTITUTIONAL:   Awake, alert, and in no apparent distress.  Neck:no goiter  Lungs:CTA b/l  Heart:RRR  Abd: soft NT  NEUROLOGIC:No focal deficits noted. DTR brisk knee b/l   PSYCHIATRIC: Cooperative, no agitation.  MUSCULOSKELETAL:   No LE edema         Laboratory results:     TSH   Date Value Ref Range Status   06/12/2019 1.04 0.40 - 4.00 mU/L Final     Triiodothyronine (T3) "   Date Value Ref Range Status   06/25/2010 123 60 - 181 ng/dL Final     Testosterone Total   Date Value Ref Range Status   06/12/2019 418 240 - 950 ng/dL Final     Comment:     This test was developed and its performance characteristics determined by the   [facility],  Special Chemistry Laboratory. It has   not been cleared or approved by the FDA. The laboratory is regulated under   CLIA as qualified to perform high-complexity testing. This test is used for   clinical purposes. It should not be regarded as investigational or for   research.       Cholesterol   Date Value Ref Range Status   06/12/2019 131 <200 mg/dL Final     Albumin Urine mg/L   Date Value Ref Range Status   06/12/2019 11 mg/L Final     Triglycerides   Date Value Ref Range Status   06/12/2019 79 <150 mg/dL Final     Comment:     Fasting specimen     HDL Cholesterol   Date Value Ref Range Status   06/12/2019 38 (L) >39 mg/dL Final     LDL Cholesterol Calculated   Date Value Ref Range Status   06/12/2019 77 <100 mg/dL Final     Comment:     Desirable:       <100 mg/dl     Cholesterol/HDL Ratio   Date Value Ref Range Status   06/26/2013 4.4 0.0 - 5.0 Final     Non HDL Cholesterol   Date Value Ref Range Status   06/12/2019 93 <130 mg/dL Final     A1C      8.3   12/15/2016  A1C      8.1   6/28/2016  A1C      7.5   4/13/2016  A1C      7.1   3/8/2016  A1C      7.4   12/16/2015 No results found for this basename: hemoglobina1          Diabetes Health Maintenance    Date of Diabetes Diagnosis: 12/2015    Special Notes (if any): He was a diagnosed with adrenal insufficiency based on inadequate response on a ACTH stim test. Adrenal insufficiency is thought to be related to  itraconazole and Symbicort use.  Last ACTH stim test was normal and he has been off chronic prednisone.    Date Last Eye Exam:      Date Last Dental Appointment: every 6 months    Dates of Episodes Severe* Hypoglycemia (month/year, cumulative, ongoing, assess  each visit): never              *Severe=patient unconscious, seizure, unable to help self    Last 25-Vitamin D (every year): 48    Last DXA, lowest Z-score (every 2 years): -2.6 right femoral neck (2016)       ?Bisphosphonates (yes/no): Started pemidronate December 2018       Last Urine Microalbumin (every year):      No results found for: MICROALBUMIN   Results for NICO FLORES (MRN [id number]) as of 8/6/2018 16:20   Ref. Range 5/3/2018 17:03   Albumin Urine mg/g Cr Latest Ref Range: 0 - 17 mg/g Cr 16.89   Albumin Urine mg/L Latest Units: mg/L 17       Last Testosterone:   Testosterone Total   Date Value Ref Range Status   06/12/2019 418 240 - 950 ng/dL Final     Comment:     This test was developed and its performance characteristics determined by the   [facility],  Special Chemistry Laboratory. It has   not been cleared or approved by the FDA. The laboratory is regulated under   CLIA as qualified to perform high-complexity testing. This test is used for   clinical purposes. It should not be regarded as investigational or for   research.        On testosterone therapy (yes/no)? No            Assessment and Plan:   Nico is a 42 year old male with CFRD    CFRD: good control, continue current insulin regimen.  He had couple of episodes of nocturnal hypoglycemia last month.  He does not check fingerstick blood glucose during the day or while on the tube feeds.  We discussed use of continuous glucose monitor.  He will meet with diabetes educator today to learn about CGM.    Low bone density: on pamidronate 30 mg IV every 3 months, has received 3 doses.  Overall tolerating it well.  Consider follow-up DEXA in spring/summer 2020    RTC in 6 months    JULIETA Tate    Note: Chart documentation done in part with Dragon Voice Recognition software. Although reviewed after completion, some word and grammatical errors may remain.  Please consider this when interpreting information in  this chart    CC  TYLER DAVIS  Answers for HPI/ROS submitted by the patient on 8/12/2019   General Symptoms: No  Skin Symptoms: No  HENT Symptoms: No  EYE SYMPTOMS: No  HEART SYMPTOMS: No  LUNG SYMPTOMS: Yes  INTESTINAL SYMPTOMS: No  URINARY SYMPTOMS: No  REPRODUCTIVE SYMPTOMS: No  SKELETAL SYMPTOMS: No  BLOOD SYMPTOMS: No  NERVOUS SYSTEM SYMPTOMS: No  MENTAL HEALTH SYMPTOMS: No  Cough: Yes  Sputum or phlegm: Yes  Coughing up blood: No  Difficulty breating or shortness of breath: Yes  Snoring: No  Wheezing: No  Difficulty breathing on exertion: Yes  Nighttime Cough: Yes  Difficulty breathing when lying flat: No

## 2019-08-13 NOTE — PROGRESS NOTES
DIABETES EDUCATION NOTE:    Nico Morales presents today for education related to Type 1 diabetes.    Patient is being treated with:  insulin  He is accompanied by self    PATIENT CONCERNS RELATED TO DIABETES SELF MANAGEMENT:   Interested in hearing about sensor    ASSESSMENT:  Current Diabetes Management per Patient:  Taking diabetes medications?   yes:     Diabetes Medication(s)     Insulin       insulin aspart (NOVOLOG PENFILL) 100 UNIT/ML cartridge    INJECT 1 UNIT PER 20G CARB AT LUNCH AND SUPPER 3 UNITS AT NIGHT WITH TUBE FEEDINGS MAX 20UNITS DAILY     insulin glargine (LANTUS SOLOSTAR PEN) 100 UNIT/ML pen    INJECT 6 UNITS SUBCUETANEOUSLY ONCE DAILY        Monitoring  Patient glucose self monitoring as follows: two times daily.   BG meter: Contour Next One meter  BG results: see glucose log Diana Lee's note today     BG values are: In goal  Patient's most recent   Lab Results   Component Value Date    A1C 6.5 06/12/2019    is meeting goal of <7.0                      EDUCATION and INSTRUCTION PROVIDED AT THIS VISIT:     We discussed the three glucose sensors currently on the market:  Agustin Flash, DexCom, Guardian Connect.  We covered how they work, the difference between a sensor reading and a blood glucose reading, the usefulness in documenting blood sugar trends and adjusting insulin accordingly, alerts and alarms, how they are inserted and removed, calibration requirements, length of wear, the times you need to remove the sensor (for X-rays, CT and MRI scans)  and the process for ordering and starting one.     Patient-stated goal written and given to Nico Morales.  Verbalized and demonstrated understanding of instructions.     PLAN:  See patient instructions  AVS printed and given to patient    FOLLOW-UP:    As needed    Time spent with patient at today's visit was 20 minutes.      Any diabetes medication dose changes were made via the CDE Protocol and Collaborative Practice Agreement with [facility]  and  [facility].  A copy of this encounter was provided to patient's referring provider.

## 2019-08-13 NOTE — LETTER
8/13/2019       RE: Nico Morales  [address]  [facility] [address]     Dear Colleague,    Thank you for referring your patient, Nico Morales, to the [facility] at [facility]. Please see a copy of my visit note below.    CF Endocrinology Return Consultation:  Diabetes  :   Patient: Nico Morales MRN# [id number]   YOB: 1975 Age: 43 year old   Date of Visit: 8/13/2019    Dear Dr. Jaye Machado:    I had the pleasure of seeing your patient, Nico Morales in the CF Endocrinology Clinic, [facility], for a return consultation regarding CFRD and low bone density.           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  Verbal overview and written information provided.          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     Priority: Medium     Encounter for long-term (current) use of antibiotics 05/20/2013     Priority: Medium     Prostatitis 05/20/2013     Priority: Medium     updating diagnosis code for icd10 cutover       Pseudomonas infection      Priority: Medium     Cystic fibrosis (H) 11/24/2010     Priority: Medium     Sweat Test   Date: 4/25/78    Lab: [facility]   Sample #1:  110mmol  Sample #2:  106mmol    Genetics  Date: 4/9/90  [id number]+[id number]->T         CARDIOVASCULAR  SCREENING; LDL GOAL LESS THAN 160 10/31/2010     Priority: Medium            HPI:   Nico is a 42 year old male with Cystic Fibrosis Related Diabetes Mellitus (CFRD).    I have reviewed the available past laboratory evaluations, imaging studies, and medical records available to me at this visit. I have reviewed  Nico'height and weight.    History was obtained from the patient and the medical record.  I have reviewed the notes of the pulmonary care team.      He is doing well overall.  I saw him about 1 year ago.  He denies any acute issues over the last year.  He has been happy with his diabetes regimen.  He reports hypoglycemia is rare.  He had 2 episodes of nocturnal hypoglycemia about 1 month ago.  These occurred on nights when he does not use the tube feed.  Blood glucose was in 50s and woke him up from sleep.  He could not explain the reason behind the low blood glucose.  He did not adjust his insulin therapy and has had no further recurrence.  Denies symptoms of low blood sugar during the day.  His fasting blood glucose range between 96 -112.  Does not check blood glucose during the day very often.    Started on pamidronate in December 2018.  Has received 3 doses so far.  Denies any muscle ache or pains after the IV bisphosphonate infusions.  Reports that he was febrile briefly after the first 2 doses however did not have any problems after the third dose.    A1c:  hemoglobin A1c: 6.5%   Result was discussed at today's visit.     Current insulin regimen:   Lantus 6 units daily at bedtime   Novolog 1 unit / 20 gm of carb, ~2 times daily. Novolog dose ranges b/w 2-5 units  Tube feed at night, 4-5 nights/week, tube feed take 2-3 hours to complete. Takes 4 units of novolog with TF     Insulin administration site(s): abd or thigh          Past Medical History:     Past Medical History:   Diagnosis Date     CF (cystic fibrosis) (H)      Exocrine pancreatic insufficiency      Nocardia infection       Pseudomonas infection      S/P gastrostomy (H) 2002            Past Surgical History:     Past Surgical History:   Procedure Laterality Date     COLONOSCOPY N/A 5/21/2018    Procedure: COMBINED COLONOSCOPY, SINGLE OR MULTIPLE BIOPSY/POLYPECTOMY BY BIOPSY;  Cystic fibrosis, 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;  Surgeon: Margarito Bowman MD;  Location: [facility]     GASTROSTOMY TUBE  2002     PICC INSERTION Left 01/22/2018    4Fr SL BioFlo PICC, 46cm (5cm external) in the L basilic vein w/ tip in the low SVC               Social History:     Social History     Social History Narrative    Kilo lives with wife in a house in [place], [place].  He works as a .        March 2016. He works independently as a . Works out 5x/week and walks the dog daily.        June 2016. No changes. Describes himself as a creature of habit.              Family History:     Family History   Problem Relation Age of Onset     Heart Disease Maternal Grandmother      Heart Disease Maternal Grandfather      Heart Disease Paternal Grandmother      Diabetes No family hx of             Allergies:     Allergies   Allergen Reactions     Pulmozyme [Dornase Trever] Other (See Comments)     Chest pain and fever     Tobramycin Fatigue     Trouble with ear ringing, shortness of breath, little clinical response.     Zosyn Hives     Bactrim [Sulfamethoxazole W/Trimethoprim]      Fatigue that limits treatment course     Ceftazidime Rash     Levaquin Rash             Medications:     Current Outpatient Rx   Medication Sig Dispense Refill     ACE NOT PRESCRIBED, INTENTIONAL, 1 each continuous prn. ACE Inhibitor not prescribed due to Risk for drug interaction 0 each 0     acetylcysteine (MUCOMYST) 10 % nebulizer solution Nebulize 4ml qid 480 mL 6     amitriptyline (ELAVIL) 10 MG tablet TAKE ONE TO THREE TABLETS BY MOUTH AT BEDTIME. 90 tablet 3     amylase-lipase-protease (CREON) 43132-78271 units CPEP per EC capsule TAKE 7-8 CAPS WITH MEALS (3  "MEALS/DAY) AND 2 CAPS WITH SNACKS (3 SNACKS/DAY) 2700 capsule 2     azithromycin (ZITHROMAX) 250 MG tablet Take 1 tablet (250 mg) by mouth daily 30 tablet 11     aztreonam lysine (CAYSTON) 75 MG SOLR Take 1 mL (75 mg) by nebulization 3 times daily 28 days on 28 days off 84 mL 5     BD OMI U/F 32G X 4 MM insulin pen needle USE 6 DAILY OR AS DIRECTED. 200 each 5     blood glucose (NO BRAND SPECIFIED) lancets standard Whatever Lancets that go with device, Test 6 times daily 540 each 3     blood glucose monitoring (OdinOtvet CONTOUR NEXT MONITOR) meter device kit Use to test blood sugar 6 times daily or as directed. 1 kit 2     blood glucose monitoring (MARTINA CONTOUR NEXT) test strip Use to test blood sugar 6 times daily or as directed. 200 each 2     blood glucose monitoring (MARTINA MICROLET) lancets Use to test blood sugar 6 times daily or as directed. 2 Box 2     CALCIUM PO Take 1 tablet by mouth 2 times daily Strength unknown.       doxycycline hyclate (VIBRAMYCIN) 100 MG capsule TAKE 1 CAPSULE BY MOUTH TWICE DAILY 60 capsule 2     [place] HOME INFUSION MANAGED PATIENT Contact [facility] for patient specific medication information at [phone] on admission and discharge from the hospital.  Phones are answered 24 hours a day 7 days a week 365 days a year.    Providers - Choose \"CONTINUE HOME MED (no script)\" at discharge if patient treatment with home infusion will continue.       Ferrous Fumarate-Vitamin C (VITRON-C) 200-125 MG TABS Take 1 tablet by mouth 2 times daily (with meals)       insulin aspart (NOVOLOG PENFILL) 100 UNIT/ML cartridge INJECT 1 UNIT PER 20G CARB AT LUNCH AND SUPPER 3 UNITS AT NIGHT WITH TUBE FEEDINGS MAX 20UNITS DAILY 30 mL 1     insulin glargine (LANTUS SOLOSTAR PEN) 100 UNIT/ML pen INJECT 6 UNITS SUBCUETANEOUSLY ONCE DAILY 15 mL 2     levalbuterol (XOPENEX HFA) 45 MCG/ACT inhaler Inhale 2 puffs into the lungs every 6 hours as needed for shortness of breath / dyspnea 3 Inhaler 3 " "    levalbuterol (XOPENEX) 1.25 MG/3ML neb solution INHALE 1 VIAL BY MOUTH INTO THE LUNGS VIA NEBULIZATION 4 TIMES DAILY 360 mL 7     MEPHYTON 5 MG PO TABS Take 1 TABLET by mouth DAILY.       MULTIVITAMIN OR Take 1 tablet by mouth daily. Includes 5,000 units vitamin D and 400 units vitamin E. Per pt, formulated for CF pts.       Nebulizers (EFLOW SCF NEBULIZER HANDSET) MISC 1 each 3 times daily. 1 each 6     oseltamivir (TAMIFLU) 75 MG capsule Take 1 capsule (75 mg) by mouth 2 times daily 10 capsule 1     polyethylene glycol (MIRALAX/GLYCOLAX) powder Take 17 g (1 capful) by mouth daily 1 Bottle 3     Respiratory Therapy Supplies (KRIS ALTERA NEBULIZER HANDSET) MISC 1 Product 3 times daily With [facility] [employer] ([employer]) nebs 1 each 6     Respiratory Therapy Supplies (KRIS ALTERA NEBULIZER HANDSET) MISC 1 each daily 1 each 1     sodium chloride inhalant 7 % NEBU neb solution NEBULIZE 4ML TWICE DAILY 240 mL 5     SYMBICORT 160-4.5 MCG/ACT Inhaler INHALE 2 PUFFS BY MOUTH INTO THE LUNGS TWICE DAILY 10.2 Inhaler 11     acetaminophen-codeine (TYLENOL #3) 300-30 MG per tablet Take 1-2 tablets by mouth every 6 hours as needed for severe pain (Patient not taking: Reported on 8/13/2019) 6 tablet 0             Review of Systems:     Comprehensive ROS negative other than the symptoms noted above in the HPI.          Physical Exam:   Blood pressure 110/74, pulse 81, temperature 98.6  F (37  C), temperature source Oral, resp. rate 18, height 1.737 m (5' 8.39\"), weight 66.2 kg (146 lb), SpO2 97 %.  Blood pressure percentiles are not available for patients who are 18 years or older.  Height: 5' 8.386\", Normalized stature-for-age data not available for patients older than 20 years.  Weight: 146 lbs 0 oz, Normalized weight-for-age data not available for patients older than 20 years.  BMI: Body mass index is 21.95 kg/m ., Normalized BMI data available only for age 0 to 20 years.      CONSTITUTIONAL:   Awake, alert, and in no apparent distress.  Neck:no " goiter  Lungs:CTA b/l  Heart:RRR  Abd: soft NT  NEUROLOGIC:No focal deficits noted. DTR brisk knee b/l   PSYCHIATRIC: Cooperative, no agitation.  MUSCULOSKELETAL:   No LE edema         Laboratory results:     TSH   Date Value Ref Range Status   06/12/2019 1.04 0.40 - 4.00 mU/L Final     Triiodothyronine (T3)   Date Value Ref Range Status   06/25/2010 123 60 - 181 ng/dL Final     Testosterone Total   Date Value Ref Range Status   06/12/2019 418 240 - 950 ng/dL Final     Comment:     This test was developed and its performance characteristics determined by the   [facility] [place],  Special Chemistry Laboratory. It has   not been cleared or approved by the FDA. The laboratory is regulated under   CLIA as qualified to perform high-complexity testing. This test is used for   clinical purposes. It should not be regarded as investigational or for   research.       Cholesterol   Date Value Ref Range Status   06/12/2019 131 <200 mg/dL Final     Albumin Urine mg/L   Date Value Ref Range Status   06/12/2019 11 mg/L Final     Triglycerides   Date Value Ref Range Status   06/12/2019 79 <150 mg/dL Final     Comment:     Fasting specimen     HDL Cholesterol   Date Value Ref Range Status   06/12/2019 38 (L) >39 mg/dL Final     LDL Cholesterol Calculated   Date Value Ref Range Status   06/12/2019 77 <100 mg/dL Final     Comment:     Desirable:       <100 mg/dl     Cholesterol/HDL Ratio   Date Value Ref Range Status   06/26/2013 4.4 0.0 - 5.0 Final     Non HDL Cholesterol   Date Value Ref Range Status   06/12/2019 93 <130 mg/dL Final     A1C      8.3   12/15/2016  A1C      8.1   6/28/2016  A1C      7.5   4/13/2016  A1C      7.1   3/8/2016  A1C      7.4   12/16/2015 No results found for this basename: hemoglobina1        CF  Diabetes Health Maintenance    Date of Diabetes Diagnosis: 12/2015    Special Notes (if any):  He was a diagnosed with adrenal insufficiency based on inadequate response on a ACTH stim test.  Adrenal insufficiency is thought to be related to  itraconazole and Symbicort use.  Last ACTH stim test was normal and he has been off chronic prednisone.    Date Last Eye Exam:      Date Last Dental Appointment: every 6 months    Dates of Episodes Severe* Hypoglycemia (month/year, cumulative, ongoing, assess each visit): never              *Severe=patient unconscious, seizure, unable to help self    Last 25-Vitamin D (every year): 48    Last DXA, lowest Z-score (every 2 years): -2.6 right femoral neck (2016)       ?Bisphosphonates (yes/no): Started pemidronate December 2018       Last Urine Microalbumin (every year):      No results found for: MICROALBUMIN   Results for NICO FLORES (MRN [id number]) as of 8/6/2018 16:20   Ref. Range 5/3/2018 17:03   Albumin Urine mg/g Cr Latest Ref Range: 0 - 17 mg/g Cr 16.89   Albumin Urine mg/L Latest Units: mg/L 17       Last Testosterone:   Testosterone Total   Date Value Ref Range Status   06/12/2019 418 240 - 950 ng/dL Final     Comment:     This test was developed and its performance characteristics determined by the   [facility],  Special Chemistry Laboratory. It has   not been cleared or approved by the FDA. The laboratory is regulated under   CLIA as qualified to perform high-complexity testing. This test is used for   clinical purposes. It should not be regarded as investigational or for   research.        On testosterone therapy (yes/no)? No            Assessment and Plan:   Nico is a 42 year old male with CFRD    CFRD: good control, continue current insulin regimen.  He had couple of episodes of nocturnal hypoglycemia last month.  He does not check fingerstick blood glucose during the day or while on the tube feeds.  We discussed use of continuous glucose monitor.  He will meet with diabetes educator today to learn about CGM.    Low bone density: on pamidronate 30 mg IV every 3 months, has received 3 doses.  Overall tolerating it  well.  Consider follow-up DEXA in spring/summer 2020    RTC in 6 months    JULIETA Tate    Note: Chart documentation done in part with Dragon Voice Recognition software. Although reviewed after completion, some word and grammatical errors may remain.  Please consider this when interpreting information in this chart    CC  TYLER DAVIS  Answers for HPI/ROS submitted by the patient on 8/12/2019   General Symptoms: No  Skin Symptoms: No  HENT Symptoms: No  EYE SYMPTOMS: No  HEART SYMPTOMS: No  LUNG SYMPTOMS: Yes  INTESTINAL SYMPTOMS: No  URINARY SYMPTOMS: No  REPRODUCTIVE SYMPTOMS: No  SKELETAL SYMPTOMS: No  BLOOD SYMPTOMS: No  NERVOUS SYSTEM SYMPTOMS: No  MENTAL HEALTH SYMPTOMS: No  Cough: Yes  Sputum or phlegm: Yes  Coughing up blood: No  Difficulty breating or shortness of breath: Yes  Snoring: No  Wheezing: No  Difficulty breathing on exertion: Yes  Nighttime Cough: Yes  Difficulty breathing when lying flat: No      Again, thank you for allowing me to participate in the care of your patient.      Sincerely,    Diana Lee MD

## 2019-08-13 NOTE — LETTER
8/13/2019       RE: Nico Morales  [address]  [facility] [address]     Dear Colleague,    Thank you for referring your patient, Nico Morales, to the [facility] at [facility]. Please see a copy of my visit note below.    [facility]  August 13, 2019         Assessment and Plan:   Nico Morales is a 43 year old male with cystic fibrosis.    1. CF lung disease with severe obstruction:1.  Kilo does report that symptomatically he is fairly stable.  He does report that his energy is a little bit worse while on [place], although he does sleep better on the drug.  Kilo does continue to show evidence of both Staph and Pseudomonas in his sputum.  At this time, I have recommended to Kilo:   -- That he continue to alternate doxycycline with inhalational [place].   -- He continue to be aggressive with his bronchial drainage and nebulized therapy, performing them each 3-4 times a day.     2.  CFTR modulator therapy.  Kilo and I discussed triple combination therapy which may be available sometime in the next year.  He did ask me to see if he would be available for the expanded access program that would be pre-FDA approval.     3.  Cystic fibrosis-related diabetes.  Kilo reports good control.  He was seen by Dr. Lee in Endocrinology today.     4.  Low bone mineral density.  Kilo does remain on pamidronate.  There is a plan for a repeat DEXA scan sometime next year.     5.  Psychosocial.  Kilo does continue to work as a .  He does report that things are going well.  He remains active with his dog and family.     6.  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  Kilo does do tube feeds 4-5 nights per week.  He has been able to maintain a stable weight.     7. Pancreatic Insufficiency:  The patient has no new symptoms consistent with worsening malabsorption.    -  continue the present dose of pancreatic enzymes  - continue vitamin supplementation.    HCM: June 2020  Immunizations: prevnar in 9/2019  Colonoscopy: due 2021    Jaye Machado MD MPH  Associate Professor of Medicine  Pulmonary, Allergy, Critical Care and Sleep Medicine      Interval History:     Kilo does report that his sputum is yellow-green in color.  His cough frequency and sputum volume are unchanged.  His activity tolerance remains stable.  He does do vest 3-4 times each day.          Review of Systems:     CONSTITUTIONAL: no fever, no chills, no change in weight, variable energy, pretty good appetite    INTEGUMENTARY/SKIN: no rash, no obvious new lesions    ENT/MOUTH: no sore throat, no new sinus pain, no new nasal drainage     RESPIRATORY: see interval history    CV: no chest pain, no palpitations, no peripheral edema, no orthopnea, no PND    GI: no nausea, no vomiting, no change in stools, no fatty stools, no GERD, no abdominal pain    : no dysuria, no urinary frequency    MUSCULOSKELETAL: no myalgias, no arthralgias    ENDOCRINE: no excessive thirst, blood sugars with adequate control    NEURO:  No headache, no numbness, no tingling    SLEEP: no issues    PSYCHIATRIC: mood stable          Past Medical and Surgical History:     Past Medical History:   Diagnosis Date     CF (cystic fibrosis) (H)      Exocrine pancreatic insufficiency      Nocardia infection      Pseudomonas infection      S/P gastrostomy (H) 2002     Past Surgical History:   Procedure Laterality Date     COLONOSCOPY N/A 5/21/2018    Procedure: COMBINED COLONOSCOPY, SINGLE OR MULTIPLE BIOPSY/POLYPECTOMY BY BIOPSY;  Cystic fibrosis, 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;  Surgeon: Margarito Bowman MD;  Location: [facility]     GASTROSTOMY TUBE  2002     PICC INSERTION Left 01/22/2018    4Fr SL BioFlo PICC, 46cm (5cm external) in the L basilic vein w/ tip in the low SVC           Family History:     Family History   Problem Relation Age of Onset      Heart Disease Maternal Grandmother      Heart Disease Maternal Grandfather      Heart Disease Paternal Grandmother      Diabetes No family hx of             Social History:     Social History     Socioeconomic History     Marital status: Single     Spouse name: Not on file     Number of children: 0     Years of education: Not on file     Highest education level: Not on file   Occupational History     Occupation: financial palnner     Employer: [employer]   Social Needs     Financial resource strain: Not on file     Food insecurity:     Worry: Not on file     Inability: Not on file     Transportation needs:     Medical: Not on file     Non-medical: Not on file   Tobacco Use     Smoking status: Never Smoker     Smokeless tobacco: Never Used   Substance and Sexual Activity     Alcohol use: No     Alcohol/week: 0.0 oz     Drug use: No     Sexual activity: Yes     Partners: Female     Birth control/protection: Pill   Lifestyle     Physical activity:     Days per week: Not on file     Minutes per session: Not on file     Stress: Not on file   Relationships     Social connections:     Talks on phone: Not on file     Gets together: Not on file     Attends Gnosticism service: Not on file     Active member of club or organization: Not on file     Attends meetings of clubs or organizations: Not on file     Relationship status: Not on file     Intimate partner violence:     Fear of current or ex partner: Not on file     Emotionally abused: Not on file     Physically abused: Not on file     Forced sexual activity: Not on file   Other Topics Concern     Parent/sibling w/ CABG, MI or angioplasty before 65F 55M? Not Asked      Service Not Asked     Blood Transfusions No     Caffeine Concern Not Asked     Occupational Exposure Not Asked     Hobby Hazards Not Asked     Sleep Concern Not Asked     Stress Concern Not Asked     Weight Concern Not Asked     Special Diet Not Asked     Back Care Not Asked     Exercise No      Bike Helmet Not Asked     Seat Belt Not Asked     Self-Exams Not Asked   Social History Narrative    Kilo lives with wife in a house in [place], [place].  He works as a .        March 2016. He works independently as a . Works out 5x/week and walks the dog daily.        June 2016. No changes. Describes himself as a creature of habit.            Medications:     Current Outpatient Medications   Medication     ACE NOT PRESCRIBED, INTENTIONAL,     acetaminophen-codeine (TYLENOL #3) 300-30 MG per tablet     acetylcysteine (MUCOMYST) 10 % nebulizer solution     amitriptyline (ELAVIL) 10 MG tablet     amylase-lipase-protease (CREON) 60208-43224 units CPEP per EC capsule     azithromycin (ZITHROMAX) 250 MG tablet     aztreonam lysine (CAYSTON) 75 MG SOLR     BD OMI U/F 32G X 4 MM insulin pen needle     blood glucose (NO BRAND SPECIFIED) lancets standard     blood glucose (NO BRAND SPECIFIED) test strip     blood glucose monitoring (MARTINA CONTOUR NEXT MONITOR) meter device kit     blood glucose monitoring (MARTINA CONTOUR NEXT) test strip     blood glucose monitoring (MARTINA MICROLET) lancets     CALCIUM PO     doxycycline hyclate (VIBRAMYCIN) 100 MG capsule     [facility] INFUSION MANAGED PATIENT     Ferrous Fumarate-Vitamin C (VITRON-C) 200-125 MG TABS     insulin aspart (NOVOLOG PENFILL) 100 UNIT/ML cartridge     insulin glargine (LANTUS SOLOSTAR PEN) 100 UNIT/ML pen     levalbuterol (XOPENEX HFA) 45 MCG/ACT inhaler     levalbuterol (XOPENEX) 1.25 MG/3ML neb solution     MEPHYTON 5 MG PO TABS     MULTIVITAMIN OR     Nebulizers (EFLOW SCF NEBULIZER HANDSET) MISC     oseltamivir (TAMIFLU) 75 MG capsule     polyethylene glycol (MIRALAX/GLYCOLAX) powder     Respiratory Therapy Supplies (KRIS ALTERA NEBULIZER HANDSET) MISC     Respiratory Therapy Supplies (KRIS ALTERA NEBULIZER HANDSET) MISC     sodium chloride inhalant 7 % NEBU neb solution     SYMBICORT 160-4.5 MCG/ACT Inhaler     No current  "facility-administered medications for this visit.             Physical Exam:   /74 (BP Location: Right arm, Patient Position: Chair, Cuff Size: Adult Regular)   Pulse 81   Temp 98.6  F (37  C) (Oral)   Resp 18   Ht 1.737 m (5' 8.39\")   Wt 66.2 kg (146 lb)   SpO2 97%   BMI 21.95 kg/m       Constitutional:   Awake, alert and in no apparent distress     Eyes:   nonicteric     ENT:   oral mucosa moist without lesions, normal tm bilaterally, bilateral mucosal edema      Neck:   Supple without supraclavicular or cervical lymphadenopathy     Lungs:   fair air flow.  No crackles. No rhonchi.  No wheezes.     Cardiovascular:   Normal S1 and S2.  RRR.  No murmur, gallop or rub.     Abdomen:   NABS, soft, nontender, nondistended.      Musculoskeletal:   No edema, digital clubbing present     Neurologic:   Alert and conversant.     Skin:   Warm, dry.  No rash on limited exam.             Data:   All laboratory and imaging data reviewed.    Cystic Fibrosis Culture  Specimen Description   Date Value Ref Range Status   06/18/2019 Sputum  Final   04/16/2019 Sputum  Final   04/16/2019 Sputum  Final   04/16/2019 Sputum  Final    Culture Micro   Date Value Ref Range Status   06/18/2019 Heavy growth  Normal santi    Final   06/18/2019 (A)  Final    Moderate growth  Pseudomonas aeruginosa, mucoid strain     06/18/2019 (A)  Final    Light growth  Strain 2  Pseudomonas aeruginosa, mucoid strain     06/18/2019 Single colony  Staphylococcus pseudintermedius   (A)  Final        Recent Results (from the past 168 hour(s))   General PFT Lab (Please always keep checked)    Collection Time: 08/13/19 10:56 AM   Result Value Ref Range    FVC-Pred 4.92 L    FVC-Pre 3.56 L    FVC-%Pred-Pre 72 %    FEV1-Pre 1.49 L    FEV1-%Pred-Pre 37 %    FEV1FVC-Pred 81 %    FEV1FVC-Pre 42 %    FEFMax-Pred 9.76 L/sec    FEFMax-Pre 6.68 L/sec    FEFMax-%Pred-Pre 68 %    FEF2575-Pred 3.86 L/sec    FEF2575-Pre 0.52 L/sec    DQW1933-%Pred-Pre 13 %    " "ExpTime-Pre 14.60 sec    FIFMax-Pre 4.58 L/sec    FEV1FEV6-Pred 82 %    FEV1FEV6-Pre 48 %       PFT: Severe obstructive lung disease.  When compared to 6/18/2019, the FEV1 and FVC have little change.  The decrease in FVC may represent air trapping v. restrictive physiology.  Lung volumes would be necessary to determine.    Pulmonary exacerbation: absent        Respiratory Therapist Note:    Vest    Brand: Hill-Rom - traditional (twice daily, [place] Protocol)    Hill Rom - [place] (once daily, 13, 14, 15 and 10)    Respirtech - (once daily) Quickstart @ 100%   Cough Pause: Yes   Frequency of therapy: 25-28 times per week   Concerns:     Exercise: 40 pushups, pull ups, and some free weights daily    Nebulized Medications   Bronchodilators: Xopenex   Mucolytic: Mucomyst and 7% Hypertonic Saline   Antibiotics: [place] (every 2 week rotation with oral Doxycycline)   Additional Inhaled Medications: ICS (Symbicort BID) and MDI (Xopenex 2 puffs as needed, usually middle of night)     Review Cleaning: Yes. Microwave bottle sterilizer.    Education and Transition Information   Correct order of inhaled medications: Yes   Mechanism of Action of inhaled medications: Yes   Frequency of inhaled medications: Yes   Dosage of inhaled medications: Yes   Other: none    Home Care:   Nebulizer Cups (Brand/Type): [place] LC   Nebulizer Compressor    Year Purchased: 2019   Home Care Company:     [facility], Phone: [phone], Fax: [phone]    Pulmonary Rehab   Site:    Date Completed: \"years ago\"    Plan of Care and Goals for next visit: Keep up the great work.        Again, thank you for allowing me to participate in the care of your patient.      Sincerely,    Jaye Machado MD      "

## 2019-08-13 NOTE — PROGRESS NOTES
[facility]  August 13, 2019         Assessment and Plan:   Nico Morales is a 43 year old male with cystic fibrosis.    1. CF lung disease with severe obstruction:1.  Kilo does report that symptomatically he is fairly stable.  He does report that his energy is a little bit worse while on [place], although he does sleep better on the drug.  Kilo does continue to show evidence of both Staph and Pseudomonas in his sputum.  At this time, I have recommended to Kilo:   -- That he continue to alternate doxycycline with inhalational [place].   -- He continue to be aggressive with his bronchial drainage and nebulized therapy, performing them each 3-4 times a day.     2.  CFTR modulator therapy.  Kilo and I discussed triple combination therapy which may be available sometime in the next year.  He did ask me to see if he would be available for the expanded access program that would be pre-FDA approval.     3.  Cystic fibrosis-related diabetes.  Kilo reports good control.  He was seen by Dr. Lee in Endocrinology today.     4.  Low bone mineral density.  Kilo does remain on pamidronate.  There is a plan for a repeat DEXA scan sometime next year.     5.  Psychosocial.  Kilo does continue to work as a .  He does report that things are going well.  He remains active with his dog and family.     6.  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  Kilo does do tube feeds 4-5 nights per week.  He has been able to maintain a stable weight.     7. Pancreatic Insufficiency:  The patient has no new symptoms consistent with worsening malabsorption.    - continue the present dose of pancreatic enzymes  - continue vitamin supplementation.    HCM: June 2020  Immunizations: prevnar in 9/2019  Colonoscopy: due 2021    aJye Machado MD MPH  Associate Professor of Medicine  Pulmonary, Allergy, Critical Care and Sleep Medicine      Interval History:      Kilo does report that his sputum is yellow-green in color.  His cough frequency and sputum volume are unchanged.  His activity tolerance remains stable.  He does do vest 3-4 times each day.          Review of Systems:     CONSTITUTIONAL: no fever, no chills, no change in weight, variable energy, pretty good appetite    INTEGUMENTARY/SKIN: no rash, no obvious new lesions    ENT/MOUTH: no sore throat, no new sinus pain, no new nasal drainage     RESPIRATORY: see interval history    CV: no chest pain, no palpitations, no peripheral edema, no orthopnea, no PND    GI: no nausea, no vomiting, no change in stools, no fatty stools, no GERD, no abdominal pain    : no dysuria, no urinary frequency    MUSCULOSKELETAL: no myalgias, no arthralgias    ENDOCRINE: no excessive thirst, blood sugars with adequate control    NEURO:  No headache, no numbness, no tingling    SLEEP: no issues    PSYCHIATRIC: mood stable          Past Medical and Surgical History:     Past Medical History:   Diagnosis Date     CF (cystic fibrosis) (H)      Exocrine pancreatic insufficiency      Nocardia infection      Pseudomonas infection      S/P gastrostomy (H) 2002     Past Surgical History:   Procedure Laterality Date     COLONOSCOPY N/A 5/21/2018    Procedure: COMBINED COLONOSCOPY, SINGLE OR MULTIPLE BIOPSY/POLYPECTOMY BY BIOPSY;  Cystic fibrosis, 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;  Surgeon: Margarito Bowman MD;  Location: [facility]     GASTROSTOMY TUBE  2002     PICC INSERTION Left 01/22/2018    4Fr SL BioFlo PICC, 46cm (5cm external) in the L basilic vein w/ tip in the low SVC           Family History:     Family History   Problem Relation Age of Onset     Heart Disease Maternal Grandmother      Heart Disease Maternal Grandfather      Heart Disease Paternal Grandmother      Diabetes No family hx of             Social History:     Social History     Socioeconomic History     Marital status: Single     Spouse name: Not on file     Number of  children: 0     Years of education: Not on file     Highest education level: Not on file   Occupational History     Occupation: financial palnner     Employer: [employer]   Social Needs     Financial resource strain: Not on file     Food insecurity:     Worry: Not on file     Inability: Not on file     Transportation needs:     Medical: Not on file     Non-medical: Not on file   Tobacco Use     Smoking status: Never Smoker     Smokeless tobacco: Never Used   Substance and Sexual Activity     Alcohol use: No     Alcohol/week: 0.0 oz     Drug use: No     Sexual activity: Yes     Partners: Female     Birth control/protection: Pill   Lifestyle     Physical activity:     Days per week: Not on file     Minutes per session: Not on file     Stress: Not on file   Relationships     Social connections:     Talks on phone: Not on file     Gets together: Not on file     Attends Pentecostal service: Not on file     Active member of club or organization: Not on file     Attends meetings of clubs or organizations: Not on file     Relationship status: Not on file     Intimate partner violence:     Fear of current or ex partner: Not on file     Emotionally abused: Not on file     Physically abused: Not on file     Forced sexual activity: Not on file   Other Topics Concern     Parent/sibling w/ CABG, MI or angioplasty before 65F 55M? Not Asked      Service Not Asked     Blood Transfusions No     Caffeine Concern Not Asked     Occupational Exposure Not Asked     Hobby Hazards Not Asked     Sleep Concern Not Asked     Stress Concern Not Asked     Weight Concern Not Asked     Special Diet Not Asked     Back Care Not Asked     Exercise No     Bike Helmet Not Asked     Seat Belt Not Asked     Self-Exams Not Asked   Social History Narrative    Kilo lives with wife in a house in [place], [place].  He works as a .        March 2016. He works independently as a . Works out 5x/week and walks the dog  "daily.        June 2016. No changes. Describes himself as a creature of habit.            Medications:     Current Outpatient Medications   Medication     ACE NOT PRESCRIBED, INTENTIONAL,     acetaminophen-codeine (TYLENOL #3) 300-30 MG per tablet     acetylcysteine (MUCOMYST) 10 % nebulizer solution     amitriptyline (ELAVIL) 10 MG tablet     amylase-lipase-protease (CREON) 57044-85720 units CPEP per EC capsule     azithromycin (ZITHROMAX) 250 MG tablet     aztreonam lysine (CAYSTON) 75 MG SOLR     BD OMI U/F 32G X 4 MM insulin pen needle     blood glucose (NO BRAND SPECIFIED) lancets standard     blood glucose (NO BRAND SPECIFIED) test strip     blood glucose monitoring ([employer] CONTOUR NEXT MONITOR) meter device kit     blood glucose monitoring (MARTINA CONTOUR NEXT) test strip     blood glucose monitoring (MARTINA MICROLET) lancets     CALCIUM PO     doxycycline hyclate (VIBRAMYCIN) 100 MG capsule     [facility] INFUSION MANAGED PATIENT     Ferrous Fumarate-Vitamin C (VITRON-C) 200-125 MG TABS     insulin aspart (NOVOLOG PENFILL) 100 UNIT/ML cartridge     insulin glargine (LANTUS SOLOSTAR PEN) 100 UNIT/ML pen     levalbuterol (XOPENEX HFA) 45 MCG/ACT inhaler     levalbuterol (XOPENEX) 1.25 MG/3ML neb solution     MEPHYTON 5 MG PO TABS     MULTIVITAMIN OR     Nebulizers (EFLOW SCF NEBULIZER HANDSET) MISC     oseltamivir (TAMIFLU) 75 MG capsule     polyethylene glycol (MIRALAX/GLYCOLAX) powder     Respiratory Therapy Supplies (KRIS ALTERA NEBULIZER HANDSET) MISC     Respiratory Therapy Supplies (KRIS ALTERA NEBULIZER HANDSET) MISC     sodium chloride inhalant 7 % NEBU neb solution     SYMBICORT 160-4.5 MCG/ACT Inhaler     No current facility-administered medications for this visit.             Physical Exam:   /74 (BP Location: Right arm, Patient Position: Chair, Cuff Size: Adult Regular)   Pulse 81   Temp 98.6  F (37  C) (Oral)   Resp 18   Ht 1.737 m (5' 8.39\")   Wt 66.2 kg (146 lb)   SpO2 97%   BMI " 21.95 kg/m      Constitutional:   Awake, alert and in no apparent distress     Eyes:   nonicteric     ENT:   oral mucosa moist without lesions, normal tm bilaterally, bilateral mucosal edema      Neck:   Supple without supraclavicular or cervical lymphadenopathy     Lungs:   fair air flow.  No crackles. No rhonchi.  No wheezes.     Cardiovascular:   Normal S1 and S2.  RRR.  No murmur, gallop or rub.     Abdomen:   NABS, soft, nontender, nondistended.      Musculoskeletal:   No edema, digital clubbing present     Neurologic:   Alert and conversant.     Skin:   Warm, dry.  No rash on limited exam.             Data:   All laboratory and imaging data reviewed.    Cystic Fibrosis Culture  Specimen Description   Date Value Ref Range Status   06/18/2019 Sputum  Final   04/16/2019 Sputum  Final   04/16/2019 Sputum  Final   04/16/2019 Sputum  Final    Culture Micro   Date Value Ref Range Status   06/18/2019 Heavy growth  Normal santi    Final   06/18/2019 (A)  Final    Moderate growth  Pseudomonas aeruginosa, mucoid strain     06/18/2019 (A)  Final    Light growth  Strain 2  Pseudomonas aeruginosa, mucoid strain     06/18/2019 Single colony  Staphylococcus pseudintermedius   (A)  Final        Recent Results (from the past 168 hour(s))   General PFT Lab (Please always keep checked)    Collection Time: 08/13/19 10:56 AM   Result Value Ref Range    FVC-Pred 4.92 L    FVC-Pre 3.56 L    FVC-%Pred-Pre 72 %    FEV1-Pre 1.49 L    FEV1-%Pred-Pre 37 %    FEV1FVC-Pred 81 %    FEV1FVC-Pre 42 %    FEFMax-Pred 9.76 L/sec    FEFMax-Pre 6.68 L/sec    FEFMax-%Pred-Pre 68 %    FEF2575-Pred 3.86 L/sec    FEF2575-Pre 0.52 L/sec    GCS2172-%Pred-Pre 13 %    ExpTime-Pre 14.60 sec    FIFMax-Pre 4.58 L/sec    FEV1FEV6-Pred 82 %    FEV1FEV6-Pre 48 %       PFT: Severe obstructive lung disease.  When compared to 6/18/2019, the FEV1 and FVC have little change.  The decrease in FVC may represent air trapping v. restrictive physiology.  Lung volumes  would be necessary to determine.    Pulmonary exacerbation: absent

## 2019-08-13 NOTE — PROGRESS NOTES
"Respiratory Therapist Note:    Vest    Brand: Hill-Rom - traditional (twice daily, [place] Protocol)    Hill Rom - [place] (once daily, 13, 14, 15 and 10)    Respirtech - (once daily) Quickstart @ 100%   Cough Pause: Yes   Frequency of therapy: 25-28 times per week   Concerns:     Exercise: 40 pushups, pull ups, and some free weights daily    Nebulized Medications   Bronchodilators: Xopenex   Mucolytic: Mucomyst and 7% Hypertonic Saline   Antibiotics: [place] (every 2 week rotation with oral Doxycycline)   Additional Inhaled Medications: ICS (Symbicort BID) and MDI (Xopenex 2 puffs as needed, usually middle of night)     Review Cleaning: Yes. Microwave bottle sterilizer.    Education and Transition Information   Correct order of inhaled medications: Yes   Mechanism of Action of inhaled medications: Yes   Frequency of inhaled medications: Yes   Dosage of inhaled medications: Yes   Other: none    Home Care:   Nebulizer Cups (Brand/Type): [place] LC   Nebulizer Compressor    Year Purchased: 2019   Home Care Company:     [facility], Phone: [phone], Fax: [phone]    Pulmonary Rehab   Site:    Date Completed: \"years ago\"    Plan of Care and Goals for next visit: Keep up the great work.      "

## 2019-08-13 NOTE — PATIENT INSTRUCTIONS
Cystic Fibrosis Self-Care Plan    RECOMMENDATIONS:   Will look into expanded access program.  Prevnar next visit.    YOUR GOAL: Stay well and enjoy the end of summer.      [facility] Nurse line:  Beatriz, Olu    [phone]     [facility] Fax Number:      [phone]         Cystic fibrosis Respiratory Therapist:   Felicitas Banuelos              [phone]   Cystic fibrosis Dietitians:              Zeina Pierre              [phone]                            Ivory Bonilla                        [phone]   Cystic fibrosis Diabetes Nurse:    Jordyn Rudolph   [phone]    Cystic fibrosis Social Workers:     Mary Ritter               [phone]                     Louann Suarez               [phone]  Cystic fibrosis Pharmacist:           Tammy Morgan                               [phone]         Ml Tilley   [phone]  Cystic Fibrosis Genetic Counselor:   Pretty Pulliam    [phone]    [facility] website:  [email or web address]         MRN: [id number]   Clinic Date: August 13, 2019   Patient: Nico Morales     Annual Studies:   IGG   Date Value Ref Range Status   06/12/2019 1,780 (H) 695 - 1,620 mg/dL Final     Insulin   Date Value Ref Range Status   07/26/2011 30 mU/L Final     Comment:     Reference Range:  0-20  +120     There are no preventive care reminders to display for this patient.    Pulmonary Function Tests  FEV1: amount of air you can blow out in 1 second  FVC: total amount of air you can take in and blow out    Your Goals:         PFT Latest Ref Rng & Units 8/13/2019   FVC L 3.56   FEV1 L 1.49   FVC% % 72   FEV1% % 37          Airway Clearance: The Most Important Way to Keep Your Lungs Healthy  Vest Settings:    Hill-Rom Frequencies: 8, 9, 10 Pressure 10 Then, Frequencies 18, 19, 20 Pressure 6      RespirTech: Quick Start with Pressure of     Do each frequency for 5 minutes; Deflate vest after  each frequency & cough 3 times before beginning the next setting.    Vest and Neb Therapy should be done 3-4 times/day.    Good Nutrition Can Improve Lung Function and Overall Health     Take ALL of your vitamins with food     Take 1/2 of your enzymes before EVERY meal/snack and the other 1/2 mid-meal/snack    Wt Readings from Last 3 Encounters:   08/13/19 66.2 kg (146 lb)   08/13/19 66.2 kg (146 lb)   07/22/19 66.4 kg (146 lb 6.4 oz)       Body mass index is 21.95 kg/m .         National CF Foundation Recommendations for BMI in CF Adults: Women: at least 22 Men: at least 23        Controlling Blood Sugars Helps Prevent Lung Infections & Improves Nutrition  Test blood sugar:     In the morning before eating (goal is )     2 hours after a meal (goal is less than 150)     When pre-meal glucose is greater than 150 add correction     At bedtime (if less than 100 eat a snack with 15 grams of carbohydrates  Last A1C Results:   Hemoglobin A1C   Date Value Ref Range Status   06/12/2019 6.5 (H) 0 - 5.6 % Final     Comment:     Normal <5.7% Prediabetes 5.7-6.4%  Diabetes 6.5% or higher - adopted from ADA   consensus guidelines.           If diabetic, measure A1C every 6 months. Goal: Under 7%    Staying Healthy    Research:  If you are interested in learning about research opportunities or have questions, please contact the CF Research Team at [phone] or [email or web address].      CF Foundation:  Compass is a personalized resource service to help you with the insurance, financial, legal and other issues you are facing.  It's free, confidential and available to anyone with CF.  Ask your  for more information or contact Compass directly at [phone]-[phone] ([phone]) or compass@cff.org, or learn more at cff.org/compass.

## 2019-08-16 ENCOUNTER — TELEPHONE (OUTPATIENT)
Dept: EDUCATION SERVICES | Facility: CLINIC | Age: 44
End: 2019-08-16

## 2019-08-16 ASSESSMENT — ANXIETY QUESTIONNAIRES
1. FEELING NERVOUS, ANXIOUS, OR ON EDGE: NOT AT ALL
6. BECOMING EASILY ANNOYED OR IRRITABLE: NOT AT ALL
7. FEELING AFRAID AS IF SOMETHING AWFUL MIGHT HAPPEN: NOT AT ALL
GAD7 TOTAL SCORE: 0
IF YOU CHECKED OFF ANY PROBLEMS ON THIS QUESTIONNAIRE, HOW DIFFICULT HAVE THESE PROBLEMS MADE IT FOR YOU TO DO YOUR WORK, TAKE CARE OF THINGS AT HOME, OR GET ALONG WITH OTHER PEOPLE: NOT DIFFICULT AT ALL
5. BEING SO RESTLESS THAT IT IS HARD TO SIT STILL: NOT AT ALL
3. WORRYING TOO MUCH ABOUT DIFFERENT THINGS: NOT AT ALL
2. NOT BEING ABLE TO STOP OR CONTROL WORRYING: NOT AT ALL

## 2019-08-16 ASSESSMENT — PATIENT HEALTH QUESTIONNAIRE - PHQ9
5. POOR APPETITE OR OVEREATING: NOT AT ALL
SUM OF ALL RESPONSES TO PHQ QUESTIONS 1-9: 0

## 2019-08-16 NOTE — TELEPHONE ENCOUNTER
Prior Authorization Specialty Medication Request    Medication/Dose:   blood glucose (NO BRAND SPECIFIED) test strip 200 strip 11 8/13/2019  --   Sig: Use to test blood sugar 6 times daily or as directed.       ICD code (if different than what is on RX):    Previously Tried and Failed:      Important Lab Values:   Hemoglobin A1C 6.5  High   0 - 5.6       Rationale: Pt must test 6x daily, related to pulmonary function. Unsafe to test less frequently.     Insurance Name:   Insurance ID:   Insurance Phone Number:     Pharmacy Information (if different than what is on RX)  Name:    Phone:

## 2019-08-16 NOTE — PROGRESS NOTES
"[facility] Adult Program   Annual Psychosocial Assessment     Presenting information:   Nico \"Kilo\" Andrew is a 43-year-old male with cystic fibrosis, presenting in clinic today for a routine appointment with Dr. Machado.  Met with Kilo to complete an updated annual psychosocial assessment.  There was no support person present today in clinic with Kilo.    Living Situation:   Kilo lives by himself (with his 6 y.o English Bulldog Jefry) in a house in [place].  He designed and built this home and moved in December 2017.  The layout of the home includes main level living with a bonus room upstairs where he does all of his treatments, he enjoys that this is a separate space.  He is really enjoying his home and denies any concerns regarding his living situation.    Family Constellation:   Kilo grew up in [place] and was raised by his biological parents (Epifanio and Honey). He has one older sister (Rosa). His parents live [place], they are retired, are in good health and enjoy travel. Rosa is  with two children and lives in [place].  Kilo reports that his family is doing \"great\", he sees his parents, his sister and her family regularly and is very close with them.    Kilo is , does not have children and is not currently in a significant relationship.      Social Support:   Kilo reports very good social support. He has positive family relationships and draws additional support from friends, co-workers, and his [place] community.  He reports that he has a 2nd cousin (twice removed) that also has CF, they keep in touch and do discuss questions and experiences regarding CF with each other.      Adjustment to Illness:   Kilo was diagnosed with CF at 18 months, he reports that the diagnosis came after he wasn't gaining weight.  He received his pediatric care through the [facility] and experienced few complications while growing up.  He recalls that " "his parents took very good care of him, he was not hospitalized until his 20's for CF related issues.      Kilo feels his current health status is currently \"pretty stable\" and hasn't noticed significant changes in his health over the past year which he is very happy about.  He is not currently eligible for a CFTR modulator but is excited that he will be for the new triple combination therapy that will be coming out.  Clinically, he has severe lung disease, pancreatic insufficiency, nutritional problems and CF related diabetes.  He has a history of nutritional failure with g-tube placement, which he uses it 4-5 nights per week.  He generally completes 4 vest treatments per day and denies any systematic barriers to adherence or impairment caused by pulmonary symptoms. He has 3 different vests (including the Ruffin) so keeps one at work and is easily able to do one while at the office. Kilo gets exercise from walking his dog and exercising with equipment he has at home.  Kilo appears to be dedicated to maintaining a healthy lifestyle and is very discipline with good adherence to his medical care.      Kilo reports that he is an \"open book\" when he comes to openness regarding his CF diagnosis.      Health Care Directive:   Kilo has not yet completed a HCD, he is familiar with HCD as he uses it as part of his work with [facility] planning.  He reports that he has HCD forms at home and plans to complete it.  In the meantime, he stated that he is  comfortable having his next of kin (his parents) make decisions on his behalf if he were unable to make decisions for himself.       Advance Care Planning:  Kilo has not yet had a ACP visit, it has been offered to him in the past and he has been open to it but it was never scheduled.  The ACP agenda was provided today and reviewed, he is aware of how to schedule this and will think it over.    Education:   Kilo completed a Bachelor's Degree in Biology and Math from [employer] in [place].      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 He enjoys his work and has built a solid clientele base. His work hours are variable but flexible, and he works close to home and has a private office space. He has 2 long-term disability policies purchased through financial planning associations. Kilo has health insurance through his work.      Mental Health:   Kilo describes his current mood as \"good\" and denies any symptoms indicative of past or current mood, anxiety, or any other mental health disorder. He reports low stress levels overall and describes himself as \"laid-back.\"  He typically sabiha with stress by taking his dog on a walk. Mindy/spirituality are important to him, he identifies as Sikhism (non-Yazidism) and is a member at [facility] and attends groups at another [employer] as well. During and after his divorce he did participate in separation and divorce care group through his Zoroastrianism which he found very helpful.      Kilo completed the following screens today:  VANE-7 Score: 0, indicating minimal symptoms of anxiety and described as not difficult at all in daily functioning.    PHQ-9 Score: 0, indicating minimal symptoms of depression and described as not difficult at all in daily functioning.       Kilo agreed with the scores and interpretation of screens above.  He denied having additional symptoms not addressed on these screens.      Chemical Health:   Kilo denies the use of tobacco, marijuana or illicit drugs.  He does not drink alcohol and denies any history of chemical dependency issues.           Finances:   Kilo has income through wages and denies any financial concerns.     Insurance:   Kilo has insurance through [employer], this policy is offered through financial companies that he partners " with and he denies any concerns about his insurance coverage.          Recreation/Leisure Interests:   Kilo enjoys having out with friends, family, going out for Sushi, hiking and being outdoors.  He enjoys spending time with his dog.      INTERVENTION:   - Psychosocial assessment  -Mental health screening  -ACP visit discussed    ASSESSMENT:   Kilo was open in his responses and he appeared to be in good spirits.  He feels that things are going well for him and he is happy with the currently status of his health.  He reports having a strong support system through family, friends and viridiana community.  He continues to demonstrate excellent adherence with managing his health care needs.  He appears to be stable from a psychosocial perspective with access to relevant supports and resources.  No concerns noted/expressed today, recommend f/u as outlined below.    PLAN OF INTERVENTION:   -Kilo plans to schedule an ACP visit in the next 6 months.  -Complete psychosocial assessment annually.   -Continue to follow for any psychosocial needs as they arise.    KACI Acosta, [facility]  Adult Cystic Fibrosis   Ph: [phone]  Pager: [phone]

## 2019-08-17 ASSESSMENT — ANXIETY QUESTIONNAIRES: GAD7 TOTAL SCORE: 0

## 2019-08-18 LAB
BACTERIA SPEC CULT: ABNORMAL
SPECIMEN SOURCE: ABNORMAL

## 2019-08-21 NOTE — TELEPHONE ENCOUNTER
Add approval letter when receive. Pharmacy notified.    Prior Authorization Approval    Authorization Effective Date:    Authorization Expiration Date:    Medication: Contour Next test strip-APPROVED  Approved Dose/Quantity:   Reference #: [id number]   Insurance Company: [employer] - Phone [phone] Fax [phone]  Expected CoPay:       CoPay Card Available:      Foundation Assistance Needed:    Which Pharmacy is filling the prescription (Not needed for infusion/clinic administered): [facility] [address] [place] [address]  Pharmacy Notified: Yes-Pharmacy will notify patient when ready.  Patient Notified: No

## 2019-08-21 NOTE — TELEPHONE ENCOUNTER
Central Prior Authorization Team   Phone: [phone]      PA Initiation    Medication: Contour Next test strip-PA Pending  Insurance Company: [employer] - Phone [phone] Fax [phone]  Pharmacy Filling the Rx: [id number] [place] - [address]  Filling Pharmacy Phone: [phone]  Filling Pharmacy Fax:    Start Date: 8/21/2019

## 2019-08-24 DIAGNOSIS — E84.9 DIABETES MELLITUS DUE TO CYSTIC FIBROSIS (H): Primary | ICD-10-CM

## 2019-08-24 DIAGNOSIS — E08.9 DIABETES MELLITUS DUE TO CYSTIC FIBROSIS (H): Primary | ICD-10-CM

## 2019-08-27 DIAGNOSIS — E84.8 DIABETES MELLITUS RELATED TO CF (CYSTIC FIBROSIS) (H): ICD-10-CM

## 2019-08-27 DIAGNOSIS — E08.9 DIABETES MELLITUS RELATED TO CF (CYSTIC FIBROSIS) (H): ICD-10-CM

## 2019-08-27 NOTE — TELEPHONE ENCOUNTER
LANTUS SOLOSTAR 100 UNIT/ML soln      Last Written Prescription Date:  12-5-18  Last Fill Quantity: 15 ml,   # refills: 2  Last Office Visit : 8-13-19  Future Office visit:  none    Routing refill request to provider for review/approval because:  Insulin - refilled per clinic        Kathleen M Doege RN

## 2019-09-25 DIAGNOSIS — E84.9 CF (CYSTIC FIBROSIS) (H): ICD-10-CM

## 2019-09-25 RX ORDER — RIMANTADINE HCL 100 MG
100 TABLET ORAL 2 TIMES DAILY
Qty: 60 TABLET | Refills: 5 | Status: SHIPPED | OUTPATIENT
Start: 2019-09-25 | End: 2020-07-21

## 2019-10-05 ENCOUNTER — HEALTH MAINTENANCE LETTER (OUTPATIENT)
Age: 44
End: 2019-10-05

## 2019-10-14 DIAGNOSIS — E84.9 CF (CYSTIC FIBROSIS) (H): Primary | ICD-10-CM

## 2019-10-14 NOTE — PROGRESS NOTES
[facility]  October 15, 2019         Assessment and Plan:   Nico Morales is a 43 year old male with cystic fibrosis.    1. CF lung disease with severe obstruction:  Kilo reports stable pulmonary symptomatology.  His cough frequency and sputum volume are stable.  He does not notice any significant change in the way it feels between his 2 weeks on aztreonam which has historically always caused some chest tightness versus his 2 weeks on doxycycline which causes a little GI upset.  Again, this continues to remain consistent.  Kilo does continue to show evidence of Pseudomonas in his sputum.  His last culture did not show evidence of Staph or Aspergillus.  I did review this in detail with him today.  Additionally, his pulmonary function is stable compared to the past several years.  At this time, I have recommended to Kilo:   --  He continue to alternate aztreonam and doxycycline every 2 weeks.     --  He should continue to be aggressive with his bronchial drainage and nebulized therapies.     2.  Cystic fibrosis-related diabetes.  Kilo has shown evidence of excellent control.  He continues to follow with Dr. Lee in Endocrinology.     3.  Nutritional failure secondary to chronic illness.  Kilo does continue to do tube feeds 4-5 nights per week.  He has maintained a stable weight with this.  He does have a stable appetite as well.     4.  Psychosocial.  Kilo and I have discussed pursuing an advanced care planning visit.  Today we spent some time talking about candidacy for lung transplant.  Additionally, we did talk about triple combination therapy, which I anticipate will be available in the next 6 months.     5. Pancreatic Insufficiency:  The patient has no new symptoms consistent with worsening malabsorption.    - continue the present dose of pancreatic enzymes  - continue vitamin supplementation.    HCM: due 6/2020  Immunizations: does not do flu  vaccine, needs prevnar  Colonoscopy: 5/2021    Jaye Machado MD MPH  Associate Professor of Medicine  Pulmonary, Allergy, Critical Care and Sleep Medicine      Interval History:     Kilo's cough frequency and sputum volume have remained stable.  His activity tolerance remains good.  He does walk his dog daily.  He continues to be consistent doing his vest 2-3 times per day.          Review of Systems:     CONSTITUTIONAL: no fever, no chills, sweats with tube feeds, no change in weight, no change in energy, no change in appetite    INTEGUMENTARY/SKIN: no rash, no obvious new lesions    ENT/MOUTH: no sore throat, no new sinus pain, no new nasal drainage, no hearing loss, no ear ringing     RESPIRATORY: see interval history    CV: no chest pain, no palpitations, no peripheral edema, no orthopnea, no PND    GI: no nausea, no vomiting, no change in stools, no fatty stools, no GERD, no abdominal pain    : no dysuria, no urinary frequency    MUSCULOSKELETAL: no myalgias, no arthralgias    ENDOCRINE: no excessive thirst, blood sugars with adequate control    NEURO:  No headache, no numbness, no tingling    SLEEP: better in general but needing more hours    PSYCHIATRIC: mood stable          Past Medical and Surgical History:     Past Medical History:   Diagnosis Date     CF (cystic fibrosis) (H)      Exocrine pancreatic insufficiency      Nocardia infection      Pseudomonas infection      S/P gastrostomy (H) 2002     Past Surgical History:   Procedure Laterality Date     COLONOSCOPY N/A 5/21/2018    Procedure: COMBINED COLONOSCOPY, SINGLE OR MULTIPLE BIOPSY/POLYPECTOMY BY BIOPSY;  Cystic fibrosis, 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;  Surgeon: Margarito Bowman MD;  Location: [facility]     GASTROSTOMY TUBE  2002     PICC INSERTION Left 01/22/2018    4Fr SL BioFlo PICC, 46cm (5cm external) in the L basilic vein w/ tip in the low SVC           Family History:     Family History   Problem Relation Age of Onset     Heart Disease  Maternal Grandmother      Heart Disease Maternal Grandfather      Heart Disease Paternal Grandmother      Diabetes No family hx of             Social History:     Social History     Socioeconomic History     Marital status: Single     Spouse name: Not on file     Number of children: 0     Years of education: Not on file     Highest education level: Not on file   Occupational History     Occupation: financial palnner     Employer: [employer]   Social Needs     Financial resource strain: Not on file     Food insecurity:     Worry: Not on file     Inability: Not on file     Transportation needs:     Medical: Not on file     Non-medical: Not on file   Tobacco Use     Smoking status: Never Smoker     Smokeless tobacco: Never Used   Substance and Sexual Activity     Alcohol use: No     Alcohol/week: 0.0 standard drinks     Drug use: No     Sexual activity: Yes     Partners: Female     Birth control/protection: Pill   Lifestyle     Physical activity:     Days per week: Not on file     Minutes per session: Not on file     Stress: Not on file   Relationships     Social connections:     Talks on phone: Not on file     Gets together: Not on file     Attends Cheondoism service: Not on file     Active member of club or organization: Not on file     Attends meetings of clubs or organizations: Not on file     Relationship status: Not on file     Intimate partner violence:     Fear of current or ex partner: Not on file     Emotionally abused: Not on file     Physically abused: Not on file     Forced sexual activity: Not on file   Other Topics Concern     Parent/sibling w/ CABG, MI or angioplasty before 65F 55M? Not Asked      Service Not Asked     Blood Transfusions No     Caffeine Concern Not Asked     Occupational Exposure Not Asked     Hobby Hazards Not Asked     Sleep Concern Not Asked     Stress Concern Not Asked     Weight Concern Not Asked     Special Diet Not Asked     Back Care Not Asked     Exercise No      Bike Helmet Not Asked     Seat Belt Not Asked     Self-Exams Not Asked   Social History Narrative    8/15/2019 - .  Lives with his English Bulldog (Jefry) in a house that he designed in [place].  No children.            Medications:     Current Outpatient Medications   Medication     ACE NOT PRESCRIBED, INTENTIONAL,     acetylcysteine (MUCOMYST) 10 % nebulizer solution     amitriptyline (ELAVIL) 10 MG tablet     amylase-lipase-protease (CREON) 82709-38354 units CPEP per EC capsule     azithromycin (ZITHROMAX) 250 MG tablet     aztreonam lysine (CAYSTON) 75 MG SOLR     BD OMI U/F 32G X 4 MM insulin pen needle     blood glucose (NO BRAND SPECIFIED) lancets standard     blood glucose (NO BRAND SPECIFIED) test strip     blood glucose monitoring (MARTINA CONTOUR NEXT MONITOR) meter device kit     blood glucose monitoring (MARTINA CONTOUR NEXT) test strip     blood glucose monitoring (MARTINA MICROLET) lancets     blood glucose monitoring (NO BRAND SPECIFIED) meter device kit     CALCIUM PO     doxycycline hyclate (VIBRAMYCIN) 100 MG capsule     [facility] INFUSION MANAGED PATIENT     Ferrous Fumarate-Vitamin C (VITRON-C) 200-125 MG TABS     insulin aspart (NOVOLOG PENFILL) 100 UNIT/ML cartridge     insulin glargine (LANTUS SOLOSTAR) 100 UNIT/ML pen     levalbuterol (XOPENEX HFA) 45 MCG/ACT inhaler     levalbuterol (XOPENEX) 1.25 MG/3ML neb solution     MEPHYTON 5 MG PO TABS     MULTIVITAMIN OR     Nebulizers (EFLOW SCF NEBULIZER HANDSET) MISC     oseltamivir (TAMIFLU) 75 MG capsule     polyethylene glycol (MIRALAX/GLYCOLAX) powder     Respiratory Therapy Supplies (KRIS ALTERA NEBULIZER HANDSET) MISC     Respiratory Therapy Supplies (KRIS ALTERA NEBULIZER HANDSET) MISC     rimantadine (FLUMADINE) 100 MG tablet     sodium chloride inhalant 7 % NEBU neb solution     SYMBICORT 160-4.5 MCG/ACT Inhaler     acetaminophen-codeine (TYLENOL #3) 300-30 MG per tablet     No current facility-administered medications for  "this visit.             Physical Exam:   /73   Pulse 90   Resp 17   Ht 1.737 m (5' 8.39\")   Wt 66.2 kg (145 lb 15.1 oz)   SpO2 97%   BMI 21.94 kg/m      Constitutional:   Awake, alert and in no apparent distress     Eyes:   nonicteric     ENT:   oral mucosa moist without lesions, normal tm bilaterally, bilateral mucosa normal     Neck:   Supple without supraclavicular or cervical lymphadenopathy     Lungs:   fair air flow.  No crackles. No rhonchi.  scattered wheezes.     Cardiovascular:   Normal S1 and S2.  RRR.  No murmur, gallop or rub.     Abdomen:   NABS, soft, nontender, nondistended.  No HSM.     Musculoskeletal:   No edema, digital clubbing present     Neurologic:   Alert and conversant.     Skin:   Warm, dry.  No rash on limited exam.             Data:   All laboratory and imaging data reviewed.    Cystic Fibrosis Culture  Specimen Description   Date Value Ref Range Status   08/13/2019 Sputum  Final   06/18/2019 Sputum  Final   04/16/2019 Sputum  Final   04/16/2019 Sputum  Final   04/16/2019 Sputum  Final    Culture Micro   Date Value Ref Range Status   08/13/2019 Moderate growth  Normal santi    Final   08/13/2019 (A)  Final    Light growth  Pseudomonas aeruginosa, mucoid strain     08/13/2019 (A)  Final    Light growth  Strain 2  Pseudomonas aeruginosa, mucoid strain          Recent Results (from the past 168 hour(s))   General PFT Lab (Please always keep checked)    Collection Time: 10/15/19 11:14 AM   Result Value Ref Range    FVC-Pred 4.92 L    FVC-Pre 3.66 L    FVC-%Pred-Pre 74 %    FEV1-Pre 1.54 L    FEV1-%Pred-Pre 38 %    FEV1FVC-Pred 81 %    FEV1FVC-Pre 42 %    FEFMax-Pred 9.76 L/sec    FEFMax-Pre 6.33 L/sec    FEFMax-%Pred-Pre 64 %    FEF2575-Pred 3.86 L/sec    FEF2575-Pre 0.58 L/sec    OZC4481-%Pred-Pre 14 %    ExpTime-Pre 14.62 sec    FIFMax-Pre 4.38 L/sec    FEV1FEV6-Pred 82 %    FEV1FEV6-Pre 48 %       PFT: Severe obstructive lung disease.  When compared to 8/13/2019, the FEV1 and " FVC have little change.  The decrease in FVC may represent air trapping v. restrictive physiology.  Lung volumes would be necessary to determine.    Pulmonary exacerbation: absent

## 2019-10-15 ENCOUNTER — OFFICE VISIT (OUTPATIENT)
Dept: PULMONOLOGY | Facility: CLINIC | Age: 44
End: 2019-10-15
Attending: INTERNAL MEDICINE
Payer: COMMERCIAL

## 2019-10-15 VITALS
WEIGHT: 145.94 LBS | HEIGHT: 68 IN | HEART RATE: 90 BPM | RESPIRATION RATE: 17 BRPM | OXYGEN SATURATION: 97 % | BODY MASS INDEX: 22.12 KG/M2 | SYSTOLIC BLOOD PRESSURE: 106 MMHG | DIASTOLIC BLOOD PRESSURE: 73 MMHG

## 2019-10-15 DIAGNOSIS — E84.9 CF (CYSTIC FIBROSIS) (H): ICD-10-CM

## 2019-10-15 DIAGNOSIS — E84.0 CYSTIC FIBROSIS WITH PULMONARY MANIFESTATIONS (H): Primary | ICD-10-CM

## 2019-10-15 DIAGNOSIS — E84.9 CYSTIC FIBROSIS (H): Primary | ICD-10-CM

## 2019-10-15 LAB
EXPTIME-PRE: 14.62 SEC
FEF2575-%PRED-PRE: 14 %
FEF2575-PRE: 0.58 L/SEC
FEF2575-PRED: 3.86 L/SEC
FEFMAX-%PRED-PRE: 64 %
FEFMAX-PRE: 6.33 L/SEC
FEFMAX-PRED: 9.76 L/SEC
FEV1-%PRED-PRE: 38 %
FEV1-PRE: 1.54 L
FEV1FEV6-PRE: 48 %
FEV1FEV6-PRED: 82 %
FEV1FVC-PRE: 42 %
FEV1FVC-PRED: 81 %
FIFMAX-PRE: 4.38 L/SEC
FVC-%PRED-PRE: 74 %
FVC-PRE: 3.66 L
FVC-PRED: 4.92 L

## 2019-10-15 PROCEDURE — 87070 CULTURE OTHR SPECIMN AEROBIC: CPT | Performed by: INTERNAL MEDICINE

## 2019-10-15 PROCEDURE — G0009 ADMIN PNEUMOCOCCAL VACCINE: HCPCS | Mod: ZF

## 2019-10-15 PROCEDURE — G0463 HOSPITAL OUTPT CLINIC VISIT: HCPCS | Mod: 25

## 2019-10-15 PROCEDURE — 87077 CULTURE AEROBIC IDENTIFY: CPT | Performed by: INTERNAL MEDICINE

## 2019-10-15 PROCEDURE — 87186 SC STD MICRODIL/AGAR DIL: CPT | Performed by: INTERNAL MEDICINE

## 2019-10-15 PROCEDURE — 25000128 H RX IP 250 OP 636: Mod: ZF | Performed by: INTERNAL MEDICINE

## 2019-10-15 PROCEDURE — 90670 PCV13 VACCINE IM: CPT | Mod: ZF | Performed by: INTERNAL MEDICINE

## 2019-10-15 RX ORDER — OSELTAMIVIR PHOSPHATE 75 MG/1
75 CAPSULE ORAL 2 TIMES DAILY
Qty: 10 CAPSULE | Refills: 1 | Status: SHIPPED | OUTPATIENT
Start: 2019-10-15 | End: 2021-12-08

## 2019-10-15 RX ADMIN — PNEUMOCOCCAL 13-VALENT CONJUGATE VACCINE 0.5 ML: 2.2; 2.2; 2.2; 2.2; 2.2; 4.4; 2.2; 2.2; 2.2; 2.2; 2.2; 2.2; 2.2 INJECTION, SUSPENSION INTRAMUSCULAR at 12:16

## 2019-10-15 ASSESSMENT — MIFFLIN-ST. JEOR: SCORE: 1542.69

## 2019-10-15 ASSESSMENT — PAIN SCALES - GENERAL: PAINLEVEL: NO PAIN (0)

## 2019-10-15 NOTE — LETTER
10/15/2019       RE: Nico Morales  [address]  [facility] [address]     Dear Colleague,    Thank you for referring your patient, Nico Morales, to the [facility] at [facility]. Please see a copy of my visit note below.    [facility]  October 15, 2019         Assessment and Plan:   Nico Morales is a 43 year old male with cystic fibrosis.    1. CF lung disease with severe obstruction:  Kilo reports stable pulmonary symptomatology.  His cough frequency and sputum volume are stable.  He does not notice any significant change in the way it feels between his 2 weeks on aztreonam which has historically always caused some chest tightness versus his 2 weeks on doxycycline which causes a little GI upset.  Again, this continues to remain consistent.  Kilo does continue to show evidence of Pseudomonas in his sputum.  His last culture did not show evidence of Staph or Aspergillus.  I did review this in detail with him today.  Additionally, his pulmonary function is stable compared to the past several years.  At this time, I have recommended to Kilo:   --  He continue to alternate aztreonam and doxycycline every 2 weeks.     --  He should continue to be aggressive with his bronchial drainage and nebulized therapies.     2.  Cystic fibrosis-related diabetes.  Kilo has shown evidence of excellent control.  He continues to follow with Dr. Lee in Endocrinology.     3.  Nutritional failure secondary to chronic illness.  Kilo does continue to do tube feeds 4-5 nights per week.  He has maintained a stable weight with this.  He does have a stable appetite as well.     4.  Psychosocial.  Kilo and I have discussed pursuing an advanced care planning visit.  Today we spent some time talking about candidacy for lung transplant.  Additionally, we did talk about triple combination therapy, which  I anticipate will be available in the next 6 months.     5. Pancreatic Insufficiency:  The patient has no new symptoms consistent with worsening malabsorption.    - continue the present dose of pancreatic enzymes  - continue vitamin supplementation.    HCM: due 6/2020  Immunizations: does not do flu vaccine, needs prevnar  Colonoscopy: 5/2021    Jaye Machado MD MPH  Associate Professor of Medicine  Pulmonary, Allergy, Critical Care and Sleep Medicine      Interval History:     Kilo's cough frequency and sputum volume have remained stable.  His activity tolerance remains good.  He does walk his dog daily.  He continues to be consistent doing his vest 2-3 times per day.          Review of Systems:     CONSTITUTIONAL: no fever, no chills, sweats with tube feeds, no change in weight, no change in energy, no change in appetite    INTEGUMENTARY/SKIN: no rash, no obvious new lesions    ENT/MOUTH: no sore throat, no new sinus pain, no new nasal drainage, no hearing loss, no ear ringing     RESPIRATORY: see interval history    CV: no chest pain, no palpitations, no peripheral edema, no orthopnea, no PND    GI: no nausea, no vomiting, no change in stools, no fatty stools, no GERD, no abdominal pain    : no dysuria, no urinary frequency    MUSCULOSKELETAL: no myalgias, no arthralgias    ENDOCRINE: no excessive thirst, blood sugars with adequate control    NEURO:  No headache, no numbness, no tingling    SLEEP: better in general but needing more hours    PSYCHIATRIC: mood stable          Past Medical and Surgical History:     Past Medical History:   Diagnosis Date     CF (cystic fibrosis) (H)      Exocrine pancreatic insufficiency      Nocardia infection      Pseudomonas infection      S/P gastrostomy (H) 2002     Past Surgical History:   Procedure Laterality Date     COLONOSCOPY N/A 5/21/2018    Procedure: COMBINED COLONOSCOPY, SINGLE OR MULTIPLE BIOPSY/POLYPECTOMY BY BIOPSY;  Cystic fibrosis, Diabetes mellitus due  to CF;  Surgeon: Margarito Bowman MD;  Location: [facility]     GASTROSTOMY TUBE  2002     PICC INSERTION Left 01/22/2018    4Fr SL BioFlo PICC, 46cm (5cm external) in the L basilic vein w/ tip in the low SVC           Family History:     Family History   Problem Relation Age of Onset     Heart Disease Maternal Grandmother      Heart Disease Maternal Grandfather      Heart Disease Paternal Grandmother      Diabetes No family hx of             Social History:     Social History     Socioeconomic History     Marital status: Single     Spouse name: Not on file     Number of children: 0     Years of education: Not on file     Highest education level: Not on file   Occupational History     Occupation: financial palnner     Employer: [employer]   Social Needs     Financial resource strain: Not on file     Food insecurity:     Worry: Not on file     Inability: Not on file     Transportation needs:     Medical: Not on file     Non-medical: Not on file   Tobacco Use     Smoking status: Never Smoker     Smokeless tobacco: Never Used   Substance and Sexual Activity     Alcohol use: No     Alcohol/week: 0.0 standard drinks     Drug use: No     Sexual activity: Yes     Partners: Female     Birth control/protection: Pill   Lifestyle     Physical activity:     Days per week: Not on file     Minutes per session: Not on file     Stress: Not on file   Relationships     Social connections:     Talks on phone: Not on file     Gets together: Not on file     Attends Tenriism service: Not on file     Active member of club or organization: Not on file     Attends meetings of clubs or organizations: Not on file     Relationship status: Not on file     Intimate partner violence:     Fear of current or ex partner: Not on file     Emotionally abused: Not on file     Physically abused: Not on file     Forced sexual activity: Not on file   Other Topics Concern     Parent/sibling w/ CABG, MI or angioplasty before 65F 55M? Not Asked       Service Not Asked     Blood Transfusions No     Caffeine Concern Not Asked     Occupational Exposure Not Asked     Hobby Hazards Not Asked     Sleep Concern Not Asked     Stress Concern Not Asked     Weight Concern Not Asked     Special Diet Not Asked     Back Care Not Asked     Exercise No     Bike Helmet Not Asked     Seat Belt Not Asked     Self-Exams Not Asked   Social History Narrative    8/15/2019 - .  Lives with his English Kaidendog (Jefry) in a house that he designed in [place].  No children.            Medications:     Current Outpatient Medications   Medication     ACE NOT PRESCRIBED, INTENTIONAL,     acetylcysteine (MUCOMYST) 10 % nebulizer solution     amitriptyline (ELAVIL) 10 MG tablet     amylase-lipase-protease (CREON) 90814-40222 units CPEP per EC capsule     azithromycin (ZITHROMAX) 250 MG tablet     aztreonam lysine (CAYSTON) 75 MG SOLR     BD OMI U/F 32G X 4 MM insulin pen needle     blood glucose (NO BRAND SPECIFIED) lancets standard     blood glucose (NO BRAND SPECIFIED) test strip     blood glucose monitoring (MARTINA CONTOUR NEXT MONITOR) meter device kit     blood glucose monitoring (MARTINA CONTOUR NEXT) test strip     blood glucose monitoring (MARTINA MICROLET) lancets     blood glucose monitoring (NO BRAND SPECIFIED) meter device kit     CALCIUM PO     doxycycline hyclate (VIBRAMYCIN) 100 MG capsule     [facility] INFUSION MANAGED PATIENT     Ferrous Fumarate-Vitamin C (VITRON-C) 200-125 MG TABS     insulin aspart (NOVOLOG PENFILL) 100 UNIT/ML cartridge     insulin glargine (LANTUS SOLOSTAR) 100 UNIT/ML pen     levalbuterol (XOPENEX HFA) 45 MCG/ACT inhaler     levalbuterol (XOPENEX) 1.25 MG/3ML neb solution     MEPHYTON 5 MG PO TABS     MULTIVITAMIN OR     Nebulizers (EFLOW SCF NEBULIZER HANDSET) MISC     oseltamivir (TAMIFLU) 75 MG capsule     polyethylene glycol (MIRALAX/GLYCOLAX) powder     Respiratory Therapy Supplies (KRIS ALTERA NEBULIZER HANDSET) MISC      "Respiratory Therapy Supplies (KRIS ALTERA NEBULIZER HANDSET) MISC     rimantadine (FLUMADINE) 100 MG tablet     sodium chloride inhalant 7 % NEBU neb solution     SYMBICORT 160-4.5 MCG/ACT Inhaler     acetaminophen-codeine (TYLENOL #3) 300-30 MG per tablet     No current facility-administered medications for this visit.             Physical Exam:   /73   Pulse 90   Resp 17   Ht 1.737 m (5' 8.39\")   Wt 66.2 kg (145 lb 15.1 oz)   SpO2 97%   BMI 21.94 kg/m       Constitutional:   Awake, alert and in no apparent distress     Eyes:   nonicteric     ENT:   oral mucosa moist without lesions, normal tm bilaterally, bilateral mucosa normal     Neck:   Supple without supraclavicular or cervical lymphadenopathy     Lungs:   fair air flow.  No crackles. No rhonchi.  scattered wheezes.     Cardiovascular:   Normal S1 and S2.  RRR.  No murmur, gallop or rub.     Abdomen:   NABS, soft, nontender, nondistended.  No HSM.     Musculoskeletal:   No edema, digital clubbing present     Neurologic:   Alert and conversant.     Skin:   Warm, dry.  No rash on limited exam.             Data:   All laboratory and imaging data reviewed.    Cystic Fibrosis Culture  Specimen Description   Date Value Ref Range Status   08/13/2019 Sputum  Final   06/18/2019 Sputum  Final   04/16/2019 Sputum  Final   04/16/2019 Sputum  Final   04/16/2019 Sputum  Final    Culture Micro   Date Value Ref Range Status   08/13/2019 Moderate growth  Normal santi    Final   08/13/2019 (A)  Final    Light growth  Pseudomonas aeruginosa, mucoid strain     08/13/2019 (A)  Final    Light growth  Strain 2  Pseudomonas aeruginosa, mucoid strain          Recent Results (from the past 168 hour(s))   General PFT Lab (Please always keep checked)    Collection Time: 10/15/19 11:14 AM   Result Value Ref Range    FVC-Pred 4.92 L    FVC-Pre 3.66 L    FVC-%Pred-Pre 74 %    FEV1-Pre 1.54 L    FEV1-%Pred-Pre 38 %    FEV1FVC-Pred 81 %    FEV1FVC-Pre 42 %    FEFMax-Pred 9.76 " L/sec    FEFMax-Pre 6.33 L/sec    FEFMax-%Pred-Pre 64 %    FEF2575-Pred 3.86 L/sec    FEF2575-Pre 0.58 L/sec    OVN7384-%Pred-Pre 14 %    ExpTime-Pre 14.62 sec    FIFMax-Pre 4.38 L/sec    FEV1FEV6-Pred 82 %    FEV1FEV6-Pre 48 %       PFT: Severe obstructive lung disease.  When compared to 8/13/2019, the FEV1 and FVC have little change.  The decrease in FVC may represent air trapping v. restrictive physiology.  Lung volumes would be necessary to determine.    Pulmonary exacerbation: absent        Again, thank you for allowing me to participate in the care of your patient.      Sincerely,    Jaye Machado MD

## 2019-10-15 NOTE — NURSING NOTE
Chief Complaint   Patient presents with     RECHECK     CF     Medications reviewed and vital signs taken.   Latoya Carias, KYLEE

## 2019-10-15 NOTE — PATIENT INSTRUCTIONS
Cystic Fibrosis Self-Care Plan    RECOMMENDATIONS:   Great job.  Prevnar vaccine today.  Tamiflu renewal sent today.  Call Keila for appointments and ACP visit schedule.  Give the peak flow meter a try.    YOUR GOAL:  Stay well and enjoy the holidays.      [facility] [place] Nurse line:  Katie Henderson amarilis Lutz    [phone]     [facility] [place] Fax Number:      [phone]         Cystic fibrosis Respiratory Therapist:   Felicitas Banuelos              [phone]   Cystic fibrosis Dietitians:              Zeina Pierre              [phone]                            Ivory Bonilla                        [phone]   Cystic fibrosis Diabetes Nurse:    Jordyn Rudolph   [phone]    Cystic fibrosis Social Workers:     Mary Ritter               [phone]                     Louann Suarez               [phone]  Cystic fibrosis Pharmacist:           Tammy Morgan                               [phone]         Ml Tilley   [phone]  Cystic Fibrosis Genetic Counselor:   Pretty Pulliam    [phone]    [facility] [place] website:  [email or web address]         MRN: [id number]   Clinic Date: October 15, 2019   Patient: Nico Morales     Annual Studies:   IGG   Date Value Ref Range Status   06/12/2019 1,780 (H) 695 - 1,620 mg/dL Final     Insulin   Date Value Ref Range Status   07/26/2011 30 mU/L Final     Comment:     Reference Range:  0-20  +120     There are no preventive care reminders to display for this patient.    Pulmonary Function Tests  FEV1: amount of air you can blow out in 1 second  FVC: total amount of air you can take in and blow out    Your Goals:         PFT Latest Ref Rng & Units 10/15/2019   FVC L 3.66   FEV1 L 1.54   FVC% % 74   FEV1% % 38          Airway Clearance: The Most Important Way to Keep Your Lungs Healthy  Vest Settings:    Hill-Rom Frequencies: 8, 9, 10 Pressure 10 Then, Frequencies 18, 19, 20 Pressure 6      RespirTech:  Quick Start with Pressure of     Do each frequency for 5 minutes; Deflate vest after each frequency & cough 3 times before beginning the next setting.    Vest and Neb Therapy should be done 3 times/day.    Good Nutrition Can Improve Lung Function and Overall Health     Take ALL of your vitamins with food     Take 1/2 of your enzymes before EVERY meal/snack and the other 1/2 mid-meal/snack    Wt Readings from Last 3 Encounters:   10/15/19 66.2 kg (145 lb 15.1 oz)   08/13/19 66.2 kg (146 lb)   08/13/19 66.2 kg (146 lb)       Body mass index is 21.94 kg/m .         National CF Foundation Recommendations for BMI in CF Adults: Women: at least 22 Men: at least 23        Controlling Blood Sugars Helps Prevent Lung Infections & Improves Nutrition  Test blood sugar:     In the morning before eating (goal is )     2 hours after a meal (goal is less than 150)     When pre-meal glucose is greater than 150 add correction     At bedtime (if less than 100 eat a snack with 15 grams of carbohydrates  Last A1C Results:   Hemoglobin A1C   Date Value Ref Range Status   06/12/2019 6.5 (H) 0 - 5.6 % Final     Comment:     Normal <5.7% Prediabetes 5.7-6.4%  Diabetes 6.5% or higher - adopted from ADA   consensus guidelines.           If diabetic, measure A1C every 6 months. Goal: Under 7%    Staying Healthy    Research:  If you are interested in learning about research opportunities or have questions, please contact the CF Research Team at [phone] or [email or web address].      CF Foundation:  Compass is a personalized resource service to help you with the insurance, financial, legal and other issues you are facing.  It's free, confidential and available to anyone with CF.  Ask your  for more information or contact Compass directly at 652-COMPASS ([phone]) or compass@cff.org, or learn more at cff.org/compass.

## 2019-10-21 LAB
BACTERIA SPEC CULT: ABNORMAL
SPECIMEN SOURCE: ABNORMAL

## 2019-10-24 ENCOUNTER — INFUSION THERAPY VISIT (OUTPATIENT)
Dept: INFUSION THERAPY | Facility: CLINIC | Age: 44
End: 2019-10-24
Payer: COMMERCIAL

## 2019-10-24 VITALS
OXYGEN SATURATION: 100 % | DIASTOLIC BLOOD PRESSURE: 71 MMHG | BODY MASS INDEX: 21.63 KG/M2 | TEMPERATURE: 98.1 F | HEART RATE: 95 BPM | SYSTOLIC BLOOD PRESSURE: 108 MMHG | WEIGHT: 143.9 LBS

## 2019-10-24 DIAGNOSIS — K86.81 EXOCRINE PANCREATIC INSUFFICIENCY: ICD-10-CM

## 2019-10-24 DIAGNOSIS — E84.0 CYSTIC FIBROSIS WITH PULMONARY MANIFESTATIONS (H): ICD-10-CM

## 2019-10-24 DIAGNOSIS — E84.9 CYSTIC FIBROSIS (H): ICD-10-CM

## 2019-10-24 DIAGNOSIS — M85.9 LOW BONE DENSITY: Primary | ICD-10-CM

## 2019-10-24 LAB
CALCIUM SERPL-MCNC: 9.4 MG/DL (ref 8.5–10.1)
CREAT SERPL-MCNC: 0.71 MG/DL (ref 0.66–1.25)
GFR SERPL CREATININE-BSD FRML MDRD: >90 ML/MIN/{1.73_M2}

## 2019-10-24 PROCEDURE — 99207 ZZC NO CHARGE LOS: CPT

## 2019-10-24 PROCEDURE — 82565 ASSAY OF CREATININE: CPT | Performed by: INTERNAL MEDICINE

## 2019-10-24 PROCEDURE — 36415 COLL VENOUS BLD VENIPUNCTURE: CPT | Performed by: INTERNAL MEDICINE

## 2019-10-24 PROCEDURE — 96366 THER/PROPH/DIAG IV INF ADDON: CPT | Performed by: NURSE PRACTITIONER

## 2019-10-24 PROCEDURE — 96365 THER/PROPH/DIAG IV INF INIT: CPT | Performed by: NURSE PRACTITIONER

## 2019-10-24 PROCEDURE — 82310 ASSAY OF CALCIUM: CPT | Performed by: INTERNAL MEDICINE

## 2019-10-24 ASSESSMENT — PAIN SCALES - GENERAL: PAINLEVEL: NO PAIN (0)

## 2019-10-25 DIAGNOSIS — E84.0 CYSTIC FIBROSIS WITH PULMONARY MANIFESTATIONS (H): ICD-10-CM

## 2019-10-25 RX ORDER — DOXYCYCLINE 100 MG/1
100 CAPSULE ORAL 2 TIMES DAILY
Qty: 60 CAPSULE | Refills: 2 | Status: SHIPPED | OUTPATIENT
Start: 2019-10-25 | End: 2020-01-21

## 2019-10-25 NOTE — PROGRESS NOTES
Infusion Nursing Note:  Nico Morales presents today for Pamidronate.    Patient seen by provider today: No   present during visit today: Not Applicable.    Note: N/A.    Intravenous Access:  Peripheral IV placed.    Treatment Conditions:  Not Applicable.    Post Infusion Assessment:  Patient tolerated infusion without incident.  Blood return noted pre and post infusion.  Site patent and intact, free from redness, edema or discomfort.  Access discontinued per protocol.     Discharge Plan:   Patient will return as scheduled for next appointment.   Patient discharged in stable condition accompanied by: self.  Departure Mode: Ambulatory.    Sarika Lima RN

## 2019-11-07 DIAGNOSIS — E84.9 CYSTIC FIBROSIS (H): ICD-10-CM

## 2019-11-07 DIAGNOSIS — E84.0 CYSTIC FIBROSIS WITH PULMONARY MANIFESTATIONS (H): ICD-10-CM

## 2019-11-07 DIAGNOSIS — E84.9 CF (CYSTIC FIBROSIS) (H): ICD-10-CM

## 2019-11-07 RX ORDER — LEVALBUTEROL INHALATION SOLUTION 1.25 MG/3ML
SOLUTION RESPIRATORY (INHALATION)
Qty: 360 ML | Refills: 11 | Status: SHIPPED | OUTPATIENT
Start: 2019-11-07 | End: 2021-01-12

## 2019-11-08 DIAGNOSIS — E84.0 CYSTIC FIBROSIS WITH PULMONARY MANIFESTATIONS (H): ICD-10-CM

## 2019-11-08 RX ORDER — ACETYLCYSTEINE 100 MG/ML
4 SOLUTION ORAL; RESPIRATORY (INHALATION) 4 TIMES DAILY
Qty: 480 ML | Refills: 11 | Status: SHIPPED | OUTPATIENT
Start: 2019-11-08 | End: 2020-02-05

## 2019-11-12 DIAGNOSIS — E84.0 CYSTIC FIBROSIS WITH PULMONARY MANIFESTATIONS (H): ICD-10-CM

## 2019-11-12 RX ORDER — SODIUM CHLORIDE FOR INHALATION 7 %
VIAL, NEBULIZER (ML) INHALATION
Qty: 240 ML | Refills: 5 | Status: SHIPPED | OUTPATIENT
Start: 2019-11-12 | End: 2020-05-19

## 2019-11-13 ENCOUNTER — OFFICE VISIT (OUTPATIENT)
Dept: PULMONOLOGY | Facility: CLINIC | Age: 44
End: 2019-11-13
Attending: INTERNAL MEDICINE
Payer: COMMERCIAL

## 2019-11-13 ENCOUNTER — OFFICE VISIT (OUTPATIENT)
Dept: PHARMACY | Facility: CLINIC | Age: 44
End: 2019-11-13

## 2019-11-13 VITALS
DIASTOLIC BLOOD PRESSURE: 71 MMHG | OXYGEN SATURATION: 97 % | SYSTOLIC BLOOD PRESSURE: 103 MMHG | WEIGHT: 139.77 LBS | RESPIRATION RATE: 17 BRPM | HEART RATE: 111 BPM | HEIGHT: 68 IN | BODY MASS INDEX: 21.18 KG/M2

## 2019-11-13 DIAGNOSIS — E84.0 CYSTIC FIBROSIS WITH PULMONARY MANIFESTATIONS (H): Primary | ICD-10-CM

## 2019-11-13 DIAGNOSIS — E84.9 CYSTIC FIBROSIS (H): Primary | ICD-10-CM

## 2019-11-13 LAB
EXPTIME-PRE: 14.93 SEC
FEF2575-%PRED-PRE: 14 %
FEF2575-PRE: 0.57 L/SEC
FEF2575-PRED: 3.86 L/SEC
FEFMAX-%PRED-PRE: 66 %
FEFMAX-PRE: 6.52 L/SEC
FEFMAX-PRED: 9.76 L/SEC
FEV1-%PRED-PRE: 38 %
FEV1-PRE: 1.53 L
FEV1FEV6-PRE: 48 %
FEV1FEV6-PRED: 82 %
FEV1FVC-PRE: 41 %
FEV1FVC-PRED: 81 %
FIFMAX-PRE: 4.92 L/SEC
FVC-%PRED-PRE: 74 %
FVC-PRE: 3.68 L
FVC-PRED: 4.92 L

## 2019-11-13 PROCEDURE — 99207 ZZC NO CHARGE LOS: CPT | Performed by: PHARMACIST

## 2019-11-13 PROCEDURE — G0463 HOSPITAL OUTPT CLINIC VISIT: HCPCS | Mod: ZF

## 2019-11-13 PROCEDURE — 87077 CULTURE AEROBIC IDENTIFY: CPT | Performed by: INTERNAL MEDICINE

## 2019-11-13 PROCEDURE — 87070 CULTURE OTHR SPECIMN AEROBIC: CPT | Performed by: INTERNAL MEDICINE

## 2019-11-13 PROCEDURE — 87186 SC STD MICRODIL/AGAR DIL: CPT | Performed by: INTERNAL MEDICINE

## 2019-11-13 ASSESSMENT — PAIN SCALES - GENERAL: PAINLEVEL: NO PAIN (0)

## 2019-11-13 ASSESSMENT — MIFFLIN-ST. JEOR: SCORE: 1514.69

## 2019-11-13 NOTE — LETTER
11/13/2019       RE: Nico Morales  [id number]  [facility] [address]     Dear Colleague,    Thank you for referring your patient, Nico Morales, to the [facility] at [facility]. Please see a copy of my visit note below.    [facility]  November 13, 2019         Assessment and Plan:   Nico Morales is a 43 year old male with cystic fibrosis.    1. CF lung disease with severe obstruction:  Kilo reports since last being seen that he has been stable.  He did have a little bit of difficulty with the pneumonia vaccine, which caused him to feel off for about a week.  He has since recovered.  He does feel from a pulmonary point of view that he is at baseline.  He does continue to show evidence of Pseudomonas in his sputum.  At this time, I have recommended:   -- He continue on his bronchial drainage therapy.   -- He continue on his nebulized therapy.    -- He continue to rotate his inhaled aztreonam with his oral doxycycline as his antibiotic maintenance.     2.  Nutrition.  Kilo does remain on tube feeds 4-5 nights per week.  He does report good weight maintenance with this.     3.  Decreased bone mineral density.  Kilo is on pamidronate therapy.  He received his last dose in October of this year.     4.  CFTR modulator therapy.  Kilo is a candidate for Trikafta.  We discussed the risks and benefits of that today.  We will begin the process of prescribing this medication for him.  He will obtain a hepatic panel and CK in the next 2 days.     5.  Psychosocial.  Kilo remains in a stable job.  He reports he is currently dating a woman who has 4 children.  He reports that this relationship is going well.     6. Pancreatic Insufficiency:  The patient has no new symptoms consistent with worsening malabsorption.    - continue the present dose of pancreatic enzymes  - continue vitamin  supplementation.    Annual studies due: 6/2020  Immunizations: declines flu  Colonoscopy: 5/2021    Jaye Machado MD MPH  Associate Professor of Medicine  Pulmonary, Allergy, Critical Care and Sleep Medicine      Interval History:     Kilo does report that his cough frequency and sputum volume are at baseline.  His activity tolerance remains good.  He does perform 4 vest therapies per day.          Review of Systems:     CONSTITUTIONAL: no fever, no chills, no sweats, no change in weight, no change in energy, no change in appetite    INTEGUMENTARY/SKIN: no rash, no obvious new lesions    ENT/MOUTH: no sore throat, no new sinus pain, no new nasal drainage, no ear ringing     RESPIRATORY: see interval history    CV: no chest pain, no palpitations, no peripheral edema, no orthopnea, no PND    GI: no nausea, no vomiting, no change in stools, no fatty stools, no GERD, no abdominal pain    : no dysuria, no urinary frequency    MUSCULOSKELETAL: no myalgias, no arthralgias    ENDOCRINE: no excessive thirst, blood sugars with adequate control    NEURO:  No headache, no numbness, no tingling    SLEEP: good    PSYCHIATRIC: mood good          Past Medical and Surgical History:     Past Medical History:   Diagnosis Date     CF (cystic fibrosis) (H)      Exocrine pancreatic insufficiency      Nocardia infection      Pseudomonas infection      S/P gastrostomy (H) 2002     Past Surgical History:   Procedure Laterality Date     COLONOSCOPY N/A 5/21/2018    Procedure: COMBINED COLONOSCOPY, SINGLE OR MULTIPLE BIOPSY/POLYPECTOMY BY BIOPSY;  Cystic fibrosis, 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;  Surgeon: Margarito Bowman MD;  Location: [facility]     GASTROSTOMY TUBE  2002     PICC INSERTION Left 01/22/2018    4Fr SL BioFlo PICC, 46cm (5cm external) in the L basilic vein w/ tip in the low SVC           Family History:     Family History   Problem Relation Age of Onset     Heart Disease Maternal Grandmother      Heart Disease Maternal  Grandfather      Heart Disease Paternal Grandmother      Diabetes No family hx of             Social History:     Social History     Socioeconomic History     Marital status: Single     Spouse name: Not on file     Number of children: 0     Years of education: Not on file     Highest education level: Not on file   Occupational History     Occupation: financial palnner     Employer: [employer]   Social Needs     Financial resource strain: Not on file     Food insecurity:     Worry: Not on file     Inability: Not on file     Transportation needs:     Medical: Not on file     Non-medical: Not on file   Tobacco Use     Smoking status: Never Smoker     Smokeless tobacco: Never Used   Substance and Sexual Activity     Alcohol use: No     Alcohol/week: 0.0 standard drinks     Drug use: No     Sexual activity: Yes     Partners: Female     Birth control/protection: Pill   Lifestyle     Physical activity:     Days per week: Not on file     Minutes per session: Not on file     Stress: Not on file   Relationships     Social connections:     Talks on phone: Not on file     Gets together: Not on file     Attends Catholic service: Not on file     Active member of club or organization: Not on file     Attends meetings of clubs or organizations: Not on file     Relationship status: Not on file     Intimate partner violence:     Fear of current or ex partner: Not on file     Emotionally abused: Not on file     Physically abused: Not on file     Forced sexual activity: Not on file   Other Topics Concern     Parent/sibling w/ CABG, MI or angioplasty before 65F 55M? Not Asked      Service Not Asked     Blood Transfusions No     Caffeine Concern Not Asked     Occupational Exposure Not Asked     Hobby Hazards Not Asked     Sleep Concern Not Asked     Stress Concern Not Asked     Weight Concern Not Asked     Special Diet Not Asked     Back Care Not Asked     Exercise No     Bike Helmet Not Asked     Seat Belt Not Asked      Self-Exams Not Asked   Social History Narrative    8/15/2019 - .  Lives with his English Bulldog (Jefry) in a house that he designed in [place].  No children.            Medications:     Current Outpatient Medications   Medication     ACE NOT PRESCRIBED, INTENTIONAL,     acetylcysteine (MUCOMYST) 10 % nebulizer solution     amitriptyline (ELAVIL) 10 MG tablet     amylase-lipase-protease (CREON) 39780-39318 units CPEP per EC capsule     azithromycin (ZITHROMAX) 250 MG tablet     aztreonam lysine (CAYSTON) 75 MG SOLR     BD OMI U/F 32G X 4 MM insulin pen needle     blood glucose (NO BRAND SPECIFIED) lancets standard     blood glucose (NO BRAND SPECIFIED) test strip     blood glucose monitoring (MARTINA CONTOUR NEXT MONITOR) meter device kit     blood glucose monitoring (MARTINA CONTOUR NEXT) test strip     blood glucose monitoring (MARTINA MICROLET) lancets     blood glucose monitoring (NO BRAND SPECIFIED) meter device kit     CALCIUM PO     doxycycline hyclate (VIBRAMYCIN) 100 MG capsule     [facility] INFUSION MANAGED PATIENT     Ferrous Fumarate-Vitamin C (VITRON-C) 200-125 MG TABS     insulin aspart (NOVOLOG PENFILL) 100 UNIT/ML cartridge     insulin glargine (LANTUS SOLOSTAR) 100 UNIT/ML pen     levalbuterol (XOPENEX HFA) 45 MCG/ACT inhaler     levalbuterol (XOPENEX) 1.25 MG/3ML neb solution     MEPHYTON 5 MG PO TABS     MULTIVITAMIN OR     Nebulizers (EFLOW SCF NEBULIZER HANDSET) MISC     polyethylene glycol (MIRALAX/GLYCOLAX) powder     Respiratory Therapy Supplies (KRIS ALTERA NEBULIZER HANDSET) MISC     Respiratory Therapy Supplies (KRIS ALTERA NEBULIZER HANDSET) MISC     rimantadine (FLUMADINE) 100 MG tablet     sodium chloride inhalant 7 % NEBU neb solution     SYMBICORT 160-4.5 MCG/ACT Inhaler     acetaminophen-codeine (TYLENOL #3) 300-30 MG per tablet     oseltamivir (TAMIFLU) 75 MG capsule     No current facility-administered medications for this visit.             Physical Exam:   /71   " Pulse 111   Resp 17   Ht 1.737 m (5' 8.39\")   Wt 63.4 kg (139 lb 12.4 oz)   SpO2 97%   BMI 21.01 kg/m       Constitutional:   Awake, alert and in no apparent distress     Eyes:   nonicteric     ENT:   oral mucosa moist without lesions, normal tm bilaterally, bilateral mucosal edema      Neck:   Supple without supraclavicular or cervical lymphadenopathy     Lungs:   Good air flow.  No crackles. biapical rhonchi.  No wheezes.     Cardiovascular:   Normal S1 and S2.  RRR.  No murmur, gallop or rub.     Abdomen:   NABS, soft, nontender, nondistended.      Musculoskeletal:   No edema, digital clubbing present     Neurologic:   Alert and conversant.     Skin:   Warm, dry.  No rash on limited exam.             Data:   All laboratory and imaging data reviewed.    Cystic Fibrosis Culture  Specimen Description   Date Value Ref Range Status   10/15/2019 Sputum  Final   08/13/2019 Sputum  Final   06/18/2019 Sputum  Final    Culture Micro   Date Value Ref Range Status   10/15/2019 Heavy growth  Normal santi    Final   10/15/2019 (A)  Final    Light growth  Pseudomonas aeruginosa, mucoid strain     10/15/2019 (A)  Final    Light growth  Strain 2  Pseudomonas aeruginosa, mucoid strain          Recent Results (from the past 168 hour(s))   General PFT Lab (Please always keep checked)    Collection Time: 11/13/19 10:43 AM   Result Value Ref Range    FVC-Pred 4.92 L    FVC-Pre 3.68 L    FVC-%Pred-Pre 74 %    FEV1-Pre 1.53 L    FEV1-%Pred-Pre 38 %    FEV1FVC-Pred 81 %    FEV1FVC-Pre 41 %    FEFMax-Pred 9.76 L/sec    FEFMax-Pre 6.52 L/sec    FEFMax-%Pred-Pre 66 %    FEF2575-Pred 3.86 L/sec    FEF2575-Pre 0.57 L/sec    ZEV3472-%Pred-Pre 14 %    ExpTime-Pre 14.93 sec    FIFMax-Pre 4.92 L/sec    FEV1FEV6-Pred 82 %    FEV1FEV6-Pre 48 %           PFT: Severe obstructive lung disease.  When compared to 10/15/2019, the FEV1 and FVC have little change.  The decrease in FVC may represent air trapping v. restrictive physiology.  Lung " volumes would be necessary to determine.    Pulmonary exacerbation: absent        Again, thank you for allowing me to participate in the care of your patient.      Sincerely,    Jaye Machado MD

## 2019-11-13 NOTE — PROGRESS NOTES
[facility]  [facility]  November 13, 2019         Assessment and Plan:   Nico Morales is a 43 year old male with cystic fibrosis.    1. CF lung disease with severe obstruction:  Kilo reports since last being seen that he has been stable.  He did have a little bit of difficulty with the pneumonia vaccine, which caused him to feel off for about a week.  He has since recovered.  He does feel from a pulmonary point of view that he is at baseline.  He does continue to show evidence of Pseudomonas in his sputum.  At this time, I have recommended:   -- He continue on his bronchial drainage therapy.   -- He continue on his nebulized therapy.    -- He continue to rotate his inhaled aztreonam with his oral doxycycline as his antibiotic maintenance.     2.  Nutrition.  Kilo does remain on tube feeds 4-5 nights per week.  He does report good weight maintenance with this.     3.  Decreased bone mineral density.  Kilo is on pamidronate therapy.  He received his last dose in October of this year.     4.  CFTR modulator therapy.  Kilo is a candidate for Trikafta.  We discussed the risks and benefits of that today.  We will begin the process of prescribing this medication for him.  He will obtain a hepatic panel and CK in the next 2 days.     5.  Psychosocial.  Kilo remains in a stable job.  He reports he is currently dating a woman who has 4 children.  He reports that this relationship is going well.     6. Pancreatic Insufficiency:  The patient has no new symptoms consistent with worsening malabsorption.    - continue the present dose of pancreatic enzymes  - continue vitamin supplementation.    Annual studies due: 6/2020  Immunizations: declines flu  Colonoscopy: 5/2021    Jaye Machado MD MPH  Associate Professor of Medicine  Pulmonary, Allergy, Critical Care and Sleep Medicine      Interval History:     Kilo does report that his cough frequency and sputum volume are  at baseline.  His activity tolerance remains good.  He does perform 4 vest therapies per day.          Review of Systems:     CONSTITUTIONAL: no fever, no chills, no sweats, no change in weight, no change in energy, no change in appetite    INTEGUMENTARY/SKIN: no rash, no obvious new lesions    ENT/MOUTH: no sore throat, no new sinus pain, no new nasal drainage, no ear ringing     RESPIRATORY: see interval history    CV: no chest pain, no palpitations, no peripheral edema, no orthopnea, no PND    GI: no nausea, no vomiting, no change in stools, no fatty stools, no GERD, no abdominal pain    : no dysuria, no urinary frequency    MUSCULOSKELETAL: no myalgias, no arthralgias    ENDOCRINE: no excessive thirst, blood sugars with adequate control    NEURO:  No headache, no numbness, no tingling    SLEEP: good    PSYCHIATRIC: mood good          Past Medical and Surgical History:     Past Medical History:   Diagnosis Date     CF (cystic fibrosis) (H)      Exocrine pancreatic insufficiency      Nocardia infection      Pseudomonas infection      S/P gastrostomy (H) 2002     Past Surgical History:   Procedure Laterality Date     COLONOSCOPY N/A 5/21/2018    Procedure: COMBINED COLONOSCOPY, SINGLE OR MULTIPLE BIOPSY/POLYPECTOMY BY BIOPSY;  Cystic fibrosis, 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;  Surgeon: Margarito Bowman MD;  Location: [facility]     GASTROSTOMY TUBE  2002     PICC INSERTION Left 01/22/2018    4Fr SL BioFlo PICC, 46cm (5cm external) in the L basilic vein w/ tip in the low SVC           Family History:     Family History   Problem Relation Age of Onset     Heart Disease Maternal Grandmother      Heart Disease Maternal Grandfather      Heart Disease Paternal Grandmother      Diabetes No family hx of             Social History:     Social History     Socioeconomic History     Marital status: Single     Spouse name: Not on file     Number of children: 0     Years of education: Not on file     Highest education level: Not on  file   Occupational History     Occupation: financial palnner     Employer: [employer]   Social Needs     Financial resource strain: Not on file     Food insecurity:     Worry: Not on file     Inability: Not on file     Transportation needs:     Medical: Not on file     Non-medical: Not on file   Tobacco Use     Smoking status: Never Smoker     Smokeless tobacco: Never Used   Substance and Sexual Activity     Alcohol use: No     Alcohol/week: 0.0 standard drinks     Drug use: No     Sexual activity: Yes     Partners: Female     Birth control/protection: Pill   Lifestyle     Physical activity:     Days per week: Not on file     Minutes per session: Not on file     Stress: Not on file   Relationships     Social connections:     Talks on phone: Not on file     Gets together: Not on file     Attends Voodoo service: Not on file     Active member of club or organization: Not on file     Attends meetings of clubs or organizations: Not on file     Relationship status: Not on file     Intimate partner violence:     Fear of current or ex partner: Not on file     Emotionally abused: Not on file     Physically abused: Not on file     Forced sexual activity: Not on file   Other Topics Concern     Parent/sibling w/ CABG, MI or angioplasty before 65F 55M? Not Asked      Service Not Asked     Blood Transfusions No     Caffeine Concern Not Asked     Occupational Exposure Not Asked     Hobby Hazards Not Asked     Sleep Concern Not Asked     Stress Concern Not Asked     Weight Concern Not Asked     Special Diet Not Asked     Back Care Not Asked     Exercise No     Bike Helmet Not Asked     Seat Belt Not Asked     Self-Exams Not Asked   Social History Narrative    8/15/2019 - .  Lives with his English Bulldog (Jefry) in a house that he designed in [place].  No children.            Medications:     Current Outpatient Medications   Medication     ACE NOT PRESCRIBED, INTENTIONAL,     acetylcysteine  "(MUCOMYST) 10 % nebulizer solution     amitriptyline (ELAVIL) 10 MG tablet     amylase-lipase-protease (CREON) 39198-90359 units CPEP per EC capsule     azithromycin (ZITHROMAX) 250 MG tablet     aztreonam lysine (CAYSTON) 75 MG SOLR     BD OMI U/F 32G X 4 MM insulin pen needle     blood glucose (NO BRAND SPECIFIED) lancets standard     blood glucose (NO BRAND SPECIFIED) test strip     blood glucose monitoring (MARTINA CONTOUR NEXT MONITOR) meter device kit     blood glucose monitoring (MARTINA CONTOUR NEXT) test strip     blood glucose monitoring (MARTINA MICROLET) lancets     blood glucose monitoring (NO BRAND SPECIFIED) meter device kit     CALCIUM PO     doxycycline hyclate (VIBRAMYCIN) 100 MG capsule     [facility] INFUSION MANAGED PATIENT     Ferrous Fumarate-Vitamin C (VITRON-C) 200-125 MG TABS     insulin aspart (NOVOLOG PENFILL) 100 UNIT/ML cartridge     insulin glargine (LANTUS SOLOSTAR) 100 UNIT/ML pen     levalbuterol (XOPENEX HFA) 45 MCG/ACT inhaler     levalbuterol (XOPENEX) 1.25 MG/3ML neb solution     MEPHYTON 5 MG PO TABS     MULTIVITAMIN OR     Nebulizers (EFLOW SCF NEBULIZER HANDSET) MISC     polyethylene glycol (MIRALAX/GLYCOLAX) powder     Respiratory Therapy Supplies (KRIS ALTERA NEBULIZER HANDSET) MISC     Respiratory Therapy Supplies (KRIS ALTERA NEBULIZER HANDSET) MISC     rimantadine (FLUMADINE) 100 MG tablet     sodium chloride inhalant 7 % NEBU neb solution     SYMBICORT 160-4.5 MCG/ACT Inhaler     acetaminophen-codeine (TYLENOL #3) 300-30 MG per tablet     oseltamivir (TAMIFLU) 75 MG capsule     No current facility-administered medications for this visit.             Physical Exam:   /71   Pulse 111   Resp 17   Ht 1.737 m (5' 8.39\")   Wt 63.4 kg (139 lb 12.4 oz)   SpO2 97%   BMI 21.01 kg/m      Constitutional:   Awake, alert and in no apparent distress     Eyes:   nonicteric     ENT:   oral mucosa moist without lesions, normal tm bilaterally, bilateral mucosal edema  "     Neck:   Supple without supraclavicular or cervical lymphadenopathy     Lungs:   Good air flow.  No crackles. biapical rhonchi.  No wheezes.     Cardiovascular:   Normal S1 and S2.  RRR.  No murmur, gallop or rub.     Abdomen:   NABS, soft, nontender, nondistended.      Musculoskeletal:   No edema, digital clubbing present     Neurologic:   Alert and conversant.     Skin:   Warm, dry.  No rash on limited exam.             Data:   All laboratory and imaging data reviewed.    Cystic Fibrosis Culture  Specimen Description   Date Value Ref Range Status   10/15/2019 Sputum  Final   08/13/2019 Sputum  Final   06/18/2019 Sputum  Final    Culture Micro   Date Value Ref Range Status   10/15/2019 Heavy growth  Normal santi    Final   10/15/2019 (A)  Final    Light growth  Pseudomonas aeruginosa, mucoid strain     10/15/2019 (A)  Final    Light growth  Strain 2  Pseudomonas aeruginosa, mucoid strain          Recent Results (from the past 168 hour(s))   General PFT Lab (Please always keep checked)    Collection Time: 11/13/19 10:43 AM   Result Value Ref Range    FVC-Pred 4.92 L    FVC-Pre 3.68 L    FVC-%Pred-Pre 74 %    FEV1-Pre 1.53 L    FEV1-%Pred-Pre 38 %    FEV1FVC-Pred 81 %    FEV1FVC-Pre 41 %    FEFMax-Pred 9.76 L/sec    FEFMax-Pre 6.52 L/sec    FEFMax-%Pred-Pre 66 %    FEF2575-Pred 3.86 L/sec    FEF2575-Pre 0.57 L/sec    APV3693-%Pred-Pre 14 %    ExpTime-Pre 14.93 sec    FIFMax-Pre 4.92 L/sec    FEV1FEV6-Pred 82 %    FEV1FEV6-Pre 48 %           PFT: Severe obstructive lung disease.  When compared to 10/15/2019, the FEV1 and FVC have little change.  The decrease in FVC may represent air trapping v. restrictive physiology.  Lung volumes would be necessary to determine.    Pulmonary exacerbation: absent

## 2019-11-13 NOTE — PROGRESS NOTES
Therapy Management:                                                    Nico Morales is a 43 year old male coming in for a therapy management visit.  He was referred to me from Dr. Machado.     Reason for Consult: Trikafta initiation    Discussion:    Patient is eligible for treatment with CFTR modulator therapy. Most appropriate choice for patient of currently available CFTR modulators is: elexacaftor/tezacaftor/ivacaftor based on age, CFTR mutation genotype, past medical history and current medications. Patient was previously naive to CFTR modulator.    Recommended dose two orange elexacaftor 100mg/tezacaftor 50mg/ivacaftor 75mg in the morning and one blue ivacaftor 150mg tablet in the evening    Dose should be given with age-appropriate fat containing food. Provided appropriate examples of fat-containing foods to patient (e.g. Whole milk, cheese, avocado, peanut butter).    Patient will not need dilated eye exam prior to initiation.    Baseline LFTs and CK drawn and are within normal limits Recommend continuing to monitor LFTs and CK quarterly for the first year of treatment then annually therafter.      Educated patient on potential side effects, including headache, GI disturbances, and rash.  Education provided on how to administer medication, what to do if a dose is missed, monitoring prior to and while on therapy, medications/foods to avoid, and expected benefits.  Discussed with patient how to obtain CFTR modulator from specialty pharmacy and informed patient they will need to bring home supply if hospitalized. Patient was engaged in teaching and verbalized understanding.    Patient enrolled in [facility], paperwork signed in clinic today .      Plan:  1. We will work on obtaining insurance coverage for Trikafta.  Once insurance is approved we will send the prescription to the specialty pharmacy required by your insurance (Kilo currently uses [facility])  2. Once you receive  the medication start Trikafta 2 orange tablets in the morning and 1 blue tablet in the evening, 12 hours apart, with fat-containing food.    Will follow-up in 1-3 months to assess response to Trikafta.    Tammy CrowderD  CF Medication Therapy Management Pharmacist  [facility]  [phone]

## 2019-11-13 NOTE — PATIENT INSTRUCTIONS
Cystic Fibrosis Self-Care Plan    RECOMMENDATIONS:   Great job!  We will start the process for trikafta.  Labs tomorrow.    YOUR GOAL:  Have a great holiday!      [facility] Nurse line:  Marquise HendersonSandoval    [phone]     [facility] Fax Number:      [phone]         Cystic fibrosis Respiratory Therapist:   Felicitas Banuelos              [phone]   Cystic fibrosis Dietitians:              Zeina Pierre              [phone]                            Ivory Bonilla                        [phone]   Cystic fibrosis Diabetes Nurse:    Jordyn Rudolph   [phone]    Cystic fibrosis Social Workers:     Mary Ritter               [phone]                     Louann Suarez               [phone]  Cystic fibrosis Pharmacist:           Tammy Morgan                               [phone]         Ml Tilley   [phone]  Cystic Fibrosis Genetic Counselor:   Pretty Pulliam    [phone]    [facility] website:  [email or web address]         MRN: [id number]   Clinic Date: November 13, 2019   Patient: Nico Morales     Annual Studies:   IGG   Date Value Ref Range Status   06/12/2019 1,780 (H) 695 - 1,620 mg/dL Final     Insulin   Date Value Ref Range Status   07/26/2011 30 mU/L Final     Comment:     Reference Range:  0-20  +120     There are no preventive care reminders to display for this patient.    Pulmonary Function Tests  FEV1: amount of air you can blow out in 1 second  FVC: total amount of air you can take in and blow out    Your Goals:         PFT Latest Ref Rng & Units 11/13/2019   FVC L 3.68   FEV1 L 1.53   FVC% % 74   FEV1% % 38          Airway Clearance: The Most Important Way to Keep Your Lungs Healthy  Vest Settings:    Hill-Rom Frequencies: 8, 9, 10 Pressure 10 Then, Frequencies 18, 19, 20 Pressure 6      RespirTech: Quick Start with Pressure of     Do each frequency for 5 minutes; Deflate vest after each  frequency & cough 3 times before beginning the next setting.    Vest and Neb Therapy should be done 3-4 times/day.    Good Nutrition Can Improve Lung Function and Overall Health     Take ALL of your vitamins with food     Take 1/2 of your enzymes before EVERY meal/snack and the other 1/2 mid-meal/snack    Wt Readings from Last 3 Encounters:   11/13/19 63.4 kg (139 lb 12.4 oz)   10/24/19 65.3 kg (143 lb 14.4 oz)   10/15/19 66.2 kg (145 lb 15.1 oz)       Body mass index is 21.01 kg/m .         National CF Foundation Recommendations for BMI in CF Adults: Women: at least 22 Men: at least 23        Controlling Blood Sugars Helps Prevent Lung Infections & Improves Nutrition  Test blood sugar:     In the morning before eating (goal is )     2 hours after a meal (goal is less than 150)     When pre-meal glucose is greater than 150 add correction     At bedtime (if less than 100 eat a snack with 15 grams of carbohydrates  Last A1C Results:   Hemoglobin A1C   Date Value Ref Range Status   06/12/2019 6.5 (H) 0 - 5.6 % Final     Comment:     Normal <5.7% Prediabetes 5.7-6.4%  Diabetes 6.5% or higher - adopted from ADA   consensus guidelines.           If diabetic, measure A1C every 6 months. Goal: Under 7%    Staying Healthy    Research:  If you are interested in learning about research opportunities or have questions, please contact the CF Research Team at [phone] or [email or web address].      CF Foundation:  Compass is a personalized resource service to help you with the insurance, financial, legal and other issues you are facing.  It's free, confidential and available to anyone with CF.  Ask your  for more information or contact Compass directly at 873-COMPASS ([phone]) or compass@cff.org, or learn more at cff.org/compass.            .

## 2019-11-14 ENCOUNTER — RESULTS ONLY (OUTPATIENT)
Dept: LAB | Age: 44
End: 2019-11-14

## 2019-11-14 DIAGNOSIS — E84.9 CYSTIC FIBROSIS (H): ICD-10-CM

## 2019-11-14 DIAGNOSIS — E84.9 CYSTIC FIBROSIS (H): Primary | ICD-10-CM

## 2019-11-14 LAB
ALBUMIN SERPL-MCNC: 3.2 G/DL (ref 3.4–5)
ALP SERPL-CCNC: 120 U/L (ref 40–150)
ALT SERPL W P-5'-P-CCNC: 20 U/L (ref 0–70)
AST SERPL W P-5'-P-CCNC: 19 U/L (ref 0–45)
BASOPHILS # BLD AUTO: 0.1 10E9/L (ref 0–0.2)
BASOPHILS NFR BLD AUTO: 0.6 %
BILIRUB DIRECT SERPL-MCNC: <0.1 MG/DL (ref 0–0.2)
BILIRUB SERPL-MCNC: 0.4 MG/DL (ref 0.2–1.3)
CK SERPL-CCNC: 34 U/L (ref 30–300)
DIFFERENTIAL METHOD BLD: ABNORMAL
EOSINOPHIL # BLD AUTO: 0.1 10E9/L (ref 0–0.7)
EOSINOPHIL NFR BLD AUTO: 0.7 %
ERYTHROCYTE [DISTWIDTH] IN BLOOD BY AUTOMATED COUNT: 14 % (ref 10–15)
GGT SERPL-CCNC: 21 U/L (ref 0–75)
HBA1C MFR BLD: 6.9 % (ref 0–5.6)
HCT VFR BLD AUTO: 43.8 % (ref 40–53)
HGB BLD-MCNC: 13.3 G/DL (ref 13.3–17.7)
IMM GRANULOCYTES # BLD: 0 10E9/L (ref 0–0.4)
IMM GRANULOCYTES NFR BLD: 0.4 %
LYMPHOCYTES # BLD AUTO: 1.9 10E9/L (ref 0.8–5.3)
LYMPHOCYTES NFR BLD AUTO: 19.4 %
MCH RBC QN AUTO: 26.9 PG (ref 26.5–33)
MCHC RBC AUTO-ENTMCNC: 30.4 G/DL (ref 31.5–36.5)
MCV RBC AUTO: 89 FL (ref 78–100)
MONOCYTES # BLD AUTO: 1 10E9/L (ref 0–1.3)
MONOCYTES NFR BLD AUTO: 10.7 %
NEUTROPHILS # BLD AUTO: 6.6 10E9/L (ref 1.6–8.3)
NEUTROPHILS NFR BLD AUTO: 68.2 %
NRBC # BLD AUTO: 0 10*3/UL
NRBC BLD AUTO-RTO: 0 /100
PLATELET # BLD AUTO: 313 10E9/L (ref 150–450)
PROT SERPL-MCNC: 7.9 G/DL (ref 6.8–8.8)
RBC # BLD AUTO: 4.94 10E12/L (ref 4.4–5.9)
WBC # BLD AUTO: 9.7 10E9/L (ref 4–11)

## 2019-11-14 PROCEDURE — 82550 ASSAY OF CK (CPK): CPT | Performed by: INTERNAL MEDICINE

## 2019-11-15 ENCOUNTER — TELEPHONE (OUTPATIENT)
Dept: PULMONOLOGY | Facility: CLINIC | Age: 44
End: 2019-11-15

## 2019-11-15 NOTE — TELEPHONE ENCOUNTER
Prior Authorization Approval    Authorization Effective Date: 11/13/2019  Authorization Expiration Date: 5/15/2020  Medication: elexacaftor-tezacaftor-ivacaftor & ivacaftor (TRIKAFTA) 100-50-75 & 150 MG tablet pack (APPROVED)  Approved Dose/Quantity: 84 PER 28 DAYS  Reference #:     Insurance Company: [employer] - Phone [phone] Fax [phone]  Expected CoPay:       CoPay Card Available:      Foundation Assistance Needed:    Which Pharmacy is filling the prescription (Not needed for infusion/clinic administered): [facility] - [place], [place] - [address]  Pharmacy Notified: Yes  Patient Notified:

## 2019-11-15 NOTE — TELEPHONE ENCOUNTER
PA Initiation    Medication: elexacaftor-tezacaftor-ivacaftor & ivacaftor (TRIKAFTA) 100-50-75 & 150 MG tablet pack (PENDING)  Insurance Company: [employer] - Phone [phone] Fax [phone]  Pharmacy Filling the Rx: [facility] - [place], [place] - [facility] [address]  Filling Pharmacy Phone:    Filling Pharmacy Fax:    Start Date: 11/15/2019

## 2019-11-18 LAB
BACTERIA SPEC CULT: ABNORMAL
BACTERIA SPEC CULT: ABNORMAL
SPECIMEN SOURCE: ABNORMAL

## 2020-01-09 DIAGNOSIS — E84.0 CYSTIC FIBROSIS WITH PULMONARY MANIFESTATIONS (H): ICD-10-CM

## 2020-01-21 DIAGNOSIS — E84.0 CYSTIC FIBROSIS WITH PULMONARY MANIFESTATIONS (H): ICD-10-CM

## 2020-01-21 RX ORDER — DOXYCYCLINE 100 MG/1
CAPSULE ORAL
Qty: 60 CAPSULE | Refills: 2 | Status: SHIPPED | OUTPATIENT
Start: 2020-01-21 | End: 2020-05-11

## 2020-02-04 ENCOUNTER — OFFICE VISIT (OUTPATIENT)
Dept: PULMONOLOGY | Facility: CLINIC | Age: 45
End: 2020-02-04
Attending: INTERNAL MEDICINE
Payer: COMMERCIAL

## 2020-02-04 VITALS
HEART RATE: 96 BPM | HEIGHT: 68 IN | WEIGHT: 138.23 LBS | SYSTOLIC BLOOD PRESSURE: 121 MMHG | OXYGEN SATURATION: 97 % | DIASTOLIC BLOOD PRESSURE: 80 MMHG | BODY MASS INDEX: 20.95 KG/M2

## 2020-02-04 DIAGNOSIS — E84.9 CYSTIC FIBROSIS (H): Primary | ICD-10-CM

## 2020-02-04 DIAGNOSIS — E84.0 CYSTIC FIBROSIS WITH PULMONARY MANIFESTATIONS (H): Primary | ICD-10-CM

## 2020-02-04 LAB
EXPTIME-PRE: 16.97 SEC
FEF2575-%PRED-PRE: 19 %
FEF2575-PRE: 0.75 L/SEC
FEF2575-PRED: 3.81 L/SEC
FEFMAX-%PRED-PRE: 68 %
FEFMAX-PRE: 6.62 L/SEC
FEFMAX-PRED: 9.74 L/SEC
FEV1-%PRED-PRE: 52 %
FEV1-PRE: 2.06 L
FEV1FEV6-PRE: 56 %
FEV1FEV6-PRED: 81 %
FEV1FVC-PRE: 49 %
FEV1FVC-PRED: 81 %
FIFMAX-PRE: 5.2 L/SEC
FVC-%PRED-PRE: 85 %
FVC-PRE: 4.18 L
FVC-PRED: 4.9 L

## 2020-02-04 PROCEDURE — G0463 HOSPITAL OUTPT CLINIC VISIT: HCPCS | Mod: 25,ZF

## 2020-02-04 PROCEDURE — 87186 SC STD MICRODIL/AGAR DIL: CPT | Performed by: INTERNAL MEDICINE

## 2020-02-04 PROCEDURE — 87077 CULTURE AEROBIC IDENTIFY: CPT | Performed by: INTERNAL MEDICINE

## 2020-02-04 PROCEDURE — 25000125 ZZHC RX 250: Mod: ZF | Performed by: INTERNAL MEDICINE

## 2020-02-04 PROCEDURE — 87070 CULTURE OTHR SPECIMN AEROBIC: CPT | Performed by: INTERNAL MEDICINE

## 2020-02-04 RX ORDER — AZTREONAM 1 G/1
INJECTION, POWDER, LYOPHILIZED, FOR SOLUTION INTRAMUSCULAR; INTRAVENOUS
Qty: 56 EACH | Refills: 6 | Status: SHIPPED | OUTPATIENT
Start: 2020-02-04 | End: 2021-02-26

## 2020-02-04 RX ORDER — AZTREONAM 1 G/1
1 INJECTION, POWDER, LYOPHILIZED, FOR SOLUTION INTRAMUSCULAR; INTRAVENOUS ONCE
Status: COMPLETED | OUTPATIENT
Start: 2020-02-04 | End: 2020-02-04

## 2020-02-04 RX ADMIN — AZTREONAM 1 G: 1 INJECTION, POWDER, FOR SOLUTION INTRAMUSCULAR; INTRAVENOUS at 13:14

## 2020-02-04 ASSESSMENT — MIFFLIN-ST. JEOR: SCORE: 1502.63

## 2020-02-04 ASSESSMENT — PAIN SCALES - GENERAL: PAINLEVEL: NO PAIN (0)

## 2020-02-04 NOTE — PROGRESS NOTES
[facility]  February 4, 2020         Assessment and Plan:   Nico Morales is a 44 year old male with cystic fibrosis.    1. CF lung disease with moderately-severe obstruction:  Kilo reports that he has been feeling good.  He does notice that his activity tolerance has improved since starting Trikafta.  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  He does continue to show evidence of Pseudomonas in his sputum.  He is very pleased with his marked improvement in pulmonary function today.  At this time, I have recommended to Kilo:   -- That he continue on his bronchial drainage and nebulized therapy, performing it 2-3 times daily.   -- Because of Kilo's difficulty tolerating inhalational [place] we will try changing him to inhalational aztreonam.  He will have first dose in clinic today.   -- He will continue to alternate his inhalational antibiotics with oral doxycycline.     2. CFTR modulator therapy.  Kilo is tolerating Trikafta without difficulty.  We will obtain a hepatic panel and CK today.     3. Cystic fibrosis-related diabetes.  Kilo reports good control of his blood sugars.     4. Nutrition.  Kilo does continue to do tube feeds 5 nights per week.  He is changing to a peptide based formula in order to see if this will facilitate weight gain.  He has had trouble with his weight maintenance over the last several years.     5. Psychosocial.  Kilo reports that he is now in a stable relationship.  His significant other has 4 children that are 12, 11, 6 and 2 years old.  He anticipates that they may announce an engagement as early as May.  He is very happy with the way things are going in this relationship and is enjoying the children very much.     6. Pancreatic Insufficiency:  The patient has no new symptoms consistent with worsening malabsorption.    - continue the present dose of pancreatic enzymes  -  continue vitamin supplementation.    Jaye Machado MD MPH  Associate Professor of Medicine  Pulmonary, Allergy, Critical Care and Sleep Medicine      Interval History:     Kilo does report that his cough frequency and sputum volume have decreased.  He does notice, however, when he does forget things like his azithromycin or doxycycline that he gets some symptoms back.  He does remain consistent with his bronchial drainage and nebulized therapies.          Review of Systems:     CONSTITUTIONAL: no fever, no chills, no sweats, decrease in weight, better energy, pretty good appetite    INTEGUMENTARY/SKIN: no rash, no obvious new lesions    ENT/MOUTH: no sore throat, no new sinus pain,  nasal drainage purge, no ear ringing     RESPIRATORY: see interval history    CV: no chest pain, no palpitations, no peripheral edema, no orthopnea, no PND    GI: no nausea, no vomiting, no change in stools, no fatty stools, no GERD, no abdominal pain    : no dysuria, no urinary frequency    MUSCULOSKELETAL: no myalgias, no arthralgias    ENDOCRINE: no excessive thirst, blood sugars with adequate control    NEURO:  No headache, no numbness, no tingling    SLEEP: no issues    PSYCHIATRIC: mood good          Past Medical and Surgical History:     Past Medical History:   Diagnosis Date     CF (cystic fibrosis) (H)      Exocrine pancreatic insufficiency      Nocardia infection      Pseudomonas infection      S/P gastrostomy (H) 2002     Past Surgical History:   Procedure Laterality Date     COLONOSCOPY N/A 5/21/2018    Procedure: COMBINED COLONOSCOPY, SINGLE OR MULTIPLE BIOPSY/POLYPECTOMY BY BIOPSY;  Cystic fibrosis, 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;  Surgeon: Margarito Bowman MD;  Location: [facility]     GASTROSTOMY TUBE  2002     PICC INSERTION Left 01/22/2018    4Fr SL BioFlo PICC, 46cm (5cm external) in the L basilic vein w/ tip in the low SVC           Family History:     Family History   Problem Relation Age of Onset     Heart  Disease Maternal Grandmother      Heart Disease Maternal Grandfather      Heart Disease Paternal Grandmother      Diabetes No family hx of             Social History:     Social History     Socioeconomic History     Marital status: Single     Spouse name: Not on file     Number of children: 0     Years of education: Not on file     Highest education level: Not on file   Occupational History     Occupation: financial palnner     Employer: [employer]   Social Needs     Financial resource strain: Not on file     Food insecurity:     Worry: Not on file     Inability: Not on file     Transportation needs:     Medical: Not on file     Non-medical: Not on file   Tobacco Use     Smoking status: Never Smoker     Smokeless tobacco: Never Used   Substance and Sexual Activity     Alcohol use: No     Alcohol/week: 0.0 standard drinks     Drug use: No     Sexual activity: Yes     Partners: Female     Birth control/protection: Pill   Lifestyle     Physical activity:     Days per week: Not on file     Minutes per session: Not on file     Stress: Not on file   Relationships     Social connections:     Talks on phone: Not on file     Gets together: Not on file     Attends [employer] service: Not on file     Active member of club or organization: Not on file     Attends meetings of clubs or organizations: Not on file     Relationship status: Not on file     Intimate partner violence:     Fear of current or ex partner: Not on file     Emotionally abused: Not on file     Physically abused: Not on file     Forced sexual activity: Not on file   Other Topics Concern     Parent/sibling w/ CABG, MI or angioplasty before 65F 55M? Not Asked      Service Not Asked     Blood Transfusions No     Caffeine Concern Not Asked     Occupational Exposure Not Asked     Hobby Hazards Not Asked     Sleep Concern Not Asked     Stress Concern Not Asked     Weight Concern Not Asked     Special Diet Not Asked     Back Care Not Asked     Exercise  "No     Bike Helmet Not Asked     Seat Belt Not Asked     Self-Exams Not Asked   Social History Narrative    8/15/2019 - .  Lives with his English Bulldog (Jefry) in a house that he designed in [place].  No children.            Medications:     Current Outpatient Medications   Medication     aztreonam (AZACTAM) 200 MG/ML     Syringe/Needle, Disp, 18G X 1\" 5 ML MISC     water for injection sterile SOLN     ACE NOT PRESCRIBED, INTENTIONAL,     acetaminophen-codeine (TYLENOL #3) 300-30 MG per tablet     acetylcysteine (MUCOMYST) 10 % nebulizer solution     amitriptyline (ELAVIL) 10 MG tablet     amylase-lipase-protease (CREON) 34028-88279 units CPEP per EC capsule     azithromycin (ZITHROMAX) 250 MG tablet     aztreonam lysine (CAYSTON) 75 MG SOLR     BD OMI U/F 32G X 4 MM insulin pen needle     blood glucose (NO BRAND SPECIFIED) lancets standard     blood glucose (NO BRAND SPECIFIED) test strip     blood glucose monitoring (MARTINA CONTOUR NEXT MONITOR) meter device kit     blood glucose monitoring (MARTINA CONTOUR NEXT) test strip     blood glucose monitoring (MARTINA MICROLET) lancets     blood glucose monitoring (NO BRAND SPECIFIED) meter device kit     CALCIUM PO     doxycycline hyclate (VIBRAMYCIN) 100 MG capsule     elexacaftor-tezacaftor-ivacaftor & ivacaftor (TRIKAFTA) 100-50-75 & 150 MG tablet pack     [facility] INFUSION MANAGED PATIENT     Ferrous Fumarate-Vitamin C (VITRON-C) 200-125 MG TABS     insulin aspart (NOVOLOG PENFILL) 100 UNIT/ML cartridge     insulin glargine (LANTUS SOLOSTAR) 100 UNIT/ML pen     levalbuterol (XOPENEX HFA) 45 MCG/ACT inhaler     levalbuterol (XOPENEX) 1.25 MG/3ML neb solution     MEPHYTON 5 MG PO TABS     MULTIVITAMIN OR     Nebulizers (EFLOW SCF NEBULIZER HANDSET) MISC     oseltamivir (TAMIFLU) 75 MG capsule     polyethylene glycol (MIRALAX/GLYCOLAX) powder     Respiratory Therapy Supplies (KRIS ALTERA NEBULIZER HANDSET) [facility]     Respiratory Therapy Supplies (KRIS " "ALTERA NEBULIZER HANDSET) MISC     rimantadine (FLUMADINE) 100 MG tablet     sodium chloride inhalant 7 % NEBU neb solution     SYMBICORT 160-4.5 MCG/ACT Inhaler     No current facility-administered medications for this visit.             Physical Exam:   /80   Pulse 96   Ht 1.737 m (5' 8.39\")   Wt 62.7 kg (138 lb 3.7 oz)   SpO2 97%   BMI 20.78 kg/m      Constitutional:   Awake, alert and in no apparent distress     Eyes:   nonicteric     ENT:   oral mucosa moist without lesions, normal tm bilaterally, bilateral mucosa normal     Neck:   Supple without supraclavicular or cervical lymphadenopathy     Lungs:   Good air flow.  No crackles. diffuse rhonchi.  No wheezes.     Cardiovascular:   Normal S1 and S2.  RRR.  No murmur, gallop or rub.     Abdomen:   NABS, soft, nontender, nondistended.      Musculoskeletal:   No edema, digital clubbing present     Neurologic:   Alert and conversant.     Skin:   Warm, dry.  No rash on limited exam.             Data:   All laboratory and imaging data reviewed.    Cystic Fibrosis Culture  Specimen Description   Date Value Ref Range Status   11/13/2019 Sputum  Final   10/15/2019 Sputum  Final   08/13/2019 Sputum  Final    Culture Micro   Date Value Ref Range Status   11/13/2019 Heavy growth  Normal santi    Final   11/13/2019 (A)  Final    Light growth  Pseudomonas aeruginosa, mucoid strain     10/15/2019 Heavy growth  Normal santi    Final   10/15/2019 (A)  Final    Light growth  Pseudomonas aeruginosa, mucoid strain     10/15/2019 (A)  Final    Light growth  Strain 2  Pseudomonas aeruginosa, mucoid strain          Recent Results (from the past 168 hour(s))   General PFT Lab (Please always keep checked)    Collection Time: 02/04/20 11:16 AM   Result Value Ref Range    FVC-Pred 4.90 L    FVC-Pre 4.18 L    FVC-%Pred-Pre 85 %    FEV1-Pre 2.06 L    FEV1-%Pred-Pre 52 %    FEV1FVC-Pred 81 %    FEV1FVC-Pre 49 %    FEFMax-Pred 9.74 L/sec    FEFMax-Pre 6.62 L/sec    " FEFMax-%Pred-Pre 68 %    FEF2575-Pred 3.81 L/sec    FEF2575-Pre 0.75 L/sec    GSG3576-%Pred-Pre 19 %    ExpTime-Pre 16.97 sec    FIFMax-Pre 5.20 L/sec    FEV1FEV6-Pred 81 %    FEV1FEV6-Pre 56 %       PFT: Moderately-severe obstructive lung disease.  When compared to 11/13/2019, the FEV1 and FVC have increased.  T    Pulmonary exacerbation: absent

## 2020-02-04 NOTE — LETTER
2/4/2020       RE: Nico Morales  [address]  [facility] [address]     Dear Colleague,    Thank you for referring your patient, Nico Morales, to the [facility] at [facility]. Please see a copy of my visit note below.    [facility]  February 4, 2020         Assessment and Plan:   Nico Morales is a 44 year old male with cystic fibrosis.    1. CF lung disease with moderately-severe obstruction:  Kilo reports that he has been feeling good.  He does notice that his activity tolerance has improved since starting Trikafta.  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  He does continue to show evidence of Pseudomonas in his sputum.  He is very pleased with his marked improvement in pulmonary function today.  At this time, I have recommended to Kilo:   -- That he continue on his bronchial drainage and nebulized therapy, performing it 2-3 times daily.   -- Because of Kilo's difficulty tolerating inhalational [place] we will try changing him to inhalational aztreonam.  He will have first dose in clinic today.   -- He will continue to alternate his inhalational antibiotics with oral doxycycline.     2. CFTR modulator therapy.  Kilo is tolerating Trikafta without difficulty.  We will obtain a hepatic panel and CK today.     3. Cystic fibrosis-related diabetes.  Kilo reports good control of his blood sugars.     4. Nutrition.  Kilo does continue to do tube feeds 5 nights per week.  He is changing to a peptide based formula in order to see if this will facilitate weight gain.  He has had trouble with his weight maintenance over the last several years.     5. Psychosocial.  Kilo reports that he is now in a stable relationship.  His significant other has 4 children that are 12, 11, 6 and 2 years old.  He anticipates that they may announce an  engagement as early as May.  He is very happy with the way things are going in this relationship and is enjoying the children very much.     6. Pancreatic Insufficiency:  The patient has no new symptoms consistent with worsening malabsorption.    - continue the present dose of pancreatic enzymes  - continue vitamin supplementation.    Jaye Machado MD MPH  Associate Professor of Medicine  Pulmonary, Allergy, Critical Care and Sleep Medicine      Interval History:     Kilo does report that his cough frequency and sputum volume have decreased.  He does notice, however, when he does forget things like his azithromycin or doxycycline that he gets some symptoms back.  He does remain consistent with his bronchial drainage and nebulized therapies.          Review of Systems:     CONSTITUTIONAL: no fever, no chills, no sweats, decrease in weight, better energy, pretty good appetite    INTEGUMENTARY/SKIN: no rash, no obvious new lesions    ENT/MOUTH: no sore throat, no new sinus pain,  nasal drainage purge, no ear ringing     RESPIRATORY: see interval history    CV: no chest pain, no palpitations, no peripheral edema, no orthopnea, no PND    GI: no nausea, no vomiting, no change in stools, no fatty stools, no GERD, no abdominal pain    : no dysuria, no urinary frequency    MUSCULOSKELETAL: no myalgias, no arthralgias    ENDOCRINE: no excessive thirst, blood sugars with adequate control    NEURO:  No headache, no numbness, no tingling    SLEEP: no issues    PSYCHIATRIC: mood good          Past Medical and Surgical History:     Past Medical History:   Diagnosis Date     CF (cystic fibrosis) (H)      Exocrine pancreatic insufficiency      Nocardia infection      Pseudomonas infection      S/P gastrostomy (H) 2002     Past Surgical History:   Procedure Laterality Date     COLONOSCOPY N/A 5/21/2018    Procedure: COMBINED COLONOSCOPY, SINGLE OR MULTIPLE BIOPSY/POLYPECTOMY BY BIOPSY;  Cystic fibrosis, Diabetes mellitus due  to CF;  Surgeon: Margarito Bowman MD;  Location: [facility]     GASTROSTOMY TUBE  2002     PICC INSERTION Left 01/22/2018    4Fr SL BioFlo PICC, 46cm (5cm external) in the L basilic vein w/ tip in the low SVC           Family History:     Family History   Problem Relation Age of Onset     Heart Disease Maternal Grandmother      Heart Disease Maternal Grandfather      Heart Disease Paternal Grandmother      Diabetes No family hx of             Social History:     Social History     Socioeconomic History     Marital status: Single     Spouse name: Not on file     Number of children: 0     Years of education: Not on file     Highest education level: Not on file   Occupational History     Occupation: financial palnner     Employer: [employer]   Social Needs     Financial resource strain: Not on file     Food insecurity:     Worry: Not on file     Inability: Not on file     Transportation needs:     Medical: Not on file     Non-medical: Not on file   Tobacco Use     Smoking status: Never Smoker     Smokeless tobacco: Never Used   Substance and Sexual Activity     Alcohol use: No     Alcohol/week: 0.0 standard drinks     Drug use: No     Sexual activity: Yes     Partners: Female     Birth control/protection: Pill   Lifestyle     Physical activity:     Days per week: Not on file     Minutes per session: Not on file     Stress: Not on file   Relationships     Social connections:     Talks on phone: Not on file     Gets together: Not on file     Attends Church service: Not on file     Active member of club or organization: Not on file     Attends meetings of clubs or organizations: Not on file     Relationship status: Not on file     Intimate partner violence:     Fear of current or ex partner: Not on file     Emotionally abused: Not on file     Physically abused: Not on file     Forced sexual activity: Not on file   Other Topics Concern     Parent/sibling w/ CABG, MI or angioplasty before 65F 55M? Not Asked      " Service Not Asked     Blood Transfusions No     Caffeine Concern Not Asked     Occupational Exposure Not Asked     Hobby Hazards Not Asked     Sleep Concern Not Asked     Stress Concern Not Asked     Weight Concern Not Asked     Special Diet Not Asked     Back Care Not Asked     Exercise No     Bike Helmet Not Asked     Seat Belt Not Asked     Self-Exams Not Asked   Social History Narrative    8/15/2019 - .  Lives with his English Kaidendog (Jefry) in a house that he designed in [place].  No children.            Medications:     Current Outpatient Medications   Medication     aztreonam (AZACTAM) 200 MG/ML     Syringe/Needle, Disp, 18G X 1\" 5 ML MISC     water for injection sterile SOLN     ACE NOT PRESCRIBED, INTENTIONAL,     acetaminophen-codeine (TYLENOL #3) 300-30 MG per tablet     acetylcysteine (MUCOMYST) 10 % nebulizer solution     amitriptyline (ELAVIL) 10 MG tablet     amylase-lipase-protease (CREON) 62604-33497 units CPEP per EC capsule     azithromycin (ZITHROMAX) 250 MG tablet     aztreonam lysine (CAYSTON) 75 MG SOLR     BD OMI U/F 32G X 4 MM insulin pen needle     blood glucose (NO BRAND SPECIFIED) lancets standard     blood glucose (NO BRAND SPECIFIED) test strip     blood glucose monitoring (MARTINA CONTOUR NEXT MONITOR) meter device kit     blood glucose monitoring (MARTINA CONTOUR NEXT) test strip     blood glucose monitoring (MARTINA MICROLET) lancets     blood glucose monitoring (NO BRAND SPECIFIED) meter device kit     CALCIUM PO     doxycycline hyclate (VIBRAMYCIN) 100 MG capsule     elexacaftor-tezacaftor-ivacaftor & ivacaftor (TRIKAFTA) 100-50-75 & 150 MG tablet pack     [facility] INFUSION MANAGED PATIENT     Ferrous Fumarate-Vitamin C (VITRON-C) 200-125 MG TABS     insulin aspart (NOVOLOG PENFILL) 100 UNIT/ML cartridge     insulin glargine (LANTUS SOLOSTAR) 100 UNIT/ML pen     levalbuterol (XOPENEX HFA) 45 MCG/ACT inhaler     levalbuterol (XOPENEX) 1.25 MG/3ML neb solution " "    MEPHYTON 5 MG PO TABS     MULTIVITAMIN OR     Nebulizers (EFLOW SCF NEBULIZER HANDSET) MISC     oseltamivir (TAMIFLU) 75 MG capsule     polyethylene glycol (MIRALAX/GLYCOLAX) powder     Respiratory Therapy Supplies (KRIS ALTERA NEBULIZER HANDSET) MISC     Respiratory Therapy Supplies (KRIS ALTERA NEBULIZER HANDSET) MISC     rimantadine (FLUMADINE) 100 MG tablet     sodium chloride inhalant 7 % NEBU neb solution     SYMBICORT 160-4.5 MCG/ACT Inhaler     No current facility-administered medications for this visit.             Physical Exam:   /80   Pulse 96   Ht 1.737 m (5' 8.39\")   Wt 62.7 kg (138 lb 3.7 oz)   SpO2 97%   BMI 20.78 kg/m       Constitutional:   Awake, alert and in no apparent distress     Eyes:   nonicteric     ENT:   oral mucosa moist without lesions, normal tm bilaterally, bilateral mucosa normal     Neck:   Supple without supraclavicular or cervical lymphadenopathy     Lungs:   Good air flow.  No crackles. diffuse rhonchi.  No wheezes.     Cardiovascular:   Normal S1 and S2.  RRR.  No murmur, gallop or rub.     Abdomen:   NABS, soft, nontender, nondistended.      Musculoskeletal:   No edema, digital clubbing present     Neurologic:   Alert and conversant.     Skin:   Warm, dry.  No rash on limited exam.             Data:   All laboratory and imaging data reviewed.    Cystic Fibrosis Culture  Specimen Description   Date Value Ref Range Status   11/13/2019 Sputum  Final   10/15/2019 Sputum  Final   08/13/2019 Sputum  Final    Culture Micro   Date Value Ref Range Status   11/13/2019 Heavy growth  Normal santi    Final   11/13/2019 (A)  Final    Light growth  Pseudomonas aeruginosa, mucoid strain     10/15/2019 Heavy growth  Normal santi    Final   10/15/2019 (A)  Final    Light growth  Pseudomonas aeruginosa, mucoid strain     10/15/2019 (A)  Final    Light growth  Strain 2  Pseudomonas aeruginosa, mucoid strain          Recent Results (from the past 168 hour(s))   General PFT Lab " (Please always keep checked)    Collection Time: 02/04/20 11:16 AM   Result Value Ref Range    FVC-Pred 4.90 L    FVC-Pre 4.18 L    FVC-%Pred-Pre 85 %    FEV1-Pre 2.06 L    FEV1-%Pred-Pre 52 %    FEV1FVC-Pred 81 %    FEV1FVC-Pre 49 %    FEFMax-Pred 9.74 L/sec    FEFMax-Pre 6.62 L/sec    FEFMax-%Pred-Pre 68 %    FEF2575-Pred 3.81 L/sec    FEF2575-Pre 0.75 L/sec    CGQ8790-%Pred-Pre 19 %    ExpTime-Pre 16.97 sec    FIFMax-Pre 5.20 L/sec    FEV1FEV6-Pred 81 %    FEV1FEV6-Pre 56 %       PFT: Moderately-severe obstructive lung disease.  When compared to 11/13/2019, the FEV1 and FVC have increased.  T    Pulmonary exacerbation: absent        I met with Kilo in the [facility] to give a test dose of 1gr. Aztreonam mixed with 4cc sterile water.Breathsounds stable  Throughout/VSS through the treatment,Kilo verbalized good understanding.      Again, thank you for allowing me to participate in the care of your patient.      Sincerely,    Jaye Machado MD

## 2020-02-04 NOTE — PATIENT INSTRUCTIONS
Cystic Fibrosis Self-Care Plan    RECOMMENDATIONS:   Great work.  Ivoyr to discuss change in tube feeds.  Hepatic panel and CK in the next week.  Will talk to Felicitas about aztreonam.  Travel neb?  [facility].    YOUR GOAL:  Enjoy the end of winter and spring break!      [facility] Nurse line:  Marquise HendersonSandoval    [phone]     [facility] Fax Number:      [phone]         Cystic fibrosis Respiratory Therapist:   Felicitas Banuelos              [phone]   Cystic fibrosis Dietitians:              Zeina Pierre              [phone]                            Ivory Bonilla                        [phone]   Cystic fibrosis Diabetes Nurse:    Jordyn Rudolph   [phone]    Cystic fibrosis Social Workers:     Mary Ritter               [phone]                     Louann uSarez               [phone]  Cystic fibrosis Pharmacist:           Tammy Morgan                               [phone]         lM Tilley   [phone]  Cystic Fibrosis Genetic Counselor:   Pretty Pulliam    [phone]    [facility] website:  [email or web address]         MRN: [id number]   Clinic Date: February 4, 2020   Patient: Nico Morales     Annual Studies:   IGG   Date Value Ref Range Status   06/12/2019 1,780 (H) 695 - 1,620 mg/dL Final     Insulin   Date Value Ref Range Status   07/26/2011 30 mU/L Final     Comment:     Reference Range:  0-20  +120     There are no preventive care reminders to display for this patient.    Pulmonary Function Tests  FEV1: amount of air you can blow out in 1 second  FVC: total amount of air you can take in and blow out    Your Goals:         PFT Latest Ref Rng & Units 2/4/2020   FVC L 4.18   FEV1 L 2.06   FVC% % 85   FEV1% % 52          Airway Clearance: The Most Important Way to Keep Your Lungs Healthy  Vest Settings:    Hill-Rom Frequencies: 8, 9, 10 Pressure 10 Then, Frequencies 18, 19, 20 Pressure  6      RespirTech: Quick Start with Pressure of     Do each frequency for 5 minutes; Deflate vest after each frequency & cough 3 times before beginning the next setting.    Vest and Neb Therapy should be done 2-3 times/day.    Good Nutrition Can Improve Lung Function and Overall Health     Take ALL of your vitamins with food     Take 1/2 of your enzymes before EVERY meal/snack and the other 1/2 mid-meal/snack    Wt Readings from Last 3 Encounters:   02/04/20 62.7 kg (138 lb 3.7 oz)   11/13/19 63.4 kg (139 lb 12.4 oz)   10/24/19 65.3 kg (143 lb 14.4 oz)       Body mass index is 20.78 kg/m .         National CF Foundation Recommendations for BMI in CF Adults: Women: at least 22 Men: at least 23        Controlling Blood Sugars Helps Prevent Lung Infections & Improves Nutrition  Test blood sugar:     In the morning before eating (goal is )     2 hours after a meal (goal is less than 150)     When pre-meal glucose is greater than 150 add correction     At bedtime (if less than 100 eat a snack with 15 grams of carbohydrates  Last A1C Results:   Hemoglobin A1C   Date Value Ref Range Status   11/14/2019 6.9 (H) 0 - 5.6 % Final     Comment:     Normal <5.7% Prediabetes 5.7-6.4%  Diabetes 6.5% or higher - adopted from ADA   consensus guidelines.           If diabetic, measure A1C every 6 months. Goal: Under 7%    Staying Healthy    Research:  If you are interested in learning about research opportunities or have questions, please contact the CF Research Team at [phone] or [email or web address].      CF Foundation:  Compass is a personalized resource service to help you with the insurance, financial, legal and other issues you are facing.  It's free, confidential and available to anyone with CF.  Ask your  for more information or contact Compass directly at 442-ICWLJBE ([phone]) or compass@cff.org, or learn more at cff.org/compass.

## 2020-02-04 NOTE — NURSING NOTE
Chief Complaint   Patient presents with     Follow Up     pt is here as a cf follow up     Vitals were taken and medications were reconciled.     PHUONG Thurman

## 2020-02-04 NOTE — PROGRESS NOTES
I met with Kilo in the [facility] to give a test dose of 1gr. Aztreonam mixed with 4cc sterile water.Breathsounds stable  Throughout/VSS through the treatment,Kilo verbalized good understanding.

## 2020-02-05 DIAGNOSIS — E84.0 CYSTIC FIBROSIS WITH PULMONARY MANIFESTATIONS (H): ICD-10-CM

## 2020-02-05 RX ORDER — ACETYLCYSTEINE 100 MG/ML
4 SOLUTION ORAL; RESPIRATORY (INHALATION) 4 TIMES DAILY
Qty: 480 ML | Refills: 11 | Status: SHIPPED | OUTPATIENT
Start: 2020-02-05 | End: 2021-04-21

## 2020-02-06 NOTE — PROGRESS NOTES
Nutrition Note    Kilo seen today to discuss his TF regimen.  Has been using Nutren 2.0 formula but due to lack in progress with weight gain, potential tolerance issues he has recently trialed a peptide-based, semi-elemental formula (Bailee misterbnb Peptide 1.5).  3 11-oz packages of formula per day, one during the day and two overnight via G-tube using the gravity system.  He has kept his enzyme intake stable with TF -- Creon 24,000 3 at the beginning and 2 at the end of TF cycle. Pt reports excellent tolerance to this formula and he would like to continue using this semi-elemental formulation.  Received 1 case (12 containers) for free from his Creon [employer] Forward program but will need his actual TF order switched from Nutren to [employer] via [employer].     Interventions:  Pt seems to be doing well on a semi-elemental, peptide-based formula with additional pancreatic enzyme support. This will support improved absorption with this patient with chronic exocrine pancreatic insufficiency. Will plan to switch formulas from previous standard formula (Nutren 2.0).    Will fax updated TF recommendations/documentation directly to [employer], who pt uses to obtain TF formula and supplies.  He states he needs no new supplies at this time.      New Tube Feeding Prescription:  Bailee misterbnb Peptide 1.5, 3 cans per day (975 mL or 33 ounces).  With monthly deliveries pt will require 93 cans per month.  Continue infusion to G-tube via gravity drip.      Ivory Bonilla MS, RD, LD (pager [phone])  Cystic Fibrosis/Lung Transplant Dietitian      -Available Mon-Thurs 8 AM-4:30 PM. On Fridays please contact pager [phone] (Zeina Pierre RD) and on weekends/holidays contact coverage dietitian at pager [phone] (inpatient use only).

## 2020-02-10 DIAGNOSIS — E84.0 CYSTIC FIBROSIS WITH PULMONARY MANIFESTATIONS (H): ICD-10-CM

## 2020-02-10 LAB
ALBUMIN SERPL-MCNC: 3.4 G/DL (ref 3.4–5)
ALP SERPL-CCNC: 174 U/L (ref 40–150)
ALT SERPL W P-5'-P-CCNC: 48 U/L (ref 0–70)
AST SERPL W P-5'-P-CCNC: 32 U/L (ref 0–45)
BILIRUB DIRECT SERPL-MCNC: <0.1 MG/DL (ref 0–0.2)
BILIRUB SERPL-MCNC: 0.3 MG/DL (ref 0.2–1.3)
CK SERPL-CCNC: 92 U/L (ref 30–300)
PROT SERPL-MCNC: 8 G/DL (ref 6.8–8.8)

## 2020-02-10 PROCEDURE — 82550 ASSAY OF CK (CPK): CPT | Performed by: INTERNAL MEDICINE

## 2020-02-10 PROCEDURE — 80076 HEPATIC FUNCTION PANEL: CPT | Performed by: INTERNAL MEDICINE

## 2020-02-11 LAB
BACTERIA SPEC CULT: ABNORMAL
SPECIMEN SOURCE: ABNORMAL

## 2020-02-13 DIAGNOSIS — E84.0 CYSTIC FIBROSIS WITH PULMONARY MANIFESTATIONS (H): ICD-10-CM

## 2020-02-13 DIAGNOSIS — E84.9 CYSTIC FIBROSIS (H): ICD-10-CM

## 2020-02-13 RX ORDER — AZITHROMYCIN 250 MG/1
250 TABLET, FILM COATED ORAL DAILY
Qty: 30 TABLET | Refills: 11 | Status: SHIPPED | OUTPATIENT
Start: 2020-02-13 | End: 2021-01-08

## 2020-02-14 RX ORDER — AZITHROMYCIN 250 MG/1
TABLET, FILM COATED ORAL
Qty: 30 TABLET | Refills: 9 | OUTPATIENT
Start: 2020-02-14

## 2020-02-14 RX ORDER — LEVALBUTEROL TARTRATE 45 UG/1
2 AEROSOL, METERED ORAL EVERY 6 HOURS PRN
Qty: 75 INHALER | Refills: 4 | Status: SHIPPED | OUTPATIENT
Start: 2020-02-14 | End: 2021-04-26

## 2020-02-25 ENCOUNTER — TELEPHONE (OUTPATIENT)
Dept: PULMONOLOGY | Facility: CLINIC | Age: 45
End: 2020-02-25

## 2020-02-25 NOTE — TELEPHONE ENCOUNTER
Prior Authorization Approval    Authorization Effective Date: 2/24/2020  Authorization Expiration Date: 2/24/2021  Medication: Azithromycin- APPROVED  Approved Dose/Quantity: 30 PER 30 DAYS  Reference #:     Insurance Company: [employer] - Phone [phone] Fax [phone]  Expected CoPay:       CoPay Card Available: No    Foundation Assistance Needed:    Which Pharmacy is filling the prescription (Not needed for infusion/clinic administered): [facility] [id number] [place] [facility] - [place] - [id number] [facility]  Pharmacy Notified: Yes  Patient Notified:

## 2020-02-27 ENCOUNTER — TELEPHONE (OUTPATIENT)
Dept: PULMONOLOGY | Facility: CLINIC | Age: 45
End: 2020-02-27

## 2020-02-27 DIAGNOSIS — G47.00 INSOMNIA: ICD-10-CM

## 2020-02-27 DIAGNOSIS — E84.9 CF (CYSTIC FIBROSIS) (H): Primary | ICD-10-CM

## 2020-02-27 RX ORDER — TRAZODONE HYDROCHLORIDE 50 MG/1
100 TABLET, FILM COATED ORAL AT BEDTIME
Qty: 30 TABLET | Refills: 0 | Status: SHIPPED | OUTPATIENT
Start: 2020-02-27 | End: 2020-05-26

## 2020-02-27 NOTE — TELEPHONE ENCOUNTER
Patient states trazodone 50 mg at bedtime isn't helping with his sleep. His psychologist recommended he call us to increased dose. Dr. Machado OK with increasing to 100 mg at bedtime however if this doesn't help with his sleep, patient will need to find another provider to help manage such as a psychiatrist. Patient agrees to plan.

## 2020-03-19 DIAGNOSIS — E84.8 DIABETES MELLITUS RELATED TO CF (CYSTIC FIBROSIS) (H): ICD-10-CM

## 2020-03-19 DIAGNOSIS — E08.9 DIABETES MELLITUS RELATED TO CF (CYSTIC FIBROSIS) (H): ICD-10-CM

## 2020-03-20 DIAGNOSIS — E08.9 DIABETES MELLITUS RELATED TO CF (CYSTIC FIBROSIS) (H): ICD-10-CM

## 2020-03-20 DIAGNOSIS — E84.8 DIABETES MELLITUS RELATED TO CF (CYSTIC FIBROSIS) (H): ICD-10-CM

## 2020-03-20 RX ORDER — PEN NEEDLE, DIABETIC 32GX 5/32"
NEEDLE, DISPOSABLE MISCELLANEOUS
Refills: 5 | OUTPATIENT
Start: 2020-03-20

## 2020-03-23 DIAGNOSIS — E08.9 DIABETES MELLITUS RELATED TO CF (CYSTIC FIBROSIS) (H): ICD-10-CM

## 2020-03-23 DIAGNOSIS — E84.8 DIABETES MELLITUS RELATED TO CF (CYSTIC FIBROSIS) (H): ICD-10-CM

## 2020-03-23 RX ORDER — PEN NEEDLE, DIABETIC 32GX 5/32"
NEEDLE, DISPOSABLE MISCELLANEOUS
Qty: 200 EACH | Refills: 5 | Status: SHIPPED | OUTPATIENT
Start: 2020-03-23 | End: 2021-01-08

## 2020-03-23 RX ORDER — PEN NEEDLE, DIABETIC 32GX 5/32"
NEEDLE, DISPOSABLE MISCELLANEOUS
Refills: 5 | OUTPATIENT
Start: 2020-03-23

## 2020-03-24 DIAGNOSIS — E84.8 DIABETES MELLITUS RELATED TO CF (CYSTIC FIBROSIS) (H): Primary | ICD-10-CM

## 2020-03-24 DIAGNOSIS — E08.9 DIABETES MELLITUS RELATED TO CF (CYSTIC FIBROSIS) (H): Primary | ICD-10-CM

## 2020-03-27 ENCOUNTER — TELEPHONE (OUTPATIENT)
Dept: PULMONOLOGY | Facility: CLINIC | Age: 45
End: 2020-03-27

## 2020-03-27 NOTE — TELEPHONE ENCOUNTER
Received call back from patient. Patients states they have not experienced any recent fevers. Patient is agreeable to participate in virtual visit on Tuesday 3/31 at 11:20. Verified correct Email address listed in Epic. Instructed patient to have ipad/laptop available and open to email at time of visit. Expecting phone call from [facility] prior to appointment time. Verbalized understanding  Alley Hendricks, RN, BSN

## 2020-03-27 NOTE — TELEPHONE ENCOUNTER
Attempt x1 to contact patient to screen for symptoms and change upcoming appointment to virtual visit. Left VM asking to return call. Will attempt again on Monday.   Alley Hendricks, RN, BSN

## 2020-03-31 ENCOUNTER — VIRTUAL VISIT (OUTPATIENT)
Dept: PULMONOLOGY | Facility: CLINIC | Age: 45
End: 2020-03-31
Attending: INTERNAL MEDICINE
Payer: COMMERCIAL

## 2020-03-31 DIAGNOSIS — F51.01 PRIMARY INSOMNIA: Primary | ICD-10-CM

## 2020-03-31 DIAGNOSIS — E84.0 CYSTIC FIBROSIS WITH PULMONARY MANIFESTATIONS (H): ICD-10-CM

## 2020-03-31 RX ORDER — ESZOPICLONE 2 MG/1
2 TABLET, FILM COATED ORAL AT BEDTIME
COMMUNITY
End: 2021-12-08

## 2020-03-31 RX ORDER — ALPRAZOLAM 1 MG
1.5 TABLET ORAL DAILY
COMMUNITY
End: 2020-07-21

## 2020-03-31 NOTE — PROGRESS NOTES
"Nico Morales is a 44 year old male who is being evaluated via a billable video visit.      The patient has been notified of following:     \"This video visit will be conducted via a call between you and your physician/provider. We have found that certain health care needs can be provided without the need for an in-person physical exam.  This service lets us provide the care you need with a video conversation.  If a prescription is necessary we can send it directly to your pharmacy.  If lab work is needed we can place an order for that and you can then stop by our lab to have the test done at a later time.    If during the course of the call the physician/provider feels a video visit is not appropriate, you will not be charged for this service.\"     Patient has given verbal consent for Video visit? Yes    Patient would like the video invitation sent by: Send to e-mail at: [email or web address]    Video Start Time: 11:27    Nico Morales complains of    Chief Complaint   Patient presents with     Cystic Fibrosis       I have reviewed and updated the patient's Past Medical History, Social History, Family History and Medication List.    ALLERGIES  Pulmozyme [dornase delio]; Tobramycin; Zosyn; Bactrim [sulfamethoxazole w/trimethoprim]; Ceftazidime; and Levaquin    [facility]  March 31, 2020         Assessment and Plan:   Nico Morales is a 44 year old male with cystic fibrosis.    1. CF lung disease with severe obstruction: Kilo reports from a pulmonary point of view that he is feeling improved.  He did have an episode of fevers that lasted about 1 week's time.  They were as high as 102 to 103.  This was not clearly associated with his cystic fibrosis.  He reports since that time his lungs have been doing well.  And really did not have did not have a marked change even with the fevers.  Kilo historically has grown out Pseudomonas.  At this time I " recommended:  --That he continue to do his vest therapy 3-4 times per day  --he continues to alternate inhalational aztreonam with oral doxycycline every 2 weeks    2. Pancreatic Insufficiency:  The patient has no new symptoms consistent with worsening malabsorption.    - continue the present dose of pancreatic enzymes  - continue vitamin supplementation.  -Kilo has a G-tube in place which she is very consistent with his use with nighttime supplementation.    3.  Cystic fibrosis related diabetes clearly related he reports very good control of his blood sugars.  Historically this has not been an issue for him.    4.  CFTR modulator therapy: Kilo is tolerating his trikafta well.  He has no concerning side effects.  He is due for hepatic panel and a CK in about 1 month's time.  We will arrange to have this done at [facility].  He does know to call ahead.    5.  Insomnia: Kilo reports since being sick at the end of February that he is struggling with sleep.  He did initiate trazodone with some result.  He was seen by primary care physician who is since added Lunesta.  He says he is now up to 5 to 6 hours each night.  This is still well short of his usual.  When area and I discussed pursuing consultation with sleep medicine.  He is agreeable to this plan.  It is unclear to me why he remains pursue this but insomnia has persisted.    6.  Febrile illness: Kilo at the end of February had a febrile illness for about 1 week's time.  Again he was febrile to about 102 to 103.  He had no significant change in symptomatology.  Kilo he reports may be a mild increase in his cough.  This was this fever was pre-cough it outbreak so he does not feel it was likely related to this.  He did not travel before but did travel after this event.  We did discuss how it may be appropriate to do some laboratories to investigate the etiology.  I will consider this as an option.  He is again scheduled to get blood work done in the next  couple weeks.    7.  Psychosocial: Kilo is still going to work but there is only one other person in the office who is at a distance.  He reports that he is keeping safe at home.  He is can struggle and start he is struggling with his current relationship with his significant other Madisyn.  She did recently travel with her family so they are spending 14 days apart.  He is unclear how to manage her young children who are also exposed to many others.  We did discuss at length issues related to appropriate distancing and duration.  We also discussed how people can monitor themselves with temperature and symptom management.  He will consider how to play out these options in his life.    Jaye Machado MD MPH  Associate Professor of Medicine  Pulmonary, Allergy, Critical Care and Sleep Medicine      Interval History:     Kilo's cough frequency and sputum volume have decreased since starting trach after.  He does report some minimal hemoptysis..  His activity tolerance is good.  He is performing 3-4 vest therapies per day mostly out of boredom.         Review of Systems:     CONSTITUTIONAL: + fever, no chills, no sweats, no change in weight, improving energy, no change in appetite    INTEGUMENTARY/SKIN: no rash, no obvious new lesions    ENT/MOUTH: no sore throat, no new sinus pain, no new nasal drainage, no ear ringing     RESPIRATORY: see interval history    CV: no chest pain, no palpitations, no peripheral edema, no orthopnea, no PND    GI: no nausea, no vomiting, no change in stools, no fatty stools, no GERD, no abdominal pain    : no dysuria, no urinary frequency    MUSCULOSKELETAL: no myalgias, no arthralgias    ENDOCRINE: no excessive thirst, blood sugars with adequate control    NEURO:  No headache, no numbness, no tingling    SLEEP: see HPI    PSYCHIATRIC: mood improving          Past Medical and Surgical History:     Past Medical History:   Diagnosis Date     CF (cystic fibrosis) (H)      Exocrine  pancreatic insufficiency      Nocardia infection      Pseudomonas infection      S/P gastrostomy (H) 2002     Past Surgical History:   Procedure Laterality Date     COLONOSCOPY N/A 5/21/2018    Procedure: COMBINED COLONOSCOPY, SINGLE OR MULTIPLE BIOPSY/POLYPECTOMY BY BIOPSY;  Cystic fibrosis, 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;  Surgeon: Margarito Bowman MD;  Location: [facility]     GASTROSTOMY TUBE  2002     PICC INSERTION Left 01/22/2018    4Fr SL BioFlo PICC, 46cm (5cm external) in the L basilic vein w/ tip in the low SVC           Family History:     Family History   Problem Relation Age of Onset     Heart Disease Maternal Grandmother      Heart Disease Maternal Grandfather      Heart Disease Paternal Grandmother      Diabetes No family hx of             Social History:     Social History     Socioeconomic History     Marital status: Single     Spouse name: Not on file     Number of children: 0     Years of education: Not on file     Highest education level: Not on file   Occupational History     Occupation: financial palnner     Employer: [employer]   Social Needs     Financial resource strain: Not on file     Food insecurity     Worry: Not on file     Inability: Not on file     Transportation needs     Medical: Not on file     Non-medical: Not on file   Tobacco Use     Smoking status: Never Smoker     Smokeless tobacco: Never Used   Substance and Sexual Activity     Alcohol use: No     Alcohol/week: 0.0 standard drinks     Drug use: No     Sexual activity: Yes     Partners: Female     Birth control/protection: Pill   Lifestyle     Physical activity     Days per week: Not on file     Minutes per session: Not on file     Stress: Not on file   Relationships     Social connections     Talks on phone: Not on file     Gets together: Not on file     Attends [employer] service: Not on file     Active member of club or organization: Not on file     Attends meetings of clubs or organizations: Not on file     Relationship  status: Not on file     Intimate partner violence     Fear of current or ex partner: Not on file     Emotionally abused: Not on file     Physically abused: Not on file     Forced sexual activity: Not on file   Other Topics Concern     Parent/sibling w/ CABG, MI or angioplasty before 65F 55M? Not Asked      Service Not Asked     Blood Transfusions No     Caffeine Concern Not Asked     Occupational Exposure Not Asked     Hobby Hazards Not Asked     Sleep Concern Not Asked     Stress Concern Not Asked     Weight Concern Not Asked     Special Diet Not Asked     Back Care Not Asked     Exercise No     Bike Helmet Not Asked     Seat Belt Not Asked     Self-Exams Not Asked   Social History Narrative    8/15/2019 - .  Lives with his English Bulldog (Jefry) in a house that he designed in [place].  No children.            Medications:     Current Outpatient Medications   Medication     acetylcysteine (MUCOMYST) 10 % nebulizer solution     ALPRAZolam (XANAX) 1 MG tablet     amitriptyline (ELAVIL) 10 MG tablet     amylase-lipase-protease (CREON) 38593-18635 units CPEP per EC capsule     azithromycin (ZITHROMAX) 250 MG tablet     aztreonam (AZACTAM) 200 MG/ML     blood glucose (NO BRAND SPECIFIED) lancets standard     blood glucose (NO BRAND SPECIFIED) test strip     blood glucose monitoring (MARTINA CONTOUR NEXT MONITOR) meter device kit     blood glucose monitoring (MARTINA CONTOUR NEXT) test strip     blood glucose monitoring (MARTINA MICROLET) lancets     blood glucose monitoring (NO BRAND SPECIFIED) meter device kit     CALCIUM PO     doxycycline hyclate (VIBRAMYCIN) 100 MG capsule     elexacaftor-tezacaftor-ivacaftor & ivacaftor (TRIKAFTA) 100-50-75 & 150 MG tablet pack     eszopiclone (LUNESTA) 2 MG tablet     [facility] INFUSION MANAGED PATIENT     Ferrous Fumarate-Vitamin C (VITRON-C) 200-125 MG TABS     insulin aspart (NOVOLOG PENFILL) 100 UNIT/ML cartridge     insulin glargine (LANTUS SOLOSTAR) 100  "UNIT/ML pen     insulin pen needle (32G X 4 MM) 32G X 4 MM miscellaneous     insulin pen needle (BD OMI U/F) 32G X 4 MM miscellaneous     levalbuterol (XOPENEX HFA) 45 MCG/ACT inhaler     levalbuterol (XOPENEX) 1.25 MG/3ML neb solution     MULTIVITAMIN OR     Nebulizers (EFLOW SCF NEBULIZER HANDSET) MISC     polyethylene glycol (MIRALAX/GLYCOLAX) powder     Respiratory Therapy Supplies (KRIS ALTERA NEBULIZER HANDSET) MISC     Respiratory Therapy Supplies (KRIS ALTERA NEBULIZER HANDSET) MISC     rimantadine (FLUMADINE) 100 MG tablet     sodium chloride inhalant 7 % NEBU neb solution     SYMBICORT 160-4.5 MCG/ACT Inhaler     Syringe/Needle, Disp, 18G X 1\" 5 ML MISC     traZODone (DESYREL) 50 MG tablet     water for injection sterile SOLN     ACE NOT PRESCRIBED, INTENTIONAL,     acetaminophen-codeine (TYLENOL #3) 300-30 MG per tablet     aztreonam lysine (CAYSTON) 75 MG SOLR     MEPHYTON 5 MG PO TABS     oseltamivir (TAMIFLU) 75 MG capsule     No current facility-administered medications for this visit.             Physical Exam:   There were no vitals taken for this visit.    Not Performed         Data:   All laboratory and imaging data reviewed.    Cystic Fibrosis Culture  Specimen Description   Date Value Ref Range Status   02/04/2020 Sputum  Final   11/13/2019 Sputum  Final   10/15/2019 Sputum  Final    Culture Micro   Date Value Ref Range Status   02/04/2020 Moderate growth  Normal santi    Final   02/04/2020 (A)  Final    Light growth  Pseudomonas aeruginosa, mucoid strain     02/04/2020 (A)  Final    Light growth  Strain 2  Pseudomonas aeruginosa, mucoid strain          No results found for this or any previous visit (from the past 168 hour(s)).      PFT: Not performed    Pulmonary exacerbation: absent      Video-Visit Details    Type of service:  Video Visit    Video End Time (time video stopped): 12:06    Originating Location (pt. Location): Other office    Distant Location (provider location):  [facility]" [facility]     Mode of Communication:  Video Conference via [employer]

## 2020-03-31 NOTE — PATIENT INSTRUCTIONS
Cystic Fibrosis Self-Care Plan    RECOMMENDATIONS:   Kilo,  It was great to talk to you today.  I am glad that you are staying safe at home. I am sorry that you are struggling with insomnia.  Continue Rimantidine until the end of April.  Referral to sleep clinic.  I will order labs to be done at [place].    YOUR GOAL:  Stay well and take care.      [facility] Nurse line:  Olu Henderson    [phone]     [facility] Fax Number:      [phone]         Cystic fibrosis Respiratory Therapist:   Felicitas Banuelos              [phone]   Cystic fibrosis Dietitians:              Zeina Pierre              [phone]                            Ivory Bonilla                        [phone]   Cystic fibrosis Diabetes Nurse:    Jordyn Rudolph   [phone]    Cystic fibrosis Social Workers:     Mary Ritter               [phone]                     Louann Suarez               [phone]  Cystic fibrosis Pharmacist:           Tammy Morgan                               [phone]         Ml Tilley   [phone]  Cystic Fibrosis Genetic Counselor:   Pretty Pulliam    [phone]    [facility] website:  [email or web address]         MRN: [id number]   Clinic Date: March 31, 2020   Patient: Nico Morales     Annual Studies:   IGG   Date Value Ref Range Status   06/12/2019 1,780 (H) 695 - 1,620 mg/dL Final     Insulin   Date Value Ref Range Status   07/26/2011 30 mU/L Final     Comment:     Reference Range:  0-20  +120     There are no preventive care reminders to display for this patient.    Pulmonary Function Tests  FEV1: amount of air you can blow out in 1 second  FVC: total amount of air you can take in and blow out    Your Goals:         PFT Latest Ref Rng & Units 2/4/2020   FVC L 4.18   FEV1 L 2.06   FVC% % 85   FEV1% % 52          Airway Clearance: The Most Important Way to Keep Your Lungs Healthy  Vest Settings:    Hill-Rom  Frequencies: 8, 9, 10 Pressure 10 Then, Frequencies 18, 19, 20 Pressure 6      RespirTech: Quick Start with Pressure of     Do each frequency for 5 minutes; Deflate vest after each frequency & cough 3 times before beginning the next setting.    Vest and Neb Therapy should be done 3-4 times/day.    Good Nutrition Can Improve Lung Function and Overall Health     Take ALL of your vitamins with food     Take 1/2 of your enzymes before EVERY meal/snack and the other 1/2 mid-meal/snack    Wt Readings from Last 3 Encounters:   02/04/20 62.7 kg (138 lb 3.7 oz)   11/13/19 63.4 kg (139 lb 12.4 oz)   10/24/19 65.3 kg (143 lb 14.4 oz)       There is no height or weight on file to calculate BMI.         National CF Foundation Recommendations for BMI in CF Adults: Women: at least 22 Men: at least 23        Controlling Blood Sugars Helps Prevent Lung Infections & Improves Nutrition  Test blood sugar:     In the morning before eating (goal is )     2 hours after a meal (goal is less than 150)     When pre-meal glucose is greater than 150 add correction     At bedtime (if less than 100 eat a snack with 15 grams of carbohydrates  Last A1C Results:   Hemoglobin A1C   Date Value Ref Range Status   11/14/2019 6.9 (H) 0 - 5.6 % Final     Comment:     Normal <5.7% Prediabetes 5.7-6.4%  Diabetes 6.5% or higher - adopted from ADA   consensus guidelines.           If diabetic, measure A1C every 6 months. Goal: Under 7%    Staying Healthy    Research:  If you are interested in learning about research opportunities or have questions, please contact the CF Research Team at [phone] or [email or web address].      CF Foundation:  Compass is a personalized resource service to help you with the insurance, financial, legal and other issues you are facing.  It's free, confidential and available to anyone with CF.  Ask your  for more information or contact Compass directly at 190-RZFTNWE ([phone]) or compass@cff.org, or learn  more at cff.org/compass.

## 2020-04-08 DIAGNOSIS — E84.8 DIABETES MELLITUS RELATED TO CF (CYSTIC FIBROSIS) (H): Primary | ICD-10-CM

## 2020-04-08 DIAGNOSIS — E08.9 DIABETES MELLITUS RELATED TO CF (CYSTIC FIBROSIS) (H): Primary | ICD-10-CM

## 2020-04-10 ENCOUNTER — TELEPHONE (OUTPATIENT)
Dept: ENDOCRINOLOGY | Facility: CLINIC | Age: 45
End: 2020-04-10

## 2020-04-10 DIAGNOSIS — E08.9 DIABETES MELLITUS RELATED TO CF (CYSTIC FIBROSIS) (H): Primary | ICD-10-CM

## 2020-04-10 DIAGNOSIS — E84.8 DIABETES MELLITUS RELATED TO CF (CYSTIC FIBROSIS) (H): Primary | ICD-10-CM

## 2020-04-10 NOTE — TELEPHONE ENCOUNTER
[facility] Call Center    Phone Message    May a detailed message be left on voicemail: yes     Reason for Call: Medication Question or concern regarding medication   Prescription Clarification  Name of Medication: insulin glargine (LANTUS SOLOSTAR) 100 UNIT/ML pen   Prescribing Provider: Dr. morris   [facility]   What on the order needs clarification? Pt called and stated the pharmacy is still waiting on approval before they can fill this medication          Action Taken: Message routed to:  [facility] endo    Travel Screening: Not Applicable

## 2020-04-15 DIAGNOSIS — E08.9 DIABETES MELLITUS RELATED TO CF (CYSTIC FIBROSIS) (H): ICD-10-CM

## 2020-04-15 DIAGNOSIS — E84.8 DIABETES MELLITUS RELATED TO CF (CYSTIC FIBROSIS) (H): ICD-10-CM

## 2020-04-20 ENCOUNTER — TELEPHONE (OUTPATIENT)
Dept: PULMONOLOGY | Facility: CLINIC | Age: 45
End: 2020-04-20

## 2020-04-20 NOTE — TELEPHONE ENCOUNTER
PA Initiation    Medication: trikafta 100-50-75&150mg - pa pending  Insurance Company: [employer] - Phone [phone] Fax [phone]  Pharmacy Filling the Rx: [facility] - [place], [place] - [address]  Filling Pharmacy Phone: [phone]  Filling Pharmacy Fax: [phone]  Start Date: 4/20/2020

## 2020-04-21 NOTE — TELEPHONE ENCOUNTER
Prior Authorization Approval    Authorization Effective Date: 4/20/2020  Authorization Expiration Date: 10/20/2020  Medication: trikafta 100-50-75&150mg - APPROVED  Approved Dose/Quantity: 84 PER 28 DAYS  Reference #:     Insurance Company: [employer] - Phone [phone] Fax [phone]  Expected CoPay:       CoPay Card Available:      Foundation Assistance Needed:    Which Pharmacy is filling the prescription (Not needed for infusion/clinic administered): [facility] - [place], [place] - [address]  Pharmacy Notified:    Patient Notified:

## 2020-04-30 DIAGNOSIS — E84.0 CYSTIC FIBROSIS WITH PULMONARY MANIFESTATIONS (H): Primary | ICD-10-CM

## 2020-05-04 ENCOUNTER — TELEPHONE (OUTPATIENT)
Dept: PULMONOLOGY | Facility: CLINIC | Age: 45
End: 2020-05-04

## 2020-05-04 DIAGNOSIS — E84.0 CYSTIC FIBROSIS WITH PULMONARY MANIFESTATIONS (H): Primary | ICD-10-CM

## 2020-05-04 DIAGNOSIS — E84.9 CF (CYSTIC FIBROSIS) (H): ICD-10-CM

## 2020-05-04 NOTE — TELEPHONE ENCOUNTER
"Called to remind patient that he is due for quarterly lab monitoring for Trikafta. Notified patient that lab orders are in computer as well as all annual lab orders that are also due. He can call any local [facility] lab to schedule \"lab only\" appointment.  Alley Hendricks RN  "

## 2020-05-07 DIAGNOSIS — E84.0 CYSTIC FIBROSIS WITH PULMONARY MANIFESTATIONS (H): ICD-10-CM

## 2020-05-07 DIAGNOSIS — E84.9 CF (CYSTIC FIBROSIS) (H): ICD-10-CM

## 2020-05-07 LAB
ALBUMIN SERPL-MCNC: 3.5 G/DL (ref 3.4–5)
ALBUMIN UR-MCNC: NEGATIVE MG/DL
ALP SERPL-CCNC: 120 U/L (ref 40–150)
ALT SERPL W P-5'-P-CCNC: 35 U/L (ref 0–70)
ANION GAP SERPL CALCULATED.3IONS-SCNC: 4 MMOL/L (ref 3–14)
APPEARANCE UR: CLEAR
AST SERPL W P-5'-P-CCNC: 22 U/L (ref 0–45)
BASOPHILS # BLD AUTO: 0.1 10E9/L (ref 0–0.2)
BASOPHILS NFR BLD AUTO: 0.6 %
BILIRUB UR QL STRIP: NEGATIVE
BUN SERPL-MCNC: 22 MG/DL (ref 7–30)
CALCIUM SERPL-MCNC: 9.1 MG/DL (ref 8.5–10.1)
CHLORIDE SERPL-SCNC: 104 MMOL/L (ref 94–109)
CHOLEST SERPL-MCNC: 162 MG/DL
CK SERPL-CCNC: 62 U/L (ref 30–300)
CO2 SERPL-SCNC: 31 MMOL/L (ref 20–32)
COLOR UR AUTO: YELLOW
CREAT SERPL-MCNC: 0.81 MG/DL (ref 0.66–1.25)
CREAT UR-MCNC: 85 MG/DL
DEPRECATED CALCIDIOL+CALCIFEROL SERPL-MC: 36 UG/L (ref 20–75)
DIFFERENTIAL METHOD BLD: NORMAL
EOSINOPHIL # BLD AUTO: 0.2 10E9/L (ref 0–0.7)
EOSINOPHIL NFR BLD AUTO: 1.7 %
ERYTHROCYTE [DISTWIDTH] IN BLOOD BY AUTOMATED COUNT: 14.8 % (ref 10–15)
ERYTHROCYTE [SEDIMENTATION RATE] IN BLOOD BY WESTERGREN METHOD: 7 MM/H (ref 0–15)
GFR SERPL CREATININE-BSD FRML MDRD: >90 ML/MIN/{1.73_M2}
GGT SERPL-CCNC: 30 U/L (ref 0–75)
GLUCOSE SERPL-MCNC: 127 MG/DL (ref 70–99)
GLUCOSE UR STRIP-MCNC: NEGATIVE MG/DL
HBA1C MFR BLD: 6.1 % (ref 0–5.6)
HCT VFR BLD AUTO: 47 % (ref 40–53)
HDLC SERPL-MCNC: 55 MG/DL
HGB BLD-MCNC: 15 G/DL (ref 13.3–17.7)
HGB UR QL STRIP: NEGATIVE
IMM GRANULOCYTES # BLD: 0 10E9/L (ref 0–0.4)
IMM GRANULOCYTES NFR BLD: 0.2 %
INR PPP: 1.03 (ref 0.86–1.14)
IRON SERPL-MCNC: 81 UG/DL (ref 35–180)
KETONES UR STRIP-MCNC: NEGATIVE MG/DL
LDLC SERPL CALC-MCNC: 91 MG/DL
LEUKOCYTE ESTERASE UR QL STRIP: NEGATIVE
LYMPHOCYTES # BLD AUTO: 2.6 10E9/L (ref 0.8–5.3)
LYMPHOCYTES NFR BLD AUTO: 26.4 %
MAGNESIUM SERPL-MCNC: 2.2 MG/DL (ref 1.6–2.3)
MCH RBC QN AUTO: 28 PG (ref 26.5–33)
MCHC RBC AUTO-ENTMCNC: 31.9 G/DL (ref 31.5–36.5)
MCV RBC AUTO: 88 FL (ref 78–100)
MICROALBUMIN UR-MCNC: <5 MG/L
MICROALBUMIN/CREAT UR: NORMAL MG/G CR (ref 0–17)
MONOCYTES # BLD AUTO: 0.7 10E9/L (ref 0–1.3)
MONOCYTES NFR BLD AUTO: 7.6 %
MUCOUS THREADS #/AREA URNS LPF: PRESENT /LPF
NEUTROPHILS # BLD AUTO: 6.2 10E9/L (ref 1.6–8.3)
NEUTROPHILS NFR BLD AUTO: 63.5 %
NITRATE UR QL: NEGATIVE
NONHDLC SERPL-MCNC: 107 MG/DL
PH UR STRIP: 6.5 PH (ref 5–7)
PHOSPHATE SERPL-MCNC: 3.4 MG/DL (ref 2.5–4.5)
PLATELET # BLD AUTO: 319 10E9/L (ref 150–450)
POTASSIUM SERPL-SCNC: 4.2 MMOL/L (ref 3.4–5.3)
PROT SERPL-MCNC: 7.8 G/DL (ref 6.8–8.8)
RBC # BLD AUTO: 5.35 10E12/L (ref 4.4–5.9)
RBC #/AREA URNS AUTO: ABNORMAL /HPF
SODIUM SERPL-SCNC: 139 MMOL/L (ref 133–144)
SOURCE: ABNORMAL
SP GR UR STRIP: 1.02 (ref 1–1.03)
TRIGL SERPL-MCNC: 80 MG/DL
TSH SERPL DL<=0.005 MIU/L-ACNC: 1.18 MU/L (ref 0.4–4)
UROBILINOGEN UR STRIP-MCNC: NORMAL MG/DL (ref 0–2)
WBC # BLD AUTO: 9.8 10E9/L (ref 4–11)
WBC #/AREA URNS AUTO: ABNORMAL /HPF

## 2020-05-07 PROCEDURE — 83540 ASSAY OF IRON: CPT | Performed by: INTERNAL MEDICINE

## 2020-05-07 PROCEDURE — 82784 ASSAY IGA/IGD/IGG/IGM EACH: CPT | Performed by: INTERNAL MEDICINE

## 2020-05-07 PROCEDURE — 99000 SPECIMEN HANDLING OFFICE-LAB: CPT | Performed by: INTERNAL MEDICINE

## 2020-05-07 PROCEDURE — 84075 ASSAY ALKALINE PHOSPHATASE: CPT | Performed by: INTERNAL MEDICINE

## 2020-05-07 PROCEDURE — 84403 ASSAY OF TOTAL TESTOSTERONE: CPT | Performed by: INTERNAL MEDICINE

## 2020-05-07 PROCEDURE — 85652 RBC SED RATE AUTOMATED: CPT | Performed by: INTERNAL MEDICINE

## 2020-05-07 PROCEDURE — 82043 UR ALBUMIN QUANTITATIVE: CPT | Performed by: INTERNAL MEDICINE

## 2020-05-07 PROCEDURE — 82977 ASSAY OF GGT: CPT | Performed by: INTERNAL MEDICINE

## 2020-05-07 PROCEDURE — 85025 COMPLETE CBC W/AUTO DIFF WBC: CPT | Performed by: INTERNAL MEDICINE

## 2020-05-07 PROCEDURE — 85610 PROTHROMBIN TIME: CPT | Performed by: INTERNAL MEDICINE

## 2020-05-07 PROCEDURE — 81001 URINALYSIS AUTO W/SCOPE: CPT | Performed by: INTERNAL MEDICINE

## 2020-05-07 PROCEDURE — 82306 VITAMIN D 25 HYDROXY: CPT | Performed by: INTERNAL MEDICINE

## 2020-05-07 PROCEDURE — 84155 ASSAY OF PROTEIN SERUM: CPT | Performed by: INTERNAL MEDICINE

## 2020-05-07 PROCEDURE — 82785 ASSAY OF IGE: CPT | Performed by: INTERNAL MEDICINE

## 2020-05-07 PROCEDURE — 83735 ASSAY OF MAGNESIUM: CPT | Performed by: INTERNAL MEDICINE

## 2020-05-07 PROCEDURE — 80061 LIPID PANEL: CPT | Performed by: INTERNAL MEDICINE

## 2020-05-07 PROCEDURE — 82550 ASSAY OF CK (CPK): CPT | Performed by: INTERNAL MEDICINE

## 2020-05-07 PROCEDURE — 87799 DETECT AGENT NOS DNA QUANT: CPT | Performed by: INTERNAL MEDICINE

## 2020-05-07 PROCEDURE — 36415 COLL VENOUS BLD VENIPUNCTURE: CPT | Performed by: INTERNAL MEDICINE

## 2020-05-07 PROCEDURE — 84590 ASSAY OF VITAMIN A: CPT | Mod: 90 | Performed by: INTERNAL MEDICINE

## 2020-05-07 PROCEDURE — 84460 ALANINE AMINO (ALT) (SGPT): CPT | Performed by: INTERNAL MEDICINE

## 2020-05-07 PROCEDURE — 83036 HEMOGLOBIN GLYCOSYLATED A1C: CPT | Performed by: INTERNAL MEDICINE

## 2020-05-07 PROCEDURE — 84450 TRANSFERASE (AST) (SGOT): CPT | Performed by: INTERNAL MEDICINE

## 2020-05-07 PROCEDURE — 84446 ASSAY OF VITAMIN E: CPT | Mod: 90 | Performed by: INTERNAL MEDICINE

## 2020-05-07 PROCEDURE — 80069 RENAL FUNCTION PANEL: CPT | Performed by: INTERNAL MEDICINE

## 2020-05-07 PROCEDURE — 84443 ASSAY THYROID STIM HORMONE: CPT | Performed by: INTERNAL MEDICINE

## 2020-05-08 ENCOUNTER — TELEPHONE (OUTPATIENT)
Dept: PULMONOLOGY | Facility: CLINIC | Age: 45
End: 2020-05-08

## 2020-05-08 DIAGNOSIS — E84.0 CYSTIC FIBROSIS OF THE LUNG (H): ICD-10-CM

## 2020-05-08 DIAGNOSIS — E84.9 CYSTIC FIBROSIS (H): ICD-10-CM

## 2020-05-08 LAB
CMV DNA SPEC NAA+PROBE-ACNC: NORMAL [IU]/ML
CMV DNA SPEC NAA+PROBE-LOG#: NORMAL {LOG_IU}/ML
EBV DNA # SPEC NAA+PROBE: NORMAL {COPIES}/ML
EBV DNA SPEC NAA+PROBE-LOG#: NORMAL {LOG_COPIES}/ML
IGA SERPL-MCNC: 217 MG/DL (ref 84–499)
IGG SERPL-MCNC: 1253 MG/DL (ref 610–1616)
IGM SERPL-MCNC: 112 MG/DL (ref 35–242)
SPECIMEN SOURCE: NORMAL
TESTOST SERPL-MCNC: 370 NG/DL (ref 240–950)

## 2020-05-08 RX ORDER — BUDESONIDE AND FORMOTEROL FUMARATE DIHYDRATE 160; 4.5 UG/1; UG/1
AEROSOL RESPIRATORY (INHALATION)
Qty: 10.2 INHALER | Refills: 11 | Status: SHIPPED | OUTPATIENT
Start: 2020-05-08 | End: 2021-04-26

## 2020-05-08 NOTE — TELEPHONE ENCOUNTER
Central Prior Authorization Team   [phone]    PA Initiation    Medication: levalbuterol 45mcg  Insurance Company: [employer] - Phone [phone] Fax [phone]  [id number] [place] - [address]  Filling Pharmacy Phone: [phone]  Filling Pharmacy Fax: [phone]  Start Date: 5/8/2020

## 2020-05-08 NOTE — TELEPHONE ENCOUNTER
Prior Authorization Retail Medication Request    Medication/Dose:   ICD code (if different than what is on RX):    Previously Tried and Failed:    Rationale:      Insurance Name:    Insurance ID:        Pharmacy Information (if different than what is on RX)  [facility]  Phone:  [phone]

## 2020-05-10 DIAGNOSIS — E84.0 CYSTIC FIBROSIS WITH PULMONARY MANIFESTATIONS (H): ICD-10-CM

## 2020-05-10 LAB
A-TOCOPHEROL VIT E SERPL-MCNC: 10.3 MG/L (ref 5.5–18)
ANNOTATION COMMENT IMP: NORMAL
BETA+GAMMA TOCOPHEROL SERPL-MCNC: 0.2 MG/L (ref 0–6)
RETINYL PALMITATE SERPL-MCNC: 0.02 MG/L (ref 0–0.1)
VIT A SERPL-MCNC: 0.6 MG/L (ref 0.3–1.2)

## 2020-05-11 LAB — IGE SERPL-ACNC: 16 KIU/L (ref 0–114)

## 2020-05-11 RX ORDER — DOXYCYCLINE 100 MG/1
CAPSULE ORAL
Qty: 60 CAPSULE | Refills: 2 | Status: SHIPPED | OUTPATIENT
Start: 2020-05-11 | End: 2020-08-14

## 2020-05-12 ENCOUNTER — TELEPHONE (OUTPATIENT)
Dept: PULMONOLOGY | Facility: CLINIC | Age: 45
End: 2020-05-12

## 2020-05-12 NOTE — TELEPHONE ENCOUNTER
Prior Authorization Approval    Authorization Effective Date: 5/12/2020  Authorization Expiration Date: 5/8/2021  Medication: levalbuterol HFA APPROVED  Approved Dose/Quantity: 1 PER MONTH  Reference #:     Insurance Company: [employer] [phone] Fax [phone]  Expected CoPay:       CoPay Card Available: No    Foundation Assistance Needed:    Which Pharmacy is filling the prescription (Not needed for infusion/clinic administered): [facility] [address] [place] [facility] [place] [facility] [address] [facility]  Pharmacy Notified:    Patient Notified:

## 2020-05-15 NOTE — TELEPHONE ENCOUNTER
Prior Authorization Approval    Authorization Effective Date: 5/8/2020  Authorization Expiration Date: 5/8/2021  Medication: levalbuterol 45mcg-PA APPROVED   Approved Dose/Quantity:   Reference #: CASE # [id number]   Insurance Company: [employer] - Phone [phone] Fax [phone]  Expected CoPay:       CoPay Card Available:      Foundation Assistance Needed:    Which Pharmacy is filling the prescription (Not needed for infusion/clinic administered): [facility] [address] [place] [facility] [place] [id number] [facility]  Pharmacy Notified: Yes- **Instructed pharmacy to notify patient when script is ready to /ship.**  Patient Notified: Yes    Called insurance to send PA Approval Letter via fax and have not received Letter. PA Approval Dates: 5/8/2020-5/8/2021 was provided by phone.

## 2020-05-19 DIAGNOSIS — E84.0 CYSTIC FIBROSIS WITH PULMONARY MANIFESTATIONS (H): ICD-10-CM

## 2020-05-19 RX ORDER — SODIUM CHLORIDE FOR INHALATION 7 %
VIAL, NEBULIZER (ML) INHALATION
Qty: 240 ML | Refills: 5 | Status: SHIPPED | OUTPATIENT
Start: 2020-05-19 | End: 2020-11-06

## 2020-05-26 ENCOUNTER — VIRTUAL VISIT (OUTPATIENT)
Dept: PULMONOLOGY | Facility: CLINIC | Age: 45
End: 2020-05-26
Attending: INTERNAL MEDICINE
Payer: COMMERCIAL

## 2020-05-26 DIAGNOSIS — E84.0 CYSTIC FIBROSIS WITH PULMONARY MANIFESTATIONS (H): Primary | ICD-10-CM

## 2020-05-26 RX ORDER — MIRTAZAPINE 15 MG/1
15 TABLET, FILM COATED ORAL AT BEDTIME
Qty: 30 TABLET | Refills: 0 | COMMUNITY
Start: 2020-05-26 | End: 2021-05-04

## 2020-05-26 NOTE — PROGRESS NOTES
"Nico Morales is a 44 year old male who is being evaluated via a billable telephone visit.      The patient has been notified of following:     \"This telephone visit will be conducted via a call between you and your physician/provider. We have found that certain health care needs can be provided without the need for a physical exam.  This service lets us provide the care you need with a short phone conversation.  If a prescription is necessary we can send it directly to your pharmacy.  If lab work is needed we can place an order for that and you can then stop by our lab to have the test done at a later time.    Telephone visits are billed at different rates depending on your insurance coverage. During this emergency period, for some insurers they may be billed the same as an in-person visit.  Please reach out to your insurance provider with any questions.    If during the course of the call the physician/provider feels a telephone visit is not appropriate, you will not be charged for this service.\"    Patient has given verbal consent for Telephone visit?  Yes    What phone number would you like to be contacted at? [phone]    How would you like to obtain your AVS? Vineloophart    Phone call duration: 25 minutes    [facility]  May 26, 2020         Assessment and Plan:   Nico Morales is a 44 year old male with cystic fibrosis.    1. 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  The sputum that he does produce is yellow-green in color.  He is uncertain if this is allergy related or related to his inhalational aztreonam.  He does not feel it is currently related to a new infection.  Kilo historically has grown out Pseudomonas in his sputum.  At this time I recommended:  --That he continue on his present bronchial drainage and nebulized therapy.  --Kilo " should continue alternating inhalational aztreonam every 2 weeks with an oral doxycycline    2. Pancreatic Insufficiency:  The patient has no new symptoms consistent with worsening malabsorption.    - continue the present dose of pancreatic enzymes  - continue vitamin supplementation.    3.  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  He does do dietary supplementation.  When he is unable to do nighttime tube feeds he does try to drink his formula.  He does report he feels he is maintaining a stable weight.    4.  CFTR modulator therapy: Kilo does remain on trikafta and is tolerating well.  Recent hepatic panel was normal.    5.  Insomnia: Kilo was seen by a mental health provider and was taken off trazodone and put on mirtazapine.  He does report improvement in sleep and mood related to this change.    6.  Cystic fibrosis related diabetes: Kilo reports good control of his blood sugars.  He is very diligent about his diabetes care.    7.  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  During our phone call he was traveling back from a trip in an   from [place].  He now has a significant other Madisyn.  This relationship is going well.     Jaye Machado MD MPH  Associate Professor of Medicine  Pulmonary, Allergy, Critical Care and Sleep Medicine      Interval History:     Kilo does report that he has slight increase in cough and sputum production.  His sputum is yellow-green in color.  He is performing 3 vest therapies per day.  He does report that his activity tolerance has been stable.         Review of Systems:     CONSTITUTIONAL: no fever, no chills, no sweats, no change in weight, pretty good energy, no change in appetite    INTEGUMENTARY/SKIN: no rash, no obvious new lesions    ENT/MOUTH: no sore throat, no new sinus pain, + allergy and nasal drainage, no ear ringing     RESPIRATORY: see interval history    CV: no chest pain, no  palpitations, no peripheral edema, no orthopnea, no PND    GI: no nausea, no vomiting, no change in stools, no fatty stools, no GERD, no abdominal pain    : no dysuria, no urinary frequency    MUSCULOSKELETAL: no myalgias, no arthralgias    ENDOCRINE: no excessive thirst, blood sugars with adequate control    NEURO:  No headache, no numbness, no tingling    SLEEP: variable but improving    PSYCHIATRIC: better with improved sleep          Past Medical and Surgical History:     Past Medical History:   Diagnosis Date     CF (cystic fibrosis) (H)      Exocrine pancreatic insufficiency      Nocardia infection      Pseudomonas infection      S/P gastrostomy (H) 2002     Past Surgical History:   Procedure Laterality Date     COLONOSCOPY N/A 5/21/2018    Procedure: COMBINED COLONOSCOPY, SINGLE OR MULTIPLE BIOPSY/POLYPECTOMY BY BIOPSY;  Cystic fibrosis, 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;  Surgeon: Margarito Bowman MD;  Location: [facility]     GASTROSTOMY TUBE  2002     PICC INSERTION Left 01/22/2018    4Fr SL BioFlo PICC, 46cm (5cm external) in the L basilic vein w/ tip in the low SVC           Family History:     Family History   Problem Relation Age of Onset     Heart Disease Maternal Grandmother      Heart Disease Maternal Grandfather      Heart Disease Paternal Grandmother      Diabetes No family hx of             Social History:     Social History     Socioeconomic History     Marital status: Single     Spouse name: Not on file     Number of children: 0     Years of education: Not on file     Highest education level: Not on file   Occupational History     Occupation: financial palnner     Employer: [employer]   Social Needs     Financial resource strain: Not on file     Food insecurity     Worry: Not on file     Inability: Not on file     Transportation needs     Medical: Not on file     Non-medical: Not on file   Tobacco Use     Smoking status: Never Smoker     Smokeless tobacco: Never Used   Substance and Sexual  Activity     Alcohol use: No     Alcohol/week: 0.0 standard drinks     Drug use: No     Sexual activity: Yes     Partners: Female     Birth control/protection: Pill   Lifestyle     Physical activity     Days per week: Not on file     Minutes per session: Not on file     Stress: Not on file   Relationships     Social connections     Talks on phone: Not on file     Gets together: Not on file     Attends [employer] service: Not on file     Active member of club or organization: Not on file     Attends meetings of clubs or organizations: Not on file     Relationship status: Not on file     Intimate partner violence     Fear of current or ex partner: Not on file     Emotionally abused: Not on file     Physically abused: Not on file     Forced sexual activity: Not on file   Other Topics Concern     Parent/sibling w/ CABG, MI or angioplasty before 65F 55M? Not Asked      Service Not Asked     Blood Transfusions No     Caffeine Concern Not Asked     Occupational Exposure Not Asked     Hobby Hazards Not Asked     Sleep Concern Not Asked     Stress Concern Not Asked     Weight Concern Not Asked     Special Diet Not Asked     Back Care Not Asked     Exercise No     Bike Helmet Not Asked     Seat Belt Not Asked     Self-Exams Not Asked   Social History Narrative    8/15/2019 - .  Lives with his English Bulldog (Jefry) in a house that he designed in [place].  No children.            Medications:     Current Outpatient Medications   Medication     ACE NOT PRESCRIBED, INTENTIONAL,     acetaminophen-codeine (TYLENOL #3) 300-30 MG per tablet     acetylcysteine (MUCOMYST) 10 % nebulizer solution     ALPRAZolam (XANAX) 1 MG tablet     amylase-lipase-protease (CREON) 47279-65607 units CPEP per EC capsule     azithromycin (ZITHROMAX) 250 MG tablet     aztreonam (AZACTAM) 200 MG/ML     aztreonam lysine (CAYSTON) 75 MG SOLR     blood glucose (NO BRAND SPECIFIED) lancets standard     blood glucose (NO BRAND SPECIFIED)  "test strip     blood glucose monitoring (MARTINA CONTOUR NEXT MONITOR) meter device kit     blood glucose monitoring (MARTINA CONTOUR NEXT) test strip     blood glucose monitoring (MARTINA MICROLET) lancets     blood glucose monitoring (NO BRAND SPECIFIED) meter device kit     budesonide-formoterol (SYMBICORT) 160-4.5 MCG/ACT Inhaler     CALCIUM PO     doxycycline hyclate (VIBRAMYCIN) 100 MG capsule     elexacaftor-tezacaftor-ivacaftor & ivacaftor (TRIKAFTA) 100-50-75 & 150 MG tablet pack     eszopiclone (LUNESTA) 2 MG tablet     [facility] INFUSION MANAGED PATIENT     Ferrous Fumarate-Vitamin C (VITRON-C) 200-125 MG TABS     insulin aspart (NOVOLOG PENFILL) 100 UNIT/ML cartridge     insulin glargine (LANTUS SOLOSTAR) 100 UNIT/ML pen     insulin glargine (LANTUS SOLOSTAR) 100 UNIT/ML pen     insulin glargine (LANTUS SOLOSTAR) 100 UNIT/ML pen     insulin pen needle (32G X 4 MM) 32G X 4 MM miscellaneous     insulin pen needle (BD OMI U/F) 32G X 4 MM miscellaneous     levalbuterol (XOPENEX HFA) 45 MCG/ACT inhaler     levalbuterol (XOPENEX) 1.25 MG/3ML neb solution     MEPHYTON 5 MG PO TABS     mirtazapine (REMERON) 15 MG tablet     MULTIVITAMIN OR     Nebulizers (EFLOW SCF NEBULIZER HANDSET) MISC     oseltamivir (TAMIFLU) 75 MG capsule     polyethylene glycol (MIRALAX/GLYCOLAX) powder     Respiratory Therapy Supplies (KRIS ALTERA NEBULIZER HANDSET) MISC     Respiratory Therapy Supplies (KRIS ALTERA NEBULIZER HANDSET) MISC     rimantadine (FLUMADINE) 100 MG tablet     sodium chloride inhalant 7 % NEBU neb solution     Syringe/Needle, Disp, 18G X 1\" 5 ML MISC     water for injection sterile SOLN     No current facility-administered medications for this visit.             Physical Exam:   There were no vitals taken for this visit.    Not performed.         Data:   All laboratory and imaging data reviewed.    Cystic Fibrosis Culture  Specimen Description   Date Value Ref Range Status   02/04/2020 Sputum  Final   11/13/2019 " Sputum  Final   10/15/2019 Sputum  Final    Culture Micro   Date Value Ref Range Status   02/04/2020 Moderate growth  Normal santi    Final   02/04/2020 (A)  Final    Light growth  Pseudomonas aeruginosa, mucoid strain     02/04/2020 (A)  Final    Light growth  Strain 2  Pseudomonas aeruginosa, mucoid strain          No results found for this or any previous visit (from the past 168 hour(s)).    PFT: Not performed    Pulmonary exacerbation: absent

## 2020-05-26 NOTE — PATIENT INSTRUCTIONS
Cystic Fibrosis Self-Care Plan    RECOMMENDATIONS:   Kilo,  It was great to talk to you today at a truck stop in [place]!  What a great adventure.  I am so glad you took the trip.  I am glad that you are feeling well.  The plan from today:  --Will check a lyme titer the next time you have labs done  --Will have Melonie call you tomorrow  --will arrange for you to have a PFT done  --I have ordered a sputum to be done at any time at [place]  Great job with vest and nebs.    YOUR GOAL:  Take care and drive safe!      [facility] Nurse line:  Katie Henderson and Lexi    [phone]     [facility] Fax Number:      [phone]         Cystic Fibrosis Respiratory Therapists:   Felicitas Banuelos              [phone]          Jacklyn Pina   [phone]  Cystic Fibrosis Dietitians:              Zeina Pierre              [phone]                            Ivory Bonilla                        [phone]   Cystic Fibrosis Diabetes Nurse:    Jordyn Rudolph   [phone]    Cystic Fibrosis Social Workers:     Mary Ritter               [phone]                     Louann Suarez               [phone]  Cystic Fibrosis Pharmacists:           Tammy Morgan                               [phone]         Ml Tilley   [phone]  Cystic Fibrosis Genetic Counselor:   Pretty Pulliam    [phone]    [facility] website:  [email or web address]         MRN: [id number]   Clinic Date: May 26, 2020   Patient: Nico Morales     Annual Studies:   IGG   Date Value Ref Range Status   05/07/2020 1,253 610 - 1,616 mg/dL Final     Insulin   Date Value Ref Range Status   07/26/2011 30 mU/L Final     Comment:     Reference Range:  0-20  +120     There are no preventive care reminders to display for this patient.    Pulmonary Function Tests  FEV1: amount of air you can blow out in 1 second  FVC: total amount of air you can take in and blow out    Your Goals:          PFT Latest Ref Rng & Units 2/4/2020   FVC L 4.18   FEV1 L 2.06   FVC% % 85   FEV1% % 52          Airway Clearance: The Most Important Way to Keep Your Lungs Healthy  Vest Settings:    Hill-Rom Frequencies: 8, 9, 10 Pressure 10 Then, Frequencies 18, 19, 20 Pressure 6      RespirTech: Quick Start with Pressure of     Do each frequency for 5 minutes; Deflate vest after each frequency & cough 3 times before beginning the next setting.    Vest and Neb Therapy should be done 3 times/day.    Good Nutrition Can Improve Lung Function and Overall Health     Take ALL of your vitamins with food     Take 1/2 of your enzymes before EVERY meal/snack and the other 1/2 mid-meal/snack    Wt Readings from Last 3 Encounters:   02/04/20 62.7 kg (138 lb 3.7 oz)   11/13/19 63.4 kg (139 lb 12.4 oz)   10/24/19 65.3 kg (143 lb 14.4 oz)       There is no height or weight on file to calculate BMI.         National CF Foundation Recommendations for BMI in CF Adults: Women: at least 22 Men: at least 23        Controlling Blood Sugars Helps Prevent Lung Infections & Improves Nutrition  Test blood sugar:     In the morning before eating (goal is )     2 hours after a meal (goal is less than 150)     When pre-meal glucose is greater than 150 add correction     At bedtime (if less than 100 eat a snack with 15 grams of carbohydrates  Last A1C Results:   Hemoglobin A1C   Date Value Ref Range Status   05/07/2020 6.1 (H) 0 - 5.6 % Final     Comment:     Normal <5.7% Prediabetes 5.7-6.4%  Diabetes 6.5% or higher - adopted from ADA   consensus guidelines.           If diabetic, measure A1C every 6 months. Goal: Under 7%    Staying Healthy    Research:  If you are interested in learning about research opportunities or have questions, please contact the CF Research Team at [phone] or [email or web address].      CF Foundation:  Compass is a personalized resource service to help you with the insurance, financial, legal and other issues you are  facing.  It's free, confidential and available to anyone with CF.  Ask your  for more information or contact Compass directly at 171-COMPASS ([phone]) or compass@cff.org, or learn more at [email or web address].[email or web address].

## 2020-05-26 NOTE — LETTER
"    5/26/2020         RE: Nico Morales  [address]  [facility] [address]        Dear Colleague,    Thank you for referring your patient, Nico Morales, to the [facility]. Please see a copy of my visit note below.    Nico Morales is a 44 year old male who is being evaluated via a billable telephone visit.      The patient has been notified of following:     \"This telephone visit will be conducted via a call between you and your physician/provider. We have found that certain health care needs can be provided without the need for a physical exam.  This service lets us provide the care you need with a short phone conversation.  If a prescription is necessary we can send it directly to your pharmacy.  If lab work is needed we can place an order for that and you can then stop by our lab to have the test done at a later time.    Telephone visits are billed at different rates depending on your insurance coverage. During this emergency period, for some insurers they may be billed the same as an in-person visit.  Please reach out to your insurance provider with any questions.    If during the course of the call the physician/provider feels a telephone visit is not appropriate, you will not be charged for this service.\"    Patient has given verbal consent for Telephone visit?  Yes    What phone number would you like to be contacted at? [phone]    How would you like to obtain your AVS? Jessa    Phone call duration: 25 minutes    [facility]  May 26, 2020         Assessment and Plan:   Nico Morales is a 44 year old male with cystic fibrosis.    1. 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  The sputum that he does produce is yellow-green in color.  He is uncertain if this is allergy related or related to his " inhalational aztreonam.  He does not feel it is currently related to a new infection.  Kilo historically has grown out Pseudomonas in his sputum.  At this time I recommended:  --That he continue on his present bronchial drainage and nebulized therapy.  --Kilo should continue alternating inhalational aztreonam every 2 weeks with an oral doxycycline    2. Pancreatic Insufficiency:  The patient has no new symptoms consistent with worsening malabsorption.    - continue the present dose of pancreatic enzymes  - continue vitamin supplementation.    3.  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  He does do dietary supplementation.  When he is unable to do nighttime tube feeds he does try to drink his formula.  He does report he feels he is maintaining a stable weight.    4.  CFTR modulator therapy: Kilo does remain on trikafta and is tolerating well.  Recent hepatic panel was normal.    5.  Insomnia: Kilo was seen by a mental health provider and was taken off trazodone and put on mirtazapine.  He does report improvement in sleep and mood related to this change.    6.  Cystic fibrosis related diabetes: Kilo reports good control of his blood sugars.  He is very diligent about his diabetes care.    7.  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  During our phone call he was traveling back from a trip in an   from [place].  He now has a significant other Madisyn.  This relationship is going well.     Jaye Machado MD MPH  Associate Professor of Medicine  Pulmonary, Allergy, Critical Care and Sleep Medicine      Interval History:     Kilo does report that he has slight increase in cough and sputum production.  His sputum is yellow-green in color.  He is performing 3 vest therapies per day.  He does report that his activity tolerance has been stable.         Review of Systems:     CONSTITUTIONAL: no fever, no chills, no sweats, no change in  weight, pretty good energy, no change in appetite    INTEGUMENTARY/SKIN: no rash, no obvious new lesions    ENT/MOUTH: no sore throat, no new sinus pain, + allergy and nasal drainage, no ear ringing     RESPIRATORY: see interval history    CV: no chest pain, no palpitations, no peripheral edema, no orthopnea, no PND    GI: no nausea, no vomiting, no change in stools, no fatty stools, no GERD, no abdominal pain    : no dysuria, no urinary frequency    MUSCULOSKELETAL: no myalgias, no arthralgias    ENDOCRINE: no excessive thirst, blood sugars with adequate control    NEURO:  No headache, no numbness, no tingling    SLEEP: variable but improving    PSYCHIATRIC: better with improved sleep          Past Medical and Surgical History:     Past Medical History:   Diagnosis Date     CF (cystic fibrosis) (H)      Exocrine pancreatic insufficiency      Nocardia infection      Pseudomonas infection      S/P gastrostomy (H) 2002     Past Surgical History:   Procedure Laterality Date     COLONOSCOPY N/A 5/21/2018    Procedure: COMBINED COLONOSCOPY, SINGLE OR MULTIPLE BIOPSY/POLYPECTOMY BY BIOPSY;  Cystic fibrosis, 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;  Surgeon: Margarito Bowman MD;  Location: [facility]     GASTROSTOMY TUBE  2002     PICC INSERTION Left 01/22/2018    4Fr SL BioFlo PICC, 46cm (5cm external) in the L basilic vein w/ tip in the low SVC           Family History:     Family History   Problem Relation Age of Onset     Heart Disease Maternal Grandmother      Heart Disease Maternal Grandfather      Heart Disease Paternal Grandmother      Diabetes No family hx of             Social History:     Social History     Socioeconomic History     Marital status: Single     Spouse name: Not on file     Number of children: 0     Years of education: Not on file     Highest education level: Not on file   Occupational History     Occupation: financial palnner     Employer: [employer]   Social Needs     Financial resource strain: Not  on file     Food insecurity     Worry: Not on file     Inability: Not on file     Transportation needs     Medical: Not on file     Non-medical: Not on file   Tobacco Use     Smoking status: Never Smoker     Smokeless tobacco: Never Used   Substance and Sexual Activity     Alcohol use: No     Alcohol/week: 0.0 standard drinks     Drug use: No     Sexual activity: Yes     Partners: Female     Birth control/protection: Pill   Lifestyle     Physical activity     Days per week: Not on file     Minutes per session: Not on file     Stress: Not on file   Relationships     Social connections     Talks on phone: Not on file     Gets together: Not on file     Attends Cheondoism service: Not on file     Active member of club or organization: Not on file     Attends meetings of clubs or organizations: Not on file     Relationship status: Not on file     Intimate partner violence     Fear of current or ex partner: Not on file     Emotionally abused: Not on file     Physically abused: Not on file     Forced sexual activity: Not on file   Other Topics Concern     Parent/sibling w/ CABG, MI or angioplasty before 65F 55M? Not Asked      Service Not Asked     Blood Transfusions No     Caffeine Concern Not Asked     Occupational Exposure Not Asked     Hobby Hazards Not Asked     Sleep Concern Not Asked     Stress Concern Not Asked     Weight Concern Not Asked     Special Diet Not Asked     Back Care Not Asked     Exercise No     Bike Helmet Not Asked     Seat Belt Not Asked     Self-Exams Not Asked   Social History Narrative    8/15/2019 - .  Lives with his English Bulldog (Jefry) in a house that he designed in [place].  No children.            Medications:     Current Outpatient Medications   Medication     ACE NOT PRESCRIBED, INTENTIONAL,     acetaminophen-codeine (TYLENOL #3) 300-30 MG per tablet     acetylcysteine (MUCOMYST) 10 % nebulizer solution     ALPRAZolam (XANAX) 1 MG tablet     amylase-lipase-protease  "(CREON) 74418-69277 units CPEP per EC capsule     azithromycin (ZITHROMAX) 250 MG tablet     aztreonam (AZACTAM) 200 MG/ML     aztreonam lysine (CAYSTON) 75 MG SOLR     blood glucose (NO BRAND SPECIFIED) lancets standard     blood glucose (NO BRAND SPECIFIED) test strip     blood glucose monitoring (MARTINA CONTOUR NEXT MONITOR) meter device kit     blood glucose monitoring (MARTINA CONTOUR NEXT) test strip     blood glucose monitoring (MARTINA MICROLET) lancets     blood glucose monitoring (NO BRAND SPECIFIED) meter device kit     budesonide-formoterol (SYMBICORT) 160-4.5 MCG/ACT Inhaler     CALCIUM PO     doxycycline hyclate (VIBRAMYCIN) 100 MG capsule     elexacaftor-tezacaftor-ivacaftor & ivacaftor (TRIKAFTA) 100-50-75 & 150 MG tablet pack     eszopiclone (LUNESTA) 2 MG tablet     [facility] INFUSION MANAGED PATIENT     Ferrous Fumarate-Vitamin C (VITRON-C) 200-125 MG TABS     insulin aspart (NOVOLOG PENFILL) 100 UNIT/ML cartridge     insulin glargine (LANTUS SOLOSTAR) 100 UNIT/ML pen     insulin glargine (LANTUS SOLOSTAR) 100 UNIT/ML pen     insulin glargine (LANTUS SOLOSTAR) 100 UNIT/ML pen     insulin pen needle (32G X 4 MM) 32G X 4 MM miscellaneous     insulin pen needle (BD OMI U/F) 32G X 4 MM miscellaneous     levalbuterol (XOPENEX HFA) 45 MCG/ACT inhaler     levalbuterol (XOPENEX) 1.25 MG/3ML neb solution     MEPHYTON 5 MG PO TABS     mirtazapine (REMERON) 15 MG tablet     MULTIVITAMIN OR     Nebulizers (EFLOW SCF NEBULIZER HANDSET) MISC     oseltamivir (TAMIFLU) 75 MG capsule     polyethylene glycol (MIRALAX/GLYCOLAX) powder     Respiratory Therapy Supplies (KRIS ALTERA NEBULIZER HANDSET) MISC     Respiratory Therapy Supplies (KRIS ALTERA NEBULIZER HANDSET) MISC     rimantadine (FLUMADINE) 100 MG tablet     sodium chloride inhalant 7 % NEBU neb solution     Syringe/Needle, Disp, 18G X 1\" 5 ML MISC     water for injection sterile SOLN     No current facility-administered medications for this visit.          "    Physical Exam:   There were no vitals taken for this visit.    Not performed.         Data:   All laboratory and imaging data reviewed.    Cystic Fibrosis Culture  Specimen Description   Date Value Ref Range Status   02/04/2020 Sputum  Final   11/13/2019 Sputum  Final   10/15/2019 Sputum  Final    Culture Micro   Date Value Ref Range Status   02/04/2020 Moderate growth  Normal santi    Final   02/04/2020 (A)  Final    Light growth  Pseudomonas aeruginosa, mucoid strain     02/04/2020 (A)  Final    Light growth  Strain 2  Pseudomonas aeruginosa, mucoid strain          No results found for this or any previous visit (from the past 168 hour(s)).    PFT: Not performed    Pulmonary exacerbation: absent            Again, thank you for allowing me to participate in the care of your patient.        Sincerely,        Jaye Machado MD

## 2020-05-27 ENCOUNTER — VIRTUAL VISIT (OUTPATIENT)
Dept: PULMONOLOGY | Facility: CLINIC | Age: 45
End: 2020-05-27
Attending: INTERNAL MEDICINE
Payer: COMMERCIAL

## 2020-05-27 DIAGNOSIS — E84.8 DIABETES MELLITUS RELATED TO CF (CYSTIC FIBROSIS) (H): ICD-10-CM

## 2020-05-27 DIAGNOSIS — E08.9 DIABETES MELLITUS RELATED TO CF (CYSTIC FIBROSIS) (H): ICD-10-CM

## 2020-05-27 DIAGNOSIS — E84.9 CYSTIC FIBROSIS (H): Primary | ICD-10-CM

## 2020-05-27 DIAGNOSIS — K86.81 EXOCRINE PANCREATIC INSUFFICIENCY: ICD-10-CM

## 2020-05-27 PROCEDURE — 97803 MED NUTRITION INDIV SUBSEQ: CPT | Mod: 95,ZF | Performed by: DIETITIAN, REGISTERED

## 2020-05-27 NOTE — LETTER
"    5/27/2020         RE: Nico Morales  [address]  [facility] [address]        Dear Colleague,    Thank you for referring your patient, Nico Morales, to the [facility]. Please see a copy of my visit note below.    CF Annual Nutrition Assessment    Reason for Assessment  Assessed during [facility] r/t increased nutrition risk with diagnosis of CF per protocol    Kilo Morales is a 44 year old male who is being evaluated via a billable telephone visit.      The patient has been notified of following:     \"This telephone visit will be conducted via a call between you and your physician/provider. We have found that certain health care needs can be provided without the need for a physical exam.  This service lets us provide the care you need with a short phone conversation.  If a prescription is necessary we can send it directly to your pharmacy.  If lab work is needed we can place an order for that and you can then stop by our lab to have the test done at a later time.    Telephone visits are billed at different rates depending on your insurance coverage. During this emergency period, for some insurers they may be billed the same as an in-person visit.  Please reach out to your insurance provider with any questions.    If during the course of the call the physician/provider feels a telephone visit is not appropriate, you will not be charged for this service.\"    Patient has given verbal consent for Telephone visit?  Yes    Nutrition Significant PMH  Severe Lung Disease   Pancreatic Insufficient   CF-Related Diabetes  G-tube - no longer used for nutrition support by pt keeps patent with water flushes and changes it every 6 months - uses [facility], switched from [facility] in 2019 due to insurance coverage    Social Assessment  Living situation: Living by himself in a house with his dog.   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, still able to work full time during the pandemic - working from home and office   Food Insecurity:  None    Anthropometric Assessment  Height:   Ht Readings from Last 1 Encounters:   20 1.737 m (5' 8.39\")     IBW based on BMI 22 for females and 23 for males per CF Foundation recs: 69.5 kg  Today's Pt Reported Weight: 65 kg (143#)   %IBW: 94%    BMI: 21.5 kg    Current weight is considered: Normal BMI; however, not a CF goal BMI   Weight history: Pt reports that his wt has been stable for the last several months, difficult to identify wt trends with out a current documented wt   Wt Readings from Last 10 Encounters:   20 62.7 kg (138 lb 3.7 oz)   19 63.4 kg (139 lb 12.4 oz)   10/24/19 65.3 kg (143 lb 14.4 oz)   10/15/19 66.2 kg (145 lb 15.1 oz)   19 66.2 kg (146 lb)   19 66.2 kg (146 lb)   19 66.4 kg (146 lb 6.4 oz)   19 65 kg (143 lb 4.8 oz)   19 65.1 kg (143 lb 8.3 oz)   19 66.8 kg (147 lb 3.2 oz)     Physical Activity/Exercise:  45 min daily walk + daily push ups + other strength training (2-4 days/week)    MALNUTRITION  % Intake:  No decreased intake noted  % Weight Loss: Unable to assess d/t no current documented wt  Subcutaneous Fat Loss:  Unable to assess d/t telehealth visit   Muscle Loss:  Unable to assess d/t telehealth visit   Fluid Accumulation/Edema:  None noted  Malnutrition Diagnosis: Unable to determine due to COVID precautions      Pancreatic Enzymes  Brand:  Creon [id number]  Dosin with meals, 2-4 with snacks  Are you taking enzymes as recommended:Yes     High dose providing 2955 units lipase/kg/meal which is slightly above recommendations   Estimated Daily Amount (if meal dose is >2500 units lipase/kg/meal): ~10,000 units -- likely adequate to prevent malabsorption and include dosing for oral [employer] formula     Signs of Malabsorption:  No  Enzyme Program:  [facility] - remains active    Diet History and Assessment  Diet " Preferences/Allergies.Intolerances: Regular   Intake Recall/Comments:  Kilo eats TID meal and drinks 2-3 Bailee CSS99 Peptide 1.3 cans/day. He has a good appetite and feels like the OP intake + nutrition supplement provide him with adequate nutrition to maintain his current wt.   B - [employer]ms + oatmeal, english muffin with pb and J  L - meat and cheese, kiwi, carrots, red peppers, pudding, string cheese, meat or pb and J sandwich   D - frozen pizza or take out   Snacks - [place] [employer]     Calcium: [employer] Peptide 1.5 x2-3/day + whole milk with dinner + cheese throughout the day + Calcium and Vit D supplements (adequate)   Salt: cheese and deli meat throughout the day, frozen pizza or take out for dinner (likely adequate)   Hydration:  80-90 fl oz/day water     Supplements:  [place] Peptide 1.5, 2-3 cans per day by mouth. Pt was having trouble sleeping with the TF infusing via his G-tube at night and he doesn't mild the taste of [employer] Farms so he now exclusively drinks the formula.       Estimated Energy and Protein Needs  Estimation based on weight maintenance and gain with Severe lung disease and pancreatic insufficient.     BEE: ~1490 kcals                           3500 - 3850 kcals/day 200-225% BEE (+ 500 kcals/day for weight gain)   g protein/day 1.5-2 g/kg    Laboratory Assessment    Vitamin A   Date Value Ref Range Status   05/07/2020 0.60 0.30 - 1.20 mg/L Final     Vitamin D Deficiency screening   Date Value Ref Range Status   05/07/2020 36 20 - 75 ug/L Final     Comment:     Season, race, dietary intake, and treatment affect the concentration of   25-hydroxy-Vitamin D. Values may decrease during winter months and increase   during summer months. Values 20-29 ug/L may indicate Vitamin D insufficiency   and values <20 ug/L may indicate Vitamin D deficiency.  Vitamin D determination is routinely performed by an immunoassay specific for   25 hydroxyvitamin D3.  If an individual is on vitamin D2  (ergocalciferol)   supplementation, please specify 25 OH vitamin D2 and D3 level determination by   LCMSMS test [id number].       Vitamin E   Date Value Ref Range Status   05/07/2020 10.3 5.5 - 18.0 mg/L Final     Comment:     (Note)  Test developed and characteristics determined by [employer]. See Compliance Statement B: "[employer]/CS       Iron   Date Value Ref Range Status   05/07/2020 81 35 - 180 ug/dL Final     Cholesterol   Date Value Ref Range Status   05/07/2020 162 <200 mg/dL Final     Triglycerides   Date Value Ref Range Status   05/07/2020 80 <150 mg/dL Final     HDL Cholesterol   Date Value Ref Range Status   05/07/2020 55 >39 mg/dL Final     LDL Cholesterol Calculated   Date Value Ref Range Status   05/07/2020 91 <100 mg/dL Final     Comment:     Desirable:       <100 mg/dl     VLDL-Cholesterol   Date Value Ref Range Status   06/26/2013 13 0 - 30 mg/dL Final     Cholesterol/HDL Ratio   Date Value Ref Range Status   06/26/2013 4.4 0.0 - 5.0 Final       DEXA: (December 2016) Showed significant bone loss - pt is seeing Dr. Lee    Current Vitamin/Mineral Prescription R/T deficiency/malabsorption: MVW Complete  international unit(s) daily, Calcium with D 1 tablet twice daily and Vitron-C 1 tablet daily.  Comments:  Obtains ADEK from [facility].     CF Related Diabetes Evaluation  Hgb A1C: 6.1%   Date: 5/7/20  FSBG range: Not discussed today  Insulin: Yes    Insulin Regimen: Novolog 1 unit:15 g carb, Lantus 10 units  Carbohydrate Counting: Yes     NUTRITION DIAGNOSIS  Impaired nutrient utilization r/t CF related diabetes, pancreatic insufficiency and CF hypermetabolism AEB requires PERT, insulin therapy and high kcal/pro intake through TF + diet to maintain/improve nutrition and health status.  INTERVENTIONS/RECOMMENDATIONS  PO Intake: Encourage pt to drink TID [employer] Peptide 1.5 + eat TID meals to insure wt maintenance/wt gain.   Vitamins/Enzymes/Insulin: Reviewed vitamin/mineral  supplement plan and reminded pt to take calcium and iron supplements spaced out by at least 2 hours.  Reviewed enzyme, insulin and vitamin/mineral supplement prescriptions to ensure accuracy and understanding.     Patient Understanding: Excellent  Expected Compliance: Excellent  Follow-Up Plans: Per protocol     GOALS:  1) Weight maintenance vs further gain to BMI of 23 kg/m2.   2) 100% compliance with prescribed enzymes, vitamin/mineral supplements and insulin therapy.      FOLLOW-UP/MONITORING:  Visit patient within 12 month(s) for annual follow-up, sooner by request or in response to acute weight/nutrition changes.    Phone Call Duration: 15 min    Melonie Peralta RD, JULIET (pager [phone])  Cystic Fibrosis/Lung Transplant Dietitian      -Available Mon-Wednesday 8 AM-4:30 PM. On Thursdays and Fridays please contact pager [phone] (Zeina Pierre RD) and on weekends/holidays contact coverage dietitian at pager [phone] (inpatient use only).       Again, thank you for allowing me to participate in the care of your patient.        Sincerely,        Ivory Bonilla RD

## 2020-06-08 NOTE — PROGRESS NOTES
"CF Annual Nutrition Assessment    Reason for Assessment  Assessed during [facility] r/t increased nutrition risk with diagnosis of CF per protocol    Kilo Morales is a 44 year old male who is being evaluated via a billable telephone visit.      The patient has been notified of following:     \"This telephone visit will be conducted via a call between you and your physician/provider. We have found that certain health care needs can be provided without the need for a physical exam.  This service lets us provide the care you need with a short phone conversation.  If a prescription is necessary we can send it directly to your pharmacy.  If lab work is needed we can place an order for that and you can then stop by our lab to have the test done at a later time.    Telephone visits are billed at different rates depending on your insurance coverage. During this emergency period, for some insurers they may be billed the same as an in-person visit.  Please reach out to your insurance provider with any questions.    If during the course of the call the physician/provider feels a telephone visit is not appropriate, you will not be charged for this service.\"    Patient has given verbal consent for Telephone visit?  Yes    Nutrition Significant PMH  Severe Lung Disease   Pancreatic Insufficient   CF-Related Diabetes  G-tube - no longer used for nutrition support by pt keeps patent with water flushes and changes it every 6 months - uses [facility], switched from [facility] in 2019 due to insurance coverage    Social Assessment  Living situation: Living by himself in a house with his dog.   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, still able to work full time during the pandemic - working from home and office   Food Insecurity:  None    Anthropometric Assessment  Height:   Ht Readings from Last 1 Encounters:   02/04/20 1.737 m (5' 8.39\")     IBW based on BMI 22 for females " and 23 for males per CF Foundation recs: 69.5 kg  Today's Pt Reported Weight: 65 kg (143#)   %IBW: 94%    BMI: 21.5 kg    Current weight is considered: Normal BMI; however, not a CF goal BMI   Weight history: Pt reports that his wt has been stable for the last several months, difficult to identify wt trends with out a current documented wt   Wt Readings from Last 10 Encounters:   20 62.7 kg (138 lb 3.7 oz)   19 63.4 kg (139 lb 12.4 oz)   10/24/19 65.3 kg (143 lb 14.4 oz)   10/15/19 66.2 kg (145 lb 15.1 oz)   19 66.2 kg (146 lb)   19 66.2 kg (146 lb)   19 66.4 kg (146 lb 6.4 oz)   19 65 kg (143 lb 4.8 oz)   19 65.1 kg (143 lb 8.3 oz)   19 66.8 kg (147 lb 3.2 oz)     Physical Activity/Exercise:  45 min daily walk + daily push ups + other strength training (2-4 days/week)    MALNUTRITION  % Intake:  No decreased intake noted  % Weight Loss: Unable to assess d/t no current documented wt  Subcutaneous Fat Loss:  Unable to assess d/t telehealth visit   Muscle Loss:  Unable to assess d/t telehealth visit   Fluid Accumulation/Edema:  None noted  Malnutrition Diagnosis: Unable to determine due to COVID precautions      Pancreatic Enzymes  Brand:  Creon [id number]  Dosin with meals, 2-4 with snacks  Are you taking enzymes as recommended:Yes     High dose providing 2955 units lipase/kg/meal which is slightly above recommendations   Estimated Daily Amount (if meal dose is >2500 units lipase/kg/meal): ~10,000 units -- likely adequate to prevent malabsorption and include dosing for oral [employer] formula     Signs of Malabsorption:  No  Enzyme Program:  [facility] - remains active    Diet History and Assessment  Diet Preferences/Allergies.Intolerances: Regular   Intake Recall/Comments:  Kilo eats TID meal and drinks 2-3 [employer] Peptide 1.3 cans/day. He has a good appetite and feels like the OP intake + nutrition supplement provide him with adequate nutrition to maintain his  current wt.   B - [employer] + oatmeal, english muffin with pb and J  L - meat and cheese, kiwi, carrots, red peppers, pudding, string cheese, meat or pb and J sandwich   D - frozen pizza or take out   Snacks - [employer]     Calcium: [employer] Peptide 1.5 x2-3/day + whole milk with dinner + cheese throughout the day + Calcium and Vit D supplements (adequate)   Salt: cheese and deli meat throughout the day, frozen pizza or take out for dinner (likely adequate)   Hydration:  80-90 fl oz/day water     Supplements:  [employer] Peptide 1.5, 2-3 cans per day by mouth. Pt was having trouble sleeping with the TF infusing via his G-tube at night and he doesn't mild the taste of [employer] so he now exclusively drinks the formula.       Estimated Energy and Protein Needs  Estimation based on weight maintenance and gain with Severe lung disease and pancreatic insufficient.     BEE: ~1490 kcals                           3500 - 3850 kcals/day 200-225% BEE (+ 500 kcals/day for weight gain)   g protein/day 1.5-2 g/kg    Laboratory Assessment    Vitamin A   Date Value Ref Range Status   05/07/2020 0.60 0.30 - 1.20 mg/L Final     Vitamin D Deficiency screening   Date Value Ref Range Status   05/07/2020 36 20 - 75 ug/L Final     Comment:     Season, race, dietary intake, and treatment affect the concentration of   25-hydroxy-Vitamin D. Values may decrease during winter months and increase   during summer months. Values 20-29 ug/L may indicate Vitamin D insufficiency   and values <20 ug/L may indicate Vitamin D deficiency.  Vitamin D determination is routinely performed by an immunoassay specific for   25 hydroxyvitamin D3.  If an individual is on vitamin D2 (ergocalciferol)   supplementation, please specify 25 OH vitamin D2 and D3 level determination by   LCMSMS test VITD23.       Vitamin E   Date Value Ref Range Status   05/07/2020 10.3 5.5 - 18.0 mg/L Final     Comment:     (Note)  Test developed and characteristics  determined by [facility]. See Compliance Statement B: [email or web address]       Iron   Date Value Ref Range Status   05/07/2020 81 35 - 180 ug/dL Final     Cholesterol   Date Value Ref Range Status   05/07/2020 162 <200 mg/dL Final     Triglycerides   Date Value Ref Range Status   05/07/2020 80 <150 mg/dL Final     HDL Cholesterol   Date Value Ref Range Status   05/07/2020 55 >39 mg/dL Final     LDL Cholesterol Calculated   Date Value Ref Range Status   05/07/2020 91 <100 mg/dL Final     Comment:     Desirable:       <100 mg/dl     VLDL-Cholesterol   Date Value Ref Range Status   06/26/2013 13 0 - 30 mg/dL Final     Cholesterol/HDL Ratio   Date Value Ref Range Status   06/26/2013 4.4 0.0 - 5.0 Final       DEXA: (December 2016) Showed significant bone loss - pt is seeing Dr. Lee    Current Vitamin/Mineral Prescription R/T deficiency/malabsorption: MVW Complete  international unit(s) daily, Calcium with D 1 tablet twice daily and Vitron-C 1 tablet daily.  Comments:  Obtains ADEK from [facility].     CF Related Diabetes Evaluation  Hgb A1C: 6.1%   Date: 5/7/20  FSBG range: Not discussed today  Insulin: Yes    Insulin Regimen: Novolog 1 unit:15 g carb, Lantus 10 units  Carbohydrate Counting: Yes     NUTRITION DIAGNOSIS  Impaired nutrient utilization r/t CF related diabetes, pancreatic insufficiency and CF hypermetabolism AEB requires PERT, insulin therapy and high kcal/pro intake through TF + diet to maintain/improve nutrition and health status.  INTERVENTIONS/RECOMMENDATIONS  PO Intake: Encourage pt to drink TID [employer] Peptide 1.5 + eat TID meals to insure wt maintenance/wt gain.   Vitamins/Enzymes/Insulin: Reviewed vitamin/mineral supplement plan and reminded pt to take calcium and iron supplements spaced out by at least 2 hours.  Reviewed enzyme, insulin and vitamin/mineral supplement prescriptions to ensure accuracy and understanding.     Patient Understanding: Excellent  Expected Compliance:  Excellent  Follow-Up Plans: Per protocol     GOALS:  1) Weight maintenance vs further gain to BMI of 23 kg/m2.   2) 100% compliance with prescribed enzymes, vitamin/mineral supplements and insulin therapy.      FOLLOW-UP/MONITORING:  Visit patient within 12 month(s) for annual follow-up, sooner by request or in response to acute weight/nutrition changes.    Phone Call Duration: 15 min    Melonie Peralta RD, JULIET (pager [phone])  Cystic Fibrosis/Lung Transplant Dietitian      -Available Mon-Wednesday 8 AM-4:30 PM. On Thursdays and Fridays please contact pager [phone] (Zeina Pierre RD) and on weekends/holidays contact coverage dietitian at pager [phone] (inpatient use only).

## 2020-07-21 ENCOUNTER — OFFICE VISIT (OUTPATIENT)
Dept: PULMONOLOGY | Facility: CLINIC | Age: 45
End: 2020-07-21
Attending: INTERNAL MEDICINE
Payer: COMMERCIAL

## 2020-07-21 VITALS
BODY MASS INDEX: 21.15 KG/M2 | HEIGHT: 68 IN | WEIGHT: 139.55 LBS | DIASTOLIC BLOOD PRESSURE: 79 MMHG | OXYGEN SATURATION: 96 % | SYSTOLIC BLOOD PRESSURE: 118 MMHG | HEART RATE: 108 BPM | RESPIRATION RATE: 17 BRPM

## 2020-07-21 DIAGNOSIS — E84.0 CYSTIC FIBROSIS WITH PULMONARY MANIFESTATIONS (H): ICD-10-CM

## 2020-07-21 DIAGNOSIS — E84.9 CF (CYSTIC FIBROSIS) (H): Primary | ICD-10-CM

## 2020-07-21 LAB
EXPTIME-PRE: 14.05 SEC
FEF2575-%PRED-PRE: 18 %
FEF2575-PRE: 0.71 L/SEC
FEF2575-PRED: 3.81 L/SEC
FEFMAX-%PRED-PRE: 77 %
FEFMAX-PRE: 7.59 L/SEC
FEFMAX-PRED: 9.74 L/SEC
FEV1-%PRED-PRE: 47 %
FEV1-PRE: 1.87 L
FEV1FEV6-PRE: 52 %
FEV1FEV6-PRED: 81 %
FEV1FVC-PRE: 47 %
FEV1FVC-PRED: 81 %
FIFMAX-PRE: 5.4 L/SEC
FVC-%PRED-PRE: 81 %
FVC-PRE: 3.99 L
FVC-PRED: 4.9 L

## 2020-07-21 PROCEDURE — 87206 SMEAR FLUORESCENT/ACID STAI: CPT | Performed by: INTERNAL MEDICINE

## 2020-07-21 PROCEDURE — 87116 MYCOBACTERIA CULTURE: CPT | Performed by: INTERNAL MEDICINE

## 2020-07-21 PROCEDURE — G0463 HOSPITAL OUTPT CLINIC VISIT: HCPCS | Mod: ZF

## 2020-07-21 PROCEDURE — 87070 CULTURE OTHR SPECIMN AEROBIC: CPT | Performed by: INTERNAL MEDICINE

## 2020-07-21 PROCEDURE — 87186 SC STD MICRODIL/AGAR DIL: CPT | Performed by: INTERNAL MEDICINE

## 2020-07-21 PROCEDURE — 87015 SPECIMEN INFECT AGNT CONCNTJ: CPT | Performed by: INTERNAL MEDICINE

## 2020-07-21 PROCEDURE — 87077 CULTURE AEROBIC IDENTIFY: CPT | Performed by: INTERNAL MEDICINE

## 2020-07-21 ASSESSMENT — PAIN SCALES - GENERAL: PAINLEVEL: NO PAIN (0)

## 2020-07-21 ASSESSMENT — MIFFLIN-ST. JEOR: SCORE: 1508.69

## 2020-07-21 NOTE — LETTER
7/21/2020         RE: Nico Morales  [address]  [place] [address]        Dear Colleague,    Thank you for referring your patient, Nico Morales, to the [facility]. Please see a copy of my visit note below.    [facility]  July 21, 2020         Assessment and Plan:   Nico Morales is a 44 year old male with cystic fibrosis.    1. CF lung disease with severe obstruction: Kilo feels from a pulmonary point of view he is doing fairly well.  He does report about a month ago he was waterskiing and felt a pop on his right lower rib cage.  This resulted in a month's worth of pain and discomfort.  It made it difficult to do bronchial drainage therapy as well as cough or sneeze.  He says during that time he did decrease his vest therapy down to once daily because he was unable to tolerate more.  He is since increased it back to 3 times daily and the pain is mostly resolved.  I suspect that Kilo had a nondisplaced rib fracture that resulted in this discomfort.  He had no concerning findings on his exam related to this.  Kilo historically has grown out Pseudomonas in his sputum.  At this time I recommended:  --That he continue bronchial drainage and nebulized therapy 3 times daily  --Kilo should continue to alternate 2 weeks of aztreonam with 2 weeks of doxycycline his his maintenance antibiotic    2. Pancreatic Insufficiency:  The patient has no new symptoms consistent with worsening malabsorption.    - continue the present dose of pancreatic enzymes  - continue vitamin supplementation.    3.  Nutritional failure secondary to chronic illness: Kilo does continue to struggle with his weight.  He does do 2-3 supplements each day.  He is not currently using his G-tube.  He is remaining quite active and feels good however.  We did discuss how it would be more ideal to put on a few more pounds to reach a goal BMI of  22-23.    4.  Cystic fibrosis related diabetes: Kilo reports good control of his blood sugars.  He is followed closely by Dr. Lee and endocrine clinic.    5.  Decreased bone mineral density: Kilo had been on pamidronate.  He has not gotten a perfusion in several months secondary to the pandemic.  We discussed waiting a few more months before pursuing the infusion to see how clinic openings go.    6.  CFTR modulator therapy: Kilo feels well on trikafta.  He has had stable hepatic panel.  He is due to have it repeated again in August which I have all ordered for him today.  I would recommend that he continue on full dose trikafta therapy.    7.  Psychosocial: Kilo remains in a stable relationship.  He is once again thinking about pursuing marriage.  He is hesitant because of her young children and the risk to him of complicating infections.  We did discuss today given his wellness on try [place] and his excellent self-cares that that anticipate that he will be able to manage well.  He is quite happy.  He and his significant other are traveling around the country and are began and enjoying this very much.  He reports work has been somewhat stressful but now is improved.     Annual studies due: 5/2021  Immunizations: UTD  Colonoscopy: 5/2021    Jaye Machado MD MPH  Associate Professor of Medicine  Pulmonary, Allergy, Critical Care and Sleep Medicine      Interval History:     Kilo reports little in the way of cough or sputum.  Again he had some chest pain and probable rib fracture about a month ago.  This is since improved.  He had been down to 1 treatment a day but is back up to 3.  He does appreciate some right left upper lobe chest crackling while he is on aztreonam but otherwise denies any chest congestion.         Review of Systems:     CONSTITUTIONAL: no fever, no chills, no sweats, no change in weight, pretty good energy, no change in appetite    INTEGUMENTARY/SKIN: no rash, no obvious new  lesions    ENT/MOUTH: no sore throat, no new sinus pain, no new nasal drainage, chronic ear ringing     RESPIRATORY: see interval history    CV: no chest pain, no palpitations, no peripheral edema    GI: no nausea, no vomiting, variable stools, no fatty stools, no GERD, no abdominal pain    : no dysuria, no urinary frequency    MUSCULOSKELETAL: no myalgias, no arthralgias    ENDOCRINE: no excessive thirst, blood sugars with adequate control    NEURO:  No headache, no numbness, no tingling    SLEEP: improved    PSYCHIATRIC: mood good          Past Medical and Surgical History:     Past Medical History:   Diagnosis Date     CF (cystic fibrosis) (H)      Exocrine pancreatic insufficiency      Nocardia infection      Pseudomonas infection      S/P gastrostomy (H) 2002     Past Surgical History:   Procedure Laterality Date     COLONOSCOPY N/A 5/21/2018    Procedure: COMBINED COLONOSCOPY, SINGLE OR MULTIPLE BIOPSY/POLYPECTOMY BY BIOPSY;  Cystic fibrosis, 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;  Surgeon: Margarito Bowman MD;  Location: [facility]     GASTROSTOMY TUBE  2002     PICC INSERTION Left 01/22/2018    4Fr SL BioFlo PICC, 46cm (5cm external) in the L basilic vein w/ tip in the low SVC           Family History:     Family History   Problem Relation Age of Onset     Heart Disease Maternal Grandmother      Heart Disease Maternal Grandfather      Heart Disease Paternal Grandmother      Diabetes No family hx of             Social History:     Social History     Socioeconomic History     Marital status: Single     Spouse name: Not on file     Number of children: 0     Years of education: Not on file     Highest education level: Not on file   Occupational History     Occupation: financial palnner     Employer: [employer]   Social Needs     Financial resource strain: Not on file     Food insecurity     Worry: Not on file     Inability: Not on file     Transportation needs     Medical: Not on file     Non-medical: Not on file    Tobacco Use     Smoking status: Never Smoker     Smokeless tobacco: Never Used   Substance and Sexual Activity     Alcohol use: No     Alcohol/week: 0.0 standard drinks     Drug use: No     Sexual activity: Yes     Partners: Female     Birth control/protection: Pill   Lifestyle     Physical activity     Days per week: Not on file     Minutes per session: Not on file     Stress: Not on file   Relationships     Social connections     Talks on phone: Not on file     Gets together: Not on file     Attends Mandaen service: Not on file     Active member of club or organization: Not on file     Attends meetings of clubs or organizations: Not on file     Relationship status: Not on file     Intimate partner violence     Fear of current or ex partner: Not on file     Emotionally abused: Not on file     Physically abused: Not on file     Forced sexual activity: Not on file   Other Topics Concern     Parent/sibling w/ CABG, MI or angioplasty before 65F 55M? Not Asked      Service Not Asked     Blood Transfusions No     Caffeine Concern Not Asked     Occupational Exposure Not Asked     Hobby Hazards Not Asked     Sleep Concern Not Asked     Stress Concern Not Asked     Weight Concern Not Asked     Special Diet Not Asked     Back Care Not Asked     Exercise No     Bike Helmet Not Asked     Seat Belt Not Asked     Self-Exams Not Asked   Social History Narrative    8/15/2019 - .  Lives with his English Bulldog (Jefry) in a house that he designed in [place].  No children.            Medications:     Current Outpatient Medications   Medication     ACE NOT PRESCRIBED, INTENTIONAL,     acetaminophen-codeine (TYLENOL #3) 300-30 MG per tablet     acetylcysteine (MUCOMYST) 10 % nebulizer solution     amylase-lipase-protease (CREON) 14090-18704 units CPEP per EC capsule     azithromycin (ZITHROMAX) 250 MG tablet     aztreonam (AZACTAM) 200 MG/ML     blood glucose (NO BRAND SPECIFIED) lancets standard     blood  "glucose (NO BRAND SPECIFIED) test strip     blood glucose monitoring (MARTINA CONTOUR NEXT MONITOR) meter device kit     blood glucose monitoring (MARTINA CONTOUR NEXT) test strip     blood glucose monitoring (MARTINA MICROLET) lancets     blood glucose monitoring (NO BRAND SPECIFIED) meter device kit     budesonide-formoterol (SYMBICORT) 160-4.5 MCG/ACT Inhaler     CALCIUM PO     doxycycline hyclate (VIBRAMYCIN) 100 MG capsule     elexacaftor-tezacaftor-ivacaftor & ivacaftor (TRIKAFTA) 100-50-75 & 150 MG tablet pack     eszopiclone (LUNESTA) 2 MG tablet     [facility] INFUSION MANAGED PATIENT     Ferrous Fumarate-Vitamin C (VITRON-C) 200-125 MG TABS     insulin aspart (NOVOLOG PENFILL) 100 UNIT/ML cartridge     insulin glargine (LANTUS SOLOSTAR) 100 UNIT/ML pen     insulin glargine (LANTUS SOLOSTAR) 100 UNIT/ML pen     insulin glargine (LANTUS SOLOSTAR) 100 UNIT/ML pen     insulin pen needle (32G X 4 MM) 32G X 4 MM miscellaneous     insulin pen needle (BD OMI U/F) 32G X 4 MM miscellaneous     levalbuterol (XOPENEX HFA) 45 MCG/ACT inhaler     levalbuterol (XOPENEX) 1.25 MG/3ML neb solution     MEPHYTON 5 MG PO TABS     mirtazapine (REMERON) 15 MG tablet     MULTIVITAMIN OR     Nebulizers (EFLOW SCF NEBULIZER HANDSET) MISC     oseltamivir (TAMIFLU) 75 MG capsule     polyethylene glycol (MIRALAX/GLYCOLAX) powder     Respiratory Therapy Supplies (KRIS ALTERA NEBULIZER HANDSET) MISC     Respiratory Therapy Supplies (KRIS ALTERA NEBULIZER HANDSET) MISC     sodium chloride inhalant 7 % NEBU neb solution     Syringe/Needle, Disp, 18G X 1\" 5 ML MISC     water for injection sterile SOLN     No current facility-administered medications for this visit.             Physical Exam:   /79   Pulse 108   Resp 17   Ht 1.737 m (5' 8.39\")   Wt 63.3 kg (139 lb 8.8 oz)   SpO2 96%   BMI 20.98 kg/m      Constitutional:   Awake, alert and in no apparent distress     Eyes:   nonicteric     ENT:   Mask in place     Neck:   Supple " without supraclavicular or cervical lymphadenopathy     Lungs:   Good air flow.  No crackles. + RUL rhonchi.  No wheezes.     Cardiovascular:   Normal S1 and S2.  Tachycardic RR.  No murmur, gallop or rub.     Abdomen:   NABS, soft, nontender, nondistended.      Musculoskeletal:   No edema, digital clubbing present     Neurologic:   Alert and conversant.     Skin:   Warm, dry.  No rash on limited exam.             Data:   All laboratory and imaging data reviewed.    Cystic Fibrosis Culture  Specimen Description   Date Value Ref Range Status   02/04/2020 Sputum  Final   11/13/2019 Sputum  Final   10/15/2019 Sputum  Final    Culture Micro   Date Value Ref Range Status   02/04/2020 Moderate growth  Normal santi    Final   02/04/2020 (A)  Final    Light growth  Pseudomonas aeruginosa, mucoid strain     02/04/2020 (A)  Final    Light growth  Strain 2  Pseudomonas aeruginosa, mucoid strain          Recent Results (from the past 168 hour(s))   General PFT Lab (Please always keep checked)    Collection Time: 07/21/20 11:22 AM   Result Value Ref Range    FVC-Pred 4.90 L    FVC-Pre 3.99 L    FVC-%Pred-Pre 81 %    FEV1-Pre 1.87 L    FEV1-%Pred-Pre 47 %    FEV1FVC-Pred 81 %    FEV1FVC-Pre 47 %    FEFMax-Pred 9.74 L/sec    FEFMax-Pre 7.59 L/sec    FEFMax-%Pred-Pre 77 %    FEF2575-Pred 3.81 L/sec    FEF2575-Pre 0.71 L/sec    EAY7697-%Pred-Pre 18 %    ExpTime-Pre 14.05 sec    FIFMax-Pre 5.40 L/sec    FEV1FEV6-Pred 81 %    FEV1FEV6-Pre 52 %       PFT: Severe obstructive lung disease.  When compared to 2/4/2020, the FEV1 and FVC have decreased.      Pulmonary exacerbation: absent        Again, thank you for allowing me to participate in the care of your patient.        Sincerely,        Jaye Machado MD

## 2020-07-21 NOTE — PATIENT INSTRUCTIONS
Cystic Fibrosis Self-Care Plan    RECOMMENDATIONS:  Kilo, It was great to see you today.  Your travels sound fun.  The plan from today:  --hepatic panel and CK in August  --Hold on pamidronate for now  --Felicitas talk to you about the home spirometer    YOUR GOAL:  Enjoy the rest of the summer!      [place] [facility] Nurse line:  Aparna Henderson Lexi    [phone]     [facility] Fax Number:      [phone]         Cystic Fibrosis Respiratory Therapists:   Felicitas Banuelos              [phone]          Jacklyn Pina   [phone]  Cystic Fibrosis Dietitians:              Zeina Pierre              [phone]                            Ivory Bonilla                        [phone]   Cystic Fibrosis Diabetes Nurse:    Jordyn Rudolph   [phone]    Cystic Fibrosis Social Workers:     Mary Ritter               [phone]                     Louann Suarez               [phone]  Cystic Fibrosis Pharmacists:           Tammy Morgan                               [phone]         Ml Tilley   [phone]  Cystic Fibrosis Genetic Counselor:   Pretty Pulliam    [phone]    [place] [facility] website:  [email or web address]         MRN: [id number]   Clinic Date: July 21, 2020   Patient: Nico Morales     Annual Studies:   IGG   Date Value Ref Range Status   05/07/2020 1,253 610 - 1,616 mg/dL Final     Insulin   Date Value Ref Range Status   07/26/2011 30 mU/L Final     Comment:     Reference Range:  0-20  +120     There are no preventive care reminders to display for this patient.    Pulmonary Function Tests  FEV1: amount of air you can blow out in 1 second  FVC: total amount of air you can take in and blow out    Your Goals:         PFT Latest Ref Rng & Units 7/21/2020   FVC L 3.99   FEV1 L 1.87   FVC% % 81   FEV1% % 47          Airway Clearance: The Most Important Way to Keep Your Lungs Healthy  Vest Settings:    Hill-Rom Frequencies: 8, 9, 10  Pressure 10 Then, Frequencies 18, 19, 20 Pressure 6      RespirTech: Quick Start with Pressure of     Do each frequency for 5 minutes; Deflate vest after each frequency & cough 3 times before beginning the next setting.    Vest and Neb Therapy should be done 3 times/day.    Good Nutrition Can Improve Lung Function and Overall Health     Take ALL of your vitamins with food     Take 1/2 of your enzymes before EVERY meal/snack and the other 1/2 mid-meal/snack    Wt Readings from Last 3 Encounters:   07/21/20 63.3 kg (139 lb 8.8 oz)   02/04/20 62.7 kg (138 lb 3.7 oz)   11/13/19 63.4 kg (139 lb 12.4 oz)       Body mass index is 20.98 kg/m .         National CF Foundation Recommendations for BMI in CF Adults: Women: at least 22 Men: at least 23        Controlling Blood Sugars Helps Prevent Lung Infections & Improves Nutrition  Test blood sugar:     In the morning before eating (goal is )     2 hours after a meal (goal is less than 150)     When pre-meal glucose is greater than 150 add correction     At bedtime (if less than 100 eat a snack with 15 grams of carbohydrates  Last A1C Results:   Hemoglobin A1C   Date Value Ref Range Status   05/07/2020 6.1 (H) 0 - 5.6 % Final     Comment:     Normal <5.7% Prediabetes 5.7-6.4%  Diabetes 6.5% or higher - adopted from ADA   consensus guidelines.           If diabetic, measure A1C every 6 months. Goal: Under 7%    Staying Healthy    Research:  If you are interested in learning about research opportunities or have questions, please contact the CF Research Team at [phone] or [email or web address].      CF Foundation:  Compass is a personalized resource service to help you with the insurance, financial, legal and other issues you are facing.  It's free, confidential and available to anyone with CF.  Ask your  for more information or contact Compass directly at 690-IJRHHBU ([phone]) or compass@cff.org, or learn more at cff.[email or web address].

## 2020-07-21 NOTE — NURSING NOTE
Chief Complaint   Patient presents with     RECHECK     Return CF     Medications reviewed and vital signs taken.   Latoya Carias, KYLEE

## 2020-07-21 NOTE — PROGRESS NOTES
[facility]  July 21, 2020         Assessment and Plan:   Nico Morales is a 44 year old male with cystic fibrosis.    1. CF lung disease with severe obstruction: Kilo feels from a pulmonary point of view he is doing fairly well.  He does report about a month ago he was waterskiing and felt a pop on his right lower rib cage.  This resulted in a month's worth of pain and discomfort.  It made it difficult to do bronchial drainage therapy as well as cough or sneeze.  He says during that time he did decrease his vest therapy down to once daily because he was unable to tolerate more.  He is since increased it back to 3 times daily and the pain is mostly resolved.  I suspect that Kilo had a nondisplaced rib fracture that resulted in this discomfort.  He had no concerning findings on his exam related to this.  Kilo historically has grown out Pseudomonas in his sputum.  At this time I recommended:  --That he continue bronchial drainage and nebulized therapy 3 times daily  --Kilo should continue to alternate 2 weeks of aztreonam with 2 weeks of doxycycline his his maintenance antibiotic    2. Pancreatic Insufficiency:  The patient has no new symptoms consistent with worsening malabsorption.    - continue the present dose of pancreatic enzymes  - continue vitamin supplementation.    3.  Nutritional failure secondary to chronic illness: Kilo does continue to struggle with his weight.  He does do 2-3 supplements each day.  He is not currently using his G-tube.  He is remaining quite active and feels good however.  We did discuss how it would be more ideal to put on a few more pounds to reach a goal BMI of 22-23.    4.  Cystic fibrosis related diabetes: Kilo reports good control of his blood sugars.  He is followed closely by Dr. Lee and endocrine clinic.    5.  Decreased bone mineral density: Kilo had been on pamidronate.  He has not gotten a perfusion in  several months secondary to the pandemic.  We discussed waiting a few more months before pursuing the infusion to see how clinic openings go.    6.  CFTR modulator therapy: Kilo feels well on trikafta.  He has had stable hepatic panel.  He is due to have it repeated again in August which I have all ordered for him today.  I would recommend that he continue on full dose trikafta therapy.    7.  Psychosocial: Kilo remains in a stable relationship.  He is once again thinking about pursuing marriage.  He is hesitant because of her young children and the risk to him of complicating infections.  We did discuss today given his wellness on try [place] and his excellent self-cares that that anticipate that he will be able to manage well.  He is quite happy.  He and his significant other are traveling around the country and are began and enjoying this very much.  He reports work has been somewhat stressful but now is improved.     Annual studies due: 5/2021  Immunizations: UTD  Colonoscopy: 5/2021    Jaye Machado MD MPH  Associate Professor of Medicine  Pulmonary, Allergy, Critical Care and Sleep Medicine      Interval History:     Kilo reports little in the way of cough or sputum.  Again he had some chest pain and probable rib fracture about a month ago.  This is since improved.  He had been down to 1 treatment a day but is back up to 3.  He does appreciate some right left upper lobe chest crackling while he is on aztreonam but otherwise denies any chest congestion.         Review of Systems:     CONSTITUTIONAL: no fever, no chills, no sweats, no change in weight, pretty good energy, no change in appetite    INTEGUMENTARY/SKIN: no rash, no obvious new lesions    ENT/MOUTH: no sore throat, no new sinus pain, no new nasal drainage, chronic ear ringing     RESPIRATORY: see interval history    CV: no chest pain, no palpitations, no peripheral edema    GI: no nausea, no vomiting, variable stools, no fatty stools, no  GERD, no abdominal pain    : no dysuria, no urinary frequency    MUSCULOSKELETAL: no myalgias, no arthralgias    ENDOCRINE: no excessive thirst, blood sugars with adequate control    NEURO:  No headache, no numbness, no tingling    SLEEP: improved    PSYCHIATRIC: mood good          Past Medical and Surgical History:     Past Medical History:   Diagnosis Date     CF (cystic fibrosis) (H)      Exocrine pancreatic insufficiency      Nocardia infection      Pseudomonas infection      S/P gastrostomy (H) 2002     Past Surgical History:   Procedure Laterality Date     COLONOSCOPY N/A 5/21/2018    Procedure: COMBINED COLONOSCOPY, SINGLE OR MULTIPLE BIOPSY/POLYPECTOMY BY BIOPSY;  Cystic fibrosis, 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;  Surgeon: Margarito Bowman MD;  Location: [facility]     GASTROSTOMY TUBE  2002     PICC INSERTION Left 01/22/2018    4Fr SL BioFlo PICC, 46cm (5cm external) in the L basilic vein w/ tip in the low SVC           Family History:     Family History   Problem Relation Age of Onset     Heart Disease Maternal Grandmother      Heart Disease Maternal Grandfather      Heart Disease Paternal Grandmother      Diabetes No family hx of             Social History:     Social History     Socioeconomic History     Marital status: Single     Spouse name: Not on file     Number of children: 0     Years of education: Not on file     Highest education level: Not on file   Occupational History     Occupation: financial palnner     Employer: [employer]   Social Needs     Financial resource strain: Not on file     Food insecurity     Worry: Not on file     Inability: Not on file     Transportation needs     Medical: Not on file     Non-medical: Not on file   Tobacco Use     Smoking status: Never Smoker     Smokeless tobacco: Never Used   Substance and Sexual Activity     Alcohol use: No     Alcohol/week: 0.0 standard drinks     Drug use: No     Sexual activity: Yes     Partners: Female     Birth control/protection:  Pill   Lifestyle     Physical activity     Days per week: Not on file     Minutes per session: Not on file     Stress: Not on file   Relationships     Social connections     Talks on phone: Not on file     Gets together: Not on file     Attends Sabianism service: Not on file     Active member of club or organization: Not on file     Attends meetings of clubs or organizations: Not on file     Relationship status: Not on file     Intimate partner violence     Fear of current or ex partner: Not on file     Emotionally abused: Not on file     Physically abused: Not on file     Forced sexual activity: Not on file   Other Topics Concern     Parent/sibling w/ CABG, MI or angioplasty before 65F 55M? Not Asked      Service Not Asked     Blood Transfusions No     Caffeine Concern Not Asked     Occupational Exposure Not Asked     Hobby Hazards Not Asked     Sleep Concern Not Asked     Stress Concern Not Asked     Weight Concern Not Asked     Special Diet Not Asked     Back Care Not Asked     Exercise No     Bike Helmet Not Asked     Seat Belt Not Asked     Self-Exams Not Asked   Social History Narrative    8/15/2019 - .  Lives with his English Bulldog (Jefry) in a house that he designed in [place].  No children.            Medications:     Current Outpatient Medications   Medication     ACE NOT PRESCRIBED, INTENTIONAL,     acetaminophen-codeine (TYLENOL #3) 300-30 MG per tablet     acetylcysteine (MUCOMYST) 10 % nebulizer solution     amylase-lipase-protease (CREON) 16338-80692 units CPEP per EC capsule     azithromycin (ZITHROMAX) 250 MG tablet     aztreonam (AZACTAM) 200 MG/ML     blood glucose (NO BRAND SPECIFIED) lancets standard     blood glucose (NO BRAND SPECIFIED) test strip     blood glucose monitoring (MARTINA CONTOUR NEXT MONITOR) meter device kit     blood glucose monitoring (MARTINA CONTOUR NEXT) test strip     blood glucose monitoring (MARTINA MICROLET) lancets     blood glucose monitoring (NO  "BRAND SPECIFIED) meter device kit     budesonide-formoterol (SYMBICORT) 160-4.5 MCG/ACT Inhaler     CALCIUM PO     doxycycline hyclate (VIBRAMYCIN) 100 MG capsule     elexacaftor-tezacaftor-ivacaftor & ivacaftor (TRIKAFTA) 100-50-75 & 150 MG tablet pack     eszopiclone (LUNESTA) 2 MG tablet     [facility] PATIENT     Ferrous Fumarate-Vitamin C (VITRON-C) 200-125 MG TABS     insulin aspart (NOVOLOG PENFILL) 100 UNIT/ML cartridge     insulin glargine (LANTUS SOLOSTAR) 100 UNIT/ML pen     insulin glargine (LANTUS SOLOSTAR) 100 UNIT/ML pen     insulin glargine (LANTUS SOLOSTAR) 100 UNIT/ML pen     insulin pen needle (32G X 4 MM) 32G X 4 MM miscellaneous     insulin pen needle (BD OMI U/F) 32G X 4 MM miscellaneous     levalbuterol (XOPENEX HFA) 45 MCG/ACT inhaler     levalbuterol (XOPENEX) 1.25 MG/3ML neb solution     MEPHYTON 5 MG PO TABS     mirtazapine (REMERON) 15 MG tablet     MULTIVITAMIN OR     Nebulizers (EFLOW SCF NEBULIZER HANDSET) MISC     oseltamivir (TAMIFLU) 75 MG capsule     polyethylene glycol (MIRALAX/GLYCOLAX) powder     Respiratory Therapy Supplies (KRIS ALTERA NEBULIZER HANDSET) MISC     Respiratory Therapy Supplies (KRIS ALTERA NEBULIZER HANDSET) MISC     sodium chloride inhalant 7 % NEBU neb solution     Syringe/Needle, Disp, 18G X 1\" 5 ML MISC     water for injection sterile SOLN     No current facility-administered medications for this visit.             Physical Exam:   /79   Pulse 108   Resp 17   Ht 1.737 m (5' 8.39\")   Wt 63.3 kg (139 lb 8.8 oz)   SpO2 96%   BMI 20.98 kg/m      Constitutional:   Awake, alert and in no apparent distress     Eyes:   nonicteric     ENT:   Mask in place     Neck:   Supple without supraclavicular or cervical lymphadenopathy     Lungs:   Good air flow.  No crackles. + RUL rhonchi.  No wheezes.     Cardiovascular:   Normal S1 and S2.  Tachycardic RR.  No murmur, gallop or rub.     Abdomen:   NABS, soft, nontender, nondistended.  "     Musculoskeletal:   No edema, digital clubbing present     Neurologic:   Alert and conversant.     Skin:   Warm, dry.  No rash on limited exam.             Data:   All laboratory and imaging data reviewed.    Cystic Fibrosis Culture  Specimen Description   Date Value Ref Range Status   02/04/2020 Sputum  Final   11/13/2019 Sputum  Final   10/15/2019 Sputum  Final    Culture Micro   Date Value Ref Range Status   02/04/2020 Moderate growth  Normal santi    Final   02/04/2020 (A)  Final    Light growth  Pseudomonas aeruginosa, mucoid strain     02/04/2020 (A)  Final    Light growth  Strain 2  Pseudomonas aeruginosa, mucoid strain          Recent Results (from the past 168 hour(s))   General PFT Lab (Please always keep checked)    Collection Time: 07/21/20 11:22 AM   Result Value Ref Range    FVC-Pred 4.90 L    FVC-Pre 3.99 L    FVC-%Pred-Pre 81 %    FEV1-Pre 1.87 L    FEV1-%Pred-Pre 47 %    FEV1FVC-Pred 81 %    FEV1FVC-Pre 47 %    FEFMax-Pred 9.74 L/sec    FEFMax-Pre 7.59 L/sec    FEFMax-%Pred-Pre 77 %    FEF2575-Pred 3.81 L/sec    FEF2575-Pre 0.71 L/sec    QQE0631-%Pred-Pre 18 %    ExpTime-Pre 14.05 sec    FIFMax-Pre 5.40 L/sec    FEV1FEV6-Pred 81 %    FEV1FEV6-Pre 52 %       PFT: Severe obstructive lung disease.  When compared to 2/4/2020, the FEV1 and FVC have decreased.      Pulmonary exacerbation: absent

## 2020-07-23 LAB
ACID FAST STN SPEC QL: NORMAL
ACID FAST STN SPEC QL: NORMAL
SPECIMEN SOURCE: NORMAL

## 2020-07-27 LAB
BACTERIA SPEC CULT: ABNORMAL
BACTERIA SPEC CULT: ABNORMAL
SPECIMEN SOURCE: ABNORMAL

## 2020-08-14 DIAGNOSIS — E84.0 CYSTIC FIBROSIS WITH PULMONARY MANIFESTATIONS (H): ICD-10-CM

## 2020-08-14 RX ORDER — DOXYCYCLINE 100 MG/1
CAPSULE ORAL
Qty: 60 CAPSULE | Refills: 2 | Status: SHIPPED | OUTPATIENT
Start: 2020-08-14 | End: 2020-11-06

## 2020-08-26 RX ORDER — HEPARIN SODIUM,PORCINE 10 UNIT/ML
5 VIAL (ML) INTRAVENOUS
Status: CANCELLED | OUTPATIENT
Start: 2020-08-26

## 2020-08-26 RX ORDER — HEPARIN SODIUM (PORCINE) LOCK FLUSH IV SOLN 100 UNIT/ML 100 UNIT/ML
5 SOLUTION INTRAVENOUS
Status: CANCELLED | OUTPATIENT
Start: 2020-08-26

## 2020-08-31 NOTE — PROGRESS NOTES
"dianelys Valerio, CMA    Nico Morales is a 44 year old male who is being evaluated via a billable video visit.      The patient has been notified of following:     \"This video visit will be conducted via a call between you and your physician/provider. We have found that certain health care needs can be provided without the need for an in-person physical exam.  This service lets us provide the care you need with a video conversation.  If a prescription is necessary we can send it directly to your pharmacy.  If lab work is needed we can place an order for that and you can then stop by our lab to have the test done at a later time.    Video visits are billed at different rates depending on your insurance coverage.  Please reach out to your insurance provider with any questions.    If during the course of the call the physician/provider feels a video visit is not appropriate, you will not be charged for this service.\"    Patient has given verbal consent for Video visit? Yes  How would you like to obtain your AVS? MyChart  If you are dropped from the video visit, the video invite should be resent to: Send to e-mail at: [email or web address]  Will anyone else be joining your video visit? No       CF Endocrinology Return Consultation:  Diabetes  :   Patient: Nico Morales MRN# [id number]   YOB: 1975 Age: 44 year old   Date of Visit: 09/01/2020    Dear Dr. Jaye Machado:    I had the pleasure of seeing your patient, Nico Morales in the [facility] End[facility]ology Clinic, [facility], for a return consultation regarding CFRD and low bone density.           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  Verbal overview and written information provided.          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     Priority: Medium     Encounter for long-term (current) use of antibiotics 05/20/2013     Priority: Medium     Prostatitis 05/20/2013     Priority: Medium     updating diagnosis code for icd10 cutover       Pseudomonas infection      Priority: Medium     Cystic fibrosis (H) 11/24/2010     Priority: Medium     Sweat Test   Date: 4/25/78    Lab: [facility]   Sample #1:  110mmol  Sample #2:  106mmol    Genetics  Date: 4/9/90  [id number]+[id number]->T         CARDIOVASCULAR SCREENING; LDL GOAL LESS THAN 160 10/31/2010     Priority: Medium            HPI:   Nico is a 44 year old male with Cystic Fibrosis Related Diabetes Mellitus (CFRD).    I have reviewed the available past laboratory evaluations, imaging studies, and medical records available to me at this visit.  History was obtained from the patient and the medical record.  I have reviewed the notes of the pulmonary care team.    He is doing well overall.  I saw him about 1 year ago.  He was started on Trikafta in November 2019.  Reports overall feeling better with reduced cough and more energy.  Reports that his diabetes has been stable.  Denies any issues with recurrent hypoglycemia or persistent hyperglycemia.  We did not have any glucose downloads to review today but he reports his fingerstick blood glucose is usually in the  range fasting.  He reports checking about 1 hour after meals and glucose readings are below 180.  He reports that hypoglycemia is rare.    Started on pamidronate in December 2018.  Has  received 3 doses so far.  Denies any muscle ache or pains after the IV bisphosphonate infusions.  Reports that he was febrile briefly after the first 2 doses however did not have any problems after the third dose.  His last dose of pamidronate was in October 2019.  Subsequent infusions were put on hold due to call with pandemic    A1c:  Recent A1c in May 2020 was 6.1%  Result was discussed at today's visit.     Current insulin regimen:   Lantus 10 units daily in AM   Novolog 1 unit / 15 gm of carb, ~3 times daily. Novolog dose ranges b/w 3-4 units    Insulin administration site(s): abd or thigh          Past Medical History:     Past Medical History:   Diagnosis Date     CF (cystic fibrosis) (H)      Exocrine pancreatic insufficiency      Nocardia infection      Pseudomonas infection      S/P gastrostomy (H) 2002            Past Surgical History:     Past Surgical History:   Procedure Laterality Date     COLONOSCOPY N/A 5/21/2018    Procedure: COMBINED COLONOSCOPY, SINGLE OR MULTIPLE BIOPSY/POLYPECTOMY BY BIOPSY;  Cystic fibrosis, 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;  Surgeon: Margarito Bowman MD;  Location: [facility]     GASTROSTOMY TUBE  2002     PICC INSERTION Left 01/22/2018    4Fr SL BioFlo PICC, 46cm (5cm external) in the L basilic vein w/ tip in the low SVC               Social History:     Social History     Social History Narrative    8/15/2019 - .  Lives with his English Bulldog (Jefry) in a house that he designed in [place].  No children.              Family History:     Family History   Problem Relation Age of Onset     Heart Disease Maternal Grandmother      Heart Disease Maternal Grandfather      Heart Disease Paternal Grandmother      Diabetes No family hx of             Allergies:     Allergies   Allergen Reactions     Pulmozyme [Dornase Trever] Other (See Comments)     Chest pain and fever     Tobramycin Fatigue     Trouble with ear ringing, shortness of breath, little clinical response.      Zosyn Hives     Bactrim [Sulfamethoxazole W/Trimethoprim]      Fatigue that limits treatment course     Ceftazidime Rash     Levaquin Rash             Medications:     Current Outpatient Rx   Medication Sig Dispense Refill     ACE NOT PRESCRIBED, INTENTIONAL, 1 each continuous prn. ACE Inhibitor not prescribed due to Risk for drug interaction 0 each 0     acetaminophen-codeine (TYLENOL #3) 300-30 MG per tablet Take 1-2 tablets by mouth every 6 hours as needed for severe pain 6 tablet 0     acetylcysteine (MUCOMYST) 10 % nebulizer solution Inhale 4 mLs into the lungs 4 times daily Nebulize 4ml qid 480 mL 11     amylase-lipase-protease (CREON) 37474-15201 units CPEP per EC capsule TAKE 7-8 CAPS WITH MEALS (3 MEALS/DAY) AND 2 CAPS WITH SNACKS (3 SNACKS/DAY) 2700 capsule 2     azithromycin (ZITHROMAX) 250 MG tablet Take 1 tablet (250 mg) by mouth daily 30 tablet 11     aztreonam (AZACTAM) 200 MG/ML Inhale 1 gram twice daily via nebulization. Mix with 4 mL sterile water. 28 days on 28 days off 56 each 6     blood glucose (NO BRAND SPECIFIED) lancets standard Whatever Lancets that go with device, Test 6 times daily 540 each 3     blood glucose (NO BRAND SPECIFIED) test strip Use to test blood sugar 6 times daily or as directed. 200 strip 11     blood glucose monitoring (MARTINA CONTOUR NEXT MONITOR) meter device kit Use to test blood sugar 6 times daily or as directed. 1 kit 2     blood glucose monitoring (MARTINA CONTOUR NEXT) test strip Use to test blood sugar 6 times daily or as directed. 200 each 2     blood glucose monitoring (MARTINA MICROLET) lancets Use to test blood sugar 6 times daily or as directed. 2 Box 2     blood glucose monitoring (NO BRAND SPECIFIED) meter device kit Use to test blood sugar 6 times daily or as directed. 1 kit 1     budesonide-formoterol (SYMBICORT) 160-4.5 MCG/ACT Inhaler INHALE 2 PUFFS BY MOUTH INTO THE LUNGS TWICE DAILY 10.2 Inhaler 11     CALCIUM PO Take 1 tablet by mouth 2 times daily  "Strength unknown.       doxycycline hyclate (VIBRAMYCIN) 100 MG capsule TAKE 1 CAPSULE BY MOUTH TWICE A DAY 60 capsule 2     elexacaftor-tezacaftor-ivacaftor & ivacaftor (TRIKAFTA) 100-50-75 & 150 MG tablet pack Take 2 orange tablets in the morning and 1 light blue tablet in the evening. Swallow whole with fat-containing food. 84 tablet 11     eszopiclone (LUNESTA) 2 MG tablet Take 2 mg by mouth At Bedtime       [facility] INFUSION MANAGED PATIENT Contact [facility] for patient specific medication information at [phone] on admission and discharge from the hospital.  Phones are answered 24 hours a day 7 days a week 365 days a year.    Providers - Choose \"CONTINUE HOME MED (no script)\" at discharge if patient treatment with home infusion will continue.       Ferrous Fumarate-Vitamin C (VITRON-C) 200-125 MG TABS Take 1 tablet by mouth 2 times daily (with meals)       insulin aspart (NOVOLOG PENFILL) 100 UNIT/ML cartridge INJECT 1 UNIT PER 20G CARB AT LUNCH AND SUPPER 3 UNITS AT NIGHT WITH TUBE FEEDINGS MAX 20UNITS DAILY 30 mL 1     insulin glargine (LANTUS SOLOSTAR) 100 UNIT/ML pen Inject 6 Units Subcutaneous daily Maximum dose 8 units daily 15 mL 3     insulin glargine (LANTUS SOLOSTAR) 100 UNIT/ML pen Don't forget to order pen needles too 15 mL 0     insulin glargine (LANTUS SOLOSTAR) 100 UNIT/ML pen INJECT 6 UNITS SUBCUETANEOUSLY ONCE DAILY 15 mL 2     insulin pen needle (32G X 4 MM) 32G X 4 MM miscellaneous Use 4 pen needles daily or as directed. 250 each 11     insulin pen needle (BD OMI U/F) 32G X 4 MM miscellaneous USE 6 DAILY OR AS DIRECTED. 200 each 5     levalbuterol (XOPENEX HFA) 45 MCG/ACT inhaler INHALE 2 PUFFS INTO THE LUNGS EVERY 6 HOURS AS NEEDED FOR SHORTNESS OF BREATH / DYSPNEA 75 Inhaler 4     levalbuterol (XOPENEX) 1.25 MG/3ML neb solution INHALE 1 VIAL BY MOUTH INTO THE LUNGS VIA NEBULIZATION 4 TIMES DAILY 360 mL 11     MEPHYTON 5 MG PO TABS Take 1 TABLET by mouth DAILY.   " "    mirtazapine (REMERON) 15 MG tablet Take 1 tablet (15 mg) by mouth At Bedtime 30 tablet 0     MULTIVITAMIN OR Take 1 tablet by mouth daily. Includes 5,000 units vitamin D and 400 units vitamin E. Per pt, formulated for CF pts.       Nebulizers (EFLOW SCF NEBULIZER HANDSET) MISC 1 each 3 times daily. 1 each 6     oseltamivir (TAMIFLU) 75 MG capsule Take 1 capsule (75 mg) by mouth 2 times daily 10 capsule 1     polyethylene glycol (MIRALAX/GLYCOLAX) powder Take 17 g (1 capful) by mouth daily 1 Bottle 3     Respiratory Therapy Supplies (KRIS ALTERA NEBULIZER HANDSET) MISC 1 Product 3 times daily With [place] nebs 1 each 6     Respiratory Therapy Supplies (KRIS ALTERA NEBULIZER HANDSET) MISC 1 each daily 1 each 1     sodium chloride inhalant 7 % NEBU neb solution NEBULIZE 4ML BY MOUTH TWICE DAILY 240 mL 5     Syringe/Needle, Disp, 18G X 1\" 5 ML MISC 1 Syringe 2 times daily To mix sterile water with aztreonam 60 each 6     water for injection sterile SOLN Mix 4mL of sterile water with aztreonam nebs 280 mL 6             Review of Systems:     Comprehensive ROS negative other than the symptoms noted above in the HPI.          Physical Exam:   There were no vitals taken for this visit.    CONSTITUTIONAL:   Awake, alert, and in no apparent distress.  PSYCHIATRIC: Cooperative, no agitation.        Laboratory results:     TSH   Date Value Ref Range Status   05/07/2020 1.18 0.40 - 4.00 mU/L Final     Triiodothyronine (T3)   Date Value Ref Range Status   06/25/2010 123 60 - 181 ng/dL Final     Testosterone Total   Date Value Ref Range Status   05/07/2020 370 240 - 950 ng/dL Final     Comment:     This test was developed and its performance characteristics determined by the   [facility].   It has not been cleared or approved by the FDA. The laboratory is regulated   under CLIA as qualified to perform high-complexity testing. This test is used   for clinical " purposes. It should not be regarded as investigational or for   research.       Cholesterol   Date Value Ref Range Status   05/07/2020 162 <200 mg/dL Final     Albumin Urine mg/L   Date Value Ref Range Status   05/07/2020 <5 mg/L Final     Triglycerides   Date Value Ref Range Status   05/07/2020 80 <150 mg/dL Final     HDL Cholesterol   Date Value Ref Range Status   05/07/2020 55 >39 mg/dL Final     LDL Cholesterol Calculated   Date Value Ref Range Status   05/07/2020 91 <100 mg/dL Final     Comment:     Desirable:       <100 mg/dl     Cholesterol/HDL Ratio   Date Value Ref Range Status   06/26/2013 4.4 0.0 - 5.0 Final     Non HDL Cholesterol   Date Value Ref Range Status   05/07/2020 107 <130 mg/dL Final     A1C      8.3   12/15/2016  A1C      8.1   6/28/2016  A1C      7.5   4/13/2016  A1C      7.1   3/8/2016  A1C      7.4   12/16/2015 No results found for this basename: hemoglobina1          Diabetes Health Maintenance    Date of Diabetes Diagnosis: 12/2015    Special Notes (if any): He was a diagnosed with adrenal insufficiency based on inadequate response on a ACTH stim test. Adrenal insufficiency is thought to be related to  itraconazole and Symbicort use.  Last ACTH stim test was normal and he has been off chronic prednisone.    Date Last Eye Exam:      Date Last Dental Appointment: every 6 months    Dates of Episodes Severe* Hypoglycemia (month/year, cumulative, ongoing, assess each visit): never              *Severe=patient unconscious, seizure, unable to help self    Last 25-Vitamin D (every year): 36    Last DXA, lowest Z-score (every 2 years): -2.6 right femoral neck (2016)       ?Bisphosphonates (yes/no): Started pemidronate December 2018       Last Urine Microalbumin (every year):      No results found for: MICROALBUMIN     Last Testosterone:   Testosterone Total   Date Value Ref Range Status   05/07/2020 370 240 - 950 ng/dL Final     Comment:     This test was developed and its performance  characteristics determined by the   [facility].   It has not been cleared or approved by the FDA. The laboratory is regulated   under CLIA as qualified to perform high-complexity testing. This test is used   for clinical purposes. It should not be regarded as investigational or for   research.        On testosterone therapy (yes/no)? No            Assessment and Plan:   Nico is a 44 year old male with CFRD    CFRD: Overall good control based on A1c and limited glucose data.  He is interested in starting Dexcom continuous glucose monitor.  Will place diabetes education referral for CGM training and initiation.  No change in insulin regimen today    Low bone density: pamidronate infusions on hold due to call with pandemic.  He would like to restart therapy later this year.  Discussed changing to week Relast 5 mg IV once a year regimen.  He is agreeable.  Reviewed side effect of IV bisphosphonate.  Will place therapy plan for Reclast.    RTC in 6 months    JULIETA Tate    Note: Chart documentation done in part with Dragon Voice Recognition software. Although reviewed after completion, some word and grammatical errors may remain.  Please consider this when interpreting information in this chart    CC  TYLER DAVIS      Video-Visit Details    Type of service:  Video Visit    Total video visit time 30 minutes    Originating Location (pt. Location): Home    Distant Location (provider location):  [facility]     Platform used for Video Visit: Sabiha

## 2020-09-01 ENCOUNTER — TELEPHONE (OUTPATIENT)
Dept: ENDOCRINOLOGY | Facility: CLINIC | Age: 45
End: 2020-09-01

## 2020-09-01 ENCOUNTER — VIRTUAL VISIT (OUTPATIENT)
Dept: ENDOCRINOLOGY | Facility: CLINIC | Age: 45
End: 2020-09-01
Attending: INTERNAL MEDICINE
Payer: COMMERCIAL

## 2020-09-01 DIAGNOSIS — E08.9 DIABETES MELLITUS DUE TO CYSTIC FIBROSIS (H): ICD-10-CM

## 2020-09-01 DIAGNOSIS — M81.0 OSTEOPOROSIS, UNSPECIFIED OSTEOPOROSIS TYPE, UNSPECIFIED PATHOLOGICAL FRACTURE PRESENCE: Primary | ICD-10-CM

## 2020-09-01 DIAGNOSIS — E84.9 DIABETES MELLITUS DUE TO CYSTIC FIBROSIS (H): ICD-10-CM

## 2020-09-01 RX ORDER — ZOLEDRONIC ACID 5 MG/100ML
5 INJECTION, SOLUTION INTRAVENOUS ONCE
Status: CANCELLED
Start: 2020-09-01

## 2020-09-01 RX ORDER — HEPARIN SODIUM (PORCINE) LOCK FLUSH IV SOLN 100 UNIT/ML 100 UNIT/ML
5 SOLUTION INTRAVENOUS
Status: CANCELLED | OUTPATIENT
Start: 2020-09-01

## 2020-09-01 RX ORDER — HEPARIN SODIUM,PORCINE 10 UNIT/ML
5 VIAL (ML) INTRAVENOUS
Status: CANCELLED | OUTPATIENT
Start: 2020-09-01

## 2020-09-01 NOTE — TELEPHONE ENCOUNTER
----- Message from Diana Lee MD sent at 9/1/2020 11:59 AM CDT -----  Please check for coverage and help schedule Reclast infusion for osteoporosis.  I have placed therapy orders.  Patient prefers to schedule infusion at the [facility].  Thank you

## 2020-09-01 NOTE — TELEPHONE ENCOUNTER
Kilo was sent a Pige[employer] message with [employer]  Scheduling number to set up the Reclast infusion. Once scheduled the PA will be done for coverage. Ginger Laureano RN on 9/1/2020 at 1:06 PM'

## 2020-09-01 NOTE — LETTER
"9/1/2020     RE: Nico Morales  [address]  [facility] [address]     Dear Colleague,    Thank you for referring your patient, Nico Morales, to the [facility]. Please see a copy of my visit note below.    dianelys Valerio, KYLEE    Nico Morales is a 44 year old male who is being evaluated via a billable video visit.      The patient has been notified of following:     \"This video visit will be conducted via a call between you and your physician/provider. We have found that certain health care needs can be provided without the need for an in-person physical exam.  This service lets us provide the care you need with a video conversation.  If a prescription is necessary we can send it directly to your pharmacy.  If lab work is needed we can place an order for that and you can then stop by our lab to have the test done at a later time.    Video visits are billed at different rates depending on your insurance coverage.  Please reach out to your insurance provider with any questions.    If during the course of the call the physician/provider feels a video visit is not appropriate, you will not be charged for this service.\"    Patient has given verbal consent for Video visit? Yes  How would you like to obtain your AVS? MyChart  If you are dropped from the video visit, the video invite should be resent to: Send to e-mail at: [email or web address]  Will anyone else be joining your video visit? No       CF Endocrinology Return Consultation:  Diabetes  :   Patient: Nico Morales MRN# [id number]   YOB: 1975 Age: 44 year old   Date of Visit: 09/01/2020    Dear Dr. Jaye Machado:    I had the pleasure of seeing your patient, Nico Morales in the [facility], [facility], for a return consultation regarding CFRD and low bone density.           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  Verbal overview and written information provided.          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     Priority: Medium     Encounter for long-term (current) use of antibiotics 05/20/2013     Priority: Medium     Prostatitis 05/20/2013     Priority: Medium     updating diagnosis code for icd10 cutover       Pseudomonas infection      Priority: Medium     Cystic fibrosis (H) 11/24/2010     Priority: Medium     Sweat Test   Date: 4/25/78    Lab: [facility]   Sample #1:  110mmol  Sample #2:  106mmol    Genetics  Date: 4/9/90  [id number]/621+[id number]->T         CARDIOVASCULAR SCREENING; LDL GOAL LESS THAN 160 10/31/2010     Priority: Medium            HPI:   Nico is a 44 year old male with Cystic Fibrosis Related Diabetes Mellitus (CFRD).    I have reviewed the available past laboratory evaluations, imaging studies, and medical records available to me at this visit.  History was obtained from the patient and the medical record.  I have reviewed the notes of the pulmonary care team.    He is doing well overall.  I saw him about 1 year ago.  He was started on Trikafta in November 2019.  Reports overall feeling better with reduced cough and more energy.  Reports that his diabetes has been stable.  Denies any issues with recurrent hypoglycemia or persistent hyperglycemia.  We did not have any  glucose downloads to review today but he reports his fingerstick blood glucose is usually in the  range fasting.  He reports checking about 1 hour after meals and glucose readings are below 180.  He reports that hypoglycemia is rare.    Started on pamidronate in December 2018.  Has received 3 doses so far.  Denies any muscle ache or pains after the IV bisphosphonate infusions.  Reports that he was febrile briefly after the first 2 doses however did not have any problems after the third dose.  His last dose of pamidronate was in October 2019.  Subsequent infusions were put on hold due to call with pandemic    A1c:  Recent A1c in May 2020 was 6.1%  Result was discussed at today's visit.     Current insulin regimen:   Lantus 10 units daily in AM   Novolog 1 unit / 15 gm of carb, ~3 times daily. Novolog dose ranges b/w 3-4 units    Insulin administration site(s): abd or thigh          Past Medical History:     Past Medical History:   Diagnosis Date     CF (cystic fibrosis) (H)      Exocrine pancreatic insufficiency      Nocardia infection      Pseudomonas infection      S/P gastrostomy (H) 2002            Past Surgical History:     Past Surgical History:   Procedure Laterality Date     COLONOSCOPY N/A 5/21/2018    Procedure: COMBINED COLONOSCOPY, SINGLE OR MULTIPLE BIOPSY/POLYPECTOMY BY BIOPSY;  Cystic fibrosis, 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;  Surgeon: Margarito Bowman MD;  Location: [facility]     GASTROSTOMY TUBE  2002     PICC INSERTION Left 01/22/2018    4Fr SL BioFlo PICC, 46cm (5cm external) in the L basilic vein w/ tip in the low SVC               Social History:     Social History     Social History Narrative    8/15/2019 - .  Lives with his English Bulldog (Jefry) in a house that he designed in [place].  No children.              Family History:     Family History   Problem Relation Age of Onset     Heart Disease Maternal Grandmother      Heart Disease Maternal Grandfather      Heart Disease  Paternal Grandmother      Diabetes No family hx of             Allergies:     Allergies   Allergen Reactions     Pulmozyme [Dornase Trever] Other (See Comments)     Chest pain and fever     Tobramycin Fatigue     Trouble with ear ringing, shortness of breath, little clinical response.     Zosyn Hives     Bactrim [Sulfamethoxazole W/Trimethoprim]      Fatigue that limits treatment course     Ceftazidime Rash     Levaquin Rash             Medications:     Current Outpatient Rx   Medication Sig Dispense Refill     ACE NOT PRESCRIBED, INTENTIONAL, 1 each continuous prn. ACE Inhibitor not prescribed due to Risk for drug interaction 0 each 0     acetaminophen-codeine (TYLENOL #3) 300-30 MG per tablet Take 1-2 tablets by mouth every 6 hours as needed for severe pain 6 tablet 0     acetylcysteine (MUCOMYST) 10 % nebulizer solution Inhale 4 mLs into the lungs 4 times daily Nebulize 4ml qid 480 mL 11     amylase-lipase-protease (CREON) 20446-94549 units CPEP per EC capsule TAKE 7-8 CAPS WITH MEALS (3 MEALS/DAY) AND 2 CAPS WITH SNACKS (3 SNACKS/DAY) 2700 capsule 2     azithromycin (ZITHROMAX) 250 MG tablet Take 1 tablet (250 mg) by mouth daily 30 tablet 11     aztreonam (AZACTAM) 200 MG/ML Inhale 1 gram twice daily via nebulization. Mix with 4 mL sterile water. 28 days on 28 days off 56 each 6     blood glucose (NO BRAND SPECIFIED) lancets standard Whatever Lancets that go with device, Test 6 times daily 540 each 3     blood glucose (NO BRAND SPECIFIED) test strip Use to test blood sugar 6 times daily or as directed. 200 strip 11     blood glucose monitoring (MARTINA CONTOUR NEXT MONITOR) meter device kit Use to test blood sugar 6 times daily or as directed. 1 kit 2     blood glucose monitoring (MARTINA CONTOUR NEXT) test strip Use to test blood sugar 6 times daily or as directed. 200 each 2     blood glucose monitoring (MARTINA MICROLET) lancets Use to test blood sugar 6 times daily or as directed. 2 Box 2     blood glucose  "monitoring (NO BRAND SPECIFIED) meter device kit Use to test blood sugar 6 times daily or as directed. 1 kit 1     budesonide-formoterol (SYMBICORT) 160-4.5 MCG/ACT Inhaler INHALE 2 PUFFS BY MOUTH INTO THE LUNGS TWICE DAILY 10.2 Inhaler 11     CALCIUM PO Take 1 tablet by mouth 2 times daily Strength unknown.       doxycycline hyclate (VIBRAMYCIN) 100 MG capsule TAKE 1 CAPSULE BY MOUTH TWICE A DAY 60 capsule 2     elexacaftor-tezacaftor-ivacaftor & ivacaftor (TRIKAFTA) 100-50-75 & 150 MG tablet pack Take 2 orange tablets in the morning and 1 light blue tablet in the evening. Swallow whole with fat-containing food. 84 tablet 11     eszopiclone (LUNESTA) 2 MG tablet Take 2 mg by mouth At Bedtime       [facility] INFUSION MANAGED PATIENT Contact [facility] for patient specific medication information at [phone] on admission and discharge from the hospital.  Phones are answered 24 hours a day 7 days a week 365 days a year.    Providers - Choose \"CONTINUE HOME MED (no script)\" at discharge if patient treatment with home infusion will continue.       Ferrous Fumarate-Vitamin C (VITRON-C) 200-125 MG TABS Take 1 tablet by mouth 2 times daily (with meals)       insulin aspart (NOVOLOG PENFILL) 100 UNIT/ML cartridge INJECT 1 UNIT PER 20G CARB AT LUNCH AND SUPPER 3 UNITS AT NIGHT WITH TUBE FEEDINGS MAX 20UNITS DAILY 30 mL 1     insulin glargine (LANTUS SOLOSTAR) 100 UNIT/ML pen Inject 6 Units Subcutaneous daily Maximum dose 8 units daily 15 mL 3     insulin glargine (LANTUS SOLOSTAR) 100 UNIT/ML pen Don't forget to order pen needles too 15 mL 0     insulin glargine (LANTUS SOLOSTAR) 100 UNIT/ML pen INJECT 6 UNITS SUBCUETANEOUSLY ONCE DAILY 15 mL 2     insulin pen needle (32G X 4 MM) 32G X 4 MM miscellaneous Use 4 pen needles daily or as directed. 250 each 11     insulin pen needle (BD OMI U/F) 32G X 4 MM miscellaneous USE 6 DAILY OR AS DIRECTED. 200 each 5     levalbuterol (XOPENEX HFA) 45 MCG/ACT inhaler " "INHALE 2 PUFFS INTO THE LUNGS EVERY 6 HOURS AS NEEDED FOR SHORTNESS OF BREATH / DYSPNEA 75 Inhaler 4     levalbuterol (XOPENEX) 1.25 MG/3ML neb solution INHALE 1 VIAL BY MOUTH INTO THE LUNGS VIA NEBULIZATION 4 TIMES DAILY 360 mL 11     MEPHYTON 5 MG PO TABS Take 1 TABLET by mouth DAILY.       mirtazapine (REMERON) 15 MG tablet Take 1 tablet (15 mg) by mouth At Bedtime 30 tablet 0     MULTIVITAMIN OR Take 1 tablet by mouth daily. Includes 5,000 units vitamin D and 400 units vitamin E. Per pt, formulated for CF pts.       Nebulizers (EFLOW SCF NEBULIZER HANDSET) MISC 1 each 3 times daily. 1 each 6     oseltamivir (TAMIFLU) 75 MG capsule Take 1 capsule (75 mg) by mouth 2 times daily 10 capsule 1     polyethylene glycol (MIRALAX/GLYCOLAX) powder Take 17 g (1 capful) by mouth daily 1 Bottle 3     Respiratory Therapy Supplies (KRIS ALTERA NEBULIZER HANDSET) MISC 1 Product 3 times daily With [place] nebs 1 each 6     Respiratory Therapy Supplies (KRIS ALTERA NEBULIZER HANDSET) MISC 1 each daily 1 each 1     sodium chloride inhalant 7 % NEBU neb solution NEBULIZE 4ML BY MOUTH TWICE DAILY 240 mL 5     Syringe/Needle, Disp, 18G X 1\" 5 ML MISC 1 Syringe 2 times daily To mix sterile water with aztreonam 60 each 6     water for injection sterile SOLN Mix 4mL of sterile water with aztreonam nebs 280 mL 6             Review of Systems:     Comprehensive ROS negative other than the symptoms noted above in the HPI.          Physical Exam:   There were no vitals taken for this visit.    CONSTITUTIONAL:   Awake, alert, and in no apparent distress.  PSYCHIATRIC: Cooperative, no agitation.        Laboratory results:     TSH   Date Value Ref Range Status   05/07/2020 1.18 0.40 - 4.00 mU/L Final     Triiodothyronine (T3)   Date Value Ref Range Status   06/25/2010 123 60 - 181 ng/dL Final     Testosterone Total   Date Value Ref Range Status   05/07/2020 370 240 - 950 ng/dL Final     Comment:     This test was developed and its performance " characteristics determined by the   [facility].   It has not been cleared or approved by the FDA. The laboratory is regulated   under CLIA as qualified to perform high-complexity testing. This test is used   for clinical purposes. It should not be regarded as investigational or for   research.       Cholesterol   Date Value Ref Range Status   05/07/2020 162 <200 mg/dL Final     Albumin Urine mg/L   Date Value Ref Range Status   05/07/2020 <5 mg/L Final     Triglycerides   Date Value Ref Range Status   05/07/2020 80 <150 mg/dL Final     HDL Cholesterol   Date Value Ref Range Status   05/07/2020 55 >39 mg/dL Final     LDL Cholesterol Calculated   Date Value Ref Range Status   05/07/2020 91 <100 mg/dL Final     Comment:     Desirable:       <100 mg/dl     Cholesterol/HDL Ratio   Date Value Ref Range Status   06/26/2013 4.4 0.0 - 5.0 Final     Non HDL Cholesterol   Date Value Ref Range Status   05/07/2020 107 <130 mg/dL Final     A1C      8.3   12/15/2016  A1C      8.1   6/28/2016  A1C      7.5   4/13/2016  A1C      7.1   3/8/2016  A1C      7.4   12/16/2015 No results found for this basename: hemoglobina1        Diabetes Health Maintenance    Date of Diabetes Diagnosis: 12/2015    Special Notes (if any): He was a diagnosed with adrenal insufficiency based on inadequate response on a ACTH stim test. Adrenal insufficiency is thought to be related to  itraconazole and Symbicort use.  Last ACTH stim test was normal and he has been off chronic prednisone.    Date Last Eye Exam:      Date Last Dental Appointment: every 6 months    Dates of Episodes Severe* Hypoglycemia (month/year, cumulative, ongoing, assess each visit): never              *Severe=patient unconscious, seizure, unable to help self    Last 25-Vitamin D (every year): 36    Last DXA, lowest Z-score (every 2 years): -2.6 right femoral neck (2016)       ?Bisphosphonates (yes/no): Started  pemidronate December 2018     Last Urine Microalbumin (every year):      No results found for: MICROALBUMIN     Last Testosterone:   Testosterone Total   Date Value Ref Range Status   05/07/2020 370 240 - 950 ng/dL Final     Comment:     This test was developed and its performance characteristics determined by the   [facility].   It has not been cleared or approved by the FDA. The laboratory is regulated   under CLIA as qualified to perform high-complexity testing. This test is used   for clinical purposes. It should not be regarded as investigational or for   research.      On testosterone therapy (yes/no)? No            Assessment and Plan:   Nico is a 44 year old male with CFRD    CFRD: Overall good control based on A1c and limited glucose data.  He is interested in starting Dexcom continuous glucose monitor.  Will place diabetes education referral for CGM training and initiation.  No change in insulin regimen today    Low bone density: pamidronate infusions on hold due to call with pandemic.  He would like to restart therapy later this year.  Discussed changing to week Relast 5 mg IV once a year regimen.  He is agreeable.  Reviewed side effect of IV bisphosphonate.  Will place therapy plan for Reclast.    RTC in 6 months    JULIETA Tate    Note: Chart documentation done in part with Dragon Voice Recognition software. Although reviewed after completion, some word and grammatical errors may remain.  Please consider this when interpreting information in this chart    CC  TYLER DAVIS      Video-Visit Details  Type of service:  Video Visit  Total video visit time 30 minutes  Originating Location (pt. Location): Home  Distant Location (provider location):  [facility]   Platform used for Video Visit: Sabiha    Again, thank you for allowing me to participate in the care of your patient.       Sincerely,    Diana Lee MD

## 2020-09-02 ENCOUNTER — RESULTS ONLY (OUTPATIENT)
Dept: LAB | Age: 45
End: 2020-09-02

## 2020-09-02 LAB
ALBUMIN SERPL-MCNC: 3.4 G/DL (ref 3.4–5)
ALP SERPL-CCNC: 142 U/L (ref 40–150)
ALT SERPL W P-5'-P-CCNC: 30 U/L (ref 0–70)
AST SERPL W P-5'-P-CCNC: 26 U/L (ref 0–45)
BASOPHILS # BLD AUTO: 0.1 10E9/L (ref 0–0.2)
BASOPHILS NFR BLD AUTO: 0.6 %
BILIRUB DIRECT SERPL-MCNC: 0.1 MG/DL (ref 0–0.2)
BILIRUB SERPL-MCNC: 0.3 MG/DL (ref 0.2–1.3)
DIFFERENTIAL METHOD BLD: ABNORMAL
EOSINOPHIL # BLD AUTO: 0.3 10E9/L (ref 0–0.7)
EOSINOPHIL NFR BLD AUTO: 2.8 %
ERYTHROCYTE [DISTWIDTH] IN BLOOD BY AUTOMATED COUNT: 14 % (ref 10–15)
GGT SERPL-CCNC: 23 U/L (ref 0–75)
HBA1C MFR BLD: 6.1 % (ref 0–5.6)
HCT VFR BLD AUTO: 48.5 % (ref 40–53)
HGB BLD-MCNC: 15 G/DL (ref 13.3–17.7)
IMM GRANULOCYTES # BLD: 0 10E9/L (ref 0–0.4)
IMM GRANULOCYTES NFR BLD: 0.3 %
LYMPHOCYTES # BLD AUTO: 2.4 10E9/L (ref 0.8–5.3)
LYMPHOCYTES NFR BLD AUTO: 25.4 %
MCH RBC QN AUTO: 29.1 PG (ref 26.5–33)
MCHC RBC AUTO-ENTMCNC: 30.9 G/DL (ref 31.5–36.5)
MCV RBC AUTO: 94 FL (ref 78–100)
MONOCYTES # BLD AUTO: 0.7 10E9/L (ref 0–1.3)
MONOCYTES NFR BLD AUTO: 7.6 %
NEUTROPHILS # BLD AUTO: 6 10E9/L (ref 1.6–8.3)
NEUTROPHILS NFR BLD AUTO: 63.3 %
NRBC # BLD AUTO: 0 10*3/UL
NRBC BLD AUTO-RTO: 0 /100
PLATELET # BLD AUTO: 253 10E9/L (ref 150–450)
PROT SERPL-MCNC: 7.5 G/DL (ref 6.8–8.8)
RBC # BLD AUTO: 5.16 10E12/L (ref 4.4–5.9)
WBC # BLD AUTO: 9.5 10E9/L (ref 4–11)

## 2020-09-11 DIAGNOSIS — E08.9 DIABETES MELLITUS RELATED TO CF (CYSTIC FIBROSIS) (H): ICD-10-CM

## 2020-09-11 DIAGNOSIS — E84.8 DIABETES MELLITUS RELATED TO CF (CYSTIC FIBROSIS) (H): ICD-10-CM

## 2020-09-11 RX ORDER — INSULIN ASPART 100 [IU]/ML
INJECTION, SOLUTION INTRAVENOUS; SUBCUTANEOUS
Qty: 30 ML | Refills: 1 | Status: SHIPPED | OUTPATIENT
Start: 2020-09-11 | End: 2021-06-30

## 2020-09-11 NOTE — TELEPHONE ENCOUNTER
insulin aspart (NOVOLOG PENFILL) 100 UNIT/ML cartridge        Last Written Prescription Date:  06/25/19  Last Fill Quantity: 30mL,   # refills: 1  Last Office Visit : 09/01/20  Future Office visit:  None scheduled    Routing refill request to provider for review/approval because:  Insulin - refilled per clinic

## 2020-09-16 ENCOUNTER — VIRTUAL VISIT (OUTPATIENT)
Dept: PULMONOLOGY | Facility: CLINIC | Age: 45
End: 2020-09-16
Attending: INTERNAL MEDICINE
Payer: COMMERCIAL

## 2020-09-16 DIAGNOSIS — E84.0 CYSTIC FIBROSIS WITH PULMONARY MANIFESTATIONS (H): Primary | ICD-10-CM

## 2020-09-16 LAB
MYCOBACTERIUM SPEC CULT: NORMAL
MYCOBACTERIUM SPEC CULT: NORMAL
SPECIMEN SOURCE: NORMAL

## 2020-09-16 RX ORDER — CIPROFLOXACIN 750 MG/1
750 TABLET, FILM COATED ORAL 2 TIMES DAILY
Qty: 42 TABLET | Refills: 0 | Status: SHIPPED | OUTPATIENT
Start: 2020-09-16 | End: 2020-10-07

## 2020-09-16 NOTE — PROGRESS NOTES
"Nico Morales is a 44 year old male who is being evaluated via a billable video visit.      The patient has been notified of following:     \"This video visit will be conducted via a call between you and your physician/provider. We have found that certain health care needs can be provided without the need for an in-person physical exam.  This service lets us provide the care you need with a video conversation.  If a prescription is necessary we can send it directly to your pharmacy.  If lab work is needed we can place an order for that and you can then stop by our lab to have the test done at a later time.    Video visits are billed at different rates depending on your insurance coverage.  Please reach out to your insurance provider with any questions.    If during the course of the call the physician/provider feels a video visit is not appropriate, you will not be charged for this service.\"    Patient has given verbal consent for Video visit? Yes  How would you like to obtain your AVS? MyChart  If you are dropped from the video visit, the video invite should be resent to: Other e-mail: [email or web address]  Will anyone else be joining your video visit? No    [facility]  September 16, 2020         Assessment and Plan:   Nico Morales is a 44 year old male with cystic fibrosis.    1. 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  Is particularly noticeable during the time that he is on aztreonam.  He feels some chest congestion and increased mucus.  Kilo also at his last visit expressed concern of a possible rib fracture and does not feel since that time that he is quite recovered.  He and I discussed that certainly it is unclear what an exacerbation may look like in him now that he is on modulator therapy and certainly symptoms could be consistent with " this.  Kilo historically has grown out Pseudomonas.  We did review his culture and sensitivities today.  At this time I have recommended:  --Begin ciprofloxacin 750 mg 1 tablet twice daily for 21days  --Continue to do bronchial drainage and nebulized therapy  --Continue to exercise consistently  --Continue to alternate inhalational aztreonam with oral doxycycline for maintenance therapy.    2. Pancreatic Insufficiency:  The patient has no new symptoms consistent with worsening malabsorption.    - continue the present dose of pancreatic enzymes  - continue vitamin supplementation.    3.  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  He now does most of his dietary supplementation orally.  We do plan to leave the G-tube in place until he shows evidence of weight stability.    4.  Cystic fibrosis related diabetes: Kilo reports good control of his blood sugars.  He was recently seen by endocrinology.    5.  Decreased bone mineral density: Kilo was on pamidronate.  He was seen by endocrinology who recommended once yearly biphosphonate therapy.  I do leave it to them to make this recommendation.    6.  CFTR modulator therapy: Kilo is on trikafta therapy and tolerating well.  He has shown evidence of stable hepatic panel.  He will continue on full dose modulator therapy.    7.  Psychosocial: Kilo is currently in a relationship that is going well.  He continues to work and this is also going well.    Annual studies due: 5/2020  Immunizations: UTD  Colonoscopy: 2021    Jaye Machado MD MPH  Associate Professor of Medicine  Pulmonary, Allergy, Critical Care and Sleep Medicine      Interval History:     Kilo does report that from a cough frequency and sputum point of view that is stable.  Again he still concerned that it is above what it was a couple months ago.  He does feel that he has nearly recovered from the recent chest pain and probable rib injury.  He does report  stable activity tolerance.         Review of Systems:     CONSTITUTIONAL: no fever, no chills, no sweats, increase in weight, no change in energy, no change in appetite    INTEGUMENTARY/SKIN: no rash, no obvious new lesions    ENT/MOUTH: no sore throat, no new sinus pain, no new nasal drainage, no ear ringing     RESPIRATORY: see interval history    CV: no chest pain, no palpitations, no peripheral edema, no orthopnea, no PND    GI: no nausea, no vomiting, no change in stools, no fatty stools, no GERD, no abdominal pain    : no dysuria, no urinary frequency    MUSCULOSKELETAL: no myalgias, no arthralgias    ENDOCRINE: no excessive thirst, blood sugars with adequate control    NEURO:  No headache, no numbness, no tingling    SLEEP: good with lunesta    PSYCHIATRIC: mood pretty good          Past Medical and Surgical History:     Past Medical History:   Diagnosis Date     CF (cystic fibrosis) (H)      Exocrine pancreatic insufficiency      Nocardia infection      Pseudomonas infection      S/P gastrostomy (H) 2002     Past Surgical History:   Procedure Laterality Date     COLONOSCOPY N/A 5/21/2018    Procedure: COMBINED COLONOSCOPY, SINGLE OR MULTIPLE BIOPSY/POLYPECTOMY BY BIOPSY;  Cystic fibrosis, 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;  Surgeon: Margarito Bowman MD;  Location: [facility]     GASTROSTOMY TUBE  2002     PICC INSERTION Left 01/22/2018    4Fr SL BioFlo PICC, 46cm (5cm external) in the L basilic vein w/ tip in the low SVC           Family History:     Family History   Problem Relation Age of Onset     Heart Disease Maternal Grandmother      Heart Disease Maternal Grandfather      Heart Disease Paternal Grandmother      Diabetes No family hx of             Social History:     Social History     Socioeconomic History     Marital status: Single     Spouse name: Not on file     Number of children: 0     Years of education: Not on file     Highest education level: Not on file   Occupational History     Occupation:  financial palnner     Employer: [employer]   Social Needs     Financial resource strain: Not on file     Food insecurity     Worry: Not on file     Inability: Not on file     Transportation needs     Medical: Not on file     Non-medical: Not on file   Tobacco Use     Smoking status: Never Smoker     Smokeless tobacco: Never Used   Substance and Sexual Activity     Alcohol use: No     Alcohol/week: 0.0 standard drinks     Drug use: No     Sexual activity: Yes     Partners: Female     Birth control/protection: Pill   Lifestyle     Physical activity     Days per week: Not on file     Minutes per session: Not on file     Stress: Not on file   Relationships     Social connections     Talks on phone: Not on file     Gets together: Not on file     Attends Shinto service: Not on file     Active member of club or organization: Not on file     Attends meetings of clubs or organizations: Not on file     Relationship status: Not on file     Intimate partner violence     Fear of current or ex partner: Not on file     Emotionally abused: Not on file     Physically abused: Not on file     Forced sexual activity: Not on file   Other Topics Concern     Parent/sibling w/ CABG, MI or angioplasty before 65F 55M? Not Asked      Service Not Asked     Blood Transfusions No     Caffeine Concern Not Asked     Occupational Exposure Not Asked     Hobby Hazards Not Asked     Sleep Concern Not Asked     Stress Concern Not Asked     Weight Concern Not Asked     Special Diet Not Asked     Back Care Not Asked     Exercise No     Bike Helmet Not Asked     Seat Belt Not Asked     Self-Exams Not Asked   Social History Narrative    8/15/2019 - .  Lives with his English Bulldog (Jefry) in a house that he designed in [place].  No children.            Medications:     Current Outpatient Medications   Medication     ACE NOT PRESCRIBED, INTENTIONAL,     acetaminophen-codeine (TYLENOL #3) 300-30 MG per tablet      "acetylcysteine (MUCOMYST) 10 % nebulizer solution     amylase-lipase-protease (CREON) 29560-40143 units CPEP per EC capsule     azithromycin (ZITHROMAX) 250 MG tablet     aztreonam (AZACTAM) 200 MG/ML     blood glucose (NO BRAND SPECIFIED) lancets standard     blood glucose (NO BRAND SPECIFIED) test strip     blood glucose monitoring (MARTINA CONTOUR NEXT MONITOR) meter device kit     blood glucose monitoring (MARTINA CONTOUR NEXT) test strip     blood glucose monitoring (MARTINA MICROLET) lancets     blood glucose monitoring (NO BRAND SPECIFIED) meter device kit     budesonide-formoterol (SYMBICORT) 160-4.5 MCG/ACT Inhaler     CALCIUM PO     doxycycline hyclate (VIBRAMYCIN) 100 MG capsule     elexacaftor-tezacaftor-ivacaftor & ivacaftor (TRIKAFTA) 100-50-75 & 150 MG tablet pack     eszopiclone (LUNESTA) 2 MG tablet     [facility] INFUSION MANAGED PATIENT     Ferrous Fumarate-Vitamin C (VITRON-C) 200-125 MG TABS     insulin aspart (NOVOLOG PENFILL) 100 UNIT/ML cartridge     insulin glargine (LANTUS SOLOSTAR) 100 UNIT/ML pen     insulin glargine (LANTUS SOLOSTAR) 100 UNIT/ML pen     insulin glargine (LANTUS SOLOSTAR) 100 UNIT/ML pen     insulin pen needle (32G X 4 MM) 32G X 4 MM miscellaneous     insulin pen needle (BD OMI U/F) 32G X 4 MM miscellaneous     levalbuterol (XOPENEX HFA) 45 MCG/ACT inhaler     levalbuterol (XOPENEX) 1.25 MG/3ML neb solution     MEPHYTON 5 MG PO TABS     mirtazapine (REMERON) 15 MG tablet     MULTIVITAMIN OR     Nebulizers (EFLOW SCF NEBULIZER HANDSET) MISC     oseltamivir (TAMIFLU) 75 MG capsule     polyethylene glycol (MIRALAX/GLYCOLAX) powder     Respiratory Therapy Supplies (KRIS ALTERA NEBULIZER HANDSET) MISC     Respiratory Therapy Supplies (KRIS ALTERA NEBULIZER HANDSET) MISC     sodium chloride inhalant 7 % NEBU neb solution     Syringe/Needle, Disp, 18G X 1\" 5 ML MISC     water for injection sterile SOLN     No current facility-administered medications for this visit.             " Physical Exam:   There were no vitals taken for this visit.    Not performed         Data:   All laboratory and imaging data reviewed.    Cystic Fibrosis Culture  Specimen Description   Date Value Ref Range Status   07/21/2020 Sputum  Final   07/21/2020 Sputum  Final   07/21/2020 Sputum  Final    Culture Micro   Date Value Ref Range Status   07/21/2020   Preliminary    Culture received and in progress.  Positive AFB results are called as soon as detected.    Final report to follow in 7 to 8 weeks.     07/21/2020   Preliminary    Assayed at [facility], [place] [place] [id number] [phone]   07/21/2020 Heavy growth  Normal santi    Final   07/21/2020 (A)  Final    Light growth  Pseudomonas aeruginosa, mucoid strain          No results found for this or any previous visit (from the past 168 hour(s)).    PFT: Not Performed    Pulmonary exacerbation: absent    Video-Visit Details    Type of service:  Video Visit    Video Start Time: 11:15  Video End Time: 11:51    Originating Location (pt. Location): Car    Distant Location (provider location):  [facility]     Platform used for Video Visit: [employer]

## 2020-09-16 NOTE — LETTER
"    9/16/2020         RE: Nico Morales  [address]  [place] [address]        Dear Colleague,    Thank you for referring your patient, Nico Morales, to the [facility]. Please see a copy of my visit note below.    Nico Morales is a 44 year old male who is being evaluated via a billable video visit.      The patient has been notified of following:     \"This video visit will be conducted via a call between you and your physician/provider. We have found that certain health care needs can be provided without the need for an in-person physical exam.  This service lets us provide the care you need with a video conversation.  If a prescription is necessary we can send it directly to your pharmacy.  If lab work is needed we can place an order for that and you can then stop by our lab to have the test done at a later time.    Video visits are billed at different rates depending on your insurance coverage.  Please reach out to your insurance provider with any questions.    If during the course of the call the physician/provider feels a video visit is not appropriate, you will not be charged for this service.\"    Patient has given verbal consent for Video visit? Yes  How would you like to obtain your AVS? MyChart  If you are dropped from the video visit, the video invite should be resent to: Other e-mail: MYchart  Will anyone else be joining your video visit? No    [facility]  September 16, 2020         Assessment and Plan:   Nico Morales is a 44 year old male with cystic fibrosis.    1. 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  Is particularly noticeable during the time that he is on aztreonam.  He feels some chest congestion and increased mucus.  Kilo also at his last visit expressed concern of a " possible rib fracture and does not feel since that time that he is quite recovered.  He and I discussed that certainly it is unclear what an exacerbation may look like in him now that he is on modulator therapy and certainly symptoms could be consistent with this.  Kilo historically has grown out Pseudomonas.  We did review his culture and sensitivities today.  At this time I have recommended:  --Begin ciprofloxacin 750 mg 1 tablet twice daily for 21days  --Continue to do bronchial drainage and nebulized therapy  --Continue to exercise consistently  --Continue to alternate inhalational aztreonam with oral doxycycline for maintenance therapy.    2. Pancreatic Insufficiency:  The patient has no new symptoms consistent with worsening malabsorption.    - continue the present dose of pancreatic enzymes  - continue vitamin supplementation.    3.  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  He now does most of his dietary supplementation orally.  We do plan to leave the G-tube in place until he shows evidence of weight stability.    4.  Cystic fibrosis related diabetes: Kilo reports good control of his blood sugars.  He was recently seen by endocrinology.    5.  Decreased bone mineral density: Kilo was on pamidronate.  He was seen by endocrinology who recommended once yearly biphosphonate therapy.  I do leave it to them to make this recommendation.    6.  CFTR modulator therapy: Kilo is on trikafta therapy and tolerating well.  He has shown evidence of stable hepatic panel.  He will continue on full dose modulator therapy.    7.  Psychosocial: Kilo is currently in a relationship that is going well.  He continues to work and this is also going well.    Annual studies due: 5/2020  Immunizations: UTD  Colonoscopy: 2021    Jaye Machado MD MPH  Associate Professor of Medicine  Pulmonary, Allergy, Critical Care and Sleep Medicine      Interval History:     Kilo does report  that from a cough frequency and sputum point of view that is stable.  Again he still concerned that it is above what it was a couple months ago.  He does feel that he has nearly recovered from the recent chest pain and probable rib injury.  He does report stable activity tolerance.         Review of Systems:     CONSTITUTIONAL: no fever, no chills, no sweats, increase in weight, no change in energy, no change in appetite    INTEGUMENTARY/SKIN: no rash, no obvious new lesions    ENT/MOUTH: no sore throat, no new sinus pain, no new nasal drainage, no ear ringing     RESPIRATORY: see interval history    CV: no chest pain, no palpitations, no peripheral edema, no orthopnea, no PND    GI: no nausea, no vomiting, no change in stools, no fatty stools, no GERD, no abdominal pain    : no dysuria, no urinary frequency    MUSCULOSKELETAL: no myalgias, no arthralgias    ENDOCRINE: no excessive thirst, blood sugars with adequate control    NEURO:  No headache, no numbness, no tingling    SLEEP: good with lunesta    PSYCHIATRIC: mood pretty good          Past Medical and Surgical History:     Past Medical History:   Diagnosis Date     CF (cystic fibrosis) (H)      Exocrine pancreatic insufficiency      Nocardia infection      Pseudomonas infection      S/P gastrostomy (H) 2002     Past Surgical History:   Procedure Laterality Date     COLONOSCOPY N/A 5/21/2018    Procedure: COMBINED COLONOSCOPY, SINGLE OR MULTIPLE BIOPSY/POLYPECTOMY BY BIOPSY;  Cystic fibrosis, 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;  Surgeon: Margarito Bowman MD;  Location: [facility]     GASTROSTOMY TUBE  2002     PICC INSERTION Left 01/22/2018    4Fr SL BioFlo PICC, 46cm (5cm external) in the L basilic vein w/ tip in the low SVC           Family History:     Family History   Problem Relation Age of Onset     Heart Disease Maternal Grandmother      Heart Disease Maternal Grandfather      Heart Disease Paternal Grandmother      Diabetes No family hx of             Social  History:     Social History     Socioeconomic History     Marital status: Single     Spouse name: Not on file     Number of children: 0     Years of education: Not on file     Highest education level: Not on file   Occupational History     Occupation: financial palnner     Employer: [employer]   Social Needs     Financial resource strain: Not on file     Food insecurity     Worry: Not on file     Inability: Not on file     Transportation needs     Medical: Not on file     Non-medical: Not on file   Tobacco Use     Smoking status: Never Smoker     Smokeless tobacco: Never Used   Substance and Sexual Activity     Alcohol use: No     Alcohol/week: 0.0 standard drinks     Drug use: No     Sexual activity: Yes     Partners: Female     Birth control/protection: Pill   Lifestyle     Physical activity     Days per week: Not on file     Minutes per session: Not on file     Stress: Not on file   Relationships     Social connections     Talks on phone: Not on file     Gets together: Not on file     Attends Jain service: Not on file     Active member of club or organization: Not on file     Attends meetings of clubs or organizations: Not on file     Relationship status: Not on file     Intimate partner violence     Fear of current or ex partner: Not on file     Emotionally abused: Not on file     Physically abused: Not on file     Forced sexual activity: Not on file   Other Topics Concern     Parent/sibling w/ CABG, MI or angioplasty before 65F 55M? Not Asked      Service Not Asked     Blood Transfusions No     Caffeine Concern Not Asked     Occupational Exposure Not Asked     Hobby Hazards Not Asked     Sleep Concern Not Asked     Stress Concern Not Asked     Weight Concern Not Asked     Special Diet Not Asked     Back Care Not Asked     Exercise No     Bike Helmet Not Asked     Seat Belt Not Asked     Self-Exams Not Asked   Social History Narrative    8/15/2019 - .  Lives with his English Bulldog  (Chase) in a house that he designed in [place].  No children.            Medications:     Current Outpatient Medications   Medication     ACE NOT PRESCRIBED, INTENTIONAL,     acetaminophen-codeine (TYLENOL #3) 300-30 MG per tablet     acetylcysteine (MUCOMYST) 10 % nebulizer solution     amylase-lipase-protease (CREON) 21646-72848 units CPEP per EC capsule     azithromycin (ZITHROMAX) 250 MG tablet     aztreonam (AZACTAM) 200 MG/ML     blood glucose (NO BRAND SPECIFIED) lancets standard     blood glucose (NO BRAND SPECIFIED) test strip     blood glucose monitoring (MARTINA CONTOUR NEXT MONITOR) meter device kit     blood glucose monitoring (MARTINA CONTOUR NEXT) test strip     blood glucose monitoring (MARTINA MICROLET) lancets     blood glucose monitoring (NO BRAND SPECIFIED) meter device kit     budesonide-formoterol (SYMBICORT) 160-4.5 MCG/ACT Inhaler     CALCIUM PO     doxycycline hyclate (VIBRAMYCIN) 100 MG capsule     elexacaftor-tezacaftor-ivacaftor & ivacaftor (TRIKAFTA) 100-50-75 & 150 MG tablet pack     eszopiclone (LUNESTA) 2 MG tablet     [facility] INFUSION MANAGED PATIENT     Ferrous Fumarate-Vitamin C (VITRON-C) 200-125 MG TABS     insulin aspart (NOVOLOG PENFILL) 100 UNIT/ML cartridge     insulin glargine (LANTUS SOLOSTAR) 100 UNIT/ML pen     insulin glargine (LANTUS SOLOSTAR) 100 UNIT/ML pen     insulin glargine (LANTUS SOLOSTAR) 100 UNIT/ML pen     insulin pen needle (32G X 4 MM) 32G X 4 MM miscellaneous     insulin pen needle (BD OMI U/F) 32G X 4 MM miscellaneous     levalbuterol (XOPENEX HFA) 45 MCG/ACT inhaler     levalbuterol (XOPENEX) 1.25 MG/3ML neb solution     MEPHYTON 5 MG PO TABS     mirtazapine (REMERON) 15 MG tablet     MULTIVITAMIN OR     Nebulizers (EFLOW SCF NEBULIZER HANDSET) MISC     oseltamivir (TAMIFLU) 75 MG capsule     polyethylene glycol (MIRALAX/GLYCOLAX) powder     Respiratory Therapy Supplies (KRIS ALTERA NEBULIZER HANDSET) MISC     Respiratory Therapy Supplies (KRIS  "ALTERA NEBULIZER HANDSET) MISC     sodium chloride inhalant 7 % NEBU neb solution     Syringe/Needle, Disp, 18G X 1\" 5 ML MISC     water for injection sterile SOLN     No current facility-administered medications for this visit.             Physical Exam:   There were no vitals taken for this visit.    Not performed         Data:   All laboratory and imaging data reviewed.    Cystic Fibrosis Culture  Specimen Description   Date Value Ref Range Status   07/21/2020 Sputum  Final   07/21/2020 Sputum  Final   07/21/2020 Sputum  Final    Culture Micro   Date Value Ref Range Status   07/21/2020   Preliminary    Culture received and in progress.  Positive AFB results are called as soon as detected.    Final report to follow in 7 to 8 weeks.     07/21/2020   Preliminary    Assayed at [facility] [address] [place] [id number] [phone]   07/21/2020 Heavy growth  Normal santi    Final   07/21/2020 (A)  Final    Light growth  Pseudomonas aeruginosa, mucoid strain          No results found for this or any previous visit (from the past 168 hour(s)).    PFT: Not Performed    Pulmonary exacerbation: absent    Video-Visit Details    Type of service:  Video Visit    Video Start Time: 11:15  Video End Time: 11:51    Originating Location (pt. Location): Car    Distant Location (provider location):  [facility]     Platform used for Video Visit: [employer]              Again, thank you for allowing me to participate in the care of your patient.        Sincerely,        Jaye Machado MD    "

## 2020-09-16 NOTE — PATIENT INSTRUCTIONS
Cystic Fibrosis Self-Care Plan    RECOMMENDATIONS:   Kilo, It was great to see you today.  The plan from today:  --begin ciprofloxacin 750 mg, one tablet twice daily  --continue to alternate doxy and aztreonam  --if your rib pain persists, call to get a CXR at [place]  Great job with self cares!    YOUR GOAL: Enjoy the start of fall!      [facility] Nurse line:  Katie Henderson KJ and Kayla [phone]     [facility] Fax Number:      [phone]         Cystic Fibrosis Respiratory Therapists:   Felicitas Banuelos              [phone]          Jacklyn Pina   [phone]  Cystic Fibrosis Dietitians:              Zeina Pierre              [phone]                            Ivory Bonilla                        [phone]   Cystic Fibrosis Diabetes Nurse:    Jordyn Rudolph   [phone]    Cystic Fibrosis Social Workers:     Mary Ritter               [phone]                     Louann Suarez               [phone]  Cystic Fibrosis Pharmacists:           Tammy Morgan                               [phone]         Ml Tilley   [phone]  Cystic Fibrosis Genetic Counselor:   Pretty Pulliam    [phone]    [facility] website:  [email or web address]         MRN: [id number]   Clinic Date: September 15, 2020   Patient: Nico Morales     Annual Studies:   IGG   Date Value Ref Range Status   05/07/2020 1,253 610 - 1,616 mg/dL Final     Insulin   Date Value Ref Range Status   07/26/2011 30 mU/L Final     Comment:     Reference Range:  0-20  +120     There are no preventive care reminders to display for this patient.    Pulmonary Function Tests  FEV1: amount of air you can blow out in 1 second  FVC: total amount of air you can take in and blow out    Your Goals:         PFT Latest Ref Rng & Units 7/21/2020   FVC L 3.99   FEV1 L 1.87   FVC% % 81   FEV1% % 47          Airway Clearance: The Most Important Way to Keep Your Lungs  Healthy  Vest Settings:    Hill-Rom Frequencies: 8, 9, 10 Pressure 10 Then, Frequencies 18, 19, 20 Pressure 6      RespirTech: Quick Start with Pressure of     Do each frequency for 5 minutes; Deflate vest after each frequency & cough 3 times before beginning the next setting.    Vest and Neb Therapy should be done 2 times/day.    Good Nutrition Can Improve Lung Function and Overall Health     Take ALL of your vitamins with food     Take 1/2 of your enzymes before EVERY meal/snack and the other 1/2 mid-meal/snack    Wt Readings from Last 3 Encounters:   07/21/20 63.3 kg (139 lb 8.8 oz)   02/04/20 62.7 kg (138 lb 3.7 oz)   11/13/19 63.4 kg (139 lb 12.4 oz)       There is no height or weight on file to calculate BMI.         National CF Foundation Recommendations for BMI in CF Adults: Women: at least 22 Men: at least 23        Controlling Blood Sugars Helps Prevent Lung Infections & Improves Nutrition  Test blood sugar:     In the morning before eating (goal is )     2 hours after a meal (goal is less than 150)     When pre-meal glucose is greater than 150 add correction     At bedtime (if less than 100 eat a snack with 15 grams of carbohydrates  Last A1C Results:   Hemoglobin A1C   Date Value Ref Range Status   09/02/2020 6.1 (H) 0 - 5.6 % Final     Comment:     Normal <5.7% Prediabetes 5.7-6.4%  Diabetes 6.5% or higher - adopted from ADA   consensus guidelines.           If diabetic, measure A1C every 6 months. Goal: Under 7%    Staying Healthy    Research:  If you are interested in learning about research opportunities or have questions, please contact the CF Research Team at [phone] or [email or web address].      CF Foundation:  Compass is a personalized resource service to help you with the insurance, financial, legal and other issues you are facing.  It's free, confidential and available to anyone with CF.  Ask your  for more information or contact Compass directly at 919-SSRSOJU  ([phone]) or compass@cff.org, or learn more at [email or web address].

## 2020-09-17 ENCOUNTER — VIRTUAL VISIT (OUTPATIENT)
Dept: EDUCATION SERVICES | Facility: CLINIC | Age: 45
End: 2020-09-17
Attending: INTERNAL MEDICINE
Payer: COMMERCIAL

## 2020-09-17 DIAGNOSIS — E08.9 DIABETES MELLITUS DUE TO CYSTIC FIBROSIS (H): Primary | ICD-10-CM

## 2020-09-17 DIAGNOSIS — E84.9 DIABETES MELLITUS DUE TO CYSTIC FIBROSIS (H): Primary | ICD-10-CM

## 2020-09-17 RX ORDER — FLASH GLUCOSE SENSOR
KIT MISCELLANEOUS
Qty: 1 EACH | Refills: 0 | Status: SHIPPED | OUTPATIENT
Start: 2020-09-17 | End: 2021-04-20

## 2020-09-17 NOTE — PROGRESS NOTES
"Nico Morales is a 44 year old male who is being evaluated via a billable video visit.      The patient has been notified of following:     \"This video visit will be conducted via a call between you and your physician/provider. We have found that certain health care needs can be provided without the need for an in-person physical exam.  This service lets us provide the care you need with a video conversation.  If a prescription is necessary we can send it directly to your pharmacy.  If lab work is needed we can place an order for that and you can then stop by our lab to have the test done at a later time.    Video visits are billed at different rates depending on your insurance coverage.  Please reach out to your insurance provider with any questions.    If during the course of the call the physician/provider feels a video visit is not appropriate, you will not be charged for this service.\"    Patient has given verbal consent for Video visit? Yes  How would you like to obtain your AVS? MyChart  If you are dropped from the video visit, the video invite should be resent to: Send to e-mail at: [email or web address]  Will anyone else be joining your video visit? No        Video-Visit Details    Type of service:  Video Visit    Video Start Time: 12:34  PM  Video End Time:   13:08  PM    Originating Location (pt. Location): Home    Distant Location (provider location):  [facility]     Platform used for Video Visit: Naveen Duvall RN    Diabetes Self-Management Education & Support  DIABETES EDUCATION NOTE:    Nico Morales presents today for education related to Cystic Fibrosis Related Diabetes (CFRD)    Patient is being treated with:  insulin  He is accompanied by self    PATIENT CONCERNS RELATED TO DIABETES SELF MANAGEMENT:     Recent visit with Dr. Lee in which continuous glucose sensors were mentioned.  He is wondering how to proceed with using a CGM at least temporarily to look more " closely at his control     PROGRESS TOWARD GOALS:  Has been taking insulin for about 4-5 years.  He feels that overall his control is good.  He states that his conversation with Dr. Lee was around improving control and making sure that any problem areas are addressed.     ASSESSMENT:  Current Diabetes Management per Patient:  Taking diabetes medications?   yes:     Diabetes Medication(s)     Insulin       insulin aspart (NOVOLOG PENFILL) 100 UNIT/ML cartridge    INJECT 1 UNIT PER 20G CARB AT LUNCH AND SUPPER 3 UNITS AT NIGHT WITH TUBE FEEDINGS MAX 20UNITS DAILY     insulin glargine (LANTUS SOLOSTAR) 100 UNIT/ML pen    Inject 6 Units Subcutaneous daily Maximum dose 8 units daily     insulin glargine (LANTUS SOLOSTAR) 100 UNIT/ML pen    Don't forget to order pen needles too     insulin glargine (LANTUS SOLOSTAR) 100 UNIT/ML pen    INJECT 6 UNITS SUBCUETANEOUSLY ONCE DAILY        Monitoring  Patient glucose self monitoring as follows: four times daily.   BG meter: unknown meter  Patient's most recent   Lab Results   Component Value Date    A1C 6.1 09/02/2020    is meeting goal of <7.0    Exercise: not assessed    Nutrition:   Not assessed     Hypoglycemia:   Patient is at risk of hypoglycemia? Yes                         EDUCATION and INSTRUCTION PROVIDED AT THIS VISIT:       Discussed which systems are available for monitoring.  He is not particularly interested in having one for personal use, but mainly because he would like to get a better snapshot of his overall control.  If he wanted to get the Dexcom system, it would necessitate an office visit, and insurance would most likely cover, however for his purposes, it seems that it would be more cost-effective to prescribe a single Abbott Freestyle 14 day sensor and he could download the Fubles application on his phone to read it.  Discussed how the Freestyle sensor works, how to insert, and how to link with the clinic so the data is visible to his providers.  He  was open to this.  Prescription sent and pharmacy called.  He will have no copay for this.  Instructed to call with his email info so accounts can be linked.      Patient-stated goal written and given to Nico Morales.  Verbalized and demonstrated understanding of instructions.     PLAN:  See patient instructions  AVS printed and given to patient    FOLLOW-UP:        Time spent with patient at today's visit was 30 minutes.      Any diabetes medication dose changes were made via the CDE Protocol and Collaborative Practice Agreement with Marilynn and  Errol.  A copy of this encounter was provided to patient's referring provider.

## 2020-09-17 NOTE — PATIENT INSTRUCTIONS
Jeffrey Boateng, it was nice visiting with you today.  I just sent the order to your pharmacy for one sensor and the pharmacist informed me that you have ZERO copay for this.      At the bottom of these instructions is the link to a video that will demonstrate how to put the sensor in.      The simon that you want to download is called Pogoseat.  It is widely available on both Plexxi phones and Android phones.  I would advise downloading the simon before you put the sensor in (the simon also has a tutorial that will walk you through how to put the sensor in).      Once you have downloaded the simon, let me know through a PitchEngine message what email address you used to create the account with and I can send an invite to you to share your data with the clinic.  That way, Dr. Lee will be able to access your readings.      INSTRUCTIONS FOR AxisMobile GLUCOSE MONITORING SYSTEM:    1.  Sensor is FDA approved for placement in the upper posterior arm, although people have successfully worn it in other areas, such as the abdomen, upper outer thighs and buttocks.     2.  Sensor will  after 14 days and will need to be removed or replaced at that time. The phone simon will track how many more days you have left of sensor use.        3.  Sensor is water-proof.  You can shower with it in and swim up to 3 feet for up to 30 minutes.        4.  Each time a new sensor is started, it requires a 1 hour warm up period.  No information will be available for the 1 hour period.  After that, the sensor will update every 5 minutes.  The reader needs to be held directly over the sensor until the reader beeps to let you know it picked up the reading.  The sensor has to be read a minimum of every 8 hours in order for a continuous trend graph to be available.  In other words, if it is not read 3 times a day, there will be gaps in the trend graph or no data at all.     5.  There will be ggpn-[id number]-upmzsh arrows with each reading that will indicate the  approximate speed with which your glucose is changing and the direction that it is heading.     6.  Data from the reader can be viewed by connecting the reader to a USB port in your computer and uploading the device to the Agustin website, or if you are using your phone, it will be automatically uploaded.     7.  If the sensor is unsure of it's own accuracy a symbol will appear in the reader window cueing you to check a blood glucose.  It is highly recommended to check a blood glucose before correcting a high reading or treating a low reading.      In general, the Agustin will tend to be LESS accurate when you are trending lower or during periods when your glucose is fluctuating (such as after meals or during exercise).      Website to instruct you in putting the sensor in your arm:    You Tube--Freestyle Agustin- Getting Started.     Please let me know if you are having any difficulty or have questions.      YULISA PeñaN, RN, CDCES, CDTC  Certified Diabetes Care and   [facility]  Clinic [id number]  Phone [phone] (voicemail)  Email:  [email or web address]

## 2020-10-05 DIAGNOSIS — E84.9 CYSTIC FIBROSIS (H): ICD-10-CM

## 2020-10-05 RX ORDER — ELEXACAFTOR, TEZACAFTOR, AND IVACAFTOR 100-50-75
KIT ORAL
Qty: 84 EACH | Refills: 0 | Status: SHIPPED | OUTPATIENT
Start: 2020-10-05 | End: 2020-11-02

## 2020-10-06 ENCOUNTER — TELEPHONE (OUTPATIENT)
Dept: PULMONOLOGY | Facility: CLINIC | Age: 45
End: 2020-10-06

## 2020-10-06 NOTE — TELEPHONE ENCOUNTER
Prior Authorization Approval    Authorization Effective Date: 10/21/2020  Authorization Expiration Date: 4/19/2021  Medication: trikafta- pa approved  Approved Dose/Quantity: UD  Reference #:     Insurance Company: [employer] - Phone [phone] Fax [phone]  Expected CoPay:       CoPay Card Available:      Foundation Assistance Needed:    Which Pharmacy is filling the prescription (Not needed for infusion/clinic administered): [facility] - [place] [address]  Pharmacy Notified: Yes  Patient Notified: NoComment:  proactive pa

## 2020-10-06 NOTE — TELEPHONE ENCOUNTER
PA Initiation    Medication: trikafta- pa pending  Insurance Company: [employer] - Phone [phone] Fax [phone]  Pharmacy Filling the Rx: [facility] - [place], [place] - [facility] [address]  Filling Pharmacy Phone: [phone]  Filling Pharmacy Fax: [phone]  Start Date: 9/25/2020

## 2020-10-08 DIAGNOSIS — E84.9 CF (CYSTIC FIBROSIS) (H): ICD-10-CM

## 2020-10-09 RX ORDER — RIMANTADINE HCL 100 MG
100 TABLET ORAL 2 TIMES DAILY
Qty: 60 TABLET | Refills: 5 | OUTPATIENT
Start: 2020-10-09

## 2020-10-13 DIAGNOSIS — E84.9 CF (CYSTIC FIBROSIS) (H): Primary | ICD-10-CM

## 2020-10-13 RX ORDER — RIMANTADINE HCL 100 MG
100 TABLET ORAL 2 TIMES DAILY
Qty: 60 TABLET | Refills: 6 | Status: SHIPPED | OUTPATIENT
Start: 2020-10-13 | End: 2021-05-04

## 2020-10-27 DIAGNOSIS — E84.0 CYSTIC FIBROSIS WITH PULMONARY MANIFESTATIONS (H): ICD-10-CM

## 2020-11-02 DIAGNOSIS — E84.9 CYSTIC FIBROSIS (H): ICD-10-CM

## 2020-11-02 RX ORDER — ELEXACAFTOR, TEZACAFTOR, AND IVACAFTOR 100-50-75
KIT ORAL
Qty: 84 EACH | Refills: 0 | Status: SHIPPED | OUTPATIENT
Start: 2020-11-02 | End: 2020-11-30

## 2020-11-06 ENCOUNTER — TELEPHONE (OUTPATIENT)
Dept: PULMONOLOGY | Facility: CLINIC | Age: 45
End: 2020-11-06

## 2020-11-06 DIAGNOSIS — Z00.6 RESEARCH STUDY PATIENT: Primary | ICD-10-CM

## 2020-11-06 DIAGNOSIS — E84.0 CYSTIC FIBROSIS WITH PULMONARY MANIFESTATIONS (H): ICD-10-CM

## 2020-11-06 RX ORDER — DOXYCYCLINE 100 MG/1
CAPSULE ORAL
Qty: 60 CAPSULE | Refills: 2 | Status: SHIPPED | OUTPATIENT
Start: 2020-11-06 | End: 2021-02-01

## 2020-11-06 RX ORDER — SODIUM CHLORIDE FOR INHALATION 7 %
VIAL, NEBULIZER (ML) INHALATION
Qty: 240 ML | Refills: 5 | Status: SHIPPED | OUTPATIENT
Start: 2020-11-06 | End: 2021-05-10

## 2020-11-09 NOTE — PATIENT INSTRUCTIONS
Cystic Fibrosis Self-Care Plan    RECOMMENDATIONS:   Kilo, It was great to see you today.I am glad that you are getting to travel.  The plan from today:  --CXR and full PFT at [place]--will have Keila work that out with you  --labs here today  --doxy and aztreonam every 2 weeks  Great job with self cares!    YOUR GOAL:  Stay safe and well.  Enjoy the Holidays!      [facility] Nurse line:  Katie Henderson KJ and Lexi [phone]     [facility] Fax Number:      [phone]         Cystic Fibrosis Respiratory Therapists:   Felicitas Banuelos              [phone]          Jacklyn Pina   [phone]  Cystic Fibrosis Dietitians:              Zeina Pierre              [phone]                            Ivory Bonilla                        [phone]   Cystic Fibrosis Diabetes Nurse:    Jordyn Rudolph   [phone]    Cystic Fibrosis Social Workers:     Mary Ritter               [phone]                     Louann Suarez               [phone]  Cystic Fibrosis Pharmacists:           Tammy Morgan                               [phone]         Ml Tilley   [phone]  Cystic Fibrosis Genetic Counselor:   Pretty Pulliam    [phone]    [facility] website:  [email or web address]         MRN: [id number]   Clinic Date: November 9, 2020   Patient: Nico Morales     Annual Studies:   IGG   Date Value Ref Range Status   05/07/2020 1,253 610 - 1,616 mg/dL Final     Insulin   Date Value Ref Range Status   07/26/2011 30 mU/L Final     Comment:     Reference Range:  0-20  +120     There are no preventive care reminders to display for this patient.    Pulmonary Function Tests  FEV1: amount of air you can blow out in 1 second  FVC: total amount of air you can take in and blow out    Your Goals:         PFT Latest Ref Rng & Units 7/21/2020   FVC L 3.99   FEV1 L 1.87   FVC% % 81   FEV1% % 47          Airway Clearance: The Most Important Way  to Keep Your Lungs Healthy  Vest Settings:    Hill-Rom Frequencies: 8, 9, 10 Pressure 10 Then, Frequencies 18, 19, 20 Pressure 6      RespirTech: Quick Start with Pressure of     Do each frequency for 5 minutes; Deflate vest after each frequency & cough 3 times before beginning the next setting.    Vest and Neb Therapy should be done 2 times/day.    Good Nutrition Can Improve Lung Function and Overall Health     Take ALL of your vitamins with food     Take 1/2 of your enzymes before EVERY meal/snack and the other 1/2 mid-meal/snack    Wt Readings from Last 3 Encounters:   07/21/20 63.3 kg (139 lb 8.8 oz)   02/04/20 62.7 kg (138 lb 3.7 oz)   11/13/19 63.4 kg (139 lb 12.4 oz)       There is no height or weight on file to calculate BMI.         National CF Foundation Recommendations for BMI in CF Adults: Women: at least 22 Men: at least 23        Controlling Blood Sugars Helps Prevent Lung Infections & Improves Nutrition  Test blood sugar:     In the morning before eating (goal is )     2 hours after a meal (goal is less than 150)     When pre-meal glucose is greater than 150 add correction     At bedtime (if less than 100 eat a snack with 15 grams of carbohydrates  Last A1C Results:   Hemoglobin A1C   Date Value Ref Range Status   09/02/2020 6.1 (H) 0 - 5.6 % Final     Comment:     Normal <5.7% Prediabetes 5.7-6.4%  Diabetes 6.5% or higher - adopted from ADA   consensus guidelines.           If diabetic, measure A1C every 6 months. Goal: Under 7%    Staying Healthy    Research:  If you are interested in learning about research opportunities or have questions, please contact the CF Research Team at [phone] or [email or web address].      CF Foundation:  Compass is a personalized resource service to help you with the insurance, financial, legal and other issues you are facing.  It's free, confidential and available to anyone with CF.  Ask your  for more information or contact Compass directly at  844-[facility] ([phone]) or compass@cff.org, or learn more at cff.[email or web address].

## 2020-11-09 NOTE — PROGRESS NOTES
[facility]  November 10, 2020         Assessment and Plan:   Nico Morales is a 44 year old male with cystic fibrosis.    1. CF lung disease with moderately-severe obstruction: Kilo reports that he continues to have some cough and sputum production each day.  He said this much less than it had been previously but could be up to 4 to 5 tablespoons in a day.  He has historically grown out Pseudomonas in his sputum.  Although his FEV1 is back up to his recent high on Trikafta he does continue to show evidence of decline in vital capacity.  Is unclear as to why this is occurring especially when he is symptomatically quite stable.  He denies discussed today how this certainly could be secondary to air trapping or something else but it would be appropriate to pursue a full set of lung function.  Additionally it has been a while since he is had imaging done.  At this time I have recommended:  --He obtain a chest x-ray.  If it does continue to show evidence of rather significant disease on the left I would consider repeat chest CT  --I would like Kilo to obtain a full set of pulmonary function including a body box and DLCO.  I want to look to see if he is having air trapping or will his gas exchange might be like.  --He should continue on his present bronchial drainage and nebulized therapies.    2. Pancreatic Insufficiency:  The patient has no new symptoms consistent with worsening malabsorption.    - continue the present dose of pancreatic enzymes  - continue vitamin supplementation.    3.  Cystic fibrosis related diabetes: Kilo reports very good control of his blood sugars.  He is currently doing a continuous glucose monitor to trend his blood sugars.    4.  CFTR modulator therapy: Kilo has shown improvement in symptoms on Trikafta.  He has shown evidence of a stable hepatic panel and CK.  I would recommend that he continue on full modulator therapy.    5.   Psychosocial: Kilo does continue to work as a .  He reports that work is going well.  He is relationship with his significant other and her family is also going well.  He does plan to travel to [place] over the Thanksgiving holiday.    Annual studies due: 5/2021  Immunizations: UTD  Colonoscopy:  5/2021    Jaye Machado MD MPH  Associate Professor of Medicine  Pulmonary, Allergy, Critical Care and Sleep Medicine      Interval History:     Kilo again does have daily cough and sputum production.  Its mostly in the morning.  It does level off throughout the day.  He denies any chest congestion.  He does continue to have an audible left-sided crackle.  It is consistent on both his aztreonam and doxycycline months.         Review of Systems:     CONSTITUTIONAL: no fever, no chills, no sweats, increase weight, pretty good energy, no change in appetite    INTEGUMENTARY/SKIN: no rash, no obvious new lesions    ENT/MOUTH: no sore throat, no new sinus pain, no new nasal drainage, no change to ear ringing     RESPIRATORY: see interval history    CV: no chest pain, no palpitations, no peripheral edema, no orthopnea, no PND    GI: no nausea, no vomiting, no change in stools, no fatty stools, no GERD, no abdominal pain    : no dysuria, no urinary frequency    MUSCULOSKELETAL: no myalgias, no arthralgias    ENDOCRINE: no excessive thirst, blood sugars with adequate control    NEURO:  No headache, no numbness, no tingling    SLEEP: variable    PSYCHIATRIC: mood good          Past Medical and Surgical History:     Past Medical History:   Diagnosis Date     CF (cystic fibrosis) (H)      Exocrine pancreatic insufficiency      Nocardia infection      Pseudomonas infection      S/P gastrostomy (H) 2002     Past Surgical History:   Procedure Laterality Date     COLONOSCOPY N/A 5/21/2018    Procedure: COMBINED COLONOSCOPY, SINGLE OR MULTIPLE BIOPSY/POLYPECTOMY BY BIOPSY;  Cystic fibrosis, Diabetes mellitus due  to CF;  Surgeon: Margarito Bowman MD;  Location: [facility]     GASTROSTOMY TUBE  2002     PICC INSERTION Left 01/22/2018    4Fr SL BioFlo PICC, 46cm (5cm external) in the L basilic vein w/ tip in the low SVC           Family History:     Family History   Problem Relation Age of Onset     Heart Disease Maternal Grandmother      Heart Disease Maternal Grandfather      Heart Disease Paternal Grandmother      Diabetes No family hx of             Social History:     Social History     Socioeconomic History     Marital status: Single     Spouse name: Not on file     Number of children: 0     Years of education: Not on file     Highest education level: Not on file   Occupational History     Occupation: financial palnner     Employer: [employer]   Social Needs     Financial resource strain: Not on file     Food insecurity     Worry: Not on file     Inability: Not on file     Transportation needs     Medical: Not on file     Non-medical: Not on file   Tobacco Use     Smoking status: Never Smoker     Smokeless tobacco: Never Used   Substance and Sexual Activity     Alcohol use: No     Alcohol/week: 0.0 standard drinks     Drug use: No     Sexual activity: Yes     Partners: Female     Birth control/protection: Pill   Lifestyle     Physical activity     Days per week: Not on file     Minutes per session: Not on file     Stress: Not on file   Relationships     Social connections     Talks on phone: Not on file     Gets together: Not on file     Attends Confucianist service: Not on file     Active member of club or organization: Not on file     Attends meetings of clubs or organizations: Not on file     Relationship status: Not on file     Intimate partner violence     Fear of current or ex partner: Not on file     Emotionally abused: Not on file     Physically abused: Not on file     Forced sexual activity: Not on file   Other Topics Concern     Parent/sibling w/ CABG, MI or angioplasty before 65F 55M? Not Asked       Service Not Asked     Blood Transfusions No     Caffeine Concern Not Asked     Occupational Exposure Not Asked     Hobby Hazards Not Asked     Sleep Concern Not Asked     Stress Concern Not Asked     Weight Concern Not Asked     Special Diet Not Asked     Back Care Not Asked     Exercise No     Bike Helmet Not Asked     Seat Belt Not Asked     Self-Exams Not Asked   Social History Narrative    8/15/2019 - .  Lives with his English Kaidendog (Jefry) in a house that he designed in [place].  No children.            Medications:     Current Outpatient Medications   Medication     ACE NOT PRESCRIBED, INTENTIONAL,     acetaminophen-codeine (TYLENOL #3) 300-30 MG per tablet     acetylcysteine (MUCOMYST) 10 % nebulizer solution     amylase-lipase-protease (CREON) 23901-15252 units CPEP per EC capsule     azithromycin (ZITHROMAX) 250 MG tablet     aztreonam (AZACTAM) 200 MG/ML     blood glucose (NO BRAND SPECIFIED) lancets standard     blood glucose (NO BRAND SPECIFIED) test strip     blood glucose monitoring (MARTINA CONTOUR NEXT MONITOR) meter device kit     blood glucose monitoring (MARTINA CONTOUR NEXT) test strip     blood glucose monitoring (MARTINA MICROLET) lancets     blood glucose monitoring (NO BRAND SPECIFIED) meter device kit     budesonide-formoterol (SYMBICORT) 160-4.5 MCG/ACT Inhaler     CALCIUM PO     Continuous Blood Gluc Sensor (FREESTYLE SUKHWINDER 14 DAY SENSOR) MISC     doxycycline hyclate (VIBRAMYCIN) 100 MG capsule     eszopiclone (LUNESTA) 2 MG tablet     [facility] INFUSION MANAGED PATIENT     Ferrous Fumarate-Vitamin C (VITRON-C) 200-125 MG TABS     insulin aspart (NOVOLOG PENFILL) 100 UNIT/ML cartridge     insulin glargine (LANTUS SOLOSTAR) 100 UNIT/ML pen     insulin glargine (LANTUS SOLOSTAR) 100 UNIT/ML pen     insulin glargine (LANTUS SOLOSTAR) 100 UNIT/ML pen     insulin pen needle (32G X 4 MM) 32G X 4 MM miscellaneous     insulin pen needle (BD OMI U/F) 32G X 4 MM miscellaneous      "levalbuterol (XOPENEX HFA) 45 MCG/ACT inhaler     levalbuterol (XOPENEX) 1.25 MG/3ML neb solution     MEPHYTON 5 MG PO TABS     mirtazapine (REMERON) 15 MG tablet     MULTIVITAMIN OR     Nebulizers (EFLOW SCF NEBULIZER HANDSET) MISC     oseltamivir (TAMIFLU) 75 MG capsule     polyethylene glycol (MIRALAX/GLYCOLAX) powder     Respiratory Therapy Supplies (KRIS ALTERA NEBULIZER HANDSET) MISC     Respiratory Therapy Supplies (KRIS ALTERA NEBULIZER HANDSET) MISC     rimantadine (FLUMADINE) 100 MG tablet     sodium chloride inhalant 7 % NEBU neb solution     Syringe/Needle, Disp, 18G X 1\" 5 ML MISC     TRIKAFTA 100-50-75 & 150 MG tablet pack     water for injection sterile SOLN     No current facility-administered medications for this visit.             Physical Exam:   /84   Pulse 90   Resp 17   Ht 1.737 m (5' 8.39\")   Wt 67.8 kg (149 lb 7.6 oz)   SpO2 96%   BMI 22.47 kg/m      Constitutional:   Awake, alert and in no apparent distress     Eyes:   nonicteric     ENT:   Mask in place     Neck:   Supple without supraclavicular or cervical lymphadenopathy     Lungs:   Good air flow.  + left upper crackles. No rhonchi.  No wheezes.     Cardiovascular:   Normal S1 and S2.  RRR.  No murmur, gallop or rub.     Abdomen:   NABS, soft, nontender, nondistended.      Musculoskeletal:   No edema, digital clubbing present     Neurologic:   Alert and conversant.     Skin:   Warm, dry.  No rash on limited exam.             Data:   All laboratory and imaging data reviewed.    Cystic Fibrosis Culture  Specimen Description   Date Value Ref Range Status   11/10/2020 Sputum  Final   11/10/2020 Sputum  Final   11/10/2020 Sputum  Final   11/10/2020 Sputum  Final    Culture Micro   Date Value Ref Range Status   11/10/2020 Heavy growth  Normal santi to date    Preliminary   11/10/2020 (A)  Preliminary    Light growth  Mucoid strain  Non lactose fermenting gram negative rods     11/10/2020 Culture in progress  Preliminary "   11/10/2020 No growth after 23 hours  Preliminary   11/10/2020   Preliminary    Culture received and in progress.  Positive AFB results are called as soon as detected.    Final report to follow in 7 to 8 weeks.     11/10/2020   Preliminary    Assayed at [facility] [address] [facility] [place] [address] [phone]        Recent Results (from the past 168 hour(s))   General PFT Lab (Please always keep checked)    Collection Time: 11/10/20 11:14 AM   Result Value Ref Range    FVC-Pred 4.90 L    FVC-Pre 3.85 L    FVC-%Pred-Pre 78 %    FEV1-Pre 2.00 L    FEV1-%Pred-Pre 50 %    FEV1FVC-Pred 81 %    FEV1FVC-Pre 52 %    FEFMax-Pred 9.74 L/sec    FEFMax-Pre 6.48 L/sec    FEFMax-%Pred-Pre 66 %    FEF2575-Pred 3.81 L/sec    FEF2575-Pre 0.76 L/sec    QRW9995-%Pred-Pre 19 %    ExpTime-Pre 16.86 sec    FIFMax-Pre 5.09 L/sec    FEV1FEV6-Pred 81 %    FEV1FEV6-Pre 58 %   Cystic Fibrosis Culture Aer[facility] Bacterial    Collection Time: 11/10/20 11:18 AM    Specimen: Sputum   Result Value Ref Range    Specimen Description Sputum     Culture Micro Heavy growth  Normal santi to date       Culture Micro (A)      Light growth  Mucoid strain  Non lactose fermenting gram negative rods      Culture Micro Culture in progress    Nocardia culture    Collection Time: 11/10/20 11:18 AM    Specimen: Sputum   Result Value Ref Range    Specimen Description Sputum     Culture Micro No growth after 23 hours    AFB Culture Non Blood    Collection Time: 11/10/20 11:18 AM    Specimen: Sputum   Result Value Ref Range    Specimen Description Sputum     Culture Micro       Culture received and in progress.  Positive AFB results are called as soon as detected.    Final report to follow in 7 to 8 weeks.      Culture Micro       Assayed at [facility] [address] [facility] [place] [address] [phone]   AFB Stain Non Blood    Collection Time: 11/10/20 11:18 AM    Specimen: Sputum   Result Value Ref Range    Specimen Description Sputum     AFB Stain  Negative for acid fast bacteria     AFB Stain       Less than 5ml of specimen received.  A minimum of 5 mL of sputum or fluid is recommended for recovery of acid fast bacilli   (AFB).  Volumes less than 5 mL are suboptimal and may compromise recovery of AFB from   culture.      AFB Stain       Assayed at [facility] [address] [place] [id number] [phone]   CK total    Collection Time: 11/10/20  1:01 PM   Result Value Ref Range    CK Total 74 30 - 300 U/L   Rubeola Antibody IgG    Collection Time: 11/10/20  1:01 PM   Result Value Ref Range    Rubeola (Measles) Antibody IgG 4.0 (H) 0.0 - 0.8 AI   Hepatic panel    Collection Time: 11/10/20  1:01 PM   Result Value Ref Range    Bilirubin Direct 0.1 0.0 - 0.2 mg/dL    Bilirubin Total 0.4 0.2 - 1.3 mg/dL    Albumin 3.6 3.4 - 5.0 g/dL    Protein Total 7.8 6.8 - 8.8 g/dL    Alkaline Phosphatase 126 40 - 150 U/L    ALT 39 0 - 70 U/L    AST 29 0 - 45 U/L       PFT: Moderate obstructive lung disease.  When compared to 7/21/30, the FEV1 have increased.  The decrease in FVC may represent air trapping v. restrictive physiology.  Lung volumes would be necessary to determine.    Pulmonary exacerbation: absent

## 2020-11-10 ENCOUNTER — ALLIED HEALTH/NURSE VISIT (OUTPATIENT)
Dept: CARE COORDINATION | Facility: CLINIC | Age: 45
End: 2020-11-10
Payer: COMMERCIAL

## 2020-11-10 ENCOUNTER — OFFICE VISIT (OUTPATIENT)
Dept: PULMONOLOGY | Facility: CLINIC | Age: 45
End: 2020-11-10
Attending: INTERNAL MEDICINE
Payer: COMMERCIAL

## 2020-11-10 VITALS
HEIGHT: 68 IN | HEART RATE: 90 BPM | WEIGHT: 149.47 LBS | DIASTOLIC BLOOD PRESSURE: 84 MMHG | BODY MASS INDEX: 22.65 KG/M2 | RESPIRATION RATE: 17 BRPM | OXYGEN SATURATION: 96 % | SYSTOLIC BLOOD PRESSURE: 120 MMHG

## 2020-11-10 DIAGNOSIS — Z13.9 RISK AND FUNCTIONAL ASSESSMENT: Primary | ICD-10-CM

## 2020-11-10 DIAGNOSIS — E84.0 CYSTIC FIBROSIS WITH PULMONARY MANIFESTATIONS (H): ICD-10-CM

## 2020-11-10 DIAGNOSIS — Z00.6 RESEARCH STUDY PATIENT: ICD-10-CM

## 2020-11-10 DIAGNOSIS — E84.0 CYSTIC FIBROSIS WITH PULMONARY MANIFESTATIONS (H): Primary | ICD-10-CM

## 2020-11-10 LAB
ALBUMIN SERPL-MCNC: 3.6 G/DL (ref 3.4–5)
ALP SERPL-CCNC: 126 U/L (ref 40–150)
ALT SERPL W P-5'-P-CCNC: 39 U/L (ref 0–70)
AST SERPL W P-5'-P-CCNC: 29 U/L (ref 0–45)
BILIRUB DIRECT SERPL-MCNC: 0.1 MG/DL (ref 0–0.2)
BILIRUB SERPL-MCNC: 0.4 MG/DL (ref 0.2–1.3)
CK SERPL-CCNC: 74 U/L (ref 30–300)
EXPTIME-PRE: 16.86 SEC
FEF2575-%PRED-PRE: 19 %
FEF2575-PRE: 0.76 L/SEC
FEF2575-PRED: 3.81 L/SEC
FEFMAX-%PRED-PRE: 66 %
FEFMAX-PRE: 6.48 L/SEC
FEFMAX-PRED: 9.74 L/SEC
FEV1-%PRED-PRE: 50 %
FEV1-PRE: 2 L
FEV1FEV6-PRE: 58 %
FEV1FEV6-PRED: 81 %
FEV1FVC-PRE: 52 %
FEV1FVC-PRED: 81 %
FIFMAX-PRE: 5.09 L/SEC
FVC-%PRED-PRE: 78 %
FVC-PRE: 3.85 L
FVC-PRED: 4.9 L
PROT SERPL-MCNC: 7.8 G/DL (ref 6.8–8.8)

## 2020-11-10 PROCEDURE — 87206 SMEAR FLUORESCENT/ACID STAI: CPT | Performed by: INTERNAL MEDICINE

## 2020-11-10 PROCEDURE — 36415 COLL VENOUS BLD VENIPUNCTURE: CPT | Performed by: PATHOLOGY

## 2020-11-10 PROCEDURE — 86765 RUBEOLA ANTIBODY: CPT | Mod: 90 | Performed by: PATHOLOGY

## 2020-11-10 PROCEDURE — 94375 RESPIRATORY FLOW VOLUME LOOP: CPT | Performed by: INTERNAL MEDICINE

## 2020-11-10 PROCEDURE — 86769 SARS-COV-2 COVID-19 ANTIBODY: CPT | Performed by: PATHOLOGY

## 2020-11-10 PROCEDURE — 87015 SPECIMEN INFECT AGNT CONCNTJ: CPT | Performed by: INTERNAL MEDICINE

## 2020-11-10 PROCEDURE — 87186 SC STD MICRODIL/AGAR DIL: CPT | Performed by: INTERNAL MEDICINE

## 2020-11-10 PROCEDURE — G0463 HOSPITAL OUTPT CLINIC VISIT: HCPCS

## 2020-11-10 PROCEDURE — 87102 FUNGUS ISOLATION CULTURE: CPT | Performed by: INTERNAL MEDICINE

## 2020-11-10 PROCEDURE — 82550 ASSAY OF CK (CPK): CPT | Performed by: PATHOLOGY

## 2020-11-10 PROCEDURE — 80076 HEPATIC FUNCTION PANEL: CPT | Performed by: PATHOLOGY

## 2020-11-10 PROCEDURE — 87077 CULTURE AEROBIC IDENTIFY: CPT | Performed by: INTERNAL MEDICINE

## 2020-11-10 PROCEDURE — 87070 CULTURE OTHR SPECIMN AEROBIC: CPT | Performed by: INTERNAL MEDICINE

## 2020-11-10 PROCEDURE — 87116 MYCOBACTERIA CULTURE: CPT | Performed by: INTERNAL MEDICINE

## 2020-11-10 PROCEDURE — 99214 OFFICE O/P EST MOD 30 MIN: CPT | Mod: 25 | Performed by: INTERNAL MEDICINE

## 2020-11-10 PROCEDURE — 99000 SPECIMEN HANDLING OFFICE-LAB: CPT | Performed by: PATHOLOGY

## 2020-11-10 ASSESSMENT — ANXIETY QUESTIONNAIRES
7. FEELING AFRAID AS IF SOMETHING AWFUL MIGHT HAPPEN: NOT AT ALL
2. NOT BEING ABLE TO STOP OR CONTROL WORRYING: NOT AT ALL
GAD7 TOTAL SCORE: 1
IF YOU CHECKED OFF ANY PROBLEMS ON THIS QUESTIONNAIRE, HOW DIFFICULT HAVE THESE PROBLEMS MADE IT FOR YOU TO DO YOUR WORK, TAKE CARE OF THINGS AT HOME, OR GET ALONG WITH OTHER PEOPLE: NOT DIFFICULT AT ALL
5. BEING SO RESTLESS THAT IT IS HARD TO SIT STILL: NOT AT ALL
3. WORRYING TOO MUCH ABOUT DIFFERENT THINGS: NOT AT ALL
6. BECOMING EASILY ANNOYED OR IRRITABLE: SEVERAL DAYS
1. FEELING NERVOUS, ANXIOUS, OR ON EDGE: NOT AT ALL

## 2020-11-10 ASSESSMENT — MIFFLIN-ST. JEOR: SCORE: 1553.69

## 2020-11-10 ASSESSMENT — PATIENT HEALTH QUESTIONNAIRE - PHQ9
SUM OF ALL RESPONSES TO PHQ QUESTIONS 1-9: 2
5. POOR APPETITE OR OVEREATING: NOT AT ALL

## 2020-11-10 ASSESSMENT — PAIN SCALES - GENERAL: PAINLEVEL: NO PAIN (0)

## 2020-11-10 NOTE — NURSING NOTE
Chief Complaint   Patient presents with     RECHECK     Cystic Fibrosis     Medications reviewed and vital signs taken.   Latoya Carias, KYLEE

## 2020-11-10 NOTE — LETTER
11/10/2020         RE: Nico Morales  [address]  [facility] [address]        Dear Colleague,    Thank you for referring your patient, Nico Morales, to the [facility] [place]. Please see a copy of my visit note below.    [facility]  November 10, 2020         Assessment and Plan:   Nico Morales is a 44 year old male with cystic fibrosis.    1. CF lung disease with moderately-severe obstruction: Kilo reports that he continues to have some cough and sputum production each day.  He said this much less than it had been previously but could be up to 4 to 5 tablespoons in a day.  He has historically grown out Pseudomonas in his sputum.  Although his FEV1 is back up to his recent high on Trikafta he does continue to show evidence of decline in vital capacity.  Is unclear as to why this is occurring especially when he is symptomatically quite stable.  He denies discussed today how this certainly could be secondary to air trapping or something else but it would be appropriate to pursue a full set of lung function.  Additionally it has been a while since he is had imaging done.  At this time I have recommended:  --He obtain a chest x-ray.  If it does continue to show evidence of rather significant disease on the left I would consider repeat chest CT  --I would like Kilo to obtain a full set of pulmonary function including a body box and DLCO.  I want to look to see if he is having air trapping or will his gas exchange might be like.  --He should continue on his present bronchial drainage and nebulized therapies.    2. Pancreatic Insufficiency:  The patient has no new symptoms consistent with worsening malabsorption.    - continue the present dose of pancreatic enzymes  - continue vitamin supplementation.    3.  Cystic fibrosis related diabetes: Kilo reports very good control of his blood sugars.   He is currently doing a continuous glucose monitor to trend his blood sugars.    4.  CFTR modulator therapy: Kilo has shown improvement in symptoms on Trikafta.  He has shown evidence of a stable hepatic panel and CK.  I would recommend that he continue on full modulator therapy.    5.  Psychosocial: Kilo does continue to work as a .  He reports that work is going well.  He is relationship with his significant other and her family is also going well.  He does plan to travel to [place] over the Thanksgiving holiday.    Annual studies due: 5/2021  Immunizations: UTD  Colonoscopy:  5/2021    Jaye Machado MD MPH  Associate Professor of Medicine  Pulmonary, Allergy, Critical Care and Sleep Medicine      Interval History:     Kilo again does have daily cough and sputum production.  Its mostly in the morning.  It does level off throughout the day.  He denies any chest congestion.  He does continue to have an audible left-sided crackle.  It is consistent on both his aztreonam and doxycycline months.         Review of Systems:     CONSTITUTIONAL: no fever, no chills, no sweats, increase weight, pretty good energy, no change in appetite    INTEGUMENTARY/SKIN: no rash, no obvious new lesions    ENT/MOUTH: no sore throat, no new sinus pain, no new nasal drainage, no change to ear ringing     RESPIRATORY: see interval history    CV: no chest pain, no palpitations, no peripheral edema, no orthopnea, no PND    GI: no nausea, no vomiting, no change in stools, no fatty stools, no GERD, no abdominal pain    : no dysuria, no urinary frequency    MUSCULOSKELETAL: no myalgias, no arthralgias    ENDOCRINE: no excessive thirst, blood sugars with adequate control    NEURO:  No headache, no numbness, no tingling    SLEEP: variable    PSYCHIATRIC: mood good          Past Medical and Surgical History:     Past Medical History:   Diagnosis Date     CF (cystic fibrosis) (H)      Exocrine pancreatic insufficiency       Nocardia infection      Pseudomonas infection      S/P gastrostomy (H) 2002     Past Surgical History:   Procedure Laterality Date     COLONOSCOPY N/A 5/21/2018    Procedure: COMBINED COLONOSCOPY, SINGLE OR MULTIPLE BIOPSY/POLYPECTOMY BY BIOPSY;  Cystic fibrosis, 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;  Surgeon: Margarito Bowman MD;  Location: [facility]     GASTROSTOMY TUBE  2002     PICC INSERTION Left 01/22/2018    4Fr SL BioFlo PICC, 46cm (5cm external) in the L basilic vein w/ tip in the low SVC           Family History:     Family History   Problem Relation Age of Onset     Heart Disease Maternal Grandmother      Heart Disease Maternal Grandfather      Heart Disease Paternal Grandmother      Diabetes No family hx of             Social History:     Social History     Socioeconomic History     Marital status: Single     Spouse name: Not on file     Number of children: 0     Years of education: Not on file     Highest education level: Not on file   Occupational History     Occupation: financial palnner     Employer: [employer]   Social Needs     Financial resource strain: Not on file     Food insecurity     Worry: Not on file     Inability: Not on file     Transportation needs     Medical: Not on file     Non-medical: Not on file   Tobacco Use     Smoking status: Never Smoker     Smokeless tobacco: Never Used   Substance and Sexual Activity     Alcohol use: No     Alcohol/week: 0.0 standard drinks     Drug use: No     Sexual activity: Yes     Partners: Female     Birth control/protection: Pill   Lifestyle     Physical activity     Days per week: Not on file     Minutes per session: Not on file     Stress: Not on file   Relationships     Social connections     Talks on phone: Not on file     Gets together: Not on file     Attends Christian service: Not on file     Active member of club or organization: Not on file     Attends meetings of clubs or organizations: Not on file     Relationship status: Not on file      Intimate partner violence     Fear of current or ex partner: Not on file     Emotionally abused: Not on file     Physically abused: Not on file     Forced sexual activity: Not on file   Other Topics Concern     Parent/sibling w/ CABG, MI or angioplasty before 65F 55M? Not Asked      Service Not Asked     Blood Transfusions No     Caffeine Concern Not Asked     Occupational Exposure Not Asked     Hobby Hazards Not Asked     Sleep Concern Not Asked     Stress Concern Not Asked     Weight Concern Not Asked     Special Diet Not Asked     Back Care Not Asked     Exercise No     Bike Helmet Not Asked     Seat Belt Not Asked     Self-Exams Not Asked   Social History Narrative    8/15/2019 - .  Lives with his English Bulldog (Jefry) in a house that he designed in [place].  No children.            Medications:     Current Outpatient Medications   Medication     ACE NOT PRESCRIBED, INTENTIONAL,     acetaminophen-codeine (TYLENOL #3) 300-30 MG per tablet     acetylcysteine (MUCOMYST) 10 % nebulizer solution     amylase-lipase-protease (CREON) 53742-25990 units CPEP per EC capsule     azithromycin (ZITHROMAX) 250 MG tablet     aztreonam (AZACTAM) 200 MG/ML     blood glucose (NO BRAND SPECIFIED) lancets standard     blood glucose (NO BRAND SPECIFIED) test strip     blood glucose monitoring (MARTINA CONTOUR NEXT MONITOR) meter device kit     blood glucose monitoring (MARTINA CONTOUR NEXT) test strip     blood glucose monitoring (MARTINA MICROLET) lancets     blood glucose monitoring (NO BRAND SPECIFIED) meter device kit     budesonide-formoterol (SYMBICORT) 160-4.5 MCG/ACT Inhaler     CALCIUM PO     Continuous Blood Gluc Sensor (FREESTYLE SUKHWINDER 14 DAY SENSOR) MISC     doxycycline hyclate (VIBRAMYCIN) 100 MG capsule     eszopiclone (LUNESTA) 2 MG tablet     [facility] INFUSION MANAGED PATIENT     Ferrous Fumarate-Vitamin C (VITRON-C) 200-125 MG TABS     insulin aspart (NOVOLOG PENFILL) 100 UNIT/ML cartridge      "insulin glargine (LANTUS SOLOSTAR) 100 UNIT/ML pen     insulin glargine (LANTUS SOLOSTAR) 100 UNIT/ML pen     insulin glargine (LANTUS SOLOSTAR) 100 UNIT/ML pen     insulin pen needle (32G X 4 MM) 32G X 4 MM miscellaneous     insulin pen needle (BD OMI U/F) 32G X 4 MM miscellaneous     levalbuterol (XOPENEX HFA) 45 MCG/ACT inhaler     levalbuterol (XOPENEX) 1.25 MG/3ML neb solution     MEPHYTON 5 MG PO TABS     mirtazapine (REMERON) 15 MG tablet     MULTIVITAMIN OR     Nebulizers (EFLOW SCF NEBULIZER HANDSET) MISC     oseltamivir (TAMIFLU) 75 MG capsule     polyethylene glycol (MIRALAX/GLYCOLAX) powder     Respiratory Therapy Supplies (KRIS ALTERA NEBULIZER HANDSET) MISC     Respiratory Therapy Supplies (KRIS ALTERA NEBULIZER HANDSET) MISC     rimantadine (FLUMADINE) 100 MG tablet     sodium chloride inhalant 7 % NEBU neb solution     Syringe/Needle, Disp, 18G X 1\" 5 ML MISC     TRIKAFTA 100-50-75 & 150 MG tablet pack     water for injection sterile SOLN     No current facility-administered medications for this visit.             Physical Exam:   /84   Pulse 90   Resp 17   Ht 1.737 m (5' 8.39\")   Wt 67.8 kg (149 lb 7.6 oz)   SpO2 96%   BMI 22.47 kg/m      Constitutional:   Awake, alert and in no apparent distress     Eyes:   nonicteric     ENT:   Mask in place     Neck:   Supple without supraclavicular or cervical lymphadenopathy     Lungs:   Good air flow.  + left upper crackles. No rhonchi.  No wheezes.     Cardiovascular:   Normal S1 and S2.  RRR.  No murmur, gallop or rub.     Abdomen:   NABS, soft, nontender, nondistended.      Musculoskeletal:   No edema, digital clubbing present     Neurologic:   Alert and conversant.     Skin:   Warm, dry.  No rash on limited exam.             Data:   All laboratory and imaging data reviewed.    Cystic Fibrosis Culture  Specimen Description   Date Value Ref Range Status   11/10/2020 Sputum  Final   11/10/2020 Sputum  Final   11/10/2020 Sputum  Final   11/10/2020 " Sputum  Final    Culture Micro   Date Value Ref Range Status   11/10/2020 Heavy growth  Normal santi to date    Preliminary   11/10/2020 (A)  Preliminary    Light growth  Mucoid strain  Non lactose fermenting gram negative rods     11/10/2020 Culture in progress  Preliminary   11/10/2020 No growth after 23 hours  Preliminary   11/10/2020   Preliminary    Culture received and in progress.  Positive AFB results are called as soon as detected.    Final report to follow in 7 to 8 weeks.     11/10/2020   Preliminary    Assayed at [facility] [address] [place] [id number] [phone]        Recent Results (from the past 168 hour(s))   General PFT Lab (Please always keep checked)    Collection Time: 11/10/20 11:14 AM   Result Value Ref Range    FVC-Pred 4.90 L    FVC-Pre 3.85 L    FVC-%Pred-Pre 78 %    FEV1-Pre 2.00 L    FEV1-%Pred-Pre 50 %    FEV1FVC-Pred 81 %    FEV1FVC-Pre 52 %    FEFMax-Pred 9.74 L/sec    FEFMax-Pre 6.48 L/sec    FEFMax-%Pred-Pre 66 %    FEF2575-Pred 3.81 L/sec    FEF2575-Pre 0.76 L/sec    GMY5658-%Pred-Pre 19 %    ExpTime-Pre 16.86 sec    FIFMax-Pre 5.09 L/sec    FEV1FEV6-Pred 81 %    FEV1FEV6-Pre 58 %   Cystic Fibrosis Culture Aerob Bacterial    Collection Time: 11/10/20 11:18 AM    Specimen: Sputum   Result Value Ref Range    Specimen Description Sputum     Culture Micro Heavy growth  Normal santi to date       Culture Micro (A)      Light growth  Mucoid strain  Non lactose fermenting gram negative rods      Culture Micro Culture in progress    Nocardia culture    Collection Time: 11/10/20 11:18 AM    Specimen: Sputum   Result Value Ref Range    Specimen Description Sputum     Culture Micro No growth after 23 hours    AFB Culture Non Blood    Collection Time: 11/10/20 11:18 AM    Specimen: Sputum   Result Value Ref Range    Specimen Description Sputum     Culture Micro       Culture received and in progress.  Positive AFB results are called as soon as detected.    Final report to  follow in 7 to 8 weeks.      Culture Micro       Assayed at [facility] [address] [place] [address] [phone]   AFB Stain Non Blood    Collection Time: 11/10/20 11:18 AM    Specimen: Sputum   Result Value Ref Range    Specimen Description Sputum     AFB Stain Negative for acid fast bacteria     AFB Stain       Less than 5ml of specimen received.  A minimum of 5 mL of sputum or fluid is recommended for recovery of acid fast bacilli   (AFB).  Volumes less than 5 mL are suboptimal and may compromise recovery of AFB from   culture.      AFB Stain       Assayed at [facility] [address] [place] [address] [phone]   CK total    Collection Time: 11/10/20  1:01 PM   Result Value Ref Range    CK Total 74 30 - 300 U/L   Rubeola Antibody IgG    Collection Time: 11/10/20  1:01 PM   Result Value Ref Range    Rubeola (Measles) Antibody IgG 4.0 (H) 0.0 - 0.8 AI   Hepatic panel    Collection Time: 11/10/20  1:01 PM   Result Value Ref Range    Bilirubin Direct 0.1 0.0 - 0.2 mg/dL    Bilirubin Total 0.4 0.2 - 1.3 mg/dL    Albumin 3.6 3.4 - 5.0 g/dL    Protein Total 7.8 6.8 - 8.8 g/dL    Alkaline Phosphatase 126 40 - 150 U/L    ALT 39 0 - 70 U/L    AST 29 0 - 45 U/L       PFT: Moderate obstructive lung disease.  When compared to 7/21/30, the FEV1 have increased.  The decrease in FVC may represent air trapping v. restrictive physiology.  Lung volumes would be necessary to determine.    Pulmonary exacerbation: absent          Again, thank you for allowing me to participate in the care of your patient.        Sincerely,        Jaye Machado MD

## 2020-11-10 NOTE — PROGRESS NOTES
"[facility], Adult Program   Annual Psychosocial Assessment     Presenting information:   Nico \"Kilo\" Andrew is a 44-year-old male with cystic fibrosis, presenting in clinic today for a routine appointment with his primary CF provider, Dr. Machado.  Met with Kilo to complete an updated annual psychosocial assessment.  Kilo was unaccompanied in clinic room today during SW visit.    Living Situation:   Kilo lives with his 7 y.o English Antionette Capps in a house in [place]. He designed and built this home and moved in December 2017.  The layout of the home includes main level living with a bonus room upstairs where he does all of his treatments, he enjoys that this is a separate space.  He is really enjoying his home and denies any concerns regarding his living situation.    Family Constellation:   Kilo grew up in [place] and was raised by his biological parents (Epifanio and Honey). He has one older sister (Rosa). His parents live [place], they are retired, are in good health and enjoy travel. Rosa is  with two children and lives in [place].  Kilo reports that his family is doing \"great\", he sees his parents, his sister and her family regularly and is very close with them.      Kilo is , does not have children and is currently in a significant relationship with his girlfriend: Madisyn.  He has been dating Madisyn for ~1 year, she lives in [place], teaches sign language at [employer] and has 4 children: ages 13, 12, 7 and 3.       Social Support:   Kilo reports very good social support. He has positive family relationships and draws additional support from his girlfriend, friends, co-workers and viridiana community.  He reports that he gets along with Madisyn's children, although admits that it's been an adjustment for him and stated that Madisyn has 50/50 custody.  He reports that he has a 2nd cousin (twice removed) that also has CF, they keep in touch and do " "discuss questions and experiences regarding CF with each other.      Adjustment to Illness:   Kilo was diagnosed with CF at 18 months, he reports that the diagnosis came after he wasn't gaining weight.  He received his pediatric care through the [facility] and experienced few complications while growing up.  He recalls that his parents took very good care of him, he was not hospitalized until his 20's for CF related issues.      Kilo feels his current health status is currently \"pretty good\".  He takes Trikafta and feels this medication has been very beneficial to him.  He has noticed a lot less congestion and no reported negative side effects.  He has been able to safely reduce his vesting from 4 to 2-3 per day.  Clinically, he has CF lung disease with severe obstruction, pancreatic insufficiency, nutritional concerns (has G-tube but hasn't needed to use it lately due to improvement in weight since starting Trikafta) and CF related diabetes with good blood sugar control.  Kilo continues to be very discipline and dedicated to maintaining a healthy lifestyle.  He reports excellent adherence with medications and other CF treatment recommendations.  Kilo gets exercise regularly from walking his dog and exercising with equipment he has at home.      Kilo reports that he is an \"open book\" when he comes to openness regarding his CF diagnosis.  He stated that he vests in front of Madisyn and her children and has been very open with them.    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  He reports that he has HCD forms at home and plans to complete it.  In the meantime, he stated that he is  comfortable having his next of kin (his parents) make decisions on his behalf if he were unable to make decisions for himself.       Advance Care Planning:  Kilo has not yet had a ACP visit, it has been offered to him in " "the past and he has been open to it but it was never scheduled.  This was not discussed today.    Education:   Kilo completed a Bachelor's Degree in Biology and Math from [employer] in [place].      Employment:   Kilo continues to work full-time as a  working mainly with long term planning.  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 He enjoys his work and has built a solid clientele base. His work hours are variable but flexible, and he works close to home and has a private office space. He has 2 long-term disability policies purchased through financial planning associations. Kilo has health insurance through his work.      Mental Health:   Kilo describes his current mood as \"pretty good\" and denies any current or past symptoms of depression, anxiety, or any other mental health disorder. He reports low stress levels overall and describes himself as \"laid-back\" but does acknowledge it's been a stressful year navigating life during the pandemic.  He typically sabiha with stress by taking his dog on a walk. Mindy/spirituality are important to him, he identifies as Tenriism (non-Jew) and is a member at [facility], he and Madisyn switched to this [employer] community together as they were looking for something smaller.  In the past after his divorce he did participate in separation and divorce care groups through his [employer] which he found very helpful.      Kilo completed the following screens today:  VANE-7 Score: 1, indicating minimal symptoms of anxiety and described as not difficult at all in daily functioning.    PHQ-9 Score: 2, indicating minimal symptoms of depression and described as not difficult at all in daily functioning.       Kilo agreed with the scores and interpretation of screens above.  He denied having additional symptoms not addressed on these screens.      Chemical Health:   Kilo denies the use of tobacco, marijuana " or illicit drugs.  He does not drink alcohol and denies any history of chemical dependency issues.           Finances:   Kilo has income through wages and denies any financial concerns.     Insurance:   Kilo has insurance through [facility], this policy is offered through financial companies that he partners with and he denies any concerns about his insurance coverage.          Recreation/Leisure Interests:   Kilo enjoys having out with friends, family, going out for Sushi, hiking and being outdoors.  He enjoys spending time with his dog.  He has enjoyed getting to know Madisyn's kids and has learned a lot since spending time with them.    INTERVENTION:   - Psychosocial assessment  -Mental health screening  -Supportive counseling    ASSESSMENT:   Kilo was open in his responses and he appeared to be in good spirits.  He feels that things are going well for him and he is happy with the currently status of his health, Trikafta has had a tremendous impact.  He reports having a strong support system through family, girlfriend, friends and viridiana community.  He continues to demonstrate excellent adherence with managing his health care needs.  He continues to deny mental health concerns and appears to be coping well given the pandemic circumstances.  He appears to be stable from a psychosocial perspective with access to relevant supports and resources.  No concerns noted/expressed today, recommend f/u as outlined below.    PLAN OF INTERVENTION:   -Kilo plans to schedule an ACP visit in the next 6 months.  -Complete psychosocial assessment annually.   -Continue to follow for any psychosocial needs as they arise.

## 2020-11-11 ENCOUNTER — CLINICAL UPDATE (OUTPATIENT)
Dept: PHARMACY | Facility: CLINIC | Age: 45
End: 2020-11-11
Payer: COMMERCIAL

## 2020-11-11 DIAGNOSIS — E84.9 CYSTIC FIBROSIS (H): Primary | ICD-10-CM

## 2020-11-11 LAB — MEV IGG SER QL IA: 4 AI (ref 0–0.8)

## 2020-11-11 PROCEDURE — 99207 PR NO CHARGE LOS: CPT | Performed by: PHARMACIST

## 2020-11-11 ASSESSMENT — ANXIETY QUESTIONNAIRES: GAD7 TOTAL SCORE: 1

## 2020-11-11 NOTE — PROGRESS NOTES
Clinical Pharmacy Consult:                                                    Reviewed patient's labs per Trikafta monitoring protocol  He was referred to me from Dr. Machado.     Reason for Consult: Trikafta lab monitoring     Discussion: Patient's hepatic panel and CK labs from 11/10 all WNL . Ok to continue Trikafta    Lab Results   Component Value Date    AST 29 11/10/2020     Lab Results   Component Value Date    ALT 39 11/10/2020     Lab Results   Component Value Date    BILICONJ 0.0 04/29/2014      Lab Results   Component Value Date    BILITOTAL 0.4 11/10/2020     Lab Results   Component Value Date    ALBUMIN 3.6 11/10/2020     Lab Results   Component Value Date    PROTTOTAL 7.8 11/10/2020      Lab Results   Component Value Date    ALKPHOS 126 11/10/2020         Plan:  1. Patient has been on drug > 1 year with stable hepatic function - next labs can be in May 2021  2. Continue Trikafta    Ml Tilley, PharmD  CF Clinic Pharmacist  Phone: [phone]  E-mail: [email or web address]

## 2020-11-12 LAB
ACID FAST STN SPEC QL: NORMAL
COVID-19 SPIKE RBD ABY TITER: NORMAL
COVID-19 SPIKE RBD ABY: NEGATIVE
SPECIMEN SOURCE: NORMAL

## 2020-11-14 ENCOUNTER — HEALTH MAINTENANCE LETTER (OUTPATIENT)
Age: 45
End: 2020-11-14

## 2020-11-20 LAB
BACTERIA SPEC CULT: ABNORMAL
SPECIMEN SOURCE: ABNORMAL

## 2020-11-27 DIAGNOSIS — E84.9 CYSTIC FIBROSIS (H): ICD-10-CM

## 2020-11-30 RX ORDER — ELEXACAFTOR, TEZACAFTOR, AND IVACAFTOR 100-50-75
KIT ORAL
Qty: 84 EACH | Refills: 5 | Status: SHIPPED | OUTPATIENT
Start: 2020-11-30 | End: 2021-05-04

## 2020-12-08 LAB
BACTERIA SPEC CULT: NORMAL
SPECIMEN SOURCE: NORMAL

## 2020-12-16 ENCOUNTER — OFFICE VISIT (OUTPATIENT)
Dept: NURSING | Facility: CLINIC | Age: 45
End: 2020-12-16
Attending: INTERNAL MEDICINE
Payer: COMMERCIAL

## 2020-12-16 ENCOUNTER — ANCILLARY PROCEDURE (OUTPATIENT)
Dept: GENERAL RADIOLOGY | Facility: CLINIC | Age: 45
End: 2020-12-16
Attending: INTERNAL MEDICINE
Payer: COMMERCIAL

## 2020-12-16 VITALS — HEART RATE: 109 BPM | WEIGHT: 151.5 LBS | BODY MASS INDEX: 22.77 KG/M2 | OXYGEN SATURATION: 96 %

## 2020-12-16 DIAGNOSIS — E84.0 CYSTIC FIBROSIS WITH PULMONARY MANIFESTATIONS (H): ICD-10-CM

## 2020-12-16 PROCEDURE — 94726 PLETHYSMOGRAPHY LUNG VOLUMES: CPT | Performed by: INTERNAL MEDICINE

## 2020-12-16 PROCEDURE — 94729 DIFFUSING CAPACITY: CPT | Performed by: INTERNAL MEDICINE

## 2020-12-16 PROCEDURE — 99207 PR DROP WITH A PROCEDURE: CPT | Performed by: INTERNAL MEDICINE

## 2020-12-16 PROCEDURE — 94375 RESPIRATORY FLOW VOLUME LOOP: CPT | Performed by: INTERNAL MEDICINE

## 2020-12-16 PROCEDURE — 71046 X-RAY EXAM CHEST 2 VIEWS: CPT | Performed by: RADIOLOGY

## 2020-12-17 LAB
DLCOUNC-%PRED-PRE: 83 %
DLCOUNC-PRE: 24.17 ML/MIN/MMHG
DLCOUNC-PRED: 29.06 ML/MIN/MMHG
ERV-%PRED-PRE: 54 %
ERV-PRE: 0.89 L
ERV-PRED: 1.63 L
EXPTIME-PRE: 16.7 SEC
FEF2575-%PRED-PRE: 13 %
FEF2575-PRE: 0.52 L/SEC
FEF2575-PRED: 3.81 L/SEC
FEFMAX-%PRED-PRE: 67 %
FEFMAX-PRE: 6.56 L/SEC
FEFMAX-PRED: 9.74 L/SEC
FEV1-%PRED-PRE: 42 %
FEV1-PRE: 1.68 L
FEV1FEV6-PRE: 51 %
FEV1FEV6-PRED: 81 %
FEV1FVC-PRE: 47 %
FEV1FVC-PRED: 81 %
FEV1SVC-PRE: 46 %
FEV1SVC-PRED: 77 %
FIFMAX-PRE: 4.57 L/SEC
FRCPLETH-%PRED-PRE: 103 %
FRCPLETH-PRE: 3.48 L
FRCPLETH-PRED: 3.36 L
FVC-%PRED-PRE: 72 %
FVC-PRE: 3.57 L
FVC-PRED: 4.9 L
IC-%PRED-PRE: 79 %
IC-PRE: 2.75 L
IC-PRED: 3.47 L
RVPLETH-%PRED-PRE: 130 %
RVPLETH-PRE: 2.59 L
RVPLETH-PRED: 1.99 L
TLCPLETH-%PRED-PRE: 91 %
TLCPLETH-PRE: 6.22 L
TLCPLETH-PRED: 6.8 L
VA-%PRED-PRE: 69 %
VA-PRE: 4.37 L
VC-%PRED-PRE: 71 %
VC-PRE: 3.63 L
VC-PRED: 5.1 L

## 2021-01-06 LAB
MYCOBACTERIUM SPEC CULT: NORMAL
MYCOBACTERIUM SPEC CULT: NORMAL
SPECIMEN SOURCE: NORMAL

## 2021-01-07 ENCOUNTER — TELEPHONE (OUTPATIENT)
Dept: PULMONOLOGY | Facility: CLINIC | Age: 46
End: 2021-01-07

## 2021-01-07 DIAGNOSIS — E84.0 CYSTIC FIBROSIS WITH PULMONARY MANIFESTATIONS (H): Primary | ICD-10-CM

## 2021-01-07 RX ORDER — BUDESONIDE 1 MG/2ML
1 INHALANT ORAL 2 TIMES DAILY
Qty: 120 ML | Refills: 3 | Status: SHIPPED | OUTPATIENT
Start: 2021-01-07 | End: 2021-05-10

## 2021-01-07 NOTE — TELEPHONE ENCOUNTER
Dr. Machado would like patient to try Pulmicort nebs for his pulmonary symptoms (see her note attached to PFTs). Patient agrees with plan.

## 2021-01-08 DIAGNOSIS — E84.0 CYSTIC FIBROSIS WITH PULMONARY MANIFESTATIONS (H): ICD-10-CM

## 2021-01-08 DIAGNOSIS — E84.8 DIABETES MELLITUS RELATED TO CF (CYSTIC FIBROSIS) (H): ICD-10-CM

## 2021-01-08 DIAGNOSIS — E08.9 DIABETES MELLITUS RELATED TO CF (CYSTIC FIBROSIS) (H): ICD-10-CM

## 2021-01-08 RX ORDER — AZITHROMYCIN 250 MG/1
TABLET, FILM COATED ORAL
Qty: 30 TABLET | Refills: 11 | Status: SHIPPED | OUTPATIENT
Start: 2021-01-08 | End: 2022-02-07

## 2021-01-08 RX ORDER — PEN NEEDLE, DIABETIC 32GX 5/32"
NEEDLE, DISPOSABLE MISCELLANEOUS
Qty: 200 EACH | Refills: 5 | Status: SHIPPED | OUTPATIENT
Start: 2021-01-08 | End: 2022-01-18

## 2021-01-12 DIAGNOSIS — E84.0 CYSTIC FIBROSIS WITH PULMONARY MANIFESTATIONS (H): ICD-10-CM

## 2021-01-12 DIAGNOSIS — E84.9 CF (CYSTIC FIBROSIS) (H): ICD-10-CM

## 2021-01-12 DIAGNOSIS — E84.9 CYSTIC FIBROSIS (H): ICD-10-CM

## 2021-01-12 RX ORDER — LEVALBUTEROL INHALATION SOLUTION 1.25 MG/3ML
SOLUTION RESPIRATORY (INHALATION)
Qty: 1080 ML | Refills: 3 | Status: SHIPPED | OUTPATIENT
Start: 2021-01-12 | End: 2022-04-29

## 2021-02-01 DIAGNOSIS — E84.0 CYSTIC FIBROSIS WITH PULMONARY MANIFESTATIONS (H): ICD-10-CM

## 2021-02-01 RX ORDER — DOXYCYCLINE 100 MG/1
100 CAPSULE ORAL 2 TIMES DAILY
Qty: 60 CAPSULE | Refills: 2 | Status: SHIPPED | OUTPATIENT
Start: 2021-02-01 | End: 2021-05-10

## 2021-02-10 NOTE — TELEPHONE ENCOUNTER
IRB# [id number]  Research Study Name: COVID-19 CF Antibody Study    : Dr. Jaye Machado  : Temi Dodge    This patient is participating in a CF COVID-19 Antibody research study looking at COVID-19 antibodies based on exposure or vaccination. Patients will have the following labs for this study: a Rubeola titer ([id number]) for a control to see if they can create antibodies, COVID-19 Antibody titer ([id number]), and the COVID-19 Antibody Reflex ([id number]) if they have received a COVID-19 vaccine. The COVID-19 Antibody Reflex ([id number]) blood draw to show if the antibodies were created through natural infection or vaccination.  Spoke with the patient over the phone for possible participation in the above study. The current IRB approved consent form was reviewed and discussed at length with the patient. Patient had the opportunity to read the entire consent, ask questions, express concerns and offered sufficient time to consider the research trial. Patient was able to clearly state what study participation involved and the associated requirements. All questions and concerns were addressed. Patient voluntarily signed the consent  form on 11/06/2020 prior to any research required tests/procedures and was given a copy of the signed form.    Due to remote e-consenting, I was unable to visually witness patient/parent sign consent.  I attest that patient (or parent) verbally reported signing consent. Inclusion/exclusion criteria assessment was delegated by PI to research staff obtaining consent.

## 2021-02-26 DIAGNOSIS — E84.0 CYSTIC FIBROSIS WITH PULMONARY MANIFESTATIONS (H): ICD-10-CM

## 2021-02-26 RX ORDER — AZTREONAM 1 G/1
INJECTION, POWDER, LYOPHILIZED, FOR SOLUTION INTRAMUSCULAR; INTRAVENOUS
Qty: 56 EACH | Refills: 6 | Status: SHIPPED | OUTPATIENT
Start: 2021-02-26 | End: 2021-12-08

## 2021-03-01 DIAGNOSIS — E84.0 CYSTIC FIBROSIS WITH PULMONARY MANIFESTATIONS (H): ICD-10-CM

## 2021-03-02 ENCOUNTER — TELEPHONE (OUTPATIENT)
Dept: PULMONOLOGY | Facility: CLINIC | Age: 46
End: 2021-03-02

## 2021-03-02 NOTE — TELEPHONE ENCOUNTER
Prior Authorization Retail Medication Request    Medication/Dose: Azithromycin  ICD code (if different than what is on RX):    Previously Tried and Failed:    Rationale:      Insurance Name:    Insurance ID:        Pharmacy Information (if different than what is on RX)  Name:  [facility]  Phone:  [phone]

## 2021-03-02 NOTE — TELEPHONE ENCOUNTER
Central Prior Authorization Team   [phone]    PA Initiation    Medication: Azithromycin  Insurance Company: [employer] - Phone [phone] Fax [phone]  Pharmacy Filling the Rx: [id number] - [place] - [address]  Filling Pharmacy Phone: [phone]  Filling Pharmacy Fax: [phone]  Start Date: 3/2/2021

## 2021-03-03 NOTE — TELEPHONE ENCOUNTER
Prior Authorization Approval    Authorization Effective Date: 3/2/2021  Authorization Expiration Date: 3/3/2022  Medication: Azithromycin-PA APPROVED   Approved Dose/Quantity:   Reference #:     Insurance Company: [employer] - Phone [phone] Fax [phone]  Expected CoPay:       CoPay Card Available:      Foundation Assistance Needed:    Which Pharmacy is filling the prescription (Not needed for infusion/clinic administered): [facility] [address] [facility] - [place] - [id number] [facility]  Pharmacy Notified: Yes- **Instructed pharmacy to notify patient when script is ready to /ship.**  Patient Notified: Yes

## 2021-03-11 ENCOUNTER — PRE VISIT (OUTPATIENT)
Dept: ALLERGY | Facility: CLINIC | Age: 46
End: 2021-03-11

## 2021-03-11 ENCOUNTER — IMMUNIZATION (OUTPATIENT)
Dept: NURSING | Facility: CLINIC | Age: 46
End: 2021-03-11
Payer: COMMERCIAL

## 2021-03-11 PROCEDURE — 0001A PR COVID VAC PFIZER DIL RECON 30 MCG/0.3 ML IM: CPT

## 2021-03-11 PROCEDURE — 91300 PR COVID VAC PFIZER DIL RECON 30 MCG/0.3 ML IM: CPT

## 2021-04-01 ENCOUNTER — IMMUNIZATION (OUTPATIENT)
Dept: NURSING | Facility: CLINIC | Age: 46
End: 2021-04-01
Attending: INTERNAL MEDICINE
Payer: COMMERCIAL

## 2021-04-01 PROCEDURE — 91300 PR COVID VAC PFIZER DIL RECON 30 MCG/0.3 ML IM: CPT

## 2021-04-01 PROCEDURE — 0002A PR COVID VAC PFIZER DIL RECON 30 MCG/0.3 ML IM: CPT

## 2021-04-15 DIAGNOSIS — Z00.6 RESEARCH STUDY PATIENT: Primary | ICD-10-CM

## 2021-04-16 DIAGNOSIS — Z00.6 RESEARCH STUDY PATIENT: ICD-10-CM

## 2021-04-16 PROCEDURE — 36415 COLL VENOUS BLD VENIPUNCTURE: CPT | Performed by: INTERNAL MEDICINE

## 2021-04-16 PROCEDURE — 86769 SARS-COV-2 COVID-19 ANTIBODY: CPT | Mod: 90 | Performed by: INTERNAL MEDICINE

## 2021-04-16 PROCEDURE — 99000 SPECIMEN HANDLING OFFICE-LAB: CPT

## 2021-04-16 PROCEDURE — 99207 SARS-COV-2 NUCLEOCAPSID TOTAL AB: CPT

## 2021-04-16 PROCEDURE — 86769 SARS-COV-2 COVID-19 ANTIBODY: CPT | Performed by: INTERNAL MEDICINE

## 2021-04-16 PROCEDURE — 86769 SARS-COV-2 COVID-19 ANTIBODY: CPT | Mod: 90

## 2021-04-17 LAB
SARS-COV-2 AB PNL SERPL IA: POSITIVE
SARS-COV-2 IGG SERPL IA-ACNC: NORMAL

## 2021-04-19 LAB — SARS-COV-2 AB SERPL QL IA: NEGATIVE

## 2021-04-19 NOTE — PROGRESS NOTES
"Outcome for 04/19/21 4:00 PM :Left Voicemail for patient to call back     Outcome for 04/19/21 4:56 PM :Patient states that Blood Sugar has been \"Regular\" and dont have meter with him       Nico Morales  is being evaluated via a billable video visit.      How would you like to obtain your AVS? Zulyhart  For the video visit, send the invitation by: Mychart   Will anyone else be joining your video visit? No      CF Endocrinology Return Consultation:  Diabetes  :   Patient: Nico Morales MRN# [id number]   YOB: 1975 Age: 45 year old   Date of Visit: 04/20/2021    Dear Dr. Jaye Machado:    I had the pleasure of seeing your patient, Nico Morlaes in the CF Endocrinology Clinic, [facility], for a return consultation regarding CFRD and low bone density.           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  Verbal overview and written information provided.          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     Priority: Medium     Encounter for long-term (current) use of antibiotics 05/20/2013     Priority: Medium     Prostatitis 05/20/2013     Priority: Medium     updating diagnosis code for icd10 cutover       Pseudomonas infection  "     Priority: Medium     Cystic fibrosis (H) 11/24/2010     Priority: Medium     Sweat Test   Date: 4/25/78    Lab: [facility]   Sample #1:  110mmol  Sample #2:  106mmol    Genetics  Date: 4/9/90  [id number]+[id number]->T         CARDIOVASCULAR SCREENING; LDL GOAL LESS THAN 160 10/31/2010     Priority: Medium            HPI:   Nico is a 45 year old male with Cystic Fibrosis Related Diabetes Mellitus (CFRD).    I have reviewed the available past laboratory evaluations, imaging studies, and medical records available to me at this visit.  History was obtained from the patient and the medical record.  I have reviewed the notes of the pulmonary care team.    He is doing well overall.  Denies any acute complaints.  Interval a mo CGM for 2 weeks in November 2020.  CGM report was reviewed with patient.  At that time average sensor glucose was 113 with 34% variability, 86% readings were in range, 0% very high, 5% low and 1% below 54 mg/dL.  Patient reports that current glucose readings are also running in mostly normal range.  He reports on average about 2 episodes of nocturnal hypoglycemia.  He is able to recognize symptoms of low glucose.    Started on pamidronate in December 2018.  Has received 3 doses so far.  Denies any muscle ache or pains after the IV bisphosphonate infusions.  Reports that he was febrile briefly after the first 2 doses however did not have any problems after the third dose.  His last dose of pamidronate was in October 2019.  Subsequent infusions were put on hold due to call with pandemic.  Reclast infusion was ordered at the last clinic visit but patient has not scheduled it yet.  He would like to schedule Reclast the Covid pandemic is improving.  He has received his Covid vaccine    A1c:   A1c in May 2020 was 6.1%    Current insulin regimen:   Lantus 10 units daily in AM   Novolog 1 unit / 15 gm of carb, ~3-4 times daily. Novolog dose ranges b/w 3-4 units    Insulin administration site(s): abd or  thigh          Past Medical History:     Past Medical History:   Diagnosis Date     CF (cystic fibrosis) (H)      Exocrine pancreatic insufficiency      Nocardia infection      Pseudomonas infection      S/P gastrostomy (H) 2002            Past Surgical History:     Past Surgical History:   Procedure Laterality Date     COLONOSCOPY N/A 5/21/2018    Procedure: COMBINED COLONOSCOPY, SINGLE OR MULTIPLE BIOPSY/POLYPECTOMY BY BIOPSY;  Cystic fibrosis, 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;  Surgeon: Margarito Bowman MD;  Location: [facility]     GASTROSTOMY TUBE  2002     PICC INSERTION Left 01/22/2018    4Fr SL BioFlo PICC, 46cm (5cm external) in the L basilic vein w/ tip in the low SVC               Social History:     Social History     Social History Narrative    8/15/2019 - .  Lives with his English Bulldog (Jefry) in a house that he designed in [place].  No children.              Family History:     Family History   Problem Relation Age of Onset     Heart Disease Maternal Grandmother      Heart Disease Maternal Grandfather      Heart Disease Paternal Grandmother      Diabetes No family hx of             Allergies:     Allergies   Allergen Reactions     Pulmozyme [Dornase Trever] Other (See Comments)     Chest pain and fever     Tobramycin Fatigue     Trouble with ear ringing, shortness of breath, little clinical response.     Zosyn Hives     Bactrim [Sulfamethoxazole W/Trimethoprim]      Fatigue that limits treatment course     Ceftazidime Rash     Levaquin Rash             Medications:     Current Outpatient Rx   Medication Sig Dispense Refill     ACE NOT PRESCRIBED, INTENTIONAL, 1 each continuous prn. ACE Inhibitor not prescribed due to Risk for drug interaction 0 each 0     acetaminophen-codeine (TYLENOL #3) 300-30 MG per tablet Take 1-2 tablets by mouth every 6 hours as needed for severe pain 6 tablet 0     acetylcysteine (MUCOMYST) 10 % nebulizer solution Inhale 4 mLs into the lungs 4 times daily Nebulize 4ml  qid 480 mL 11     amylase-lipase-protease (CREON) 97744-06670 units CPEP per EC capsule TAKE 7-8 CAPS WITH MEALS (3 MEALS/DAY) AND 2 CAPS WITH SNACKS (3 SNACKS/DAY) 2700 capsule 2     azithromycin (ZITHROMAX) 250 MG tablet TAKE 1 TABLET BY MOUTH EVERY DAY 30 tablet 11     aztreonam (AZACTAM) 1 GM vial MIX WITH 4ML STERILE WATER AND NEBULIZE TWICE DAILY FOR 28 DAYS ON, THEN 28 DAYS OFF. 56 each 6     BD OMI U/F 32G X 4 MM insulin pen needle USE 6 DAILY OR AS DIRECTED. 200 each 5     blood glucose (NO BRAND SPECIFIED) lancets standard Whatever Lancets that go with device, Test 6 times daily 540 each 3     blood glucose (NO BRAND SPECIFIED) test strip Use to test blood sugar 6 times daily or as directed. 200 strip 11     blood glucose monitoring (MARTINA CONTOUR NEXT MONITOR) meter device kit Use to test blood sugar 6 times daily or as directed. 1 kit 2     blood glucose monitoring (MARTINA CONTOUR NEXT) test strip Use to test blood sugar 6 times daily or as directed. 200 each 2     blood glucose monitoring (MARTINA MICROLET) lancets Use to test blood sugar 6 times daily or as directed. 2 Box 2     blood glucose monitoring (NO BRAND SPECIFIED) meter device kit Use to test blood sugar 6 times daily or as directed. 1 kit 1     budesonide (PULMICORT) 1 MG/2ML neb solution Take 2 mLs (1 mg) by nebulization 2 times daily 120 mL 3     budesonide-formoterol (SYMBICORT) 160-4.5 MCG/ACT Inhaler INHALE 2 PUFFS BY MOUTH INTO THE LUNGS TWICE DAILY 10.2 Inhaler 11     CALCIUM PO Take 1 tablet by mouth 2 times daily Strength unknown.       Continuous Blood Gluc Sensor (FREESTYLE SUKHWINDER 14 DAY SENSOR) MISC Change every 14 days. 1 each 0     doxycycline hyclate (VIBRAMYCIN) 100 MG capsule Take 1 capsule (100 mg) by mouth 2 times daily Alternating 1 month on/1 month off 60 capsule 2     eszopiclone (LUNESTA) 2 MG tablet Take 2 mg by mouth At Bedtime       [facility] MANAGED PATIENT Contact [facility] for patient  "specific medication information at [phone] on admission and discharge from the hospital.  Phones are answered 24 hours a day 7 days a week 365 days a year.    Providers - Choose \"CONTINUE HOME MED (no script)\" at discharge if patient treatment with home infusion will continue.       Ferrous Fumarate-Vitamin C (VITRON-C) 200-125 MG TABS Take 1 tablet by mouth 2 times daily (with meals)       insulin aspart (NOVOLOG PENFILL) 100 UNIT/ML cartridge INJECT 1 UNIT PER 20G CARB AT LUNCH AND SUPPER 3 UNITS AT NIGHT WITH TUBE FEEDINGS MAX 20UNITS DAILY 30 mL 1     insulin glargine (LANTUS SOLOSTAR) 100 UNIT/ML pen Inject 6 Units Subcutaneous daily Maximum dose 8 units daily 15 mL 3     insulin glargine (LANTUS SOLOSTAR) 100 UNIT/ML pen Don't forget to order pen needles too 15 mL 0     insulin glargine (LANTUS SOLOSTAR) 100 UNIT/ML pen INJECT 6 UNITS SUBCUETANEOUSLY ONCE DAILY 15 mL 2     insulin pen needle (32G X 4 MM) 32G X 4 MM miscellaneous Use 4 pen needles daily or as directed. 250 each 11     levalbuterol (XOPENEX HFA) 45 MCG/ACT inhaler INHALE 2 PUFFS INTO THE LUNGS EVERY 6 HOURS AS NEEDED FOR SHORTNESS OF BREATH / DYSPNEA 75 Inhaler 4     levalbuterol (XOPENEX) 1.25 MG/3ML neb solution INHALE 1 VIAL BY MOUTH INTO THE LUNGS VIA NEBULIZATION 4 TIMES DAILY 1080 mL 3     MEPHYTON 5 MG PO TABS Take 1 TABLET by mouth DAILY.       mirtazapine (REMERON) 15 MG tablet Take 1 tablet (15 mg) by mouth At Bedtime 30 tablet 0     MULTIVITAMIN OR Take 1 tablet by mouth daily. Includes 5,000 units vitamin D and 400 units vitamin E. Per pt, formulated for CF pts.       Nebulizers (EFLOW SCF NEBULIZER HANDSET) MISC 1 each 3 times daily. 1 each 6     oseltamivir (TAMIFLU) 75 MG capsule Take 1 capsule (75 mg) by mouth 2 times daily 10 capsule 1     polyethylene glycol (MIRALAX/GLYCOLAX) powder Take 17 g (1 capful) by mouth daily 1 Bottle 3     Respiratory Therapy Supplies (KRIS ALTERA NEBULIZER HANDSET) MISC 1 Product 3 times " "daily [facility] nebs 1 each 6     Respiratory Therapy Supplies (KRIS ALTERA NEBULIZER HANDSET) MISC 1 each daily 1 each 1     rimantadine (FLUMADINE) 100 MG tablet Take 1 tablet (100 mg) by mouth 2 times daily Take from Nov. 1 thru April 30 60 tablet 6     sodium chloride inhalant 7 % NEBU neb solution NEBULIZE 4ML BY MOUTH TWICE DAILY 240 mL 5     Syringe/Needle, Disp, 18G X 1\" 5 ML MISC 1 Syringe 2 times daily To mix sterile water with aztreonam 60 each 6     TRIKAFTA 100-50-75 & 150 MG tablet pack TAKE TWO ORANGE TABLETS BY MOUTH IN THE MORNING AND ONE BLUE TABLET IN THE EVENING AS DIRECTED ON PACKAGE.  TAKE WITH FAT CONTAINING FOOD. 84 each 5     water for injection sterile SOLN Mix 4mL of sterile water with aztreonam nebs 280 mL 6             Review of Systems:     Comprehensive ROS negative other than the symptoms noted above in the HPI.          Physical Exam:   There were no vitals taken for this visit.    CONSTITUTIONAL:   Awake, alert, and in no apparent distress.  PSYCHIATRIC: Cooperative, no agitation.        Laboratory results:     TSH   Date Value Ref Range Status   05/07/2020 1.18 0.40 - 4.00 mU/L Final     Triiodothyronine (T3)   Date Value Ref Range Status   06/25/2010 123 60 - 181 ng/dL Final     Testosterone Total   Date Value Ref Range Status   05/07/2020 370 240 - 950 ng/dL Final     Comment:     This test was developed and its performance characteristics determined by the   [facility].   It has not been cleared or approved by the FDA. The laboratory is regulated   under CLIA as qualified to perform high-complexity testing. This test is used   for clinical purposes. It should not be regarded as investigational or for   research.       Cholesterol   Date Value Ref Range Status   05/07/2020 162 <200 mg/dL Final     Albumin Urine mg/L   Date Value Ref Range Status   05/07/2020 <5 mg/L Final     Triglycerides   Date Value Ref " Range Status   05/07/2020 80 <150 mg/dL Final     HDL Cholesterol   Date Value Ref Range Status   05/07/2020 55 >39 mg/dL Final     LDL Cholesterol Calculated   Date Value Ref Range Status   05/07/2020 91 <100 mg/dL Final     Comment:     Desirable:       <100 mg/dl     Cholesterol/HDL Ratio   Date Value Ref Range Status   06/26/2013 4.4 0.0 - 5.0 Final     Non HDL Cholesterol   Date Value Ref Range Status   05/07/2020 107 <130 mg/dL Final     A1C      8.3   12/15/2016  A1C      8.1   6/28/2016  A1C      7.5   4/13/2016  A1C      7.1   3/8/2016  A1C      7.4   12/16/2015 No results found for this basename: hemoglobina1          Diabetes Health Maintenance    Date of Diabetes Diagnosis: 12/2015    Special Notes (if any): He was a diagnosed with adrenal insufficiency based on inadequate response on a ACTH stim test. Adrenal insufficiency is thought to be related to  itraconazole and Symbicort use.  Last ACTH stim test was normal and he has been off chronic prednisone.    Date Last Eye Exam:      Date Last Dental Appointment: every 6 months    Dates of Episodes Severe* Hypoglycemia (month/year, cumulative, ongoing, assess each visit): never              *Severe=patient unconscious, seizure, unable to help self    Last 25-Vitamin D (every year): 36    Last DXA, lowest Z-score (every 2 years): -2.6 right femoral neck (2016)       ?Bisphosphonates (yes/no): Started pemidronate December 2018       Last Urine Microalbumin (every year):      No results found for: MICROALBUMIN     Last Testosterone:   Testosterone Total   Date Value Ref Range Status   05/07/2020 370 240 - 950 ng/dL Final     Comment:     This test was developed and its performance characteristics determined by the   [facility].   It has not been cleared or approved by the FDA. The laboratory is regulated   under CLIA as qualified to perform high-complexity testing. This test is used   for  clinical purposes. It should not be regarded as investigational or for   research.        On testosterone therapy (yes/no)? No            Assessment and Plan:   Nico is a 45 year old male with CFRD    CFRD: Overall good control with occasional nocturnal hypoglycemia.  Reduce dose of Lantus to 8 units daily.  Continue current dose of NovoLog.  He would like to periodically use mo CGM.  Prescription for mo CGM was sent.    Low bone density: Bisphosphonate infusion have been on hold due to the Covid pandemic.  We had discussed and changed therapy to Reclast from from medronate at the last visit.    He is due for annual labs and would likely get those in the coming weeks.  If vitamin D is normal, proceed with scheduling Reclast infusion.    RTC in 6 months    JULIETA Tate    Note: Chart documentation done in part with Dragon Voice Recognition software. Although reviewed after completion, some word and grammatical errors may remain.  Please consider this when interpreting information in this chart    CC  TYLER DAVIS      Video-Visit Details    Type of service:  Video Visit    Video visit start time 11:25 AM and video visit end time 11:51 AM    Originating Location (pt. Location): Home    Distant Location (provider location):  [facility]     Platform used for Video Visit: [facility]     Time: I spent 40 minutes on the date of the encounter preparing to see patient (including chart review and preparation), obtaining and or reviewing additional medical history, performing an evaluation, documenting clinical information in the electronic health record, independently interpreting results, communicating results to the patient, and/or coordinating care.      [id number]}

## 2021-04-20 ENCOUNTER — VIRTUAL VISIT (OUTPATIENT)
Dept: ENDOCRINOLOGY | Facility: CLINIC | Age: 46
End: 2021-04-20
Attending: INTERNAL MEDICINE
Payer: COMMERCIAL

## 2021-04-20 ENCOUNTER — TELEPHONE (OUTPATIENT)
Dept: PULMONOLOGY | Facility: CLINIC | Age: 46
End: 2021-04-20

## 2021-04-20 DIAGNOSIS — E08.9 DIABETES MELLITUS DUE TO CYSTIC FIBROSIS (H): ICD-10-CM

## 2021-04-20 DIAGNOSIS — E84.9 DIABETES MELLITUS DUE TO CYSTIC FIBROSIS (H): ICD-10-CM

## 2021-04-20 PROCEDURE — 99215 OFFICE O/P EST HI 40 MIN: CPT | Mod: GT | Performed by: INTERNAL MEDICINE

## 2021-04-20 RX ORDER — FLASH GLUCOSE SENSOR
KIT MISCELLANEOUS
Qty: 1 EACH | Refills: 3 | Status: SHIPPED | OUTPATIENT
Start: 2021-04-20

## 2021-04-20 NOTE — TELEPHONE ENCOUNTER
PA Initiation    Medication: TRIKAFTA 100-50-75 & 150MG- PA PENDING  Insurance Company: [employer] - Phone [phone] Fax [phone]  Pharmacy Filling the Rx: [facility] - [place], [place] - [address]  Filling Pharmacy Phone: [phone]  Filling Pharmacy Fax: [phone]  Start Date: 4/20/2021

## 2021-04-20 NOTE — LETTER
"4/20/2021       RE: Nico Morales  [address] [facility] [address]     Dear Colleague,    Thank you for referring your patient, Nico Morales, to the [facility] [place] [facility]. Please see a copy of my visit note below.    Outcome for 04/19/21 4:00 PM :Left Voicemail for patient to call back     Outcome for 04/19/21 4:56 PM :Patient states that Blood Sugar has been \"Regular\" and dont have meter with him       Nico Morales  is being evaluated via a billable video visit.      How would you like to obtain your AVS? [employer]  For the video visit, send the invitation by: [employer]   Will anyone else be joining your video visit? No      CF Endocrinology Return Consultation:  Diabetes  :   Patient: Nico Morales MRN# [id number]   YOB: 1975 Age: 45 year old   Date of Visit: 04/20/2021    Dear Dr. Jaye Machado:    I had the pleasure of seeing your patient, Nico Morales in the [facility] Endocrinology Clinic, [facility], for a return consultation regarding CFRD and low bone density.           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  Verbal " overview and written information provided.          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     Priority: Medium     Encounter for long-term (current) use of antibiotics 05/20/2013     Priority: Medium     Prostatitis 05/20/2013     Priority: Medium     updating diagnosis code for icd10 cutover       Pseudomonas infection      Priority: Medium     Cystic fibrosis (H) 11/24/2010     Priority: Medium     Sweat Test   Date: 4/25/78    Lab: [facility]   Sample #1:  110mmol  Sample #2:  106mmol    Genetics  Date: 4/9/90  [id number]+[id number]->T         CARDIOVASCULAR SCREENING; LDL GOAL LESS THAN 160 10/31/2010     Priority: Medium            HPI:   Nico is a 45 year old male with Cystic Fibrosis Related Diabetes Mellitus (CFRD).    I have reviewed the available past laboratory evaluations, imaging studies, and medical records available to me at this visit.  History was obtained from the patient and the medical record.  I have reviewed the notes of the pulmonary care team.    He is doing well overall.  Denies any acute complaints.  Interval a mo CGM for 2 weeks in November 2020.  CGM report was reviewed with patient.  At that time average sensor glucose was 113 with 34% variability, 86% readings were in range, 0% very high, 5% low and 1% below 54 mg/dL.  Patient reports that current glucose readings are also running in mostly normal range.  He reports on average about 2 episodes of nocturnal hypoglycemia.  He is able to recognize symptoms of low glucose.    Started on pamidronate in December 2018.  Has received 3 doses so far.  Denies any muscle ache or pains after the IV bisphosphonate infusions.  Reports that he was febrile briefly after the first 2 doses however did not have any problems after the third dose.  His last dose of pamidronate was in October 2019.  Subsequent infusions were put on hold due to call with pandemic.  Reclast infusion was ordered at the last clinic visit but patient has not scheduled it yet.  He would like  to schedule Reclast the Covid pandemic is improving.  He has received his Covid vaccine    A1c:   A1c in May 2020 was 6.1%    Current insulin regimen:   Lantus 10 units daily in AM   Novolog 1 unit / 15 gm of carb, ~3-4 times daily. Novolog dose ranges b/w 3-4 units    Insulin administration site(s): abd or thigh          Past Medical History:     Past Medical History:   Diagnosis Date     CF (cystic fibrosis) (H)      Exocrine pancreatic insufficiency      Nocardia infection      Pseudomonas infection      S/P gastrostomy (H) 2002            Past Surgical History:     Past Surgical History:   Procedure Laterality Date     COLONOSCOPY N/A 5/21/2018    Procedure: COMBINED COLONOSCOPY, SINGLE OR MULTIPLE BIOPSY/POLYPECTOMY BY BIOPSY;  Cystic fibrosis, 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;  Surgeon: Margarito Bowman MD;  Location: [facility]     GASTROSTOMY TUBE  2002     PICC INSERTION Left 01/22/2018    4Fr SL BioFlo PICC, 46cm (5cm external) in the L basilic vein w/ tip in the low SVC               Social History:     Social History     Social History Narrative    8/15/2019 - .  Lives with his English Bulldog (Jefry) in a house that he designed in [place].  No children.              Family History:     Family History   Problem Relation Age of Onset     Heart Disease Maternal Grandmother      Heart Disease Maternal Grandfather      Heart Disease Paternal Grandmother      Diabetes No family hx of             Allergies:     Allergies   Allergen Reactions     Pulmozyme [Dornase Trever] Other (See Comments)     Chest pain and fever     Tobramycin Fatigue     Trouble with ear ringing, shortness of breath, little clinical response.     Zosyn Hives     Bactrim [Sulfamethoxazole W/Trimethoprim]      Fatigue that limits treatment course     Ceftazidime Rash     Levaquin Rash             Medications:     Current Outpatient Rx   Medication Sig Dispense Refill     ACE NOT PRESCRIBED, INTENTIONAL, 1 each continuous prn. ACE  Inhibitor not prescribed due to Risk for drug interaction 0 each 0     acetaminophen-codeine (TYLENOL #3) 300-30 MG per tablet Take 1-2 tablets by mouth every 6 hours as needed for severe pain 6 tablet 0     acetylcysteine (MUCOMYST) 10 % nebulizer solution Inhale 4 mLs into the lungs 4 times daily Nebulize 4ml qid 480 mL 11     amylase-lipase-protease (CREON) 70375-53240 units CPEP per EC capsule TAKE 7-8 CAPS WITH MEALS (3 MEALS/DAY) AND 2 CAPS WITH SNACKS (3 SNACKS/DAY) 2700 capsule 2     azithromycin (ZITHROMAX) 250 MG tablet TAKE 1 TABLET BY MOUTH EVERY DAY 30 tablet 11     aztreonam (AZACTAM) 1 GM vial MIX WITH 4ML STERILE WATER AND NEBULIZE TWICE DAILY FOR 28 DAYS ON, THEN 28 DAYS OFF. 56 each 6     BD OMI U/F 32G X 4 MM insulin pen needle USE 6 DAILY OR AS DIRECTED. 200 each 5     blood glucose (NO BRAND SPECIFIED) lancets standard Whatever Lancets that go with device, Test 6 times daily 540 each 3     blood glucose (NO BRAND SPECIFIED) test strip Use to test blood sugar 6 times daily or as directed. 200 strip 11     blood glucose monitoring (MARTINA CONTOUR NEXT MONITOR) meter device kit Use to test blood sugar 6 times daily or as directed. 1 kit 2     blood glucose monitoring (MARTINA CONTOUR NEXT) test strip Use to test blood sugar 6 times daily or as directed. 200 each 2     blood glucose monitoring (MARTINA MICROLET) lancets Use to test blood sugar 6 times daily or as directed. 2 Box 2     blood glucose monitoring (NO BRAND SPECIFIED) meter device kit Use to test blood sugar 6 times daily or as directed. 1 kit 1     budesonide (PULMICORT) 1 MG/2ML neb solution Take 2 mLs (1 mg) by nebulization 2 times daily 120 mL 3     budesonide-formoterol (SYMBICORT) 160-4.5 MCG/ACT Inhaler INHALE 2 PUFFS BY MOUTH INTO THE LUNGS TWICE DAILY 10.2 Inhaler 11     CALCIUM PO Take 1 tablet by mouth 2 times daily Strength unknown.       Continuous Blood Gluc Sensor (FREESTYLE SUKHWINDER 14 DAY SENSOR) MISC Change every 14 days. 1  "each 0     doxycycline hyclate (VIBRAMYCIN) 100 MG capsule Take 1 capsule (100 mg) by mouth 2 times daily Alternating 1 month on/1 month off 60 capsule 2     eszopiclone (LUNESTA) 2 MG tablet Take 2 mg by mouth At Bedtime       [facility] INFUSION MANAGED PATIENT Contact [facility] for patient specific medication information at [phone] on admission and discharge from the hospital.  Phones are answered 24 hours a day 7 days a week 365 days a year.    Providers - Choose \"CONTINUE HOME MED (no script)\" at discharge if patient treatment with home infusion will continue.       Ferrous Fumarate-Vitamin C (VITRON-C) 200-125 MG TABS Take 1 tablet by mouth 2 times daily (with meals)       insulin aspart (NOVOLOG PENFILL) 100 UNIT/ML cartridge INJECT 1 UNIT PER 20G CARB AT LUNCH AND SUPPER 3 UNITS AT NIGHT WITH TUBE FEEDINGS MAX 20UNITS DAILY 30 mL 1     insulin glargine (LANTUS SOLOSTAR) 100 UNIT/ML pen Inject 6 Units Subcutaneous daily Maximum dose 8 units daily 15 mL 3     insulin glargine (LANTUS SOLOSTAR) 100 UNIT/ML pen Don't forget to order pen needles too 15 mL 0     insulin glargine (LANTUS SOLOSTAR) 100 UNIT/ML pen INJECT 6 UNITS SUBCUETANEOUSLY ONCE DAILY 15 mL 2     insulin pen needle (32G X 4 MM) 32G X 4 MM miscellaneous Use 4 pen needles daily or as directed. 250 each 11     levalbuterol (XOPENEX HFA) 45 MCG/ACT inhaler INHALE 2 PUFFS INTO THE LUNGS EVERY 6 HOURS AS NEEDED FOR SHORTNESS OF BREATH / DYSPNEA 75 Inhaler 4     levalbuterol (XOPENEX) 1.25 MG/3ML neb solution INHALE 1 VIAL BY MOUTH INTO THE LUNGS VIA NEBULIZATION 4 TIMES DAILY 1080 mL 3     MEPHYTON 5 MG PO TABS Take 1 TABLET by mouth DAILY.       mirtazapine (REMERON) 15 MG tablet Take 1 tablet (15 mg) by mouth At Bedtime 30 tablet 0     MULTIVITAMIN OR Take 1 tablet by mouth daily. Includes 5,000 units vitamin D and 400 units vitamin E. Per pt, formulated for CF pts.       Nebulizers (EFLOW SCF NEBULIZER HANDSET) MISC 1 each 3 " "times daily. 1 each 6     oseltamivir (TAMIFLU) 75 MG capsule Take 1 capsule (75 mg) by mouth 2 times daily 10 capsule 1     polyethylene glycol (MIRALAX/GLYCOLAX) powder Take 17 g (1 capful) by mouth daily 1 Bottle 3     Respiratory Therapy Supplies (KRIS ALTERA NEBULIZER HANDSET) MISC 1 Product 3 times daily With [place] nebs 1 each 6     Respiratory Therapy Supplies (KRIS ALTERA NEBULIZER HANDSET) MISC 1 each daily 1 each 1     rimantadine (FLUMADINE) 100 MG tablet Take 1 tablet (100 mg) by mouth 2 times daily Take from Nov. 1 thru April 30 60 tablet 6     sodium chloride inhalant 7 % NEBU neb solution NEBULIZE 4ML BY MOUTH TWICE DAILY 240 mL 5     Syringe/Needle, Disp, 18G X 1\" 5 ML MISC 1 Syringe 2 times daily To mix sterile water with aztreonam 60 each 6     TRIKAFTA 100-50-75 & 150 MG tablet pack TAKE TWO ORANGE TABLETS BY MOUTH IN THE MORNING AND ONE BLUE TABLET IN THE EVENING AS DIRECTED ON PACKAGE.  TAKE WITH FAT CONTAINING FOOD. 84 each 5     water for injection sterile SOLN Mix 4mL of sterile water with aztreonam nebs 280 mL 6             Review of Systems:     Comprehensive ROS negative other than the symptoms noted above in the HPI.          Physical Exam:   There were no vitals taken for this visit.    CONSTITUTIONAL:   Awake, alert, and in no apparent distress.  PSYCHIATRIC: Cooperative, no agitation.        Laboratory results:     TSH   Date Value Ref Range Status   05/07/2020 1.18 0.40 - 4.00 mU/L Final     Triiodothyronine (T3)   Date Value Ref Range Status   06/25/2010 123 60 - 181 ng/dL Final     Testosterone Total   Date Value Ref Range Status   05/07/2020 370 240 - 950 ng/dL Final     Comment:     This test was developed and its performance characteristics determined by the   [facility].   It has not been cleared or approved by the FDA. The laboratory is regulated   under CLIA as qualified to perform high-complexity testing. " This test is used   for clinical purposes. It should not be regarded as investigational or for   research.       Cholesterol   Date Value Ref Range Status   05/07/2020 162 <200 mg/dL Final     Albumin Urine mg/L   Date Value Ref Range Status   05/07/2020 <5 mg/L Final     Triglycerides   Date Value Ref Range Status   05/07/2020 80 <150 mg/dL Final     HDL Cholesterol   Date Value Ref Range Status   05/07/2020 55 >39 mg/dL Final     LDL Cholesterol Calculated   Date Value Ref Range Status   05/07/2020 91 <100 mg/dL Final     Comment:     Desirable:       <100 mg/dl     Cholesterol/HDL Ratio   Date Value Ref Range Status   06/26/2013 4.4 0.0 - 5.0 Final     Non HDL Cholesterol   Date Value Ref Range Status   05/07/2020 107 <130 mg/dL Final     A1C      8.3   12/15/2016  A1C      8.1   6/28/2016  A1C      7.5   4/13/2016  A1C      7.1   3/8/2016  A1C      7.4   12/16/2015 No results found for this basename: hemoglobina1          Diabetes Health Maintenance    Date of Diabetes Diagnosis: 12/2015    Special Notes (if any): He was a diagnosed with adrenal insufficiency based on inadequate response on a ACTH stim test. Adrenal insufficiency is thought to be related to  itraconazole and Symbicort use.  Last ACTH stim test was normal and he has been off chronic prednisone.    Date Last Eye Exam:      Date Last Dental Appointment: every 6 months    Dates of Episodes Severe* Hypoglycemia (month/year, cumulative, ongoing, assess each visit): never              *Severe=patient unconscious, seizure, unable to help self    Last 25-Vitamin D (every year): 36    Last DXA, lowest Z-score (every 2 years): -2.6 right femoral neck (2016)       ?Bisphosphonates (yes/no): Started pemidronate December 2018       Last Urine Microalbumin (every year):      No results found for: MICROALBUMIN     Last Testosterone:   Testosterone Total   Date Value Ref Range Status   05/07/2020 370 240 - 950 ng/dL Final     Comment:     This test was  developed and its performance characteristics determined by the   [facility].   It has not been cleared or approved by the FDA. The laboratory is regulated   under CLIA as qualified to perform high-complexity testing. This test is used   for clinical purposes. It should not be regarded as investigational or for   research.        On testosterone therapy (yes/no)? No            Assessment and Plan:   Nico is a 45 year old male with CFRD    CFRD: Overall good control with occasional nocturnal hypoglycemia.  Reduce dose of Lantus to 8 units daily.  Continue current dose of NovoLog.  He would like to periodically use mo CGM.  Prescription for mo CGM was sent.    Low bone density: Bisphosphonate infusion have been on hold due to the Covid pandemic.  We had discussed and changed therapy to Reclast from from medronate at the last visit.    He is due for annual labs and would likely get those in the coming weeks.  If vitamin D is normal, proceed with scheduling Reclast infusion.    RTC in 6 months    JULIETA Tate    Note: Chart documentation done in part with Dragon Voice Recognition software. Although reviewed after completion, some word and grammatical errors may remain.  Please consider this when interpreting information in this chart    CC  TYLER DAVIS      Video-Visit Details    Type of service:  Video Visit    Video visit start time 11:25 AM and video visit end time 11:51 AM    Originating Location (pt. Location): Home    Distant Location (provider location):  [facility]     Platform used for Video Visit: AmWell     Time: I spent 40 minutes on the date of the encounter preparing to see patient (including chart review and preparation), obtaining and or reviewing additional medical history, performing an evaluation, documenting clinical information in the electronic health record,  independently interpreting results, communicating results to the patient, and/or coordinating care.      1  Again, thank you for allowing me to participate in the care of your patient.      Sincerely,    Diana Lee MD

## 2021-04-21 DIAGNOSIS — E84.0 CYSTIC FIBROSIS WITH PULMONARY MANIFESTATIONS (H): ICD-10-CM

## 2021-04-21 RX ORDER — ACETYLCYSTEINE 100 MG/ML
4 SOLUTION ORAL; RESPIRATORY (INHALATION) 4 TIMES DAILY
Qty: 480 ML | Refills: 11 | Status: SHIPPED | OUTPATIENT
Start: 2021-04-21 | End: 2022-06-16

## 2021-04-21 NOTE — TELEPHONE ENCOUNTER
Prior Authorization Approval    Authorization Effective Date: 4/20/2021  Authorization Expiration Date: 10/17/2021  Medication: TRIKAFTA 100-50-75 & 150MG- PA DENIED  Approved Dose/Quantity: ud  Reference #:     Insurance Company: [employer] - Phone [phone] Fax [phone]  Expected CoPay:       CoPay Card Available:      Foundation Assistance Needed:    Which Pharmacy is filling the prescription (Not needed for infusion/clinic administered): [facility] - [facility] [address]  Pharmacy Notified: Yes  Patient Notified: No

## 2021-04-26 DIAGNOSIS — E84.0 CYSTIC FIBROSIS OF THE LUNG (H): ICD-10-CM

## 2021-04-26 DIAGNOSIS — E84.9 CYSTIC FIBROSIS (H): ICD-10-CM

## 2021-04-26 RX ORDER — BUDESONIDE AND FORMOTEROL FUMARATE DIHYDRATE 160; 4.5 UG/1; UG/1
AEROSOL RESPIRATORY (INHALATION)
Qty: 30.6 G | Refills: 3 | Status: SHIPPED | OUTPATIENT
Start: 2021-04-26 | End: 2022-05-04

## 2021-04-26 RX ORDER — LEVALBUTEROL TARTRATE 45 UG/1
2 AEROSOL, METERED ORAL EVERY 6 HOURS PRN
Qty: 75 G | Refills: 4 | Status: SHIPPED | OUTPATIENT
Start: 2021-04-26 | End: 2022-06-24

## 2021-05-04 ENCOUNTER — OFFICE VISIT (OUTPATIENT)
Dept: PULMONOLOGY | Facility: CLINIC | Age: 46
End: 2021-05-04
Attending: INTERNAL MEDICINE
Payer: COMMERCIAL

## 2021-05-04 ENCOUNTER — CLINICAL UPDATE (OUTPATIENT)
Dept: PHARMACY | Facility: CLINIC | Age: 46
End: 2021-05-04

## 2021-05-04 VITALS
BODY MASS INDEX: 23.03 KG/M2 | HEART RATE: 86 BPM | DIASTOLIC BLOOD PRESSURE: 83 MMHG | WEIGHT: 153.22 LBS | SYSTOLIC BLOOD PRESSURE: 121 MMHG | OXYGEN SATURATION: 97 % | RESPIRATION RATE: 18 BRPM

## 2021-05-04 DIAGNOSIS — E84.9 CYSTIC FIBROSIS (H): Primary | ICD-10-CM

## 2021-05-04 DIAGNOSIS — E84.0 CYSTIC FIBROSIS WITH PULMONARY MANIFESTATIONS (H): ICD-10-CM

## 2021-05-04 DIAGNOSIS — E84.9 CYSTIC FIBROSIS (H): ICD-10-CM

## 2021-05-04 DIAGNOSIS — E84.0 CYSTIC FIBROSIS OF THE LUNG (H): ICD-10-CM

## 2021-05-04 DIAGNOSIS — E84.0 CYSTIC FIBROSIS WITH PULMONARY MANIFESTATIONS (H): Primary | ICD-10-CM

## 2021-05-04 DIAGNOSIS — E84.0 CYSTIC FIBROSIS OF THE LUNG (H): Primary | ICD-10-CM

## 2021-05-04 LAB
ALBUMIN SERPL-MCNC: 3.3 G/DL (ref 3.4–5)
ALBUMIN UR-MCNC: NEGATIVE MG/DL
ALP SERPL-CCNC: 135 U/L (ref 40–150)
ALT SERPL W P-5'-P-CCNC: 28 U/L (ref 0–70)
ANION GAP SERPL CALCULATED.3IONS-SCNC: 6 MMOL/L (ref 3–14)
APPEARANCE UR: CLEAR
AST SERPL W P-5'-P-CCNC: 19 U/L (ref 0–45)
BASOPHILS # BLD AUTO: 0.1 10E9/L (ref 0–0.2)
BASOPHILS NFR BLD AUTO: 0.7 %
BILIRUB DIRECT SERPL-MCNC: 0.1 MG/DL (ref 0–0.2)
BILIRUB SERPL-MCNC: 0.4 MG/DL (ref 0.2–1.3)
BILIRUB UR QL STRIP: NEGATIVE
BUN SERPL-MCNC: 22 MG/DL (ref 7–30)
CALCIUM SERPL-MCNC: 9.3 MG/DL (ref 8.5–10.1)
CHLORIDE SERPL-SCNC: 104 MMOL/L (ref 94–109)
CHOLEST SERPL-MCNC: 159 MG/DL
CK SERPL-CCNC: 54 U/L (ref 30–300)
CO2 SERPL-SCNC: 29 MMOL/L (ref 20–32)
COLOR UR AUTO: YELLOW
CREAT SERPL-MCNC: 0.8 MG/DL (ref 0.66–1.25)
CREAT UR-MCNC: 114 MG/DL
DEPRECATED CALCIDIOL+CALCIFEROL SERPL-MC: 45 UG/L (ref 20–75)
DIFFERENTIAL METHOD BLD: NORMAL
EOSINOPHIL # BLD AUTO: 0.3 10E9/L (ref 0–0.7)
EOSINOPHIL NFR BLD AUTO: 3.2 %
ERYTHROCYTE [DISTWIDTH] IN BLOOD BY AUTOMATED COUNT: 13 % (ref 10–15)
ERYTHROCYTE [SEDIMENTATION RATE] IN BLOOD BY WESTERGREN METHOD: 11 MM/H (ref 0–15)
EXPTIME-PRE: 14.77 SEC
FEF2575-%PRED-PRE: 16 %
FEF2575-PRE: 0.61 L/SEC
FEF2575-PRED: 3.76 L/SEC
FEFMAX-%PRED-PRE: 71 %
FEFMAX-PRE: 6.97 L/SEC
FEFMAX-PRED: 9.7 L/SEC
FEV1-%PRED-PRE: 45 %
FEV1-PRE: 1.79 L
FEV1FEV6-PRE: 56 %
FEV1FEV6-PRED: 81 %
FEV1FVC-PRE: 47 %
FEV1FVC-PRED: 80 %
FIFMAX-PRE: 4.86 L/SEC
FVC-%PRED-PRE: 77 %
FVC-PRE: 3.8 L
FVC-PRED: 4.87 L
GFR SERPL CREATININE-BSD FRML MDRD: >90 ML/MIN/{1.73_M2}
GGT SERPL-CCNC: 19 U/L (ref 0–75)
GLUCOSE SERPL-MCNC: 115 MG/DL (ref 70–99)
GLUCOSE UR STRIP-MCNC: NEGATIVE MG/DL
HBA1C MFR BLD: 6.2 % (ref 0–5.6)
HCT VFR BLD AUTO: 50.3 % (ref 40–53)
HDLC SERPL-MCNC: 40 MG/DL
HGB BLD-MCNC: 15.9 G/DL (ref 13.3–17.7)
HGB UR QL STRIP: NEGATIVE
IMM GRANULOCYTES # BLD: 0 10E9/L (ref 0–0.4)
IMM GRANULOCYTES NFR BLD: 0.3 %
INR PPP: 0.99 (ref 0.86–1.14)
IRON SERPL-MCNC: 45 UG/DL (ref 35–180)
KETONES UR STRIP-MCNC: NEGATIVE MG/DL
LDLC SERPL CALC-MCNC: 96 MG/DL
LEUKOCYTE ESTERASE UR QL STRIP: NEGATIVE
LYMPHOCYTES # BLD AUTO: 1.8 10E9/L (ref 0.8–5.3)
LYMPHOCYTES NFR BLD AUTO: 17.3 %
MAGNESIUM SERPL-MCNC: 2.4 MG/DL (ref 1.6–2.3)
MCH RBC QN AUTO: 28.6 PG (ref 26.5–33)
MCHC RBC AUTO-ENTMCNC: 31.6 G/DL (ref 31.5–36.5)
MCV RBC AUTO: 91 FL (ref 78–100)
MICROALBUMIN UR-MCNC: 15 MG/L
MICROALBUMIN/CREAT UR: 12.89 MG/G CR (ref 0–17)
MONOCYTES # BLD AUTO: 0.9 10E9/L (ref 0–1.3)
MONOCYTES NFR BLD AUTO: 8.3 %
MUCOUS THREADS #/AREA URNS LPF: PRESENT /LPF
NEUTROPHILS # BLD AUTO: 7.3 10E9/L (ref 1.6–8.3)
NEUTROPHILS NFR BLD AUTO: 70.2 %
NITRATE UR QL: NEGATIVE
NONHDLC SERPL-MCNC: 119 MG/DL
NRBC # BLD AUTO: 0 10*3/UL
NRBC BLD AUTO-RTO: 0 /100
PH UR STRIP: 6 PH (ref 5–7)
PHOSPHATE SERPL-MCNC: 3.1 MG/DL (ref 2.5–4.5)
PLATELET # BLD AUTO: 286 10E9/L (ref 150–450)
POTASSIUM SERPL-SCNC: 4.1 MMOL/L (ref 3.4–5.3)
PROT SERPL-MCNC: 7.6 G/DL (ref 6.8–8.8)
RBC # BLD AUTO: 5.55 10E12/L (ref 4.4–5.9)
RBC #/AREA URNS AUTO: 1 /HPF (ref 0–2)
SODIUM SERPL-SCNC: 140 MMOL/L (ref 133–144)
SOURCE: ABNORMAL
SP GR UR STRIP: 1.02 (ref 1–1.03)
TRIGL SERPL-MCNC: 115 MG/DL
TSH SERPL DL<=0.005 MIU/L-ACNC: 1.49 MU/L (ref 0.4–4)
UROBILINOGEN UR STRIP-MCNC: 0 MG/DL (ref 0–2)
WBC # BLD AUTO: 10.4 10E9/L (ref 4–11)
WBC #/AREA URNS AUTO: 1 /HPF (ref 0–5)

## 2021-05-04 PROCEDURE — 84590 ASSAY OF VITAMIN A: CPT | Mod: 90 | Performed by: PATHOLOGY

## 2021-05-04 PROCEDURE — 99000 SPECIMEN HANDLING OFFICE-LAB: CPT | Performed by: PATHOLOGY

## 2021-05-04 PROCEDURE — 87015 SPECIMEN INFECT AGNT CONCNTJ: CPT | Performed by: INTERNAL MEDICINE

## 2021-05-04 PROCEDURE — 85610 PROTHROMBIN TIME: CPT | Performed by: PATHOLOGY

## 2021-05-04 PROCEDURE — 85025 COMPLETE CBC W/AUTO DIFF WBC: CPT | Performed by: PATHOLOGY

## 2021-05-04 PROCEDURE — 99207 PR NO CHARGE LOS: CPT | Performed by: PHARMACIST

## 2021-05-04 PROCEDURE — 99215 OFFICE O/P EST HI 40 MIN: CPT | Mod: 25 | Performed by: INTERNAL MEDICINE

## 2021-05-04 PROCEDURE — 81001 URINALYSIS AUTO W/SCOPE: CPT | Performed by: PATHOLOGY

## 2021-05-04 PROCEDURE — 36415 COLL VENOUS BLD VENIPUNCTURE: CPT | Performed by: PATHOLOGY

## 2021-05-04 PROCEDURE — 83540 ASSAY OF IRON: CPT | Performed by: PATHOLOGY

## 2021-05-04 PROCEDURE — 82785 ASSAY OF IGE: CPT | Performed by: PATHOLOGY

## 2021-05-04 PROCEDURE — 84403 ASSAY OF TOTAL TESTOSTERONE: CPT | Mod: 90 | Performed by: PATHOLOGY

## 2021-05-04 PROCEDURE — 80076 HEPATIC FUNCTION PANEL: CPT | Performed by: PATHOLOGY

## 2021-05-04 PROCEDURE — 82306 VITAMIN D 25 HYDROXY: CPT | Performed by: PATHOLOGY

## 2021-05-04 PROCEDURE — 87116 MYCOBACTERIA CULTURE: CPT | Performed by: INTERNAL MEDICINE

## 2021-05-04 PROCEDURE — 82043 UR ALBUMIN QUANTITATIVE: CPT | Performed by: PATHOLOGY

## 2021-05-04 PROCEDURE — 87206 SMEAR FLUORESCENT/ACID STAI: CPT | Performed by: INTERNAL MEDICINE

## 2021-05-04 PROCEDURE — 82784 ASSAY IGA/IGD/IGG/IGM EACH: CPT | Performed by: PATHOLOGY

## 2021-05-04 PROCEDURE — 87077 CULTURE AEROBIC IDENTIFY: CPT | Performed by: INTERNAL MEDICINE

## 2021-05-04 PROCEDURE — G0463 HOSPITAL OUTPT CLINIC VISIT: HCPCS | Mod: 25

## 2021-05-04 PROCEDURE — 82977 ASSAY OF GGT: CPT | Performed by: PATHOLOGY

## 2021-05-04 PROCEDURE — 83036 HEMOGLOBIN GLYCOSYLATED A1C: CPT | Performed by: PATHOLOGY

## 2021-05-04 PROCEDURE — 80048 BASIC METABOLIC PNL TOTAL CA: CPT | Performed by: PATHOLOGY

## 2021-05-04 PROCEDURE — 87070 CULTURE OTHR SPECIMN AEROBIC: CPT | Performed by: INTERNAL MEDICINE

## 2021-05-04 PROCEDURE — 87186 SC STD MICRODIL/AGAR DIL: CPT | Performed by: INTERNAL MEDICINE

## 2021-05-04 PROCEDURE — 94375 RESPIRATORY FLOW VOLUME LOOP: CPT | Performed by: INTERNAL MEDICINE

## 2021-05-04 PROCEDURE — 84100 ASSAY OF PHOSPHORUS: CPT | Performed by: PATHOLOGY

## 2021-05-04 PROCEDURE — 84443 ASSAY THYROID STIM HORMONE: CPT | Performed by: PATHOLOGY

## 2021-05-04 PROCEDURE — 82550 ASSAY OF CK (CPK): CPT | Performed by: PATHOLOGY

## 2021-05-04 PROCEDURE — 84446 ASSAY OF VITAMIN E: CPT | Mod: 90 | Performed by: PATHOLOGY

## 2021-05-04 PROCEDURE — 85652 RBC SED RATE AUTOMATED: CPT | Performed by: PATHOLOGY

## 2021-05-04 PROCEDURE — 83735 ASSAY OF MAGNESIUM: CPT | Performed by: PATHOLOGY

## 2021-05-04 PROCEDURE — 80061 LIPID PANEL: CPT | Performed by: PATHOLOGY

## 2021-05-04 RX ORDER — ELEXACAFTOR, TEZACAFTOR, AND IVACAFTOR 100-50-75
KIT ORAL
Qty: 84 EACH | Refills: 11 | Status: SHIPPED | OUTPATIENT
Start: 2021-05-04 | End: 2022-03-28

## 2021-05-04 ASSESSMENT — PAIN SCALES - GENERAL: PAINLEVEL: NO PAIN (0)

## 2021-05-04 NOTE — LETTER
5/4/2021         RE: Nico Morales  [address]  [facility] [address]        Dear Colleague,    Thank you for referring your patient, Nico Morales, to the [facility] [place]. Please see a copy of my visit note below.    [facility]  May 4, 2021         Assessment and Plan:   Nico Morales is a 45 year old male with cystic fibrosis.    1. CF lung disease with severe obstruction: Kilo reports that he continues to feel well from a pulmonary point of view.  He continues to produce sputum daily that is usually yellow and green in color.  He remains quite active with his stepchildren.  Kilo historically has grown out Pseudomonas in his sputum.  He does continue to alternate inhalational aztreonam for 2 weeks followed by doxycycline orally for 2 weeks.  There has been some concern that Kilo and I have discussed about declining pulmonary function after initiation of Trikafta.  Today he does shown improvement.  We had considered doing IVs or some form of antibiotic if he had not improved today.  Fortunately both he and I are pleased with the increase he is had on both his FEV1 and FVC.  At this time I have recommended to Kilo:  --He should discontinue the inhalational hypertonic saline  --He will reinitiate inhalational Mucomyst  --He should continue to do his vest and nebulized therapies  --I have asked him to be seen in person at his next visit for repeat pulmonary function.  If he does not show evidence of ongoing improvement in his pulmonary function I would consider antibiotics at that time.    2. Pancreatic Insufficiency/GI: Kilo has no new symptoms consistent with worsening malabsorption.  Kilo in the past has had to use tube feeds to support his nutrition and maintain his weight.  He is now doing well without supplementation.  - continue the present dose of pancreatic enzymes  -  continue vitamin supplementation.    3.  CF TR modulator therapy: Kilo is on Trikafta and tolerating it very well.  He should continue on full dose medication.    4.  CF related diabetes: Kilo reports very good control of his blood sugars.  He he is having to decrease his insulin dosing because of lower blood sugars.  We discussed how this is often seen with the use of Trikafta.    5.  Psychosocial: Kilo was  in January.  He is building a new house with his wife and family.  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  He remains busy and reports his mood is good.    Annual studies due: 5/2021  Immunizations: UTD  Colonoscopy: 6/2021    Jaye Machado MD MPH  Associate Professor of Medicine  Pulmonary, Allergy, Critical Care and Sleep Medicine      Interval History:     Kilo reports that his pulmonary symptoms are all over the board.  He does alternate again between oral doxycycline and inhalational aztreonam.  When he first started inhalational Pulmicort he had some hemoptysis but this is since resolved.  He continues to produce sputum daily that is yellow to green in color.  He denies any shortness of breath.  He is performing vest therapy 3 times a day.         Review of Systems:     CONSTITUTIONAL: no fever, no chills, no sweats, no change in weight, no change in energy--pretty good, no change in appetite    INTEGUMENTARY/SKIN: + puritis    ENT/MOUTH: no sore throat, no new sinus pain, no new nasal drainage, no new nasal congestion, + ear ringing--not bothersome     RESPIRATORY: see interval history    CV: + chest tightness, no palpitations    GI: no nausea, no vomiting, no change in stools, no fatty stools, no GERD    : negative    MUSCULOSKELETAL: no myalgias, no arthralgias    ENDOCRINE: no excessive thirst, blood sugars with adequate control    NEURO:  No headache    SLEEP: all over the board    PSYCHIATRIC: mood good          Past Medical and Surgical History:     Past Medical  History:   Diagnosis Date     CF (cystic fibrosis) (H)      Exocrine pancreatic insufficiency      Nocardia infection      Pseudomonas infection      S/P gastrostomy (H) 2002     Past Surgical History:   Procedure Laterality Date     COLONOSCOPY N/A 5/21/2018    Procedure: COMBINED COLONOSCOPY, SINGLE OR MULTIPLE BIOPSY/POLYPECTOMY BY BIOPSY;  Cystic fibrosis, 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;  Surgeon: Margarito Bowman MD;  Location: [facility]     GASTROSTOMY TUBE  2002     PICC INSERTION Left 01/22/2018    4Fr SL BioFlo PICC, 46cm (5cm external) in the L basilic vein w/ tip in the low SVC           Family History:     Family History   Problem Relation Age of Onset     Heart Disease Maternal Grandmother      Heart Disease Maternal Grandfather      Heart Disease Paternal Grandmother      Diabetes No family hx of             Social History:     Social History     Socioeconomic History     Marital status: Single     Spouse name: Not on file     Number of children: 0     Years of education: Not on file     Highest education level: Not on file   Occupational History     Occupation: financial palnner     Employer: [employer]   Social Needs     Financial resource strain: Not on file     Food insecurity     Worry: Not on file     Inability: Not on file     Transportation needs     Medical: Not on file     Non-medical: Not on file   Tobacco Use     Smoking status: Never Smoker     Smokeless tobacco: Never Used   Substance and Sexual Activity     Alcohol use: No     Alcohol/week: 0.0 standard drinks     Drug use: No     Sexual activity: Yes     Partners: Female     Birth control/protection: Pill   Lifestyle     Physical activity     Days per week: Not on file     Minutes per session: Not on file     Stress: Not on file   Relationships     Social connections     Talks on phone: Not on file     Gets together: Not on file     Attends Nondenominational service: Not on file     Active member of club or organization: Not on file      Attends meetings of clubs or organizations: Not on file     Relationship status: Not on file     Intimate partner violence     Fear of current or ex partner: Not on file     Emotionally abused: Not on file     Physically abused: Not on file     Forced sexual activity: Not on file   Other Topics Concern     Parent/sibling w/ CABG, MI or angioplasty before 65F 55M? Not Asked      Service Not Asked     Blood Transfusions No     Caffeine Concern Not Asked     Occupational Exposure Not Asked     Hobby Hazards Not Asked     Sleep Concern Not Asked     Stress Concern Not Asked     Weight Concern Not Asked     Special Diet Not Asked     Back Care Not Asked     Exercise No     Bike Helmet Not Asked     Seat Belt Not Asked     Self-Exams Not Asked   Social History Narrative    8/15/2019 - .  Lives with his English Bulldog (Jefry) in a house that he designed in [place].  No children.            Medications:     Current Outpatient Medications   Medication     ACE NOT PRESCRIBED, INTENTIONAL,     acetylcysteine (MUCOMYST) 10 % nebulizer solution     amylase-lipase-protease (CREON) 79160-48762 units CPEP per EC capsule     azithromycin (ZITHROMAX) 250 MG tablet     aztreonam (AZACTAM) 1 GM vial     BD OMI U/F 32G X 4 MM insulin pen needle     blood glucose (NO BRAND SPECIFIED) lancets standard     blood glucose (NO BRAND SPECIFIED) test strip     blood glucose monitoring (MARTINA CONTOUR NEXT MONITOR) meter device kit     blood glucose monitoring (MARTINA CONTOUR NEXT) test strip     blood glucose monitoring (MARTINA MICROLET) lancets     blood glucose monitoring (NO BRAND SPECIFIED) meter device kit     budesonide (PULMICORT) 1 MG/2ML neb solution     budesonide-formoterol (SYMBICORT) 160-4.5 MCG/ACT Inhaler     CALCIUM PO     Continuous Blood Gluc Sensor (FREESTYLE SUKHWINDER 14 DAY SENSOR) MISC     doxycycline hyclate (VIBRAMYCIN) 100 MG capsule     eszopiclone (LUNESTA) 2 MG tablet     [facility]  "MANAGED PATIENT     Ferrous Fumarate-Vitamin C (VITRON-C) 200-125 MG TABS     insulin aspart (NOVOLOG PENFILL) 100 UNIT/ML cartridge     insulin glargine (LANTUS SOLOSTAR) 100 UNIT/ML pen     insulin glargine (LANTUS SOLOSTAR) 100 UNIT/ML pen     insulin glargine (LANTUS SOLOSTAR) 100 UNIT/ML pen     insulin pen needle (32G X 4 MM) 32G X 4 MM miscellaneous     levalbuterol (XOPENEX HFA) 45 MCG/ACT inhaler     levalbuterol (XOPENEX) 1.25 MG/3ML neb solution     MEPHYTON 5 MG PO TABS     MULTIVITAMIN OR     Nebulizers (EFLOW SCF NEBULIZER HANDSET) MISC     oseltamivir (TAMIFLU) 75 MG capsule     polyethylene glycol (MIRALAX/GLYCOLAX) powder     Respiratory Therapy Supplies (KRIS ALTERA NEBULIZER HANDSET) MISC     Respiratory Therapy Supplies (KRIS ALTERA NEBULIZER HANDSET) MISC     sodium chloride inhalant 7 % NEBU neb solution     Syringe/Needle, Disp, 18G X 1\" 5 ML MISC     water for injection sterile SOLN     elexacaftor-tezacaftor-ivacaftor & ivacaftor (TRIKAFTA) 100-50-75 & 150 MG tablet pack     No current facility-administered medications for this visit.             Physical Exam:   /83   Pulse 86   Resp 18   Wt 69.5 kg (153 lb 3.5 oz)   SpO2 97%   BMI 23.03 kg/m      Constitutional:   Awake, alert and in no apparent distress     Eyes:   nonicteric     ENT:   Mask in place     Neck:   Supple without supraclavicular or cervical lymphadenopathy     Lungs:   Good air flow.  No crackles. +left apical rhonchi.  No wheezes.     Cardiovascular:   Normal S1 and S2.  RRR.  No murmur, gallop or rub.     Abdomen:   NABS, soft, nontender, nondistended. g tube     Musculoskeletal:   No edema, digital clubbing present     Neurologic:   Alert and conversant.     Skin:   Warm, dry.  No rash on limited exam.             Data:   All laboratory and imaging data reviewed.    Cystic Fibrosis Culture  Specimen Description   Date Value Ref Range Status   05/04/2021 Sputum  Final   05/04/2021 Sputum  Final   05/04/2021 " Sputum  Final    Culture Micro   Date Value Ref Range Status   05/04/2021 Moderate growth  Normal santi    Preliminary   05/04/2021 (A)  Preliminary    Light growth  mucoid  Non lactose fermenting gram negative rods     05/04/2021 Culture in progress  Preliminary   05/04/2021   Preliminary    Culture received and in progress.  Positive AFB results are called as soon as detected.    Final report to follow in 7 to 8 weeks.     05/04/2021   Preliminary    Assayed at [facility] [address] [place] [id number] [phone]        Recent Results (from the past 168 hour(s))   HCL SPIROMETRY    Collection Time: 04/30/21 12:00 AM   Result Value Ref Range    FVC 3.67     FEV-1 1.73     FEV1/FVC 0.47     FEF 25/75     General PFT Lab (Please always keep checked)    Collection Time: 05/04/21  7:53 AM   Result Value Ref Range    FVC-Pred 4.87 L    FVC-Pre 3.80 L    FVC-%Pred-Pre 77 %    FEV1-Pre 1.79 L    FEV1-%Pred-Pre 45 %    FEV1FVC-Pred 80 %    FEV1FVC-Pre 47 %    FEFMax-Pred 9.70 L/sec    FEFMax-Pre 6.97 L/sec    FEFMax-%Pred-Pre 71 %    FEF2575-Pred 3.76 L/sec    FEF2575-Pre 0.61 L/sec    EWT7741-%Pred-Pre 16 %    ExpTime-Pre 14.77 sec    FIFMax-Pre 4.86 L/sec    FEV1FEV6-Pred 81 %    FEV1FEV6-Pre 56 %   Cystic Fibrosis Culture Aerob Bacterial    Collection Time: 05/04/21  7:55 AM    Specimen: Sputum   Result Value Ref Range    Specimen Description Sputum     Culture Micro Moderate growth  Normal santi       Culture Micro Culture in progress     Culture Micro (A)      Moderate growth  mucoid  Non lactose fermenting gram negative rods     Cystic Fibrosis Culture Aerob Bacterial    Collection Time: 05/04/21  9:21 AM    Specimen: Sputum   Result Value Ref Range    Specimen Description Sputum     Culture Micro Moderate growth  Normal santi       Culture Micro (A)      Light growth  mucoid  Non lactose fermenting gram negative rods      Culture Micro Culture in progress    Acid-Fast Bacilli Culture and Stain     Collection Time: 05/04/21  9:21 AM    Specimen: Sputum   Result Value Ref Range    Specimen Description Sputum     Culture Micro       Culture received and in progress.  Positive AFB results are called as soon as detected.    Final report to follow in 7 to 8 weeks.      Culture Micro       Assayed at [facility] [address] [place] [address] [phone]   AFB Stain Non Blood    Collection Time: 05/04/21  9:21 AM    Specimen: Sputum   Result Value Ref Range    Specimen Description Sputum     AFB Stain Negative for acid fast bacteria     AFB Stain       Assayed at [facility] [address] [place] [address] [phone]   Hepatic panel    Collection Time: 05/04/21  9:45 AM   Result Value Ref Range    Bilirubin Direct 0.1 0.0 - 0.2 mg/dL    Bilirubin Total 0.4 0.2 - 1.3 mg/dL    Albumin 3.3 (L) 3.4 - 5.0 g/dL    Protein Total 7.6 6.8 - 8.8 g/dL    Alkaline Phosphatase 135 40 - 150 U/L    ALT 28 0 - 70 U/L    AST 19 0 - 45 U/L   CK total    Collection Time: 05/04/21  9:45 AM   Result Value Ref Range    CK Total 54 30 - 300 U/L   Basic metabolic panel    Collection Time: 05/04/21  9:45 AM   Result Value Ref Range    Sodium 140 133 - 144 mmol/L    Potassium 4.1 3.4 - 5.3 mmol/L    Chloride 104 94 - 109 mmol/L    Carbon Dioxide 29 20 - 32 mmol/L    Anion Gap 6 3 - 14 mmol/L    Glucose 115 (H) 70 - 99 mg/dL    Urea Nitrogen 22 7 - 30 mg/dL    Creatinine 0.80 0.66 - 1.25 mg/dL    GFR Estimate >90 >60 mL/min/[1.73_m2]    GFR Estimate If Black >90 >60 mL/min/[1.73_m2]    Calcium 9.3 8.5 - 10.1 mg/dL   Lipid Profile    Collection Time: 05/04/21  9:45 AM   Result Value Ref Range    Cholesterol 159 <200 mg/dL    Triglycerides 115 <150 mg/dL    HDL Cholesterol 40 >39 mg/dL    LDL Cholesterol Calculated 96 <100 mg/dL    Non HDL Cholesterol 119 <130 mg/dL   Iron    Collection Time: 05/04/21  9:45 AM   Result Value Ref Range    Iron 45 35 - 180 ug/dL   GGT    Collection Time: 05/04/21  9:45 AM   Result  Value Ref Range    GGT 19 0 - 75 U/L   Hemoglobin A1c    Collection Time: 05/04/21  9:45 AM   Result Value Ref Range    Hemoglobin A1C 6.2 (H) 0 - 5.6 %   IgA    Collection Time: 05/04/21  9:45 AM   Result Value Ref Range     84 - 499 mg/dL   IgG    Collection Time: 05/04/21  9:45 AM   Result Value Ref Range    IGG 1,208 610 - 1,616 mg/dL   IgM    Collection Time: 05/04/21  9:45 AM   Result Value Ref Range    IGM 98 35 - 242 mg/dL   IgE    Collection Time: 05/04/21  9:45 AM   Result Value Ref Range    IGE 16 0 - 114 KIU/L   INR    Collection Time: 05/04/21  9:45 AM   Result Value Ref Range    INR 0.99 0.86 - 1.14   Magnesium    Collection Time: 05/04/21  9:45 AM   Result Value Ref Range    Magnesium 2.4 (H) 1.6 - 2.3 mg/dL   Phosphorus    Collection Time: 05/04/21  9:45 AM   Result Value Ref Range    Phosphorus 3.1 2.5 - 4.5 mg/dL   Testosterone total     Collection Time: 05/04/21  9:45 AM   Result Value Ref Range    Testosterone Total 418 240 - 950 ng/dL   TSH with free T4 reflex    Collection Time: 05/04/21  9:45 AM   Result Value Ref Range    TSH 1.49 0.40 - 4.00 mU/L   Vitamin D Deficiency    Collection Time: 05/04/21  9:45 AM   Result Value Ref Range    Vitamin D Deficiency screening 45 20 - 75 ug/L   CBC with platelets differential    Collection Time: 05/04/21  9:45 AM   Result Value Ref Range    WBC 10.4 4.0 - 11.0 10e9/L    RBC Count 5.55 4.4 - 5.9 10e12/L    Hemoglobin 15.9 13.3 - 17.7 g/dL    Hematocrit 50.3 40.0 - 53.0 %    MCV 91 78 - 100 fl    MCH 28.6 26.5 - 33.0 pg    MCHC 31.6 31.5 - 36.5 g/dL    RDW 13.0 10.0 - 15.0 %    Platelet Count 286 150 - 450 10e9/L    Diff Method Automated Method     % Neutrophils 70.2 %    % Lymphocytes 17.3 %    % Monocytes 8.3 %    % Eosinophils 3.2 %    % Basophils 0.7 %    % Immature Granulocytes 0.3 %    Nucleated RBCs 0 0 /100    Absolute Neutrophil 7.3 1.6 - 8.3 10e9/L    Absolute Lymphocytes 1.8 0.8 - 5.3 10e9/L    Absolute Monocytes 0.9 0.0 - 1.3 10e9/L     Absolute Eosinophils 0.3 0.0 - 0.7 10e9/L    Absolute Basophils 0.1 0.0 - 0.2 10e9/L    Abs Immature Granulocytes 0.0 0 - 0.4 10e9/L    Absolute Nucleated RBC 0.0    Erythrocyte sedimentation rate auto    Collection Time: 05/04/21  9:45 AM   Result Value Ref Range    Sed Rate 11 0 - 15 mm/h   Routine UA with microscopic    Collection Time: 05/04/21  9:54 AM   Result Value Ref Range    Color Urine Yellow     Appearance Urine Clear     Glucose Urine Negative NEG^Negative mg/dL    Bilirubin Urine Negative NEG^Negative    Ketones Urine Negative NEG^Negative mg/dL    Specific Gravity Urine 1.019 1.003 - 1.035    Blood Urine Negative NEG^Negative    pH Urine 6.0 5.0 - 7.0 pH    Protein Albumin Urine Negative NEG^Negative mg/dL    Urobilinogen mg/dL 0.0 0.0 - 2.0 mg/dL    Nitrite Urine Negative NEG^Negative    Leukocyte Esterase Urine Negative NEG^Negative    Source Midstream Urine     WBC Urine 1 0 - 5 /HPF    RBC Urine 1 0 - 2 /HPF    Mucous Urine Present (A) NEG^Negative /LPF   Albumin Random Urine Quantitative with Creat Ratio    Collection Time: 05/04/21  9:54 AM   Result Value Ref Range    Creatinine Urine 114 mg/dL    Albumin Urine mg/L 15 mg/L    Albumin Urine mg/g Cr 12.89 0 - 17 mg/g Cr       PFT: Severe obstructive lung disease.  When compared to 12/16/2020, the FEV1 and FVC have increase.  The decrease in FVC may represent air trapping v. restrictive physiology.  Lung volumes would be necessary to determine.    Pulmonary exacerbation: absent    40 minutes spent on the date of the encounter doing chart review, history and exam, documentation and further activities per the note        Again, thank you for allowing me to participate in the care of your patient.        Sincerely,        Jaye Machado MD

## 2021-05-04 NOTE — PROGRESS NOTES
Clinical Update:                                                    At the request of Dr. Machado, a chart review was conducted for Nico Morales.    Reason for Chart Review: Trikafta Lab Monitoring (6 month follow up)    Discussion: Kilo has been on Trikafta since 11/15/19.    Labs were reviewed from [facility]. All labs were WNL as they were previously    Lab Results   Component Value Date    ALT 28 05/04/2021    AST 19 05/04/2021    BILITOTAL 0.4 05/04/2021    DBIL 0.1 05/04/2021    CKT 54 05/04/2021       Plan:  1. Continue Trikafta  2. Recheck hepatic panel and CK in one year      Gracie Allen PharmD  Cystic Fibrosis MTM Pharmacist  [facility]  Voicemail: [phone]

## 2021-05-04 NOTE — PROGRESS NOTES
[facility]  May 4, 2021         Assessment and Plan:   Nico Morales is a 45 year old male with cystic fibrosis.    1. CF lung disease with severe obstruction: Kilo reports that he continues to feel well from a pulmonary point of view.  He continues to produce sputum daily that is usually yellow and green in color.  He remains quite active with his stepchildren.  Kilo historically has grown out Pseudomonas in his sputum.  He does continue to alternate inhalational aztreonam for 2 weeks followed by doxycycline orally for 2 weeks.  There has been some concern that Kilo and I have discussed about declining pulmonary function after initiation of Trikafta.  Today he does shown improvement.  We had considered doing IVs or some form of antibiotic if he had not improved today.  Fortunately both he and I are pleased with the increase he is had on both his FEV1 and FVC.  At this time I have recommended to Kilo:  --He should discontinue the inhalational hypertonic saline  --He will reinitiate inhalational Mucomyst  --He should continue to do his vest and nebulized therapies  --I have asked him to be seen in person at his next visit for repeat pulmonary function.  If he does not show evidence of ongoing improvement in his pulmonary function I would consider antibiotics at that time.    2. Pancreatic Insufficiency/GI: Kilo has no new symptoms consistent with worsening malabsorption.  Kilo in the past has had to use tube feeds to support his nutrition and maintain his weight.  He is now doing well without supplementation.  - continue the present dose of pancreatic enzymes  - continue vitamin supplementation.    3.  CF TR modulator therapy: Kilo is on Trikafta and tolerating it very well.  He should continue on full dose medication.    4.  CF related diabetes: Kilo reports very good control of his blood sugars.  He he is having to decrease his insulin dosing  because of lower blood sugars.  We discussed how this is often seen with the use of Trikafta.    5.  Psychosocial: Kilo was  in January.  He is building a new house with his wife and family.  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  He remains busy and reports his mood is good.    Annual studies due: 5/2021  Immunizations: UTD  Colonoscopy: 6/2021    Jaye Machado MD MPH  Associate Professor of Medicine  Pulmonary, Allergy, Critical Care and Sleep Medicine      Interval History:     Kilo reports that his pulmonary symptoms are all over the board.  He does alternate again between oral doxycycline and inhalational aztreonam.  When he first started inhalational Pulmicort he had some hemoptysis but this is since resolved.  He continues to produce sputum daily that is yellow to green in color.  He denies any shortness of breath.  He is performing vest therapy 3 times a day.         Review of Systems:     CONSTITUTIONAL: no fever, no chills, no sweats, no change in weight, no change in energy--pretty good, no change in appetite    INTEGUMENTARY/SKIN: + puritis    ENT/MOUTH: no sore throat, no new sinus pain, no new nasal drainage, no new nasal congestion, + ear ringing--not bothersome     RESPIRATORY: see interval history    CV: + chest tightness, no palpitations    GI: no nausea, no vomiting, no change in stools, no fatty stools, no GERD    : negative    MUSCULOSKELETAL: no myalgias, no arthralgias    ENDOCRINE: no excessive thirst, blood sugars with adequate control    NEURO:  No headache    SLEEP: all over the board    PSYCHIATRIC: mood good          Past Medical and Surgical History:     Past Medical History:   Diagnosis Date     CF (cystic fibrosis) (H)      Exocrine pancreatic insufficiency      Nocardia infection      Pseudomonas infection      S/P gastrostomy (H) 2002     Past Surgical History:   Procedure Laterality Date     COLONOSCOPY N/A 5/21/2018    Procedure: COMBINED  COLONOSCOPY, SINGLE OR MULTIPLE BIOPSY/POLYPECTOMY BY BIOPSY;  Cystic fibrosis, 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;  Surgeon: Margarito Bowman MD;  Location: [facility]     GASTROSTOMY TUBE  2002     PICC INSERTION Left 01/22/2018    4Fr SL BioFlo PICC, 46cm (5cm external) in the L basilic vein w/ tip in the low SVC           Family History:     Family History   Problem Relation Age of Onset     Heart Disease Maternal Grandmother      Heart Disease Maternal Grandfather      Heart Disease Paternal Grandmother      Diabetes No family hx of             Social History:     Social History     Socioeconomic History     Marital status: Single     Spouse name: Not on file     Number of children: 0     Years of education: Not on file     Highest education level: Not on file   Occupational History     Occupation: financial palnner     Employer: [employer]   Social Needs     Financial resource strain: Not on file     Food insecurity     Worry: Not on file     Inability: Not on file     Transportation needs     Medical: Not on file     Non-medical: Not on file   Tobacco Use     Smoking status: Never Smoker     Smokeless tobacco: Never Used   Substance and Sexual Activity     Alcohol use: No     Alcohol/week: 0.0 standard drinks     Drug use: No     Sexual activity: Yes     Partners: Female     Birth control/protection: Pill   Lifestyle     Physical activity     Days per week: Not on file     Minutes per session: Not on file     Stress: Not on file   Relationships     Social connections     Talks on phone: Not on file     Gets together: Not on file     Attends [employer]an service: Not on file     Active member of club or organization: Not on file     Attends meetings of clubs or organizations: Not on file     Relationship status: Not on file     Intimate partner violence     Fear of current or ex partner: Not on file     Emotionally abused: Not on file     Physically abused: Not on file     Forced sexual activity: Not on file    Other Topics Concern     Parent/sibling w/ CABG, MI or angioplasty before 65F 55M? Not Asked      Service Not Asked     Blood Transfusions No     Caffeine Concern Not Asked     Occupational Exposure Not Asked     Hobby Hazards Not Asked     Sleep Concern Not Asked     Stress Concern Not Asked     Weight Concern Not Asked     Special Diet Not Asked     Back Care Not Asked     Exercise No     Bike Helmet Not Asked     Seat Belt Not Asked     Self-Exams Not Asked   Social History Narrative    8/15/2019 - .  Lives with his English Kaidendog (Jefry) in a house that he designed in [place].  No children.            Medications:     Current Outpatient Medications   Medication     ACE NOT PRESCRIBED, INTENTIONAL,     acetylcysteine (MUCOMYST) 10 % nebulizer solution     amylase-lipase-protease (CREON) 99639-33978 units CPEP per EC capsule     azithromycin (ZITHROMAX) 250 MG tablet     aztreonam (AZACTAM) 1 GM vial     BD OMI U/F 32G X 4 MM insulin pen needle     blood glucose (NO BRAND SPECIFIED) lancets standard     blood glucose (NO BRAND SPECIFIED) test strip     blood glucose monitoring (MARTINA CONTOUR NEXT MONITOR) meter device kit     blood glucose monitoring (MARTINA CONTOUR NEXT) test strip     blood glucose monitoring (MARTINA MICROLET) lancets     blood glucose monitoring (NO BRAND SPECIFIED) meter device kit     budesonide (PULMICORT) 1 MG/2ML neb solution     budesonide-formoterol (SYMBICORT) 160-4.5 MCG/ACT Inhaler     CALCIUM PO     Continuous Blood Gluc Sensor (FREESTYLE SUKHWINDER 14 DAY SENSOR) MISC     doxycycline hyclate (VIBRAMYCIN) 100 MG capsule     eszopiclone (LUNESTA) 2 MG tablet     [facility] INFUSION MANAGED PATIENT     Ferrous Fumarate-Vitamin C (VITRON-C) 200-125 MG TABS     insulin aspart (NOVOLOG PENFILL) 100 UNIT/ML cartridge     insulin glargine (LANTUS SOLOSTAR) 100 UNIT/ML pen     insulin glargine (LANTUS SOLOSTAR) 100 UNIT/ML pen     insulin glargine (LANTUS SOLOSTAR) 100  "UNIT/ML pen     insulin pen needle (32G X 4 MM) 32G X 4 MM miscellaneous     levalbuterol (XOPENEX HFA) 45 MCG/ACT inhaler     levalbuterol (XOPENEX) 1.25 MG/3ML neb solution     MEPHYTON 5 MG PO TABS     MULTIVITAMIN OR     Nebulizers (EFLOW SCF NEBULIZER HANDSET) MISC     oseltamivir (TAMIFLU) 75 MG capsule     polyethylene glycol (MIRALAX/GLYCOLAX) powder     Respiratory Therapy Supplies (KRIS ALTERA NEBULIZER HANDSET) MISC     Respiratory Therapy Supplies (KRIS ALTERA NEBULIZER HANDSET) MISC     sodium chloride inhalant 7 % NEBU neb solution     Syringe/Needle, Disp, 18G X 1\" 5 ML MISC     water for injection sterile SOLN     elexacaftor-tezacaftor-ivacaftor & ivacaftor (TRIKAFTA) 100-50-75 & 150 MG tablet pack     No current facility-administered medications for this visit.             Physical Exam:   /83   Pulse 86   Resp 18   Wt 69.5 kg (153 lb 3.5 oz)   SpO2 97%   BMI 23.03 kg/m      Constitutional:   Awake, alert and in no apparent distress     Eyes:   nonicteric     ENT:   Mask in place     Neck:   Supple without supraclavicular or cervical lymphadenopathy     Lungs:   Good air flow.  No crackles. +left apical rhonchi.  No wheezes.     Cardiovascular:   Normal S1 and S2.  RRR.  No murmur, gallop or rub.     Abdomen:   NABS, soft, nontender, nondistended. g tube     Musculoskeletal:   No edema, digital clubbing present     Neurologic:   Alert and conversant.     Skin:   Warm, dry.  No rash on limited exam.             Data:   All laboratory and imaging data reviewed.    Cystic Fibrosis Culture  Specimen Description   Date Value Ref Range Status   05/04/2021 Sputum  Final   05/04/2021 Sputum  Final   05/04/2021 Sputum  Final    Culture Micro   Date Value Ref Range Status   05/04/2021 Moderate growth  Normal santi    Preliminary   05/04/2021 (A)  Preliminary    Light growth  mucoid  Non lactose fermenting gram negative rods     05/04/2021 Culture in progress  Preliminary   05/04/2021   Preliminary "    Culture received and in progress.  Positive AFB results are called as soon as detected.    Final report to follow in 7 to 8 weeks.     05/04/2021   Preliminary    Assayed at [facility] [address] [place] [id number] [phone]        Recent Results (from the past 168 hour(s))   HCL SPIROMETRY    Collection Time: 04/30/21 12:00 AM   Result Value Ref Range    FVC 3.67     FEV-1 1.73     FEV1/FVC 0.47     FEF 25/75     General PFT Lab (Please always keep checked)    Collection Time: 05/04/21  7:53 AM   Result Value Ref Range    FVC-Pred 4.87 L    FVC-Pre 3.80 L    FVC-%Pred-Pre 77 %    FEV1-Pre 1.79 L    FEV1-%Pred-Pre 45 %    FEV1FVC-Pred 80 %    FEV1FVC-Pre 47 %    FEFMax-Pred 9.70 L/sec    FEFMax-Pre 6.97 L/sec    FEFMax-%Pred-Pre 71 %    FEF2575-Pred 3.76 L/sec    FEF2575-Pre 0.61 L/sec    JYL7461-%Pred-Pre 16 %    ExpTime-Pre 14.77 sec    FIFMax-Pre 4.86 L/sec    FEV1FEV6-Pred 81 %    FEV1FEV6-Pre 56 %   Cystic Fibrosis Culture Aerob Bacterial    Collection Time: 05/04/21  7:55 AM    Specimen: Sputum   Result Value Ref Range    Specimen Description Sputum     Culture Micro Moderate growth  Normal santi       Culture Micro Culture in progress     Culture Micro (A)      Moderate growth  mucoid  Non lactose fermenting gram negative rods     Cystic Fibrosis Culture Aerob Bacterial    Collection Time: 05/04/21  9:21 AM    Specimen: Sputum   Result Value Ref Range    Specimen Description Sputum     Culture Micro Moderate growth  Normal santi       Culture Micro (A)      Light growth  mucoid  Non lactose fermenting gram negative rods      Culture Micro Culture in progress    Acid-Fast Bacilli Culture and Stain    Collection Time: 05/04/21  9:21 AM    Specimen: Sputum   Result Value Ref Range    Specimen Description Sputum     Culture Micro       Culture received and in progress.  Positive AFB results are called as soon as detected.    Final report to follow in 7 to 8 weeks.      Culture Micro        Assayed at [facility] [address] [place] [address] [phone]   AFB Stain Non Blood    Collection Time: 05/04/21  9:21 AM    Specimen: Sputum   Result Value Ref Range    Specimen Description Sputum     AFB Stain Negative for acid fast bacteria     AFB Stain       Assayed at [facility] [address] [place] [address] [phone]   Hepatic panel    Collection Time: 05/04/21  9:45 AM   Result Value Ref Range    Bilirubin Direct 0.1 0.0 - 0.2 mg/dL    Bilirubin Total 0.4 0.2 - 1.3 mg/dL    Albumin 3.3 (L) 3.4 - 5.0 g/dL    Protein Total 7.6 6.8 - 8.8 g/dL    Alkaline Phosphatase 135 40 - 150 U/L    ALT 28 0 - 70 U/L    AST 19 0 - 45 U/L   CK total    Collection Time: 05/04/21  9:45 AM   Result Value Ref Range    CK Total 54 30 - 300 U/L   Basic metabolic panel    Collection Time: 05/04/21  9:45 AM   Result Value Ref Range    Sodium 140 133 - 144 mmol/L    Potassium 4.1 3.4 - 5.3 mmol/L    Chloride 104 94 - 109 mmol/L    Carbon Dioxide 29 20 - 32 mmol/L    Anion Gap 6 3 - 14 mmol/L    Glucose 115 (H) 70 - 99 mg/dL    Urea Nitrogen 22 7 - 30 mg/dL    Creatinine 0.80 0.66 - 1.25 mg/dL    GFR Estimate >90 >60 mL/min/[1.73_m2]    GFR Estimate If Black >90 >60 mL/min/[1.73_m2]    Calcium 9.3 8.5 - 10.1 mg/dL   Lipid Profile    Collection Time: 05/04/21  9:45 AM   Result Value Ref Range    Cholesterol 159 <200 mg/dL    Triglycerides 115 <150 mg/dL    HDL Cholesterol 40 >39 mg/dL    LDL Cholesterol Calculated 96 <100 mg/dL    Non HDL Cholesterol 119 <130 mg/dL   Iron    Collection Time: 05/04/21  9:45 AM   Result Value Ref Range    Iron 45 35 - 180 ug/dL   GGT    Collection Time: 05/04/21  9:45 AM   Result Value Ref Range    GGT 19 0 - 75 U/L   Hemoglobin A1c    Collection Time: 05/04/21  9:45 AM   Result Value Ref Range    Hemoglobin A1C 6.2 (H) 0 - 5.6 %   IgA    Collection Time: 05/04/21  9:45 AM   Result Value Ref Range     84 - 499 mg/dL   IgG    Collection Time: 05/04/21  9:45 AM    Result Value Ref Range    IGG 1,208 610 - 1,616 mg/dL   IgM    Collection Time: 05/04/21  9:45 AM   Result Value Ref Range    IGM 98 35 - 242 mg/dL   IgE    Collection Time: 05/04/21  9:45 AM   Result Value Ref Range    IGE 16 0 - 114 KIU/L   INR    Collection Time: 05/04/21  9:45 AM   Result Value Ref Range    INR 0.99 0.86 - 1.14   Magnesium    Collection Time: 05/04/21  9:45 AM   Result Value Ref Range    Magnesium 2.4 (H) 1.6 - 2.3 mg/dL   Phosphorus    Collection Time: 05/04/21  9:45 AM   Result Value Ref Range    Phosphorus 3.1 2.5 - 4.5 mg/dL   Testosterone total     Collection Time: 05/04/21  9:45 AM   Result Value Ref Range    Testosterone Total 418 240 - 950 ng/dL   TSH with free T4 reflex    Collection Time: 05/04/21  9:45 AM   Result Value Ref Range    TSH 1.49 0.40 - 4.00 mU/L   Vitamin D Deficiency    Collection Time: 05/04/21  9:45 AM   Result Value Ref Range    Vitamin D Deficiency screening 45 20 - 75 ug/L   CBC with platelets differential    Collection Time: 05/04/21  9:45 AM   Result Value Ref Range    WBC 10.4 4.0 - 11.0 10e9/L    RBC Count 5.55 4.4 - 5.9 10e12/L    Hemoglobin 15.9 13.3 - 17.7 g/dL    Hematocrit 50.3 40.0 - 53.0 %    MCV 91 78 - 100 fl    MCH 28.6 26.5 - 33.0 pg    MCHC 31.6 31.5 - 36.5 g/dL    RDW 13.0 10.0 - 15.0 %    Platelet Count 286 150 - 450 10e9/L    Diff Method Automated Method     % Neutrophils 70.2 %    % Lymphocytes 17.3 %    % Monocytes 8.3 %    % Eosinophils 3.2 %    % Basophils 0.7 %    % Immature Granulocytes 0.3 %    Nucleated RBCs 0 0 /100    Absolute Neutrophil 7.3 1.6 - 8.3 10e9/L    Absolute Lymphocytes 1.8 0.8 - 5.3 10e9/L    Absolute Monocytes 0.9 0.0 - 1.3 10e9/L    Absolute Eosinophils 0.3 0.0 - 0.7 10e9/L    Absolute Basophils 0.1 0.0 - 0.2 10e9/L    Abs Immature Granulocytes 0.0 0 - 0.4 10e9/L    Absolute Nucleated RBC 0.0    Erythrocyte sedimentation rate auto    Collection Time: 05/04/21  9:45 AM   Result Value Ref Range    Sed Rate 11 0 - 15 mm/h    Routine UA with microscopic    Collection Time: 05/04/21  9:54 AM   Result Value Ref Range    Color Urine Yellow     Appearance Urine Clear     Glucose Urine Negative NEG^Negative mg/dL    Bilirubin Urine Negative NEG^Negative    Ketones Urine Negative NEG^Negative mg/dL    Specific Gravity Urine 1.019 1.003 - 1.035    Blood Urine Negative NEG^Negative    pH Urine 6.0 5.0 - 7.0 pH    Protein Albumin Urine Negative NEG^Negative mg/dL    Urobilinogen mg/dL 0.0 0.0 - 2.0 mg/dL    Nitrite Urine Negative NEG^Negative    Leukocyte Esterase Urine Negative NEG^Negative    Source Midstream Urine     WBC Urine 1 0 - 5 /HPF    RBC Urine 1 0 - 2 /HPF    Mucous Urine Present (A) NEG^Negative /LPF   Albumin Random Urine Quantitative with Creat Ratio    Collection Time: 05/04/21  9:54 AM   Result Value Ref Range    Creatinine Urine 114 mg/dL    Albumin Urine mg/L 15 mg/L    Albumin Urine mg/g Cr 12.89 0 - 17 mg/g Cr       PFT: Severe obstructive lung disease.  When compared to 12/16/2020, the FEV1 and FVC have increase.  The decrease in FVC may represent air trapping v. restrictive physiology.  Lung volumes would be necessary to determine.    Pulmonary exacerbation: absent    40 minutes spent on the date of the encounter doing chart review, history and exam, documentation and further activities per the note

## 2021-05-05 LAB
IGA SERPL-MCNC: 217 MG/DL (ref 84–499)
IGE SERPL-ACNC: 16 KIU/L (ref 0–114)
IGG SERPL-MCNC: 1208 MG/DL (ref 610–1616)
IGM SERPL-MCNC: 98 MG/DL (ref 35–242)
TESTOST SERPL-MCNC: 418 NG/DL (ref 240–950)

## 2021-05-06 LAB
ACID FAST STN SPEC QL: NORMAL
ACID FAST STN SPEC QL: NORMAL
SPECIMEN SOURCE: NORMAL

## 2021-05-07 LAB
A-TOCOPHEROL VIT E SERPL-MCNC: 15.2 MG/L (ref 5.5–18)
ANNOTATION COMMENT IMP: NORMAL
BETA+GAMMA TOCOPHEROL SERPL-MCNC: 0.3 MG/L (ref 0–6)
RETINYL PALMITATE SERPL-MCNC: 0.03 MG/L (ref 0–0.1)
VIT A SERPL-MCNC: 0.55 MG/L (ref 0.3–1.2)

## 2021-05-08 DIAGNOSIS — E84.0 CYSTIC FIBROSIS WITH PULMONARY MANIFESTATIONS (H): ICD-10-CM

## 2021-05-09 LAB
BACTERIA SPEC CULT: ABNORMAL
SPECIMEN SOURCE: ABNORMAL
SPECIMEN SOURCE: ABNORMAL

## 2021-05-10 RX ORDER — BUDESONIDE 1 MG/2ML
1 INHALANT ORAL 2 TIMES DAILY
Qty: 120 ML | Refills: 3 | Status: SHIPPED | OUTPATIENT
Start: 2021-05-10 | End: 2021-12-01

## 2021-05-10 RX ORDER — SODIUM CHLORIDE FOR INHALATION 7 %
VIAL, NEBULIZER (ML) INHALATION
Qty: 240 ML | Refills: 5 | Status: SHIPPED | OUTPATIENT
Start: 2021-05-10 | End: 2022-01-18

## 2021-05-10 RX ORDER — DOXYCYCLINE 100 MG/1
100 CAPSULE ORAL 2 TIMES DAILY
Qty: 60 CAPSULE | Refills: 2 | Status: SHIPPED | OUTPATIENT
Start: 2021-05-10 | End: 2022-02-07

## 2021-05-13 ENCOUNTER — HOSPITAL ENCOUNTER (OUTPATIENT)
Dept: RESEARCH | Facility: CLINIC | Age: 46
End: 2021-05-13
Attending: INTERNAL MEDICINE
Payer: COMMERCIAL

## 2021-05-13 VITALS — WEIGHT: 150.35 LBS | BODY MASS INDEX: 22.6 KG/M2

## 2021-05-13 PROCEDURE — 300N000007 HC RESEARCH B&I SPECIMEN PROCESSING, SIMPLE

## 2021-05-13 PROCEDURE — 510N000029 HC RESEARCH B&I MEALS, PER MEAL

## 2021-05-13 PROCEDURE — 300N000009 HC RESEARCH B&I SPECIMEN PROCESSING, COMPLEX

## 2021-05-13 PROCEDURE — 510N000018 HC RESEARCH OGTT, PER HOUR

## 2021-05-13 PROCEDURE — 510N000017 HC CRU PATIENT CARE, PER 15 MIN

## 2021-05-13 PROCEDURE — 510N000009 HC RESEARCH FACILITY, PER 15 MIN

## 2021-05-19 NOTE — PROGRESS NOTES
Respiratory Therapist Note:         Vest                Brand: Hill-Rom - traditional Hill Rom: Frequencies 8, 9, 10 at pressure 10 then frequencies 18, 19, 20 at pressure 6. and Hill-Rom - [place] Frequencies: 13-15, intensity 8-10                Cough Pause: Cough Pause; Yes                Vest Garment Size: Adult Medium                Last Fitting Date: 2021                Frequency of therapy: 21 times per week                Concerns: none         Exercise (purposeful and aerobic for >20 minutes each session): NO.                Does this qualify as additional airway clearance: No         Alternative Airway Clearance:               Nebulized Medications                Bronchodilators: Xopenex                Mucolytic: Mucomyst                Antibiotics: Aztreonam                Additional Inhaled Medications: ICS                Spacer Use: yes         Review Cleaning: Yes. Soap and boil.         Education and Transition Information                Correct order of inhaled medications: Yes                Mechanism of Action of inhaled medications: Yes                Frequency of inhaled medications: Yes                Dosage of inhaled medications: Yes                Other:          Home Care:                Nebulizer Cups (Brand/Type): [place]                Nebulizer Compressor                            Year Purchased: 2019                            [facility], Phone: [phone], Fax: [phone]                Nebulizer Supply Company:                            [facility], Phone: [phone], Fax: [phone]         Oxygen:                                     Pulmonary Rehab                Site:                 Date Completed:          Plan of Care and Goals for next visit: Keep up the good work

## 2021-05-25 ENCOUNTER — RECORDS - HEALTHEAST (OUTPATIENT)
Dept: ADMINISTRATIVE | Facility: CLINIC | Age: 46
End: 2021-05-25

## 2021-06-03 ENCOUNTER — INFUSION THERAPY VISIT (OUTPATIENT)
Dept: INFUSION THERAPY | Facility: CLINIC | Age: 46
End: 2021-06-03
Payer: COMMERCIAL

## 2021-06-03 VITALS
HEART RATE: 80 BPM | TEMPERATURE: 98.5 F | WEIGHT: 154.1 LBS | DIASTOLIC BLOOD PRESSURE: 73 MMHG | BODY MASS INDEX: 23.16 KG/M2 | OXYGEN SATURATION: 96 % | SYSTOLIC BLOOD PRESSURE: 118 MMHG

## 2021-06-03 DIAGNOSIS — E84.0 CYSTIC FIBROSIS WITH PULMONARY MANIFESTATIONS (H): ICD-10-CM

## 2021-06-03 DIAGNOSIS — M85.9 LOW BONE DENSITY: ICD-10-CM

## 2021-06-03 DIAGNOSIS — M81.0 OSTEOPOROSIS, UNSPECIFIED OSTEOPOROSIS TYPE, UNSPECIFIED PATHOLOGICAL FRACTURE PRESENCE: Primary | ICD-10-CM

## 2021-06-03 DIAGNOSIS — A49.8 PSEUDOMONAS INFECTION: ICD-10-CM

## 2021-06-03 PROCEDURE — 96365 THER/PROPH/DIAG IV INF INIT: CPT | Performed by: NURSE PRACTITIONER

## 2021-06-03 PROCEDURE — 99207 PR NO CHARGE LOS: CPT

## 2021-06-03 RX ORDER — HEPARIN SODIUM,PORCINE 10 UNIT/ML
5 VIAL (ML) INTRAVENOUS
Status: CANCELLED | OUTPATIENT
Start: 2021-06-03

## 2021-06-03 RX ORDER — HEPARIN SODIUM (PORCINE) LOCK FLUSH IV SOLN 100 UNIT/ML 100 UNIT/ML
5 SOLUTION INTRAVENOUS
Status: CANCELLED | OUTPATIENT
Start: 2021-06-03

## 2021-06-03 RX ORDER — ZOLEDRONIC ACID 5 MG/100ML
5 INJECTION, SOLUTION INTRAVENOUS ONCE
Status: CANCELLED
Start: 2021-06-03 | End: 2021-06-03

## 2021-06-03 RX ORDER — ZOLEDRONIC ACID 5 MG/100ML
5 INJECTION, SOLUTION INTRAVENOUS ONCE
Status: COMPLETED | OUTPATIENT
Start: 2021-06-03 | End: 2021-06-03

## 2021-06-03 RX ADMIN — ZOLEDRONIC ACID 5 MG: 0.05 INJECTION, SOLUTION INTRAVENOUS at 15:38

## 2021-06-03 RX ADMIN — Medication 250 ML: at 15:38

## 2021-06-03 ASSESSMENT — PAIN SCALES - GENERAL: PAINLEVEL: NO PAIN (0)

## 2021-06-03 NOTE — PROGRESS NOTES
Infusion Nursing Note:  Nico Morales presents today for Reclast.    Patient seen by provider today: No   present during visit today: Not Applicable.    Note: Pt denies any new medical concerns, see flow sheet for assessment.    Intravenous Access:  Peripheral IV placed.    Treatment Conditions:  Lab Results   Component Value Date     05/04/2021                   Lab Results   Component Value Date    POTASSIUM 4.1 05/04/2021           Lab Results   Component Value Date    MAG 2.4 05/04/2021            Lab Results   Component Value Date    CR 0.80 05/04/2021                   Lab Results   Component Value Date    CLAUDIA 9.3 05/04/2021                Lab Results   Component Value Date    BILITOTAL 0.4 05/04/2021           Lab Results   Component Value Date    ALBUMIN 3.3 05/04/2021                    Lab Results   Component Value Date    ALT 28 05/04/2021           Lab Results   Component Value Date    AST 19 05/04/2021       Results reviewed, labs MET treatment parameters, ok to proceed with treatment.      Post Infusion Assessment:  Patient tolerated infusion without incident.  Blood return noted pre and post infusion.  Site patent and intact, free from redness, edema or discomfort.  No evidence of extravasations.  Access discontinued per protocol.       Discharge Plan:   Patient discharged in stable condition accompanied by: self.  Departure Mode: Ambulatory.  Pt will RTC in 1 year for Reclast.      Vern Velasco RN

## 2021-06-27 NOTE — PATIENT INSTRUCTIONS
Cystic Fibrosis Self-Care Plan    RECOMMENDATIONS:   Augmentin one tablet twice daily for 10 days.  Call if you want to start prednisone at 10 mg.  [employer] on line for work outs.  Keila [phone]    YOUR GOAL:  Stay well.  Enjoy the Holidays!!      CF Nurse line:          Louise HendersonFrannie   [phone]            CF Resp Therapist: Felicitas Banuelos            [phone]   CF Dietitians:           Zeina Pierre            [phone]                       Ivory Bonilla                       [phone]   CF Diabetes Nurse: Jordyn Rudolph             [phone]    CF Social Workers:  Mary Ritter             [phone]                Marisa Urbina            [phone]  CF Pharmacist:        Tammy Morgan                              [phone]  [email or web address]         MRN: [id number]   Clinic Date: October 30, 2018   Patient: Nico Morales     Annual Studies:   IGG   Date Value Ref Range Status   05/03/2018 1770 (H) 695 - 1620 mg/dL Final     Insulin   Date Value Ref Range Status   07/26/2011 30 mU/L Final     Comment:     Reference Range:  0-20  +120     There are no preventive care reminders to display for this patient.      Pulmonary Function Tests  FEV1: amount of air you can blow out in 1 second  FVC: total amount of air you can take in and blow out    Your Goals:         PFT Latest Ref Rng & Units 10/30/2018   FVC L 3.81   FEV1 L 1.54   FVC% % 77   FEV1% % 38          Airway Clearance: The Most Important Way to Keep Your Lungs Healthy  Vest Settings:    Hill-Rom Frequencies: 8, 9, 10 Pressure 10 Then, Frequencies 18, 19, 20 Pressure 6      RespirTech: Quick Start with Pressure of     Do each frequency for 5 minutes; Deflate vest after each frequency & cough 3 times before beginning the next setting.    Vest and Neb Therapy should be done 3 times/day.    Good Nutrition Can Improve Lung Function and Overall Health     Take ALL of your vitamins with food     Take 1/2 of your  Start pnv  Discussed first trimester changes  Refer midwife group  Please let me know if you have any questions or concerns. enzymes before EVERY meal/snack and the other 1/2 mid-meal/snack    Wt Readings from Last 3 Encounters:   10/30/18 66.7 kg (147 lb 0.8 oz)   09/18/18 66.1 kg (145 lb 11.6 oz)   08/08/18 64.2 kg (141 lb 8.6 oz)       Body mass index is 21.72 kg/(m^2).         National CF Foundation Recommendations for BMI in CF Adults: Women: at least 22 Men: at least 23        Controlling Blood Sugars Helps Prevent Lung Infections & Improves Nutrition  Test blood sugar:     In the morning before eating (goal is )     2 hours after a meal (goal is less than 150)     When pre-meal glucose is greater than 150 add correction     At bedtime (if less than 100 eat a snack with 15 grams of carbohydrates  Last A1C Results:   Hemoglobin A1C   Date Value Ref Range Status   08/06/2018 6.5 % Final         If diabetic, measure A1C every 6 months. Goal: Under 7%    Staying Healthy    Research:  If you are interested in learning about research opportunities or have questions, please contact the CF Research Team at [phone] or [email or web address].      CF Foundation:  Compass is a personalized resource service to help you with the insurance, financial, legal and other issues you are facing.  It's free, confidential and available to anyone with CF.  Ask your  for more information or contact Compass directly at 266-COMPASS (129-3296) or compass@cff.org, or learn more at cff.[email or web address].

## 2021-06-30 ENCOUNTER — TELEPHONE (OUTPATIENT)
Dept: EDUCATION SERVICES | Facility: CLINIC | Age: 46
End: 2021-06-30

## 2021-06-30 ENCOUNTER — VIRTUAL VISIT (OUTPATIENT)
Dept: PULMONOLOGY | Facility: CLINIC | Age: 46
End: 2021-06-30
Attending: INTERNAL MEDICINE
Payer: COMMERCIAL

## 2021-06-30 ENCOUNTER — VIRTUAL VISIT (OUTPATIENT)
Dept: PHARMACY | Facility: CLINIC | Age: 46
End: 2021-06-30

## 2021-06-30 DIAGNOSIS — E08.9 DIABETES MELLITUS RELATED TO CF (CYSTIC FIBROSIS) (H): ICD-10-CM

## 2021-06-30 DIAGNOSIS — E84.9 CYSTIC FIBROSIS (H): Primary | ICD-10-CM

## 2021-06-30 DIAGNOSIS — E84.0 CYSTIC FIBROSIS WITH PULMONARY MANIFESTATIONS (H): Primary | ICD-10-CM

## 2021-06-30 DIAGNOSIS — E84.8 DIABETES MELLITUS RELATED TO CF (CYSTIC FIBROSIS) (H): ICD-10-CM

## 2021-06-30 LAB
MYCOBACTERIUM SPEC CULT: NORMAL
MYCOBACTERIUM SPEC CULT: NORMAL
SPECIMEN SOURCE: NORMAL

## 2021-06-30 PROCEDURE — 99214 OFFICE O/P EST MOD 30 MIN: CPT | Mod: GT | Performed by: INTERNAL MEDICINE

## 2021-06-30 PROCEDURE — 99207 PR NO CHARGE LOS: CPT | Performed by: PHARMACIST

## 2021-06-30 RX ORDER — INSULIN ASPART 100 [IU]/ML
INJECTION, SOLUTION INTRAVENOUS; SUBCUTANEOUS
Qty: 30 ML | Refills: 1 | Status: SHIPPED | OUTPATIENT
Start: 2021-06-30 | End: 2022-05-10

## 2021-06-30 NOTE — PATIENT INSTRUCTIONS
Cystic Fibrosis Self-Care Plan    RECOMMENDATIONS:   Kilo, It was great to see you today.  You are traveling adventure sounds amazing.  You are very brave.  The plan from today:  --Let us plan to do a chest CT without contrast.  My hope is that I can better characterize why you are having this chest tightness.  -I will have the office contact you about scheduling a colonoscopy  --I will let you know what the CT shows but we will plan on reviewing in more detail when I see you in person in August  Great job with self-cares!    YOUR GOAL:  Enjoy the summer!      [facility] Nurse line:  SHANKAR Wilson and Lexi [phone]     [facility] Fax Number:      [phone]         Cystic Fibrosis Respiratory Therapists:   Felicitas Banuelos              [phone]          Jacklyn Pina   [phone]  Cystic Fibrosis Dietitians:              Zeina Pierre              [phone]                            Ivory Bonilla                        [phone]   Cystic Fibrosis Diabetes Nurse:    Jordyn Rudolph   [phone]    Cystic Fibrosis Social Workers:     Mary Ritter               [phone]                     Louann Suarez               [phone]  Cystic Fibrosis Pharmacists:           Tammy Morgan                               [phone]         Ml Tilley   [phone]  Cystic Fibrosis Genetic Counselor:   Pretty Pulliam    [phone]    [facility] website:  [email or web address]    COVID VACCINES:    You are eligible for the COVID-19 vaccine. Sign up for your COVID vaccine via Gira[employer]. Log in, select the menu bar, select schedule an appointment, and then select COVID-19 Vaccine 1st Dose. You may also schedule by calling this number [phone] however hold times can be long.       OR schedule through the [place] Department of Health Vaccine Connector at https://vaccineconnector.[place].gov/ or by calling [phone].      The best vaccine is the one  that s available to you first.  All COVID-19 vaccines currently available in the [place] (Celestino & Celestino, Pfizer and Moderna) have been shown to be highly effect at preventing COVID-19.       We re still learning how vaccines will affect the spread of COVID-19. After you ve been fully vaccinated against COVID-19, you should keep taking precautions in public places like wearing a mask, staying 6 feet apart from others, and avoiding crowds and poorly ventilated spaces until we know more.    People are considered fully vaccinated:  2 weeks after their second dose in a 2-dose series, such as the Pfizer or Moderna vaccines, or  2 weeks after a single-dose vaccine, such as Celestino & Celestino s Stephanie vaccine    If you ve been fully vaccinated:  You can gather indoors with fully vaccinated people without wearing a mask.  You can gather indoors with unvaccinated people from one other household (for example, visiting with relatives who all live together) without masks, unless any of those people or anyone they live with has an increased risk for severe illness from COVID-19.  If you ve been around someone who has COVID-19, you do not need to stay away from others or get tested unless you have symptoms.  However, if you live in a group setting (like a correctional or nursing home facility or group home) and are around someone who has COVID-19, you should still stay away from others for 14 days and get tested, even if you don t have symptoms.         MRN: [id number]   Clinic Date: June 30, 2021   Patient: Nico Morales     Annual Studies:   IGG   Date Value Ref Range Status   05/04/2021 1,208 610 - 1,616 mg/dL Final     Insulin   Date Value Ref Range Status   07/26/2011 30 mU/L Final     Comment:     Reference Range:  0-20  +120     There are no preventive care reminders to display for this patient.    Pulmonary Function Tests  FEV1: amount of air you can blow out in 1 second  FVC: total amount of air you can take in  and blow out    Your Goals:         PFT Latest Ref Rng & Units 5/4/2021   FVC L 3.80   FEV1 L 1.79   FVC% % 77   FEV1% % 45          Airway Clearance: The Most Important Way to Keep Your Lungs Healthy  Vest Settings:    Hill-Rom Frequencies: 8, 9, 10 Pressure 10 Then, Frequencies 18, 19, 20 Pressure 6      RespirTech: Quick Start with Pressure of     Do each frequency for 5 minutes; Deflate vest after each frequency & cough 3 times before beginning the next setting.    Vest and Neb Therapy should be done 2 times/day.    Good Nutrition Can Improve Lung Function and Overall Health     Take ALL of your vitamins with food     Take 1/2 of your enzymes before EVERY meal/snack and the other 1/2 mid-meal/snack    Wt Readings from Last 3 Encounters:   06/03/21 69.9 kg (154 lb 1.6 oz)   05/13/21 68.2 kg (150 lb 5.7 oz)   05/04/21 69.5 kg (153 lb 3.5 oz)       There is no height or weight on file to calculate BMI.         National CF Foundation Recommendations for BMI in CF Adults: Women: at least 22 Men: at least 23        Controlling Blood Sugars Helps Prevent Lung Infections & Improves Nutrition  Test blood sugar:     In the morning before eating (goal is )     2 hours after a meal (goal is less than 150)     When pre-meal glucose is greater than 150 add correction     At bedtime (if less than 100 eat a snack with 15 grams of carbohydrates  Last A1C Results:   Hemoglobin A1C   Date Value Ref Range Status   05/04/2021 6.2 (H) 0 - 5.6 % Final     Comment:     Normal <5.7% Prediabetes 5.7-6.4%  Diabetes 6.5% or higher - adopted from ADA   consensus guidelines.           If diabetic, measure A1C every 6 months. Goal: Under 7%    Staying Healthy    Research:  If you are interested in learning about research opportunities or have questions, please contact the CF Research Team at [phone] or [email or web address].      CF Foundation:  Compass is a personalized resource service to help you with the insurance, financial,  legal and other issues you are facing.  It's free, confidential and available to anyone with CF.  Ask your  for more information or contact Compass directly at 814-OPAHCCJ ([phone]) or compass@cff.org, or learn more at cff.org/compass.

## 2021-06-30 NOTE — LETTER
6/30/2021         RE: Nico Morales  [address] [place] [address]        Dear Colleague,    Thank you for referring your patient, Nico Morales, to the [facility]. Please see a copy of my visit note below.    Kilo is a 45 year old who is being evaluated via a billable video visit.      How would you like to obtain your AVS? MyChart  If the video visit is dropped, the invitation should be resent by: Other e-mail: [email or web address]  Will anyone else be joining your video visit? No    [facility]  June 30, 2021         Assessment and Plan:   Nico Morales is a 45 year old male with cystic fibrosis.    1. 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  He has little in the way of cough.  When he does produce sputum that is yellow-green in color.  He does continue to produce some daily.  Historically Kilo has grown out Pseudomonas and staph aureus in his sputum.  Most recently he has shown evidence of slight improvement in his pulmonary function.  At this time I have recommended to Kilo:  --He should continue to do his vest and nebulized therapy 3 times daily  --Because of the persistent chest tightness that is now been the present since January I have recommended to Kilo that he undergo a noncontrast chest CT.  I suspect some of his discomfort is secondary to his loss of volume in his left upper and lingula.  --He should continue to remain active and has not has really improved his conditioning and activity tolerance    2. Pancreatic Insufficiency/GI: Kilo has no new symptoms consistent with worsening malabsorption.    - continue the present dose of pancreatic enzymes  - continue vitamin supplementation.    3.  Cystic fibrosis related diabetes: Kilo reports excellent control of his blood sugars.  He is followed by  endocrinology.    4.  CF TR modulator therapy: Kilo is on Trikafta and tolerating well.  He has had stable labs.    5.  Healthcare maintenance: Kilo is due for repeat colonoscopy at the end of the year.  I will have the office contact him to get that scheduled.    6.  Psychosocial: Kilo just returned from a 9-day road trip with the .  His family went to [place] and down to [place].  He enjoyed it very much.  He is a little bit tired today.    Annual studies due: 5/2022  Immunizations: UTD  Colonoscopy: 5/2021    Jaye Machado MD MPH  Associate Professor of Medicine  Pulmonary, Allergy, Critical Care and Sleep Medicine        Interval History:     Kilo reports that he is no significant change in his cough or sputum production.  Again he has daily sputum that is green to yellow in color.  His chest congestion and activity tolerance are unchanged.  He continues to have a persistent chest tightness across his anterior chest.  He notices in particular with deep breathing.  He does continue to perform the degree vest therapies per day.         Review of Systems:     CONSTITUTIONAL: no fever, no chills, no sweats, no change in weight--stable at 151, no change in energy, no change in appetite    INTEGUMENTARY/SKIN: no rash, no obvious new lesions    ENT/MOUTH: no sore throat, no new sinus pain, no new nasal drainage, no new nasal congestion, chronic ear ringing     RESPIRATORY: see interval history    CV: no chest pain, no palpitations, no peripheral edema    GI: no nausea, no vomiting, no change in stools, no fatty stools, no GERD    : negative    MUSCULOSKELETAL: no myalgias, no arthralgias    ENDOCRINE: blood sugars with adequate control    NEURO:  No headache    SLEEP: no issues    PSYCHIATRIC: mood stable--good          Past Medical and Surgical History:     Past Medical History:   Diagnosis Date     CF (cystic fibrosis) (H)      Exocrine pancreatic insufficiency      Nocardia infection      Pseudomonas  infection      S/P gastrostomy (H) 2002     Past Surgical History:   Procedure Laterality Date     COLONOSCOPY N/A 5/21/2018    Procedure: COMBINED COLONOSCOPY, SINGLE OR MULTIPLE BIOPSY/POLYPECTOMY BY BIOPSY;  Cystic fibrosis, 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;  Surgeon: Margarito Bowman MD;  Location: [facility]     GASTROSTOMY TUBE  2002     PICC INSERTION Left 01/22/2018    4Fr SL BioFlo PICC, 46cm (5cm external) in the L basilic vein w/ tip in the low SVC           Family History:     Family History   Problem Relation Age of Onset     Heart Disease Maternal Grandmother      Heart Disease Maternal Grandfather      Heart Disease Paternal Grandmother      Diabetes No family hx of             Social History:     Social History     Socioeconomic History     Marital status:      Spouse name: Not on file     Number of children: 0     Years of education: Not on file     Highest education level: Not on file   Occupational History     Occupation: financial palnner     Employer: [employer]   Social Needs     Financial resource strain: Not on file     Food insecurity     Worry: Not on file     Inability: Not on file     Transportation needs     Medical: Not on file     Non-medical: Not on file   Tobacco Use     Smoking status: Never Smoker     Smokeless tobacco: Never Used   Substance and Sexual Activity     Alcohol use: No     Alcohol/week: 0.0 standard drinks     Drug use: No     Sexual activity: Yes     Partners: Female     Birth control/protection: Pill   Lifestyle     Physical activity     Days per week: Not on file     Minutes per session: Not on file     Stress: Not on file   Relationships     Social connections     Talks on phone: Not on file     Gets together: Not on file     Attends Denominational service: Not on file     Active member of club or organization: Not on file     Attends meetings of clubs or organizations: Not on file     Relationship status: Not on file     Intimate partner violence     Fear of  current or ex partner: Not on file     Emotionally abused: Not on file     Physically abused: Not on file     Forced sexual activity: Not on file   Other Topics Concern     Parent/sibling w/ CABG, MI or angioplasty before 65F 55M? Not Asked      Service Not Asked     Blood Transfusions No     Caffeine Concern Not Asked     Occupational Exposure Not Asked     Hobby Hazards Not Asked     Sleep Concern Not Asked     Stress Concern Not Asked     Weight Concern Not Asked     Special Diet Not Asked     Back Care Not Asked     Exercise No     Bike Helmet Not Asked     Seat Belt Not Asked     Self-Exams Not Asked   Social History Narrative    8/15/2019 - .  Lives with his English Bulldog (Jefry) in a house that he designed in [place].  No children.            Medications:     Current Outpatient Medications   Medication     ACE NOT PRESCRIBED, INTENTIONAL,     acetylcysteine (MUCOMYST) 10 % nebulizer solution     amylase-lipase-protease (CREON) 13965-61353 units CPEP per EC capsule     azithromycin (ZITHROMAX) 250 MG tablet     aztreonam (AZACTAM) 1 GM vial     BD OMI U/F 32G X 4 MM insulin pen needle     blood glucose (NO BRAND SPECIFIED) lancets standard     blood glucose (NO BRAND SPECIFIED) test strip     blood glucose monitoring (MARTINA CONTOUR NEXT MONITOR) meter device kit     blood glucose monitoring (MARTINA CONTOUR NEXT) test strip     blood glucose monitoring (MARTINA MICROLET) lancets     blood glucose monitoring (NO BRAND SPECIFIED) meter device kit     budesonide (PULMICORT) 1 MG/2ML neb solution     budesonide-formoterol (SYMBICORT) 160-4.5 MCG/ACT Inhaler     CALCIUM PO     Continuous Blood Gluc Sensor (FREESTYLE SUKHWINDER 14 DAY SENSOR) MISC     doxycycline hyclate (VIBRAMYCIN) 100 MG capsule     elexacaftor-tezacaftor-ivacaftor & ivacaftor (TRIKAFTA) 100-50-75 & 150 MG tablet pack     eszopiclone (LUNESTA) 2 MG tablet     [facility] MANAGED PATIENT     Ferrous Fumarate-Vitamin C  "(VITRON-C) 200-125 MG TABS     insulin aspart (NOVOLOG PENFILL) 100 UNIT/ML cartridge     insulin glargine (LANTUS SOLOSTAR) 100 UNIT/ML pen     insulin glargine (LANTUS SOLOSTAR) 100 UNIT/ML pen     insulin glargine (LANTUS SOLOSTAR) 100 UNIT/ML pen     insulin pen needle (32G X 4 MM) 32G X 4 MM miscellaneous     levalbuterol (XOPENEX HFA) 45 MCG/ACT inhaler     levalbuterol (XOPENEX) 1.25 MG/3ML neb solution     MEPHYTON 5 MG PO TABS     MULTIVITAMIN OR     Nebulizers (EFLOW SCF NEBULIZER HANDSET) MISC     oseltamivir (TAMIFLU) 75 MG capsule     polyethylene glycol (MIRALAX/GLYCOLAX) powder     Respiratory Therapy Supplies (KRIS ALTERA NEBULIZER HANDSET) MISC     Respiratory Therapy Supplies (KRIS ALTERA NEBULIZER HANDSET) MISC     sodium chloride inhalant 7 % NEBU neb solution     Syringe/Needle, Disp, 18G X 1\" 5 ML MISC     water for injection sterile SOLN     No current facility-administered medications for this visit.             Physical Exam:   There were no vitals taken for this visit.  GENERAL: Healthy, alert and no distress  EYES: Eyes grossly normal to inspection.  No discharge or erythema, or obvious scleral/conjunctival abnormalities.  RESP: No audible wheeze, cough, or visible cyanosis.  No visible retractions or increased work of breathing.    SKIN: Visible skin clear. No significant rash, abnormal pigmentation or lesions.  NEURO: Cranial nerves grossly intact.  Mentation and speech appropriate for age.  PSYCH: Mentation appears normal, affect normal/bright, judgement and insight intact, normal speech and appearance well-groomed.         Data:   All laboratory and imaging data reviewed.    Cystic Fibrosis Culture  Specimen Description   Date Value Ref Range Status   05/04/2021 Sputum  Final   05/04/2021 Sputum  Final   05/04/2021 Sputum  Final    Culture Micro   Date Value Ref Range Status   05/04/2021 Moderate growth  Normal santi    Final   05/04/2021 (A)  Final    Light growth  Pseudomonas " aeruginosa, mucoid strain  Susceptibility testing done on previous specimen     05/04/2021   Preliminary    Culture received and in progress.  Positive AFB results are called as soon as detected.    Final report to follow in 7 to 8 weeks.     05/04/2021   Preliminary    Assayed at [facility] [employer] [address] [place] [id number] [phone]        No results found for this or any previous visit (from the past 168 hour(s)).    PFT: Not performed.    Pulmonary exacerbation: absent    40 minutes spent on the date of the encounter doing chart review, history and exam, documentation and further activities per the note    Video-Visit Details    Type of service:  Video Visit  Video Start Time: 9:51  Video End Time: 10:19  Originating Location (pt. Location):  Home  Distant Location (provider location):  [facility]     Platform used for Video Visit: Naveen        Again, thank you for allowing me to participate in the care of your patient.        Sincerely,        Jaye Machado MD

## 2021-06-30 NOTE — PROGRESS NOTES
Clinical Pharmacy Consult:                                                    Nico Morales is a 45 year old male contacted via secure video for a clinical pharmacist consult.  He was referred to me from Dr. Machado.     Reason for Consult: Trikafta update; assess medication access and adherence    Discussion: Kilo has been on Trikafta since November 2019. He has had a good clinical response in terms of increased FEV1. He states the medication is working well for him  And that he has noticed decreased cough, decreased sputum and an ability to gain weight. He has not experienced any side effects. Kilo has completed his quarterly and 6 month lab monitoring with no significant lab abnormalities. He has not stopped or decreased any other treatments since starting Trikafta, although this is something he may consider in the future, with guidance from Dr. Machado.    Kilo fills his medications at a [facility] in [place] and his specialty medications at [place] and denies any medication access barriers. Kilo reports good medication adherence, mainly due to a consistent routine at home. He does need prescription renewals for his Lantus, Novolog and pen needles.    Plan:  1. Continue Trikafta. LFTs to be monitored annually  2. I will send a message to Dr. Lee's nurse about your prescription renewals    Keep up the good work!

## 2021-06-30 NOTE — PROGRESS NOTES
Kilo is a 45 year old who is being evaluated via a billable video visit.      How would you like to obtain your AVS? [employer]  If the video visit is dropped, the invitation should be resent by: Other e-mail: [email or web address]  Will anyone else be joining your video visit? No    [facility]  June 30, 2021         Assessment and Plan:   Nico Morales is a 45 year old male with cystic fibrosis.    1. 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  He has little in the way of cough.  When he does produce sputum that is yellow-green in color.  He does continue to produce some daily.  Historically Kilo has grown out Pseudomonas and staph aureus in his sputum.  Most recently he has shown evidence of slight improvement in his pulmonary function.  At this time I have recommended to Kilo:  --He should continue to do his vest and nebulized therapy 3 times daily  --Because of the persistent chest tightness that is now been the present since January I have recommended to Kilo that he undergo a noncontrast chest CT.  I suspect some of his discomfort is secondary to his loss of volume in his left upper and lingula.  --He should continue to remain active and has not has really improved his conditioning and activity tolerance    2. Pancreatic Insufficiency/GI: Kilo has no new symptoms consistent with worsening malabsorption.    - continue the present dose of pancreatic enzymes  - continue vitamin supplementation.    3.  Cystic fibrosis related diabetes: Kilo reports excellent control of his blood sugars.  He is followed by endocrinology.    4.  CF TR modulator therapy: Kilo is on Trikafta and tolerating well.  He has had stable labs.    5.  Healthcare maintenance: Kilo is due for repeat colonoscopy at the end of the year.  I will have the office contact him to get that scheduled.    6.  Psychosocial: Kilo  just returned from a 9-day road trip with the .  His family went to [place] and down to [place].  He enjoyed it very much.  He is a little bit tired today.    Annual studies due: 5/2022  Immunizations: UTD  Colonoscopy: 5/2021    Jaye Machado MD MPH  Associate Professor of Medicine  Pulmonary, Allergy, Critical Care and Sleep Medicine        Interval History:     Kilo reports that he is no significant change in his cough or sputum production.  Again he has daily sputum that is green to yellow in color.  His chest congestion and activity tolerance are unchanged.  He continues to have a persistent chest tightness across his anterior chest.  He notices in particular with deep breathing.  He does continue to perform the degree vest therapies per day.         Review of Systems:     CONSTITUTIONAL: no fever, no chills, no sweats, no change in weight--stable at 151, no change in energy, no change in appetite    INTEGUMENTARY/SKIN: no rash, no obvious new lesions    ENT/MOUTH: no sore throat, no new sinus pain, no new nasal drainage, no new nasal congestion, chronic ear ringing     RESPIRATORY: see interval history    CV: no chest pain, no palpitations, no peripheral edema    GI: no nausea, no vomiting, no change in stools, no fatty stools, no GERD    : negative    MUSCULOSKELETAL: no myalgias, no arthralgias    ENDOCRINE: blood sugars with adequate control    NEURO:  No headache    SLEEP: no issues    PSYCHIATRIC: mood stable--good          Past Medical and Surgical History:     Past Medical History:   Diagnosis Date     CF (cystic fibrosis) (H)      Exocrine pancreatic insufficiency      Nocardia infection      Pseudomonas infection      S/P gastrostomy (H) 2002     Past Surgical History:   Procedure Laterality Date     COLONOSCOPY N/A 5/21/2018    Procedure: COMBINED COLONOSCOPY, SINGLE OR MULTIPLE BIOPSY/POLYPECTOMY BY BIOPSY;  Cystic fibrosis, 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;  Surgeon: Margarito Bowman MD;   Location: [facility]     GASTROSTOMY TUBE  2002     PICC INSERTION Left 01/22/2018    4Fr SL BioFlo PICC, 46cm (5cm external) in the L basilic vein w/ tip in the low SVC           Family History:     Family History   Problem Relation Age of Onset     Heart Disease Maternal Grandmother      Heart Disease Maternal Grandfather      Heart Disease Paternal Grandmother      Diabetes No family hx of             Social History:     Social History     Socioeconomic History     Marital status:      Spouse name: Not on file     Number of children: 0     Years of education: Not on file     Highest education level: Not on file   Occupational History     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     Employer: [employer]   Social Needs     Financial resource strain: Not on file     Food insecurity     Worry: Not on file     Inability: Not on file     Transportation needs     Medical: Not on file     Non-medical: Not on file   Tobacco Use     Smoking status: Never Smoker     Smokeless tobacco: Never Used   Substance and Sexual Activity     Alcohol use: No     Alcohol/week: 0.0 standard drinks     Drug use: No     Sexual activity: Yes     Partners: Female     Birth control/protection: Pill   Lifestyle     Physical activity     Days per week: Not on file     Minutes per session: Not on file     Stress: Not on file   Relationships     Social connections     Talks on phone: Not on file     Gets together: Not on file     Attends Sabianism service: Not on file     Active member of club or organization: Not on file     Attends meetings of clubs or organizations: Not on file     Relationship status: Not on file     Intimate partner violence     Fear of current or ex partner: Not on file     Emotionally abused: Not on file     Physically abused: Not on file     Forced sexual activity: Not on file   Other Topics Concern     Parent/sibling w/ CABG, MI or angioplasty before 65F 55M? Not Asked      Service Not Asked     Blood Transfusions  No     Caffeine Concern Not Asked     Occupational Exposure Not Asked     Hobby Hazards Not Asked     Sleep Concern Not Asked     Stress Concern Not Asked     Weight Concern Not Asked     Special Diet Not Asked     Back Care Not Asked     Exercise No     Bike Helmet Not Asked     Seat Belt Not Asked     Self-Exams Not Asked   Social History Narrative    8/15/2019 - .  Lives with his English Kaidendog (Jefry) in a house that he designed in [place].  No children.            Medications:     Current Outpatient Medications   Medication     ACE NOT PRESCRIBED, INTENTIONAL,     acetylcysteine (MUCOMYST) 10 % nebulizer solution     amylase-lipase-protease (CREON) 22354-27509 units CPEP per EC capsule     azithromycin (ZITHROMAX) 250 MG tablet     aztreonam (AZACTAM) 1 GM vial     BD OMI U/F 32G X 4 MM insulin pen needle     blood glucose (NO BRAND SPECIFIED) lancets standard     blood glucose (NO BRAND SPECIFIED) test strip     blood glucose monitoring (MARTINA CONTOUR NEXT MONITOR) meter device kit     blood glucose monitoring (MARTINA CONTOUR NEXT) test strip     blood glucose monitoring (MARTINA MICROLET) lancets     blood glucose monitoring (NO BRAND SPECIFIED) meter device kit     budesonide (PULMICORT) 1 MG/2ML neb solution     budesonide-formoterol (SYMBICORT) 160-4.5 MCG/ACT Inhaler     CALCIUM PO     Continuous Blood Gluc Sensor (FREESTYLE SUKHWINDER 14 DAY SENSOR) MISC     doxycycline hyclate (VIBRAMYCIN) 100 MG capsule     elexacaftor-tezacaftor-ivacaftor & ivacaftor (TRIKAFTA) 100-50-75 & 150 MG tablet pack     eszopiclone (LUNESTA) 2 MG tablet     [facility] INFUSION MANAGED PATIENT     Ferrous Fumarate-Vitamin C (VITRON-C) 200-125 MG TABS     insulin aspart (NOVOLOG PENFILL) 100 UNIT/ML cartridge     insulin glargine (LANTUS SOLOSTAR) 100 UNIT/ML pen     insulin glargine (LANTUS SOLOSTAR) 100 UNIT/ML pen     insulin glargine (LANTUS SOLOSTAR) 100 UNIT/ML pen     insulin pen needle (32G X 4 MM) 32G X 4 MM  "miscellaneous     levalbuterol (XOPENEX HFA) 45 MCG/ACT inhaler     levalbuterol (XOPENEX) 1.25 MG/3ML neb solution     MEPHYTON 5 MG PO TABS     MULTIVITAMIN OR     Nebulizers (EFLOW SCF NEBULIZER HANDSET) MISC     oseltamivir (TAMIFLU) 75 MG capsule     polyethylene glycol (MIRALAX/GLYCOLAX) powder     Respiratory Therapy Supplies (KRIS ALTERA NEBULIZER HANDSET) MISC     Respiratory Therapy Supplies (KRIS ALTERA NEBULIZER HANDSET) MISC     sodium chloride inhalant 7 % NEBU neb solution     Syringe/Needle, Disp, 18G X 1\" 5 ML MISC     water for injection sterile SOLN     No current facility-administered medications for this visit.             Physical Exam:   There were no vitals taken for this visit.  GENERAL: Healthy, alert and no distress  EYES: Eyes grossly normal to inspection.  No discharge or erythema, or obvious scleral/conjunctival abnormalities.  RESP: No audible wheeze, cough, or visible cyanosis.  No visible retractions or increased work of breathing.    SKIN: Visible skin clear. No significant rash, abnormal pigmentation or lesions.  NEURO: Cranial nerves grossly intact.  Mentation and speech appropriate for age.  PSYCH: Mentation appears normal, affect normal/bright, judgement and insight intact, normal speech and appearance well-groomed.         Data:   All laboratory and imaging data reviewed.    Cystic Fibrosis Culture  Specimen Description   Date Value Ref Range Status   05/04/2021 Sputum  Final   05/04/2021 Sputum  Final   05/04/2021 Sputum  Final    Culture Micro   Date Value Ref Range Status   05/04/2021 Moderate growth  Normal santi    Final   05/04/2021 (A)  Final    Light growth  Pseudomonas aeruginosa, mucoid strain  Susceptibility testing done on previous specimen     05/04/2021   Preliminary    Culture received and in progress.  Positive AFB results are called as soon as detected.    Final report to follow in 7 to 8 weeks.     05/04/2021   Preliminary    Assayed at [facility] [employer] [facility] [address] [place] [address] [phone]        No results found for this or any previous visit (from the past 168 hour(s)).    PFT: Not performed.    Pulmonary exacerbation: absent    40 minutes spent on the date of the encounter doing chart review, history and exam, documentation and further activities per the note    Video-Visit Details    Type of service:  Video Visit  Video Start Time: 9:51  Video End Time: 10:19  Originating Location (pt. Location):  Home  Distant Location (provider location):  [facility]     Platform used for Video Visit: [employer]

## 2021-07-01 ENCOUNTER — TELEPHONE (OUTPATIENT)
Dept: GASTROENTEROLOGY | Facility: CLINIC | Age: 46
End: 2021-07-01

## 2021-07-01 ENCOUNTER — HOSPITAL ENCOUNTER (OUTPATIENT)
Facility: CLINIC | Age: 46
End: 2021-07-01
Attending: INTERNAL MEDICINE | Admitting: INTERNAL MEDICINE
Payer: COMMERCIAL

## 2021-07-01 DIAGNOSIS — E84.9 CYSTIC FIBROSIS (H): Primary | ICD-10-CM

## 2021-07-01 DIAGNOSIS — Z12.11 SCREENING FOR COLON CANCER: ICD-10-CM

## 2021-07-01 NOTE — TELEPHONE ENCOUNTER
Screening Questions  1. Are you active on mychart?    2. What insurance is in the chart? [employer]    2.  Ordering/Referring Provider: Jaye Machado MD    3. BMI 22.3    4. Are you on daily home oxygen? no    5. Do you have a history of difficult airway? No (CF)    6. Have you had a heart, lung, or liver transplant? no    7. Are you currently on dialysis?  no    8. Have you had a stroke or Transient ischemic atttack (TIA) within 6 months? no    9. In the past 6 months, have you had any heart related issues including cardiomyopathy or heart attack?         If yes, did it require cardiac stenting or other implantable device?no    10. Do you have any implantable devices in your body (pacemaker, defib, LVAD)?  no    11. Do you take nitroglycerin? If yes, how often? no    12. Are you currently taking any blood thinners?no    13. Are you a diabetic?  Yes    14. (Females) Are you currently pregnant?   If yes, how many weeks?    15. Have you had a procedure in the past that was difficult to tolerate with conscious sedation? Any allergies to Fentanyl or Versed      16. Are you taking any scheduled prescription narcotics more than once daily?  no    17. Do you have any chemical dependencies such as alcohol, street drugs, or methadone?  no    18. Do you have any history of post-traumatic stress syndrome or mental health issues?  no    19. Do you transfer independently? yes    20.  Do you have any issues with constipation? no    21. [facility]    Scheduling Details    Procedure Scheduled: colon  Provider/Surgeon: Jennifer  Date of Procedure: 10/4/2021  Location: [facility]  Caller (Please ask for phone number if not scheduled by patient): Kilo Morales      Sedation Type: CS  Conscious Sedation- Needs  for 6 hours after the procedure  MAC/General-Needs  for 24 hours after procedure    Pre-op Required at [facility] [place], and OR for MAC sedation:   (if yes  advise patient they will need a pre-op prior to procedure)      Is patient on blood thinners? -no (If yes- inform patient to follow up with PCP or provider for follow up instructions)     Informed patient they will need an adult  yes  Cannot take any type of public or medical transportation alone    Informed Patient of COVID Test Requirement yes    Confirmed Nurse will call to complete assessment yes    Additional comments: no

## 2021-07-04 DIAGNOSIS — Z11.59 ENCOUNTER FOR SCREENING FOR OTHER VIRAL DISEASES: ICD-10-CM

## 2021-07-22 ENCOUNTER — TELEPHONE (OUTPATIENT)
Dept: PULMONOLOGY | Facility: CLINIC | Age: 46
End: 2021-07-22

## 2021-07-22 DIAGNOSIS — E84.0 CYSTIC FIBROSIS WITH PULMONARY MANIFESTATIONS (H): ICD-10-CM

## 2021-07-23 ENCOUNTER — LAB (OUTPATIENT)
Dept: LAB | Facility: CLINIC | Age: 46
End: 2021-07-23
Payer: COMMERCIAL

## 2021-07-23 ENCOUNTER — ANCILLARY PROCEDURE (OUTPATIENT)
Dept: CT IMAGING | Facility: CLINIC | Age: 46
End: 2021-07-23
Attending: INTERNAL MEDICINE
Payer: COMMERCIAL

## 2021-07-23 ENCOUNTER — RECORDS - HEALTHEAST (OUTPATIENT)
Dept: ADMINISTRATIVE | Facility: CLINIC | Age: 46
End: 2021-07-23

## 2021-07-23 DIAGNOSIS — E84.0 CYSTIC FIBROSIS WITH PULMONARY MANIFESTATIONS (H): ICD-10-CM

## 2021-07-23 PROCEDURE — 87070 CULTURE OTHR SPECIMN AEROBIC: CPT

## 2021-07-23 PROCEDURE — 71250 CT THORAX DX C-: CPT | Mod: GC | Performed by: RADIOLOGY

## 2021-07-23 PROCEDURE — 87186 SC STD MICRODIL/AGAR DIL: CPT

## 2021-07-23 PROCEDURE — 87077 CULTURE AEROBIC IDENTIFY: CPT

## 2021-07-23 NOTE — TELEPHONE ENCOUNTER
Prior Authorization Not Needed per Insurance    Medication: levalbuterol (XOPENEX HFA) 45 MCG/ACT inhaler--NO PA NEEDED  Insurance Company: [employer] - Phone [phone] Fax [phone]  Expected CoPay:      [id number] [place] - [address] [facility]  Pharmacy Notified: Yes  Patient Notified: Yes **Instructed pharmacy to notify patient when script is ready to /ship.**

## 2021-07-27 ENCOUNTER — VIRTUAL VISIT (OUTPATIENT)
Dept: PULMONOLOGY | Facility: CLINIC | Age: 46
End: 2021-07-27
Attending: INTERNAL MEDICINE
Payer: COMMERCIAL

## 2021-07-27 DIAGNOSIS — E84.0 CYSTIC FIBROSIS WITH PULMONARY MANIFESTATIONS (H): Primary | ICD-10-CM

## 2021-07-27 NOTE — PROGRESS NOTES
Kilo is a 45 year old who is being evaluated via a billable video visit.      How would you like to obtain your AVS? [employer]  If the video visit is dropped, the invitation should be resent by: Other e-mail: mychart  Will anyone else be joining your video visit? No    I met with Kilo via video today to discuss his recent chest CT scan.  We reviewed his scan in detail.  It does show evidence of progression of bronchiectasis over the last 5 years but there is a decrease in the inflammatory response surrounding these areas of bronchiectasis.  We were concerned that he is had a recent decrease of his vital capacity.  Is unclear to me if this has to do with decreased compliance from scarring in the areas of bronchiectasis.  I did discuss with him that I would review his images with our CF care team to see if they have other thoughts on this possibility.  I have encouraged him to continue to remain adherent to his cares.  Kilo has consistently stayed committed to his vest and nebulized therapies.    Video-Visit Details    Type of service:  Video Visit  Video Start Time: 10:30  Video End Time: 10:47    Originating Location (pt. Location): Home    Distant Location (provider location):  [facility]     Platform used for Video Visit: [employer]

## 2021-07-27 NOTE — LETTER
7/27/2021         RE: Nico Morales  [address] [address]        Dear Colleague,    Thank you for referring your patient, Nico Morales, to the [facility]. Please see a copy of my visit note below.    Kilo is a 45 year old who is being evaluated via a billable video visit.      How would you like to obtain your AVS? MyChart  If the video visit is dropped, the invitation should be resent by: Other e-mail: mychart  Will anyone else be joining your video visit? No    I met with Kilo via video today to discuss his recent chest CT scan.  We reviewed his scan in detail.  It does show evidence of progression of bronchiectasis over the last 5 years but there is a decrease in the inflammatory response surrounding these areas of bronchiectasis.  We were concerned that he is had a recent decrease of his vital capacity.  Is unclear to me if this has to do with decreased compliance from scarring in the areas of bronchiectasis.  I did discuss with him that I would review his images with our CF care team to see if they have other thoughts on this possibility.  I have encouraged him to continue to remain adherent to his cares.  Kilo has consistently stayed committed to his vest and nebulized therapies.    Video-Visit Details    Type of service:  Video Visit  Video Start Time: 10:30  Video End Time: 10:47    Originating Location (pt. Location): Home    Distant Location (provider location):  [facility]     Platform used for Video Visit: [employer]en        Again, thank you for allowing me to participate in the care of your patient.        Sincerely,        Jaye Machado MD

## 2021-07-28 LAB
BACTERIA SPT CULT: ABNORMAL
BACTERIA SPT CULT: ABNORMAL

## 2021-07-30 DIAGNOSIS — E84.9 CYSTIC FIBROSIS (H): Primary | ICD-10-CM

## 2021-09-12 ENCOUNTER — HEALTH MAINTENANCE LETTER (OUTPATIENT)
Age: 46
End: 2021-09-12

## 2021-09-27 ENCOUNTER — TELEPHONE (OUTPATIENT)
Dept: GASTROENTEROLOGY | Facility: CLINIC | Age: 46
End: 2021-09-27

## 2021-09-27 DIAGNOSIS — Z12.11 SPECIAL SCREENING FOR MALIGNANT NEOPLASMS, COLON: Primary | ICD-10-CM

## 2021-09-27 RX ORDER — BISACODYL 5 MG
TABLET, DELAYED RELEASE (ENTERIC COATED) ORAL
Qty: 2 TABLET | Refills: 0 | Status: SHIPPED | OUTPATIENT
Start: 2021-09-27 | End: 2022-03-02

## 2021-09-27 NOTE — TELEPHONE ENCOUNTER
Pre assessment questions completed for upcoming colonoscopy procedure scheduled on 10/4/21    COVID test scheduled 9/30/21    Procedural arrival time and facility location reviewed.    Designated  policy reviewed. Instructed to have someone stay 6 hours post procedure.     Bowel prep recommendation: extended prep d/t CF    Extended prep script sent to [email or web address] - [place], [place] - [address] [facility] AT [facility] OF [address]. Prep instructions sent via [employer] at time of scheduling.    Reviewed Extended prep instructions with patient. No fiber/iron supplements or foods that contain nuts/seeds 7 days prior to procedure.     Anticoagulation/blood thinners? no    Electronic implanted devices? no    Patient verbalized understanding and had no questions or concerns at this time.    Britta Almonte RN

## 2021-09-29 ENCOUNTER — TELEPHONE (OUTPATIENT)
Dept: GASTROENTEROLOGY | Facility: CLINIC | Age: 46
End: 2021-09-29

## 2021-09-29 NOTE — TELEPHONE ENCOUNTER
Caller: Kilo Morales     Procedure: colonoscopy     Date of Procedure Cancelled: 10/04/2021    Ordering Provider:Jaye Machado MD in  [facility] LUNG SCIENCE    Reason for cancel: Pt not feeling well.    Any Staff messages sent regarding case?:                   Recipient and Sender:  &                   Message Details:       Rescheduled: no, will call back     If rescheduled:    Date:    Location:    Note any change or update to original order/sedation:

## 2021-10-05 DIAGNOSIS — Z00.6 RESEARCH STUDY PATIENT: Primary | ICD-10-CM

## 2021-10-14 ENCOUNTER — LAB (OUTPATIENT)
Dept: LAB | Facility: OTHER | Age: 46
End: 2021-10-14
Payer: COMMERCIAL

## 2021-10-14 ENCOUNTER — TELEPHONE (OUTPATIENT)
Dept: ENDOCRINOLOGY | Facility: CLINIC | Age: 46
End: 2021-10-14

## 2021-10-14 DIAGNOSIS — E08.9 DIABETES MELLITUS DUE TO CYSTIC FIBROSIS (H): ICD-10-CM

## 2021-10-14 DIAGNOSIS — Z00.6 RESEARCH STUDY PATIENT: ICD-10-CM

## 2021-10-14 DIAGNOSIS — E84.9 DIABETES MELLITUS DUE TO CYSTIC FIBROSIS (H): ICD-10-CM

## 2021-10-14 PROCEDURE — 36415 COLL VENOUS BLD VENIPUNCTURE: CPT

## 2021-10-14 PROCEDURE — 86769 SARS-COV-2 COVID-19 ANTIBODY: CPT

## 2021-10-14 PROCEDURE — 99000 SPECIMEN HANDLING OFFICE-LAB: CPT

## 2021-10-14 RX ORDER — BISMUTH SUBSALICYLATE 262MG/15ML
SUSPENSION, ORAL (FINAL DOSE FORM) ORAL
Qty: 540 EACH | Refills: 3 | Status: SHIPPED | OUTPATIENT
Start: 2021-10-14 | End: 2021-10-19

## 2021-10-14 NOTE — TELEPHONE ENCOUNTER
[facility] Call Center    Phone Message    May a detailed message be left on voicemail: yes     Reason for Call: Medication Question or concern regarding medication   Prescription Clarification  Name of Medication: Acucheck Fast click Lancets  Prescribing Provider: Dr. Lee    Pharmacy: [id number]   What on the order needs clarification?     Pt states that this medication is no longer on file for his pharmacy and he is out of the lancets and test strips for blood sugar monitor. Requesting Dr. Lee send over another prescription or call him back to discuss solution. Says he is out and requesting call back at #[phone]. Pt also says he is with [facility] instead of [facility] for his pharmacy.         Action Taken: Other: Diabetes    Travel Screening: Not Applicable

## 2021-10-14 NOTE — TELEPHONE ENCOUNTER
Sent in orders for new meter/ strips/ lancets covered by insurance testing 6 times daily Ginger Laureano RN on 10/14/2021 at 1:26 PM

## 2021-10-16 LAB
SARS-COV-2 AB SERPL IA-ACNC: 241 U/ML
SARS-COV-2 AB SERPL QL IA: NEGATIVE
SARS-COV-2 AB SERPL QL IA: POSITIVE

## 2021-10-19 DIAGNOSIS — E84.8 DIABETES MELLITUS RELATED TO CF (CYSTIC FIBROSIS) (H): ICD-10-CM

## 2021-10-19 DIAGNOSIS — E10.9 TYPE 1 DIABETES MELLITUS (H): Primary | ICD-10-CM

## 2021-10-19 DIAGNOSIS — E84.9 DIABETES MELLITUS DUE TO CYSTIC FIBROSIS (H): ICD-10-CM

## 2021-10-19 DIAGNOSIS — E08.9 DIABETES MELLITUS DUE TO CYSTIC FIBROSIS (H): ICD-10-CM

## 2021-10-19 DIAGNOSIS — E08.9 DIABETES MELLITUS RELATED TO CF (CYSTIC FIBROSIS) (H): ICD-10-CM

## 2021-10-19 RX ORDER — BISMUTH SUBSALICYLATE 262MG/15ML
SUSPENSION, ORAL (FINAL DOSE FORM) ORAL
Qty: 540 EACH | Refills: 3 | Status: SHIPPED | OUTPATIENT
Start: 2021-10-19

## 2021-12-01 DIAGNOSIS — E84.0 CYSTIC FIBROSIS WITH PULMONARY MANIFESTATIONS (H): ICD-10-CM

## 2021-12-01 RX ORDER — BUDESONIDE 1 MG/2ML
1 INHALANT ORAL 2 TIMES DAILY
Qty: 120 ML | Refills: 3 | Status: SHIPPED | OUTPATIENT
Start: 2021-12-01 | End: 2022-11-14

## 2021-12-07 NOTE — PROGRESS NOTES
[facility]  December 8, 2021         Assessment and Plan:   Nico Morales is a 45 year old male with cystic fibrosis.    1. CF lung disease with severe obstruction: Kilo reports that since last being seen he had rather significant difficulty with inhalational aztreonam.  Several of his past courses of therapy included fevers chest pain and pretty marked shortness of breath.  It resulted in him discontinuing the therapy early.  Given the significance of these reactions I do not think he should be on inhalational aztreonam any longer.  I have now added to his allergy list.  He had previously been on [place] and was having trouble with coughing while on that course.  We had transitioned to the aztreonam formulation which she initially tolerated but clearly this has changed.  We did review Kilo's allergies and consideration of an additional inhalational antibiotic.  I do not feel comfortable choosing a different agent at this time until we better clarify if he is truly allergic to some of these medications.  Kilo continues to show evidence of Pseudomonas in his sputum.  He does show evidence of stable pulmonary function today.  At this time I have recommended to Kilo:  --He should continue to do his vest and nebulized therapies 3 times daily  --I have asked him to initiate Mucomyst has a mucolytic during his morning vest therapy  --I have increased his Pulmicort to twice daily in hopes that this will help improve his air trapping that was evident on lung volumes in January  --I have asked him to get up full set of pulmonary function tests when he seen again in 3 months time  --He should continue his doxycycline but rotated 2 weeks on 2 weeks off  --I have asked him to see Dr. Lee already an allergy for evaluation of his multiple antibiotic allergies.  My hope is is that we can try a different agent such as inhalational Zosyn if it appears that he  can tolerate it.  --Kilo does describe intermittent hemoptysis.  He had some blood streaking recently.  He does not feel he needs an intervention at this time.      2. Pancreatic Insufficiency/GI: Kilo has no new symptoms consistent with worsening malabsorption.  Kilo still has a G-tube in place for about 20 years.  We did discuss its removal.  I am concerned that the track is so well-established that it might be difficult to heal.  I would like it to discuss it with the surgeon before we remove it.  - continue the present dose of pancreatic enzymes  - continue vitamin supplementation.    3.  CF TR modulator therapy: Kilo is on Trikafta and tolerating well.    4.  Cystic fibrosis related diabetes: Kilo reports good control of his blood sugars.  He was getting some hypoglycemia while on aztreonam.    5.  Psychosocial: Kilo is here today with his wife.  They plan to travel to [place] next week in order to finalize a purchase of a condominium.  He reports that things are going well with his family.    Annual studies due: 5/2022  Immunizations: UTD  Colonoscopy: 5/2022    Jaye Machado MD MPH  Associate Professor of Medicine  Pulmonary, Allergy, Critical Care and Sleep Medicine      Interval History:     Kilo does feel that his cough frequency and sputum volume are at a good place.  He does occasionally have chest congestion particularly in his left upper lobe.  He denies any shortness of breath.  He does do 3 vest therapies per day.         Review of Systems:     CONSTITUTIONAL: Some temps with aztreonam, no chills, no sweats, no change in weight, no change in energy--great, no change in appetite--pretty good    INTEGUMENTARY/SKIN: no rash, no obvious new lesions    ENT/MOUTH: no sore throat, no new sinus pain, no new nasal drainage, no new nasal congestion, no ear ringing     RESPIRATORY: see interval history    CV: Tweaks of chest pain, no palpitations, no peripheral edema    GI: no nausea, no vomiting,  no change in stools, no fatty stools, no GERD    : negative    MUSCULOSKELETAL: no myalgias, no arthralgias    ENDOCRINE: no excessive thirst, blood sugars with adequate control    NEURO:  No headache    SLEEP: no issues    PSYCHIATRIC: mood stable--good          Past Medical and Surgical History:     Past Medical History:   Diagnosis Date     CF (cystic fibrosis) (H)      Exocrine pancreatic insufficiency      Nocardia infection      Pseudomonas infection      S/P gastrostomy (H) 2002     Past Surgical History:   Procedure Laterality Date     COLONOSCOPY N/A 5/21/2018    Procedure: COMBINED COLONOSCOPY, SINGLE OR MULTIPLE BIOPSY/POLYPECTOMY BY BIOPSY;  Cystic fibrosis, 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;  Surgeon: Margarito Bowman MD;  Location: [facility]     GASTROSTOMY TUBE  2002     IR PULMONARY EMBOLIZATION  7/20/2001     PICC INSERTION Left 01/22/2018    4Fr SL BioFlo PICC, 46cm (5cm external) in the L basilic vein w/ tip in the low SVC           Family History:     Family History   Problem Relation Age of Onset     Heart Disease Maternal Grandmother      Heart Disease Maternal Grandfather      Heart Disease Paternal Grandmother      Diabetes No family hx of             Social History:     Social History     Socioeconomic History     Marital status:      Spouse name: Not on file     Number of children: 0     Years of education: Not on file     Highest education level: Not on file   Occupational History     Occupation: financial palnner     Employer: [employer]   Tobacco Use     Smoking status: Never Smoker     Smokeless tobacco: Never Used   Substance and Sexual Activity     Alcohol use: No     Alcohol/week: 0.0 standard drinks     Drug use: No     Sexual activity: Yes     Partners: Female     Birth control/protection: Pill   Other Topics Concern     Parent/sibling w/ CABG, MI or angioplasty before 65F 55M? Not Asked      Service Not Asked     Blood Transfusions No     Caffeine Concern Not  Asked     Occupational Exposure Not Asked     Hobby Hazards Not Asked     Sleep Concern Not Asked     Stress Concern Not Asked     Weight Concern Not Asked     Special Diet Not Asked     Back Care Not Asked     Exercise No     Bike Helmet Not Asked     Seat Belt Not Asked     Self-Exams Not Asked   Social History Narrative    8/15/2019 - .  Lives with his English Bulldog (Jefry) in a house that he designed in [place].  No children.     Social Determinants of Health     Financial Resource Strain: Not on file   Food Insecurity: Not on file   Transportation Needs: Not on file   Physical Activity: Not on file   Stress: Not on file   Social Connections: Not on file   Intimate Partner Violence: Not on file   Housing Stability: Not on file            Medications:     Current Outpatient Medications   Medication     ACE NOT PRESCRIBED, INTENTIONAL,     acetylcysteine (MUCOMYST) 10 % nebulizer solution     amylase-lipase-protease (CREON) 55508-89064 units CPEP per EC capsule     azithromycin (ZITHROMAX) 250 MG tablet     BD OMI U/F 32G X 4 MM insulin pen needle     bisacodyl (DULCOLAX) 5 MG EC tablet     blood glucose (NO BRAND SPECIFIED) test strip     blood glucose (NO BRAND SPECIFIED) test strip     blood glucose monitoring (NO BRAND SPECIFIED) meter device kit     blood glucose monitoring (ULTRA THIN 30G) lancets     budesonide (PULMICORT) 1 MG/2ML neb solution     budesonide-formoterol (SYMBICORT) 160-4.5 MCG/ACT Inhaler     CALCIUM PO     Continuous Blood Gluc Sensor (FREESTYLE SUKHWINDER 14 DAY SENSOR) MISC     doxycycline hyclate (VIBRAMYCIN) 100 MG capsule     elexacaftor-tezacaftor-ivacaftor & ivacaftor (TRIKAFTA) 100-50-75 & 150 MG tablet pack     [facility] INFUSION MANAGED PATIENT     ferrous fumarate 65 mg, [place]. FE,-Vitamin C 125 mg (VITRON-C)  MG TABS tablet     insulin aspart (NOVOLOG PENFILL) 100 UNIT/ML cartridge     insulin glargine (LANTUS SOLOSTAR) 100 UNIT/ML pen     insulin glargine  "(LANTUS SOLOSTAR) 100 UNIT/ML pen     insulin glargine (LANTUS SOLOSTAR) 100 UNIT/ML pen     insulin pen needle (32G X 4 MM) 32G X 4 MM miscellaneous     levalbuterol (XOPENEX HFA) 45 MCG/ACT inhaler     levalbuterol (XOPENEX) 1.25 MG/3ML neb solution     magnesium citrate solution     MULTIVITAMIN OR     mvw complete (PROBIOTIC FORMULATION) capsule     polyethylene glycol (GOLYTELY) 236 g suspension     polyethylene glycol (MIRALAX/GLYCOLAX) powder     sodium chloride inhalant 7 % NEBU neb solution     Syringe/Needle, Disp, 18G X 1\" 5 ML MISC     water for injection sterile SOLN     No current facility-administered medications for this visit.            Physical Exam:   /83   Pulse 78   Ht 1.753 m (5' 9\")   Wt 63.5 kg (139 lb 15.9 oz)   SpO2 95%   BMI 20.67 kg/m      Constitutional:   Awake, alert and in no apparent distress     Eyes:   nonicteric     ENT:   Mask in place     Neck:   Supple without supraclavicular or cervical lymphadenopathy     Lungs:   Good air flow.  No crackles.  Biapical rhonchi.  No wheezes.     Cardiovascular:   Normal S1 and S2.  RRR.  No murmur, gallop or rub.     Abdomen:   NABS, soft, nontender, nondistended.      Musculoskeletal:   No edema, digital clubbing present     Neurologic:   Alert and conversant.     Skin:   Warm, dry.  No rash on limited exam.             Data:   All laboratory and imaging data reviewed.    Cystic Fibrosis Culture  Specimen Description   Date Value Ref Range Status   05/04/2021 Sputum  Final   05/04/2021 Sputum  Final   05/04/2021 Sputum  Final    Culture Micro   Date Value Ref Range Status   05/04/2021 Moderate growth  Normal santi    Final   05/04/2021 (A)  Final    Light growth  Pseudomonas aeruginosa, mucoid strain  Susceptibility testing done on previous specimen     05/04/2021 Culture negative for acid fast bacilli  Final   05/04/2021   Final    Assayed at [facility] [address] [place] [id number] [phone]        Recent " Results (from the past 168 hour(s))   General PFT Lab (Please always keep checked)    Collection Time: 12/08/21  8:15 AM   Result Value Ref Range    FVC-Pred 4.87 L    FVC-Pre 3.77 L    FVC-%Pred-Pre 77 %    FEV1-Pre 1.79 L    FEV1-%Pred-Pre 45 %    FEV1FVC-Pred 80 %    FEV1FVC-Pre 47 %    FEFMax-Pred 9.70 L/sec    FEFMax-Pre 6.74 L/sec    FEFMax-%Pred-Pre 69 %    FEF2575-Pred 3.76 L/sec    FEF2575-Pre 0.56 L/sec    AOK9228-%Pred-Pre 14 %    ExpTime-Pre 15.62 sec    FIFMax-Pre 4.95 L/sec    FEV1FEV6-Pred 81 %    FEV1FEV6-Pre 56 %       PFT: Severe obstructive lung disease.  When compared to 5/4/2021, the FEV1 and FVC have little change.  The decrease in FVC may represent air trapping v. restrictive physiology.  Lung volumes would be necessary to determine.    Pulmonary exacerbation: absent    45 minutes spent on the date of the encounter doing chart review, history and exam, documentation and further activities per the note

## 2021-12-07 NOTE — PATIENT INSTRUCTIONS
Cystic Fibrosis Self-Care Plan    RECOMMENDATIONS:   Kilo, it was great to see you today.  The plan from today:  --add mucomyst inhalational once daily  --See allergy to re-evaluate antibiotic intolerance  --two weeks on two weeks off of doxy  --increase pulmicort to twice daily  --g tube removal next year.  I will talk to Dr. Chong  --Start rimantidine once daily  --full PFT at next visit  Great job with self cares!    YOUR GOAL: Stay safe and well.  Enjoy the holiday!      [facility] Nurse line:  SHANKAR Wilson and Lexi  [phone]     [facility] Fax Number:      [phone]         Cystic Fibrosis Respiratory Therapists:   Felicitas Banuelos              [phone]          Jacklyn Pina   [phone]  Cystic Fibrosis Dietitians:              Zeina Pierre              [phone]                            Ivory Bonilla                        [phone]   Cystic Fibrosis Diabetes Nurse:    Jordyn Rudolph   [phone]    Cystic Fibrosis Social Workers:     Mary Ritter               [phone]                     Louann Suarez               [phone]  Cystic Fibrosis Pharmacists:           Tammy Morgan                               [phone]         Gracie Allen      [phone]   Cystic Fibrosis Genetic Counselor:   Pretty Pulliam    [phone]    [facility] website:  [email or web address]    COVID VACCINES:    You are eligible for the COVID-19 vaccine. Sign up for your COVID vaccine via Explore.To Yellow Pages. Log in, select the menu bar, select schedule an appointment, and then select COVID-19 Vaccine 1st Dose. You may also schedule by calling this number [phone] however hold times can be long.       OR schedule through the [place] Department of Health Vaccine Connector at https://vaccineconnector.[place].gov/ or by calling [phone].      The best vaccine is the one that s available to you first.  All COVID-19 vaccines currently available in the  [place] (Celestino & Celestino, Pfizer and Moderna) have been shown to be highly effect at preventing COVID-19.       We re still learning how vaccines will affect the spread of COVID-19. After you ve been fully vaccinated against COVID-19, you should keep taking precautions in public places like wearing a mask, staying 6 feet apart from others, and avoiding crowds and poorly ventilated spaces until we know more.       MRN: [id number]   Clinic Date: December 7, 2021   Patient: Nico Morales     Annual Studies:   IGG   Date Value Ref Range Status   05/04/2021 1,208 610 - 1,616 mg/dL Final     Insulin   Date Value Ref Range Status   07/26/2011 30 mU/L Final     Comment:     Reference Range:  0-20  +120     There are no preventive care reminders to display for this patient.    Pulmonary Function Tests  FEV1: amount of air you can blow out in 1 second  FVC: total amount of air you can take in and blow out    Your Goals:         PFT Latest Ref Rng & Units 5/4/2021   FVC L 3.80   FEV1 L 1.79   FVC% % 77   FEV1% % 45          Airway Clearance: The Most Important Way to Keep Your Lungs Healthy  Vest Settings:    Hill-Rom Frequencies: 8, 9, 10 Pressure 10 Then, Frequencies 18, 19, 20 Pressure 6      RespirTech: Quick Start with Pressure of     Do each frequency for 5 minutes; Deflate vest after each frequency & cough 3 times before beginning the next setting.    Vest and Neb Therapy should be done 2 times/day.    Good Nutrition Can Improve Lung Function and Overall Health     Take ALL of your vitamins with food     Take 1/2 of your enzymes before EVERY meal/snack and the other 1/2 mid-meal/snack    Wt Readings from Last 3 Encounters:   06/03/21 69.9 kg (154 lb 1.6 oz)   05/13/21 68.2 kg (150 lb 5.7 oz)   05/04/21 69.5 kg (153 lb 3.5 oz)       There is no height or weight on file to calculate BMI.         National CF Foundation Recommendations for BMI in CF Adults: Women: at least 22 Men: at least 23         Controlling Blood Sugars Helps Prevent Lung Infections & Improves Nutrition  Test blood sugar:     In the morning before eating (goal is )     2 hours after a meal (goal is less than 150)     When pre-meal glucose is greater than 150 add correction     At bedtime (if less than 100 eat a snack with 15 grams of carbohydrates  Last A1C Results:   Hemoglobin A1C   Date Value Ref Range Status   05/04/2021 6.2 (H) 0 - 5.6 % Final     Comment:     Normal <5.7% Prediabetes 5.7-6.4%  Diabetes 6.5% or higher - adopted from ADA   consensus guidelines.           If diabetic, measure A1C every 6 months. Goal: Under 7%    Staying Healthy    Research:  If you are interested in learning about research opportunities or have questions, please contact the CF Research Team at [phone] or [email or web address].      CF Foundation:  Compass is a personalized resource service to help you with the insurance, financial, legal and other issues you are facing.  It's free, confidential and available to anyone with CF.  Ask your  for more information or contact Compass directly at 330-VFWMOZL ([phone]) or compass@cff.org, or learn more at cff.org/compass.

## 2021-12-08 ENCOUNTER — OFFICE VISIT (OUTPATIENT)
Dept: PULMONOLOGY | Facility: CLINIC | Age: 46
End: 2021-12-08
Attending: INTERNAL MEDICINE
Payer: COMMERCIAL

## 2021-12-08 VITALS
DIASTOLIC BLOOD PRESSURE: 83 MMHG | HEIGHT: 69 IN | WEIGHT: 139.99 LBS | SYSTOLIC BLOOD PRESSURE: 124 MMHG | BODY MASS INDEX: 20.73 KG/M2 | OXYGEN SATURATION: 95 % | HEART RATE: 78 BPM

## 2021-12-08 DIAGNOSIS — E84.0 CYSTIC FIBROSIS WITH PULMONARY MANIFESTATIONS (H): Primary | ICD-10-CM

## 2021-12-08 DIAGNOSIS — E08.9 DIABETES MELLITUS DUE TO CYSTIC FIBROSIS (H): ICD-10-CM

## 2021-12-08 DIAGNOSIS — E84.9 DIABETES MELLITUS DUE TO CYSTIC FIBROSIS (H): ICD-10-CM

## 2021-12-08 DIAGNOSIS — M85.9 LOW BONE DENSITY: ICD-10-CM

## 2021-12-08 DIAGNOSIS — E84.9 CYSTIC FIBROSIS (H): Primary | ICD-10-CM

## 2021-12-08 DIAGNOSIS — K86.81 EXOCRINE PANCREATIC INSUFFICIENCY: ICD-10-CM

## 2021-12-08 LAB
EXPTIME-PRE: 15.62 SEC
FEF2575-%PRED-PRE: 14 %
FEF2575-PRE: 0.56 L/SEC
FEF2575-PRED: 3.76 L/SEC
FEFMAX-%PRED-PRE: 69 %
FEFMAX-PRE: 6.74 L/SEC
FEFMAX-PRED: 9.7 L/SEC
FEV1-%PRED-PRE: 45 %
FEV1-PRE: 1.79 L
FEV1FEV6-PRE: 56 %
FEV1FEV6-PRED: 81 %
FEV1FVC-PRE: 47 %
FEV1FVC-PRED: 80 %
FIFMAX-PRE: 4.95 L/SEC
FVC-%PRED-PRE: 77 %
FVC-PRE: 3.77 L
FVC-PRED: 4.87 L

## 2021-12-08 PROCEDURE — 97803 MED NUTRITION INDIV SUBSEQ: CPT | Performed by: DIETITIAN, REGISTERED

## 2021-12-08 PROCEDURE — 87077 CULTURE AEROBIC IDENTIFY: CPT | Mod: 91 | Performed by: INTERNAL MEDICINE

## 2021-12-08 PROCEDURE — G0463 HOSPITAL OUTPT CLINIC VISIT: HCPCS | Mod: 25

## 2021-12-08 PROCEDURE — 99214 OFFICE O/P EST MOD 30 MIN: CPT | Mod: 25 | Performed by: INTERNAL MEDICINE

## 2021-12-08 PROCEDURE — 94375 RESPIRATORY FLOW VOLUME LOOP: CPT | Performed by: INTERNAL MEDICINE

## 2021-12-08 RX ORDER — BACILLUS COAGULANS 1B CELL
1 CAPSULE ORAL DAILY
COMMUNITY

## 2021-12-08 RX ORDER — PEDIATRIC MULTIVIT 61/D3/VIT K 1500-800
1 CAPSULE ORAL DAILY
Qty: 30 CAPSULE | Refills: 11 | Status: SHIPPED | OUTPATIENT
Start: 2021-12-08 | End: 2022-12-13

## 2021-12-08 ASSESSMENT — MIFFLIN-ST. JEOR: SCORE: 1515.38

## 2021-12-08 ASSESSMENT — PAIN SCALES - GENERAL: PAINLEVEL: NO PAIN (0)

## 2021-12-08 NOTE — NURSING NOTE
Chief Complaint   Patient presents with     Follow Up     CF     Vitals were taken and medications were reconciled.     Sheree Thompson RMA  8:47 AM

## 2021-12-08 NOTE — PROGRESS NOTES
CF Annual Nutrition Assessment    Reason for Assessment  Assessed during Dr. Machado [facility] r/t increased nutrition risk with diagnosis of CF per protocol.    Nutrition Significant PMH  Severe Lung Disease   Pancreatic Insufficient   CF-Related Diabetes  G-tube - no longer used for nutrition support by pt keeps patent with water flushes and changes it every 6 months - uses [employer], switched from [employer] in 2019 due to insurance coverage    Anthropometric Assessment  Height: 173.7 cm   IBW based on BMI 22 for females and 23 for males per CF Foundation recs: 69.5 kg  Today's Weight: 63.5 kg (actual weight)  %IBW: 91%    BMI: Body mass index is 20.67 kg/m .  Comments:  Weight loss related to an episode of RUDI 2 months ago, however pt stated weight loss was about 10#.     If weight loss per measurement hx is accurate pt is down 6.4 kg in 6 months (9.1%).  However, pt stated he felt his weight in  was skewed a bit high.     Wt Readings from Last 10 Encounters:   21 63.5 kg (139 lb 15.9 oz)   21 69.9 kg (154 lb 1.6 oz)   21 68.2 kg (150 lb 5.7 oz)   21 69.5 kg (153 lb 3.5 oz)   20 68.7 kg (151 lb 8 oz)   11/10/20 67.8 kg (149 lb 7.6 oz)   20 63.3 kg (139 lb 8.8 oz)   20 62.7 kg (138 lb 3.7 oz)   19 63.4 kg (139 lb 12.4 oz)   10/24/19 65.3 kg (143 lb 14.4 oz)     Pancreatic Enzymes  Brand:  Creon [id number]  Dosin-8 with meals, 2-4 with snacks (average of 30 caps/day)     High dose providing 3024 units lipase/kg/meal which is slightly above recommendations.    Estimated Daily Amount (if meal dose is >2500 units lipase/kg/meal): 11,339 units/kg per day which is slightly above recommended daily intake range, however pt presents with malabsorption/weight loss with reduced doses.  Has been educated in the past regarding the risk of elevated enzyme dosing.     Signs of Malabsorption:  No  Enzyme Program:  [facility] - remains active    Diet History and  Assessment  Diet Preferences/Allergies.Intolerances: Regular + daily oral supplements    Intake Recall/Comments:  Kilo eats TID meal and drinks 2-3 8thBridge Peptide 1.3 cans/day. He has a good appetite and feels like the OP intake + nutrition supplement provide him with adequate nutrition to maintain his current wt.     (Per previous recall)   B - [employer] + oatmeal, english muffin with pb and J  L - meat and cheese, kiwi, carrots, red peppers, pudding, string cheese, meat or pb and J sandwich   D - frozen pizza or take out   Snacks - 8thBridge     Calcium: Food + supplements average 5-6+ servings per day  Salt: No symptoms of deficiency, discussed replacement amounts and techniques  Hydration:  80-90 fl oz/day water   Supplements:  8thBridge Peptide 1.5, 2-3 cartons per day by mouth. Pt was having trouble sleeping with the TF infusing via his G-tube at night and he doesn't mild the taste of Bailee AdvanDx so he now exclusively drinks the formula.       Laboratory Assessment    Vitamin A   Date Value Ref Range Status   05/04/2021 0.55 0.30 - 1.20 mg/L Final     Vitamin D Deficiency screening   Date Value Ref Range Status   05/04/2021 45 20 - 75 ug/L Final     Comment:     Season, race, dietary intake, and treatment affect the concentration of   25-hydroxy-Vitamin D. Values may decrease during winter months and increase   during summer months. Values 20-29 ug/L may indicate Vitamin D insufficiency   and values <20 ug/L may indicate Vitamin D deficiency.  Vitamin D determination is routinely performed by an immunoassay specific for   25 hydroxyvitamin D3.  If an individual is on vitamin D2 (ergocalciferol)   supplementation, please specify 25 OH vitamin D2 and D3 level determination by   LCMSMS test VITD23.       Vitamin E   Date Value Ref Range Status   05/04/2021 15.2 5.5 - 18.0 mg/L Final     Comment:     (Note)  This test was developed and its performance characteristics   determined by [facility]. It  has not been cleared or   approved by the US Food and Drug Administration. This test   was performed in a CLIA certified laboratory and is   intended for clinical purposes.       Iron   Date Value Ref Range Status   05/04/2021 45 35 - 180 ug/dL Final     Cholesterol   Date Value Ref Range Status   05/04/2021 159 <200 mg/dL Final     Triglycerides   Date Value Ref Range Status   05/04/2021 115 <150 mg/dL Final     HDL Cholesterol   Date Value Ref Range Status   05/04/2021 40 >39 mg/dL Final     LDL Cholesterol Calculated   Date Value Ref Range Status   05/04/2021 96 <100 mg/dL Final     Comment:     Desirable:       <100 mg/dl     VLDL-Cholesterol   Date Value Ref Range Status   06/26/2013 13 0 - 30 mg/dL Final     Cholesterol/HDL Ratio   Date Value Ref Range Status   06/26/2013 4.4 0.0 - 5.0 Final       DEXA: (December 2016) Showed significant bone loss - pt is seeing Dr. Lee and has resumed Reclast treatment (will be annual now).     Current Vitamin/Mineral Supplements: MVW Complete  international unit(s) daily, Calcium with D 1 tablet twice daily and Vitron-C 1 tablet daily.    Comments:  Obtains ADEK from [facility].     CF Related Diabetes Evaluation  Hgb A1C: 6.2%   Date: 5/4/21  FSBG range:   Insulin: Yes    Insulin Regimen: Novolog 1 unit:15 g carb, Lantus 8 units  Carbohydrate Counting: Yes     Last visit with Dr. Lee April 2021, continues with the care plan discussed during that visit, doing well.     NUTRITION DIAGNOSIS  Impaired nutrient utilization r/t CF related diabetes, pancreatic insufficiency and CF hypermetabolism AEB requires PERT, insulin therapy and high kcal/pro intake through TF + diet to maintain/improve nutrition and health status.  INTERVENTIONS/RECOMMENDATIONS  1) Oral Intake/Weight: Discussed intake patterns, food choices and intake adequacy.  Pt notes some ongoing GI recovery with increasing food intake tolerance/volumes, no problems currently.  Provided education on  food/fiber, fluid and salt intake recommendations particularly with dry weather/exercise in order to avoid RUDI reoccurrence. Promoted ongoing use of Xceleron (Chapter 11) supplements taken orally.    Of note, pt does want to have his G-tube removed.  This was discussed with Dr. Machado today and we will be waiting for a few months before discussing this again (pt using tube for prep for upcoming colonoscopy).  Tube is in good condition, as is the surrounding skin, and pt is changing this out every 6 months and utilizing patency flushes.     Weight goal continues to be BMI of 23 kg/m2.     BEE: ~1490 kcals                           3500 - 3850 kcals/day 200-225% BEE (+ 500 kcals/day for weight gain)   g protein/day 1.5-2 g/kg    2) Enzymes: Although pt is on a higher than recommended dose he is doing well with his current prescription and wishes to continue on this brand/capsule size/dose. Reports good insurance coverage.     3) Vitamin/Mineral Supplementation: Continuing same supplementation regimen (as documented above) based off most recent annual studies.  Bone management overseen by Dr. Lee.  Pt is interested in starting a probiotic supplement so I recommended MVW Complete Probiotic 1 capsule daily -- will prescribe to [facility].     4) Diabetes: Current care plan reviewed along with updated labs.     Patient Understanding: Good  Expected Compliance: Good  Follow-Up Plans: Per Protocol     GOALS:  1) Weight maintenance vs gain toward goal BMI of 23 kg/m2.   2) Good adherence (>90%) with prescribed enzymes, vitamin/mineral supplements and insulin therapy.      FOLLOW-UP/MONITORING:  Visit patient within 12 month(s) for annual follow-up, sooner by request or in response to acute weight/nutrition changes.    Time Spent in Face-to-Face Interaction: 15 min (Medicare time with diabetes)    Ivory Bonilla MS, RD, LD (pager [phone])  Cystic Fibrosis/Lung Transplant Dietitian      -Available Mon-Thurs 8 AM-4:30 PM. On Fridays  please contact pager [phone] (Zeina Pierre RD) and on weekends/holidays contact coverage dietitian at pager [phone] (inpatient use only).

## 2021-12-08 NOTE — LETTER
12/8/2021     RE: Nico Morales  [address] [place] [address]    Dear Colleague,    Thank you for referring your patient, Nico Morales, to the [facility] [place]. Please see a copy of my visit note below.    [facility]  December 8, 2021         Assessment and Plan:   Nico Morales is a 45 year old male with cystic fibrosis.    1. CF lung disease with severe obstruction: Kilo reports that since last being seen he had rather significant difficulty with inhalational aztreonam.  Several of his past courses of therapy included fevers chest pain and pretty marked shortness of breath.  It resulted in him discontinuing the therapy early.  Given the significance of these reactions I do not think he should be on inhalational aztreonam any longer.  I have now added to his allergy list.  He had previously been on [place] and was having trouble with coughing while on that course.  We had transitioned to the aztreonam formulation which she initially tolerated but clearly this has changed.  We did review Kilo's allergies and consideration of an additional inhalational antibiotic.  I do not feel comfortable choosing a different agent at this time until we better clarify if he is truly allergic to some of these medications.  Kilo continues to show evidence of Pseudomonas in his sputum.  He does show evidence of stable pulmonary function today.  At this time I have recommended to Kilo:  --He should continue to do his vest and nebulized therapies 3 times daily  --I have asked him to initiate Mucomyst has a mucolytic during his morning vest therapy  --I have increased his Pulmicort to twice daily in hopes that this will help improve his air trapping that was evident on lung volumes in January  --I have asked him to get up full set of pulmonary function tests when he seen again in 3 months time  --He should  continue his doxycycline but rotated 2 weeks on 2 weeks off  --I have asked him to see Dr. Lee already an allergy for evaluation of his multiple antibiotic allergies.  My hope is is that we can try a different agent such as inhalational Zosyn if it appears that he can tolerate it.  --Kilo does describe intermittent hemoptysis.  He had some blood streaking recently.  He does not feel he needs an intervention at this time.      2. Pancreatic Insufficiency/GI: Kilo has no new symptoms consistent with worsening malabsorption.  Kilo still has a G-tube in place for about 20 years.  We did discuss its removal.  I am concerned that the track is so well-established that it might be difficult to heal.  I would like it to discuss it with the surgeon before we remove it.  - continue the present dose of pancreatic enzymes  - continue vitamin supplementation.    3.  CF TR modulator therapy: Kilo is on Trikafta and tolerating well.    4.  Cystic fibrosis related diabetes: Kilo reports good control of his blood sugars.  He was getting some hypoglycemia while on aztreonam.    5.  Psychosocial: Kilo is here today with his wife.  They plan to travel to [place] next week in order to finalize a purchase of a condominium.  He reports that things are going well with his family.    Annual studies due: 5/2022  Immunizations: UTD  Colonoscopy: 5/2022    Jaye Machado MD MPH  Associate Professor of Medicine  Pulmonary, Allergy, Critical Care and Sleep Medicine      Interval History:     Kilo does feel that his cough frequency and sputum volume are at a good place.  He does occasionally have chest congestion particularly in his left upper lobe.  He denies any shortness of breath.  He does do 3 vest therapies per day.         Review of Systems:     CONSTITUTIONAL: Some temps with aztreonam, no chills, no sweats, no change in weight, no change in energy--great, no change in appetite--pretty good    INTEGUMENTARY/SKIN: no rash,  no obvious new lesions    ENT/MOUTH: no sore throat, no new sinus pain, no new nasal drainage, no new nasal congestion, no ear ringing     RESPIRATORY: see interval history    CV: Tweaks of chest pain, no palpitations, no peripheral edema    GI: no nausea, no vomiting, no change in stools, no fatty stools, no GERD    : negative    MUSCULOSKELETAL: no myalgias, no arthralgias    ENDOCRINE: no excessive thirst, blood sugars with adequate control    NEURO:  No headache    SLEEP: no issues    PSYCHIATRIC: mood stable--good          Past Medical and Surgical History:     Past Medical History:   Diagnosis Date     CF (cystic fibrosis) (H)      Exocrine pancreatic insufficiency      Nocardia infection      Pseudomonas infection      S/P gastrostomy (H) 2002     Past Surgical History:   Procedure Laterality Date     COLONOSCOPY N/A 5/21/2018    Procedure: COMBINED COLONOSCOPY, SINGLE OR MULTIPLE BIOPSY/POLYPECTOMY BY BIOPSY;  Cystic fibrosis, 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;  Surgeon: Margarito Bowman MD;  Location: [facility]     GASTROSTOMY TUBE  2002     IR PULMONARY EMBOLIZATION  7/20/2001     PICC INSERTION Left 01/22/2018    4Fr SL BioFlo PICC, 46cm (5cm external) in the L basilic vein w/ tip in the low SVC           Family History:     Family History   Problem Relation Age of Onset     Heart Disease Maternal Grandmother      Heart Disease Maternal Grandfather      Heart Disease Paternal Grandmother      Diabetes No family hx of             Social History:     Social History     Socioeconomic History     Marital status:      Spouse name: Not on file     Number of children: 0     Years of education: Not on file     Highest education level: Not on file   Occupational History     Occupation: financial palnner     Employer: [employer]   Tobacco Use     Smoking status: Never Smoker     Smokeless tobacco: Never Used   Substance and Sexual Activity     Alcohol use: No     Alcohol/week: 0.0 standard drinks     Drug  use: No     Sexual activity: Yes     Partners: Female     Birth control/protection: Pill   Other Topics Concern     Parent/sibling w/ CABG, MI or angioplasty before 65F 55M? Not Asked      Service Not Asked     Blood Transfusions No     Caffeine Concern Not Asked     Occupational Exposure Not Asked     Hobby Hazards Not Asked     Sleep Concern Not Asked     Stress Concern Not Asked     Weight Concern Not Asked     Special Diet Not Asked     Back Care Not Asked     Exercise No     Bike Helmet Not Asked     Seat Belt Not Asked     Self-Exams Not Asked   Social History Narrative    8/15/2019 - .  Lives with his English Bulldog (Jefry) in a house that he designed in [place].  No children.     Social Determinants of Health     Financial Resource Strain: Not on file   Food Insecurity: Not on file   Transportation Needs: Not on file   Physical Activity: Not on file   Stress: Not on file   Social Connections: Not on file   Intimate Partner Violence: Not on file   Housing Stability: Not on file            Medications:     Current Outpatient Medications   Medication     ACE NOT PRESCRIBED, INTENTIONAL,     acetylcysteine (MUCOMYST) 10 % nebulizer solution     amylase-lipase-protease (CREON) 14111-98935 units CPEP per EC capsule     azithromycin (ZITHROMAX) 250 MG tablet     BD OMI U/F 32G X 4 MM insulin pen needle     bisacodyl (DULCOLAX) 5 MG EC tablet     blood glucose (NO BRAND SPECIFIED) test strip     blood glucose (NO BRAND SPECIFIED) test strip     blood glucose monitoring (NO BRAND SPECIFIED) meter device kit     blood glucose monitoring (ULTRA THIN 30G) lancets     budesonide (PULMICORT) 1 MG/2ML neb solution     budesonide-formoterol (SYMBICORT) 160-4.5 MCG/ACT Inhaler     CALCIUM PO     Continuous Blood Gluc Sensor (FREESTYLE SUKHWINDER 14 DAY SENSOR) MISC     doxycycline hyclate (VIBRAMYCIN) 100 MG capsule     elexacaftor-tezacaftor-ivacaftor & ivacaftor (TRIKAFTA) 100-50-75 & 150 MG tablet pack  "    [facility]     ferrous fumarate 65 mg, [place]ie IRA. FE,-Vitamin C 125 mg (VITRON-C)  MG TABS tablet     insulin aspart (NOVOLOG PENFILL) 100 UNIT/ML cartridge     insulin glargine (LANTUS SOLOSTAR) 100 UNIT/ML pen     insulin glargine (LANTUS SOLOSTAR) 100 UNIT/ML pen     insulin glargine (LANTUS SOLOSTAR) 100 UNIT/ML pen     insulin pen needle (32G X 4 MM) 32G X 4 MM miscellaneous     levalbuterol (XOPENEX HFA) 45 MCG/ACT inhaler     levalbuterol (XOPENEX) 1.25 MG/3ML neb solution     magnesium citrate solution     MULTIVITAMIN OR     mvw complete (PROBIOTIC FORMULATION) capsule     polyethylene glycol (GOLYTELY) 236 g suspension     polyethylene glycol (MIRALAX/GLYCOLAX) powder     sodium chloride inhalant 7 % NEBU neb solution     Syringe/Needle, Disp, 18G X 1\" 5 ML MISC     water for injection sterile SOLN     No current facility-administered medications for this visit.            Physical Exam:   /83   Pulse 78   Ht 1.753 m (5' 9\")   Wt 63.5 kg (139 lb 15.9 oz)   SpO2 95%   BMI 20.67 kg/m      Constitutional:   Awake, alert and in no apparent distress     Eyes:   nonicteric     ENT:   Mask in place     Neck:   Supple without supraclavicular or cervical lymphadenopathy     Lungs:   Good air flow.  No crackles.  Biapical rhonchi.  No wheezes.     Cardiovascular:   Normal S1 and S2.  RRR.  No murmur, gallop or rub.     Abdomen:   NABS, soft, nontender, nondistended.      Musculoskeletal:   No edema, digital clubbing present     Neurologic:   Alert and conversant.     Skin:   Warm, dry.  No rash on limited exam.             Data:   All laboratory and imaging data reviewed.    Cystic Fibrosis Culture  Specimen Description   Date Value Ref Range Status   05/04/2021 Sputum  Final   05/04/2021 Sputum  Final   05/04/2021 Sputum  Final    Culture Micro   Date Value Ref Range Status   05/04/2021 Moderate growth  Normal santi    Final   05/04/2021 (A)  Final    Light " growth  Pseudomonas aeruginosa, mucoid strain  Susceptibility testing done on previous specimen     05/04/2021 Culture negative for acid fast bacilli  Final   05/04/2021   Final    Assayed at [facility] [address] [place] [address] [phone]        Recent Results (from the past 168 hour(s))   General PFT Lab (Please always keep checked)    Collection Time: 12/08/21  8:15 AM   Result Value Ref Range    FVC-Pred 4.87 L    FVC-Pre 3.77 L    FVC-%Pred-Pre 77 %    FEV1-Pre 1.79 L    FEV1-%Pred-Pre 45 %    FEV1FVC-Pred 80 %    FEV1FVC-Pre 47 %    FEFMax-Pred 9.70 L/sec    FEFMax-Pre 6.74 L/sec    FEFMax-%Pred-Pre 69 %    FEF2575-Pred 3.76 L/sec    FEF2575-Pre 0.56 L/sec    XRU7643-%Pred-Pre 14 %    ExpTime-Pre 15.62 sec    FIFMax-Pre 4.95 L/sec    FEV1FEV6-Pred 81 %    FEV1FEV6-Pre 56 %       PFT: Severe obstructive lung disease.  When compared to 5/4/2021, the FEV1 and FVC have little change.  The decrease in FVC may represent air trapping v. restrictive physiology.  Lung volumes would be necessary to determine.    Pulmonary exacerbation: absent    45 minutes spent on the date of the encounter doing chart review, history and exam, documentation and further activities per the note      CF Annual Nutrition Assessment    Reason for Assessment  Assessed during Dr. Machado [facility] r/t increased nutrition risk with diagnosis of CF per protocol.    Nutrition Significant PMH  Severe Lung Disease   Pancreatic Insufficient   CF-Related Diabetes  G-tube - no longer used for nutrition support by pt keeps patent with water flushes and changes it every 6 months - uses [employer], switched from [employer] in 2019 due to insurance coverage    Anthropometric Assessment  Height: 173.7 cm   IBW based on BMI 22 for females and 23 for males per CF Foundation recs: 69.5 kg  Today's Weight: 63.5 kg (actual weight)  %IBW: 91%    BMI: Body mass index is 20.67 kg/m .  Comments:  Weight loss related to an episode of RUDI 2 months  ago, however pt stated weight loss was about 10#.     If weight loss per measurement hx is accurate pt is down 6.4 kg in 6 months (9.1%).  However, pt stated he felt his weight in  was skewed a bit high.     Wt Readings from Last 10 Encounters:   21 63.5 kg (139 lb 15.9 oz)   21 69.9 kg (154 lb 1.6 oz)   21 68.2 kg (150 lb 5.7 oz)   21 69.5 kg (153 lb 3.5 oz)   20 68.7 kg (151 lb 8 oz)   11/10/20 67.8 kg (149 lb 7.6 oz)   20 63.3 kg (139 lb 8.8 oz)   20 62.7 kg (138 lb 3.7 oz)   19 63.4 kg (139 lb 12.4 oz)   10/24/19 65.3 kg (143 lb 14.4 oz)     Pancreatic Enzymes  Brand:  Creon [id number]  Dosin-8 with meals, 2-4 with snacks (average of 30 caps/day)     High dose providing 3024 units lipase/kg/meal which is slightly above recommendations.    Estimated Daily Amount (if meal dose is >2500 units lipase/kg/meal): 11,339 units/kg per day which is slightly above recommended daily intake range, however pt presents with malabsorption/weight loss with reduced doses.  Has been educated in the past regarding the risk of elevated enzyme dosing.     Signs of Malabsorption:  No  Enzyme Program:  [facility] - remains active    Diet History and Assessment  Diet Preferences/Allergies.Intolerances: Regular + daily oral supplements    Intake Recall/Comments:  Kilo eats TID meal and drinks 2-3 [employer] Peptide 1.3 cans/day. He has a good appetite and feels like the OP intake + nutrition supplement provide him with adequate nutrition to maintain his current wt.     (Per previous recall)   B - [employer] + oatmeal, english muffin with pb and J  L - meat and cheese, kiwi, carrots, red peppers, pudding, string cheese, meat or pb and J sandwich   D - frozen pizza or take out   Snacks - [employer]     Calcium: Food + supplements average 5-6+ servings per day  Salt: No symptoms of deficiency, discussed replacement amounts and techniques  Hydration:  80-90 fl oz/day water    Supplements:  [employer] Peptide 1.5, 2-3 cartons per day by mouth. Pt was having trouble sleeping with the TF infusing via his G-tube at night and he doesn't mild the taste of [employer] [facility] so he now exclusively drinks the formula.       Laboratory Assessment    Vitamin A   Date Value Ref Range Status   05/04/2021 0.55 0.30 - 1.20 mg/L Final     Vitamin D Deficiency screening   Date Value Ref Range Status   05/04/2021 45 20 - 75 ug/L Final     Comment:     Season, race, dietary intake, and treatment affect the concentration of   25-hydroxy-Vitamin D. Values may decrease during winter months and increase   during summer months. Values 20-29 ug/L may indicate Vitamin D insufficiency   and values <20 ug/L may indicate Vitamin D deficiency.  Vitamin D determination is routinely performed by an immunoassay specific for   25 hydroxyvitamin D3.  If an individual is on vitamin D2 (ergocalciferol)   supplementation, please specify 25 OH vitamin D2 and D3 level determination by   LCMSMS test VITD23.       Vitamin E   Date Value Ref Range Status   05/04/2021 15.2 5.5 - 18.0 mg/L Final     Comment:     (Note)  This test was developed and its performance characteristics   determined by [facility]. It has not been cleared or   approved by the US Food and Drug Administration. This test   was performed in a CLIA certified laboratory and is   intended for clinical purposes.       Iron   Date Value Ref Range Status   05/04/2021 45 35 - 180 ug/dL Final     Cholesterol   Date Value Ref Range Status   05/04/2021 159 <200 mg/dL Final     Triglycerides   Date Value Ref Range Status   05/04/2021 115 <150 mg/dL Final     HDL Cholesterol   Date Value Ref Range Status   05/04/2021 40 >39 mg/dL Final     LDL Cholesterol Calculated   Date Value Ref Range Status   05/04/2021 96 <100 mg/dL Final     Comment:     Desirable:       <100 mg/dl     VLDL-Cholesterol   Date Value Ref Range Status   06/26/2013 13 0 - 30 mg/dL Final      Cholesterol/HDL Ratio   Date Value Ref Range Status   06/26/2013 4.4 0.0 - 5.0 Final       DEXA: (December 2016) Showed significant bone loss - pt is seeing Dr. Lee and has resumed Reclast treatment (will be annual now).     Current Vitamin/Mineral Supplements: MVW Complete  international unit(s) daily, Calcium with D 1 tablet twice daily and Vitron-C 1 tablet daily.    Comments:  Obtains ADEK from [facility].     CF Related Diabetes Evaluation  Hgb A1C: 6.2%   Date: 5/4/21  FSBG range:   Insulin: Yes    Insulin Regimen: Novolog 1 unit:15 g carb, Lantus 8 units  Carbohydrate Counting: Yes     Last visit with Dr. Lee April 2021, continues with the care plan discussed during that visit, doing well.     NUTRITION DIAGNOSIS  Impaired nutrient utilization r/t CF related diabetes, pancreatic insufficiency and CF hypermetabolism AEB requires PERT, insulin therapy and high kcal/pro intake through TF + diet to maintain/improve nutrition and health status.  INTERVENTIONS/RECOMMENDATIONS  1) Oral Intake/Weight: Discussed intake patterns, food choices and intake adequacy.  Pt notes some ongoing GI recovery with increasing food intake tolerance/volumes, no problems currently.  Provided education on food/fiber, fluid and salt intake recommendations particularly with dry weather/exercise in order to avoid RUDI reoccurrence. Promoted ongoing use of [employer] supplements taken orally.    Of note, pt does want to have his G-tube removed.  This was discussed with Dr. Machado today and we will be waiting for a few months before discussing this again (pt using tube for prep for upcoming colonoscopy).  Tube is in good condition, as is the surrounding skin, and pt is changing this out every 6 months and utilizing patency flushes.     Weight goal continues to be BMI of 23 kg/m2.     BEE: ~1490 kcals                           3500 - 3850 kcals/day 200-225% BEE (+ 500 kcals/day for weight gain)   g protein/day  1.5-2 g/kg    2) Enzymes: Although pt is on a higher than recommended dose he is doing well with his current prescription and wishes to continue on this brand/capsule size/dose. Reports good insurance coverage.     3) Vitamin/Mineral Supplementation: Continuing same supplementation regimen (as documented above) based off most recent annual studies.  Bone management overseen by Dr. Lee.  Pt is interested in starting a probiotic supplement so I recommended MVW Complete Probiotic 1 capsule daily -- will prescribe to [facility].     4) Diabetes: Current care plan reviewed along with updated labs.     Patient Understanding: Good  Expected Compliance: Good  Follow-Up Plans: Per Protocol   GOALS:  1) Weight maintenance vs gain toward goal BMI of 23 kg/m2.   2) Good adherence (>90%) with prescribed enzymes, vitamin/mineral supplements and insulin therapy.    FOLLOW-UP/MONITORING:  Visit patient within 12 month(s) for annual follow-up, sooner by request or in response to acute weight/nutrition changes.  Time Spent in Face-to-Face Interaction: 15 min (Medicare time with diabetes)    Ivory Bonilla MS, RD, LD (pager [phone])  Cystic Fibrosis/Lung Transplant Dietitian      -Available Mon-Thurs 8 AM-4:30 PM. On Fridays please contact pager [phone] (Zeina Pierre RD) and on weekends/holidays contact coverage dietitian at pager [phone] (inpatient use only).     Again, thank you for allowing me to participate in the care of your patient.      Sincerely,      Jaye Machado MD

## 2021-12-09 ENCOUNTER — TELEPHONE (OUTPATIENT)
Dept: ALLERGY | Facility: CLINIC | Age: 46
End: 2021-12-09
Payer: COMMERCIAL

## 2021-12-09 NOTE — TELEPHONE ENCOUNTER
[facility] Call Center    Phone Message    May a detailed message be left on voicemail: yes     Reason for Call: Appointment Intake    Referring Provider Name: Dr Jaye Machado  Diagnosis and/or Symptoms: See allergy to re-evaluate antibiotic intolerance (from Dr Machado OV note) new referral needs review, thanks!    Action Taken: Message routed to:  [facility] ([facility]): Allergy    Travel Screening: Not Applicable                                                                         No

## 2021-12-10 ENCOUNTER — TELEPHONE (OUTPATIENT)
Dept: ALLERGY | Facility: CLINIC | Age: 46
End: 2021-12-10
Payer: COMMERCIAL

## 2021-12-10 NOTE — TELEPHONE ENCOUNTER
Called and spoke to Kilo about his need for an appointment, his CF Doc wants some more in depth testing. He has older allergies and they would like them looked into.    Saurabh Tirado, Paramedic

## 2021-12-13 LAB
BACTERIA SPT CULT: ABNORMAL

## 2021-12-16 NOTE — TELEPHONE ENCOUNTER
FUTURE VISIT INFORMATION      FUTURE VISIT INFORMATION:    Date: 3.11.21    Time: 7:30 AM    Location: [facility] [place]  REFERRAL INFORMATION:    Referring provider:  [place]    Referring providers clinic:  [facility]    Reason for visit/diagnosis drug testing consult    RECORDS REQUESTED FROM:       Clinic name Comments Records Status Imaging Status   [facility] Radha 12.8.21 [facility] EPIC

## 2022-01-02 ENCOUNTER — HEALTH MAINTENANCE LETTER (OUTPATIENT)
Age: 47
End: 2022-01-02

## 2022-01-18 DIAGNOSIS — E84.0 CYSTIC FIBROSIS WITH PULMONARY MANIFESTATIONS (H): ICD-10-CM

## 2022-01-18 DIAGNOSIS — E84.8 DIABETES MELLITUS RELATED TO CF (CYSTIC FIBROSIS) (H): ICD-10-CM

## 2022-01-18 DIAGNOSIS — E08.9 DIABETES MELLITUS RELATED TO CF (CYSTIC FIBROSIS) (H): ICD-10-CM

## 2022-01-18 RX ORDER — SODIUM CHLORIDE FOR INHALATION 7 %
VIAL, NEBULIZER (ML) INHALATION
Qty: 240 ML | Refills: 5 | Status: SHIPPED | OUTPATIENT
Start: 2022-01-18 | End: 2023-06-30

## 2022-01-18 RX ORDER — PEN NEEDLE, DIABETIC 32GX 5/32"
NEEDLE, DISPOSABLE MISCELLANEOUS
Qty: 200 EACH | Refills: 5 | Status: SHIPPED | OUTPATIENT
Start: 2022-01-18 | End: 2022-12-22

## 2022-01-31 DIAGNOSIS — E84.0 CYSTIC FIBROSIS WITH PULMONARY MANIFESTATIONS (H): Primary | ICD-10-CM

## 2022-02-04 ENCOUNTER — TELEPHONE (OUTPATIENT)
Dept: ADMINISTRATIVE | Facility: CLINIC | Age: 47
End: 2022-02-04
Payer: COMMERCIAL

## 2022-02-04 NOTE — PROGRESS NOTES
Cystic Fibrosis Clinical Follow Up Assessment  Assessment Link -Cystic Fibrosis Clinical Follow Up Assessment  2022/02/04 6:42:14 [facility], by Soheila Cantrell      Activity date 2022/02/04      Providers    TYLER DAVIS      Medications    TRIKAFTA      Encounter Notes  BERENICE FLORES is a 46-year-old male being followed by the clinical pharmacist for medication monitoring of Cystic Fibrosis. This patient's current med list, allergies, and vaccination status have been reviewed and updated as appropriate.      Question   Answer     What are the patient's goals for this therapy?   stable pulmonary status     Did you identify any patient barriers to access and successful treatment?   No barriers to access identified     Is it appropriate to collect a PDC at this time? [QA Metric] (An MPR or PDC would not be appropriate for cycled medications or if the patient is on therapy   Yes     Document the date of the last refill of PDC   2022/01/28     Document PDC   1     Has the patient missed doses inappropriately?   No     Please select CURRENT side effect(s) for monitoring:   None         Summary notes  Spoke to pt via relay for TM assessment. Pt states 'Things are going super well. No side effects. All is going great. In 2 years I have only missed 1 evening dose. So the morning and evening is working great.' Denies health, allergy or med changes. PDC 1.0. Invited him to reach out anytime for concerns. No new updates in Epic today. Will continue to follow pt every 6 months.    Soheila Cantrell, Pharm.D.[facility] Special[facility] Pharmacist  [place] [address] [address] [facility] [place] [address]  [email or web address]  Office: [phone]

## 2022-02-07 DIAGNOSIS — E84.0 CYSTIC FIBROSIS WITH PULMONARY MANIFESTATIONS (H): ICD-10-CM

## 2022-02-07 RX ORDER — AZITHROMYCIN 250 MG/1
250 TABLET, FILM COATED ORAL DAILY
Qty: 30 TABLET | Refills: 11 | Status: SHIPPED | OUTPATIENT
Start: 2022-02-07 | End: 2023-02-10

## 2022-02-07 RX ORDER — DOXYCYCLINE 100 MG/1
100 CAPSULE ORAL 2 TIMES DAILY
Qty: 60 CAPSULE | Refills: 3 | Status: SHIPPED | OUTPATIENT
Start: 2022-02-07 | End: 2022-07-15

## 2022-03-01 NOTE — PATIENT INSTRUCTIONS
Cystic Fibrosis Self-Care Plan    RECOMMENDATIONS:   Kilo, It was great to see you today.  The plan from today:  --I will look into the g-tube  --annual studies labs  --Continue the Rimantadine through mid April  Great job with self cares!     YOUR GOAL:  Stay safe and well.  Enjoy the end of winter!      [facility] Nurse line:  Rachel Wilson Lexi  [phone]     [facility] Fax Number:      [phone]         Cystic Fibrosis Respiratory Therapists:   Felicitas Banuelos              [phone]          Jacklyn Pina   [phone]  Cystic Fibrosis Dietitians:              Zeina Pierre              [phone]                            Ivory Bonilla                        [phone]   Cystic Fibrosis Diabetes Nurse:    Jordyn Rudolph   [phone]    Cystic Fibrosis Social Workers:     Mary Ritter               [phone]                     Louann Suarez               [phone]  Cystic Fibrosis Pharmacists:           Tammy Morgan                               [phone]         Gracie Allen      [phone]   Cystic Fibrosis Genetic Counselor:   Pretty Pulliam    [phone]    [facility] website:  [email or web address]    COVID VACCINES:    You are eligible for the COVID-19 vaccine. Sign up for your COVID vaccine via [employer]. Log in, select the menu bar, select schedule an appointment, and then select COVID-19 Vaccine 1st Dose. You may also schedule by calling this number [phone] however hold times can be long.       OR schedule through the [facility] Vaccine Connector at https://vaccineconnector.[place].gov/ or by calling [phone].      The best vaccine is the one that s available to you first.  All COVID-19 vaccines currently available in the [place] (Celestino & Celestino, Pfizer and Moderna) have been shown to be highly effect at preventing COVID-19.       We re still learning how vaccines will affect the  spread of COVID-19. After you ve been fully vaccinated against COVID-19, you should keep taking precautions in public places like wearing a mask, staying 6 feet apart from others, and avoiding crowds and poorly ventilated spaces until we know more.       MRN: [id number]   Clinic Date: March 1, 2022   Patient: Nico Morales     Annual Studies:   IGG   Date Value Ref Range Status   05/04/2021 1,208 610 - 1,616 mg/dL Final     Insulin   Date Value Ref Range Status   07/26/2011 30 mU/L Final     Comment:     Reference Range:  0-20  +120     There are no preventive care reminders to display for this patient.    Pulmonary Function Tests  FEV1: amount of air you can blow out in 1 second  FVC: total amount of air you can take in and blow out    Your Goals:         PFT Latest Ref Rng & Units 12/8/2021   FVC L 3.77   FEV1 L 1.79   FVC% % 77   FEV1% % 45          Airway Clearance: The Most Important Way to Keep Your Lungs Healthy  Vest Settings:    Hill-Rom Frequencies: 8, 9, 10 Pressure 10 Then, Frequencies 18, 19, 20 Pressure 6      RespirTech: Quick Start with Pressure of     Do each frequency for 5 minutes; Deflate vest after each frequency & cough 3 times before beginning the next setting.    Vest and Neb Therapy should be done 2 times/day.    Good Nutrition Can Improve Lung Function and Overall Health     Take ALL of your vitamins with food     Take 1/2 of your enzymes before EVERY meal/snack and the other 1/2 mid-meal/snack    Wt Readings from Last 3 Encounters:   12/08/21 63.5 kg (139 lb 15.9 oz)   06/03/21 69.9 kg (154 lb 1.6 oz)   05/13/21 68.2 kg (150 lb 5.7 oz)       There is no height or weight on file to calculate BMI.         National CF Foundation Recommendations for BMI in CF Adults: Women: at least 22 Men: at least 23        Controlling Blood Sugars Helps Prevent Lung Infections & Improves Nutrition  Test blood sugar:     In the morning before eating (goal is )     2 hours after a meal (goal is  less than 150)     When pre-meal glucose is greater than 150 add correction     At bedtime (if less than 100 eat a snack with 15 grams of carbohydrates  Last A1C Results:   Hemoglobin A1C POCT   Date Value Ref Range Status   05/04/2021 6.2 (H) 0 - 5.6 % Final     Comment:     Normal <5.7% Prediabetes 5.7-6.4%  Diabetes 6.5% or higher - adopted from ADA   consensus guidelines.           If diabetic, measure A1C every 6 months. Goal: Under 7%    Staying Healthy    Research:  If you are interested in learning about research opportunities or have questions, please contact the CF Research Team at [phone] or [email or web address].      CF Foundation:  Compass is a personalized resource service to help you with the insurance, financial, legal and other issues you are facing.  It's free, confidential and available to anyone with CF.  Ask your  for more information or contact Compass directly at 634-COMPASS ([phone]) or compass@cff.org, or learn more at cff.org/compass.

## 2022-03-01 NOTE — PROGRESS NOTES
[facility]  March 2, 2022         Assessment and Plan:   Nico Morales is a 46 year old male with cystic fibrosis.    1. CF lung disease with severe obstruction: Kilo reports from pulmonary point of view that he is been feeling good.  He is remained quite active and without much limitation.  He does feel since starting Trikafta that he is able to sleep 2 hours less a day which is led to the ability to do more a day.  He historically has grown out Pseudomonas and staph in his sputum.  His pulmonary function tests today do show a stable FEV1 and vital capacity.  There are some suggestion that there is less air trapping compared to 1 year ago.  At his last visit we did make some changes to his nebulized therapy.  He does feel that the Mucomyst has been beneficial to him.  At this time I have recommended to Kilo:  --He should continue to do his vest and nebulized therapies twice daily  --He does to nebulized therapy without the vest 1 time each day as well  --He should continue to remain active as another form of bronchial drainage therapy.  --He should continue doxycycline 2 weeks on 2 weeks off.  This is been his long-term maintenance therapy.    2. Pancreatic Insufficiency/GI: Kilo has no new symptoms consistent with worsening malabsorption.  Kilo does have a G-tube in place that has been there for over 10 years.  There are some consideration of its removal but I am concerned that the site might not close given its long duration of placement.  I will discuss this with surgery to see if there is an appropriate way to remove it or if there is any specific recommendations.  Fortunately has maintained an excellent weight.  - continue the present dose of pancreatic enzymes  - continue vitamin supplementation.    3.  CF TR modulator therapy.  Kilo is on Trikafta and tolerating well.    4.  Cystic fibrosis related diabetes: Kilo has excellent control of his  blood sugars.  He is followed by endocrinology.    5.  Psychosocial: Kilo is  in a stable relationship.  He and his family have just bought a new condo in [place] and are enjoying their time there very much.  He reports work is also very good.    Annual studies due: 5/2022  Immunizations: utd  Colonoscopy: 5/2022    Jaye Machado MD MPH  Associate Professor of Medicine  Pulmonary, Allergy, Critical Care and Sleep Medicine      Interval History:     Kilo reports that his cough frequency and sputum volume are stable.  He mostly produces sputum in the morning.  He does do a Mucomyst neb in the morning with his bronchodilator.  He does budesonide in the middle of the day.  He then does hypertonic saline in the evening.           Review of Systems:     CONSTITUTIONAL: no fever, no chills, no sweats, no change in weight, no change in energy--phenomenal, no change in appetite--normal appetite    INTEGUMENTARY/SKIN: no rash, no obvious new lesions    ENT/MOUTH: no sore throat, no new sinus pain, no new nasal drainage, no new nasal congestion, no ear ringing     RESPIRATORY: see interval history    CV: no chest pain, no palpitations, no peripheral edema    GI: no nausea, no vomiting, no change in stools, no fatty stools, no GERD    : negative    MUSCULOSKELETAL: no myalgias, no arthralgias    ENDOCRINE: Negative    NEURO: Occasional headache    SLEEP: no issues--can no longer sleep in    PSYCHIATRIC: mood stable--great          Past Medical and Surgical History:     Past Medical History:   Diagnosis Date     CF (cystic fibrosis) (H)      Exocrine pancreatic insufficiency      Nocardia infection      Pseudomonas infection      S/P gastrostomy (H) 2002     Past Surgical History:   Procedure Laterality Date     COLONOSCOPY N/A 5/21/2018    Procedure: COMBINED COLONOSCOPY, SINGLE OR MULTIPLE BIOPSY/POLYPECTOMY BY BIOPSY;  Cystic fibrosis, 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;  Surgeon: Margarito Bowman MD;  Location:  [facility]     GASTROSTOMY TUBE  2002     IR PULMONARY EMBOLIZATION  7/20/2001     PICC INSERTION Left 01/22/2018    4Fr SL BioFlo PICC, 46cm (5cm external) in the L basilic vein w/ tip in the low SVC           Family History:     Family History   Problem Relation Age of Onset     Heart Disease Maternal Grandmother      Heart Disease Maternal Grandfather      Heart Disease Paternal Grandmother      Diabetes No family hx of             Social History:     Social History     Socioeconomic History     Marital status:      Spouse name: Not on file     Number of children: 0     Years of education: Not on file     Highest education level: Not on file   Occupational History     Occupation: [employer]     Employer: [employer]   Tobacco Use     Smoking status: Never Smoker     Smokeless tobacco: Never Used   Substance and Sexual Activity     Alcohol use: No     Alcohol/week: 0.0 standard drinks     Drug use: No     Sexual activity: Yes     Partners: Female     Birth control/protection: Pill   Other Topics Concern     Parent/sibling w/ CABG, MI or angioplasty before 65F 55M? Not Asked      Service Not Asked     Blood Transfusions No     Caffeine Concern Not Asked     Occupational Exposure Not Asked     Hobby Hazards Not Asked     Sleep Concern Not Asked     Stress Concern Not Asked     Weight Concern Not Asked     Special Diet Not Asked     Back Care Not Asked     Exercise No     Bike Helmet Not Asked     Seat Belt Not Asked     Self-Exams Not Asked   Social History Narrative    8/15/2019 - .  Lives with his English Bulldog (Jefry) in a house that he designed in [place].  No children.     Social Determinants of Health     Financial Resource Strain: Not on file   Food Insecurity: Not on file   Transportation Needs: Not on file   Physical Activity: Not on file   Stress: Not on file   Social Connections: Not on file   Intimate Partner Violence: Not on file   Housing Stability: Not on file  "           Medications:     Current Outpatient Medications   Medication     ACE NOT PRESCRIBED, INTENTIONAL,     acetylcysteine (MUCOMYST) 10 % nebulizer solution     amylase-lipase-protease (CREON) 32497-98106 units CPEP per EC capsule     azithromycin (ZITHROMAX) 250 MG tablet     blood glucose (NO BRAND SPECIFIED) test strip     blood glucose (NO BRAND SPECIFIED) test strip     blood glucose monitoring (NO BRAND SPECIFIED) meter device kit     blood glucose monitoring (ULTRA THIN 30G) lancets     budesonide (PULMICORT) 1 MG/2ML neb solution     budesonide-formoterol (SYMBICORT) 160-4.5 MCG/ACT Inhaler     CALCIUM PO     Continuous Blood Gluc Sensor (FREESTYLE SUKHWINDER 14 DAY SENSOR) [facility]     elexacaftor-tezacaftor-ivacaftor & ivacaftor (TRIKAFTA) 100-50-75 & 150 MG tablet pack     [facility] INFUSION MANAGED PATIENT     ferrous fumarate 65 mg, [place]. FE,-Vitamin C 125 mg (VITRON-C)  MG TABS tablet     insulin aspart (NOVOLOG PENFILL) 100 UNIT/ML cartridge     insulin glargine (LANTUS SOLOSTAR) 100 UNIT/ML pen     insulin glargine (LANTUS SOLOSTAR) 100 UNIT/ML pen     insulin glargine (LANTUS SOLOSTAR) 100 UNIT/ML pen     insulin pen needle (32G X 4 MM) 32G X 4 MM miscellaneous     insulin pen needle (BD OMI U/F) 32G X 4 MM miscellaneous     levalbuterol (XOPENEX HFA) 45 MCG/ACT inhaler     levalbuterol (XOPENEX) 1.25 MG/3ML neb solution     MULTIVITAMIN OR     mvw complete (PROBIOTIC FORMULATION) capsule     polyethylene glycol (GOLYTELY) 236 g suspension     polyethylene glycol (MIRALAX/GLYCOLAX) powder     sodium chloride inhalant 7 % NEBU neb solution     Syringe/Needle, Disp, 18G X 1\" 5 ML MISC     water for injection sterile SOLN     doxycycline hyclate (VIBRAMYCIN) 100 MG capsule     No current facility-administered medications for this visit.            Physical Exam:   /83   Pulse 97   Resp 17   Ht 1.753 m (5' 9\")   Wt 66.2 kg (146 lb)   SpO2 96%   BMI 21.56 kg/m      Constitutional:   " Awake, alert and in no apparent distress     Eyes:   nonicteric     ENT:   Mask in place     Neck:   Supple without supraclavicular or cervical lymphadenopathy     Lungs:   Good air flow.  No crackles.  Biapical rhonchi.  No wheezes.     Cardiovascular:   Normal S1 and S2.  RRR.  No murmur, gallop or rub.     Abdomen:   NABS, soft, nontender, nondistended.      Musculoskeletal:   No edema, digital clubbing present     Neurologic:   Alert and conversant.     Skin:   Warm, dry.  No rash on limited exam.             Data:   All laboratory and imaging data reviewed.    Cystic Fibrosis Culture  Specimen Description   Date Value Ref Range Status   05/04/2021 Sputum  Final   05/04/2021 Sputum  Final   05/04/2021 Sputum  Final    Culture Micro   Date Value Ref Range Status   05/04/2021 Moderate growth  Normal santi    Final   05/04/2021 (A)  Final    Light growth  Pseudomonas aeruginosa, mucoid strain  Susceptibility testing done on previous specimen     05/04/2021 Culture negative for acid fast bacilli  Final   05/04/2021   Final    Assayed at [facility] [address] [place] [id number] [phone]        Recent Results (from the past 168 hour(s))   General PFT Lab (Please always keep checked)    Collection Time: 03/02/22 10:15 AM   Result Value Ref Range    FVC-Pred 4.85 L    FVC-Pre 3.79 L    FVC-%Pred-Pre 78 %    FEV1-Pre 1.89 L    FEV1-%Pred-Pre 48 %    FEV1FVC-Pred 80 %    FEV1FVC-Pre 50 %    FEFMax-Pred 9.67 L/sec    FEFMax-Pre 6.99 L/sec    FEFMax-%Pred-Pre 72 %    FEF2575-Pred 3.71 L/sec    FEF2575-Pre 0.68 L/sec    CVC0823-%Pred-Pre 18 %    ExpTime-Pre 13.97 sec    FIFMax-Pre 5.09 L/sec    VC-Pred 5.05 L    VC-Pre 3.75 L    VC-%Pred-Pre 74 %    IC-Pred 3.39 L    IC-Pre 2.79 L    IC-%Pred-Pre 82 %    ERV-Pred 1.66 L    ERV-Pre 0.96 L    ERV-%Pred-Pre 57 %    FEV1FEV6-Pred 81 %    FEV1FEV6-Pre 56 %    FRCPleth-Pred 3.38 L    FRCPleth-Pre 3.31 L    FRCPleth-%Pred-Pre 97 %    RVPleth-Pred 2.04 L     RVPleth-Pre 2.35 L    RVPleth-%Pred-Pre 115 %    TLCPleth-Pred 6.80 L    TLCPleth-Pre 6.09 L    TLCPleth-%Pred-Pre 89 %    DLCOunc-Pred 28.74 ml/min/mmHg    DLCOunc-Pre 25.82 ml/min/mmHg    DLCOunc-%Pred-Pre 89 %    VA-Pre 4.24 L    VA-%Pred-Pre 67 %    FEV1SVC-Pred 77 %    FEV1SVC-Pre 51 %   Cystic Fibrosis Culture Aerobic Bacterial    Collection Time: 03/02/22 11:07 AM    Specimen: Expectorate; Sputum   Result Value Ref Range    Culture Culture in progress     Culture 4+ Normal santi to date        PFT: Severe obstructive lung disease.  When compared to 12/8/2021, the FEV1 and FVC have little change.    The TLC and RV are normal.  The RV to TLC ratio does suggest air trapping.  The DLCO corrected for hemoglobin is normal.    Pulmonary exacerbation: absent    45 minutes spent on the date of the encounter doing chart review, history and exam, documentation and further activities per the note  Time documented is excluding time spent for PFT interpretation.

## 2022-03-02 ENCOUNTER — ALLIED HEALTH/NURSE VISIT (OUTPATIENT)
Dept: CARE COORDINATION | Facility: CLINIC | Age: 47
End: 2022-03-02

## 2022-03-02 ENCOUNTER — OFFICE VISIT (OUTPATIENT)
Dept: PULMONOLOGY | Facility: CLINIC | Age: 47
End: 2022-03-02
Attending: INTERNAL MEDICINE
Payer: COMMERCIAL

## 2022-03-02 VITALS
SYSTOLIC BLOOD PRESSURE: 128 MMHG | HEIGHT: 69 IN | OXYGEN SATURATION: 96 % | WEIGHT: 146 LBS | BODY MASS INDEX: 21.62 KG/M2 | HEART RATE: 97 BPM | DIASTOLIC BLOOD PRESSURE: 83 MMHG | RESPIRATION RATE: 17 BRPM

## 2022-03-02 DIAGNOSIS — E84.0 CYSTIC FIBROSIS WITH PULMONARY MANIFESTATIONS (H): Primary | ICD-10-CM

## 2022-03-02 DIAGNOSIS — Z13.9 RISK AND FUNCTIONAL ASSESSMENT: Primary | ICD-10-CM

## 2022-03-02 DIAGNOSIS — E84.9 CYSTIC FIBROSIS (H): ICD-10-CM

## 2022-03-02 LAB
DLCOUNC-%PRED-PRE: 89 %
DLCOUNC-PRE: 25.82 ML/MIN/MMHG
DLCOUNC-PRED: 28.74 ML/MIN/MMHG
ERV-%PRED-PRE: 57 %
ERV-PRE: 0.96 L
ERV-PRED: 1.66 L
EXPTIME-PRE: 13.97 SEC
FEF2575-%PRED-PRE: 18 %
FEF2575-PRE: 0.68 L/SEC
FEF2575-PRED: 3.71 L/SEC
FEFMAX-%PRED-PRE: 72 %
FEFMAX-PRE: 6.99 L/SEC
FEFMAX-PRED: 9.67 L/SEC
FEV1-%PRED-PRE: 48 %
FEV1-PRE: 1.89 L
FEV1FEV6-PRE: 56 %
FEV1FEV6-PRED: 81 %
FEV1FVC-PRE: 50 %
FEV1FVC-PRED: 80 %
FEV1SVC-PRE: 51 %
FEV1SVC-PRED: 77 %
FIFMAX-PRE: 5.09 L/SEC
FRCPLETH-%PRED-PRE: 97 %
FRCPLETH-PRE: 3.31 L
FRCPLETH-PRED: 3.38 L
FVC-%PRED-PRE: 78 %
FVC-PRE: 3.79 L
FVC-PRED: 4.85 L
IC-%PRED-PRE: 82 %
IC-PRE: 2.79 L
IC-PRED: 3.39 L
RVPLETH-%PRED-PRE: 115 %
RVPLETH-PRE: 2.35 L
RVPLETH-PRED: 2.04 L
TLCPLETH-%PRED-PRE: 89 %
TLCPLETH-PRE: 6.09 L
TLCPLETH-PRED: 6.8 L
VA-%PRED-PRE: 67 %
VA-PRE: 4.24 L
VC-%PRED-PRE: 74 %
VC-PRE: 3.75 L
VC-PRED: 5.05 L

## 2022-03-02 PROCEDURE — 87070 CULTURE OTHR SPECIMN AEROBIC: CPT | Performed by: INTERNAL MEDICINE

## 2022-03-02 PROCEDURE — 87116 MYCOBACTERIA CULTURE: CPT | Performed by: INTERNAL MEDICINE

## 2022-03-02 PROCEDURE — 94375 RESPIRATORY FLOW VOLUME LOOP: CPT | Performed by: INTERNAL MEDICINE

## 2022-03-02 PROCEDURE — 99214 OFFICE O/P EST MOD 30 MIN: CPT | Mod: 25 | Performed by: INTERNAL MEDICINE

## 2022-03-02 PROCEDURE — 94726 PLETHYSMOGRAPHY LUNG VOLUMES: CPT | Performed by: INTERNAL MEDICINE

## 2022-03-02 PROCEDURE — G0463 HOSPITAL OUTPT CLINIC VISIT: HCPCS | Mod: 25

## 2022-03-02 PROCEDURE — 94729 DIFFUSING CAPACITY: CPT | Performed by: INTERNAL MEDICINE

## 2022-03-02 ASSESSMENT — ANXIETY QUESTIONNAIRES

## 2022-03-02 ASSESSMENT — PATIENT HEALTH QUESTIONNAIRE - PHQ9
SUM OF ALL RESPONSES TO PHQ QUESTIONS 1-9: 0
5. POOR APPETITE OR OVEREATING: NOT AT ALL

## 2022-03-02 ASSESSMENT — PAIN SCALES - GENERAL: PAINLEVEL: NO PAIN (0)

## 2022-03-02 NOTE — LETTER
3/2/2022    RE: Nico Morales  [address] [place] [address]    Dear Colleague,    Thank you for referring your patient, Nico Morales, to the [facility] [place]. Please see a copy of my visit note below.    [facility]  March 2, 2022         Assessment and Plan:   Nico Morales is a 46 year old male with cystic fibrosis.    1. CF lung disease with severe obstruction: Kilo reports from pulmonary point of view that he is been feeling good.  He is remained quite active and without much limitation.  He does feel since starting Trikafta that he is able to sleep 2 hours less a day which is led to the ability to do more a day.  He historically has grown out Pseudomonas and staph in his sputum.  His pulmonary function tests today do show a stable FEV1 and vital capacity.  There are some suggestion that there is less air trapping compared to 1 year ago.  At his last visit we did make some changes to his nebulized therapy.  He does feel that the Mucomyst has been beneficial to him.  At this time I have recommended to Kilo:  --He should continue to do his vest and nebulized therapies twice daily  --He does to nebulized therapy without the vest 1 time each day as well  --He should continue to remain active as another form of bronchial drainage therapy.  --He should continue doxycycline 2 weeks on 2 weeks off.  This is been his long-term maintenance therapy.    2. Pancreatic Insufficiency/GI: Kilo has no new symptoms consistent with worsening malabsorption.  Kilo does have a G-tube in place that has been there for over 10 years.  There are some consideration of its removal but I am concerned that the site might not close given its long duration of placement.  I will discuss this with surgery to see if there is an appropriate way to remove it or if there is any specific recommendations.  Fortunately  has maintained an excellent weight.  - continue the present dose of pancreatic enzymes  - continue vitamin supplementation.    3.  CF TR modulator therapy.  Kilo is on Trikafta and tolerating well.    4.  Cystic fibrosis related diabetes: Kilo has excellent control of his blood sugars.  He is followed by endocrinology.    5.  Psychosocial: Kilo is  in a stable relationship.  He and his family have just bought a new condo in [place] and are enjoying their time there very much.  He reports work is also very good.    Annual studies due: 5/2022  Immunizations: utd  Colonoscopy: 5/2022    Jaye Machado MD MPH  Associate Professor of Medicine  Pulmonary, Allergy, Critical Care and Sleep Medicine      Interval History:     Kilo reports that his cough frequency and sputum volume are stable.  He mostly produces sputum in the morning.  He does do a Mucomyst neb in the morning with his bronchodilator.  He does budesonide in the middle of the day.  He then does hypertonic saline in the evening.           Review of Systems:     CONSTITUTIONAL: no fever, no chills, no sweats, no change in weight, no change in energy--phenomenal, no change in appetite--normal appetite    INTEGUMENTARY/SKIN: no rash, no obvious new lesions    ENT/MOUTH: no sore throat, no new sinus pain, no new nasal drainage, no new nasal congestion, no ear ringing     RESPIRATORY: see interval history    CV: no chest pain, no palpitations, no peripheral edema    GI: no nausea, no vomiting, no change in stools, no fatty stools, no GERD    : negative    MUSCULOSKELETAL: no myalgias, no arthralgias    ENDOCRINE: Negative    NEURO: Occasional headache    SLEEP: no issues--can no longer sleep in    PSYCHIATRIC: mood stable--great          Past Medical and Surgical History:     Past Medical History:   Diagnosis Date     CF (cystic fibrosis) (H)      Exocrine pancreatic insufficiency      Nocardia infection      Pseudomonas infection      S/P  gastrostomy (H) 2002     Past Surgical History:   Procedure Laterality Date     COLONOSCOPY N/A 5/21/2018    Procedure: COMBINED COLONOSCOPY, SINGLE OR MULTIPLE BIOPSY/POLYPECTOMY BY BIOPSY;  Cystic fibrosis, 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;  Surgeon: Margarito Bowman MD;  Location: [facility]     GASTROSTOMY TUBE  2002     IR PULMONARY EMBOLIZATION  7/20/2001     PICC INSERTION Left 01/22/2018    4Fr SL BioFlo PICC, 46cm (5cm external) in the L basilic vein w/ tip in the low SVC           Family History:     Family History   Problem Relation Age of Onset     Heart Disease Maternal Grandmother      Heart Disease Maternal Grandfather      Heart Disease Paternal Grandmother      Diabetes No family hx of             Social History:     Social History     Socioeconomic History     Marital status:      Spouse name: Not on file     Number of children: 0     Years of education: Not on file     Highest education level: Not on file   Occupational History     Occupation: financial palnner     Employer: [employer]   Tobacco Use     Smoking status: Never Smoker     Smokeless tobacco: Never Used   Substance and Sexual Activity     Alcohol use: No     Alcohol/week: 0.0 standard drinks     Drug use: No     Sexual activity: Yes     Partners: Female     Birth control/protection: Pill   Other Topics Concern     Parent/sibling w/ CABG, MI or angioplasty before 65F 55M? Not Asked      Service Not Asked     Blood Transfusions No     Caffeine Concern Not Asked     Occupational Exposure Not Asked     Hobby Hazards Not Asked     Sleep Concern Not Asked     Stress Concern Not Asked     Weight Concern Not Asked     Special Diet Not Asked     Back Care Not Asked     Exercise No     Bike Helmet Not Asked     Seat Belt Not Asked     Self-Exams Not Asked   Social History Narrative    8/15/2019 - .  Lives with his English Bulldog (Jefry) in a house that he designed in [place].  No children.     Social Determinants  "of Health     Financial Resource Strain: Not on file   Food Insecurity: Not on file   Transportation Needs: Not on file   Physical Activity: Not on file   Stress: Not on file   Social Connections: Not on file   Intimate Partner Violence: Not on file   Housing Stability: Not on file            Medications:     Current Outpatient Medications   Medication     ACE NOT PRESCRIBED, INTENTIONAL,     acetylcysteine (MUCOMYST) 10 % nebulizer solution     amylase-lipase-protease (CREON) 94138-30520 units CPEP per EC capsule     azithromycin (ZITHROMAX) 250 MG tablet     blood glucose (NO BRAND SPECIFIED) test strip     blood glucose (NO BRAND SPECIFIED) test strip     blood glucose monitoring (NO BRAND SPECIFIED) meter device kit     blood glucose monitoring (ULTRA THIN 30G) lancets     budesonide (PULMICORT) 1 MG/2ML neb solution     budesonide-formoterol (SYMBICORT) 160-4.5 MCG/ACT Inhaler     CALCIUM PO     Continuous Blood Gluc Sensor (FREESTYLE SUKHWINDER 14 DAY SENSOR) [facility]     elexacaftor-tezacaftor-ivacaftor & ivacaftor (TRIKAFTA) 100-50-75 & 150 MG tablet pack     [facility] INFUSION MANAGED PATIENT     ferrous fumarate 65 mg, [place]. FE,-Vitamin C 125 mg (VITRON-C)  MG TABS tablet     insulin aspart (NOVOLOG PENFILL) 100 UNIT/ML cartridge     insulin glargine (LANTUS SOLOSTAR) 100 UNIT/ML pen     insulin glargine (LANTUS SOLOSTAR) 100 UNIT/ML pen     insulin glargine (LANTUS SOLOSTAR) 100 UNIT/ML pen     insulin pen needle (32G X 4 MM) 32G X 4 MM miscellaneous     insulin pen needle (BD OMI U/F) 32G X 4 MM miscellaneous     levalbuterol (XOPENEX HFA) 45 MCG/ACT inhaler     levalbuterol (XOPENEX) 1.25 MG/3ML neb solution     MULTIVITAMIN OR     mvw complete (PROBIOTIC FORMULATION) capsule     polyethylene glycol (GOLYTELY) 236 g suspension     polyethylene glycol (MIRALAX/GLYCOLAX) powder     sodium chloride inhalant 7 % NEBU neb solution     Syringe/Needle, Disp, 18G X 1\" 5 ML MISC     water for injection sterile " "SOLN     doxycycline hyclate (VIBRAMYCIN) 100 MG capsule     No current facility-administered medications for this visit.            Physical Exam:   /83   Pulse 97   Resp 17   Ht 1.753 m (5' 9\")   Wt 66.2 kg (146 lb)   SpO2 96%   BMI 21.56 kg/m      Constitutional:   Awake, alert and in no apparent distress     Eyes:   nonicteric     ENT:   Mask in place     Neck:   Supple without supraclavicular or cervical lymphadenopathy     Lungs:   Good air flow.  No crackles.  Biapical rhonchi.  No wheezes.     Cardiovascular:   Normal S1 and S2.  RRR.  No murmur, gallop or rub.     Abdomen:   NABS, soft, nontender, nondistended.      Musculoskeletal:   No edema, digital clubbing present     Neurologic:   Alert and conversant.     Skin:   Warm, dry.  No rash on limited exam.             Data:   All laboratory and imaging data reviewed.    Cystic Fibrosis Culture  Specimen Description   Date Value Ref Range Status   05/04/2021 Sputum  Final   05/04/2021 Sputum  Final   05/04/2021 Sputum  Final    Culture Micro   Date Value Ref Range Status   05/04/2021 Moderate growth  Normal santi    Final   05/04/2021 (A)  Final    Light growth  Pseudomonas aeruginosa, mucoid strain  Susceptibility testing done on previous specimen     05/04/2021 Culture negative for acid fast bacilli  Final   05/04/2021   Final    Assayed at [facility], [place] [place] [id number] [phone]        Recent Results (from the past 168 hour(s))   General PFT Lab (Please always keep checked)    Collection Time: 03/02/22 10:15 AM   Result Value Ref Range    FVC-Pred 4.85 L    FVC-Pre 3.79 L    FVC-%Pred-Pre 78 %    FEV1-Pre 1.89 L    FEV1-%Pred-Pre 48 %    FEV1FVC-Pred 80 %    FEV1FVC-Pre 50 %    FEFMax-Pred 9.67 L/sec    FEFMax-Pre 6.99 L/sec    FEFMax-%Pred-Pre 72 %    FEF2575-Pred 3.71 L/sec    FEF2575-Pre 0.68 L/sec    GSS1652-%Pred-Pre 18 %    ExpTime-Pre 13.97 sec    FIFMax-Pre 5.09 L/sec    VC-Pred 5.05 L    VC-Pre 3.75 L    " VC-%Pred-Pre 74 %    IC-Pred 3.39 L    IC-Pre 2.79 L    IC-%Pred-Pre 82 %    ERV-Pred 1.66 L    ERV-Pre 0.96 L    ERV-%Pred-Pre 57 %    FEV1FEV6-Pred 81 %    FEV1FEV6-Pre 56 %    FRCPleth-Pred 3.38 L    FRCPleth-Pre 3.31 L    FRCPleth-%Pred-Pre 97 %    RVPleth-Pred 2.04 L    RVPleth-Pre 2.35 L    RVPleth-%Pred-Pre 115 %    TLCPleth-Pred 6.80 L    TLCPleth-Pre 6.09 L    TLCPleth-%Pred-Pre 89 %    DLCOunc-Pred 28.74 ml/min/mmHg    DLCOunc-Pre 25.82 ml/min/mmHg    DLCOunc-%Pred-Pre 89 %    VA-Pre 4.24 L    VA-%Pred-Pre 67 %    FEV1SVC-Pred 77 %    FEV1SVC-Pre 51 %   Cystic Fibrosis Culture Aerobic Bacterial    Collection Time: 03/02/22 11:07 AM    Specimen: Expectorate; Sputum   Result Value Ref Range    Culture Culture in progress     Culture 4+ Normal santi to date        PFT: Severe obstructive lung disease.  When compared to 12/8/2021, the FEV1 and FVC have little change.    The TLC and RV are normal.  The RV to TLC ratio does suggest air trapping.  The DLCO corrected for hemoglobin is normal.    Pulmonary exacerbation: absent    45 minutes spent on the date of the encounter doing chart review, history and exam, documentation and further activities per the note  Time documented is excluding time spent for PFT interpretation.    Again, thank you for allowing me to participate in the care of your patient.      Sincerely,    Jaye Machado MD

## 2022-03-02 NOTE — NURSING NOTE
Chief Complaint   Patient presents with     RECHECK     Return Cystic Fibrosis     Medications reviewed and vital signs taken.   Latoya Carias, CMA

## 2022-03-03 ASSESSMENT — ANXIETY QUESTIONNAIRES: GAD7 TOTAL SCORE: 0

## 2022-03-07 ENCOUNTER — TELEPHONE (OUTPATIENT)
Dept: PULMONOLOGY | Facility: CLINIC | Age: 47
End: 2022-03-07

## 2022-03-07 NOTE — TELEPHONE ENCOUNTER
PA Initiation    Medication: Azithromycin PA renewal pending  Insurance Company: [employer] - Phone [phone] Fax [phone]  Pharmacy Filling the Rx:    Filling Pharmacy Phone:    Filling Pharmacy Fax:    Start Date: 3/7/2022    Called [facility] ([phone]) and spoke to rep [place] to initiate pending PA [id number], clinical notes faxed [phone]

## 2022-03-08 ENCOUNTER — TELEPHONE (OUTPATIENT)
Dept: PULMONOLOGY | Facility: CLINIC | Age: 47
End: 2022-03-08
Payer: COMMERCIAL

## 2022-03-08 LAB
BACTERIA SPT CULT: ABNORMAL

## 2022-03-08 NOTE — TELEPHONE ENCOUNTER
Talked with patient and scheduled 3 month follow up with Dr. Machado on 5/24/2022 in addition to testing prior. Details confirmed with patient.

## 2022-03-11 NOTE — PROGRESS NOTES
Respiratory Therapist Note:         Vest                Brand: Hill-Rom - traditional Hill Rom: Frequencies 8, 9, 10 at pressure 10 then frequencies 18, 19, 20 at pressure 6. and Hill-Rom - [place] Frequencies: 13-15, intensity 8-10                Cough Pause: Cough Pause; Yes                Vest Garment Size: Adult Medium                Last Fitting Date: 2022                Frequency of therapy: 14-21 times per week                Concerns: none         Exercise (purposeful and aerobic for >20 minutes each session): NO.                Does this qualify as additional airway clearance: No         Alternative Airway Clearance:               Nebulized Medications                Bronchodilators: Xopenex                Mucolytic: Mucomyst, Pulmozyme and 7% Hypertonic Saline                Antibiotics:                 Additional Inhaled Medications: ICS                Spacer Use: yes         Review Cleaning: Yes. Countertop bottle sterilizer.         Education and Transition Information                Correct order of inhaled medications: Yes                Mechanism of Action of inhaled medications: Yes                Frequency of inhaled medications: Yes                Dosage of inhaled medications:                 Other:          Home Care:                Nebulizer Cups (Brand/Type): [place]                Nebulizer Compressor                            Year Purchased: 2022                            [facility], Phone: [phone], Fax: [phone]                Nebulizer Supply Company:                            [facility], Phone: [phone], Fax: [phone]         Oxygen:                                     Pulmonary Rehab                Site:                 Date Completed:          Plan of Care and Goals for next visit: Keep up the good work

## 2022-03-16 NOTE — TELEPHONE ENCOUNTER
Called [facility] ([phone]) and PA [id number] and spoke to rep Mo and still pending review and chart notes not received. Refaxed clinicals to [phone]

## 2022-03-17 NOTE — TELEPHONE ENCOUNTER
Prior Authorization Approval    Authorization Effective Date: 3/7/2022  Authorization Expiration Date: 3/7/2023  Medication: Azithromycin PA renewal approved  Approved Dose/Quantity: 250mg / 30 for 30 day supply   Reference #: Key: [id number] - PA Case ID: [id number]   Insurance Company: [employer] - Phone [phone] Fax [phone]  Expected CoPay:     $0  CoPay Card Available:      Foundation Assistance Needed:    Which Pharmacy is filling the prescription (Not needed for infusion/clinic administered): [id number] - [place], [place] - [address] [address]  Pharmacy Notified:    Patient Notified:

## 2022-03-23 DIAGNOSIS — E84.0 CYSTIC FIBROSIS WITH PULMONARY MANIFESTATIONS (H): Primary | ICD-10-CM

## 2022-03-24 NOTE — TELEPHONE ENCOUNTER
REFERRAL INFORMATION:    Referring Provider:  Dr. Jaye Machado     Referring Clinic:  [facility]    Reason for Visit/Diagnosis: G-tube removal, possible closure of site        FUTURE VISIT INFORMATION:    Appointment Date: 4/11/2022     Appointment Time: 1:30 PM      NOTES RECORD STATUS  DETAILS   OFFICE NOTE from Referring Provider Internal 3/2/2022 Office visit with Dr. Machado      OFFICE NOTE from Other Specialists N/A    HOSPITAL DISCHARGE SUMMARY/ ED VISITS  N/A    OPERATIVE REPORT N/A    ENDOSCOPY (EGD)  N/A    PERTINENT LABS Internal    PATHOLOGY REPORTS (RELATED) N/A    IMAGING (CT, MRI, US, XR)  Internal CT Chest: 7/23/2021  XR Chest: 12/16/2020

## 2022-03-28 DIAGNOSIS — E84.9 CYSTIC FIBROSIS (H): ICD-10-CM

## 2022-03-28 RX ORDER — ELEXACAFTOR, TEZACAFTOR, AND IVACAFTOR 100-50-75
KIT ORAL
Qty: 84 TABLET | Refills: 11 | Status: SHIPPED | OUTPATIENT
Start: 2022-03-28 | End: 2023-02-28

## 2022-04-08 ENCOUNTER — PATIENT OUTREACH (OUTPATIENT)
Dept: SURGERY | Facility: CLINIC | Age: 47
End: 2022-04-08
Payer: COMMERCIAL

## 2022-04-08 NOTE — PROGRESS NOTES
Patient Telephone Reminder Call    Date of call:  04/08/22  Phone numbers:  Home number on file [phone] (home)    Reached patient/confirmed appointment:  No - left message:   on voicemail  Appointment with:   Dr. Gerhard Chong  Reason for visit:  g-tube removal

## 2022-04-11 ENCOUNTER — OFFICE VISIT (OUTPATIENT)
Dept: SURGERY | Facility: CLINIC | Age: 47
End: 2022-04-11
Attending: INTERNAL MEDICINE
Payer: COMMERCIAL

## 2022-04-11 ENCOUNTER — PRE VISIT (OUTPATIENT)
Dept: SURGERY | Facility: CLINIC | Age: 47
End: 2022-04-11

## 2022-04-11 VITALS — WEIGHT: 148 LBS | BODY MASS INDEX: 21.92 KG/M2 | HEART RATE: 77 BPM | OXYGEN SATURATION: 97 % | HEIGHT: 69 IN

## 2022-04-11 DIAGNOSIS — E84.0 CYSTIC FIBROSIS WITH PULMONARY MANIFESTATIONS (H): ICD-10-CM

## 2022-04-11 PROCEDURE — 99203 OFFICE O/P NEW LOW 30 MIN: CPT | Performed by: SURGERY

## 2022-04-11 ASSESSMENT — PAIN SCALES - GENERAL: PAINLEVEL: NO PAIN (0)

## 2022-04-11 NOTE — LETTER
4/11/2022       RE: Nico Morales  [address] [facility] [address]     Dear Colleague,    Thank you for referring your patient, Nico Morales, to the [facility] [place] [facility]. Please see a copy of my visit note below.      Mr. Morales returns to my clinic 13 years after the last visit.  At that time he had a G-tube in place for malnutrition due to cystic fibrosis.  He had a polyp that we observed.  He has had the G-tube continually since then but his pulmonary function is improved and he has not used it for many years.  He pulled it 2 weeks ago.  There was some concern that due to the length of time he had it that it would result in a gastrocutaneous fistula.  Sealed up after 2 days and has not drained since.    On exam he has a retracted scar at the G-tube site but it is dry and there is no evidence of a fistula.    Assessment plan healed gastrostomy site.  I explained that this could open up again and that the presence of a fistula is not of particular concern other than being annoying.  I explained that if it were to open up or what we would have to do to close it.  I think 2 weeks out the chance of that happening is low.  He will call us if it does we will then deal with the fistula takedown.    I spent 15 minutes with the patient in examining interviewing charting and dictating.      Faisal Chong MD

## 2022-04-11 NOTE — NURSING NOTE
"Chief Complaint   Patient presents with     Consult     Kilo, is being seen today for a consult regarding g-tube removal closure, as reported by patient.       Vitals:    04/11/22 1346   Pulse: 77   SpO2: 97%   Weight: 67.1 kg (148 lb)   Height: 1.753 m (5' 9\")       Body mass index is 21.86 kg/m .      Zahida Davey LPN    "

## 2022-04-11 NOTE — PROGRESS NOTES
Mr. Morales returns to my clinic 13 years after the last visit.  At that time he had a G-tube in place for malnutrition due to cystic fibrosis.  He had a polyp that we observed.  He has had the G-tube continually since then but his pulmonary function is improved and he has not used it for many years.  He pulled it 2 weeks ago.  There was some concern that due to the length of time he had it that it would result in a gastrocutaneous fistula.  Sealed up after 2 days and has not drained since.    On exam he has a retracted scar at the G-tube site but it is dry and there is no evidence of a fistula.    Assessment plan healed gastrostomy site.  I explained that this could open up again and that the presence of a fistula is not of particular concern other than being annoying.  I explained that if it were to open up or what we would have to do to close it.  I think 2 weeks out the chance of that happening is low.  He will call us if it does we will then deal with the fistula takedown.    I spent 15 minutes with the patient in examining interviewing charting and dictating.

## 2022-04-11 NOTE — PATIENT INSTRUCTIONS
You met with Dr. Gerhard Chong.      Today's visit instructions:    Return to the Surgery Clinic on an as needed basis.        If you have questions please contact Sophia RN or Annie RN during regular clinic hours, Monday through Friday 7:30 AM - 4:00 PM, or you can contact us via Kartela at anytime.       If you have urgent needs after-hours, weekends, or holidays please call the hospital at [phone] and ask to speak with our on-call General Surgery Team.    Appointment [phone]  Nurse Advice (Sophia or Annie): [phone]   Surgery Scheduler ([place]): [phone]  Fax: [phone]

## 2022-04-27 LAB
ACID FAST STAIN (ARUP): NORMAL
ACID FAST STAIN (ARUP): NORMAL

## 2022-04-29 DIAGNOSIS — E84.9 CF (CYSTIC FIBROSIS) (H): ICD-10-CM

## 2022-04-29 DIAGNOSIS — E84.9 CYSTIC FIBROSIS (H): ICD-10-CM

## 2022-04-29 DIAGNOSIS — E84.0 CYSTIC FIBROSIS WITH PULMONARY MANIFESTATIONS (H): ICD-10-CM

## 2022-04-29 RX ORDER — LEVALBUTEROL INHALATION SOLUTION 1.25 MG/3ML
SOLUTION RESPIRATORY (INHALATION)
Qty: 1080 ML | Refills: 3 | Status: SHIPPED | OUTPATIENT
Start: 2022-04-29 | End: 2023-04-20

## 2022-05-03 ENCOUNTER — TELEPHONE (OUTPATIENT)
Dept: GASTROENTEROLOGY | Facility: CLINIC | Age: 47
End: 2022-05-03
Payer: COMMERCIAL

## 2022-05-03 NOTE — TELEPHONE ENCOUNTER
Screening Questions  BlueKIND OF PREP RedLOCATION [review exclusion criteria] GreenSEDATION TYPE  1. Have you had a positive covid test in the last 90 days? N     2. Do you have a legal guardian or medical Power of ?  Are you able to give consent for your medical care? N (Sedation review/consideration needed)    3. Are you active on mychart? Y    4. What insurance is in the chart? [employer]     3.   Ordering/Referring Provider: Jaye Machado MD    4. BMI 21.9 [BMI OVER 40-EXTENDED PREP]  If greater than 40 review exclusion criteria [PAC APPT IF @ [facility]]        5.  Respiratory Screening :  [If yes to any of the following HOSPITAL setting only]     Do you use daily home oxygen? N    Do you have mod to severe Obstructive Sleep Apnea? N  [OKAY @ [facility] SH PH RI]   Do you have Pulmonary Hypertension? N     Do you have UNCONTROLLED asthma? N        6.   Have you had a heart or lung transplant? N      7.   Are you currently on dialysis? N [ If yes, G-PREP & HOSPITAL setting only]     8.   Do you have chronic kidney disease? N [ If yes, G-PREP ]    9.   Have you had a stroke or Transient ischemic attack (TIA - aka  mini stroke ) within 6 months?  N (If yes, please review exclusion criteria)    10.   In the past 6 months, have you had any heart related issues including cardiomyopathy or heart attack? N           If yes, did it require cardiac stenting or other implantable device? N      11.   Do you have any implantable devices in your body (pacemaker, defib, LVAD)? N (If yes, please review exclusion criteria)    12.   Do you take nitroglycerin? N           If yes, how often? N  (if yes, HOSPITAL setting ONLY)    13.   Are you currently taking any blood thinners? N           [IF YES, INFORM PATIENT TO FOLLOW UP W/ ORDERING PROVIDER FOR BRIDGING INSTRUCTIONS]     14.   Do you have a diagnosis of diabetes? CYSTIC FIBROSIS RELATED DIABETES  [ If yes, G-PREP ]    15.   [FEMALES] Are you currently  pregnant? N    If yes, how many weeks? N    16.   Are you taking any prescription pain medications on a routine schedule?  N   [ If yes, EXTENDED PREP.] [If yes, MAC]    17.   Do you have any chemical dependencies such as alcohol, street drugs, or methadone?  N  [If yes, MAC]    18.   Do you have any history of post-traumatic stress syndrome, severe anxiety or history of psychosis?  N    [If yes, MAC]    19.   Do you transfer independently?  Y    20.  On a regular basis do you go 3-5 days between bowel movements? N   [ If yes, EXTENDED PREP.]    21.   Preferred LOCAL Pharmacy for Pre Prescription  [id number] - [place], [place] - [address] [address]      Scheduling Details    Caller : Nico Morales  (Please ask for phone number if not scheduled by patient)    Type of Procedure Scheduled: COLON  Which Colonoscopy Prep was Sent?: EXTENDED (CF)    KHORXIN CF PATIENTS & GROEN'S PATIENTS NEEDS EXTENDED PREP  Surgeon: VALARIE  Date of Procedure: 6/27  Location: [facility]      Sedation Type: CS PER ORDER  Conscious Sedation- Needs  for 6 hours after the procedure  MAC/General-Needs  for 24 hours after procedure    Pre-op Required at [facility], [place], [place] and OR for MAC sedation: N  (advise patient they will need a pre-op prior to procedure -)      Informed patient they will need an adult  Y  Cannot take any type of public or medical transportation alone    [facility]Pro[facility] or Externally: Y @ ER    Confirmed Nurse will call to complete assessment Y    Additional comments: Patient has Cystic Fibrosis

## 2022-05-04 ENCOUNTER — HOSPITAL ENCOUNTER (OUTPATIENT)
Dept: RESEARCH | Facility: CLINIC | Age: 47
Discharge: HOME | End: 2022-05-04
Attending: INTERNAL MEDICINE | Admitting: INTERNAL MEDICINE
Payer: COMMERCIAL

## 2022-05-04 ENCOUNTER — LAB REQUISITION (OUTPATIENT)
Dept: LAB | Facility: CLINIC | Age: 47
End: 2022-05-04

## 2022-05-04 DIAGNOSIS — E84.0 CYSTIC FIBROSIS OF THE LUNG (H): ICD-10-CM

## 2022-05-04 DIAGNOSIS — E84.9 CYSTIC FIBROSIS (H): ICD-10-CM

## 2022-05-04 DIAGNOSIS — Z00.6 EXAMINATION OF PARTICIPANT OR CONTROL IN CLINICAL RESEARCH: ICD-10-CM

## 2022-05-04 LAB
ALBUMIN SERPL-MCNC: 3.3 G/DL (ref 3.4–5)
ALP SERPL-CCNC: 124 U/L (ref 40–150)
ALT SERPL W P-5'-P-CCNC: 32 U/L (ref 0–70)
AST SERPL W P-5'-P-CCNC: 21 U/L (ref 0–45)
BASOPHILS # BLD AUTO: 0.1 10E3/UL (ref 0–0.2)
BASOPHILS NFR BLD AUTO: 1 %
BILIRUB DIRECT SERPL-MCNC: 0.1 MG/DL (ref 0–0.2)
BILIRUB SERPL-MCNC: 1 MG/DL (ref 0.2–1.3)
EOSINOPHIL # BLD AUTO: 0.3 10E3/UL (ref 0–0.7)
EOSINOPHIL NFR BLD AUTO: 3 %
ERYTHROCYTE [DISTWIDTH] IN BLOOD BY AUTOMATED COUNT: 13.2 % (ref 10–15)
GGT SERPL-CCNC: 15 U/L (ref 0–75)
HBA1C MFR BLD: 6.1 % (ref 0–5.6)
HCT VFR BLD AUTO: 43.9 % (ref 40–53)
HGB BLD-MCNC: 14.1 G/DL (ref 13.3–17.7)
IMM GRANULOCYTES # BLD: 0 10E3/UL
IMM GRANULOCYTES NFR BLD: 0 %
LYMPHOCYTES # BLD AUTO: 2.4 10E3/UL (ref 0.8–5.3)
LYMPHOCYTES NFR BLD AUTO: 24 %
MCH RBC QN AUTO: 29 PG (ref 26.5–33)
MCHC RBC AUTO-ENTMCNC: 32.1 G/DL (ref 31.5–36.5)
MCV RBC AUTO: 90 FL (ref 78–100)
MONOCYTES # BLD AUTO: 0.8 10E3/UL (ref 0–1.3)
MONOCYTES NFR BLD AUTO: 8 %
NEUTROPHILS # BLD AUTO: 6.4 10E3/UL (ref 1.6–8.3)
NEUTROPHILS NFR BLD AUTO: 64 %
NRBC # BLD AUTO: 0 10E3/UL
NRBC BLD AUTO-RTO: 0 /100
PLATELET # BLD AUTO: 270 10E3/UL (ref 150–450)
PROT SERPL-MCNC: 7.4 G/DL (ref 6.8–8.8)
RBC # BLD AUTO: 4.86 10E6/UL (ref 4.4–5.9)
WBC # BLD AUTO: 9.9 10E3/UL (ref 4–11)

## 2022-05-04 PROCEDURE — 510N000016 HC RESEARCH MEALS, PER MEAL

## 2022-05-04 PROCEDURE — 80076 HEPATIC FUNCTION PANEL: CPT | Performed by: INTERNAL MEDICINE

## 2022-05-04 PROCEDURE — 82977 ASSAY OF GGT: CPT | Performed by: INTERNAL MEDICINE

## 2022-05-04 PROCEDURE — 300N000007 HC RESEARCH B&I SPECIMEN PROCESSING, SIMPLE

## 2022-05-04 PROCEDURE — 85014 HEMATOCRIT: CPT | Performed by: INTERNAL MEDICINE

## 2022-05-04 PROCEDURE — 510N000009 HC RESEARCH FACILITY, PER 15 MIN

## 2022-05-04 PROCEDURE — 510N000023 HC CRU B&I PATIENT CARE, PER 15 MIN

## 2022-05-04 PROCEDURE — 83036 HEMOGLOBIN GLYCOSYLATED A1C: CPT | Performed by: INTERNAL MEDICINE

## 2022-05-04 PROCEDURE — 300N000008 HC RESEARCH B&I SPECIMEN PROCESSING, MODERATE

## 2022-05-04 PROCEDURE — 510N000029 HC RESEARCH B&I MEALS, PER MEAL

## 2022-05-04 RX ORDER — BUDESONIDE AND FORMOTEROL FUMARATE DIHYDRATE 160; 4.5 UG/1; UG/1
AEROSOL RESPIRATORY (INHALATION)
Qty: 30.6 G | Refills: 3 | Status: SHIPPED | OUTPATIENT
Start: 2022-05-04 | End: 2023-05-08

## 2022-05-04 NOTE — ADDENDUM NOTE
Encounter addended by: Елена Thompson RMA on: 5/4/2022 1:06 PM   Actions taken: Charge Capture section accepted

## 2022-05-04 NOTE — ADDENDUM NOTE
Encounter addended by: Kathe Rodriguez on: 5/4/2022 3:56 PM   Actions taken: Charge Capture section accepted

## 2022-05-08 DIAGNOSIS — Z88.9 DRUG ALLERGY, MULTIPLE: Primary | ICD-10-CM

## 2022-05-08 RX ORDER — CEFTAZIDIME 1 G/1
INJECTION, POWDER, FOR SOLUTION INTRAMUSCULAR; INTRAVENOUS
Qty: 1 EACH | Refills: 0 | Status: SHIPPED | OUTPATIENT
Start: 2022-05-08 | End: 2023-02-21

## 2022-05-08 RX ORDER — CEFTRIAXONE SODIUM 1 G
250 VIAL (EA) INJECTION ONCE
Status: COMPLETED | OUTPATIENT
Start: 2022-05-10 | End: 2022-05-10

## 2022-05-08 RX ORDER — AZTREONAM 1 G/1
1 INJECTION, POWDER, LYOPHILIZED, FOR SOLUTION INTRAMUSCULAR; INTRAVENOUS ONCE
Status: COMPLETED | OUTPATIENT
Start: 2022-05-10 | End: 2022-05-10

## 2022-05-08 RX ORDER — TOBRAMYCIN 40 MG/ML
80 INJECTION INTRAMUSCULAR; INTRAVENOUS ONCE
Status: COMPLETED | OUTPATIENT
Start: 2022-05-10 | End: 2022-05-10

## 2022-05-08 RX ORDER — LEVOFLOXACIN 5 MG/ML
500 INJECTION, SOLUTION INTRAVENOUS ONCE
Qty: 100 ML | Refills: 0
Start: 2022-05-10 | End: 2022-05-10

## 2022-05-08 RX ORDER — PENICILLIN G POTASSIUM 5000000 [IU]/1
5000000 INJECTION, POWDER, FOR SOLUTION INTRAMUSCULAR; INTRAVENOUS ONCE
Status: COMPLETED | OUTPATIENT
Start: 2022-05-10 | End: 2022-05-10

## 2022-05-08 RX ORDER — PIPERACILLIN SODIUM, TAZOBACTAM SODIUM 3; .375 G/15ML; G/15ML
1 INJECTION, POWDER, LYOPHILIZED, FOR SOLUTION INTRAVENOUS ONCE
Status: COMPLETED | OUTPATIENT
Start: 2022-05-10 | End: 2022-05-10

## 2022-05-08 RX ORDER — CEFAZOLIN SODIUM 1 G
500 VIAL (EA) INJECTION ONCE
Status: DISCONTINUED | OUTPATIENT
Start: 2022-05-10 | End: 2023-05-16

## 2022-05-08 RX ORDER — SULFAMETHOXAZOLE AND TRIMETHOPRIM 80; 16 MG/ML; MG/ML
80 INJECTION INTRAVENOUS ONCE
Status: COMPLETED | OUTPATIENT
Start: 2022-05-10 | End: 2022-05-10

## 2022-05-09 DIAGNOSIS — E84.9 CYSTIC FIBROSIS (H): Primary | ICD-10-CM

## 2022-05-09 RX ORDER — RIMANTADINE HCL 100 MG
100 TABLET ORAL 2 TIMES DAILY
Qty: 60 TABLET | Refills: 1 | Status: SHIPPED | OUTPATIENT
Start: 2022-05-09 | End: 2022-09-12

## 2022-05-09 NOTE — PROGRESS NOTES
[facility] Dermato-allergology Note  Office visit  Encounter Date: May 10, 2022  ____________________________________________    CC: Allergy Consult (Kilo is here today due to having a referral from his CF MD. She wanted him tested for drug allergies.)      HPI:  (05/08/22)  Mr. Nico Morales is a(n) 46 year old male who presents today as a new patient for allergy consultation  - hx of CF. Multiple drug allergies in the chart and would like allergy testing   - reports full body rash after few doses of Zosyn. Rash stayed (didn't come and go like hives).   - From Levaquin: about 20 years ago. Had full body rash after a few doses. Rash stays (didn't come and go like hives). Rash stopped after drug was stopped.   - From ceftazidime: about 20 years ago. Had full body rash after a few doses. Rash stays (didn't come and go like hives). Rash stopped after drug was stopped.  - about 5-6 years ago, was on tobramycin but no rash   - Aztreonam nebulizer - worked good for years, felt like then chest became really tight after using it for a few years and lung function went down; same thing for Tobramycin.     - current on azithromycin and doxycycline   - otherwise feeling well in usual state of health    Physical exam:  General: In no acute distress, well-developed, well-nourished  Eyes: no conjunctivitis  ENT: no signs of rhinitis   Pulmonary: no wheezing or coughing  Skin: Focused examination of the skin on test sites was performed = see test results below    Past Medical History:   Patient Active Problem List   Diagnosis     CARDIOVASCULAR SCREENING; LDL GOAL LESS THAN 160     Cystic fibrosis (H)     Nocardia infection     Pseudomonas infection     Encounter for long-term (current) use of antibiotics     Prostatitis     ACP (advance care planning)     Cystic fibrosis with pulmonary manifestations (H)     Exocrine pancreatic insufficiency     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)     Low bone  density     Adrenal insufficiency (H)     Influenza     Osteoporosis, unspecified osteoporosis type, unspecified pathological fracture presence     Past Medical History:   Diagnosis Date     CF (cystic fibrosis) (H)      Exocrine pancreatic insufficiency      Nocardia infection      Pseudomonas infection      S/P gastrostomy (H) 2002       Allergies:  Allergies   Allergen Reactions     Aztreonam      Fevers and chest pain with inhalation     Pulmozyme [Dornase Trever] Other (See Comments)     Chest pain and fever     Tobramycin Fatigue     Trouble with ear ringing, shortness of breath, little clinical response.     Zosyn Hives     Bactrim [Sulfamethoxazole W/Trimethoprim]      Fatigue that limits treatment course     Ceftazidime Rash     Levaquin Rash       Medications:  Current Outpatient Medications   Medication     ACE NOT PRESCRIBED, INTENTIONAL,     acetylcysteine (MUCOMYST) 10 % nebulizer solution     amylase-lipase-protease (CREON) 72004-49220 units CPEP per EC capsule     azithromycin (ZITHROMAX) 250 MG tablet     blood glucose (NO BRAND SPECIFIED) test strip     blood glucose (NO BRAND SPECIFIED) test strip     blood glucose monitoring (NO BRAND SPECIFIED) meter device kit     blood glucose monitoring (ULTRA THIN 30G) lancets     budesonide (PULMICORT) 1 MG/2ML neb solution     budesonide-formoterol (SYMBICORT) 160-4.5 MCG/ACT Inhaler     CALCIUM PO     cefTAZidime (FORTAZ) 1 GM vial     Continuous Blood Gluc Sensor (NewsFixedSTYLE SUKHWINDER 14 DAY SENSOR) MISC     doxycycline hyclate (VIBRAMYCIN) 100 MG capsule     [facility] PATIENT     ferrous fumarate 65 mg, [place]. FE,-Vitamin C 125 mg (VITRON-C)  MG TABS tablet     insulin aspart (NOVOLOG PENFILL) 100 UNIT/ML cartridge     insulin glargine (LANTUS SOLOSTAR) 100 UNIT/ML pen     insulin glargine (LANTUS SOLOSTAR) 100 UNIT/ML pen     insulin glargine (LANTUS SOLOSTAR) 100 UNIT/ML pen     insulin pen needle (32G X 4 MM) 32G X 4 MM  "miscellaneous     insulin pen needle (BD OMI U/F) 32G X 4 MM miscellaneous     levalbuterol (XOPENEX HFA) 45 MCG/ACT inhaler     levalbuterol (XOPENEX) 1.25 MG/3ML neb solution     levofloxacin (LEVAQUIN) 500 MG/100ML infusion     MULTIVITAMIN OR     mvw complete (PROBIOTIC FORMULATION) capsule     polyethylene glycol (GOLYTELY) 236 g suspension     polyethylene glycol (MIRALAX/GLYCOLAX) powder     rimantadine (FLUMADINE) 100 MG tablet     sodium chloride inhalant 7 % NEBU neb solution     Syringe/Needle, Disp, 18G X 1\" 5 ML MISC     TRIKAFTA 100-50-75 & 150 MG tablet pack     water for injection sterile SOLN     Current Facility-Administered Medications   Medication     aztreonam (AZACTAM) 1 g vial INTRADERMAL     ceFAZolin (ANCEF) injection 500 mg     cefTRIAXone (ROCEPHIN) injection 250 mg     penicillin g potassium [id number] unit vial INTRADERMAL     piperacillin-tazobactam (ZOSYN) 3.375 g vial INTRADERMAL     sulfamethoxazole-trimethoprim (BACTRIM) injection 80 mg     tobramycin (NEBCIN) injection 80 mg       Social History:  The patient works as a . Patient has the following hobbies or non-occupational exposure: nothing special    Family History:  Family History   Problem Relation Age of Onset     Heart Disease Maternal Grandmother      Heart Disease Maternal Grandfather      Heart Disease Paternal Grandmother      Diabetes No family hx of        Previous Labs, Allergy Tests, Dermatopathology, Imaging:  Thinks he maybe had prick testing before to a few of the antibiotics but doesn't think they did delayed reading or patch testing     Referred By: Jaye Machado MD  [address] [place] [id number]     Allergy Tests:    Past Allergy Test    Order for Future Allergy Testing:    [x] Outpatient  [] Inpatient: [facility]..../ Bed ....       Skin Atopy (atopic dermatitis) [] Yes   [x] No .........  Contact allergies:   [] Yes   [x] No ..........  Hand eczema:   [] Yes   [x] No        "    Leading hand:   [] R   [] L       [] Ambidextrous         Drug allergies:        [] Yes   [] No  Which?. ???.....testing for Aztreonam, tobramycin, zosyn, bactrim, ceftazidime, levaquin    Urticaria/Angioedema  [] Yes   [x] No .........  Food Allergy:  [] Yes   [x] No  which?......  Pets :  [x] Yes   [] No  Which?....1 dog ..         []  Rhinitis   [] Conjunctivitis   [] Sinusitis   [] Polyposis   [] Otitis   [] Pharyngitis         []  none  Operations:   [] Tonsils   [] Septum   [] Sinus   [] Polyposis        [] Asthma bronchiale   [] Coughing      [x]  none  Symptoms (mostly Rhinoconjunctivitis and Asthma) aggravated by:  Season   [] I   [] II   [] III   [] IV   []V   []VI   []VII   []VIII   []IX   []X   []XI   []XI     [] perennial   Day time      [] morning   [] noon      [] evening        [] night    [] whole day........  []  none  Location/changes    [] inside        [] outside   [] mountains    [] sea     [] others.............   []  none  Triggers, specific     [] animals     [] plants     [] dust              [] others ...........................    []  none  Triggers, others       [] work          [] psyche    [] sport            [] others .............................  []  none  Irritant                [] phys efforts [] smoke    [] heat/cold     [] odors  []others............... []  none    Order for PATCH TESTS  Reason for tests (suspected allergy): possible drug allergies   Known previous allergies: none  Standardized panels  [] Standard panel (40 tests)  [] Preservatives & Antimicrobials (31 tests)  [] Emulsifiers & Additives (25 tests)   [] Perfumes/Flavours & Plants (25 tests)  [] Hairdresser panel (12 tests)  [] Rubber Chemicals (22 tests)  [] Plastics (26 tests)  [] Colorants/Dyes/Food additives (20 tests)  [] Metals (implants/dental) (24 tests)  [] Local anaesthetics/NSAIDs (13 tests)  [x] Antibiotics & Antimycotics (14 tests)   [] Corticosteroids (15 tests)   [] Photopatch test (62 tests)    [] others: ...      [] Patient's own products: ...    DO NOT test if chemical or biological identity is unknown!     always ask from patient the product information and safety sheets (MSDS)       Order for PRICK TESTS    Reason for tests (suspected allergy): seasonal allergies in fall?  Known previous allergies: none    Standardized prick panels  [x] Atopic panel (20 tests)  [] Pediatric Panel (12 tests)  [] Milk, Meat, Eggs, Grains (20 tests)   [] Dust, Epithelia, Feathers (10 tests)  [] Fish, Seafood, Shellfish (17 tests)  [] Nuts, Beans (14 tests)  [] Spice, Vegetable, Fruit (17 tests)  [] Pollen Panel = Tree, Grass, Weed (24 tests)  [] Others: ...      [] Patient's own products: ...    DO NOT test if chemical or biological identity is unknown!     always ask from patient the product information and safety sheets (MSDS)     Standardized intradermal tests  [] Penicillium notatum [] Aspergillus fumigatus [] House dust mites D.far & D. pteron  [] Cat    [] dog  [] Others: ...  [] Bee venom   [] Wasp venom  !!Specific protocol with dilutions!!       Order for Drug allergy tests (prick & Intradermal & patch tests)    [] Penicillin G  [] Ampicillin [] Cefazolin   [] Ceftriaxone   [] Ceftazidime  [] Bactrim    [] Others: see below  Order for ... as test date      DRUG ALLERGY TEST SERIES 05/10/2022     ANTIBIOTICS      Prick Tests         Substance/ Allergen Conc Result (20 min) Remarks    Histamine Hydrochloride (ALK) 0.1 mg/ml ++     NaCl 0.9% -    1 Ceftriaxone* [2] 100 mg/ml -    2 Ceftazidime[3] 100 mg/ml -    3 Benzylpenicillin (Penicillin G) 1 [place] IU/ml (600 mg) -    4 Piperacillin-Tazobactam 337.5 mg/ml -    5 Aztreonam (Monobactam) 100 mg/ml -    6 Levofloxacin 5 mg/ml -    7 Bactrim 80/400 mg/ml -    8 Tobramycin 40 mg/ml -       Intradermal Tests   immed immed delay delay      Substance Conc 1st dil  2nd dil  days  days remarks   1 Ceftriaxone* [2] 1:5 -       2 Ceftazidime[3] 1:5 5mm + Neg 15min   2nd time  5mm Papule   3 Benzylpenicillin (Penicillin G) 1:100 -       4 Piperacillin-Tazobactam 1:10 -       5 Aztreonam (Monobactam) 1:100 -       6 Levofloxacin 1:1000 -       7 Bactrim 1:100 -       8 Tobramycin 1:10 -           Patch Tests  as is as is 1:2 1:2     As Is  Vas Substance Conc days days days days remarks   1 2 Ceftriaxone* [2] 100 mg/ml        3 4 Ceftazidime[3] 100 mg/ml        5 6 Benzylpenicillin (Penicillin G) 1 [place] IU/ml (600 mg)        7 8 Piperacillin- Tazobactam 337.5 mg/ml        9 10 Aztreonam (Monobactam) 100 mg/ml        11 12 Levofloxacin 5 mg/ml        13 14 Bactrim 80/400 mg/ml        15 16 Tobramycin 40 mg/ml        [1]  Cefalexin/Cefazolin-group        [2]    Cefotaxim-group     [3]     Cefuroxim-group  ==> after about 20min over Ceftazidim a slight erythema of about 6mm diameter and the repeat did not show any reaction after 15min. Patient will observe today the 2 test sites of Ceftazidime. In photos from patient patch tests negatives    ANTIBIOTICS & ANTIMYCOTICS    # Substance 2 days 4 days remarks   17 1 Erythromycine - -     2 Framycetine Sulphate - -     3 Fusidic Acid Sodium Salt - -    20 4 Gentamicin Sulphate - -     5 Neomycine Sulphate - -     6 Oxytetracycline  - -     7 Polymyxin B Sulphate - -     8 Tetracycline-HCL - -    25 9 Sulfanilamide - -     10 Metronidazole - -     11 Oxyquinoline Mix - -     12 Nitrofurazone - -     13 Nystatin - -    30 14 Clotrimazole - -    ==> remove the patches on Thursday, ciarra the fields and then take photos and take other photos on Friday 13th and Saturday 14th and then make virtual consult on Monday 16th of [facility].     Atopy Screen (Placed 05/10/22)    No Substance Readings (15 min) Evaluation   POS Histamine 1mg/ml ++    NEG NaCl 0.9% -      No Substance Readings (15 min) Evaluation   1 Alternaria alternata (tenuis)  -    2 Cladosporium herbarum -    3 Aspergillus fumigatus -    4 Penicillium notatum -    5 Dermatophagoides pteronyssinus -    6  Dermatophagoides farinae -    7 Dog epithelium (canis spp) -    8 Cat hair (soledad catus) +/++    9 Cockroach   (Blatella americana & germanica) -    10 Grass mix midwest   (Lilia, Orchard, [place], [place]) -    11 Celestino grass (sorghum halepense) -    12 Weed mix   (common Cocklebur, Lamb s quarters, rough redroot Pigweed, Dock/Sorrel) -    13 Mug wort (artemisia vulgare) -    14 Ragweed giant/short (ambrosia spp) -    15 White birch (Betula papyrifera) -    16 Tree mix 1 (Pecan, Maple BHR, Oak RVW, american [place], black [place]) -    17 Red cedar (juniperus virginia) -    18 Tree mix 2   (white Will, river/red Birch, black [place], common [place], american Elm) -    19 Box elder/Maple mix (acer spp) -    20 [place] shagbark (carya ovata) -           Conclusion: some reaction to cat allergen (not to dog = has a dog and not to seasonal molds or ragweed)  ________________________________    Assessment & Plan:    ==> Final Diagnosis:     # Multiple drug allergies. Mostly delayed-type.   - will do prick, intradermal, and patch testing with delayed readings to piperacillin, levofloxacin, ceftazidime, tobramycin, aztreonam, bactrim   * chronic illness with exacerbation, progression, side effects from treatment    # Possible seasonal allergies with Chest tightness and slight rhinitis in fall   - atopy screen prick test  * chronic illness with exacerbation, progression, side effects from treatment    These conclusions are made at the best of one's knowledge and belief based on the provided evidence such as patient's history and allergy test results and they can change over time or can be incomplete because of missing information's.    ==> Treatment Plan:  ==> remove the patches on Thursday, ciarra the fields and then take photos and take other photos on Friday 13th and Saturday 14th and then make virtual consult on Monday 16th of [facility].     Procedures Performed: Allergy tests, including prick, patch tests and drug allergy  tests    Staff:  Provider    Follow-up in Derm-Allergy clinic virtually on Monday 16th. If any positive reaction patient should come in. If all negative, then remove all medications from the Epic allergy list    I spent a total of 50 minutes with Nico Morales. This time was spent counseling the patient and/or coordinating care, explaining the allergy tests, performing allergy tests and assessing the clinical relevance.

## 2022-05-10 ENCOUNTER — OFFICE VISIT (OUTPATIENT)
Dept: ALLERGY | Facility: CLINIC | Age: 47
End: 2022-05-10
Attending: INTERNAL MEDICINE
Payer: COMMERCIAL

## 2022-05-10 DIAGNOSIS — J30.2 SEASONAL AND PERENNIAL ALLERGIC RHINOCONJUNCTIVITIS: ICD-10-CM

## 2022-05-10 DIAGNOSIS — E84.0 CYSTIC FIBROSIS WITH PULMONARY MANIFESTATIONS (H): ICD-10-CM

## 2022-05-10 DIAGNOSIS — Z88.9 DRUG ALLERGY, MULTIPLE: Primary | ICD-10-CM

## 2022-05-10 DIAGNOSIS — H10.10 SEASONAL AND PERENNIAL ALLERGIC RHINOCONJUNCTIVITIS: ICD-10-CM

## 2022-05-10 DIAGNOSIS — J30.89 SEASONAL AND PERENNIAL ALLERGIC RHINOCONJUNCTIVITIS: ICD-10-CM

## 2022-05-10 DIAGNOSIS — Z88.9 ATOPY: ICD-10-CM

## 2022-05-10 DIAGNOSIS — E08.9 DIABETES MELLITUS RELATED TO CF (CYSTIC FIBROSIS) (H): ICD-10-CM

## 2022-05-10 DIAGNOSIS — E84.8 DIABETES MELLITUS RELATED TO CF (CYSTIC FIBROSIS) (H): ICD-10-CM

## 2022-05-10 PROCEDURE — 95044 PATCH/APPLICATION TESTS: CPT | Performed by: DERMATOLOGY

## 2022-05-10 PROCEDURE — 95018 ALL TSTG PERQ&IQ DRUGS/BIOL: CPT | Performed by: DERMATOLOGY

## 2022-05-10 PROCEDURE — 99205 OFFICE O/P NEW HI 60 MIN: CPT | Mod: 25 | Performed by: DERMATOLOGY

## 2022-05-10 PROCEDURE — 95004 PERQ TESTS W/ALRGNC XTRCS: CPT | Performed by: DERMATOLOGY

## 2022-05-10 RX ORDER — INSULIN GLARGINE 100 [IU]/ML
INJECTION, SOLUTION SUBCUTANEOUS
Qty: 15 ML | Refills: 3 | Status: SHIPPED | OUTPATIENT
Start: 2022-05-10 | End: 2022-08-03

## 2022-05-10 RX ORDER — INSULIN ASPART 100 [IU]/ML
INJECTION, SOLUTION INTRAVENOUS; SUBCUTANEOUS
Qty: 30 ML | Refills: 3 | Status: SHIPPED | OUTPATIENT
Start: 2022-05-10 | End: 2023-08-07

## 2022-05-10 RX ORDER — CEFTAZIDIME 1 G/1
1 INJECTION, POWDER, FOR SOLUTION INTRAMUSCULAR; INTRAVENOUS ONCE
Status: DISCONTINUED | OUTPATIENT
Start: 2022-05-10 | End: 2023-05-16

## 2022-05-10 RX ADMIN — PIPERACILLIN SODIUM, TAZOBACTAM SODIUM 1 G: 3; .375 INJECTION, POWDER, LYOPHILIZED, FOR SOLUTION INTRAVENOUS at 08:00

## 2022-05-10 RX ADMIN — Medication 250 MG: at 08:00

## 2022-05-10 RX ADMIN — AZTREONAM 1 G: 1 INJECTION, POWDER, LYOPHILIZED, FOR SOLUTION INTRAMUSCULAR; INTRAVENOUS at 08:00

## 2022-05-10 RX ADMIN — SULFAMETHOXAZOLE AND TRIMETHOPRIM 80 MG: 80; 16 INJECTION INTRAVENOUS at 08:00

## 2022-05-10 RX ADMIN — TOBRAMYCIN 80 MG: 40 INJECTION INTRAMUSCULAR; INTRAVENOUS at 08:00

## 2022-05-10 RX ADMIN — PENICILLIN G POTASSIUM 5000000 UNITS: 5000000 INJECTION, POWDER, FOR SOLUTION INTRAMUSCULAR; INTRAVENOUS at 08:00

## 2022-05-10 ASSESSMENT — PAIN SCALES - GENERAL: PAINLEVEL: NO PAIN (0)

## 2022-05-10 NOTE — NURSING NOTE
Chief Complaint   Patient presents with     Allergy Consult     Kilo is here today due to having a referral from his CF MD. She wanted him tested for drug allergies.     Saurabh Tirado Paramedic

## 2022-05-10 NOTE — PATIENT INSTRUCTIONS
==> remove the patches on Thursday, ciarra the fields and then take photos and take other photos on Friday 13th and Saturday 14th and then make virtual consult on Monday 16th of [facility].

## 2022-05-10 NOTE — LETTER
5/10/2022         RE: Nico Morales  [address] [place] [address]        Dear Colleague,    Thank you for referring your patient, Nico Morales, to the [facility] [place]. Please see a copy of my visit note below.    [facility] Dermato-allergology Note  Office visit  Encounter Date: May 10, 2022  ____________________________________________    CC: Allergy Consult (Kilo is here today due to having a referral from his CF MD. She wanted him tested for drug allergies.)      HPI:  (05/08/22)  Mr. Nico Morales is a(n) 46 year old male who presents today as a new patient for allergy consultation  - hx of CF. Multiple drug allergies in the chart and would like allergy testing   - reports full body rash after few doses of Zosyn. Rash stayed (didn't come and go like hives).   - From Levaquin: about 20 years ago. Had full body rash after a few doses. Rash stays (didn't come and go like hives). Rash stopped after drug was stopped.   - From ceftazidime: about 20 years ago. Had full body rash after a few doses. Rash stays (didn't come and go like hives). Rash stopped after drug was stopped.  - about 5-6 years ago, was on tobramycin but no rash   - Aztreonam nebulizer - worked good for years, felt like then chest became really tight after using it for a few years and lung function went down; same thing for Tobramycin.     - current on azithromycin and doxycycline   - otherwise feeling well in usual state of health    Physical exam:  General: In no acute distress, well-developed, well-nourished  Eyes: no conjunctivitis  ENT: no signs of rhinitis   Pulmonary: no wheezing or coughing  Skin: Focused examination of the skin on test sites was performed = see test results below    Past Medical History:   Patient Active Problem List   Diagnosis     CARDIOVASCULAR SCREENING; LDL GOAL LESS THAN 160     Cystic fibrosis (H)     Nocardia infection     Pseudomonas infection      Encounter for long-term (current) use of antibiotics     Prostatitis     ACP (advance care planning)     Cystic fibrosis with pulmonary manifestations (H)     Exocrine pancreatic insufficiency     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)     Low bone density     Adrenal insufficiency (H)     Influenza     Osteoporosis, unspecified osteoporosis type, unspecified pathological fracture presence     Past Medical History:   Diagnosis Date     CF (cystic fibrosis) (H)      Exocrine pancreatic insufficiency      Nocardia infection      Pseudomonas infection      S/P gastrostomy (H) 2002       Allergies:  Allergies   Allergen Reactions     Aztreonam      Fevers and chest pain with inhalation     Pulmozyme [Dornase Trever] Other (See Comments)     Chest pain and fever     Tobramycin Fatigue     Trouble with ear ringing, shortness of breath, little clinical response.     Zosyn Hives     Bactrim [Sulfamethoxazole W/Trimethoprim]      Fatigue that limits treatment course     Ceftazidime Rash     Levaquin Rash       Medications:  Current Outpatient Medications   Medication     ACE NOT PRESCRIBED, INTENTIONAL,     acetylcysteine (MUCOMYST) 10 % nebulizer solution     amylase-lipase-protease (CREON) 09704-07547 units CPEP per EC capsule     azithromycin (ZITHROMAX) 250 MG tablet     blood glucose (NO BRAND SPECIFIED) test strip     blood glucose (NO BRAND SPECIFIED) test strip     blood glucose monitoring (NO BRAND SPECIFIED) meter device kit     blood glucose monitoring (ULTRA THIN 30G) lancets     budesonide (PULMICORT) 1 MG/2ML neb solution     budesonide-formoterol (SYMBICORT) 160-4.5 MCG/ACT Inhaler     CALCIUM PO     cefTAZidime (FORTAZ) 1 GM vial     Continuous Blood Gluc Sensor (FREESTYLE SUKHWINDER 14 DAY SENSOR) MISC     doxycycline hyclate (VIBRAMYCIN) 100 MG capsule     [place] [facility] MANAGED PATIENT     ferrous fumarate 65 mg, Lime. FE,-Vitamin C 125 mg (VITRON-C)  MG TABS tablet     insulin aspart (NOVOLOG  "PENFILL) 100 UNIT/ML cartridge     insulin glargine (LANTUS SOLOSTAR) 100 UNIT/ML pen     insulin glargine (LANTUS SOLOSTAR) 100 UNIT/ML pen     insulin glargine (LANTUS SOLOSTAR) 100 UNIT/ML pen     insulin pen needle (32G X 4 MM) 32G X 4 MM miscellaneous     insulin pen needle (BD OMI U/F) 32G X 4 MM miscellaneous     levalbuterol (XOPENEX HFA) 45 MCG/ACT inhaler     levalbuterol (XOPENEX) 1.25 MG/3ML neb solution     levofloxacin (LEVAQUIN) 500 MG/100ML infusion     MULTIVITAMIN OR     mvw complete (PROBIOTIC FORMULATION) capsule     polyethylene glycol (GOLYTELY) 236 g suspension     polyethylene glycol (MIRALAX/GLYCOLAX) powder     rimantadine (FLUMADINE) 100 MG tablet     sodium chloride inhalant 7 % NEBU neb solution     Syringe/Needle, Disp, 18G X 1\" 5 ML MISC     TRIKAFTA 100-50-75 & 150 MG tablet pack     water for injection sterile SOLN     Current Facility-Administered Medications   Medication     aztreonam (AZACTAM) 1 g vial INTRADERMAL     ceFAZolin (ANCEF) injection 500 mg     cefTRIAXone (ROCEPHIN) injection 250 mg     penicillin g potassium [id number] unit vial INTRADERMAL     piperacillin-tazobactam (ZOSYN) 3.375 g vial INTRADERMAL     sulfamethoxazole-trimethoprim (BACTRIM) injection 80 mg     tobramycin (NEBCIN) injection 80 mg       Social History:  The patient works as a . Patient has the following hobbies or non-occupational exposure: nothing special    Family History:  Family History   Problem Relation Age of Onset     Heart Disease Maternal Grandmother      Heart Disease Maternal Grandfather      Heart Disease Paternal Grandmother      Diabetes No family hx of        Previous Labs, Allergy Tests, Dermatopathology, Imaging:  Thinks he maybe had prick testing before to a few of the antibiotics but doesn't think they did delayed reading or patch testing     Referred By: Jaye Machado MD  [address] [place] [address]     Allergy Tests:    Past Allergy " Test    Order for Future Allergy Testing:    [x] Outpatient  [] Inpatient: [facility]..../ Bed ....       Skin Atopy (atopic dermatitis) [] Yes   [x] No .........  Contact allergies:   [] Yes   [x] No ..........  Hand eczema:   [] Yes   [x] No           Leading hand:   [] R   [] L       [] Ambidextrous         Drug allergies:        [] Yes   [] No  Which?. ???.....testing for Aztreonam, tobramycin, zosyn, bactrim, ceftazidime, levaquin    Urticaria/Angioedema  [] Yes   [x] No .........  Food Allergy:  [] Yes   [x] No  which?......  Pets :  [x] Yes   [] No  Which?....1 dog ..         []  Rhinitis   [] Conjunctivitis   [] Sinusitis   [] Polyposis   [] Otitis   [] Pharyngitis         []  none  Operations:   [] Tonsils   [] Septum   [] Sinus   [] Polyposis        [] Asthma bronchiale   [] Coughing      [x]  none  Symptoms (mostly Rhinoconjunctivitis and Asthma) aggravated by:  Season   [] I   [] II   [] III   [] IV   []V   []VI   []VII   []VIII   []IX   []X   []XI   []XI     [] perennial   Day time      [] morning   [] noon      [] evening        [] night    [] whole day........  []  none  Location/changes    [] inside        [] outside   [] mountains    [] sea     [] others.............   []  none  Triggers, specific     [] animals     [] plants     [] dust              [] others ...........................    []  none  Triggers, others       [] work          [] psyche    [] sport            [] others .............................  []  none  Irritant                [] phys efforts [] smoke    [] heat/cold     [] odors  []others............... []  none    Order for PATCH TESTS  Reason for tests (suspected allergy): possible drug allergies   Known previous allergies: none  Standardized panels  [] Standard panel (40 tests)  [] Preservatives & Antimicrobials (31 tests)  [] Emulsifiers & Additives (25 tests)   [] Perfumes/Flavours & Plants (25 tests)  [] Hairdresser panel (12 tests)  [] Rubber Chemicals (22 tests)  [] Plastics  (26 tests)  [] Colorants/Dyes/Food additives (20 tests)  [] Metals (implants/dental) (24 tests)  [] Local anaesthetics/NSAIDs (13 tests)  [x] Antibiotics & Antimycotics (14 tests)   [] Corticosteroids (15 tests)   [] Photopatch test (62 tests)   [] others: ...      [] Patient's own products: ...    DO NOT test if chemical or biological identity is unknown!     always ask from patient the product information and safety sheets (MSDS)       Order for PRICK TESTS    Reason for tests (suspected allergy): seasonal allergies in fall?  Known previous allergies: none    Standardized prick panels  [x] Atopic panel (20 tests)  [] Pediatric Panel (12 tests)  [] Milk, Meat, Eggs, Grains (20 tests)   [] Dust, Epithelia, Feathers (10 tests)  [] Fish, Seafood, Shellfish (17 tests)  [] Nuts, Beans (14 tests)  [] Spice, Vegetable, Fruit (17 tests)  [] Pollen Panel = Tree, Grass, Weed (24 tests)  [] Others: ...      [] Patient's own products: ...    DO NOT test if chemical or biological identity is unknown!     always ask from patient the product information and safety sheets (MSDS)     Standardized intradermal tests  [] Penicillium notatum [] Aspergillus fumigatus [] House dust mites D.far & D. pteron  [] Cat    [] dog  [] Others: ...  [] Bee venom   [] Wasp venom  !!Specific protocol with dilutions!!       Order for Drug allergy tests (prick & Intradermal & patch tests)    [] Penicillin G  [] Ampicillin [] Cefazolin   [] Ceftriaxone   [] Ceftazidime  [] Bactrim    [] Others: see below  Order for ... as test date      DRUG ALLERGY TEST SERIES 05/10/2022     ANTIBIOTICS      Prick Tests         Substance/ Allergen Conc Result (20 min) Remarks    Histamine Hydrochloride (ALK) 0.1 mg/ml ++     NaCl 0.9% -    1 Ceftriaxone* [2] 100 mg/ml -    2 Ceftazidime[3] 100 mg/ml -    3 Benzylpenicillin (Penicillin G) 1 [place] IU/ml (600 mg) -    4 Piperacillin-Tazobactam 337.5 mg/ml -    5 Aztreonam (Monobactam) 100 mg/ml -    6 Levofloxacin 5 mg/ml  -    7 Bactrim 80/400 mg/ml -    8 Tobramycin 40 mg/ml -       Intradermal Tests   immed immed delay delay      Substance Conc 1st dil  2nd dil  days  days remarks   1 Ceftriaxone* [2] 1:5 -       2 Ceftazidime[3] 1:5 5mm + Neg 15min   2nd time 5mm Papule   3 Benzylpenicillin (Penicillin G) 1:100 -       4 Piperacillin-Tazobactam 1:10 -       5 Aztreonam (Monobactam) 1:100 -       6 Levofloxacin 1:1000 -       7 Bactrim 1:100 -       8 Tobramycin 1:10 -           Patch Tests  as is as is 1:2 1:2     As Is  Vas Substance Conc days days days days remarks   1 2 Ceftriaxone* [2] 100 mg/ml        3 4 Ceftazidime[3] 100 mg/ml        5 6 Benzylpenicillin (Penicillin G) 1 [place] IU/ml (600 mg)        7 8 Piperacillin- Tazobactam 337.5 mg/ml        9 10 Aztreonam (Monobactam) 100 mg/ml        11 12 Levofloxacin 5 mg/ml        13 14 Bactrim 80/400 mg/ml        15 16 Tobramycin 40 mg/ml        [1]  Cefalexin/Cefazolin-group        [2]    Cefotaxim-group     [3]     Cefuroxim-group  ==> after about 20min over Ceftazidim a slight erythema of about 6mm diameter and the repeat did not show any reaction after 15min. Patient will observe today the 2 test sites of Ceftazidime    ANTIBIOTICS & ANTIMYCOTICS    # Substance 2 days 4 days remarks   17 1 Erythromycine - -     2 Framycetine Sulphate - -     3 Fusidic Acid Sodium Salt - -    20 4 Gentamicin Sulphate - -     5 Neomycine Sulphate - -     6 Oxytetracycline  - -     7 Polymyxin B Sulphate - -     8 Tetracycline-HCL - -    25 9 Sulfanilamide - -     10 Metronidazole - -     11 Oxyquinoline Mix - -     12 Nitrofurazone - -     13 Nystatin - -    30 14 Clotrimazole - -    ==> remove the patches on Thursday, ciarra the fields and then take photos and take other photos on Friday 13th and Saturday 14th and then make virtual consult on Monday 16th of [facility].     Atopy Screen (Placed 05/10/22)    No Substance Readings (15 min) Evaluation   POS Histamine 1mg/ml ++    NEG NaCl 0.9% -      No  Substance Readings (15 min) Evaluation   1 Alternaria alternata (tenuis)  -    2 Cladosporium herbarum -    3 Aspergillus fumigatus -    4 Penicillium notatum -    5 Dermatophagoides pteronyssinus -    6 Dermatophagoides farinae -    7 Dog epithelium (canis spp) -    8 Cat hair (soledad catus) +/++    9 Cockroach   (Blatella americana & germanica) -    10 Grass mix midwest   (Lilia, Orchard, Redtop, Mynor) -    11 Celestino grass (sorghum halepense) -    12 Weed mix   (common Cocklebur, Lamb s quarters, rough redroot Pigweed, Dock/Sorrel) -    13 Mug wort (artemisia vulgare) -    14 Ragweed giant/short (ambrosia spp) -    15 White birch (Betula papyrifera) -    16 Tree mix 1 (Pecan, Maple BHR, Oak RVW, american [place], black [place]) -    17 Red cedar (juniperus virginia) -    18 Tree mix 2   (white Will, river/red Birch, black [place], common [place], american Elm) -    19 Box elder/Maple mix (acer spp) -    20 O'Brien shagbark (carya ovata) -           Conclusion: some reaction to cat allergen (not to dog = has a dog and not to seasonal molds or ragweed)  ________________________________    Assessment & Plan:    ==> Final Diagnosis:     # Multiple drug allergies. Mostly delayed-type.   - will do prick, intradermal, and patch testing with delayed readings to piperacillin, levofloxacin, ceftazidime, tobramycin, aztreonam, bactrim   * chronic illness with exacerbation, progression, side effects from treatment    # Possible seasonal allergies with Chest tightness and slight rhinitis in fall   - atopy screen prick test  * chronic illness with exacerbation, progression, side effects from treatment    These conclusions are made at the best of one's knowledge and belief based on the provided evidence such as patient's history and allergy test results and they can change over time or can be incomplete because of missing information's.    ==> Treatment Plan:  ==> remove the patches on Thursday, ciarra the fields and then take  photos and take other photos on Friday 13th and Saturday 14th and then make virtual consult on Monday 16th of [facility].     Procedures Performed: Allergy tests, including prick, patch tests and drug allergy tests    Staff:  Provider    Follow-up in Derm-Allergy clinic virtually on Monday 16th. If any positive reaction patient should come in. If all negative, then remove all medications from the Epic allergy list    I spent a total of 50 minutes with Nico Morales. This time was spent counseling the patient and/or coordinating care, explaining the allergy tests, performing allergy tests and assessing the clinical relevance.        Again, thank you for allowing me to participate in the care of your patient.        Sincerely,        Robby Lee MD

## 2022-05-10 NOTE — TELEPHONE ENCOUNTER
insulin aspart (NOVOLOG PENFILL) 100 UNIT/ML cartridge   Last Written Prescription Date:   6/30/2021  Last Fill Quantity: 30,   # refills: 1  Last Office Visit :  4/20/2021  Future Office visit:  None  Routing refill request to provider for review/approval because:  Drug not on the [facility], [facility] or [facility] refill protocol or controlled substance      insulin glargine (LANTUS SOLOSTAR) 100 UNIT/ML pen  Last Written Prescription Date:   6/30/2021  Last Fill Quantity:  15   # refills: 3  Last Office Visit :  4/20/2021  Future Office visit:  None  Routing refill request to provider for review/approval because:  Drug not on the [facility], [facility] or [facility] refill protocol or controlled substance      Khushi Wilkerson RN  Central Triage Red Flags/Med Refills

## 2022-05-10 NOTE — PROGRESS NOTES
Drug Administration Record    Prior to injection, verified patient identity using patient's name and date of birth.  Due to injection administration, patient instructed to remain in clinic for 15 minutes  afterwards, and to report any adverse reaction to me immediately.    Drug Name: Ceftazidime  Dose: 0.5 mL  Route administered: ID  NDC #: 38653-368-90  Amount of waste(mL): 9.5ml  Reason for waste: Single use vial    LOT #: [id number]  SITE: skin  : [facility]  EXPIRATION DATE:  08/2023    Drug Administration Record    Prior to injection, verified patient identity using patient's name and date of birth.  Due to injection administration, patient instructed to remain in clinic for 15 minutes  afterwards, and to report any adverse reaction to me immediately.    Drug Name: Levofloxacin    Dose: 0.5 mL  Route administered: ID  NDC #: 56364-844-19     Amount of waste(mL): 99.5 mL  Reason for waste: Single use vial  LOT #: [id number]  SITE: Skin  : [employer]  EXPIRATION DATE:

## 2022-05-10 NOTE — TELEPHONE ENCOUNTER
[facility] Call Center    Phone Message    May a detailed message be left on voicemail: yes     Reason for Call: Medication Refill Request    Has the patient contacted the pharmacy for the refill? Yes   Name of medication being requested: insulin aspart (NOVOLOG PENFILL) 100 UNIT/ML cartridge   insulin glargine (LANTUS SOLOSTAR) 100 UNIT/ML pen   Provider who prescribed the medication: Rosa  Pharmacy: [id number] - [place], [place] - [address] [address]  Date medication is needed: As soon as possible        Action Taken: Message routed to:  [facility] endo    Travel Screening: Not Applicable

## 2022-05-15 NOTE — PROGRESS NOTES
[facility] Dermato-allergology Note  Virtual visit: store and forward video ([employer] connected), start time: 4:20pm, end time: 4:50pm, date of images: during video and in Epic media  Encounter Date: May 16, 2022  ____________________________________________    CC: No chief complaint on file.      HPI:  (05/14/22)  Mr. Nico Morales is a(n) 46 year old male who presents today as a return patient for allergy consultation  - Follow-up in Derm-Allergy clinic virtually on Monday 16th. If any positive reaction patient should come in. If all negative, then remove all medications from the Epic allergy list  - otherwise feeling well in usual state of health    Physical exam:  General: In no acute distress, well-developed, well-nourished  Eyes: no conjunctivitis  ENT: no signs of rhinitis   Pulmonary: no wheezing or coughing  Skin:Focused examination of the skin on test sites was performed = see test results below    Earlier History and Allergy exams:  (05/08/22)  - hx of CF. Multiple drug allergies in the chart and would like allergy testing   - reports full body rash after few doses of Zosyn. Rash stayed (didn't come and go like hives).   - From Levaquin: about 20 years ago. Had full body rash after a few doses. Rash stays (didn't come and go like hives). Rash stopped after drug was stopped.   - From ceftazidime: about 20 years ago. Had full body rash after a few doses. Rash stays (didn't come and go like hives). Rash stopped after drug was stopped.  - about 5-6 years ago, was on tobramycin but no rash   - Aztreonam nebulizer - worked good for years, felt like then chest became really tight after using it for a few years and lung function went down; same thing for Tobramycin.   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)     Low bone density     Adrenal insufficiency (H)     Influenza     Osteoporosis, unspecified osteoporosis type, unspecified pathological fracture presence     Past Medical History:   Diagnosis Date     CF (cystic fibrosis) (H)      Exocrine pancreatic insufficiency      Nocardia infection      Pseudomonas infection      S/P gastrostomy (H) 2002       Allergies:  Allergies   Allergen Reactions     Aztreonam      Fevers and chest pain with inhalation     Pulmozyme [Dornase Trever] Other (See Comments)     Chest pain and fever     Tobramycin Fatigue     Trouble with ear ringing, shortness of breath, little clinical response.     Zosyn Hives     Bactrim [Sulfamethoxazole W/Trimethoprim]      Fatigue that limits treatment course     Ceftazidime Rash     Levaquin Rash       Medications:  Current Outpatient Medications   Medication     ACE NOT PRESCRIBED, INTENTIONAL,     acetylcysteine (MUCOMYST) 10 % nebulizer solution     amylase-lipase-protease (CREON) 55163-84145 units CPEP per EC capsule     azithromycin (ZITHROMAX) 250 MG tablet     blood glucose (NO BRAND SPECIFIED) test strip     blood glucose (NO BRAND SPECIFIED) test strip     blood glucose monitoring (NO BRAND SPECIFIED) meter device kit     blood glucose monitoring (ULTRA THIN 30G) lancets     budesonide (PULMICORT) 1 MG/2ML neb solution     budesonide-formoterol (SYMBICORT) 160-4.5 MCG/ACT Inhaler     CALCIUM PO     cefTAZidime (FORTAZ) 1 GM vial     Continuous Blood Gluc Sensor (FREESTYLE SUKHWINDER 14 DAY SENSOR) MISC     doxycycline hyclate (VIBRAMYCIN) 100 MG capsule     [facility] MANAGED PATIENT     ferrous fumarate 65 mg, [place]. FE,-Vitamin C 125 mg (VITRON-C)  MG TABS tablet     insulin  "aspart (NOVOLOG PENFILL) 100 UNIT/ML cartridge     insulin glargine (LANTUS SOLOSTAR) 100 UNIT/ML pen     insulin glargine (LANTUS SOLOSTAR) 100 UNIT/ML pen     insulin glargine (LANTUS SOLOSTAR) 100 UNIT/ML pen     insulin pen needle (32G X 4 MM) 32G X 4 MM miscellaneous     insulin pen needle (BD OMI U/F) 32G X 4 MM miscellaneous     levalbuterol (XOPENEX HFA) 45 MCG/ACT inhaler     levalbuterol (XOPENEX) 1.25 MG/3ML neb solution     MULTIVITAMIN OR     mvw complete (PROBIOTIC FORMULATION) capsule     polyethylene glycol (GOLYTELY) 236 g suspension     polyethylene glycol (MIRALAX/GLYCOLAX) powder     rimantadine (FLUMADINE) 100 MG tablet     sodium chloride inhalant 7 % NEBU neb solution     Syringe/Needle, Disp, 18G X 1\" 5 ML MISC     TRIKAFTA 100-50-75 & 150 MG tablet pack     water for injection sterile SOLN     Current Facility-Administered Medications   Medication     ceFAZolin (ANCEF) injection 500 mg     cefTAZidime (FORTAZ) 1 g vial to attach to  ml bag for ADULTS or NS 50 ml bag for PEDS     cefTAZidime (FORTAZ) 1 g       Social History:  The patient works as a . Patient has the following hobbies or non-occupational exposure: nothing special    Family History:  Family History   Problem Relation Age of Onset     Heart Disease Maternal Grandmother      Heart Disease Maternal Grandfather      Heart Disease Paternal Grandmother      Diabetes No family hx of        Previous Labs, Allergy Tests, Dermatopathology, Imaging:  Thinks he maybe had prick testing before to a few of the antibiotics but doesn't think they did delayed reading or patch testing     Referred By: Jaye Machado MD  [address] [place] [id number]     Allergy Tests:    Past Allergy Test    Order for Future Allergy Testing:    [x] Outpatient  [] Inpatient: [facility]..../ Bed ....       Skin Atopy (atopic dermatitis) [] Yes   [x] No .........  Contact allergies:   [] Yes   [x] No ..........  Hand " eczema:   [] Yes   [x] No           Leading hand:   [] R   [] L       [] Ambidextrous         Drug allergies:        [] Yes   [] No  Which?. ???.....testing for Aztreonam, tobramycin, zosyn, bactrim, ceftazidime, levaquin    Urticaria/Angioedema  [] Yes   [x] No .........  Food Allergy:  [] Yes   [x] No  which?......  Pets :  [x] Yes   [] No  Which?....1 dog ..         []  Rhinitis   [] Conjunctivitis   [] Sinusitis   [] Polyposis   [] Otitis   [] Pharyngitis         []  none  Operations:   [] Tonsils   [] Septum   [] Sinus   [] Polyposis        [] Asthma bronchiale   [] Coughing      [x]  none  Symptoms (mostly Rhinoconjunctivitis and Asthma) aggravated by:  Season   [] I   [] II   [] III   [] IV   []V   []VI   []VII   []VIII   []IX   []X   []XI   []XI     [] perennial   Day time      [] morning   [] noon      [] evening        [] night    [] whole day........  []  none  Location/changes    [] inside        [] outside   [] mountains    [] sea     [] others.............   []  none  Triggers, specific     [] animals     [] plants     [] dust              [] others ...........................    []  none  Triggers, others       [] work          [] psyche    [] sport            [] others .............................  []  none  Irritant                [] phys efforts [] smoke    [] heat/cold     [] odors  []others............... []  none    Order for PATCH TESTS  Reason for tests (suspected allergy): possible drug allergies   Known previous allergies: none  Standardized panels  [] Standard panel (40 tests)  [] Preservatives & Antimicrobials (31 tests)  [] Emulsifiers & Additives (25 tests)   [] Perfumes/Flavours & Plants (25 tests)  [] Hairdresser panel (12 tests)  [] Rubber Chemicals (22 tests)  [] Plastics (26 tests)  [] Colorants/Dyes/Food additives (20 tests)  [] Metals (implants/dental) (24 tests)  [] Local anaesthetics/NSAIDs (13 tests)  [x] Antibiotics & Antimycotics (14 tests)   [] Corticosteroids (15 tests)    [] Photopatch test (62 tests)   [] others: ...      [] Patient's own products: ...    DO NOT test if chemical or biological identity is unknown!     always ask from patient the product information and safety sheets (MSDS)       Order for PRICK TESTS    Reason for tests (suspected allergy): seasonal allergies in fall?  Known previous allergies: none    Standardized prick panels  [x] Atopic panel (20 tests)  [] Pediatric Panel (12 tests)  [] Milk, Meat, Eggs, Grains (20 tests)   [] Dust, Epithelia, Feathers (10 tests)  [] Fish, Seafood, Shellfish (17 tests)  [] Nuts, Beans (14 tests)  [] Spice, Vegetable, Fruit (17 tests)  [] Pollen Panel = Tree, Grass, Weed (24 tests)  [] Others: ...      [] Patient's own products: ...    DO NOT test if chemical or biological identity is unknown!     always ask from patient the product information and safety sheets (MSDS)     Standardized intradermal tests  [] Penicillium notatum [] Aspergillus fumigatus [] House dust mites D.far & D. pteron  [] Cat    [] dog  [] Others: ...  [] Bee venom   [] Wasp venom  !!Specific protocol with dilutions!!       Order for Drug allergy tests (prick & Intradermal & patch tests)    [] Penicillin G  [] Ampicillin [] Cefazolin   [] Ceftriaxone   [] Ceftazidime  [] Bactrim    [] Others: see below  Order for ... as test date      DRUG ALLERGY TEST SERIES 05/10/2022     ANTIBIOTICS      Prick Tests         Substance/ Allergen Conc Result (20 min) Remarks    Histamine Hydrochloride (ALK) 0.1 mg/ml ++     NaCl 0.9% -    1 Ceftriaxone* [2] 100 mg/ml -    2 Ceftazidime[3] 100 mg/ml -    3 Benzylpenicillin (Penicillin G) 1 Curran IU/ml (600 mg) -    4 Piperacillin-Tazobactam 337.5 mg/ml -    5 Aztreonam (Monobactam) 100 mg/ml -    6 Levofloxacin 5 mg/ml -    7 Bactrim 80/400 mg/ml -    8 Tobramycin 40 mg/ml -       Intradermal Tests   immed immed delay delay      Substance Conc 1st dil  2nd dil  1 days  2 days remarks   1 Ceftriaxone* [2] 1:5 -  - -    2  Ceftazidime[3] 1:5 5mm + Neg 15min - - 2nd time 5mm Papule   3 Benzylpenicillin (Penicillin G) 1:100 -  - -    4 Piperacillin-Tazobactam 1:10 -  + +    5 Aztreonam (Monobactam) 1:100 -  - -    6 Levofloxacin 1:1000 -  - -    7 Bactrim 1:100 -  - -    8 Tobramycin 1:10 -  - -        Patch Tests  as is as is 1:2 1:2     As Is  Vas Substance Conc 2 days 4 days 2 days 4 days remarks   1 2 Ceftriaxone* [2] 100 mg/ml - - - -    3 4 Ceftazidime[3] 100 mg/ml - - - -    5 6 Benzylpenicillin (Penicillin G) 1 Jay IU/ml (600 mg) - - - -    7 8 Piperacillin- Tazobactam 337.5 mg/ml - - - -    9 10 Aztreonam (Monobactam) 100 mg/ml - - - -    11 12 Levofloxacin 5 mg/ml - - - -    13 14 Bactrim 80/400 mg/ml - - - -    15 16 Tobramycin 40 mg/ml - - - -    [1]  Cefalexin/Cefazolin-group        [2]    Cefotaxim-group     [3]     Cefuroxim-group  ==> after about 20min over Ceftazidim a slight erythema of about 6mm diameter and the repeat did not show any reaction after 15min. Patient will observe today the 2 test sites of Ceftazidime    ANTIBIOTICS & ANTIMYCOTICS    # Substance 2 days 4 days remarks   17 1 Erythromycine - -     2 Framycetine Sulphate - -     3 Fusidic Acid Sodium Salt - -    20 4 Gentamicin Sulphate - -     5 Neomycine Sulphate - -     6 Oxytetracycline  - -     7 Polymyxin B Sulphate - -     8 Tetracycline-HCL - -    25 9 Sulfanilamide - -     10 Metronidazole - -     11 Oxyquinoline Mix - -     12 Nitrofurazone - -     13 Nystatin - -    30 14 Clotrimazole - -    ==> remove the patches on Thursday, ciarra the fields and then take photos and take other photos on Friday 13th and Saturday 14th and then make virtual consult on Monday 16th of [facility].     Atopy Screen (Placed 05/10/22)    No Substance Readings (15 min) Evaluation   POS Histamine 1mg/ml ++    NEG NaCl 0.9% -      No Substance Readings (15 min) Evaluation   1 Alternaria alternata (tenuis)  -    2 Cladosporium herbarum -    3 Aspergillus fumigatus -    4  Penicillium notatum -    5 Dermatophagoides pteronyssinus -    6 Dermatophagoides farinae -    7 Dog epithelium (canis spp) -    8 Cat hair (soledad catus) +/++    9 Cockroach   (Blatella americana & germanica) -    10 Grass mix midwest   (Lilia, Orchard, [place], [place]) -    11 Celestino grass (sorghum halepense) -    12 Weed mix   (common Cocklebur, Lamb s quarters, rough redroot Pigweed, Dock/Sorrel) -    13 Mug wort (artemisia vulgare) -    14 Ragweed giant/short (ambrosia spp) -    15 White birch (Betula papyrifera) -    16 Tree mix 1 (Pecan, Maple BHR, Oak RVW, american Sarepta, black [place]) -    17 Red cedar (juniperus virginia) -    18 Tree mix 2   (white Will, river/red Birch, black [place], common [place], american Elm) -    19 Box elder/Maple mix (acer spp) -    20 [place] shagbark (carya ovata) -           Conclusion: some reaction to cat allergen (not to dog = has a dog and not to seasonal molds or ragweed)  ________________________________    Assessment & Plan:    ==> Final Diagnosis:     # Multiple drug allergies. Mostly delayed-type.   - will do prick, intradermal, and patch testing with delayed readings to piperacillin, levofloxacin, ceftazidime, tobramycin, aztreonam, bactrim   * chronic illness with exacerbation, progression, side effects from treatment    # Possible seasonal allergies with Chest tightness and slight rhinitis in fall   - atopy screen prick test  * chronic illness with exacerbation, progression, side effects from treatment    These conclusions are made at the best of one's knowledge and belief based on the provided evidence such as patient's history and allergy test results and they can change over time or can be incomplete because of missing information's.    ==> Treatment Plan:  >> No immediate or delayed type reaction in prick, intradermal and patch tests to Cephalosporines (Ceftriaxone and Ceftazidim), Penicillin G, Aztreonam, Tobramycin, Levofloxacin and Bactrim (Sulfonamide.    >> Delayed type reaction in intradermal tests to Zosyn (Piperacillin/Tazobactam). Might be specific reaction to the Zosyn without cross-reactions to Penicillins (beta-Lactam core). Avoid in future Zosyn. However, I would avoid the Penicillins and use instead Penems or Cephalosporines. If Penicillins are the best option, then they can be used under observation.      Staff: : provider    Follow-up in Derm-Allergy clinic if necessary and if again drug reaction take photos, write down all concomitant medications and make appointment with derm-allergy clinic. Maybe then we repeat prick, intradermal and patch tests with Zosyn, Penicillin G and Ampicillin.  ___________________________    I spent a total of 30 minutes with Nico Morales during today s  visit. This time was spent discussing all the individual test results, correlating them to the clinical relevance, counseling the patient and/or coordinating care

## 2022-05-16 ENCOUNTER — VIRTUAL VISIT (OUTPATIENT)
Dept: ALLERGY | Facility: CLINIC | Age: 47
End: 2022-05-16
Payer: COMMERCIAL

## 2022-05-16 DIAGNOSIS — E84.0 CYSTIC FIBROSIS WITH PULMONARY MANIFESTATIONS (H): Primary | ICD-10-CM

## 2022-05-16 DIAGNOSIS — Z88.9 DRUG ALLERGY, MULTIPLE: ICD-10-CM

## 2022-05-16 PROCEDURE — 99214 OFFICE O/P EST MOD 30 MIN: CPT | Mod: GT | Performed by: DERMATOLOGY

## 2022-05-16 NOTE — LETTER
5/16/2022         RE: Nico Morales  [address] [place] [address]        Dear Colleague,    Thank you for referring your patient, Nico Morales, to the [facility] [place]. Please see a copy of my visit note below.    [facility] Dermato-allergology Note  Virtual visit: store and forward video (Merlin connected), start time: 4:20pm, end time: 4:50pm, date of images: during video and in Epic media  Encounter Date: May 16, 2022  ____________________________________________    CC: No chief complaint on file.      HPI:  (05/14/22)  Mr. Nico Morales is a(n) 46 year old male who presents today as a return patient for allergy consultation  - Follow-up in Derm-Allergy clinic virtually on Monday 16th. If any positive reaction patient should come in. If all negative, then remove all medications from the Epic allergy list  - otherwise feeling well in usual state of health    Physical exam:  General: In no acute distress, well-developed, well-nourished  Eyes: no conjunctivitis  ENT: no signs of rhinitis   Pulmonary: no wheezing or coughing  Skin:Focused examination of the skin on test sites was performed = see test results below    Earlier History and Allergy exams:  (05/08/22)  - hx of CF. Multiple drug allergies in the chart and would like allergy testing   - reports full body rash after few doses of Zosyn. Rash stayed (didn't come and go like hives).   - From Levaquin: about 20 years ago. Had full body rash after a few doses. Rash stays (didn't come and go like hives). Rash stopped after drug was stopped.   - From ceftazidime: about 20 years ago. Had full body rash after a few doses. Rash stays (didn't come and go like hives). Rash stopped after drug was stopped.  - about 5-6 years ago, was on tobramycin but no rash   - Aztreonam nebulizer - worked good for years, felt like then chest became really tight after using it for a few years and lung function went down;  same thing for Tobramycin.   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)     Low bone density     Adrenal insufficiency (H)     Influenza     Osteoporosis, unspecified osteoporosis type, unspecified pathological fracture presence     Past Medical History:   Diagnosis Date     CF (cystic fibrosis) (H)      Exocrine pancreatic insufficiency      Nocardia infection      Pseudomonas infection      S/P gastrostomy (H) 2002       Allergies:  Allergies   Allergen Reactions     Aztreonam      Fevers and chest pain with inhalation     Pulmozyme [Dornase Trever] Other (See Comments)     Chest pain and fever     Tobramycin Fatigue     Trouble with ear ringing, shortness of breath, little clinical response.     Zosyn Hives     Bactrim [Sulfamethoxazole W/Trimethoprim]      Fatigue that limits treatment course     Ceftazidime Rash     Levaquin Rash       Medications:  Current Outpatient Medications   Medication     ACE NOT PRESCRIBED, INTENTIONAL,     acetylcysteine (MUCOMYST) 10 % nebulizer solution     amylase-lipase-protease (CREON) 24266-70312 units CPEP per EC capsule     azithromycin (ZITHROMAX) 250 MG tablet     blood glucose (NO BRAND SPECIFIED) test strip     blood glucose (NO BRAND SPECIFIED) test strip     blood glucose monitoring (NO BRAND SPECIFIED) meter device kit     blood glucose monitoring (ULTRA THIN 30G) lancets     budesonide (PULMICORT) 1 MG/2ML neb solution     budesonide-formoterol (SYMBICORT) 160-4.5 MCG/ACT Inhaler     CALCIUM PO     cefTAZidime (FORTAZ) 1 GM vial      "Continuous Blood Gluc Sensor (FREESTYLE SUKHWINDER 14 DAY SENSOR) MISC     doxycycline hyclate (VIBRAMYCIN) 100 MG capsule     [facility] PAT[facility]     ferrous fumarate 65 mg, [place]. FE,-Vitamin C 125 mg (VITRON-C)  MG TABS tablet     insulin aspart (NOVOLOG PENFILL) 100 UNIT/ML cartridge     insulin glargine (LANTUS SOLOSTAR) 100 UNIT/ML pen     insulin glargine (LANTUS SOLOSTAR) 100 UNIT/ML pen     insulin glargine (LANTUS SOLOSTAR) 100 UNIT/ML pen     insulin pen needle (32G X 4 MM) 32G X 4 MM miscellaneous     insulin pen needle (BD OMI U/F) 32G X 4 MM miscellaneous     levalbuterol (XOPENEX HFA) 45 MCG/ACT inhaler     levalbuterol (XOPENEX) 1.25 MG/3ML neb solution     MULTIVITAMIN OR     mvw complete (PROBIOTIC FORMULATION) capsule     polyethylene glycol (GOLYTELY) 236 g suspension     polyethylene glycol (MIRALAX/GLYCOLAX) powder     rimantadine (FLUMADINE) 100 MG tablet     sodium chloride inhalant 7 % NEBU neb solution     Syringe/Needle, Disp, 18G X 1\" 5 ML MISC     TRIKAFTA 100-50-75 & 150 MG tablet pack     water for injection sterile SOLN     Current Facility-Administered Medications   Medication     ceFAZolin (ANCEF) injection 500 mg     cefTAZidime (FORTAZ) 1 g vial to attach to  ml bag for ADULTS or NS 50 ml bag for PEDS     cefTAZidime (FORTAZ) 1 g       Social History:  The patient works as a . Patient has the following hobbies or non-occupational exposure: nothing special    Family History:  Family History   Problem Relation Age of Onset     Heart Disease Maternal Grandmother      Heart Disease Maternal Grandfather      Heart Disease Paternal Grandmother      Diabetes No family hx of        Previous Labs, Allergy Tests, Dermatopathology, Imaging:  Thinks he maybe had prick testing before to a few of the antibiotics but doesn't think they did delayed reading or patch testing     Referred By: Jaye Machado MD  [address] [facility],  " [place] [address]     Allergy Tests:    Past Allergy Test    Order for Future Allergy Testing:    [x] Outpatient  [] Inpatient: [facility]..../ Bed ....       Skin Atopy (atopic dermatitis) [] Yes   [x] No .........  Contact allergies:   [] Yes   [x] No ..........  Hand eczema:   [] Yes   [x] No           Leading hand:   [] R   [] L       [] Ambidextrous         Drug allergies:        [] Yes   [] No  Which?. ???.....testing for Aztreonam, tobramycin, zosyn, bactrim, ceftazidime, levaquin    Urticaria/Angioedema  [] Yes   [x] No .........  Food Allergy:  [] Yes   [x] No  which?......  Pets :  [x] Yes   [] No  Which?....1 dog ..         []  Rhinitis   [] Conjunctivitis   [] Sinusitis   [] Polyposis   [] Otitis   [] Pharyngitis         []  none  Operations:   [] Tonsils   [] Septum   [] Sinus   [] Polyposis        [] Asthma bronchiale   [] Coughing      [x]  none  Symptoms (mostly Rhinoconjunctivitis and Asthma) aggravated by:  Season   [] I   [] II   [] III   [] IV   []V   []VI   []VII   []VIII   []IX   []X   []XI   []XI     [] perennial   Day time      [] morning   [] noon      [] evening        [] night    [] whole day........  []  none  Location/changes    [] inside        [] outside   [] mountains    [] sea     [] others.............   []  none  Triggers, specific     [] animals     [] plants     [] dust              [] others ...........................    []  none  Triggers, others       [] work          [] psyche    [] sport            [] others .............................  []  none  Irritant                [] phys efforts [] smoke    [] heat/cold     [] odors  []others............... []  none    Order for PATCH TESTS  Reason for tests (suspected allergy): possible drug allergies   Known previous allergies: none  Standardized panels  [] Standard panel (40 tests)  [] Preservatives & Antimicrobials (31 tests)  [] Emulsifiers & Additives (25 tests)   [] Perfumes/Flavours & Plants (25 tests)  [] Hairdresser panel (12 tests)  []  Rubber Chemicals (22 tests)  [] Plastics (26 tests)  [] Colorants/Dyes/Food additives (20 tests)  [] Metals (implants/dental) (24 tests)  [] Local anaesthetics/NSAIDs (13 tests)  [x] Antibiotics & Antimycotics (14 tests)   [] Corticosteroids (15 tests)   [] Photopatch test (62 tests)   [] others: ...      [] Patient's own products: ...    DO NOT test if chemical or biological identity is unknown!     always ask from patient the product information and safety sheets (MSDS)       Order for PRICK TESTS    Reason for tests (suspected allergy): seasonal allergies in fall?  Known previous allergies: none    Standardized prick panels  [x] Atopic panel (20 tests)  [] Pediatric Panel (12 tests)  [] Milk, Meat, Eggs, Grains (20 tests)   [] Dust, Epithelia, Feathers (10 tests)  [] Fish, Seafood, Shellfish (17 tests)  [] Nuts, Beans (14 tests)  [] Spice, Vegetable, Fruit (17 tests)  [] Pollen Panel = Tree, Grass, Weed (24 tests)  [] Others: ...      [] Patient's own products: ...    DO NOT test if chemical or biological identity is unknown!     always ask from patient the product information and safety sheets (MSDS)     Standardized intradermal tests  [] Penicillium notatum [] Aspergillus fumigatus [] House dust mites D.far & D. pteron  [] Cat    [] dog  [] Others: ...  [] Bee venom   [] Wasp venom  !!Specific protocol with dilutions!!       Order for Drug allergy tests (prick & Intradermal & patch tests)    [] Penicillin G  [] Ampicillin [] Cefazolin   [] Ceftriaxone   [] Ceftazidime  [] Bactrim    [] Others: see below  Order for ... as test date      DRUG ALLERGY TEST SERIES 05/10/2022     ANTIBIOTICS      Prick Tests         Substance/ Allergen Conc Result (20 min) Remarks    Histamine Hydrochloride (ALK) 0.1 mg/ml ++     NaCl 0.9% -    1 Ceftriaxone* [2] 100 mg/ml -    2 Ceftazidime[3] 100 mg/ml -    3 Benzylpenicillin (Penicillin G) 1 [place] IU/ml (600 mg) -    4 Piperacillin-Tazobactam 337.5 mg/ml -    5 Aztreonam  (Monobactam) 100 mg/ml -    6 Levofloxacin 5 mg/ml -    7 Bactrim 80/400 mg/ml -    8 Tobramycin 40 mg/ml -       Intradermal Tests   immed immed delay delay      Substance Conc 1st dil  2nd dil  1 days  2 days remarks   1 Ceftriaxone* [2] 1:5 -  - -    2 Ceftazidime[3] 1:5 5mm + Neg 15min - - 2nd time 5mm Papule   3 Benzylpenicillin (Penicillin G) 1:100 -  - -    4 Piperacillin-Tazobactam 1:10 -  + +    5 Aztreonam (Monobactam) 1:100 -  - -    6 Levofloxacin 1:1000 -  - -    7 Bactrim 1:100 -  - -    8 Tobramycin 1:10 -  - -        Patch Tests  as is as is 1:2 1:2     As Is  Vas Substance Conc 2 days 4 days 2 days 4 days remarks   1 2 Ceftriaxone* [2] 100 mg/ml - - - -    3 4 Ceftazidime[3] 100 mg/ml - - - -    5 6 Benzylpenicillin (Penicillin G) 1 Jay IU/ml (600 mg) - - - -    7 8 Piperacillin- Tazobactam 337.5 mg/ml - - - -    9 10 Aztreonam (Monobactam) 100 mg/ml - - - -    11 12 Levofloxacin 5 mg/ml - - - -    13 14 Bactrim 80/400 mg/ml - - - -    15 16 Tobramycin 40 mg/ml - - - -    [1]  Cefalexin/Cefazolin-group        [2]    Cefotaxim-group     [3]     Cefuroxim-group  ==> after about 20min over Ceftazidim a slight erythema of about 6mm diameter and the repeat did not show any reaction after 15min. Patient will observe today the 2 test sites of Ceftazidime    ANTIBIOTICS & ANTIMYCOTICS    # Substance 2 days 4 days remarks   17 1 Erythromycine - -     2 Framycetine Sulphate - -     3 Fusidic Acid Sodium Salt - -    20 4 Gentamicin Sulphate - -     5 Neomycine Sulphate - -     6 Oxytetracycline  - -     7 Polymyxin B Sulphate - -     8 Tetracycline-HCL - -    25 9 Sulfanilamide - -     10 Metronidazole - -     11 Oxyquinoline Mix - -     12 Nitrofurazone - -     13 Nystatin - -    30 14 Clotrimazole - -    ==> remove the patches on Thursday, ciarra the fields and then take photos and take other photos on Friday 13th and Saturday 14th and then make virtual consult on Monday 16th of [facility].     Atopy Screen (Placed  05/10/22)    No Substance Readings (15 min) Evaluation   POS Histamine 1mg/ml ++    NEG NaCl 0.9% -      No Substance Readings (15 min) Evaluation   1 Alternaria alternata (tenuis)  -    2 Cladosporium herbarum -    3 Aspergillus fumigatus -    4 Penicillium notatum -    5 Dermatophagoides pteronyssinus -    6 Dermatophagoides farinae -    7 Dog epithelium (canis spp) -    8 Cat hair (soledad catus) +/++    9 Cockroach   (Blatella americana & germanica) -    10 Grass mix midwest   (Lilia, Orchard, Redtop, Mynor) -    11 Celestino grass (sorghum halepense) -    12 Weed mix   (common Cocklebur, Lamb s quarters, rough redroot Pigweed, Dock/Sorrel) -    13 Mug wort (artemisia vulgare) -    14 Ragweed giant/short (ambrosia spp) -    15 White birch (Betula papyrifera) -    16 Tree mix 1 (Pecan, Maple BHR, Oak RVW, american Hawi, black [place]) -    17 Red cedar (juniperus virginia) -    18 Tree mix 2   (white Will, river/red Birch, black [place], common [place], american Elm) -    19 Box elder/Maple mix (acer spp) -    20 Smith shagbark (carya ovata) -           Conclusion: some reaction to cat allergen (not to dog = has a dog and not to seasonal molds or ragweed)  ________________________________    Assessment & Plan:    ==> Final Diagnosis:     # Multiple drug allergies. Mostly delayed-type.   - will do prick, intradermal, and patch testing with delayed readings to piperacillin, levofloxacin, ceftazidime, tobramycin, aztreonam, bactrim   * chronic illness with exacerbation, progression, side effects from treatment    # Possible seasonal allergies with Chest tightness and slight rhinitis in fall   - atopy screen prick test  * chronic illness with exacerbation, progression, side effects from treatment    These conclusions are made at the best of one's knowledge and belief based on the provided evidence such as patient's history and allergy test results and they can change over time or can be incomplete because of  missing information's.    ==> Treatment Plan:  >> No immediate or delayed type reaction in prick, intradermal and patch tests to Cephalosporines (Ceftriaxone and Ceftazidim), Penicillin G, Aztreonam, Tobramycin, Levofloxacin and Bactrim (Sulfonamide.   >> Delayed type reaction in intradermal tests to Zosyn (Piperacillin/Tazobactam). Might be specific reaction to the Zosyn without cross-reactions to Penicillins (beta-Lactam core). Avoid in future Zosyn. However, I would avoid the Penicillins and use instead Penems or Cephalosporines. If Penicillins are the best option, then they can be used under observation.      Staff: : provider    Follow-up in Derm-Allergy clinic if necessary and if again drug reaction take photos, write down all concomitant medications and make appointment with derm-allergy clinic. Maybe then we repeat prick, intradermal and patch tests with Zosyn, Penicillin G and Ampicillin.  ___________________________    I spent a total of 30 minutes with Nico Morales during today s  visit. This time was spent discussing all the individual test results, correlating them to the clinical relevance, counseling the patient and/or coordinating care         Again, thank you for allowing me to participate in the care of your patient.        Sincerely,        Robby Lee MD

## 2022-05-16 NOTE — NURSING NOTE
Chief Complaint   Patient presents with     Allergy Testing Followup     Kilo is a video visit to discuss his drug testing results      Irina Killian

## 2022-06-14 ENCOUNTER — TELEPHONE (OUTPATIENT)
Dept: GASTROENTEROLOGY | Facility: CLINIC | Age: 47
End: 2022-06-14

## 2022-06-14 DIAGNOSIS — Z12.11 ENCOUNTER FOR SCREENING COLONOSCOPY: Primary | ICD-10-CM

## 2022-06-14 RX ORDER — BISACODYL 5 MG/1
TABLET, DELAYED RELEASE ORAL
Qty: 2 TABLET | Refills: 0 | Status: SHIPPED | OUTPATIENT
Start: 2022-06-14 | End: 2023-02-21

## 2022-06-14 NOTE — TELEPHONE ENCOUNTER
Attempted to contact patient for pre assessment questions. No answer.     Left message to return call to [phone] #3    Britta Almonte RN

## 2022-06-14 NOTE — TELEPHONE ENCOUNTER
Patient scheduled for colonoscopy  on 6/27/22.     Covid test scheduled 6/23/22. Discuss at home test option.     Arrival time: 1200    Facility location: [facility]    Sedation type: CS    Indication for procedure: screening     Anticoagulations? no     Bowel prep recommendation: Extended prep d/t CF    Extended prep sent to [id number] - [place], [place] - [address] [address] [facility] AT [address]. Prep instructions sent via [employer]    Pre visit planning completed.    Britta Almonte RN

## 2022-06-16 DIAGNOSIS — E84.0 CYSTIC FIBROSIS WITH PULMONARY MANIFESTATIONS (H): ICD-10-CM

## 2022-06-16 RX ORDER — ACETYLCYSTEINE 100 MG/ML
SOLUTION ORAL; RESPIRATORY (INHALATION)
Qty: 480 ML | Refills: 11 | Status: SHIPPED | OUTPATIENT
Start: 2022-06-16 | End: 2023-09-21

## 2022-06-16 NOTE — TELEPHONE ENCOUNTER
Attempt # 2 -- will also send a [employer] message.     Attempted to contact patient regarding upcoming Colonoscopy procedure on 6/27/22  for pre assessment questions.  No answer.  Left message to return call to [phone] #2    Ml Shaw RN

## 2022-06-16 NOTE — TELEPHONE ENCOUNTER
Pre assessment questions completed for upcoming colonoscopy  procedure scheduled on 6/27/22    COVID policy reviewed. Patient to complete rapid antigen test one to two days before their scheduled procedure. Patient to bring photo of the results when they come in for their procedure.    Reviewed procedural arrival time 1200 and facility location [facility].    Designated  policy reviewed. Instructed to have someone stay 6 hours post procedure.     Reviewed Extended prep instructions with patient. No fiber/iron supplements or foods that contain nuts/seeds 5 days prior to procedure.     Patient verbalized understanding and had no questions or concerns at this time.    Britta Almonte RN

## 2022-06-20 ENCOUNTER — LAB (OUTPATIENT)
Dept: LAB | Facility: OTHER | Age: 47
End: 2022-06-20
Payer: COMMERCIAL

## 2022-06-20 DIAGNOSIS — E84.0 CYSTIC FIBROSIS WITH PULMONARY MANIFESTATIONS (H): ICD-10-CM

## 2022-06-20 LAB
ALBUMIN SERPL-MCNC: 3.4 G/DL (ref 3.4–5)
ALBUMIN UR-MCNC: NEGATIVE MG/DL
ALP SERPL-CCNC: 193 U/L (ref 40–150)
ALT SERPL W P-5'-P-CCNC: 117 U/L (ref 0–70)
ANION GAP SERPL CALCULATED.3IONS-SCNC: 1 MMOL/L (ref 3–14)
APPEARANCE UR: CLEAR
AST SERPL W P-5'-P-CCNC: 43 U/L (ref 0–45)
BACTERIA #/AREA URNS HPF: ABNORMAL /HPF
BASOPHILS # BLD AUTO: 0.1 10E3/UL (ref 0–0.2)
BASOPHILS NFR BLD AUTO: 1 %
BILIRUB UR QL STRIP: NEGATIVE
BUN SERPL-MCNC: 27 MG/DL (ref 7–30)
CALCIUM SERPL-MCNC: 8.6 MG/DL (ref 8.5–10.1)
CHLORIDE BLD-SCNC: 107 MMOL/L (ref 94–109)
CHOLEST SERPL-MCNC: 159 MG/DL
CO2 SERPL-SCNC: 33 MMOL/L (ref 20–32)
COLOR UR AUTO: YELLOW
CREAT SERPL-MCNC: 1.05 MG/DL (ref 0.66–1.25)
CREAT UR-MCNC: 124 MG/DL
EOSINOPHIL # BLD AUTO: 0.2 10E3/UL (ref 0–0.7)
EOSINOPHIL NFR BLD AUTO: 2 %
ERYTHROCYTE [DISTWIDTH] IN BLOOD BY AUTOMATED COUNT: 14 % (ref 10–15)
ERYTHROCYTE [SEDIMENTATION RATE] IN BLOOD BY WESTERGREN METHOD: 8 MM/HR (ref 0–15)
FASTING STATUS PATIENT QL REPORTED: NO
GFR SERPL CREATININE-BSD FRML MDRD: 89 ML/MIN/1.73M2
GGT SERPL-CCNC: 132 U/L (ref 0–75)
GLUCOSE BLD-MCNC: 149 MG/DL (ref 70–99)
GLUCOSE UR STRIP-MCNC: NEGATIVE MG/DL
HBA1C MFR BLD: 6.4 % (ref 0–5.6)
HCT VFR BLD AUTO: 46.4 % (ref 40–53)
HDLC SERPL-MCNC: 40 MG/DL
HGB BLD-MCNC: 14.7 G/DL (ref 13.3–17.7)
HGB UR QL STRIP: NEGATIVE
INR PPP: 1 (ref 0.85–1.15)
IRON SERPL-MCNC: 45 UG/DL (ref 35–180)
KETONES UR STRIP-MCNC: NEGATIVE MG/DL
LDLC SERPL CALC-MCNC: 89 MG/DL
LEUKOCYTE ESTERASE UR QL STRIP: NEGATIVE
LYMPHOCYTES # BLD AUTO: 2 10E3/UL (ref 0.8–5.3)
LYMPHOCYTES NFR BLD AUTO: 19 %
MAGNESIUM SERPL-MCNC: 2.3 MG/DL (ref 1.6–2.3)
MCH RBC QN AUTO: 28.9 PG (ref 26.5–33)
MCHC RBC AUTO-ENTMCNC: 31.7 G/DL (ref 31.5–36.5)
MCV RBC AUTO: 91 FL (ref 78–100)
MICROALBUMIN UR-MCNC: 10 MG/L
MICROALBUMIN/CREAT UR: 8.06 MG/G CR (ref 0–17)
MONOCYTES # BLD AUTO: 0.9 10E3/UL (ref 0–1.3)
MONOCYTES NFR BLD AUTO: 9 %
NEUTROPHILS # BLD AUTO: 7.5 10E3/UL (ref 1.6–8.3)
NEUTROPHILS NFR BLD AUTO: 70 %
NITRATE UR QL: NEGATIVE
NONHDLC SERPL-MCNC: 119 MG/DL
PH UR STRIP: 5 [PH] (ref 5–7)
PHOSPHATE SERPL-MCNC: 3.2 MG/DL (ref 2.5–4.5)
PLATELET # BLD AUTO: 271 10E3/UL (ref 150–450)
POTASSIUM BLD-SCNC: 4 MMOL/L (ref 3.4–5.3)
PROT SERPL-MCNC: 7.6 G/DL (ref 6.8–8.8)
RBC # BLD AUTO: 5.08 10E6/UL (ref 4.4–5.9)
RBC #/AREA URNS AUTO: ABNORMAL /HPF
SODIUM SERPL-SCNC: 141 MMOL/L (ref 133–144)
SP GR UR STRIP: >=1.03 (ref 1–1.03)
TRIGL SERPL-MCNC: 149 MG/DL
TSH SERPL DL<=0.005 MIU/L-ACNC: 1.08 MU/L (ref 0.4–4)
URATE CRY #/AREA URNS HPF: ABNORMAL /HPF
UROBILINOGEN UR STRIP-ACNC: 0.2 E.U./DL
WBC # BLD AUTO: 10.7 10E3/UL (ref 4–11)
WBC #/AREA URNS AUTO: ABNORMAL /HPF

## 2022-06-20 PROCEDURE — 82784 ASSAY IGA/IGD/IGG/IGM EACH: CPT

## 2022-06-20 PROCEDURE — 84443 ASSAY THYROID STIM HORMONE: CPT

## 2022-06-20 PROCEDURE — 85025 COMPLETE CBC W/AUTO DIFF WBC: CPT

## 2022-06-20 PROCEDURE — 84450 TRANSFERASE (AST) (SGOT): CPT

## 2022-06-20 PROCEDURE — 85652 RBC SED RATE AUTOMATED: CPT

## 2022-06-20 PROCEDURE — 84403 ASSAY OF TOTAL TESTOSTERONE: CPT

## 2022-06-20 PROCEDURE — 84155 ASSAY OF PROTEIN SERUM: CPT

## 2022-06-20 PROCEDURE — 84460 ALANINE AMINO (ALT) (SGPT): CPT

## 2022-06-20 PROCEDURE — 82977 ASSAY OF GGT: CPT

## 2022-06-20 PROCEDURE — 84590 ASSAY OF VITAMIN A: CPT | Mod: 90

## 2022-06-20 PROCEDURE — 82785 ASSAY OF IGE: CPT

## 2022-06-20 PROCEDURE — 83036 HEMOGLOBIN GLYCOSYLATED A1C: CPT

## 2022-06-20 PROCEDURE — 83540 ASSAY OF IRON: CPT

## 2022-06-20 PROCEDURE — 99000 SPECIMEN HANDLING OFFICE-LAB: CPT

## 2022-06-20 PROCEDURE — 84446 ASSAY OF VITAMIN E: CPT | Mod: 90

## 2022-06-20 PROCEDURE — 81001 URINALYSIS AUTO W/SCOPE: CPT

## 2022-06-20 PROCEDURE — 80069 RENAL FUNCTION PANEL: CPT

## 2022-06-20 PROCEDURE — 80061 LIPID PANEL: CPT

## 2022-06-20 PROCEDURE — 85610 PROTHROMBIN TIME: CPT

## 2022-06-20 PROCEDURE — 83735 ASSAY OF MAGNESIUM: CPT

## 2022-06-20 PROCEDURE — 84075 ASSAY ALKALINE PHOSPHATASE: CPT

## 2022-06-20 PROCEDURE — 82306 VITAMIN D 25 HYDROXY: CPT

## 2022-06-20 PROCEDURE — 36415 COLL VENOUS BLD VENIPUNCTURE: CPT

## 2022-06-20 PROCEDURE — 82043 UR ALBUMIN QUANTITATIVE: CPT

## 2022-06-21 LAB
DEPRECATED CALCIDIOL+CALCIFEROL SERPL-MC: 55 UG/L (ref 20–75)
IGA SERPL-MCNC: 218 MG/DL (ref 84–499)
IGE SERPL-ACNC: 20 KU/L (ref 0–114)
IGG SERPL-MCNC: 1140 MG/DL (ref 610–1616)
IGM SERPL-MCNC: 100 MG/DL (ref 35–242)

## 2022-06-22 ENCOUNTER — TELEPHONE (OUTPATIENT)
Dept: NURSING | Facility: CLINIC | Age: 47
End: 2022-06-22

## 2022-06-22 ENCOUNTER — TELEPHONE (OUTPATIENT)
Dept: PULMONOLOGY | Facility: CLINIC | Age: 47
End: 2022-06-22

## 2022-06-22 DIAGNOSIS — E84.9 CYSTIC FIBROSIS (H): Primary | ICD-10-CM

## 2022-06-22 LAB — TESTOST SERPL-MCNC: 398 NG/DL (ref 240–950)

## 2022-06-22 NOTE — TELEPHONE ENCOUNTER
Outreach attempt made to review COVID testing options for upcoming procedure. Patient asked to call back [phone] to discuss further.     Cristiana Ramos LPN

## 2022-06-23 ENCOUNTER — CLINICAL UPDATE (OUTPATIENT)
Dept: PHARMACY | Facility: CLINIC | Age: 47
End: 2022-06-23
Payer: COMMERCIAL

## 2022-06-23 DIAGNOSIS — E84.9 CYSTIC FIBROSIS (H): Primary | ICD-10-CM

## 2022-06-23 PROCEDURE — 99207 PR NO CHARGE LOS: CPT | Performed by: PHARMACIST

## 2022-06-23 NOTE — PROGRESS NOTES
Clinical Update:                                                    At the request of Dr. Machado, a chart review was conducted for Nico Morales.    Reason for Chart Review: Trikafta Annual Lab Monitoring    Discussion: Kilo has been on Trikafta since 11/15/19. Per chart review, Kilo continues full dose Trikafta.    Labs were reviewed from 6/20/22 at [facility]. ALT is elevated at 1.7x the upper limit of normal. A GGT was also checked which was noted to be elevated. Per visit with Dr. Machado, Kilo endorsed food poisoning over the weekend which may have contributed to lab abnormalities. Plan to repeat labs in 1 week. CK was not checked, will add to next week's labs.    Lab Results   Component Value Date     (H) 06/20/2022    AST 43 06/20/2022    BILITOTAL 1.0 05/04/2022    DBIL 0.1 05/04/2022    CKT 54 05/04/2021     (H) 06/20/2022       Plan:  1. Continue Trikafta  2. Recheck hepatic panel and CK in 1 week      Gracie Allen, PharmD  Cystic Fibrosis MTM Pharmacist  [facility]  Voicemail: [phone]

## 2022-06-24 DIAGNOSIS — E84.9 CYSTIC FIBROSIS (H): ICD-10-CM

## 2022-06-24 LAB
A-TOCOPHEROL VIT E SERPL-MCNC: 14.6 MG/L
ANNOTATION COMMENT IMP: NORMAL
BETA+GAMMA TOCOPHEROL SERPL-MCNC: 0.3 MG/L
RETINYL PALMITATE SERPL-MCNC: 0.06 MG/L
VIT A SERPL-MCNC: 0.58 MG/L

## 2022-06-24 RX ORDER — LEVALBUTEROL TARTRATE 45 UG/1
2 AEROSOL, METERED ORAL EVERY 6 HOURS PRN
Qty: 75 G | Refills: 4 | Status: SHIPPED | OUTPATIENT
Start: 2022-06-24 | End: 2023-05-16

## 2022-06-27 ENCOUNTER — HOSPITAL ENCOUNTER (OUTPATIENT)
Facility: CLINIC | Age: 47
Discharge: HOME | End: 2022-06-27
Attending: INTERNAL MEDICINE | Admitting: INTERNAL MEDICINE
Payer: COMMERCIAL

## 2022-06-27 VITALS
SYSTOLIC BLOOD PRESSURE: 113 MMHG | HEIGHT: 69 IN | WEIGHT: 145.28 LBS | BODY MASS INDEX: 21.52 KG/M2 | HEART RATE: 78 BPM | OXYGEN SATURATION: 100 % | RESPIRATION RATE: 16 BRPM | DIASTOLIC BLOOD PRESSURE: 78 MMHG | TEMPERATURE: 98.5 F

## 2022-06-27 LAB
COLONOSCOPY: NORMAL
GLUCOSE BLDC GLUCOMTR-MCNC: 107 MG/DL (ref 70–99)
GLUCOSE BLDC GLUCOMTR-MCNC: 138 MG/DL (ref 70–99)
GLUCOSE BLDC GLUCOMTR-MCNC: 58 MG/DL (ref 70–99)

## 2022-06-27 PROCEDURE — 82962 GLUCOSE BLOOD TEST: CPT

## 2022-06-27 PROCEDURE — 45380 COLONOSCOPY AND BIOPSY: CPT | Performed by: INTERNAL MEDICINE

## 2022-06-27 PROCEDURE — 258N000001 HC RX 258: Performed by: INTERNAL MEDICINE

## 2022-06-27 PROCEDURE — 88305 TISSUE EXAM BY PATHOLOGIST: CPT | Mod: TC | Performed by: INTERNAL MEDICINE

## 2022-06-27 PROCEDURE — 250N000011 HC RX IP 250 OP 636: Performed by: INTERNAL MEDICINE

## 2022-06-27 PROCEDURE — G0500 MOD SEDAT ENDO SERVICE >5YRS: HCPCS | Performed by: INTERNAL MEDICINE

## 2022-06-27 RX ORDER — NALOXONE HYDROCHLORIDE 0.4 MG/ML
0.4 INJECTION, SOLUTION INTRAMUSCULAR; INTRAVENOUS; SUBCUTANEOUS
Status: CANCELLED | OUTPATIENT
Start: 2022-06-27

## 2022-06-27 RX ORDER — ONDANSETRON 2 MG/ML
4 INJECTION INTRAMUSCULAR; INTRAVENOUS
Status: DISCONTINUED | OUTPATIENT
Start: 2022-06-27 | End: 2022-06-27 | Stop reason: HOSPADM

## 2022-06-27 RX ORDER — NALOXONE HYDROCHLORIDE 0.4 MG/ML
0.2 INJECTION, SOLUTION INTRAMUSCULAR; INTRAVENOUS; SUBCUTANEOUS
Status: CANCELLED | OUTPATIENT
Start: 2022-06-27

## 2022-06-27 RX ORDER — ONDANSETRON 4 MG/1
4 TABLET, ORALLY DISINTEGRATING ORAL EVERY 6 HOURS PRN
Status: CANCELLED | OUTPATIENT
Start: 2022-06-27

## 2022-06-27 RX ORDER — FENTANYL CITRATE 50 UG/ML
INJECTION, SOLUTION INTRAMUSCULAR; INTRAVENOUS PRN
Status: COMPLETED | OUTPATIENT
Start: 2022-06-27 | End: 2022-06-27

## 2022-06-27 RX ORDER — PROCHLORPERAZINE MALEATE 5 MG
10 TABLET ORAL EVERY 6 HOURS PRN
Status: CANCELLED | OUTPATIENT
Start: 2022-06-27

## 2022-06-27 RX ORDER — NICOTINE POLACRILEX 4 MG
15-30 LOZENGE BUCCAL
Status: DISCONTINUED | OUTPATIENT
Start: 2022-06-27 | End: 2022-06-27 | Stop reason: HOSPADM

## 2022-06-27 RX ORDER — DEXTROSE MONOHYDRATE 25 G/50ML
25-50 INJECTION, SOLUTION INTRAVENOUS
Status: DISCONTINUED | OUTPATIENT
Start: 2022-06-27 | End: 2022-06-27 | Stop reason: HOSPADM

## 2022-06-27 RX ORDER — LIDOCAINE 40 MG/G
CREAM TOPICAL
Status: DISCONTINUED | OUTPATIENT
Start: 2022-06-27 | End: 2022-06-27 | Stop reason: HOSPADM

## 2022-06-27 RX ORDER — FLUMAZENIL 0.1 MG/ML
0.2 INJECTION, SOLUTION INTRAVENOUS
Status: CANCELLED | OUTPATIENT
Start: 2022-06-27 | End: 2022-06-28

## 2022-06-27 RX ORDER — ONDANSETRON 2 MG/ML
4 INJECTION INTRAMUSCULAR; INTRAVENOUS EVERY 6 HOURS PRN
Status: CANCELLED | OUTPATIENT
Start: 2022-06-27

## 2022-06-27 RX ADMIN — FENTANYL CITRATE 50 MCG: 50 INJECTION, SOLUTION INTRAMUSCULAR; INTRAVENOUS at 13:00

## 2022-06-27 RX ADMIN — DEXTROSE MONOHYDRATE 25 ML: 25 INJECTION, SOLUTION INTRAVENOUS at 12:45

## 2022-06-27 RX ADMIN — MIDAZOLAM 2 MG: 1 INJECTION INTRAMUSCULAR; INTRAVENOUS at 13:03

## 2022-06-27 RX ADMIN — MIDAZOLAM 2 MG: 1 INJECTION INTRAMUSCULAR; INTRAVENOUS at 13:00

## 2022-06-27 RX ADMIN — FENTANYL CITRATE 25 MCG: 50 INJECTION, SOLUTION INTRAMUSCULAR; INTRAVENOUS at 13:03

## 2022-06-27 NOTE — H&P
[facility] Anesthesia Pre-op History and Physical    Nico Morales MRN# [id number]   Age: 46 year old YOB: 1975      Date of Surgery: 6/27/2022 Location [facility]      Date of Exam 6/27/2022 Facility (In hospital)       Home clinic: [facility]  Primary care provider: Jaye Machado         Chief Complaint and/or Reason for Procedure:   No chief complaint on file.           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  Verbal overview and written information provided.          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     Priority: Medium     Encounter for long-term (current) use of antibiotics 05/20/2013     Priority: Medium     Prostatitis 05/20/2013     Priority: Medium     updating diagnosis code for icd10 cutover       Pseudomonas infection      Priority: Medium     Cystic fibrosis (H) 11/24/2010     Priority: Medium     Sweat Test   Date: 4/25/78    Lab: [facility]   Sample #1:  110mmol  Sample #2:  106mmol    Genetics  Date: 4/9/90  [id number]/621+1G->T         CARDIOVASCULAR SCREENING; LDL GOAL LESS THAN 160 10/31/2010     Priority: Medium            Medications  (include herbals and vitamins):   Any Plavix use in the last 7 days? No     Current Facility-Administered Medications   Medication     glucose gel 15-30 g    Or     dextrose 50 % injection 25-50 mL    Or     glucagon injection 1 mg     lidocaine (LMX4) cream     lidocaine 1 % 0.1-1 mL     ondansetron (ZOFRAN) injection 4 mg     sodium chloride (PF) 0.9% PF flush 3 mL     sodium chloride (PF) 0.9% PF flush 3 mL             Allergies:      Allergies   Allergen Reactions     Zosyn Hives     Delayed type reaction in intradermal tests to Zosyn (Piperacillin/Tazobactam). Might be specific reaction to the Zosyn without cross-reactions to Penicillins (beta-Lactam core). Avoid in future Zosyn. However, I would avoid the Penicillins and use instead Penems or Cephalosporines. If Penicillins are the best option, then they can be used under observation.     Pulmozyme [Dornase Trever] Other (See Comments)     Chest pain and fever     Allergy to Latex? No  Allergy to tape?   No  Intolerances: None            Physical Exam:   All vitals have been reviewed  No data found.  No intake/output data recorded.            Lab / Radiology Results:            Anesthetic risk and/or ASA classification:   ASA 3    Margarito Bowman MD

## 2022-06-27 NOTE — OR NURSING
Pt had colonoscopy under moderate sedation, polypectomy x 1 and biopsy. Pt tolerated procedure very well. Report given to Iris SUÁREZ. Pt sent over to 3C recovery in stable condition on RA.

## 2022-06-28 LAB
PATH REPORT.COMMENTS IMP SPEC: NORMAL
PATH REPORT.FINAL DX SPEC: NORMAL
PATH REPORT.GROSS SPEC: NORMAL
PATH REPORT.MICROSCOPIC SPEC OTHER STN: NORMAL
PATH REPORT.RELEVANT HX SPEC: NORMAL
PHOTO IMAGE: NORMAL

## 2022-06-28 PROCEDURE — 88305 TISSUE EXAM BY PATHOLOGIST: CPT | Mod: 26 | Performed by: PATHOLOGY

## 2022-06-30 ENCOUNTER — LAB (OUTPATIENT)
Dept: LAB | Facility: OTHER | Age: 47
End: 2022-06-30
Payer: COMMERCIAL

## 2022-06-30 DIAGNOSIS — E84.9 CYSTIC FIBROSIS (H): ICD-10-CM

## 2022-06-30 LAB
ALBUMIN SERPL-MCNC: 3.6 G/DL (ref 3.4–5)
ALP SERPL-CCNC: 151 U/L (ref 40–150)
ALT SERPL W P-5'-P-CCNC: 47 U/L (ref 0–70)
AST SERPL W P-5'-P-CCNC: 25 U/L (ref 0–45)
BILIRUB DIRECT SERPL-MCNC: <0.1 MG/DL (ref 0–0.2)
BILIRUB SERPL-MCNC: 0.4 MG/DL (ref 0.2–1.3)
CK SERPL-CCNC: 55 U/L (ref 30–300)
GGT SERPL-CCNC: 61 U/L (ref 0–75)
PROT SERPL-MCNC: 7.8 G/DL (ref 6.8–8.8)

## 2022-06-30 PROCEDURE — 80076 HEPATIC FUNCTION PANEL: CPT

## 2022-06-30 PROCEDURE — 36415 COLL VENOUS BLD VENIPUNCTURE: CPT

## 2022-06-30 PROCEDURE — 82550 ASSAY OF CK (CPK): CPT

## 2022-06-30 PROCEDURE — 82977 ASSAY OF GGT: CPT

## 2022-07-11 NOTE — PATIENT INSTRUCTIONS
Cystic Fibrosis Self-Care Plan    RECOMMENDATIONS:   Help us provide the best possible care. If you receive a questionnaire from the CF Foundation about your clinic experience today please fill it out.  It should take less than 5 minutes. Let us know what we are doing well and how we can improve.    Kilo, It was great to see you today.  The plan from today:  --let's plan on first dose of inhalational ceftazidime  --I will think about maintenance alternatives  Great job with self cares!    YOUR GOAL: Stay safe and well.  Enjoy the summer weather!      [facility] Nurse line:  Johanne Wilson  [phone]     [facility] Fax Number:      [phone]         Cystic Fibrosis Respiratory Therapists:   Felicitas Banuelos              [phone]          Jacklyn Pina   [phone]  Cystic Fibrosis Dietitians:              Zeina Pierre              [phone]                            Ivory Bonilla                        [phone]   Cystic Fibrosis Diabetes Nurse:    Jordyn Rudolph   [phone]    Cystic Fibrosis Social Workers:     Mary Ritter               [phone]                     Louann Suarez               [phone]  Cystic Fibrosis Pharmacists:           Tammy Morgan                               [phone]         Gracie Allen      [phone]   Cystic Fibrosis Genetic Counselor:   Pretty Pulliam    [phone]    [facility] [place] website:  [email or web address]    COVID VACCINES:    You are eligible for the COVID-19 vaccine. Sign up for your COVID vaccine via [employer]. Log in, select the menu bar, select schedule an appointment, and then select COVID-19 Vaccine 1st Dose. You may also schedule by calling this number [phone] however hold times can be long.       OR schedule through the [facility] Vaccine Connector at https://vaccineconnector.[place].gov/ or by calling [phone].      The best vaccine is the one  that s available to you first.  All COVID-19 vaccines currently available in the [place] (Celestino & Celestino, Pfizer and Moderna) have been shown to be highly effect at preventing COVID-19.       We re still learning how vaccines will affect the spread of COVID-19. After you ve been fully vaccinated against COVID-19, you should keep taking precautions in public places like wearing a mask, staying 6 feet apart from others, and avoiding crowds and poorly ventilated spaces until we know more.       MRN: [id number]   Clinic Date: July 11, 2022   Patient: Nico Morales     Annual Studies:   IGG   Date Value Ref Range Status   05/04/2021 1,208 610 - 1,616 mg/dL Final     Immunoglobulin G   Date Value Ref Range Status   06/20/2022 1,140 610 - 1,616 mg/dL Final     Insulin   Date Value Ref Range Status   07/26/2011 30 mU/L Final     Comment:     Reference Range:  0-20  +120     There are no preventive care reminders to display for this patient.    Pulmonary Function Tests  FEV1: amount of air you can blow out in 1 second  FVC: total amount of air you can take in and blow out    Your Goals:         PFT Latest Ref Rng & Units 3/2/2022   FVC L 3.79   FEV1 L 1.89   FVC% % 78   FEV1% % 48          Airway Clearance: The Most Important Way to Keep Your Lungs Healthy  Vest Settings:   Hill-Rom Frequencies: 8, 9, 10 Pressure 10 Then, Frequencies 18, 19, 20 Pressure 6     RespirTech: Quick Start with Pressure of     Do each frequency for 5 minutes; Deflate vest after each frequency & cough 3 times before beginning the next setting.    Vest and Neb Therapy should be done 2 times/day.    Good Nutrition Can Improve Lung Function and Overall Health    Take ALL of your vitamins with food    Take 1/2 of your enzymes before EVERY meal/snack and the other 1/2 mid-meal/snack    Wt Readings from Last 3 Encounters:   06/27/22 65.9 kg (145 lb 4.5 oz)   04/11/22 67.1 kg (148 lb)   03/02/22 66.2 kg (146 lb)       There is no height or  weight on file to calculate BMI.         National CF Foundation Recommendations for BMI in CF Adults: Women: at least 22 Men: at least 23        Controlling Blood Sugars Helps Prevent Lung Infections & Improves Nutrition  Test blood sugar:    In the morning before eating (goal is )    2 hours after a meal (goal is less than 150)    When pre-meal glucose is greater than 150 add correction    At bedtime (if less than 100 eat a snack with 15 grams of carbohydrates  Last A1C Results:   Hemoglobin A1C POCT   Date Value Ref Range Status   05/04/2021 6.2 (H) 0 - 5.6 % Final     Comment:     Normal <5.7% Prediabetes 5.7-6.4%  Diabetes 6.5% or higher - adopted from ADA   consensus guidelines.       Hemoglobin A1C   Date Value Ref Range Status   06/20/2022 6.4 (H) 0.0 - 5.6 % Final     Comment:     Normal <5.7%   Prediabetes 5.7-6.4%    Diabetes 6.5% or higher     Note: Adopted from ADA consensus guidelines.         If diabetic, measure A1C every 6 months. Goal: Under 7%    Staying Healthy  Research:  If you are interested in learning about research opportunities or have questions, please contact the CF Research Team at [phone] or [email or web address].    CF Foundation:  Compass is a personalized resource service to help you with the insurance, financial, legal and other issues you are facing.  It's free, confidential and available to anyone with CF.  Ask your  for more information or contact Compass directly at [phone]-COMPASS [phone]) or compass@cff.org, or learn more at cff.[email or web address].

## 2022-07-11 NOTE — PROGRESS NOTES
[facility]  July 13, 2022         Assessment and Plan:   Nico Morales is a 46 year old male with cystic fibrosis.    1. CF lung disease with severe obstruction: Kilo reports that he continues to do well from a pulmonary point of view.  He does continue to produce some sputum daily.  It is usually green to yellow in color.  He does remain consistent with his vest therapy and does not feel that he can break his habits.  He historically has grown out Pseudomonas and staph in his sputum.  His pulmonary function tests today are stable.  Kilo and I did discuss today that he has been on long-term doxycycline his is on and off maintenance therapy.  He is now having some joint stiffness with his shoes.  I do think it is time to transition him off this medication because I would like to prevent long-term side effects and complications.  I have asked him to consider test testing inhalational ceftazidime.  He did arrange a time to have this done with our respiratory therapist.  I do realize that this will not cover staph organisms but I do think it would be potentially without long-term side effects.  At this time I have recommended to Kilo:  -- He should continue to do his vest and nebulized therapies 2-3 times daily  -- He should arrange for test dose of inhalational ceftazidime  -- He will keep track of whether the doxycycline is truly related to his change in joint discomfort    2. Pancreatic Insufficiency/GI: Kilo has no new symptoms consistent with worsening malabsorption.  Kilo did remove his G-tube.  Fortunately had no difficulty with closure of the site.  He does remain stable from a weight point of view.  - continue the present dose of pancreatic enzymes  - continue vitamin supplementation.    3.  CF TR modulator therapy: Kilo is on Trikafta and tolerating well.  He does have a recent hepatic function which was normal.    4.  Cystic fibrosis related  diabetes: Kilo reports good control of his blood sugars.  He does have a tendency to have a little bit more hypoglycemia while he is on doxycycline.  He is aware of this and knows how to manage it.    5.  Psychosocial: Kilo is  and in a stable relationship.  They recently returned from [place] where they have a condo.  He reports that things are going well in general.    Annual studies due: 6/2023  Immunizations: UTD  Colonoscopy: 6/2022    Jaye Machado MD MPH  Associate Professor of Medicine  Pulmonary, Allergy, Critical Care and Sleep Medicine      Interval History:     Kilo reports that he has little in the way of cough or sputum.  He has occasional chest congestion.  He denies any shortness of breath.  He is performing 2-3 vest therapies per day.         Review of Systems:     CONSTITUTIONAL: no fever, no chills, no sweats, no change in weight, no change in energy, no change in appetite--good    INTEGUMENTARY/SKIN: no rash, no obvious new lesions    ENT/MOUTH: no sore throat, no new sinus pain, no new nasal drainage, no new nasal congestion, no ear ringing     RESPIRATORY: see interval history    CV: no chest pain, no palpitations, no peripheral edema    GI: no nausea, no vomiting, no change in stools, no fatty stools, no GERD, g tube removed without event    : negative urinary    MUSCULOSKELETAL: some stiff joints    ENDOCRINE: some lows while on doxy    NEURO:  No headache    SLEEP: no issues--needs less    PSYCHIATRIC: mood stable--good          Past Medical and Surgical History:     Past Medical History:   Diagnosis Date     CF (cystic fibrosis) (H)      Exocrine pancreatic insufficiency      Nocardia infection      Pseudomonas infection      S/P gastrostomy (H) 2002     Past Surgical History:   Procedure Laterality Date     COLONOSCOPY N/A 5/21/2018    Procedure: COMBINED COLONOSCOPY, SINGLE OR MULTIPLE BIOPSY/POLYPECTOMY BY BIOPSY;  Cystic fibrosis, 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;  Surgeon:  Margarito Bowman MD;  Location:  GI     COLONOSCOPY N/A 6/27/2022    Procedure: COLONOSCOPY, WITH POLYPECTOMY AND BIOPSY;  Surgeon: Margartio Bowman MD;  Location:  GI     GASTROSTOMY TUBE  2002     IR PULMONARY EMBOLIZATION  7/20/2001     PICC INSERTION Left 01/22/2018    4Fr SL BioFlo PICC, 46cm (5cm external) in the L basilic vein w/ tip in the low SVC           Family History:     Family History   Problem Relation Age of Onset     Heart Disease Maternal Grandmother      Heart Disease Maternal Grandfather      Heart Disease Paternal Grandmother      Diabetes No family hx of             Social History:     Social History     Socioeconomic History     Marital status:      Spouse name: Not on file     Number of children: 0     Years of education: Not on file     Highest education level: Not on file   Occupational History     Occupation: [employer]     Employer: [employer]   Tobacco Use     Smoking status: Never Smoker     Smokeless tobacco: Never Used   Substance and Sexual Activity     Alcohol use: No     Alcohol/week: 0.0 standard drinks     Drug use: No     Sexual activity: Yes     Partners: Female     Birth control/protection: Pill   Other Topics Concern     Parent/sibling w/ CABG, MI or angioplasty before 65F 55M? Not Asked      Service Not Asked     Blood Transfusions No     Caffeine Concern Not Asked     Occupational Exposure Not Asked     Hobby Hazards Not Asked     Sleep Concern Not Asked     Stress Concern Not Asked     Weight Concern Not Asked     Special Diet Not Asked     Back Care Not Asked     Exercise No     Bike Helmet Not Asked     Seat Belt Not Asked     Self-Exams Not Asked   Social History Narrative    8/15/2019 - .  Lives with his English Bulldog (Jefry) in a house that he designed in [place].  No children.     Social Determinants of Health     Financial Resource Strain: Not on file   Food Insecurity: Not on file   Transportation Needs: Not  "on file   Physical Activity: Not on file   Stress: Not on file   Social Connections: Not on file   Intimate Partner Violence: Not on file   Housing Stability: Not on file            Medications:     Current Outpatient Medications   Medication     ACE NOT PRESCRIBED, INTENTIONAL,     acetylcysteine (MUCOMYST) 10 % nebulizer solution     amylase-lipase-protease (CREON) 71261-05391 units CPEP per EC capsule     azithromycin (ZITHROMAX) 250 MG tablet     bisacodyl (DULCOLAX) 5 MG EC tablet     blood glucose (NO BRAND SPECIFIED) test strip     blood glucose (NO BRAND SPECIFIED) test strip     blood glucose monitoring (NO BRAND SPECIFIED) meter device kit     blood glucose monitoring (ULTRA THIN 30G) lancets     budesonide (PULMICORT) 1 MG/2ML neb solution     budesonide-formoterol (SYMBICORT) 160-4.5 MCG/ACT Inhaler     CALCIUM PO     cefTAZidime (FORTAZ) 1 GM vial     Continuous Blood Gluc Sensor (FREESTYLE SUKHWINDER 14 DAY SENSOR) MISC     doxycycline hyclate (VIBRAMYCIN) 100 MG capsule     [facility]NA[facility]ED PATIENT     ferrous fumarate 65 mg, [place]. FE,-Vitamin C 125 mg (VITRON-C)  MG TABS tablet     insulin aspart (NOVOLOG PENFILL) 100 UNIT/ML cartridge     insulin glargine (LANTUS SOLOSTAR) 100 UNIT/ML pen     insulin glargine (LANTUS SOLOSTAR) 100 UNIT/ML pen     insulin glargine (LANTUS SOLOSTAR) 100 UNIT/ML pen     insulin pen needle (32G X 4 MM) 32G X 4 MM miscellaneous     insulin pen needle (BD OMI U/F) 32G X 4 MM miscellaneous     levalbuterol (XOPENEX HFA) 45 MCG/ACT inhaler     levalbuterol (XOPENEX) 1.25 MG/3ML neb solution     MULTIVITAMIN OR     mvw complete (PROBIOTIC FORMULATION) capsule     polyethylene glycol (MIRALAX/GLYCOLAX) powder     rimantadine (FLUMADINE) 100 MG tablet     sodium chloride inhalant 7 % NEBU neb solution     Syringe/Needle, Disp, 18G X 1\" 5 ML MISC     TRIKAFTA 100-50-75 & 150 MG tablet pack     water for injection sterile SOLN     Current Facility-Administered " "Medications   Medication     ceFAZolin (ANCEF) injection 500 mg     cefTAZidime (FORTAZ) 1 g vial to attach to  ml bag for ADULTS or NS 50 ml bag for PEDS     cefTAZidime (FORTAZ) 1 g            Physical Exam:   /76   Pulse 80   Ht 1.73 m (5' 8.11\")   Wt 67.1 kg (147 lb 14.9 oz)   SpO2 97%   BMI 22.42 kg/m      Constitutional:   Awake, alert and in no apparent distress     Eyes:   nonicteric     ENT:   normal tm bilaterally     Neck:   Supple without supraclavicular or cervical lymphadenopathy     Lungs:   Good air flow.  No crackles.  biapical rhonchi.  No wheezes.     Cardiovascular:   Normal S1 and S2.  RRR.  No murmur, gallop or rub.     Abdomen:   NABS, soft, nontender, nondistended. Well healed g tube site     Musculoskeletal:   No edema, digital clubbing present     Neurologic:   Alert and conversant.     Skin:   Warm, dry.  No rash on limited exam.             Data:   All laboratory and imaging data reviewed.    Cystic Fibrosis Culture  Specimen Description   Date Value Ref Range Status   05/04/2021 Sputum  Final   05/04/2021 Sputum  Final   05/04/2021 Sputum  Final    Culture Micro   Date Value Ref Range Status   05/04/2021 Moderate growth  Normal santi    Final   05/04/2021 (A)  Final    Light growth  Pseudomonas aeruginosa, mucoid strain  Susceptibility testing done on previous specimen     05/04/2021 Culture negative for acid fast bacilli  Final   05/04/2021   Final    Assayed at [facility] [address] [place] [id number] [phone]        Recent Results (from the past 168 hour(s))   General PFT Lab (Please always keep checked)    Collection Time: 07/13/22  8:41 AM   Result Value Ref Range    FVC-Pred 4.85 L    FVC-Pre 3.71 L    FVC-%Pred-Pre 76 %    FEV1-Pre 1.89 L    FEV1-%Pred-Pre 48 %    FEV1FVC-Pred 80 %    FEV1FVC-Pre 51 %    FEFMax-Pred 9.67 L/sec    FEFMax-Pre 6.81 L/sec    FEFMax-%Pred-Pre 70 %    FEF2575-Pred 3.71 L/sec    FEF2575-Pre 0.63 L/sec    " [id number]-%Pred-Pre 17 %    ExpTime-Pre 17.63 sec    FIFMax-Pre 5.05 L/sec    FEV1FEV6-Pred 81 %    FEV1FEV6-Pre 58 %       PFT: Severe obstructive lung disease.  When compared to 3/2/2022, the FEV1 and FVC have little change.  The decrease in FVC may represent air trapping v. restrictive physiology.  Lung volumes would be necessary to determine.    Pulmonary exacerbation: absent    40 minutes spent on the date of the encounter doing chart review, history and exam, documentation and further activities per the note  Time documented is excluding time spent for PFT interpretation.

## 2022-07-13 ENCOUNTER — OFFICE VISIT (OUTPATIENT)
Dept: PULMONOLOGY | Facility: CLINIC | Age: 47
End: 2022-07-13
Attending: INTERNAL MEDICINE
Payer: COMMERCIAL

## 2022-07-13 VITALS
HEART RATE: 80 BPM | SYSTOLIC BLOOD PRESSURE: 113 MMHG | WEIGHT: 147.93 LBS | OXYGEN SATURATION: 97 % | BODY MASS INDEX: 22.42 KG/M2 | HEIGHT: 68 IN | DIASTOLIC BLOOD PRESSURE: 76 MMHG

## 2022-07-13 DIAGNOSIS — E84.9 CYSTIC FIBROSIS (H): Primary | ICD-10-CM

## 2022-07-13 DIAGNOSIS — E84.0 CYSTIC FIBROSIS WITH PULMONARY MANIFESTATIONS (H): Primary | ICD-10-CM

## 2022-07-13 LAB
EXPTIME-PRE: 17.63 SEC
FEF2575-%PRED-PRE: 17 %
FEF2575-PRE: 0.63 L/SEC
FEF2575-PRED: 3.71 L/SEC
FEFMAX-%PRED-PRE: 70 %
FEFMAX-PRE: 6.81 L/SEC
FEFMAX-PRED: 9.67 L/SEC
FEV1-%PRED-PRE: 48 %
FEV1-PRE: 1.89 L
FEV1FEV6-PRE: 58 %
FEV1FEV6-PRED: 81 %
FEV1FVC-PRE: 51 %
FEV1FVC-PRED: 80 %
FIFMAX-PRE: 5.05 L/SEC
FVC-%PRED-PRE: 76 %
FVC-PRE: 3.71 L
FVC-PRED: 4.85 L

## 2022-07-13 PROCEDURE — G0463 HOSPITAL OUTPT CLINIC VISIT: HCPCS | Mod: 25

## 2022-07-13 PROCEDURE — 94375 RESPIRATORY FLOW VOLUME LOOP: CPT | Performed by: INTERNAL MEDICINE

## 2022-07-13 PROCEDURE — 87070 CULTURE OTHR SPECIMN AEROBIC: CPT | Performed by: INTERNAL MEDICINE

## 2022-07-13 PROCEDURE — 99214 OFFICE O/P EST MOD 30 MIN: CPT | Mod: 25 | Performed by: INTERNAL MEDICINE

## 2022-07-13 ASSESSMENT — PAIN SCALES - GENERAL: PAINLEVEL: NO PAIN (0)

## 2022-07-13 NOTE — LETTER
7/13/2022         RE: Nico Morales  [address] [place] [address]        Dear Colleague,    Thank you for referring your patient, Nico Morales, to the [facility] [place]. Please see a copy of my visit note below.    [facility]  July 13, 2022         Assessment and Plan:   Nico Morales is a 46 year old male with cystic fibrosis.    1. CF lung disease with severe obstruction: Kilo reports that he continues to do well from a pulmonary point of view.  He does continue to produce some sputum daily.  It is usually green to yellow in color.  He does remain consistent with his vest therapy and does not feel that he can break his habits.  He historically has grown out Pseudomonas and staph in his sputum.  His pulmonary function tests today are stable.  Kilo and I did discuss today that he has been on long-term doxycycline his is on and off maintenance therapy.  He is now having some joint stiffness with his shoes.  I do think it is time to transition him off this medication because I would like to prevent long-term side effects and complications.  I have asked him to consider test testing inhalational ceftazidime.  He did arrange a time to have this done with our respiratory therapist.  I do realize that this will not cover staph organisms but I do think it would be potentially without long-term side effects.  At this time I have recommended to Kilo:  -- He should continue to do his vest and nebulized therapies 2-3 times daily  -- He should arrange for test dose of inhalational ceftazidime  -- He will keep track of whether the doxycycline is truly related to his change in joint discomfort    2. Pancreatic Insufficiency/GI: Kilo has no new symptoms consistent with worsening malabsorption.  Kilo did remove his G-tube.  Fortunately had no difficulty with closure of the site.  He does remain  stable from a weight point of view.  - continue the present dose of pancreatic enzymes  - continue vitamin supplementation.    3.  CF TR modulator therapy: Kilo is on Trikafta and tolerating well.  He does have a recent hepatic function which was normal.    4.  Cystic fibrosis related diabetes: Kilo reports good control of his blood sugars.  He does have a tendency to have a little bit more hypoglycemia while he is on doxycycline.  He is aware of this and knows how to manage it.    5.  Psychosocial: Kilo is  and in a stable relationship.  They recently returned from [place] where they have a condo.  He reports that things are going well in general.    Annual studies due: 6/2023  Immunizations: UTD  Colonoscopy: 6/2022    Jaye Machado MD MPH  Associate Professor of Medicine  Pulmonary, Allergy, Critical Care and Sleep Medicine      Interval History:     Kilo reports that he has little in the way of cough or sputum.  He has occasional chest congestion.  He denies any shortness of breath.  He is performing 2-3 vest therapies per day.         Review of Systems:     CONSTITUTIONAL: no fever, no chills, no sweats, no change in weight, no change in energy, no change in appetite--good    INTEGUMENTARY/SKIN: no rash, no obvious new lesions    ENT/MOUTH: no sore throat, no new sinus pain, no new nasal drainage, no new nasal congestion, no ear ringing     RESPIRATORY: see interval history    CV: no chest pain, no palpitations, no peripheral edema    GI: no nausea, no vomiting, no change in stools, no fatty stools, no GERD, g tube removed without event    : negative urinary    MUSCULOSKELETAL: some stiff joints    ENDOCRINE: some lows while on doxy    NEURO:  No headache    SLEEP: no issues--needs less    PSYCHIATRIC: mood stable--good          Past Medical and Surgical History:     Past Medical History:   Diagnosis Date     CF (cystic fibrosis) (H)      Exocrine pancreatic insufficiency      Nocardia  infection      Pseudomonas infection      S/P gastrostomy (H) 2002     Past Surgical History:   Procedure Laterality Date     COLONOSCOPY N/A 5/21/2018    Procedure: COMBINED COLONOSCOPY, SINGLE OR MULTIPLE BIOPSY/POLYPECTOMY BY BIOPSY;  Cystic fibrosis, 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;  Surgeon: Margarito Bowman MD;  Location: [facility]     COLONOSCOPY N/A 6/27/2022    Procedure: COLONOSCOPY, WITH POLYPECTOMY AND BIOPSY;  Surgeon: Margarito Bowman MD;  Location: [facility]     GASTROSTOMY TUBE  2002     IR PULMONARY EMBOLIZATION  7/20/2001     PICC INSERTION Left 01/22/2018    4Fr SL BioFlo PICC, 46cm (5cm external) in the L basilic vein w/ tip in the low SVC           Family History:     Family History   Problem Relation Age of Onset     Heart Disease Maternal Grandmother      Heart Disease Maternal Grandfather      Heart Disease Paternal Grandmother      Diabetes No family hx of             Social History:     Social History     Socioeconomic History     Marital status:      Spouse name: Not on file     Number of children: 0     Years of education: Not on file     Highest education level: Not on file   Occupational History     Occupation: financial [employer]     Employer: [employer]   Tobacco Use     Smoking status: Never Smoker     Smokeless tobacco: Never Used   Substance and Sexual Activity     Alcohol use: No     Alcohol/week: 0.0 standard drinks     Drug use: No     Sexual activity: Yes     Partners: Female     Birth control/protection: Pill   Other Topics Concern     Parent/sibling w/ CABG, MI or angioplasty before 65F 55M? Not Asked      Service Not Asked     Blood Transfusions No     Caffeine Concern Not Asked     Occupational Exposure Not Asked     Hobby Hazards Not Asked     Sleep Concern Not Asked     Stress Concern Not Asked     Weight Concern Not Asked     Special Diet Not Asked     Back Care Not Asked     Exercise No     Bike Helmet Not Asked     Seat Belt Not Asked     Self-Exams Not  Asked   Social History Narrative    8/15/2019 - .  Lives with his English Bulldog (Jefry) in a house that he designed in [place].  No children.     Social Determinants of Health     Financial Resource Strain: Not on file   Food Insecurity: Not on file   Transportation Needs: Not on file   Physical Activity: Not on file   Stress: Not on file   Social Connections: Not on file   Intimate Partner Violence: Not on file   Housing Stability: Not on file            Medications:     Current Outpatient Medications   Medication     ACE NOT PRESCRIBED, INTENTIONAL,     acetylcysteine (MUCOMYST) 10 % nebulizer solution     amylase-lipase-protease (CREON) 55482-86230 units CPEP per EC capsule     azithromycin (ZITHROMAX) 250 MG tablet     bisacodyl (DULCOLAX) 5 MG EC tablet     blood glucose (NO BRAND SPECIFIED) test strip     blood glucose (NO BRAND SPECIFIED) test strip     blood glucose monitoring (NO BRAND SPECIFIED) meter device kit     blood glucose monitoring (ULTRA THIN 30G) lancets     budesonide (PULMICORT) 1 MG/2ML neb solution     budesonide-formoterol (SYMBICORT) 160-4.5 MCG/ACT Inhaler     CALCIUM PO     cefTAZidime (FORTAZ) 1 GM vial     Continuous Blood Gluc Sensor (FREESTYLE SUKHWINDER 14 DAY SENSOR) MISC     doxycycline hyclate (VIBRAMYCIN) 100 MG capsule     [facility] INFUSION MANAGED PATIENT     ferrous fumarate 65 mg, [place]. FE,-Vitamin C 125 mg (VITRON-C)  MG TABS tablet     insulin aspart (NOVOLOG PENFILL) 100 UNIT/ML cartridge     insulin glargine (LANTUS SOLOSTAR) 100 UNIT/ML pen     insulin glargine (LANTUS SOLOSTAR) 100 UNIT/ML pen     insulin glargine (LANTUS SOLOSTAR) 100 UNIT/ML pen     insulin pen needle (32G X 4 MM) 32G X 4 MM miscellaneous     insulin pen needle (BD OMI U/F) 32G X 4 MM miscellaneous     levalbuterol (XOPENEX HFA) 45 MCG/ACT inhaler     levalbuterol (XOPENEX) 1.25 MG/3ML neb solution     MULTIVITAMIN OR     mvw complete (PROBIOTIC FORMULATION) capsule      "polyethylene glycol (MIRALAX/GLYCOLAX) powder     rimantadine (FLUMADINE) 100 MG tablet     sodium chloride inhalant 7 % NEBU neb solution     Syringe/Needle, Disp, 18G X 1\" 5 ML MISC     TRIKAFTA 100-50-75 & 150 MG tablet pack     water for injection sterile SOLN     Current Facility-Administered Medications   Medication     ceFAZolin (ANCEF) injection 500 mg     cefTAZidime (FORTAZ) 1 g vial to attach to  ml bag for ADULTS or NS 50 ml bag for PEDS     cefTAZidime (FORTAZ) 1 g            Physical Exam:   /76   Pulse 80   Ht 1.73 m (5' 8.11\")   Wt 67.1 kg (147 lb 14.9 oz)   SpO2 97%   BMI 22.42 kg/m      Constitutional:   Awake, alert and in no apparent distress     Eyes:   nonicteric     ENT:   normal tm bilaterally     Neck:   Supple without supraclavicular or cervical lymphadenopathy     Lungs:   Good air flow.  No crackles.  biapical rhonchi.  No wheezes.     Cardiovascular:   Normal S1 and S2.  RRR.  No murmur, gallop or rub.     Abdomen:   NABS, soft, nontender, nondistended. Well healed g tube site     Musculoskeletal:   No edema, digital clubbing present     Neurologic:   Alert and conversant.     Skin:   Warm, dry.  No rash on limited exam.             Data:   All laboratory and imaging data reviewed.    Cystic Fibrosis Culture  Specimen Description   Date Value Ref Range Status   05/04/2021 Sputum  Final   05/04/2021 Sputum  Final   05/04/2021 Sputum  Final    Culture Micro   Date Value Ref Range Status   05/04/2021 Moderate growth  Normal santi    Final   05/04/2021 (A)  Final    Light growth  Pseudomonas aeruginosa, mucoid strain  Susceptibility testing done on previous specimen     05/04/2021 Culture negative for acid fast bacilli  Final   05/04/2021   Final    Assayed at [facility] [address] [place] [id number] [phone]        Recent Results (from the past 168 hour(s))   General PFT Lab (Please always keep checked)    Collection Time: 07/13/22  8:41 AM   Result Value " Ref Range    FVC-Pred 4.85 L    FVC-Pre 3.71 L    FVC-%Pred-Pre 76 %    FEV1-Pre 1.89 L    FEV1-%Pred-Pre 48 %    FEV1FVC-Pred 80 %    FEV1FVC-Pre 51 %    FEFMax-Pred 9.67 L/sec    FEFMax-Pre 6.81 L/sec    FEFMax-%Pred-Pre 70 %    FEF2575-Pred 3.71 L/sec    FEF2575-Pre 0.63 L/sec    MMY9043-%Pred-Pre 17 %    ExpTime-Pre 17.63 sec    FIFMax-Pre 5.05 L/sec    FEV1FEV6-Pred 81 %    FEV1FEV6-Pre 58 %       PFT: Severe obstructive lung disease.  When compared to 3/2/2022, the FEV1 and FVC have little change.  The decrease in FVC may represent air trapping v. restrictive physiology.  Lung volumes would be necessary to determine.    Pulmonary exacerbation: absent    40 minutes spent on the date of the encounter doing chart review, history and exam, documentation and further activities per the note  Time documented is excluding time spent for PFT interpretation.      RT NOTE:    Patient needs to do a test dose of inhaled Caftaz. He would like to wait until he has a slower work day in case of adverse reactions.     He is scheduled for a clinic test dose 8/1 at 9am. CMA ok'd and scheduled.     Ceftaz is in pyxis and patient will need Levalbuterol prior.       Again, thank you for allowing me to participate in the care of your patient.        Sincerely,        Jaye Machado MD

## 2022-07-13 NOTE — PROGRESS NOTES
RT NOTE:    Patient needs to do a test dose of inhaled Caftaz. He would like to wait until he has a slower work day in case of adverse reactions.     He is scheduled for a clinic test dose 8/1 at 9am. CMA ok'd and scheduled.     Ceftaz is in pyxis and patient will need Levalbuterol prior.

## 2022-07-13 NOTE — NURSING NOTE
Nico Morales is a 46 year old year old who is being seen for Cystic Fibrosis (3 month follow up )      Medications reviewed and Vital signs taken.    Specimen Collection Type: Sputum    Order(s) placed: CF Aerobic Bacterial        Lab Results   Component Value Date    ACIDFAST No acid fast bacilli seen 03/02/2022    ACIDFAST No acid fast bacilli seen 03/02/2022         Lab Results   Component Value Date    AFBSMS Negative for acid fast bacteria 05/04/2021    AFBSMS  05/04/2021     Assayed at [facility] [address] [place] [address] [id number] [phone]       Vitals were taken and medications were reconciled.     Sheree BACA  9:01 AM

## 2022-07-13 NOTE — LETTER
Date:July 15, 2022      Patient was self referred, no letter generated. Do not send.        [facility] Information

## 2022-07-15 DIAGNOSIS — E84.0 CYSTIC FIBROSIS WITH PULMONARY MANIFESTATIONS (H): ICD-10-CM

## 2022-07-15 RX ORDER — DOXYCYCLINE 100 MG/1
100 CAPSULE ORAL 2 TIMES DAILY
Qty: 60 CAPSULE | Refills: 0 | Status: SHIPPED | OUTPATIENT
Start: 2022-07-15 | End: 2024-01-16

## 2022-07-19 LAB
BACTERIA SPT CULT: ABNORMAL

## 2022-07-21 ENCOUNTER — CLINICAL UPDATE (OUTPATIENT)
Dept: PHARMACY | Facility: CLINIC | Age: 47
End: 2022-07-21
Payer: COMMERCIAL

## 2022-07-21 DIAGNOSIS — E84.9 CYSTIC FIBROSIS (H): Primary | ICD-10-CM

## 2022-07-21 PROCEDURE — 99207 PR NO CHARGE LOS: CPT | Performed by: PHARMACIST

## 2022-07-21 NOTE — PROGRESS NOTES
Clinical Update:                                                    A chart review was conducted for Nico Morales.    Reason for Chart Review: Trikafta Lab Monitoring     Discussion: Kilo has been on Trikafta since 11/15/19. Per chart review, Kilo continues full dose Trikafta.      Nico has a history of an ALT abnormality which has required closer monitoring - on 6/20/22, Kilo had an elevated ALT 1.7x the upper limit of normal which was thought to potentially be attributed to food poisoning.      Labs were reviewed from 6/30/22 at [facility]. Labs are within normal limits.     Lab Results   Component Value Date    ALT 47 06/30/2022    AST 25 06/30/2022    BILITOTAL 0.4 06/30/2022    DBIL <0.1 06/30/2022    CKT 55 06/30/2022       Plan:  1. Continue Trikafta   2. Recheck hepatic panel in 6 months     Linda Stone, PharmD   Medication Therapy Management   Cystic Fibrosis Pharmacist  Pager: [phone]

## 2022-07-21 NOTE — Clinical Note
Kilo had an elevated ALT 1 month ago, but then came back down (thought to be potentially caused by food poisoning). Do we think it would be appropriate to move him to 6 month monitoring for now?

## 2022-07-22 DIAGNOSIS — E84.0 CYSTIC FIBROSIS WITH PULMONARY MANIFESTATIONS (H): ICD-10-CM

## 2022-08-01 ENCOUNTER — ALLIED HEALTH/NURSE VISIT (OUTPATIENT)
Dept: PULMONOLOGY | Facility: CLINIC | Age: 47
End: 2022-08-01
Attending: INTERNAL MEDICINE
Payer: COMMERCIAL

## 2022-08-01 DIAGNOSIS — E84.0 CYSTIC FIBROSIS WITH PULMONARY MANIFESTATIONS (H): Primary | ICD-10-CM

## 2022-08-01 DIAGNOSIS — E08.9 DIABETES MELLITUS RELATED TO CF (CYSTIC FIBROSIS) (H): ICD-10-CM

## 2022-08-01 DIAGNOSIS — E84.8 DIABETES MELLITUS RELATED TO CF (CYSTIC FIBROSIS) (H): ICD-10-CM

## 2022-08-01 PROCEDURE — 250N000011 HC RX IP 250 OP 636: Performed by: INTERNAL MEDICINE

## 2022-08-01 RX ORDER — WATER 10 ML/10ML
INJECTION INTRAMUSCULAR; INTRAVENOUS; SUBCUTANEOUS
Qty: 280 ML | Refills: 6 | Status: SHIPPED | OUTPATIENT
Start: 2022-08-01 | End: 2023-02-21

## 2022-08-01 RX ORDER — CEFTAZIDIME 1 G/1
1 INJECTION, POWDER, FOR SOLUTION INTRAMUSCULAR; INTRAVENOUS ONCE
Status: COMPLETED | OUTPATIENT
Start: 2022-08-01 | End: 2022-08-01

## 2022-08-01 RX ORDER — CEFTAZIDIME 1 G/1
1 INJECTION, POWDER, FOR SOLUTION INTRAMUSCULAR; INTRAVENOUS ONCE
Status: CANCELLED | OUTPATIENT
Start: 2022-08-01 | End: 2022-08-01

## 2022-08-01 RX ADMIN — CEFTAZIDIME 1 G: 1 INJECTION, POWDER, FOR SOLUTION INTRAMUSCULAR; INTRAVENOUS at 09:31

## 2022-08-01 NOTE — PROGRESS NOTES
Patient given test dose of Ceftazidime in clinic, premedicated with home Levalbuterol MDI. HR 85-87 pre and post nebulizer, SPO2 95-99% pre and post nebulizer, Breath sounds clear and diminished pre and post nebulizer. Patient made aware of side effects to watch for and call nurse triage line with any issues after leaving clinic.

## 2022-08-01 NOTE — NURSING NOTE
Patient had test dose of Ceftazidime today in clinic. Tolerated well. $0 copay and sent to [facility]. Patient notified via [employer].    KAMINI Elliott

## 2022-08-03 RX ORDER — INSULIN GLARGINE 100 [IU]/ML
INJECTION, SOLUTION SUBCUTANEOUS
Qty: 15 ML | Refills: 0 | Status: SHIPPED | OUTPATIENT
Start: 2022-08-03 | End: 2022-08-21

## 2022-08-03 NOTE — TELEPHONE ENCOUNTER
insulin glargine (LANTUS SOLOSTAR) 100 UNIT/ML pen         Last Written Prescription Date:  5/10/22  Last Fill Quantity: 15 ml,   # refills: 3  Last Office Visit : 4/20/21  Future Office visit:  None noted    Routing refill request to provider for review/approval because:  Insulin - refilled per clinic

## 2022-08-10 DIAGNOSIS — E08.9 DIABETES MELLITUS RELATED TO CF (CYSTIC FIBROSIS) (H): ICD-10-CM

## 2022-08-10 DIAGNOSIS — E84.8 DIABETES MELLITUS RELATED TO CF (CYSTIC FIBROSIS) (H): ICD-10-CM

## 2022-08-10 RX ORDER — INSULIN GLARGINE 100 [IU]/ML
INJECTION, SOLUTION SUBCUTANEOUS
Qty: 15 ML | OUTPATIENT
Start: 2022-08-10

## 2022-08-10 NOTE — TELEPHONE ENCOUNTER
insulin glargine (LANTUS SOLOSTAR) 100 UNIT/ML pen  insulin glargine (LANTUS SOLOSTAR) 100 UNIT/ML pen 15 mL 0 8/3/2022  No   Sig: INJECT 6 UNITS SUBCUETANEOUSLY ONCE DAILY Needs follow up for any future refills   Sent to pharmacy as: Lantus SoloStar 100 UNIT/ML Subcutaneous Solution Pen-injector (insulin glargine)   Class: E-Prescribe   Notes to Pharmacy: If Lantus is not covered by insurance, may substitute Basaglar or Semglee or other insulin glargine product per insurance preference at same dose and frequency.     Order: [id number]   E-Prescribing Status: Receipt confirmed by pharmacy (8/3/2022  4:52 PM CDT)       Printout Tracking    External Result Report     Medication Administration Instructions    INJECT 6 UNITS SUBCUETANEOUSLY ONCE DAILY Needs follow up for any future refills     Pharmacy    [id number] - [place], [place] - [address] AT [facility] [address]

## 2022-08-16 NOTE — TELEPHONE ENCOUNTER
Appointment scheduled.    Xenograft Text: Because of the size and depth of the defect and to facilitate healing by granulation, a tissue-cultured epidermal autograft was planned.  After prep and local anesthesia, Xenograft material was trimmed to fi the defect and secured

## 2022-08-19 DIAGNOSIS — E84.8 DIABETES MELLITUS RELATED TO CF (CYSTIC FIBROSIS) (H): ICD-10-CM

## 2022-08-19 DIAGNOSIS — E08.9 DIABETES MELLITUS RELATED TO CF (CYSTIC FIBROSIS) (H): ICD-10-CM

## 2022-08-21 RX ORDER — INSULIN GLARGINE 100 [IU]/ML
INJECTION, SOLUTION SUBCUTANEOUS
Qty: 15 ML | Refills: 1 | Status: SHIPPED | OUTPATIENT
Start: 2022-08-21 | End: 2023-06-05

## 2022-08-21 NOTE — TELEPHONE ENCOUNTER
insulin glargine (LANTUS SOLOSTAR) 100 UNIT/ML pen         Last Written Prescription Date:  8/3/22  Last Fill Quantity: 15 ml,   # refills: 0  Last Office Visit : 4/20/21  Future Office visit:  11/15/22    Routing refill request to provider for review/approval because:  Insulin - refilled per clinic

## 2022-08-23 ENCOUNTER — MYC MEDICAL ADVICE (OUTPATIENT)
Dept: PULMONOLOGY | Facility: CLINIC | Age: 47
End: 2022-08-23

## 2022-08-23 DIAGNOSIS — E84.0 CYSTIC FIBROSIS WITH PULMONARY MANIFESTATIONS (H): ICD-10-CM

## 2022-08-23 DIAGNOSIS — E84.9 CF (CYSTIC FIBROSIS) (H): Primary | ICD-10-CM

## 2022-08-23 RX ORDER — CEFDINIR 300 MG/1
300 CAPSULE ORAL 2 TIMES DAILY
Status: CANCELLED | OUTPATIENT
Start: 2022-08-23

## 2022-08-24 RX ORDER — LEVOFLOXACIN 750 MG/1
750 TABLET, FILM COATED ORAL DAILY
Qty: 14 TABLET | Refills: 0 | Status: SHIPPED | OUTPATIENT
Start: 2022-08-24 | End: 2023-05-16

## 2022-08-24 RX ORDER — CEFDINIR 300 MG/1
300 CAPSULE ORAL 2 TIMES DAILY
Qty: 28 CAPSULE | Refills: 0 | Status: SHIPPED | OUTPATIENT
Start: 2022-08-24 | End: 2023-09-26

## 2022-08-24 NOTE — CONFIDENTIAL NOTE
Patient has developed chest pain, increased sputum production and cough since starting inhaled ceftaz. He states it's the same reaction he's had with aztreonam, gabo and coli nebs. He stopped ceftaz last Thursday and chest pain has resolved but he's still bringing up large amount and sputum and cough keeping him up at night.     Ceftaz was started to try and get patient off long term use doxy (month on/month off) since he had developed joint pain around his feet and wasn't sure if doxy was the cause or not. Patient not currently on doxy and not experiencing joint pain.     Reviewed with Dr. Waldron:  1. Cefdinir 300 mg BID to treat staph  2. Levaquin 750 mg daily to treat his pseudomonas . Hold azithro and stop it if he develops any joint pain

## 2022-08-31 ENCOUNTER — PHARMACY VISIT (OUTPATIENT)
Dept: ADMINISTRATIVE | Facility: CLINIC | Age: 47
End: 2022-08-31

## 2022-08-31 NOTE — PROGRESS NOTES
Cystic Fibrosis Clinical Follow Up Assessment   Assessment Link -Cystic Fibrosis Clinical Follow Up Assessment      18:25:22 [facility], by Soheila Cantrell        Patient  Patient Name: BERENICE FLORES  :      EHR ID: [id number]   Activity Date    Care Details    What are the patient's goals for this therapy?   ? stable pulmonary status      Did you identify any patient barriers to access and successful treatment?   ? No barriers to access identified      Is it appropriate to collect a PDC at this time? [QA Metric] (An MPR or PDC would not be appropriate for cycled medications or if the patient is on therapy   ? Yes      Document the date of the last refill of PDC   ? 2022      Document PDC   ? 1      Has the patient missed doses inappropriately?   ? No      Please select CURRENT side effect(s) for monitoring:   ? None     Summary Notes   Spoke to pt via [facility] Link for TM assessment. Trikafta: Pt states 'It s going great, thanks. Amazing med! No side effects. Never miss a dose.' PDC 1.0. CF: Denies health, allergy or med changes. Will continue to follow pt every 6 months and invited pt to call sooner as needed.    Soheila Cantrell, Pharm.D.[place] [facility] Pharmacist  [address] [place] [address]  [email or web address]  Office: [phone]

## 2022-09-11 DIAGNOSIS — E84.9 CYSTIC FIBROSIS (H): ICD-10-CM

## 2022-09-12 RX ORDER — RIMANTADINE HCL 100 MG
TABLET ORAL
Qty: 60 TABLET | Refills: 6 | Status: SHIPPED | OUTPATIENT
Start: 2022-09-12 | End: 2023-05-16

## 2022-09-23 NOTE — TELEPHONE ENCOUNTER
Reviewed recent labs from 6/20 with Dr. Machado, noted elevation in Alk Phos, ALT, GGT.   Spoke to patient, patient denies any recent alcohol consumption (patient states he does not drink) or tylenol use. Patient reports that Friday night into Saturday he had potential food poisoning. Both him and his wife had upset stomachs after food they consumed. Patient reports vomiting a couple times. Patient feeling much better now. Does have scheduled colonoscopy on Monday 6/27.   Instructed patient to get labs repeated next week after his colonoscopy (either wed/thurs). Orders placed, patient will call local [facility] lab to set up lab only appointment.    Patient verbalized understanding and agreement in plan.    Alley Hendricks RN    
17-Aug-2021

## 2022-09-27 ENCOUNTER — TELEPHONE (OUTPATIENT)
Dept: PULMONOLOGY | Facility: CLINIC | Age: 47
End: 2022-09-27

## 2022-09-27 NOTE — TELEPHONE ENCOUNTER
Central Prior Authorization Team   Phone: [phone]    PA Initiation    Medication: levalbuterol (XOPENEX HFA) 45 MCG/ACT inhaler  Insurance Company: [employer] - Phone [phone] Fax [phone]  Pharmacy Filling the Rx: [id number] - [place], [place] - [address] [facility] AT [address]  Filling Pharmacy Phone: [phone]  Filling Pharmacy Fax: [phone]  Start Date: 9/27/2022

## 2022-09-27 NOTE — TELEPHONE ENCOUNTER
Prior Authorization Approval        Authorization Effective Date: 9/27/2022  Authorization Expiration Date: 9/27/2023  Medication: levalbuterol (XOPENEX HFA) 45 MCG/ACT inhaler-PA APPROVED   Approved Dose/Quantity:   Reference #:     Insurance Company: [employer] - Phone [phone] Fax [phone]  Expected CoPay:       CoPay Card Available:      Foundation Assistance Needed:    Which Pharmacy is filling the prescription (Not needed for infusion/clinic administered): [facility] #[id number] - [place], [place] - [address] [facility] AT [address] OF [address] [facility]  Pharmacy Notified: Yes- **Instructed pharmacy to notify patient when script is ready to /ship.**  Pharmacy stated that they have a paid claim on medication quantity 5 inhalers for 90 day supply and will notify the patient when medication is ready for .   Patient Notified: Yes

## 2022-10-10 ENCOUNTER — TELEPHONE (OUTPATIENT)
Dept: PULMONOLOGY | Facility: CLINIC | Age: 47
End: 2022-10-10

## 2022-10-10 NOTE — TELEPHONE ENCOUNTER
PA Initiation    Medication: Trikafta PA renewal pending  Insurance Company: [employer] - Phone [phone] Fax [phone]  Pharmacy Filling the Rx: [facility] - [place], [place] - [address]  Filling Pharmacy Phone:    Filling Pharmacy Fax:    Start Date: 10/10/2022    Key: [id number] - need to submit PA on 10/24 after current PA expires   Additional Information Required  If this claim were submitted today by the pharmacy, it would reject for Early Refill. The dispensing pharmacy can contact our pharmacy help desk at [phone] for assistance with an override, if needed.

## 2022-10-20 ENCOUNTER — LAB REQUISITION (OUTPATIENT)
Dept: LAB | Facility: CLINIC | Age: 47
End: 2022-10-20

## 2022-10-20 ENCOUNTER — HOSPITAL ENCOUNTER (OUTPATIENT)
Dept: RESEARCH | Facility: CLINIC | Age: 47
Discharge: HOME | End: 2022-10-20
Attending: INTERNAL MEDICINE
Payer: COMMERCIAL

## 2022-10-20 LAB
ALBUMIN SERPL BCG-MCNC: 3.8 G/DL (ref 3.5–5.2)
ALP SERPL-CCNC: 126 U/L (ref 40–129)
ALT SERPL W P-5'-P-CCNC: 35 U/L (ref 10–50)
ANION GAP SERPL CALCULATED.3IONS-SCNC: 12 MMOL/L (ref 7–15)
AST SERPL W P-5'-P-CCNC: 28 U/L (ref 10–50)
BASOPHILS # BLD AUTO: 0.1 10E3/UL (ref 0–0.2)
BASOPHILS NFR BLD AUTO: 0 %
BILIRUB DIRECT SERPL-MCNC: <0.2 MG/DL (ref 0–0.3)
BILIRUB SERPL-MCNC: 0.3 MG/DL
BUN SERPL-MCNC: 28 MG/DL (ref 6–20)
CALCIUM SERPL-MCNC: 9.6 MG/DL (ref 8.6–10)
CHLORIDE SERPL-SCNC: 104 MMOL/L (ref 98–107)
CREAT SERPL-MCNC: 0.86 MG/DL (ref 0.67–1.17)
DEPRECATED HCO3 PLAS-SCNC: 26 MMOL/L (ref 22–29)
EOSINOPHIL # BLD AUTO: 0.3 10E3/UL (ref 0–0.7)
EOSINOPHIL NFR BLD AUTO: 2 %
ERYTHROCYTE [DISTWIDTH] IN BLOOD BY AUTOMATED COUNT: 13.5 % (ref 10–15)
GFR SERPL CREATININE-BSD FRML MDRD: >90 ML/MIN/1.73M2
GLUCOSE SERPL-MCNC: 73 MG/DL (ref 70–99)
HCT VFR BLD AUTO: 44.2 % (ref 40–53)
HGB BLD-MCNC: 14.1 G/DL (ref 13.3–17.7)
IMM GRANULOCYTES # BLD: 0 10E3/UL
IMM GRANULOCYTES NFR BLD: 0 %
LDH SERPL L TO P-CCNC: 160 U/L (ref 0–250)
LYMPHOCYTES # BLD AUTO: 2.4 10E3/UL (ref 0.8–5.3)
LYMPHOCYTES NFR BLD AUTO: 20 %
MCH RBC QN AUTO: 28.4 PG (ref 26.5–33)
MCHC RBC AUTO-ENTMCNC: 31.9 G/DL (ref 31.5–36.5)
MCV RBC AUTO: 89 FL (ref 78–100)
MONOCYTES # BLD AUTO: 1 10E3/UL (ref 0–1.3)
MONOCYTES NFR BLD AUTO: 9 %
NEUTROPHILS # BLD AUTO: 8 10E3/UL (ref 1.6–8.3)
NEUTROPHILS NFR BLD AUTO: 69 %
NRBC # BLD AUTO: 0 10E3/UL
NRBC BLD AUTO-RTO: 0 /100
PLATELET # BLD AUTO: 266 10E3/UL (ref 150–450)
POTASSIUM SERPL-SCNC: 4.2 MMOL/L (ref 3.4–5.3)
PROT SERPL-MCNC: 7 G/DL (ref 6.4–8.3)
RBC # BLD AUTO: 4.96 10E6/UL (ref 4.4–5.9)
SODIUM SERPL-SCNC: 142 MMOL/L (ref 136–145)
WBC # BLD AUTO: 11.8 10E3/UL (ref 4–11)

## 2022-10-20 PROCEDURE — 85025 COMPLETE CBC W/AUTO DIFF WBC: CPT | Performed by: INTERNAL MEDICINE

## 2022-10-20 PROCEDURE — 510N000009 HC RESEARCH FACILITY, PER 15 MIN

## 2022-10-20 PROCEDURE — 82248 BILIRUBIN DIRECT: CPT | Performed by: INTERNAL MEDICINE

## 2022-10-20 PROCEDURE — 80053 COMPREHEN METABOLIC PANEL: CPT | Performed by: INTERNAL MEDICINE

## 2022-10-20 PROCEDURE — 83615 LACTATE (LD) (LDH) ENZYME: CPT | Performed by: INTERNAL MEDICINE

## 2022-11-11 NOTE — PROGRESS NOTES
CF Endocrinology Return Consultation:  Diabetes  :   Patient: Nico Morales MRN# [id number]   YOB: 1975 Age: 46 year old   Date of Visit: 11/15/2022    Dear Dr. Jaye Machado:    I had the pleasure of seeing your patient, Nico Morales in the [facility], [facility], for a return consultation regarding CFRD and low bone density.           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  Verbal overview and written information provided.          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     Priority: Medium     Encounter for long-term (current) use of antibiotics 05/20/2013     Priority: Medium     Prostatitis 05/20/2013     Priority: Medium     updating diagnosis code for icd10 cutover       Pseudomonas infection      Priority: Medium     Cystic fibrosis (H) 11/24/2010     Priority: Medium     Sweat Test   Date: 4/25/78    Lab: [facility]   Sample #1:  110mmol  Sample #2:  106mmol    Genetics  Date: 4/9/90  [id number]/621+1G->T         CARDIOVASCULAR SCREENING; LDL GOAL LESS THAN 160 10/31/2010     Priority: Medium            HPI:   Nico is a 46 year old male with Cystic Fibrosis Related Diabetes Mellitus (CFRD).    I have  reviewed the available past laboratory evaluations, imaging studies, and medical records available to me at this visit.  History was obtained from the patient and the medical record.  I have reviewed the notes of the pulmonary care team.    He is doing well overall.  Denies any acute complaints.  Not checking fingerstick blood glucose regularly.  2 to 3 days of data in the last week showing morning glucose readings ranging from 56-88.  A1c today 6.5%  Notes that he gets more hypoglycemia when on doxycycline.  Currently he is not taking doxycycline    Patient is on Trikafta and reports being stable from pulmonary point of view.    History of low bone density  Started on pamidronate in December 2018.  Has received 3 doses so far.  Denies any muscle ache or pains after the IV bisphosphonate infusions.  Reports that he was febrile briefly after the first 2 doses however did not have any problems after the third dose.  His last dose of pamidronate was in October 2019.  Subsequent infusions were put on hold due to call with pandemic.   Pamidronate was subsequently switched to Reclast.  He received first dose of Reclast in June 2021.  Reports having jaw pain for about 1 to 2 weeks after the infusion.  He has not received Reclast this year  He is taking calcium and vitamin D supplements  He has not had DEXA scan in our system since 2016.  He notes that he has had DEXA as part of research study for Trikafta.    A1c:  6.5%    Current insulin regimen:   Lantus 9 units daily in AM   Novolog 1 unit / 20 gm of carb, ~3-4 times daily. Ave Novolog dose around 3 units    Insulin administration site(s): abd or thigh          Past Medical History:     Past Medical History:   Diagnosis Date     CF (cystic fibrosis) (H)      Exocrine pancreatic insufficiency      Nocardia infection      Pseudomonas infection      S/P gastrostomy (H) 2002            Past Surgical History:     Past Surgical History:   Procedure Laterality Date      COLONOSCOPY N/A 5/21/2018    Procedure: COMBINED COLONOSCOPY, SINGLE OR MULTIPLE BIOPSY/POLYPECTOMY BY BIOPSY;  Cystic fibrosis, 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;  Surgeon: Margarito Bowman MD;  Location: [facility]     COLONOSCOPY N/A 6/27/2022    Procedure: COLONOSCOPY, WITH POLYPECTOMY AND BIOPSY;  Surgeon: Margarito Bowman MD;  Location: [facility]     GASTROSTOMY TUBE  2002     IR PULMONARY EMBOLIZATION  7/20/2001     PICC INSERTION Left 01/22/2018    4Fr SL BioFlo PICC, 46cm (5cm external) in the L basilic vein w/ tip in the low SVC               Social History:     Social History     Social History Narrative    8/15/2019 - .  Lives with his English Bulldog (Jefry) in a house that he designed in [place].  No children.              Family History:     Family History   Problem Relation Age of Onset     Heart Disease Maternal Grandmother      Heart Disease Maternal Grandfather      Heart Disease Paternal Grandmother      Diabetes No family hx of             Allergies:     Allergies   Allergen Reactions     Zosyn Hives     Delayed type reaction in intradermal tests to Zosyn (Piperacillin/Tazobactam). Might be specific reaction to the Zosyn without cross-reactions to Penicillins (beta-Lactam core). Avoid in future Zosyn. However, I would avoid the Penicillins and use instead Penems or Cephalosporines. If Penicillins are the best option, then they can be used under observation.     Pulmozyme [Dornase Trever] Other (See Comments)     Chest pain and fever             Medications:     Current Outpatient Rx   Medication Sig Dispense Refill     ACE NOT PRESCRIBED, INTENTIONAL, 1 each continuous prn. ACE Inhibitor not prescribed due to Risk for drug interaction 0 each 0     acetylcysteine (MUCOMYST) 10 % nebulizer solution NEBULIZE AND INHALE 4 ML INTO THE LUNGS FOUR TIMES A  mL 11     amylase-lipase-protease (CREON) 55953-38502 units CPEP per EC capsule TAKE 9-10 CAPSULES BY MOUTH WITH MEALS (3 MEALS/DAY) AND  2-4 CAPS WITH SNACKS (3 SNACKS/DAY) 3780 capsule 3     azithromycin (ZITHROMAX) 250 MG tablet Take 1 tablet (250 mg) by mouth daily 30 tablet 11     bisacodyl (DULCOLAX) 5 MG EC tablet Take as directed. One day prior to exam at 10:00am take 2 tablets 2 tablet 0     blood glucose (NO BRAND SPECIFIED) test strip Use to test blood sugar 6 times daily or as directed. 200 strip 11     blood glucose (NO BRAND SPECIFIED) test strip Use to test blood sugar 6 times daily or as directed.whatever is covered 540 strip 3     blood glucose monitoring (NO BRAND SPECIFIED) meter device kit Use to test blood sugar 6 times daily or as directed.whatever is covered 1 kit 1     blood glucose monitoring (ULTRA THIN 30G) lancets Use to test blood sugar 6 times daily or as directed.whatever is covered 540 each 3     budesonide (PULMICORT) 1 MG/2ML neb solution USE ONE VIAL IN NEBULIZER TWICE DAILY 120 mL 3     budesonide-formoterol (SYMBICORT) 160-4.5 MCG/ACT Inhaler INHALE 2 PUFFS BY MOUTH INTO THE LUNGS TWICE DAILY 30.6 g 3     CALCIUM PO Take 1 tablet by mouth 2 times daily Strength unknown.       cefdinir (OMNICEF) 300 MG capsule Take 1 capsule (300 mg) by mouth 2 times daily 28 capsule 0     cefTAZidime (FORTAZ) 1 GM vial For Allergy Testing in Allergy Clinic Only 1 each 0     ceftAZIDime (FORTAZ) 200 mg/mL SOLN nebulizer solution Inhale 1G twice daily via nebulization. Mix with 4mLs of sterile water. 28 days on, 28 days off 280 mL 3     Continuous Blood Gluc Sensor (FREESTYLE SUKHWINDER 14 DAY SENSOR) MISC Change every 14 days. 1 each 3     doxycycline hyclate (VIBRAMYCIN) 100 MG capsule Take 1 capsule (100 mg) by mouth 2 times daily Alternating 1 month on/1 month off 60 capsule 0     [facility]NA[facility]ED PATIENT Contact [facility] for patient specific medication information at [phone] on admission and discharge from the hospital.  Phones are answered 24 hours a day 7 days a week 365 days a year.    Providers  "- Choose \"CONTINUE HOME MED (no script)\" at discharge if patient treatment with home infusion will continue.       ferrous fumarate 65 mg, [place]. FE,-Vitamin C 125 mg (VITRON-C)  MG TABS tablet Take 1 tablet by mouth daily       insulin aspart (NOVOLOG PENFILL) 100 UNIT/ML cartridge INJECT 1 UNIT PER 20G CARB AT LUNCH AND SUPPER 3 UNITS AT NIGHT WITH TUBE FEEDINGS MAX 20UNITS DAILY 30 mL 3     insulin glargine (LANTUS SOLOSTAR) 100 UNIT/ML pen INJECT 6 UNITS SUBCUETANEOUSLY ONCE DAILY Needs follow up for any future refills 15 mL 1     insulin glargine (LANTUS SOLOSTAR) 100 UNIT/ML pen Inject 6 Units Subcutaneous daily Maximum dose 8 units daily 15 mL 3     insulin glargine (LANTUS SOLOSTAR) 100 UNIT/ML pen Don't forget to order pen needles too 15 mL 0     insulin pen needle (32G X 4 MM) 32G X 4 MM miscellaneous Use 4 pen needles daily or as directed. 250 each 11     insulin pen needle (BD OMI U/F) 32G X 4 MM miscellaneous Use 4 pen needles daily or as directed. 200 each 5     levalbuterol (XOPENEX HFA) 45 MCG/ACT inhaler Inhale 2 puffs into the lungs every 6 hours as needed for shortness of breath / dyspnea 75 g 4     levalbuterol (XOPENEX) 1.25 MG/3ML neb solution INHALE 1 VIAL BY MOUTH INTO THE LUNGS VIA NEBULIZATION 4 TIMES DAILY 1080 mL 3     levofloxacin (LEVAQUIN) 750 MG tablet Take 1 tablet (750 mg) by mouth daily Hold azithromycin while on levaquin 14 tablet 0     MULTIVITAMIN OR Take 1 tablet by mouth daily. Includes 5,000 units vitamin D and 400 units vitamin E. Per pt, formulated for CF pts.       mvw complete (PROBIOTIC FORMULATION) capsule Take 1 capsule by mouth daily 30 capsule 11     polyethylene glycol (MIRALAX/GLYCOLAX) powder Take 17 g (1 capful) by mouth daily 1 Bottle 3     rimantadine (FLUMADINE) 100 MG tablet TAKE 1 TABLET(100 MG) BY MOUTH TWICE DAILY 60 tablet 6     sodium chloride inhalant 7 % NEBU neb solution NEBULIZE 4ML BY MOUTH TWICE DAILY 240 mL 5     Syringe/Needle, Disp, 18G X " "1\" 5 ML MISC 1 each 2 times daily Use to mix Ceftrazidime nebs 56 each 6     Syringe/Needle, Disp, 18G X 1\" 5 ML MISC 1 Syringe 2 times daily To mix sterile water with aztreonam 60 each 6     TRIKAFTA 100-50-75 & 150 MG tablet pack TAKE TWO ORANGE TABLETS BY MOUTH IN THE MORNING AND ONE BLUE TABLET IN THE EVENING AS DIRECTED ON PACKAGE.  TAKE WITH FAT CONTAINING FOOD. 84 tablet 11     Water For Injection Sterile (STERILE WATER, PRESERVATIVE FREE,) injection Mix 4mLs of sterile water with Ceftazidime. 280 mL 6     water for injection sterile SOLN Mix 4mL of sterile water with aztreonam nebs 280 mL 6             Review of Systems:     Comprehensive ROS negative other than the symptoms noted above in the HPI.          Physical Exam:   Blood pressure 123/76, pulse 86, weight 68.8 kg (151 lb 11.2 oz).    CONSTITUTIONAL:   Awake, alert, and in no apparent distress.  HEENT: Extraocular muscle intact, no lid lag retraction or proptosis  Neck: No goiter or palpable nodules  Lungs: Clear to auscultation bilaterally  Heart: Regular rate rhythm  Abdomen: Soft nontender  Skin: No lipohypertrophy at injection sites in the abdomen  PSYCHIATRIC: Cooperative, no agitation.  Feet: Monofilament exam normal bilaterally, no ulcers        Laboratory results:     TSH   Date Value Ref Range Status   06/20/2022 1.08 0.40 - 4.00 mU/L Final   05/04/2021 1.49 0.40 - 4.00 mU/L Final     Triiodothyronine (T3)   Date Value Ref Range Status   06/25/2010 123 60 - 181 ng/dL Final     Testosterone Total   Date Value Ref Range Status   06/20/2022 398 240 - 950 ng/dL Final   05/04/2021 418 240 - 950 ng/dL Final     Comment:     This test was developed and its performance characteristics determined by the   [facility].   It has not been cleared or approved by the FDA. The laboratory is regulated   under CLIA as qualified to perform high-complexity testing. This test is used   for " clinical purposes. It should not be regarded as investigational or for   research.       Cholesterol   Date Value Ref Range Status   06/20/2022 159 <200 mg/dL Final   05/04/2021 159 <200 mg/dL Final     Albumin Urine mg/L   Date Value Ref Range Status   06/20/2022 10 mg/L Final   05/04/2021 15 mg/L Final     Triglycerides   Date Value Ref Range Status   06/20/2022 149 <150 mg/dL Final   05/04/2021 115 <150 mg/dL Final     HDL Cholesterol   Date Value Ref Range Status   05/04/2021 40 >39 mg/dL Final     Direct Measure HDL   Date Value Ref Range Status   06/20/2022 40 >=40 mg/dL Final     LDL Cholesterol Calculated   Date Value Ref Range Status   06/20/2022 89 <=100 mg/dL Final   05/04/2021 96 <100 mg/dL Final     Comment:     Desirable:       <100 mg/dl     Cholesterol/HDL Ratio   Date Value Ref Range Status   06/26/2013 4.4 0.0 - 5.0 Final     Non HDL Cholesterol   Date Value Ref Range Status   06/20/2022 119 <130 mg/dL Final   05/04/2021 119 <130 mg/dL Final           Diabetes Health Maintenance    Date of Diabetes Diagnosis: 12/2015    Special Notes (if any): He was a diagnosed with adrenal insufficiency based on inadequate response on a ACTH stim test. Adrenal insufficiency is thought to be related to  itraconazole and Symbicort use.  Last ACTH stim test was normal and he has been off chronic prednisone.    Date Last Eye Exam: > 3 years     Date Last Dental Appointment: every 6 months    Dates of Episodes Severe* Hypoglycemia (month/year, cumulative, ongoing, assess each visit): never              *Severe=patient unconscious, seizure, unable to help self    Last 25-Vitamin D (every year): 60    Last DXA, lowest Z-score (every 2 years): -2.6 right femoral neck (2016)       ?Bisphosphonates (yes/no): Started pemidronate December 2018       Last Urine Microalbumin (every year):      No results found for: MICROALBUMIN     Last Testosterone:   Testosterone Total   Date Value Ref Range Status   06/20/2022 398 240 -  950 ng/dL Final   05/04/2021 418 240 - 950 ng/dL Final     Comment:     This test was developed and its performance characteristics determined by the   [facility].   It has not been cleared or approved by the FDA. The laboratory is regulated   under CLIA as qualified to perform high-complexity testing. This test is used   for clinical purposes. It should not be regarded as investigational or for   research.        On testosterone therapy (yes/no)? No            Assessment and Plan:   Nico is a 46 year old male with CFRD    CFRD: Overall good control with occasional morning hypoglycemia.  Reduce dose of Lantus to 7 units daily  Continue current NovoLog regimen  Due for eye exam    Low bone density: Started on pamidronate in 2018 but missed doses in 2020 due to COVID pandemic.  Received first dose of Reclast in June 2021.   He would like to proceed with the second dose of reclast.  He will schedule at his convenience   is due for DEXA.  Will check with pulmonary team if the DEXA report from the research study is available for review.     RTC in 6 months    JULIETA Tate    Note: Chart documentation done in part with Dragon Voice Recognition software. Although reviewed after completion, some word and grammatical errors may remain.  Please consider this when interpreting information in this chart    CC  TYLER DAVIS

## 2022-11-14 ENCOUNTER — TELEPHONE (OUTPATIENT)
Dept: ENDOCRINOLOGY | Facility: CLINIC | Age: 47
End: 2022-11-14

## 2022-11-14 DIAGNOSIS — E84.0 CYSTIC FIBROSIS WITH PULMONARY MANIFESTATIONS (H): ICD-10-CM

## 2022-11-14 RX ORDER — BUDESONIDE 1 MG/2ML
INHALANT ORAL
Qty: 120 ML | Refills: 3 | Status: SHIPPED | OUTPATIENT
Start: 2022-11-14 | End: 2024-01-16

## 2022-11-14 NOTE — TELEPHONE ENCOUNTER
Outcome for 11/14/22 3:57 PM :Left Voicemail for patient to call back   Appointment reminder phone call made to patient.   Regina Pak

## 2022-11-15 ENCOUNTER — OFFICE VISIT (OUTPATIENT)
Dept: ENDOCRINOLOGY | Facility: CLINIC | Age: 47
End: 2022-11-15
Payer: COMMERCIAL

## 2022-11-15 VITALS
SYSTOLIC BLOOD PRESSURE: 123 MMHG | BODY MASS INDEX: 22.99 KG/M2 | WEIGHT: 151.7 LBS | DIASTOLIC BLOOD PRESSURE: 76 MMHG | HEART RATE: 86 BPM

## 2022-11-15 DIAGNOSIS — E84.0 CYSTIC FIBROSIS WITH PULMONARY MANIFESTATIONS (H): ICD-10-CM

## 2022-11-15 DIAGNOSIS — M81.0 OSTEOPOROSIS, UNSPECIFIED OSTEOPOROSIS TYPE, UNSPECIFIED PATHOLOGICAL FRACTURE PRESENCE: Primary | ICD-10-CM

## 2022-11-15 DIAGNOSIS — A49.8 PSEUDOMONAS INFECTION: ICD-10-CM

## 2022-11-15 DIAGNOSIS — M85.9 LOW BONE DENSITY: ICD-10-CM

## 2022-11-15 PROCEDURE — 99214 OFFICE O/P EST MOD 30 MIN: CPT | Performed by: INTERNAL MEDICINE

## 2022-11-15 RX ORDER — ZOLEDRONIC ACID 5 MG/100ML
5 INJECTION, SOLUTION INTRAVENOUS ONCE
Status: CANCELLED
Start: 2022-11-15

## 2022-11-15 RX ORDER — ALBUTEROL SULFATE 0.83 MG/ML
2.5 SOLUTION RESPIRATORY (INHALATION)
Status: CANCELLED | OUTPATIENT
Start: 2022-11-15

## 2022-11-15 RX ORDER — METHYLPREDNISOLONE SODIUM SUCCINATE 125 MG/2ML
125 INJECTION, POWDER, LYOPHILIZED, FOR SOLUTION INTRAMUSCULAR; INTRAVENOUS
Status: CANCELLED
Start: 2022-11-15

## 2022-11-15 RX ORDER — ACETAMINOPHEN 325 MG/1
650 TABLET ORAL ONCE
Status: CANCELLED | OUTPATIENT
Start: 2022-11-15

## 2022-11-15 RX ORDER — HEPARIN SODIUM (PORCINE) LOCK FLUSH IV SOLN 100 UNIT/ML 100 UNIT/ML
5 SOLUTION INTRAVENOUS
Status: CANCELLED | OUTPATIENT
Start: 2022-11-15

## 2022-11-15 RX ORDER — ALBUTEROL SULFATE 90 UG/1
1-2 AEROSOL, METERED RESPIRATORY (INHALATION)
Status: CANCELLED
Start: 2022-11-15

## 2022-11-15 RX ORDER — MEPERIDINE HYDROCHLORIDE 25 MG/ML
25 INJECTION INTRAMUSCULAR; INTRAVENOUS; SUBCUTANEOUS EVERY 30 MIN PRN
Status: CANCELLED | OUTPATIENT
Start: 2022-11-15

## 2022-11-15 RX ORDER — EPINEPHRINE 1 MG/ML
0.3 INJECTION, SOLUTION, CONCENTRATE INTRAVENOUS EVERY 5 MIN PRN
Status: CANCELLED | OUTPATIENT
Start: 2022-11-15

## 2022-11-15 RX ORDER — HEPARIN SODIUM,PORCINE 10 UNIT/ML
5 VIAL (ML) INTRAVENOUS
Status: CANCELLED | OUTPATIENT
Start: 2022-11-15

## 2022-11-15 RX ORDER — DIPHENHYDRAMINE HYDROCHLORIDE 50 MG/ML
50 INJECTION INTRAMUSCULAR; INTRAVENOUS
Status: CANCELLED
Start: 2022-11-15

## 2022-11-15 ASSESSMENT — PAIN SCALES - GENERAL: PAINLEVEL: NO PAIN (0)

## 2022-11-15 NOTE — LETTER
11/15/2022       RE: Nico Morales  [address]  [place] [address]     Dear Colleague,    Thank you for referring your patient, Nico Morales, to the [facility] ENDOCRINOLOGY CLINIC [place] at [facility]. Please see a copy of my visit note below.      CF Endocrinology Return Consultation:  Diabetes  :   Patient: Nico Morales MRN# [id number]   YOB: 1975 Age: 46 year old   Date of Visit: 11/15/2022    Dear Dr. Jaye Machado:    I had the pleasure of seeing your patient, Nico Morales in the CF Endocrinology Clinic, [facility], for a return consultation regarding CFRD and low bone density.           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  Verbal overview and written information provided.          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     Priority: Medium     Encounter for long-term (current) use of antibiotics 05/20/2013     Priority: Medium     Prostatitis 05/20/2013     Priority: Medium     updating diagnosis code for icd10 cutover       Pseudomonas infection      Priority: Medium     Cystic fibrosis (H) 11/24/2010     Priority: Medium     Sweat Test    Date: 4/25/78    Lab: [facility]   Sample #1:  110mmol  Sample #2:  106mmol    Genetics  Date: 4/9/90  [id number]/621+1G->T         CARDIOVASCULAR SCREENING; LDL GOAL LESS THAN 160 10/31/2010     Priority: Medium            HPI:   Nico is a 46 year old male with Cystic Fibrosis Related Diabetes Mellitus (CFRD).    I have reviewed the available past laboratory evaluations, imaging studies, and medical records available to me at this visit.  History was obtained from the patient and the medical record.  I have reviewed the notes of the pulmonary care team.    He is doing well overall.  Denies any acute complaints.  Not checking fingerstick blood glucose regularly.  2 to 3 days of data in the last week showing morning glucose readings ranging from 56-88.  A1c today 6.5%  Notes that he gets more hypoglycemia when on doxycycline.  Currently he is not taking doxycycline    Patient is on Trikafta and reports being stable from pulmonary point of view.    History of low bone density  Started on pamidronate in December 2018.  Has received 3 doses so far.  Denies any muscle ache or pains after the IV bisphosphonate infusions.  Reports that he was febrile briefly after the first 2 doses however did not have any problems after the third dose.  His last dose of pamidronate was in October 2019.  Subsequent infusions were put on hold due to call with pandemic.   Pamidronate was subsequently switched to Reclast.  He received first dose of Reclast in June 2021.  Reports having jaw pain for about 1 to 2 weeks after the infusion.  He has not received Reclast this year  He is taking calcium and vitamin D supplements  He has not had DEXA scan in our system since 2016.  He notes that he has had DEXA as part of research study for Trikafta.    A1c:  6.5%    Current insulin regimen:   Lantus 9 units daily in AM   Novolog 1 unit / 20 gm of carb, ~3-4 times daily. Ave Novolog dose around 3 units    Insulin administration site(s): abd or  thigh          Past Medical History:     Past Medical History:   Diagnosis Date     CF (cystic fibrosis) (H)      Exocrine pancreatic insufficiency      Nocardia infection      Pseudomonas infection      S/P gastrostomy (H) 2002            Past Surgical History:     Past Surgical History:   Procedure Laterality Date     COLONOSCOPY N/A 5/21/2018    Procedure: COMBINED COLONOSCOPY, SINGLE OR MULTIPLE BIOPSY/POLYPECTOMY BY BIOPSY;  Cystic fibrosis, 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;  Surgeon: Margarito Bowman MD;  Location: [facility]     COLONOSCOPY N/A 6/27/2022    Procedure: COLONOSCOPY, WITH POLYPECTOMY AND BIOPSY;  Surgeon: Margarito Bowman MD;  Location: [facility]     GASTROSTOMY TUBE  2002     IR PULMONARY EMBOLIZATION  7/20/2001     PICC INSERTION Left 01/22/2018    4Fr SL BioFlo PICC, 46cm (5cm external) in the L basilic vein w/ tip in the low SVC               Social History:     Social History     Social History Narrative    8/15/2019 - .  Lives with his English Bulldog (Jefry) in a house that he designed in [place].  No children.              Family History:     Family History   Problem Relation Age of Onset     Heart Disease Maternal Grandmother      Heart Disease Maternal Grandfather      Heart Disease Paternal Grandmother      Diabetes No family hx of             Allergies:     Allergies   Allergen Reactions     Zosyn Hives     Delayed type reaction in intradermal tests to Zosyn (Piperacillin/Tazobactam). Might be specific reaction to the Zosyn without cross-reactions to Penicillins (beta-Lactam core). Avoid in future Zosyn. However, I would avoid the Penicillins and use instead Penems or Cephalosporines. If Penicillins are the best option, then they can be used under observation.     Pulmozyme [Dornase Trever] Other (See Comments)     Chest pain and fever             Medications:     Current Outpatient Rx   Medication Sig Dispense Refill     ACE NOT PRESCRIBED, INTENTIONAL, 1 each continuous prn.  ACE Inhibitor not prescribed due to Risk for drug interaction 0 each 0     acetylcysteine (MUCOMYST) 10 % nebulizer solution NEBULIZE AND INHALE 4 ML INTO THE LUNGS FOUR TIMES A  mL 11     amylase-lipase-protease (CREON) 36891-63578 units CPEP per EC capsule TAKE 9-10 CAPSULES BY MOUTH WITH MEALS (3 MEALS/DAY) AND 2-4 CAPS WITH SNACKS (3 SNACKS/DAY) 3780 capsule 3     azithromycin (ZITHROMAX) 250 MG tablet Take 1 tablet (250 mg) by mouth daily 30 tablet 11     bisacodyl (DULCOLAX) 5 MG EC tablet Take as directed. One day prior to exam at 10:00am take 2 tablets 2 tablet 0     blood glucose (NO BRAND SPECIFIED) test strip Use to test blood sugar 6 times daily or as directed. 200 strip 11     blood glucose (NO BRAND SPECIFIED) test strip Use to test blood sugar 6 times daily or as directed.whatever is covered 540 strip 3     blood glucose monitoring (NO BRAND SPECIFIED) meter device kit Use to test blood sugar 6 times daily or as directed.whatever is covered 1 kit 1     blood glucose monitoring (ULTRA THIN 30G) lancets Use to test blood sugar 6 times daily or as directed.whatever is covered 540 each 3     budesonide (PULMICORT) 1 MG/2ML neb solution USE ONE VIAL IN NEBULIZER TWICE DAILY 120 mL 3     budesonide-formoterol (SYMBICORT) 160-4.5 MCG/ACT Inhaler INHALE 2 PUFFS BY MOUTH INTO THE LUNGS TWICE DAILY 30.6 g 3     CALCIUM PO Take 1 tablet by mouth 2 times daily Strength unknown.       cefdinir (OMNICEF) 300 MG capsule Take 1 capsule (300 mg) by mouth 2 times daily 28 capsule 0     cefTAZidime (FORTAZ) 1 GM vial For Allergy Testing in Allergy Clinic Only 1 each 0     ceftAZIDime (FORTAZ) 200 mg/mL SOLN nebulizer solution Inhale 1G twice daily via nebulization. Mix with 4mLs of sterile water. 28 days on, 28 days off 280 mL 3     Continuous Blood Gluc Sensor (FREESTYLE SUKHWINDER 14 DAY SENSOR) MISC Change every 14 days. 1 each 3     doxycycline hyclate (VIBRAMYCIN) 100 MG capsule Take 1 capsule (100 mg) by mouth 2  "times daily Alternating 1 month on/1 month off 60 capsule 0     [place] HOME INFUSION MANAGED PATIENT Contact [facility] for patient specific medication information at [phone] on admission and discharge from the hospital.  Phones are answered 24 hours a day 7 days a week 365 days a year.    Providers - Choose \"CONTINUE HOME MED (no script)\" at discharge if patient treatment with home infusion will continue.       ferrous fumarate 65 mg, [place]. FE,-Vitamin C 125 mg (VITRON-C)  MG TABS tablet Take 1 tablet by mouth daily       insulin aspart (NOVOLOG PENFILL) 100 UNIT/ML cartridge INJECT 1 UNIT PER 20G CARB AT LUNCH AND SUPPER 3 UNITS AT NIGHT WITH TUBE FEEDINGS MAX 20UNITS DAILY 30 mL 3     insulin glargine (LANTUS SOLOSTAR) 100 UNIT/ML pen INJECT 6 UNITS SUBCUETANEOUSLY ONCE DAILY Needs follow up for any future refills 15 mL 1     insulin glargine (LANTUS SOLOSTAR) 100 UNIT/ML pen Inject 6 Units Subcutaneous daily Maximum dose 8 units daily 15 mL 3     insulin glargine (LANTUS SOLOSTAR) 100 UNIT/ML pen Don't forget to order pen needles too 15 mL 0     insulin pen needle (32G X 4 MM) 32G X 4 MM miscellaneous Use 4 pen needles daily or as directed. 250 each 11     insulin pen needle (BD OMI U/F) 32G X 4 MM miscellaneous Use 4 pen needles daily or as directed. 200 each 5     levalbuterol (XOPENEX HFA) 45 MCG/ACT inhaler Inhale 2 puffs into the lungs every 6 hours as needed for shortness of breath / dyspnea 75 g 4     levalbuterol (XOPENEX) 1.25 MG/3ML neb solution INHALE 1 VIAL BY MOUTH INTO THE LUNGS VIA NEBULIZATION 4 TIMES DAILY 1080 mL 3     levofloxacin (LEVAQUIN) 750 MG tablet Take 1 tablet (750 mg) by mouth daily Hold azithromycin while on levaquin 14 tablet 0     MULTIVITAMIN OR Take 1 tablet by mouth daily. Includes 5,000 units vitamin D and 400 units vitamin E. Per pt, formulated for CF pts.       mvw complete (PROBIOTIC FORMULATION) capsule Take 1 capsule by mouth daily 30 capsule " "11     polyethylene glycol (MIRALAX/GLYCOLAX) powder Take 17 g (1 capful) by mouth daily 1 Bottle 3     rimantadine (FLUMADINE) 100 MG tablet TAKE 1 TABLET(100 MG) BY MOUTH TWICE DAILY 60 tablet 6     sodium chloride inhalant 7 % NEBU neb solution NEBULIZE 4ML BY MOUTH TWICE DAILY 240 mL 5     Syringe/Needle, Disp, 18G X 1\" 5 ML MISC 1 each 2 times daily Use to mix Ceftrazidime nebs 56 each 6     Syringe/Needle, Disp, 18G X 1\" 5 ML MISC 1 Syringe 2 times daily To mix sterile water with aztreonam 60 each 6     TRIKAFTA 100-50-75 & 150 MG tablet pack TAKE TWO ORANGE TABLETS BY MOUTH IN THE MORNING AND ONE BLUE TABLET IN THE EVENING AS DIRECTED ON PACKAGE.  TAKE WITH FAT CONTAINING FOOD. 84 tablet 11     Water For Injection Sterile (STERILE WATER, PRESERVATIVE FREE,) injection Mix 4mLs of sterile water with Ceftazidime. 280 mL 6     water for injection sterile SOLN Mix 4mL of sterile water with aztreonam nebs 280 mL 6             Review of Systems:     Comprehensive ROS negative other than the symptoms noted above in the HPI.          Physical Exam:   Blood pressure 123/76, pulse 86, weight 68.8 kg (151 lb 11.2 oz).    CONSTITUTIONAL:   Awake, alert, and in no apparent distress.  HEENT: Extraocular muscle intact, no lid lag retraction or proptosis  Neck: No goiter or palpable nodules  Lungs: Clear to auscultation bilaterally  Heart: Regular rate rhythm  Abdomen: Soft nontender  Skin: No lipohypertrophy at injection sites in the abdomen  PSYCHIATRIC: Cooperative, no agitation.  Feet: Monofilament exam normal bilaterally, no ulcers        Laboratory results:     TSH   Date Value Ref Range Status   06/20/2022 1.08 0.40 - 4.00 mU/L Final   05/04/2021 1.49 0.40 - 4.00 mU/L Final     Triiodothyronine (T3)   Date Value Ref Range Status   06/25/2010 123 60 - 181 ng/dL Final     Testosterone Total   Date Value Ref Range Status   06/20/2022 398 240 - 950 ng/dL Final   05/04/2021 418 240 - 950 ng/dL Final     Comment:     This " test was developed and its performance characteristics determined by the   [facility].   It has not been cleared or approved by the FDA. The laboratory is regulated   under CLIA as qualified to perform high-complexity testing. This test is used   for clinical purposes. It should not be regarded as investigational or for   research.       Cholesterol   Date Value Ref Range Status   06/20/2022 159 <200 mg/dL Final   05/04/2021 159 <200 mg/dL Final     Albumin Urine mg/L   Date Value Ref Range Status   06/20/2022 10 mg/L Final   05/04/2021 15 mg/L Final     Triglycerides   Date Value Ref Range Status   06/20/2022 149 <150 mg/dL Final   05/04/2021 115 <150 mg/dL Final     HDL Cholesterol   Date Value Ref Range Status   05/04/2021 40 >39 mg/dL Final     Direct Measure HDL   Date Value Ref Range Status   06/20/2022 40 >=40 mg/dL Final     LDL Cholesterol Calculated   Date Value Ref Range Status   06/20/2022 89 <=100 mg/dL Final   05/04/2021 96 <100 mg/dL Final     Comment:     Desirable:       <100 mg/dl     Cholesterol/HDL Ratio   Date Value Ref Range Status   06/26/2013 4.4 0.0 - 5.0 Final     Non HDL Cholesterol   Date Value Ref Range Status   06/20/2022 119 <130 mg/dL Final   05/04/2021 119 <130 mg/dL Final         CF  Diabetes Health Maintenance    Date of Diabetes Diagnosis: 12/2015    Special Notes (if any): He was a diagnosed with adrenal insufficiency based on inadequate response on a ACTH stim test. Adrenal insufficiency is thought to be related to  itraconazole and Symbicort use.  Last ACTH stim test was normal and he has been off chronic prednisone.    Date Last Eye Exam: > 3 years     Date Last Dental Appointment: every 6 months    Dates of Episodes Severe* Hypoglycemia (month/year, cumulative, ongoing, assess each visit): never              *Severe=patient unconscious, seizure, unable to help self    Last 25-Vitamin D (every year): 60    Last  DXA, lowest Z-score (every 2 years): -2.6 right femoral neck (2016)       ?Bisphosphonates (yes/no): Started pemidronate December 2018       Last Urine Microalbumin (every year):      No results found for: MICROALBUMIN     Last Testosterone:   Testosterone Total   Date Value Ref Range Status   06/20/2022 398 240 - 950 ng/dL Final   05/04/2021 418 240 - 950 ng/dL Final     Comment:     This test was developed and its performance characteristics determined by the   [facility].   It has not been cleared or approved by the FDA. The laboratory is regulated   under CLIA as qualified to perform high-complexity testing. This test is used   for clinical purposes. It should not be regarded as investigational or for   research.        On testosterone therapy (yes/no)? No            Assessment and Plan:   Nico is a 46 year old male with CFRD    CFRD: Overall good control with occasional morning hypoglycemia.  Reduce dose of Lantus to 7 units daily  Continue current NovoLog regimen  Due for eye exam    Low bone density: Started on pamidronate in 2018 but missed doses in 2020 due to COVID pandemic.  Received first dose of Reclast in June 2021.   He would like to proceed with the second dose of reclast.  He will schedule at his convenience   is due for DEXA.  Will check with pulmonary team if the DEXA report from the research study is available for review.     RTC in 6 months    JULIETA Tate    Note: Chart documentation done in part with Dragon Voice Recognition software. Although reviewed after completion, some word and grammatical errors may remain.  Please consider this when interpreting information in this chart    CC  TYLER DAVIS

## 2022-11-19 ENCOUNTER — HEALTH MAINTENANCE LETTER (OUTPATIENT)
Age: 47
End: 2022-11-19

## 2022-11-30 DIAGNOSIS — Z00.6 EXAMINATION OF PARTICIPANT OR CONTROL IN CLINICAL RESEARCH: Primary | ICD-10-CM

## 2022-12-01 ENCOUNTER — HOSPITAL ENCOUNTER (OUTPATIENT)
Dept: RESEARCH | Facility: CLINIC | Age: 47
Discharge: HOME | End: 2022-12-01
Attending: INTERNAL MEDICINE | Admitting: INTERNAL MEDICINE
Payer: COMMERCIAL

## 2022-12-01 ENCOUNTER — LAB REQUISITION (OUTPATIENT)
Dept: LAB | Facility: CLINIC | Age: 47
End: 2022-12-01

## 2022-12-01 DIAGNOSIS — Z00.6 EXAMINATION OF PARTICIPANT OR CONTROL IN CLINICAL RESEARCH: ICD-10-CM

## 2022-12-01 LAB
ALBUMIN SERPL BCG-MCNC: 3.4 G/DL (ref 3.5–5.2)
ALP SERPL-CCNC: 209 U/L (ref 40–129)
ALT SERPL W P-5'-P-CCNC: 49 U/L (ref 10–50)
ANION GAP SERPL CALCULATED.3IONS-SCNC: 11 MMOL/L (ref 7–15)
AST SERPL W P-5'-P-CCNC: 44 U/L (ref 10–50)
BASOPHILS # BLD AUTO: 0.1 10E3/UL (ref 0–0.2)
BASOPHILS NFR BLD AUTO: 0 %
BILIRUB DIRECT SERPL-MCNC: <0.2 MG/DL (ref 0–0.3)
BILIRUB SERPL-MCNC: 0.3 MG/DL
BUN SERPL-MCNC: 21 MG/DL (ref 6–20)
CALCIUM SERPL-MCNC: 9.4 MG/DL (ref 8.6–10)
CHLORIDE SERPL-SCNC: 104 MMOL/L (ref 98–107)
CREAT SERPL-MCNC: 0.84 MG/DL (ref 0.67–1.17)
DEPRECATED HCO3 PLAS-SCNC: 27 MMOL/L (ref 22–29)
EOSINOPHIL # BLD AUTO: 0.1 10E3/UL (ref 0–0.7)
EOSINOPHIL NFR BLD AUTO: 1 %
ERYTHROCYTE [DISTWIDTH] IN BLOOD BY AUTOMATED COUNT: 13.6 % (ref 10–15)
GFR SERPL CREATININE-BSD FRML MDRD: >90 ML/MIN/1.73M2
GLUCOSE SERPL-MCNC: 129 MG/DL (ref 70–99)
HCT VFR BLD AUTO: 42.2 % (ref 40–53)
HGB BLD-MCNC: 13.9 G/DL (ref 13.3–17.7)
IMM GRANULOCYTES # BLD: 0.4 10E3/UL
IMM GRANULOCYTES NFR BLD: 3 %
LDH SERPL L TO P-CCNC: 225 U/L (ref 0–250)
LYMPHOCYTES # BLD AUTO: 2.3 10E3/UL (ref 0.8–5.3)
LYMPHOCYTES NFR BLD AUTO: 16 %
MCH RBC QN AUTO: 28.7 PG (ref 26.5–33)
MCHC RBC AUTO-ENTMCNC: 32.9 G/DL (ref 31.5–36.5)
MCV RBC AUTO: 87 FL (ref 78–100)
MONOCYTES # BLD AUTO: 2 10E3/UL (ref 0–1.3)
MONOCYTES NFR BLD AUTO: 14 %
NEUTROPHILS # BLD AUTO: 9.7 10E3/UL (ref 1.6–8.3)
NEUTROPHILS NFR BLD AUTO: 66 %
NRBC # BLD AUTO: 0 10E3/UL
NRBC BLD AUTO-RTO: 0 /100
PLATELET # BLD AUTO: 325 10E3/UL (ref 150–450)
POTASSIUM SERPL-SCNC: 3.8 MMOL/L (ref 3.4–5.3)
PROT SERPL-MCNC: 7.4 G/DL (ref 6.4–8.3)
RBC # BLD AUTO: 4.85 10E6/UL (ref 4.4–5.9)
SODIUM SERPL-SCNC: 142 MMOL/L (ref 136–145)
WBC # BLD AUTO: 14.6 10E3/UL (ref 4–11)

## 2022-12-01 PROCEDURE — 87070 CULTURE OTHR SPECIMN AEROBIC: CPT | Performed by: INTERNAL MEDICINE

## 2022-12-01 PROCEDURE — 510N000017 HC CRU PATIENT CARE, PER 15 MIN

## 2022-12-01 PROCEDURE — 510N000009 HC RESEARCH FACILITY, PER 15 MIN

## 2022-12-01 PROCEDURE — 82248 BILIRUBIN DIRECT: CPT | Performed by: INTERNAL MEDICINE

## 2022-12-01 PROCEDURE — 83615 LACTATE (LD) (LDH) ENZYME: CPT | Performed by: INTERNAL MEDICINE

## 2022-12-01 PROCEDURE — 85025 COMPLETE CBC W/AUTO DIFF WBC: CPT | Performed by: INTERNAL MEDICINE

## 2022-12-01 PROCEDURE — 87077 CULTURE AEROBIC IDENTIFY: CPT | Performed by: INTERNAL MEDICINE

## 2022-12-05 ENCOUNTER — HOSPITAL ENCOUNTER (OUTPATIENT)
Dept: RESEARCH | Facility: CLINIC | Age: 47
Discharge: HOME | End: 2022-12-05
Attending: INTERNAL MEDICINE
Payer: COMMERCIAL

## 2022-12-05 DIAGNOSIS — Z00.6 EXAMINATION OF PARTICIPANT OR CONTROL IN CLINICAL RESEARCH: ICD-10-CM

## 2022-12-05 DIAGNOSIS — E84.0 CYSTIC FIBROSIS WITH PULMONARY MANIFESTATIONS (H): Primary | ICD-10-CM

## 2022-12-05 PROCEDURE — 300N000003 HC RESEARCH SPECIMEN PROCESSING, SIMPLE

## 2022-12-05 PROCEDURE — 510N000017 HC CRU PATIENT CARE, PER 15 MIN

## 2022-12-05 PROCEDURE — 510N000009 HC RESEARCH FACILITY, PER 15 MIN

## 2022-12-05 PROCEDURE — 510N000016 HC RESEARCH MEALS, PER MEAL

## 2022-12-05 RX ORDER — LEVALBUTEROL TARTRATE 45 UG/1
2 AEROSOL, METERED ORAL EVERY 4 HOURS PRN
Qty: 15 G | Refills: 0
Start: 2022-12-05 | End: 2023-09-22

## 2022-12-05 RX ORDER — LEVALBUTEROL TARTRATE 45 UG/1
2 AEROSOL, METERED ORAL EVERY 4 HOURS PRN
Status: DISCONTINUED | OUTPATIENT
Start: 2022-12-05 | End: 2022-12-06 | Stop reason: HOSPADM

## 2022-12-05 RX ORDER — LEVALBUTEROL TARTRATE 45 UG/1
2 AEROSOL, METERED ORAL EVERY 4 HOURS PRN
Qty: 15 G | Refills: 0 | Status: SHIPPED | OUTPATIENT
Start: 2022-12-05 | End: 2023-05-16

## 2022-12-05 NOTE — ADDENDUM NOTE
Encounter addended by: Kathe Rodriguez on: 12/5/2022 5:46 PM   Actions taken: Charge Capture section accepted Render In Strict Bullet Format?: No Detail Level: Detailed Continue Regimen: Continue using ketoconazole shampoo 2-3x per week

## 2022-12-06 ENCOUNTER — HOSPITAL ENCOUNTER (OUTPATIENT)
Dept: RESEARCH | Facility: CLINIC | Age: 47
Discharge: HOME | End: 2022-12-06
Attending: INTERNAL MEDICINE | Admitting: INTERNAL MEDICINE
Payer: COMMERCIAL

## 2022-12-06 ENCOUNTER — LAB REQUISITION (OUTPATIENT)
Dept: LAB | Facility: CLINIC | Age: 47
End: 2022-12-06

## 2022-12-06 LAB
ALBUMIN SERPL BCG-MCNC: 3.7 G/DL (ref 3.5–5.2)
ALP SERPL-CCNC: 133 U/L (ref 40–129)
ALT SERPL W P-5'-P-CCNC: 33 U/L (ref 10–50)
ANION GAP SERPL CALCULATED.3IONS-SCNC: 11 MMOL/L (ref 7–15)
AST SERPL W P-5'-P-CCNC: 29 U/L (ref 10–50)
BASOPHILS # BLD AUTO: 0.1 10E3/UL (ref 0–0.2)
BASOPHILS NFR BLD AUTO: 0 %
BILIRUB DIRECT SERPL-MCNC: <0.2 MG/DL (ref 0–0.3)
BILIRUB SERPL-MCNC: 0.4 MG/DL
BUN SERPL-MCNC: 30 MG/DL (ref 6–20)
CALCIUM SERPL-MCNC: 9.5 MG/DL (ref 8.6–10)
CHLORIDE SERPL-SCNC: 102 MMOL/L (ref 98–107)
CREAT SERPL-MCNC: 0.96 MG/DL (ref 0.67–1.17)
DEPRECATED HCO3 PLAS-SCNC: 25 MMOL/L (ref 22–29)
EOSINOPHIL # BLD AUTO: 0.2 10E3/UL (ref 0–0.7)
EOSINOPHIL NFR BLD AUTO: 2 %
ERYTHROCYTE [DISTWIDTH] IN BLOOD BY AUTOMATED COUNT: 13.8 % (ref 10–15)
GFR SERPL CREATININE-BSD FRML MDRD: >90 ML/MIN/1.73M2
GLUCOSE SERPL-MCNC: 101 MG/DL (ref 70–99)
HCT VFR BLD AUTO: 45 % (ref 40–53)
HGB BLD-MCNC: 14.7 G/DL (ref 13.3–17.7)
IMM GRANULOCYTES # BLD: 0.1 10E3/UL
IMM GRANULOCYTES NFR BLD: 1 %
LDH SERPL L TO P-CCNC: 207 U/L (ref 0–250)
LYMPHOCYTES # BLD AUTO: 2.6 10E3/UL (ref 0.8–5.3)
LYMPHOCYTES NFR BLD AUTO: 20 %
MCH RBC QN AUTO: 28.5 PG (ref 26.5–33)
MCHC RBC AUTO-ENTMCNC: 32.7 G/DL (ref 31.5–36.5)
MCV RBC AUTO: 87 FL (ref 78–100)
MONOCYTES # BLD AUTO: 1.3 10E3/UL (ref 0–1.3)
MONOCYTES NFR BLD AUTO: 10 %
NEUTROPHILS # BLD AUTO: 9 10E3/UL (ref 1.6–8.3)
NEUTROPHILS NFR BLD AUTO: 67 %
NRBC # BLD AUTO: 0 10E3/UL
NRBC BLD AUTO-RTO: 0 /100
PLATELET # BLD AUTO: 470 10E3/UL (ref 150–450)
POTASSIUM SERPL-SCNC: 4.7 MMOL/L (ref 3.4–5.3)
PROT SERPL-MCNC: 7.6 G/DL (ref 6.4–8.3)
RBC # BLD AUTO: 5.15 10E6/UL (ref 4.4–5.9)
SODIUM SERPL-SCNC: 138 MMOL/L (ref 136–145)
WBC # BLD AUTO: 13.2 10E3/UL (ref 4–11)

## 2022-12-06 PROCEDURE — 80053 COMPREHEN METABOLIC PANEL: CPT | Performed by: INTERNAL MEDICINE

## 2022-12-06 PROCEDURE — 510N000017 HC CRU PATIENT CARE, PER 15 MIN

## 2022-12-06 PROCEDURE — 85004 AUTOMATED DIFF WBC COUNT: CPT | Performed by: INTERNAL MEDICINE

## 2022-12-06 PROCEDURE — 510N000009 HC RESEARCH FACILITY, PER 15 MIN

## 2022-12-06 PROCEDURE — 82248 BILIRUBIN DIRECT: CPT | Performed by: INTERNAL MEDICINE

## 2022-12-06 PROCEDURE — 300N000003 HC RESEARCH SPECIMEN PROCESSING, SIMPLE

## 2022-12-06 PROCEDURE — 83615 LACTATE (LD) (LDH) ENZYME: CPT | Performed by: INTERNAL MEDICINE

## 2022-12-06 NOTE — ADDENDUM NOTE
Encounter addended by: Yandy Heller RN on: 12/5/2022 8:11 PM   Actions taken: Charge Capture section accepted

## 2022-12-06 NOTE — ADDENDUM NOTE
Encounter addended by: Angela Brothers RN on: 12/6/2022 3:26 PM   Actions taken: Chief Complaint modified, Charge Capture section accepted

## 2022-12-06 NOTE — ADDENDUM NOTE
Encounter addended by: Yandy Heller RN on: 12/5/2022 8:09 PM   Actions taken: Charge Capture section accepted

## 2022-12-07 LAB
BACTERIA SPT CULT: ABNORMAL
BACTERIA SPT CULT: ABNORMAL

## 2022-12-08 ENCOUNTER — HOSPITAL ENCOUNTER (OUTPATIENT)
Dept: RESEARCH | Facility: CLINIC | Age: 47
Discharge: HOME | End: 2022-12-08
Payer: COMMERCIAL

## 2022-12-08 ENCOUNTER — LAB REQUISITION (OUTPATIENT)
Dept: LAB | Facility: CLINIC | Age: 47
End: 2022-12-08

## 2022-12-08 LAB
ALBUMIN SERPL BCG-MCNC: 3.7 G/DL (ref 3.5–5.2)
ALP SERPL-CCNC: 122 U/L (ref 40–129)
ALT SERPL W P-5'-P-CCNC: 25 U/L (ref 10–50)
ANION GAP SERPL CALCULATED.3IONS-SCNC: 10 MMOL/L (ref 7–15)
AST SERPL W P-5'-P-CCNC: 23 U/L (ref 10–50)
BASOPHILS # BLD AUTO: 0.1 10E3/UL (ref 0–0.2)
BASOPHILS NFR BLD AUTO: 1 %
BILIRUB DIRECT SERPL-MCNC: <0.2 MG/DL (ref 0–0.3)
BILIRUB SERPL-MCNC: 0.3 MG/DL
BUN SERPL-MCNC: 26.7 MG/DL (ref 6–20)
CALCIUM SERPL-MCNC: 9.5 MG/DL (ref 8.6–10)
CHLORIDE SERPL-SCNC: 102 MMOL/L (ref 98–107)
CREAT SERPL-MCNC: 0.87 MG/DL (ref 0.67–1.17)
DEPRECATED HCO3 PLAS-SCNC: 28 MMOL/L (ref 22–29)
EOSINOPHIL # BLD AUTO: 0.1 10E3/UL (ref 0–0.7)
EOSINOPHIL NFR BLD AUTO: 1 %
ERYTHROCYTE [DISTWIDTH] IN BLOOD BY AUTOMATED COUNT: 13.9 % (ref 10–15)
GFR SERPL CREATININE-BSD FRML MDRD: >90 ML/MIN/1.73M2
GLUCOSE SERPL-MCNC: 146 MG/DL (ref 70–99)
HCT VFR BLD AUTO: 42.8 % (ref 40–53)
HGB BLD-MCNC: 13.6 G/DL (ref 13.3–17.7)
IMM GRANULOCYTES # BLD: 0 10E3/UL
IMM GRANULOCYTES NFR BLD: 0 %
LDH SERPL L TO P-CCNC: 174 U/L (ref 0–250)
LYMPHOCYTES # BLD AUTO: 2.1 10E3/UL (ref 0.8–5.3)
LYMPHOCYTES NFR BLD AUTO: 21 %
MCH RBC QN AUTO: 28.9 PG (ref 26.5–33)
MCHC RBC AUTO-ENTMCNC: 31.8 G/DL (ref 31.5–36.5)
MCV RBC AUTO: 91 FL (ref 78–100)
MONOCYTES # BLD AUTO: 1 10E3/UL (ref 0–1.3)
MONOCYTES NFR BLD AUTO: 10 %
NEUTROPHILS # BLD AUTO: 6.6 10E3/UL (ref 1.6–8.3)
NEUTROPHILS NFR BLD AUTO: 67 %
NRBC # BLD AUTO: 0 10E3/UL
NRBC BLD AUTO-RTO: 0 /100
PLATELET # BLD AUTO: 451 10E3/UL (ref 150–450)
POTASSIUM SERPL-SCNC: 4.3 MMOL/L (ref 3.4–5.3)
PROT SERPL-MCNC: 7.3 G/DL (ref 6.4–8.3)
RBC # BLD AUTO: 4.7 10E6/UL (ref 4.4–5.9)
SODIUM SERPL-SCNC: 140 MMOL/L (ref 136–145)
WBC # BLD AUTO: 9.9 10E3/UL (ref 4–11)

## 2022-12-08 PROCEDURE — 82248 BILIRUBIN DIRECT: CPT | Performed by: INTERNAL MEDICINE

## 2022-12-08 PROCEDURE — 510N000009 HC RESEARCH FACILITY, PER 15 MIN

## 2022-12-08 PROCEDURE — 510N000017 HC CRU PATIENT CARE, PER 15 MIN

## 2022-12-08 PROCEDURE — 80053 COMPREHEN METABOLIC PANEL: CPT | Performed by: INTERNAL MEDICINE

## 2022-12-08 PROCEDURE — 83615 LACTATE (LD) (LDH) ENZYME: CPT | Performed by: INTERNAL MEDICINE

## 2022-12-08 PROCEDURE — 85025 COMPLETE CBC W/AUTO DIFF WBC: CPT | Performed by: INTERNAL MEDICINE

## 2022-12-12 ENCOUNTER — LAB REQUISITION (OUTPATIENT)
Dept: LAB | Facility: CLINIC | Age: 47
End: 2022-12-12

## 2022-12-12 ENCOUNTER — HOSPITAL ENCOUNTER (OUTPATIENT)
Dept: RESEARCH | Facility: CLINIC | Age: 47
Discharge: HOME | End: 2022-12-12
Attending: INTERNAL MEDICINE | Admitting: INTERNAL MEDICINE
Payer: COMMERCIAL

## 2022-12-12 LAB
ALBUMIN SERPL BCG-MCNC: 3.6 G/DL (ref 3.5–5.2)
ALP SERPL-CCNC: 115 U/L (ref 40–129)
ALT SERPL W P-5'-P-CCNC: 21 U/L (ref 10–50)
ANION GAP SERPL CALCULATED.3IONS-SCNC: 10 MMOL/L (ref 7–15)
AST SERPL W P-5'-P-CCNC: 21 U/L (ref 10–50)
BASOPHILS # BLD AUTO: 0.1 10E3/UL (ref 0–0.2)
BASOPHILS NFR BLD AUTO: 1 %
BILIRUB DIRECT SERPL-MCNC: <0.2 MG/DL (ref 0–0.3)
BILIRUB SERPL-MCNC: 0.3 MG/DL
BUN SERPL-MCNC: 21.1 MG/DL (ref 6–20)
CALCIUM SERPL-MCNC: 9.3 MG/DL (ref 8.6–10)
CHLORIDE SERPL-SCNC: 103 MMOL/L (ref 98–107)
CREAT SERPL-MCNC: 0.82 MG/DL (ref 0.67–1.17)
DEPRECATED HCO3 PLAS-SCNC: 26 MMOL/L (ref 22–29)
EOSINOPHIL # BLD AUTO: 0.2 10E3/UL (ref 0–0.7)
EOSINOPHIL NFR BLD AUTO: 2 %
ERYTHROCYTE [DISTWIDTH] IN BLOOD BY AUTOMATED COUNT: 14.1 % (ref 10–15)
GFR SERPL CREATININE-BSD FRML MDRD: >90 ML/MIN/1.73M2
GLUCOSE SERPL-MCNC: 155 MG/DL (ref 70–99)
HCT VFR BLD AUTO: 41.7 % (ref 40–53)
HGB BLD-MCNC: 13.4 G/DL (ref 13.3–17.7)
IMM GRANULOCYTES # BLD: 0 10E3/UL
IMM GRANULOCYTES NFR BLD: 0 %
LDH SERPL L TO P-CCNC: 168 U/L (ref 0–250)
LYMPHOCYTES # BLD AUTO: 2.3 10E3/UL (ref 0.8–5.3)
LYMPHOCYTES NFR BLD AUTO: 28 %
MCH RBC QN AUTO: 28.8 PG (ref 26.5–33)
MCHC RBC AUTO-ENTMCNC: 32.1 G/DL (ref 31.5–36.5)
MCV RBC AUTO: 90 FL (ref 78–100)
MONOCYTES # BLD AUTO: 0.8 10E3/UL (ref 0–1.3)
MONOCYTES NFR BLD AUTO: 10 %
NEUTROPHILS # BLD AUTO: 4.9 10E3/UL (ref 1.6–8.3)
NEUTROPHILS NFR BLD AUTO: 59 %
NRBC # BLD AUTO: 0 10E3/UL
NRBC BLD AUTO-RTO: 0 /100
PLATELET # BLD AUTO: 425 10E3/UL (ref 150–450)
POTASSIUM SERPL-SCNC: 3.9 MMOL/L (ref 3.4–5.3)
PROT SERPL-MCNC: 7.1 G/DL (ref 6.4–8.3)
RBC # BLD AUTO: 4.65 10E6/UL (ref 4.4–5.9)
SODIUM SERPL-SCNC: 139 MMOL/L (ref 136–145)
WBC # BLD AUTO: 8.2 10E3/UL (ref 4–11)

## 2022-12-12 PROCEDURE — 83615 LACTATE (LD) (LDH) ENZYME: CPT | Performed by: INTERNAL MEDICINE

## 2022-12-12 PROCEDURE — 510N000009 HC RESEARCH FACILITY, PER 15 MIN

## 2022-12-12 PROCEDURE — 82310 ASSAY OF CALCIUM: CPT | Performed by: INTERNAL MEDICINE

## 2022-12-12 PROCEDURE — 82248 BILIRUBIN DIRECT: CPT | Performed by: INTERNAL MEDICINE

## 2022-12-12 PROCEDURE — 85025 COMPLETE CBC W/AUTO DIFF WBC: CPT | Performed by: INTERNAL MEDICINE

## 2022-12-12 PROCEDURE — 510N000017 HC CRU PATIENT CARE, PER 15 MIN

## 2022-12-12 PROCEDURE — 82374 ASSAY BLOOD CARBON DIOXIDE: CPT | Performed by: INTERNAL MEDICINE

## 2022-12-14 ENCOUNTER — PHARMACY VISIT (OUTPATIENT)
Dept: ADMINISTRATIVE | Facility: CLINIC | Age: 47
End: 2022-12-14

## 2022-12-14 NOTE — PROGRESS NOTES
"   Cystic Fibrosis Clinical Follow Up Assessment   Assessment Link -Cystic Fibrosis Clinical Follow Up Assessment      20:52:55 [facility], by Shayy Little        Patient  Patient Name: BERENICE FLORES  :   EHR ID: [id number]       Activity Date      Care Details    What are the patient's goals for this therapy?   ? stable pulmonary status  22 - \"No new goals. Just stay healthy.\"      Did you identify any patient barriers to access and successful treatment?   ? No barriers to access identified      Is it appropriate to collect a PDC at this time? [QA Metric] (An MPR or PDC would not be appropriate for cycled medications or if the patient is on therapy   ? Yes      Document the date of the last refill of PDC   ? 2022      Document PDC   ? 0.99      Has the patient missed doses inappropriately?   ? No      Please select CURRENT side effect(s) for monitoring:   ? None          Summary Notes   Spoke to Kilo via [facility] for CF assessment. Order set to deliver  with TM liaison.     Trikafta - Denies side effects or missed doses. \"It's going fantastic. Trikafta is a life saver.\" PDC = 0.99   CF: Denies exacerbations, changes to medications or allergies. No questions or concerns.   Goals: \"No new goals. Just stay healthy.\"     TM clinicians will continue biannual assessments.     Shayy Little, PharmD  Therapy Management Pharmacist  [facility]  [email or web address]  Specialty: [phone]      "

## 2022-12-19 DIAGNOSIS — E84.8 DIABETES MELLITUS RELATED TO CF (CYSTIC FIBROSIS) (H): ICD-10-CM

## 2022-12-19 DIAGNOSIS — E08.9 DIABETES MELLITUS RELATED TO CF (CYSTIC FIBROSIS) (H): ICD-10-CM

## 2022-12-21 ENCOUNTER — LAB REQUISITION (OUTPATIENT)
Dept: LAB | Facility: CLINIC | Age: 47
End: 2022-12-21

## 2022-12-21 ENCOUNTER — HOSPITAL ENCOUNTER (OUTPATIENT)
Dept: RESEARCH | Facility: CLINIC | Age: 47
Discharge: HOME | End: 2022-12-21
Admitting: INTERNAL MEDICINE
Payer: COMMERCIAL

## 2022-12-21 LAB
ALBUMIN SERPL BCG-MCNC: 3.9 G/DL (ref 3.5–5.2)
ALP SERPL-CCNC: 116 U/L (ref 40–129)
ALT SERPL W P-5'-P-CCNC: 16 U/L (ref 10–50)
ANION GAP SERPL CALCULATED.3IONS-SCNC: 13 MMOL/L (ref 7–15)
AST SERPL W P-5'-P-CCNC: 21 U/L (ref 10–50)
BASOPHILS # BLD AUTO: 0.1 10E3/UL (ref 0–0.2)
BASOPHILS NFR BLD AUTO: 1 %
BILIRUB DIRECT SERPL-MCNC: <0.2 MG/DL (ref 0–0.3)
BILIRUB SERPL-MCNC: 0.3 MG/DL
BUN SERPL-MCNC: 22.9 MG/DL (ref 6–20)
CALCIUM SERPL-MCNC: 9.6 MG/DL (ref 8.6–10)
CHLORIDE SERPL-SCNC: 102 MMOL/L (ref 98–107)
CREAT SERPL-MCNC: 0.88 MG/DL (ref 0.67–1.17)
DEPRECATED HCO3 PLAS-SCNC: 23 MMOL/L (ref 22–29)
EOSINOPHIL # BLD AUTO: 0.3 10E3/UL (ref 0–0.7)
EOSINOPHIL NFR BLD AUTO: 3 %
ERYTHROCYTE [DISTWIDTH] IN BLOOD BY AUTOMATED COUNT: 13.9 % (ref 10–15)
GFR SERPL CREATININE-BSD FRML MDRD: >90 ML/MIN/1.73M2
GLUCOSE SERPL-MCNC: 131 MG/DL (ref 70–99)
HCT VFR BLD AUTO: 45.1 % (ref 40–53)
HGB BLD-MCNC: 14.3 G/DL (ref 13.3–17.7)
IMM GRANULOCYTES # BLD: 0 10E3/UL
IMM GRANULOCYTES NFR BLD: 0 %
LDH SERPL L TO P-CCNC: 185 U/L (ref 0–250)
LYMPHOCYTES # BLD AUTO: 2.4 10E3/UL (ref 0.8–5.3)
LYMPHOCYTES NFR BLD AUTO: 23 %
MCH RBC QN AUTO: 28.5 PG (ref 26.5–33)
MCHC RBC AUTO-ENTMCNC: 31.7 G/DL (ref 31.5–36.5)
MCV RBC AUTO: 90 FL (ref 78–100)
MONOCYTES # BLD AUTO: 1.1 10E3/UL (ref 0–1.3)
MONOCYTES NFR BLD AUTO: 10 %
NEUTROPHILS # BLD AUTO: 6.8 10E3/UL (ref 1.6–8.3)
NEUTROPHILS NFR BLD AUTO: 63 %
NRBC # BLD AUTO: 0 10E3/UL
NRBC BLD AUTO-RTO: 0 /100
PLATELET # BLD AUTO: 281 10E3/UL (ref 150–450)
POTASSIUM SERPL-SCNC: 4.2 MMOL/L (ref 3.4–5.3)
PROT SERPL-MCNC: 7.6 G/DL (ref 6.4–8.3)
RBC # BLD AUTO: 5.01 10E6/UL (ref 4.4–5.9)
SODIUM SERPL-SCNC: 138 MMOL/L (ref 136–145)
WBC # BLD AUTO: 10.6 10E3/UL (ref 4–11)

## 2022-12-21 PROCEDURE — 85025 COMPLETE CBC W/AUTO DIFF WBC: CPT | Performed by: INTERNAL MEDICINE

## 2022-12-21 PROCEDURE — 510N000009 HC RESEARCH FACILITY, PER 15 MIN

## 2022-12-21 PROCEDURE — 510N000017 HC CRU PATIENT CARE, PER 15 MIN

## 2022-12-21 PROCEDURE — 83615 LACTATE (LD) (LDH) ENZYME: CPT | Performed by: INTERNAL MEDICINE

## 2022-12-21 PROCEDURE — 80053 COMPREHEN METABOLIC PANEL: CPT | Performed by: INTERNAL MEDICINE

## 2022-12-21 PROCEDURE — 82248 BILIRUBIN DIRECT: CPT | Performed by: INTERNAL MEDICINE

## 2022-12-21 NOTE — ADDENDUM NOTE
Encounter addended by: Елена Thompson RMA on: 12/21/2022 10:29 AM   Actions taken: Charge Capture section accepted

## 2022-12-21 NOTE — ADDENDUM NOTE
Encounter addended by: Елена Thompson RMA on: 12/21/2022 9:56 AM   Actions taken: Charge Capture section accepted

## 2022-12-22 RX ORDER — PEN NEEDLE, DIABETIC 32GX 5/32"
NEEDLE, DISPOSABLE MISCELLANEOUS
Qty: 200 EACH | Refills: 4 | Status: SHIPPED | OUTPATIENT
Start: 2022-12-22 | End: 2023-10-30

## 2022-12-22 NOTE — TELEPHONE ENCOUNTER
LCV: 11/15/2022  [facility] [place]    
monitored anesthesia care (MAC)

## 2023-01-04 ENCOUNTER — TELEPHONE (OUTPATIENT)
Dept: ENDOCRINOLOGY | Facility: CLINIC | Age: 48
End: 2023-01-04

## 2023-01-04 NOTE — TELEPHONE ENCOUNTER
----- Message from Diana Lee MD sent at 1/2/2023  1:10 PM CST -----  Regarding: RE: PRIOR AUTHORIZATION  Endo team-please contact patient and ask him to schedule DEXA.  I have previously placed the order.  Please inform patient that the result of DEXA he had done for a research study is not available for clinical use.    Thank you  ----- Message -----  From: Luda Cat  Sent: 12/30/2022  11:07 AM CST  To: Diana Lee MD, Ashley Lopez, RN, #  Subject: RE: PRIOR AUTHORIZATION                          Hello,     This information has been written on the form and sent back to insurance.     We will let you know determination as soon as we get it.    Luda BUTLER    Infusion        ----- Message -----  From: Ashley Lopez RN  Sent: 12/28/2022  12:48 PM CST  To: Diana Cancino MD, #  Subject: RE: PRIOR AUTHORIZATION                          Forwarding onto the clinic RN- I work in the med refill.   I do not see any dexa scan in Care everywhere or Media tab. Last in epic- 12-15-16  Thanks,  Ashley Lopez RN  Med Refill Team  ----- Message -----  From: Luda Cat  Sent: 12/28/2022  11:13 AM CST  To: Diana Lee MD, Ashley Lopez, RN, #  Subject: PRIOR AUTHORIZATION                              Hello,     We are working on securing coverage for Reclast. We have submitted a prior authorization and we have now received a fax asking for a new updated DEXA scan. Has this patient had one recently? I don't see a current one in our system.     Please advise so we can get back to insurance.     Thank you     Luda BUTLER    Infusion

## 2023-01-04 NOTE — TELEPHONE ENCOUNTER
Kilo will call and set up the DEXA scan  ordered by Dr Lee. Ginger Laureano RN on 1/4/2023 at 12:54 PM

## 2023-01-05 ENCOUNTER — HOSPITAL ENCOUNTER (OUTPATIENT)
Dept: RESEARCH | Facility: CLINIC | Age: 48
Discharge: HOME | End: 2023-01-05
Attending: INTERNAL MEDICINE | Admitting: INTERNAL MEDICINE
Payer: COMMERCIAL

## 2023-01-05 ENCOUNTER — LAB REQUISITION (OUTPATIENT)
Dept: LAB | Facility: CLINIC | Age: 48
End: 2023-01-05

## 2023-01-05 DIAGNOSIS — E84.0 CYSTIC FIBROSIS WITH PULMONARY MANIFESTATIONS (H): Primary | ICD-10-CM

## 2023-01-05 LAB
ALBUMIN SERPL BCG-MCNC: 3.6 G/DL (ref 3.5–5.2)
ALP SERPL-CCNC: 112 U/L (ref 40–129)
ALT SERPL W P-5'-P-CCNC: 14 U/L (ref 10–50)
ANION GAP SERPL CALCULATED.3IONS-SCNC: 14 MMOL/L (ref 7–15)
AST SERPL W P-5'-P-CCNC: 21 U/L (ref 10–50)
BASOPHILS # BLD AUTO: 0 10E3/UL (ref 0–0.2)
BASOPHILS NFR BLD AUTO: 1 %
BILIRUB DIRECT SERPL-MCNC: <0.2 MG/DL (ref 0–0.3)
BILIRUB SERPL-MCNC: 0.3 MG/DL
BUN SERPL-MCNC: 21 MG/DL (ref 6–20)
CALCIUM SERPL-MCNC: 9.1 MG/DL (ref 8.6–10)
CHLORIDE SERPL-SCNC: 105 MMOL/L (ref 98–107)
CREAT SERPL-MCNC: 0.76 MG/DL (ref 0.67–1.17)
DEPRECATED HCO3 PLAS-SCNC: 22 MMOL/L (ref 22–29)
EOSINOPHIL # BLD AUTO: 0.2 10E3/UL (ref 0–0.7)
EOSINOPHIL NFR BLD AUTO: 3 %
ERYTHROCYTE [DISTWIDTH] IN BLOOD BY AUTOMATED COUNT: 13.8 % (ref 10–15)
GFR SERPL CREATININE-BSD FRML MDRD: >90 ML/MIN/1.73M2
GLUCOSE SERPL-MCNC: 152 MG/DL (ref 70–99)
HCT VFR BLD AUTO: 41.6 % (ref 40–53)
HGB BLD-MCNC: 13.1 G/DL (ref 13.3–17.7)
IMM GRANULOCYTES # BLD: 0 10E3/UL
IMM GRANULOCYTES NFR BLD: 0 %
LDH SERPL L TO P-CCNC: 189 U/L (ref 0–250)
LYMPHOCYTES # BLD AUTO: 2.3 10E3/UL (ref 0.8–5.3)
LYMPHOCYTES NFR BLD AUTO: 28 %
MCH RBC QN AUTO: 28.3 PG (ref 26.5–33)
MCHC RBC AUTO-ENTMCNC: 31.5 G/DL (ref 31.5–36.5)
MCV RBC AUTO: 90 FL (ref 78–100)
MONOCYTES # BLD AUTO: 0.7 10E3/UL (ref 0–1.3)
MONOCYTES NFR BLD AUTO: 8 %
NEUTROPHILS # BLD AUTO: 5 10E3/UL (ref 1.6–8.3)
NEUTROPHILS NFR BLD AUTO: 60 %
NRBC # BLD AUTO: 0 10E3/UL
NRBC BLD AUTO-RTO: 0 /100
PLATELET # BLD AUTO: 280 10E3/UL (ref 150–450)
POTASSIUM SERPL-SCNC: 4 MMOL/L (ref 3.4–5.3)
PROT SERPL-MCNC: 7.1 G/DL (ref 6.4–8.3)
RBC # BLD AUTO: 4.63 10E6/UL (ref 4.4–5.9)
SODIUM SERPL-SCNC: 141 MMOL/L (ref 136–145)
WBC # BLD AUTO: 8.3 10E3/UL (ref 4–11)

## 2023-01-05 PROCEDURE — 83615 LACTATE (LD) (LDH) ENZYME: CPT | Performed by: INTERNAL MEDICINE

## 2023-01-05 PROCEDURE — 80053 COMPREHEN METABOLIC PANEL: CPT | Performed by: INTERNAL MEDICINE

## 2023-01-05 PROCEDURE — 85025 COMPLETE CBC W/AUTO DIFF WBC: CPT | Performed by: INTERNAL MEDICINE

## 2023-01-05 PROCEDURE — 510N000017 HC CRU PATIENT CARE, PER 15 MIN

## 2023-01-05 PROCEDURE — 510N000009 HC RESEARCH FACILITY, PER 15 MIN

## 2023-01-05 PROCEDURE — 82248 BILIRUBIN DIRECT: CPT | Performed by: INTERNAL MEDICINE

## 2023-01-05 RX ORDER — LEVOFLOXACIN 750 MG/1
750 TABLET, FILM COATED ORAL DAILY
Qty: 14 TABLET | Refills: 0 | Status: SHIPPED | OUTPATIENT
Start: 2023-01-05 | End: 2023-01-19

## 2023-01-20 DIAGNOSIS — Z00.6 RESEARCH STUDY PATIENT: Primary | ICD-10-CM

## 2023-01-26 ENCOUNTER — CLINICAL UPDATE (OUTPATIENT)
Dept: PHARMACY | Facility: CLINIC | Age: 48
End: 2023-01-26
Payer: COMMERCIAL

## 2023-01-26 DIAGNOSIS — E84.9 CYSTIC FIBROSIS (H): Primary | ICD-10-CM

## 2023-01-26 PROCEDURE — 99207 PR NO CHARGE LOS: CPT | Performed by: PHARMACIST

## 2023-01-26 NOTE — PROGRESS NOTES
Clinical Update:                                                    A chart review was conducted for Nico Morales.    Reason for Chart Review: Trikafta 6 Month Lab Follow Up     Discussion: Kilo has been on Trikafta since 11/15/19. Per chart review, Kilo continues full dose Trikafta.      He has a history of an ALT abnormality which has required closer monitoring - on 6/20/22, Kilo had an elevated ALT 1.7x the upper limit of normal which was thought to potentially be attributed to food poisoning.      Labs were reviewed from 1/5/23 at [facility]. All labs are within normal limits as they were previously.    Lab Results   Component Value Date    ALT 14 01/05/2023    AST 21 01/05/2023    BILITOTAL 0.3 01/05/2023    DBIL <0.20 01/05/2023    CKT 55 06/30/2022     Plan:  1. Continue Trikafta   2. Recheck hepatic panel and CK in 6 months , then annually if stable.        Linda Stone, PharmD   Medication Therapy Management   Cystic Fibrosis Pharmacist

## 2023-02-09 ENCOUNTER — LAB (OUTPATIENT)
Dept: LAB | Facility: CLINIC | Age: 48
End: 2023-02-09
Payer: COMMERCIAL

## 2023-02-09 ENCOUNTER — ANCILLARY PROCEDURE (OUTPATIENT)
Dept: BONE DENSITY | Facility: CLINIC | Age: 48
End: 2023-02-09
Attending: INTERNAL MEDICINE
Payer: COMMERCIAL

## 2023-02-09 DIAGNOSIS — Z00.6 RESEARCH STUDY PATIENT: ICD-10-CM

## 2023-02-09 DIAGNOSIS — M81.0 OSTEOPOROSIS, UNSPECIFIED OSTEOPOROSIS TYPE, UNSPECIFIED PATHOLOGICAL FRACTURE PRESENCE: ICD-10-CM

## 2023-02-09 PROCEDURE — 36415 COLL VENOUS BLD VENIPUNCTURE: CPT | Performed by: PATHOLOGY

## 2023-02-09 PROCEDURE — 99000 SPECIMEN HANDLING OFFICE-LAB: CPT | Performed by: PATHOLOGY

## 2023-02-09 PROCEDURE — 77080 DXA BONE DENSITY AXIAL: CPT | Performed by: INTERNAL MEDICINE

## 2023-02-09 PROCEDURE — 86769 SARS-COV-2 COVID-19 ANTIBODY: CPT | Mod: 90 | Performed by: PATHOLOGY

## 2023-02-10 DIAGNOSIS — E84.0 CYSTIC FIBROSIS WITH PULMONARY MANIFESTATIONS (H): ICD-10-CM

## 2023-02-10 LAB
SARS-COV-2 AB SERPL IA-ACNC: >250 U/ML
SARS-COV-2 AB SERPL QL IA: NEGATIVE
SARS-COV-2 AB SERPL-IMP: POSITIVE

## 2023-02-10 RX ORDER — AZITHROMYCIN 250 MG/1
250 TABLET, FILM COATED ORAL DAILY
Qty: 30 TABLET | Refills: 11 | Status: SHIPPED | OUTPATIENT
Start: 2023-02-10 | End: 2024-01-16

## 2023-02-17 ENCOUNTER — TELEPHONE (OUTPATIENT)
Dept: PULMONOLOGY | Facility: CLINIC | Age: 48
End: 2023-02-17
Payer: COMMERCIAL

## 2023-02-17 DIAGNOSIS — E84.0 CYSTIC FIBROSIS WITH PULMONARY MANIFESTATIONS (H): Primary | ICD-10-CM

## 2023-02-17 RX ORDER — LEVOFLOXACIN 750 MG/1
750 TABLET, FILM COATED ORAL DAILY
Qty: 30 TABLET | Refills: 1 | Status: SHIPPED | OUTPATIENT
Start: 2023-02-17 | End: 2024-01-16

## 2023-02-17 RX ORDER — DOXYCYCLINE 100 MG/1
100 CAPSULE ORAL 2 TIMES DAILY
Qty: 60 CAPSULE | Refills: 1 | Status: SHIPPED | OUTPATIENT
Start: 2023-02-17 | End: 2024-01-17

## 2023-02-17 NOTE — TELEPHONE ENCOUNTER
----- Message from Jaye Machado MD sent at 2/16/2023  3:37 PM CST -----  Regarding: thoughts on plan  KJ,    I am very hesitant to go back to systemic antibiotics.  I am concerned about the long term consequences to his bone health, bacterial santi and other consequences.  I understand that he has a documented intolerance to inhalational agents.  We have tried multiple products without success.   That being said, I am willing to try a trial of alternating oral antibiotics for the first 10 days of each month.    Doxy 100 mg BID for 10 days  Following months Levofloxacin 750 mg daily for 10 days.      We can see if the antibiotic effect is sustainable over time.      Jaye

## 2023-02-17 NOTE — TELEPHONE ENCOUNTER
Discussed Dr. Machado thoughts/concerns/plans with Kilo. He is in agreement to trial this plan. Patient would request (3) 10 day courses of each med be filled at a time as he has a hard time getting to the pharmacy for monthly fills.    Patient aware that Dr. Machado plan is to alternate meds, and treat for the first 10 days of each month. Patient also aware that if he is feeling well he does not have to take the med just because it is the first of the month.    Instructed patient to keep track of how he is feeling on each med, how he's feeling off the med, how long until he is feeling poorly, etc and to discuss at his next appointment.     Patient verbalized understanding and agreement in plan.

## 2023-02-21 ENCOUNTER — INFUSION THERAPY VISIT (OUTPATIENT)
Dept: INFUSION THERAPY | Facility: CLINIC | Age: 48
End: 2023-02-21
Payer: COMMERCIAL

## 2023-02-21 VITALS
WEIGHT: 148.6 LBS | OXYGEN SATURATION: 96 % | TEMPERATURE: 98.2 F | HEART RATE: 79 BPM | RESPIRATION RATE: 18 BRPM | DIASTOLIC BLOOD PRESSURE: 72 MMHG | SYSTOLIC BLOOD PRESSURE: 109 MMHG | BODY MASS INDEX: 22.52 KG/M2

## 2023-02-21 DIAGNOSIS — E84.0 CYSTIC FIBROSIS WITH PULMONARY MANIFESTATIONS (H): ICD-10-CM

## 2023-02-21 DIAGNOSIS — M85.9 LOW BONE DENSITY: ICD-10-CM

## 2023-02-21 DIAGNOSIS — A49.8 PSEUDOMONAS INFECTION: ICD-10-CM

## 2023-02-21 DIAGNOSIS — M81.0 OSTEOPOROSIS, UNSPECIFIED OSTEOPOROSIS TYPE, UNSPECIFIED PATHOLOGICAL FRACTURE PRESENCE: Primary | ICD-10-CM

## 2023-02-21 PROCEDURE — 99207 PR NO CHARGE LOS: CPT

## 2023-02-21 PROCEDURE — 96365 THER/PROPH/DIAG IV INF INIT: CPT | Performed by: NURSE PRACTITIONER

## 2023-02-21 RX ORDER — ACETAMINOPHEN 325 MG/1
650 TABLET ORAL ONCE
Status: CANCELLED | OUTPATIENT
Start: 2023-02-21

## 2023-02-21 RX ORDER — METHYLPREDNISOLONE SODIUM SUCCINATE 125 MG/2ML
125 INJECTION, POWDER, LYOPHILIZED, FOR SOLUTION INTRAMUSCULAR; INTRAVENOUS
Status: CANCELLED
Start: 2023-02-21

## 2023-02-21 RX ORDER — ALBUTEROL SULFATE 0.83 MG/ML
2.5 SOLUTION RESPIRATORY (INHALATION)
Status: CANCELLED | OUTPATIENT
Start: 2023-02-21

## 2023-02-21 RX ORDER — HEPARIN SODIUM (PORCINE) LOCK FLUSH IV SOLN 100 UNIT/ML 100 UNIT/ML
5 SOLUTION INTRAVENOUS
Status: CANCELLED | OUTPATIENT
Start: 2023-02-21

## 2023-02-21 RX ORDER — HEPARIN SODIUM,PORCINE 10 UNIT/ML
5 VIAL (ML) INTRAVENOUS
Status: CANCELLED | OUTPATIENT
Start: 2023-02-21

## 2023-02-21 RX ORDER — ALBUTEROL SULFATE 90 UG/1
1-2 AEROSOL, METERED RESPIRATORY (INHALATION)
Status: CANCELLED
Start: 2023-02-21

## 2023-02-21 RX ORDER — ZOLEDRONIC ACID 5 MG/100ML
5 INJECTION, SOLUTION INTRAVENOUS ONCE
Status: COMPLETED | OUTPATIENT
Start: 2023-02-21 | End: 2023-02-21

## 2023-02-21 RX ORDER — DIPHENHYDRAMINE HYDROCHLORIDE 50 MG/ML
50 INJECTION INTRAMUSCULAR; INTRAVENOUS
Status: CANCELLED
Start: 2023-02-21

## 2023-02-21 RX ORDER — MEPERIDINE HYDROCHLORIDE 25 MG/ML
25 INJECTION INTRAMUSCULAR; INTRAVENOUS; SUBCUTANEOUS EVERY 30 MIN PRN
Status: CANCELLED | OUTPATIENT
Start: 2023-02-21

## 2023-02-21 RX ORDER — ZOLEDRONIC ACID 5 MG/100ML
5 INJECTION, SOLUTION INTRAVENOUS ONCE
Status: CANCELLED
Start: 2023-02-21

## 2023-02-21 RX ORDER — EPINEPHRINE 1 MG/ML
0.3 INJECTION, SOLUTION INTRAMUSCULAR; SUBCUTANEOUS EVERY 5 MIN PRN
Status: CANCELLED | OUTPATIENT
Start: 2023-02-21

## 2023-02-21 RX ADMIN — ZOLEDRONIC ACID 5 MG: 0.05 INJECTION, SOLUTION INTRAVENOUS at 15:21

## 2023-02-21 RX ADMIN — Medication 250 ML: at 15:19

## 2023-02-28 DIAGNOSIS — E84.9 CYSTIC FIBROSIS (H): ICD-10-CM

## 2023-02-28 RX ORDER — ELEXACAFTOR, TEZACAFTOR, AND IVACAFTOR 100-50-75
KIT ORAL
Qty: 84 TABLET | Refills: 5 | Status: SHIPPED | OUTPATIENT
Start: 2023-02-28 | End: 2023-08-07

## 2023-03-08 ENCOUNTER — TELEPHONE (OUTPATIENT)
Dept: PULMONOLOGY | Facility: CLINIC | Age: 48
End: 2023-03-08

## 2023-03-09 NOTE — TELEPHONE ENCOUNTER
PA Initiation    Medication: Azithromycin 250mg - PA Pending  Insurance Company: [employer] - Phone [phone] Fax [phone]  Pharmacy Filling the Rx:    Filling Pharmacy Phone:    Filling Pharmacy Fax:    Start Date: 3/9/2023    Key: [id number]

## 2023-03-20 NOTE — TELEPHONE ENCOUNTER
Prior Authorization Approval    Authorization Effective Date: 3/9/2023  Authorization Expiration Date: 3/9/2024  Medication: Azithromycin 250mg - [place] approved  Approved Dose/Quantity: 250mg / 30 for 30 ds   Reference #: Key: [id number]   Insurance Company: [employer] - Phone [phone] Fax [phone]  Expected CoPay:       CoPay Card Available:      Foundation Assistance Needed:    Which Pharmacy is filling the prescription (Not needed for infusion/clinic administered):    Pharmacy Notified:    Patient Notified:

## 2023-03-29 ENCOUNTER — TELEPHONE (OUTPATIENT)
Dept: PULMONOLOGY | Facility: CLINIC | Age: 48
End: 2023-03-29

## 2023-03-29 NOTE — TELEPHONE ENCOUNTER
PA Initiation    Medication: Trikafta PA pending  Insurance Company: Other (see comments)Comment:  [facility]  Pharmacy Filling the Rx: [facility] - [place], [place] - [address]  Filling Pharmacy Phone:    Filling Pharmacy Fax:    Start Date: 3/29/2023    Key: [id number] need to resubmit closer to PA expiration date     Additional Information Required  If this claim were submitted today by the pharmacy, it would reject for Early Refill. The dispensing pharmacy can contact our pharmacy help desk at [phone] for assistance with an override, if needed.

## 2023-04-09 ENCOUNTER — HEALTH MAINTENANCE LETTER (OUTPATIENT)
Age: 48
End: 2023-04-09

## 2023-04-13 NOTE — TELEPHONE ENCOUNTER
PA Initiation    Medication: Trikafta PA pending  Insurance Company: Other (see comments)Comment:  [facility]  Pharmacy Filling the Rx: [facility] - [place], [place] - [address]  Filling Pharmacy Phone:    Filling Pharmacy Fax:    Start Date: 4/13/2023    Key: [id number]     Ran test claim at [facility] to determine next fill date, received this message. Patient list filled 3/29/2023      Called [facility] [phone] spoke to Floresita she will fax PA form to [phone]    Additional Information Required  If this claim were submitted today by the pharmacy, it would reject for Early Refill. The dispensing pharmacy can contact our pharmacy help desk at [phone] for assistance with an override, if needed.

## 2023-04-14 NOTE — TELEPHONE ENCOUNTER
PA Initiation    Medication: Trikafta PA pending  Insurance Company: Other (see comments)Comment:  [facility]  Pharmacy Filling the Rx: [facility] - [place], [place] - [address]  Filling Pharmacy Phone:    Filling Pharmacy Fax:    Start Date: 4/13/2023    Faxed PA form, notes, genotype to [facility] at [phone]

## 2023-04-17 NOTE — TELEPHONE ENCOUNTER
PA Initiation    Medication: Trikafta PA pending  Insurance Company: Other (see comments)Comment:  [facility]  Pharmacy Filling the Rx: [facility] - [place], [place] - [facility] [address]  Filling Pharmacy Phone:    Filling Pharmacy Fax:    Start Date: 4/13/2023    Faxed additional info to [facility] [phone]

## 2023-04-18 NOTE — TELEPHONE ENCOUNTER
Prior Authorization Approval    Authorization Effective Date: 4/23/2023  Authorization Expiration Date: 10/20/2023  Medication: Trikafta PA approved   Approved Dose/Quantity: 100-50-75 & 150mg / 84 for 28 ds   Reference #: Key: [id number]   Insurance Company: Other (see comments)Comment:  [facility]  Expected CoPay:       CoPay Card Available:      Foundation Assistance Needed:    Which Pharmacy is filling the prescription (Not needed for infusion/clinic administered): [facility] - [place], [place] - [address]  Pharmacy Notified:    Patient Notified:

## 2023-04-20 DIAGNOSIS — E84.9 CYSTIC FIBROSIS (H): ICD-10-CM

## 2023-04-20 DIAGNOSIS — E84.9 CF (CYSTIC FIBROSIS) (H): ICD-10-CM

## 2023-04-20 DIAGNOSIS — E84.0 CYSTIC FIBROSIS WITH PULMONARY MANIFESTATIONS (H): ICD-10-CM

## 2023-04-20 RX ORDER — LEVALBUTEROL INHALATION SOLUTION 1.25 MG/3ML
SOLUTION RESPIRATORY (INHALATION)
Qty: 360 ML | Refills: 11 | Status: SHIPPED | OUTPATIENT
Start: 2023-04-20 | End: 2024-01-16

## 2023-05-07 DIAGNOSIS — E84.0 CYSTIC FIBROSIS OF THE LUNG (H): ICD-10-CM

## 2023-05-07 DIAGNOSIS — E84.9 CYSTIC FIBROSIS (H): ICD-10-CM

## 2023-05-08 RX ORDER — BUDESONIDE AND FORMOTEROL FUMARATE DIHYDRATE 160; 4.5 UG/1; UG/1
AEROSOL RESPIRATORY (INHALATION)
Qty: 30.6 G | Refills: 3 | Status: SHIPPED | OUTPATIENT
Start: 2023-05-08 | End: 2023-05-26

## 2023-05-10 ENCOUNTER — LAB REQUISITION (OUTPATIENT)
Dept: LAB | Facility: CLINIC | Age: 48
End: 2023-05-10

## 2023-05-10 ENCOUNTER — HOSPITAL ENCOUNTER (OUTPATIENT)
Dept: RESEARCH | Facility: CLINIC | Age: 48
Discharge: HOME | End: 2023-05-10
Attending: INTERNAL MEDICINE | Admitting: INTERNAL MEDICINE
Payer: COMMERCIAL

## 2023-05-10 LAB
ALBUMIN SERPL BCG-MCNC: 4 G/DL (ref 3.5–5.2)
ALP SERPL-CCNC: 110 U/L (ref 40–129)
ALT SERPL W P-5'-P-CCNC: 23 U/L (ref 10–50)
AST SERPL W P-5'-P-CCNC: 22 U/L (ref 10–50)
BASOPHILS # BLD AUTO: 0.1 10E3/UL (ref 0–0.2)
BASOPHILS NFR BLD AUTO: 1 %
BILIRUB DIRECT SERPL-MCNC: <0.2 MG/DL (ref 0–0.3)
BILIRUB SERPL-MCNC: 0.3 MG/DL
EOSINOPHIL # BLD AUTO: 0.2 10E3/UL (ref 0–0.7)
EOSINOPHIL NFR BLD AUTO: 2 %
ERYTHROCYTE [DISTWIDTH] IN BLOOD BY AUTOMATED COUNT: 14 % (ref 10–15)
GGT SERPL-CCNC: 28 U/L (ref 8–61)
HBA1C MFR BLD: 7 %
HCT VFR BLD AUTO: 44.3 % (ref 40–53)
HGB BLD-MCNC: 13.9 G/DL (ref 13.3–17.7)
IMM GRANULOCYTES # BLD: 0 10E3/UL
IMM GRANULOCYTES NFR BLD: 0 %
LYMPHOCYTES # BLD AUTO: 2.1 10E3/UL (ref 0.8–5.3)
LYMPHOCYTES NFR BLD AUTO: 19 %
MCH RBC QN AUTO: 28 PG (ref 26.5–33)
MCHC RBC AUTO-ENTMCNC: 31.4 G/DL (ref 31.5–36.5)
MCV RBC AUTO: 89 FL (ref 78–100)
MONOCYTES # BLD AUTO: 0.8 10E3/UL (ref 0–1.3)
MONOCYTES NFR BLD AUTO: 7 %
NEUTROPHILS # BLD AUTO: 7.7 10E3/UL (ref 1.6–8.3)
NEUTROPHILS NFR BLD AUTO: 71 %
NRBC # BLD AUTO: 0 10E3/UL
NRBC BLD AUTO-RTO: 0 /100
PLATELET # BLD AUTO: 256 10E3/UL (ref 150–450)
PROT SERPL-MCNC: 7 G/DL (ref 6.4–8.3)
RBC # BLD AUTO: 4.97 10E6/UL (ref 4.4–5.9)
WBC # BLD AUTO: 10.9 10E3/UL (ref 4–11)

## 2023-05-10 PROCEDURE — 82977 ASSAY OF GGT: CPT | Performed by: INTERNAL MEDICINE

## 2023-05-10 PROCEDURE — 510N000023 HC CRU B&I PATIENT CARE, PER 15 MIN

## 2023-05-10 PROCEDURE — 510N000029 HC RESEARCH B&I MEALS, PER MEAL

## 2023-05-10 PROCEDURE — 300N000008 HC RESEARCH B&I SPECIMEN PROCESSING, MODERATE

## 2023-05-10 PROCEDURE — 80076 HEPATIC FUNCTION PANEL: CPT | Performed by: INTERNAL MEDICINE

## 2023-05-10 PROCEDURE — 300N000007 HC RESEARCH B&I SPECIMEN PROCESSING, SIMPLE

## 2023-05-10 PROCEDURE — 83036 HEMOGLOBIN GLYCOSYLATED A1C: CPT | Performed by: INTERNAL MEDICINE

## 2023-05-10 PROCEDURE — 85025 COMPLETE CBC W/AUTO DIFF WBC: CPT | Performed by: INTERNAL MEDICINE

## 2023-05-10 PROCEDURE — 510N000009 HC RESEARCH FACILITY, PER 15 MIN

## 2023-05-10 NOTE — ADDENDUM NOTE
Encounter addended by: Britta Shah LPN on: 5/10/2023 3:16 PM   Actions taken: Charge Capture section accepted

## 2023-05-15 NOTE — PROGRESS NOTES
[facility]  May 16, 2023         Assessment and Plan:   Nico Morales is a 47 year old male with cystic fibrosis.    1. CF lung disease with severe obstruction: Kilo reports that he continues to have stable cough and sputum production.  It is usually yellow-green in color.  He does have stable activity tolerance.  He historically has grown out Pseudomonas and Staph aureus in his sputum.  He does show evidence of stability in his pulmonary function test today.  At this time I have recommended to Kilo:  -- He should continue to do his vest and nebulized therapies 2-3 times daily  -- he does remain quite active with his family    2. Pancreatic Insufficiency/GI: Kilo has no new symptoms consistent with worsening malabsorption.    - continue the present dose of pancreatic enzymes  - continue vitamin supplementation.    3.  Cystic fibrosis related diabetes: Kilo was recently seen by endocrinology.  They did make some adjustments to his insulin therapy.  He did have a recent increase in his hemoglobin A1c that he is aware of.    4.  CFTR modulator therapy: Kilo is on Trikafta and tolerating well.  He will have his annual studies performed at his next visit.    5.  Osteoporosis: Kilo recently received his Reclast.  He is followed by endocrinology for this.    6.  Psychosocial: Kilo is  in a stable relationship.  He reports his family is doing well.  He continues to work as an .    7. Tinnitus:  This is a long standing problem for Kilo.  I have referred him to audiology for evaluation.    8.  HCM:  Kilo is due for annual studies which he will perform at his next visit.  He was also obtain a CXR.    Annual studies due: 6/2023  Recent Labs   Lab Test 06/20/22  1536   IGG 1,140     Immunizations: COVID bivalent  Colonoscopy: 6/2027    Jaye Machado MD MPH  Associate Professor of Medicine  Pulmonary, Allergy, Critical Care and  Sleep Medicine        Interval History:     Kilo describes stable cough frequency and sputum volume.  His sputum is yellow-green in color.  It mostly happens with treatment.  He denies any chest congestion or shortness of breath with rest or activity.  He does continue to perform 2-3 vest therapies per day.         Review of Systems:     CONSTITUTIONAL: no fever, no chills, no sweats, no change in weight, no change in energy--great, no change in appetite--good    INTEGUMENTARY/SKIN: no rash, no obvious new lesions    ENT/MOUTH: no sore throat, no new sinus pain, no new nasal drainage, no new nasal congestion, chronic ear ringing     RESPIRATORY: see interval history    CV: no chest pain, no palpitations, no peripheral edema    GI: no nausea, no vomiting, no change in stools, no fatty stools, no GERD    : negative urinary    MUSCULOSKELETAL: no myalgias, no arthralgias    ENDOCRINE: no hypoglycemia, blood sugars with adequate control    NEURO:  No headache    SLEEP: no issues--less need than previous    PSYCHIATRIC: mood stable--good          Past Medical and Surgical History:     Past Medical History:   Diagnosis Date     CF (cystic fibrosis) (H)      Exocrine pancreatic insufficiency      Nocardia infection      Pseudomonas infection      S/P gastrostomy (H) 2002     Past Surgical History:   Procedure Laterality Date     COLONOSCOPY N/A 5/21/2018    Procedure: COMBINED COLONOSCOPY, SINGLE OR MULTIPLE BIOPSY/POLYPECTOMY BY BIOPSY;  Cystic fibrosis, 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;  Surgeon: Margarito Bowman MD;  Location: [facility]     COLONOSCOPY N/A 6/27/2022    Procedure: COLONOSCOPY, WITH POLYPECTOMY AND BIOPSY;  Surgeon: Margarito Bowman MD;  Location: [facility]     GASTROSTOMY TUBE  2002     IR PULMONARY EMBOLIZATION  7/20/2001     PICC INSERTION Left 01/22/2018    4Fr SL BioFlo PICC, 46cm (5cm external) in the L basilic vein w/ tip in the low SVC           Family History:     Family History   Problem Relation  Age of Onset     Heart Disease Maternal Grandmother      Heart Disease Maternal Grandfather      Heart Disease Paternal Grandmother      Diabetes No family hx of             Social History:     Social History     Socioeconomic History     Marital status:      Spouse name: Not on file     Number of children: 0     Years of education: Not on file     Highest education level: Not on file   Occupational History     Occupation: financial palnner     Employer: [employer]   Tobacco Use     Smoking status: Never     Smokeless tobacco: Never   Vaping Use     Vaping status: Not on file   Substance and Sexual Activity     Alcohol use: No     Alcohol/week: 0.0 standard drinks of alcohol     Drug use: No     Sexual activity: Yes     Partners: Female     Birth control/protection: Pill   Other Topics Concern     Parent/sibling w/ CABG, MI or angioplasty before 65F 55M? Not Asked      Service Not Asked     Blood Transfusions No     Caffeine Concern Not Asked     Occupational Exposure Not Asked     Hobby Hazards Not Asked     Sleep Concern Not Asked     Stress Concern Not Asked     Weight Concern Not Asked     Special Diet Not Asked     Back Care Not Asked     Exercise No     Bike Helmet Not Asked     Seat Belt Not Asked     Self-Exams Not Asked   Social History Narrative    8/15/2019 - .  Lives with his English Bulldog (Jefry) in a house that he designed in [place].  No children.     Social Determinants of Health     Financial Resource Strain: Not on file   Food Insecurity: Not on file   Transportation Needs: Not on file   Physical Activity: Not on file   Stress: Not on file   Social Connections: Not on file   Intimate Partner Violence: Not on file   Housing Stability: Not on file            Medications:     Current Outpatient Medications   Medication     ACE NOT PRESCRIBED, INTENTIONAL,     acetylcysteine (MUCOMYST) 10 % nebulizer solution     amylase-lipase-protease (CREON) 37856-86112 units  "CPEP per EC capsule     azithromycin (ZITHROMAX) 250 MG tablet     blood glucose (NO BRAND SPECIFIED) test strip     blood glucose monitoring (NO BRAND SPECIFIED) meter device kit     blood glucose monitoring (ULTRA THIN 30G) lancets     budesonide (PULMICORT) 1 MG/2ML neb solution     budesonide-formoterol (SYMBICORT) 160-4.5 MCG/ACT Inhaler     CALCIUM PO     cefdinir (OMNICEF) 300 MG capsule     Continuous Blood Gluc Sensor (FREESTYLE SUKHWINDER 14 DAY SENSOR) MISC     doxycycline hyclate (VIBRAMYCIN) 100 MG capsule     doxycycline hyclate (VIBRAMYCIN) 100 MG capsule     [facility] INFUSION MANAGED PATIENT     ferrous fumarate 65 mg, [place]. FE,-Vitamin C 125 mg (VITRON-C)  MG TABS tablet     insulin aspart (NOVOLOG PENFILL) 100 UNIT/ML cartridge     insulin glargine (LANTUS SOLOSTAR) 100 UNIT/ML pen     insulin pen needle (BD OMI U/F) 32G X 4 MM miscellaneous     levalbuterol (XOPENEX HFA) 45 MCG/ACT inhaler     levalbuterol (XOPENEX) 1.25 MG/3ML neb solution     levofloxacin (LEVAQUIN) 750 MG tablet     MULTIVITAMIN OR     mvw complete (PROBIOTIC FORMULATION) capsule     polyethylene glycol (MIRALAX) 17 GM/Dose powder     sodium chloride inhalant 7 % NEBU neb solution     Syringe/Needle, Disp, 18G X 1\" 5 ML MISC     TRIKAFTA 100-50-75 & 150 MG tablet pack     No current facility-administered medications for this visit.            Physical Exam:   /77   Pulse 76   Ht 1.73 m (5' 8.11\")   Wt 66.4 kg (146 lb 6.2 oz)   SpO2 98%   BMI 22.19 kg/m      Constitutional:   Awake, alert and in no apparent distress     Eyes:   nonicteric     ENT:   oral mucosa moist without lesions     Neck:   Supple without supraclavicular or cervical lymphadenopathy     Lungs:   Fair air flow.  No crackles.  Biapical rhonchi.  Right middle lobe wheezes.     Cardiovascular:   Normal S1 and S2.  RRR.  No murmur, gallop or rub.     Abdomen:   NABS, soft, nontender, nondistended.      Musculoskeletal:   No edema, digital " clubbing present     Neurologic:   Alert and conversant.     Skin:   Warm, dry.  No rash on limited exam.             Data:   All laboratory and imaging data reviewed.      Results from the last week:  Recent Results (from the past 168 hour(s))   Hepatic function panel    Collection Time: 05/10/23  9:15 AM   Result Value Ref Range    Protein Total 7.0 6.4 - 8.3 g/dL    Albumin 4.0 3.5 - 5.2 g/dL    Bilirubin Total 0.3 <=1.2 mg/dL    Alkaline Phosphatase 110 40 - 129 U/L    AST 22 10 - 50 U/L    ALT 23 10 - 50 U/L    Bilirubin Direct <0.20 0.00 - 0.30 mg/dL   GGT    Collection Time: 05/10/23  9:15 AM   Result Value Ref Range    GGT 28 8 - 61 U/L   Hemoglobin A1c    Collection Time: 05/10/23  9:15 AM   Result Value Ref Range    Hemoglobin A1C 7.0 (H) <5.7 %   CBC with platelets and differential    Collection Time: 05/10/23  9:15 AM   Result Value Ref Range    WBC Count 10.9 4.0 - 11.0 10e3/uL    RBC Count 4.97 4.40 - 5.90 10e6/uL    Hemoglobin 13.9 13.3 - 17.7 g/dL    Hematocrit 44.3 40.0 - 53.0 %    MCV 89 78 - 100 fL    MCH 28.0 26.5 - 33.0 pg    MCHC 31.4 (L) 31.5 - 36.5 g/dL    RDW 14.0 10.0 - 15.0 %    Platelet Count 256 150 - 450 10e3/uL    % Neutrophils 71 %    % Lymphocytes 19 %    % Monocytes 7 %    % Eosinophils 2 %    % Basophils 1 %    % Immature Granulocytes 0 %    NRBCs per 100 WBC 0 <1 /100    Absolute Neutrophils 7.7 1.6 - 8.3 10e3/uL    Absolute Lymphocytes 2.1 0.8 - 5.3 10e3/uL    Absolute Monocytes 0.8 0.0 - 1.3 10e3/uL    Absolute Eosinophils 0.2 0.0 - 0.7 10e3/uL    Absolute Basophils 0.1 0.0 - 0.2 10e3/uL    Absolute Immature Granulocytes 0.0 <=0.4 10e3/uL    Absolute NRBCs 0.0 10e3/uL   General PFT Lab (Please always keep checked)    Collection Time: 05/16/23  8:07 AM   Result Value Ref Range    FVC-Pred 4.44 L    FVC-Pre 3.73 L    FVC-%Pred-Pre 83 %    FEV1-Pre 1.76 L    FEV1-%Pred-Pre 49 %    FEV1FVC-Pred 81 %    FEV1FVC-Pre 47 %    FEFMax-Pred 9.64 L/sec    FEFMax-Pre 6.74 L/sec     FEFMax-%Pred-Pre 69 %    FEF2575-Pred 3.47 L/sec    FEF2575-Pre 0.56 L/sec    KSN0926-%Pred-Pre 16 %    ExpTime-Pre 17.72 sec    FIFMax-Pre 4.59 L/sec    FEV1FEV6-Pred 81 %    FEV1FEV6-Pre 55 %       PFT interpretation:   Spirometry interpretation:  The spirometry shows evidence of severe obstructive lung disease.  When compared to 7/13/2022, the FEV1 and FVC have little change.  The testing meets ATS criteria.    Severity Z-score*   Normal > -1.645   Mild -1.65 to -2.5   Moderate -2.5 to -4   Severe < -4   * accounts for sex, age, height, ancestry     Use z-score to assess airflow obstruction, restriction and DLCO  - FEV1 z-score for airflow obstruction  - TLC z-score for restriction  - DLCO z-score for diffusion defect    Pulmonary exacerbation: absent    35 minutes spent by me on the date of the encounter doing chart review, history and exam, documentation and further activities per the note  Time documented is excluding time spent for PFT interpretation.

## 2023-05-15 NOTE — PATIENT INSTRUCTIONS
Cystic Fibrosis Self-Care Plan    RECOMMENDATIONS:   Help us provide the best possible care. If you receive a questionnaire from the CF Foundation about your clinic experience today please fill it out.  It should take less than 5 minutes. Let us know what we are doing well and how we can improve.  Kilo, It was great to see you today.  The plan from today:  --annual studies at next visit  --CXR next visit  --audiology referral  --COVID bivalent   Great job with self cares!    YOUR GOAL:  Stay safe and well.  Enjoy the spring weather!      Cystic Fibrosis :    Keila Barrera  [phone]  [facility] Nurse line:  Johanne    [phone]     [facility] Fax Number:      [phone]         Cystic Fibrosis Respiratory Therapists:   Chasidy Encinas                          [phone]          Jacklyn Pina   [phone]  Cystic Fibrosis Dietitians:              Zeina Pierre              [phone]                            Ivory Bonilla                        [phone]   Cystic Fibrosis Diabetes Nurse:    Jordyn Rudolph               [phone]    Cystic Fibrosis Social Workers:     Mary Ritter               [phone]                     Louann Suaerz               [phone]  Cystic Fibrosis Pharmacists:           Linda Stone                              [phone] (pager)         Gracie Allen      [phone]   Cystic Fibrosis Genetic Counselor:   Pretty Pulliam    [phone]    [facility] website:  [email or web address]    We have recently learned about an albuterol neb solution shortage due to a manufacturing delay. There is still a small supply coming in but not enough to meet the current demand. We do not yet have an estimate for when this will become widely available again.    We our asking our CF community to do the following:    Please take time to check your supply of albuterol neb solution at home. We recommend  keeping at least a 2-week supply of albuterol nebs at home in case of illness.    2.  If you have an albuterol inhaler at home, you can use 4 puffs of the inhaler with a spacer in place of the nebulized albuterol at the start of your treatments. It is important to use a spacer for the best technique. If you do not have a spacer at home or have questions on technique, we will be happy to review and send one to your home address. Please also take a moment to check that your albuterol HFA inhaler is available and not .  inhalers may be less effective as the medication loses its potency or power. In some instances your team may suggest another alternative instead of an albuterol inhaler.    3.  Please take care in requesting refills. Albuterol neb solution is a life-saving medication for patients having severe asthma attacks and other emergency respiratory conditions. Let s work together to make sure that albuterol neb solution is available to those who need it urgently.    Reach out to your care team with questions and to confirm your planned alternative for albuterol nebs. [employer] will be the fastest way to connect. If possible, please reserve the nursing line for more urgent concerns as we are short on nursing staff.    Here are some reputable sites with more information:    https://www.cidrap.[facility].edu/resilient-drug-supply/us-liquid-albuterol-udkvwuaz-rwwevrjo-xtrmst-after-major-supplier-shuts-down    https://www.[place].de//health/albuterol-shortage    https://www.[facility]p.org/drug-shortages/current-shortages/drug-shortage-detail.aspx?ui=479&loginreturnUrl=SSOCheckOnly       MRN: [id number]   Clinic Date: May 15, 2023   Patient: Nico Morales     Annual Studies:   IGG   Date Value Ref Range Status   2021 1,208 610 - 1,616 mg/dL Final     Immunoglobulin G   Date Value Ref Range Status   2022 1,140 610 - 1,616 mg/dL Final     Insulin   Date Value Ref Range Status   2011 30 mU/L  Final     Comment:     Reference Range:  0-20  +120     There are no preventive care reminders to display for this patient.    Pulmonary Function Tests  FEV1: amount of air you can blow out in 1 second  FVC: total amount of air you can take in and blow out    Your Goals:             Latest Ref Rng & Units 5/16/2023     8:07 AM   PFT   FVC L 3.73  P   FEV1 L 1.76  P   FVC% % 83  P   FEV1% % 49  P      P Preliminary result          Airway Clearance: The Most Important Way to Keep Your Lungs Healthy  Vest Settings:   Hill-Rom Frequencies: 8, 9, 10 Pressure 10 Then, Frequencies 18, 19, 20 Pressure 6     RespirTech: Quick Start with Pressure of     Do each frequency for 5 minutes; Deflate vest after each frequency & cough 3 times before beginning the next setting.    Vest and Neb Therapy should be done 2 times/day.    Good Nutrition Can Improve Lung Function and Overall Health    Take ALL of your vitamins with food    Take 1/2 of your enzymes before EVERY meal/snack and the other 1/2 mid-meal/snack    Wt Readings from Last 3 Encounters:   05/16/23 66.4 kg (146 lb 6.2 oz)   02/21/23 67.4 kg (148 lb 9.6 oz)   11/15/22 68.8 kg (151 lb 11.2 oz)       Body mass index is 22.19 kg/m .         National CF Foundation Recommendations for BMI in CF Adults: Women: at least 22 Men: at least 23        Controlling Blood Sugars Helps Prevent Lung Infections & Improves Nutrition  Test blood sugar:    In the morning before eating (goal is )    2 hours after a meal (goal is less than 150)    When pre-meal glucose is greater than 150 add correction    At bedtime (if less than 100 eat a snack with 15 grams of carbohydrates  Last A1C Results:   Hemoglobin A1C   Date Value Ref Range Status   05/10/2023 7.0 (H) <5.7 % Final     Comment:     Normal <5.7%   Prediabetes 5.7-6.4%    Diabetes 6.5% or higher     Note: Adopted from ADA consensus guidelines.   05/04/2021 6.2 (H) 0 - 5.6 % Final     Comment:     Normal <5.7% Prediabetes  5.7-6.4%  Diabetes 6.5% or higher - adopted from ADA   consensus guidelines.           If diabetic, measure A1C every 6 months. Goal: Under 7%    Staying Healthy  Research:  If you are interested in learning about research opportunities or have questions, please contact the CF Research Team at [phone] or [email or web address].    CF Foundation:  Compass is a personalized resource service to help you with the insurance, financial, legal and other issues you are facing.  It's free, confidential and available to anyone with CF.  Ask your  for more information or contact Compass directly at 709-COMPASS ([phone]) or compass@cff.org, or learn more at cff.org/compass.

## 2023-05-16 ENCOUNTER — OFFICE VISIT (OUTPATIENT)
Dept: PULMONOLOGY | Facility: CLINIC | Age: 48
End: 2023-05-16
Attending: INTERNAL MEDICINE
Payer: COMMERCIAL

## 2023-05-16 ENCOUNTER — OFFICE VISIT (OUTPATIENT)
Dept: PHARMACY | Facility: CLINIC | Age: 48
End: 2023-05-16
Payer: COMMERCIAL

## 2023-05-16 VITALS
SYSTOLIC BLOOD PRESSURE: 114 MMHG | HEART RATE: 76 BPM | DIASTOLIC BLOOD PRESSURE: 77 MMHG | BODY MASS INDEX: 22.19 KG/M2 | OXYGEN SATURATION: 98 % | WEIGHT: 146.39 LBS | HEIGHT: 68 IN

## 2023-05-16 DIAGNOSIS — K86.81 EXOCRINE PANCREATIC INSUFFICIENCY: Primary | ICD-10-CM

## 2023-05-16 DIAGNOSIS — E08.9 DIABETES MELLITUS DUE TO CYSTIC FIBROSIS (H): ICD-10-CM

## 2023-05-16 DIAGNOSIS — E84.0 CYSTIC FIBROSIS WITH PULMONARY MANIFESTATIONS (H): Primary | ICD-10-CM

## 2023-05-16 DIAGNOSIS — E84.11 CYSTIC FIBROSIS WITH MECONIUM ILEUS (H): ICD-10-CM

## 2023-05-16 DIAGNOSIS — M85.9 LOW BONE DENSITY: ICD-10-CM

## 2023-05-16 DIAGNOSIS — K86.81 EXOCRINE PANCREATIC INSUFFICIENCY: ICD-10-CM

## 2023-05-16 DIAGNOSIS — E84.9 DIABETES MELLITUS DUE TO CYSTIC FIBROSIS (H): ICD-10-CM

## 2023-05-16 LAB
EXPTIME-PRE: 17.72 SEC
FEF2575-%PRED-PRE: 16 %
FEF2575-PRE: 0.56 L/SEC
FEF2575-PRED: 3.47 L/SEC
FEFMAX-%PRED-PRE: 69 %
FEFMAX-PRE: 6.74 L/SEC
FEFMAX-PRED: 9.64 L/SEC
FEV1-%PRED-PRE: 49 %
FEV1-PRE: 1.76 L
FEV1FEV6-PRE: 55 %
FEV1FEV6-PRED: 81 %
FEV1FVC-PRE: 47 %
FEV1FVC-PRED: 81 %
FIFMAX-PRE: 4.59 L/SEC
FVC-%PRED-PRE: 83 %
FVC-PRE: 3.73 L
FVC-PRED: 4.44 L

## 2023-05-16 PROCEDURE — 97803 MED NUTRITION INDIV SUBSEQ: CPT | Mod: XU | Performed by: DIETITIAN, REGISTERED

## 2023-05-16 PROCEDURE — 99214 OFFICE O/P EST MOD 30 MIN: CPT | Mod: 25 | Performed by: INTERNAL MEDICINE

## 2023-05-16 PROCEDURE — 99207 PR NO CHARGE LOS: CPT | Performed by: PHARMACIST

## 2023-05-16 PROCEDURE — G0463 HOSPITAL OUTPT CLINIC VISIT: HCPCS | Performed by: INTERNAL MEDICINE

## 2023-05-16 PROCEDURE — 94375 RESPIRATORY FLOW VOLUME LOOP: CPT | Performed by: INTERNAL MEDICINE

## 2023-05-16 PROCEDURE — 87070 CULTURE OTHR SPECIMN AEROBIC: CPT | Performed by: INTERNAL MEDICINE

## 2023-05-16 PROCEDURE — 87206 SMEAR FLUORESCENT/ACID STAI: CPT | Performed by: INTERNAL MEDICINE

## 2023-05-16 RX ORDER — POLYETHYLENE GLYCOL 3350 17 G/17G
17 POWDER, FOR SOLUTION ORAL DAILY PRN
COMMUNITY
Start: 2023-05-16

## 2023-05-16 ASSESSMENT — PAIN SCALES - GENERAL: PAINLEVEL: NO PAIN (0)

## 2023-05-16 NOTE — PROGRESS NOTES
Disease State Management Encounter:                          Kilo Morales is a 47 year old male coming in for a follow-up visit. Today's visit is a co-visit with Dr. Jaye Machado MD. Today's visit is a follow-up visit from 6/30/21     Reason for visit: Annual CF Medication Review.    Medication Access: Patient fills medication at [facility] and [facility] pharmacy. Patient expresses no concern regarding cost of medications.     CF: Patient is currently taking the following medications:  Bronchodilator: levalbuterol 1.25 mg/3 mL nebs 2-3x/day and levalbuterol HFA inhaler as needed (active inhaler on hand)  Mucolytic: Mucomyst 10% nebs 4 mL 2-3x/day and hypertonic 7% 2-3x/day  Antibiotic: Not indicated. Prefers to keep cefdinir, doxycycline, and levofloxacin on medication list, will let clinic know in future if he needs them for exacerbation.  Azithromycin: 250 mg daily  CFTR modulator: Trikafta (full dose) with fat-containing meals.   Other: Budesonide+Formoterol (Symbicort) 160-4.5 mcg/act 2 puffs twice daily, budesonide 1 mg/2 mL twice daily. Washes mouth out with Symbicort use.  Patient reports no side effects or concerns with current CF medication regimen.  Pulmonary symptoms are stable  Cultures (last growth): sputum cultures grow MSSA  (7/13/22) and PSA (7/13/22)  Genotype: [id number]+[id number]->T     Lab Results   Component Value Date    ALT 23 05/10/2023    AST 22 05/10/2023    BILITOTAL 0.3 05/10/2023    DBIL <0.20 05/10/2023    CKT 55 06/30/2022         Latest Ref Rng & Units 5/16/2023     8:07 AM   PFT   FVC L 3.73  P   FEV1 L 1.76  P   FVC% % 83  P   FEV1% % 49  P      P Preliminary result     Pancreatic Insufficiency/Nutrition: Pancreatic enzyme replacement with Creon 33003-23027 units.  Patient is taking 9-10 capsules with meals and 2-4 capsules with snacks. Patient is not experiencing sign/symptoms of malabsorption.  Acid reducer: not indicated  Bowel regimen: Miralax 17 g daily  as needed and MVW complete probiotic daily  Vitamins include: calcium 500 mg twice daily, MVI daily, and Vitron-C  mg daily.    Lab Results   Component Value Date    FREYA 0.58 06/20/2022    FATIMAH 14.6 06/20/2022    VITDT 55 06/20/2022     CFRD:  Patient is currently using Novolog with meals (carb counting  1 unit: 20 g) and Lantus 6 units nightly.   SMBG: with meter and supplies, may use FreeStyle Agustin CGM in future as needed. Ranges (patient reported): fasting 100-105 mg/dL  Patient is experiencing hypoglycemia. Frequency of hypoglycemia? rarely.   Recent symptoms of high blood sugar? none  Patient follows with Dr. Lee for endocrinology. Last visit 11/15/22.  Most recent HgA1C:   Lab Results   Component Value Date    A1C 7.0 05/10/2023    A1C 6.4 06/20/2022    A1C 6.1 05/04/2022    A1C 6.2 05/04/2021    A1C 6.1 09/02/2020    A1C 6.1 05/07/2020    A1C 6.9 11/14/2019    A1C 6.5 06/12/2019     Assessment/Plan:    1. Great job with managing your medications!  2. Recheck Trikafta labs with next visit for 6 month monitoring.     Follow-up: 3 months for Trikafta labs    I spent 12 minutes with this patient today. All changes were made via verbal approval with Dr. Jaye Machado MD. A copy of the visit note was provided to the patient's provider(s).    A summary of these recommendations was given to the patient (see AVS from today's appointment with Dr. Jaye Machado MD).    Linda Stone, PharmD   Medication Therapy Management   Cystic Fibrosis Pharmacist     Medication Therapy Recommendations  No medication therapy recommendations to display

## 2023-05-16 NOTE — PROGRESS NOTES
CF Annual Nutrition Assessment    Reason for Assessment  Assessed during [facility] r/t increased nutrition risk with diagnosis of CF per protocol.    Nutrition Significant PMH  Severe Lung Disease   Pancreatic Insufficient   CF-Related Diabetes  Hx G-tube - now removed, site has healed well    Anthropometric Assessment  Height: 173.7 cm   IBW based on BMI 22 for females and 23 for males per [facility] recs: 69.5 kg  Today's Weight: 66.4 kg (actual weight)   Body mass index is 22.19 kg/m .    Wt Readings from Last 10 Encounters:   23 66.4 kg (146 lb 6.2 oz)   23 67.4 kg (148 lb 9.6 oz)   11/15/22 68.8 kg (151 lb 11.2 oz)   22 67.1 kg (147 lb 14.9 oz)   22 65.9 kg (145 lb 4.5 oz)   22 67.1 kg (148 lb)   22 66.2 kg (146 lb)   21 63.5 kg (139 lb 15.9 oz)   21 69.9 kg (154 lb 1.6 oz)   21 68.2 kg (150 lb 5.7 oz)     Pancreatic Enzymes  Brand:  Creon [id number]  Dosin-8 with meals, 2-4 with snacks (average of 30 caps/day)     High dose providing 2891 units lipase/kg/meal which is slightly above recommendations.     Estimated Daily Amount (if meal dose is >2500 units lipase/kg/meal): 10,843 units/kg per day which is slightly above recommended daily intake range, however pt presents with malabsorption/weight loss with reduced doses.  Has been educated in the past regarding the risk of elevated enzyme dosing.     Signs of Malabsorption:  No  Enzyme Program:  [facility] - remains active.  Uses mostly just in the beginning of the year.     Diet History and Assessment  Diet Preferences/Allergies.Intolerances: Regular    Intake Recall/Comments:  Kilo eats TID meal and drinks 2-3 [employer] Peptide 1.3 cans/day. He has a good appetite and feels like the OP intake + nutrition supplement provide him with adequate nutrition to maintain his current wt.     (Per previous recall)   B - [employer] + oatmeal, english muffin with pb and J  L - meat and cheese, kiwi,  carrots, red peppers, pudding, string cheese, meat or pb and J sandwich   D - frozen pizza or take out   Snacks - [employer]     Calcium: Food + supplements average 5-6+ servings per day  Salt: No symptoms of deficiency, discussed replacement amounts and techniques  Hydration:  80-90 fl oz/day water   Supplements:  [employer] Peptide 1.5, 2-3 cartons per day by mouth.     Laboratory Assessment    Vitamin A   Date Value Ref Range Status   06/20/2022 0.58 0.30 - 1.20 mg/L Final   05/04/2021 0.55 0.30 - 1.20 mg/L Final     Vitamin D Deficiency screening   Date Value Ref Range Status   05/04/2021 45 20 - 75 ug/L Final     Comment:     Season, race, dietary intake, and treatment affect the concentration of   25-hydroxy-Vitamin D. Values may decrease during winter months and increase   during summer months. Values 20-29 ug/L may indicate Vitamin D insufficiency   and values <20 ug/L may indicate Vitamin D deficiency.  Vitamin D determination is routinely performed by an immunoassay specific for   25 hydroxyvitamin D3.  If an individual is on vitamin D2 (ergocalciferol)   supplementation, please specify 25 OH vitamin D2 and D3 level determination by   LCMSMS test [id number].       Vitamin D, Total (25-Hydroxy)   Date Value Ref Range Status   06/20/2022 55 20 - 75 ug/L Final     Vitamin E   Date Value Ref Range Status   06/20/2022 14.6 5.5 - 18.0 mg/L Final     Comment:       This test was developed and its performance characteristics   determined by [facility]. It has not been cleared or   approved by the [place] Food and Drug Administration. This test   was performed in a CLIA certified laboratory and is   intended for clinical purposes.   05/04/2021 15.2 5.5 - 18.0 mg/L Final     Comment:     (Note)  This test was developed and its performance characteristics   determined by [facility]. It has not been cleared or   approved by the [place] Food and Drug Administration. This test   was performed in a CLIA certified  laboratory and is   intended for clinical purposes.       Iron   Date Value Ref Range Status   06/20/2022 45 35 - 180 ug/dL Final   05/04/2021 45 35 - 180 ug/dL Final     Cholesterol   Date Value Ref Range Status   06/20/2022 159 <200 mg/dL Final   05/04/2021 159 <200 mg/dL Final     Triglycerides   Date Value Ref Range Status   06/20/2022 149 <150 mg/dL Final   05/04/2021 115 <150 mg/dL Final     HDL Cholesterol   Date Value Ref Range Status   05/04/2021 40 >39 mg/dL Final     Direct Measure HDL   Date Value Ref Range Status   06/20/2022 40 >=40 mg/dL Final     LDL Cholesterol Calculated   Date Value Ref Range Status   06/20/2022 89 <=100 mg/dL Final   05/04/2021 96 <100 mg/dL Final     Comment:     Desirable:       <100 mg/dl     VLDL-Cholesterol   Date Value Ref Range Status   06/26/2013 13 0 - 30 mg/dL Final     Cholesterol/HDL Ratio   Date Value Ref Range Status   06/26/2013 4.4 0.0 - 5.0 Final       DEXA: (2/9/23) Most negative Z-score -1.7. Hx osteoporosis - pt is seeing Dr. Lee and has been on both pamidronate and Reclast in the past, continues on Reclast.     Current Vitamin/Mineral Supplements: MVW Complete  international unit(s) daily, Calcium with D 1 tablet twice daily and Vitron-C 1 tablet daily.    Comments:  Obtains ADEK from [facility]. Reports GI sx with any higher doses of iron but tolerates 1 tablet well (takes with food).     CF Related Diabetes Evaluation  Hgb A1C: 7.0%   Date: 5/10/23  FSBG range: Checking intermittently, better with lows since Dr. Lee reduced his Lantus dose a couple months ago (was taking 8-9 units).   Insulin: Yes    Insulin Regimen: Novolog 1 unit:15 g carb, Lantus 7 units  Carbohydrate Counting: Yes     Last visit with Dr. Lee November 2022, continues with the care plan discussed during that visit, doing well.     NUTRITION DIAGNOSIS  Impaired nutrient utilization r/t CF related diabetes, pancreatic insufficiency and CF hypermetabolism AEB requires PERT,  insulin therapy and high kcal/pro intake through TF + diet to maintain/improve nutrition and health status.  INTERVENTIONS/RECOMMENDATIONS  1) Oral Intake/Weight: Discussed maintaining balanced intake, maintaining good weight and body composition including bone health.       BEE: ~1490 kcals                           3500 - 3850 kcals/day 200-225% BEE (+ 500 kcals/day for weight gain)   g protein/day 1.5-2 g/kg    2) Enzymes: Although pt is on a higher than recommended dose he is doing well with his current prescription and wishes to continue on this brand/capsule size/dose. Reports good insurance coverage.     3) Vitamin/Mineral Supplementation: Continuing same supplementation regimen (as documented above) based off most recent annual studies.  Bone management overseen by Dr. Lee.  Continuing MVW Complete Probiotic 1 capsule daily -- will prescribe to [facility].     4) Diabetes: Current care plan reviewed along with updated labs.     Patient Understanding: Good  Expected Compliance: Good  Follow-Up Plans: Per Protocol     GOALS:  1) Weight maintenance vs gain toward goal BMI of 23 kg/m2.   2) Good adherence (>90%) with prescribed enzymes, vitamin/mineral supplements and insulin therapy.      FOLLOW-UP/MONITORING:  Visit patient within 12 month(s) for annual follow-up, sooner by request or in response to acute weight/nutrition changes.    Time Spent in Face-to-Face Interaction: 15 min (Medicare time with diabetes)      Ivory Bonilla MS, RD, LD (pager [phone])  Cystic Fibrosis/Lung Transplant Dietitian      -Available Mon-Thurs 8 AM-4:30 PM. On Fridays please contact pager [phone] (Zeina Pierre RD) and on weekends/holidays contact coverage dietitian at pager [phone] (inpatient use only).

## 2023-05-16 NOTE — NURSING NOTE
"Nico Morales is a 47 year old year old who is being seen for Cystic Fibrosis (CF Follow up )      Medications reviewed and Vital signs taken.    Specimen Collection Type: Sputum    Order(s) placed: AFB (Acid Fast Bacilli) and CF Aerobic Bacterial    *IF AFB order placed - please enter \"PRIORITIZE AFB\" to order comments.       Lab Results   Component Value Date    ACIDFAST No acid fast bacilli seen 03/02/2022    ACIDFAST No acid fast bacilli seen 03/02/2022         Lab Results   Component Value Date    AFBSMS Negative for acid fast bacteria 05/04/2021    AFBSMS  05/04/2021     Assayed at [facility] [address] [place] [id number] [phone]           Vitals were taken and medications were reconciled.     Sheree Thompson RMA  8:35 AM    "

## 2023-05-16 NOTE — PATIENT INSTRUCTIONS
See provider AVS for a summary of recommendations from today's visit.  Linda Stone, PharmD  Cystic Fibrosis MTM Pharmacist  [facility]

## 2023-05-16 NOTE — LETTER
5/16/2023         RE: Nico Morales  [address]  [place] [address]        Dear Colleague,    Thank you for referring your patient, Nico Morales, to the [facility]. Please see a copy of my visit note below.    [facility]  May 16, 2023         Assessment and Plan:   Nico Morales is a 47 year old male with cystic fibrosis.    1. CF lung disease with severe obstruction: Kilo reports that he continues to have stable cough and sputum production.  It is usually yellow-green in color.  He does have stable activity tolerance.  He historically has grown out Pseudomonas and Staph aureus in his sputum.  He does show evidence of stability in his pulmonary function test today.  At this time I have recommended to Kilo:  -- He should continue to do his vest and nebulized therapies 2-3 times daily  -- he does remain quite active with his family    2. Pancreatic Insufficiency/GI: Kilo has no new symptoms consistent with worsening malabsorption.    - continue the present dose of pancreatic enzymes  - continue vitamin supplementation.    3.  Cystic fibrosis related diabetes: Kilo was recently seen by endocrinology.  They did make some adjustments to his insulin therapy.  He did have a recent increase in his hemoglobin A1c that he is aware of.    4.  CFTR modulator therapy: Kilo is on Trikafta and tolerating well.  He will have his annual studies performed at his next visit.    5.  Osteoporosis: Kilo recently received his Reclast.  He is followed by endocrinology for this.    6.  Psychosocial: Kilo is  in a stable relationship.  He reports his family is doing well.  He continues to work as an .    7. Tinnitus:  This is a long standing problem for Kilo.  I have referred him to audiology for evaluation.    8.  HCM:  Kilo is due for annual studies which he will perform at  his next visit.  He was also obtain a CXR.    Annual studies due: 6/2023  Recent Labs   Lab Test 06/20/22  1536   IGG 1,140     Immunizations: COVID bivalent  Colonoscopy: 6/2027    Jaye Machado MD MPH  Associate Professor of Medicine  Pulmonary, Allergy, Critical Care and Sleep Medicine        Interval History:     Kilo describes stable cough frequency and sputum volume.  His sputum is yellow-green in color.  It mostly happens with treatment.  He denies any chest congestion or shortness of breath with rest or activity.  He does continue to perform 2-3 vest therapies per day.         Review of Systems:     CONSTITUTIONAL: no fever, no chills, no sweats, no change in weight, no change in energy--great, no change in appetite--good    INTEGUMENTARY/SKIN: no rash, no obvious new lesions    ENT/MOUTH: no sore throat, no new sinus pain, no new nasal drainage, no new nasal congestion, chronic ear ringing     RESPIRATORY: see interval history    CV: no chest pain, no palpitations, no peripheral edema    GI: no nausea, no vomiting, no change in stools, no fatty stools, no GERD    : negative urinary    MUSCULOSKELETAL: no myalgias, no arthralgias    ENDOCRINE: no hypoglycemia, blood sugars with adequate control    NEURO:  No headache    SLEEP: no issues--less need than previous    PSYCHIATRIC: mood stable--good          Past Medical and Surgical History:     Past Medical History:   Diagnosis Date    CF (cystic fibrosis) (H)     Exocrine pancreatic insufficiency     Nocardia infection     Pseudomonas infection     S/P gastrostomy (H) 2002     Past Surgical History:   Procedure Laterality Date    COLONOSCOPY N/A 5/21/2018    Procedure: COMBINED COLONOSCOPY, SINGLE OR MULTIPLE BIOPSY/POLYPECTOMY BY BIOPSY;  Cystic fibrosis, 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;  Surgeon: Margarito Bowman MD;  Location:  GI    COLONOSCOPY N/A 6/27/2022    Procedure: COLONOSCOPY, WITH POLYPECTOMY AND BIOPSY;  Surgeon: Margarito Bowman MD;   Location: [facility]    GASTROSTOMY TUBE  2002    IR PULMONARY EMBOLIZATION  7/20/2001    PICC INSERTION Left 01/22/2018    4Fr SL BioFlo PICC, 46cm (5cm external) in the L basilic vein w/ tip in the low SVC           Family History:     Family History   Problem Relation Age of Onset    Heart Disease Maternal Grandmother     Heart Disease Maternal Grandfather     Heart Disease Paternal Grandmother     Diabetes No family hx of             Social History:     Social History     Socioeconomic History    Marital status:      Spouse name: Not on file    Number of children: 0    Years of education: Not on file    Highest education level: Not on file   Occupational History    Occupation: [address]     Employer: [employer]   Tobacco Use    Smoking status: Never    Smokeless tobacco: Never   Vaping Use    Vaping status: Not on file   Substance and Sexual Activity    Alcohol use: No     Alcohol/week: 0.0 standard drinks of alcohol    Drug use: No    Sexual activity: Yes     Partners: Female     Birth control/protection: Pill   Other Topics Concern    Parent/sibling w/ CABG, MI or angioplasty before 65F 55M? Not Asked     Service Not Asked    Blood Transfusions No    Caffeine Concern Not Asked    Occupational Exposure Not Asked    Hobby Hazards Not Asked    Sleep Concern Not Asked    Stress Concern Not Asked    Weight Concern Not Asked    Special Diet Not Asked    Back Care Not Asked    Exercise No    Bike Helmet Not Asked    Seat Belt Not Asked    Self-Exams Not Asked   Social History Narrative    8/15/2019 - .  Lives with his English Bulldog (Jefry) in a house that he designed in [place].  No children.     Social Determinants of Health     Financial Resource Strain: Not on file   Food Insecurity: Not on file   Transportation Needs: Not on file   Physical Activity: Not on file   Stress: Not on file   Social Connections: Not on file   Intimate Partner Violence: Not on file   Housing  "Stability: Not on file            Medications:     Current Outpatient Medications   Medication    ACE NOT PRESCRIBED, INTENTIONAL,    acetylcysteine (MUCOMYST) 10 % nebulizer solution    amylase-lipase-protease (CREON) 11684-37618 units CPEP per EC capsule    azithromycin (ZITHROMAX) 250 MG tablet    blood glucose (NO BRAND SPECIFIED) test strip    blood glucose monitoring (NO BRAND SPECIFIED) meter device kit    blood glucose monitoring (ULTRA THIN 30G) lancets    budesonide (PULMICORT) 1 MG/2ML neb solution    budesonide-formoterol (SYMBICORT) 160-4.5 MCG/ACT Inhaler    CALCIUM PO    cefdinir (OMNICEF) 300 MG capsule    Continuous Blood Gluc Sensor (FREESTYLE SUKHWINDER 14 DAY SENSOR) MISC    doxycycline hyclate (VIBRAMYCIN) 100 MG capsule    doxycycline hyclate (VIBRAMYCIN) 100 MG capsule    [facility] PAT[facility]    ferrous fumarate 65 mg, [place]. FE,-Vitamin C 125 mg (VITRON-C)  MG TABS tablet    insulin aspart (NOVOLOG PENFILL) 100 UNIT/ML cartridge    insulin glargine (LANTUS SOLOSTAR) 100 UNIT/ML pen    insulin pen needle (BD OMI U/F) 32G X 4 MM miscellaneous    levalbuterol (XOPENEX HFA) 45 MCG/ACT inhaler    levalbuterol (XOPENEX) 1.25 MG/3ML neb solution    levofloxacin (LEVAQUIN) 750 MG tablet    MULTIVITAMIN OR    mvw complete (PROBIOTIC FORMULATION) capsule    polyethylene glycol (MIRALAX) 17 GM/Dose powder    sodium chloride inhalant 7 % NEBU neb solution    Syringe/Needle, Disp, 18G X 1\" 5 ML MISC    TRIKAFTA 100-50-75 & 150 MG tablet pack     No current facility-administered medications for this visit.            Physical Exam:   /77   Pulse 76   Ht 1.73 m (5' 8.11\")   Wt 66.4 kg (146 lb 6.2 oz)   SpO2 98%   BMI 22.19 kg/m      Constitutional:   Awake, alert and in no apparent distress     Eyes:   nonicteric     ENT:   oral mucosa moist without lesions     Neck:   Supple without supraclavicular or cervical lymphadenopathy     Lungs:   Fair air flow.  No crackles.  Biapical " rhonchi.  Right middle lobe wheezes.     Cardiovascular:   Normal S1 and S2.  RRR.  No murmur, gallop or rub.     Abdomen:   NABS, soft, nontender, nondistended.      Musculoskeletal:   No edema, digital clubbing present     Neurologic:   Alert and conversant.     Skin:   Warm, dry.  No rash on limited exam.             Data:   All laboratory and imaging data reviewed.      Results from the last week:  Recent Results (from the past 168 hour(s))   Hepatic function panel    Collection Time: 05/10/23  9:15 AM   Result Value Ref Range    Protein Total 7.0 6.4 - 8.3 g/dL    Albumin 4.0 3.5 - 5.2 g/dL    Bilirubin Total 0.3 <=1.2 mg/dL    Alkaline Phosphatase 110 40 - 129 U/L    AST 22 10 - 50 U/L    ALT 23 10 - 50 U/L    Bilirubin Direct <0.20 0.00 - 0.30 mg/dL   GGT    Collection Time: 05/10/23  9:15 AM   Result Value Ref Range    GGT 28 8 - 61 U/L   Hemoglobin A1c    Collection Time: 05/10/23  9:15 AM   Result Value Ref Range    Hemoglobin A1C 7.0 (H) <5.7 %   CBC with platelets and differential    Collection Time: 05/10/23  9:15 AM   Result Value Ref Range    WBC Count 10.9 4.0 - 11.0 10e3/uL    RBC Count 4.97 4.40 - 5.90 10e6/uL    Hemoglobin 13.9 13.3 - 17.7 g/dL    Hematocrit 44.3 40.0 - 53.0 %    MCV 89 78 - 100 fL    MCH 28.0 26.5 - 33.0 pg    MCHC 31.4 (L) 31.5 - 36.5 g/dL    RDW 14.0 10.0 - 15.0 %    Platelet Count 256 150 - 450 10e3/uL    % Neutrophils 71 %    % Lymphocytes 19 %    % Monocytes 7 %    % Eosinophils 2 %    % Basophils 1 %    % Immature Granulocytes 0 %    NRBCs per 100 WBC 0 <1 /100    Absolute Neutrophils 7.7 1.6 - 8.3 10e3/uL    Absolute Lymphocytes 2.1 0.8 - 5.3 10e3/uL    Absolute Monocytes 0.8 0.0 - 1.3 10e3/uL    Absolute Eosinophils 0.2 0.0 - 0.7 10e3/uL    Absolute Basophils 0.1 0.0 - 0.2 10e3/uL    Absolute Immature Granulocytes 0.0 <=0.4 10e3/uL    Absolute NRBCs 0.0 10e3/uL   General PFT Lab (Please always keep checked)    Collection Time: 05/16/23  8:07 AM   Result Value Ref Range     FVC-Pred 4.44 L    FVC-Pre 3.73 L    FVC-%Pred-Pre 83 %    FEV1-Pre 1.76 L    FEV1-%Pred-Pre 49 %    FEV1FVC-Pred 81 %    FEV1FVC-Pre 47 %    FEFMax-Pred 9.64 L/sec    FEFMax-Pre 6.74 L/sec    FEFMax-%Pred-Pre 69 %    FEF2575-Pred 3.47 L/sec    FEF2575-Pre 0.56 L/sec    NXD7892-%Pred-Pre 16 %    ExpTime-Pre 17.72 sec    FIFMax-Pre 4.59 L/sec    FEV1FEV6-Pred 81 %    FEV1FEV6-Pre 55 %       PFT interpretation:   Spirometry interpretation:  The spirometry shows evidence of severe obstructive lung disease.  When compared to 7/13/2022, the FEV1 and FVC have little change.  The testing meets ATS criteria.    Severity Z-score*   Normal > -1.645   Mild -1.65 to -2.5   Moderate -2.5 to -4   Severe < -4   * accounts for sex, age, height, ancestry     Use z-score to assess airflow obstruction, restriction and DLCO  - FEV1 z-score for airflow obstruction  - TLC z-score for restriction  - DLCO z-score for diffusion defect    Pulmonary exacerbation: absent    35 minutes spent by me on the date of the encounter doing chart review, history and exam, documentation and further activities per the note  Time documented is excluding time spent for PFT interpretation.      CF Annual Nutrition Assessment    Reason for Assessment  Assessed during [facility] r/t increased nutrition risk with diagnosis of CF per protocol.    Nutrition Significant PMH  Severe Lung Disease   Pancreatic Insufficient   CF-Related Diabetes  Hx G-tube - now removed, site has healed well    Anthropometric Assessment  Height: 173.7 cm   IBW based on BMI 22 for females and 23 for males per CF Foundation recs: 69.5 kg  Today's Weight: 66.4 kg (actual weight)   Body mass index is 22.19 kg/m .    Wt Readings from Last 10 Encounters:   05/16/23 66.4 kg (146 lb 6.2 oz)   02/21/23 67.4 kg (148 lb 9.6 oz)   11/15/22 68.8 kg (151 lb 11.2 oz)   07/13/22 67.1 kg (147 lb 14.9 oz)   06/27/22 65.9 kg (145 lb 4.5 oz)   04/11/22 67.1 kg (148 lb)   03/02/22 66.2 kg (146  lb)   21 63.5 kg (139 lb 15.9 oz)   21 69.9 kg (154 lb 1.6 oz)   21 68.2 kg (150 lb 5.7 oz)     Pancreatic Enzymes  Brand:  Creon [id number]  Dosin-8 with meals, 2-4 with snacks (average of 30 caps/day)     High dose providing 2891 units lipase/kg/meal which is slightly above recommendations.     Estimated Daily Amount (if meal dose is >2500 units lipase/kg/meal): 10,843 units/kg per day which is slightly above recommended daily intake range, however pt presents with malabsorption/weight loss with reduced doses.  Has been educated in the past regarding the risk of elevated enzyme dosing.     Signs of Malabsorption:  No  Enzyme Program:  [facility] - remains active.  Uses mostly just in the beginning of the year.     Diet History and Assessment  Diet Preferences/Allergies.Intolerances: Regular    Intake Recall/Comments:  Kilo eats TID meal and drinks 2-3 Adfaces Peptide 1.3 cans/day. He has a good appetite and feels like the OP intake + nutrition supplement provide him with adequate nutrition to maintain his current wt.     (Per previous recall)   B - [employer] + oatmeal, english muffin with pb and J  L - meat and cheese, kiwi, carrots, red peppers, pudding, string cheese, meat or pb and J sandwich   D - frozen pizza or take out   Snacks - Adfaces     Calcium: Food + supplements average 5-6+ servings per day  Salt: No symptoms of deficiency, discussed replacement amounts and techniques  Hydration:  80-90 fl oz/day water   Supplements:  Adfaces Peptide 1.5, 2-3 cartons per day by mouth.     Laboratory Assessment    Vitamin A   Date Value Ref Range Status   2022 0.58 0.30 - 1.20 mg/L Final   2021 0.55 0.30 - 1.20 mg/L Final     Vitamin D Deficiency screening   Date Value Ref Range Status   2021 45 20 - 75 ug/L Final     Comment:     Season, race, dietary intake, and treatment affect the concentration of   25-hydroxy-Vitamin D. Values may decrease during winter months  and increase   during summer months. Values 20-29 ug/L may indicate Vitamin D insufficiency   and values <20 ug/L may indicate Vitamin D deficiency.  Vitamin D determination is routinely performed by an immunoassay specific for   25 hydroxyvitamin D3.  If an individual is on vitamin D2 (ergocalciferol)   supplementation, please specify 25 OH vitamin D2 and D3 level determination by   LCMSMS test [id number].       Vitamin D, Total (25-Hydroxy)   Date Value Ref Range Status   06/20/2022 55 20 - 75 ug/L Final     Vitamin E   Date Value Ref Range Status   06/20/2022 14.6 5.5 - 18.0 mg/L Final     Comment:       This test was developed and its performance characteristics   determined by [facility]. It has not been cleared or   approved by the US Food and Drug Administration. This test   was performed in a CLIA certified laboratory and is   intended for clinical purposes.   05/04/2021 15.2 5.5 - 18.0 mg/L Final     Comment:     (Note)  This test was developed and its performance characteristics   determined by [facility]. It has not been cleared or   approved by the US Food and Drug Administration. This test   was performed in a CLIA certified laboratory and is   intended for clinical purposes.       Iron   Date Value Ref Range Status   06/20/2022 45 35 - 180 ug/dL Final   05/04/2021 45 35 - 180 ug/dL Final     Cholesterol   Date Value Ref Range Status   06/20/2022 159 <200 mg/dL Final   05/04/2021 159 <200 mg/dL Final     Triglycerides   Date Value Ref Range Status   06/20/2022 149 <150 mg/dL Final   05/04/2021 115 <150 mg/dL Final     HDL Cholesterol   Date Value Ref Range Status   05/04/2021 40 >39 mg/dL Final     Direct Measure HDL   Date Value Ref Range Status   06/20/2022 40 >=40 mg/dL Final     LDL Cholesterol Calculated   Date Value Ref Range Status   06/20/2022 89 <=100 mg/dL Final   05/04/2021 96 <100 mg/dL Final     Comment:     Desirable:       <100 mg/dl     VLDL-Cholesterol   Date Value Ref Range  Status   06/26/2013 13 0 - 30 mg/dL Final     Cholesterol/HDL Ratio   Date Value Ref Range Status   06/26/2013 4.4 0.0 - 5.0 Final       DEXA: (2/9/23) Most negative Z-score -1.7. Hx osteoporosis - pt is seeing Dr. Lee and has been on both pamidronate and Reclast in the past, continues on Reclast.     Current Vitamin/Mineral Supplements: MVW Complete  international unit(s) daily, Calcium with D 1 tablet twice daily and Vitron-C 1 tablet daily.    Comments:  Obtains ADEK from [facility]. Reports GI sx with any higher doses of iron but tolerates 1 tablet well (takes with food).     CF Related Diabetes Evaluation  Hgb A1C: 7.0%   Date: 5/10/23  FSBG range: Checking intermittently, better with lows since Dr. Lee reduced his Lantus dose a couple months ago (was taking 8-9 units).   Insulin: Yes    Insulin Regimen: Novolog 1 unit:15 g carb, Lantus 7 units  Carbohydrate Counting: Yes     Last visit with Dr. Lee November 2022, continues with the care plan discussed during that visit, doing well.     NUTRITION DIAGNOSIS  Impaired nutrient utilization r/t CF related diabetes, pancreatic insufficiency and CF hypermetabolism AEB requires PERT, insulin therapy and high kcal/pro intake through TF + diet to maintain/improve nutrition and health status.  INTERVENTIONS/RECOMMENDATIONS  1) Oral Intake/Weight: Discussed maintaining balanced intake, maintaining good weight and body composition including bone health.       BEE: ~1490 kcals                           3500 - 3850 kcals/day 200-225% BEE (+ 500 kcals/day for weight gain)   g protein/day 1.5-2 g/kg    2) Enzymes: Although pt is on a higher than recommended dose he is doing well with his current prescription and wishes to continue on this brand/capsule size/dose. Reports good insurance coverage.     3) Vitamin/Mineral Supplementation: Continuing same supplementation regimen (as documented above) based off most recent annual studies.  Bone management  overseen by Dr. Lee.  Continuing MVW Complete Probiotic 1 capsule daily -- will prescribe to [facility].     4) Diabetes: Current care plan reviewed along with updated labs.     Patient Understanding: Good  Expected Compliance: Good  Follow-Up Plans: Per Protocol     GOALS:  1) Weight maintenance vs gain toward goal BMI of 23 kg/m2.   2) Good adherence (>90%) with prescribed enzymes, vitamin/mineral supplements and insulin therapy.      FOLLOW-UP/MONITORING:  Visit patient within 12 month(s) for annual follow-up, sooner by request or in response to acute weight/nutrition changes.    Time Spent in Face-to-Face Interaction: 15 min (Medicare time with diabetes)      Ivory Bonilla MS, RD, LD (pager [phone])  Cystic Fibrosis/Lung Transplant Dietitian      -Available Mon-Thurs 8 AM-4:30 PM. On Fridays please contact pager [phone] (Zeina Pierre RD) and on weekends/holidays contact coverage dietitian at pager [phone] (inpatient use only).

## 2023-05-21 LAB
BACTERIA SPT CULT: ABNORMAL
BACTERIA SPT CULT: ABNORMAL

## 2023-05-26 DIAGNOSIS — E84.9 CYSTIC FIBROSIS (H): ICD-10-CM

## 2023-05-26 DIAGNOSIS — E84.0 CYSTIC FIBROSIS OF THE LUNG (H): ICD-10-CM

## 2023-05-26 RX ORDER — BUDESONIDE AND FORMOTEROL FUMARATE DIHYDRATE 160; 4.5 UG/1; UG/1
AEROSOL RESPIRATORY (INHALATION)
Qty: 30.6 G | Refills: 3 | Status: SHIPPED | OUTPATIENT
Start: 2023-05-26 | End: 2024-01-16

## 2023-06-02 NOTE — PROGRESS NOTES
Outcome for 06/02/23 10:53 AM: Protecodehart message sent  Yandy Haley MA  Outcome for 06/09/23 1:55 PM: Left Voicemail   Zahida Medina MA  Outcome for 06/12/23 11:58 AM: Left Guidoil   Zahida Medina MA    CF Endocrinology Return Consultation:  Diabetes  :   Patient: Nico Morales MRN# [id number]   YOB: 1975 Age: 47 year old   Date of Visit: 06/13/2023    Dear Dr. Jaye Machado:    I had the pleasure of seeing your patient, Nico Morales in the [facility] Endocr[facility]ology Clinic, [facility], for a return consultation regarding CFRD and low bone density.           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  Verbal overview and written information provided.          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     Priority: Medium     Encounter for long-term (current) use of antibiotics 05/20/2013     Priority: Medium     Prostatitis 05/20/2013     Priority: Medium     updating diagnosis code for icd10 cutover       Pseudomonas infection      Priority: Medium     Cystic fibrosis (H) 11/24/2010     Priority: Medium     Sweat Test   Date: 4/25/78    Lab: [facility]   Sample #1:  110mmol  Sample #2:  106mmol    Genetics  Date:  4/9/90  [id number]+[id number]->T         CARDIOVASCULAR SCREENING; LDL GOAL LESS THAN 160 10/31/2010     Priority: Medium            HPI:   Nico is a 47 year old male with Cystic Fibrosis Related Diabetes Mellitus (CFRD).    I have reviewed the available past laboratory evaluations, imaging studies, and medical records available to me at this visit.  History was obtained from the patient and the medical record.  I have reviewed the notes of the pulmonary care team.    He is doing well overall.  Denies any acute complaints.  Not checking fingerstick blood glucose regularly. AM glucose readings usually in the  range    Patient is on Trikafta and reports being stable from pulmonary point of view.  He is participating in the promise study and notes undergoing OGTT recently.  Notes that his 2-hour reading was below 200    History of low bone density  Started on pamidronate in December 2018.  Has received 3 doses so far.  Denies any muscle ache or pains after the IV bisphosphonate infusions.  Reports that he was febrile briefly after the first 2 doses however did not have any problems after the third dose.  His last dose of pamidronate was in October 2019.  Subsequent infusions were put on hold due to call with pandemic.   Pamidronate was subsequently switched to Reclast.    First dose of Reclast was in June 21 and second dose was given Feb 2022  Denies any side effects from the Reclast.    A1c:  Hemoglobin A1C   Date Value Ref Range Status   05/10/2023 7.0 (H) <5.7 % Final     Comment:     Normal <5.7%   Prediabetes 5.7-6.4%    Diabetes 6.5% or higher     Note: Adopted from ADA consensus guidelines.   05/04/2021 6.2 (H) 0 - 5.6 % Final     Comment:     Normal <5.7% Prediabetes 5.7-6.4%  Diabetes 6.5% or higher - adopted from ADA   consensus guidelines.         Current insulin regimen:   Lantus 8 units daily in AM   Novolog 1 unit / 15 gm of carb, ~3-4 times daily. Ave Novolog dose around 3 units    Insulin  administration site(s): abd or thigh          Past Medical History:     Past Medical History:   Diagnosis Date     CF (cystic fibrosis) (H)      Exocrine pancreatic insufficiency      Nocardia infection      Pseudomonas infection      S/P gastrostomy (H) 2002            Past Surgical History:     Past Surgical History:   Procedure Laterality Date     COLONOSCOPY N/A 5/21/2018    Procedure: COMBINED COLONOSCOPY, SINGLE OR MULTIPLE BIOPSY/POLYPECTOMY BY BIOPSY;  Cystic fibrosis, 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;  Surgeon: Margarito Bowman MD;  Location: [facility]     COLONOSCOPY N/A 6/27/2022    Procedure: COLONOSCOPY, WITH POLYPECTOMY AND BIOPSY;  Surgeon: Margarito Bowman MD;  Location: [facility]     GASTROSTOMY TUBE  2002     IR PULMONARY EMBOLIZATION  7/20/2001     PICC INSERTION Left 01/22/2018    4Fr SL BioFlo PICC, 46cm (5cm external) in the L basilic vein w/ tip in the low SVC               Social History:     Social History     Social History Narrative    8/15/2019 - .  Lives with his English Bulldog (Jefry) in a house that he designed in [place].  No children.              Family History:     Family History   Problem Relation Age of Onset     Heart Disease Maternal Grandmother      Heart Disease Maternal Grandfather      Heart Disease Paternal Grandmother      Diabetes No family hx of             Allergies:     Allergies   Allergen Reactions     Piperacillin Sod-Tazobactam So Hives     Delayed type reaction in intradermal tests to Zosyn (Piperacillin/Tazobactam). Might be specific reaction to the Zosyn without cross-reactions to Penicillins (beta-Lactam core). Avoid in future Zosyn. However, I would avoid the Penicillins and use instead Penems or Cephalosporines. If Penicillins are the best option, then they can be used under observation.     Pulmozyme [Dornase Trever] Other (See Comments)     Chest pain and fever             Medications:     Current Outpatient Rx   Medication Sig Dispense Refill      "ACE NOT PRESCRIBED, INTENTIONAL, 1 each continuous prn. ACE Inhibitor not prescribed due to Risk for drug interaction 0 each 0     acetylcysteine (MUCOMYST) 10 % nebulizer solution NEBULIZE AND INHALE 4 ML INTO THE LUNGS FOUR TIMES A  mL 11     amylase-lipase-protease (CREON) 08036-58140 units CPEP per EC capsule TAKE 9-10 CAPSULES BY MOUTH WITH MEALS (3 MEALS/DAY) AND 2-4 CAPS WITH SNACKS (3 SNACKS/DAY) 3780 capsule 3     azithromycin (ZITHROMAX) 250 MG tablet Take 1 tablet (250 mg) by mouth daily 30 tablet 11     blood glucose (NO BRAND SPECIFIED) test strip Use to test blood sugar 6 times daily or as directed.whatever is covered 540 strip 3     blood glucose monitoring (NO BRAND SPECIFIED) meter device kit Use to test blood sugar 6 times daily or as directed.whatever is covered 1 kit 1     blood glucose monitoring (ULTRA THIN 30G) lancets Use to test blood sugar 6 times daily or as directed.whatever is covered 540 each 3     budesonide (PULMICORT) 1 MG/2ML neb solution USE ONE VIAL IN NEBULIZER TWICE DAILY 120 mL 3     CALCIUM PO Take 500 mg by mouth 2 times daily Strength unknown.        cefdinir (OMNICEF) 300 MG capsule Take 1 capsule (300 mg) by mouth 2 times daily 28 capsule 0     Continuous Blood Gluc Sensor (FREESTYLE SUKHWINDER 14 DAY SENSOR) MISC Change every 14 days. 1 each 3     doxycycline hyclate (VIBRAMYCIN) 100 MG capsule Take 1 capsule (100 mg) by mouth 2 times daily 60 capsule 1     doxycycline hyclate (VIBRAMYCIN) 100 MG capsule Take 1 capsule (100 mg) by mouth 2 times daily Alternating 1 month on/1 month off 60 capsule 0     [place] [facility] MANAGED PATIENT Contact [facility] for patient specific medication information at [phone] on admission and discharge from the hospital.  Phones are answered 24 hours a day 7 days a week 365 days a year.    Providers - Choose \"CONTINUE HOME MED (no script)\" at discharge if patient treatment with home infusion will continue.       " "ferrous fumarate 65 mg, [place]. FE,-Vitamin C 125 mg (VITRON-C)  MG TABS tablet Take 1 tablet by mouth daily       insulin aspart (NOVOLOG PENFILL) 100 UNIT/ML cartridge INJECT 1 UNIT PER 20G CARB AT LUNCH AND SUPPER 3 UNITS AT NIGHT WITH TUBE FEEDINGS MAX 20UNITS DAILY 30 mL 3     insulin glargine (LANTUS SOLOSTAR) 100 UNIT/ML pen INJECT 6 UNITS SUBCUETANEOUSLY ONCE DAILY Needs follow up for any future refills 3 mL 0     insulin pen needle (BD OMI U/F) 32G X 4 MM miscellaneous Use 4 pen needles daily or as directed. 200 each 4     levalbuterol (XOPENEX HFA) 45 MCG/ACT inhaler Inhale 2 puffs into the lungs every 4 hours as needed for shortness of breath / dyspnea or wheezing 15 g 0     levalbuterol (XOPENEX) 1.25 MG/3ML neb solution INHALE 1 VIAL BY MOUTH INTO THE LUNGS VIA NEBULIZATION FOUR TIMES DAILY 360 mL 11     MULTIVITAMIN OR Take 1 tablet by mouth daily. Includes 5,000 units vitamin D and 400 units vitamin E. Per pt, formulated for CF pts.       mvw complete (PROBIOTIC FORMULATION) capsule TAKE ONE CAPSULE BY MOUTH ONCE DAILY 30 capsule 11     polyethylene glycol (MIRALAX) 17 GM/Dose powder Take 17 g by mouth daily as needed for constipation       sodium chloride inhalant 7 % NEBU neb solution NEBULIZE 4ML BY MOUTH TWICE DAILY 240 mL 5     SYMBICORT 160-4.5 MCG/ACT Inhaler INHALE 2 PUFFS BY MOUTH TWICE DAILY 30.6 g 3     Syringe/Needle, Disp, 18G X 1\" 5 ML MISC 1 each 2 times daily Use to mix Ceftrazidime nebs 56 each 6     TRIKAFTA 100-50-75 & 150 MG tablet pack TAKE TWO ORANGE TABLETS BY MOUTH IN THE MORNING AND ONE BLUE TABLET IN THE EVENING AS DIRECTED ON PACKAGE.  TAKE WITH FAT CONTAINING FOOD. 84 tablet 5     levofloxacin (LEVAQUIN) 750 MG tablet Take 1 tablet (750 mg) by mouth daily (Patient not taking: Reported on 6/13/2023) 30 tablet 1             Review of Systems:     Comprehensive ROS negative other than the symptoms noted above in the HPI.          Physical Exam:   There were no vitals " taken for this visit.    GENERAL: Healthy, alert and no distress  EYES: Eyes grossly normal to inspection.  No discharge or erythema, or obvious scleral/conjunctival abnormalities.  RESP: No audible wheeze, cough, or visible cyanosis.  No visible retractions or increased work of breathing.    NEURO: Cranial nerves grossly intact.  Mentation and speech appropriate for age.  PSYCH: affect normal/bright, judgement and insight intact, normal speech and appearance well-groomed.        Laboratory results:     TSH   Date Value Ref Range Status   06/20/2022 1.08 0.40 - 4.00 mU/L Final   05/04/2021 1.49 0.40 - 4.00 mU/L Final     Triiodothyronine (T3)   Date Value Ref Range Status   06/25/2010 123 60 - 181 ng/dL Final     Testosterone Total   Date Value Ref Range Status   06/20/2022 398 240 - 950 ng/dL Final   05/04/2021 418 240 - 950 ng/dL Final     Comment:     This test was developed and its performance characteristics determined by the   [facility].   It has not been cleared or approved by the FDA. The laboratory is regulated   under CLIA as qualified to perform high-complexity testing. This test is used   for clinical purposes. It should not be regarded as investigational or for   research.       Cholesterol   Date Value Ref Range Status   06/20/2022 159 <200 mg/dL Final   05/04/2021 159 <200 mg/dL Final     Albumin Urine mg/L   Date Value Ref Range Status   06/20/2022 10 mg/L Final   05/04/2021 15 mg/L Final     Triglycerides   Date Value Ref Range Status   06/20/2022 149 <150 mg/dL Final   05/04/2021 115 <150 mg/dL Final     HDL Cholesterol   Date Value Ref Range Status   05/04/2021 40 >39 mg/dL Final     Direct Measure HDL   Date Value Ref Range Status   06/20/2022 40 >=40 mg/dL Final     LDL Cholesterol Calculated   Date Value Ref Range Status   06/20/2022 89 <=100 mg/dL Final   05/04/2021 96 <100 mg/dL Final     Comment:     Desirable:       <100  mg/dl     Cholesterol/HDL Ratio   Date Value Ref Range Status   06/26/2013 4.4 0.0 - 5.0 Final     Non HDL Cholesterol   Date Value Ref Range Status   06/20/2022 119 <130 mg/dL Final   05/04/2021 119 <130 mg/dL Final           Diabetes Health Maintenance    Date of Diabetes Diagnosis: 12/2015    Special Notes (if any): He was a diagnosed with adrenal insufficiency based on inadequate response on a ACTH stim test. Adrenal insufficiency is thought to be related to  itraconazole and Symbicort use.  Last ACTH stim test was normal and he has been off chronic prednisone.    Date Last Eye Exam: > 3 years     Date Last Dental Appointment: every 6 months    Dates of Episodes Severe* Hypoglycemia (month/year, cumulative, ongoing, assess each visit): never              *Severe=patient unconscious, seizure, unable to help self    Last 25-Vitamin D (every year): 60    Last DXA, lowest Z-score (every 2 years): -1.7 last DEXA 2023       ?Bisphosphonates (yes/no): yes     Last Urine Microalbumin (every year):      No results found for: MICROALBUMIN     Last Testosterone:   Testosterone Total   Date Value Ref Range Status   06/20/2022 398 240 - 950 ng/dL Final   05/04/2021 418 240 - 950 ng/dL Final     Comment:     This test was developed and its performance characteristics determined by the   [facility].   It has not been cleared or approved by the FDA. The laboratory is regulated   under CLIA as qualified to perform high-complexity testing. This test is used   for clinical purposes. It should not be regarded as investigational or for   research.        On testosterone therapy (yes/no)? No          Assessment and Plan:   Nico is a 47 year old male with CFRD    CFRD: Overall good control   A1c slightly higher at 7% compared to before  Continue current insulin regimen    Low bone density: Started on pamidronate in 2018 but missed doses in 2020 due to COVID  pandemic.  Received first dose of Reclast in June 2021.   Second dose of Reclast was February 2023.  Overall around 3 years of bisphosphonate treatment  Has had significant improvement in bone density at both spine and hip    RTC in 6 months    JULIETA Tate    Note: Chart documentation done in part with Dragon Voice Recognition software. Although reviewed after completion, some word and grammatical errors may remain.  Please consider this when interpreting information in this chart    Video visit done using Tracour  Video start time 1:28 PM  Video visit end time 1:46 PM  Patient location: Home  Provider location: Off-site  TYLER REYES

## 2023-06-05 DIAGNOSIS — E84.8 DIABETES MELLITUS RELATED TO CF (CYSTIC FIBROSIS) (H): ICD-10-CM

## 2023-06-05 DIAGNOSIS — E08.9 DIABETES MELLITUS RELATED TO CF (CYSTIC FIBROSIS) (H): ICD-10-CM

## 2023-06-05 RX ORDER — INSULIN GLARGINE 100 [IU]/ML
INJECTION, SOLUTION SUBCUTANEOUS
Qty: 3 ML | Refills: 0 | Status: SHIPPED | OUTPATIENT
Start: 2023-06-05 | End: 2023-07-03

## 2023-06-05 NOTE — TELEPHONE ENCOUNTER
Long Acting Insulin Protocol Passed       insulin glargine (LANTUS SOLOSTAR) 100 UNIT/ML pen        Last Written Prescription Date:  08/21/22  Last Fill Quantity: 15mL,   # refills: 1  Last Office Visit : 11/15/22  Future Office visit:  06/13/23    Routing refill request to provider for review/approval because:  Insulin - refilled per clinic

## 2023-06-09 ENCOUNTER — TELEPHONE (OUTPATIENT)
Dept: ENDOCRINOLOGY | Facility: CLINIC | Age: 48
End: 2023-06-09
Payer: COMMERCIAL

## 2023-06-09 NOTE — TELEPHONE ENCOUNTER
Attempted to reach patient for upcoming appointment; 14 days of glucose data needed. No answer, LM on  to call office back.     Appointment with Diana Lee MD on 6/13/2023    Zahida Medina MA

## 2023-06-12 NOTE — TELEPHONE ENCOUNTER
Attempted to reach patient for upcoming appointment; 14 days of glucose data needed. No answer, LM on VM to call office back.     Appointment with Diana Lee MD on 6/13/23    Zahida Medina MA

## 2023-06-13 ENCOUNTER — VIRTUAL VISIT (OUTPATIENT)
Dept: ENDOCRINOLOGY | Facility: CLINIC | Age: 48
End: 2023-06-13
Payer: COMMERCIAL

## 2023-06-13 DIAGNOSIS — E08.9 DIABETES MELLITUS DUE TO CYSTIC FIBROSIS (H): ICD-10-CM

## 2023-06-13 DIAGNOSIS — E84.9 DIABETES MELLITUS DUE TO CYSTIC FIBROSIS (H): ICD-10-CM

## 2023-06-13 DIAGNOSIS — M81.0 OSTEOPOROSIS, UNSPECIFIED OSTEOPOROSIS TYPE, UNSPECIFIED PATHOLOGICAL FRACTURE PRESENCE: Primary | ICD-10-CM

## 2023-06-13 PROCEDURE — 99213 OFFICE O/P EST LOW 20 MIN: CPT | Mod: VID | Performed by: INTERNAL MEDICINE

## 2023-06-13 NOTE — NURSING NOTE
Is the patient currently in the state of [place]? YES    Visit mode:VIDEO    If the visit is dropped, the patient can be reconnected by: VIDEO VISIT: Text to cell phone: [phone]    Will anyone else be joining the visit? NO      How would you like to obtain your AVS? MyChart    Are changes needed to the allergy or medication list? NO    Reason for visit: RECHECK

## 2023-06-13 NOTE — LETTER
6/13/2023       RE: iNco Morales  [address] [place] [address]     Dear Colleague,    Thank you for referring your patient, Nico Morales, to the [facility] ENDOCRINOLOGY CLIN[facility] [place] [facility]. Please see a copy of my visit note below.    Outcome for 06/02/23 10:53 AM: Yieldbot message sent  Yandy Haley MA  Outcome for 06/09/23 1:55 PM: Left Voicemail   Zahida Medina MA  Outcome for 06/12/23 11:58 AM: Left Voicemail   Zahida Medina MA    CF Endocrinology Return Consultation:  Diabetes  :   Patient: Nico Morales MRN# [id number]   YOB: 1975 Age: 47 year old   Date of Visit: 06/13/2023    Dear Dr. Jaye Machado:    I had the pleasure of seeing your patient, Nico Morales in the CF Endocrinology Clinic, [facility], for a return consultation regarding CFRD and low bone density.           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  Verbal overview and written information provided.         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     Priority: Medium    Encounter for long-term (current) use of antibiotics 05/20/2013     Priority: Medium    Prostatitis  05/20/2013     Priority: Medium     updating diagnosis code for icd10 cutover      Pseudomonas infection      Priority: Medium    Cystic fibrosis (H) 11/24/2010     Priority: Medium     Sweat Test   Date: 4/25/78    Lab: [facility]   Sample #1:  110mmol  Sample #2:  106mmol    Genetics  Date: 4/9/90  [id number]/621+1G->T        CARDIOVASCULAR SCREENING; LDL GOAL LESS THAN 160 10/31/2010     Priority: Medium            HPI:   Nico is a 47 year old male with Cystic Fibrosis Related Diabetes Mellitus (CFRD).    I have reviewed the available past laboratory evaluations, imaging studies, and medical records available to me at this visit.  History was obtained from the patient and the medical record.  I have reviewed the notes of the pulmonary care team.    He is doing well overall.  Denies any acute complaints.  Not checking fingerstick blood glucose regularly. AM glucose readings usually in the  range    Patient is on Trikafta and reports being stable from pulmonary point of view.  He is participating in the promise study and notes undergoing OGTT recently.  Notes that his 2-hour reading was below 200    History of low bone density  Started on pamidronate in December 2018.  Has received 3 doses so far.  Denies any muscle ache or pains after the IV bisphosphonate infusions.  Reports that he was febrile briefly after the first 2 doses however did not have any problems after the third dose.  His last dose of pamidronate was in October 2019.  Subsequent infusions were put on hold due to call with pandemic.   Pamidronate was subsequently switched to Reclast.    First dose of Reclast was in June 21 and second dose was given Feb 2022  Denies any side effects from the Reclast.    A1c:  Hemoglobin A1C   Date Value Ref Range Status   05/10/2023 7.0 (H) <5.7 % Final     Comment:     Normal <5.7%   Prediabetes 5.7-6.4%    Diabetes 6.5% or higher     Note: Adopted from ADA consensus guidelines.   05/04/2021 6.2 (H) 0 - 5.6 %  Final     Comment:     Normal <5.7% Prediabetes 5.7-6.4%  Diabetes 6.5% or higher - adopted from ADA   consensus guidelines.         Current insulin regimen:   Lantus 8 units daily in AM   Novolog 1 unit / 15 gm of carb, ~3-4 times daily. Ave Novolog dose around 3 units    Insulin administration site(s): abd or thigh          Past Medical History:     Past Medical History:   Diagnosis Date    CF (cystic fibrosis) (H)     Exocrine pancreatic insufficiency     Nocardia infection     Pseudomonas infection     S/P gastrostomy (H) 2002            Past Surgical History:     Past Surgical History:   Procedure Laterality Date    COLONOSCOPY N/A 5/21/2018    Procedure: COMBINED COLONOSCOPY, SINGLE OR MULTIPLE BIOPSY/POLYPECTOMY BY BIOPSY;  Cystic fibrosis, 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;  Surgeon: Margarito Bowman MD;  Location: [facility]    COLONOSCOPY N/A 6/27/2022    Procedure: COLONOSCOPY, WITH POLYPECTOMY AND BIOPSY;  Surgeon: Margarito Bowman MD;  Location: [facility]    GASTROSTOMY TUBE  2002    IR PULMONARY EMBOLIZATION  7/20/2001    PICC INSERTION Left 01/22/2018    4Fr SL BioFlo PICC, 46cm (5cm external) in the L basilic vein w/ tip in the low SVC               Social History:     Social History     Social History Narrative    8/15/2019 - .  Lives with his English Bulldog (Jefry) in a house that he designed in [place].  No children.              Family History:     Family History   Problem Relation Age of Onset    Heart Disease Maternal Grandmother     Heart Disease Maternal Grandfather     Heart Disease Paternal Grandmother     Diabetes No family hx of             Allergies:     Allergies   Allergen Reactions    Piperacillin Sod-Tazobactam So Hives     Delayed type reaction in intradermal tests to Zosyn (Piperacillin/Tazobactam). Might be specific reaction to the Zosyn without cross-reactions to Penicillins (beta-Lactam core). Avoid in future Zosyn. However, I would avoid the Penicillins and use instead  Penems or Cephalosporines. If Penicillins are the best option, then they can be used under observation.    Pulmozyme [Dornase Trever] Other (See Comments)     Chest pain and fever             Medications:     Current Outpatient Rx   Medication Sig Dispense Refill    ACE NOT PRESCRIBED, INTENTIONAL, 1 each continuous prn. ACE Inhibitor not prescribed due to Risk for drug interaction 0 each 0    acetylcysteine (MUCOMYST) 10 % nebulizer solution NEBULIZE AND INHALE 4 ML INTO THE LUNGS FOUR TIMES A  mL 11    amylase-lipase-protease (CREON) 83755-61582 units CPEP per EC capsule TAKE 9-10 CAPSULES BY MOUTH WITH MEALS (3 MEALS/DAY) AND 2-4 CAPS WITH SNACKS (3 SNACKS/DAY) 3780 capsule 3    azithromycin (ZITHROMAX) 250 MG tablet Take 1 tablet (250 mg) by mouth daily 30 tablet 11    blood glucose (NO BRAND SPECIFIED) test strip Use to test blood sugar 6 times daily or as directed.whatever is covered 540 strip 3    blood glucose monitoring (NO BRAND SPECIFIED) meter device kit Use to test blood sugar 6 times daily or as directed.whatever is covered 1 kit 1    blood glucose monitoring (ULTRA THIN 30G) lancets Use to test blood sugar 6 times daily or as directed.whatever is covered 540 each 3    budesonide (PULMICORT) 1 MG/2ML neb solution USE ONE VIAL IN NEBULIZER TWICE DAILY 120 mL 3    CALCIUM PO Take 500 mg by mouth 2 times daily Strength unknown.       cefdinir (OMNICEF) 300 MG capsule Take 1 capsule (300 mg) by mouth 2 times daily 28 capsule 0    Continuous Blood Gluc Sensor (FREESTYLE SUKHWINDER 14 DAY SENSOR) MISC Change every 14 days. 1 each 3    doxycycline hyclate (VIBRAMYCIN) 100 MG capsule Take 1 capsule (100 mg) by mouth 2 times daily 60 capsule 1    doxycycline hyclate (VIBRAMYCIN) 100 MG capsule Take 1 capsule (100 mg) by mouth 2 times daily Alternating 1 month on/1 month off 60 capsule 0    [place] [facility] INFUSION MANAGED PATIENT Contact [facility] for patient specific medication information at  "[phone] on admission and discharge from the hospital.  Phones are answered 24 hours a day 7 days a week 365 days a year.    Providers - Choose \"CONTINUE HOME MED (no script)\" at discharge if patient treatment with home infusion will continue.      ferrous fumarate 65 mg, [place]. FE,-Vitamin C 125 mg (VITRON-C)  MG TABS tablet Take 1 tablet by mouth daily      insulin aspart (NOVOLOG PENFILL) 100 UNIT/ML cartridge INJECT 1 UNIT PER 20G CARB AT LUNCH AND SUPPER 3 UNITS AT NIGHT WITH TUBE FEEDINGS MAX 20UNITS DAILY 30 mL 3    insulin glargine (LANTUS SOLOSTAR) 100 UNIT/ML pen INJECT 6 UNITS SUBCUETANEOUSLY ONCE DAILY Needs follow up for any future refills 3 mL 0    insulin pen needle (BD OMI U/F) 32G X 4 MM miscellaneous Use 4 pen needles daily or as directed. 200 each 4    levalbuterol (XOPENEX HFA) 45 MCG/ACT inhaler Inhale 2 puffs into the lungs every 4 hours as needed for shortness of breath / dyspnea or wheezing 15 g 0    levalbuterol (XOPENEX) 1.25 MG/3ML neb solution INHALE 1 VIAL BY MOUTH INTO THE LUNGS VIA NEBULIZATION FOUR TIMES DAILY 360 mL 11    MULTIVITAMIN OR Take 1 tablet by mouth daily. Includes 5,000 units vitamin D and 400 units vitamin E. Per pt, formulated for CF pts.      mvw complete (PROBIOTIC FORMULATION) capsule TAKE ONE CAPSULE BY MOUTH ONCE DAILY 30 capsule 11    polyethylene glycol (MIRALAX) 17 GM/Dose powder Take 17 g by mouth daily as needed for constipation      sodium chloride inhalant 7 % NEBU neb solution NEBULIZE 4ML BY MOUTH TWICE DAILY 240 mL 5    SYMBICORT 160-4.5 MCG/ACT Inhaler INHALE 2 PUFFS BY MOUTH TWICE DAILY 30.6 g 3    Syringe/Needle, Disp, 18G X 1\" 5 ML MISC 1 each 2 times daily Use to mix Ceftrazidime nebs 56 each 6    TRIKAFTA 100-50-75 & 150 MG tablet pack TAKE TWO ORANGE TABLETS BY MOUTH IN THE MORNING AND ONE BLUE TABLET IN THE EVENING AS DIRECTED ON PACKAGE.  TAKE WITH FAT CONTAINING FOOD. 84 tablet 5    levofloxacin (LEVAQUIN) 750 MG tablet Take 1 tablet " (750 mg) by mouth daily (Patient not taking: Reported on 6/13/2023) 30 tablet 1             Review of Systems:     Comprehensive ROS negative other than the symptoms noted above in the HPI.          Physical Exam:   There were no vitals taken for this visit.    GENERAL: Healthy, alert and no distress  EYES: Eyes grossly normal to inspection.  No discharge or erythema, or obvious scleral/conjunctival abnormalities.  RESP: No audible wheeze, cough, or visible cyanosis.  No visible retractions or increased work of breathing.    NEURO: Cranial nerves grossly intact.  Mentation and speech appropriate for age.  PSYCH: affect normal/bright, judgement and insight intact, normal speech and appearance well-groomed.        Laboratory results:     TSH   Date Value Ref Range Status   06/20/2022 1.08 0.40 - 4.00 mU/L Final   05/04/2021 1.49 0.40 - 4.00 mU/L Final     Triiodothyronine (T3)   Date Value Ref Range Status   06/25/2010 123 60 - 181 ng/dL Final     Testosterone Total   Date Value Ref Range Status   06/20/2022 398 240 - 950 ng/dL Final   05/04/2021 418 240 - 950 ng/dL Final     Comment:     This test was developed and its performance characteristics determined by the   [facility].   It has not been cleared or approved by the FDA. The laboratory is regulated   under CLIA as qualified to perform high-complexity testing. This test is used   for clinical purposes. It should not be regarded as investigational or for   research.       Cholesterol   Date Value Ref Range Status   06/20/2022 159 <200 mg/dL Final   05/04/2021 159 <200 mg/dL Final     Albumin Urine mg/L   Date Value Ref Range Status   06/20/2022 10 mg/L Final   05/04/2021 15 mg/L Final     Triglycerides   Date Value Ref Range Status   06/20/2022 149 <150 mg/dL Final   05/04/2021 115 <150 mg/dL Final     HDL Cholesterol   Date Value Ref Range Status   05/04/2021 40 >39 mg/dL Final     Direct Measure  HDL   Date Value Ref Range Status   06/20/2022 40 >=40 mg/dL Final     LDL Cholesterol Calculated   Date Value Ref Range Status   06/20/2022 89 <=100 mg/dL Final   05/04/2021 96 <100 mg/dL Final     Comment:     Desirable:       <100 mg/dl     Cholesterol/HDL Ratio   Date Value Ref Range Status   06/26/2013 4.4 0.0 - 5.0 Final     Non HDL Cholesterol   Date Value Ref Range Status   06/20/2022 119 <130 mg/dL Final   05/04/2021 119 <130 mg/dL Final           Diabetes Health Maintenance    Date of Diabetes Diagnosis: 12/2015    Special Notes (if any): He was a diagnosed with adrenal insufficiency based on inadequate response on a ACTH stim test. Adrenal insufficiency is thought to be related to  itraconazole and Symbicort use.  Last ACTH stim test was normal and he has been off chronic prednisone.    Date Last Eye Exam: > 3 years     Date Last Dental Appointment: every 6 months    Dates of Episodes Severe* Hypoglycemia (month/year, cumulative, ongoing, assess each visit): never              *Severe=patient unconscious, seizure, unable to help self    Last 25-Vitamin D (every year): 60    Last DXA, lowest Z-score (every 2 years): -1.7 last DEXA 2023       ?Bisphosphonates (yes/no): yes     Last Urine Microalbumin (every year):      No results found for: MICROALBUMIN     Last Testosterone:   Testosterone Total   Date Value Ref Range Status   06/20/2022 398 240 - 950 ng/dL Final   05/04/2021 418 240 - 950 ng/dL Final     Comment:     This test was developed and its performance characteristics determined by the   [facility].   It has not been cleared or approved by the FDA. The laboratory is regulated   under CLIA as qualified to perform high-complexity testing. This test is used   for clinical purposes. It should not be regarded as investigational or for   research.        On testosterone therapy (yes/no)? No          Assessment and Plan:   Nico douglass  47 year old male with CFRD    CFRD: Overall good control   A1c slightly higher at 7% compared to before  Continue current insulin regimen    Low bone density: Started on pamidronate in 2018 but missed doses in 2020 due to COVID pandemic.  Received first dose of Reclast in June 2021.   Second dose of Reclast was February 2023.  Overall around 3 years of bisphosphonate treatment  Has had significant improvement in bone density at both spine and hip    RTC in 6 months    JULIETA Tate    Note: Chart documentation done in part with Dragon Voice Recognition software. Although reviewed after completion, some word and grammatical errors may remain.  Please consider this when interpreting information in this chart    Video visit done using [employer]  Video start time 1:28 PM  Video visit end time 1:46 PM  Patient location: Home  Provider location: Off-site  TYLER REYES

## 2023-06-20 ENCOUNTER — PHARMACY VISIT (OUTPATIENT)
Dept: ADMINISTRATIVE | Facility: CLINIC | Age: 48
End: 2023-06-20
Payer: COMMERCIAL

## 2023-06-20 NOTE — PROGRESS NOTES
"   Cystic Fibrosis Clinical Follow Up Assessment   Completed on  16:24:15 [facility], by Soheila Cantrell        Patient  Patient Name: BERENICE FLORES  :   EHR ID: [id number]       Activity Date      Activity Medications    TRIKAFTA        Care Details    What are the patient's goals for this therapy?   ? stable pulmonary nikvrp62/13/22 - \"No new goals. Just stay healthy.\"      Did you identify any patient barriers to access and successful treatment?   ? No barriers to access identified      Is it appropriate to collect a PDC at this time? [QA Metric] (An MPR or PDC would not be appropriate for cycled medications or if the patient is on therapy   ? Yes      Document PDC   ? 0.97      Has the patient missed doses inappropriately?   ? No      Please select CURRENT side effect(s) for monitoring:   ? None          Summary Notes   Spoke to Kilo via [facility] for CF assessment. Order set to deliver 23 with TM liaison. Trikafta - Pt reports 'Going super well. No side effects and no problem getting both doses in each day.' PDC = 0.97 CF: Denies health, allergy or medication changes today. No questions or concerns. TM clinicians will continue biannual assessments.     Soheila Cantrell, Pharm.D.[place] Specialty Pharmacist  [address] [address]  [email or web address]  Office: [phone]         "

## 2023-06-29 DIAGNOSIS — E84.0 CYSTIC FIBROSIS WITH PULMONARY MANIFESTATIONS (H): ICD-10-CM

## 2023-06-30 RX ORDER — SODIUM CHLORIDE FOR INHALATION 7 %
VIAL, NEBULIZER (ML) INHALATION
Qty: 240 ML | Refills: 5 | Status: SHIPPED | OUTPATIENT
Start: 2023-06-30 | End: 2024-01-16

## 2023-07-03 DIAGNOSIS — E84.8 DIABETES MELLITUS RELATED TO CF (CYSTIC FIBROSIS) (H): ICD-10-CM

## 2023-07-03 DIAGNOSIS — E08.9 DIABETES MELLITUS RELATED TO CF (CYSTIC FIBROSIS) (H): ICD-10-CM

## 2023-07-03 RX ORDER — INSULIN GLARGINE 100 [IU]/ML
INJECTION, SOLUTION SUBCUTANEOUS
Qty: 15 ML | Refills: 0 | Status: SHIPPED | OUTPATIENT
Start: 2023-07-03 | End: 2023-10-11

## 2023-07-03 NOTE — TELEPHONE ENCOUNTER
EARNESTINE LOPEZ PEN INJ 3ML  Last Written Prescription Date:  6/5/2023  Last Fill Quantity: 3,   # refills: 0  Last Office Visit :  6/13/2023  Future Office visit:  None    Routing refill request to provider for review/approval because:  Drug not on the [facility], P or [facility] refill protocol or controlled substance      Khushi Wilkerson RN  Central Triage Red Flags/Med Refills

## 2023-07-11 LAB
ACID FAST STAIN (ARUP): NORMAL

## 2023-07-17 DIAGNOSIS — E84.9 CYSTIC FIBROSIS (H): Primary | ICD-10-CM

## 2023-07-17 RX ORDER — RIMANTADINE HCL 100 MG
100 TABLET ORAL 2 TIMES DAILY
Qty: 180 TABLET | Refills: 0 | Status: SHIPPED | OUTPATIENT
Start: 2023-07-17 | End: 2023-09-26

## 2023-07-17 NOTE — TELEPHONE ENCOUNTER
Patient called requesting to fill all meds in preparation of insurance change next month. Wishing to fill rimantadine to have on hand for this flu season. Patient is aware to not start this until notified by cf team this fall. Will have on hand in preparation.     Alley Hendricks RN

## 2023-08-05 DIAGNOSIS — E84.8 DIABETES MELLITUS RELATED TO CF (CYSTIC FIBROSIS) (H): ICD-10-CM

## 2023-08-05 DIAGNOSIS — E08.9 DIABETES MELLITUS RELATED TO CF (CYSTIC FIBROSIS) (H): ICD-10-CM

## 2023-08-06 NOTE — TELEPHONE ENCOUNTER
NOVOLOG PENFILL 3ML CARTRIDGE(ORNG)   Last Written Prescription Date:  5/10/22  Last Fill Quantity: 30 ml ,   # refills: 3   Last Office Visit :  6/13/2023  Future Office visit:  None    Routing refill request to provider for review/approval because:   Insulin - refilled per clinic

## 2023-08-07 DIAGNOSIS — E84.9 CYSTIC FIBROSIS (H): ICD-10-CM

## 2023-08-07 RX ORDER — INSULIN ASPART 100 [IU]/ML
INJECTION, SOLUTION INTRAVENOUS; SUBCUTANEOUS
Qty: 30 ML | Refills: 3 | Status: SHIPPED | OUTPATIENT
Start: 2023-08-07

## 2023-08-07 RX ORDER — ELEXACAFTOR, TEZACAFTOR, AND IVACAFTOR 100-50-75
KIT ORAL
Qty: 84 TABLET | Refills: 5 | Status: SHIPPED | OUTPATIENT
Start: 2023-08-07 | End: 2024-01-16

## 2023-08-11 ENCOUNTER — TELEPHONE (OUTPATIENT)
Dept: PULMONOLOGY | Facility: CLINIC | Age: 48
End: 2023-08-11

## 2023-08-11 NOTE — TELEPHONE ENCOUNTER
Message from [facility] that patient is almost out of copay assistance     I wanted to connect with you regarding Nico Morales 1975 and his copay assistance. I believe he is in a maximizer plan with his [facility] Rx management plan as we see that his copays this year have been exactly $3,500 each month (See below his cpa screenshot). He is approaching the cap of $20,000 as he is currently at $17,500 used. We have not been able to reach him to give him a heads-up about this. There is a possibility that his insurance will automatically adjust once he has used up all his copay assistance funds, or he may need to call and notify the insurance company once this happens. We are unable to get much information from his insurance since they 3rd party us. Is this something you can help flag with [facility], so they know to connect back with Kilo or his insurance on his next month?    Test claim at [facility] refill too soon until 8/30 - need to run test claim and run copay assistance card with it to see what copay is.     Message with Regina CARTER supervisor at [facility] to reach out to clinic team if Kilo contacts pharmacy about copays

## 2023-08-14 NOTE — NURSING NOTE
Chief Complaint   Patient presents with     RECHECK     Cystic Fibrosis     Medications reviewed and updated.  Vitals taken  Leeann Whaley CMA   [facility] 4/5/2023    Patient is stating she has another UTI and would like something called in for her. She has an appt next week but does not want to wait that long. Sx  Burning  Frequency  Pain  Starting over the weekend.     Sivan Herring

## 2023-09-05 DIAGNOSIS — Z00.6 RESEARCH STUDY PATIENT: Primary | ICD-10-CM

## 2023-09-16 ENCOUNTER — TELEPHONE (OUTPATIENT)
Dept: MULTI SPECIALTY CLINIC | Facility: CLINIC | Age: 48
End: 2023-09-16
Payer: COMMERCIAL

## 2023-09-16 DIAGNOSIS — U07.1 INFECTION DUE TO 2019 NOVEL CORONAVIRUS: Primary | ICD-10-CM

## 2023-09-16 NOTE — TELEPHONE ENCOUNTER
Throat irritation, sinus pressure, slightly worsening cough starting 9/13. no SOB, no CP, no fever/chills. Tested positive for COVID today x2. Wife negative, child tested positive. Is vesting/nebs 2-3 x day. Plan to prescribe paxlovid, meds reviewed and trikafta dosing adjustment as below and hepatic panel 9/28. Patient in agreement and verbalizes plan.     Trikafta:  Day 1: 2 orange tablets in morning only  Days 2 - 4: No Trikafta  Day 5 (last day of PAXLOVID): 2 orange tablets in morning only  Days 6 - 8: No Trikafta  Day 9: resume normal Trikafta dosing    Zackary Shaw MD  Pulmonary and Critical Care Fellow  Pager: [phone]

## 2023-09-21 DIAGNOSIS — E84.0 CYSTIC FIBROSIS WITH PULMONARY MANIFESTATIONS (H): ICD-10-CM

## 2023-09-21 RX ORDER — ACETYLCYSTEINE 100 MG/ML
SOLUTION ORAL; RESPIRATORY (INHALATION)
Qty: 480 ML | Refills: 11 | Status: SHIPPED | OUTPATIENT
Start: 2023-09-21 | End: 2024-09-10

## 2023-09-22 RX ORDER — LEVALBUTEROL TARTRATE 45 UG/1
AEROSOL, METERED ORAL
Qty: 75 G | Refills: 0 | Status: SHIPPED | OUTPATIENT
Start: 2023-09-22 | End: 2024-01-16

## 2023-09-25 DIAGNOSIS — Z00.6 RESEARCH STUDY PATIENT: Primary | ICD-10-CM

## 2023-09-25 NOTE — PROGRESS NOTES
[facility]  September 26, 2023         Assessment and Plan:   Nico Morales is a 47 year old male with cystic fibrosis.    1. CF lung disease with severe obstruction: Kilo reports that he had a recent COVID infection.  This resulted in a little bit of increase in cough and sputum production.  This is since improved.  He is now near baseline.  He currently denies any significant symptomatology has residual of this infection.  He was prescribed Paxlovid but felt well at the time that he went to pick it up so he did not take the medication.  Kilo historically has grown out Pseudomonas in his sputum.  He does show evidence of stable pulmonary function which I am pleased with given his recent COVID infection and being in recovery.  At this time I have recommended to Kilo:  -- He should continue to do his vest and nebulized therapies 2-3 times daily  -- Kilo did try inhalational ceftaz but developed chest pain with dosing.  It was discontinued.  -- He does have both levofloxacin and doxycycline available to him for exacerbation.  Fortunately he has not needed to use these in many months.     2. Pancreatic Insufficiency/GI: Kilo has no new symptoms consistent with worsening malabsorption.    - continue the present dose of pancreatic enzymes  - continue vitamin supplementation.     3.  CF TR modulator therapy: Kilo is on Trikafta and tolerating well.  His last hepatic panel was in May.  It would be appropriate for him to follow-up with repeat labs.    4.  Cystic fibrosis related diabetes: Kilo reports good control of his blood sugars.      5.  Psychosocial: Kilo is  and in a stable relationship.  Reports all his kids are doing well.  Work is good.  [facility] [place] and they like to spend time they are together.    6.  Influenza prophylaxis: I prescribed amantadine to Kilo today.  He knows to start it in a couple weeks  time.    Annual studies due: due now  Recent Labs   Lab Test 06/20/22  1536   IGG 1,140     Immunizations: Flu and COVID  Colonoscopy: 6/2025    Jaye Machado MD MPH  Associate Professor of Medicine  Pulmonary, Allergy, Critical Care and Sleep Medicine        Interval History:     Kilo does report increased cough and sputum production during his COVID infection.  He did notice that was little bit more green in color.  His sputum is now back to normal.  He reports now he has no chest congestion.  He did feel a bit more tight chested.  He was doing 3 vest therapies per day when he was unwell.  He denies any shortness of breath.         Review of Systems:     CONSTITUTIONAL: no fever, no chills, did feel poorly for about 3 to 4 days.  This was followed by some fatigue.  This is now improved    INTEGUMENTARY/SKIN: no rash, no obvious new lesions    ENT/MOUTH: Some nasal congestion and dry throat with COVID, some decreased hearing and ear ringing but that is resolved    RESPIRATORY: see interval history    CV: no chest pain, no palpitations, no peripheral edema    GI: no nausea, no vomiting, no change in stools, no fatty stools, no GERD    : negative urinary    MUSCULOSKELETAL: no myalgias, no arthralgias    ENDOCRINE: Good control    NEURO: Headache with COVID    SLEEP: no issues    PSYCHIATRIC: mood stable--good          Past Medical and Surgical History:     Past Medical History:   Diagnosis Date    CF (cystic fibrosis) (H)     Exocrine pancreatic insufficiency     Nocardia infection     Pseudomonas infection     S/P gastrostomy (H) 2002     Past Surgical History:   Procedure Laterality Date    COLONOSCOPY N/A 5/21/2018    Procedure: COMBINED COLONOSCOPY, SINGLE OR MULTIPLE BIOPSY/POLYPECTOMY BY BIOPSY;  Cystic fibrosis, 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;  Surgeon: Margarito Bowman MD;  Location:  GI    COLONOSCOPY N/A 6/27/2022    Procedure: COLONOSCOPY, WITH POLYPECTOMY AND BIOPSY;  Surgeon: Gracie  MD Margarito;  Location: [facility]    GASTROSTOMY TUBE  2002    IR PULMONARY EMBOLIZATION  7/20/2001    PICC INSERTION Left 01/22/2018    4Fr SL BioFlo PICC, 46cm (5cm external) in the L basilic vein w/ tip in the low SVC           Family History:     Family History   Problem Relation Age of Onset    Heart Disease Maternal Grandmother     Heart Disease Maternal Grandfather     Heart Disease Paternal Grandmother     Diabetes No family hx of             Social History:     Social History     Socioeconomic History    Marital status:      Spouse name: Not on file    Number of children: 0    Years of education: Not on file    Highest education level: Not on file   Occupational History    Occupation: nothingGrinder     Employer: [employer]   Tobacco Use    Smoking status: Never    Smokeless tobacco: Never   Vaping Use    Vaping Use: Never used   Substance and Sexual Activity    Alcohol use: No     Alcohol/week: 0.0 standard drinks of alcohol    Drug use: No    Sexual activity: Yes     Partners: Female     Birth control/protection: Pill   Other Topics Concern    Parent/sibling w/ CABG, MI or angioplasty before 65F 55M? Not Asked     Service Not Asked    Blood Transfusions No    Caffeine Concern Not Asked    Occupational Exposure Not Asked    Hobby Hazards Not Asked    Sleep Concern Not Asked    Stress Concern Not Asked    Weight Concern Not Asked    Special Diet Not Asked    Back Care Not Asked    Exercise No    Bike Helmet Not Asked    Seat Belt Not Asked    Self-Exams Not Asked   Social History Narrative    8/15/2019 - .  Lives with his English Bulldog (Jefry) in a house that he designed in [place].  No children.     Social Determinants of Health     Financial Resource Strain: Not on file   Food Insecurity: Not on file   Transportation Needs: Not on file   Physical Activity: Not on file   Stress: Not on file   Social Connections: Not on file   Interpersonal Safety: Not on file  "  Housing Stability: Not on file            Medications:     Current Outpatient Medications   Medication    rimantadine (FLUMADINE) 100 MG tablet    ACE NOT PRESCRIBED, INTENTIONAL,    acetylcysteine (MUCOMYST) 10 % nebulizer solution    amylase-lipase-protease (CREON) 81643-38917 units CPEP per EC capsule    azithromycin (ZITHROMAX) 250 MG tablet    blood glucose (NO BRAND SPECIFIED) test strip    blood glucose monitoring (NO BRAND SPECIFIED) meter device kit    blood glucose monitoring (ULTRA THIN 30G) lancets    budesonide (PULMICORT) 1 MG/2ML neb solution    CALCIUM PO    Continuous Blood Gluc Sensor (FREESTYLE SUKHWINDER 14 DAY SENSOR) MISC    doxycycline hyclate (VIBRAMYCIN) 100 MG capsule    doxycycline hyclate (VIBRAMYCIN) 100 MG capsule    [facility]NA[facility]ED PAT[facility]    ferrous fumarate 65 mg, [place]. FE,-Vitamin C 125 mg (VITRON-C)  MG TABS tablet    insulin aspart (NOVOLOG PENFILL) 100 UNIT/ML cartridge    insulin glargine (LANTUS SOLOSTAR) 100 UNIT/ML pen    insulin pen needle (BD OMI U/F) 32G X 4 MM miscellaneous    levalbuterol (XOPENEX HFA) 45 MCG/ACT inhaler    levalbuterol (XOPENEX) 1.25 MG/3ML neb solution    levofloxacin (LEVAQUIN) 750 MG tablet    MULTIVITAMIN OR    mvw complete (PROBIOTIC FORMULATION) capsule    polyethylene glycol (MIRALAX) 17 GM/Dose powder    sodium chloride inhalant 7 % NEBU neb solution    SYMBICORT 160-4.5 MCG/ACT Inhaler    Syringe/Needle, Disp, 18G X 1\" 5 ML MISC    TRIKAFTA 100-50-75 & 150 MG tablet pack     No current facility-administered medications for this visit.            Physical Exam:   /76   Pulse 88   Ht 1.737 m (5' 8.39\")   Wt 66.7 kg (147 lb 1.6 oz)   SpO2 96%   BMI 22.11 kg/m      Constitutional:   Awake, alert and in no apparent distress     Eyes:   nonicteric     ENT:   oral mucosa moist without lesions, normal tm bilaterally, bilateral mucosal edema      Neck:   Supple without supraclavicular or cervical lymphadenopathy "     Lungs:   Fair air flow.  No crackles.  Left apical rhonchi.  No wheezes.     Cardiovascular:   Normal S1 and S2.  RRR.  No murmur, gallop or rub.     Abdomen:   NABS, soft, nontender, nondistended.      Musculoskeletal:   No edema, digital clubbing present     Neurologic:   Alert and conversant.     Skin:   Warm, dry.  No rash on limited exam.             Data:   All laboratory and imaging data reviewed.      Results from the last week:  Recent Results (from the past 168 hour(s))   COVID-19 Donald RBD Flores & Titer Reflex    Collection Time: 09/26/23  9:35 AM   Result Value Ref Range    SARS-CoV-2 Donald Ab, Interp, S Positive     SARS-CoV-2 Donald Ab, Quant, S >250 U/mL    Patient's Race White     Patient's Ethnicity SEE NOTE    SARS-CoV-2 Nucleocapsid Total Ab    Collection Time: 09/26/23  9:35 AM   Result Value Ref Range    SARS-CoV-2 Nucleocapsid Total Ab, S Positive (A) Negative    Patient's Race White     Patient's Ethnicity SEE NOTE    General PFT Lab (Please always keep checked)    Collection Time: 09/26/23  9:42 AM   Result Value Ref Range    FVC-Pred 4.42 L    FVC-Pre 3.58 L    FVC-%Pred-Pre 80 %    FEV1-Pre 1.76 L    FEV1-%Pred-Pre 49 %    FEV1FVC-Pred 81 %    FEV1FVC-Pre 49 %    FEFMax-Pred 9.61 L/sec    FEFMax-Pre 6.32 L/sec    FEFMax-%Pred-Pre 65 %    FEF2575-Pred 3.43 L/sec    FEF2575-Pre 0.61 L/sec    QQM7698-%Pred-Pre 17 %    ExpTime-Pre 15.09 sec    FIFMax-Pre 4.79 L/sec    FEV1FEV6-Pred 81 %    FEV1FEV6-Pre 55 %   Cystic Fibrosis Culture Aerobic Bacterial    Collection Time: 09/26/23 10:08 AM    Specimen: Expectorate; Sputum   Result Value Ref Range    Culture Culture in progress     Culture 4+ Normal santi     Culture 1+ Non lactose fermenting gram negative bacilli (A)    Acid-Fast Bacilli Culture and Stain    Collection Time: 09/26/23 10:36 AM    Specimen: Expectorate; Sputum   Result Value Ref Range    Acid Fast Stain No acid fast bacilli seen     Acid Fast Stain No acid fast bacilli seen         PFT interpretation:   Spirometry interpretation:  The spirometry shows evidence of severe obstructive lung disease.  When compared to 5/16/2023, the FEV1 and FVC have little change.  The testing meets ATS criteria.    Severity Z-score*   Normal > -1.645   Mild -1.65 to -2.5   Moderate -2.5 to -4   Severe < -4   * accounts for sex, age, height, ancestry     Use z-score to assess airflow obstruction, restriction and DLCO  - FEV1 z-score for airflow obstruction  - TLC z-score for restriction  - DLCO z-score for diffusion defect    Pulmonary exacerbation: mild: Increased vest/bronchodilator/execise    40 minutes spent by me on the date of the encounter doing chart review, history and exam, documentation and further activities per the note  Time documented is excluding time spent for PFT interpretation.

## 2023-09-25 NOTE — PATIENT INSTRUCTIONS
Cystic Fibrosis Self-Care Plan    RECOMMENDATIONS:   Help us provide the best possible care. If you receive a questionnaire from the CF Foundation about your clinic experience today please fill it out.  It should take less than 5 minutes. Let us know what we are doing well and how we can improve.  Kilo, It was great to see you today.  The plan from today:  --doxycycline 100 mg twice daily if needed for 2 weeks  --levofloxacin 750 mg daily for 10 days if needed  --Ok to start Rimantadine in the next week or two  Great job with self cares!      YOUR GOAL:  Stay safe and well.  Enjoy the fall weather!      Cystic Fibrosis :    Keila Barrera  [phone]  [facility] Nurse line:  Keiko CHAPARRO   [phone]     [facility] Fax Number:      [phone]         Cystic Fibrosis Respiratory Therapists:   Chasidy Encinas                          [phone]          Jacklyn Pina   [phone]  Cystic Fibrosis Dietitians:              Zeina Pierre              [phone]                            Ivory Bonilla                        [phone]   Cystic Fibrosis Diabetes Nurse:    Jordyn Rudolph               [phone]    Cystic Fibrosis Social Workers:     Dahiana Guidry              [phone]                     Louann Suarez               [phone]  Cystic Fibrosis Pharmacists:           Linda Stone                              [phone] (pager)         Gracie Allen      [phone]   Cystic Fibrosis Genetic Counselor:   Pretty Pulliam    [phone]    [facility] website:  [email or web address]    We have recently learned about an albuterol neb solution shortage due to a manufacturing delay. There is still a small supply coming in but not enough to meet the current demand. We do not yet have an estimate for when this will become widely available again.    We our asking our CF community to do the following:    Please take time  to check your supply of albuterol neb solution at home. We recommend keeping at least a 2-week supply of albuterol nebs at home in case of illness.    2.  If you have an albuterol inhaler at home, you can use 4 puffs of the inhaler with a spacer in place of the nebulized albuterol at the start of your treatments. It is important to use a spacer for the best technique. If you do not have a spacer at home or have questions on technique, we will be happy to review and send one to your home address. Please also take a moment to check that your albuterol HFA inhaler is available and not .  inhalers may be less effective as the medication loses its potency or power. In some instances your team may suggest another alternative instead of an albuterol inhaler.    3.  Please take care in requesting refills. Albuterol neb solution is a life-saving medication for patients having severe asthma attacks and other emergency respiratory conditions. Let s work together to make sure that albuterol neb solution is available to those who need it urgently.    Reach out to your care team with questions and to confirm your planned alternative for albuterol nebs. [facility] will be the fastest way to connect. If possible, please reserve the nursing line for more urgent concerns as we are short on nursing staff.    Here are some reputable sites with more information:    https://www.cidrap.[facility].edu/resilient-drug-supply/us-liquid-albuterol-vtsrqzzg-expqljnw-jdlnph-after-major-supplier-shuts-down    https://www.[employer]//health/albuterol-shortage    https://www.ashp.org/drug-shortages/current-shortages/drug-shortage-detail.aspx?pa=540&loginreturnUrl=SSOCheckOnly       MRN: [id number]   Clinic Date: 2023   Patient: Nico Morales     Annual Studies:   IGG   Date Value Ref Range Status   2021 1,208 610 - 1,616 mg/dL Final     Immunoglobulin G   Date Value Ref Range Status   2022 1,140 610 - 1,616  mg/dL Final     Insulin   Date Value Ref Range Status   07/26/2011 30 mU/L Final     Comment:     Reference Range:  0-20  +120     There are no preventive care reminders to display for this patient.    Pulmonary Function Tests  FEV1: amount of air you can blow out in 1 second  FVC: total amount of air you can take in and blow out    Your Goals:             Latest Ref Rng & Units 5/16/2023     8:07 AM   PFT   FVC L 3.73  P   FEV1 L 1.76  P   FVC% % 83  P   FEV1% % 49  P      P Preliminary result          Airway Clearance: The Most Important Way to Keep Your Lungs Healthy  Vest Settings:   Hill-Rom Frequencies: 8, 9, 10 Pressure 10 Then, Frequencies 18, 19, 20 Pressure 6     RespirTech: Quick Start with Pressure of     Do each frequency for 5 minutes; Deflate vest after each frequency & cough 3 times before beginning the next setting.    Vest and Neb Therapy should be done 2 times/day.    Good Nutrition Can Improve Lung Function and Overall Health    Take ALL of your vitamins with food    Take 1/2 of your enzymes before EVERY meal/snack and the other 1/2 mid-meal/snack    Wt Readings from Last 3 Encounters:   05/16/23 66.4 kg (146 lb 6.2 oz)   02/21/23 67.4 kg (148 lb 9.6 oz)   11/15/22 68.8 kg (151 lb 11.2 oz)       There is no height or weight on file to calculate BMI.         National CF Foundation Recommendations for BMI in CF Adults: Women: at least 22 Men: at least 23        Controlling Blood Sugars Helps Prevent Lung Infections & Improves Nutrition  Test blood sugar:    In the morning before eating (goal is )    2 hours after a meal (goal is less than 150)    When pre-meal glucose is greater than 150 add correction    At bedtime (if less than 100 eat a snack with 15 grams of carbohydrates  Last A1C Results:   Hemoglobin A1C   Date Value Ref Range Status   05/10/2023 7.0 (H) <5.7 % Final     Comment:     Normal <5.7%   Prediabetes 5.7-6.4%    Diabetes 6.5% or higher     Note: Adopted from ADA  consensus guidelines.   05/04/2021 6.2 (H) 0 - 5.6 % Final     Comment:     Normal <5.7% Prediabetes 5.7-6.4%  Diabetes 6.5% or higher - adopted from ADA   consensus guidelines.           If diabetic, measure A1C every 6 months. Goal: Under 7%    Staying Healthy  Research:  If you are interested in learning about research opportunities or have questions, please contact the CF Research Team at [phone] or [email or web address].    CF Foundation:  Compass is a personalized resource service to help you with the insurance, financial, legal and other issues you are facing.  It's free, confidential and available to anyone with CF.  Ask your  for more information or contact Compass directly at 866-QPGNPJO ([phone]) or compass@cff.org, or learn more at cff.org/compass.

## 2023-09-26 ENCOUNTER — LAB (OUTPATIENT)
Dept: LAB | Facility: CLINIC | Age: 48
End: 2023-09-26
Payer: COMMERCIAL

## 2023-09-26 ENCOUNTER — ALLIED HEALTH/NURSE VISIT (OUTPATIENT)
Dept: CARE COORDINATION | Facility: CLINIC | Age: 48
End: 2023-09-26

## 2023-09-26 ENCOUNTER — OFFICE VISIT (OUTPATIENT)
Dept: PULMONOLOGY | Facility: CLINIC | Age: 48
End: 2023-09-26
Attending: INTERNAL MEDICINE
Payer: COMMERCIAL

## 2023-09-26 VITALS
BODY MASS INDEX: 22.29 KG/M2 | DIASTOLIC BLOOD PRESSURE: 76 MMHG | SYSTOLIC BLOOD PRESSURE: 119 MMHG | WEIGHT: 147.1 LBS | OXYGEN SATURATION: 96 % | HEART RATE: 88 BPM | HEIGHT: 68 IN

## 2023-09-26 DIAGNOSIS — E08.9 DIABETES MELLITUS DUE TO CYSTIC FIBROSIS (H): ICD-10-CM

## 2023-09-26 DIAGNOSIS — Z13.9 RISK AND FUNCTIONAL ASSESSMENT: Primary | ICD-10-CM

## 2023-09-26 DIAGNOSIS — E84.0 CYSTIC FIBROSIS WITH PULMONARY MANIFESTATIONS (H): Primary | ICD-10-CM

## 2023-09-26 DIAGNOSIS — E84.9 DIABETES MELLITUS DUE TO CYSTIC FIBROSIS (H): ICD-10-CM

## 2023-09-26 DIAGNOSIS — E84.9 CYSTIC FIBROSIS (H): ICD-10-CM

## 2023-09-26 DIAGNOSIS — K86.81 EXOCRINE PANCREATIC INSUFFICIENCY: ICD-10-CM

## 2023-09-26 DIAGNOSIS — E84.9 CYSTIC FIBROSIS (H): Primary | ICD-10-CM

## 2023-09-26 DIAGNOSIS — Z00.6 RESEARCH STUDY PATIENT: ICD-10-CM

## 2023-09-26 LAB
EXPTIME-PRE: 15.09 SEC
FEF2575-%PRED-PRE: 17 %
FEF2575-PRE: 0.61 L/SEC
FEF2575-PRED: 3.43 L/SEC
FEFMAX-%PRED-PRE: 65 %
FEFMAX-PRE: 6.32 L/SEC
FEFMAX-PRED: 9.61 L/SEC
FEV1-%PRED-PRE: 49 %
FEV1-PRE: 1.76 L
FEV1FEV6-PRE: 55 %
FEV1FEV6-PRED: 81 %
FEV1FVC-PRE: 49 %
FEV1FVC-PRED: 81 %
FIFMAX-PRE: 4.79 L/SEC
FVC-%PRED-PRE: 80 %
FVC-PRE: 3.58 L
FVC-PRED: 4.42 L

## 2023-09-26 PROCEDURE — 99000 SPECIMEN HANDLING OFFICE-LAB: CPT | Performed by: PATHOLOGY

## 2023-09-26 PROCEDURE — 86769 SARS-COV-2 COVID-19 ANTIBODY: CPT | Mod: 90 | Performed by: PATHOLOGY

## 2023-09-26 PROCEDURE — 87186 SC STD MICRODIL/AGAR DIL: CPT | Performed by: INTERNAL MEDICINE

## 2023-09-26 PROCEDURE — 94375 RESPIRATORY FLOW VOLUME LOOP: CPT | Performed by: INTERNAL MEDICINE

## 2023-09-26 PROCEDURE — 36415 COLL VENOUS BLD VENIPUNCTURE: CPT | Performed by: PATHOLOGY

## 2023-09-26 PROCEDURE — 87206 SMEAR FLUORESCENT/ACID STAI: CPT | Performed by: INTERNAL MEDICINE

## 2023-09-26 PROCEDURE — 87188 SC STD MACROBROTH DIL METH: CPT | Performed by: INTERNAL MEDICINE

## 2023-09-26 PROCEDURE — 99214 OFFICE O/P EST MOD 30 MIN: CPT | Mod: 25 | Performed by: INTERNAL MEDICINE

## 2023-09-26 PROCEDURE — G0463 HOSPITAL OUTPT CLINIC VISIT: HCPCS | Performed by: INTERNAL MEDICINE

## 2023-09-26 RX ORDER — RIMANTADINE HCL 100 MG
100 TABLET ORAL 2 TIMES DAILY
Qty: 180 TABLET | Refills: 3 | Status: SHIPPED | OUTPATIENT
Start: 2023-09-26 | End: 2024-01-16

## 2023-09-26 ASSESSMENT — ANXIETY QUESTIONNAIRES
3. WORRYING TOO MUCH ABOUT DIFFERENT THINGS: NOT AT ALL
2. NOT BEING ABLE TO STOP OR CONTROL WORRYING: NOT AT ALL
1. FEELING NERVOUS, ANXIOUS, OR ON EDGE: NOT AT ALL
5. BEING SO RESTLESS THAT IT IS HARD TO SIT STILL: NOT AT ALL
7. FEELING AFRAID AS IF SOMETHING AWFUL MIGHT HAPPEN: NOT AT ALL
GAD7 TOTAL SCORE: 0
6. BECOMING EASILY ANNOYED OR IRRITABLE: NOT AT ALL
GAD7 TOTAL SCORE: 0

## 2023-09-26 ASSESSMENT — PATIENT HEALTH QUESTIONNAIRE - PHQ9
SUM OF ALL RESPONSES TO PHQ QUESTIONS 1-9: 0
5. POOR APPETITE OR OVEREATING: NOT AT ALL

## 2023-09-26 NOTE — CONFIDENTIAL NOTE
Respiratory Therapist Note:         Vest                Brand: Hill-Rom - traditional Hill Rom: Frequencies 8, 9, 10 at pressure 10 then frequencies 18, 19, 20 at pressure 6., Hill-Rom - Mackay Frequencies: 13-15, intensity 8-10, and RespirTech RespirTech: Quickstart at a pressure of .                Cough Pause: Cough Pause; Yes                Vest Garment Size: Adult Small, unknown Encourage, Mackay custom                Last Fitting Date: prn                Frequency of therapy: 14-21 times per week                Concerns:          Exercise (purposeful and aerobic for >20 minutes each session): NO.                Does this qualify as additional airway clearance: No         Alternative Airway Clearance:          Nebulized Medications                Bronchodilators: Xopenex                Mucolytic: Mucomyst am and 7% Hypertonic Saline pm                Antibiotics: none                Additional Inhaled Medications: ICS (budesonide once-twice daily), Xopenex MDI as needed, Symbicort 2 puffs twice daily                Spacer Use:          Review Cleaning: Yes. Microwave bottle sterilizer, meets CF guidelines with technique         Education and Transition Information                Correct order of inhaled medications: Yes                Mechanism of Action of inhaled medications: Yes                Frequency of inhaled medications: Yes                Dosage of inhaled medications: Yes                Other:          Home Care:                Nebulizer Cups (Brand/Type): [employer] LC                Nebulizer Compressor                            Year Purchased: old, has 50psi at home, and portable style at [place] home                            [facility], Phone: [phone], Fax: [phone]                Nebulizer Supply Company:                            [facility], Phone: [phone], Fax: [phone]         Oxygen: none       Pulmonary Rehab                Site: [place] [address]                Date Completed:  far past (15+ years ago)         Plan of Care and Goals for next visit:

## 2023-09-26 NOTE — NURSING NOTE
"Nico Morales is a 47 year old year old who is being seen for Cystic Fibrosis (F/u)      Medications reviewed and Vital signs taken.    Specimen Collection Type: Sputum    Order(s) placed: AFB (Acid Fast Bacilli), CF Aerobic Bacterial, and Provider Requested AFB    *IF AFB order placed - please enter \"PRIORITIZE AFB\" to order comments.       Lab Results   Component Value Date    ACIDFAST No acid fast bacilli seen 05/16/2023    ACIDFAST No acid fast bacilli seen 05/16/2023    ACIDFAST No acid fast bacilli seen 05/16/2023         Lab Results   Component Value Date    AFBSMS Negative for acid fast bacteria 05/04/2021    AFBSMS  05/04/2021     Assayed at [facility] [address] [place] [id number] [phone]                   "

## 2023-09-26 NOTE — PROGRESS NOTES
"Adult Cystic Fibrosis Program  Annual Psychosocial Assessment    Presenting information:   Nico \"Kilo\" Andrew is a 47-year-old male with cystic fibrosis, presenting in clinic today for a routine appointment with his primary CF provider, Dr. Machado.  Met with Kilo to introduce self as new  and complete an updated annual psychosocial assessment.  Kilo was unaccompanied in clinic room today during SW visit.     Living Situation:   Kilo lives with his wife Madisyn, Madisyn's 4 children (50% of the time) in [place]. They built their home 2 years ago and enjoy living there. 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 He reports things are going well and he denies any concerns about his living situation.     Family Constellation:   Kilo grew up in [place] and was raised by his biological parents (Epifanio and Honey). He has one older sister (Rosa). His parents live [place], they are retired, are in good health and enjoy travel. Rosa is  with two children and lives in [facility].  Kilo reports that his family is doing \"great\", he sees his parents, his sister and her family regularly and is very close with them. His in-laws also live nearby.     Kilo and his first wife  and he got remarried to Madisyn in 2021. Kilo does not have biological children, Madisyn has 4 children from a previous marriage: Enrique (age 16), Jackeline (14), Flory (9) and Shabnam (6).        Social Support:   Kilo reports very good social support. He gets along well with his family, friends, co-workers and viridiana community. 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 He reports that he has a 2nd cousin (twice removed) that also has CF, they keep in touch and do discuss questions and experiences regarding CF with each other.      Adjustment to Illness:   Kilo was diagnosed with CF at 18 months, he reports that the diagnosis came after he wasn't gaining weight. " " He received his pediatric care through the [facility] and experienced few complications while growing up.  He recalls that his parents took very good care of him, he was not hospitalized until his 20's for CF related issues.       Kilo feels he is doing \"good\" from a physical health perspective. He continues to take Trikafta and feels this medication has been life changing for him. He does continue to vest 2-3 times per day. Clinically, he has CF lung disease with severe obstruction, pancreatic insufficiency and CF related diabetes with good blood sugar control. Kilo continues to be very disciplined and dedicated to maintaining a healthy lifestyle.  He reports excellent adherence with medications and other CF treatment recommendations.  Kilo gets exercise regularly with equipment he has at home.       Kilo reports that he is an \"open book\" when he comes to openness regarding his CF diagnosis.  He stated that he vests in front of Madisyn and her children and has been very open with them.     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  He reports that he has the software to complete this and does plan to eventually. He is comfortable with his wife Madisyn to make medical decisions on his behalf if he were unable to make medical decisions for himself.     Advance Care Planning:  Not discussed today.    Kilo has not yet had a ACP visit, it has been offered to him in the past and he has been open to it but it was never scheduled.  This was not discussed today as he has had much improved stability with Trikafta.     Education:   Kilo completed a Bachelor's Degree in Biology and Math from [employer] in [place].       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 He enjoys his work and has built a solid clientele base. His work hours are variable but flexible, and he works close to home and has a private office space. He does have the option to work from home when needed, and often works remotely when traveling. He has 2 long-term disability policies purchased through financial planning associations. Kilo has health insurance through his work.      Mental Health:   Kilo describes his current mood as \"good\" and denies any current or past symptoms of depression, anxiety, or any other mental health disorder. He reports \"normal\" stress levels overall and describes typical stressors due to life with kids and running a business as expected and easy for him to navigate. Mindy/spirituality are important to him, he identifies as Baptism (Hinduism) and is a member at [facility], he and Madisyn switched to this [employer] community together as they were looking for something smaller.  In the past after his divorce he did participate in separation and divorce care groups through his [employer] which he found very helpful.       Kilo completed the following screens today:  VANE-7 Score: 0, indicating minimal symptoms of anxiety and described as not difficult at all in daily functioning.    PHQ-9 Score: 0, indicating minimal symptoms of depression and described as not difficult at all in daily functioning.        Kilo agreed with the scores and interpretation of screens above.  He denied having additional symptoms not addressed on these screens.       Chemical Health:   Kilo denies the use of tobacco, marijuana or illicit drugs.  He does not drink alcohol and denies any history of chemical dependency issues.           Finances:   Kilo has income through wages and denies any financial concerns.      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     Recreation/Leisure Interests:   Kilo enjoys spending time with friends, family and being outdoors. He is enjoying traveling to [place] where their condo is and is able to use a boat as they provide this amenity. He also has recently taken up salt water fishing and enjoys playing in the pool and being with his step-children.     Intervention:  -Introduction to SW  -Psychosocial Assessment    Assessment:  Kilo appeared to be open in his responses. Kilo reports that he enjoys being a stepfather and has good family and social support. Kilo did not express any mental health or physical health concerns today. He continues to enjoy his work and although work can be stressful at times, Kilo feels as though he's able to cope with normal, everyday stressors well. Kilo seems to be psychosocially stable overall, with access to relevant resources and supports.  No concerns expressed/noted. Recommend f/u SW support as noted in plan below.      Plan:  Re-consult for any psychosocial needs that may arise.    Complete psychosocial assessment annually.  Continue to follow for regular clinic consult.        Dahiana Guidry [facility]  Adult Cystic Fibrosis   Ph: [phone], Pager: [phone]

## 2023-09-27 LAB
SARS-COV-2 AB SERPL IA-ACNC: >250 U/ML
SARS-COV-2 AB SERPL QL IA: POSITIVE
SARS-COV-2 AB SERPL-IMP: POSITIVE

## 2023-10-02 ENCOUNTER — TELEPHONE (OUTPATIENT)
Dept: PULMONOLOGY | Facility: CLINIC | Age: 48
End: 2023-10-02

## 2023-10-02 NOTE — TELEPHONE ENCOUNTER
PA Initiation    Medication: TRIKAFTA 100-50-75 & 150 MG PO TBPK  Insurance Company: JOSEX - Phone [phone] Fax [phone]  Pharmacy Filling the Rx:    Filling Pharmacy Phone:    Filling Pharmacy Fax:    Start Date: 10/2/2023    Called [facility] ([phone]) spoke to Yamileth who will fax Trikafta PA form to [phone]

## 2023-10-03 LAB
BACTERIA SPT CULT: ABNORMAL

## 2023-10-05 NOTE — TELEPHONE ENCOUNTER
PA Initiation    Medication: TRIKAFTA 100-50-75 & 150 MG PO TBPK  Insurance Company: LYNN - Phone [phone] Fax [phone]  Pharmacy Filling the Rx:    Filling Pharmacy Phone:    Filling Pharmacy Fax:    Start Date: 10/2/2023    Key: [id number] - tried to submit on CMM per fax stating request CMM but received message that medication was refill too soon     Faxed PA form, genotype and notes to [facility] [phone]

## 2023-10-10 DIAGNOSIS — E84.8 DIABETES MELLITUS RELATED TO CF (CYSTIC FIBROSIS) (H): ICD-10-CM

## 2023-10-10 DIAGNOSIS — E08.9 DIABETES MELLITUS RELATED TO CF (CYSTIC FIBROSIS) (H): ICD-10-CM

## 2023-10-11 RX ORDER — INSULIN GLARGINE 100 [IU]/ML
INJECTION, SOLUTION SUBCUTANEOUS
Qty: 15 ML | Refills: 1 | Status: SHIPPED | OUTPATIENT
Start: 2023-10-11

## 2023-10-11 NOTE — TELEPHONE ENCOUNTER
insulin glargine (LANTUS SOLOSTAR) 100 UNIT/ML pen          Last Written Prescription Date:  7/3/23  Last Fill Quantity: 15 ml,   # refills: 0  Last Office Visit : 6/13/23  Future Office visit:  None noted    Routing refill request to provider for review/approval because:  Insulin - refilled per clinic

## 2023-10-11 NOTE — TELEPHONE ENCOUNTER
Prior Authorization Approval    Medication: TRIKAFTA 100-50-75 & 150 MG PO TBPK  Authorization Effective Date: 10/9/2023  Authorization Expiration Date: 4/6/2024  Approved Dose/Quantity: 84 for 28 ds   Reference #:     Insurance Company: [employer] - Phone [phone] Fax [phone]  Expected CoPay: $    CoPay Card Available:      Financial Assistance Needed:   Which Pharmacy is filling the prescription: [facility] - [place], [place] - [address]  Pharmacy Notified:   Patient Notified:

## 2023-10-30 DIAGNOSIS — E84.8 DIABETES MELLITUS RELATED TO CF (CYSTIC FIBROSIS) (H): ICD-10-CM

## 2023-10-30 DIAGNOSIS — E08.9 DIABETES MELLITUS RELATED TO CF (CYSTIC FIBROSIS) (H): ICD-10-CM

## 2023-10-30 RX ORDER — PEN NEEDLE, DIABETIC 32GX 5/32"
NEEDLE, DISPOSABLE MISCELLANEOUS
Qty: 200 EACH | Refills: 4 | Status: SHIPPED | OUTPATIENT
Start: 2023-10-30

## 2023-10-30 NOTE — TELEPHONE ENCOUNTER
Pen needle    200 each 4 12/22/2022 6/13/2023  [facility] [place]      Diana Lee MD  Endocrinology, Diabetes, and Metabolism

## 2023-11-07 LAB
ACID FAST STAIN (ARUP): ABNORMAL
ORGANISM (ARUP): ABNORMAL

## 2023-11-08 LAB — ORGANISM (ARUP): ABNORMAL

## 2023-11-09 DIAGNOSIS — E84.0 CYSTIC FIBROSIS WITH PULMONARY MANIFESTATIONS (H): Primary | ICD-10-CM

## 2023-11-14 ENCOUNTER — LAB (OUTPATIENT)
Dept: LAB | Facility: OTHER | Age: 48
End: 2023-11-14
Payer: COMMERCIAL

## 2023-11-14 DIAGNOSIS — E84.0 CYSTIC FIBROSIS WITH PULMONARY MANIFESTATIONS (H): ICD-10-CM

## 2023-11-14 PROCEDURE — 99000 SPECIMEN HANDLING OFFICE-LAB: CPT

## 2023-11-14 PROCEDURE — 87116 MYCOBACTERIA CULTURE: CPT | Mod: 90

## 2023-11-14 PROCEDURE — 87206 SMEAR FLUORESCENT/ACID STAI: CPT | Mod: 90

## 2023-11-16 ENCOUNTER — ANCILLARY PROCEDURE (OUTPATIENT)
Dept: CT IMAGING | Facility: CLINIC | Age: 48
End: 2023-11-16
Attending: INTERNAL MEDICINE
Payer: COMMERCIAL

## 2023-11-16 DIAGNOSIS — E84.0 CYSTIC FIBROSIS WITH PULMONARY MANIFESTATIONS (H): ICD-10-CM

## 2023-11-16 PROCEDURE — 71250 CT THORAX DX C-: CPT | Mod: GC | Performed by: RADIOLOGY

## 2023-11-17 ENCOUNTER — TELEPHONE (OUTPATIENT)
Dept: PULMONOLOGY | Facility: CLINIC | Age: 48
End: 2023-11-17
Payer: COMMERCIAL

## 2023-11-17 NOTE — TELEPHONE ENCOUNTER
Notified patient that we are aware that he had his ct scan completed, waiting on the formal read from the radiologist. Will reach out to patient once formal read is in.    Alley Hendricks RN

## 2023-12-01 DIAGNOSIS — E84.9 CYSTIC FIBROSIS (H): Primary | ICD-10-CM

## 2023-12-07 ENCOUNTER — TELEPHONE (OUTPATIENT)
Dept: PULMONOLOGY | Facility: CLINIC | Age: 48
End: 2023-12-07
Payer: COMMERCIAL

## 2023-12-07 NOTE — LETTER
December 22, 2023     [employer]  Attention [facility] Appeals Department  [address] [address] Attn: [id number]  [place], [place]  [address]     Nico Morlaes is a patient under my care at the [facility] for treatment and management of his cystic fibrosis. I am writing to request an appeals review for my patient, Nico. Mr. Morales has cystic fibrosis and requires an inhaled bronchodilator.  On 3/6/2013, he was prescribed Levalbuterol in response to increased heart rate and heart palpitations with use of albuterol.     I have prescribed Levalbuterol for Nico, as an alternative to albuterol.  I and the pulmonologists at the CF Center frequently use Levalbuterol for our patients who cannot tolerate albuterol, and have found it to offer bronchodilation without the adverse event of an intolerable increased heart rate.  However, coverage for this prescription was denied.  I am requesting that you offer coverage for Levalbuterol for Nico Morales.    Since switching to Levalbuterol in 2013, he's been without intolerable adverse events.  Thus, he has shown failure on the insurance plan's preferred drug and therapeutic success on the insurance plan's non preferred drug.     Thank you for your time and consideration.    Sincerely,

## 2023-12-11 NOTE — TELEPHONE ENCOUNTER
Central Prior Authorization Team   Phone: [phone]    PA Initiation    Medication: LEVALBUTEROL TARTRATE 45 MCG/ACT IN AERO  Insurance Company: [employer]ok[employer]JAM[employer] - Phone [phone] Fax [phone]  Pharmacy Filling the Rx: [id number] - [place], [place] - [address]  Filling Pharmacy Phone: [phone]  Filling Pharmacy Fax:    Start Date: 12/11/2023

## 2023-12-14 NOTE — TELEPHONE ENCOUNTER
PRIOR AUTHORIZATION FOLLOW-UP  Per Emmanuelle at [facility] - they have received the PA request. States a determination can take up to 15 days from receiving.

## 2023-12-17 NOTE — TELEPHONE ENCOUNTER
PRIOR AUTHORIZATION DENIED    Medication: LEVALBUTEROL TARTRATE 45 MCG/ACT IN AERO  Insurance Company: MAIACINDY - Phone [phone] Fax [phone]  Denial Date: 12/15/2023  Denial Reason(s): MUST TRY/FAIL TWO GENERIC ALTERNATIVES -       Appeal Information: IF PROVIDER WOULD LIKE TO APPEAL THIS DECISION PLEASE PROVIDE THE PA TEAM WITH A LETTER OF MEDICAL NECESSITY      Patient Notified: No

## 2023-12-21 ENCOUNTER — PHARMACY VISIT (OUTPATIENT)
Dept: ADMINISTRATIVE | Facility: CLINIC | Age: 48
End: 2023-12-21
Payer: COMMERCIAL

## 2023-12-21 DIAGNOSIS — E84.0 CYSTIC FIBROSIS WITH PULMONARY MANIFESTATIONS (H): ICD-10-CM

## 2023-12-21 RX ORDER — PEDIATRIC MULTIVIT 61/D3/VIT K 1500-800
CAPSULE ORAL
Qty: 30 CAPSULE | Refills: 11 | Status: SHIPPED | OUTPATIENT
Start: 2023-12-21

## 2023-12-21 NOTE — PROGRESS NOTES
"   Cystic Fibrosis Clinical Follow Up Assessment  Summary Notes  Spoke to Kilo via [facility] for CF assessment. Set up order for Trikafta and MVW Probiotic to del 12/26/23. He did not need acetylcysteine today.   Trikafta - Pt reports 'Doing wonderful. Trikafta is a miracle. No side effects and no missed doses. In 4 years I think I have only missed 3 or 4 pills.' PDC = 0.97   CF: Denies health, allergy or medication changes today. No questions or concerns. Next OV 1/16/2024.   -TM clinicians will continue biannual assessments.        Care Details    What are the patient's goals for this therapy?   ? stable pulmonary cqhooe31/13/22 - \"No new goals. Just stay healthy.\"      Did you identify any patient barriers to access and successful treatment?   ? No barriers to access identified      Is it appropriate to collect a PDC at this time? [QA Metric] (An MPR or PDC would not be appropriate for cycled medications or if the patient is on therapy   ? Yes      Document PDC   ? 0.97      Has the patient missed doses inappropriately?   ? No      Please select CURRENT side effect(s) for monitoring:   ? None       Soheila Cantrell, Pharm.D.[facility]  [address] [address]  [email or web address]  Office: [phone]            "

## 2023-12-22 NOTE — TELEPHONE ENCOUNTER
RN completed LMN for Levalbuterol. Reviewed with Dr. Machado. Letter routed to Chelsy Gonzalez to submit to insurance and appeals team.    KAMINI Elliott

## 2023-12-23 NOTE — TELEPHONE ENCOUNTER
Central Prior Authorization Team   Phone: [phone]    Medication Appeal Initiation    Medication: LEVALBUTEROL TARTRATE 45 MCG/ACT IN AERO  Appeal Start Date:  12/23/2023  Insurance Company: [employer]   Insurance Phone: [phone]  Insurance Fax: [phone]  Comments:       FAXED LETTER OF MEDICAL NECESSITY AND CHART NOTES TO INSURANCE -

## 2023-12-27 NOTE — TELEPHONE ENCOUNTER
MEDICATION APPEAL APPROVED    Medication: LEVALBUTEROL TARTRATE 45 MCG/ACT IN AERO  Authorization Effective Date: 12/23/2023  Authorization Expiration Date: 12/26/2024  Appeal Insurance Company: [employer]  Filling Pharmacy: [id number] - [place], [place] - [address]  Patient Notified: YES (pharmacy will notify the patient when ready)  Comments:

## 2024-01-09 ENCOUNTER — TELEPHONE (OUTPATIENT)
Dept: ENDOCRINOLOGY | Facility: CLINIC | Age: 49
End: 2024-01-09
Payer: COMMERCIAL

## 2024-01-09 DIAGNOSIS — A49.8 PSEUDOMONAS INFECTION: ICD-10-CM

## 2024-01-09 DIAGNOSIS — E84.0 CYSTIC FIBROSIS WITH PULMONARY MANIFESTATIONS (H): ICD-10-CM

## 2024-01-09 DIAGNOSIS — M85.9 LOW BONE DENSITY: ICD-10-CM

## 2024-01-09 DIAGNOSIS — M81.0 OSTEOPOROSIS, UNSPECIFIED OSTEOPOROSIS TYPE, UNSPECIFIED PATHOLOGICAL FRACTURE PRESENCE: Primary | ICD-10-CM

## 2024-01-09 NOTE — TELEPHONE ENCOUNTER
TALKED TO PT AND SCHEDULED HIM FOR RETURN DIABETES VISIT WITH DR COSTA AND CALLED  INFUSION   TO SCHEDULE   LAST TIME  FEB 2023 WHEN HE HAD HIS LAST INFUSION    SCHEDULED FOR HIS INFUSION  02/22 AT  LOCATION    Nu Frias on 1/9/2024 at 3:05 PM     Unit RN to OR RN

## 2024-01-09 NOTE — TELEPHONE ENCOUNTER
Provider notified to update Reclast therapy plan order for 2024.   Anne Nettles RN on 1/9/2024 at 2:37 PM     [facility] Call Center    Phone Message    May a detailed message be left on voicemail: yes     Reason for Call: Other: Patient calling to request current infusion orders to be entered for reclast injections. Please call to discuss.      Action Taken: Message routed to:  [facility] endo    Travel Screening: Not Applicable

## 2024-01-11 ENCOUNTER — TELEPHONE (OUTPATIENT)
Dept: PULMONOLOGY | Facility: CLINIC | Age: 49
End: 2024-01-11
Payer: COMMERCIAL

## 2024-01-11 LAB
ACID FAST STAIN (ARUP): NORMAL

## 2024-01-11 NOTE — TELEPHONE ENCOUNTER
COPAY CARD OBTAINED for 2024    Medication: TRIKAFTA 100-50-75 & 150 MG PO TBPK  Sponsor: Matthew  Copay card Monthly Max Amount: $ 3,500  Copay card Annual Amount: $ 20,000      PA Initiation    Medication: TRIKAFTA 100-50-75 & 150 MG PO TBPK  Insurance Company: [employer] - Phone [phone] Fax [phone]  Pharmacy Filling the Rx:    Filling Pharmacy Phone:    Filling Pharmacy Fax:    Start Date: 1/11/2024    Key: [id number]

## 2024-01-12 NOTE — TELEPHONE ENCOUNTER
Prior Authorization Approval    Medication: TRIKAFTA 100-50-75 & 150 MG PO TBPK  Authorization Effective Date: 1/1/2024  Authorization Expiration Date: 1/11/2025  Approved Dose/Quantity: 84 tabs per 28 days  Reference #: Key: [id number]   Insurance Company: [employer] - Phone [phone] Fax [phone]  Expected CoPay: $    CoPay Card Available: Yes    Financial Assistance Needed: Co-pay card on file  Which Pharmacy is filling the prescription: [facility] - [place], [place] - [address]  Pharmacy Notified: Not needed  Patient Notified: Not needed

## 2024-01-15 RX ORDER — HEPARIN SODIUM (PORCINE) LOCK FLUSH IV SOLN 100 UNIT/ML 100 UNIT/ML
5 SOLUTION INTRAVENOUS
Status: CANCELLED | OUTPATIENT
Start: 2024-03-04

## 2024-01-15 RX ORDER — ZOLEDRONIC ACID 5 MG/100ML
5 INJECTION, SOLUTION INTRAVENOUS ONCE
Status: CANCELLED
Start: 2024-03-04

## 2024-01-15 RX ORDER — ALBUTEROL SULFATE 0.83 MG/ML
2.5 SOLUTION RESPIRATORY (INHALATION)
Status: CANCELLED | OUTPATIENT
Start: 2024-03-04

## 2024-01-15 RX ORDER — ALBUTEROL SULFATE 90 UG/1
1-2 AEROSOL, METERED RESPIRATORY (INHALATION)
Status: CANCELLED
Start: 2024-03-04

## 2024-01-15 RX ORDER — METHYLPREDNISOLONE SODIUM SUCCINATE 125 MG/2ML
125 INJECTION, POWDER, LYOPHILIZED, FOR SOLUTION INTRAMUSCULAR; INTRAVENOUS
Status: CANCELLED
Start: 2024-03-04

## 2024-01-15 RX ORDER — EPINEPHRINE 1 MG/ML
0.3 INJECTION, SOLUTION, CONCENTRATE INTRAVENOUS EVERY 5 MIN PRN
Status: CANCELLED | OUTPATIENT
Start: 2024-03-04

## 2024-01-15 RX ORDER — MEPERIDINE HYDROCHLORIDE 25 MG/ML
25 INJECTION INTRAMUSCULAR; INTRAVENOUS; SUBCUTANEOUS EVERY 30 MIN PRN
Status: CANCELLED | OUTPATIENT
Start: 2024-03-04

## 2024-01-15 RX ORDER — ACETAMINOPHEN 325 MG/1
325 TABLET ORAL
Status: CANCELLED | OUTPATIENT
Start: 2024-03-04

## 2024-01-15 RX ORDER — DIPHENHYDRAMINE HYDROCHLORIDE 50 MG/ML
50 INJECTION INTRAMUSCULAR; INTRAVENOUS
Status: CANCELLED
Start: 2024-03-04

## 2024-01-15 RX ORDER — HEPARIN SODIUM,PORCINE 10 UNIT/ML
5-20 VIAL (ML) INTRAVENOUS DAILY PRN
Status: CANCELLED | OUTPATIENT
Start: 2024-03-04

## 2024-01-15 NOTE — PATIENT INSTRUCTIONS
Cystic Fibrosis Self-Care Plan    RECOMMENDATIONS:   Help us provide the best possible care. If you receive a questionnaire from the CF Foundation about your clinic experience today please fill it out.  It should take less than 5 minutes. Let us know what we are doing well and how we can improve.  Kilo, It was great to see you today.  The plan from today:  --if bleeding is persistent let me know  --vitamin K as needed along with your inhaler  --will plan to repeat CT chest in the summer  --sputum for AFB  Great job with self care!    YOUR GOAL:  Stay safe and well.  Enjoy the winter weather!      Cystic Fibrosis :    Keila Barrera  [phone]  [place] [facility] [place] Nurse line:  Keiko CHAPARRO   [phone]     [facility] [place] Fax Number:      [phone]         Cystic Fibrosis Respiratory Therapists:   Chasidy Encinas                          [phone]          Jacklyn Pina   [phone]  Cystic Fibrosis Dietitians:              Zeina Pierre              [phone]                            Ivory Bonilla                        [phone]   Cystic Fibrosis Diabetes Nurse:    Jordyn Rudolph               [phone]    Cystic Fibrosis Social Workers:     Dahiana Guidry              [phone]                     Louann Suarez               [phone]  Cystic Fibrosis Pharmacists:           Linda Stone                              [phone] (pager)         Gracie Allen      [phone]   Cystic Fibrosis Genetic Counselor:   Pretty Pulliam    [phone]    [place] [facility] website:  [email or web address]    We have recently learned about an albuterol neb solution shortage due to a manufacturing delay. There is still a small supply coming in but not enough to meet the current demand. We do not yet have an estimate for when this will become widely available again.    We our asking our CF community to do the following:    Please take time to check  your supply of albuterol neb solution at home. We recommend keeping at least a 2-week supply of albuterol nebs at home in case of illness.    2.  If you have an albuterol inhaler at home, you can use 4 puffs of the inhaler with a spacer in place of the nebulized albuterol at the start of your treatments. It is important to use a spacer for the best technique. If you do not have a spacer at home or have questions on technique, we will be happy to review and send one to your home address. Please also take a moment to check that your albuterol HFA inhaler is available and not .  inhalers may be less effective as the medication loses its potency or power. In some instances your team may suggest another alternative instead of an albuterol inhaler.    3.  Please take care in requesting refills. Albuterol neb solution is a life-saving medication for patients having severe asthma attacks and other emergency respiratory conditions. Let s work together to make sure that albuterol neb solution is available to those who need it urgently.    Reach out to your care team with questions and to confirm your planned alternative for albuterol nebs. [facility] will be the fastest way to connect. If possible, please reserve the nursing line for more urgent concerns as we are short on nursing staff.    Here are some reputable sites with more information:    https://www.cidrap.[facility].edu/resilient-drug-supply/us-liquid-albuterol-rxffuoxz-onfvoctr-iuroeh-after-major-supplier-shuts-down    https://www.[employer]//health/albuterol-shortage    https://www.ashp.org/drug-shortages/current-shortages/drug-shortage-detail.aspx?vw=732&loginreturnUrl=SSOCheckOnly       MRN: [id number]   Clinic Date: January 15, 2024   Patient: Nico Morales     Annual Studies:   IGG   Date Value Ref Range Status   2021 1,208 610 - 1,616 mg/dL Final     Immunoglobulin G   Date Value Ref Range Status   2022 1,140 610 - 1,616 mg/dL Final      Insulin   Date Value Ref Range Status   07/26/2011 30 mU/L Final     Comment:     Reference Range:  0-20  +120     There are no preventive care reminders to display for this patient.    Pulmonary Function Tests  FEV1: amount of air you can blow out in 1 second  FVC: total amount of air you can take in and blow out    Your Goals:             Latest Ref Rng & Units 9/26/2023     9:42 AM   PFT   FVC L 3.58  P   FEV1 L 1.76  P   FVC% % 80  P   FEV1% % 49  P      P Preliminary result          Airway Clearance: The Most Important Way to Keep Your Lungs Healthy  Vest Settings:   Hill-Rom Frequencies: 8, 9, 10 Pressure 10 Then, Frequencies 18, 19, 20 Pressure 6     RespirTech: Quick Start with Pressure of     Do each frequency for 5 minutes; Deflate vest after each frequency & cough 3 times before beginning the next setting.    Vest and Neb Therapy should be done 2 times/day.    Good Nutrition Can Improve Lung Function and Overall Health    Take ALL of your vitamins with food    Take 1/2 of your enzymes before EVERY meal/snack and the other 1/2 mid-meal/snack    Wt Readings from Last 3 Encounters:   09/26/23 66.7 kg (147 lb 1.6 oz)   05/16/23 66.4 kg (146 lb 6.2 oz)   02/21/23 67.4 kg (148 lb 9.6 oz)       There is no height or weight on file to calculate BMI.         National CF Foundation Recommendations for BMI in CF Adults: Women: at least 22 Men: at least 23        Controlling Blood Sugars Helps Prevent Lung Infections & Improves Nutrition  Test blood sugar:    In the morning before eating (goal is )    2 hours after a meal (goal is less than 150)    When pre-meal glucose is greater than 150 add correction    At bedtime (if less than 100 eat a snack with 15 grams of carbohydrates  Last A1C Results:   Hemoglobin A1C   Date Value Ref Range Status   05/10/2023 7.0 (H) <5.7 % Final     Comment:     Normal <5.7%   Prediabetes 5.7-6.4%    Diabetes 6.5% or higher     Note: Adopted from ADA consensus  guidelines.   05/04/2021 6.2 (H) 0 - 5.6 % Final     Comment:     Normal <5.7% Prediabetes 5.7-6.4%  Diabetes 6.5% or higher - adopted from ADA   consensus guidelines.           If diabetic, measure A1C every 6 months. Goal: Under 7%    Staying Healthy  Research:  If you are interested in learning about research opportunities or have questions, please contact the CF Research Team at [phone] or [email or web address].    CF Foundation:  Compass is a personalized resource service to help you with the insurance, financial, legal and other issues you are facing.  It's free, confidential and available to anyone with CF.  Ask your  for more information or contact Compass directly at 763-VIKOQWK ([phone]) or compass@cff.org, or learn more at cff.org/compass.

## 2024-01-15 NOTE — PROGRESS NOTES
[facility]  January 16, 2024         Assessment and Plan:   Nico Morales is a 48 year old male with cystic fibrosis.    1. CF lung disease with moderate obstruction:  Kilo reports he is having some difficulty with environmental exposures.  His teenage children and their friends are wearing perfumes and colognes that result in having a tight chest and hemoptysis.  The hemoptysis can be several tablespoons but is always self limited.  He and his wife are working with their children and friends to limit these exposures.  We discuss pursuing a chest CT with IV contrast and an IR consult for possible ALE.  He would like to see if he can prevent the episodes from occurring.  He does show evidence of stable pfts.  He historically has grown pseudomonas in his sputum. He did have recent positive AFB but fortunately the repeat was negative. We did review his CT shest in detail today.  At this time I have recommended to Kilo:  -- He should continue to do his vest and nebulized therapies 2-3 times daily  -- repeat AFB sputum today  -- He does have both levofloxacin and doxycycline available to him for exacerbation.  Fortunately he has not needed to use these in many months.     2. Pancreatic Insufficiency/GI: Kilo has no new symptoms consistent with worsening malabsorption.    - continue the present dose of pancreatic enzymes  - continue vitamin supplementation.     3.  CF TR modulator therapy: Kilo is on Trikafta and tolerating well.  His hepatic panel and CK today are stable.       4.  Cystic fibrosis related diabetes: Kilo reports good control of his blood sugars. His A1C is stable and he is followed by endocrinology.     5.  Psychosocial: Kilo is  and in a stable relationship.  Reports all his kids are doing well.  Work is good.  His wife in undergoing treatment for breast cancer.     6.  Influenza prophylaxis: Kilo is on rimantadine for the flu  season.    7. HCM:  We reviewed Kilo's labs today in detail.    Annual studies due: done today  Recent Labs   Lab Test 01/16/24  0824   IGG 1,123     Immunizations: UTD  Colonoscopy: 6/2025    Jaye Machado MD MPH  Associate Professor of Medicine  Pulmonary, Allergy, Critical Care and Sleep Medicine        Interval History:     Kilo describes stable cough and sputum except for during these times of exposure.  He denies chest congestion unless exposure.  He is doing his vest 3 times per day.           Review of Systems:     CONSTITUTIONAL: no fever, no chills, no sweats, no change in weight, no change in energy, no change in appetite    INTEGUMENTARY/SKIN: no rash, no obvious new lesions    ENT/MOUTH: no sore throat, no new sinus pain, no new nasal drainage, no new nasal congestion, no change to ear ringing     RESPIRATORY: see interval history    CV: no chest pain, no palpitations, no peripheral edema    GI: no nausea, no vomiting, no change in stools, no fatty stools, no GERD    : negative urinary    MUSCULOSKELETAL: no myalgias, no arthralgias    ENDOCRINE: good control    NEURO:  rare headache    SLEEP: challenging    PSYCHIATRIC: mood stable--good          Past Medical and Surgical History:     Past Medical History:   Diagnosis Date    CF (cystic fibrosis) (H)     Exocrine pancreatic insufficiency     Nocardia infection     Pseudomonas infection     S/P gastrostomy (H) 2002     Past Surgical History:   Procedure Laterality Date    COLONOSCOPY N/A 5/21/2018    Procedure: COMBINED COLONOSCOPY, SINGLE OR MULTIPLE BIOPSY/POLYPECTOMY BY BIOPSY;  Cystic fibrosis, 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;  Surgeon: Margarito Bowman MD;  Location: [facility]    COLONOSCOPY N/A 6/27/2022    Procedure: COLONOSCOPY, WITH POLYPECTOMY AND BIOPSY;  Surgeon: Margarito Bowman MD;  Location: [facility]    GASTROSTOMY TUBE  2002    IR PULMONARY EMBOLIZATION  7/20/2001    PICC INSERTION Left 01/22/2018    4Fr SL BioFlo PICC, 46cm (5cm  external) in the L basilic vein w/ tip in the low SVC           Family History:     Family History   Problem Relation Age of Onset    Heart Disease Maternal Grandmother     Heart Disease Maternal Grandfather     Heart Disease Paternal Grandmother     Diabetes No family hx of             Social History:     Social History     Socioeconomic History    Marital status:      Spouse name: Not on file    Number of children: 0    Years of education: Not on file    Highest education level: Not on file   Occupational History    Occupation: financial palnner     Employer: [employer]   Tobacco Use    Smoking status: Never    Smokeless tobacco: Never   Vaping Use    Vaping Use: Never used   Substance and Sexual Activity    Alcohol use: No     Alcohol/week: 0.0 standard drinks of alcohol    Drug use: No    Sexual activity: Yes     Partners: Female     Birth control/protection: Pill   Other Topics Concern    Parent/sibling w/ CABG, MI or angioplasty before 65F 55M? Not Asked     Service Not Asked    Blood Transfusions No    Caffeine Concern Not Asked    Occupational Exposure Not Asked    Hobby Hazards Not Asked    Sleep Concern Not Asked    Stress Concern Not Asked    Weight Concern Not Asked    Special Diet Not Asked    Back Care Not Asked    Exercise No    Bike Helmet Not Asked    Seat Belt Not Asked    Self-Exams Not Asked   Social History Narrative    8/15/2019 - .  Lives with his English Bulldog (Jefry) in a house that he designed in [place].  No children.     Social Determinants of Health     Financial Resource Strain: Not on file   Food Insecurity: Not on file   Transportation Needs: Not on file   Physical Activity: Not on file   Stress: Not on file   Social Connections: Not on file   Interpersonal Safety: Not on file   Housing Stability: Not on file            Medications:     Current Outpatient Medications   Medication    ACE NOT PRESCRIBED, INTENTIONAL,    acetylcysteine (MUCOMYST) 10  "% nebulizer solution    blood glucose (NO BRAND SPECIFIED) test strip    blood glucose monitoring (NO BRAND SPECIFIED) meter device kit    blood glucose monitoring (ULTRA THIN 30G) lancets    CALCIUM PO    Continuous Blood Gluc Sensor (FREESTYLE SUKHWINDER 14 DAY SENSOR) [facility] INFUSION MANAGED PATIENT    ferrous fumarate 65 mg, Hopi. FE,-Vitamin C 125 mg (VITRON-C)  MG TABS tablet    insulin aspart (NOVOLOG PENFILL) 100 UNIT/ML cartridge    insulin glargine (LANTUS SOLOSTAR) 100 UNIT/ML pen    insulin pen needle (BD OMI U/F) 32G X 4 MM miscellaneous    MULTIVITAMIN OR    mvw complete (PROBIOTIC FORMULATION) capsule    polyethylene glycol (MIRALAX) 17 GM/Dose powder    azithromycin (ZITHROMAX) 250 MG tablet    budesonide (PULMICORT) 1 MG/2ML neb solution    budesonide-formoterol (SYMBICORT) 160-4.5 MCG/ACT Inhaler    Glucagon (BAQSIMI) 3 MG/DOSE nasal powder    Glucagon (GVOKE HYPOPEN) 1 MG/0.2ML pen    levalbuterol (XOPENEX HFA) 45 MCG/ACT inhaler    levalbuterol (XOPENEX) 1.25 MG/3ML neb solution    lipase-protease-amylase (CREON) 99520-12452-868937 units CPEP per EC capsule    phytonadione (MEPHYTON) 5 MG tablet    rimantadine (FLUMADINE) 100 MG tablet    sodium chloride inhalant 7 % NEBU neb solution    TRIKAFTA 100-50-75 & 150 MG tablet pack     No current facility-administered medications for this visit.            Physical Exam:   /78   Pulse 85   Resp 17   Ht 1.735 m (5' 8.31\")   Wt 67.7 kg (149 lb 4 oz)   SpO2 95%   BMI 22.49 kg/m      Constitutional:   Awake, alert and in no apparent distress     Eyes:   nonicteric     ENT:   oral mucosa moist without lesions, normal tm bilaterally, bilateral mucosa normal     Neck:   Supple without supraclavicular or cervical lymphadenopathy     Lungs:   Fair air flow.  No crackles. Biapical rhonchi.  No wheezes.     Cardiovascular:   Normal S1 and S2.  RRR.  No murmur, gallop or rub.     Abdomen:   NABS, soft, nontender, nondistended.  "     Musculoskeletal:   No edema, digital clubbing present     Neurologic:   Alert and conversant.     Skin:   Warm, dry.  No rash on limited exam.             Data:   All laboratory and imaging data reviewed.      Results from the last week:  Recent Results (from the past 168 hour(s))   CK total    Collection Time: 01/16/24  8:24 AM   Result Value Ref Range    CK 54 39 - 308 U/L   Hepatic panel (Albumin, ALT, AST, Bili, Alk Phos, TP)    Collection Time: 01/16/24  8:24 AM   Result Value Ref Range    Protein Total 7.7 6.4 - 8.3 g/dL    Albumin 4.2 3.5 - 5.2 g/dL    Bilirubin Total 0.4 <=1.2 mg/dL    Alkaline Phosphatase 131 40 - 150 U/L    AST 33 0 - 45 U/L    ALT 39 0 - 70 U/L    Bilirubin Direct <0.20 0.00 - 0.30 mg/dL   Erythrocyte sedimentation rate auto    Collection Time: 01/16/24  8:24 AM   Result Value Ref Range    Erythrocyte Sedimentation Rate 16 (H) 0 - 15 mm/hr   Vitamin D Deficiency    Collection Time: 01/16/24  8:24 AM   Result Value Ref Range    Vitamin D, Total (25-Hydroxy) 47 20 - 50 ng/mL   TSH with free T4 reflex    Collection Time: 01/16/24  8:24 AM   Result Value Ref Range    TSH 1.53 0.30 - 4.20 uIU/mL   Testosterone total     Collection Time: 01/16/24  8:24 AM   Result Value Ref Range    Testosterone Total 385 240 - 950 ng/dL   Phosphorus    Collection Time: 01/16/24  8:24 AM   Result Value Ref Range    Phosphorus 2.3 (L) 2.5 - 4.5 mg/dL   Magnesium    Collection Time: 01/16/24  8:24 AM   Result Value Ref Range    Magnesium 2.4 (H) 1.7 - 2.3 mg/dL   INR    Collection Time: 01/16/24  8:24 AM   Result Value Ref Range    INR 1.05 0.85 - 1.15   IgE    Collection Time: 01/16/24  8:24 AM   Result Value Ref Range    Immunoglobulin E 13 0 - 114 kU/L   IgM    Collection Time: 01/16/24  8:24 AM   Result Value Ref Range    Immunoglobulin M 93 35 - 242 mg/dL   IgG    Collection Time: 01/16/24  8:24 AM   Result Value Ref Range    Immunoglobulin G 1,123 610 - 1,616 mg/dL   IgA    Collection Time: 01/16/24   8:24 AM   Result Value Ref Range    Immunoglobulin A 244 84 - 499 mg/dL   Hemoglobin A1c    Collection Time: 01/16/24  8:24 AM   Result Value Ref Range    Hemoglobin A1C 6.7 (H) <5.7 %   GGT    Collection Time: 01/16/24  8:24 AM   Result Value Ref Range    GGT 18 8 - 61 U/L   Iron    Collection Time: 01/16/24  8:24 AM   Result Value Ref Range    Iron 57 (L) 61 - 157 ug/dL   Lipid Profile    Collection Time: 01/16/24  8:24 AM   Result Value Ref Range    Cholesterol 148 <200 mg/dL    Triglycerides 138 <150 mg/dL    Direct Measure HDL 41 >=40 mg/dL    LDL Cholesterol Calculated 79 <=100 mg/dL    Non HDL Cholesterol 107 <130 mg/dL    Patient Fasting > 8hrs? No    Basic metabolic panel    Collection Time: 01/16/24  8:24 AM   Result Value Ref Range    Sodium 140 135 - 145 mmol/L    Potassium 4.5 3.4 - 5.3 mmol/L    Chloride 102 98 - 107 mmol/L    Carbon Dioxide (CO2) 28 22 - 29 mmol/L    Anion Gap 10 7 - 15 mmol/L    Urea Nitrogen 22.5 (H) 6.0 - 20.0 mg/dL    Creatinine 0.90 0.67 - 1.17 mg/dL    GFR Estimate >90 >60 mL/min/1.73m2    Calcium 9.4 8.6 - 10.0 mg/dL    Glucose 159 (H) 70 - 99 mg/dL   CBC with platelets and differential    Collection Time: 01/16/24  8:24 AM   Result Value Ref Range    WBC Count 9.2 4.0 - 11.0 10e3/uL    RBC Count 5.29 4.40 - 5.90 10e6/uL    Hemoglobin 15.4 13.3 - 17.7 g/dL    Hematocrit 47.8 40.0 - 53.0 %    MCV 90 78 - 100 fL    MCH 29.1 26.5 - 33.0 pg    MCHC 32.2 31.5 - 36.5 g/dL    RDW 13.2 10.0 - 15.0 %    Platelet Count 278 150 - 450 10e3/uL    % Neutrophils 66 %    % Lymphocytes 22 %    % Monocytes 8 %    % Eosinophils 3 %    % Basophils 1 %    % Immature Granulocytes 0 %    NRBCs per 100 WBC 0 <1 /100    Absolute Neutrophils 6.1 1.6 - 8.3 10e3/uL    Absolute Lymphocytes 2.1 0.8 - 5.3 10e3/uL    Absolute Monocytes 0.7 0.0 - 1.3 10e3/uL    Absolute Eosinophils 0.2 0.0 - 0.7 10e3/uL    Absolute Basophils 0.1 0.0 - 0.2 10e3/uL    Absolute Immature Granulocytes 0.0 <=0.4 10e3/uL    Absolute  NRBCs 0.0 10e3/uL   Albumin Random Urine Quantitative with Creat Ratio    Collection Time: 01/16/24  8:32 AM   Result Value Ref Range    Creatinine Urine mg/dL 92.4 mg/dL    Albumin Urine mg/L <12.0 mg/L    Albumin Urine mg/g Cr     Routine UA with microscopic    Collection Time: 01/16/24  8:32 AM   Result Value Ref Range    Color Urine Light Yellow Colorless, Straw, Light Yellow, Yellow    Appearance Urine Clear Clear    Glucose Urine Negative Negative mg/dL    Bilirubin Urine Negative Negative    Ketones Urine Negative Negative mg/dL    Specific Gravity Urine 1.017 1.003 - 1.035    Blood Urine Negative Negative    pH Urine 5.0 5.0 - 7.0    Protein Albumin Urine Negative Negative mg/dL    Urobilinogen Urine Normal Normal, 2.0 mg/dL    Nitrite Urine Negative Negative    Leukocyte Esterase Urine Negative Negative    Mucus Urine Present (A) None Seen /LPF    RBC Urine 0 <=2 /HPF    WBC Urine <1 <=5 /HPF   General PFT Lab (Please always keep checked)    Collection Time: 01/16/24  8:40 AM   Result Value Ref Range    FVC-Pred 4.40 L    FVC-Pre 3.64 L    FVC-%Pred-Pre 82 %    FEV1-Pre 1.76 L    FEV1-%Pred-Pre 49 %    FEV1FVC-Pred 81 %    FEV1FVC-Pre 48 %    FEFMax-Pred 9.58 L/sec    FEFMax-Pre 6.35 L/sec    FEFMax-%Pred-Pre 66 %    FEF2575-Pred 3.41 L/sec    FEF2575-Pre 0.57 L/sec    LLV6692-%Pred-Pre 16 %    ExpTime-Pre 13.66 sec    FIFMax-Pre 4.76 L/sec    FEV1FEV6-Pred 81 %    FEV1FEV6-Pre 56 %   Cystic Fibrosis Culture Aerobic Bacterial    Collection Time: 01/16/24  9:07 AM    Specimen: Expectorate; Sputum   Result Value Ref Range    Culture 3+ Normal santi to date        PFT interpretation:   Spirometry interpretation:  The spirometry shows evidence of moderate obstructive lung disease.  When compared to 9/26/2023, the FEV1 and FVC have little change.  The testing meets ATS criteria.    Severity Z-score*   Normal > -1.645   Mild -1.65 to -2.5   Moderate -2.5 to -4   Severe < -4   * accounts for sex, age, height,  ancestry     Use z-score to assess airflow obstruction, restriction and DLCO  - FEV1 z-score for airflow obstruction  - TLC z-score for restriction  - DLCO z-score for diffusion defect    Pulmonary exacerbation: absent    50 minutes spent by me on the date of the encounter doing chart review, history and exam, documentation and further activities per the note  Time documented is excluding time spent for PFT interpretation.

## 2024-01-15 NOTE — RESULT ENCOUNTER NOTE
Kilo,    Your repeat sputum AFB was negative.  We should obtain one more sputum for AFB in the next few weeks.  We can decide on the need for any treatment pending the results of this next sputum.    Take care,    Jaye Machado

## 2024-01-16 ENCOUNTER — OFFICE VISIT (OUTPATIENT)
Dept: PULMONOLOGY | Facility: CLINIC | Age: 49
End: 2024-01-16
Attending: INTERNAL MEDICINE
Payer: COMMERCIAL

## 2024-01-16 ENCOUNTER — OFFICE VISIT (OUTPATIENT)
Dept: PHARMACY | Facility: CLINIC | Age: 49
End: 2024-01-16
Payer: COMMERCIAL

## 2024-01-16 ENCOUNTER — ANCILLARY PROCEDURE (OUTPATIENT)
Dept: GENERAL RADIOLOGY | Facility: CLINIC | Age: 49
End: 2024-01-16
Attending: INTERNAL MEDICINE
Payer: COMMERCIAL

## 2024-01-16 ENCOUNTER — LAB (OUTPATIENT)
Dept: LAB | Facility: CLINIC | Age: 49
End: 2024-01-16
Attending: INTERNAL MEDICINE
Payer: COMMERCIAL

## 2024-01-16 VITALS
RESPIRATION RATE: 17 BRPM | SYSTOLIC BLOOD PRESSURE: 121 MMHG | OXYGEN SATURATION: 95 % | HEIGHT: 68 IN | WEIGHT: 149.25 LBS | HEART RATE: 85 BPM | DIASTOLIC BLOOD PRESSURE: 78 MMHG | BODY MASS INDEX: 22.62 KG/M2

## 2024-01-16 DIAGNOSIS — E08.9 DIABETES MELLITUS RELATED TO CF (CYSTIC FIBROSIS) (H): Primary | ICD-10-CM

## 2024-01-16 DIAGNOSIS — E84.8 DIABETES MELLITUS RELATED TO CF (CYSTIC FIBROSIS) (H): Primary | ICD-10-CM

## 2024-01-16 DIAGNOSIS — E84.0 CYSTIC FIBROSIS WITH PULMONARY MANIFESTATIONS (H): Primary | ICD-10-CM

## 2024-01-16 DIAGNOSIS — E84.9 DIABETES MELLITUS DUE TO CYSTIC FIBROSIS (H): ICD-10-CM

## 2024-01-16 DIAGNOSIS — K86.81 EXOCRINE PANCREATIC INSUFFICIENCY: ICD-10-CM

## 2024-01-16 DIAGNOSIS — E84.0 CYSTIC FIBROSIS WITH PULMONARY MANIFESTATIONS (H): ICD-10-CM

## 2024-01-16 DIAGNOSIS — E08.9 DIABETES MELLITUS DUE TO CYSTIC FIBROSIS (H): ICD-10-CM

## 2024-01-16 DIAGNOSIS — E84.9 CYSTIC FIBROSIS (H): ICD-10-CM

## 2024-01-16 DIAGNOSIS — E84.11 CYSTIC FIBROSIS WITH MECONIUM ILEUS (H): Primary | ICD-10-CM

## 2024-01-16 DIAGNOSIS — U07.1 INFECTION DUE TO 2019 NOVEL CORONAVIRUS: ICD-10-CM

## 2024-01-16 DIAGNOSIS — E84.9 CF (CYSTIC FIBROSIS) (H): ICD-10-CM

## 2024-01-16 DIAGNOSIS — E84.0 CYSTIC FIBROSIS OF THE LUNG (H): ICD-10-CM

## 2024-01-16 LAB
ALBUMIN SERPL BCG-MCNC: 4.2 G/DL (ref 3.5–5.2)
ALBUMIN UR-MCNC: NEGATIVE MG/DL
ALP SERPL-CCNC: 131 U/L (ref 40–150)
ALT SERPL W P-5'-P-CCNC: 39 U/L (ref 0–70)
ANION GAP SERPL CALCULATED.3IONS-SCNC: 10 MMOL/L (ref 7–15)
APPEARANCE UR: CLEAR
AST SERPL W P-5'-P-CCNC: 33 U/L (ref 0–45)
BASOPHILS # BLD AUTO: 0.1 10E3/UL (ref 0–0.2)
BASOPHILS NFR BLD AUTO: 1 %
BILIRUB DIRECT SERPL-MCNC: <0.2 MG/DL (ref 0–0.3)
BILIRUB SERPL-MCNC: 0.4 MG/DL
BILIRUB UR QL STRIP: NEGATIVE
BUN SERPL-MCNC: 22.5 MG/DL (ref 6–20)
CALCIUM SERPL-MCNC: 9.4 MG/DL (ref 8.6–10)
CHLORIDE SERPL-SCNC: 102 MMOL/L (ref 98–107)
CHOLEST SERPL-MCNC: 148 MG/DL
CK SERPL-CCNC: 54 U/L (ref 39–308)
COLOR UR AUTO: ABNORMAL
CREAT SERPL-MCNC: 0.9 MG/DL (ref 0.67–1.17)
CREAT UR-MCNC: 92.4 MG/DL
DEPRECATED HCO3 PLAS-SCNC: 28 MMOL/L (ref 22–29)
EGFRCR SERPLBLD CKD-EPI 2021: >90 ML/MIN/1.73M2
EOSINOPHIL # BLD AUTO: 0.2 10E3/UL (ref 0–0.7)
EOSINOPHIL NFR BLD AUTO: 3 %
ERYTHROCYTE [DISTWIDTH] IN BLOOD BY AUTOMATED COUNT: 13.2 % (ref 10–15)
ERYTHROCYTE [SEDIMENTATION RATE] IN BLOOD BY WESTERGREN METHOD: 16 MM/HR (ref 0–15)
EXPTIME-PRE: 13.66 SEC
FASTING STATUS PATIENT QL REPORTED: NO
FEF2575-%PRED-PRE: 16 %
FEF2575-PRE: 0.57 L/SEC
FEF2575-PRED: 3.41 L/SEC
FEFMAX-%PRED-PRE: 66 %
FEFMAX-PRE: 6.35 L/SEC
FEFMAX-PRED: 9.58 L/SEC
FEV1-%PRED-PRE: 49 %
FEV1-PRE: 1.76 L
FEV1FEV6-PRE: 56 %
FEV1FEV6-PRED: 81 %
FEV1FVC-PRE: 48 %
FEV1FVC-PRED: 81 %
FIFMAX-PRE: 4.76 L/SEC
FVC-%PRED-PRE: 82 %
FVC-PRE: 3.64 L
FVC-PRED: 4.4 L
GGT SERPL-CCNC: 18 U/L (ref 8–61)
GLUCOSE SERPL-MCNC: 159 MG/DL (ref 70–99)
GLUCOSE UR STRIP-MCNC: NEGATIVE MG/DL
HBA1C MFR BLD: 6.7 %
HCT VFR BLD AUTO: 47.8 % (ref 40–53)
HDLC SERPL-MCNC: 41 MG/DL
HGB BLD-MCNC: 15.4 G/DL (ref 13.3–17.7)
HGB UR QL STRIP: NEGATIVE
IGA SERPL-MCNC: 244 MG/DL (ref 84–499)
IGG SERPL-MCNC: 1123 MG/DL (ref 610–1616)
IGM SERPL-MCNC: 93 MG/DL (ref 35–242)
IMM GRANULOCYTES # BLD: 0 10E3/UL
IMM GRANULOCYTES NFR BLD: 0 %
INR PPP: 1.05 (ref 0.85–1.15)
IRON SERPL-MCNC: 57 UG/DL (ref 61–157)
KETONES UR STRIP-MCNC: NEGATIVE MG/DL
LDLC SERPL CALC-MCNC: 79 MG/DL
LEUKOCYTE ESTERASE UR QL STRIP: NEGATIVE
LYMPHOCYTES # BLD AUTO: 2.1 10E3/UL (ref 0.8–5.3)
LYMPHOCYTES NFR BLD AUTO: 22 %
MAGNESIUM SERPL-MCNC: 2.4 MG/DL (ref 1.7–2.3)
MCH RBC QN AUTO: 29.1 PG (ref 26.5–33)
MCHC RBC AUTO-ENTMCNC: 32.2 G/DL (ref 31.5–36.5)
MCV RBC AUTO: 90 FL (ref 78–100)
MICROALBUMIN UR-MCNC: <12 MG/L
MICROALBUMIN/CREAT UR: NORMAL MG/G{CREAT}
MONOCYTES # BLD AUTO: 0.7 10E3/UL (ref 0–1.3)
MONOCYTES NFR BLD AUTO: 8 %
MUCOUS THREADS #/AREA URNS LPF: PRESENT /LPF
NEUTROPHILS # BLD AUTO: 6.1 10E3/UL (ref 1.6–8.3)
NEUTROPHILS NFR BLD AUTO: 66 %
NITRATE UR QL: NEGATIVE
NONHDLC SERPL-MCNC: 107 MG/DL
NRBC # BLD AUTO: 0 10E3/UL
NRBC BLD AUTO-RTO: 0 /100
PH UR STRIP: 5 [PH] (ref 5–7)
PHOSPHATE SERPL-MCNC: 2.3 MG/DL (ref 2.5–4.5)
PLATELET # BLD AUTO: 278 10E3/UL (ref 150–450)
POTASSIUM SERPL-SCNC: 4.5 MMOL/L (ref 3.4–5.3)
PROT SERPL-MCNC: 7.7 G/DL (ref 6.4–8.3)
RBC # BLD AUTO: 5.29 10E6/UL (ref 4.4–5.9)
RBC URINE: 0 /HPF
SODIUM SERPL-SCNC: 140 MMOL/L (ref 135–145)
SP GR UR STRIP: 1.02 (ref 1–1.03)
TRIGL SERPL-MCNC: 138 MG/DL
TSH SERPL DL<=0.005 MIU/L-ACNC: 1.53 UIU/ML (ref 0.3–4.2)
UROBILINOGEN UR STRIP-MCNC: NORMAL MG/DL
WBC # BLD AUTO: 9.2 10E3/UL (ref 4–11)
WBC URINE: <1 /HPF

## 2024-01-16 PROCEDURE — 80053 COMPREHEN METABOLIC PANEL: CPT | Performed by: PATHOLOGY

## 2024-01-16 PROCEDURE — 85652 RBC SED RATE AUTOMATED: CPT | Performed by: PATHOLOGY

## 2024-01-16 PROCEDURE — 36415 COLL VENOUS BLD VENIPUNCTURE: CPT | Performed by: PATHOLOGY

## 2024-01-16 PROCEDURE — 82977 ASSAY OF GGT: CPT | Performed by: PATHOLOGY

## 2024-01-16 PROCEDURE — 84443 ASSAY THYROID STIM HORMONE: CPT | Performed by: PATHOLOGY

## 2024-01-16 PROCEDURE — 82784 ASSAY IGA/IGD/IGG/IGM EACH: CPT | Performed by: INTERNAL MEDICINE

## 2024-01-16 PROCEDURE — 99000 SPECIMEN HANDLING OFFICE-LAB: CPT | Performed by: PATHOLOGY

## 2024-01-16 PROCEDURE — 87186 SC STD MICRODIL/AGAR DIL: CPT | Performed by: INTERNAL MEDICINE

## 2024-01-16 PROCEDURE — 71046 X-RAY EXAM CHEST 2 VIEWS: CPT | Mod: GC | Performed by: RADIOLOGY

## 2024-01-16 PROCEDURE — 82550 ASSAY OF CK (CPK): CPT | Performed by: PATHOLOGY

## 2024-01-16 PROCEDURE — 85610 PROTHROMBIN TIME: CPT | Performed by: PATHOLOGY

## 2024-01-16 PROCEDURE — 84590 ASSAY OF VITAMIN A: CPT | Mod: 90 | Performed by: PATHOLOGY

## 2024-01-16 PROCEDURE — 81001 URINALYSIS AUTO W/SCOPE: CPT | Performed by: PATHOLOGY

## 2024-01-16 PROCEDURE — 80061 LIPID PANEL: CPT | Performed by: PATHOLOGY

## 2024-01-16 PROCEDURE — 85025 COMPLETE CBC W/AUTO DIFF WBC: CPT | Performed by: PATHOLOGY

## 2024-01-16 PROCEDURE — 84403 ASSAY OF TOTAL TESTOSTERONE: CPT | Performed by: INTERNAL MEDICINE

## 2024-01-16 PROCEDURE — 94375 RESPIRATORY FLOW VOLUME LOOP: CPT | Performed by: INTERNAL MEDICINE

## 2024-01-16 PROCEDURE — 99215 OFFICE O/P EST HI 40 MIN: CPT | Mod: 25 | Performed by: INTERNAL MEDICINE

## 2024-01-16 PROCEDURE — 82248 BILIRUBIN DIRECT: CPT | Performed by: PATHOLOGY

## 2024-01-16 PROCEDURE — 87206 SMEAR FLUORESCENT/ACID STAI: CPT | Performed by: INTERNAL MEDICINE

## 2024-01-16 PROCEDURE — 82043 UR ALBUMIN QUANTITATIVE: CPT | Performed by: INTERNAL MEDICINE

## 2024-01-16 PROCEDURE — 99605 MTMS BY PHARM NP 15 MIN: CPT | Performed by: PHARMACIST

## 2024-01-16 PROCEDURE — 82306 VITAMIN D 25 HYDROXY: CPT | Performed by: INTERNAL MEDICINE

## 2024-01-16 PROCEDURE — 99213 OFFICE O/P EST LOW 20 MIN: CPT | Performed by: INTERNAL MEDICINE

## 2024-01-16 PROCEDURE — 82785 ASSAY OF IGE: CPT | Performed by: INTERNAL MEDICINE

## 2024-01-16 PROCEDURE — 84100 ASSAY OF PHOSPHORUS: CPT | Performed by: PATHOLOGY

## 2024-01-16 PROCEDURE — 83735 ASSAY OF MAGNESIUM: CPT | Performed by: PATHOLOGY

## 2024-01-16 PROCEDURE — 83540 ASSAY OF IRON: CPT | Performed by: PATHOLOGY

## 2024-01-16 PROCEDURE — 83036 HEMOGLOBIN GLYCOSYLATED A1C: CPT | Performed by: INTERNAL MEDICINE

## 2024-01-16 PROCEDURE — 84446 ASSAY OF VITAMIN E: CPT | Mod: 90 | Performed by: PATHOLOGY

## 2024-01-16 RX ORDER — BUDESONIDE 1 MG/2ML
INHALANT ORAL
Qty: 120 ML | Refills: 3 | Status: SHIPPED | OUTPATIENT
Start: 2024-01-16 | End: 2024-05-21

## 2024-01-16 RX ORDER — LEVALBUTEROL INHALATION SOLUTION 1.25 MG/3ML
SOLUTION RESPIRATORY (INHALATION)
Qty: 360 ML | Refills: 11 | Status: SHIPPED | OUTPATIENT
Start: 2024-01-16

## 2024-01-16 RX ORDER — LEVALBUTEROL TARTRATE 45 UG/1
AEROSOL, METERED ORAL
Qty: 75 G | Refills: 0 | Status: SHIPPED | OUTPATIENT
Start: 2024-01-16 | End: 2024-06-27

## 2024-01-16 RX ORDER — BUDESONIDE AND FORMOTEROL FUMARATE DIHYDRATE 160; 4.5 UG/1; UG/1
AEROSOL RESPIRATORY (INHALATION)
Qty: 30.6 G | Refills: 3 | Status: SHIPPED | OUTPATIENT
Start: 2024-01-16 | End: 2024-02-08

## 2024-01-16 RX ORDER — PHYTONADIONE 5 MG/1
TABLET ORAL
Qty: 6 TABLET | Refills: 4 | Status: SHIPPED | OUTPATIENT
Start: 2024-01-16

## 2024-01-16 RX ORDER — RIMANTADINE HCL 100 MG
100 TABLET ORAL 2 TIMES DAILY
Qty: 180 TABLET | Refills: 3 | Status: SHIPPED | OUTPATIENT
Start: 2024-01-16 | End: 2024-05-21

## 2024-01-16 RX ORDER — ELEXACAFTOR, TEZACAFTOR, AND IVACAFTOR 100-50-75
KIT ORAL
Qty: 84 TABLET | Refills: 4 | Status: SHIPPED | OUTPATIENT
Start: 2024-01-16 | End: 2024-05-29

## 2024-01-16 RX ORDER — PHYTONADIONE 5 MG/1
TABLET ORAL
Qty: 6 TABLET | Refills: 4 | Status: SHIPPED | OUTPATIENT
Start: 2024-01-16 | End: 2024-01-16

## 2024-01-16 RX ORDER — AZITHROMYCIN 250 MG/1
250 TABLET, FILM COATED ORAL DAILY
Qty: 30 TABLET | Refills: 11 | Status: SHIPPED | OUTPATIENT
Start: 2024-01-16

## 2024-01-16 RX ORDER — SODIUM CHLORIDE FOR INHALATION 7 %
VIAL, NEBULIZER (ML) INHALATION
Qty: 240 ML | Refills: 5 | Status: SHIPPED | OUTPATIENT
Start: 2024-01-16

## 2024-01-16 ASSESSMENT — PAIN SCALES - GENERAL: PAINLEVEL: NO PAIN (0)

## 2024-01-16 NOTE — PROGRESS NOTES
Sending all orders to [facility], as requested by patient.    Linda Stone, PharmD   Medication Therapy Management   Cystic Fibrosis Pharmacist

## 2024-01-16 NOTE — PROGRESS NOTES
Medication Therapy Management (MTM) Encounter    ASSESSMENT:                            Medication Adherence/Access: Patient may benefit from using one pharmacy, if possible, to help with medication refill convenience.    CF:   Stable. All modulator labs are within normal limits.     Pancreatic Insufficiency/Nutrition:   Annual vitamin labs are due today. Dietitian to follow up with results.     CFRD:   Patient is not meeting A1c goal of <7% per CFF guidelines. Patient would benefit from continued follow-up with endocrine. May be beneficial to have glucagon kit on hand at home for hypoglycemic emergencies.     PLAN:                            1. Sent all medications to [facility] to help with convenience of refilling medications at one pharmacy.   2. Continue Trikafta, recheck hepatic panel and CK in 6 months.  3. Sent request for glucagon prescription to Dr. Lee's team.      Follow-up: 6 months for Trikafta labs; annual 1 year, sooner if needed    SUBJECTIVE/OBJECTIVE:                          Kilo Morales is a 48 year old male coming in for a follow-up visit from 5/16/23. Today's visit is a co-visit with Dr. Jaye Machado MD.      Reason for visit: Annual CF Medication Review.    Allergies/ADRs: Reviewed in chart  Past Medical History: Reviewed in chart  Tobacco: He reports that he has never smoked. He has never used smokeless tobacco.  Alcohol: none    Medication Adherence/Access: Medication Access: Patient fills medication at [facility], [facility], and [facility] pharmacy. Patient expresses no concern regarding cost of medications, notes recent change in insurance with the new year. Trikafta HP prior authorization recently approved.    CF:    Inhaled medications:   Bronchodilator: levalbuterol nebs 1.25 mg/3 mL 2x/day and levalbuterol HFA as needed (rarely)   Mucolytic: Mucomyst 10% nebs 2x/day , sodium chloride 7% nebs 2x/day   Other:    budesonide  1 mg/2mL nebs as needed (not currently taking), Symbicort 160-4.5 mcg/act 2 puffs twice daily  Oral medications:   Azithromycin: 250mg daily   CFTR modulator: Trikafta (full dose) with fat-containing food  Other: rimantadine 100 mg twice daily    Kilo notes he stopped budesonide nebs a month ago and hasn't noticed changes since holding. No CF medication concerns noted today.   Genotype: [id number]/621+1G->T   Cultures (last growth): sputum cultures grow PSA (9/26/23)  Lab Results   Component Value Date    ALT 39 01/16/2024    AST 33 01/16/2024    BILITOTAL 0.4 01/16/2024    DBIL <0.20 01/16/2024    CKT 54 01/16/2024     Pancreatic Insufficiency/Nutrition:   Creon 24,000 nits taking 9-10 capsules with meals and 2-4 capsules with snacks  Bowel regimen: probiotic daily and Miralax 17 g daily as needed  Vitamins/Supplements include: MVI daily, Vitron-C daily and calcium daily    Patient is not experiencing sign/symptoms of malabsorption. Finds effective, no concerns noted today.    CFRD:    Lantus 8 units daily  Novolog sliding scale    SMBG: Freestyle Libre2  Ranges (patient reported): notes stable lab readings, reports lows 1x/every 2-3 months.  Patient is not experiencing hypoglycemia  Recent symptoms of high blood sugar? none  Most recent HgA1C:   Lab Results   Component Value Date    A1C 7.0 05/10/2023    A1C 6.4 06/20/2022    A1C 6.2 05/04/2021    A1C 6.1 09/02/2020     Patient follows with Dr. Lee for endocrinology. Last visit 6/13/23.  ----------------  I spent 15 minutes with this patient today. All changes were made via verbal approval with Dr. Jaye Machado MD and Dr. Lee. A copy of the visit note was provided to the patient's provider(s).    A summary of these recommendations was given to the patient (see AVS from today's appointment with Dr. Jaye Machado MD).    Linda Stone, PharmD   Medication Therapy Management   Cystic Fibrosis Pharmacist     Medication Therapy Recommendations  Diabetes  mellitus due to cystic fibrosis (H)    Rationale: Preventive therapy - Needs additional medication therapy - Indication   Status: Accepted per Provider

## 2024-01-16 NOTE — NURSING NOTE
"Nico Morales is a 48 year old year old who is being seen for RECHECK (Return Cystic Fibrosis )      Medications reviewed and Vital signs taken.    Specimen Collection Type: Sputum    Order(s) placed: CF Aerobic Bacterial    *IF AFB order placed - please enter \"PRIORITIZE AFB\" to order comments.       Lab Results   Component Value Date    ACIDFAST No acid fast bacilli seen 11/14/2023    ACIDFAST No acid fast bacilli seen 11/14/2023    ACIDFAST No acid fast bacilli seen 11/14/2023         Lab Results   Component Value Date    AFBSMS Negative for acid fast bacteria 05/04/2021    AFBSMS  05/04/2021     Assayed at [facility] [address] [place] [address] [id number] [phone]         Medications reviewed and vital signs taken.   Latoya Carias CMA    "

## 2024-01-16 NOTE — TELEPHONE ENCOUNTER
Patient does not have glucagon on hand at home and requests fill. Sending to Dr. Lee for management.    Linda Stone, PharmD   Medication Therapy Management   Cystic Fibrosis Pharmacist

## 2024-01-16 NOTE — LETTER
1/16/2024         RE: Nico Morales  [address] [place] [address]        Dear Colleague,    Thank you for referring your patient, Nico Morales, to the [facility] [place]. Please see a copy of my visit note below.    [facility]  January 16, 2024         Assessment and Plan:   Nico Morales is a 48 year old male with cystic fibrosis.    1. CF lung disease with moderate obstruction:  Kilo reports he is having some difficulty with environmental exposures.  His teenage children and their friends are wearing perfumes and colognes that result in having a tight chest and hemoptysis.  The hemoptysis can be several tablespoons but is always self limited.  He and his wife are working with their children and friends to limit these exposures.  We discuss pursuing a chest CT with IV contrast and an IR consult for possible ALE.  He would like to see if he can prevent the episodes from occurring.  He does show evidence of stable pfts.  He historically has grown pseudomonas in his sputum. He did have recent positive AFB but fortunately the repeat was negative. We did review his CT shest in detail today.  At this time I have recommended to Kilo:  -- He should continue to do his vest and nebulized therapies 2-3 times daily  -- repeat AFB sputum today  -- He does have both levofloxacin and doxycycline available to him for exacerbation.  Fortunately he has not needed to use these in many months.     2. Pancreatic Insufficiency/GI: Kilo has no new symptoms consistent with worsening malabsorption.    - continue the present dose of pancreatic enzymes  - continue vitamin supplementation.     3.  CF TR modulator therapy: Kilo is on Trikafta and tolerating well.  His hepatic panel and CK today are stable.       4.  Cystic fibrosis related diabetes: Klio reports good control of his blood sugars. His A1C is  stable and he is followed by endocrinology.     5.  Psychosocial: Kilo is  and in a stable relationship.  Reports all his kids are doing well.  Work is good.  His wife in undergoing treatment for breast cancer.     6.  Influenza prophylaxis: Kilo is on rimantadine for the flu season.    7. HCM:  We reviewed Kilo's labs today in detail.    Annual studies due: done today  Recent Labs   Lab Test 01/16/24  0824   IGG 1,123     Immunizations: UTD  Colonoscopy: 6/2025    Jaye Machado MD MPH  Associate Professor of Medicine  Pulmonary, Allergy, Critical Care and Sleep Medicine        Interval History:     Kilo describes stable cough and sputum except for during these times of exposure.  He denies chest congestion unless exposure.  He is doing his vest 3 times per day.           Review of Systems:     CONSTITUTIONAL: no fever, no chills, no sweats, no change in weight, no change in energy, no change in appetite    INTEGUMENTARY/SKIN: no rash, no obvious new lesions    ENT/MOUTH: no sore throat, no new sinus pain, no new nasal drainage, no new nasal congestion, no change to ear ringing     RESPIRATORY: see interval history    CV: no chest pain, no palpitations, no peripheral edema    GI: no nausea, no vomiting, no change in stools, no fatty stools, no GERD    : negative urinary    MUSCULOSKELETAL: no myalgias, no arthralgias    ENDOCRINE: good control    NEURO:  rare headache    SLEEP: challenging    PSYCHIATRIC: mood stable--good          Past Medical and Surgical History:     Past Medical History:   Diagnosis Date     CF (cystic fibrosis) (H)      Exocrine pancreatic insufficiency      Nocardia infection      Pseudomonas infection      S/P gastrostomy (H) 2002     Past Surgical History:   Procedure Laterality Date     COLONOSCOPY N/A 5/21/2018    Procedure: COMBINED COLONOSCOPY, SINGLE OR MULTIPLE BIOPSY/POLYPECTOMY BY BIOPSY;  Cystic fibrosis, 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;  Surgeon: Margarito Bowman,  MD;  Location: [facility]     COLONOSCOPY N/A 6/27/2022    Procedure: COLONOSCOPY, WITH POLYPECTOMY AND BIOPSY;  Surgeon: Margarito Bowman MD;  Location: [facility]     GASTROSTOMY TUBE  2002     IR PULMONARY EMBOLIZATION  7/20/2001     PICC INSERTION Left 01/22/2018    4Fr SL BioFlo PICC, 46cm (5cm external) in the L basilic vein w/ tip in the low SVC           Family History:     Family History   Problem Relation Age of Onset     Heart Disease Maternal Grandmother      Heart Disease Maternal Grandfather      Heart Disease Paternal Grandmother      Diabetes No family hx of             Social History:     Social History     Socioeconomic History     Marital status:      Spouse name: Not on file     Number of children: 0     Years of education: Not on file     Highest education level: Not on file   Occupational History     Occupation: [employer]     Employer: [employer]   Tobacco Use     Smoking status: Never     Smokeless tobacco: Never   Vaping Use     Vaping Use: Never used   Substance and Sexual Activity     Alcohol use: No     Alcohol/week: 0.0 standard drinks of alcohol     Drug use: No     Sexual activity: Yes     Partners: Female     Birth control/protection: Pill   Other Topics Concern     Parent/sibling w/ CABG, MI or angioplasty before 65F 55M? Not Asked      Service Not Asked     Blood Transfusions No     Caffeine Concern Not Asked     Occupational Exposure Not Asked     Hobby Hazards Not Asked     Sleep Concern Not Asked     Stress Concern Not Asked     Weight Concern Not Asked     Special Diet Not Asked     Back Care Not Asked     Exercise No     Bike Helmet Not Asked     Seat Belt Not Asked     Self-Exams Not Asked   Social History Narrative    8/15/2019 - .  Lives with his English Bulldog (Jefry) in a house that he designed in [place].  No children.     Social Determinants of Health     Financial Resource Strain: Not on file   Food Insecurity: Not on file  "  Transportation Needs: Not on file   Physical Activity: Not on file   Stress: Not on file   Social Connections: Not on file   Interpersonal Safety: Not on file   Housing Stability: Not on file            Medications:     Current Outpatient Medications   Medication     ACE NOT PRESCRIBED, INTENTIONAL,     acetylcysteine (MUCOMYST) 10 % nebulizer solution     blood glucose (NO BRAND SPECIFIED) test strip     blood glucose monitoring (NO BRAND SPECIFIED) meter device kit     blood glucose monitoring (ULTRA THIN 30G) lancets     CALCIUM PO     Continuous Blood Gluc Sensor (FREESTYLE SUKHWINDER 14 DAY SENSOR) [facility] INFUSION MANAGED PATIENT     ferrous fumarate 65 mg, [place]. FE,-Vitamin C 125 mg (VITRON-C)  MG TABS tablet     insulin aspart (NOVOLOG PENFILL) 100 UNIT/ML cartridge     insulin glargine (LANTUS SOLOSTAR) 100 UNIT/ML pen     insulin pen needle (BD OMI U/F) 32G X 4 MM miscellaneous     MULTIVITAMIN OR     mvw complete (PROBIOTIC FORMULATION) capsule     polyethylene glycol (MIRALAX) 17 GM/Dose powder     azithromycin (ZITHROMAX) 250 MG tablet     budesonide (PULMICORT) 1 MG/2ML neb solution     budesonide-formoterol (SYMBICORT) 160-4.5 MCG/ACT Inhaler     Glucagon (BAQSIMI) 3 MG/DOSE nasal powder     Glucagon (GVOKE HYPOPEN) 1 MG/0.2ML pen     levalbuterol (XOPENEX HFA) 45 MCG/ACT inhaler     levalbuterol (XOPENEX) 1.25 MG/3ML neb solution     lipase-protease-amylase (CREON) 78128-88313-377281 units CPEP per EC capsule     phytonadione (MEPHYTON) 5 MG tablet     rimantadine (FLUMADINE) 100 MG tablet     sodium chloride inhalant 7 % NEBU neb solution     TRIKAFTA 100-50-75 & 150 MG tablet pack     No current facility-administered medications for this visit.            Physical Exam:   /78   Pulse 85   Resp 17   Ht 1.735 m (5' 8.31\")   Wt 67.7 kg (149 lb 4 oz)   SpO2 95%   BMI 22.49 kg/m      Constitutional:   Awake, alert and in no apparent distress     Eyes:   nonicteric "     ENT:   oral mucosa moist without lesions, normal tm bilaterally, bilateral mucosa normal     Neck:   Supple without supraclavicular or cervical lymphadenopathy     Lungs:   Fair air flow.  No crackles. Biapical rhonchi.  No wheezes.     Cardiovascular:   Normal S1 and S2.  RRR.  No murmur, gallop or rub.     Abdomen:   NABS, soft, nontender, nondistended.      Musculoskeletal:   No edema, digital clubbing present     Neurologic:   Alert and conversant.     Skin:   Warm, dry.  No rash on limited exam.             Data:   All laboratory and imaging data reviewed.      Results from the last week:  Recent Results (from the past 168 hour(s))   CK total    Collection Time: 01/16/24  8:24 AM   Result Value Ref Range    CK 54 39 - 308 U/L   Hepatic panel (Albumin, ALT, AST, Bili, Alk Phos, TP)    Collection Time: 01/16/24  8:24 AM   Result Value Ref Range    Protein Total 7.7 6.4 - 8.3 g/dL    Albumin 4.2 3.5 - 5.2 g/dL    Bilirubin Total 0.4 <=1.2 mg/dL    Alkaline Phosphatase 131 40 - 150 U/L    AST 33 0 - 45 U/L    ALT 39 0 - 70 U/L    Bilirubin Direct <0.20 0.00 - 0.30 mg/dL   Erythrocyte sedimentation rate auto    Collection Time: 01/16/24  8:24 AM   Result Value Ref Range    Erythrocyte Sedimentation Rate 16 (H) 0 - 15 mm/hr   Vitamin D Deficiency    Collection Time: 01/16/24  8:24 AM   Result Value Ref Range    Vitamin D, Total (25-Hydroxy) 47 20 - 50 ng/mL   TSH with free T4 reflex    Collection Time: 01/16/24  8:24 AM   Result Value Ref Range    TSH 1.53 0.30 - 4.20 uIU/mL   Testosterone total     Collection Time: 01/16/24  8:24 AM   Result Value Ref Range    Testosterone Total 385 240 - 950 ng/dL   Phosphorus    Collection Time: 01/16/24  8:24 AM   Result Value Ref Range    Phosphorus 2.3 (L) 2.5 - 4.5 mg/dL   Magnesium    Collection Time: 01/16/24  8:24 AM   Result Value Ref Range    Magnesium 2.4 (H) 1.7 - 2.3 mg/dL   INR    Collection Time: 01/16/24  8:24 AM   Result Value Ref Range    INR 1.05 0.85 - 1.15    IgE    Collection Time: 01/16/24  8:24 AM   Result Value Ref Range    Immunoglobulin E 13 0 - 114 kU/L   IgM    Collection Time: 01/16/24  8:24 AM   Result Value Ref Range    Immunoglobulin M 93 35 - 242 mg/dL   IgG    Collection Time: 01/16/24  8:24 AM   Result Value Ref Range    Immunoglobulin G 1,123 610 - 1,616 mg/dL   IgA    Collection Time: 01/16/24  8:24 AM   Result Value Ref Range    Immunoglobulin A 244 84 - 499 mg/dL   Hemoglobin A1c    Collection Time: 01/16/24  8:24 AM   Result Value Ref Range    Hemoglobin A1C 6.7 (H) <5.7 %   GGT    Collection Time: 01/16/24  8:24 AM   Result Value Ref Range    GGT 18 8 - 61 U/L   Iron    Collection Time: 01/16/24  8:24 AM   Result Value Ref Range    Iron 57 (L) 61 - 157 ug/dL   Lipid Profile    Collection Time: 01/16/24  8:24 AM   Result Value Ref Range    Cholesterol 148 <200 mg/dL    Triglycerides 138 <150 mg/dL    Direct Measure HDL 41 >=40 mg/dL    LDL Cholesterol Calculated 79 <=100 mg/dL    Non HDL Cholesterol 107 <130 mg/dL    Patient Fasting > 8hrs? No    Basic metabolic panel    Collection Time: 01/16/24  8:24 AM   Result Value Ref Range    Sodium 140 135 - 145 mmol/L    Potassium 4.5 3.4 - 5.3 mmol/L    Chloride 102 98 - 107 mmol/L    Carbon Dioxide (CO2) 28 22 - 29 mmol/L    Anion Gap 10 7 - 15 mmol/L    Urea Nitrogen 22.5 (H) 6.0 - 20.0 mg/dL    Creatinine 0.90 0.67 - 1.17 mg/dL    GFR Estimate >90 >60 mL/min/1.73m2    Calcium 9.4 8.6 - 10.0 mg/dL    Glucose 159 (H) 70 - 99 mg/dL   CBC with platelets and differential    Collection Time: 01/16/24  8:24 AM   Result Value Ref Range    WBC Count 9.2 4.0 - 11.0 10e3/uL    RBC Count 5.29 4.40 - 5.90 10e6/uL    Hemoglobin 15.4 13.3 - 17.7 g/dL    Hematocrit 47.8 40.0 - 53.0 %    MCV 90 78 - 100 fL    MCH 29.1 26.5 - 33.0 pg    MCHC 32.2 31.5 - 36.5 g/dL    RDW 13.2 10.0 - 15.0 %    Platelet Count 278 150 - 450 10e3/uL    % Neutrophils 66 %    % Lymphocytes 22 %    % Monocytes 8 %    % Eosinophils 3 %    %  Basophils 1 %    % Immature Granulocytes 0 %    NRBCs per 100 WBC 0 <1 /100    Absolute Neutrophils 6.1 1.6 - 8.3 10e3/uL    Absolute Lymphocytes 2.1 0.8 - 5.3 10e3/uL    Absolute Monocytes 0.7 0.0 - 1.3 10e3/uL    Absolute Eosinophils 0.2 0.0 - 0.7 10e3/uL    Absolute Basophils 0.1 0.0 - 0.2 10e3/uL    Absolute Immature Granulocytes 0.0 <=0.4 10e3/uL    Absolute NRBCs 0.0 10e3/uL   Albumin Random Urine Quantitative with Creat Ratio    Collection Time: 01/16/24  8:32 AM   Result Value Ref Range    Creatinine Urine mg/dL 92.4 mg/dL    Albumin Urine mg/L <12.0 mg/L    Albumin Urine mg/g Cr     Routine UA with microscopic    Collection Time: 01/16/24  8:32 AM   Result Value Ref Range    Color Urine Light Yellow Colorless, Straw, Light Yellow, Yellow    Appearance Urine Clear Clear    Glucose Urine Negative Negative mg/dL    Bilirubin Urine Negative Negative    Ketones Urine Negative Negative mg/dL    Specific Gravity Urine 1.017 1.003 - 1.035    Blood Urine Negative Negative    pH Urine 5.0 5.0 - 7.0    Protein Albumin Urine Negative Negative mg/dL    Urobilinogen Urine Normal Normal, 2.0 mg/dL    Nitrite Urine Negative Negative    Leukocyte Esterase Urine Negative Negative    Mucus Urine Present (A) None Seen /LPF    RBC Urine 0 <=2 /HPF    WBC Urine <1 <=5 /HPF   General PFT Lab (Please always keep checked)    Collection Time: 01/16/24  8:40 AM   Result Value Ref Range    FVC-Pred 4.40 L    FVC-Pre 3.64 L    FVC-%Pred-Pre 82 %    FEV1-Pre 1.76 L    FEV1-%Pred-Pre 49 %    FEV1FVC-Pred 81 %    FEV1FVC-Pre 48 %    FEFMax-Pred 9.58 L/sec    FEFMax-Pre 6.35 L/sec    FEFMax-%Pred-Pre 66 %    FEF2575-Pred 3.41 L/sec    FEF2575-Pre 0.57 L/sec    ZSZ8107-%Pred-Pre 16 %    ExpTime-Pre 13.66 sec    FIFMax-Pre 4.76 L/sec    FEV1FEV6-Pred 81 %    FEV1FEV6-Pre 56 %   Cystic Fibrosis Culture Aerobic Bacterial    Collection Time: 01/16/24  9:07 AM    Specimen: Expectorate; Sputum   Result Value Ref Range    Culture 3+ Normal santi  to date        PFT interpretation:   Spirometry interpretation:  The spirometry shows evidence of moderate obstructive lung disease.  When compared to 9/26/2023, the FEV1 and FVC have little change.  The testing meets ATS criteria.    Severity Z-score*   Normal > -1.645   Mild -1.65 to -2.5   Moderate -2.5 to -4   Severe < -4   * accounts for sex, age, height, ancestry     Use z-score to assess airflow obstruction, restriction and DLCO  - FEV1 z-score for airflow obstruction  - TLC z-score for restriction  - DLCO z-score for diffusion defect    Pulmonary exacerbation: absent    50 minutes spent by me on the date of the encounter doing chart review, history and exam, documentation and further activities per the note  Time documented is excluding time spent for PFT interpretation.      Again, thank you for allowing me to participate in the care of your patient.        Sincerely,        Jaye Machado MD

## 2024-01-17 LAB
IGE SERPL-ACNC: 13 KU/L (ref 0–114)
TESTOST SERPL-MCNC: 385 NG/DL (ref 240–950)
VIT D+METAB SERPL-MCNC: 47 NG/ML (ref 20–50)

## 2024-01-17 NOTE — PATIENT INSTRUCTIONS
See provider AVS for a summary of recommendations from today's visit.  Linda Stone, PharmD  Cystic Fibrosis MTM Pharmacist  [facility]

## 2024-01-19 LAB
A-TOCOPHEROL VIT E SERPL-MCNC: 12.4 MG/L
ANNOTATION COMMENT IMP: NORMAL
BETA+GAMMA TOCOPHEROL SERPL-MCNC: 0.4 MG/L
RETINYL PALMITATE SERPL-MCNC: 0.03 MG/L
VIT A SERPL-MCNC: 0.47 MG/L

## 2024-01-21 LAB
BACTERIA SPT CULT: ABNORMAL
BACTERIA SPT CULT: ABNORMAL

## 2024-02-07 DIAGNOSIS — E84.9 CYSTIC FIBROSIS (H): ICD-10-CM

## 2024-02-07 DIAGNOSIS — E84.0 CYSTIC FIBROSIS OF THE LUNG (H): ICD-10-CM

## 2024-02-08 RX ORDER — BUDESONIDE AND FORMOTEROL FUMARATE DIHYDRATE 160; 4.5 UG/1; UG/1
AEROSOL RESPIRATORY (INHALATION)
Qty: 30.6 G | Refills: 3 | Status: SHIPPED | OUTPATIENT
Start: 2024-02-08

## 2024-02-16 ENCOUNTER — TELEPHONE (OUTPATIENT)
Dept: ENDOCRINOLOGY | Facility: CLINIC | Age: 49
End: 2024-02-16
Payer: COMMERCIAL

## 2024-02-16 ENCOUNTER — TELEPHONE (OUTPATIENT)
Dept: PULMONOLOGY | Facility: CLINIC | Age: 49
End: 2024-02-16
Payer: COMMERCIAL

## 2024-02-16 NOTE — TELEPHONE ENCOUNTER
PA Initiation    Medication: NOVOLOG PENFILL 100 UNIT/ML SC SOCT  Insurance Company: [employer] - Phone [phone] Fax [phone]  Pharmacy Filling the Rx:    Filling Pharmacy Phone:    Filling Pharmacy Fax:    Start Date: 2/16/2024    [id number]

## 2024-02-16 NOTE — TELEPHONE ENCOUNTER
Prior Authorization Specialty Medication Request    Medication/Dose: Symbicort Prior Authorization  [facility]    Patient received a letter stating Symbicort requires a prior authorization.     Thank you!  Linda

## 2024-02-16 NOTE — TELEPHONE ENCOUNTER
Prior Authorization Not Needed per Insurance    Medication: BUDESONIDE-FORMOTEROL FUMARATE 160-4.5 MCG/ACT IN AERO  Insurance Company: [employer] - Phone [phone] Fax [phone]  Expected CoPay: $    Pharmacy Filling the Rx: [id number] - [place], [place] - [address]  Pharmacy Notified: Yes  Patient Notified: Yes

## 2024-02-21 NOTE — TELEPHONE ENCOUNTER
PRIOR AUTHORIZATION DENIED    Medication: NOVOLOG PENFILL 100 UNIT/ML SC SOCT  Insurance Company: [employer] - Phone [phone] Fax [phone]  Denial Date: 2/21/2024  Denial Reason(s):   Appeal Information:   Patient Notified:

## 2024-02-22 ENCOUNTER — INFUSION THERAPY VISIT (OUTPATIENT)
Dept: INFUSION THERAPY | Facility: CLINIC | Age: 49
End: 2024-02-22
Attending: INTERNAL MEDICINE
Payer: COMMERCIAL

## 2024-02-22 VITALS
OXYGEN SATURATION: 94 % | RESPIRATION RATE: 16 BRPM | HEIGHT: 68 IN | HEART RATE: 82 BPM | TEMPERATURE: 98.3 F | WEIGHT: 149.3 LBS | SYSTOLIC BLOOD PRESSURE: 119 MMHG | DIASTOLIC BLOOD PRESSURE: 71 MMHG | BODY MASS INDEX: 22.63 KG/M2

## 2024-02-22 DIAGNOSIS — M85.9 LOW BONE DENSITY: ICD-10-CM

## 2024-02-22 DIAGNOSIS — A49.8 PSEUDOMONAS INFECTION: Primary | ICD-10-CM

## 2024-02-22 DIAGNOSIS — E84.0 CYSTIC FIBROSIS WITH PULMONARY MANIFESTATIONS (H): ICD-10-CM

## 2024-02-22 DIAGNOSIS — M81.0 OSTEOPOROSIS, UNSPECIFIED OSTEOPOROSIS TYPE, UNSPECIFIED PATHOLOGICAL FRACTURE PRESENCE: Primary | ICD-10-CM

## 2024-02-22 DIAGNOSIS — E10.9 TYPE 1 DIABETES MELLITUS (H): Primary | ICD-10-CM

## 2024-02-22 DIAGNOSIS — A49.8 PSEUDOMONAS INFECTION: ICD-10-CM

## 2024-02-22 DIAGNOSIS — M81.0 OSTEOPOROSIS, UNSPECIFIED OSTEOPOROSIS TYPE, UNSPECIFIED PATHOLOGICAL FRACTURE PRESENCE: ICD-10-CM

## 2024-02-22 LAB
CALCIUM SERPL-MCNC: 9.6 MG/DL (ref 8.6–10)
CREAT SERPL-MCNC: 1.02 MG/DL (ref 0.67–1.17)
EGFRCR SERPLBLD CKD-EPI 2021: >90 ML/MIN/1.73M2
HOLD SPECIMEN: NORMAL
HOLD SPECIMEN: NORMAL

## 2024-02-22 PROCEDURE — 99207 PR NO CHARGE LOS: CPT

## 2024-02-22 PROCEDURE — 36415 COLL VENOUS BLD VENIPUNCTURE: CPT | Performed by: INTERNAL MEDICINE

## 2024-02-22 PROCEDURE — 82565 ASSAY OF CREATININE: CPT | Performed by: INTERNAL MEDICINE

## 2024-02-22 PROCEDURE — 258N000003 HC RX IP 258 OP 636: Performed by: INTERNAL MEDICINE

## 2024-02-22 PROCEDURE — 250N000011 HC RX IP 250 OP 636: Mod: JZ | Performed by: INTERNAL MEDICINE

## 2024-02-22 PROCEDURE — 82310 ASSAY OF CALCIUM: CPT | Performed by: INTERNAL MEDICINE

## 2024-02-22 PROCEDURE — 96365 THER/PROPH/DIAG IV INF INIT: CPT

## 2024-02-22 RX ORDER — ACETAMINOPHEN 325 MG/1
325 TABLET ORAL
Status: CANCELLED | OUTPATIENT
Start: 2025-02-21

## 2024-02-22 RX ORDER — ACETAMINOPHEN 325 MG/1
325 TABLET ORAL
OUTPATIENT
Start: 2025-02-21

## 2024-02-22 RX ORDER — ALBUTEROL SULFATE 90 UG/1
1-2 AEROSOL, METERED RESPIRATORY (INHALATION)
Status: CANCELLED
Start: 2025-02-21

## 2024-02-22 RX ORDER — ZOLEDRONIC ACID 5 MG/100ML
5 INJECTION, SOLUTION INTRAVENOUS ONCE
Start: 2025-02-21

## 2024-02-22 RX ORDER — HEPARIN SODIUM,PORCINE 10 UNIT/ML
5-20 VIAL (ML) INTRAVENOUS DAILY PRN
OUTPATIENT
Start: 2025-02-21

## 2024-02-22 RX ORDER — MEPERIDINE HYDROCHLORIDE 25 MG/ML
25 INJECTION INTRAMUSCULAR; INTRAVENOUS; SUBCUTANEOUS EVERY 30 MIN PRN
OUTPATIENT
Start: 2025-02-21

## 2024-02-22 RX ORDER — ALBUTEROL SULFATE 90 UG/1
1-2 AEROSOL, METERED RESPIRATORY (INHALATION)
Start: 2025-02-21

## 2024-02-22 RX ORDER — HEPARIN SODIUM (PORCINE) LOCK FLUSH IV SOLN 100 UNIT/ML 100 UNIT/ML
5 SOLUTION INTRAVENOUS
OUTPATIENT
Start: 2025-02-21

## 2024-02-22 RX ORDER — EPINEPHRINE 1 MG/ML
0.3 INJECTION, SOLUTION INTRAMUSCULAR; SUBCUTANEOUS EVERY 5 MIN PRN
Status: CANCELLED | OUTPATIENT
Start: 2025-02-21

## 2024-02-22 RX ORDER — DIPHENHYDRAMINE HYDROCHLORIDE 50 MG/ML
50 INJECTION INTRAMUSCULAR; INTRAVENOUS
Status: CANCELLED
Start: 2025-02-21

## 2024-02-22 RX ORDER — HEPARIN SODIUM (PORCINE) LOCK FLUSH IV SOLN 100 UNIT/ML 100 UNIT/ML
5 SOLUTION INTRAVENOUS
Status: CANCELLED | OUTPATIENT
Start: 2025-02-21

## 2024-02-22 RX ORDER — ALBUTEROL SULFATE 0.83 MG/ML
2.5 SOLUTION RESPIRATORY (INHALATION)
Status: CANCELLED | OUTPATIENT
Start: 2025-02-21

## 2024-02-22 RX ORDER — ZOLEDRONIC ACID 5 MG/100ML
5 INJECTION, SOLUTION INTRAVENOUS ONCE
Status: COMPLETED | OUTPATIENT
Start: 2024-02-22 | End: 2024-02-22

## 2024-02-22 RX ORDER — ZOLEDRONIC ACID 5 MG/100ML
5 INJECTION, SOLUTION INTRAVENOUS ONCE
Status: CANCELLED
Start: 2025-02-21

## 2024-02-22 RX ORDER — METHYLPREDNISOLONE SODIUM SUCCINATE 125 MG/2ML
125 INJECTION, POWDER, LYOPHILIZED, FOR SOLUTION INTRAMUSCULAR; INTRAVENOUS
Status: CANCELLED
Start: 2025-02-21

## 2024-02-22 RX ORDER — EPINEPHRINE 1 MG/ML
0.3 INJECTION, SOLUTION INTRAMUSCULAR; SUBCUTANEOUS EVERY 5 MIN PRN
OUTPATIENT
Start: 2025-02-21

## 2024-02-22 RX ORDER — INSULIN ASPART 100 [IU]/ML
1 INJECTION, SOLUTION INTRAVENOUS; SUBCUTANEOUS 3 TIMES DAILY
Qty: 15 ML | Refills: 3 | Status: SHIPPED | OUTPATIENT
Start: 2024-02-22

## 2024-02-22 RX ORDER — DIPHENHYDRAMINE HYDROCHLORIDE 50 MG/ML
50 INJECTION INTRAMUSCULAR; INTRAVENOUS
Start: 2025-02-21

## 2024-02-22 RX ORDER — HEPARIN SODIUM,PORCINE 10 UNIT/ML
5-20 VIAL (ML) INTRAVENOUS DAILY PRN
Status: CANCELLED | OUTPATIENT
Start: 2025-02-21

## 2024-02-22 RX ORDER — MEPERIDINE HYDROCHLORIDE 25 MG/ML
25 INJECTION INTRAMUSCULAR; INTRAVENOUS; SUBCUTANEOUS EVERY 30 MIN PRN
Status: CANCELLED | OUTPATIENT
Start: 2025-02-21

## 2024-02-22 RX ORDER — ALBUTEROL SULFATE 0.83 MG/ML
2.5 SOLUTION RESPIRATORY (INHALATION)
OUTPATIENT
Start: 2025-02-21

## 2024-02-22 RX ORDER — METHYLPREDNISOLONE SODIUM SUCCINATE 125 MG/2ML
125 INJECTION, POWDER, LYOPHILIZED, FOR SOLUTION INTRAMUSCULAR; INTRAVENOUS
Start: 2025-02-21

## 2024-02-22 RX ADMIN — ZOLEDRONIC ACID 5 MG: 5 INJECTION, SOLUTION INTRAVENOUS at 16:11

## 2024-02-22 RX ADMIN — SODIUM CHLORIDE 250 ML: 9 INJECTION, SOLUTION INTRAVENOUS at 16:10

## 2024-02-22 NOTE — PROGRESS NOTES
Infusion Nursing Note:  Nico Morales presents today for Reclast.    Patient seen by provider today: No   present during visit today: Not Applicable.    Note: Patient has tolerated previous infusions. No new concerns.    Patient denies dental issues at this time.  States last dental exam was January.  Patient will inform dentist that he got Reclast infusion, prior to any invasive dental work in the future.    Reminded patient to drink 6-8 glasses of water today, and tomorrow, to protect kidneys.      Intravenous Access:  Peripheral IV placed.    Treatment Conditions:  Lab Results   Component Value Date     01/16/2024    POTASSIUM 4.5 01/16/2024    MAG 2.4 (H) 01/16/2024    CR 1.02 02/22/2024    CLAUDIA 9.6 02/22/2024    BILITOTAL 0.4 01/16/2024    ALBUMIN 4.2 01/16/2024    ALT 39 01/16/2024    AST 33 01/16/2024       Results reviewed, labs MET treatment parameters, ok to proceed with treatment.      Post Infusion Assessment:  Patient tolerated infusion without incident.  Site patent and intact, free from redness, edema or discomfort.  No evidence of extravasations.  Access discontinued per protocol.       Discharge Plan:   AVS to patient via MYCHART.  Patient will return next year for next appointment.   Patient discharged in stable condition accompanied by: self.  Departure Mode: Ambulatory.      Peace Banda RN

## 2024-03-12 LAB
ACID FAST STAIN (ARUP): NORMAL

## 2024-05-01 ENCOUNTER — MYC REFILL (OUTPATIENT)
Dept: ENDOCRINOLOGY | Facility: CLINIC | Age: 49
End: 2024-05-01
Payer: COMMERCIAL

## 2024-05-01 DIAGNOSIS — E08.9 DIABETES MELLITUS DUE TO CYSTIC FIBROSIS (H): ICD-10-CM

## 2024-05-01 DIAGNOSIS — E84.9 DIABETES MELLITUS DUE TO CYSTIC FIBROSIS (H): ICD-10-CM

## 2024-05-02 ENCOUNTER — MYC REFILL (OUTPATIENT)
Dept: ENDOCRINOLOGY | Facility: CLINIC | Age: 49
End: 2024-05-02
Payer: COMMERCIAL

## 2024-05-02 DIAGNOSIS — E08.9 DIABETES MELLITUS DUE TO CYSTIC FIBROSIS (H): ICD-10-CM

## 2024-05-02 DIAGNOSIS — E84.9 DIABETES MELLITUS DUE TO CYSTIC FIBROSIS (H): ICD-10-CM

## 2024-05-02 RX ORDER — LANCETS
EACH MISCELLANEOUS
Qty: 600 EACH | Refills: 3 | Status: SHIPPED | OUTPATIENT
Start: 2024-05-02

## 2024-05-03 ENCOUNTER — TELEPHONE (OUTPATIENT)
Dept: PULMONOLOGY | Facility: CLINIC | Age: 49
End: 2024-05-03
Payer: COMMERCIAL

## 2024-05-03 NOTE — TELEPHONE ENCOUNTER
Patient Contacted for the patient to call back and schedule the following:    Appointment type: Reschedule Return  Provider: Carter  Return date: 9/17/2024  Specialty phone number: [phone]  Additional appointment(s) needed: cf loop  Additonal Notes: na

## 2024-05-06 NOTE — PROGRESS NOTES
"[facility], Adult Program   Annual Psychosocial Assessment     Presenting information:   Nico \"Kilo\" Andrew is a 46-year-old male with cystic fibrosis, presenting in clinic today for a routine appointment with his primary CF provider, Dr. Machado.  Met with Kilo to complete an updated annual psychosocial assessment.  Kilo was unaccompanied in clinic room today during SW visit.    Living Situation:   Kilo lives with his wife Madisyn, Madisyn's 4 children (50% of the time) and his 9 y.o English Bulldog Jefry in [place]. They built their home and he sold his former home in [place]. They also have recently bought a condo in [place] and plan to go there frequently. He reports things are going well and he denies any concerns about his living situation.    Family Constellation:   Kilo grew up in [place] and was raised by his biological parents (Epifanio and Honey). He has one older sister (Rosa). His parents live [place], they are retired, are in good health and enjoy travel. Rosa is  with two children and lives in [place].  Kilo reports that his family is doing \"great\", he sees his parents, his sister and her family regularly and is very close with them.      Kilo and his first wife  and he got remarried to Madisyn in 2021. Kilo does not have biological children, Madisyn has 4 children from a previous marriage: Enrique (age 14), Jackeline (13), Flory (8) and Shabnam (4).       Social Support:   Kilo reports very good social support. He gets along well with his family, friends, co-workers and viridiana community. 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 He reports that he has a 2nd cousin (twice removed) that also has CF, they keep in touch and do discuss questions and experiences regarding CF with each other.      Adjustment to Illness:   Kilo was diagnosed with CF at 18 months, he reports that the diagnosis came after he " "wasn't gaining weight.  He received his pediatric care through the [facility] [place] [facility] and experienced few complications while growing up.  He recalls that his parents took very good care of him, he was not hospitalized until his 20's for CF related issues.      Kilo feels he is doing \"wonderful\" from a physical health perspective. He continues to take Trikafta and feels this medication has been life changing for him. He does continue to vest 2-3 times per day. He does still have his g-tube but has not used it in a very long time and is likely going to be able to take it out soon. Clinically, he has CF lung disease with severe obstruction, pancreatic insufficiency and CF related diabetes with good blood sugar control. Kilo continues to be very disciplined and dedicated to maintaining a healthy lifestyle.  He reports excellent adherence with medications and other CF treatment recommendations.  Kilo gets exercise regularly from walking his dog and exercising with equipment he has at home.      Kilo reports that he is an \"open book\" when he comes to openness regarding his CF diagnosis.  He stated that he vests in front of Madisyn and her children and has been very open with them.    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  He reports that he has the software to complete this and does plan to eventually. He is comfortable with his wife Madisyn to make medical decisions on his behalf if he were unable to make medical decisions for himself.    Advance Care Planning:  Kilo has not yet had a ACP visit, it has been offered to him in the past and he has been open to it but it was never scheduled.  This was not discussed today as he has had much improved stability with Trikafta.    Education:   Kilo completed a Bachelor's Degree in Biology and Math from [employer] in [place].      Employment:   Kilo continues to work " "full-time as a  working mainly with halfway planning.  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 He enjoys his work and has built a solid clientele base. His work hours are variable but flexible, and he works close to home and has a private office space. He has 2 long-term disability policies purchased through financial planning associations. Kilo has health insurance through his work.      Mental Health:   Kilo describes his current mood as \"good, stable\" and denies any current or past symptoms of depression, anxiety, or any other mental health disorder. He reports low stress levels overall and describes himself as \"laid-back\".  He typically sabiha with stress by taking his dog on a walk. Mindy/spirituality are important to him, he identifies as Zoroastrianism (non-Mormonism) and is a member at [facility], he and Madisyn switched to this [employer] community together as they were looking for something smaller.  In the past after his divorce he did participate in separation and divorce care groups through his [employer] which he found very helpful.      Kilo completed the following screens today:  VANE-7 Score: 0, indicating minimal symptoms of anxiety and described as not difficult at all in daily functioning.    PHQ-9 Score: 0, indicating minimal symptoms of depression and described as not difficult at all in daily functioning.       Kilo agreed with the scores and interpretation of screens above.  He denied having additional symptoms not addressed on these screens.      Chemical Health:   Kilo denies the use of tobacco, marijuana or illicit drugs.  He does not drink alcohol and denies any history of chemical dependency issues.           Finances:   Kilo has income through wages and denies any financial concerns.     Insurance:   Kilo has insurance through [employer], this policy is offered through [employer] that he " no partners with and he denies any concerns about his insurance coverage and reports that it is very good coverage.    Recreation/Leisure Interests:   Kilo enjoys having out with friends, family, going out for Sushi, hiking and being outdoors. He is enjoying traveling to [place] where their condo is and is able to use a boat as they provide this amenity. He enjoys spending time with his dog. He has enjoyed getting to know Madisyn's kids and has learned a lot since spending time with them.    INTERVENTION:   - Psychosocial assessment  -Mental health screening  -Supportive counseling    ASSESSMENT:   Kilo was open in his responses and he appeared to be in good spirits.  He feels that things are going well for him and he is happy with the currently status of his health, Trikafta has had a tremendous impact.  He reports having a strong support system through family, friends and viridiana community.  He continues to demonstrate excellent adherence with managing his health care needs.  He continues to deny mental health concerns and appears to be coping well given the pandemic circumstances.  He appears to be stable from a psychosocial perspective with access to relevant supports and resources.  No concerns noted/expressed today, recommend f/u as outlined below.    PLAN OF INTERVENTION:   -Complete psychosocial assessment annually.   -Continue to follow for any psychosocial needs as they arise.    KACI Acosta, [facility]  Adult Cystic Fibrosis   Ph: [phone]  Pager: [phone]

## 2024-05-13 DIAGNOSIS — E84.9 CYSTIC FIBROSIS (H): Primary | ICD-10-CM

## 2024-05-20 NOTE — PROGRESS NOTES
[facility]  [facility]  May 21, 2024         Assessment and Plan:   Nico Morales is a 48 year old male with cystic fibrosis.    1. CF lung disease with moderate obstruction:Kilo reports he is doing well.  His teenage children and their friends are wearing less perfumes and colognes.  This has previously resulted in having a tight chest and hemoptysis. He does show evidence of stable pfts.  He historically has grown pseudomonas in his sputum. He did have recent positive AFB but fortunately the repeat  times 2 was negative.   At this time I have recommended to Kilo:  -- He should continue to do his vest and nebulized therapies 2-3 times daily  -- repeat AFB sputum in the next couple of months   --if it is negative, we will return to yearly testing  -- He does have both levofloxacin and doxycycline available to him for exacerbation.  Fortunately he has not needed to use these in many months.     2. Pancreatic Insufficiency/GI: Kilo has no new symptoms consistent with worsening malabsorption.  He did previously had a g tube.  This was removed after several years of stable weight.  The site occasionally bleeds.  It does appear well healed.  - continue the present dose of pancreatic enzymes  - continue vitamin supplementation.     3.  CF TR modulator therapy: Kilo is on Trikafta and tolerating well.  His hepatic panel and CK in February were stable.  He will repeat them sometime in the next month.     4.  Cystic fibrosis related diabetes: Kilo reports good control of his blood sugars. His A1C is stable and he is followed by endocrinology.     5.  Psychosocial: Kilo is  and in a stable relationship.  Reports all his kids are doing well.  Work is good.  His wife in undergoing treatment for breast cancer.    Immunizations: UTD  Colonoscopy: 6/2025  Last DEXA: 2/2023  Last hepatic panel: 1/2024    Annual studies due:   Recent Labs   Lab Test 01/16/24  0824   IGG  1,123     Jaye Machado MD MPH  Associate Professor of Medicine  Pulmonary, Allergy, Critical Care and Sleep Medicine        Interval History:     Kilo reports that he his feeling better than last visit.  He discontinued his mucolytic and feels no different.  He denies chest congestion or shortness of breath.           Review of Systems:     CONSTITUTIONAL: no fever, no chills, no sweats, no change in weight, no change in energy--good, no change in appetite--pretty good    INTEGUMENTARY/SKIN: no rash, no obvious new lesions    ENT/MOUTH: no sore throat, no new sinus pain, no new nasal drainage, no new nasal congestion, normal ear ringing     RESPIRATORY: see interval history    CV: no chest pain, no palpitations, no peripheral edema    GI: no nausea, no vomiting, no change in stools, no fatty stools, no GERD    : negative urinary    MUSCULOSKELETAL: no myalgias, no arthralgias    ENDOCRINE: no hypoglycemia, blood sugars with adequate control    NEURO:  rare headache    SLEEP: difficult with wife's recent issues    PSYCHIATRIC: mood stable--good          Past Medical and Surgical History:     Past Medical History:   Diagnosis Date    CF (cystic fibrosis) (H)     Exocrine pancreatic insufficiency     Nocardia infection     Pseudomonas infection     S/P gastrostomy (H) 2002     Past Surgical History:   Procedure Laterality Date    COLONOSCOPY N/A 5/21/2018    Procedure: COMBINED COLONOSCOPY, SINGLE OR MULTIPLE BIOPSY/POLYPECTOMY BY BIOPSY;  Cystic fibrosis, 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;  Surgeon: Margarito Bowman MD;  Location: [facility]    COLONOSCOPY N/A 6/27/2022    Procedure: COLONOSCOPY, WITH POLYPECTOMY AND BIOPSY;  Surgeon: Margarito Bowman MD;  Location: [facility]    GASTROSTOMY TUBE  2002    IR PULMONARY EMBOLIZATION  7/20/2001    PICC INSERTION Left 01/22/2018    4Fr SL BioFlo PICC, 46cm (5cm external) in the L basilic vein w/ tip in the low SVC           Family History:     Family History   Problem  Relation Age of Onset    Heart Disease Maternal Grandmother     Heart Disease Maternal Grandfather     Heart Disease Paternal Grandmother     Diabetes No family hx of             Social History:     Social History     Socioeconomic History    Marital status:      Spouse name: Not on file    Number of children: 0    Years of education: Not on file    Highest education level: Not on file   Occupational History    Occupation: financial palnner     Employer: [employer]   Tobacco Use    Smoking status: Never    Smokeless tobacco: Never   Vaping Use    Vaping status: Never Used   Substance and Sexual Activity    Alcohol use: No     Alcohol/week: 0.0 standard drinks of alcohol    Drug use: No    Sexual activity: Yes     Partners: Female     Birth control/protection: Pill   Other Topics Concern    Parent/sibling w/ CABG, MI or angioplasty before 65F 55M? Not Asked     Service Not Asked    Blood Transfusions No    Caffeine Concern Not Asked    Occupational Exposure Not Asked    Hobby Hazards Not Asked    Sleep Concern Not Asked    Stress Concern Not Asked    Weight Concern Not Asked    Special Diet Not Asked    Back Care Not Asked    Exercise No    Bike Helmet Not Asked    Seat Belt Not Asked    Self-Exams Not Asked   Social History Narrative    8/15/2019 - .  Lives with his English Bulldog (Jefry) in a house that he designed in [place].  No children.     Social Determinants of Health     Financial Resource Strain: Not on file   Food Insecurity: Not on file   Transportation Needs: Not on file   Physical Activity: Not on file   Stress: Not on file   Social Connections: Not on file   Interpersonal Safety: Not on file   Housing Stability: Not on file            Medications:     Current Outpatient Medications   Medication Sig Dispense Refill    ACE NOT PRESCRIBED, INTENTIONAL, 1 each continuous prn. ACE Inhibitor not prescribed due to Risk for drug interaction 0 each 0    acetylcysteine  "(MUCOMYST) 10 % nebulizer solution NEBULIZE AND INHALE 4 ML INTO THE LUNGS FOUR TIMES A  mL 11    azithromycin (ZITHROMAX) 250 MG tablet Take 1 tablet (250 mg) by mouth daily 30 tablet 11    blood glucose (NO BRAND SPECIFIED) test strip Use to test blood sugar 6 times daily or as directed. To accompany: Blood Glucose Monitor Brands: per insurance. 600 strip 3    blood glucose (NO BRAND SPECIFIED) test strip Use to test blood sugar 6 times daily. Any covered brand. Pt now using Contour Next, if covered. 600 strip 3    blood glucose monitoring (NO BRAND SPECIFIED) meter device kit Use to test blood sugar 6 times daily or as directed. Preferred blood glucose meter OR supplies to accompany: Blood Glucose Monitor Brands: per insurance. 1 kit 0    blood glucose monitoring (NO BRAND SPECIFIED) meter device kit Use to test blood sugar 6 times daily or as directed.whatever is covered 1 kit 1    blood glucose monitoring (ULTRA THIN 30G) lancets Use to test blood sugar 6 times daily or as directed.whatever is covered 540 each 3    budesonide-formoterol (SYMBICORT) 160-4.5 MCG/ACT Inhaler INHALE 2 PUFFS BY MOUTH TWICE DAILY 30.6 g 3    CALCIUM PO Take 500 mg by mouth 2 times daily Strength unknown.       Continuous Blood Gluc Sensor (FREESTYLE SUKHWINDER 14 DAY SENSOR) MISC Change every 14 days. 1 each 3    [place] [facility] PATIENT Contact [facility] for patient specific medication information at [phone] on admission and discharge from the hospital.  Phones are answered 24 hours a day 7 days a week 365 days a year.    Providers - Choose \"CONTINUE HOME MED (no script)\" at discharge if patient treatment with home infusion will continue.      ferrous fumarate 65 mg, Venetie IRA. FE,-Vitamin C 125 mg (VITRON-C)  MG TABS tablet Take 1 tablet by mouth daily      Glucagon (BAQSIMI) 3 MG/DOSE nasal powder Spray 1 spray (3 mg) in nostril as needed (in emergency for hypoglycic reaction) in the event of " unconscious hypoglycemia or hypoglycemic seizure. May repeat dose if no response after 15 minutes. 1 each 1    Glucagon (GVOKE HYPOPEN) 1 MG/0.2ML pen Inject the contents of 1 device under the skin into lower abdomen, outer thigh, or outer upper arm as needed for hypoglycemia. If no response after 15 minutes, additional 1 mg dose from a new device may be injected while waiting for emergency assistance. 0.4 mL 1    insulin aspart (NOVOLOG PENFILL) 100 UNIT/ML cartridge INJECT 1 UNIT PER 20G CARB AT LUNCH AND SUPPER; INJECT 3 UNITS AT NIGHT WITH TUBE FEEDINGS. MAX 20 UNITS PER DAY 30 mL 3    Insulin Aspart FlexPen 100 UNIT/ML SOPN Inject 1 Units Subcutaneous 3 times daily With these instructions :INJECT 1 UNIT PER 20G CARB AT LUNCH AND SUPPER; INJECT 3 UNITS AT NIGHT WITH TUBE FEEDINGS. MAX 20 UNITS PER Day 15 mL 3    insulin glargine (LANTUS SOLOSTAR) 100 UNIT/ML pen INJECT 8 UNITS UNDER THE SKIN ONCE DAILY 15 mL 1    insulin pen needle (BD OMI U/F) 32G X 4 MM miscellaneous Use 4 pen needles daily or as directed. 200 each 4    levalbuterol (XOPENEX HFA) 45 MCG/ACT inhaler INHALE 2 PUFFS INTO THE LUNGS EVERY 6 HOURS AS NEEDED FOR SHORTNESS OF BREATH/DYSPNEA 75 g 0    levalbuterol (XOPENEX) 1.25 MG/3ML neb solution INHALE 1 VIAL BY MOUTH INTO THE LUNGS VIA NEBULIZATION FOUR TIMES DAILY 360 mL 11    lipase-protease-amylase (CREON) 53870-16213-659372 units CPEP per EC capsule TAKE 9-10 CAPSULES BY MOUTH WITH MEALS (3 MEALS/DAY) AND 2-4 CAPS WITH SNACKS (3 SNACKS/DAY) 3780 capsule 3    MULTIVITAMIN OR Take 1 tablet by mouth daily. Includes 5,000 units vitamin D and 400 units vitamin E. Per pt, formulated for CF pts.      mvw complete (PROBIOTIC FORMULATION) capsule TAKE ONE CAPSULE BY MOUTH ONCE DAILY 30 capsule 11    phytonadione (MEPHYTON) 5 MG tablet Take one tablet daily for 3 days as needed for hemoptysis. 6 tablet 4    polyethylene glycol (MIRALAX) 17 GM/Dose powder Take 17 g by mouth daily as needed for  constipation      sodium chloride inhalant 7 % NEBU neb solution USE 4ML( 1 VIAL) VIA NEBULIZER TWICE DAILY 240 mL 5    thin (NO BRAND SPECIFIED) lancets Use with lanceting device 6x daily. To accompany: Blood Glucose Monitor Brands: per insurance. 600 each 3    TRIKAFTA 100-50-75 & 150 MG tablet pack TAKE TWO ORANGE TABLETS BY MOUTH IN THE MORNING AND ONE BLUE TABLET IN THE EVENING AS DIRECTED ON PACKAGE.  TAKE WITH FAT CONTAINING FOOD. 84 tablet 4     No current facility-administered medications for this visit.            Physical Exam:   /73   Pulse 83   SpO2 96%     Constitutional:   Awake, alert and in no apparent distress     Eyes:   nonicteric     ENT:   oral mucosa moist without lesions, normal tm bilaterally, bilateral mucosa normal     Neck:   Supple without supraclavicular or cervical lymphadenopathy     Lungs:   Fair air flow.  No crackles. Biapical rhonchi.  No wheezes.     Cardiovascular:   Normal S1 and S2.  RRR.  No murmur, gallop or rub.     Abdomen:   NABS, soft, nontender, nondistended. G tube site well healed     Musculoskeletal:   No edema, digital clubbing present     Neurologic:   Alert and conversant.     Skin:   Warm, dry.  No rash on limited exam.             Data:   All laboratory and imaging data reviewed.      Results from the last week:  Recent Results (from the past 168 hour(s))   General PFT Lab (Please always keep checked)    Collection Time: 05/21/24  9:14 AM   Result Value Ref Range    FVC-Pred 4.41 L    FVC-Pre 3.65 L    FVC-%Pred-Pre 82 %    FEV1-Pre 1.75 L    FEV1-%Pred-Pre 49 %    FEV1FVC-Pred 81 %    FEV1FVC-Pre 48 %    FEFMax-Pred 9.59 L/sec    FEFMax-Pre 6.56 L/sec    FEFMax-%Pred-Pre 68 %    FEF2575-Pred 3.40 L/sec    FEF2575-Pre 0.56 L/sec    CNX0391-%Pred-Pre 16 %    ExpTime-Pre 13.99 sec    FIFMax-Pre 4.62 L/sec    FEV1FEV6-Pred 81 %    FEV1FEV6-Pre 56 %       PFT interpretation:   Spirometry interpretation:  The spirometry shows evidence of moderate obstructive  lung disease.  When compared to 1/16/24, the FEV1 and FVC have little change.  The testing meets ATS criteria.    Severity Z-score*   Normal > -1.645   Mild -1.65 to -2.5   Moderate -2.5 to -4   Severe < -4   * accounts for sex, age, height, ancestry     Use z-score to assess airflow obstruction, restriction and DLCO  - FEV1 z-score for airflow obstruction  - TLC z-score for restriction  - DLCO z-score for diffusion defect    Pulmonary exacerbation: absent    45 minutes spent by me on the date of the encounter doing chart review, history and exam, documentation and further activities per the note  Time documented is excluding time spent for PFT interpretation.

## 2024-05-20 NOTE — PATIENT INSTRUCTIONS
Cystic Fibrosis Self-Care Plan    RECOMMENDATIONS:   Help us provide the best possible care. If you receive a questionnaire from the CF Foundation about your clinic experience today please fill it out.  It should take less than 5 minutes. Let us know what we are doing well and how we can improve.  Kilo, It was great to see you today.  The plan from today:  --labs sometime in the next couple of months  --one additional AFB   Great job taking care of yourself.    YOUR GOAL:  Stay safe and well.  Enjoy the great weather!      Cystic Fibrosis :    Keila Barrera  [phone]  [facility] Nurse line:  Johanne    [phone]     [facility] Fax Number:      [phone]         Cystic Fibrosis Respiratory Therapists:   Chasidy Encinas                          [phone]          Jacklyn Pina   [phone]  Cystic Fibrosis Dietitians:              Zeina Pierre              [phone]                            Ivory Bonilla                        [phone]   Cystic Fibrosis Diabetes Nurse:    Jordyn Rudolph               [phone]    Cystic Fibrosis Social Workers:     Dahiana Guidry              [phone]                     Louann Suarez               [phone]  Cystic Fibrosis Pharmacists:           Linda Stone                              [phone] (pager)         Gracie Allen      [phone]   Cystic Fibrosis Genetic Counselor:   Pretty Pulliam    [phone]    [facility] website:  [email or web address]    We have recently learned about an albuterol neb solution shortage due to a manufacturing delay. There is still a small supply coming in but not enough to meet the current demand. We do not yet have an estimate for when this will become widely available again.    We our asking our CF community to do the following:    Please take time to check your supply of albuterol neb solution at home. We recommend keeping at least a  2-week supply of albuterol nebs at home in case of illness.    2.  If you have an albuterol inhaler at home, you can use 4 puffs of the inhaler with a spacer in place of the nebulized albuterol at the start of your treatments. It is important to use a spacer for the best technique. If you do not have a spacer at home or have questions on technique, we will be happy to review and send one to your home address. Please also take a moment to check that your albuterol HFA inhaler is available and not .  inhalers may be less effective as the medication loses its potency or power. In some instances your team may suggest another alternative instead of an albuterol inhaler.    3.  Please take care in requesting refills. Albuterol neb solution is a life-saving medication for patients having severe asthma attacks and other emergency respiratory conditions. Let s work together to make sure that albuterol neb solution is available to those who need it urgently.    Reach out to your care team with questions and to confirm your planned alternative for albuterol nebs. [employer] will be the fastest way to connect. If possible, please reserve the nursing line for more urgent concerns as we are short on nursing staff.    Here are some reputable sites with more information:    https://www.cidrap.[email or web address]/resilient-drug-supply/us-liquid-albuterol-uhvuzjkb-cvvwgntn-bizusl-after-major-supplier-shuts-down    https://www.[employer]//health/albuterol-shortage    https://www.ashp.org/drug-shortages/current-shortages/drug-shortage-detail.aspx?sk=190&loginreturnUrl=SSOCheckOnly       MRN: [id number]   Clinic Date: May 20, 2024   Patient: Nico Morales     Annual Studies:   IGG   Date Value Ref Range Status   2021 1,208 610 - 1,616 mg/dL Final     Immunoglobulin G   Date Value Ref Range Status   2024 1,123 610 - 1,616 mg/dL Final     Insulin   Date Value Ref Range Status   2011 30 mU/L Final     Comment:      Reference Range:  0-20  +120     There are no preventive care reminders to display for this patient.    Pulmonary Function Tests  FEV1: amount of air you can blow out in 1 second  FVC: total amount of air you can take in and blow out    Your Goals:             Latest Ref Rng & Units 1/16/2024     8:40 AM   PFT   FVC L 3.64  P   FEV1 L 1.76  P   FVC% % 82  P   FEV1% % 49  P      P Preliminary result          Airway Clearance: The Most Important Way to Keep Your Lungs Healthy  Vest Settings:   Hill-Rom Frequencies: 8, 9, 10 Pressure 10 Then, Frequencies 18, 19, 20 Pressure 6     RespirTech: Quick Start with Pressure of     Do each frequency for 5 minutes; Deflate vest after each frequency & cough 3 times before beginning the next setting.    Vest and Neb Therapy should be done 2 times/day.    Good Nutrition Can Improve Lung Function and Overall Health    Take ALL of your vitamins with food    Take 1/2 of your enzymes before EVERY meal/snack and the other 1/2 mid-meal/snack    Wt Readings from Last 3 Encounters:   02/22/24 67.7 kg (149 lb 4.8 oz)   01/16/24 67.7 kg (149 lb 4 oz)   09/26/23 66.7 kg (147 lb 1.6 oz)       There is no height or weight on file to calculate BMI.         National CF Foundation Recommendations for BMI in CF Adults: Women: at least 22 Men: at least 23        Controlling Blood Sugars Helps Prevent Lung Infections & Improves Nutrition  Test blood sugar:    In the morning before eating (goal is )    2 hours after a meal (goal is less than 150)    When pre-meal glucose is greater than 150 add correction    At bedtime (if less than 100 eat a snack with 15 grams of carbohydrates  Last A1C Results:   Hemoglobin A1C   Date Value Ref Range Status   01/16/2024 6.7 (H) <5.7 % Final     Comment:     Normal <5.7%   Prediabetes 5.7-6.4%    Diabetes 6.5% or higher     Note: Adopted from ADA consensus guidelines.   05/04/2021 6.2 (H) 0 - 5.6 % Final     Comment:     Normal <5.7% Prediabetes  5.7-6.4%  Diabetes 6.5% or higher - adopted from ADA   consensus guidelines.           If diabetic, measure A1C every 6 months. Goal: Under 7%    Staying Healthy  Research:  If you are interested in learning about research opportunities or have questions, please contact the CF Research Team at [phone] or [email or web address].    CF Foundation:  Compass is a personalized resource service to help you with the insurance, financial, legal and other issues you are facing.  It's free, confidential and available to anyone with CF.  Ask your  for more information or contact Compass directly at [phone]-[phone] ([phone]) or compass@cff.org, or learn more at cff.org/compass.

## 2024-05-21 ENCOUNTER — OFFICE VISIT (OUTPATIENT)
Dept: PULMONOLOGY | Facility: CLINIC | Age: 49
End: 2024-05-21
Attending: INTERNAL MEDICINE
Payer: COMMERCIAL

## 2024-05-21 VITALS — OXYGEN SATURATION: 96 % | DIASTOLIC BLOOD PRESSURE: 73 MMHG | HEART RATE: 83 BPM | SYSTOLIC BLOOD PRESSURE: 112 MMHG

## 2024-05-21 DIAGNOSIS — E84.0 CYSTIC FIBROSIS WITH PULMONARY MANIFESTATIONS (H): Primary | ICD-10-CM

## 2024-05-21 DIAGNOSIS — E84.9 DIABETES MELLITUS DUE TO CYSTIC FIBROSIS (H): ICD-10-CM

## 2024-05-21 DIAGNOSIS — K86.81 EXOCRINE PANCREATIC INSUFFICIENCY: ICD-10-CM

## 2024-05-21 DIAGNOSIS — E08.9 DIABETES MELLITUS DUE TO CYSTIC FIBROSIS (H): ICD-10-CM

## 2024-05-21 LAB
EXPTIME-PRE: 13.99 SEC
FEF2575-%PRED-PRE: 16 %
FEF2575-PRE: 0.56 L/SEC
FEF2575-PRED: 3.4 L/SEC
FEFMAX-%PRED-PRE: 68 %
FEFMAX-PRE: 6.56 L/SEC
FEFMAX-PRED: 9.59 L/SEC
FEV1-%PRED-PRE: 49 %
FEV1-PRE: 1.75 L
FEV1FEV6-PRE: 56 %
FEV1FEV6-PRED: 81 %
FEV1FVC-PRE: 48 %
FEV1FVC-PRED: 81 %
FIFMAX-PRE: 4.62 L/SEC
FVC-%PRED-PRE: 82 %
FVC-PRE: 3.65 L
FVC-PRED: 4.41 L

## 2024-05-21 PROCEDURE — 99213 OFFICE O/P EST LOW 20 MIN: CPT | Performed by: INTERNAL MEDICINE

## 2024-05-21 PROCEDURE — 99214 OFFICE O/P EST MOD 30 MIN: CPT | Mod: 25 | Performed by: INTERNAL MEDICINE

## 2024-05-21 PROCEDURE — 87186 SC STD MICRODIL/AGAR DIL: CPT | Performed by: INTERNAL MEDICINE

## 2024-05-21 PROCEDURE — 94375 RESPIRATORY FLOW VOLUME LOOP: CPT | Performed by: INTERNAL MEDICINE

## 2024-05-21 NOTE — NURSING NOTE
"Nico Morales is a 48 year old year old who is being seen for Cystic Fibrosis (F/u)      Medications reviewed and Vital signs taken.    Specimen Collection Type: Sputum    Order(s) placed: CF Aerobic Bacterial    *IF AFB order placed - please enter \"PRIORITIZE AFB\" to order comments.       Lab Results   Component Value Date    ACIDFAST No acid fast bacilli seen 01/16/2024    ACIDFAST No acid fast bacilli seen 01/16/2024    ACIDFAST No acid fast bacilli seen 01/16/2024         Lab Results   Component Value Date    AFBSMS Negative for acid fast bacteria 05/04/2021    AFBSMS  05/04/2021     Assayed at [facility] [address] [place] [id number] [phone]           "

## 2024-05-21 NOTE — LETTER
5/21/2024         RE: Nico Morales  [address] [place] [address]        Dear Colleague,    Thank you for referring your patient, Nico Morales, to the [facility] [place]. Please see a copy of my visit note below.    [facility]  May 21, 2024         Assessment and Plan:   Nico Morales is a 48 year old male with cystic fibrosis.    1. CF lung disease with moderate obstruction:Kilo reports he is doing well.  His teenage children and their friends are wearing less perfumes and colognes.  This has previously resulted in having a tight chest and hemoptysis. He does show evidence of stable pfts.  He historically has grown pseudomonas in his sputum. He did have recent positive AFB but fortunately the repeat  times 2 was negative.   At this time I have recommended to Kilo:  -- He should continue to do his vest and nebulized therapies 2-3 times daily  -- repeat AFB sputum in the next couple of months   --if it is negative, we will return to yearly testing  -- He does have both levofloxacin and doxycycline available to him for exacerbation.  Fortunately he has not needed to use these in many months.     2. Pancreatic Insufficiency/GI: Kilo has no new symptoms consistent with worsening malabsorption.  He did previously had a g tube.  This was removed after several years of stable weight.  The site occasionally bleeds.  It does appear well healed.  - continue the present dose of pancreatic enzymes  - continue vitamin supplementation.     3.  CF TR modulator therapy: Kilo is on Trikafta and tolerating well.  His hepatic panel and CK in February were stable.  He will repeat them sometime in the next month.     4.  Cystic fibrosis related diabetes: Kilo reports good control of his blood sugars. His A1C is stable and he is followed by endocrinology.     5.  Psychosocial: Kilo is  and in a  stable relationship.  Reports all his kids are doing well.  Work is good.  His wife in undergoing treatment for breast cancer.    Immunizations: UTD  Colonoscopy: 6/2025  Last DEXA: 2/2023  Last hepatic panel: 1/2024    Annual studies due:   Recent Labs   Lab Test 01/16/24  0824   IGG 1,123     Jaye Machado MD MPH  Associate Professor of Medicine  Pulmonary, Allergy, Critical Care and Sleep Medicine        Interval History:     Kilo reports that he his feeling better than last visit.  He discontinued his mucolytic and feels no different.  He denies chest congestion or shortness of breath.           Review of Systems:     CONSTITUTIONAL: no fever, no chills, no sweats, no change in weight, no change in energy--good, no change in appetite--pretty good    INTEGUMENTARY/SKIN: no rash, no obvious new lesions    ENT/MOUTH: no sore throat, no new sinus pain, no new nasal drainage, no new nasal congestion, normal ear ringing     RESPIRATORY: see interval history    CV: no chest pain, no palpitations, no peripheral edema    GI: no nausea, no vomiting, no change in stools, no fatty stools, no GERD    : negative urinary    MUSCULOSKELETAL: no myalgias, no arthralgias    ENDOCRINE: no hypoglycemia, blood sugars with adequate control    NEURO:  rare headache    SLEEP: difficult with wife's recent issues    PSYCHIATRIC: mood stable--good          Past Medical and Surgical History:     Past Medical History:   Diagnosis Date     CF (cystic fibrosis) (H)      Exocrine pancreatic insufficiency      Nocardia infection      Pseudomonas infection      S/P gastrostomy (H) 2002     Past Surgical History:   Procedure Laterality Date     COLONOSCOPY N/A 5/21/2018    Procedure: COMBINED COLONOSCOPY, SINGLE OR MULTIPLE BIOPSY/POLYPECTOMY BY BIOPSY;  Cystic fibrosis, 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;  Surgeon: Margarito Bowman MD;  Location:  GI     COLONOSCOPY N/A 6/27/2022    Procedure: COLONOSCOPY, WITH POLYPECTOMY AND BIOPSY;   Surgeon: Margarito Bowman MD;  Location: [facility]     GASTROSTOMY TUBE  2002     IR PULMONARY EMBOLIZATION  7/20/2001     PICC INSERTION Left 01/22/2018    4Fr SL BioFlo PICC, 46cm (5cm external) in the L basilic vein w/ tip in the low SVC           Family History:     Family History   Problem Relation Age of Onset     Heart Disease Maternal Grandmother      Heart Disease Maternal Grandfather      Heart Disease Paternal Grandmother      Diabetes No family hx of             Social History:     Social History     Socioeconomic History     Marital status:      Spouse name: Not on file     Number of children: 0     Years of education: Not on file     Highest education level: Not on file   Occupational History     Occupation: [employer]     Employer: [employer]   Tobacco Use     Smoking status: Never     Smokeless tobacco: Never   Vaping Use     Vaping status: Never Used   Substance and Sexual Activity     Alcohol use: No     Alcohol/week: 0.0 standard drinks of alcohol     Drug use: No     Sexual activity: Yes     Partners: Female     Birth control/protection: Pill   Other Topics Concern     Parent/sibling w/ CABG, MI or angioplasty before 65F 55M? Not Asked      Service Not Asked     Blood Transfusions No     Caffeine Concern Not Asked     Occupational Exposure Not Asked     Hobby Hazards Not Asked     Sleep Concern Not Asked     Stress Concern Not Asked     Weight Concern Not Asked     Special Diet Not Asked     Back Care Not Asked     Exercise No     Bike Helmet Not Asked     Seat Belt Not Asked     Self-Exams Not Asked   Social History Narrative    8/15/2019 - .  Lives with his English Bulldog (Jefry) in a house that he designed in [place].  No children.     Social Determinants of Health     Financial Resource Strain: Not on file   Food Insecurity: Not on file   Transportation Needs: Not on file   Physical Activity: Not on file   Stress: Not on file   Social Connections:  "Not on file   Interpersonal Safety: Not on file   Housing Stability: Not on file            Medications:     Current Outpatient Medications   Medication Sig Dispense Refill     ACE NOT PRESCRIBED, INTENTIONAL, 1 each continuous prn. ACE Inhibitor not prescribed due to Risk for drug interaction 0 each 0     acetylcysteine (MUCOMYST) 10 % nebulizer solution NEBULIZE AND INHALE 4 ML INTO THE LUNGS FOUR TIMES A  mL 11     azithromycin (ZITHROMAX) 250 MG tablet Take 1 tablet (250 mg) by mouth daily 30 tablet 11     blood glucose (NO BRAND SPECIFIED) test strip Use to test blood sugar 6 times daily or as directed. To accompany: Blood Glucose Monitor Brands: per insurance. 600 strip 3     blood glucose (NO BRAND SPECIFIED) test strip Use to test blood sugar 6 times daily. Any covered brand. Pt now using Contour Next, if covered. 600 strip 3     blood glucose monitoring (NO BRAND SPECIFIED) meter device kit Use to test blood sugar 6 times daily or as directed. Preferred blood glucose meter OR supplies to accompany: Blood Glucose Monitor Brands: per insurance. 1 kit 0     blood glucose monitoring (NO BRAND SPECIFIED) meter device kit Use to test blood sugar 6 times daily or as directed.whatever is covered 1 kit 1     blood glucose monitoring (ULTRA THIN 30G) lancets Use to test blood sugar 6 times daily or as directed.whatever is covered 540 each 3     budesonide-formoterol (SYMBICORT) 160-4.5 MCG/ACT Inhaler INHALE 2 PUFFS BY MOUTH TWICE DAILY 30.6 g 3     CALCIUM PO Take 500 mg by mouth 2 times daily Strength unknown.        Continuous Blood Gluc Sensor (FREESTYLE SUKHWINDER 14 DAY SENSOR) MISC Change every 14 days. 1 each 3     [facility] MANAGED PATIENT Contact [facility] for patient specific medication information at [phone] on admission and discharge from the hospital.  Phones are answered 24 hours a day 7 days a week 365 days a year.    Providers - Choose \"CONTINUE HOME MED (no " "script)\" at discharge if patient treatment with home infusion will continue.       ferrous fumarate 65 mg, [place]. FE,-Vitamin C 125 mg (VITRON-C)  MG TABS tablet Take 1 tablet by mouth daily       Glucagon (BAQSIMI) 3 MG/DOSE nasal powder Spray 1 spray (3 mg) in nostril as needed (in emergency for hypoglycic reaction) in the event of unconscious hypoglycemia or hypoglycemic seizure. May repeat dose if no response after 15 minutes. 1 each 1     Glucagon (GVOKE HYPOPEN) 1 MG/0.2ML pen Inject the contents of 1 device under the skin into lower abdomen, outer thigh, or outer upper arm as needed for hypoglycemia. If no response after 15 minutes, additional 1 mg dose from a new device may be injected while waiting for emergency assistance. 0.4 mL 1     insulin aspart (NOVOLOG PENFILL) 100 UNIT/ML cartridge INJECT 1 UNIT PER 20G CARB AT LUNCH AND SUPPER; INJECT 3 UNITS AT NIGHT WITH TUBE FEEDINGS. MAX 20 UNITS PER DAY 30 mL 3     Insulin Aspart FlexPen 100 UNIT/ML SOPN Inject 1 Units Subcutaneous 3 times daily With these instructions :INJECT 1 UNIT PER 20G CARB AT LUNCH AND SUPPER; INJECT 3 UNITS AT NIGHT WITH TUBE FEEDINGS. MAX 20 UNITS PER Day 15 mL 3     insulin glargine (LANTUS SOLOSTAR) 100 UNIT/ML pen INJECT 8 UNITS UNDER THE SKIN ONCE DAILY 15 mL 1     insulin pen needle (BD OMI U/F) 32G X 4 MM miscellaneous Use 4 pen needles daily or as directed. 200 each 4     levalbuterol (XOPENEX HFA) 45 MCG/ACT inhaler INHALE 2 PUFFS INTO THE LUNGS EVERY 6 HOURS AS NEEDED FOR SHORTNESS OF BREATH/DYSPNEA 75 g 0     levalbuterol (XOPENEX) 1.25 MG/3ML neb solution INHALE 1 VIAL BY MOUTH INTO THE LUNGS VIA NEBULIZATION FOUR TIMES DAILY 360 mL 11     lipase-protease-amylase (CREON) 48387-24538-854084 units CPEP per EC capsule TAKE 9-10 CAPSULES BY MOUTH WITH MEALS (3 MEALS/DAY) AND 2-4 CAPS WITH SNACKS (3 SNACKS/DAY) 3780 capsule 3     MULTIVITAMIN OR Take 1 tablet by mouth daily. Includes 5,000 units vitamin D and 400 units " vitamin E. Per pt, formulated for CF pts.       mvw complete (PROBIOTIC FORMULATION) capsule TAKE ONE CAPSULE BY MOUTH ONCE DAILY 30 capsule 11     phytonadione (MEPHYTON) 5 MG tablet Take one tablet daily for 3 days as needed for hemoptysis. 6 tablet 4     polyethylene glycol (MIRALAX) 17 GM/Dose powder Take 17 g by mouth daily as needed for constipation       sodium chloride inhalant 7 % NEBU neb solution USE 4ML( 1 VIAL) VIA NEBULIZER TWICE DAILY 240 mL 5     thin (NO BRAND SPECIFIED) lancets Use with lanceting device 6x daily. To accompany: Blood Glucose Monitor Brands: per insurance. 600 each 3     TRIKAFTA 100-50-75 & 150 MG tablet pack TAKE TWO ORANGE TABLETS BY MOUTH IN THE MORNING AND ONE BLUE TABLET IN THE EVENING AS DIRECTED ON PACKAGE.  TAKE WITH FAT CONTAINING FOOD. 84 tablet 4     No current facility-administered medications for this visit.            Physical Exam:   /73   Pulse 83   SpO2 96%     Constitutional:   Awake, alert and in no apparent distress     Eyes:   nonicteric     ENT:   oral mucosa moist without lesions, normal tm bilaterally, bilateral mucosa normal     Neck:   Supple without supraclavicular or cervical lymphadenopathy     Lungs:   Fair air flow.  No crackles. Biapical rhonchi.  No wheezes.     Cardiovascular:   Normal S1 and S2.  RRR.  No murmur, gallop or rub.     Abdomen:   NABS, soft, nontender, nondistended. G tube site well healed     Musculoskeletal:   No edema, digital clubbing present     Neurologic:   Alert and conversant.     Skin:   Warm, dry.  No rash on limited exam.             Data:   All laboratory and imaging data reviewed.      Results from the last week:  Recent Results (from the past 168 hour(s))   General PFT Lab (Please always keep checked)    Collection Time: 05/21/24  9:14 AM   Result Value Ref Range    FVC-Pred 4.41 L    FVC-Pre 3.65 L    FVC-%Pred-Pre 82 %    FEV1-Pre 1.75 L    FEV1-%Pred-Pre 49 %    FEV1FVC-Pred 81 %    FEV1FVC-Pre 48 %     FEFMax-Pred 9.59 L/sec    FEFMax-Pre 6.56 L/sec    FEFMax-%Pred-Pre 68 %    FEF2575-Pred 3.40 L/sec    FEF2575-Pre 0.56 L/sec    VAB7210-%Pred-Pre 16 %    ExpTime-Pre 13.99 sec    FIFMax-Pre 4.62 L/sec    FEV1FEV6-Pred 81 %    FEV1FEV6-Pre 56 %       PFT interpretation:   Spirometry interpretation:  The spirometry shows evidence of moderate obstructive lung disease.  When compared to 1/16/24, the FEV1 and FVC have little change.  The testing meets ATS criteria.    Severity Z-score*   Normal > -1.645   Mild -1.65 to -2.5   Moderate -2.5 to -4   Severe < -4   * accounts for sex, age, height, ancestry     Use z-score to assess airflow obstruction, restriction and DLCO  - FEV1 z-score for airflow obstruction  - TLC z-score for restriction  - DLCO z-score for diffusion defect    Pulmonary exacerbation: absent    45 minutes spent by me on the date of the encounter doing chart review, history and exam, documentation and further activities per the note  Time documented is excluding time spent for PFT interpretation.      Again, thank you for allowing me to participate in the care of your patient.        Sincerely,        Jaye Machado MD

## 2024-05-29 DIAGNOSIS — E84.9 CYSTIC FIBROSIS (H): ICD-10-CM

## 2024-05-29 LAB
BACTERIA SPT CULT: ABNORMAL
BACTERIA SPT CULT: ABNORMAL

## 2024-05-29 RX ORDER — ELEXACAFTOR, TEZACAFTOR, AND IVACAFTOR 100-50-75
KIT ORAL
Qty: 84 TABLET | Refills: 4 | Status: SHIPPED | OUTPATIENT
Start: 2024-05-29 | End: 2024-09-10

## 2024-06-03 NOTE — PROGRESS NOTES
Outcome for 06/03/24 9:56 AM: Lvmamahart message sent  Melonie Morillo LPN   Outcome for 06/07/24 1:01 PM: Left Voicemail   Melonie Morillo LPN   Outcome for 06/10/24 9:47 AM: Left Voicemail   Melonie Morillo LPN   Outcome for 06/10/24 10:31 AM: Glucose readings sent via Lvmamaashley Morillo LPN

## 2024-06-07 ENCOUNTER — TELEPHONE (OUTPATIENT)
Dept: ENDOCRINOLOGY | Facility: CLINIC | Age: 49
End: 2024-06-07
Payer: COMMERCIAL

## 2024-06-07 NOTE — TELEPHONE ENCOUNTER
Called patient and left voicemail. Patient has an appointment on 6/11/24. Need to collect the last 14 days worth of blood sugar readings to prepare for patient's visit.     Melonie Morillo LPN 06/07/24 1:01 PM

## 2024-06-10 NOTE — TELEPHONE ENCOUNTER
Called patient and left voicemail. Patient has an appointment on 6/11/24. Need to collect the last 14 days worth of blood sugar readings to prepare for patient's visit.     Melonie Morillo LPN 06/10/24 9:46 AM

## 2024-06-11 ENCOUNTER — VIRTUAL VISIT (OUTPATIENT)
Dept: ENDOCRINOLOGY | Facility: CLINIC | Age: 49
End: 2024-06-11
Payer: COMMERCIAL

## 2024-06-11 DIAGNOSIS — M85.9 LOW BONE DENSITY: Primary | ICD-10-CM

## 2024-06-11 DIAGNOSIS — E84.9 DIABETES MELLITUS DUE TO CYSTIC FIBROSIS (H): ICD-10-CM

## 2024-06-11 DIAGNOSIS — E84.9 CYSTIC FIBROSIS (H): ICD-10-CM

## 2024-06-11 DIAGNOSIS — E08.9 DIABETES MELLITUS DUE TO CYSTIC FIBROSIS (H): ICD-10-CM

## 2024-06-11 PROBLEM — E11.9 DIABETES MELLITUS, TYPE 2 (H): Status: ACTIVE | Noted: 2024-06-11

## 2024-06-11 PROCEDURE — 99214 OFFICE O/P EST MOD 30 MIN: CPT | Mod: 95 | Performed by: INTERNAL MEDICINE

## 2024-06-11 ASSESSMENT — PAIN SCALES - GENERAL: PAINLEVEL: NO PAIN (0)

## 2024-06-11 NOTE — PROGRESS NOTES
CF Endocrinology Return Consultation:  Diabetes  :   Patient: Nico Morales MRN# [id number]   YOB: 1975 Age: 48 year old   Date of Visit: 06/11/2024    Dear Dr. Jaye Machado:    I had the pleasure of seeing your patient, Nico Morales in the [facility] Endocr[facility]ology Clinic, [facility], for a return consultation regarding CFRD and low bone density.           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  Verbal overview and written information provided.         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     Priority: Medium    Encounter for long-term (current) use of antibiotics 05/20/2013     Priority: Medium    Prostatitis 05/20/2013     Priority: Medium     updating diagnosis code for icd10 cutover      Pseudomonas infection      Priority: Medium    Cystic fibrosis (H) 11/24/2010     Priority: Medium     Sweat Test   Date: 4/25/78    Lab: [facility]   Sample #1:  110mmol  Sample #2:  106mmol    Genetics  Date: 4/9/90  [id number]+[id number]->T        CARDIOVASCULAR SCREENING; LDL GOAL LESS THAN 160 10/31/2010     Priority: Medium            HPI:   Nico is a 48 year old male with Cystic Fibrosis Related Diabetes Mellitus (CFRD).    I have reviewed  the available past laboratory evaluations, imaging studies, and medical records available to me at this visit.  History was obtained from the patient and the medical record.  I have reviewed the notes of the pulmonary care team.    He is doing well overall.  Denies any acute complaints.  Does not check  fingerstick blood glucose regularly.  Notes that his fasting glucose readings are usually below 10 and 2-hour postmeal readings are below 140  Most recent A1c was 6.7%  Reports that hypoglycemia is rare and occurs when he is on doxycycline  He usually reduce his Lantus dose while on doxycycline    Patient is on Trikafta and reports being stable from pulmonary point of view.      History of low bone density  Started on pamidronate in December 2018.  Has received 3 doses.  Denies any muscle ache or pains after the IV bisphosphonate infusions.  Reports that he was febrile briefly after the first 2 doses however did not have any problems after the third dose.  His last dose of pamidronate was in October 2019.  Subsequent infusions were put on hold due the pandemic.   Pamidronate was subsequently switched to Reclast.    First dose of Reclast was in June 2021  and second dose was given Feb 2023  Received the third dose in February 2024  Denies any side effects from the Reclast.  Significant increase in bone density on the last DEXA in 2023    A1c:  Hemoglobin A1C   Date Value Ref Range Status   01/16/2024 6.7 (H) <5.7 % Final     Comment:     Normal <5.7%   Prediabetes 5.7-6.4%    Diabetes 6.5% or higher     Note: Adopted from ADA consensus guidelines.   05/04/2021 6.2 (H) 0 - 5.6 % Final     Comment:     Normal <5.7% Prediabetes 5.7-6.4%  Diabetes 6.5% or higher - adopted from ADA   consensus guidelines.         Current insulin regimen:   Lantus 8 units daily in AM   Novolog 1 unit / 15 gm of carb, ~3-4 times daily. Ave Novolog dose around 3 units    Insulin administration site(s): abd or thigh          Past Medical  History:     Past Medical History:   Diagnosis Date    CF (cystic fibrosis) (H)     Exocrine pancreatic insufficiency     Nocardia infection     Pseudomonas infection     S/P gastrostomy (H) 2002            Past Surgical History:     Past Surgical History:   Procedure Laterality Date    COLONOSCOPY N/A 5/21/2018    Procedure: COMBINED COLONOSCOPY, SINGLE OR MULTIPLE BIOPSY/POLYPECTOMY BY BIOPSY;  Cystic fibrosis, 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;  Surgeon: Margarito Bowman MD;  Location: [facility]    COLONOSCOPY N/A 6/27/2022    Procedure: COLONOSCOPY, WITH POLYPECTOMY AND BIOPSY;  Surgeon: Margarito Bowman MD;  Location: [facility]    GASTROSTOMY TUBE  2002    IR PULMONARY EMBOLIZATION  7/20/2001    PICC INSERTION Left 01/22/2018    4Fr SL BioFlo PICC, 46cm (5cm external) in the L basilic vein w/ tip in the low SVC               Social History:     Social History     Social History Narrative    8/15/2019 - .  Lives with his English Bulldog (Jefry) in a house that he designed in [place].  No children.              Family History:     Family History   Problem Relation Age of Onset    Heart Disease Maternal Grandmother     Heart Disease Maternal Grandfather     Heart Disease Paternal Grandmother     Diabetes No family hx of             Allergies:     Allergies   Allergen Reactions    Piperacillin Sod-Tazobactam So Hives     Delayed type reaction in intradermal tests to Zosyn (Piperacillin/Tazobactam). Might be specific reaction to the Zosyn without cross-reactions to Penicillins (beta-Lactam core). Avoid in future Zosyn. However, I would avoid the Penicillins and use instead Penems or Cephalosporines. If Penicillins are the best option, then they can be used under observation.    Pulmozyme [Dornase Trever] Other (See Comments)     Chest pain and fever             Medications:     Current Outpatient Rx   Medication Sig Dispense Refill    ACE NOT PRESCRIBED, INTENTIONAL, 1 each continuous prn. ACE Inhibitor not  "prescribed due to Risk for drug interaction 0 each 0    acetylcysteine (MUCOMYST) 10 % nebulizer solution NEBULIZE AND INHALE 4 ML INTO THE LUNGS FOUR TIMES A  mL 11    azithromycin (ZITHROMAX) 250 MG tablet Take 1 tablet (250 mg) by mouth daily 30 tablet 11    blood glucose (NO BRAND SPECIFIED) test strip Use to test blood sugar 6 times daily or as directed. To accompany: Blood Glucose Monitor Brands: per insurance. 600 strip 3    blood glucose (NO BRAND SPECIFIED) test strip Use to test blood sugar 6 times daily. Any covered brand. Pt now using Contour Next, if covered. 600 strip 3    blood glucose monitoring (NO BRAND SPECIFIED) meter device kit Use to test blood sugar 6 times daily or as directed. Preferred blood glucose meter OR supplies to accompany: Blood Glucose Monitor Brands: per insurance. 1 kit 0    blood glucose monitoring (NO BRAND SPECIFIED) meter device kit Use to test blood sugar 6 times daily or as directed.whatever is covered 1 kit 1    blood glucose monitoring (ULTRA THIN 30G) lancets Use to test blood sugar 6 times daily or as directed.whatever is covered 540 each 3    budesonide-formoterol (SYMBICORT) 160-4.5 MCG/ACT Inhaler INHALE 2 PUFFS BY MOUTH TWICE DAILY 30.6 g 3    CALCIUM PO Take 500 mg by mouth 2 times daily Strength unknown.       Continuous Blood Gluc Sensor (FREESTYLE SUKHWINDER 14 DAY SENSOR) MISC Change every 14 days. 1 each 3    [facility] INFUSION MANAGED PATIENT Contact [facility] for patient specific medication information at [phone] on admission and discharge from the hospital.  Phones are answered 24 hours a day 7 days a week 365 days a year.    Providers - Choose \"CONTINUE HOME MED (no script)\" at discharge if patient treatment with home infusion will continue.      ferrous fumarate 65 mg, [place]. FE,-Vitamin C 125 mg (VITRON-C)  MG TABS tablet Take 1 tablet by mouth daily      Glucagon (BAQSIMI) 3 MG/DOSE nasal powder Spray 1 spray (3 mg) in " nostril as needed (in emergency for hypoglycic reaction) in the event of unconscious hypoglycemia or hypoglycemic seizure. May repeat dose if no response after 15 minutes. 1 each 1    Glucagon (GVOKE HYPOPEN) 1 MG/0.2ML pen Inject the contents of 1 device under the skin into lower abdomen, outer thigh, or outer upper arm as needed for hypoglycemia. If no response after 15 minutes, additional 1 mg dose from a new device may be injected while waiting for emergency assistance. 0.4 mL 1    insulin aspart (NOVOLOG PENFILL) 100 UNIT/ML cartridge INJECT 1 UNIT PER 20G CARB AT LUNCH AND SUPPER; INJECT 3 UNITS AT NIGHT WITH TUBE FEEDINGS. MAX 20 UNITS PER DAY 30 mL 3    Insulin Aspart FlexPen 100 UNIT/ML SOPN Inject 1 Units Subcutaneous 3 times daily With these instructions :INJECT 1 UNIT PER 20G CARB AT LUNCH AND SUPPER; INJECT 3 UNITS AT NIGHT WITH TUBE FEEDINGS. MAX 20 UNITS PER Day 15 mL 3    insulin glargine (LANTUS SOLOSTAR) 100 UNIT/ML pen INJECT 8 UNITS UNDER THE SKIN ONCE DAILY 15 mL 1    insulin pen needle (BD OMI U/F) 32G X 4 MM miscellaneous Use 4 pen needles daily or as directed. 200 each 4    levalbuterol (XOPENEX HFA) 45 MCG/ACT inhaler INHALE 2 PUFFS INTO THE LUNGS EVERY 6 HOURS AS NEEDED FOR SHORTNESS OF BREATH/DYSPNEA 75 g 0    levalbuterol (XOPENEX) 1.25 MG/3ML neb solution INHALE 1 VIAL BY MOUTH INTO THE LUNGS VIA NEBULIZATION FOUR TIMES DAILY 360 mL 11    lipase-protease-amylase (CREON) 25182-66253-639099 units CPEP per EC capsule TAKE 9-10 CAPSULES BY MOUTH WITH MEALS (3 MEALS/DAY) AND 2-4 CAPS WITH SNACKS (3 SNACKS/DAY) 3780 capsule 3    MULTIVITAMIN OR Take 1 tablet by mouth daily. Includes 5,000 units vitamin D and 400 units vitamin E. Per pt, formulated for CF pts.      mvw complete (PROBIOTIC FORMULATION) capsule TAKE ONE CAPSULE BY MOUTH ONCE DAILY 30 capsule 11    phytonadione (MEPHYTON) 5 MG tablet Take one tablet daily for 3 days as needed for hemoptysis. 6 tablet 4    polyethylene glycol  (MIRALAX) 17 GM/Dose powder Take 17 g by mouth daily as needed for constipation      sodium chloride inhalant 7 % NEBU neb solution USE 4ML( 1 VIAL) VIA NEBULIZER TWICE DAILY 240 mL 5    thin (NO BRAND SPECIFIED) lancets Use with lanceting device 6x daily. To accompany: Blood Glucose Monitor Brands: per insurance. 600 each 3    TRIKAFTA 100-50-75 & 150 MG tablet pack TAKE TWO ORANGE TABLETS BY MOUTH IN THE MORNING AND ONE BLUE TABLET IN THE EVENING AS DIRECTED ON PACKAGE.  TAKE WITH FAT CONTAINING FOOD. 84 tablet 4             Review of Systems:               Physical Exam:   There were no vitals taken for this visit.    GENERAL: Healthy, alert and no distress  EYES: Eyes grossly normal to inspection.  No discharge or erythema, or obvious scleral/conjunctival abnormalities.  RESP: No audible wheeze, cough, or visible cyanosis.  No visible retractions or increased work of breathing.    NEURO: Cranial nerves grossly intact.  Mentation and speech appropriate for age.  PSYCH: affect normal/bright, judgement and insight intact, normal speech and appearance well-groomed.        Laboratory results:     TSH   Date Value Ref Range Status   01/16/2024 1.53 0.30 - 4.20 uIU/mL Final   06/20/2022 1.08 0.40 - 4.00 mU/L Final   05/04/2021 1.49 0.40 - 4.00 mU/L Final     Triiodothyronine (T3)   Date Value Ref Range Status   06/25/2010 123 60 - 181 ng/dL Final     Testosterone Total   Date Value Ref Range Status   01/16/2024 385 240 - 950 ng/dL Final   05/04/2021 418 240 - 950 ng/dL Final     Comment:     This test was developed and its performance characteristics determined by the   [facility].   It has not been cleared or approved by the FDA. The laboratory is regulated   under CLIA as qualified to perform high-complexity testing. This test is used   for clinical purposes. It should not be regarded as investigational or for   research.       Cholesterol   Date  "Value Ref Range Status   01/16/2024 148 <200 mg/dL Final   05/04/2021 159 <200 mg/dL Final     Albumin Urine mg/L   Date Value Ref Range Status   01/16/2024 <12.0 mg/L Final     Comment:     The reference ranges have not been established in urine albumin. The results should be integrated into the clinical context for interpretation.   06/20/2022 10 mg/L Final   05/04/2021 15 mg/L Final     Triglycerides   Date Value Ref Range Status   01/16/2024 138 <150 mg/dL Final   05/04/2021 115 <150 mg/dL Final     HDL Cholesterol   Date Value Ref Range Status   05/04/2021 40 >39 mg/dL Final     Direct Measure HDL   Date Value Ref Range Status   01/16/2024 41 >=40 mg/dL Final     LDL Cholesterol Calculated   Date Value Ref Range Status   01/16/2024 79 <=100 mg/dL Final   05/04/2021 96 <100 mg/dL Final     Comment:     Desirable:       <100 mg/dl     Cholesterol/HDL Ratio   Date Value Ref Range Status   06/26/2013 4.4 0.0 - 5.0 Final     Non HDL Cholesterol   Date Value Ref Range Status   01/16/2024 107 <130 mg/dL Final   05/04/2021 119 <130 mg/dL Final           Diabetes Health Maintenance    Date of Diabetes Diagnosis: 12/2015    Special Notes (if any): He was a diagnosed with adrenal insufficiency based on inadequate response on a ACTH stim test. Adrenal insufficiency is thought to be related to  itraconazole and Symbicort use.  Last ACTH stim test was normal and he has been off chronic prednisone.    Date Last Eye Exam: < 1 year     Date Last Dental Appointment: every 6 months    Dates of Episodes Severe* Hypoglycemia (month/year, cumulative, ongoing, assess each visit): never              *Severe=patient unconscious, seizure, unable to help self    Last 25-Vitamin D (every year): 47    Last DXA, lowest Z-score (every 2 years): -1.7 last DEXA 2023       ?Bisphosphonates (yes/no): yes     Last Urine Microalbumin (every year):      No results found for: \"MICROALBUMIN\"     Last Testosterone:   Testosterone Total   Date Value " Ref Range Status   01/16/2024 385 240 - 950 ng/dL Final   05/04/2021 418 240 - 950 ng/dL Final     Comment:     This test was developed and its performance characteristics determined by the   [facility].   It has not been cleared or approved by the FDA. The laboratory is regulated   under CLIA as qualified to perform high-complexity testing. This test is used   for clinical purposes. It should not be regarded as investigational or for   research.        On testosterone therapy (yes/no)? No          Assessment and Plan:   Nico is a 48 year old male with CFRD    CFRD: Overall good control   Continue current regimen  Counseled to continue to periodically check fingerstick glucose    Low bone density: Started on pamidronate in 2018 but missed doses in 2020 due to COVID pandemic.  Received first dose of Reclast in June 2021.   Second dose of Reclast was February 2023 and the most recent dose was in February 2024  Has had significant improvement in bone density at both spine and hip on DEXA in 2023  He will be due for 2-year follow-up DEXA in February 2025  May consider drug holiday after reviewing the next DEXA    RTC in 6 months with DEXA result    JULIETA Tate    Note: Chart documentation done in part with Dragon Voice Recognition software. Although reviewed after completion, some word and grammatical errors may remain.  Please consider this when interpreting information in this chart    Video visit done using [employer]  Video start time 1:38 PM  Video visit end time 1:53 PM  Patient location: Home  Provider location: Off-site    TYLER REYES

## 2024-06-11 NOTE — NURSING NOTE
Is the patient currently in the state of [place]? YES    Visit mode:VIDEO    If the visit is dropped, the patient can be reconnected by: VIDEO VISIT: Text to cell phone:   Telephone Information:   Mobile [phone]       Will anyone else be joining the visit? NO  (If patient encounters technical issues they should call [phone]    How would you like to obtain your AVS? MyChart    Are changes needed to the allergy or medication list? No    Are refills needed on medications prescribed by this physician? NO    Reason for visit: RECHECK Shelby Kocher VVF

## 2024-06-11 NOTE — LETTER
6/11/2024       RE: Nico Morales  [address] [place] [address]     Dear Colleague,    Thank you for referring your patient, Nico Morales, to the [facility] [place] [facility]. Please see a copy of my visit note below.    Outcome for 06/03/24 9:56 AM: Speech [employer] message sent  Melonie Morillo LPN   Outcome for 06/07/24 1:01 PM: Left Voicemail   Melonie Morillo LPN   Outcome for 06/10/24 9:47 AM: Left Voicemail   Melonie Morillo LPN   Outcome for 06/10/24 10:31 AM: Glucose readings sent via Speech [employer]  Melonie Morillo LPN               CF Endocrinology Return Consultation:  Diabetes  :   Patient: Nico Morales MRN# [id number]   YOB: 1975 Age: 48 year old   Date of Visit: 06/11/2024    Dear Dr. Jaye Machado:    I had the pleasure of seeing your patient, Nico Morales in the [facility] Endocrinology Clinic, [facility], for a return consultation regarding CFRD and low bone density.           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  Verbal overview and written information provided.         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     Priority: Medium    Encounter for  long-term (current) use of antibiotics 05/20/2013     Priority: Medium    Prostatitis 05/20/2013     Priority: Medium     updating diagnosis code for icd10 cutover      Pseudomonas infection      Priority: Medium    Cystic fibrosis (H) 11/24/2010     Priority: Medium     Sweat Test   Date: 4/25/78    Lab: [facility]   Sample #1:  110mmol  Sample #2:  106mmol    Genetics  Date: 4/9/90  [id number]+[id number]->T        CARDIOVASCULAR SCREENING; LDL GOAL LESS THAN 160 10/31/2010     Priority: Medium            HPI:   Nico is a 48 year old male with Cystic Fibrosis Related Diabetes Mellitus (CFRD).    I have reviewed the available past laboratory evaluations, imaging studies, and medical records available to me at this visit.  History was obtained from the patient and the medical record.  I have reviewed the notes of the pulmonary care team.    He is doing well overall.  Denies any acute complaints.  Does not check  fingerstick blood glucose regularly.  Notes that his fasting glucose readings are usually below 10 and 2-hour postmeal readings are below 140  Most recent A1c was 6.7%  Reports that hypoglycemia is rare and occurs when he is on doxycycline  He usually reduce his Lantus dose while on doxycycline    Patient is on Trikafta and reports being stable from pulmonary point of view.      History of low bone density  Started on pamidronate in December 2018.  Has received 3 doses.  Denies any muscle ache or pains after the IV bisphosphonate infusions.  Reports that he was febrile briefly after the first 2 doses however did not have any problems after the third dose.  His last dose of pamidronate was in October 2019.  Subsequent infusions were put on hold due the pandemic.   Pamidronate was subsequently switched to Reclast.    First dose of Reclast was in June 2021  and second dose was given Feb 2023  Received the third dose in February 2024  Denies any side effects from the Reclast.  Significant increase in bone density on the  last DEXA in 2023    A1c:  Hemoglobin A1C   Date Value Ref Range Status   01/16/2024 6.7 (H) <5.7 % Final     Comment:     Normal <5.7%   Prediabetes 5.7-6.4%    Diabetes 6.5% or higher     Note: Adopted from ADA consensus guidelines.   05/04/2021 6.2 (H) 0 - 5.6 % Final     Comment:     Normal <5.7% Prediabetes 5.7-6.4%  Diabetes 6.5% or higher - adopted from ADA   consensus guidelines.         Current insulin regimen:   Lantus 8 units daily in AM   Novolog 1 unit / 15 gm of carb, ~3-4 times daily. Ave Novolog dose around 3 units    Insulin administration site(s): abd or thigh          Past Medical History:     Past Medical History:   Diagnosis Date    CF (cystic fibrosis) (H)     Exocrine pancreatic insufficiency     Nocardia infection     Pseudomonas infection     S/P gastrostomy (H) 2002            Past Surgical History:     Past Surgical History:   Procedure Laterality Date    COLONOSCOPY N/A 5/21/2018    Procedure: COMBINED COLONOSCOPY, SINGLE OR MULTIPLE BIOPSY/POLYPECTOMY BY BIOPSY;  Cystic fibrosis, 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;  Surgeon: Margarito Bowman MD;  Location: [facility]    COLONOSCOPY N/A 6/27/2022    Procedure: COLONOSCOPY, WITH POLYPECTOMY AND BIOPSY;  Surgeon: Margarito Bowman MD;  Location: [facility]    GASTROSTOMY TUBE  2002    IR PULMONARY EMBOLIZATION  7/20/2001    PICC INSERTION Left 01/22/2018    4Fr SL BioFlo PICC, 46cm (5cm external) in the L basilic vein w/ tip in the low SVC               Social History:     Social History     Social History Narrative    8/15/2019 - .  Lives with his English Bulldog (Jefry) in a house that he designed in [place].  No children.              Family History:     Family History   Problem Relation Age of Onset    Heart Disease Maternal Grandmother     Heart Disease Maternal Grandfather     Heart Disease Paternal Grandmother     Diabetes No family hx of             Allergies:     Allergies   Allergen Reactions    Piperacillin Sod-Tazobactam So  Hives     Delayed type reaction in intradermal tests to Zosyn (Piperacillin/Tazobactam). Might be specific reaction to the Zosyn without cross-reactions to Penicillins (beta-Lactam core). Avoid in future Zosyn. However, I would avoid the Penicillins and use instead Penems or Cephalosporines. If Penicillins are the best option, then they can be used under observation.    Pulmozyme [Dornase Trever] Other (See Comments)     Chest pain and fever             Medications:     Current Outpatient Rx   Medication Sig Dispense Refill    ACE NOT PRESCRIBED, INTENTIONAL, 1 each continuous prn. ACE Inhibitor not prescribed due to Risk for drug interaction 0 each 0    acetylcysteine (MUCOMYST) 10 % nebulizer solution NEBULIZE AND INHALE 4 ML INTO THE LUNGS FOUR TIMES A  mL 11    azithromycin (ZITHROMAX) 250 MG tablet Take 1 tablet (250 mg) by mouth daily 30 tablet 11    blood glucose (NO BRAND SPECIFIED) test strip Use to test blood sugar 6 times daily or as directed. To accompany: Blood Glucose Monitor Brands: per insurance. 600 strip 3    blood glucose (NO BRAND SPECIFIED) test strip Use to test blood sugar 6 times daily. Any covered brand. Pt now using Contour Next, if covered. 600 strip 3    blood glucose monitoring (NO BRAND SPECIFIED) meter device kit Use to test blood sugar 6 times daily or as directed. Preferred blood glucose meter OR supplies to accompany: Blood Glucose Monitor Brands: per insurance. 1 kit 0    blood glucose monitoring (NO BRAND SPECIFIED) meter device kit Use to test blood sugar 6 times daily or as directed.whatever is covered 1 kit 1    blood glucose monitoring (ULTRA THIN 30G) lancets Use to test blood sugar 6 times daily or as directed.whatever is covered 540 each 3    budesonide-formoterol (SYMBICORT) 160-4.5 MCG/ACT Inhaler INHALE 2 PUFFS BY MOUTH TWICE DAILY 30.6 g 3    CALCIUM PO Take 500 mg by mouth 2 times daily Strength unknown.       Continuous Blood Gluc Sensor (FREESTYLE SUKHWINDER 14 DAY  "SENSOR) MISC Change every 14 days. 1 each 3    [facility] INFUSION MANAGED PATIENT Contact [facility] for patient specific medication information at [phone] on admission and discharge from the hospital.  Phones are answered 24 hours a day 7 days a week 365 days a year.    Providers - Choose \"CONTINUE HOME MED (no script)\" at discharge if patient treatment with home infusion will continue.      ferrous fumarate 65 mg, [place]. FE,-Vitamin C 125 mg (VITRON-C)  MG TABS tablet Take 1 tablet by mouth daily      Glucagon (BAQSIMI) 3 MG/DOSE nasal powder Spray 1 spray (3 mg) in nostril as needed (in emergency for hypoglycic reaction) in the event of unconscious hypoglycemia or hypoglycemic seizure. May repeat dose if no response after 15 minutes. 1 each 1    Glucagon (GVOKE HYPOPEN) 1 MG/0.2ML pen Inject the contents of 1 device under the skin into lower abdomen, outer thigh, or outer upper arm as needed for hypoglycemia. If no response after 15 minutes, additional 1 mg dose from a new device may be injected while waiting for emergency assistance. 0.4 mL 1    insulin aspart (NOVOLOG PENFILL) 100 UNIT/ML cartridge INJECT 1 UNIT PER 20G CARB AT LUNCH AND SUPPER; INJECT 3 UNITS AT NIGHT WITH TUBE FEEDINGS. MAX 20 UNITS PER DAY 30 mL 3    Insulin Aspart FlexPen 100 UNIT/ML SOPN Inject 1 Units Subcutaneous 3 times daily With these instructions :INJECT 1 UNIT PER 20G CARB AT LUNCH AND SUPPER; INJECT 3 UNITS AT NIGHT WITH TUBE FEEDINGS. MAX 20 UNITS PER Day 15 mL 3    insulin glargine (LANTUS SOLOSTAR) 100 UNIT/ML pen INJECT 8 UNITS UNDER THE SKIN ONCE DAILY 15 mL 1    insulin pen needle (BD OMI U/F) 32G X 4 MM miscellaneous Use 4 pen needles daily or as directed. 200 each 4    levalbuterol (XOPENEX HFA) 45 MCG/ACT inhaler INHALE 2 PUFFS INTO THE LUNGS EVERY 6 HOURS AS NEEDED FOR SHORTNESS OF BREATH/DYSPNEA 75 g 0    levalbuterol (XOPENEX) 1.25 MG/3ML neb solution INHALE 1 VIAL BY MOUTH INTO THE LUNGS " VIA NEBULIZATION FOUR TIMES DAILY 360 mL 11    lipase-protease-amylase (CREON) 95615-52063-684321 units CPEP per EC capsule TAKE 9-10 CAPSULES BY MOUTH WITH MEALS (3 MEALS/DAY) AND 2-4 CAPS WITH SNACKS (3 SNACKS/DAY) 3780 capsule 3    MULTIVITAMIN OR Take 1 tablet by mouth daily. Includes 5,000 units vitamin D and 400 units vitamin E. Per pt, formulated for CF pts.      mvw complete (PROBIOTIC FORMULATION) capsule TAKE ONE CAPSULE BY MOUTH ONCE DAILY 30 capsule 11    phytonadione (MEPHYTON) 5 MG tablet Take one tablet daily for 3 days as needed for hemoptysis. 6 tablet 4    polyethylene glycol (MIRALAX) 17 GM/Dose powder Take 17 g by mouth daily as needed for constipation      sodium chloride inhalant 7 % NEBU neb solution USE 4ML( 1 VIAL) VIA NEBULIZER TWICE DAILY 240 mL 5    thin (NO BRAND SPECIFIED) lancets Use with lanceting device 6x daily. To accompany: Blood Glucose Monitor Brands: per insurance. 600 each 3    TRIKAFTA 100-50-75 & 150 MG tablet pack TAKE TWO ORANGE TABLETS BY MOUTH IN THE MORNING AND ONE BLUE TABLET IN THE EVENING AS DIRECTED ON PACKAGE.  TAKE WITH FAT CONTAINING FOOD. 84 tablet 4             Review of Systems:               Physical Exam:   There were no vitals taken for this visit.    GENERAL: Healthy, alert and no distress  EYES: Eyes grossly normal to inspection.  No discharge or erythema, or obvious scleral/conjunctival abnormalities.  RESP: No audible wheeze, cough, or visible cyanosis.  No visible retractions or increased work of breathing.    NEURO: Cranial nerves grossly intact.  Mentation and speech appropriate for age.  PSYCH: affect normal/bright, judgement and insight intact, normal speech and appearance well-groomed.        Laboratory results:     TSH   Date Value Ref Range Status   01/16/2024 1.53 0.30 - 4.20 uIU/mL Final   06/20/2022 1.08 0.40 - 4.00 mU/L Final   05/04/2021 1.49 0.40 - 4.00 mU/L Final     Triiodothyronine (T3)   Date Value Ref Range Status   06/25/2010 123 60  - 181 ng/dL Final     Testosterone Total   Date Value Ref Range Status   01/16/2024 385 240 - 950 ng/dL Final   05/04/2021 418 240 - 950 ng/dL Final     Comment:     This test was developed and its performance characteristics determined by the   [facility].   It has not been cleared or approved by the FDA. The laboratory is regulated   under CLIA as qualified to perform high-complexity testing. This test is used   for clinical purposes. It should not be regarded as investigational or for   research.       Cholesterol   Date Value Ref Range Status   01/16/2024 148 <200 mg/dL Final   05/04/2021 159 <200 mg/dL Final     Albumin Urine mg/L   Date Value Ref Range Status   01/16/2024 <12.0 mg/L Final     Comment:     The reference ranges have not been established in urine albumin. The results should be integrated into the clinical context for interpretation.   06/20/2022 10 mg/L Final   05/04/2021 15 mg/L Final     Triglycerides   Date Value Ref Range Status   01/16/2024 138 <150 mg/dL Final   05/04/2021 115 <150 mg/dL Final     HDL Cholesterol   Date Value Ref Range Status   05/04/2021 40 >39 mg/dL Final     Direct Measure HDL   Date Value Ref Range Status   01/16/2024 41 >=40 mg/dL Final     LDL Cholesterol Calculated   Date Value Ref Range Status   01/16/2024 79 <=100 mg/dL Final   05/04/2021 96 <100 mg/dL Final     Comment:     Desirable:       <100 mg/dl     Cholesterol/HDL Ratio   Date Value Ref Range Status   06/26/2013 4.4 0.0 - 5.0 Final     Non HDL Cholesterol   Date Value Ref Range Status   01/16/2024 107 <130 mg/dL Final   05/04/2021 119 <130 mg/dL Final         CF  Diabetes Health Maintenance    Date of Diabetes Diagnosis: 12/2015    Special Notes (if any): He was a diagnosed with adrenal insufficiency based on inadequate response on a ACTH stim test. Adrenal insufficiency is thought to be related to  itraconazole and Symbicort use.  Last ACTH  "stim test was normal and he has been off chronic prednisone.    Date Last Eye Exam: < 1 year     Date Last Dental Appointment: every 6 months    Dates of Episodes Severe* Hypoglycemia (month/year, cumulative, ongoing, assess each visit): never              *Severe=patient unconscious, seizure, unable to help self    Last 25-Vitamin D (every year): 47    Last DXA, lowest Z-score (every 2 years): -1.7 last DEXA 2023       ?Bisphosphonates (yes/no): yes     Last Urine Microalbumin (every year):      No results found for: \"MICROALBUMIN\"     Last Testosterone:   Testosterone Total   Date Value Ref Range Status   01/16/2024 385 240 - 950 ng/dL Final   05/04/2021 418 240 - 950 ng/dL Final     Comment:     This test was developed and its performance characteristics determined by the   [facility].   It has not been cleared or approved by the FDA. The laboratory is regulated   under CLIA as qualified to perform high-complexity testing. This test is used   for clinical purposes. It should not be regarded as investigational or for   research.        On testosterone therapy (yes/no)? No          Assessment and Plan:   Nico is a 48 year old male with CFRD    CFRD: Overall good control   Continue current regimen  Counseled to continue to periodically check fingerstick glucose    Low bone density: Started on pamidronate in 2018 but missed doses in 2020 due to COVID pandemic.  Received first dose of Reclast in June 2021.   Second dose of Reclast was February 2023 and the most recent dose was in February 2024  Has had significant improvement in bone density at both spine and hip on DEXA in 2023  He will be due for 2-year follow-up DEXA in February 2025  May consider drug holiday after reviewing the next DEXA    RTC in 6 months with DEXA result    JULIETA Tate    Note: Chart documentation done in part with Dragon Voice Recognition software. Although reviewed " after completion, some word and grammatical errors may remain.  Please consider this when interpreting information in this chart    Video visit done using [employer]  Video start time 1:38 PM  Video visit end time 1:53 PM  Patient location: Home  Provider location: Off-site    TYLER REYES

## 2024-06-15 ENCOUNTER — HEALTH MAINTENANCE LETTER (OUTPATIENT)
Age: 49
End: 2024-06-15

## 2024-06-20 DIAGNOSIS — E84.9 CYSTIC FIBROSIS (H): ICD-10-CM

## 2024-06-20 DIAGNOSIS — H93.13 BILATERAL TINNITUS: Primary | ICD-10-CM

## 2024-06-21 ENCOUNTER — LAB REQUISITION (OUTPATIENT)
Dept: LAB | Facility: CLINIC | Age: 49
End: 2024-06-21

## 2024-06-21 ENCOUNTER — HOSPITAL ENCOUNTER (OUTPATIENT)
Dept: RESEARCH | Facility: CLINIC | Age: 49
Discharge: HOME | End: 2024-06-21
Attending: INTERNAL MEDICINE | Admitting: INTERNAL MEDICINE
Payer: COMMERCIAL

## 2024-06-21 LAB
ALBUMIN SERPL BCG-MCNC: 3.9 G/DL (ref 3.5–5.2)
ALP SERPL-CCNC: 104 U/L (ref 40–150)
ALT SERPL W P-5'-P-CCNC: 22 U/L (ref 0–70)
AST SERPL W P-5'-P-CCNC: 25 U/L (ref 0–45)
BASOPHILS # BLD AUTO: 0.1 10E3/UL (ref 0–0.2)
BASOPHILS NFR BLD AUTO: 1 %
BILIRUB DIRECT SERPL-MCNC: <0.2 MG/DL (ref 0–0.3)
BILIRUB SERPL-MCNC: 0.4 MG/DL
EOSINOPHIL # BLD AUTO: 0.2 10E3/UL (ref 0–0.7)
EOSINOPHIL NFR BLD AUTO: 3 %
ERYTHROCYTE [DISTWIDTH] IN BLOOD BY AUTOMATED COUNT: 13.2 % (ref 10–15)
GGT SERPL-CCNC: 15 U/L (ref 8–61)
HBA1C MFR BLD: 6.7 %
HCT VFR BLD AUTO: 44 % (ref 40–53)
HGB BLD-MCNC: 14.4 G/DL (ref 13.3–17.7)
IMM GRANULOCYTES # BLD: 0 10E3/UL
IMM GRANULOCYTES NFR BLD: 0 %
LYMPHOCYTES # BLD AUTO: 2.2 10E3/UL (ref 0.8–5.3)
LYMPHOCYTES NFR BLD AUTO: 26 %
MCH RBC QN AUTO: 29 PG (ref 26.5–33)
MCHC RBC AUTO-ENTMCNC: 32.7 G/DL (ref 31.5–36.5)
MCV RBC AUTO: 89 FL (ref 78–100)
MONOCYTES # BLD AUTO: 0.7 10E3/UL (ref 0–1.3)
MONOCYTES NFR BLD AUTO: 8 %
NEUTROPHILS # BLD AUTO: 5.3 10E3/UL (ref 1.6–8.3)
NEUTROPHILS NFR BLD AUTO: 62 %
NRBC # BLD AUTO: 0 10E3/UL
NRBC BLD AUTO-RTO: 0 /100
PLATELET # BLD AUTO: 296 10E3/UL (ref 150–450)
PROT SERPL-MCNC: 7.3 G/DL (ref 6.4–8.3)
RBC # BLD AUTO: 4.96 10E6/UL (ref 4.4–5.9)
WBC # BLD AUTO: 8.5 10E3/UL (ref 4–11)

## 2024-06-21 PROCEDURE — 510N000016 HC RESEARCH MEALS, PER MEAL

## 2024-06-21 PROCEDURE — 300N000004 HC RESEARCH SPECIMEN PROCESSING, MODERATE

## 2024-06-21 PROCEDURE — 85004 AUTOMATED DIFF WBC COUNT: CPT | Performed by: INTERNAL MEDICINE

## 2024-06-21 PROCEDURE — 83036 HEMOGLOBIN GLYCOSYLATED A1C: CPT | Performed by: INTERNAL MEDICINE

## 2024-06-21 PROCEDURE — 300N000003 HC RESEARCH SPECIMEN PROCESSING, SIMPLE

## 2024-06-21 PROCEDURE — 510N000018 HC RESEARCH OGTT, PER HOUR

## 2024-06-21 PROCEDURE — 80076 HEPATIC FUNCTION PANEL: CPT | Performed by: INTERNAL MEDICINE

## 2024-06-21 PROCEDURE — 510N000009 HC RESEARCH FACILITY, PER 15 MIN

## 2024-06-21 PROCEDURE — 82977 ASSAY OF GGT: CPT | Performed by: INTERNAL MEDICINE

## 2024-06-21 PROCEDURE — 510N000017 HC CRU PATIENT CARE, PER 15 MIN

## 2024-06-21 NOTE — ADDENDUM NOTE
Encounter addended by: Kathe Rodriguez on: 6/21/2024 1:09 PM   Actions taken: Charge Capture section accepted

## 2024-06-24 NOTE — LETTER
9/26/2023         RE: Nico Morales  [address]  [place] [address]        Dear Colleague,    Thank you for referring your patient, Nico Morales, to the [facility] [place]. Please see a copy of my visit note below.    [facility]  September 26, 2023         Assessment and Plan:   Nico Morales is a 47 year old male with cystic fibrosis.    1. CF lung disease with severe obstruction: Kilo reports that he had a recent COVID infection.  This resulted in a little bit of increase in cough and sputum production.  This is since improved.  He is now near baseline.  He currently denies any significant symptomatology has residual of this infection.  He was prescribed Paxlovid but felt well at the time that he went to pick it up so he did not take the medication.  Kilo historically has grown out Pseudomonas in his sputum.  He does show evidence of stable pulmonary function which I am pleased with given his recent COVID infection and being in recovery.  At this time I have recommended to Kilo:  -- He should continue to do his vest and nebulized therapies 2-3 times daily  -- Kilo did try inhalational ceftaz but developed chest pain with dosing.  It was discontinued.  -- He does have both levofloxacin and doxycycline available to him for exacerbation.  Fortunately he has not needed to use these in many months.     2. Pancreatic Insufficiency/GI: Kilo has no new symptoms consistent with worsening malabsorption.    - continue the present dose of pancreatic enzymes  - continue vitamin supplementation.     3.  CF TR modulator therapy: Kilo is on Trikafta and tolerating well.  His last hepatic panel was in May.  It would be appropriate for him to follow-up with repeat labs.    4.  Cystic fibrosis related diabetes: Kilo reports good control of his blood sugars.      5.  Psychosocial: Kilo is  and  Case was discussed with SHANA Hernandez and the patient's admission status was agreed to be Admission Status: inpatient status to the service of Dr. Garrison .   in a stable relationship.  Reports all his kids are doing well.  Work is good.  He and his wife to [facility] in [place] and they like to spend time they are together.    6.  Influenza prophylaxis: I prescribed amantadine to Kilo today.  He knows to start it in a couple weeks time.    Annual studies due: due now  Recent Labs   Lab Test 06/20/22  1536   IGG 1,140     Immunizations: Flu and COVID  Colonoscopy: 6/2025    Jaye Machado MD MPH  Associate Professor of Medicine  Pulmonary, Allergy, Critical Care and Sleep Medicine        Interval History:     Kilo does report increased cough and sputum production during his COVID infection.  He did notice that was little bit more green in color.  His sputum is now back to normal.  He reports now he has no chest congestion.  He did feel a bit more tight chested.  He was doing 3 vest therapies per day when he was unwell.  He denies any shortness of breath.         Review of Systems:     CONSTITUTIONAL: no fever, no chills, did feel poorly for about 3 to 4 days.  This was followed by some fatigue.  This is now improved    INTEGUMENTARY/SKIN: no rash, no obvious new lesions    ENT/MOUTH: Some nasal congestion and dry throat with COVID, some decreased hearing and ear ringing but that is resolved    RESPIRATORY: see interval history    CV: no chest pain, no palpitations, no peripheral edema    GI: no nausea, no vomiting, no change in stools, no fatty stools, no GERD    : negative urinary    MUSCULOSKELETAL: no myalgias, no arthralgias    ENDOCRINE: Good control    NEURO: Headache with COVID    SLEEP: no issues    PSYCHIATRIC: mood stable--good          Past Medical and Surgical History:     Past Medical History:   Diagnosis Date     CF (cystic fibrosis) (H)      Exocrine pancreatic insufficiency      Nocardia infection      Pseudomonas infection      S/P gastrostomy (H) 2002     Past Surgical History:   Procedure Laterality Date     COLONOSCOPY N/A 5/21/2018     Procedure: COMBINED COLONOSCOPY, SINGLE OR MULTIPLE BIOPSY/POLYPECTOMY BY BIOPSY;  Cystic fibrosis, 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;  Surgeon: Margarito Bowman MD;  Location: [facility]     COLONOSCOPY N/A 6/27/2022    Procedure: COLONOSCOPY, WITH POLYPECTOMY AND BIOPSY;  Surgeon: Margarito Bowman MD;  Location: [facility]     GASTROSTOMY TUBE  2002     IR PULMONARY EMBOLIZATION  7/20/2001     PICC INSERTION Left 01/22/2018    4Fr SL BioFlo PICC, 46cm (5cm external) in the L basilic vein w/ tip in the low SVC           Family History:     Family History   Problem Relation Age of Onset     Heart Disease Maternal Grandmother      Heart Disease Maternal Grandfather      Heart Disease Paternal Grandmother      Diabetes No family hx of             Social History:     Social History     Socioeconomic History     Marital status:      Spouse name: Not on file     Number of children: 0     Years of education: Not on file     Highest education level: Not on file   Occupational History     Occupation: financial palnner     Employer: [employer]   Tobacco Use     Smoking status: Never     Smokeless tobacco: Never   Vaping Use     Vaping Use: Never used   Substance and Sexual Activity     Alcohol use: No     Alcohol/week: 0.0 standard drinks of alcohol     Drug use: No     Sexual activity: Yes     Partners: Female     Birth control/protection: Pill   Other Topics Concern     Parent/sibling w/ CABG, MI or angioplasty before 65F 55M? Not Asked      Service Not Asked     Blood Transfusions No     Caffeine Concern Not Asked     Occupational Exposure Not Asked     Hobby Hazards Not Asked     Sleep Concern Not Asked     Stress Concern Not Asked     Weight Concern Not Asked     Special Diet Not Asked     Back Care Not Asked     Exercise No     Bike Helmet Not Asked     Seat Belt Not Asked     Self-Exams Not Asked   Social History Narrative    8/15/2019 - .  Lives with his English Bulldog (Jefry) in a house that  "he designed in [place].  No children.     Social Determinants of Health     Financial Resource Strain: Not on file   Food Insecurity: Not on file   Transportation Needs: Not on file   Physical Activity: Not on file   Stress: Not on file   Social Connections: Not on file   Interpersonal Safety: Not on file   Housing Stability: Not on file            Medications:     Current Outpatient Medications   Medication     rimantadine (FLUMADINE) 100 MG tablet     ACE NOT PRESCRIBED, INTENTIONAL,     acetylcysteine (MUCOMYST) 10 % nebulizer solution     amylase-lipase-protease (CREON) 42042-59208 units CPEP per EC capsule     azithromycin (ZITHROMAX) 250 MG tablet     blood glucose (NO BRAND SPECIFIED) test strip     blood glucose monitoring (NO BRAND SPECIFIED) meter device kit     blood glucose monitoring (ULTRA THIN 30G) lancets     budesonide (PULMICORT) 1 MG/2ML neb solution     CALCIUM PO     Continuous Blood Gluc Sensor (FREESTYLE SUKHWINDER 14 DAY SENSOR) MISC     doxycycline hyclate (VIBRAMYCIN) 100 MG capsule     doxycycline hyclate (VIBRAMYCIN) 100 MG capsule     [place] [facility] MANAGED PATIENT     ferrous fumarate 65 mg, [place]. FE,-Vitamin C 125 mg (VITRON-C)  MG TABS tablet     insulin aspart (NOVOLOG PENFILL) 100 UNIT/ML cartridge     insulin glargine (LANTUS SOLOSTAR) 100 UNIT/ML pen     insulin pen needle (BD OMI U/F) 32G X 4 MM miscellaneous     levalbuterol (XOPENEX HFA) 45 MCG/ACT inhaler     levalbuterol (XOPENEX) 1.25 MG/3ML neb solution     levofloxacin (LEVAQUIN) 750 MG tablet     MULTIVITAMIN OR     mvw complete (PROBIOTIC FORMULATION) capsule     polyethylene glycol (MIRALAX) 17 GM/Dose powder     sodium chloride inhalant 7 % NEBU neb solution     SYMBICORT 160-4.5 MCG/ACT Inhaler     Syringe/Needle, Disp, 18G X 1\" 5 ML MISC     TRIKAFTA 100-50-75 & 150 MG tablet pack     No current facility-administered medications for this visit.            Physical Exam:   /76   Pulse 88   Ht " "1.737 m (5' 8.39\")   Wt 66.7 kg (147 lb 1.6 oz)   SpO2 96%   BMI 22.11 kg/m      Constitutional:   Awake, alert and in no apparent distress     Eyes:   nonicteric     ENT:   oral mucosa moist without lesions, normal tm bilaterally, bilateral mucosal edema      Neck:   Supple without supraclavicular or cervical lymphadenopathy     Lungs:   Fair air flow.  No crackles.  Left apical rhonchi.  No wheezes.     Cardiovascular:   Normal S1 and S2.  RRR.  No murmur, gallop or rub.     Abdomen:   NABS, soft, nontender, nondistended.      Musculoskeletal:   No edema, digital clubbing present     Neurologic:   Alert and conversant.     Skin:   Warm, dry.  No rash on limited exam.             Data:   All laboratory and imaging data reviewed.      Results from the last week:  Recent Results (from the past 168 hour(s))   COVID-19 Donald RBD Flores & Titer Reflex    Collection Time: 09/26/23  9:35 AM   Result Value Ref Range    SARS-CoV-2 Donald Ab, Interp, S Positive     SARS-CoV-2 Donald Ab, Quant, S >250 U/mL    Patient's Race White     Patient's Ethnicity SEE NOTE    SARS-CoV-2 Nucleocapsid Total Ab    Collection Time: 09/26/23  9:35 AM   Result Value Ref Range    SARS-CoV-2 Nucleocapsid Total Ab, S Positive (A) Negative    Patient's Race White     Patient's Ethnicity SEE NOTE    General PFT Lab (Please always keep checked)    Collection Time: 09/26/23  9:42 AM   Result Value Ref Range    FVC-Pred 4.42 L    FVC-Pre 3.58 L    FVC-%Pred-Pre 80 %    FEV1-Pre 1.76 L    FEV1-%Pred-Pre 49 %    FEV1FVC-Pred 81 %    FEV1FVC-Pre 49 %    FEFMax-Pred 9.61 L/sec    FEFMax-Pre 6.32 L/sec    FEFMax-%Pred-Pre 65 %    FEF2575-Pred 3.43 L/sec    FEF2575-Pre 0.61 L/sec    JKK6570-%Pred-Pre 17 %    ExpTime-Pre 15.09 sec    FIFMax-Pre 4.79 L/sec    FEV1FEV6-Pred 81 %    FEV1FEV6-Pre 55 %   Cystic Fibrosis Culture Aerobic Bacterial    Collection Time: 09/26/23 10:08 AM    Specimen: Expectorate; Sputum   Result Value Ref Range    Culture Culture in " progress     Culture 4+ Normal santi     Culture 1+ Non lactose fermenting gram negative bacilli (A)    Acid-Fast Bacilli Culture and Stain    Collection Time: 09/26/23 10:36 AM    Specimen: Expectorate; Sputum   Result Value Ref Range    Acid Fast Stain No acid fast bacilli seen     Acid Fast Stain No acid fast bacilli seen        PFT interpretation:   Spirometry interpretation:  The spirometry shows evidence of severe obstructive lung disease.  When compared to 5/16/2023, the FEV1 and FVC have little change.  The testing meets ATS criteria.    Severity Z-score*   Normal > -1.645   Mild -1.65 to -2.5   Moderate -2.5 to -4   Severe < -4   * accounts for sex, age, height, ancestry     Use z-score to assess airflow obstruction, restriction and DLCO  - FEV1 z-score for airflow obstruction  - TLC z-score for restriction  - DLCO z-score for diffusion defect    Pulmonary exacerbation: mild: Increased vest/bronchodilator/execise    40 minutes spent by me on the date of the encounter doing chart review, history and exam, documentation and further activities per the note  Time documented is excluding time spent for PFT interpretation.      Again, thank you for allowing me to participate in the care of your patient.        Sincerely,        Jaye Machado MD

## 2024-06-27 DIAGNOSIS — E84.0 CYSTIC FIBROSIS WITH PULMONARY MANIFESTATIONS (H): ICD-10-CM

## 2024-06-27 RX ORDER — LEVALBUTEROL TARTRATE 45 UG/1
AEROSOL, METERED ORAL
Qty: 75 G | Refills: 3 | Status: SHIPPED | OUTPATIENT
Start: 2024-06-27

## 2024-06-28 ENCOUNTER — PHARMACY VISIT (OUTPATIENT)
Dept: ADMINISTRATIVE | Facility: CLINIC | Age: 49
End: 2024-06-28
Payer: COMMERCIAL

## 2024-06-28 NOTE — PROGRESS NOTES
"   Cystic Fibrosis Clinical Follow Up Assessment  Summary Notes    I spoke to for CF Therapy Management assessment. Order set with     CFTR - Trikafta 100-50-75 & 150 mg tabs (full dose). \"All is great. Meds work super well. No side effects. No concerns.\" PDC= 0.96     CF - Denies changes to meds or allergies. \"Nothing to update.\" No questions or concerns.     Chart review: OV Dr. Machado 5/21/2024 - \"Kilo reports he is doing well. He does show evidence of stable pfts. He did have recent positive AFB but fortunately the repeat times 2 was negative. Continue to do his vest and nebulized therapies 2-3 times daily. Repeat AFB sputum in the next couple of months (if it is negative, we will return to yearly testing). He does have both levofloxacin and doxycycline available to him for exacerbation. Fortunately he has not needed to use these in many months.\"    Follow-up - Biannual         Care Details    What are the patient's goals for this therapy?   ? stable pulmonary waayke37/13/22 - \"No new goals. Just stay healthy.\"      Did you identify any patient barriers to access and successful treatment?   ? No barriers to access identified      Document PDC   ? 0.96      Has the patient missed doses inappropriately?   ? No      CURRENT side effect(s) for monitoring:   ? None       Shayy Little, PharmD  Therapy Management Pharmacist  [facility]  [email or web address]  [email or web address]  Specialty: [phone]          "

## 2024-07-05 ENCOUNTER — TELEPHONE (OUTPATIENT)
Dept: ENDOCRINOLOGY | Facility: CLINIC | Age: 49
End: 2024-07-05
Payer: COMMERCIAL

## 2024-07-05 NOTE — TELEPHONE ENCOUNTER
Left Voicemail (1st Attempt) and Sent [employer] (1st Attempt) for the patient to call back and schedule the following:    Appointment type: Return Diabetes  Provider: Dr. Lee  Return date: end of Feb 2025  Specialty phone number: [phone]  Additional appointment(s) needed:  DXA on or after 2/9/25, prior to follow-up visit  Additonal Notes: LVM/MyC x1    Check Out Comments: Follow-up in February 2025 with results of DEXA Schedule DXA in the beginning of February 2025

## 2024-07-08 NOTE — TELEPHONE ENCOUNTER
Left Voicemail (2nd Attempt) and Sent [employer] (2nd Attempt) for the patient to call back and schedule the following:    Appointment type: Return Diabetes  Provider: Dr. Lee  Return date: end of Feb 2025  Specialty phone number: [phone]  Additional appointment(s) needed:  DXA on or after 2/9/25, prior to follow-up visit  Additonal Notes: LVMx2/MyC x2     Check Out Comments: Follow-up in February 2025 with results of DEXA Schedule DXA in the beginning of February 2025

## 2024-08-01 ENCOUNTER — OFFICE VISIT (OUTPATIENT)
Dept: AUDIOLOGY | Facility: CLINIC | Age: 49
End: 2024-08-01
Payer: COMMERCIAL

## 2024-08-01 DIAGNOSIS — H90.A22 SENSORINEURAL HEARING LOSS (SNHL) OF LEFT EAR WITH RESTRICTED HEARING OF RIGHT EAR: ICD-10-CM

## 2024-08-01 DIAGNOSIS — E84.9 CYSTIC FIBROSIS (H): ICD-10-CM

## 2024-08-01 DIAGNOSIS — H90.A31 MIXED CONDUCTIVE AND SENSORINEURAL HEARING LOSS OF RIGHT EAR WITH RESTRICTED HEARING OF LEFT EAR: Primary | ICD-10-CM

## 2024-08-01 DIAGNOSIS — H93.13 BILATERAL TINNITUS: ICD-10-CM

## 2024-08-01 PROCEDURE — 92565 STENGER TEST PURE TONE: CPT | Performed by: AUDIOLOGIST

## 2024-08-01 PROCEDURE — 92557 COMPREHENSIVE HEARING TEST: CPT | Performed by: AUDIOLOGIST

## 2024-08-01 PROCEDURE — 92550 TYMPANOMETRY & REFLEX THRESH: CPT | Performed by: AUDIOLOGIST

## 2024-08-01 NOTE — PROGRESS NOTES
AUDIOLOGY REPORT    SUBJECTIVE:  Nico Morales is a 48 year old male who was seen in the Audiology Clinic at the [facility] for audiologic evaluation, referred by Jaye Machado M.D.  The patient reports hearing loss for the past 20 years that he believes may be due to IV antibiotics which he had when the hearing loss began.  The loss is greater in the left ear . The patient denies  bilateral otalgia, bilateral drainage, bilateral aural fullness, and family history of hearing loss.  He uses hearing protection in noise but does have a history of recreational noise exposure (few concerts) without hearing protection.  He notes intermittent tinnitus which is noticeable in quiet. The patient reports that his wife is noticing his hearing loss at home.    They were unaccompanied today.  .    OBJECTIVE:    Otoscopic exam indicates ears are clear of cerumen bilaterally     Pure Tone Thresholds assessed using conventional audiometry with good  reliability from 250-8000 Hz bilaterally using insert earphones and TDH headphones     RIGHT:  mild sloping to moderate mixed hearing loss    LEFT:    normal sloping to profound sensorineural hearing loss    Negative Arthur    Tympanogram:    RIGHT: normal eardrum mobility    LEFT:   normal eardrum mobility    Reflexes (reported by stimulus ear):  RIGHT: Ipsilateral is present at normal levels  RIGHT: Contralateral is present at normal levels  LEFT:   Ipsilateral is present at normal levels  LEFT:   Contralateral is present at normal levels      Speech Reception Threshold:    RIGHT: 25 dB HL    LEFT:   30 dB HL  Word Recognition Score:     RIGHT: 100% at 65 dB HL using NU-6 recorded word list.    LEFT:   96% at 70 dB HL using NU-6 recorded word list.      ASSESSMENT:   Right Mixed hearing loss. Left sensorineural hearing loss.    The loss is communicatively significant.    Today s results were discussed with the patient in detail.     PLAN:  Patient  was counseled regarding hearing loss and impact on communication. ENT evaluation is recommended due to mixed nature of hearing loss in the right ear in the low frequencies with normal tympanogram.     Patient is a good candidate for amplification at this time.   It is recommended that the patient return to clinic for hearing aid evaluation after medical clearance.   Copy of audiogram was provided to the patient.  Please call this clinic with questions regarding these results or recommendations.        Lynette Ferrer.   Doctor of Audiology  License #[id number]

## 2024-09-09 NOTE — PROGRESS NOTES
[facility]  September 10, 2024         Assessment and Plan:   Nico Morales is a 48 year old male with cystic fibrosis.    1. CF lung disease with moderate obstruction: Kilo reports he is doing well from a pulmonary point of view.  He does show evidence of stable pfts.  He historically has grown pseudomonas in his sputum. He did have recent positive AFB but fortunately the sputum on repeat times 2 that was negative.   At this time I have recommended to Kilo:  -- He should continue to do his vest and nebulized therapies 2-3 times daily  -- He remains off mucomyst and hypertonic saline without change in symptoms  -- repeat AFB sputum in the next couple of months              --if it is negative, we will return to yearly testing  -- He does have both levofloxacin and doxycycline available to him for exacerbation.  Fortunately he has not needed to use these in many months.     2. Pancreatic Insufficiency/GI: Kilo has no new symptoms consistent with worsening malabsorption.  He did previously had a g tube.  This was removed after several years of stable weight.  He did have a recent episode of constipation requiring increased use of miralax.  The symptoms have since resolved, but he reports it took several weeks to feel back to normal.  - continue the present dose of pancreatic enzymes  - continue vitamin supplementation.     3.  CFTR modulator therapy: Kilo is on Trikafta and tolerating well.  His hepatic panel and CK in June were stable.  He will repeat these studies around his next visit.     4.  Cystic fibrosis related diabetes: Kilo reports good control of his blood sugars. His A1C is stable and he is followed by endocrinology.     5.  Psychosocial: Kilo is  and in a stable relationship.  He reports all his kids are doing well.  Work is good and busy.  His wife in undergoing treatment for breast cancer.    6. Hearing loss:  Kilo had an  audiology exam that was concerning for hearing loss.  I have referred him to ENT for further evaluation.       Immunizations: COVID, flu--declined  Colonoscopy: 6/2025  Last DEXA: 2/2023  Last hepatic panel: 6/2024  Last sputum culture:   Collected 5/21/2024 10:06 AM       Status: Final result       Dx: Cystic fibrosis with pulmonary manife...    Specimen Information: Expectorate; Sputum   0 Result Notes  Culture   4+ Normal santi      1+ Pseudomonas aeruginosa, mucoid strain Abnormal             Annual studies due:   Recent Labs   Lab Test 01/16/24  0824   IGG 1,123       Jaye Machado MD MPH  Associate Professor of Medicine  Pulmonary, Allergy, Critical Care and Sleep Medicine        Interval History:     Kilo reports stable cough and sputum.  He denies congestion or change in activity tolerance.  He does vest therapy 2 to 3 times per day.         Review of Systems:     CONSTITUTIONAL: no fever, no chills, no sweats, no change in weight, no change in energy--good, no change in appetite--pretty good    INTEGUMENTARY/SKIN: no rash, no obvious new lesions    ENT/MOUTH: no sore throat, no new sinus pain, no new nasal drainage, no new nasal congestion, no change to ear ringing, evidence of hearing loss     RESPIRATORY: see interval history    CV: no chest pain, no palpitations, no peripheral edema    GI: no nausea, no vomiting, recent constipation, no GERD    : negative urinary    MUSCULOSKELETAL: no myalgias, no arthralgias    ENDOCRINE: no hypoglycemia, blood sugars with adequate control    NEURO:  occasional headache    SLEEP: Ok    PSYCHIATRIC: mood stable--good          Past Medical and Surgical History:     Past Medical History:   Diagnosis Date    CF (cystic fibrosis) (H)     Exocrine pancreatic insufficiency     Nocardia infection     Pseudomonas infection     S/P gastrostomy (H) 2002     Past Surgical History:   Procedure Laterality Date    COLONOSCOPY N/A 5/21/2018    Procedure: COMBINED COLONOSCOPY,  SINGLE OR MULTIPLE BIOPSY/POLYPECTOMY BY BIOPSY;  Cystic fibrosis, 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;  Surgeon: Margarito Bowman MD;  Location: [facility]    COLONOSCOPY N/A 6/27/2022    Procedure: COLONOSCOPY, WITH POLYPECTOMY AND BIOPSY;  Surgeon: Margarito Bowman MD;  Location: [facility]    GASTROSTOMY TUBE  2002    IR PULMONARY EMBOLIZATION  7/20/2001    PICC INSERTION Left 01/22/2018    4Fr SL BioFlo PICC, 46cm (5cm external) in the L basilic vein w/ tip in the low SVC           Family History:     Family History   Problem Relation Age of Onset    Heart Disease Maternal Grandmother     Heart Disease Maternal Grandfather     Heart Disease Paternal Grandmother     Diabetes No family hx of             Social History:     Social History     Socioeconomic History    Marital status:      Spouse name: Not on file    Number of children: 0    Years of education: Not on file    Highest education level: Not on file   Occupational History    Occupation: financial palnner     Employer: [employer]   Tobacco Use    Smoking status: Never    Smokeless tobacco: Never   Vaping Use    Vaping status: Never Used   Substance and Sexual Activity    Alcohol use: No     Alcohol/week: 0.0 standard drinks of alcohol    Drug use: No    Sexual activity: Yes     Partners: Female     Birth control/protection: Pill   Other Topics Concern    Parent/sibling w/ CABG, MI or angioplasty before 65F 55M? Not Asked     Service Not Asked    Blood Transfusions No    Caffeine Concern Not Asked    Occupational Exposure Not Asked    Hobby Hazards Not Asked    Sleep Concern Not Asked    Stress Concern Not Asked    Weight Concern Not Asked    Special Diet Not Asked    Back Care Not Asked    Exercise No    Bike Helmet Not Asked    Seat Belt Not Asked    Self-Exams Not Asked   Social History Narrative    8/15/2019 - .  Lives with his English Bulldog (Jefry) in a house that he designed in [place].  No children.     Social  Determinants of Health     Financial Resource Strain: Not on file   Food Insecurity: Not on file   Transportation Needs: Not on file   Physical Activity: Not on file   Stress: Not on file   Social Connections: Not on file   Interpersonal Safety: Not on file   Housing Stability: Not on file            Medications:     Current Outpatient Medications   Medication Sig Dispense Refill    acetylcysteine (MUCOMYST) 10 % nebulizer solution NEBULIZE AND INHALE 4 ML INTO THE LUNGS FOUR TIMES A  mL 11    elexacaftor-tezacaftor-ivacaftor & ivacaftor (TRIKAFTA) 100-50-75 & 150 MG tablet pack TAKE TWO ORANGE TABLETS BY MOUTH IN THE MORNING AND ONE BLUE TABLET IN THE EVENING AS DIRECTED ON PACKAGE.  TAKE WITH FAT CONTAINING FOOD. 84 tablet 4    ACE NOT PRESCRIBED, INTENTIONAL, 1 each continuous prn. ACE Inhibitor not prescribed due to Risk for drug interaction 0 each 0    azithromycin (ZITHROMAX) 250 MG tablet Take 1 tablet (250 mg) by mouth daily 30 tablet 11    blood glucose (NO BRAND SPECIFIED) test strip Use to test blood sugar 6 times daily or as directed. To accompany: Blood Glucose Monitor Brands: per insurance. 600 strip 3    blood glucose (NO BRAND SPECIFIED) test strip Use to test blood sugar 6 times daily. Any covered brand. Pt now using Contour Next, if covered. 600 strip 3    blood glucose monitoring (NO BRAND SPECIFIED) meter device kit Use to test blood sugar 6 times daily or as directed. Preferred blood glucose meter OR supplies to accompany: Blood Glucose Monitor Brands: per insurance. 1 kit 0    blood glucose monitoring (NO BRAND SPECIFIED) meter device kit Use to test blood sugar 6 times daily or as directed.whatever is covered 1 kit 1    blood glucose monitoring (ULTRA THIN 30G) lancets Use to test blood sugar 6 times daily or as directed.whatever is covered 540 each 3    budesonide-formoterol (SYMBICORT) 160-4.5 MCG/ACT Inhaler INHALE 2 PUFFS BY MOUTH TWICE DAILY 30.6 g 3    CALCIUM PO Take 500 mg by  "mouth 2 times daily Strength unknown.       Continuous Blood Gluc Sensor (FREESTYLE SUKHWINDER 14 DAY SENSOR) MISC Change every 14 days. 1 each 3    [facility] INFUSION MANAGED PATIENT Contact [facility] for patient specific medication information at [phone] on admission and discharge from the hospital.  Phones are answered 24 hours a day 7 days a week 365 days a year.    Providers - Choose \"CONTINUE HOME MED (no script)\" at discharge if patient treatment with home infusion will continue.      ferrous fumarate 65 mg, "[employer]. FE,-Vitamin C 125 mg (VITRON-C)  MG TABS tablet Take 1 tablet by mouth daily      Glucagon (BAQSIMI) 3 MG/DOSE nasal powder Spray 1 spray (3 mg) in nostril as needed (in emergency for hypoglycic reaction) in the event of unconscious hypoglycemia or hypoglycemic seizure. May repeat dose if no response after 15 minutes. 1 each 1    Glucagon (GVOKE HYPOPEN) 1 MG/0.2ML pen Inject the contents of 1 device under the skin into lower abdomen, outer thigh, or outer upper arm as needed for hypoglycemia. If no response after 15 minutes, additional 1 mg dose from a new device may be injected while waiting for emergency assistance. 0.4 mL 1    insulin aspart (NOVOLOG PENFILL) 100 UNIT/ML cartridge INJECT 1 UNIT PER 20G CARB AT LUNCH AND SUPPER; INJECT 3 UNITS AT NIGHT WITH TUBE FEEDINGS. MAX 20 UNITS PER DAY 30 mL 3    Insulin Aspart FlexPen 100 UNIT/ML SOPN Inject 1 Units Subcutaneous 3 times daily With these instructions :INJECT 1 UNIT PER 20G CARB AT LUNCH AND SUPPER; INJECT 3 UNITS AT NIGHT WITH TUBE FEEDINGS. MAX 20 UNITS PER Day 15 mL 3    insulin glargine (LANTUS SOLOSTAR) 100 UNIT/ML pen INJECT 8 UNITS UNDER THE SKIN ONCE DAILY 15 mL 1    insulin pen needle (BD OMI U/F) 32G X 4 MM miscellaneous Use 4 pen needles daily or as directed. 200 each 4    levalbuterol (XOPENEX HFA) 45 MCG/ACT inhaler INHALE 2 PUFFS INTO THE LUNGS EVERY 6 HOURS AS NEEDED FOR SHORTNESS OF BREATH/DYSPNEA " 75 g 3    levalbuterol (XOPENEX) 1.25 MG/3ML neb solution INHALE 1 VIAL BY MOUTH INTO THE LUNGS VIA NEBULIZATION FOUR TIMES DAILY 360 mL 11    lipase-protease-amylase (CREON) 09339-11054-645322 units CPEP per EC capsule TAKE 9-10 CAPSULES BY MOUTH WITH MEALS (3 MEALS/DAY) AND 2-4 CAPS WITH SNACKS (3 SNACKS/DAY) 3780 capsule 3    MULTIVITAMIN OR Take 1 tablet by mouth daily. Includes 5,000 units vitamin D and 400 units vitamin E. Per pt, formulated for CF pts.      mvw complete (PROBIOTIC FORMULATION) capsule TAKE ONE CAPSULE BY MOUTH ONCE DAILY 30 capsule 11    phytonadione (MEPHYTON) 5 MG tablet Take one tablet daily for 3 days as needed for hemoptysis. 6 tablet 4    polyethylene glycol (MIRALAX) 17 GM/Dose powder Take 17 g by mouth daily as needed for constipation      sodium chloride inhalant 7 % NEBU neb solution USE 4ML( 1 VIAL) VIA NEBULIZER TWICE DAILY 240 mL 5    thin (NO BRAND SPECIFIED) lancets Use with lanceting device 6x daily. To accompany: Blood Glucose Monitor Brands: per insurance. 600 each 3     No current facility-administered medications for this visit.            Physical Exam:   /70   Pulse 83   SpO2 94%     Constitutional:   Awake, alert and in no apparent distress     Eyes:   nonicteric     ENT:   oral mucosa moist without lesions, normal tm bilaterally, bilateral mucosa normal     Neck:   Supple without supraclavicular or cervical lymphadenopathy     Lungs:   Fair air flow.  No crackles. Left apical rhonchi.  No wheezes.     Cardiovascular:   Normal S1 and S2.  RRR.  No murmur, gallop or rub.     Abdomen:   NABS, soft, nontender, nondistended. Healed g tube site     Musculoskeletal:   No edema, digital clubbing present     Neurologic:   Alert and conversant.     Skin:   Warm, dry.  No rash on limited exam.             Data:   All laboratory and imaging data reviewed.      Results from the last week:  Recent Results (from the past 168 hour(s))   General PFT Lab (Please always keep  checked)    Collection Time: 09/10/24  9:23 AM   Result Value Ref Range    FVC-Pred 4.41 L    FVC-Pre 3.55 L    FVC-%Pred-Pre 80 %    FEV1-Pre 1.77 L    FEV1-%Pred-Pre 49 %    FEV1FVC-Pred 81 %    FEV1FVC-Pre 50 %    FEFMax-Pred 9.58 L/sec    FEFMax-Pre 5.67 L/sec    FEFMax-%Pred-Pre 59 %    FEF2575-Pred 3.38 L/sec    FEF2575-Pre 0.58 L/sec    IGH9565-%Pred-Pre 17 %    ExpTime-Pre 14.62 sec    FIFMax-Pre 4.92 L/sec    FEV1FEV6-Pred 81 %    FEV1FEV6-Pre 57 %   Cystic Fibrosis Culture Aerobic Bacterial    Collection Time: 09/10/24 10:06 AM    Specimen: Expectorate; Sputum   Result Value Ref Range    Culture Culture in progress        PFT interpretation:   Spirometry interpretation:  The spirometry shows evidence of moderate obstructive lung disease.  When compared to 5/21/2024, the FEV1 and FVC have little change.  The testing meets ATS criteria.    Severity Z-score*   Normal > -1.645   Mild -1.65 to -2.5   Moderate -2.5 to -4   Severe < -4   * accounts for sex, age, height, ancestry     Use z-score to assess airflow obstruction, restriction and DLCO  - FEV1 z-score for airflow obstruction  - TLC z-score for restriction  - DLCO z-score for diffusion defect    Pulmonary exacerbation: absent    40 minutes spent by me on the date of the encounter doing chart review, history and exam, documentation and further activities per the note  Time documented is excluding time spent for PFT interpretation.

## 2024-09-09 NOTE — PATIENT INSTRUCTIONS
Cystic Fibrosis Self-Care Plan    RECOMMENDATIONS:     Kilo, It was great to see you today.  The plan from today:  --ENT referral for evaluation of hearing loss in your right ear  --I will order annual studies labs at your next appointment  --call Keila to book  (Mily Landis)  Great job with self cares!    Your goals:  Stay safe and well.  Enjoy the end of summer!    Cystic Fibrosis :    Keila Barrera   [phone]  [facility] Nurse line:  Luis Carlos   [phone]     [facility] Fax Number:      [phone]         Cystic Fibrosis Respiratory Therapists:   Chasidy Encinas                          [phone]          Jacklyn Pina   [phone]  Cystic Fibrosis Dietitians:              Zeina Pierre              [phone]                            Ivory Bonilla                        [phone]   Cystic Fibrosis Diabetes Nurse:    Jordyn Rudolph               [phone]    Cystic Fibrosis Social Workers:     Dahiana Guidry              [phone]                     Louann Suarez               [phone]  Cystic Fibrosis Pharmacists:           Linda Stone                              [phone] (pager)         Gracie Allen      [phone]   Cystic Fibrosis Genetic Counselor:   Pretty Pulliam    [phone]    [facility] website:  [email or web address]    We have recently learned about an albuterol neb solution shortage due to a manufacturing delay. There is still a small supply coming in but not enough to meet the current demand. We do not yet have an estimate for when this will become widely available again.    We our asking our CF community to do the following:    Please take time to check your supply of albuterol neb solution at home. We recommend keeping at least a 2-week supply of albuterol nebs at home in case of illness.    2.  If you have an albuterol inhaler at home, you can use 4 puffs of the inhaler with a  spacer in place of the nebulized albuterol at the start of your treatments. It is important to use a spacer for the best technique. If you do not have a spacer at home or have questions on technique, we will be happy to review and send one to your home address. Please also take a moment to check that your albuterol HFA inhaler is available and not .  inhalers may be less effective as the medication loses its potency or power. In some instances your team may suggest another alternative instead of an albuterol inhaler.    3.  Please take care in requesting refills. Albuterol neb solution is a life-saving medication for patients having severe asthma attacks and other emergency respiratory conditions. Let s work together to make sure that albuterol neb solution is available to those who need it urgently.    Reach out to your care team with questions and to confirm your planned alternative for albuterol nebs. MyChart will be the fastest way to connect. If possible, please reserve the nursing line for more urgent concerns as we are short on nursing staff.    Here are some reputable sites with more information:    https://www.[email or web address]/resilient-drug-supply/us-liquid-albuterol-wzpunbxn-ceowifje-bhwftt-after-major-supplier-shuts-down    https://www.[employer]//health/albuterol-shortage    https://www.ashp.org/drug-shortages/current-shortages/drug-shortage-detail.aspx?he=324&loginreturnUrl=SSOCheckOnly       MRN: [id number]   Clinic Date: 2024   Patient: Nico Morales     Annual Studies:   IGG   Date Value Ref Range Status   2021 1,208 610 - 1,616 mg/dL Final     Immunoglobulin G   Date Value Ref Range Status   2024 1,123 610 - 1,616 mg/dL Final     Insulin   Date Value Ref Range Status   2011 30 mU/L Final     Comment:     Reference Range:  0-20  +120     There are no preventive care reminders to display for this patient.    Pulmonary Function Tests  FEV1: amount of  air you can blow out in 1 second  FVC: total amount of air you can take in and blow out    Your Goals:             Latest Ref Rng & Units 5/21/2024     9:14 AM   PFT   FVC L 3.65  P   FEV1 L 1.75  P   FVC% % 82  P   FEV1% % 49  P      P Preliminary result          Airway Clearance: The Most Important Way to Keep Your Lungs Healthy  Vest Settings:   Hill-Rom Frequencies: 8, 9, 10 Pressure 10 Then, Frequencies 18, 19, 20 Pressure 6     RespirTech: Quick Start with Pressure of     Do each frequency for 5 minutes; Deflate vest after each frequency & cough 3 times before beginning the next setting.    Vest and Neb Therapy should be done 2 times/day.    Good Nutrition Can Improve Lung Function and Overall Health    Take ALL of your vitamins with food    Take 1/2 of your enzymes before EVERY meal/snack and the other 1/2 mid-meal/snack    Wt Readings from Last 3 Encounters:   02/22/24 67.7 kg (149 lb 4.8 oz)   01/16/24 67.7 kg (149 lb 4 oz)   09/26/23 66.7 kg (147 lb 1.6 oz)       There is no height or weight on file to calculate BMI.         National CF Foundation Recommendations for BMI in CF Adults: Women: at least 22 Men: at least 23        Controlling Blood Sugars Helps Prevent Lung Infections & Improves Nutrition  Test blood sugar:    In the morning before eating (goal is )    2 hours after a meal (goal is less than 150)    When pre-meal glucose is greater than 150 add correction    At bedtime (if less than 100 eat a snack with 15 grams of carbohydrates  Last A1C Results:   Hemoglobin A1C   Date Value Ref Range Status   06/21/2024 6.7 (H) <5.7 % Final     Comment:     Normal <5.7%   Prediabetes 5.7-6.4%    Diabetes 6.5% or higher     Note: Adopted from ADA consensus guidelines.   05/04/2021 6.2 (H) 0 - 5.6 % Final     Comment:     Normal <5.7% Prediabetes 5.7-6.4%  Diabetes 6.5% or higher - adopted from ADA   consensus guidelines.           If diabetic, measure A1C every 6 months. Goal: Under 7%    Staying  Healthy  Research:  If you are interested in learning about research opportunities or have questions, please contact the CF Research Team at [phone] or [email or web address].    CF Foundation:  Compass is a personalized resource service to help you with the insurance, financial, legal and other issues you are facing.  It's free, confidential and available to anyone with CF.  Ask your  for more information or contact Compass directly at [phone] ([phone]) or compass@cff.org, or learn more at cff.org/compass.

## 2024-09-10 ENCOUNTER — OFFICE VISIT (OUTPATIENT)
Dept: PULMONOLOGY | Facility: CLINIC | Age: 49
End: 2024-09-10
Attending: INTERNAL MEDICINE
Payer: COMMERCIAL

## 2024-09-10 ENCOUNTER — ALLIED HEALTH/NURSE VISIT (OUTPATIENT)
Dept: CARE COORDINATION | Facility: CLINIC | Age: 49
End: 2024-09-10

## 2024-09-10 VITALS — SYSTOLIC BLOOD PRESSURE: 104 MMHG | OXYGEN SATURATION: 94 % | HEART RATE: 83 BPM | DIASTOLIC BLOOD PRESSURE: 70 MMHG

## 2024-09-10 DIAGNOSIS — E08.9 DIABETES MELLITUS DUE TO CYSTIC FIBROSIS (H): ICD-10-CM

## 2024-09-10 DIAGNOSIS — Z13.9 RISK AND FUNCTIONAL ASSESSMENT: Primary | ICD-10-CM

## 2024-09-10 DIAGNOSIS — E84.9 CYSTIC FIBROSIS (H): Primary | ICD-10-CM

## 2024-09-10 DIAGNOSIS — E84.9 CYSTIC FIBROSIS (H): ICD-10-CM

## 2024-09-10 DIAGNOSIS — E84.0 CYSTIC FIBROSIS WITH PULMONARY MANIFESTATIONS (H): Primary | ICD-10-CM

## 2024-09-10 DIAGNOSIS — E84.9 DIABETES MELLITUS DUE TO CYSTIC FIBROSIS (H): ICD-10-CM

## 2024-09-10 DIAGNOSIS — K86.81 EXOCRINE PANCREATIC INSUFFICIENCY: ICD-10-CM

## 2024-09-10 PROCEDURE — 87186 SC STD MICRODIL/AGAR DIL: CPT | Performed by: INTERNAL MEDICINE

## 2024-09-10 PROCEDURE — 99214 OFFICE O/P EST MOD 30 MIN: CPT | Mod: 25 | Performed by: INTERNAL MEDICINE

## 2024-09-10 PROCEDURE — 99213 OFFICE O/P EST LOW 20 MIN: CPT | Performed by: INTERNAL MEDICINE

## 2024-09-10 PROCEDURE — 94375 RESPIRATORY FLOW VOLUME LOOP: CPT | Performed by: INTERNAL MEDICINE

## 2024-09-10 RX ORDER — ELEXACAFTOR, TEZACAFTOR, AND IVACAFTOR 100-50-75
KIT ORAL
Qty: 84 TABLET | Refills: 4 | Status: SHIPPED | OUTPATIENT
Start: 2024-09-10

## 2024-09-10 RX ORDER — ACETYLCYSTEINE 100 MG/ML
SOLUTION ORAL; RESPIRATORY (INHALATION)
Qty: 480 ML | Refills: 11 | Status: SHIPPED | OUTPATIENT
Start: 2024-09-10

## 2024-09-10 NOTE — PROGRESS NOTES
Respiratory Therapist Note:           Vest                 Brand: Hill-Rom - traditional Hill Rom: Frequencies 8, 9, 10 at pressure 10 then frequencies 18, 19, 20 at pressure 6., Hill-Rom - Moodus Frequencies: 13-15, intensity 8-10, and RespirTech RespirTech: Quickstart at a pressure of .                 Cough Pause: Cough Pause; Yes                 Vest Garment Size: Adult Small, unknown Encourage, Moodus custom                 Last Fitting Date: prn                 Frequency of therapy: 14-21 times per week                 Concerns:  none, uses 105 at home, keeps Encourage system at work, and Moodus at home in [place]           Exercise (purposeful and aerobic for >20 minutes each session): NO.                 Does this qualify as additional airway clearance: No           Alternative Airway Clearance:            Nebulized Medications                 Bronchodilators: Xopenex                 Mucolytic: Mucomyst am and 7% Hypertonic Saline pm                 Antibiotics: none                 Additional Inhaled Medications: ICS (budesonide once-twice daily), Xopenex MDI as needed, Symbicort 2 puffs twice daily                 Spacer Use:            Review Cleaning: Yes. Microwave bottle sterilizer, meets CF guidelines with technique           Education and Transition Information                 Correct order of inhaled medications: Yes                 Mechanism of Action of inhaled medications: Yes                 Frequency of inhaled medications: Yes                 Dosage of inhaled medications: Yes                 Other:            Home Care:                 Nebulizer Cups (Brand/Type): [place] LC                 Nebulizer Compressor                             Year Purchased: old, has 50psi at home, and portable style at [place] home                             [facility], Phone: [phone], Fax: [phone]                 Nebulizer Supply Company:                             [facility], Phone: [phone], Fax: [phone]           Oxygen: none        Pulmonary Rehab                 Site: [place] [id number]                 Date Completed:  far past (15+ years ago)           Plan of Care and Goals for next visit: Interested in APX demo at next visit if time allows.

## 2024-09-10 NOTE — NURSING NOTE
"Nico Morales is a 48 year old year old who is being seen for Follow Up (cf)      Medications reviewed and Vital signs taken.    Specimen Collection Type: Sputum    Order(s) placed: CF Aerobic Bacterial    *IF AFB order placed - please enter \"PRIORITIZE AFB\" to order comments.       Lab Results   Component Value Date    ACIDFAST No acid fast bacilli seen 01/16/2024    ACIDFAST No acid fast bacilli seen 01/16/2024    ACIDFAST No acid fast bacilli seen 01/16/2024         Lab Results   Component Value Date    AFBSMS Negative for acid fast bacteria 05/04/2021    AFBSMS  05/04/2021     Assayed at [facility] [address] [place] [id number] [phone]           "

## 2024-09-10 NOTE — PROGRESS NOTES
"Adult Cystic Fibrosis Program  Annual Psychosocial Assessment    Presenting information:   Nico \"Kilo\" Andrew is a 48-year-old male with cystic fibrosis, presenting in clinic today for a routine appointment with his primary CF provider, Dr. Machado. Met with Kilo to complete an updated annual psychosocial assessment.  Kilo was unaccompanied in clinic room today during SW visit.     Living Situation:   Kilo lives with his wife Madisyn and Madisyn's 4 children (50% of the time) in [place]. They built their home 3 years ago and enjoy living there. 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 He reports things are going well and he denies any concerns about his living situation.     Family Constellation:   Kilo grew up in [place] and was raised by his biological parents (Epifanio and Honey). He has one older sister (Rosa). His parents live [place], they are retired, are in good health and enjoy travel. Rosa is  with two children and lives in [facility].  Kilo reports that his family is doing well, he sees his parents, his sister and her family regularly and is very close with them. His in-laws also live nearby.     Kilo and his first wife  and he got remarried to Madisyn in 2021. Madisyn was diagnosed with breast cancer last year, and has had surgery, chemo and radiation, and she is now on oral medications. He and Madisyn are hopeful that her health continues to improve with these treatments. Kilo does not have biological children, Madisyn has 4 children from a previous marriage: Enrique (age 17), Jackeline (15), Flory (11) and Shabnam (7).        Social Support:   Kilo reports very good social support. He gets along well with his family, friends, co-workers and viridiana community. 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 He and Madisyn are active in their Gnosticism and he is involved with volunteering and various Gnosticism groups. He reports that he has a 2nd cousin (twice " "removed) that also has CF, they keep in touch and do discuss questions and experiences regarding CF with each other.      Adjustment to Illness:   Kilo was diagnosed with CF at 18 months, he reports that the diagnosis came after he wasn't gaining weight.  He received his pediatric care through the [facility] and experienced few complications while growing up. He recalls that his parents took very good care of him, he was not hospitalized until his 20's for CF related issues.       Kilo feels he is doing \"good\" from a physical health perspective. He continues to take Trikafta and feels this medication has been life changing for him. He does continue to vest 2-3 times per day. Clinically, he has CF lung disease with severe obstruction, pancreatic insufficiency and CF related diabetes with good blood sugar control. Kilo continues to be very disciplined and dedicated to maintaining a healthy lifestyle. He reports excellent adherence with medications and other CF treatment recommendations. Kilo reports that his main form of exercise is walking.      Kilo reports that he is an \"open book\" when he comes to openness regarding his CF diagnosis. He stated that he vests in front of Madisyn and her children and has been very open with them.     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  He reports that he has the software to complete this and does plan to eventually. He is comfortable with his wife Madisyn to make medical decisions on his behalf if he were unable to make medical decisions for himself.     Advance Care Planning:  Not discussed today.     Kilo has not yet had a ACP visit, it has been offered to him in the past and he has been open to it but it was never scheduled. This was not discussed today as he has had much improved stability with Trikafta.     Education:   Kilo completed a Bachelor's Degree " "in Biology and Math from [employer] in [place].       Employment:   Kilo continues to work full-time as a  working mainly with [employer]. 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 He enjoys his work and has built a solid clientele base. His work hours are variable but flexible, and he works close to home and has a private office space in [place]. He does have the option to work from home when needed, and often works remotely when traveling. He has 2 long-term disability policies purchased through financial planning associations. Kilo has health insurance through his work.  .      Mental Health:   Kilo describes his current mood as \"good\" and denies any current or past symptoms of depression, anxiety, or any other mental health disorder. He reports \"normal\" stress levels overall and describes typical stressors due to life with kids, his wife's cancer diagnosis and treatments, and running a business. He feels that he is able to handle these stressors well. Mindy/spirituality are important to him, he identifies as Adventist (Mandaeism) and is a member at [facility], he and Madisyn switched to this Religion community together as they were looking for something smaller. In the past after his divorce, he did participate in separation and divorce care groups through his Religion which he found very helpful.       Kilo declined completing mental health screeners today as he feels he has no concerns. He requested that SW use his most recent scores.   VANE-7 Score: 0, indicating minimal symptoms of anxiety and described as not difficult at all in daily functioning.    PHQ-9 Score: 0, indicating minimal symptoms of depression and described as not difficult at all in daily functioning.        Kilo agreed with the scores and interpretation of screens above.  He denied having additional symptoms not addressed on these screens.  "     Chemical Health:   Kilo denies the use of tobacco, marijuana or illicit drugs.  He does not drink alcohol and denies any history of chemical dependency issues.           Finances:   Kilo has income through wages and denies any financial concerns.     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    Recreation/Leisure Interests:   Kilo enjoys spending time with friends, family and being outdoors. He is enjoying traveling to [place] where their condo is and is able to use a boat as they provide this amenity. He also has recently taken up salt water fishing and enjoys playing in the pool and being with his step-children.       Intervention:  -Psychosocial Assessment    Assessment:  Kilo appeared to be open in his responses. He continues to work for himself and continues to enjoy traveling to their condo in [place] when he is able. His wife, Madisyn, was diagnosed with breast cancer last year, and while he reports this has brought it's challenges, he feels that he is able to handle the stress well. He reports no financial or insurance concerns today. Kilo seems to be psychosocially stable overall, with access to relevant resources and supports.  No concerns expressed/noted.    Plan:  Re-consult for any psychosocial needs that may arise.    Complete psychosocial assessment annually.  Continue to follow for regular clinic consult.    Dahiana Guidry [facility]  Adult Cystic Fibrosis   Ph: [phone]

## 2024-09-10 NOTE — LETTER
9/10/2024      Nico Morales  [address] [place] [address]      Dear Colleague,    Thank you for referring your patient, Nico Morales, to the [facility] [place]. Please see a copy of my visit note below.    [facility]  September 10, 2024         Assessment and Plan:   Nico Morales is a 48 year old male with cystic fibrosis.    1. CF lung disease with moderate obstruction: Kilo reports he is doing well from a pulmonary point of view.  He does show evidence of stable pfts.  He historically has grown pseudomonas in his sputum. He did have recent positive AFB but fortunately the sputum on repeat times 2 that was negative.   At this time I have recommended to Kilo:  -- He should continue to do his vest and nebulized therapies 2-3 times daily  -- He remains off mucomyst and hypertonic saline without change in symptoms  -- repeat AFB sputum in the next couple of months              --if it is negative, we will return to yearly testing  -- He does have both levofloxacin and doxycycline available to him for exacerbation.  Fortunately he has not needed to use these in many months.     2. Pancreatic Insufficiency/GI: Kilo has no new symptoms consistent with worsening malabsorption.  He did previously had a g tube.  This was removed after several years of stable weight.  He did have a recent episode of constipation requiring increased use of miralax.  The symptoms have since resolved, but he reports it took several weeks to feel back to normal.  - continue the present dose of pancreatic enzymes  - continue vitamin supplementation.     3.  CFTR modulator therapy: Kilo is on Trikafta and tolerating well.  His hepatic panel and CK in June were stable.  He will repeat these studies around his next visit.     4.  Cystic fibrosis related diabetes: Kilo reports good control of his blood sugars. His A1C is  stable and he is followed by endocrinology.     5.  Psychosocial: Kilo is  and in a stable relationship.  He reports all his kids are doing well.  Work is good and busy.  His wife in undergoing treatment for breast cancer.    6. Hearing loss:  Kilo had an audiology exam that was concerning for hearing loss.  I have referred him to ENT for further evaluation.       Immunizations: COVID, flu--declined  Colonoscopy: 6/2025  Last DEXA: 2/2023  Last hepatic panel: 6/2024  Last sputum culture:   Collected 5/21/2024 10:06 AM       Status: Final result       Dx: Cystic fibrosis with pulmonary manife...    Specimen Information: Expectorate; Sputum   0 Result Notes  Culture   4+ Normal santi      1+ Pseudomonas aeruginosa, mucoid strain Abnormal             Annual studies due:   Recent Labs   Lab Test 01/16/24  0824   IGG 1,123       Jaye Machado MD MPH  Associate Professor of Medicine  Pulmonary, Allergy, Critical Care and Sleep Medicine        Interval History:     Kilo reports stable cough and sputum.  He denies congestion or change in activity tolerance.  He does vest therapy 2 to 3 times per day.         Review of Systems:     CONSTITUTIONAL: no fever, no chills, no sweats, no change in weight, no change in energy--good, no change in appetite--pretty good    INTEGUMENTARY/SKIN: no rash, no obvious new lesions    ENT/MOUTH: no sore throat, no new sinus pain, no new nasal drainage, no new nasal congestion, no change to ear ringing, evidence of hearing loss     RESPIRATORY: see interval history    CV: no chest pain, no palpitations, no peripheral edema    GI: no nausea, no vomiting, recent constipation, no GERD    : negative urinary    MUSCULOSKELETAL: no myalgias, no arthralgias    ENDOCRINE: no hypoglycemia, blood sugars with adequate control    NEURO:  occasional headache    SLEEP: Ok    PSYCHIATRIC: mood stable--good          Past Medical and Surgical History:     Past Medical History:   Diagnosis  Date     CF (cystic fibrosis) (H)      Exocrine pancreatic insufficiency      Nocardia infection      Pseudomonas infection      S/P gastrostomy (H) 2002     Past Surgical History:   Procedure Laterality Date     COLONOSCOPY N/A 5/21/2018    Procedure: COMBINED COLONOSCOPY, SINGLE OR MULTIPLE BIOPSY/POLYPECTOMY BY BIOPSY;  Cystic fibrosis, 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;  Surgeon: Margarito Bowman MD;  Location: [facility]     COLONOSCOPY N/A 6/27/2022    Procedure: COLONOSCOPY, WITH POLYPECTOMY AND BIOPSY;  Surgeon: Margarito Bowman MD;  Location: [facility]     GASTROSTOMY TUBE  2002     IR PULMONARY EMBOLIZATION  7/20/2001     PICC INSERTION Left 01/22/2018    4Fr SL BioFlo PICC, 46cm (5cm external) in the L basilic vein w/ tip in the low SVC           Family History:     Family History   Problem Relation Age of Onset     Heart Disease Maternal Grandmother      Heart Disease Maternal Grandfather      Heart Disease Paternal Grandmother      Diabetes No family hx of             Social History:     Social History     Socioeconomic History     Marital status:      Spouse name: Not on file     Number of children: 0     Years of education: Not on file     Highest education level: Not on file   Occupational History     Occupation: financial [employer]     Employer: [employer]   Tobacco Use     Smoking status: Never     Smokeless tobacco: Never   Vaping Use     Vaping status: Never Used   Substance and Sexual Activity     Alcohol use: No     Alcohol/week: 0.0 standard drinks of alcohol     Drug use: No     Sexual activity: Yes     Partners: Female     Birth control/protection: Pill   Other Topics Concern     Parent/sibling w/ CABG, MI or angioplasty before 65F 55M? Not Asked      Service Not Asked     Blood Transfusions No     Caffeine Concern Not Asked     Occupational Exposure Not Asked     Hobby Hazards Not Asked     Sleep Concern Not Asked     Stress Concern Not Asked     Weight Concern Not Asked      Special Diet Not Asked     Back Care Not Asked     Exercise No     Bike Helmet Not Asked     Seat Belt Not Asked     Self-Exams Not Asked   Social History Narrative    8/15/2019 - .  Lives with his English Bulldog (Jefry) in a house that he designed in [place].  No children.     Social Determinants of Health     Financial Resource Strain: Not on file   Food Insecurity: Not on file   Transportation Needs: Not on file   Physical Activity: Not on file   Stress: Not on file   Social Connections: Not on file   Interpersonal Safety: Not on file   Housing Stability: Not on file            Medications:     Current Outpatient Medications   Medication Sig Dispense Refill     acetylcysteine (MUCOMYST) 10 % nebulizer solution NEBULIZE AND INHALE 4 ML INTO THE LUNGS FOUR TIMES A  mL 11     elexacaftor-tezacaftor-ivacaftor & ivacaftor (TRIKAFTA) 100-50-75 & 150 MG tablet pack TAKE TWO ORANGE TABLETS BY MOUTH IN THE MORNING AND ONE BLUE TABLET IN THE EVENING AS DIRECTED ON PACKAGE.  TAKE WITH FAT CONTAINING FOOD. 84 tablet 4     ACE NOT PRESCRIBED, INTENTIONAL, 1 each continuous prn. ACE Inhibitor not prescribed due to Risk for drug interaction 0 each 0     azithromycin (ZITHROMAX) 250 MG tablet Take 1 tablet (250 mg) by mouth daily 30 tablet 11     blood glucose (NO BRAND SPECIFIED) test strip Use to test blood sugar 6 times daily or as directed. To accompany: Blood Glucose Monitor Brands: per insurance. 600 strip 3     blood glucose (NO BRAND SPECIFIED) test strip Use to test blood sugar 6 times daily. Any covered brand. Pt now using Contour Next, if covered. 600 strip 3     blood glucose monitoring (NO BRAND SPECIFIED) meter device kit Use to test blood sugar 6 times daily or as directed. Preferred blood glucose meter OR supplies to accompany: Blood Glucose Monitor Brands: per insurance. 1 kit 0     blood glucose monitoring (NO BRAND SPECIFIED) meter device kit Use to test blood sugar 6 times daily or as  "directed.whatever is covered 1 kit 1     blood glucose monitoring (ULTRA THIN 30G) lancets Use to test blood sugar 6 times daily or as directed.whatever is covered 540 each 3     budesonide-formoterol (SYMBICORT) 160-4.5 MCG/ACT Inhaler INHALE 2 PUFFS BY MOUTH TWICE DAILY 30.6 g 3     CALCIUM PO Take 500 mg by mouth 2 times daily Strength unknown.        Continuous Blood Gluc Sensor (FREESTYLE SUKHWINDER 14 DAY SENSOR) MISC Change every 14 days. 1 each 3     [facility] INFUSION MANAGED PATIENT Contact [facility] for patient specific medication information at [phone] on admission and discharge from the hospital.  Phones are answered 24 hours a day 7 days a week 365 days a year.    Providers - Choose \"CONTINUE HOME MED (no script)\" at discharge if patient treatment with home infusion will continue.       ferrous fumarate 65 mg, [place]. FE,-Vitamin C 125 mg (VITRON-C)  MG TABS tablet Take 1 tablet by mouth daily       Glucagon (BAQSIMI) 3 MG/DOSE nasal powder Spray 1 spray (3 mg) in nostril as needed (in emergency for hypoglycic reaction) in the event of unconscious hypoglycemia or hypoglycemic seizure. May repeat dose if no response after 15 minutes. 1 each 1     Glucagon (GVOKE HYPOPEN) 1 MG/0.2ML pen Inject the contents of 1 device under the skin into lower abdomen, outer thigh, or outer upper arm as needed for hypoglycemia. If no response after 15 minutes, additional 1 mg dose from a new device may be injected while waiting for emergency assistance. 0.4 mL 1     insulin aspart (NOVOLOG PENFILL) 100 UNIT/ML cartridge INJECT 1 UNIT PER 20G CARB AT LUNCH AND SUPPER; INJECT 3 UNITS AT NIGHT WITH TUBE FEEDINGS. MAX 20 UNITS PER DAY 30 mL 3     Insulin Aspart FlexPen 100 UNIT/ML SOPN Inject 1 Units Subcutaneous 3 times daily With these instructions :INJECT 1 UNIT PER 20G CARB AT LUNCH AND SUPPER; INJECT 3 UNITS AT NIGHT WITH TUBE FEEDINGS. MAX 20 UNITS PER Day 15 mL 3     insulin glargine (LANTUS " SOLOSTAR) 100 UNIT/ML pen INJECT 8 UNITS UNDER THE SKIN ONCE DAILY 15 mL 1     insulin pen needle (BD OMI U/F) 32G X 4 MM miscellaneous Use 4 pen needles daily or as directed. 200 each 4     levalbuterol (XOPENEX HFA) 45 MCG/ACT inhaler INHALE 2 PUFFS INTO THE LUNGS EVERY 6 HOURS AS NEEDED FOR SHORTNESS OF BREATH/DYSPNEA 75 g 3     levalbuterol (XOPENEX) 1.25 MG/3ML neb solution INHALE 1 VIAL BY MOUTH INTO THE LUNGS VIA NEBULIZATION FOUR TIMES DAILY 360 mL 11     lipase-protease-amylase (CREON) 75924-05129-962133 units CPEP per EC capsule TAKE 9-10 CAPSULES BY MOUTH WITH MEALS (3 MEALS/DAY) AND 2-4 CAPS WITH SNACKS (3 SNACKS/DAY) 3780 capsule 3     MULTIVITAMIN OR Take 1 tablet by mouth daily. Includes 5,000 units vitamin D and 400 units vitamin E. Per pt, formulated for CF pts.       mvw complete (PROBIOTIC FORMULATION) capsule TAKE ONE CAPSULE BY MOUTH ONCE DAILY 30 capsule 11     phytonadione (MEPHYTON) 5 MG tablet Take one tablet daily for 3 days as needed for hemoptysis. 6 tablet 4     polyethylene glycol (MIRALAX) 17 GM/Dose powder Take 17 g by mouth daily as needed for constipation       sodium chloride inhalant 7 % NEBU neb solution USE 4ML( 1 VIAL) VIA NEBULIZER TWICE DAILY 240 mL 5     thin (NO BRAND SPECIFIED) lancets Use with lanceting device 6x daily. To accompany: Blood Glucose Monitor Brands: per insurance. 600 each 3     No current facility-administered medications for this visit.            Physical Exam:   /70   Pulse 83   SpO2 94%     Constitutional:   Awake, alert and in no apparent distress     Eyes:   nonicteric     ENT:   oral mucosa moist without lesions, normal tm bilaterally, bilateral mucosa normal     Neck:   Supple without supraclavicular or cervical lymphadenopathy     Lungs:   Fair air flow.  No crackles. Left apical rhonchi.  No wheezes.     Cardiovascular:   Normal S1 and S2.  RRR.  No murmur, gallop or rub.     Abdomen:   NABS, soft, nontender, nondistended. Healed g tube  site     Musculoskeletal:   No edema, digital clubbing present     Neurologic:   Alert and conversant.     Skin:   Warm, dry.  No rash on limited exam.             Data:   All laboratory and imaging data reviewed.      Results from the last week:  Recent Results (from the past 168 hour(s))   General PFT Lab (Please always keep checked)    Collection Time: 09/10/24  9:23 AM   Result Value Ref Range    FVC-Pred 4.41 L    FVC-Pre 3.55 L    FVC-%Pred-Pre 80 %    FEV1-Pre 1.77 L    FEV1-%Pred-Pre 49 %    FEV1FVC-Pred 81 %    FEV1FVC-Pre 50 %    FEFMax-Pred 9.58 L/sec    FEFMax-Pre 5.67 L/sec    FEFMax-%Pred-Pre 59 %    FEF2575-Pred 3.38 L/sec    FEF2575-Pre 0.58 L/sec    RCM1802-%Pred-Pre 17 %    ExpTime-Pre 14.62 sec    FIFMax-Pre 4.92 L/sec    FEV1FEV6-Pred 81 %    FEV1FEV6-Pre 57 %   Cystic Fibrosis Culture Aerobic Bacterial    Collection Time: 09/10/24 10:06 AM    Specimen: Expectorate; Sputum   Result Value Ref Range    Culture Culture in progress        PFT interpretation:   Spirometry interpretation:  The spirometry shows evidence of moderate obstructive lung disease.  When compared to 5/21/2024, the FEV1 and FVC have little change.  The testing meets ATS criteria.    Severity Z-score*   Normal > -1.645   Mild -1.65 to -2.5   Moderate -2.5 to -4   Severe < -4   * accounts for sex, age, height, ancestry     Use z-score to assess airflow obstruction, restriction and DLCO  - FEV1 z-score for airflow obstruction  - TLC z-score for restriction  - DLCO z-score for diffusion defect    Pulmonary exacerbation: absent    40 minutes spent by me on the date of the encounter doing chart review, history and exam, documentation and further activities per the note  Time documented is excluding time spent for PFT interpretation.      Respiratory Therapist Note:           Vest                 Brand: Hill-Rom - traditional Hill Rom: Frequencies 8, 9, 10 at pressure 10 then frequencies 18, 19, 20 at pressure 6., Hill-Rom - [place]  Frequencies: 13-15, intensity 8-10, and RespirTech RespirTech: Quickstart at a pressure of .                 Cough Pause: Cough Pause; Yes                 Vest Garment Size: Adult Small, unknown Encourage, [place] custom                 Last Fitting Date: prn                 Frequency of therapy: 14-21 times per week                 Concerns:  none, uses 105 at home, keeps Encourage system at work, and [place] at home in [place]           Exercise (purposeful and aerobic for >20 minutes each session): NO.                 Does this qualify as additional airway clearance: No           Alternative Airway Clearance:            Nebulized Medications                 Bronchodilators: Xopenex                 Mucolytic: Mucomyst am and 7% Hypertonic Saline pm                 Antibiotics: none                 Additional Inhaled Medications: ICS (budesonide once-twice daily), Xopenex MDI as needed, Symbicort 2 puffs twice daily                 Spacer Use:            Review Cleaning: Yes. Microwave bottle sterilizer, meets CF guidelines with technique           Education and Transition Information                 Correct order of inhaled medications: Yes                 Mechanism of Action of inhaled medications: Yes                 Frequency of inhaled medications: Yes                 Dosage of inhaled medications: Yes                 Other:            Home Care:                 Nebulizer Cups (Brand/Type): [place] LC                 Nebulizer Compressor                             Year Purchased: old, has 50psi at home, and portable style at [place] home                             [facility], Phone: [phone], Fax: [phone]                 Nebulizer Supply Company:                             [facility], Phone: [phone], Fax: [phone]           Oxygen: none        Pulmonary Rehab                 Site: [place] [address]                 Date Completed:  far past (15+ years ago)            Plan of Care and Goals for next visit: Interested in APX demo at next visit if time allows.         Again, thank you for allowing me to participate in the care of your patient.        Sincerely,        Jaye Machado MD

## 2024-09-11 LAB
EXPTIME-PRE: 14.62 SEC
FEF2575-%PRED-PRE: 17 %
FEF2575-PRE: 0.58 L/SEC
FEF2575-PRED: 3.38 L/SEC
FEFMAX-%PRED-PRE: 59 %
FEFMAX-PRE: 5.67 L/SEC
FEFMAX-PRED: 9.58 L/SEC
FEV1-%PRED-PRE: 49 %
FEV1-PRE: 1.77 L
FEV1FEV6-PRE: 57 %
FEV1FEV6-PRED: 81 %
FEV1FVC-PRE: 50 %
FEV1FVC-PRED: 81 %
FIFMAX-PRE: 4.92 L/SEC
FVC-%PRED-PRE: 80 %
FVC-PRE: 3.55 L
FVC-PRED: 4.41 L

## 2024-09-17 LAB
BACTERIA SPT CULT: ABNORMAL

## 2024-11-11 DIAGNOSIS — E08.9 DIABETES MELLITUS RELATED TO CF (CYSTIC FIBROSIS) (H): ICD-10-CM

## 2024-11-11 DIAGNOSIS — E84.8 DIABETES MELLITUS RELATED TO CF (CYSTIC FIBROSIS) (H): ICD-10-CM

## 2024-11-11 RX ORDER — INSULIN GLARGINE 100 [IU]/ML
INJECTION, SOLUTION SUBCUTANEOUS
Qty: 15 ML | Refills: 1 | Status: SHIPPED | OUTPATIENT
Start: 2024-11-11

## 2024-11-11 NOTE — TELEPHONE ENCOUNTER
insulin glargine (LANTUS SOLOSTAR) 100 UNIT/ML pen         Last Written Prescription Date:  10/11/23  Last Fill Quantity: 15 ml,   # refills: 1  Last Office Visit : 6/11/24  Future Office visit:  none noted    Routing refill request to provider for review/approval because:  Insulin and insulin pump supplies - refilled per Endocrine clinic.

## 2024-11-13 ENCOUNTER — TELEPHONE (OUTPATIENT)
Dept: ENDOCRINOLOGY | Facility: CLINIC | Age: 49
End: 2024-11-13
Payer: COMMERCIAL

## 2024-11-13 DIAGNOSIS — E10.9 TYPE 1 DIABETES MELLITUS (H): Primary | ICD-10-CM

## 2024-11-13 RX ORDER — INSULIN LISPRO 100 [IU]/ML
INJECTION, SOLUTION INTRAVENOUS; SUBCUTANEOUS
Qty: 15 ML | Refills: 3 | Status: SHIPPED | OUTPATIENT
Start: 2024-11-13

## 2024-11-13 NOTE — TELEPHONE ENCOUNTER
Pharmacy calling stating insurance changed and now only covers lispro not insulin aspart. Please send new script.

## 2024-11-13 NOTE — TELEPHONE ENCOUNTER
Insulin Lispor order placed same orders as insulin aspart  Ginger Laureano RN on 11/13/2024 at 1:46 PM

## 2024-11-18 ENCOUNTER — TELEPHONE (OUTPATIENT)
Dept: PULMONOLOGY | Facility: CLINIC | Age: 49
End: 2024-11-18
Payer: COMMERCIAL

## 2024-11-18 NOTE — TELEPHONE ENCOUNTER
Pt is requesting new rx for    Rimantadine hcl 100mg tabs    Did not see on active med list please verify and send new rx. Thank you!     [facility]  [phone]

## 2024-11-20 ENCOUNTER — TELEPHONE (OUTPATIENT)
Dept: PULMONOLOGY | Facility: CLINIC | Age: 49
End: 2024-11-20
Payer: COMMERCIAL

## 2024-11-20 NOTE — TELEPHONE ENCOUNTER
Follow up call to [facility], discussed that we are not routinely ordering Rimantadine for our CF patients, patient has asya updated and verbalized understanding.    Carolyn Auguste, BSN, RN  RN Care Coordinator [facility]

## 2024-11-20 NOTE — TELEPHONE ENCOUNTER
Pt is requesting new rx for     Rimantadine hcl 100mg tabs     Did not see on active med list please verify and send new rx. Thank you!     [facility]  [phone]

## 2024-11-29 DIAGNOSIS — E84.8 DIABETES MELLITUS RELATED TO CF (CYSTIC FIBROSIS) (H): ICD-10-CM

## 2024-11-29 DIAGNOSIS — E08.9 DIABETES MELLITUS RELATED TO CF (CYSTIC FIBROSIS) (H): ICD-10-CM

## 2024-12-02 RX ORDER — PEN NEEDLE, DIABETIC 32GX 5/32"
NEEDLE, DISPOSABLE MISCELLANEOUS
Qty: 400 EACH | Refills: 3 | Status: SHIPPED | OUTPATIENT
Start: 2024-12-02

## 2024-12-19 DIAGNOSIS — E84.0 CYSTIC FIBROSIS WITH PULMONARY MANIFESTATIONS (H): ICD-10-CM

## 2024-12-19 RX ORDER — AZITHROMYCIN 250 MG/1
250 TABLET, FILM COATED ORAL DAILY
Qty: 30 TABLET | Refills: 11 | Status: SHIPPED | OUTPATIENT
Start: 2024-12-19

## 2024-12-23 ENCOUNTER — TELEPHONE (OUTPATIENT)
Dept: PULMONOLOGY | Facility: CLINIC | Age: 49
End: 2024-12-23
Payer: COMMERCIAL

## 2024-12-23 NOTE — TELEPHONE ENCOUNTER
Prior Authorization Not Needed per Insurance    Medication: TRIKAFTA 100-50-75 & 150 MG PO TBPK  Insurance Company: [employer] - Phone [phone] Fax [phone]  Expected CoPay: $    Pharmacy Filling the Rx: [facility] - [place], [place] - [address]   Pharmacy Notified: no  Patient Notified: no      Key: [id number]

## 2025-01-16 ENCOUNTER — LAB (OUTPATIENT)
Dept: LAB | Facility: OTHER | Age: 50
End: 2025-01-16
Payer: COMMERCIAL

## 2025-01-16 DIAGNOSIS — E84.9 DIABETES MELLITUS DUE TO CYSTIC FIBROSIS (H): ICD-10-CM

## 2025-01-16 DIAGNOSIS — K86.81 EXOCRINE PANCREATIC INSUFFICIENCY: ICD-10-CM

## 2025-01-16 DIAGNOSIS — E84.0 CYSTIC FIBROSIS WITH PULMONARY MANIFESTATIONS (H): ICD-10-CM

## 2025-01-16 DIAGNOSIS — E08.9 DIABETES MELLITUS DUE TO CYSTIC FIBROSIS (H): ICD-10-CM

## 2025-01-16 DIAGNOSIS — E84.9 CYSTIC FIBROSIS (H): ICD-10-CM

## 2025-01-16 LAB
ALBUMIN SERPL BCG-MCNC: 4.3 G/DL (ref 3.5–5.2)
ALP SERPL-CCNC: 118 U/L (ref 40–150)
ALT SERPL W P-5'-P-CCNC: 32 U/L (ref 0–70)
AST SERPL W P-5'-P-CCNC: 27 U/L (ref 0–45)
BILIRUB DIRECT SERPL-MCNC: <0.2 MG/DL (ref 0–0.3)
BILIRUB SERPL-MCNC: 0.4 MG/DL
CK SERPL-CCNC: 58 U/L (ref 39–308)
PROT SERPL-MCNC: 7.6 G/DL (ref 6.4–8.3)

## 2025-01-18 LAB
ACID FAST STAIN (ARUP): NORMAL
ACID FAST STAIN (ARUP): NORMAL

## 2025-01-30 DIAGNOSIS — E84.9 CYSTIC FIBROSIS (H): ICD-10-CM

## 2025-01-30 RX ORDER — ELEXACAFTOR, TEZACAFTOR, AND IVACAFTOR 100-50-75
KIT ORAL
Qty: 84 TABLET | Refills: 4 | Status: SHIPPED | OUTPATIENT
Start: 2025-01-30

## 2025-02-04 LAB
ACID FAST STAIN (ARUP): NORMAL

## 2025-02-08 NOTE — PATIENT INSTRUCTIONS
Self-Care Plan    RECOMMENDATIONS:     Kilo, It was great to see you today.  The plan from today:  --appointment in cystic fibrosis GI  --we will work on getting VTD  --hepatic panel next week  Great job with self cares!    YOUR GOAL:  Stay safe and well.  Enjoy the winter weather!      Cystic Fibrosis :    Keila Lopezcharmaine   [phone]  [place] [facility] [place] Nurse line:  Antoni Elliott  [phone]     [facility] [place] Fax Number:      [phone]         Cystic Fibrosis Respiratory Therapists:   Chasidy Encinas                          [phone]          Jacklyn Pina   [phone]  Cystic Fibrosis Dietitians:              Zeina Pierre              [phone]                            Ivory Bonilla                        [phone]   Cystic Fibrosis Diabetes Nurse:    Jordyn Rudolph               [phone]    Cystic Fibrosis Social Workers:     Dahiana Guidry              [phone]                     Louann Suarez               [phone]  Cystic Fibrosis Pharmacists:           Linda Stone                              [phone]         Gracie Allen      [phone]   Cystic Fibrosis Genetic Counselor:   Pretty Pulliam    [phone]    [facility] website:  [email or web address]    We have recently learned about an albuterol neb solution shortage due to a manufacturing delay. There is still a small supply coming in but not enough to meet the current demand. We do not yet have an estimate for when this will become widely available again.    We our asking our CF community to do the following:    Please take time to check your supply of albuterol neb solution at home. We recommend keeping at least a 2-week supply of albuterol nebs at home in case of illness.    2.  If you have an albuterol inhaler at home, you can use 4 puffs of the inhaler with a spacer in place of the nebulized albuterol at the start of your treatments. It is  important to use a spacer for the best technique. If you do not have a spacer at home or have questions on technique, we will be happy to review and send one to your home address. Please also take a moment to check that your albuterol HFA inhaler is available and not .  inhalers may be less effective as the medication loses its potency or power. In some instances your team may suggest another alternative instead of an albuterol inhaler.    3.  Please take care in requesting refills. Albuterol neb solution is a life-saving medication for patients having severe asthma attacks and other emergency respiratory conditions. Let s work together to make sure that albuterol neb solution is available to those who need it urgently.    Reach out to your care team with questions and to confirm your planned alternative for albuterol nebs. [employer] will be the fastest way to connect. If possible, please reserve the nursing line for more urgent concerns as we are short on nursing staff.    Here are some reputable sites with more information:    https://www.cidrap.[facility]/resilient-drug-supply/us-liquid-albuterol-vjbyoamv-svntnkhm-qrmsav-after-major-supplier-shuts-down    https://www.[employer]//health/albuterol-shortage    https://www.ashp.org/drug-shortages/current-shortages/drug-shortage-detail.aspx?xp=775&loginreturnUrl=SSOCheckOnly       MRN: [id number]   Clinic Date: 2025   Patient: Nico Morales     Annual Studies:   IGG   Date Value Ref Range Status   2021 1,208 610 - 1,616 mg/dL Final     Immunoglobulin G   Date Value Ref Range Status   2024 1,123 610 - 1,616 mg/dL Final     Insulin   Date Value Ref Range Status   2011 30 mU/L Final     Comment:     Reference Range:  0-20  +120     There are no preventive care reminders to display for this patient.    Pulmonary Function Tests  FEV1: amount of air you can blow out in 1 second  FVC: total amount of air you can take in and  blow out    Your Goals:             Latest Ref Rng & Units 9/10/2024     9:23 AM   PFT   FVC L 3.55    FEV1 L 1.77    FVC% % 80    FEV1% % 49           Airway Clearance: The Most Important Way to Keep Your Lungs Healthy  Vest Settings:   Hill-Rom Frequencies: 8, 9, 10 Pressure 10 Then, Frequencies 18, 19, 20 Pressure 6     [employer]pirTech: Quick Start with Pressure of     Do each frequency for 5 minutes; Deflate vest after each frequency & cough 3 times before beginning the next setting.    Vest and Neb Therapy should be done 2 times/day.    Good Nutrition Can Improve Lung Function and Overall Health    Take ALL of your vitamins with food    Take 1/2 of your enzymes before EVERY meal/snack and the other 1/2 mid-meal/snack    Wt Readings from Last 3 Encounters:   02/22/24 67.7 kg (149 lb 4.8 oz)   01/16/24 67.7 kg (149 lb 4 oz)   09/26/23 66.7 kg (147 lb 1.6 oz)       There is no height or weight on file to calculate BMI.         National CF Foundation Recommendations for BMI in CF Adults: Women: at least 22 Men: at least 23        Controlling Blood Sugars Helps Prevent Lung Infections & Improves Nutrition  Test blood sugar:    In the morning before eating (goal is )    2 hours after a meal (goal is less than 150)    When pre-meal glucose is greater than 150 add correction    At bedtime (if less than 100 eat a snack with 15 grams of carbohydrates  Last A1C Results:   Hemoglobin A1C   Date Value Ref Range Status   06/21/2024 6.7 (H) <5.7 % Final     Comment:     Normal <5.7%   Prediabetes 5.7-6.4%    Diabetes 6.5% or higher     Note: Adopted from ADA consensus guidelines.   05/04/2021 6.2 (H) 0 - 5.6 % Final     Comment:     Normal <5.7% Prediabetes 5.7-6.4%  Diabetes 6.5% or higher - adopted from ADA   consensus guidelines.           If diabetic, measure A1C every 6 months. Goal: Under 7%    Staying Healthy  Research:  If you are interested in learning about research opportunities or have questions, please  contact the CF Research Team at [phone] or [email or web address].    CF Foundation:  Compass is a personalized resource service to help you with the insurance, financial, legal and other issues you are facing.  It's free, confidential and available to anyone with CF.  Ask your  for more information or contact Compass directly at [phone]-IDDZKOW ([phone]) or compass@cff.org, or learn more at cff.org/compass.

## 2025-02-08 NOTE — PROGRESS NOTES
[facility]  February 11, 2025         Assessment and Plan:   Nico Morales is a 49 year old male with cystic fibrosis.    1. CF lung disease with moderate obstruction:  Kilo reports he has a little bit more chest congestion with increase in cough and sputum.  He did have recent sick contacts.  He does have access to antibiotics, but he has not felt the need to take them.  He does show evidence of a decline in PFT.  He historically has grown pseudomonas in his sputum. He did have positive AFB but fortunately the sputum on repeat times 2 that was negative. A third AFB from January is negative to date.  At this time I have recommended to Kilo:  -- He should continue to do his vest and nebulized therapies 2-3 times daily  -- He remains off mucomyst without change in symptoms  -- if the pending AFB is negative, we will return to yearly testing  -- He does have both levofloxacin and doxycycline available to him for exacerbation.  Fortunately he has not needed to use these in many months.     2. Pancreatic Insufficiency/GI: Kilo has no new symptoms consistent with worsening malabsorption.  He did previously had a g tube.  This was removed after several years of stable weight.  He does continue to struggle with intermittent GI issues  - continue the present dose of pancreatic enzymes  - continue vitamin supplementation.  - Referral to cystic fibrosis gi for further evaluation     3.  CFTR modulator therapy: Kilo is on Trikafta and tolerating well.  His hepatic panel today did show an increase in AP and ALT.  This could from a recent GI illness.  We will repeat the labs next week.  Kilo is interested in transitioning to VTD.  I would like to see a return of his LFTs back to normal.  Myself and Linda, the cystic fibrosis pharmacist, spent time educating the patient today.      4.  Cystic fibrosis related diabetes/bone health: Kilo reports good control of  his blood sugars. His A1C is stable and he is followed by endocrinology.  Getting next dose of reclast soon.     5.  Psychosocial: Kilo is  and in a stable relationship.  He reports all his kids are doing well.  Work is good and busy.  His wife in undergoing treatment for breast cancer.     6. HCM:  Labs were reviewed today by me with patient.     Immunizations: UTD  Colonoscopy: 6/2025  Last DEXA: 2/2023  Last hepatic panel: 6/2024  Last sputum culture:  Collected 9/10/2024 10:06 AM    Specimen Information: Expectorate; Sputum   1 Result Note  Culture 4+ Normal santi   1+ Pseudomonas aeruginosa, mucoid strain Abnormal    1+ Pseudomonas aeruginosa, mucoid strain Abnormal         Annual studies due: done today  Recent Labs   Lab Test 02/11/25  0902   IGG 1,102     Jaye Machado MD MPH  Associate Professor of Medicine  Pulmonary, Allergy, Critical Care and Sleep Medicine        Interval History:     Kilo reports an increase cough, sputum and chest congestion.  He is doing 2 to 3 vests per day.  He denies change in activity tolerance.           Review of Systems:     CONSTITUTIONAL: no fever, no chills, no sweats, no change in weight, no change in energy, no change in appetite    INTEGUMENTARY/SKIN: no rash, no obvious new lesions    ENT/MOUTH: no sore throat, no new sinus pain, no new nasal drainage, no new nasal congestion, no ear ringing     RESPIRATORY: see interval history    CV: no chest pain, no palpitations, no peripheral edema    GI: + occasional nausea, no vomiting, no change in stools, no fatty stools, no GERD, occasional abdominal pain.      : negative urinary    MUSCULOSKELETAL: no myalgias, no arthralgias    ENDOCRINE: no hypoglycemia, blood sugars with adequate control    NEURO:  No headache    SLEEP: no issues    PSYCHIATRIC: mood stable--great           Past Medical and Surgical History:     Past Medical History:   Diagnosis Date    CF (cystic fibrosis) (H)     Exocrine pancreatic  insufficiency     Nocardia infection     Pseudomonas infection     S/P gastrostomy (H) 2002     Past Surgical History:   Procedure Laterality Date    COLONOSCOPY N/A 5/21/2018    Procedure: COMBINED COLONOSCOPY, SINGLE OR MULTIPLE BIOPSY/POLYPECTOMY BY BIOPSY;  Cystic fibrosis, 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;  Surgeon: Margarito Bowman MD;  Location: [facility]    COLONOSCOPY N/A 6/27/2022    Procedure: COLONOSCOPY, WITH POLYPECTOMY AND BIOPSY;  Surgeon: Margarito Bowman MD;  Location: [facility]    GASTROSTOMY TUBE  2002    IR PULMONARY EMBOLIZATION  7/20/2001    PICC INSERTION Left 01/22/2018    4Fr SL BioFlo PICC, 46cm (5cm external) in the L basilic vein w/ tip in the low SVC           Family History:     Family History   Problem Relation Age of Onset    Heart Disease Maternal Grandmother     Heart Disease Maternal Grandfather     Heart Disease Paternal Grandmother     Diabetes No family hx of             Social History:     Social History     Socioeconomic History    Marital status:      Spouse name: Not on file    Number of children: 0    Years of education: Not on file    Highest education level: Not on file   Occupational History    Occupation: [employer]     Employer: [employer]   Tobacco Use    Smoking status: Never    Smokeless tobacco: Never   Vaping Use    Vaping status: Never Used   Substance and Sexual Activity    Alcohol use: No     Alcohol/week: 0.0 standard drinks of alcohol    Drug use: No    Sexual activity: Yes     Partners: Female     Birth control/protection: Pill   Other Topics Concern    Parent/sibling w/ CABG, MI or angioplasty before 65F 55M? Not Asked     Service Not Asked    Blood Transfusions No    Caffeine Concern Not Asked    Occupational Exposure Not Asked    Hobby Hazards Not Asked    Sleep Concern Not Asked    Stress Concern Not Asked    Weight Concern Not Asked    Special Diet Not Asked    Back Care Not Asked    Exercise No    Bike Helmet Not Asked    Seat  Belt Not Asked    Self-Exams Not Asked   Social History Narrative    8/15/2019 - .  Lives with his English Bulldog (Jefry) in a house that he designed in [place].  No children.     Social Drivers of Health     Financial Resource Strain: Not on file   Food Insecurity: Not on file   Transportation Needs: Not on file   Physical Activity: Not on file   Stress: Not on file   Social Connections: Not on file   Interpersonal Safety: Not on file   Housing Stability: Not on file            Medications:     Current Outpatient Medications   Medication Sig Dispense Refill    ACE NOT PRESCRIBED, INTENTIONAL, 1 each continuous prn. ACE Inhibitor not prescribed due to Risk for drug interaction 0 each 0    azithromycin (ZITHROMAX) 250 MG tablet TAKE 1 TABLET (250 MG) BY MOUTH DAILY 30 tablet 11    blood glucose (NO BRAND SPECIFIED) test strip Use to test blood sugar 6 times daily or as directed. To accompany: Blood Glucose Monitor Brands: per insurance. 600 strip 3    blood glucose (NO BRAND SPECIFIED) test strip Use to test blood sugar 6 times daily. Any covered brand. Pt now using Contour Next, if covered. 600 strip 3    blood glucose monitoring (NO BRAND SPECIFIED) meter device kit Use to test blood sugar 6 times daily or as directed. Preferred blood glucose meter OR supplies to accompany: Blood Glucose Monitor Brands: per insurance. 1 kit 0    blood glucose monitoring (NO BRAND SPECIFIED) meter device kit Use to test blood sugar 6 times daily or as directed.whatever is covered 1 kit 1    blood glucose monitoring (ULTRA THIN 30G) lancets Use to test blood sugar 6 times daily or as directed.whatever is covered 540 each 3    budesonide-formoterol (SYMBICORT) 160-4.5 MCG/ACT Inhaler INHALE 2 PUFFS BY MOUTH TWICE DAILY 30.6 g 3    CALCIUM PO Take 500 mg by mouth 2 times daily Strength unknown.       Continuous Blood Gluc Sensor (FREESTYLE SUKHWINDER 14 DAY SENSOR) MISC Change every 14 days. 1 each 3    [facility]  "MANAGED PATIENT Contact [facility] for patient specific medication information at [phone] on admission and discharge from the hospital.  Phones are answered 24 hours a day 7 days a week 365 days a year.    Providers - Choose \"CONTINUE HOME MED (no script)\" at discharge if patient treatment with home infusion will continue.      ferrous fumarate 65 mg, [place]. FE,-Vitamin C 125 mg (VITRON-C)  MG TABS tablet Take 1 tablet by mouth daily      Glucagon (BAQSIMI) 3 MG/DOSE nasal powder Spray 1 spray (3 mg) in nostril as needed (in emergency for hypoglycic reaction) in the event of unconscious hypoglycemia or hypoglycemic seizure. May repeat dose if no response after 15 minutes. 1 each 1    Insulin Aspart FlexPen 100 UNIT/ML SOPN Inject 1 Units Subcutaneous 3 times daily With these instructions :INJECT 1 UNIT PER 20G CARB AT LUNCH AND SUPPER; INJECT 3 UNITS AT NIGHT WITH TUBE FEEDINGS. MAX 20 UNITS PER Day 15 mL 3    insulin glargine (LANTUS SOLOSTAR) 100 UNIT/ML pen INJECT 8 UNITS UNDER THE SKIN ONCE DAILY 15 mL 1    insulin lispro (HUMALOG KWIKPEN) 100 UNIT/ML (1 unit dial) KWIKPEN Inject 1 Units Subcutaneous 3 times daily With these instructions :INJECT 1 UNIT PER 20G CARB AT LUNCH AND SUPPER; INJECT 3 UNITS AT NIGHT WITH TUBE FEEDINGS. MAX 20 UNITS PER Day, Disp-15 mL, R-3, E-Prescribe 15 mL 3    insulin pen needle (BD OMI U/F) 32G X 4 MM miscellaneous Use 4 pen needles daily or as directed. Follow up visit needed. Call  to schedule. 400 each 3    levalbuterol (XOPENEX HFA) 45 MCG/ACT inhaler INHALE 2 PUFFS INTO THE LUNGS EVERY 6 HOURS AS NEEDED FOR SHORTNESS OF BREATH/DYSPNEA 75 g 3    levalbuterol (XOPENEX) 1.25 MG/3ML neb solution INHALE 1 VIAL BY MOUTH INTO THE LUNGS VIA NEBULIZATION FOUR TIMES DAILY 360 mL 11    lipase-protease-amylase (CREON) 12133-76990-149464 units CPEP per EC capsule TAKE 9-10 CAPSULES BY MOUTH WITH MEALS (3 MEALS/DAY) AND 2-4 CAPS WITH SNACKS (3 " "SNACKS/DAY) 3780 capsule 3    MULTIVITAMIN OR Take 1 tablet by mouth daily. Includes 5,000 units vitamin D and 400 units vitamin E. Per pt, formulated for CF pts.      mvw complete (PROBIOTIC FORMULATION) capsule TAKE ONE CAPSULE BY MOUTH ONCE DAILY 30 capsule 11    phytonadione (MEPHYTON) 5 MG tablet Take one tablet daily for 3 days as needed for hemoptysis. 6 tablet 4    polyethylene glycol (MIRALAX) 17 GM/Dose powder Take 17 g by mouth daily as needed for constipation      sodium chloride inhalant 7 % NEBU neb solution USE 4ML( 1 VIAL) VIA NEBULIZER TWICE DAILY 240 mL 5    thin (NO BRAND SPECIFIED) lancets Use with lanceting device 6x daily. To accompany: Blood Glucose Monitor Brands: per insurance. 600 each 3    TRIKAFTA 100-50-75 & 150 MG tablet pack TAKE TWO ORANGE TABLETS BY MOUTH IN THE MORNING AND ONE BLUE TABLET IN THE EVENING AS DIRECTED ON PACKAGE.  TAKE WITH FAT CONTAINING FOOD. 84 tablet 4    Glucagon (GVOKE HYPOPEN) 1 MG/0.2ML pen Inject the contents of 1 device under the skin into lower abdomen, outer thigh, or outer upper arm as needed for hypoglycemia. If no response after 15 minutes, additional 1 mg dose from a new device may be injected while waiting for emergency assistance. 0.4 mL 1     No current facility-administered medications for this visit.            Physical Exam:   /71 (BP Location: Right arm, Patient Position: Chair, Cuff Size: Adult Regular)   Pulse 81   Ht 1.738 m (5' 8.43\")   Wt 63.7 kg (140 lb 6.4 oz)   SpO2 98%   BMI 21.08 kg/m      Constitutional:   Awake, alert and in no apparent distress     Eyes:   nonicteric     ENT:   oral mucosa moist without lesions, normal tm bilaterally, bilateral mucosa normal     Neck:   Supple without supraclavicular or cervical lymphadenopathy     Lungs:   Fair air flow.  No crackles. biapical rhonchi.  No wheezes.     Cardiovascular:   Normal S1 and S2.  RRR.  No murmur, gallop or rub.     Abdomen:   NABS, soft, nontender, nondistended.  "     Musculoskeletal:   No edema, digital clubbing present     Neurologic:   Alert and conversant.     Skin:   Warm, dry.  No rash on limited exam.             Data:   All laboratory and imaging data reviewed.      Results from the last week:  Recent Results (from the past week)   Hepatic function panel    Collection Time: 02/11/25  9:02 AM   Result Value Ref Range    Protein Total 7.6 6.4 - 8.3 g/dL    Albumin 4.1 3.5 - 5.2 g/dL    Bilirubin Total 0.4 <=1.2 mg/dL    Alkaline Phosphatase 200 (H) 40 - 150 U/L    AST 33 0 - 45 U/L    ALT 83 (H) 0 - 70 U/L    Bilirubin Direct <0.20 0.00 - 0.30 mg/dL   CK total    Collection Time: 02/11/25  9:02 AM   Result Value Ref Range    CK 43 39 - 308 U/L   Basic metabolic panel    Collection Time: 02/11/25  9:02 AM   Result Value Ref Range    Sodium 140 135 - 145 mmol/L    Potassium 3.9 3.4 - 5.3 mmol/L    Chloride 100 98 - 107 mmol/L    Carbon Dioxide (CO2) 28 22 - 29 mmol/L    Anion Gap 12 7 - 15 mmol/L    Urea Nitrogen 20.3 (H) 6.0 - 20.0 mg/dL    Creatinine 1.03 0.67 - 1.17 mg/dL    GFR Estimate 90 >60 mL/min/1.73m2    Calcium 9.6 8.8 - 10.4 mg/dL    Glucose 137 (H) 70 - 99 mg/dL    Patient Fasting > 8hrs? No    Lipid Profile    Collection Time: 02/11/25  9:02 AM   Result Value Ref Range    Cholesterol 147 <200 mg/dL    Triglycerides 98 <150 mg/dL    Direct Measure HDL 42 >=40 mg/dL    LDL Cholesterol Calculated 85 <100 mg/dL    Non HDL Cholesterol 105 <130 mg/dL    Patient Fasting > 8hrs? No    Iron    Collection Time: 02/11/25  9:02 AM   Result Value Ref Range    Iron 39 (L) 61 - 157 ug/dL   GGT    Collection Time: 02/11/25  9:02 AM   Result Value Ref Range     (H) 8 - 61 U/L   Hemoglobin A1c    Collection Time: 02/11/25  9:02 AM   Result Value Ref Range    Estimated Average Glucose 140 (H) <117 mg/dL    Hemoglobin A1C 6.5 (H) <5.7 %   IgG    Collection Time: 02/11/25  9:02 AM   Result Value Ref Range    Immunoglobulin G 1,102 610 - 1,616 mg/dL   INR    Collection  Time: 02/11/25  9:02 AM   Result Value Ref Range    INR 0.95 0.85 - 1.15   Magnesium    Collection Time: 02/11/25  9:02 AM   Result Value Ref Range    Magnesium 2.0 1.7 - 2.3 mg/dL   Phosphorus    Collection Time: 02/11/25  9:02 AM   Result Value Ref Range    Phosphorus 3.6 2.5 - 4.5 mg/dL   TSH with free T4 reflex    Collection Time: 02/11/25  9:02 AM   Result Value Ref Range    TSH 1.02 0.30 - 4.20 uIU/mL   Vitamin D Deficiency    Collection Time: 02/11/25  9:02 AM   Result Value Ref Range    Vitamin D, Total (25-Hydroxy) 55 (H) 20 - 50 ng/mL   Erythrocyte sedimentation rate auto    Collection Time: 02/11/25  9:02 AM   Result Value Ref Range    Erythrocyte Sedimentation Rate 13 0 - 15 mm/hr   CBC with platelets and differential    Collection Time: 02/11/25  9:02 AM   Result Value Ref Range    WBC Count 11.3 (H) 4.0 - 11.0 10e3/uL    RBC Count 5.12 4.40 - 5.90 10e6/uL    Hemoglobin 14.8 13.3 - 17.7 g/dL    Hematocrit 45.3 40.0 - 53.0 %    MCV 89 78 - 100 fL    MCH 28.9 26.5 - 33.0 pg    MCHC 32.7 31.5 - 36.5 g/dL    RDW 13.2 10.0 - 15.0 %    Platelet Count 292 150 - 450 10e3/uL    % Neutrophils 71 %    % Lymphocytes 18 %    % Monocytes 8 %    % Eosinophils 2 %    % Basophils 1 %    % Immature Granulocytes 0 %    NRBCs per 100 WBC 0 <1 /100    Absolute Neutrophils 8.0 1.6 - 8.3 10e3/uL    Absolute Lymphocytes 2.1 0.8 - 5.3 10e3/uL    Absolute Monocytes 1.0 0.0 - 1.3 10e3/uL    Absolute Eosinophils 0.2 0.0 - 0.7 10e3/uL    Absolute Basophils 0.1 0.0 - 0.2 10e3/uL    Absolute Immature Granulocytes 0.0 <=0.4 10e3/uL    Absolute NRBCs 0.0 10e3/uL   Iron & Iron Binding Capacity    Collection Time: 02/11/25  9:02 AM   Result Value Ref Range    Iron 40 (L) 61 - 157 ug/dL    Iron Binding Capacity 319 240 - 430 ug/dL    Iron Sat Index 13 (L) 15 - 46 %   Ferritin    Collection Time: 02/11/25  9:02 AM   Result Value Ref Range    Ferritin 64 31 - 409 ng/mL   Routine UA with microscopic    Collection Time: 02/11/25  9:05 AM    Result Value Ref Range    Color Urine Straw Colorless, Straw, Light Yellow, Yellow    Appearance Urine Clear Clear    Glucose Urine Negative Negative mg/dL    Bilirubin Urine Negative Negative    Ketones Urine Negative Negative mg/dL    Specific Gravity Urine 1.007 1.003 - 1.035    Blood Urine Negative Negative    pH Urine 6.0 5.0 - 7.0    Protein Albumin Urine Negative Negative mg/dL    Urobilinogen Urine Normal Normal, 2.0 mg/dL    Nitrite Urine Negative Negative    Leukocyte Esterase Urine Negative Negative    RBC Urine <1 <=2 /HPF    WBC Urine 0 <=5 /HPF    Squamous Epithelials Urine <1 <=1 /HPF   Albumin Random Urine Quantitative with Creat Ratio    Collection Time: 02/11/25  9:05 AM   Result Value Ref Range    Creatinine Urine mg/dL 31.8 mg/dL    Albumin Urine mg/L <12.0 mg/L    Albumin Urine mg/g Cr     General PFT Lab (Please always keep checked)    Collection Time: 02/11/25  9:28 AM   Result Value Ref Range    FVC-Pred 4.40 L    FVC-Pre 3.38 L    FVC-%Pred-Pre 76 %    FEV1-Pre 1.67 L    FEV1-%Pred-Pre 47 %    FEV1FVC-Pred 81 %    FEV1FVC-Pre 49 %    FEFMax-Pred 9.56 L/sec    FEFMax-Pre 5.24 L/sec    FEFMax-%Pred-Pre 54 %    FEF2575-Pred 3.36 L/sec    FEF2575-Pre 0.58 L/sec    CNQ8917-%Pred-Pre 17 %    ExpTime-Pre 13.80 sec    FIFMax-Pre 4.79 L/sec    FEV1FEV6-Pred 81 %    FEV1FEV6-Pre 53 %       PFT interpretation:   Spirometry interpretation:  The spirometry shows evidence of moderate obstructive lung disease. The decrease in FVC may represent air trapping versus restrictive lung physiology.  Lung volumes would be necessary to confirm  When compared to 9/10/2024, the FEV1 and FVC have decreased.  The testing meets ATS criteria.    Severity Z-score*   Normal > -1.645   Mild -1.65 to -2.5   Moderate -2.5 to -4   Severe < -4   * accounts for sex, age, height, ancestry     Use z-score to assess airflow obstruction, restriction and DLCO  - FEV1 z-score for airflow obstruction  - TLC z-score for restriction  -  DLCO z-score for diffusion defect    Pulmonary exacerbation: absent    45 minutes spent by me on the date of the encounter doing chart review, history and exam, documentation and further activities per the note  Time documented is excluding time spent for PFT interpretation.

## 2025-02-11 ENCOUNTER — OFFICE VISIT (OUTPATIENT)
Dept: PULMONOLOGY | Facility: CLINIC | Age: 50
End: 2025-02-11
Attending: INTERNAL MEDICINE
Payer: COMMERCIAL

## 2025-02-11 ENCOUNTER — OFFICE VISIT (OUTPATIENT)
Dept: PHARMACY | Facility: CLINIC | Age: 50
End: 2025-02-11
Payer: COMMERCIAL

## 2025-02-11 ENCOUNTER — LAB (OUTPATIENT)
Dept: LAB | Facility: CLINIC | Age: 50
End: 2025-02-11
Payer: COMMERCIAL

## 2025-02-11 VITALS
DIASTOLIC BLOOD PRESSURE: 71 MMHG | OXYGEN SATURATION: 98 % | BODY MASS INDEX: 21.28 KG/M2 | HEART RATE: 81 BPM | HEIGHT: 68 IN | SYSTOLIC BLOOD PRESSURE: 110 MMHG | WEIGHT: 140.4 LBS

## 2025-02-11 DIAGNOSIS — K86.81 EXOCRINE PANCREATIC INSUFFICIENCY: ICD-10-CM

## 2025-02-11 DIAGNOSIS — E84.9 DIABETES MELLITUS DUE TO CYSTIC FIBROSIS (H): ICD-10-CM

## 2025-02-11 DIAGNOSIS — E08.9 DIABETES MELLITUS DUE TO CYSTIC FIBROSIS (H): ICD-10-CM

## 2025-02-11 DIAGNOSIS — E84.9 CYSTIC FIBROSIS (H): ICD-10-CM

## 2025-02-11 DIAGNOSIS — E84.0 CYSTIC FIBROSIS WITH PULMONARY MANIFESTATIONS (H): ICD-10-CM

## 2025-02-11 DIAGNOSIS — E84.0 CYSTIC FIBROSIS WITH PULMONARY MANIFESTATIONS (H): Primary | ICD-10-CM

## 2025-02-11 DIAGNOSIS — E84.9 CYSTIC FIBROSIS (H): Primary | ICD-10-CM

## 2025-02-11 LAB
ALBUMIN SERPL BCG-MCNC: 4.1 G/DL (ref 3.5–5.2)
ALBUMIN UR-MCNC: NEGATIVE MG/DL
ALP SERPL-CCNC: 200 U/L (ref 40–150)
ALT SERPL W P-5'-P-CCNC: 83 U/L (ref 0–70)
AMYLASE SERPL-CCNC: 24 U/L (ref 28–100)
ANION GAP SERPL CALCULATED.3IONS-SCNC: 12 MMOL/L (ref 7–15)
APPEARANCE UR: CLEAR
AST SERPL W P-5'-P-CCNC: 33 U/L (ref 0–45)
BASOPHILS # BLD AUTO: 0.1 10E3/UL (ref 0–0.2)
BASOPHILS NFR BLD AUTO: 1 %
BILIRUB DIRECT SERPL-MCNC: <0.2 MG/DL (ref 0–0.3)
BILIRUB SERPL-MCNC: 0.4 MG/DL
BILIRUB UR QL STRIP: NEGATIVE
BUN SERPL-MCNC: 20.3 MG/DL (ref 6–20)
CALCIUM SERPL-MCNC: 9.6 MG/DL (ref 8.8–10.4)
CHLORIDE SERPL-SCNC: 100 MMOL/L (ref 98–107)
CHOLEST SERPL-MCNC: 147 MG/DL
CK SERPL-CCNC: 43 U/L (ref 39–308)
COLOR UR AUTO: NORMAL
CREAT SERPL-MCNC: 1.03 MG/DL (ref 0.67–1.17)
CREAT UR-MCNC: 31.8 MG/DL
EGFRCR SERPLBLD CKD-EPI 2021: 90 ML/MIN/1.73M2
EOSINOPHIL # BLD AUTO: 0.2 10E3/UL (ref 0–0.7)
EOSINOPHIL NFR BLD AUTO: 2 %
ERYTHROCYTE [DISTWIDTH] IN BLOOD BY AUTOMATED COUNT: 13.2 % (ref 10–15)
ERYTHROCYTE [SEDIMENTATION RATE] IN BLOOD BY WESTERGREN METHOD: 13 MM/HR (ref 0–15)
EST. AVERAGE GLUCOSE BLD GHB EST-MCNC: 140 MG/DL
EXPTIME-PRE: 13.8 SEC
FASTING STATUS PATIENT QL REPORTED: NO
FASTING STATUS PATIENT QL REPORTED: NO
FEF2575-%PRED-PRE: 17 %
FEF2575-PRE: 0.58 L/SEC
FEF2575-PRED: 3.36 L/SEC
FEFMAX-%PRED-PRE: 54 %
FEFMAX-PRE: 5.24 L/SEC
FEFMAX-PRED: 9.56 L/SEC
FERRITIN SERPL-MCNC: 64 NG/ML (ref 31–409)
FEV1-%PRED-PRE: 47 %
FEV1-PRE: 1.67 L
FEV1FEV6-PRE: 53 %
FEV1FEV6-PRED: 81 %
FEV1FVC-PRE: 49 %
FEV1FVC-PRED: 81 %
FIFMAX-PRE: 4.79 L/SEC
FVC-%PRED-PRE: 76 %
FVC-PRE: 3.38 L
FVC-PRED: 4.4 L
GGT SERPL-CCNC: 112 U/L (ref 8–61)
GLUCOSE SERPL-MCNC: 137 MG/DL (ref 70–99)
GLUCOSE UR STRIP-MCNC: NEGATIVE MG/DL
HBA1C MFR BLD: 6.5 %
HCO3 SERPL-SCNC: 28 MMOL/L (ref 22–29)
HCT VFR BLD AUTO: 45.3 % (ref 40–53)
HDLC SERPL-MCNC: 42 MG/DL
HGB BLD-MCNC: 14.8 G/DL (ref 13.3–17.7)
HGB UR QL STRIP: NEGATIVE
IGG SERPL-MCNC: 1102 MG/DL (ref 610–1616)
IMM GRANULOCYTES # BLD: 0 10E3/UL
IMM GRANULOCYTES NFR BLD: 0 %
INR PPP: 0.95 (ref 0.85–1.15)
IRON BINDING CAPACITY (ROCHE): 319 UG/DL (ref 240–430)
IRON SATN MFR SERPL: 13 % (ref 15–46)
IRON SERPL-MCNC: 39 UG/DL (ref 61–157)
IRON SERPL-MCNC: 40 UG/DL (ref 61–157)
KETONES UR STRIP-MCNC: NEGATIVE MG/DL
LDLC SERPL CALC-MCNC: 85 MG/DL
LEUKOCYTE ESTERASE UR QL STRIP: NEGATIVE
LIPASE SERPL-CCNC: 7 U/L (ref 13–60)
LYMPHOCYTES # BLD AUTO: 2.1 10E3/UL (ref 0.8–5.3)
LYMPHOCYTES NFR BLD AUTO: 18 %
MAGNESIUM SERPL-MCNC: 2 MG/DL (ref 1.7–2.3)
MCH RBC QN AUTO: 28.9 PG (ref 26.5–33)
MCHC RBC AUTO-ENTMCNC: 32.7 G/DL (ref 31.5–36.5)
MCV RBC AUTO: 89 FL (ref 78–100)
MICROALBUMIN UR-MCNC: <12 MG/L
MICROALBUMIN/CREAT UR: NORMAL MG/G{CREAT}
MONOCYTES # BLD AUTO: 1 10E3/UL (ref 0–1.3)
MONOCYTES NFR BLD AUTO: 8 %
NEUTROPHILS # BLD AUTO: 8 10E3/UL (ref 1.6–8.3)
NEUTROPHILS NFR BLD AUTO: 71 %
NITRATE UR QL: NEGATIVE
NONHDLC SERPL-MCNC: 105 MG/DL
NRBC # BLD AUTO: 0 10E3/UL
NRBC BLD AUTO-RTO: 0 /100
PH UR STRIP: 6 [PH] (ref 5–7)
PHOSPHATE SERPL-MCNC: 3.6 MG/DL (ref 2.5–4.5)
PLATELET # BLD AUTO: 292 10E3/UL (ref 150–450)
POTASSIUM SERPL-SCNC: 3.9 MMOL/L (ref 3.4–5.3)
PROT SERPL-MCNC: 7.6 G/DL (ref 6.4–8.3)
RBC # BLD AUTO: 5.12 10E6/UL (ref 4.4–5.9)
RBC URINE: <1 /HPF
SODIUM SERPL-SCNC: 140 MMOL/L (ref 135–145)
SP GR UR STRIP: 1.01 (ref 1–1.03)
SQUAMOUS EPITHELIAL: <1 /HPF
TRIGL SERPL-MCNC: 98 MG/DL
TSH SERPL DL<=0.005 MIU/L-ACNC: 1.02 UIU/ML (ref 0.3–4.2)
UROBILINOGEN UR STRIP-MCNC: NORMAL MG/DL
VIT D+METAB SERPL-MCNC: 55 NG/ML (ref 20–50)
WBC # BLD AUTO: 11.3 10E3/UL (ref 4–11)
WBC URINE: 0 /HPF

## 2025-02-11 PROCEDURE — 82043 UR ALBUMIN QUANTITATIVE: CPT | Performed by: INTERNAL MEDICINE

## 2025-02-11 PROCEDURE — 82977 ASSAY OF GGT: CPT | Performed by: PATHOLOGY

## 2025-02-11 PROCEDURE — 84446 ASSAY OF VITAMIN E: CPT | Mod: 90 | Performed by: PATHOLOGY

## 2025-02-11 PROCEDURE — 81001 URINALYSIS AUTO W/SCOPE: CPT | Performed by: PATHOLOGY

## 2025-02-11 PROCEDURE — 80061 LIPID PANEL: CPT | Performed by: PATHOLOGY

## 2025-02-11 PROCEDURE — 82248 BILIRUBIN DIRECT: CPT | Performed by: PATHOLOGY

## 2025-02-11 PROCEDURE — 85610 PROTHROMBIN TIME: CPT | Performed by: PATHOLOGY

## 2025-02-11 PROCEDURE — 94375 RESPIRATORY FLOW VOLUME LOOP: CPT | Performed by: INTERNAL MEDICINE

## 2025-02-11 PROCEDURE — 82150 ASSAY OF AMYLASE: CPT | Performed by: PATHOLOGY

## 2025-02-11 PROCEDURE — 99215 OFFICE O/P EST HI 40 MIN: CPT | Mod: 25 | Performed by: INTERNAL MEDICINE

## 2025-02-11 PROCEDURE — 82306 VITAMIN D 25 HYDROXY: CPT | Performed by: INTERNAL MEDICINE

## 2025-02-11 PROCEDURE — 87070 CULTURE OTHR SPECIMN AEROBIC: CPT | Performed by: INTERNAL MEDICINE

## 2025-02-11 PROCEDURE — 84443 ASSAY THYROID STIM HORMONE: CPT | Performed by: PATHOLOGY

## 2025-02-11 PROCEDURE — 80053 COMPREHEN METABOLIC PANEL: CPT | Performed by: PATHOLOGY

## 2025-02-11 PROCEDURE — 83690 ASSAY OF LIPASE: CPT | Performed by: PATHOLOGY

## 2025-02-11 PROCEDURE — 85025 COMPLETE CBC W/AUTO DIFF WBC: CPT | Performed by: PATHOLOGY

## 2025-02-11 PROCEDURE — 99000 SPECIMEN HANDLING OFFICE-LAB: CPT | Performed by: PATHOLOGY

## 2025-02-11 PROCEDURE — 99213 OFFICE O/P EST LOW 20 MIN: CPT | Performed by: INTERNAL MEDICINE

## 2025-02-11 PROCEDURE — 82728 ASSAY OF FERRITIN: CPT | Performed by: PATHOLOGY

## 2025-02-11 PROCEDURE — 82570 ASSAY OF URINE CREATININE: CPT | Performed by: INTERNAL MEDICINE

## 2025-02-11 PROCEDURE — 36415 COLL VENOUS BLD VENIPUNCTURE: CPT | Performed by: PATHOLOGY

## 2025-02-11 PROCEDURE — 82785 ASSAY OF IGE: CPT | Performed by: INTERNAL MEDICINE

## 2025-02-11 PROCEDURE — 83540 ASSAY OF IRON: CPT | Performed by: PATHOLOGY

## 2025-02-11 PROCEDURE — 82550 ASSAY OF CK (CPK): CPT | Performed by: PATHOLOGY

## 2025-02-11 PROCEDURE — 83036 HEMOGLOBIN GLYCOSYLATED A1C: CPT | Performed by: INTERNAL MEDICINE

## 2025-02-11 PROCEDURE — 84100 ASSAY OF PHOSPHORUS: CPT | Performed by: PATHOLOGY

## 2025-02-11 PROCEDURE — 99207 PR NO CHARGE LOS: CPT | Performed by: PHARMACIST

## 2025-02-11 PROCEDURE — 83735 ASSAY OF MAGNESIUM: CPT | Performed by: PATHOLOGY

## 2025-02-11 PROCEDURE — 83550 IRON BINDING TEST: CPT | Performed by: PATHOLOGY

## 2025-02-11 PROCEDURE — 85652 RBC SED RATE AUTOMATED: CPT | Performed by: PATHOLOGY

## 2025-02-11 PROCEDURE — 82784 ASSAY IGA/IGD/IGG/IGM EACH: CPT | Performed by: INTERNAL MEDICINE

## 2025-02-11 PROCEDURE — 84590 ASSAY OF VITAMIN A: CPT | Mod: 90 | Performed by: PATHOLOGY

## 2025-02-11 ASSESSMENT — PAIN SCALES - GENERAL: PAINLEVEL_OUTOF10: NO PAIN (0)

## 2025-02-11 NOTE — PROGRESS NOTES
Disease State Management Encounter:                          Kilo Morales is a 49 year old male seen for a follow-up visit. Today's visit is a co-visit with Dr. Jaye Machado MD.     Reason for visit: Annual CF Medication Review.    Medication Adherence/Access: Medication Access: Patient fills medication at [facility], [facility] pharmacy, and [facility] pharmacy. Patient expresses no concern regarding cost of medications.     CF:    Inhaled medications:              Bronchodilator: levalbuterol nebs 1.25 mg/3 mL 2x/day and levalbuterol HFA as needed (rarely)              Mucolytic: sodium chloride 7% nebs 2x/day              Other: Symbicort 160-4.5 mcg/act 2 puffs twice daily  Oral medications:              Azithromycin: 250 mg daily              CFTR modulator: Trikafta (full dose) with fat-containing food  Genotype: [id number]+[id number]->T   Cultures (last growth): sputum cultures grow PSA (9/10/24)    CFTR:  Reviewed patient eligibility for CFTR modulator therapy, education provided today:  Most appropriate choice for patient of currently available CFTR modulators is: vanzacaftor-tezacaftor-deutivacaftor based on age, CF genotype ([id number]/621+1G->T ), past medical history, current medications, and patient preference. Patient was previously taking elexacaftor-tezacaftor-ivacaftor.  Recommended dose Vanzacaftor age 6 to 11 at least 40kg or age 12+: two vanzacaftor 10 mg-tezacaftor 50 mg-deutivacaftor 125 mg once daily  Drug interactions with CFTR modulator: none  Dose should be given with age-appropriate fat containing food (~10g or up to 20g if experiencing GI upset). Provided appropriate examples of fat-containing foods to patient (e.g. Whole milk, cheese, avocado, peanut butter).  Baseline LFTs and CK were checked and are elevated. Recommend continuing to monitor LFTs monthly for 6 months, then quarterly for 12 months, then every 6 months thereafter. Additional recommended monitoring:  CK.  Educated patient on potential side effects, including headache, GI disturbances, rash, increased mucus production/respiratory symptoms, weight gain, acne, and mental health changes.  Education provided on how to administer medication, what to do if a dose is missed, monitoring prior to and while on therapy, medications/foods to avoid (e.g. grapefruit).  Written patient information from [employer] was provided to patient.  Discussed with patient how to obtain CFTR modulator from specialty pharmacy and informed patient they will need to bring home supply if hospitalized. Patient was engaged in teaching and verbalized understanding. Patient is already enrolled in [employer] .      Lab Results   Component Value Date    ALT 83 (H) 02/11/2025    AST 33 02/11/2025    BILITOTAL 0.4 02/11/2025    DBIL <0.20 02/11/2025    ALKPHOS 200 (H) 02/11/2025    CKT 43 02/11/2025     Pancreatic Insufficiency/Nutrition:   Creon 24,000 units taking 9-10 capsules with meals and 2-4 capsules with snacks  Bowel regimen: probiotic daily and Miralax 17 g daily as needed  Vitamins/Supplements include: MVI daily, Vitron-C every other day, and calcium daily  Notes ongoing constipation (occurs every 2-3 months for 1 week at a time). Finds effective, no concerns noted today.  Medication History:  Pancreaze     CFRD:    Lantus 8 units daily  Humalog sliding scale  SMBG: Freestyle Libre2  Ranges (patient reported): notes stable lab readings, reports lows 1x/every 2-3 months.  Patient is not experiencing hypoglycemia  Recent symptoms of high blood sugar? none  Patient follows with Dr. Lee for endocrinology. Last visit 6/11/24    Assessment/Plan:    ALT elevated, repeat hepatic panel at [facility] in 1 week prior to starting Alyftrek. Initiate prior authorization, warm hand off to [facility] pharmacy liaison, then will send off Alyftrek with stable labs. Will discontinue Trikafta and switch to Alyftrek.   Kilo to talk to AKIKO Dodson,  regarding ongoing constipation management, warm hand-off to [place].       Follow-up: repeat hepatic panel in 1 week, then monthly hepatic panels thereafter.    I spent 20 minutes with this patient today. All changes were made via verbal approval with Dr. Jaye Machado MD. A copy of the visit note was provided to the patient's provider(s).    A summary of these recommendations was given to the patient (see AVS from today's appointment with Dr. Jaye Machado MD).    Linda Stone, PharmD   Cystic Fibrosis MTM Pharmacist  [facility]     Medication Therapy Recommendations  Cystic fibrosis (H)   1 Current Medication: TRIKAFTA 100-50-75 & 150 MG tablet pack   Current Medication Sig: TAKE TWO ORANGE TABLETS BY MOUTH IN THE MORNING AND ONE BLUE TABLET IN THE EVENING AS DIRECTED ON PACKAGE.  TAKE WITH FAT CONTAINING FOOD.   Rationale: More effective medication available - Ineffective medication - Effectiveness   Recommendation: Change Medication - vanzacaftor-tezacaftor-deutivacaftor 10- MG tablet   Status: Accepted per Provider   Identified Date: 2/11/2025 Completed Date: 2/12/2025

## 2025-02-11 NOTE — LETTER
2/11/2025      Nico Morales  [address] [place] [address]      Dear Colleague,    Thank you for referring your patient, Nico Morales, to the [facility]. Please see a copy of my visit note below.    [facility]  February 11, 2025         Assessment and Plan:   Nico Morales is a 49 year old male with cystic fibrosis.    1. CF lung disease with moderate obstruction:  Kilo reports he has a little bit more chest congestion with increase in cough and sputum.  He did have recent sick contacts.  He does have access to antibiotics, but he has not felt the need to take them.  He does show evidence of a decline in PFT.  He historically has grown pseudomonas in his sputum. He did have positive AFB but fortunately the sputum on repeat times 2 that was negative. A third AFB from January is negative to date.  At this time I have recommended to Kilo:  -- He should continue to do his vest and nebulized therapies 2-3 times daily  -- He remains off mucomyst without change in symptoms  -- if the pending AFB is negative, we will return to yearly testing  -- He does have both levofloxacin and doxycycline available to him for exacerbation.  Fortunately he has not needed to use these in many months.     2. Pancreatic Insufficiency/GI: Kilo has no new symptoms consistent with worsening malabsorption.  He did previously had a g tube.  This was removed after several years of stable weight.  He does continue to struggle with intermittent GI issues  - continue the present dose of pancreatic enzymes  - continue vitamin supplementation.  - Referral to cystic fibrosis gi for further evaluation     3.  CFTR modulator therapy: Kilo is on Trikafta and tolerating well.  His hepatic panel today did show an increase in AP and ALT.  This could from a recent GI illness.  We will repeat the labs next week.  Kilo is  interested in transitioning to VTD.  I would like to see a return of his LFTs back to normal.  Myself and Linda, the cystic fibrosis pharmacist, spent time educating the patient today.      4.  Cystic fibrosis related diabetes/bone health: Kilo reports good control of his blood sugars. His A1C is stable and he is followed by endocrinology.  Getting next dose of reclast soon.     5.  Psychosocial: Kilo is  and in a stable relationship.  He reports all his kids are doing well.  Work is good and busy.  His wife in undergoing treatment for breast cancer.     6. HCM:  Labs were reviewed today by me with patient.     Immunizations: UTD  Colonoscopy: 6/2025  Last DEXA: 2/2023  Last hepatic panel: 6/2024  Last sputum culture:  Collected 9/10/2024 10:06 AM    Specimen Information: Expectorate; Sputum   1 Result Note  Culture 4+ Normal santi   1+ Pseudomonas aeruginosa, mucoid strain Abnormal    1+ Pseudomonas aeruginosa, mucoid strain Abnormal         Annual studies due: done today  Recent Labs   Lab Test 02/11/25  0902   IGG 1,102     Jaye Machado MD MPH  Associate Professor of Medicine  Pulmonary, Allergy, Critical Care and Sleep Medicine        Interval History:     Kilo reports an increase cough, sputum and chest congestion.  He is doing 2 to 3 vests per day.  He denies change in activity tolerance.           Review of Systems:     CONSTITUTIONAL: no fever, no chills, no sweats, no change in weight, no change in energy, no change in appetite    INTEGUMENTARY/SKIN: no rash, no obvious new lesions    ENT/MOUTH: no sore throat, no new sinus pain, no new nasal drainage, no new nasal congestion, no ear ringing     RESPIRATORY: see interval history    CV: no chest pain, no palpitations, no peripheral edema    GI: + occasional nausea, no vomiting, no change in stools, no fatty stools, no GERD, occasional abdominal pain.      : negative urinary    MUSCULOSKELETAL: no myalgias, no arthralgias    ENDOCRINE: no  hypoglycemia, blood sugars with adequate control    NEURO:  No headache    SLEEP: no issues    PSYCHIATRIC: mood stable--great           Past Medical and Surgical History:     Past Medical History:   Diagnosis Date     CF (cystic fibrosis) (H)      Exocrine pancreatic insufficiency      Nocardia infection      Pseudomonas infection      S/P gastrostomy (H) 2002     Past Surgical History:   Procedure Laterality Date     COLONOSCOPY N/A 5/21/2018    Procedure: COMBINED COLONOSCOPY, SINGLE OR MULTIPLE BIOPSY/POLYPECTOMY BY BIOPSY;  Cystic fibrosis, 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;  Surgeon: Margarito Bowman MD;  Location: [facility]     COLONOSCOPY N/A 6/27/2022    Procedure: COLONOSCOPY, WITH POLYPECTOMY AND BIOPSY;  Surgeon: Margarito Bowman MD;  Location: [facility]     GASTROSTOMY TUBE  2002     IR PULMONARY EMBOLIZATION  7/20/2001     PICC INSERTION Left 01/22/2018    4Fr SL BioFlo PICC, 46cm (5cm external) in the L basilic vein w/ tip in the low SVC           Family History:     Family History   Problem Relation Age of Onset     Heart Disease Maternal Grandmother      Heart Disease Maternal Grandfather      Heart Disease Paternal Grandmother      Diabetes No family hx of             Social History:     Social History     Socioeconomic History     Marital status:      Spouse name: Not on file     Number of children: 0     Years of education: Not on file     Highest education level: Not on file   Occupational History     Occupation: financial palnner     Employer: [employer]   Tobacco Use     Smoking status: Never     Smokeless tobacco: Never   Vaping Use     Vaping status: Never Used   Substance and Sexual Activity     Alcohol use: No     Alcohol/week: 0.0 standard drinks of alcohol     Drug use: No     Sexual activity: Yes     Partners: Female     Birth control/protection: Pill   Other Topics Concern     Parent/sibling w/ CABG, MI or angioplasty before 65F 55M? Not Asked      Service Not Asked      Blood Transfusions No     Caffeine Concern Not Asked     Occupational Exposure Not Asked     Hobby Hazards Not Asked     Sleep Concern Not Asked     Stress Concern Not Asked     Weight Concern Not Asked     Special Diet Not Asked     Back Care Not Asked     Exercise No     Bike Helmet Not Asked     Seat Belt Not Asked     Self-Exams Not Asked   Social History Narrative    8/15/2019 - .  Lives with his English Bulldog (Jefry) in a house that he designed in [place].  No children.     Social Drivers of Health     Financial Resource Strain: Not on file   Food Insecurity: Not on file   Transportation Needs: Not on file   Physical Activity: Not on file   Stress: Not on file   Social Connections: Not on file   Interpersonal Safety: Not on file   Housing Stability: Not on file            Medications:     Current Outpatient Medications   Medication Sig Dispense Refill     ACE NOT PRESCRIBED, INTENTIONAL, 1 each continuous prn. ACE Inhibitor not prescribed due to Risk for drug interaction 0 each 0     azithromycin (ZITHROMAX) 250 MG tablet TAKE 1 TABLET (250 MG) BY MOUTH DAILY 30 tablet 11     blood glucose (NO BRAND SPECIFIED) test strip Use to test blood sugar 6 times daily or as directed. To accompany: Blood Glucose Monitor Brands: per insurance. 600 strip 3     blood glucose (NO BRAND SPECIFIED) test strip Use to test blood sugar 6 times daily. Any covered brand. Pt now using Contour Next, if covered. 600 strip 3     blood glucose monitoring (NO BRAND SPECIFIED) meter device kit Use to test blood sugar 6 times daily or as directed. Preferred blood glucose meter OR supplies to accompany: Blood Glucose Monitor Brands: per insurance. 1 kit 0     blood glucose monitoring (NO BRAND SPECIFIED) meter device kit Use to test blood sugar 6 times daily or as directed.whatever is covered 1 kit 1     blood glucose monitoring (ULTRA THIN 30G) lancets Use to test blood sugar 6 times daily or as directed.whatever is covered  "540 each 3     budesonide-formoterol (SYMBICORT) 160-4.5 MCG/ACT Inhaler INHALE 2 PUFFS BY MOUTH TWICE DAILY 30.6 g 3     CALCIUM PO Take 500 mg by mouth 2 times daily Strength unknown.        Continuous Blood Gluc Sensor (FREESTYLE SUKHWINDER 14 DAY SENSOR) MISC Change every 14 days. 1 each 3     [facility] INFUSION MANAGED PATIENT Contact [facility] for patient specific medication information at [phone] on admission and discharge from the hospital.  Phones are answered 24 hours a day 7 days a week 365 days a year.    Providers - Choose \"CONTINUE HOME MED (no script)\" at discharge if patient treatment with home infusion will continue.       ferrous fumarate 65 mg, Eyak. FE,-Vitamin C 125 mg (VITRON-C)  MG TABS tablet Take 1 tablet by mouth daily       Glucagon (BAQSIMI) 3 MG/DOSE nasal powder Spray 1 spray (3 mg) in nostril as needed (in emergency for hypoglycic reaction) in the event of unconscious hypoglycemia or hypoglycemic seizure. May repeat dose if no response after 15 minutes. 1 each 1     Insulin Aspart FlexPen 100 UNIT/ML SOPN Inject 1 Units Subcutaneous 3 times daily With these instructions :INJECT 1 UNIT PER 20G CARB AT LUNCH AND SUPPER; INJECT 3 UNITS AT NIGHT WITH TUBE FEEDINGS. MAX 20 UNITS PER Day 15 mL 3     insulin glargine (LANTUS SOLOSTAR) 100 UNIT/ML pen INJECT 8 UNITS UNDER THE SKIN ONCE DAILY 15 mL 1     insulin lispro (HUMALOG KWIKPEN) 100 UNIT/ML (1 unit dial) KWIKPEN Inject 1 Units Subcutaneous 3 times daily With these instructions :INJECT 1 UNIT PER 20G CARB AT LUNCH AND SUPPER; INJECT 3 UNITS AT NIGHT WITH TUBE FEEDINGS. MAX 20 UNITS PER Day, Disp-15 mL, R-3, E-Prescribe 15 mL 3     insulin pen needle (BD OMI U/F) 32G X 4 MM miscellaneous Use 4 pen needles daily or as directed. Follow up visit needed. Call  to schedule. 400 each 3     levalbuterol (XOPENEX HFA) 45 MCG/ACT inhaler INHALE 2 PUFFS INTO THE LUNGS EVERY 6 HOURS AS NEEDED FOR SHORTNESS OF " "BREATH/DYSPNEA 75 g 3     levalbuterol (XOPENEX) 1.25 MG/3ML neb solution INHALE 1 VIAL BY MOUTH INTO THE LUNGS VIA NEBULIZATION FOUR TIMES DAILY 360 mL 11     lipase-protease-amylase (CREON) 41887-27730-197993 units CPEP per EC capsule TAKE 9-10 CAPSULES BY MOUTH WITH MEALS (3 MEALS/DAY) AND 2-4 CAPS WITH SNACKS (3 SNACKS/DAY) 3780 capsule 3     MULTIVITAMIN OR Take 1 tablet by mouth daily. Includes 5,000 units vitamin D and 400 units vitamin E. Per pt, formulated for CF pts.       mvw complete (PROBIOTIC FORMULATION) capsule TAKE ONE CAPSULE BY MOUTH ONCE DAILY 30 capsule 11     phytonadione (MEPHYTON) 5 MG tablet Take one tablet daily for 3 days as needed for hemoptysis. 6 tablet 4     polyethylene glycol (MIRALAX) 17 GM/Dose powder Take 17 g by mouth daily as needed for constipation       sodium chloride inhalant 7 % NEBU neb solution USE 4ML( 1 VIAL) VIA NEBULIZER TWICE DAILY 240 mL 5     thin (NO BRAND SPECIFIED) lancets Use with lanceting device 6x daily. To accompany: Blood Glucose Monitor Brands: per insurance. 600 each 3     TRIKAFTA 100-50-75 & 150 MG tablet pack TAKE TWO ORANGE TABLETS BY MOUTH IN THE MORNING AND ONE BLUE TABLET IN THE EVENING AS DIRECTED ON PACKAGE.  TAKE WITH FAT CONTAINING FOOD. 84 tablet 4     Glucagon (GVOKE HYPOPEN) 1 MG/0.2ML pen Inject the contents of 1 device under the skin into lower abdomen, outer thigh, or outer upper arm as needed for hypoglycemia. If no response after 15 minutes, additional 1 mg dose from a new device may be injected while waiting for emergency assistance. 0.4 mL 1     No current facility-administered medications for this visit.            Physical Exam:   /71 (BP Location: Right arm, Patient Position: Chair, Cuff Size: Adult Regular)   Pulse 81   Ht 1.738 m (5' 8.43\")   Wt 63.7 kg (140 lb 6.4 oz)   SpO2 98%   BMI 21.08 kg/m      Constitutional:   Awake, alert and in no apparent distress     Eyes:   nonicteric     ENT:   oral mucosa moist without " lesions, normal tm bilaterally, bilateral mucosa normal     Neck:   Supple without supraclavicular or cervical lymphadenopathy     Lungs:   Fair air flow.  No crackles. biapical rhonchi.  No wheezes.     Cardiovascular:   Normal S1 and S2.  RRR.  No murmur, gallop or rub.     Abdomen:   NABS, soft, nontender, nondistended.      Musculoskeletal:   No edema, digital clubbing present     Neurologic:   Alert and conversant.     Skin:   Warm, dry.  No rash on limited exam.             Data:   All laboratory and imaging data reviewed.      Results from the last week:  Recent Results (from the past week)   Hepatic function panel    Collection Time: 02/11/25  9:02 AM   Result Value Ref Range    Protein Total 7.6 6.4 - 8.3 g/dL    Albumin 4.1 3.5 - 5.2 g/dL    Bilirubin Total 0.4 <=1.2 mg/dL    Alkaline Phosphatase 200 (H) 40 - 150 U/L    AST 33 0 - 45 U/L    ALT 83 (H) 0 - 70 U/L    Bilirubin Direct <0.20 0.00 - 0.30 mg/dL   CK total    Collection Time: 02/11/25  9:02 AM   Result Value Ref Range    CK 43 39 - 308 U/L   Basic metabolic panel    Collection Time: 02/11/25  9:02 AM   Result Value Ref Range    Sodium 140 135 - 145 mmol/L    Potassium 3.9 3.4 - 5.3 mmol/L    Chloride 100 98 - 107 mmol/L    Carbon Dioxide (CO2) 28 22 - 29 mmol/L    Anion Gap 12 7 - 15 mmol/L    Urea Nitrogen 20.3 (H) 6.0 - 20.0 mg/dL    Creatinine 1.03 0.67 - 1.17 mg/dL    GFR Estimate 90 >60 mL/min/1.73m2    Calcium 9.6 8.8 - 10.4 mg/dL    Glucose 137 (H) 70 - 99 mg/dL    Patient Fasting > 8hrs? No    Lipid Profile    Collection Time: 02/11/25  9:02 AM   Result Value Ref Range    Cholesterol 147 <200 mg/dL    Triglycerides 98 <150 mg/dL    Direct Measure HDL 42 >=40 mg/dL    LDL Cholesterol Calculated 85 <100 mg/dL    Non HDL Cholesterol 105 <130 mg/dL    Patient Fasting > 8hrs? No    Iron    Collection Time: 02/11/25  9:02 AM   Result Value Ref Range    Iron 39 (L) 61 - 157 ug/dL   GGT    Collection Time: 02/11/25  9:02 AM   Result Value Ref  Range     (H) 8 - 61 U/L   Hemoglobin A1c    Collection Time: 02/11/25  9:02 AM   Result Value Ref Range    Estimated Average Glucose 140 (H) <117 mg/dL    Hemoglobin A1C 6.5 (H) <5.7 %   IgG    Collection Time: 02/11/25  9:02 AM   Result Value Ref Range    Immunoglobulin G 1,102 610 - 1,616 mg/dL   INR    Collection Time: 02/11/25  9:02 AM   Result Value Ref Range    INR 0.95 0.85 - 1.15   Magnesium    Collection Time: 02/11/25  9:02 AM   Result Value Ref Range    Magnesium 2.0 1.7 - 2.3 mg/dL   Phosphorus    Collection Time: 02/11/25  9:02 AM   Result Value Ref Range    Phosphorus 3.6 2.5 - 4.5 mg/dL   TSH with free T4 reflex    Collection Time: 02/11/25  9:02 AM   Result Value Ref Range    TSH 1.02 0.30 - 4.20 uIU/mL   Vitamin D Deficiency    Collection Time: 02/11/25  9:02 AM   Result Value Ref Range    Vitamin D, Total (25-Hydroxy) 55 (H) 20 - 50 ng/mL   Erythrocyte sedimentation rate auto    Collection Time: 02/11/25  9:02 AM   Result Value Ref Range    Erythrocyte Sedimentation Rate 13 0 - 15 mm/hr   CBC with platelets and differential    Collection Time: 02/11/25  9:02 AM   Result Value Ref Range    WBC Count 11.3 (H) 4.0 - 11.0 10e3/uL    RBC Count 5.12 4.40 - 5.90 10e6/uL    Hemoglobin 14.8 13.3 - 17.7 g/dL    Hematocrit 45.3 40.0 - 53.0 %    MCV 89 78 - 100 fL    MCH 28.9 26.5 - 33.0 pg    MCHC 32.7 31.5 - 36.5 g/dL    RDW 13.2 10.0 - 15.0 %    Platelet Count 292 150 - 450 10e3/uL    % Neutrophils 71 %    % Lymphocytes 18 %    % Monocytes 8 %    % Eosinophils 2 %    % Basophils 1 %    % Immature Granulocytes 0 %    NRBCs per 100 WBC 0 <1 /100    Absolute Neutrophils 8.0 1.6 - 8.3 10e3/uL    Absolute Lymphocytes 2.1 0.8 - 5.3 10e3/uL    Absolute Monocytes 1.0 0.0 - 1.3 10e3/uL    Absolute Eosinophils 0.2 0.0 - 0.7 10e3/uL    Absolute Basophils 0.1 0.0 - 0.2 10e3/uL    Absolute Immature Granulocytes 0.0 <=0.4 10e3/uL    Absolute NRBCs 0.0 10e3/uL   Iron & Iron Binding Capacity    Collection Time:  02/11/25  9:02 AM   Result Value Ref Range    Iron 40 (L) 61 - 157 ug/dL    Iron Binding Capacity 319 240 - 430 ug/dL    Iron Sat Index 13 (L) 15 - 46 %   Ferritin    Collection Time: 02/11/25  9:02 AM   Result Value Ref Range    Ferritin 64 31 - 409 ng/mL   Routine UA with microscopic    Collection Time: 02/11/25  9:05 AM   Result Value Ref Range    Color Urine Straw Colorless, Straw, Light Yellow, Yellow    Appearance Urine Clear Clear    Glucose Urine Negative Negative mg/dL    Bilirubin Urine Negative Negative    Ketones Urine Negative Negative mg/dL    Specific Gravity Urine 1.007 1.003 - 1.035    Blood Urine Negative Negative    pH Urine 6.0 5.0 - 7.0    Protein Albumin Urine Negative Negative mg/dL    Urobilinogen Urine Normal Normal, 2.0 mg/dL    Nitrite Urine Negative Negative    Leukocyte Esterase Urine Negative Negative    RBC Urine <1 <=2 /HPF    WBC Urine 0 <=5 /HPF    Squamous Epithelials Urine <1 <=1 /HPF   Albumin Random Urine Quantitative with Creat Ratio    Collection Time: 02/11/25  9:05 AM   Result Value Ref Range    Creatinine Urine mg/dL 31.8 mg/dL    Albumin Urine mg/L <12.0 mg/L    Albumin Urine mg/g Cr     General PFT Lab (Please always keep checked)    Collection Time: 02/11/25  9:28 AM   Result Value Ref Range    FVC-Pred 4.40 L    FVC-Pre 3.38 L    FVC-%Pred-Pre 76 %    FEV1-Pre 1.67 L    FEV1-%Pred-Pre 47 %    FEV1FVC-Pred 81 %    FEV1FVC-Pre 49 %    FEFMax-Pred 9.56 L/sec    FEFMax-Pre 5.24 L/sec    FEFMax-%Pred-Pre 54 %    FEF2575-Pred 3.36 L/sec    FEF2575-Pre 0.58 L/sec    KZM5909-%Pred-Pre 17 %    ExpTime-Pre 13.80 sec    FIFMax-Pre 4.79 L/sec    FEV1FEV6-Pred 81 %    FEV1FEV6-Pre 53 %       PFT interpretation:   Spirometry interpretation:  The spirometry shows evidence of moderate obstructive lung disease. The decrease in FVC may represent air trapping versus restrictive lung physiology.  Lung volumes would be necessary to confirm  When compared to 9/10/2024, the FEV1 and FVC have  decreased.  The testing meets ATS criteria.    Severity Z-score*   Normal > -1.645   Mild -1.65 to -2.5   Moderate -2.5 to -4   Severe < -4   * accounts for sex, age, height, ancestry     Use z-score to assess airflow obstruction, restriction and DLCO  - FEV1 z-score for airflow obstruction  - TLC z-score for restriction  - DLCO z-score for diffusion defect    Pulmonary exacerbation: absent    45 minutes spent by me on the date of the encounter doing chart review, history and exam, documentation and further activities per the note  Time documented is excluding time spent for PFT interpretation.      Again, thank you for allowing me to participate in the care of your patient.        Sincerely,        Jaye Machado MD    Electronically signed

## 2025-02-11 NOTE — NURSING NOTE
Bed: ED18  Expected date:   Expected time:   Means of arrival:   Comments:  EMS   Chief Complaint   Patient presents with    Follow Up     Return CF        Vitals were taken, medications reconciled.    Kirit Andrew, Clinic Assistant   9:57 AM

## 2025-02-12 ENCOUNTER — TELEPHONE (OUTPATIENT)
Dept: PULMONOLOGY | Facility: CLINIC | Age: 50
End: 2025-02-12

## 2025-02-12 DIAGNOSIS — E84.0 CYSTIC FIBROSIS WITH PULMONARY MANIFESTATIONS (H): Primary | ICD-10-CM

## 2025-02-12 LAB — IGE SERPL-ACNC: 13 KU/L (ref 0–114)

## 2025-02-12 NOTE — TELEPHONE ENCOUNTER
----- Message from Jaye Machado sent at 2/11/2025  1:24 PM CST -----  Regarding: RUQ us  I am ordering a RUQ ultrasound on Kilo.  He was complaining of intermittent epigastric pain.  I want to look at his liver and pancreas.  He and I did not discuss doing it.  Can we let him know.  I ordered it to be done at [place].  Thank you,  Jaye

## 2025-02-12 NOTE — PATIENT INSTRUCTIONS
See provider AVS for a summary of recommendations from today's visit.  Linda Stone, PharmD  Cystic Fibrosis MTM Pharmacist  [facility] [facility]

## 2025-02-13 ENCOUNTER — PHARMACY VISIT (OUTPATIENT)
Dept: ADMINISTRATIVE | Facility: CLINIC | Age: 50
End: 2025-02-13
Payer: COMMERCIAL

## 2025-02-13 ENCOUNTER — TELEPHONE (OUTPATIENT)
Dept: PULMONOLOGY | Facility: CLINIC | Age: 50
End: 2025-02-13
Payer: COMMERCIAL

## 2025-02-13 LAB
BACTERIA SPT CULT: ABNORMAL

## 2025-02-13 NOTE — PROGRESS NOTES
Cystic Fibrosis Clinical Follow Up Assessment  I spoke with Kilo today for [facility] TM assessment.     Trikafta: Pt continues on full dose. No SE or missed doses. He declined refill today because he is waiting for PA approval for Alyftrek. He stated he has enough Trikafta on hand to hold him over until new rx is received. PDC: 1.0     CF: Denies health, allergy or current medications changes today. No questions or concerns.     TM will continue biannual assessments unless Alyftrek is approved, then will follow SOP for new CFTR follow up.     Soheila Cantrell, Pharm.D.[facility]ist  [facility]  [address] [place] [id number]  [email or web address]  Office: [phone]

## 2025-02-13 NOTE — TELEPHONE ENCOUNTER
PA Initiation    Medication: ALYFTREK 10- MG PO TABS  Insurance Company: [employer], Phone [phone] Fax [phone]  Pharmacy Filling the Rx:    Filling Pharmacy Phone:    Filling Pharmacy Fax:    Start Date: 2/13/2025    Patient is due for labs before releasing prescription. Check with Lauri Mckeon.

## 2025-02-14 LAB
A-TOCOPHEROL VIT E SERPL-MCNC: 11.2 MG/L
ANNOTATION COMMENT IMP: NORMAL
BETA+GAMMA TOCOPHEROL SERPL-MCNC: 0.2 MG/L
RETINYL PALMITATE SERPL-MCNC: <0.02 MG/L
VIT A SERPL-MCNC: 0.38 MG/L

## 2025-02-17 ENCOUNTER — RESULT FOLLOW UP (OUTPATIENT)
Dept: NUTRITION | Facility: CLINIC | Age: 50
End: 2025-02-17
Payer: COMMERCIAL

## 2025-02-17 LAB
BACTERIA SPT CULT: ABNORMAL
TESTOST SERPL-MCNC: 564 NG/DL (ref 240–950)

## 2025-02-17 NOTE — PROGRESS NOTES
Nutrition Note    Annual studies nutrition results sent by provider for RD review;  testing completed 2/11/25.    Vitamin A - 0.38 wnl  Vitamin D - 55 wnl for CF   Vitamin E - 11.2 wnl  Iron - 40 low     -TIBC 319 wnl, ferritin 64 wnl, Hb 14.8 wnl, hematocrit 45.3 wnl  Lipid Panel - wnl  OGTT - n/a, pt with CFRD  DEXA - not done this visit    Comments: --    Interventions/Recommendations:   No changes made to baseline supplementation regimen.  Per provider note, pt ill at the time of this visit which may have impacted serum iron level negatively.  Recommend repeat iron panel once pt in usual state of health.       Ivory Bonilla MS, RD, LD, CACFD   Cystic Fibrosis/CF Lung Transplant Dietitian      -Available Mon-Thurs 8 AM-4:30 PM. Contact via [employer] or Epic Inbasket.      -On Fridays please contact Zeina Pierre RD and on weekends/holidays contact coverage dietitian via [employer] (inpatient use only).

## 2025-02-18 ENCOUNTER — ANCILLARY PROCEDURE (OUTPATIENT)
Dept: ULTRASOUND IMAGING | Facility: OTHER | Age: 50
End: 2025-02-18
Attending: INTERNAL MEDICINE
Payer: COMMERCIAL

## 2025-02-18 DIAGNOSIS — E84.9 DIABETES MELLITUS DUE TO CYSTIC FIBROSIS (H): ICD-10-CM

## 2025-02-18 DIAGNOSIS — E84.0 CYSTIC FIBROSIS WITH PULMONARY MANIFESTATIONS (H): ICD-10-CM

## 2025-02-18 DIAGNOSIS — K86.81 EXOCRINE PANCREATIC INSUFFICIENCY: ICD-10-CM

## 2025-02-18 DIAGNOSIS — E08.9 DIABETES MELLITUS DUE TO CYSTIC FIBROSIS (H): ICD-10-CM

## 2025-02-18 PROCEDURE — 76705 ECHO EXAM OF ABDOMEN: CPT | Mod: TC | Performed by: RADIOLOGY

## 2025-02-18 NOTE — TELEPHONE ENCOUNTER
Date: 25    Company: [employer] Help Desk:    Phone Number: [phone]    Rep: Indu NAIR  Tx to colleague specializes in this account    Rep: Olinda  Tx to colleague specializes in this account    Rep: Ashley Garrison to call [phone], [facility]    Submitted PA info in note below. [facility] is not associated with [facility]. Online info states PAs go through [employer]. Filled out [employer] PA form and sent with chart notes.    Date: 25    Company: [employer]    Phone Number: [phone]    Rep: Annabella Morales - No member on file.    [facility]: [phone]  Rep: Automated  Call ref [id number], no info on PAs given.    [employer] Help Desk: [phone]  Rep: Jennifer  Tx to colleague of mine    Rep: Nazanin  Not deligated to handle [facility]: [phone]    [facility] PA Desk: [phone]  Rep: Violeta  No longer has prescription benefit coverage    [employer]:   Rep: Automated  Call reference: [id number]    Called the patient to inquire about pharmacy benefits, see if he could call member services. Requested to conference them in.    [employer]:   Rep: Becca  Patient is active and has coverage. Pharmacy benefits should go through [employer]. Verified name, , ID, group, BIN, PCN.  Gave numbers  [employer]:  [place]    Stated that's already been tried, explained all previous calls/encounters. Put on hold while Becca reached out to [employer], Jacklyn    Try CMM. Explained CMM couldn't locate patient.  Try Provider Line for PAs through [employer]: [phone]  Fax: [phone]    After disconnecting with Becca, patient stated in his member portal he clicked into his individual name and found 03 at the end of his ID.  Grp: [id number]    Entered 03 at end of ID in CMM and was able to submit.

## 2025-02-18 NOTE — TELEPHONE ENCOUNTER
PA Initiation    Medication: ALYFTREK 10- MG PO TABS  Insurance Company: [employer] [place] - Phone [phone] Fax [phone]  Pharmacy Filling the Rx:    Filling Pharmacy Phone:    Filling Pharmacy Fax:    Start Date: 2/18/2025    Key: [id number]

## 2025-02-19 ENCOUNTER — LAB (OUTPATIENT)
Dept: LAB | Facility: OTHER | Age: 50
End: 2025-02-19
Payer: COMMERCIAL

## 2025-02-19 ENCOUNTER — APPOINTMENT (OUTPATIENT)
Dept: LAB | Facility: CLINIC | Age: 50
End: 2025-02-19
Payer: COMMERCIAL

## 2025-02-19 DIAGNOSIS — K86.81 EXOCRINE PANCREATIC INSUFFICIENCY: ICD-10-CM

## 2025-02-19 DIAGNOSIS — E84.0 CYSTIC FIBROSIS WITH PULMONARY MANIFESTATIONS (H): ICD-10-CM

## 2025-02-19 DIAGNOSIS — E84.9 DIABETES MELLITUS DUE TO CYSTIC FIBROSIS (H): ICD-10-CM

## 2025-02-19 DIAGNOSIS — E08.9 DIABETES MELLITUS DUE TO CYSTIC FIBROSIS (H): ICD-10-CM

## 2025-02-19 LAB
ALBUMIN SERPL BCG-MCNC: 4.3 G/DL (ref 3.5–5.2)
ALP SERPL-CCNC: 157 U/L (ref 40–150)
ALT SERPL W P-5'-P-CCNC: 35 U/L (ref 0–70)
AST SERPL W P-5'-P-CCNC: 30 U/L (ref 0–45)
BILIRUB DIRECT SERPL-MCNC: 0.13 MG/DL (ref 0–0.3)
BILIRUB SERPL-MCNC: 0.4 MG/DL
CK SERPL-CCNC: 48 U/L (ref 39–308)
PROT SERPL-MCNC: 7.9 G/DL (ref 6.4–8.3)

## 2025-02-19 PROCEDURE — 80076 HEPATIC FUNCTION PANEL: CPT

## 2025-02-19 PROCEDURE — 82550 ASSAY OF CK (CPK): CPT

## 2025-02-19 PROCEDURE — 36415 COLL VENOUS BLD VENIPUNCTURE: CPT

## 2025-02-20 DIAGNOSIS — E84.0 CYSTIC FIBROSIS WITH PULMONARY MANIFESTATIONS (H): Primary | ICD-10-CM

## 2025-02-20 LAB — GGT SERPL-CCNC: 67 U/L (ref 8–61)

## 2025-02-23 NOTE — TELEPHONE ENCOUNTER
Routed appeal letter to [facility] for completion.     PRIOR AUTHORIZATION DENIED    Medication: ALYFTREK 10- MG PO TABS  Insurance Company: [employer] [place] - Phone [phone] Fax [phone]  Denial Date: 2/21/2025  Denial Reason(s):     Appeal Information:

## 2025-02-24 NOTE — TELEPHONE ENCOUNTER
Medication Appeal Initiation    Medication: ALYFTREK 10- MG PO TABS  Appeal Start Date:  2/24/2025  Insurance Company: [employer] GALLARDO  Insurance Phone: [phone]  Insurance Fax: [phone]  Comments:   Faxed signed appeal authorization, appeal letter, and chart notes to [employer].

## 2025-02-24 NOTE — TELEPHONE ENCOUNTER
Sent Soapbox Mobile message to patient with appeal authorization form. Once signed and received back, liaison will send appeal.

## 2025-02-26 ENCOUNTER — TELEPHONE (OUTPATIENT)
Dept: PULMONOLOGY | Facility: CLINIC | Age: 50
End: 2025-02-26
Payer: COMMERCIAL

## 2025-02-26 NOTE — TELEPHONE ENCOUNTER
MEDICATION APPEAL APPROVED    Medication: ALYFTREK 10- MG PO TABS  Authorization Effective Date: 1/26/2025  Authorization Expiration Date: 2/25/2026  Approved Dose/Quantity: 56 tabs per 28 days  Reference #: Key: [id number]   Appeal Insurance Company: [employer]  Expected CoPay: $ 0     CoPay Card Available: Yes  Financial Assistance Needed: Enrolled  Filling Pharmacy: [facility] - [place] [id number]  Patient Notified: Yes  Comments:  Released RX to SP

## 2025-02-26 NOTE — TELEPHONE ENCOUNTER
Bree sent to patient to see if he is ready to repeat labs early to consider starting Alyftrek now that approved.    Gracie Allen, PharmD  Cystic Fibrosis MTM Pharmacist  [facility]  Voicemail: [phone]

## 2025-02-27 ENCOUNTER — LAB (OUTPATIENT)
Dept: LAB | Facility: OTHER | Age: 50
End: 2025-02-27
Payer: COMMERCIAL

## 2025-02-27 DIAGNOSIS — E84.9 CYSTIC FIBROSIS (H): Primary | ICD-10-CM

## 2025-02-27 DIAGNOSIS — E84.9 CYSTIC FIBROSIS (H): ICD-10-CM

## 2025-02-27 LAB
ALBUMIN SERPL BCG-MCNC: 4.1 G/DL (ref 3.5–5.2)
ALP SERPL-CCNC: 133 U/L (ref 40–150)
ALT SERPL W P-5'-P-CCNC: 36 U/L (ref 0–70)
AST SERPL W P-5'-P-CCNC: 34 U/L (ref 0–45)
BILIRUB DIRECT SERPL-MCNC: 0.12 MG/DL (ref 0–0.3)
BILIRUB SERPL-MCNC: 0.4 MG/DL
PROT SERPL-MCNC: 7.2 G/DL (ref 6.4–8.3)

## 2025-03-04 ENCOUNTER — TELEPHONE (OUTPATIENT)
Dept: PULMONOLOGY | Facility: CLINIC | Age: 50
End: 2025-03-04
Payer: COMMERCIAL

## 2025-03-04 NOTE — TELEPHONE ENCOUNTER
Left Voicemail (1st Attempt) and Sent Mychart (1st Attempt) for the patient to call back and schedule the following:    Appointment type: Reschedule Return CF  Provider: Carter  Return date: 6/3/2025 to next avail  Specialty phone number: [phone]  Additional appointment(s) needed: cf loop  Additonal Notes: provider unavailable

## 2025-03-06 NOTE — TELEPHONE ENCOUNTER
Left Voicemail (2nd Attempt) for the patient to call back and schedule the following:    Appointment type: Reschedule Return CF  Provider: Carter  Return date: 6/3/2025 to next avail  Specialty phone number: [phone]  Additional appointment(s) needed: cf loop  Additonal Notes: provider unavailable

## 2025-03-12 ENCOUNTER — PHARMACY VISIT (OUTPATIENT)
Dept: ADMINISTRATIVE | Facility: CLINIC | Age: 50
End: 2025-03-12
Payer: COMMERCIAL

## 2025-03-12 NOTE — PROGRESS NOTES
"   Cystic Fibrosis Clinical Follow Up Assessment   Completed on 2025/03/12 15:07:20 [facility], by Ayana Jones        Activity Medications    ALYFTREK        Care Details    What are the patient's goals for this therapy?   ? 2/13/25 - to get started on Zeqarfzg03/13/22 - \"No new goals. Just stay healthy.\"      Did you identify any patient barriers to access and successful treatment?   ? No barriers to access identified      Is it appropriate to collect a PDC at this time? [QA Metric] (An MPR or PDC would not be appropriate for cycled medications or if the patient is on therapy   ? No      Has the patient missed doses inappropriately?   ? No      Please select CURRENT side effect(s) for monitoring:   ? None          Summary Notes  I had the pleasure of speaking to pt for initial TM.   Current regimen: Alyftrek 10-50-125mg tabs: two tablets daily.   Side effects: None.   Doses: no missed doses. States they started about a week and a half ago and going just fine.   Health, allergy or med changes: None.   Questions or concerns: None.   Follow up: Next time of refill for TM.       Ayana JONES, PharmD, CSP  Therapy Management Pharmacist  [address] [address]   [email or web address]  [email or web address]   Specialty: [phone]  Mail Order: [phone]   "

## 2025-03-16 DIAGNOSIS — E84.0 CYSTIC FIBROSIS OF THE LUNG (H): ICD-10-CM

## 2025-03-16 DIAGNOSIS — E84.9 CYSTIC FIBROSIS (H): ICD-10-CM

## 2025-03-17 RX ORDER — BUDESONIDE AND FORMOTEROL FUMARATE DIHYDRATE 160; 4.5 UG/1; UG/1
AEROSOL RESPIRATORY (INHALATION)
Qty: 30.6 G | Refills: 3 | Status: SHIPPED | OUTPATIENT
Start: 2025-03-17

## 2025-04-03 ENCOUNTER — LAB (OUTPATIENT)
Dept: INFUSION THERAPY | Facility: CLINIC | Age: 50
End: 2025-04-03
Attending: INTERNAL MEDICINE
Payer: COMMERCIAL

## 2025-04-03 VITALS
WEIGHT: 138.2 LBS | DIASTOLIC BLOOD PRESSURE: 71 MMHG | RESPIRATION RATE: 16 BRPM | HEIGHT: 68 IN | BODY MASS INDEX: 20.95 KG/M2 | HEART RATE: 79 BPM | OXYGEN SATURATION: 96 % | TEMPERATURE: 98.2 F | SYSTOLIC BLOOD PRESSURE: 111 MMHG

## 2025-04-03 DIAGNOSIS — M85.9 LOW BONE DENSITY: ICD-10-CM

## 2025-04-03 DIAGNOSIS — A49.8 PSEUDOMONAS INFECTION: ICD-10-CM

## 2025-04-03 DIAGNOSIS — E84.0 CYSTIC FIBROSIS WITH PULMONARY MANIFESTATIONS (H): ICD-10-CM

## 2025-04-03 DIAGNOSIS — E84.9 CYSTIC FIBROSIS (H): ICD-10-CM

## 2025-04-03 DIAGNOSIS — M81.0 OSTEOPOROSIS, UNSPECIFIED OSTEOPOROSIS TYPE, UNSPECIFIED PATHOLOGICAL FRACTURE PRESENCE: Primary | ICD-10-CM

## 2025-04-03 DIAGNOSIS — E08.9 DIABETES MELLITUS DUE TO CYSTIC FIBROSIS (H): ICD-10-CM

## 2025-04-03 DIAGNOSIS — K86.81 EXOCRINE PANCREATIC INSUFFICIENCY: ICD-10-CM

## 2025-04-03 DIAGNOSIS — M81.0 OSTEOPOROSIS, UNSPECIFIED OSTEOPOROSIS TYPE, UNSPECIFIED PATHOLOGICAL FRACTURE PRESENCE: ICD-10-CM

## 2025-04-03 DIAGNOSIS — E84.9 DIABETES MELLITUS DUE TO CYSTIC FIBROSIS (H): ICD-10-CM

## 2025-04-03 DIAGNOSIS — A49.8 PSEUDOMONAS INFECTION: Primary | ICD-10-CM

## 2025-04-03 LAB
ALBUMIN SERPL BCG-MCNC: 4.1 G/DL (ref 3.5–5.2)
ALP SERPL-CCNC: 123 U/L (ref 40–150)
ALT SERPL W P-5'-P-CCNC: 34 U/L (ref 0–70)
AST SERPL W P-5'-P-CCNC: 32 U/L (ref 0–45)
BILIRUB DIRECT SERPL-MCNC: <0.08 MG/DL (ref 0–0.3)
BILIRUB SERPL-MCNC: 0.2 MG/DL
CALCIUM SERPL-MCNC: 9.5 MG/DL (ref 8.8–10.4)
CK SERPL-CCNC: 51 U/L (ref 39–308)
CREAT SERPL-MCNC: 1.05 MG/DL (ref 0.67–1.17)
EGFRCR SERPLBLD CKD-EPI 2021: 88 ML/MIN/1.73M2
GGT SERPL-CCNC: 27 U/L (ref 8–61)
HOLD SPECIMEN: NORMAL
HOLD SPECIMEN: NORMAL
PROT SERPL-MCNC: 7.5 G/DL (ref 6.4–8.3)

## 2025-04-03 PROCEDURE — 84155 ASSAY OF PROTEIN SERUM: CPT

## 2025-04-03 PROCEDURE — 250N000011 HC RX IP 250 OP 636: Mod: JZ | Performed by: INTERNAL MEDICINE

## 2025-04-03 PROCEDURE — 82040 ASSAY OF SERUM ALBUMIN: CPT

## 2025-04-03 PROCEDURE — 82565 ASSAY OF CREATININE: CPT | Performed by: INTERNAL MEDICINE

## 2025-04-03 PROCEDURE — 82977 ASSAY OF GGT: CPT

## 2025-04-03 PROCEDURE — 82310 ASSAY OF CALCIUM: CPT | Performed by: INTERNAL MEDICINE

## 2025-04-03 PROCEDURE — 258N000003 HC RX IP 258 OP 636: Performed by: INTERNAL MEDICINE

## 2025-04-03 PROCEDURE — 82550 ASSAY OF CK (CPK): CPT

## 2025-04-03 RX ORDER — HEPARIN SODIUM (PORCINE) LOCK FLUSH IV SOLN 100 UNIT/ML 100 UNIT/ML
5 SOLUTION INTRAVENOUS
OUTPATIENT
Start: 2026-02-21

## 2025-04-03 RX ORDER — ALBUTEROL SULFATE 0.83 MG/ML
2.5 SOLUTION RESPIRATORY (INHALATION)
OUTPATIENT
Start: 2026-02-21

## 2025-04-03 RX ORDER — ACETAMINOPHEN 325 MG/1
325 TABLET ORAL
Status: CANCELLED | OUTPATIENT
Start: 2026-02-21

## 2025-04-03 RX ORDER — METHYLPREDNISOLONE SODIUM SUCCINATE 125 MG/2ML
125 INJECTION INTRAMUSCULAR; INTRAVENOUS
Status: CANCELLED
Start: 2026-02-21

## 2025-04-03 RX ORDER — HEPARIN SODIUM,PORCINE 10 UNIT/ML
5-20 VIAL (ML) INTRAVENOUS DAILY PRN
Status: CANCELLED | OUTPATIENT
Start: 2026-02-21

## 2025-04-03 RX ORDER — MEPERIDINE HYDROCHLORIDE 25 MG/ML
25 INJECTION INTRAMUSCULAR; INTRAVENOUS; SUBCUTANEOUS EVERY 30 MIN PRN
OUTPATIENT
Start: 2026-02-21

## 2025-04-03 RX ORDER — DIPHENHYDRAMINE HYDROCHLORIDE 50 MG/ML
50 INJECTION, SOLUTION INTRAMUSCULAR; INTRAVENOUS
Start: 2026-02-21

## 2025-04-03 RX ORDER — HEPARIN SODIUM,PORCINE 10 UNIT/ML
5-20 VIAL (ML) INTRAVENOUS DAILY PRN
OUTPATIENT
Start: 2026-02-21

## 2025-04-03 RX ORDER — ZOLEDRONIC ACID 0.05 MG/ML
5 INJECTION, SOLUTION INTRAVENOUS ONCE
Status: CANCELLED
Start: 2026-02-21

## 2025-04-03 RX ORDER — EPINEPHRINE 1 MG/ML
0.3 INJECTION, SOLUTION INTRAMUSCULAR; SUBCUTANEOUS EVERY 5 MIN PRN
OUTPATIENT
Start: 2026-02-21

## 2025-04-03 RX ORDER — DIPHENHYDRAMINE HYDROCHLORIDE 50 MG/ML
50 INJECTION, SOLUTION INTRAMUSCULAR; INTRAVENOUS
Status: CANCELLED
Start: 2026-02-21

## 2025-04-03 RX ORDER — ALBUTEROL SULFATE 90 UG/1
1-2 INHALANT RESPIRATORY (INHALATION)
Status: CANCELLED
Start: 2026-02-21

## 2025-04-03 RX ORDER — EPINEPHRINE 1 MG/ML
0.3 INJECTION, SOLUTION INTRAMUSCULAR; SUBCUTANEOUS EVERY 5 MIN PRN
Status: CANCELLED | OUTPATIENT
Start: 2026-02-21

## 2025-04-03 RX ORDER — ALBUTEROL SULFATE 90 UG/1
1-2 INHALANT RESPIRATORY (INHALATION)
Start: 2026-02-21

## 2025-04-03 RX ORDER — MEPERIDINE HYDROCHLORIDE 25 MG/ML
25 INJECTION INTRAMUSCULAR; INTRAVENOUS; SUBCUTANEOUS EVERY 30 MIN PRN
Status: CANCELLED | OUTPATIENT
Start: 2026-02-21

## 2025-04-03 RX ORDER — ALBUTEROL SULFATE 0.83 MG/ML
2.5 SOLUTION RESPIRATORY (INHALATION)
Status: CANCELLED | OUTPATIENT
Start: 2026-02-21

## 2025-04-03 RX ORDER — HEPARIN SODIUM (PORCINE) LOCK FLUSH IV SOLN 100 UNIT/ML 100 UNIT/ML
5 SOLUTION INTRAVENOUS
Status: CANCELLED | OUTPATIENT
Start: 2026-02-21

## 2025-04-03 RX ORDER — ZOLEDRONIC ACID 0.05 MG/ML
5 INJECTION, SOLUTION INTRAVENOUS ONCE
Start: 2026-02-21

## 2025-04-03 RX ORDER — ACETAMINOPHEN 325 MG/1
325 TABLET ORAL
OUTPATIENT
Start: 2026-02-21

## 2025-04-03 RX ORDER — ZOLEDRONIC ACID 0.05 MG/ML
5 INJECTION, SOLUTION INTRAVENOUS ONCE
Status: COMPLETED | OUTPATIENT
Start: 2025-04-03 | End: 2025-04-03

## 2025-04-03 RX ORDER — METHYLPREDNISOLONE SODIUM SUCCINATE 125 MG/2ML
125 INJECTION INTRAMUSCULAR; INTRAVENOUS
Start: 2026-02-21

## 2025-04-03 RX ADMIN — ZOLEDRONIC ACID 5 MG: 0.05 INJECTION, SOLUTION INTRAVENOUS at 15:35

## 2025-04-03 RX ADMIN — SODIUM CHLORIDE 250 ML: 0.9 INJECTION, SOLUTION INTRAVENOUS at 15:35

## 2025-04-03 NOTE — PROGRESS NOTES
Infusion Nursing Note:  Nico Morales presents today for Reclast.    Patient seen by provider today: No   present during visit today: Not Applicable.    Note: Patient reports tolerating Reclast infusions in the past. Denies new medical/dental concerns today.    Intravenous Access:  Peripheral IV placed.    Treatment Conditions:  Calcium: 9.5  Creatinine: 1.05  Results reviewed, labs MET treatment parameters, ok to proceed with treatment.    Post Infusion Assessment:  Patient tolerated infusion without incident.  Site patent and intact, free from redness, edema or discomfort.  No evidence of extravasations.  Access discontinued per protocol.     Discharge Plan:   Future appts have been reviewed and crosschecked with appt note and plan.  AVS to patient via GoodGuideT.  Patient will return in 1 year for next appointment.   Patient discharged in stable condition accompanied by: self.  Departure Mode: Ambulatory.      Shahla Hoang RN BSN OCN

## 2025-04-30 NOTE — PROGRESS NOTES
CF Endocrinology Return Consultation:  Diabetes  :   Patient: Nico Morales MRN# [id number]   YOB: 1975 Age: 49 year old   Date of Visit: 05/13/2025    Dear Dr. Jaye Machado:    I had the pleasure of seeing your patient, Nico Morales in the CF Endocrinology Clinic, [facility], for a return consultation regarding CFRD and low bone density.           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  Verbal overview and written information provided.         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     Priority: Medium    Encounter for long-term (current) use of antibiotics 05/20/2013     Priority: Medium    Prostatitis 05/20/2013     Priority: Medium     updating diagnosis code for icd10 cutover      Pseudomonas infection      Priority: Medium    Cystic fibrosis (H) 11/24/2010     Priority: Medium     Sweat Test   Date: 4/25/78    Lab: [facility]   Sample #1:  110mmol  Sample #2:  106mmol    Genetics  Date: 4/9/90  [id number]+1G->T        CARDIOVASCULAR SCREENING; LDL GOAL LESS THAN 160 10/31/2010     Priority: Medium            HPI:   Nico is a 49 year old male with Cystic  Fibrosis Related Diabetes Mellitus (CFRD).    I have reviewed the available past laboratory evaluations, imaging studies, and medical records available to me at this visit.  History was obtained from the patient and the medical record.  I have reviewed the notes of the pulmonary care team.    He is doing well overall.    Undergoing workup for gallbladder disease  Checks fingerstick blood glucose on and off  Most recent A1c was 6.5%  Reports that hypoglycemia is rare and occurs when he is on doxycycline  He usually reduce his Lantus dose while on doxycycline    Patient is on Trikafta and reports being stable from pulmonary point of view.      History of low bone density  Started on pamidronate in December 2018.  Has received 3 doses.  Denies any muscle ache or pains after the IV bisphosphonate infusions.  Reports that he was febrile briefly after the first 2 doses however did not have any problems after the third dose.  His last dose of pamidronate was in October 2019.  Subsequent infusions were put on hold due the pandemic.   Pamidronate was subsequently switched to Reclast.    First dose of Reclast was in June 2021  and second dose was given Feb 2023  Received the third dose in February 2024  Fourth dose April 2025   Denies any side effects from the Reclast.  Significant increase in bone density on the last DEXA in 2023    Most recent DEXA 5/8/2025  Lumbar spine Z-score -1  Left hip Z-score -1.3  Overall bone density stable     A1c:  Hemoglobin A1C   Date Value Ref Range Status   02/11/2025 6.5 (H) <5.7 % Final     Comment:     Normal <5.7%   Prediabetes 5.7-6.4%    Diabetes 6.5% or higher     Note: Adopted from ADA consensus guidelines.   05/04/2021 6.2 (H) 0 - 5.6 % Final     Comment:     Normal <5.7% Prediabetes 5.7-6.4%  Diabetes 6.5% or higher - adopted from ADA   consensus guidelines.         Current insulin regimen:   Lantus 8 units daily in AM   Humalog 1 unit / 15 gm of carb, ~3-4 times daily. Ave dose  around 3 units    Insulin administration site(s): abd or thigh          Past Medical History:     Past Medical History:   Diagnosis Date    CF (cystic fibrosis) (H)     Exocrine pancreatic insufficiency     Nocardia infection     Pseudomonas infection     S/P gastrostomy (H) 2002            Past Surgical History:     Past Surgical History:   Procedure Laterality Date    COLONOSCOPY N/A 5/21/2018    Procedure: COMBINED COLONOSCOPY, SINGLE OR MULTIPLE BIOPSY/POLYPECTOMY BY BIOPSY;  Cystic fibrosis, 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;  Surgeon: Margarito Bowman MD;  Location: [facility]    COLONOSCOPY N/A 6/27/2022    Procedure: COLONOSCOPY, WITH POLYPECTOMY AND BIOPSY;  Surgeon: Margarito Bowman MD;  Location: [facility]    GASTROSTOMY TUBE  2002    IR PULMONARY EMBOLIZATION  7/20/2001    PICC INSERTION Left 01/22/2018    4Fr SL BioFlo PICC, 46cm (5cm external) in the L basilic vein w/ tip in the low SVC               Social History:     Social History     Social History Narrative    8/15/2019 - .  Lives with his English Bulldog (Jefry) in a house that he designed in [place].  No children.              Family History:     Family History   Problem Relation Age of Onset    Heart Disease Maternal Grandmother     Heart Disease Maternal Grandfather     Heart Disease Paternal Grandmother     Diabetes No family hx of             Allergies:     Allergies   Allergen Reactions    Piperacillin Sod-Tazobactam So Hives     Delayed type reaction in intradermal tests to Zosyn (Piperacillin/Tazobactam). Might be specific reaction to the Zosyn without cross-reactions to Penicillins (beta-Lactam core). Avoid in future Zosyn. However, I would avoid the Penicillins and use instead Penems or Cephalosporines. If Penicillins are the best option, then they can be used under observation.    Pulmozyme [Dornase Trever] Other (See Comments)     Chest pain and fever             Medications:     Current Outpatient Rx   Medication Sig Dispense  "Refill    ACE NOT PRESCRIBED, INTENTIONAL, 1 each continuous prn. ACE Inhibitor not prescribed due to Risk for drug interaction 0 each 0    azithromycin (ZITHROMAX) 250 MG tablet TAKE 1 TABLET (250 MG) BY MOUTH DAILY 30 tablet 11    blood glucose (NO BRAND SPECIFIED) test strip Use to test blood sugar 6 times daily or as directed. To accompany: Blood Glucose Monitor Brands: per insurance. 600 strip 3    blood glucose (NO BRAND SPECIFIED) test strip Use to test blood sugar 6 times daily. Any covered brand. Pt now using Contour Next, if covered. 600 strip 3    blood glucose monitoring (NO BRAND SPECIFIED) meter device kit Use to test blood sugar 6 times daily or as directed. Preferred blood glucose meter OR supplies to accompany: Blood Glucose Monitor Brands: per insurance. 1 kit 0    blood glucose monitoring (NO BRAND SPECIFIED) meter device kit Use to test blood sugar 6 times daily or as directed.whatever is covered 1 kit 1    blood glucose monitoring (ULTRA THIN 30G) lancets Use to test blood sugar 6 times daily or as directed.whatever is covered 540 each 3    budesonide-formoterol (SYMBICORT/BREYNA) 160-4.5 MCG/ACT Inhaler INHALE 2 PUFFS BY MOUTH TWICE DAILY 30.6 g 3    CALCIUM PO Take 500 mg by mouth 2 times daily Strength unknown.       Continuous Blood Gluc Sensor (FREESTYLE SUKHWINDER 14 DAY SENSOR) MISC Change every 14 days. 1 each 3    [place] HOME INFUSION MANAGED PATIENT Contact [facility] for patient specific medication information at [phone] on admission and discharge from the hospital.  Phones are answered 24 hours a day 7 days a week 365 days a year.    Providers - Choose \"CONTINUE HOME MED (no script)\" at discharge if patient treatment with home infusion will continue.      ferrous fumarate 65 mg, [place]. FE,-Vitamin C 125 mg (VITRON-C)  MG TABS tablet Take 1 tablet by mouth daily      Glucagon (BAQSIMI) 3 MG/DOSE nasal powder Spray 1 spray (3 mg) in nostril as needed (in emergency " for hypoglycic reaction) in the event of unconscious hypoglycemia or hypoglycemic seizure. May repeat dose if no response after 15 minutes. 1 each 1    Glucagon (GVOKE HYPOPEN) 1 MG/0.2ML pen Inject the contents of 1 device under the skin into lower abdomen, outer thigh, or outer upper arm as needed for hypoglycemia. If no response after 15 minutes, additional 1 mg dose from a new device may be injected while waiting for emergency assistance. 0.4 mL 1    Insulin Aspart FlexPen 100 UNIT/ML SOPN Inject 1 Units Subcutaneous 3 times daily With these instructions :INJECT 1 UNIT PER 20G CARB AT LUNCH AND SUPPER; INJECT 3 UNITS AT NIGHT WITH TUBE FEEDINGS. MAX 20 UNITS PER Day 15 mL 3    insulin glargine (LANTUS SOLOSTAR) 100 UNIT/ML pen INJECT 8 UNITS UNDER THE SKIN ONCE DAILY 15 mL 1    insulin lispro (HUMALOG KWIKPEN) 100 UNIT/ML (1 unit dial) KWIKPEN Inject 1 Units Subcutaneous 3 times daily With these instructions :INJECT 1 UNIT PER 20G CARB AT LUNCH AND SUPPER; INJECT 3 UNITS AT NIGHT WITH TUBE FEEDINGS. MAX 20 UNITS PER Day, Disp-15 mL, R-3, E-Prescribe 15 mL 3    insulin pen needle (BD OMI U/F) 32G X 4 MM miscellaneous Use 4 pen needles daily or as directed. Follow up visit needed. Call  to schedule. 400 each 3    levalbuterol (XOPENEX HFA) 45 MCG/ACT inhaler INHALE 2 PUFFS INTO THE LUNGS EVERY 6 HOURS AS NEEDED FOR SHORTNESS OF BREATH/DYSPNEA 75 g 3    levalbuterol (XOPENEX) 1.25 MG/3ML neb solution INHALE 1 VIAL BY MOUTH INTO THE LUNGS VIA NEBULIZATION FOUR TIMES DAILY 360 mL 11    lipase-protease-amylase (CREON) 39931-84487-299517 units CPEP per EC capsule TAKE 9-10 CAPSULES BY MOUTH WITH MEALS (3 MEALS/DAY) AND 2-4 CAPS WITH SNACKS (3 SNACKS/DAY) 3780 capsule 3    MULTIVITAMIN OR Take 1 tablet by mouth daily. Includes 5,000 units vitamin D and 400 units vitamin E. Per pt, formulated for CF pts.      mvw complete (PROBIOTIC FORMULATION) capsule TAKE ONE CAPSULE BY MOUTH ONCE DAILY 30 capsule 11     phytonadione (MEPHYTON) 5 MG tablet Take one tablet daily for 3 days as needed for hemoptysis. (Patient not taking: Reported on 5/8/2025) 6 tablet 4    polyethylene glycol (MIRALAX) 17 GM/Dose powder Take 17 g by mouth daily as needed for constipation      sodium chloride inhalant 7 % NEBU neb solution USE 4ML( 1 VIAL) VIA NEBULIZER TWICE DAILY 240 mL 5    thin (NO BRAND SPECIFIED) lancets Use with lanceting device 6x daily. To accompany: Blood Glucose Monitor Brands: per insurance. 600 each 3    vanzacaftor-tezacaftor-deutivacaftor (ALYFTREK) 10- MG tablet Take 2 tablets by mouth daily. Take with fat-containing food. 56 tablet 1             Review of Systems:               Physical Exam:   There were no vitals taken for this visit.    GENERAL: Healthy, alert and no distress  EYES: Eyes grossly normal to inspection.  No discharge or erythema, or obvious scleral/conjunctival abnormalities.  RESP: No audible wheeze, cough, or visible cyanosis.  No visible retractions or increased work of breathing.    NEURO: Cranial nerves grossly intact.  Mentation and speech appropriate for age.  PSYCH: affect normal/bright, judgement and insight intact, normal speech and appearance well-groomed.        Laboratory results:     TSH   Date Value Ref Range Status   02/11/2025 1.02 0.30 - 4.20 uIU/mL Final   06/20/2022 1.08 0.40 - 4.00 mU/L Final   05/04/2021 1.49 0.40 - 4.00 mU/L Final     Triiodothyronine (T3)   Date Value Ref Range Status   06/25/2010 123 60 - 181 ng/dL Final     Testosterone Total   Date Value Ref Range Status   02/11/2025 564 240 - 950 ng/dL Final   05/04/2021 418 240 - 950 ng/dL Final     Comment:     This test was developed and its performance characteristics determined by the   [facility].   It has not been cleared or approved by the FDA. The laboratory is regulated   under CLIA as qualified to perform high-complexity testing. This test is  used   for clinical purposes. It should not be regarded as investigational or for   research.       Cholesterol   Date Value Ref Range Status   02/11/2025 147 <200 mg/dL Final   05/04/2021 159 <200 mg/dL Final     Albumin Urine mg/L   Date Value Ref Range Status   02/11/2025 <12.0 mg/L Final     Comment:     The reference ranges have not been established in urine albumin. The results should be integrated into the clinical context for interpretation.   06/20/2022 10 mg/L Final   05/04/2021 15 mg/L Final     Triglycerides   Date Value Ref Range Status   02/11/2025 98 <150 mg/dL Final   05/04/2021 115 <150 mg/dL Final     HDL Cholesterol   Date Value Ref Range Status   05/04/2021 40 >39 mg/dL Final     Direct Measure HDL   Date Value Ref Range Status   02/11/2025 42 >=40 mg/dL Final     LDL Cholesterol Calculated   Date Value Ref Range Status   02/11/2025 85 <100 mg/dL Final   05/04/2021 96 <100 mg/dL Final     Comment:     Desirable:       <100 mg/dl     Cholesterol/HDL Ratio   Date Value Ref Range Status   06/26/2013 4.4 0.0 - 5.0 Final     Non HDL Cholesterol   Date Value Ref Range Status   02/11/2025 105 <130 mg/dL Final   05/04/2021 119 <130 mg/dL Final           Diabetes Health Maintenance    Date of Diabetes Diagnosis: 12/2015    Special Notes (if any): He was a diagnosed with adrenal insufficiency based on inadequate response on a ACTH stim test. Adrenal insufficiency is thought to be related to  itraconazole and Symbicort use.  Last ACTH stim test was normal and he has been off chronic prednisone.    Date Last Eye Exam: < 1 year     Date Last Dental Appointment: every 6 months    Dates of Episodes Severe* Hypoglycemia (month/year, cumulative, ongoing, assess each visit): never              *Severe=patient unconscious, seizure, unable to help self    Last 25-Vitamin D (every year): 55    Last DXA, lowest Z-score (every 2 years): -1.7 last DEXA 2023       ?Bisphosphonates (yes/no): yes     Last Urine  "Microalbumin (every year):      No results found for: \"MICROALBUMIN\"     Last Testosterone:   Testosterone Total   Date Value Ref Range Status   02/11/2025 564 240 - 950 ng/dL Final   05/04/2021 418 240 - 950 ng/dL Final     Comment:     This test was developed and its performance characteristics determined by the   [facility].   It has not been cleared or approved by the FDA. The laboratory is regulated   under CLIA as qualified to perform high-complexity testing. This test is used   for clinical purposes. It should not be regarded as investigational or for   research.        On testosterone therapy (yes/no)? No          Assessment and Plan:   Nico is a 49 year old male with CFRD    CFRD: Overall good control   Continue current regimen  Patient has CGM at home, consider using CGM periodically    Low bone density: Started on pamidronate in 2018 but missed doses in 2020 due to COVID pandemic.    Received first dose of Reclast in June 2021.   Has received Reclast doses in 2023, 2024 and 2025  Bone density was stable and Z-scores on most recent DEXA in normal range  Discussed drug holiday  Will start drug holiday from bisphosphonate therapy at this time  Counseled about calcium and vitamin D supplementation and weightbearing exercise as tolerated    RTC in 1 year    JULIETA Tate    Note: Chart documentation done in part with Dragon Voice Recognition software. Although reviewed after completion, some word and grammatical errors may remain.  Please consider this when interpreting information in this chart    Video visit done using [employer]  Video start time 9:55 AM  Video visit end time 10:16 AM  Patient location: Home  Provider location: Off-site    TYLER REYES"

## 2025-05-08 ENCOUNTER — ANCILLARY PROCEDURE (OUTPATIENT)
Dept: GENERAL RADIOLOGY | Facility: CLINIC | Age: 50
End: 2025-05-08
Attending: DIETITIAN, REGISTERED
Payer: COMMERCIAL

## 2025-05-08 ENCOUNTER — LAB (OUTPATIENT)
Dept: LAB | Facility: CLINIC | Age: 50
End: 2025-05-08
Payer: COMMERCIAL

## 2025-05-08 ENCOUNTER — ANCILLARY PROCEDURE (OUTPATIENT)
Dept: BONE DENSITY | Facility: CLINIC | Age: 50
End: 2025-05-08
Attending: INTERNAL MEDICINE
Payer: COMMERCIAL

## 2025-05-08 ENCOUNTER — OFFICE VISIT (OUTPATIENT)
Dept: PULMONOLOGY | Facility: CLINIC | Age: 50
End: 2025-05-08
Attending: DIETITIAN, REGISTERED
Payer: COMMERCIAL

## 2025-05-08 ENCOUNTER — RESULTS FOLLOW-UP (OUTPATIENT)
Dept: GASTROENTEROLOGY | Facility: CLINIC | Age: 50
End: 2025-05-08

## 2025-05-08 ENCOUNTER — CLINICAL UPDATE (OUTPATIENT)
Dept: PHARMACY | Facility: CLINIC | Age: 50
End: 2025-05-08

## 2025-05-08 VITALS
WEIGHT: 137.1 LBS | SYSTOLIC BLOOD PRESSURE: 106 MMHG | HEART RATE: 80 BPM | BODY MASS INDEX: 20.85 KG/M2 | DIASTOLIC BLOOD PRESSURE: 69 MMHG | OXYGEN SATURATION: 98 %

## 2025-05-08 DIAGNOSIS — M85.9 LOW BONE DENSITY: ICD-10-CM

## 2025-05-08 DIAGNOSIS — E84.9 CYSTIC FIBROSIS (H): Primary | ICD-10-CM

## 2025-05-08 DIAGNOSIS — E84.9 DIABETES MELLITUS DUE TO CYSTIC FIBROSIS (H): ICD-10-CM

## 2025-05-08 DIAGNOSIS — R10.11 RUQ ABDOMINAL PAIN: ICD-10-CM

## 2025-05-08 DIAGNOSIS — R10.84 ABDOMINAL PAIN, GENERALIZED: ICD-10-CM

## 2025-05-08 DIAGNOSIS — E84.9 CYSTIC FIBROSIS (H): ICD-10-CM

## 2025-05-08 DIAGNOSIS — K86.81 EXOCRINE PANCREATIC INSUFFICIENCY: ICD-10-CM

## 2025-05-08 DIAGNOSIS — E08.9 DIABETES MELLITUS DUE TO CYSTIC FIBROSIS (H): ICD-10-CM

## 2025-05-08 DIAGNOSIS — E84.0 CYSTIC FIBROSIS WITH PULMONARY MANIFESTATIONS (H): ICD-10-CM

## 2025-05-08 DIAGNOSIS — Z12.11 ENCOUNTER FOR SCREENING FOR MALIGNANT NEOPLASM OF COLON: ICD-10-CM

## 2025-05-08 LAB
ALBUMIN SERPL BCG-MCNC: 4.2 G/DL (ref 3.5–5.2)
ALP SERPL-CCNC: 120 U/L (ref 40–150)
ALT SERPL W P-5'-P-CCNC: 26 U/L (ref 0–70)
AST SERPL W P-5'-P-CCNC: 27 U/L (ref 0–45)
BILIRUB DIRECT SERPL-MCNC: 0.12 MG/DL (ref 0–0.3)
BILIRUB SERPL-MCNC: 0.2 MG/DL
IGA SERPL-MCNC: 233 MG/DL (ref 84–499)
PROT SERPL-MCNC: 7.5 G/DL (ref 6.4–8.3)

## 2025-05-08 PROCEDURE — 99213 OFFICE O/P EST LOW 20 MIN: CPT | Performed by: DIETITIAN, REGISTERED

## 2025-05-08 PROCEDURE — 77080 DXA BONE DENSITY AXIAL: CPT | Performed by: INTERNAL MEDICINE

## 2025-05-08 PROCEDURE — 86258 DGP ANTIBODY EACH IG CLASS: CPT | Performed by: DIETITIAN, REGISTERED

## 2025-05-08 PROCEDURE — 99000 SPECIMEN HANDLING OFFICE-LAB: CPT | Performed by: PATHOLOGY

## 2025-05-08 PROCEDURE — 86364 TISS TRNSGLTMNASE EA IG CLAS: CPT | Performed by: DIETITIAN, REGISTERED

## 2025-05-08 PROCEDURE — 82784 ASSAY IGA/IGD/IGG/IGM EACH: CPT | Performed by: DIETITIAN, REGISTERED

## 2025-05-08 ASSESSMENT — PAIN SCALES - GENERAL: PAINLEVEL_OUTOF10: NO PAIN (0)

## 2025-05-08 NOTE — NURSING NOTE
Chief Complaint   Patient presents with    New Patient     NEW CF       Vitals were taken, medications reconciled.    Kirit Andrew, Clinic Assistant   8:05 AM

## 2025-05-08 NOTE — PROGRESS NOTES
Clinical Update:                                                    A chart review was conducted for Ncio Morales.    Reason for Chart Review: Alyftrek Monthly Lab Monitoring 2/6     Discussion: Kilo has been on Alyftrek since ~3/1/25. Kilo has a history of LFT elevation which resolved without dose reduction. Per chart review, Kilo continues full dose Alyftrek.     Labs were reviewed from 5/8/25 at [facility]. All modulator labs are within normal limits.    Lab Results   Component Value Date    ALT 26 05/08/2025    AST 27 05/08/2025    BILITOTAL 0.2 05/08/2025    DBIL 0.12 05/08/2025    ALKPHOS 120 05/08/2025       Plan:  1. Continue Alyftrek (refill previously sent; sent mychart follow up)   2. Recheck hepatic panel in 1 month (previously ordered)        Gracie Allen PharmD  Cystic Fibrosis MTM Pharmacist  [facility]  Voicemail: [phone]

## 2025-05-08 NOTE — PATIENT INSTRUCTIONS
I've included a brief summary of our discussion and care plan from today's visit below.  Please review this information with your primary care provider.  _______________________________________________________________________      Kilo , It was wonderful getting a chance to meet with you to discuss your symptoms.     Here is the plan we discussed:  -- Try Pepcid (famotidine) up to twice daily  -- Get HIDA (gallbladder scan)  -- Get abdominal x-ray  -- Get lab work for celiac disease  -- Get upper endoscopy and colonoscopy      If you have any questions, please do not hesitate to contact our nurse, Michaelle Ly, at [phone]. Fax # [phone]. (Attn: Brenda Romero/Giovanna Ly)      Here are some numbers you may find useful now or in the future:    Imaging Appointments: [phone]    Schedule Follow Up Clinic Appts: [phone]   Clinic Fax Number: [phone]    Procedure/Endoscopy [phone]      - Contact us via EVRSThart or phone should these symptoms occur, particularly as we may advise further evaluation accordingly.     Return to GI Clinic in 6 months  - If you are unable to schedule this follow-up appointment today, please contact our  at [phone] within the next week to help set up this necessary appointment.    Sincerely,  Brenda Romero PA-C  Division of Gastroenterology, Hepatology & Nutrition  [facility]

## 2025-05-08 NOTE — PROGRESS NOTES
CYSTIC FIBROSIS GI CLINIC VISIT - NEW PATIENT    CC/REFERRING PROVIDER: Dr. Machado  REASON FOR CONSULTATION: exocrine pancreatic insufficiency    HPI: 49 year old male cystic fibrosis with moderate lung disease on [place], CFRD, pancreatic insufficiency. Previously had g-tube for enteral feeds, now removed with weight stability.    Here today for evaluation as he has continued to struggle with intermittent GI symptoms including episodes of significant RUQ pain lasting up to a few days at a time, more persistent symptoms since last episode in February with more mild abdominal discomfort which has been leading to decreased oral intake. and about 10 pound weight loss.  Recounts 6-7 episodes total over last 25 years or so.  During episodes reports sudden onset of right upper quadrant pain, typically at night, no radiation, described as tighnesst.  Not necessarily associated with eating or any food triggers.  The only thing that seems to help is making himself vomit and taking antacids and sitting upright.  No change in stool pattern prior to or during these episodes.  No significant nausea but vomiting can help.  He has history of RUDI (2011,2021), initially thought that these results may be related but again no change in bowel pattern as is typical for him with these.  Recent episode in February resolved but notes general discomfort since that is difficult for him to describe -some lower abdominal discomfort particularly with snacks.  Has stopped drinking his [employer] shakes twice daily as he is afraid these will cause him symptoms..  Notes that he has been sleeping more upright.  Denies any overt acid regurgitation or heartburn.  No dysphagia or odynophagia.  No nausea or vomiting.    Currently having about 3 stools per day, soft formed.  No significant straining.  Not always feeling fully empty.  No significant bloating.  No oily stools.    Had abdominal ultrasound in February that showed gallbladder sludge with  mild wall thickening in the context of incompletely distended state.     Last colonoscopy was 6/2022 with one 4 mm tubular adenoma polyp, diverticulosis, otherwise normal colon mucosa, random biopsy of colon normal.    No family history of GI malignancy, IBD, celiac disease.  Denies NSAID use.      GI Related Medications:  Bowel meds:  -- Miralax PRN  Enzymes:  -- Creon 24 - 8 caps with meals, 2-4 with snacks  Acid suppression:  -- none    ROS: 10pt ROS performed and otherwise negative.    PERTINENT PAST MEDICAL HISTORY:  As noted above.  Past Medical History:   Diagnosis Date    CF (cystic fibrosis) (H)     Exocrine pancreatic insufficiency     Nocardia infection     Pseudomonas infection     S/P gastrostomy (H) 2002        PREVIOUS ABDOMINAL/GYNECOLOGIC SURGERIES:  As noted above.  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 ACE Inhibitor not prescribed due to Risk for drug interaction 0 each 0    azithromycin (ZITHROMAX) 250 MG tablet TAKE 1 TABLET (250 MG) BY MOUTH DAILY 30 tablet 11    blood glucose (NO BRAND SPECIFIED) test strip Use to test blood sugar 6 times daily or as directed. To accompany: Blood Glucose Monitor Brands: per insurance. 600 strip 3    blood glucose (NO BRAND SPECIFIED) test strip Use to test blood sugar 6  "times daily. Any covered brand. Pt now using Contour Next, if covered. 600 strip 3    blood glucose monitoring (NO BRAND SPECIFIED) meter device kit Use to test blood sugar 6 times daily or as directed. Preferred blood glucose meter OR supplies to accompany: Blood Glucose Monitor Brands: per insurance. 1 kit 0    blood glucose monitoring (NO BRAND SPECIFIED) meter device kit Use to test blood sugar 6 times daily or as directed.whatever is covered 1 kit 1    blood glucose monitoring (ULTRA THIN 30G) lancets Use to test blood sugar 6 times daily or as directed.whatever is covered 540 each 3    budesonide-formoterol (SYMBICORT/BREYNA) 160-4.5 MCG/ACT Inhaler INHALE 2 PUFFS BY MOUTH TWICE DAILY 30.6 g 3    CALCIUM PO Take 500 mg by mouth 2 times daily Strength unknown.       Continuous Blood Gluc Sensor (FREESTYLE SUKHWINDER 14 DAY SENSOR) MISC Change every 14 days. 1 each 3    [facility]NA[facility] PATIENT Contact [facility] for patient specific medication information at [phone] on admission and discharge from the hospital.  Phones are answered 24 hours a day 7 days a week 365 days a year.    Providers - Choose \"CONTINUE HOME MED (no script)\" at discharge if patient treatment with home infusion will continue.      ferrous fumarate 65 mg, [place]. FE,-Vitamin C 125 mg (VITRON-C)  MG TABS tablet Take 1 tablet by mouth daily      Glucagon (BAQSIMI) 3 MG/DOSE nasal powder Spray 1 spray (3 mg) in nostril as needed (in emergency for hypoglycic reaction) in the event of unconscious hypoglycemia or hypoglycemic seizure. May repeat dose if no response after 15 minutes. 1 each 1    Glucagon (GVOKE HYPOPEN) 1 MG/0.2ML pen Inject the contents of 1 device under the skin into lower abdomen, outer thigh, or outer upper arm as needed for hypoglycemia. If no response after 15 minutes, additional 1 mg dose from a new device may be injected while waiting for emergency assistance. 0.4 mL 1    Insulin Aspart " FlexPen 100 UNIT/ML SOPN Inject 1 Units Subcutaneous 3 times daily With these instructions :INJECT 1 UNIT PER 20G CARB AT LUNCH AND SUPPER; INJECT 3 UNITS AT NIGHT WITH TUBE FEEDINGS. MAX 20 UNITS PER Day 15 mL 3    insulin glargine (LANTUS SOLOSTAR) 100 UNIT/ML pen INJECT 8 UNITS UNDER THE SKIN ONCE DAILY 15 mL 1    insulin lispro (HUMALOG KWIKPEN) 100 UNIT/ML (1 unit dial) KWIKPEN Inject 1 Units Subcutaneous 3 times daily With these instructions :INJECT 1 UNIT PER 20G CARB AT LUNCH AND SUPPER; INJECT 3 UNITS AT NIGHT WITH TUBE FEEDINGS. MAX 20 UNITS PER Day, Disp-15 mL, R-3, E-Prescribe 15 mL 3    insulin pen needle (BD OMI U/F) 32G X 4 MM miscellaneous Use 4 pen needles daily or as directed. Follow up visit needed. Call  to schedule. 400 each 3    levalbuterol (XOPENEX HFA) 45 MCG/ACT inhaler INHALE 2 PUFFS INTO THE LUNGS EVERY 6 HOURS AS NEEDED FOR SHORTNESS OF BREATH/DYSPNEA 75 g 3    levalbuterol (XOPENEX) 1.25 MG/3ML neb solution INHALE 1 VIAL BY MOUTH INTO THE LUNGS VIA NEBULIZATION FOUR TIMES DAILY 360 mL 11    lipase-protease-amylase (CREON) 50387-14090-736871 units CPEP per EC capsule TAKE 9-10 CAPSULES BY MOUTH WITH MEALS (3 MEALS/DAY) AND 2-4 CAPS WITH SNACKS (3 SNACKS/DAY) 3780 capsule 3    MULTIVITAMIN OR Take 1 tablet by mouth daily. Includes 5,000 units vitamin D and 400 units vitamin E. Per pt, formulated for CF pts.      mvw complete (PROBIOTIC FORMULATION) capsule TAKE ONE CAPSULE BY MOUTH ONCE DAILY 30 capsule 11    phytonadione (MEPHYTON) 5 MG tablet Take one tablet daily for 3 days as needed for hemoptysis. 6 tablet 4    polyethylene glycol (MIRALAX) 17 GM/Dose powder Take 17 g by mouth daily as needed for constipation      sodium chloride inhalant 7 % NEBU neb solution USE 4ML( 1 VIAL) VIA NEBULIZER TWICE DAILY 240 mL 5    thin (NO BRAND SPECIFIED) lancets Use with lanceting device 6x daily. To accompany: Blood Glucose Monitor Brands: per insurance. 600 each 3     vanzacaftor-tezacaftor-deutivacaftor (ALYFTREK) 10- MG tablet Take 2 tablets by mouth daily. Take with fat-containing food. 56 tablet 1     No current facility-administered medications for this visit.      - Anticoagulation/Antiplatelet Agents: none  Medications reviewed with patient today, see Medication List/Assessment for details.  No other NSAID/anticoagulation reported by patient.  No other OTC/herbal/supplements reported by patient.    SOCIAL HISTORY:  Tobacco: no  Alcohol: no  Drugs: no  Cannabis: no  Work/fun: Works in finances, goes into the office frequently    FAMILY HISTORY:  No colon/panc/esophageal/other GI CA, no other [place] or other HPS-related [place]. No IBD/celiac, no other AI/liver/thyroid disease. No known FH bleeding/clotting disorders.  Family History   Problem Relation Age of Onset    Heart Disease Maternal Grandmother     Heart Disease Maternal Grandfather     Heart Disease Paternal Grandmother     Diabetes No family hx of         PHYSICAL EXAMINATION:  Eyes: Sclera anicteric/injected  Ears/nose/mouth/throat: Normal oropharynx without ulcers or exudate, mucus membranes moist, hearing intact  Neck: supple, thyroid normal size  CV: No edema  Respiratory: Unlabored breathing  Lymph: No axillary, submandibular, supraclavicular or inguinal lymphadenopathy  Abd: Nondistended, +bs, no hepatosplenomegaly, nontender, no peritoneal signs   Skin: warm, perfused, no jaundice  Psych: Normal affect  MSK: Normal gait    PREVIOUS ENDOSCOPY:  Colonoscopy 6/27/22:  Impression:            - One 4 mm polyp at the splenic flexure, removed with                          a jumbo cold forceps. Resected and retrieved.                          - Diverticulosis in the descending colon, in the                          transverse colon and in the ascending colon.                          - Normal mucosa in the entire examined colon. Biopsied.   A. RIGHT COLON, BIOPSY:  Colonic mucosa with no evidence of microscopic or  chronic colitis or other significant histologic abnormality      B. COLON, SPLENIC FLEXURE, POLYP, BIOPSY:  Tubular adenoma; negative for high-grade dysplasia    PERTINENT STUDIES:  Imaging  Abdominal US 2/18/25 -  IMPRESSION:  1.  Gallbladder sludge and mild gallbladder wall thickening, of uncertain clinical significance given its incompletely distended state.  2.  Otherwise no acute process demonstrated.    Labs  LFT and GGT wnl 4/2025 (Alk Phos with prior mild elevation)    ASSESSMENT/PLAN:    # Intermittent severe right upper quadrant pain  # Abdominal discomfort, dyspepsia  Patient with 6-7 episodes of severe right upper quadrant pain since 2000 (25 years) with most recent episode in February with some persistent symptoms since including lower abdominal discomfort with eating, leading to decreased intake and weight loss.  During acute episodes he gets severe right upper quadrant tightness that is only relieved by vomiting, antacids and sitting upright.  Recent right upper quadrant ultrasound showed sludge and some possible wall thickening in the context of not fully distended gallbladder.  Previous elevation in alk phos which is now normalized.  He also notes a sensation of things not moving through.  Differential for symptoms includes biliary etiology, underlying constipation with history of RUDI, GERD/gastritis/PUD, gastric outlet obstruction/intermittent partial bowel obstruction, pancreatic etiology, celiac, other.  He is due for colonoscopy for surveillance, we will proceed with this and get upper endoscopy for upper GI evaluation at the same time.  Will also get KUB to assess for stool burden.  To further assess his gallbladder will get HIDA scan.  Will also get lab work for celiac serologies and if elevations take biopsies during upper endoscopy.  Pending findings/ongoing symptoms consider cross-sectional imaging with CT versus MR enterography.  For symptom management now given ongoing symptoms can try  Pepcid to see if this is helpful.  If KUB shows stool burden would recommend more regular bowel regimen especially in the context of prior RUDI.  -- Try Pepcid (famotidine) up to twice daily  -- Get HIDA (gallbladder scan)  -- Get abdominal x-ray  -- Get lab work for celiac disease  -- Get upper endoscopy and colonoscopy    #CRC Screening  Colonoscopy 6/2022 with with 4 mm polyp, recommended repeat in 3 years (6/2025) --- ordered today      RTC 6 months, sooner if symptomatic.     Thank you for this consultation. It was a pleasure to participate in the care of this patient; please contact us with any further questions.    70 Minutes was spent on the date of the encounter during chart review, history and exam, documentation, and further activities as noted     The longitudinal plan of care for the diagnosis(es)/condition(s) as documented were addressed during this visit. Due to the added complexity in care, I will continue to support Kilo in the subsequent management and with ongoing continuity of care.    Brenda Romero PA-C  Division of Gastroenterology, Hepatology & Nutrition  [facility]      No orders of the defined types were placed in this encounter.

## 2025-05-08 NOTE — LETTER
5/8/2025      Nico Morales  [address] [place] [address]      Dear Colleague,    Thank you for referring your patient, Nico Morales, to the [facility] [place]. Please see a copy of my visit note below.    CYSTIC FIBROSIS GI CLINIC VISIT - NEW PATIENT    CC/REFERRING PROVIDER: Dr. Machado  REASON FOR CONSULTATION: exocrine pancreatic insufficiency    HPI: 49 year old male cystic fibrosis with moderate lung disease on [place], CFRD, pancreatic insufficiency. Previously had g-tube for enteral feeds, now removed with weight stability.    Here today for evaluation as he has continued to struggle with intermittent GI symptoms including episodes of significant RUQ pain lasting up to a few days at a time, more persistent symptoms since last episode in February with more mild abdominal discomfort which has been leading to decreased oral intake. and about 10 pound weight loss.  Recounts 6-7 episodes total over last 25 years or so.  During episodes reports sudden onset of right upper quadrant pain, typically at night, no radiation, described as tighnesst.  Not necessarily associated with eating or any food triggers.  The only thing that seems to help is making himself vomit and taking antacids and sitting upright.  No change in stool pattern prior to or during these episodes.  No significant nausea but vomiting can help.  He has history of RUDI (2011,2021), initially thought that these results may be related but again no change in bowel pattern as is typical for him with these.  Recent episode in February resolved but notes general discomfort since that is difficult for him to describe -some lower abdominal discomfort particularly with snacks.  Has stopped drinking his [employer] shakes twice daily as he is afraid these will cause him symptoms..  Notes that he has been sleeping more upright.  Denies any overt acid regurgitation or heartburn.  No dysphagia or  odynophagia.  No nausea or vomiting.    Currently having about 3 stools per day, soft formed.  No significant straining.  Not always feeling fully empty.  No significant bloating.  No oily stools.    Had abdominal ultrasound in February that showed gallbladder sludge with mild wall thickening in the context of incompletely distended state.     Last colonoscopy was 6/2022 with one 4 mm tubular adenoma polyp, diverticulosis, otherwise normal colon mucosa, random biopsy of colon normal.    No family history of GI malignancy, IBD, celiac disease.  Denies NSAID use.      GI Related Medications:  Bowel meds:  -- Miralax PRN  Enzymes:  -- Creon 24 - 8 caps with meals, 2-4 with snacks  Acid suppression:  -- none    ROS: 10pt ROS performed and otherwise negative.    PERTINENT PAST MEDICAL HISTORY:  As noted above.  Past Medical History:   Diagnosis Date     CF (cystic fibrosis) (H)      Exocrine pancreatic insufficiency      Nocardia infection      Pseudomonas infection      S/P gastrostomy (H) 2002        PREVIOUS ABDOMINAL/GYNECOLOGIC SURGERIES:  As noted above.  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 ACE Inhibitor not prescribed due to Risk for drug interaction 0 each 0     azithromycin (ZITHROMAX) 250 MG tablet TAKE 1 TABLET  "(250 MG) BY MOUTH DAILY 30 tablet 11     blood glucose (NO BRAND SPECIFIED) test strip Use to test blood sugar 6 times daily or as directed. To accompany: Blood Glucose Monitor Brands: per insurance. 600 strip 3     blood glucose (NO BRAND SPECIFIED) test strip Use to test blood sugar 6 times daily. Any covered brand. Pt now using Contour Next, if covered. 600 strip 3     blood glucose monitoring (NO BRAND SPECIFIED) meter device kit Use to test blood sugar 6 times daily or as directed. Preferred blood glucose meter OR supplies to accompany: Blood Glucose Monitor Brands: per insurance. 1 kit 0     blood glucose monitoring (NO BRAND SPECIFIED) meter device kit Use to test blood sugar 6 times daily or as directed.whatever is covered 1 kit 1     blood glucose monitoring (ULTRA THIN 30G) lancets Use to test blood sugar 6 times daily or as directed.whatever is covered 540 each 3     budesonide-formoterol (SYMBICORT/BREYNA) 160-4.5 MCG/ACT Inhaler INHALE 2 PUFFS BY MOUTH TWICE DAILY 30.6 g 3     CALCIUM PO Take 500 mg by mouth 2 times daily Strength unknown.        Continuous Blood Gluc Sensor (FREESTYLE SUKHWINDER 14 DAY SENSOR) MISC Change every 14 days. 1 each 3     [facility]NA[facility] PATIENT Contact [facility] for patient specific medication information at [phone] on admission and discharge from the hospital.  Phones are answered 24 hours a day 7 days a week 365 days a year.    Providers - Choose \"CONTINUE HOME MED (no script)\" at discharge if patient treatment with home infusion will continue.       ferrous fumarate 65 mg, [place]. FE,-Vitamin C 125 mg (VITRON-C)  MG TABS tablet Take 1 tablet by mouth daily       Glucagon (BAQSIMI) 3 MG/DOSE nasal powder Spray 1 spray (3 mg) in nostril as needed (in emergency for hypoglycic reaction) in the event of unconscious hypoglycemia or hypoglycemic seizure. May repeat dose if no response after 15 minutes. 1 each 1     Glucagon (GVOKE HYPOPEN) 1 " MG/0.2ML pen Inject the contents of 1 device under the skin into lower abdomen, outer thigh, or outer upper arm as needed for hypoglycemia. If no response after 15 minutes, additional 1 mg dose from a new device may be injected while waiting for emergency assistance. 0.4 mL 1     Insulin Aspart FlexPen 100 UNIT/ML SOPN Inject 1 Units Subcutaneous 3 times daily With these instructions :INJECT 1 UNIT PER 20G CARB AT LUNCH AND SUPPER; INJECT 3 UNITS AT NIGHT WITH TUBE FEEDINGS. MAX 20 UNITS PER Day 15 mL 3     insulin glargine (LANTUS SOLOSTAR) 100 UNIT/ML pen INJECT 8 UNITS UNDER THE SKIN ONCE DAILY 15 mL 1     insulin lispro (HUMALOG KWIKPEN) 100 UNIT/ML (1 unit dial) KWIKPEN Inject 1 Units Subcutaneous 3 times daily With these instructions :INJECT 1 UNIT PER 20G CARB AT LUNCH AND SUPPER; INJECT 3 UNITS AT NIGHT WITH TUBE FEEDINGS. MAX 20 UNITS PER Day, Disp-15 mL, R-3, E-Prescribe 15 mL 3     insulin pen needle (BD OMI U/F) 32G X 4 MM miscellaneous Use 4 pen needles daily or as directed. Follow up visit needed. Call  to schedule. 400 each 3     levalbuterol (XOPENEX HFA) 45 MCG/ACT inhaler INHALE 2 PUFFS INTO THE LUNGS EVERY 6 HOURS AS NEEDED FOR SHORTNESS OF BREATH/DYSPNEA 75 g 3     levalbuterol (XOPENEX) 1.25 MG/3ML neb solution INHALE 1 VIAL BY MOUTH INTO THE LUNGS VIA NEBULIZATION FOUR TIMES DAILY 360 mL 11     lipase-protease-amylase (CREON) 40993-48539-655680 units CPEP per EC capsule TAKE 9-10 CAPSULES BY MOUTH WITH MEALS (3 MEALS/DAY) AND 2-4 CAPS WITH SNACKS (3 SNACKS/DAY) 3780 capsule 3     MULTIVITAMIN OR Take 1 tablet by mouth daily. Includes 5,000 units vitamin D and 400 units vitamin E. Per pt, formulated for CF pts.       mvw complete (PROBIOTIC FORMULATION) capsule TAKE ONE CAPSULE BY MOUTH ONCE DAILY 30 capsule 11     phytonadione (MEPHYTON) 5 MG tablet Take one tablet daily for 3 days as needed for hemoptysis. 6 tablet 4     polyethylene glycol (MIRALAX) 17 GM/Dose powder Take 17 g by  mouth daily as needed for constipation       sodium chloride inhalant 7 % NEBU neb solution USE 4ML( 1 VIAL) VIA NEBULIZER TWICE DAILY 240 mL 5     thin (NO BRAND SPECIFIED) lancets Use with lanceting device 6x daily. To accompany: Blood Glucose Monitor Brands: per insurance. 600 each 3     vanzacaftor-tezacaftor-deutivacaftor (ALYFTREK) 10- MG tablet Take 2 tablets by mouth daily. Take with fat-containing food. 56 tablet 1     No current facility-administered medications for this visit.      - Anticoagulation/Antiplatelet Agents: none  Medications reviewed with patient today, see Medication List/Assessment for details.  No other NSAID/anticoagulation reported by patient.  No other OTC/herbal/supplements reported by patient.    SOCIAL HISTORY:  Tobacco: no  Alcohol: no  Drugs: no  Cannabis: no  Work/fun: Works in finances, goes into the office frequently    FAMILY HISTORY:  No colon/panc/esophageal/other GI CA, no other [place] or other HPS-related [place]. No IBD/celiac, no other AI/liver/thyroid disease. No known FH bleeding/clotting disorders.  Family History   Problem Relation Age of Onset     Heart Disease Maternal Grandmother      Heart Disease Maternal Grandfather      Heart Disease Paternal Grandmother      Diabetes No family hx of         PHYSICAL EXAMINATION:  Eyes: Sclera anicteric/injected  Ears/nose/mouth/throat: Normal oropharynx without ulcers or exudate, mucus membranes moist, hearing intact  Neck: supple, thyroid normal size  CV: No edema  Respiratory: Unlabored breathing  Lymph: No axillary, submandibular, supraclavicular or inguinal lymphadenopathy  Abd: Nondistended, +bs, no hepatosplenomegaly, nontender, no peritoneal signs   Skin: warm, perfused, no jaundice  Psych: Normal affect  MSK: Normal gait    PREVIOUS ENDOSCOPY:  Colonoscopy 6/27/22:  Impression:            - One 4 mm polyp at the splenic flexure, removed with                          a jumbo cold forceps. Resected and retrieved.                           - Diverticulosis in the descending colon, in the                          transverse colon and in the ascending colon.                          - Normal mucosa in the entire examined colon. Biopsied.   A. RIGHT COLON, BIOPSY:  Colonic mucosa with no evidence of microscopic or chronic colitis or other significant histologic abnormality      B. COLON, SPLENIC FLEXURE, POLYP, BIOPSY:  Tubular adenoma; negative for high-grade dysplasia    PERTINENT STUDIES:  Imaging  Abdominal US 2/18/25 -  IMPRESSION:  1.  Gallbladder sludge and mild gallbladder wall thickening, of uncertain clinical significance given its incompletely distended state.  2.  Otherwise no acute process demonstrated.    Labs  LFT and GGT wnl 4/2025 (Alk Phos with prior mild elevation)    ASSESSMENT/PLAN:    # Intermittent severe right upper quadrant pain  # Abdominal discomfort, dyspepsia  Patient with 6-7 episodes of severe right upper quadrant pain since 2000 (25 years) with most recent episode in February with some persistent symptoms since including lower abdominal discomfort with eating, leading to decreased intake and weight loss.  During acute episodes he gets severe right upper quadrant tightness that is only relieved by vomiting, antacids and sitting upright.  Recent right upper quadrant ultrasound showed sludge and some possible wall thickening in the context of not fully distended gallbladder.  Previous elevation in alk phos which is now normalized.  He also notes a sensation of things not moving through.  Differential for symptoms includes biliary etiology, underlying constipation with history of RUDI, GERD/gastritis/PUD, gastric outlet obstruction/intermittent partial bowel obstruction, pancreatic etiology, celiac, other.  He is due for colonoscopy for surveillance, we will proceed with this and get upper endoscopy for upper GI evaluation at the same time.  Will also get KUB to assess for stool burden.  To further assess his  gallbladder will get HIDA scan.  Will also get lab work for celiac serologies and if elevations take biopsies during upper endoscopy.  Pending findings/ongoing symptoms consider cross-sectional imaging with CT versus MR enterography.  For symptom management now given ongoing symptoms can try Pepcid to see if this is helpful.  If KUB shows stool burden would recommend more regular bowel regimen especially in the context of prior RUDI.  -- Try Pepcid (famotidine) up to twice daily  -- Get HIDA (gallbladder scan)  -- Get abdominal x-ray  -- Get lab work for celiac disease  -- Get upper endoscopy and colonoscopy    #CRC Screening  Colonoscopy 6/2022 with with 4 mm polyp, recommended repeat in 3 years (6/2025) --- ordered today      RTC 6 months, sooner if symptomatic.     Thank you for this consultation. It was a pleasure to participate in the care of this patient; please contact us with any further questions.    70 Minutes was spent on the date of the encounter during chart review, history and exam, documentation, and further activities as noted     The longitudinal plan of care for the diagnosis(es)/condition(s) as documented were addressed during this visit. Due to the added complexity in care, I will continue to support Kilo in the subsequent management and with ongoing continuity of care.    Brenda Romero PA-C  Division of Gastroenterology, Hepatology & Nutrition  [facility]      No orders of the defined types were placed in this encounter.         Again, thank you for allowing me to participate in the care of your patient.        Sincerely,        Brenda Romero PA-C    Electronically signed

## 2025-05-13 ENCOUNTER — VIRTUAL VISIT (OUTPATIENT)
Dept: ENDOCRINOLOGY | Facility: CLINIC | Age: 50
End: 2025-05-13
Attending: INTERNAL MEDICINE
Payer: COMMERCIAL

## 2025-05-13 DIAGNOSIS — E84.8 DIABETES MELLITUS RELATED TO CF (CYSTIC FIBROSIS) (H): ICD-10-CM

## 2025-05-13 DIAGNOSIS — M85.9 LOW BONE DENSITY: Primary | ICD-10-CM

## 2025-05-13 DIAGNOSIS — E08.9 DIABETES MELLITUS RELATED TO CF (CYSTIC FIBROSIS) (H): ICD-10-CM

## 2025-05-13 PROCEDURE — 98006 SYNCH AUDIO-VIDEO EST MOD 30: CPT | Performed by: INTERNAL MEDICINE

## 2025-05-13 PROCEDURE — 1126F AMNT PAIN NOTED NONE PRSNT: CPT | Mod: 95 | Performed by: INTERNAL MEDICINE

## 2025-05-13 PROCEDURE — G2211 COMPLEX E/M VISIT ADD ON: HCPCS | Performed by: INTERNAL MEDICINE

## 2025-05-13 NOTE — LETTER
5/13/2025       RE: Nico Morales  [address]  [place] [address]     Dear Colleague,    Thank you for referring your patient, Nico Morales, to the [facility] [place] at [facility]. Please see a copy of my visit note below.      CF Endocrinology Return Consultation:  Diabetes  :   Patient: Nico Morales MRN# [id number]   YOB: 1975 Age: 49 year old   Date of Visit: 05/13/2025    Dear Dr. Jaye Machado:    I had the pleasure of seeing your patient, Nico Morales in the CF Endocrinology Clinic, [facility], for a return consultation regarding CFRD and low bone density.           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  Verbal overview and written information provided.          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     Priority: Medium     Encounter for long-term (current) use of antibiotics 05/20/2013     Priority: Medium     Prostatitis 05/20/2013     Priority: Medium     updating diagnosis code for icd10 cutover       Pseudomonas infection      Priority: Medium     Cystic fibrosis  (H) 11/24/2010     Priority: Medium     Sweat Test   Date: 4/25/78    Lab: [facility]   Sample #1:  110mmol  Sample #2:  106mmol    Genetics  Date: 4/9/90  [id number]+1G->T         CARDIOVASCULAR SCREENING; LDL GOAL LESS THAN 160 10/31/2010     Priority: Medium            HPI:   Nico is a 49 year old male with Cystic Fibrosis Related Diabetes Mellitus (CFRD).    I have reviewed the available past laboratory evaluations, imaging studies, and medical records available to me at this visit.  History was obtained from the patient and the medical record.  I have reviewed the notes of the pulmonary care team.    He is doing well overall.    Undergoing workup for gallbladder disease  Checks fingerstick blood glucose on and off  Most recent A1c was 6.5%  Reports that hypoglycemia is rare and occurs when he is on doxycycline  He usually reduce his Lantus dose while on doxycycline    Patient is on Trikafta and reports being stable from pulmonary point of view.      History of low bone density  Started on pamidronate in December 2018.  Has received 3 doses.  Denies any muscle ache or pains after the IV bisphosphonate infusions.  Reports that he was febrile briefly after the first 2 doses however did not have any problems after the third dose.  His last dose of pamidronate was in October 2019.  Subsequent infusions were put on hold due the pandemic.   Pamidronate was subsequently switched to Reclast.    First dose of Reclast was in June 2021  and second dose was given Feb 2023  Received the third dose in February 2024  Fourth dose April 2025   Denies any side effects from the Reclast.  Significant increase in bone density on the last DEXA in 2023    Most recent DEXA 5/8/2025  Lumbar spine Z-score -1  Left hip Z-score -1.3  Overall bone density stable     A1c:  Hemoglobin A1C   Date Value Ref Range Status   02/11/2025 6.5 (H) <5.7 % Final     Comment:     Normal <5.7%   Prediabetes 5.7-6.4%    Diabetes 6.5% or higher     Note:  Adopted from ADA consensus guidelines.   05/04/2021 6.2 (H) 0 - 5.6 % Final     Comment:     Normal <5.7% Prediabetes 5.7-6.4%  Diabetes 6.5% or higher - adopted from ADA   consensus guidelines.         Current insulin regimen:   Lantus 8 units daily in AM   Humalog 1 unit / 15 gm of carb, ~3-4 times daily. Ave dose around 3 units    Insulin administration site(s): abd or thigh          Past Medical History:     Past Medical History:   Diagnosis Date     CF (cystic fibrosis) (H)      Exocrine pancreatic insufficiency      Nocardia infection      Pseudomonas infection      S/P gastrostomy (H) 2002            Past Surgical History:     Past Surgical History:   Procedure Laterality Date     COLONOSCOPY N/A 5/21/2018    Procedure: COMBINED COLONOSCOPY, SINGLE OR MULTIPLE BIOPSY/POLYPECTOMY BY BIOPSY;  Cystic fibrosis, 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;  Surgeon: Margarito Bowman MD;  Location: [facility]     COLONOSCOPY N/A 6/27/2022    Procedure: COLONOSCOPY, WITH POLYPECTOMY AND BIOPSY;  Surgeon: Margarito Bowman MD;  Location: [facility]     GASTROSTOMY TUBE  2002     IR PULMONARY EMBOLIZATION  7/20/2001     PICC INSERTION Left 01/22/2018    4Fr SL BioFlo PICC, 46cm (5cm external) in the L basilic vein w/ tip in the low SVC               Social History:     Social History     Social History Narrative    8/15/2019 - .  Lives with his English Bulldog (Jefry) in a house that he designed in [place].  No children.              Family History:     Family History   Problem Relation Age of Onset     Heart Disease Maternal Grandmother      Heart Disease Maternal Grandfather      Heart Disease Paternal Grandmother      Diabetes No family hx of             Allergies:     Allergies   Allergen Reactions     Piperacillin Sod-Tazobactam So Hives     Delayed type reaction in intradermal tests to Zosyn (Piperacillin/Tazobactam). Might be specific reaction to the Zosyn without cross-reactions to Penicillins (beta-Lactam core).  Avoid in future Zosyn. However, I would avoid the Penicillins and use instead Penems or Cephalosporines. If Penicillins are the best option, then they can be used under observation.     Pulmozyme [Dornase Trever] Other (See Comments)     Chest pain and fever             Medications:     Current Outpatient Rx   Medication Sig Dispense Refill     ACE NOT PRESCRIBED, INTENTIONAL, 1 each continuous prn. ACE Inhibitor not prescribed due to Risk for drug interaction 0 each 0     azithromycin (ZITHROMAX) 250 MG tablet TAKE 1 TABLET (250 MG) BY MOUTH DAILY 30 tablet 11     blood glucose (NO BRAND SPECIFIED) test strip Use to test blood sugar 6 times daily or as directed. To accompany: Blood Glucose Monitor Brands: per insurance. 600 strip 3     blood glucose (NO BRAND SPECIFIED) test strip Use to test blood sugar 6 times daily. Any covered brand. Pt now using Contour Next, if covered. 600 strip 3     blood glucose monitoring (NO BRAND SPECIFIED) meter device kit Use to test blood sugar 6 times daily or as directed. Preferred blood glucose meter OR supplies to accompany: Blood Glucose Monitor Brands: per insurance. 1 kit 0     blood glucose monitoring (NO BRAND SPECIFIED) meter device kit Use to test blood sugar 6 times daily or as directed.whatever is covered 1 kit 1     blood glucose monitoring (ULTRA THIN 30G) lancets Use to test blood sugar 6 times daily or as directed.whatever is covered 540 each 3     budesonide-formoterol (SYMBICORT/BREYNA) 160-4.5 MCG/ACT Inhaler INHALE 2 PUFFS BY MOUTH TWICE DAILY 30.6 g 3     CALCIUM PO Take 500 mg by mouth 2 times daily Strength unknown.        Continuous Blood Gluc Sensor (FREESTYLE SUKHWINDER 14 DAY SENSOR) MISC Change every 14 days. 1 each 3     [place] [facility]NAGED PATIENT Contact [facility] for patient specific medication information at [phone] on admission and discharge from the hospital.  Phones are answered 24 hours a day 7 days a week 365 days a  "year.    Providers - Choose \"CONTINUE HOME MED (no script)\" at discharge if patient treatment with home infusion will continue.       ferrous fumarate 65 mg, [place]. FE,-Vitamin C 125 mg (VITRON-C)  MG TABS tablet Take 1 tablet by mouth daily       Glucagon (BAQSIMI) 3 MG/DOSE nasal powder Spray 1 spray (3 mg) in nostril as needed (in emergency for hypoglycic reaction) in the event of unconscious hypoglycemia or hypoglycemic seizure. May repeat dose if no response after 15 minutes. 1 each 1     Glucagon (GVOKE HYPOPEN) 1 MG/0.2ML pen Inject the contents of 1 device under the skin into lower abdomen, outer thigh, or outer upper arm as needed for hypoglycemia. If no response after 15 minutes, additional 1 mg dose from a new device may be injected while waiting for emergency assistance. 0.4 mL 1     Insulin Aspart FlexPen 100 UNIT/ML SOPN Inject 1 Units Subcutaneous 3 times daily With these instructions :INJECT 1 UNIT PER 20G CARB AT LUNCH AND SUPPER; INJECT 3 UNITS AT NIGHT WITH TUBE FEEDINGS. MAX 20 UNITS PER Day 15 mL 3     insulin glargine (LANTUS SOLOSTAR) 100 UNIT/ML pen INJECT 8 UNITS UNDER THE SKIN ONCE DAILY 15 mL 1     insulin lispro (HUMALOG KWIKPEN) 100 UNIT/ML (1 unit dial) KWIKPEN Inject 1 Units Subcutaneous 3 times daily With these instructions :INJECT 1 UNIT PER 20G CARB AT LUNCH AND SUPPER; INJECT 3 UNITS AT NIGHT WITH TUBE FEEDINGS. MAX 20 UNITS PER Day, Disp-15 mL, R-3, E-Prescribe 15 mL 3     insulin pen needle (BD OMI U/F) 32G X 4 MM miscellaneous Use 4 pen needles daily or as directed. Follow up visit needed. Call  to schedule. 400 each 3     levalbuterol (XOPENEX HFA) 45 MCG/ACT inhaler INHALE 2 PUFFS INTO THE LUNGS EVERY 6 HOURS AS NEEDED FOR SHORTNESS OF BREATH/DYSPNEA 75 g 3     levalbuterol (XOPENEX) 1.25 MG/3ML neb solution INHALE 1 VIAL BY MOUTH INTO THE LUNGS VIA NEBULIZATION FOUR TIMES DAILY 360 mL 11     lipase-protease-amylase (CREON) 82841-20623-218422 units CPEP per " EC capsule TAKE 9-10 CAPSULES BY MOUTH WITH MEALS (3 MEALS/DAY) AND 2-4 CAPS WITH SNACKS (3 SNACKS/DAY) 3780 capsule 3     MULTIVITAMIN OR Take 1 tablet by mouth daily. Includes 5,000 units vitamin D and 400 units vitamin E. Per pt, formulated for CF pts.       mvw complete (PROBIOTIC FORMULATION) capsule TAKE ONE CAPSULE BY MOUTH ONCE DAILY 30 capsule 11     phytonadione (MEPHYTON) 5 MG tablet Take one tablet daily for 3 days as needed for hemoptysis. (Patient not taking: Reported on 5/8/2025) 6 tablet 4     polyethylene glycol (MIRALAX) 17 GM/Dose powder Take 17 g by mouth daily as needed for constipation       sodium chloride inhalant 7 % NEBU neb solution USE 4ML( 1 VIAL) VIA NEBULIZER TWICE DAILY 240 mL 5     thin (NO BRAND SPECIFIED) lancets Use with lanceting device 6x daily. To accompany: Blood Glucose Monitor Brands: per insurance. 600 each 3     vanzacaftor-tezacaftor-deutivacaftor (ALYFTREK) 10- MG tablet Take 2 tablets by mouth daily. Take with fat-containing food. 56 tablet 1             Review of Systems:               Physical Exam:   There were no vitals taken for this visit.    GENERAL: Healthy, alert and no distress  EYES: Eyes grossly normal to inspection.  No discharge or erythema, or obvious scleral/conjunctival abnormalities.  RESP: No audible wheeze, cough, or visible cyanosis.  No visible retractions or increased work of breathing.    NEURO: Cranial nerves grossly intact.  Mentation and speech appropriate for age.  PSYCH: affect normal/bright, judgement and insight intact, normal speech and appearance well-groomed.        Laboratory results:     TSH   Date Value Ref Range Status   02/11/2025 1.02 0.30 - 4.20 uIU/mL Final   06/20/2022 1.08 0.40 - 4.00 mU/L Final   05/04/2021 1.49 0.40 - 4.00 mU/L Final     Triiodothyronine (T3)   Date Value Ref Range Status   06/25/2010 123 60 - 181 ng/dL Final     Testosterone Total   Date Value Ref Range Status   02/11/2025 564 240 - 950 ng/dL Final    05/04/2021 418 240 - 950 ng/dL Final     Comment:     This test was developed and its performance characteristics determined by the   [facility].   It has not been cleared or approved by the FDA. The laboratory is regulated   under CLIA as qualified to perform high-complexity testing. This test is used   for clinical purposes. It should not be regarded as investigational or for   research.       Cholesterol   Date Value Ref Range Status   02/11/2025 147 <200 mg/dL Final   05/04/2021 159 <200 mg/dL Final     Albumin Urine mg/L   Date Value Ref Range Status   02/11/2025 <12.0 mg/L Final     Comment:     The reference ranges have not been established in urine albumin. The results should be integrated into the clinical context for interpretation.   06/20/2022 10 mg/L Final   05/04/2021 15 mg/L Final     Triglycerides   Date Value Ref Range Status   02/11/2025 98 <150 mg/dL Final   05/04/2021 115 <150 mg/dL Final     HDL Cholesterol   Date Value Ref Range Status   05/04/2021 40 >39 mg/dL Final     Direct Measure HDL   Date Value Ref Range Status   02/11/2025 42 >=40 mg/dL Final     LDL Cholesterol Calculated   Date Value Ref Range Status   02/11/2025 85 <100 mg/dL Final   05/04/2021 96 <100 mg/dL Final     Comment:     Desirable:       <100 mg/dl     Cholesterol/HDL Ratio   Date Value Ref Range Status   06/26/2013 4.4 0.0 - 5.0 Final     Non HDL Cholesterol   Date Value Ref Range Status   02/11/2025 105 <130 mg/dL Final   05/04/2021 119 <130 mg/dL Final           Diabetes Health Maintenance    Date of Diabetes Diagnosis: 12/2015    Special Notes (if any): He was a diagnosed with adrenal insufficiency based on inadequate response on a ACTH stim test. Adrenal insufficiency is thought to be related to  itraconazole and Symbicort use.  Last ACTH stim test was normal and he has been off chronic prednisone.    Date Last Eye Exam: < 1 year     Date Last  "Dental Appointment: every 6 months    Dates of Episodes Severe* Hypoglycemia (month/year, cumulative, ongoing, assess each visit): never              *Severe=patient unconscious, seizure, unable to help self    Last 25-Vitamin D (every year): 55    Last DXA, lowest Z-score (every 2 years): -1.7 last DEXA 2023       ?Bisphosphonates (yes/no): yes     Last Urine Microalbumin (every year):      No results found for: \"MICROALBUMIN\"     Last Testosterone:   Testosterone Total   Date Value Ref Range Status   02/11/2025 564 240 - 950 ng/dL Final   05/04/2021 418 240 - 950 ng/dL Final     Comment:     This test was developed and its performance characteristics determined by the   [facility].   It has not been cleared or approved by the FDA. The laboratory is regulated   under CLIA as qualified to perform high-complexity testing. This test is used   for clinical purposes. It should not be regarded as investigational or for   research.        On testosterone therapy (yes/no)? No          Assessment and Plan:   Nico is a 49 year old male with CFRD    CFRD: Overall good control   Continue current regimen  Patient has CGM at home, consider using CGM periodically    Low bone density: Started on pamidronate in 2018 but missed doses in 2020 due to COVID pandemic.    Received first dose of Reclast in June 2021.   Has received Reclast doses in 2023, 2024 and 2025  Bone density was stable and Z-scores on most recent DEXA in normal range  Discussed drug holiday  Will start drug holiday from bisphosphonate therapy at this time  Counseled about calcium and vitamin D supplementation and weightbearing exercise as tolerated    RTC in 1 year    JULIETA Tate    Note: Chart documentation done in part with Dragon Voice Recognition software. Although reviewed after completion, some word and grammatical errors may remain.  Please consider this when interpreting " information in this chart    Video visit done using SongFlame  Video start time 9:55 AM  Video visit end time 10:16 AM  Patient location: Home  Provider location: Off-site    TYLER REYES        Again, thank you for allowing me to participate in the care of your patient.      Sincerely,    Diana Lee MD

## 2025-05-13 NOTE — NURSING NOTE
Current patient location: [address] [place] [address]  [place] [address]    Is the patient currently in the state of [place]? YES    Visit mode: VIDEO    If the visit is dropped, the patient can be reconnected by:VIDEO VISIT: Text to cell phone:   Telephone Information:   Mobile [phone]       Will anyone else be joining the visit? NO  (If patient encounters technical issues they should call [phone]    Are changes needed to the allergy or medication list? No and Pt stated no med changes    Are refills needed on medications prescribed by this physician? NO    Rooming Documentation:  Not applicable    Reason for visit: RECHANA DELEON

## 2025-05-14 LAB
GLIADIN IGA SER-ACNC: 1.3 U/ML
GLIADIN IGG SER-ACNC: <0.6 U/ML
TTG IGA SER-ACNC: 0.6 U/ML
TTG IGG SER-ACNC: 0.8 U/ML

## 2025-05-21 ENCOUNTER — TELEPHONE (OUTPATIENT)
Dept: GASTROENTEROLOGY | Facility: CLINIC | Age: 50
End: 2025-05-21
Payer: COMMERCIAL

## 2025-05-21 NOTE — TELEPHONE ENCOUNTER
"Endoscopy Scheduling Screen    Caller: patient    Have you had any respiratory illness or flu-like symptoms in the last 10 days?  No    What is your communication preference for Instructions and/or Bowel Prep?   MyChart    What insurance is in the chart?  Other:  BCBS    Ordering/Referring Provider:     PETE DÍAZ      (If ordering provider performs procedure, schedule with ordering provider unless otherwise instructed. )    BMI: Estimated body mass index is 20.85 kg/m  as calculated from the following:    Height as of 4/3/25: 1.727 m (5' 8\").    Weight as of 5/8/25: 62.2 kg (137 lb 1.6 oz).     Sedation Ordered  moderate sedation.   If patient BMI > 50 do not schedule in [facility].    If patient BMI > 45 do not schedule at [facility].    Are you taking methadone or Suboxone?  NO, No RN review required.    Have you been diagnosed and are being treated for severe PTSD or severe anxiety?  NO, No RN review required.    Are you taking any prescription medications for pain 3 or more times per week?   NO, No RN review required.    Do you have a history of malignant hyperthermia?  No    (Females) Are you currently pregnant?        Have you been diagnosed or told you have pulmonary hypertension?   No    Do you have an LVAD?  No    Have you been told you have moderate to severe sleep apnea?  No.    Have you been told you have COPD, asthma, or any other lung disease?  No    Has your doctor ordered any cardiac tests like echo, angiogram, stress test, ablation, or EKG, that you have not completed yet?  No    Do you  have a history of any heart conditions?  No     Have you ever had or are you waiting for an organ transplant?  No. Continue scheduling, no site restrictions.    Have you had a stroke or transient ischemic attack (TIA aka \"mini stroke\") in the last 2 years?   No.    Have you been diagnosed with or been told you have cirrhosis of the liver?   No.    Are you currently on dialysis?   No    Do you need assistance " "transferring?   No    BMI: Estimated body mass index is 20.85 kg/m  as calculated from the following:    Height as of 4/3/25: 1.727 m (5' 8\").    Weight as of 5/8/25: 62.2 kg (137 lb 1.6 oz).     Is patients BMI > 40 and scheduling location [facility]?  No    Do you take an injectable or oral medication for weight loss or diabetes (excluding insulin)?  No    Do you take the medication Naltrexone?  No    Do you take blood thinners?  No       Prep   Are you currently on dialysis or do you have chronic kidney disease?  No    Do you have a diagnosis of diabetes?  Yes (Golytely Prep)    Do you have a diagnosis of cystic fibrosis (CF)?  Yes (Extended Prep)    On a regular basis do you go 3 -5 days between bowel movements?  No    BMI > 40?  No    Preferred Pharmacy:    [facility] [address] [place], [place] - [address]  [address] [facility] [address]  Phone: [phone] Fax: [phone]    [facility] - [place], [place] - [address]  [address] [facility] [address]  Phone: [phone] Fax: [phone]    [facility] - [place], [place] - [address]  [address]  [facility] [address]  Phone: [phone] Fax: [phone]    [id number] - [place] - [address] [facility] [address]  [address] [facility]  [place] [address]  Phone: [phone] Fax: [phone]    [facility] - [place], [place] - [address]  [address]  [facility] [address]  Phone: [phone] Fax: [phone] Alternate Fax: [phone], [phone]    [id number]  [place] - [address] [facility] [address]  [place] [address]  Phone: [phone] Fax: [phone]    [facility] - [place], [place] - [address]  [address] [facility] [address]  Phone: [phone] " Fax: [phone]    [id number] - [place], [place] - [address]  [address]   [place] [address]  Phone: [phone] Fax: [phone]      Final Scheduling Details     Procedure scheduled  Colonoscopy / Upper endoscopy (EGD)    Surgeon:  SCOTTY     Date of procedure:  7/14     Pre-OP / PAC:   No - Not required for this site.    Location  [facility] - Per RN assessment.    Sedation   Moderate Sedation - Per order.      Patient Reminders:   You will receive a call from a Nurse to review instructions and health history.  This assessment must be completed prior to your procedure.  Failure to complete the Nurse assessment may result in the procedure being cancelled.      On the day of your procedure, please designate an adult(s) who can drive you home stay with you for the next 24 hours. The medicines used in the exam will make you sleepy. You will not be able to drive.      You cannot take public transportation, ride share services, or non-medical taxi service without a responsible caregiver.  Medical transport services are allowed with the requirement that a responsible caregiver will receive you at your destination.  We require that drivers and caregivers are confirmed prior to your procedure.

## 2025-05-27 ENCOUNTER — HOSPITAL ENCOUNTER (OUTPATIENT)
Dept: NUCLEAR MEDICINE | Facility: CLINIC | Age: 50
Setting detail: NUCLEAR MEDICINE
Discharge: HOME | End: 2025-05-27
Attending: DIETITIAN, REGISTERED
Payer: COMMERCIAL

## 2025-05-27 DIAGNOSIS — R10.11 RUQ ABDOMINAL PAIN: ICD-10-CM

## 2025-05-27 PROCEDURE — 343N000001 HC RX 343 MED OP 636: Performed by: DIETITIAN, REGISTERED

## 2025-05-27 PROCEDURE — A9537 TC99M MEBROFENIN: HCPCS | Performed by: DIETITIAN, REGISTERED

## 2025-05-27 PROCEDURE — 78226 HEPATOBILIARY SYSTEM IMAGING: CPT | Mod: 26 | Performed by: RADIOLOGY

## 2025-05-27 PROCEDURE — 78226 HEPATOBILIARY SYSTEM IMAGING: CPT

## 2025-05-27 RX ORDER — KIT FOR THE PREPARATION OF TECHNETIUM TC 99M MEBROFENIN 45 MG/10ML
4.8-7.2 INJECTION, POWDER, LYOPHILIZED, FOR SOLUTION INTRAVENOUS ONCE
Status: COMPLETED | OUTPATIENT
Start: 2025-05-27 | End: 2025-05-27

## 2025-05-27 RX ADMIN — MEBROFENIN 5.5 MILLICURIE: 45 INJECTION, POWDER, LYOPHILIZED, FOR SOLUTION INTRAVENOUS at 08:13

## 2025-05-29 DIAGNOSIS — R10.11 RUQ ABDOMINAL PAIN: Primary | ICD-10-CM

## 2025-05-29 DIAGNOSIS — K81.1 CHRONIC CHOLECYSTITIS: ICD-10-CM

## 2025-06-02 ENCOUNTER — PATIENT OUTREACH (OUTPATIENT)
Dept: CARE COORDINATION | Facility: CLINIC | Age: 50
End: 2025-06-02
Payer: COMMERCIAL

## 2025-06-05 DIAGNOSIS — E84.0 CYSTIC FIBROSIS WITH PULMONARY MANIFESTATIONS (H): ICD-10-CM

## 2025-06-05 RX ORDER — VANZACAFTOR, TEZACAFTOR, AND DEUTIVACAFTOR 125; 50; 10 MG/1; MG/1; MG/1
TABLET, FILM COATED ORAL
Qty: 56 TABLET | Refills: 1 | Status: SHIPPED | OUTPATIENT
Start: 2025-06-05

## 2025-06-09 NOTE — PROGRESS NOTES
[facility]  Lilia 10, 2025         Assessment and Plan:   Nico Morales is a 49 year old male with cystic fibrosis, cystic fibrosis related diabetes, pancreatic exocrine insufficiency and on VTD for modulator therapy.    1. 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  He feels, since starting alyftrek, that he is moving more air in his lung apices.  He historically has grown pseudomonas in his sputum. He did have positive AFB 9/2023, but fortunately the sputum on repeat times 3 has been negative.   At this time I have recommended to Kilo:  -- He should continue to do his vest and nebulized therapies 2-3 times daily  --  AFB yearly testing  -- He does have both levofloxacin and doxycycline available to him for exacerbation.  Fortunately he has not needed to use these in many months.     2. Pancreatic Insufficiency/GI: Kilo has no new symptoms consistent with worsening malabsorption.  He did previously had a g tube.  This was removed after several years of stable weight.  He does continue to struggle with intermittent GI issues and was seen by Brenda Romero.  He had a HIDA scan that suggested abnormal gallbladder kinetics.  He continues to diet restrict to avoid symptoms of abdominal discomfort.    - continue the present dose of pancreatic enzymes  - continue vitamin supplementation.  - Referral to general surgery for consideration of cholecystectomy  --Kilo is somewhat hesitant to pursue  --we did discuss the potential complications to not pursue surgery v. Performing the surgery     3.  CFTR modulator therapy: Kilo is on Alyftrek and tolerating well.  His hepatic panel today remains stable.    Kilo has tolerated transitioning to VTD. Continue hepatic panel per protocol.     4.  Cystic fibrosis related diabetes/bone health: Kilo reports good control of his blood sugars. His  A1C has been stable and he is followed by endocrinology.  He does check FSBS and is consistent with his insulin use. He receive Reclast earlier this year.  He has shown improvement in BMD.  Per endo note, will hold on further Reclast dosing for now.     5.  Psychosocial: Kilo is  and in a stable relationship.  He reports all his kids are doing well.  Work is good and busy.  His wife is on another year of treatment for breast cancer.      6. HCM:  Labs were reviewed today by me with patient.     Immunizations: UTD  Colonoscopy: scheduled for July  Last DEXA: 5/2025  Last hepatic panel: 6/2025  Last sputum culture:  Collected 2/11/2025  9:59 AM    Specimen Information: Expectorate; Sputum   1 Result Note  Culture 2+ Pseudomonas aeruginosa, mucoid strain Abnormal    1+ Pseudomonas aeruginosa, mucoid strain Abnormal         Annual studies due: 2/2026  Recent Labs   Lab Test 02/11/25  0902   IGG 1,102     Jaye Machado MD MPH  Associate Professor of Medicine  Pulmonary, Allergy, Critical Care and Sleep Medicine        Interval History:     Kilo describes a decrease in cough and sputum.  He notices an increase in air movement in his apices.  He denies chest congestion of change in activity tolerance.  He performs 2 to 3 vest therapies per day.           Review of Systems:     CONSTITUTIONAL: no fever, no chills, no sweats, no change in weight, no change in energy, no change in appetite--cautious     INTEGUMENTARY/SKIN: no rash, no obvious new lesions    ENT/MOUTH: no sore throat, no new sinus pain, no new nasal drainage, no new nasal congestion, + chronic ear ringing     RESPIRATORY: see interval history    CV: no chest pain, no palpitations, no peripheral edema    GI: no nausea, no vomiting, no change in stools, no fatty stools, no GERD    : negative urinary    MUSCULOSKELETAL: no myalgias, no arthralgias    ENDOCRINE: no hypoglycemia, blood sugars with adequate control    NEURO:  rare headache    SLEEP:  no issues--5 to 6 hours    PSYCHIATRIC: mood stable--normal          Past Medical and Surgical History:     Past Medical History:   Diagnosis Date    CF (cystic fibrosis) (H)     Exocrine pancreatic insufficiency     Nocardia infection     Pseudomonas infection     S/P gastrostomy (H) 2002     Past Surgical History:   Procedure Laterality Date    COLONOSCOPY N/A 5/21/2018    Procedure: COMBINED COLONOSCOPY, SINGLE OR MULTIPLE BIOPSY/POLYPECTOMY BY BIOPSY;  Cystic fibrosis, 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;  Surgeon: Margarito Bowman MD;  Location: [facility]    COLONOSCOPY N/A 6/27/2022    Procedure: COLONOSCOPY, WITH POLYPECTOMY AND BIOPSY;  Surgeon: Margarito Bowman MD;  Location: [facility]    GASTROSTOMY TUBE  2002    IR PULMONARY EMBOLIZATION  7/20/2001    PICC INSERTION Left 01/22/2018    4Fr SL BioFlo PICC, 46cm (5cm external) in the L basilic vein w/ tip in the low SVC           Family History:     Family History   Problem Relation Age of Onset    Heart Disease Maternal Grandmother     Heart Disease Maternal Grandfather     Heart Disease Paternal Grandmother     Diabetes No family hx of             Social History:     Social History     Socioeconomic History    Marital status:      Spouse name: Not on file    Number of children: 0    Years of education: Not on file    Highest education level: Not on file   Occupational History    Occupation: financial palnner     Employer: [employer]   Tobacco Use    Smoking status: Never    Smokeless tobacco: Never   Vaping Use    Vaping status: Never Used   Substance and Sexual Activity    Alcohol use: No     Alcohol/week: 0.0 standard drinks of alcohol    Drug use: No    Sexual activity: Yes     Partners: Female     Birth control/protection: Pill   Other Topics Concern    Parent/sibling w/ CABG, MI or angioplasty before 65F 55M? Not Asked     Service Not Asked    Blood Transfusions No    Caffeine Concern Not Asked    Occupational Exposure Not Asked    Hobby  Hazards Not Asked    Sleep Concern Not Asked    Stress Concern Not Asked    Weight Concern Not Asked    Special Diet Not Asked    Back Care Not Asked    Exercise No    Bike Helmet Not Asked    Seat Belt Not Asked    Self-Exams Not Asked   Social History Narrative    8/15/2019 - .  Lives with his English Bulldog (Jefry) in a house that he designed in [place].  No children.     Social Drivers of Health     Financial Resource Strain: Not on file   Food Insecurity: Not on file   Transportation Needs: Not on file   Physical Activity: Not on file   Stress: Not on file   Social Connections: Not on file   Interpersonal Safety: Not on file   Housing Stability: Not on file            Medications:     Current Outpatient Medications   Medication Sig Dispense Refill    ALYFTREK 10- MG tablet TAKE 2 TABLETS BY MOUTH DAILY. TAKE WITH FAT-CONTAINING FOOD. 56 tablet 1    azithromycin (ZITHROMAX) 250 MG tablet TAKE 1 TABLET (250 MG) BY MOUTH DAILY 30 tablet 11    blood glucose (NO BRAND SPECIFIED) test strip Use to test blood sugar 6 times daily or as directed. To accompany: Blood Glucose Monitor Brands: per insurance. 600 strip 3    blood glucose (NO BRAND SPECIFIED) test strip Use to test blood sugar 6 times daily. Any covered brand. Pt now using Contour Next, if covered. 600 strip 3    blood glucose monitoring (NO BRAND SPECIFIED) meter device kit Use to test blood sugar 6 times daily or as directed. Preferred blood glucose meter OR supplies to accompany: Blood Glucose Monitor Brands: per insurance. 1 kit 0    blood glucose monitoring (NO BRAND SPECIFIED) meter device kit Use to test blood sugar 6 times daily or as directed.whatever is covered 1 kit 1    blood glucose monitoring (ULTRA THIN 30G) lancets Use to test blood sugar 6 times daily or as directed.whatever is covered 540 each 3    budesonide-formoterol (SYMBICORT/BREYNA) 160-4.5 MCG/ACT Inhaler INHALE 2 PUFFS BY MOUTH TWICE DAILY 30.6 g 3    CALCIUM PO  "Take 500 mg by mouth 2 times daily Strength unknown.       Continuous Blood Gluc Sensor (FREESTYLE SUKHWINDER 14 DAY SENSOR) MIS Change every 14 days. 1 each 3    [facility]NA[facility]ED PATIENT Contact [facility] for patient specific medication information at [phone] on admission and discharge from the hospital.  Phones are answered 24 hours a day 7 days a week 365 days a year.    Providers - Choose \"CONTINUE HOME MED (no script)\" at discharge if patient treatment with home infusion will continue.      ferrous fumarate 65 mg, [place]. FE,-Vitamin C 125 mg (VITRON-C)  MG TABS tablet Take 1 tablet by mouth daily      Glucagon (BAQSIMI) 3 MG/DOSE nasal powder Spray 1 spray (3 mg) in nostril as needed (in emergency for hypoglycic reaction) in the event of unconscious hypoglycemia or hypoglycemic seizure. May repeat dose if no response after 15 minutes. 1 each 1    Glucagon (GVOKE HYPOPEN) 1 MG/0.2ML pen Inject the contents of 1 device under the skin into lower abdomen, outer thigh, or outer upper arm as needed for hypoglycemia. If no response after 15 minutes, additional 1 mg dose from a new device may be injected while waiting for emergency assistance. 0.4 mL 1    Insulin Aspart FlexPen 100 UNIT/ML SOPN Inject 1 Units Subcutaneous 3 times daily With these instructions :INJECT 1 UNIT PER 20G CARB AT LUNCH AND SUPPER; INJECT 3 UNITS AT NIGHT WITH TUBE FEEDINGS. MAX 20 UNITS PER Day 15 mL 3    insulin glargine (LANTUS SOLOSTAR) 100 UNIT/ML pen INJECT 8 UNITS UNDER THE SKIN ONCE DAILY 15 mL 1    insulin lispro (HUMALOG KWIKPEN) 100 UNIT/ML (1 unit dial) KWIKPEN Inject 1 Units Subcutaneous 3 times daily With these instructions :INJECT 1 UNIT PER 20G CARB AT LUNCH AND SUPPER; INJECT 3 UNITS AT NIGHT WITH TUBE FEEDINGS. MAX 20 UNITS PER Day, Disp-15 mL, R-3, E-Prescribe 15 mL 3    insulin pen needle (BD OMI U/F) 32G X 4 MM miscellaneous Use 4 pen needles daily or as directed. Follow up visit needed. " "Call  to schedule. 400 each 3    levalbuterol (XOPENEX HFA) 45 MCG/ACT inhaler INHALE 2 PUFFS INTO THE LUNGS EVERY 6 HOURS AS NEEDED FOR SHORTNESS OF BREATH/DYSPNEA 75 g 3    levalbuterol (XOPENEX) 1.25 MG/3ML neb solution INHALE 1 VIAL BY MOUTH INTO THE LUNGS VIA NEBULIZATION FOUR TIMES DAILY 360 mL 11    lipase-protease-amylase (CREON) 58606-12930-226577 units CPEP per EC capsule TAKE 9-10 CAPSULES BY MOUTH WITH MEALS (3 MEALS/DAY) AND 2-4 CAPS WITH SNACKS (3 SNACKS/DAY) 3780 capsule 3    MULTIVITAMIN OR Take 1 tablet by mouth daily. Includes 5,000 units vitamin D and 400 units vitamin E. Per pt, formulated for CF pts.      MVW complete (PROBIOTIC) capsule Take 1 capsule by mouth daily. 30 capsule 11    polyethylene glycol (MIRALAX) 17 GM/Dose powder Take 17 g by mouth daily as needed for constipation      thin (NO BRAND SPECIFIED) lancets Use with lanceting device 6x daily. To accompany: Blood Glucose Monitor Brands: per insurance. 600 each 3    ACE NOT PRESCRIBED, INTENTIONAL, 1 each continuous prn. ACE Inhibitor not prescribed due to Risk for drug interaction 0 each 0    phytonadione (MEPHYTON) 5 MG tablet Take one tablet daily for 3 days as needed for hemoptysis. (Patient not taking: Reported on 6/10/2025) 6 tablet 4     No current facility-administered medications for this visit.            Physical Exam:   /80 (BP Location: Left arm, Patient Position: Chair, Cuff Size: Adult Regular)   Pulse 99   Ht 1.736 m (5' 8.35\")   Wt 61.3 kg (135 lb 2.3 oz)   SpO2 96%   BMI 20.34 kg/m      Constitutional:   Awake, alert and in no apparent distress     Eyes:   nonicteric     ENT:   oral mucosa moist without lesions, normal tm bilaterally, bilateral mucosa normal     Neck:   Supple without supraclavicular or cervical lymphadenopathy     Lungs:   fair air flow.  No crackles. biapical rhonchi.  No wheezes.     Cardiovascular:   Normal S1 and S2.  RRR.  No murmur, gallop or rub.     Abdomen:   NABS, " soft, nontender, nondistended.      Musculoskeletal:   No edema, digital clubbing present     Neurologic:   Alert and conversant.     Skin:   Warm, dry.  No rash on limited exam.             Data:   All laboratory and imaging data reviewed.      Results from the last week:  Recent Results (from the past week)   Hepatic function panel    Collection Time: 06/10/25  7:39 AM   Result Value Ref Range    Protein Total 7.4 6.4 - 8.3 g/dL    Albumin 4.1 3.5 - 5.2 g/dL    Bilirubin Total 0.3 <=1.2 mg/dL    Alkaline Phosphatase 131 40 - 150 U/L    AST 26 0 - 45 U/L    ALT 22 0 - 70 U/L    Bilirubin Direct 0.12 0.00 - 0.45 mg/dL   General PFT Lab (Please always keep checked)    Collection Time: 06/10/25  7:45 AM   Result Value Ref Range    FVC-Pred 4.38 L    FVC-Pre 3.79 L    FVC-%Pred-Pre 86 %    FEV1-Pre 1.72 L    FEV1-%Pred-Pre 49 %    FEV1FVC-Pred 80 %    FEV1FVC-Pre 46 %    FEFMax-Pred 9.53 L/sec    FEFMax-Pre 5.88 L/sec    FEFMax-%Pred-Pre 61 %    FEF2575-Pred 3.34 L/sec    FEF2575-Pre 0.53 L/sec    CDG1142-%Pred-Pre 15 %    ExpTime-Pre 14.30 sec    FIFMax-Pre 5.49 L/sec    FEV1FEV6-Pred 81 %    FEV1FEV6-Pre 54 %       PFT interpretation:   Spirometry interpretation:  The spirometry shows evidence of moderate obstructive lung disease.  When compared to 2/11/2025, the FEV1 and FVC have little change.  The testing meets ATS criteria.    Severity Z-score*   Normal > -1.645   Mild -1.65 to -2.5   Moderate -2.5 to -4   Severe < -4   * accounts for sex, age, height, ancestry     Use z-score to assess airflow obstruction, restriction and DLCO  - FEV1 z-score for airflow obstruction  - TLC z-score for restriction  - DLCO z-score for diffusion defect    Pulmonary exacerbation: absent    50 minutes spent by me on the date of the encounter doing chart review, history and exam, documentation and further activities per the note  Time documented is excluding time spent for PFT interpretation.

## 2025-06-09 NOTE — PATIENT INSTRUCTIONS
Cystic Fibrosis Self-Care Plan    RECOMMENDATIONS:     Kilo, It was great to see you today.  The plan from today:  --let's stick with Alyftrek  --continue LFTs per protocol  --agree with general surgery consult to discuss gallbladder  --colonoscopy and endoscopy in July  Great job with self cares!      YOUR GOAL:  Stay safe and well.  Enjoy the weather!      Cystic Fibrosis :    Keila Barrera   [phone]  [facility] [place] Nurse line:  Ginger Elliott  [phone]     [facility] [place] Fax Number:      [phone]         Cystic Fibrosis Respiratory Therapists:   Chasidy Encinas                          [phone]          Jacklyn Pina   [phone]  Cystic Fibrosis Dietitians:              Zeina Pierre              [phone]                            Ivory Bonilla                        [phone]   Cystic Fibrosis Diabetes Nurse:    Jordyn Rudolph               [phone]    Cystic Fibrosis Social Workers:     Dahiana Guidry              [phone]                     Louann Suarez               [phone]  Cystic Fibrosis Pharmacists:           Linda Stone                              [phone]         Gracie Allen      [phone]   Cystic Fibrosis Genetic Counselor:   Pretty Pulliam    [phone]    [facility] [place] website:  [email or web address]    We have recently learned about an albuterol neb solution shortage due to a manufacturing delay. There is still a small supply coming in but not enough to meet the current demand. We do not yet have an estimate for when this will become widely available again.    We our asking our CF community to do the following:    Please take time to check your supply of albuterol neb solution at home. We recommend keeping at least a 2-week supply of albuterol nebs at home in case of illness.    2.  If you have an albuterol inhaler at home, you can use 4 puffs of the inhaler with a spacer in place  of the nebulized albuterol at the start of your treatments. It is important to use a spacer for the best technique. If you do not have a spacer at home or have questions on technique, we will be happy to review and send one to your home address. Please also take a moment to check that your albuterol HFA inhaler is available and not .  inhalers may be less effective as the medication loses its potency or power. In some instances your team may suggest another alternative instead of an albuterol inhaler.    3.  Please take care in requesting refills. Albuterol neb solution is a life-saving medication for patients having severe asthma attacks and other emergency respiratory conditions. Let s work together to make sure that albuterol neb solution is available to those who need it urgently.    Reach out to your care team with questions and to confirm your planned alternative for albuterol nebs. MyChart will be the fastest way to connect. If possible, please reserve the nursing line for more urgent concerns as we are short on nursing staff.    Here are some reputable sites with more information:    https://www.cidrap.[facility]/resilient-drug-supply/us-liquid-albuterol-wqgwrbtp-fzqttdbz-ieijrb-after-major-supplier-shuts-down    https://www.[employer]//health/albuterol-shortage    https://www.ashp.org/drug-shortages/current-shortages/drug-shortage-detail.aspx?zo=917&loginreturnUrl=SSOCheckOnly       MRN: [id number]   Clinic Date: 2025   Patient: Nico Morales     Annual Studies:   IGG   Date Value Ref Range Status   2021 1,208 610 - 1,616 mg/dL Final     Immunoglobulin G   Date Value Ref Range Status   2025 1,102 610 - 1,616 mg/dL Final     Insulin   Date Value Ref Range Status   2011 30 mU/L Final     Comment:     Reference Range:  0-20  +120     There are no preventive care reminders to display for this patient.    Pulmonary Function Tests  FEV1: amount of air you can blow out  in 1 second  FVC: total amount of air you can take in and blow out    Your Goals:             Latest Ref Rng & Units 2/11/2025     9:28 AM   PFT   FVC L 3.38  P   FEV1 L 1.67  P   FVC% % 76  P   FEV1% % 47  P      P Preliminary result          Airway Clearance: The Most Important Way to Keep Your Lungs Healthy  Vest Settings:   Hill-[place] Frequencies: 8, 9, 10 Pressure 10 Then, Frequencies 18, 19, 20 Pressure 6     RespirTech: Quick Start with Pressure of     Do each frequency for 5 minutes; Deflate vest after each frequency & cough 3 times before beginning the next setting.    Vest and Neb Therapy should be done 2-3 times/day.    Good Nutrition Can Improve Lung Function and Overall Health    Take ALL of your vitamins with food    Take 1/2 of your enzymes before EVERY meal/snack and the other 1/2 mid-meal/snack    Wt Readings from Last 3 Encounters:   05/08/25 62.2 kg (137 lb 1.6 oz)   04/03/25 62.7 kg (138 lb 3.2 oz)   02/11/25 63.7 kg (140 lb 6.4 oz)       There is no height or weight on file to calculate BMI.         National CF Foundation Recommendations for BMI in CF Adults: Women: at least 22 Men: at least 23        Controlling Blood Sugars Helps Prevent Lung Infections & Improves Nutrition  Test blood sugar:    In the morning before eating (goal is )    2 hours after a meal (goal is less than 150)    When pre-meal glucose is greater than 150 add correction    At bedtime (if less than 100 eat a snack with 15 grams of carbohydrates  Last A1C Results:   Hemoglobin A1C   Date Value Ref Range Status   02/11/2025 6.5 (H) <5.7 % Final     Comment:     Normal <5.7%   Prediabetes 5.7-6.4%    Diabetes 6.5% or higher     Note: Adopted from ADA consensus guidelines.   05/04/2021 6.2 (H) 0 - 5.6 % Final     Comment:     Normal <5.7% Prediabetes 5.7-6.4%  Diabetes 6.5% or higher - adopted from ADA   consensus guidelines.           If diabetic, measure A1C every 6 months. Goal: Under 7%    Staying  Healthy  Research:  If you are interested in learning about research opportunities or have questions, please contact the CF Research Team at [phone] or [email or web address].    CF Foundation:  Compass is a personalized resource service to help you with the insurance, financial, legal and other issues you are facing.  It's free, confidential and available to anyone with CF.  Ask your  for more information or contact Compass directly at 438-XFKPCRD ([phone]) or compass@cff.org, or learn more at cff.org/compass.

## 2025-06-10 ENCOUNTER — OFFICE VISIT (OUTPATIENT)
Dept: PULMONOLOGY | Facility: CLINIC | Age: 50
End: 2025-06-10
Attending: INTERNAL MEDICINE
Payer: COMMERCIAL

## 2025-06-10 ENCOUNTER — CLINICAL UPDATE (OUTPATIENT)
Dept: PHARMACY | Facility: CLINIC | Age: 50
End: 2025-06-10
Payer: COMMERCIAL

## 2025-06-10 ENCOUNTER — RESULTS FOLLOW-UP (OUTPATIENT)
Dept: PHARMACY | Facility: CLINIC | Age: 50
End: 2025-06-10

## 2025-06-10 ENCOUNTER — LAB (OUTPATIENT)
Dept: LAB | Facility: CLINIC | Age: 50
End: 2025-06-10
Attending: INTERNAL MEDICINE
Payer: COMMERCIAL

## 2025-06-10 VITALS
HEART RATE: 99 BPM | WEIGHT: 135.14 LBS | DIASTOLIC BLOOD PRESSURE: 80 MMHG | HEIGHT: 68 IN | SYSTOLIC BLOOD PRESSURE: 125 MMHG | BODY MASS INDEX: 20.48 KG/M2 | OXYGEN SATURATION: 96 %

## 2025-06-10 DIAGNOSIS — E84.9 DIABETES MELLITUS DUE TO CYSTIC FIBROSIS (H): ICD-10-CM

## 2025-06-10 DIAGNOSIS — K86.81 EXOCRINE PANCREATIC INSUFFICIENCY: ICD-10-CM

## 2025-06-10 DIAGNOSIS — E84.0 CYSTIC FIBROSIS WITH PULMONARY MANIFESTATIONS (H): Primary | ICD-10-CM

## 2025-06-10 DIAGNOSIS — E84.0 CYSTIC FIBROSIS WITH PULMONARY MANIFESTATIONS (H): ICD-10-CM

## 2025-06-10 DIAGNOSIS — E84.9 CYSTIC FIBROSIS (H): Primary | ICD-10-CM

## 2025-06-10 DIAGNOSIS — E08.9 DIABETES MELLITUS DUE TO CYSTIC FIBROSIS (H): ICD-10-CM

## 2025-06-10 LAB
ALBUMIN SERPL BCG-MCNC: 4.1 G/DL (ref 3.5–5.2)
ALP SERPL-CCNC: 131 U/L (ref 40–150)
ALT SERPL W P-5'-P-CCNC: 22 U/L (ref 0–70)
AST SERPL W P-5'-P-CCNC: 26 U/L (ref 0–45)
BILIRUB SERPL-MCNC: 0.3 MG/DL
BILIRUBIN DIRECT (ROCHE PRO & PURE): 0.12 MG/DL (ref 0–0.45)
EXPTIME-PRE: 14.3 SEC
FEF2575-%PRED-PRE: 15 %
FEF2575-PRE: 0.53 L/SEC
FEF2575-PRED: 3.34 L/SEC
FEFMAX-%PRED-PRE: 61 %
FEFMAX-PRE: 5.88 L/SEC
FEFMAX-PRED: 9.53 L/SEC
FEV1-%PRED-PRE: 49 %
FEV1-PRE: 1.72 L
FEV1FEV6-PRE: 54 %
FEV1FEV6-PRED: 81 %
FEV1FVC-PRE: 46 %
FEV1FVC-PRED: 80 %
FIFMAX-PRE: 5.49 L/SEC
FVC-%PRED-PRE: 86 %
FVC-PRE: 3.79 L
FVC-PRED: 4.38 L
PROT SERPL-MCNC: 7.4 G/DL (ref 6.4–8.3)

## 2025-06-10 PROCEDURE — 94375 RESPIRATORY FLOW VOLUME LOOP: CPT | Performed by: INTERNAL MEDICINE

## 2025-06-10 PROCEDURE — 99215 OFFICE O/P EST HI 40 MIN: CPT | Mod: 25 | Performed by: INTERNAL MEDICINE

## 2025-06-10 PROCEDURE — 99207 PR NO CHARGE LOS: CPT | Performed by: PHARMACIST

## 2025-06-10 PROCEDURE — 3074F SYST BP LT 130 MM HG: CPT | Performed by: INTERNAL MEDICINE

## 2025-06-10 PROCEDURE — 1126F AMNT PAIN NOTED NONE PRSNT: CPT | Performed by: INTERNAL MEDICINE

## 2025-06-10 PROCEDURE — 36415 COLL VENOUS BLD VENIPUNCTURE: CPT | Performed by: PATHOLOGY

## 2025-06-10 PROCEDURE — 3079F DIAST BP 80-89 MM HG: CPT | Performed by: INTERNAL MEDICINE

## 2025-06-10 PROCEDURE — 80076 HEPATIC FUNCTION PANEL: CPT | Performed by: PATHOLOGY

## 2025-06-10 PROCEDURE — 87070 CULTURE OTHR SPECIMN AEROBIC: CPT | Performed by: INTERNAL MEDICINE

## 2025-06-10 PROCEDURE — 99213 OFFICE O/P EST LOW 20 MIN: CPT | Performed by: INTERNAL MEDICINE

## 2025-06-10 RX ORDER — PEDIATRIC MULTIVIT 61/D3/VIT K 1500-800
1 CAPSULE ORAL DAILY
Qty: 30 CAPSULE | Refills: 11 | Status: SHIPPED | OUTPATIENT
Start: 2025-06-10

## 2025-06-10 RX ORDER — LEVALBUTEROL TARTRATE 45 UG/1
AEROSOL, METERED ORAL
Qty: 75 G | Refills: 3 | Status: SHIPPED | OUTPATIENT
Start: 2025-06-10

## 2025-06-10 ASSESSMENT — PAIN SCALES - GENERAL: PAINLEVEL_OUTOF10: NO PAIN (0)

## 2025-06-10 NOTE — NURSING NOTE
Chief Complaint   Patient presents with    Follow Up     Return CF        Vitals were taken, medications reconciled.    Kirit Andrew, Clinic Assistant   8:07 AM

## 2025-06-10 NOTE — PROGRESS NOTES
RT Note:    Patient unable to stay for RT annual today, but has no airway clearance needs at this time.

## 2025-06-10 NOTE — LETTER
6/10/2025      Nico Morales  [address]  [place] [address]      Dear Colleague,    Thank you for referring your patient, Nico Morales, to the [facility] [place]. Please see a copy of my visit note below.    [facility]  Lilia 10, 2025         Assessment and Plan:   Nico Morales is a 49 year old male with cystic fibrosis, cystic fibrosis related diabetes, pancreatic exocrine insufficiency and on VTD for modulator therapy.    1. 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  He feels, since starting alyftrek, that he is moving more air in his lung apices.  He historically has grown pseudomonas in his sputum. He did have positive AFB 9/2023, but fortunately the sputum on repeat times 3 has been negative.   At this time I have recommended to Kilo:  -- He should continue to do his vest and nebulized therapies 2-3 times daily  --  AFB yearly testing  -- He does have both levofloxacin and doxycycline available to him for exacerbation.  Fortunately he has not needed to use these in many months.     2. Pancreatic Insufficiency/GI: Kilo has no new symptoms consistent with worsening malabsorption.  He did previously had a g tube.  This was removed after several years of stable weight.  He does continue to struggle with intermittent GI issues and was seen by Brenda Romero.  He had a HIDA scan that suggested abnormal gallbladder kinetics.  He continues to diet restrict to avoid symptoms of abdominal discomfort.    - continue the present dose of pancreatic enzymes  - continue vitamin supplementation.  - Referral to general surgery for consideration of cholecystectomy  --Kilo is somewhat hesitant to pursue  --we did discuss the potential complications to not pursue surgery v. Performing the surgery     3.  CFTR modulator therapy:  Kilo is on Alyftrek and tolerating well.  His hepatic panel today remains stable.    Kilo has tolerated transitioning to VTD. Continue hepatic panel per protocol.     4.  Cystic fibrosis related diabetes/bone health: Kilo reports good control of his blood sugars. His A1C has been stable and he is followed by endocrinology.  He does check FSBS and is consistent with his insulin use. He receive Reclast earlier this year.  He has shown improvement in BMD.  Per endo note, will hold on further Reclast dosing for now.     5.  Psychosocial: Kilo is  and in a stable relationship.  He reports all his kids are doing well.  Work is good and busy.  His wife is on another year of treatment for breast cancer.      6. HCM:  Labs were reviewed today by me with patient.     Immunizations: UTD  Colonoscopy: scheduled for July  Last DEXA: 5/2025  Last hepatic panel: 6/2025  Last sputum culture:  Collected 2/11/2025  9:59 AM    Specimen Information: Expectorate; Sputum   1 Result Note  Culture 2+ Pseudomonas aeruginosa, mucoid strain Abnormal    1+ Pseudomonas aeruginosa, mucoid strain Abnormal         Annual studies due: 2/2026  Recent Labs   Lab Test 02/11/25  0902   IGG 1,102     Jaey Machado MD MPH  Associate Professor of Medicine  Pulmonary, Allergy, Critical Care and Sleep Medicine        Interval History:     Kilo describes a decrease in cough and sputum.  He notices an increase in air movement in his apices.  He denies chest congestion of change in activity tolerance.  He performs 2 to 3 vest therapies per day.           Review of Systems:     CONSTITUTIONAL: no fever, no chills, no sweats, no change in weight, no change in energy, no change in appetite--cautious     INTEGUMENTARY/SKIN: no rash, no obvious new lesions    ENT/MOUTH: no sore throat, no new sinus pain, no new nasal drainage, no new nasal congestion, + chronic ear ringing     RESPIRATORY: see interval history    CV: no chest pain, no  palpitations, no peripheral edema    GI: no nausea, no vomiting, no change in stools, no fatty stools, no GERD    : negative urinary    MUSCULOSKELETAL: no myalgias, no arthralgias    ENDOCRINE: no hypoglycemia, blood sugars with adequate control    NEURO:  rare headache    SLEEP: no issues--5 to 6 hours    PSYCHIATRIC: mood stable--normal          Past Medical and Surgical History:     Past Medical History:   Diagnosis Date     CF (cystic fibrosis) (H)      Exocrine pancreatic insufficiency      Nocardia infection      Pseudomonas infection      S/P gastrostomy (H) 2002     Past Surgical History:   Procedure Laterality Date     COLONOSCOPY N/A 5/21/2018    Procedure: COMBINED COLONOSCOPY, SINGLE OR MULTIPLE BIOPSY/POLYPECTOMY BY BIOPSY;  Cystic fibrosis, 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;  Surgeon: Margarito Bowman MD;  Location: [facility]     COLONOSCOPY N/A 6/27/2022    Procedure: COLONOSCOPY, WITH POLYPECTOMY AND BIOPSY;  Surgeon: Margarito Bowman MD;  Location: [facility]     GASTROSTOMY TUBE  2002     IR PULMONARY EMBOLIZATION  7/20/2001     PICC INSERTION Left 01/22/2018    4Fr SL BioFlo PICC, 46cm (5cm external) in the L basilic vein w/ tip in the low SVC           Family History:     Family History   Problem Relation Age of Onset     Heart Disease Maternal Grandmother      Heart Disease Maternal Grandfather      Heart Disease Paternal Grandmother      Diabetes No family hx of             Social History:     Social History     Socioeconomic History     Marital status:      Spouse name: Not on file     Number of children: 0     Years of education: Not on file     Highest education level: Not on file   Occupational History     Occupation: financial palnner     Employer: [employer]   Tobacco Use     Smoking status: Never     Smokeless tobacco: Never   Vaping Use     Vaping status: Never Used   Substance and Sexual Activity     Alcohol use: No     Alcohol/week: 0.0 standard drinks of alcohol     Drug  use: No     Sexual activity: Yes     Partners: Female     Birth control/protection: Pill   Other Topics Concern     Parent/sibling w/ CABG, MI or angioplasty before 65F 55M? Not Asked      Service Not Asked     Blood Transfusions No     Caffeine Concern Not Asked     Occupational Exposure Not Asked     Hobby Hazards Not Asked     Sleep Concern Not Asked     Stress Concern Not Asked     Weight Concern Not Asked     Special Diet Not Asked     Back Care Not Asked     Exercise No     Bike Helmet Not Asked     Seat Belt Not Asked     Self-Exams Not Asked   Social History Narrative    8/15/2019 - .  Lives with his English Bulldog (Jefry) in a house that he designed in [place].  No children.     Social Drivers of Health     Financial Resource Strain: Not on file   Food Insecurity: Not on file   Transportation Needs: Not on file   Physical Activity: Not on file   Stress: Not on file   Social Connections: Not on file   Interpersonal Safety: Not on file   Housing Stability: Not on file            Medications:     Current Outpatient Medications   Medication Sig Dispense Refill     ALYFTREK 10- MG tablet TAKE 2 TABLETS BY MOUTH DAILY. TAKE WITH FAT-CONTAINING FOOD. 56 tablet 1     azithromycin (ZITHROMAX) 250 MG tablet TAKE 1 TABLET (250 MG) BY MOUTH DAILY 30 tablet 11     blood glucose (NO BRAND SPECIFIED) test strip Use to test blood sugar 6 times daily or as directed. To accompany: Blood Glucose Monitor Brands: per insurance. 600 strip 3     blood glucose (NO BRAND SPECIFIED) test strip Use to test blood sugar 6 times daily. Any covered brand. Pt now using Contour Next, if covered. 600 strip 3     blood glucose monitoring (NO BRAND SPECIFIED) meter device kit Use to test blood sugar 6 times daily or as directed. Preferred blood glucose meter OR supplies to accompany: Blood Glucose Monitor Brands: per insurance. 1 kit 0     blood glucose monitoring (NO BRAND SPECIFIED) meter device kit Use to test  "blood sugar 6 times daily or as directed.whatever is covered 1 kit 1     blood glucose monitoring (ULTRA THIN 30G) lancets Use to test blood sugar 6 times daily or as directed.whatever is covered 540 each 3     budesonide-formoterol (SYMBICORT/BREYNA) 160-4.5 MCG/ACT Inhaler INHALE 2 PUFFS BY MOUTH TWICE DAILY 30.6 g 3     CALCIUM PO Take 500 mg by mouth 2 times daily Strength unknown.        Continuous Blood Gluc Sensor (FREESTYLE SUKHWINDER 14 DAY SENSOR) MISC Change every 14 days. 1 each 3     [facility] INFUSION MANAGED PATIENT Contact [facility] for patient specific medication information at [phone] on admission and discharge from the hospital.  Phones are answered 24 hours a day 7 days a week 365 days a year.    Providers - Choose \"CONTINUE HOME MED (no script)\" at discharge if patient treatment with home infusion will continue.       ferrous fumarate 65 mg, [place]. FE,-Vitamin C 125 mg (VITRON-C)  MG TABS tablet Take 1 tablet by mouth daily       Glucagon (BAQSIMI) 3 MG/DOSE nasal powder Spray 1 spray (3 mg) in nostril as needed (in emergency for hypoglycic reaction) in the event of unconscious hypoglycemia or hypoglycemic seizure. May repeat dose if no response after 15 minutes. 1 each 1     Glucagon (GVOKE HYPOPEN) 1 MG/0.2ML pen Inject the contents of 1 device under the skin into lower abdomen, outer thigh, or outer upper arm as needed for hypoglycemia. If no response after 15 minutes, additional 1 mg dose from a new device may be injected while waiting for emergency assistance. 0.4 mL 1     Insulin Aspart FlexPen 100 UNIT/ML SOPN Inject 1 Units Subcutaneous 3 times daily With these instructions :INJECT 1 UNIT PER 20G CARB AT LUNCH AND SUPPER; INJECT 3 UNITS AT NIGHT WITH TUBE FEEDINGS. MAX 20 UNITS PER Day 15 mL 3     insulin glargine (LANTUS SOLOSTAR) 100 UNIT/ML pen INJECT 8 UNITS UNDER THE SKIN ONCE DAILY 15 mL 1     insulin lispro (HUMALOG KWIKPEN) 100 UNIT/ML (1 unit dial) " "KWIKPEN Inject 1 Units Subcutaneous 3 times daily With these instructions :INJECT 1 UNIT PER 20G CARB AT LUNCH AND SUPPER; INJECT 3 UNITS AT NIGHT WITH TUBE FEEDINGS. MAX 20 UNITS PER Day, Disp-15 mL, R-3, E-Prescribe 15 mL 3     insulin pen needle (BD OMI U/F) 32G X 4 MM miscellaneous Use 4 pen needles daily or as directed. Follow up visit needed. Call  to schedule. 400 each 3     levalbuterol (XOPENEX HFA) 45 MCG/ACT inhaler INHALE 2 PUFFS INTO THE LUNGS EVERY 6 HOURS AS NEEDED FOR SHORTNESS OF BREATH/DYSPNEA 75 g 3     levalbuterol (XOPENEX) 1.25 MG/3ML neb solution INHALE 1 VIAL BY MOUTH INTO THE LUNGS VIA NEBULIZATION FOUR TIMES DAILY 360 mL 11     lipase-protease-amylase (CREON) [id number] units CPEP per EC capsule TAKE 9-10 CAPSULES BY MOUTH WITH MEALS (3 MEALS/DAY) AND 2-4 CAPS WITH SNACKS (3 SNACKS/DAY) 3780 capsule 3     MULTIVITAMIN OR Take 1 tablet by mouth daily. Includes 5,000 units vitamin D and 400 units vitamin E. Per pt, formulated for CF pts.       MVW complete (PROBIOTIC) capsule Take 1 capsule by mouth daily. 30 capsule 11     polyethylene glycol (MIRALAX) 17 GM/Dose powder Take 17 g by mouth daily as needed for constipation       thin (NO BRAND SPECIFIED) lancets Use with lanceting device 6x daily. To accompany: Blood Glucose Monitor Brands: per insurance. 600 each 3     ACE NOT PRESCRIBED, INTENTIONAL, 1 each continuous prn. ACE Inhibitor not prescribed due to Risk for drug interaction 0 each 0     phytonadione (MEPHYTON) 5 MG tablet Take one tablet daily for 3 days as needed for hemoptysis. (Patient not taking: Reported on 6/10/2025) 6 tablet 4     No current facility-administered medications for this visit.            Physical Exam:   /80 (BP Location: Left arm, Patient Position: Chair, Cuff Size: Adult Regular)   Pulse 99   Ht 1.736 m (5' 8.35\")   Wt 61.3 kg (135 lb 2.3 oz)   SpO2 96%   BMI 20.34 kg/m      Constitutional:   Awake, alert and in no apparent " distress     Eyes:   nonicteric     ENT:   oral mucosa moist without lesions, normal tm bilaterally, bilateral mucosa normal     Neck:   Supple without supraclavicular or cervical lymphadenopathy     Lungs:   fair air flow.  No crackles. biapical rhonchi.  No wheezes.     Cardiovascular:   Normal S1 and S2.  RRR.  No murmur, gallop or rub.     Abdomen:   NABS, soft, nontender, nondistended.      Musculoskeletal:   No edema, digital clubbing present     Neurologic:   Alert and conversant.     Skin:   Warm, dry.  No rash on limited exam.             Data:   All laboratory and imaging data reviewed.      Results from the last week:  Recent Results (from the past week)   Hepatic function panel    Collection Time: 06/10/25  7:39 AM   Result Value Ref Range    Protein Total 7.4 6.4 - 8.3 g/dL    Albumin 4.1 3.5 - 5.2 g/dL    Bilirubin Total 0.3 <=1.2 mg/dL    Alkaline Phosphatase 131 40 - 150 U/L    AST 26 0 - 45 U/L    ALT 22 0 - 70 U/L    Bilirubin Direct 0.12 0.00 - 0.45 mg/dL   General PFT Lab (Please always keep checked)    Collection Time: 06/10/25  7:45 AM   Result Value Ref Range    FVC-Pred 4.38 L    FVC-Pre 3.79 L    FVC-%Pred-Pre 86 %    FEV1-Pre 1.72 L    FEV1-%Pred-Pre 49 %    FEV1FVC-Pred 80 %    FEV1FVC-Pre 46 %    FEFMax-Pred 9.53 L/sec    FEFMax-Pre 5.88 L/sec    FEFMax-%Pred-Pre 61 %    FEF2575-Pred 3.34 L/sec    FEF2575-Pre 0.53 L/sec    LZW6338-%Pred-Pre 15 %    ExpTime-Pre 14.30 sec    FIFMax-Pre 5.49 L/sec    FEV1FEV6-Pred 81 %    FEV1FEV6-Pre 54 %       PFT interpretation:   Spirometry interpretation:  The spirometry shows evidence of moderate obstructive lung disease.  When compared to 2/11/2025, the FEV1 and FVC have little change.  The testing meets ATS criteria.    Severity Z-score*   Normal > -1.645   Mild -1.65 to -2.5   Moderate -2.5 to -4   Severe < -4   * accounts for sex, age, height, ancestry     Use z-score to assess airflow obstruction, restriction and DLCO  - FEV1 z-score for airflow  obstruction  - TLC z-score for restriction  - DLCO z-score for diffusion defect    Pulmonary exacerbation: absent    50 minutes spent by me on the date of the encounter doing chart review, history and exam, documentation and further activities per the note  Time documented is excluding time spent for PFT interpretation.    RT Note:    Patient unable to stay for RT annual today, but has no airway clearance needs at this time.     Again, thank you for allowing me to participate in the care of your patient.        Sincerely,        Jaye Machado MD    Electronically signed

## 2025-06-10 NOTE — PROGRESS NOTES
Clinical Update:                                                    A chart review was conducted for Nico Morales.    Reason for Chart Review: Alyftrek Monthly Lab Monitoring 3/6    Discussion: Nico has been on Alyftrek since ~3/1/25, switch from Trikafta started in 11/15/19.  Kilo has a history of LFT elevation which resolved without dose reduction. Per chart review, Kilo continues full dose Alyftrek.   Patient denies side effects.    Labs were reviewed from 6/10/2025 at [facility] . All modulator labs are within normal limits.    Lab Results   Component Value Date    ALT 22 06/10/2025    AST 26 06/10/2025    BILITOTAL 0.3 06/10/2025    DBIL 0.12 06/10/2025    CKT 51 04/03/2025     Plan:  1. Continue Alyftrek (two refills sent 6/5/25)  2. Recheck hepatic panel in 1 month (previously ordered)        Linda Stone, PharmD   Cystic Fibrosis MTM Pharmacist  [facility]

## 2025-06-12 LAB
BACTERIA SPT CULT: ABNORMAL

## 2025-06-12 NOTE — PROGRESS NOTES
"CF Annual Nutrition Assessment    Reason for Assessment  Assessed during Dr. Machado [facility] r/t increased nutrition risk with diagnosis of CF per protocol.    Nutrition Significant PMH  Severe Lung Disease   Pancreatic Insufficient   CF-Related Diabetes  Hx G-tube - now removed, site has healed well    Anthropometric Assessment  Height: 173.7 cm   IBW based on BMI 22 for females and 23 for males per CF Foundation recs: 69.5 kg  Today's Weight: 61.3 kg (actual weight)   Body mass index is 20.34 kg/m .    Weight is down ~15 lbs within 4 months r/t gallbladder/GI concerns. He has been referred to surgery for cholecystectomy. He has restricted PO intake in the evenings to prevent a \"flare\" likely leading to weight loss, however feels he weight has been relatively stable for the last few weeks.     Wt Readings from Last 10 Encounters:   06/10/25 61.3 kg (135 lb 2.3 oz)   25 62.2 kg (137 lb 1.6 oz)   25 62.7 kg (138 lb 3.2 oz)   25 63.7 kg (140 lb 6.4 oz)   24 67.7 kg (149 lb 4.8 oz)   24 67.7 kg (149 lb 4 oz)   23 66.7 kg (147 lb 1.6 oz)   23 66.4 kg (146 lb 6.2 oz)   23 67.4 kg (148 lb 9.6 oz)   11/15/22 68.8 kg (151 lb 11.2 oz)     Pancreatic Enzymes  Brand:  Creon [id number]  Dosin-8 with meals, 2-4 with snacks (average of 30 caps/day)     High dose providing 2891 units lipase/kg/meal which is slightly above recommendations.     Estimated Daily Amount (if meal dose is >2500 units lipase/kg/meal): 10,843 units/kg per day which is slightly above recommended daily intake range, however pt presents with malabsorption/weight loss with reduced doses.  Has been educated in the past regarding the risk of elevated enzyme dosing.     Signs of Malabsorption:  No  Enzyme Program:  [facility] - remains active.  Uses mostly just in the beginning of the year.     Diet History and Assessment  Diet Preferences/Allergies.Intolerances: Regular    Intake Recall/Comments:  Kilo eats " TID meal and drinks 2-3 [employer] Peptide 1.3 cans/day. He has a good appetite. Previously consuming 2 [employer] Peptide/Ensure Plus in evenings however has stopped this d/t concerns that this will contribute to GI pain.     (Per previous recall)   B - [employer] Farms + oatmeal, english muffin with pb and J  L - meat and cheese, kiwi, carrots, red peppers, pudding, string cheese, meat or pb and J sandwich   D - frozen pizza or take out   Snacks - [employer]     Calcium: Food + supplements average 5-6+ servings per day  Salt: No symptoms of deficiency, discussed replacement amounts and techniques  Hydration:  80-90 fl oz/day water   Supplements:  [employer] Peptide 1.5, 2-3 cartons per day by mouth. Obtains from [facility] or purchases out of pocket.     Laboratory Assessment    Vitamin A   Date Value Ref Range Status   02/11/2025 0.38 0.30 - 1.20 mg/L Final   05/04/2021 0.55 0.30 - 1.20 mg/L Final     Vitamin D Deficiency screening   Date Value Ref Range Status   05/04/2021 45 20 - 75 ug/L Final     Comment:     Season, race, dietary intake, and treatment affect the concentration of   25-hydroxy-Vitamin D. Values may decrease during winter months and increase   during summer months. Values 20-29 ug/L may indicate Vitamin D insufficiency   and values <20 ug/L may indicate Vitamin D deficiency.  Vitamin D determination is routinely performed by an immunoassay specific for   25 hydroxyvitamin D3.  If an individual is on vitamin D2 (ergocalciferol)   supplementation, please specify 25 OH vitamin D2 and D3 level determination by   LCMSMS test VITD23.       Vitamin D, Total (25-Hydroxy)   Date Value Ref Range Status   02/11/2025 55 (H) 20 - 50 ng/mL Final     Comment:     indicates supplementation, with increased risk of hypercalciuria   06/20/2022 55 20 - 75 ug/L Final     Vitamin E   Date Value Ref Range Status   02/11/2025 11.2 5.5 - 18.0 mg/L Final     Comment:       This test was developed and its performance  characteristics   determined by [facility]. It has not been cleared or   approved by the US Food and Drug Administration. This test   was performed in a CLIA certified laboratory and is   intended for clinical purposes.   05/04/2021 15.2 5.5 - 18.0 mg/L Final     Comment:     (Note)  This test was developed and its performance characteristics   determined by [facility]. It has not been cleared or   approved by the US Food and Drug Administration. This test   was performed in a CLIA certified laboratory and is   intended for clinical purposes.       Iron   Date Value Ref Range Status   02/11/2025 39 (L) 61 - 157 ug/dL Final   02/11/2025 40 (L) 61 - 157 ug/dL Final   05/04/2021 45 35 - 180 ug/dL Final     Cholesterol   Date Value Ref Range Status   02/11/2025 147 <200 mg/dL Final   05/04/2021 159 <200 mg/dL Final     Triglycerides   Date Value Ref Range Status   02/11/2025 98 <150 mg/dL Final   05/04/2021 115 <150 mg/dL Final     HDL Cholesterol   Date Value Ref Range Status   05/04/2021 40 >39 mg/dL Final     Direct Measure HDL   Date Value Ref Range Status   02/11/2025 42 >=40 mg/dL Final     LDL Cholesterol Calculated   Date Value Ref Range Status   02/11/2025 85 <100 mg/dL Final   05/04/2021 96 <100 mg/dL Final     Comment:     Desirable:       <100 mg/dl     VLDL-Cholesterol   Date Value Ref Range Status   06/26/2013 13 0 - 30 mg/dL Final     Cholesterol/HDL Ratio   Date Value Ref Range Status   06/26/2013 4.4 0.0 - 5.0 Final       DEXA: (5/27/25) Most negative Z-score -1.3. Hx osteoporosis, has been on both pamidronate and Reclast in the past. Follows with Dr. Lee. Plan for drug holiday.     Current Vitamin/Mineral Supplements: MVW Complete  international unit(s) daily, Calcium with D 1 tablet twice daily and Vitron-C 1 tablet daily.    Comments:  Obtains ADEK from [facility]. Reports GI sx with any higher doses of iron but tolerates 1 tablet well (takes with food).     CF Related  Diabetes Evaluation  Hgb A1C: 6.5%   Date: 2/11/25  FSBG range: Checking intermittently  Insulin: Yes    Insulin Regimen: Novolog 1 unit:15 g carb, Lantus 7 units  Carbohydrate Counting: Yes     Last visit with Dr. Lee May 2025, continues with the care plan discussed during that visit, doing well.     NUTRITION DIAGNOSIS  Impaired nutrient utilization r/t CF related diabetes, pancreatic insufficiency and CF hypermetabolism AEB requires PERT, insulin therapy and high kcal/pro intake through TF + diet to maintain/improve nutrition and health status.  INTERVENTIONS/RECOMMENDATIONS  1) Oral Intake/Weight: Discussed maintaining balanced intake, maintaining good weight and body composition including bone health. Pt acknowledges he has a calorie deficit ~700 kcals/day now without his [employer] x 2 in evenings. He has recently started adding more supplements throughout the day. Reports appetite remains adequate and is not concerned about his weight, feels it has levelled off.     BEE: ~1490 kcals                           3500 - 3850 kcals/day 200-225% BEE (+ 500 kcals/day for weight gain)   g protein/day 1.5-2 g/kg    2) Enzymes: Although pt is on a higher than recommended dose he is doing well with his current prescription and wishes to continue on this brand/capsule size/dose.     3) Vitamin/Mineral Supplementation: Continuing same supplementation regimen (as documented above) based off most recent annual studies.  Bone management overseen by Dr. Lee.     4) Diabetes: Continue current plan of care.     Patient Understanding: Good  Expected Compliance: Good  Follow-Up Plans: Per Protocol     GOALS:  1) Weight maintenance vs gain toward goal BMI of 23 kg/m2.   2) Good adherence (>90%) with prescribed enzymes, vitamin/mineral supplements and insulin therapy.      FOLLOW-UP/MONITORING:  Visit patient within 12 month(s) for annual follow-up, sooner by request or in response to acute weight/nutrition  changes.    Time Spent in Face-to-Face Interaction: <7 min (brief visit, pt unable to stay for longer visit)    Zeina Pierre RD, JULIET, CACFD  Cystic Fibrosis/Lung Transplant Dietitian  Available via [employer]

## 2025-06-16 ENCOUNTER — RESULTS FOLLOW-UP (OUTPATIENT)
Dept: PHARMACY | Facility: CLINIC | Age: 50
End: 2025-06-16

## 2025-06-16 LAB
BACTERIA SPT CULT: ABNORMAL
BACTERIA SPT CULT: ABNORMAL

## 2025-06-20 ENCOUNTER — TELEPHONE (OUTPATIENT)
Dept: GASTROENTEROLOGY | Facility: CLINIC | Age: 50
End: 2025-06-20
Payer: COMMERCIAL

## 2025-06-20 DIAGNOSIS — E84.9 CYSTIC FIBROSIS (H): Primary | ICD-10-CM

## 2025-06-20 RX ORDER — BISACODYL 5 MG/1
TABLET, DELAYED RELEASE ORAL
Qty: 4 TABLET | Refills: 0 | Status: SHIPPED | OUTPATIENT
Start: 2025-06-20

## 2025-06-23 NOTE — TELEPHONE ENCOUNTER
Attempted to contact patient in order to complete pre assessment questions.     No answer. Left message to return call to [phone] option 3.    Callback communication sent via [employer].    Britta Ortiz LPN  
Pre assessment completed for upcoming procedure.   (Please see previous telephone encounter notes for complete details)    Patient returned call.       Procedure details:    Procedure date 7.14.2025, arrival time 1300 and facility location reviewed.    Pre op exam needed? No.    Designated  policy reviewed. Instructed to have someone stay 6  hours post procedure.       Medication review:    Medications reviewed. Please see supporting documentation below. Holding recommendations discussed (if applicable).       Prep for procedure:     Procedure prep instructions reviewed.        Any additional information needed:  N/A      Patient verbalized understanding and had no questions or concerns at this time.      Michaelle Lawson RN  Endoscopy Procedure Pre Assessment   [phone] option 3  
Pre visit planning completed.    Procedure details:    Patient scheduled for Colonoscopy/Upper endoscopy (EGD) on 07/14/2025.     Arrival time: 1300. Procedure time 1400    Facility location: [facility]; [address], [address], [place] [address]. Check in location: Main entrance at registration desk.  *Disclaimer: Drivers are to check in with patient and stay on campus during procedure.     Sedation type: Conscious sedation     Pre op exam needed? No.    Indication for procedure:   E84.9 (ICD-10-CM) - Cystic fibrosis (H)R10.84 (ICD-10-CM) - Abdominal pain, ofwgnomcgmtI85.11 (ICD-10-CM) - RUQ abdominal painZ12.11 (ICD-10-CM) - Encounter for screening for malignant neoplasm of colon     Chart review:     Electronic implanted devices? No    Recent diagnosis of diverticulitis within the last 6 weeks? No    Medication review:    Diabetic? Yes. Insulin: Consult with managing provider.     Anticoagulants? No    Weight loss medication/injectable? No GLP-1 medication per patient's medication list. Nursing to verify with pre-assessment call.    Other medication HOLDING recommendations:  Ferrous sulfate (iron supplements): HOLD 7 days before procedure.    Prep for procedure:     Bowel prep recommendation: Extended Golytely. Bowel prep sent to    [facility] [address] [place], [place] - [address]  Due to: cystic fibrosis (CF)    Procedure information and instructions sent via mayra Denny RN  Endoscopy Procedure Pre Assessment   [phone] option 3  
negative

## 2025-07-06 ENCOUNTER — HEALTH MAINTENANCE LETTER (OUTPATIENT)
Age: 50
End: 2025-07-06

## 2025-07-14 ENCOUNTER — CLINICAL UPDATE (OUTPATIENT)
Dept: PHARMACY | Facility: CLINIC | Age: 50
End: 2025-07-14
Payer: COMMERCIAL

## 2025-07-14 ENCOUNTER — HOSPITAL ENCOUNTER (OUTPATIENT)
Facility: CLINIC | Age: 50
Discharge: HOME | End: 2025-07-14
Attending: INTERNAL MEDICINE | Admitting: INTERNAL MEDICINE
Payer: COMMERCIAL

## 2025-07-14 VITALS
DIASTOLIC BLOOD PRESSURE: 74 MMHG | HEART RATE: 67 BPM | SYSTOLIC BLOOD PRESSURE: 103 MMHG | RESPIRATION RATE: 16 BRPM | OXYGEN SATURATION: 96 %

## 2025-07-14 DIAGNOSIS — E84.9 CYSTIC FIBROSIS (H): Primary | ICD-10-CM

## 2025-07-14 LAB
COLONOSCOPY: NORMAL
UPPER GI ENDOSCOPY: NORMAL

## 2025-07-14 PROCEDURE — 88305 TISSUE EXAM BY PATHOLOGIST: CPT | Mod: TC | Performed by: INTERNAL MEDICINE

## 2025-07-14 PROCEDURE — 45385 COLONOSCOPY W/LESION REMOVAL: CPT | Mod: PT | Performed by: INTERNAL MEDICINE

## 2025-07-14 PROCEDURE — 88305 TISSUE EXAM BY PATHOLOGIST: CPT | Mod: 26 | Performed by: PATHOLOGY

## 2025-07-14 PROCEDURE — 99207 PR NO CHARGE LOS: CPT | Performed by: PHARMACIST

## 2025-07-14 PROCEDURE — 99153 MOD SED SAME PHYS/QHP EA: CPT | Performed by: INTERNAL MEDICINE

## 2025-07-14 PROCEDURE — 43239 EGD BIOPSY SINGLE/MULTIPLE: CPT | Performed by: INTERNAL MEDICINE

## 2025-07-14 PROCEDURE — G0500 MOD SEDAT ENDO SERVICE >5YRS: HCPCS | Performed by: INTERNAL MEDICINE

## 2025-07-14 PROCEDURE — 45380 COLONOSCOPY AND BIOPSY: CPT | Performed by: INTERNAL MEDICINE

## 2025-07-14 PROCEDURE — 250N000011 HC RX IP 250 OP 636: Performed by: INTERNAL MEDICINE

## 2025-07-14 RX ORDER — FLUMAZENIL 0.1 MG/ML
0.2 INJECTION, SOLUTION INTRAVENOUS
Status: DISCONTINUED | OUTPATIENT
Start: 2025-07-14 | End: 2025-07-14 | Stop reason: HOSPADM

## 2025-07-14 RX ORDER — NALOXONE HYDROCHLORIDE 0.4 MG/ML
0.4 INJECTION, SOLUTION INTRAMUSCULAR; INTRAVENOUS; SUBCUTANEOUS
Status: DISCONTINUED | OUTPATIENT
Start: 2025-07-14 | End: 2025-07-14 | Stop reason: HOSPADM

## 2025-07-14 RX ORDER — NALOXONE HYDROCHLORIDE 0.4 MG/ML
0.2 INJECTION, SOLUTION INTRAMUSCULAR; INTRAVENOUS; SUBCUTANEOUS
Status: DISCONTINUED | OUTPATIENT
Start: 2025-07-14 | End: 2025-07-14 | Stop reason: HOSPADM

## 2025-07-14 RX ORDER — LIDOCAINE 40 MG/G
CREAM TOPICAL
Status: DISCONTINUED | OUTPATIENT
Start: 2025-07-14 | End: 2025-07-14 | Stop reason: HOSPADM

## 2025-07-14 RX ORDER — PROCHLORPERAZINE MALEATE 5 MG/1
10 TABLET ORAL EVERY 6 HOURS PRN
Status: DISCONTINUED | OUTPATIENT
Start: 2025-07-14 | End: 2025-07-14 | Stop reason: HOSPADM

## 2025-07-14 RX ORDER — ONDANSETRON 2 MG/ML
4 INJECTION INTRAMUSCULAR; INTRAVENOUS EVERY 6 HOURS PRN
Status: DISCONTINUED | OUTPATIENT
Start: 2025-07-14 | End: 2025-07-14 | Stop reason: HOSPADM

## 2025-07-14 RX ORDER — ONDANSETRON 2 MG/ML
4 INJECTION INTRAMUSCULAR; INTRAVENOUS
Status: DISCONTINUED | OUTPATIENT
Start: 2025-07-14 | End: 2025-07-14 | Stop reason: HOSPADM

## 2025-07-14 RX ORDER — ONDANSETRON 4 MG/1
4 TABLET, ORALLY DISINTEGRATING ORAL EVERY 6 HOURS PRN
Status: DISCONTINUED | OUTPATIENT
Start: 2025-07-14 | End: 2025-07-14 | Stop reason: HOSPADM

## 2025-07-14 RX ORDER — FENTANYL CITRATE 50 UG/ML
INJECTION, SOLUTION INTRAMUSCULAR; INTRAVENOUS PRN
Status: DISCONTINUED | OUTPATIENT
Start: 2025-07-14 | End: 2025-07-14 | Stop reason: HOSPADM

## 2025-07-14 ASSESSMENT — ACTIVITIES OF DAILY LIVING (ADL)
ADLS_ACUITY_SCORE: 46

## 2025-07-14 NOTE — H&P
[facility] Anesthesia Pre-op History and Physical    Nico Morales MRN# [id number]   Age: 49 year old YOB: 1975      Date of Surgery: 7/14/2025 Location [facility]      Date of Exam 7/14/2025 Facility (In hospital)       Home clinic: [facility]  Primary care provider: Jaye Machado         Chief Complaint and/or Reason for Procedure:   No chief complaint on file.           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  Verbal overview and written information provided.         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     Priority: Medium    Encounter for long-term (current) use of antibiotics 05/20/2013     Priority: Medium    Prostatitis 05/20/2013     Priority: Medium     updating diagnosis code for icd10 cutover      Pseudomonas infection      Priority: Medium    Cystic fibrosis (H) 11/24/2010     Priority: Medium     Sweat Test   Date: 4/25/78    Lab: [facility]   Sample #1:  110mmol  Sample #2:  106mmol    Genetics  Date: 4/9/90  [id number]->T        CARDIOVASCULAR SCREENING; LDL GOAL LESS THAN 160 10/31/2010      Priority: Medium            Medications (include herbals and vitamins):   Any Plavix use in the last 7 days? No     Current Facility-Administered Medications   Medication Dose Route Frequency Provider Last Rate Last Admin    lidocaine (LMX4) cream   Topical Q1H PRN Margarito Bowman MD        lidocaine 1 % 0.1-1 mL  0.1-1 mL Other Q1H PRN Margarito Bowman MD        ondansetron (ZOFRAN) injection 4 mg  4 mg Intravenous Once PRN Margarito Bowman MD        sodium chloride (PF) 0.9% PF flush 3 mL  3 mL Intracatheter Q8H [facility] Margarito Bowman MD        sodium chloride (PF) 0.9% PF flush 3 mL  3 mL Intracatheter q1 min prn Margarito oBwman MD                 Allergies:      Allergies   Allergen Reactions    Piperacillin Sod-Tazobactam So Hives     Delayed type reaction in intradermal tests to Zosyn (Piperacillin/Tazobactam). Might be specific reaction to the Zosyn without cross-reactions to Penicillins (beta-Lactam core). Avoid in future Zosyn. However, I would avoid the Penicillins and use instead Penems or Cephalosporines. If Penicillins are the best option, then they can be used under observation.    Pulmozyme [Dornase Trever] Other (See Comments)     Chest pain and fever     Allergy to Latex? No  Allergy to tape?   No  Intolerances: None            Physical Exam:   All vitals have been reviewed  Patient Vitals for the past 8 hrs:   BP SpO2   07/14/25 1313 117/78 98 %     No intake/output data recorded.            Lab / Radiology Results:            Anesthetic risk and/or ASA classification:       Margarito Bowman MD

## 2025-07-14 NOTE — PROGRESS NOTES
Clinical Update:                                                    A chart review was conducted for Kilo Morales.    Reason for Chart Review: Alyftrek Monthly Lab Monitoring 4/6    Discussion: Kilo has been on Alyftrek since ~3/1/25, switch from Trikafta started in 11/15/19 due to interest in once daily dosing.  Kilo has a history of LFT elevation which resolved without dose reduction. Per chart review, Kilo continues full dose Alyftrek.   Patient denies side effects.    Labs were reviewed from 7/11/25 at [facility]. All modulator labs are within normal limits.    Lab Results   Component Value Date    ALT 28 07/11/2025    AST 26 07/11/2025    BILITOTAL 0.3 07/11/2025    DBIL <0.08 07/11/2025    ALKPHOS 119 07/11/2025       Plan:  1. Continue Alyftrek (refills on file; mychart to patient)  2. Recheck hepatic panel in 1 month (previously ordered)        Gracie Allen PharmD  Cystic Fibrosis MTM Pharmacist  [facility]  Voicemail: [phone]

## 2025-07-15 ENCOUNTER — RESULTS FOLLOW-UP (OUTPATIENT)
Dept: MULTI SPECIALTY CLINIC | Facility: CLINIC | Age: 50
End: 2025-07-15
Payer: COMMERCIAL

## 2025-07-15 LAB
PATH REPORT.COMMENTS IMP SPEC: NORMAL
PATH REPORT.COMMENTS IMP SPEC: NORMAL
PATH REPORT.FINAL DX SPEC: NORMAL
PATH REPORT.GROSS SPEC: NORMAL
PATH REPORT.MICROSCOPIC SPEC OTHER STN: NORMAL
PATH REPORT.RELEVANT HX SPEC: NORMAL
PHOTO IMAGE: NORMAL

## 2025-07-24 DIAGNOSIS — E84.0 CYSTIC FIBROSIS WITH PULMONARY MANIFESTATIONS (H): ICD-10-CM

## 2025-07-24 RX ORDER — VANZACAFTOR, TEZACAFTOR, AND DEUTIVACAFTOR 125; 50; 10 MG/1; MG/1; MG/1
2 TABLET, FILM COATED ORAL DAILY
Qty: 56 TABLET | Refills: 1 | Status: SHIPPED | OUTPATIENT
Start: 2025-07-24

## 2025-07-30 ENCOUNTER — APPOINTMENT (OUTPATIENT)
Dept: ULTRASOUND IMAGING | Facility: CLINIC | Age: 50
End: 2025-07-30
Attending: PHYSICIAN ASSISTANT
Payer: COMMERCIAL

## 2025-07-30 ENCOUNTER — HOSPITAL ENCOUNTER (INPATIENT)
Facility: CLINIC | Age: 50
End: 2025-07-30
Attending: EMERGENCY MEDICINE | Admitting: STUDENT IN AN ORGANIZED HEALTH CARE EDUCATION/TRAINING PROGRAM
Payer: COMMERCIAL

## 2025-07-30 DIAGNOSIS — K26.9 DUODENAL ULCER WITHOUT HEMORRHAGE OR PERFORATION AND WITHOUT OBSTRUCTION: ICD-10-CM

## 2025-07-30 DIAGNOSIS — R79.89 ELEVATED LFTS: ICD-10-CM

## 2025-07-30 DIAGNOSIS — R74.01 TRANSAMINITIS: ICD-10-CM

## 2025-07-30 DIAGNOSIS — K81.0 ACUTE CHOLECYSTITIS: ICD-10-CM

## 2025-07-30 DIAGNOSIS — K83.8 COMMON BILE DUCT DILATATION: Primary | ICD-10-CM

## 2025-07-30 DIAGNOSIS — K83.1 BILIARY OBSTRUCTION (H): ICD-10-CM

## 2025-07-30 DIAGNOSIS — R74.8 ABNORMAL SERUM LEVEL OF ALKALINE PHOSPHATASE: ICD-10-CM

## 2025-07-30 LAB
ALBUMIN SERPL BCG-MCNC: 4.3 G/DL (ref 3.5–5.2)
ALP SERPL-CCNC: 375 U/L (ref 40–150)
ALT SERPL W P-5'-P-CCNC: 588 U/L (ref 0–70)
ANION GAP SERPL CALCULATED.3IONS-SCNC: 15 MMOL/L (ref 7–15)
AST SERPL W P-5'-P-CCNC: 460 U/L (ref 0–45)
BASOPHILS # BLD AUTO: 0 10E3/UL (ref 0–0.2)
BASOPHILS NFR BLD AUTO: 0 %
BILIRUB SERPL-MCNC: 3.8 MG/DL
BUN SERPL-MCNC: 18.8 MG/DL (ref 6–20)
CALCIUM SERPL-MCNC: 11.1 MG/DL (ref 8.8–10.4)
CHLORIDE SERPL-SCNC: 95 MMOL/L (ref 98–107)
CREAT SERPL-MCNC: 0.92 MG/DL (ref 0.67–1.17)
EGFRCR SERPLBLD CKD-EPI 2021: >90 ML/MIN/1.73M2
EOSINOPHIL # BLD AUTO: 0 10E3/UL (ref 0–0.7)
EOSINOPHIL NFR BLD AUTO: 0 %
ERYTHROCYTE [DISTWIDTH] IN BLOOD BY AUTOMATED COUNT: 13.5 % (ref 10–15)
EST. AVERAGE GLUCOSE BLD GHB EST-MCNC: 140 MG/DL
GLUCOSE BLDC GLUCOMTR-MCNC: 114 MG/DL (ref 70–99)
GLUCOSE BLDC GLUCOMTR-MCNC: 63 MG/DL (ref 70–99)
GLUCOSE SERPL-MCNC: 133 MG/DL (ref 70–99)
HBA1C MFR BLD: 6.5 %
HCO3 SERPL-SCNC: 28 MMOL/L (ref 22–29)
HCT VFR BLD AUTO: 52.7 % (ref 40–53)
HGB BLD-MCNC: 17.1 G/DL (ref 13.3–17.7)
IMM GRANULOCYTES # BLD: 0.1 10E3/UL
IMM GRANULOCYTES NFR BLD: 0 %
LIPASE SERPL-CCNC: 8 U/L (ref 13–60)
LYMPHOCYTES # BLD AUTO: 2.3 10E3/UL (ref 0.8–5.3)
LYMPHOCYTES NFR BLD AUTO: 15 %
MCH RBC QN AUTO: 29 PG (ref 26.5–33)
MCHC RBC AUTO-ENTMCNC: 32.4 G/DL (ref 31.5–36.5)
MCV RBC AUTO: 89 FL (ref 78–100)
MONOCYTES # BLD AUTO: 1.9 10E3/UL (ref 0–1.3)
MONOCYTES NFR BLD AUTO: 12 %
NEUTROPHILS # BLD AUTO: 11.3 10E3/UL (ref 1.6–8.3)
NEUTROPHILS NFR BLD AUTO: 73 %
NRBC # BLD AUTO: 0 10E3/UL
NRBC BLD AUTO-RTO: 0 /100
PLAT MORPH BLD: ABNORMAL
PLATELET # BLD AUTO: 314 10E3/UL (ref 150–450)
POTASSIUM SERPL-SCNC: 4.7 MMOL/L (ref 3.4–5.3)
PROT SERPL-MCNC: 8 G/DL (ref 6.4–8.3)
RADIOLOGIST FLAGS: ABNORMAL
RADIOLOGIST FLAGS: ABNORMAL
RBC # BLD AUTO: 5.9 10E6/UL (ref 4.4–5.9)
RBC MORPH BLD: ABNORMAL
SODIUM SERPL-SCNC: 138 MMOL/L (ref 135–145)
VARIANT LYMPHS BLD QL SMEAR: PRESENT
WBC # BLD AUTO: 15.6 10E3/UL (ref 4–11)

## 2025-07-30 PROCEDURE — 250N000013 HC RX MED GY IP 250 OP 250 PS 637

## 2025-07-30 PROCEDURE — 85025 COMPLETE CBC W/AUTO DIFF WBC: CPT | Performed by: PHYSICIAN ASSISTANT

## 2025-07-30 PROCEDURE — 82962 GLUCOSE BLOOD TEST: CPT

## 2025-07-30 PROCEDURE — 99285 EMERGENCY DEPT VISIT HI MDM: CPT | Mod: FS | Performed by: EMERGENCY MEDICINE

## 2025-07-30 PROCEDURE — 82435 ASSAY OF BLOOD CHLORIDE: CPT | Performed by: PHYSICIAN ASSISTANT

## 2025-07-30 PROCEDURE — 87040 BLOOD CULTURE FOR BACTERIA: CPT | Performed by: EMERGENCY MEDICINE

## 2025-07-30 PROCEDURE — 250N000009 HC RX 250

## 2025-07-30 PROCEDURE — 76705 ECHO EXAM OF ABDOMEN: CPT

## 2025-07-30 PROCEDURE — 96374 THER/PROPH/DIAG INJ IV PUSH: CPT | Performed by: EMERGENCY MEDICINE

## 2025-07-30 PROCEDURE — 83690 ASSAY OF LIPASE: CPT | Performed by: PHYSICIAN ASSISTANT

## 2025-07-30 PROCEDURE — 258N000003 HC RX IP 258 OP 636: Performed by: PHYSICIAN ASSISTANT

## 2025-07-30 PROCEDURE — 84132 ASSAY OF SERUM POTASSIUM: CPT | Performed by: PHYSICIAN ASSISTANT

## 2025-07-30 PROCEDURE — 87040 BLOOD CULTURE FOR BACTERIA: CPT | Performed by: STUDENT IN AN ORGANIZED HEALTH CARE EDUCATION/TRAINING PROGRAM

## 2025-07-30 PROCEDURE — 120N000002 HC R&B MED SURG/OB UMMC

## 2025-07-30 PROCEDURE — 76705 ECHO EXAM OF ABDOMEN: CPT | Mod: 26 | Performed by: RADIOLOGY

## 2025-07-30 PROCEDURE — 250N000011 HC RX IP 250 OP 636: Performed by: EMERGENCY MEDICINE

## 2025-07-30 PROCEDURE — 250N000011 HC RX IP 250 OP 636: Performed by: PHYSICIAN ASSISTANT

## 2025-07-30 PROCEDURE — 99254 IP/OBS CNSLTJ NEW/EST MOD 60: CPT | Performed by: SURGERY

## 2025-07-30 PROCEDURE — 36415 COLL VENOUS BLD VENIPUNCTURE: CPT | Performed by: EMERGENCY MEDICINE

## 2025-07-30 PROCEDURE — 96361 HYDRATE IV INFUSION ADD-ON: CPT | Performed by: EMERGENCY MEDICINE

## 2025-07-30 PROCEDURE — 83036 HEMOGLOBIN GLYCOSYLATED A1C: CPT

## 2025-07-30 PROCEDURE — 99285 EMERGENCY DEPT VISIT HI MDM: CPT | Mod: 25 | Performed by: EMERGENCY MEDICINE

## 2025-07-30 PROCEDURE — 36415 COLL VENOUS BLD VENIPUNCTURE: CPT | Performed by: STUDENT IN AN ORGANIZED HEALTH CARE EDUCATION/TRAINING PROGRAM

## 2025-07-30 PROCEDURE — 36415 COLL VENOUS BLD VENIPUNCTURE: CPT | Performed by: PHYSICIAN ASSISTANT

## 2025-07-30 PROCEDURE — 99223 1ST HOSP IP/OBS HIGH 75: CPT | Mod: AI | Performed by: STUDENT IN AN ORGANIZED HEALTH CARE EDUCATION/TRAINING PROGRAM

## 2025-07-30 RX ORDER — LEVALBUTEROL TARTRATE 45 UG/1
1-2 AEROSOL, METERED ORAL EVERY 6 HOURS PRN
Status: ACTIVE | OUTPATIENT
Start: 2025-07-30

## 2025-07-30 RX ORDER — LEVALBUTEROL INHALATION SOLUTION 1.25 MG/3ML
1.25 SOLUTION RESPIRATORY (INHALATION) EVERY 6 HOURS PRN
Status: DISCONTINUED | OUTPATIENT
Start: 2025-07-30 | End: 2025-07-30

## 2025-07-30 RX ORDER — IBUPROFEN 600 MG/1
600 TABLET, FILM COATED ORAL EVERY 6 HOURS PRN
Status: ACTIVE | OUTPATIENT
Start: 2025-07-30

## 2025-07-30 RX ORDER — BUDESONIDE AND FORMOTEROL FUMARATE DIHYDRATE 160; 4.5 UG/1; UG/1
2 AEROSOL RESPIRATORY (INHALATION)
Status: DISPENSED | OUTPATIENT
Start: 2025-07-30

## 2025-07-30 RX ORDER — AZITHROMYCIN 250 MG/1
250 TABLET, FILM COATED ORAL DAILY
Status: DISPENSED | OUTPATIENT
Start: 2025-07-31

## 2025-07-30 RX ORDER — ACETAMINOPHEN 325 MG/1
650 TABLET ORAL EVERY 4 HOURS PRN
Status: DISCONTINUED | OUTPATIENT
Start: 2025-07-30 | End: 2025-07-30

## 2025-07-30 RX ORDER — CALCIUM CARBONATE 500(1250)
500 TABLET ORAL 2 TIMES DAILY
Status: DISPENSED | OUTPATIENT
Start: 2025-07-30

## 2025-07-30 RX ORDER — POLYETHYLENE GLYCOL 3350 17 G/17G
17 POWDER, FOR SOLUTION ORAL 2 TIMES DAILY PRN
Status: ACTIVE | OUTPATIENT
Start: 2025-07-30

## 2025-07-30 RX ORDER — LIDOCAINE 40 MG/G
CREAM TOPICAL
Status: ACTIVE | OUTPATIENT
Start: 2025-07-30

## 2025-07-30 RX ORDER — ACETAMINOPHEN 325 MG/1
650 TABLET ORAL EVERY 8 HOURS PRN
Status: DISPENSED | OUTPATIENT
Start: 2025-07-30

## 2025-07-30 RX ORDER — IRON,CARBONYL/ASCORBIC ACID 65MG-125MG
1 TABLET ORAL DAILY
Status: DISPENSED | OUTPATIENT
Start: 2025-07-31

## 2025-07-30 RX ORDER — METRONIDAZOLE 500 MG/100ML
500 INJECTION, SOLUTION INTRAVENOUS ONCE
Status: COMPLETED | OUTPATIENT
Start: 2025-07-30 | End: 2025-07-30

## 2025-07-30 RX ORDER — AMOXICILLIN 250 MG
1 CAPSULE ORAL 2 TIMES DAILY PRN
Status: ACTIVE | OUTPATIENT
Start: 2025-07-30

## 2025-07-30 RX ORDER — ONDANSETRON 4 MG/1
4 TABLET, ORALLY DISINTEGRATING ORAL EVERY 6 HOURS PRN
Status: ACTIVE | OUTPATIENT
Start: 2025-07-30

## 2025-07-30 RX ORDER — CALCIUM CARBONATE 500 MG/1
1000 TABLET, CHEWABLE ORAL 4 TIMES DAILY PRN
Status: DISPENSED | OUTPATIENT
Start: 2025-07-30

## 2025-07-30 RX ORDER — NICOTINE POLACRILEX 4 MG
15-30 LOZENGE BUCCAL
Status: ACTIVE | OUTPATIENT
Start: 2025-07-30

## 2025-07-30 RX ORDER — CEFTRIAXONE 2 G/1
2 INJECTION, POWDER, FOR SOLUTION INTRAMUSCULAR; INTRAVENOUS ONCE
Status: COMPLETED | OUTPATIENT
Start: 2025-07-30 | End: 2025-07-30

## 2025-07-30 RX ORDER — CEFTRIAXONE 2 G/1
2 INJECTION, POWDER, FOR SOLUTION INTRAMUSCULAR; INTRAVENOUS EVERY 24 HOURS
Status: DISPENSED | OUTPATIENT
Start: 2025-07-31

## 2025-07-30 RX ORDER — ONDANSETRON 2 MG/ML
4 INJECTION INTRAMUSCULAR; INTRAVENOUS EVERY 6 HOURS PRN
Status: ACTIVE | OUTPATIENT
Start: 2025-07-30

## 2025-07-30 RX ORDER — LEVALBUTEROL TARTRATE 45 UG/1
1-2 AEROSOL, METERED ORAL EVERY 6 HOURS PRN
Status: DISCONTINUED | OUTPATIENT
Start: 2025-07-30 | End: 2025-07-30

## 2025-07-30 RX ORDER — AMOXICILLIN 250 MG
2 CAPSULE ORAL 2 TIMES DAILY PRN
Status: ACTIVE | OUTPATIENT
Start: 2025-07-30

## 2025-07-30 RX ORDER — DEXTROSE MONOHYDRATE 25 G/50ML
25-50 INJECTION, SOLUTION INTRAVENOUS
Status: ACTIVE | OUTPATIENT
Start: 2025-07-30

## 2025-07-30 RX ORDER — LEVALBUTEROL INHALATION SOLUTION 1.25 MG/3ML
1.25 SOLUTION RESPIRATORY (INHALATION) 4 TIMES DAILY
Status: DISCONTINUED | OUTPATIENT
Start: 2025-07-31 | End: 2025-07-31

## 2025-07-30 RX ORDER — METRONIDAZOLE 500 MG/100ML
500 INJECTION, SOLUTION INTRAVENOUS EVERY 12 HOURS
Status: DISPENSED | OUTPATIENT
Start: 2025-07-31

## 2025-07-30 RX ORDER — LACTOBACILLUS RHAMNOSUS GG 10B CELL
1 CAPSULE ORAL DAILY
Status: DISPENSED | OUTPATIENT
Start: 2025-07-31

## 2025-07-30 RX ADMIN — METRONIDAZOLE 500 MG: 500 INJECTION, SOLUTION INTRAVENOUS at 18:06

## 2025-07-30 RX ADMIN — BUDESONIDE AND FORMOTEROL FUMARATE DIHYDRATE 2 PUFF: 160; 4.5 AEROSOL RESPIRATORY (INHALATION) at 22:12

## 2025-07-30 RX ADMIN — LEVALBUTEROL HYDROCHLORIDE 1.25 MG: 1.25 SOLUTION RESPIRATORY (INHALATION) at 22:05

## 2025-07-30 RX ADMIN — CALCIUM 500 MG: 500 TABLET ORAL at 22:06

## 2025-07-30 RX ADMIN — SODIUM CHLORIDE 1000 ML: 0.9 INJECTION, SOLUTION INTRAVENOUS at 14:16

## 2025-07-30 RX ADMIN — CEFTRIAXONE SODIUM 2 G: 2 INJECTION, POWDER, FOR SOLUTION INTRAMUSCULAR; INTRAVENOUS at 16:45

## 2025-07-30 ASSESSMENT — ACTIVITIES OF DAILY LIVING (ADL)
ADLS_ACUITY_SCORE: 46

## 2025-07-30 ASSESSMENT — COLUMBIA-SUICIDE SEVERITY RATING SCALE - C-SSRS
1. IN THE PAST MONTH, HAVE YOU WISHED YOU WERE DEAD OR WISHED YOU COULD GO TO SLEEP AND NOT WAKE UP?: NO
2. HAVE YOU ACTUALLY HAD ANY THOUGHTS OF KILLING YOURSELF IN THE PAST MONTH?: NO
6. HAVE YOU EVER DONE ANYTHING, STARTED TO DO ANYTHING, OR PREPARED TO DO ANYTHING TO END YOUR LIFE?: NO

## 2025-07-30 NOTE — ED TRIAGE NOTES
Pt ambulatory to triage with c/o abdominal pain. Pt sates that yesterday he began having RLQ abdominal pain, similar to previous gall bladder stones. Pt here for further evaluation.       Triage Assessment (Adult)       Row Name 07/30/25 1318          Triage Assessment    Airway WDL WDL        Respiratory WDL    Respiratory WDL WDL        Skin Circulation/Temperature WDL    Skin Circulation/Temperature WDL WDL        Cardiac WDL    Cardiac WDL WDL        Peripheral/Neurovascular WDL    Peripheral Neurovascular WDL WDL        Cognitive/Neuro/Behavioral WDL    Cognitive/Neuro/Behavioral WDL WDL

## 2025-07-30 NOTE — LETTER
[facility]  [address] [place]   [address]  Tel. [phone]   Fax [phone]   [facility]  [address] [place]   [address]  Tel. [phone]   Fax [phone]        Nico Morales  [address]  [place] [address]          8/2/2025      Dear Nico,    Our records show that you were last seen on 8/2/25 (date of hospital discharge for choledocholithiasis).  Please make an appointment at your earliest convenience for the following:  - complete metabolic panel with liver function around Tuesday, Aug 5th, 2025.     Please have the results faxed to Jaye [facility]    Taking care of your health is important to us!      Sincerely,    Gallito Lomax MD

## 2025-07-30 NOTE — CONSULTS
EGS Surgery Consult  2025    Nico Morales  : 1975    Date of Service: 2025 4:25 PM    Assessment and Plan:  Nico Morales is a 49 year old male with past medical history of cystic fibrosis, pancreatic insufficiency with prior G-tube that is now removed, and recurrent subacute right upper quadrant pain with HIDA showing abnormal gallbladder kinetics who presented to the ED with abdominal pain. Labs notable for elevated LFTS and leukocytosis to 15.6. Ultrasound with findings similar to US performed in February (gallbladder wall thickening and cholelithiasis) apart from dilatation of the CBD and intrahepatic biliary ducts.    - MRCP to evaluate for choledocholithiasis  - Recommend GI involvement following MRCP  - Admit to medicine  - General Surgery will follow along    Patient discussed with chief resident who will discuss with staff.    Wilian Gresham MD  PGY-2 General Surgery Resident    History of Present Illness:    Nico Morales is a 49 year old male with past medical history of cystic fibrosis, pancreatic insufficiency with prior G-tube that is now removed, and recurrent subacute right upper quadrant pain with HIDA showing abnormal gallbladder kinetics who presented to the ED with abdominal pain relieved by self-induced vomiting. He reports several years of intermittent RUQ pain that was recently evaluated in February with an ultrasound and HIDA scan with subsequent recommendations for elective cholecystectomy. He started having similar pain yesterday after eating a milkshake. He denies any nausea, changes in bowel movements, or fevers.    Past Medical History:  Past Medical History:   Diagnosis Date    CF (cystic fibrosis) (H)     Exocrine pancreatic insufficiency     Nocardia infection     Pseudomonas infection     S/P gastrostomy (H)        Past Surgical History  Past Surgical History:   Procedure Laterality Date    COLONOSCOPY N/A 2018    Procedure: COMBINED  COLONOSCOPY, SINGLE OR MULTIPLE BIOPSY/POLYPECTOMY BY BIOPSY;  Cystic fibrosis, 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;  Surgeon: Margarito Bowman MD;  Location: [facility]    COLONOSCOPY N/A 6/27/2022    Procedure: COLONOSCOPY, WITH POLYPECTOMY AND BIOPSY;  Surgeon: Margarito Bowman MD;  Location: [facility]    COLONOSCOPY N/A 7/14/2025    Procedure: COLONOSCOPY, FLEXIBLE, WITH LESION REMOVAL USING SNARE;  Surgeon: Margarito Bowman MD;  Location: [facility]    ESOPHAGOSCOPY, GASTROSCOPY, DUODENOSCOPY (EGD), COMBINED N/A 7/14/2025    Procedure: ESOPHAGOGASTRODUODENOSCOPY, WITH BIOPSY;  Surgeon: Margarito Bowman MD;  Location: [facility]    GASTROSTOMY TUBE  2002    IR PULMONARY EMBOLIZATION  7/20/2001    PICC INSERTION Left 01/22/2018    4Fr SL BioFlo PICC, 46cm (5cm external) in the L basilic vein w/ tip in the low SVC       Family History:  Family History   Problem Relation Age of Onset    Heart Disease Maternal Grandmother     Heart Disease Maternal Grandfather     Heart Disease Paternal Grandmother     Diabetes No family hx of        Social History:  Social History     Socioeconomic History    Marital status:      Spouse name: Not on file    Number of children: 0    Years of education: Not on file    Highest education level: Not on file   Occupational History    Occupation: financial palnner     Employer: [employer]   Tobacco Use    Smoking status: Never    Smokeless tobacco: Never   Vaping Use    Vaping status: Never Used   Substance and Sexual Activity    Alcohol use: No     Alcohol/week: 0.0 standard drinks of alcohol    Drug use: No    Sexual activity: Yes     Partners: Female     Birth control/protection: Pill   Other Topics Concern    Parent/sibling w/ CABG, MI or angioplasty before 65F 55M? Not Asked     Service Not Asked    Blood Transfusions No    Caffeine Concern Not Asked    Occupational Exposure Not Asked    Hobby Hazards Not Asked    Sleep Concern Not Asked    Stress Concern Not Asked    Weight  Concern Not Asked    Special Diet Not Asked    Back Care Not Asked    Exercise No    Bike Helmet Not Asked    Seat Belt Not Asked    Self-Exams Not Asked   Social History Narrative    8/15/2019 - .  Lives with his English Bulldog (Jefry) in a house that he designed in [place].  No children.     Social Drivers of Health     Financial Resource Strain: Not on file   Food Insecurity: Not on file   Transportation Needs: Not on file   Physical Activity: Not on file   Stress: Not on file   Social Connections: Not on file   Interpersonal Safety: Low Risk  (7/14/2025)    Interpersonal Safety     Do you feel physically and emotionally safe where you currently live?: Yes     Within the past 12 months, have you been hit, slapped, kicked or otherwise physically hurt by someone?: No     Within the past 12 months, have you been humiliated or emotionally abused in other ways by your partner or ex-partner?: No   Housing Stability: Not on file       Medications:  Current Outpatient Medications   Medication Sig Dispense Refill    ACE NOT PRESCRIBED, INTENTIONAL, 1 each continuous prn. ACE Inhibitor not prescribed due to Risk for drug interaction 0 each 0    azithromycin (ZITHROMAX) 250 MG tablet TAKE 1 TABLET (250 MG) BY MOUTH DAILY 30 tablet 11    bisacodyl (DULCOLAX) 5 MG EC tablet Two days prior to exam take two (2) tablets at 4pm. One day prior to exam take two (2) tablets at 4pm 4 tablet 0    blood glucose (NO BRAND SPECIFIED) test strip Use to test blood sugar 6 times daily or as directed. To accompany: Blood Glucose Monitor Brands: per insurance. 600 strip 3    blood glucose (NO BRAND SPECIFIED) test strip Use to test blood sugar 6 times daily. Any covered brand. Pt now using Contour Next, if covered. 600 strip 3    blood glucose monitoring (NO BRAND SPECIFIED) meter device kit Use to test blood sugar 6 times daily or as directed. Preferred blood glucose meter OR supplies to accompany: Blood Glucose Monitor  "Brands: per insurance. 1 kit 0    blood glucose monitoring (NO BRAND SPECIFIED) meter device kit Use to test blood sugar 6 times daily or as directed.whatever is covered 1 kit 1    blood glucose monitoring (ULTRA THIN 30G) lancets Use to test blood sugar 6 times daily or as directed.whatever is covered 540 each 3    budesonide-formoterol (SYMBICORT/BREYNA) 160-4.5 MCG/ACT Inhaler INHALE 2 PUFFS BY MOUTH TWICE DAILY 30.6 g 3    CALCIUM PO Take 500 mg by mouth 2 times daily Strength unknown.       Continuous Blood Gluc Sensor (FREESTYLE SUKHWINDER 14 DAY SENSOR) MISC Change every 14 days. 1 each 3    [facility] HOME INFUSION MANAGED PATIENT Contact [facility] for patient specific medication information at [phone] on admission and discharge from the hospital.  Phones are answered 24 hours a day 7 days a week 365 days a year.    Providers - Choose \"CONTINUE HOME MED (no script)\" at discharge if patient treatment with home infusion will continue.      ferrous fumarate 65 mg, [place]. FE,-Vitamin C 125 mg (VITRON-C)  MG TABS tablet Take 1 tablet by mouth daily      Glucagon (BAQSIMI) 3 MG/DOSE nasal powder Spray 1 spray (3 mg) in nostril as needed (in emergency for hypoglycic reaction) in the event of unconscious hypoglycemia or hypoglycemic seizure. May repeat dose if no response after 15 minutes. 1 each 1    Glucagon (GVOKE HYPOPEN) 1 MG/0.2ML pen Inject the contents of 1 device under the skin into lower abdomen, outer thigh, or outer upper arm as needed for hypoglycemia. If no response after 15 minutes, additional 1 mg dose from a new device may be injected while waiting for emergency assistance. 0.4 mL 1    Insulin Aspart FlexPen 100 UNIT/ML SOPN Inject 1 Units Subcutaneous 3 times daily With these instructions :INJECT 1 UNIT PER 20G CARB AT LUNCH AND SUPPER; INJECT 3 UNITS AT NIGHT WITH TUBE FEEDINGS. MAX 20 UNITS PER Day 15 mL 3    insulin glargine (LANTUS SOLOSTAR) 100 UNIT/ML pen INJECT 8 UNITS " UNDER THE SKIN ONCE DAILY 15 mL 1    insulin lispro (HUMALOG KWIKPEN) 100 UNIT/ML (1 unit dial) KWIKPEN Inject 1 Units Subcutaneous 3 times daily With these instructions :INJECT 1 UNIT PER 20G CARB AT LUNCH AND SUPPER; INJECT 3 UNITS AT NIGHT WITH TUBE FEEDINGS. MAX 20 UNITS PER Day, Disp-15 mL, R-3, E-Prescribe 15 mL 3    insulin pen needle (BD OMI U/F) 32G X 4 MM miscellaneous Use 4 pen needles daily or as directed. Follow up visit needed. Call  to schedule. 400 each 3    levalbuterol (XOPENEX HFA) 45 MCG/ACT inhaler INHALE 2 PUFFS INTO THE LUNGS EVERY 6 HOURS AS NEEDED FOR SHORTNESS OF BREATH/DYSPNEA 75 g 3    levalbuterol (XOPENEX) 1.25 MG/3ML neb solution INHALE 1 VIAL BY MOUTH INTO THE LUNGS VIA NEBULIZATION FOUR TIMES DAILY 360 mL 11    lipase-protease-amylase (CREON) 27927-53198-847866 units CPEP per EC capsule TAKE 9-10 CAPSULES BY MOUTH WITH MEALS (3 MEALS/DAY) AND 2-4 CAPS WITH SNACKS (3 SNACKS/DAY) 3780 capsule 3    MULTIVITAMIN OR Take 1 tablet by mouth daily. Includes 5,000 units vitamin D and 400 units vitamin E. Per pt, formulated for CF pts.      MVW complete (PROBIOTIC) capsule Take 1 capsule by mouth daily. 30 capsule 11    phytonadione (MEPHYTON) 5 MG tablet Take one tablet daily for 3 days as needed for hemoptysis. (Patient not taking: Reported on 6/10/2025) 6 tablet 4    polyethylene glycol (GOLYTELY) 236 g suspension Two days before procedure at 5PM fill first container with water. Mix and drink an 8 oz glass every 15 minutes until HALF of the container is gone. Place the remainder in the refrigerator. One day before procedure at 5PM drink second half of bowel prep. Drink an 8 oz glass every 15 minutes until it is gone. Day of procedure 6 hours before arrival time fill the 2nd container with water. Mix and drink an 8 oz glass every 15 minutes until HALF of the container is gone. Discard the remaining solution. 8000 mL 0    polyethylene glycol (MIRALAX) 17 GM/Dose powder Take 17 g  by mouth daily as needed for constipation      thin (NO BRAND SPECIFIED) lancets Use with lanceting device 6x daily. To accompany: Blood Glucose Monitor Brands: per insurance. 600 each 3    vanzacaftor-tezacaftor-deutivacaftor (ALYFTREK) 10- MG tablet Take 2 tablets by mouth daily. Take with fat-containing food. 56 tablet 1       Allergies:     Allergies   Allergen Reactions    Piperacillin Sod-Tazobactam So Hives     Delayed type reaction in intradermal tests to Zosyn (Piperacillin/Tazobactam). Might be specific reaction to the Zosyn without cross-reactions to Penicillins (beta-Lactam core). Avoid in future Zosyn. However, I would avoid the Penicillins and use instead Penems or Cephalosporines. If Penicillins are the best option, then they can be used under observation.    Pulmozyme [Dornase Trever] Other (See Comments)     Chest pain and fever       Review of Symptoms:  A 10 point review of symptoms has been conducted and is negative except for that mentioned in the above HPI.    Physical Exam:    Blood pressure (!) 142/76, pulse 65, temperature 98.5  F (36.9  C), temperature source Oral, resp. rate 15, SpO2 96%.  Gen:    Lying in bed in NAD, A&OX3  HEENT: Normocephalic and atraumatic  CV:  RRR  Pulm:  Non-labored breathing  Abd:  Soft, non-tender, non-distended  Ext:  Warm and well perfused, no obvious deformities    Labs:  CBC RESULTS:   Recent Labs   Lab Test 07/30/25  1415   WBC 15.6*   RBC 5.90   HGB 17.1   HCT 52.7   MCV 89   MCH 29.0   MCHC 32.4   RDW 13.5        BMP RESULTS:   Recent Labs   Lab 07/30/25  1415      POTASSIUM 4.7   CHLORIDE 95*   CO2 28   BUN 18.8   CR 0.92   *     LFT RESULTS:   Recent Labs   Lab 07/30/25  1415   *   *   ALKPHOS 375*   BILITOTAL 3.8*   ALBUMIN 4.3       Imaging:  Imaging results reviewed over the past 24 hrs:   Recent Results (from the past 24 hours)   US Abdomen Limited   Result Value    Radiologist flags Acute cholecystitis.     Radiologist flags Possible acute cholecystitis. (Urgent)    Narrative    EXAMINATION: Limited Abdominal Ultrasound, 7/30/2025 3:22 PM     COMPARISON: HIDA scan 5/27/2025. Right upper quadrant ultrasound  2/18/2025    HISTORY: Right upper quadrant pain. Assess stone versus cholecystitis.    TECHNIQUE: The abdomen was scanned in standard fashion with  specialized ultrasound transducer(s) using both gray-scale and limited  color Doppler techniques.    FINDINGS:     Liver: The liver demonstrates normal echotexture, measuring 14.2 cm in  craniocaudal dimension. There is no focal mass.     Gallbladder: There is gallbladder wall thickening measuring up to  approximately 5.3 mm. Large mobile peripherally echogenic tumefactive  sludge ball versus developing stone. The gallbladder is filled with  sludge. There is no pericholecystic fluid or positive sonographic  Mullen's sign.    Bile Ducts: Diffuse intrahepatic biliary dilatation.  The common bile  duct is dilated and measures 7 mm in diameter.    Pancreas: Visualized portions of the head and body of the pancreas  appear normal.     Kidney: The right kidney measures 11.1 cm long. There is no  hydronephrosis or hydroureter, no shadowing renal calculi, cystic  lesion or mass.     Fluid: No evidence of ascites or pleural effusions.      Impression    IMPRESSION:   1.  Findings equivocal for acute cholecystitis. Gallbladder wall  thickening and stones are unchanged from 2/18/25 study. Absence of  pericholecystic fluid and negative sonographic Mullen's sign do not  support the diagnosis of acute cholecystitis.   2.  There is dilatation of the CBD and intrahepatic biliary ducts  which is new since prior study. Consider MRCP for evaluation of distal  CBD obstruction.      [Urgent Result: Possible acute cholecystitis.]    Finding was identified on 7/30/2025 3:23 PM.     Arturo Simpson contacted by Dr. Trey Encinas 30/2025 3:27 PM and  verbalized understanding of the urgent  finding.     I have personally reviewed the examination and initial interpretation  and I agree with the findings.    SCOTTIE PORRAS MD         SYSTEM ID:  [id number]

## 2025-07-30 NOTE — PROGRESS NOTES
Brief GI Note    Nico Morales is a 49 year old male with past medical history of cystic fibrosis, pancreatic insufficiency with prior G-tube that is now removed, and recurrent subacute right upper quadrant pain with HIDA showing abnormal gallbladder kinetics who presented to the ED with abdominal pain. The patient had an US in February with findings equivocal for acute cholecystitis but he did not follow up for cholecystectomy. Per the ED PA, the patient does not have signs of acute cholangitis- no jaundice, fevers, N/V, or severe pain on palpation.     Labs notable for new elevation in hepatic panel: WBC 15.6, , , , tbili 3.8. These labs were all normal when last checked 7/11/25. Ultrasound with findings are below and are similar to US performed in February- findings equivocal for acute cholecystitis, gallbladder wall thickening and stones. Surgery was consulted and do not have plans for urgent cholecystectomy but recommended MRCP.    US Abdomen 7/30/25  IMPRESSION:   1.  Findings equivocal for acute cholecystitis. Gallbladder wall  thickening and stones are unchanged from 2/18/25 study. Absence of  pericholecystic fluid and negative sonographic Mullen's sign do not  support the diagnosis of acute cholecystitis.   2.  There is dilatation of the CBD and intrahepatic biliary ducts  which is new since prior study. Consider MRCP for evaluation of distal  CBD obstruction.      Recommendations:  - Keep NPO at midnight.  - Possible for ERCP tomorrow after evaluating clinically.  - Hold anticoagulation and DVT ppx.    Discussed with: Dr. Barr.

## 2025-07-30 NOTE — ED PROVIDER NOTES
ED Provider Note  [facility]    ______________________________________________________________________    ATTENDING ATTESTATION  I have discussed this patient's case with the ED ABDOULAYE. We have together formed a plan for this patient's medical decision making, assessment and plan as documented. I have reviewed the below documentation and either agree with or have accordingly edited the below encounter documentation to represent encounter with the patient.     Summary of HPI, PE, ED Course   Patient is a 49 year old male evaluated in the emergency department for abdominal pain. After the completion of care in the emergency department, the patient was admitted to inpatient w/ plan for GI and Surgery consults. Surgery saw and defers admission to Medicine, w/ GI consult, but they'll continue to follow. Given IV ceftriaxone and flagyl.     There were no emergent procedures or critical care time for this encounter.     Ashley Moeller MD  Emergency Medicine Attending        History     Chief Complaint   Patient presents with    Abdominal Pain     HPI  48yo M pmhx, CF, pancreatic insufficiency with prior G-tube for malnutrition (subsequently removed) and recurrent, subacute, intermittent RUQ pain with HIDA (5/27/2025) showing abnormal gallbladder kinetics, potentially consistent with chronic cholecystitis p/w RUQ pain x yesterday.  Symptoms began after eating a milkshake.  They have gradually improved over the interval (pain currently 1/10 versus 7/10 at worst).  He notes that vomiting improves pain, so was making himself vomit, but otherwise has had no spontaneous vomiting.  Minimal nausea.  Normal bowel movements.  No CP, SOB, cough, fever, chills, urinary symptoms.          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;  Surgeon: Margarito Bowman MD;  Location: [facility]    COLONOSCOPY N/A 6/27/2022    Procedure: COLONOSCOPY, WITH POLYPECTOMY AND BIOPSY;  Surgeon: Margarito Bowman MD;  Location: [facility]    COLONOSCOPY N/A 7/14/2025    Procedure: COLONOSCOPY, FLEXIBLE, WITH LESION REMOVAL USING SNARE;  Surgeon: Margarito Bowman MD;  Location: [facility]    ESOPHAGOSCOPY, GASTROSCOPY, DUODENOSCOPY (EGD), COMBINED N/A 7/14/2025    Procedure: ESOPHAGOGASTRODUODENOSCOPY, WITH BIOPSY;  Surgeon: Margarito Bowman MD;  Location: [facility]    GASTROSTOMY TUBE  2002    IR PULMONARY EMBOLIZATION  7/20/2001    PICC INSERTION Left 01/22/2018    4Fr SL BioFlo PICC, 46cm (5cm external) in the L basilic vein w/ tip in the low SVC     ACE NOT PRESCRIBED, INTENTIONAL,  azithromycin (ZITHROMAX) 250 MG tablet  bisacodyl (DULCOLAX) 5 MG EC tablet  blood glucose (NO BRAND SPECIFIED) test strip  blood glucose (NO BRAND SPECIFIED) test strip  blood glucose monitoring (NO BRAND SPECIFIED) meter device kit  blood glucose monitoring (NO BRAND SPECIFIED) meter device kit  blood glucose monitoring (ULTRA THIN 30G) lancets  budesonide-formoterol (SYMBICORT/BREYNA) 160-4.5 MCG/ACT Inhaler  CALCIUM PO  Continuous Blood Gluc Sensor (FREESTYLE SUKHWINDER 14 DAY SENSOR) [facility] INFUSION MANAGED PATIENT  ferrous fumarate 65 mg, [place]. FE,-Vitamin C 125 mg (VITRON-C)  MG TABS tablet  Glucagon (BAQSIMI) 3 MG/DOSE nasal powder  Glucagon (GVOKE HYPOPEN) 1 MG/0.2ML pen  Insulin Aspart FlexPen 100 UNIT/ML SOPN  insulin glargine (LANTUS SOLOSTAR) 100 UNIT/ML pen  insulin lispro (HUMALOG KWIKPEN) 100 UNIT/ML (1 unit dial) KWIKPEN  insulin pen needle (BD OMI U/F) 32G X 4 MM miscellaneous  levalbuterol (XOPENEX HFA) 45 MCG/ACT inhaler  levalbuterol (XOPENEX) 1.25 MG/3ML neb  solution  lipase-protease-amylase (CREON) 38883-49606-205312 units CPEP per EC capsule  MULTIVITAMIN OR  MVW complete (PROBIOTIC) capsule  phytonadione (MEPHYTON) 5 MG tablet  polyethylene glycol (GOLYTELY) 236 g suspension  polyethylene glycol (MIRALAX) 17 GM/Dose powder  thin (NO BRAND SPECIFIED) lancets  vanzacaftor-tezacaftor-deutivacaftor (ALYFTREK) 10- MG tablet      Allergies   Allergen Reactions    Piperacillin Sod-Tazobactam So Hives     Delayed type reaction in intradermal tests to Zosyn (Piperacillin/Tazobactam). Might be specific reaction to the Zosyn without cross-reactions to Penicillins (beta-Lactam core). Avoid in future Zosyn. However, I would avoid the Penicillins and use instead Penems or Cephalosporines. If Penicillins are the best option, then they can be used under observation.    Pulmozyme [Dornase Trever] Other (See Comments)     Chest pain and fever     Family History  Family History   Problem Relation Age of Onset    Heart Disease Maternal Grandmother     Heart Disease Maternal Grandfather     Heart Disease Paternal Grandmother     Diabetes No family hx of      Social History   Social History     Tobacco Use    Smoking status: Never    Smokeless tobacco: Never   Vaping Use    Vaping status: Never Used   Substance Use Topics    Alcohol use: No     Alcohol/week: 0.0 standard drinks of alcohol    Drug use: No      A medically appropriate review of systems was performed with pertinent positives and negatives noted in the HPI, and all other systems negative.    Physical Exam   BP: (!) 142/76  Pulse: 65  Temp: 98.5  F (36.9  C)  Resp: 15  SpO2: 96 %  Physical Exam  Constitutional:       General: He is not in acute distress.     Appearance: Normal appearance. He is not toxic-appearing.   HENT:      Head: Atraumatic.   Eyes:      General: No scleral icterus.     Conjunctiva/sclera: Conjunctivae normal.   Cardiovascular:      Rate and Rhythm: Normal rate.      Heart sounds: Normal heart sounds.    Pulmonary:      Effort: Pulmonary effort is normal.   Abdominal:      Palpations: Abdomen is soft.      Tenderness: There is abdominal tenderness in the right upper quadrant. There is no right CVA tenderness, left CVA tenderness or guarding. Positive signs include Mullen's sign. Negative signs include McBurney's sign.   Musculoskeletal:         General: No deformity.      Cervical back: Neck supple.   Skin:     General: Skin is warm.   Neurological:      Mental Status: He is alert.           ED Course, Procedures, & Data      Procedures           IV Antibiotics given and/or elevated Lactate of 0 and no sepsis note found - Delete this reminder and enter the sepsis note or '.edcms' before signing chart.>>>     Results for orders placed or performed during the hospital encounter of 07/30/25   US Abdomen Limited   Result Value Ref Range    Radiologist flags Acute cholecystitis.     Radiologist flags Possible acute cholecystitis. (Urgent)     Impression    IMPRESSION:   1.  Findings equivocal for acute cholecystitis. Gallbladder wall  thickening and stones are unchanged from 2/18/25 study. Absence of  pericholecystic fluid and negative sonographic Mullen's sign do not  support the diagnosis of acute cholecystitis.   2.  There is dilatation of the CBD and intrahepatic biliary ducts  which is new since prior study. Consider MRCP for evaluation of distal  CBD obstruction.      [Urgent Result: Possible acute cholecystitis.]    Finding was identified on 7/30/2025 3:23 PM.     Arturo Simpson contacted by Dr. Trey Encinas 30/2025 3:27 PM and  verbalized understanding of the urgent finding.     I have personally reviewed the examination and initial interpretation  and I agree with the findings.    SCOTTIE PORRAS MD         SYSTEM ID:  [id number]   Result Value Ref Range    Lipase 8 (L) 13 - 60 U/L   Comprehensive Metabolic Panel (Limited Occurrences)   Result Value Ref Range    Sodium 138 135 - 145 mmol/L    Potassium 4.7 3.4  - 5.3 mmol/L    Carbon Dioxide (CO2) 28 22 - 29 mmol/L    Anion Gap 15 7 - 15 mmol/L    Urea Nitrogen 18.8 6.0 - 20.0 mg/dL    Creatinine 0.92 0.67 - 1.17 mg/dL    GFR Estimate >90 >60 mL/min/1.73m2    Calcium 11.1 (H) 8.8 - 10.4 mg/dL    Chloride 95 (L) 98 - 107 mmol/L    Glucose 133 (H) 70 - 99 mg/dL    Alkaline Phosphatase 375 (H) 40 - 150 U/L     (H) 0 - 45 U/L     (HH) 0 - 70 U/L    Protein Total 8.0 6.4 - 8.3 g/dL    Albumin 4.3 3.5 - 5.2 g/dL    Bilirubin Total 3.8 (H) <=1.2 mg/dL   CBC with platelets and differential   Result Value Ref Range    WBC Count 15.6 (H) 4.0 - 11.0 10e3/uL    RBC Count 5.90 4.40 - 5.90 10e6/uL    Hemoglobin 17.1 13.3 - 17.7 g/dL    Hematocrit 52.7 40.0 - 53.0 %    MCV 89 78 - 100 fL    MCH 29.0 26.5 - 33.0 pg    MCHC 32.4 31.5 - 36.5 g/dL    RDW 13.5 10.0 - 15.0 %    Platelet Count 314 150 - 450 10e3/uL    % Neutrophils 73 %    % Lymphocytes 15 %    % Monocytes 12 %    % Eosinophils 0 %    % Basophils 0 %    % Immature Granulocytes 0 %    NRBCs per 100 WBC 0 <1 /100    Absolute Neutrophils 11.3 (H) 1.6 - 8.3 10e3/uL    Absolute Lymphocytes 2.3 0.8 - 5.3 10e3/uL    Absolute Monocytes 1.9 (H) 0.0 - 1.3 10e3/uL    Absolute Eosinophils 0.0 0.0 - 0.7 10e3/uL    Absolute Basophils 0.0 0.0 - 0.2 10e3/uL    Absolute Immature Granulocytes 0.1 <=0.4 10e3/uL    Absolute NRBCs 0.0 10e3/uL   RBC and Platelet Morphology   Result Value Ref Range    RBC Morphology Confirmed RBC Indices     Platelet Assessment  Automated Count Confirmed. Platelet morphology is normal.     Automated Count Confirmed. Platelet morphology is normal.    Reactive Lymphocytes Present (A) None Seen     Medications   metroNIDAZOLE (FLAGYL) infusion 500 mg (has no administration in time range)   pharmacy alert - intermittent dosing (has no administration in time range)   sodium chloride 0.9% BOLUS 1,000 mL (0 mLs Intravenous Stopped 7/30/25 9195)   cefTRIAXone (ROCEPHIN) 2 g vial to attach to  ml bag for  ADULTS or NS 50 ml bag for PEDS (2 g Intravenous $New Bag 7/30/25 0205)          Critical care was not performed.     Medical Decision Making  The patient's presentation was of high complexity (an acute health issue posing potential threat to life or bodily function).    The patient's evaluation involved:  review of external note(s) from 3+ sources (see separate area of note for details)  ordering and/or review of 3+ test(s) in this encounter (see separate area of note for details)  discussion of management or test interpretation with another health professional (see separate area of note for details)    The patient's management necessitated high risk (a decision regarding hospitalization).    Assessment & Plan    48yo M pmhx, CF, pancreatic insufficiency with prior G-tube for malnutrition (subsequently removed) and recurrent, subacute, intermittent RUQ pain with HIDA (5/27/2025) showing abnormal gallbladder kinetics, potentially consistent with chronic cholecystitis p/w RUQ pain x yesterday.  Pain is improved over the interval but not completely resolved.  Has had very limited p.o. intake 2/2 pain.  No fever, chills, CP, SOB, acute cough.    The ED patient is HDS/AF, appears in moderate pain.  Abdomen is soft, but does have RUQ TTP/R, Mullen sign.  DDx cholecystitis versus cholangitis versus pancreatitis versus gastritis versus GERD versus less likely cholangitis versus unlikely ACS, PNA, PTX    Patient's laboratory workup notable for leukocytosis of 15.6.  Additionally, his CMP shows marked elevation in all of his LFTs (, , T. bili 3.8).  Lipase negative.  RUQ obtained and read as equivocal for acute cholecystitis, with unchanged wall thickening and stones from prior.  However dilatation of the CBD and intraparotid biliary ducts is new from prior.  Patient prophylaxed with ceftriaxone and Flagyl.  General surgery consulted, and feel that this is more likely obstruction, with recommendation for GI  consult and MRCP.  GI made aware of patient.  Patient will be admitted to medicine pending MRCP and GI formal consult.  Patient offered pain control but declined.        I have reviewed the nursing notes. I have reviewed the findings, diagnosis, plan and need for follow up with the patient.    New Prescriptions    No medications on file       Final diagnoses:   Common bile duct dilatation   Elevated LFTs         Arturo Simpson PA-C  [facility] EMERGENCY DEPARTMENT  7/30/2025     Arturo Simpson PA-C  07/30/25 3300       Ashley Moeller MD  07/31/25 4845

## 2025-07-31 ENCOUNTER — ANESTHESIA EVENT (OUTPATIENT)
Dept: SURGERY | Facility: CLINIC | Age: 50
End: 2025-07-31

## 2025-07-31 ENCOUNTER — ANESTHESIA (OUTPATIENT)
Dept: SURGERY | Facility: CLINIC | Age: 50
End: 2025-07-31

## 2025-07-31 VITALS
SYSTOLIC BLOOD PRESSURE: 142 MMHG | RESPIRATION RATE: 16 BRPM | DIASTOLIC BLOOD PRESSURE: 88 MMHG | OXYGEN SATURATION: 95 % | TEMPERATURE: 99.3 F | HEART RATE: 70 BPM

## 2025-07-31 LAB
ALBUMIN SERPL BCG-MCNC: 3.7 G/DL (ref 3.5–5.2)
ALP SERPL-CCNC: 367 U/L (ref 40–150)
ALT SERPL W P-5'-P-CCNC: 401 U/L (ref 0–70)
ANION GAP SERPL CALCULATED.3IONS-SCNC: 15 MMOL/L (ref 7–15)
AST SERPL W P-5'-P-CCNC: 246 U/L (ref 0–45)
BACTERIA SPEC CULT: NORMAL
BACTERIA SPEC CULT: NORMAL
BASOPHILS # BLD AUTO: 0 10E3/UL (ref 0–0.2)
BASOPHILS NFR BLD AUTO: 0 %
BILIRUB SERPL-MCNC: 4 MG/DL
BUN SERPL-MCNC: 19.4 MG/DL (ref 6–20)
CALCIUM SERPL-MCNC: 9.3 MG/DL (ref 8.8–10.4)
CHLORIDE SERPL-SCNC: 98 MMOL/L (ref 98–107)
CREAT SERPL-MCNC: 0.76 MG/DL (ref 0.67–1.17)
EGFRCR SERPLBLD CKD-EPI 2021: >90 ML/MIN/1.73M2
EOSINOPHIL # BLD AUTO: 0 10E3/UL (ref 0–0.7)
EOSINOPHIL NFR BLD AUTO: 0 %
ERYTHROCYTE [DISTWIDTH] IN BLOOD BY AUTOMATED COUNT: 13.8 % (ref 10–15)
GLUCOSE BLDC GLUCOMTR-MCNC: 105 MG/DL (ref 70–99)
GLUCOSE BLDC GLUCOMTR-MCNC: 107 MG/DL (ref 70–99)
GLUCOSE BLDC GLUCOMTR-MCNC: 115 MG/DL (ref 70–99)
GLUCOSE BLDC GLUCOMTR-MCNC: 141 MG/DL (ref 70–99)
GLUCOSE SERPL-MCNC: 107 MG/DL (ref 70–99)
HCO3 SERPL-SCNC: 24 MMOL/L (ref 22–29)
HCT VFR BLD AUTO: 45.8 % (ref 40–53)
HGB BLD-MCNC: 15 G/DL (ref 13.3–17.7)
IMM GRANULOCYTES # BLD: 0.1 10E3/UL
IMM GRANULOCYTES NFR BLD: 0 %
LYMPHOCYTES # BLD AUTO: 1.6 10E3/UL (ref 0.8–5.3)
LYMPHOCYTES NFR BLD AUTO: 11 %
MCH RBC QN AUTO: 28.6 PG (ref 26.5–33)
MCHC RBC AUTO-ENTMCNC: 32.8 G/DL (ref 31.5–36.5)
MCV RBC AUTO: 87 FL (ref 78–100)
MONOCYTES # BLD AUTO: 1.3 10E3/UL (ref 0–1.3)
MONOCYTES NFR BLD AUTO: 9 %
NEUTROPHILS # BLD AUTO: 11.4 10E3/UL (ref 1.6–8.3)
NEUTROPHILS NFR BLD AUTO: 80 %
NRBC # BLD AUTO: 0 10E3/UL
NRBC BLD AUTO-RTO: 0 /100
PLATELET # BLD AUTO: 267 10E3/UL (ref 150–450)
POTASSIUM SERPL-SCNC: 4 MMOL/L (ref 3.4–5.3)
PROT SERPL-MCNC: 6.9 G/DL (ref 6.4–8.3)
RBC # BLD AUTO: 5.24 10E6/UL (ref 4.4–5.9)
SODIUM SERPL-SCNC: 137 MMOL/L (ref 135–145)
WBC # BLD AUTO: 14.3 10E3/UL (ref 4–11)

## 2025-07-31 PROCEDURE — 250N000009 HC RX 250

## 2025-07-31 PROCEDURE — 272N000098

## 2025-07-31 PROCEDURE — 99223 1ST HOSP IP/OBS HIGH 75: CPT | Mod: FS | Performed by: NURSE PRACTITIONER

## 2025-07-31 PROCEDURE — 94640 AIRWAY INHALATION TREATMENT: CPT | Mod: 76

## 2025-07-31 PROCEDURE — 82962 GLUCOSE BLOOD TEST: CPT

## 2025-07-31 PROCEDURE — 250N000011 HC RX IP 250 OP 636

## 2025-07-31 PROCEDURE — 94669 MECHANICAL CHEST WALL OSCILL: CPT

## 2025-07-31 PROCEDURE — 36415 COLL VENOUS BLD VENIPUNCTURE: CPT

## 2025-07-31 PROCEDURE — 250N000013 HC RX MED GY IP 250 OP 250 PS 637: Performed by: STUDENT IN AN ORGANIZED HEALTH CARE EDUCATION/TRAINING PROGRAM

## 2025-07-31 PROCEDURE — 999N000157 HC STATISTIC RCP TIME EA 10 MIN

## 2025-07-31 PROCEDURE — 258N000003 HC RX IP 258 OP 636

## 2025-07-31 PROCEDURE — 99232 SBSQ HOSP IP/OBS MODERATE 35: CPT | Mod: GC | Performed by: STUDENT IN AN ORGANIZED HEALTH CARE EDUCATION/TRAINING PROGRAM

## 2025-07-31 PROCEDURE — 99253 IP/OBS CNSLTJ NEW/EST LOW 45: CPT | Mod: GC | Performed by: INTERNAL MEDICINE

## 2025-07-31 PROCEDURE — 82247 BILIRUBIN TOTAL: CPT

## 2025-07-31 PROCEDURE — 99207 PR NON-BILLABLE SERV PER CHARTING: CPT | Performed by: NURSE PRACTITIONER

## 2025-07-31 PROCEDURE — 99232 SBSQ HOSP IP/OBS MODERATE 35: CPT | Mod: GC | Performed by: SURGERY

## 2025-07-31 PROCEDURE — 94640 AIRWAY INHALATION TREATMENT: CPT

## 2025-07-31 PROCEDURE — 82040 ASSAY OF SERUM ALBUMIN: CPT

## 2025-07-31 PROCEDURE — 250N000009 HC RX 250: Performed by: NURSE PRACTITIONER

## 2025-07-31 PROCEDURE — 250N000009 HC RX 250: Performed by: STUDENT IN AN ORGANIZED HEALTH CARE EDUCATION/TRAINING PROGRAM

## 2025-07-31 PROCEDURE — 85025 COMPLETE CBC W/AUTO DIFF WBC: CPT

## 2025-07-31 PROCEDURE — 250N000013 HC RX MED GY IP 250 OP 250 PS 637

## 2025-07-31 PROCEDURE — 120N000002 HC R&B MED SURG/OB UMMC

## 2025-07-31 RX ORDER — SODIUM CHLORIDE FOR INHALATION 7 %
4 VIAL, NEBULIZER (ML) INHALATION
Status: DISPENSED | OUTPATIENT
Start: 2025-08-01

## 2025-07-31 RX ORDER — ACETYLCYSTEINE 200 MG/ML
2 SOLUTION ORAL; RESPIRATORY (INHALATION) 2 TIMES DAILY
Status: DISPENSED | OUTPATIENT
Start: 2025-07-31

## 2025-07-31 RX ORDER — ENOXAPARIN SODIUM 100 MG/ML
40 INJECTION SUBCUTANEOUS
OUTPATIENT
Start: 2025-07-31

## 2025-07-31 RX ORDER — LEVALBUTEROL INHALATION SOLUTION 1.25 MG/3ML
1.25 SOLUTION RESPIRATORY (INHALATION) 2 TIMES DAILY
Status: DISCONTINUED | OUTPATIENT
Start: 2025-07-31 | End: 2025-07-31

## 2025-07-31 RX ORDER — CEFAZOLIN SODIUM 2 G/50ML
2 SOLUTION INTRAVENOUS SEE ADMIN INSTRUCTIONS
OUTPATIENT
Start: 2025-07-31

## 2025-07-31 RX ORDER — SODIUM CHLORIDE, SODIUM LACTATE, POTASSIUM CHLORIDE, CALCIUM CHLORIDE 600; 310; 30; 20 MG/100ML; MG/100ML; MG/100ML; MG/100ML
INJECTION, SOLUTION INTRAVENOUS CONTINUOUS
Status: ACTIVE | OUTPATIENT
Start: 2025-07-31 | End: 2025-08-01

## 2025-07-31 RX ORDER — SODIUM CHLORIDE FOR INHALATION 7 %
4 VIAL, NEBULIZER (ML) INHALATION
Status: DISCONTINUED | OUTPATIENT
Start: 2025-07-31 | End: 2025-07-31

## 2025-07-31 RX ORDER — ACETYLCYSTEINE 200 MG/ML
2 SOLUTION ORAL; RESPIRATORY (INHALATION) 2 TIMES DAILY
Status: DISCONTINUED | OUTPATIENT
Start: 2025-07-31 | End: 2025-07-31

## 2025-07-31 RX ORDER — CEFAZOLIN SODIUM 2 G/50ML
2 SOLUTION INTRAVENOUS
OUTPATIENT
Start: 2025-07-31

## 2025-07-31 RX ORDER — ACETYLCYSTEINE 200 MG/ML
2 SOLUTION ORAL; RESPIRATORY (INHALATION) 4 TIMES DAILY
Status: DISCONTINUED | OUTPATIENT
Start: 2025-07-31 | End: 2025-07-31

## 2025-07-31 RX ORDER — INDOCYANINE GREEN AND WATER 25 MG
2.5 KIT INJECTION ONCE
OUTPATIENT
Start: 2025-07-31 | End: 2025-07-31

## 2025-07-31 RX ORDER — LEVALBUTEROL INHALATION SOLUTION 1.25 MG/3ML
1.25 SOLUTION RESPIRATORY (INHALATION) 4 TIMES DAILY
Status: DISPENSED | OUTPATIENT
Start: 2025-07-31

## 2025-07-31 RX ADMIN — METRONIDAZOLE 500 MG: 500 INJECTION, SOLUTION INTRAVENOUS at 20:00

## 2025-07-31 RX ADMIN — BUDESONIDE AND FORMOTEROL FUMARATE DIHYDRATE 2 PUFF: 160; 4.5 AEROSOL RESPIRATORY (INHALATION) at 08:22

## 2025-07-31 RX ADMIN — LEVALBUTEROL HYDROCHLORIDE 1.25 MG: 1.25 SOLUTION RESPIRATORY (INHALATION) at 11:43

## 2025-07-31 RX ADMIN — LEVALBUTEROL HYDROCHLORIDE 1.25 MG: 1.25 SOLUTION RESPIRATORY (INHALATION) at 17:24

## 2025-07-31 RX ADMIN — ACETAMINOPHEN 650 MG: 325 TABLET ORAL at 16:38

## 2025-07-31 RX ADMIN — ACETAMINOPHEN 650 MG: 325 TABLET ORAL at 05:59

## 2025-07-31 RX ADMIN — BUDESONIDE AND FORMOTEROL FUMARATE DIHYDRATE 2 PUFF: 160; 4.5 AEROSOL RESPIRATORY (INHALATION) at 21:59

## 2025-07-31 RX ADMIN — CALCIUM 500 MG: 500 TABLET ORAL at 20:10

## 2025-07-31 RX ADMIN — LEVALBUTEROL HYDROCHLORIDE 1.25 MG: 1.25 SOLUTION RESPIRATORY (INHALATION) at 07:59

## 2025-07-31 RX ADMIN — METRONIDAZOLE 500 MG: 500 INJECTION, SOLUTION INTRAVENOUS at 05:46

## 2025-07-31 RX ADMIN — ACETYLCYSTEINE 2 ML: 200 SOLUTION ORAL; RESPIRATORY (INHALATION) at 17:25

## 2025-07-31 RX ADMIN — SODIUM CHLORIDE, SODIUM LACTATE, POTASSIUM CHLORIDE, AND CALCIUM CHLORIDE: .6; .31; .03; .02 INJECTION, SOLUTION INTRAVENOUS at 14:07

## 2025-07-31 RX ADMIN — CEFTRIAXONE SODIUM 2 G: 2 INJECTION, POWDER, FOR SOLUTION INTRAMUSCULAR; INTRAVENOUS at 16:31

## 2025-07-31 RX ADMIN — CALCIUM CARBONATE (ANTACID) CHEW TAB 500 MG 1000 MG: 500 CHEW TAB at 08:21

## 2025-07-31 ASSESSMENT — ACTIVITIES OF DAILY LIVING (ADL)
ADLS_ACUITY_SCORE: 56

## 2025-07-31 NOTE — PROGRESS NOTES
Resident/Fellow Attestation   I, Gallito Lomax MD, was present with the medical/ABDOULAYE student who participated in the service and in the documentation of the note.  I have verified the history and personally performed the physical exam and medical decision making.  I agree with the assessment and plan of care as documented in the note.      Gallito Lomax MD  PGY3  Date of Service (when I saw the patient): 07/31/25    [facility]    Progress Note - Medicine Service, MAROON TEAM 2       Date of Admission:  7/30/2025    Assessment & Plan   Nico Morales is a 49 year old male who has a history of cystic fibrosis (C618mtk/621+1G->T) c/b moderate obstructive lung disease, T2DM (on insulin), pancreatic exocrine insufficiency, malnutrition (previously s/p G tube, now removed), and osteoporosis, and who is admitted for RUQ abdominal pain. US 7/30 concerning for acute cholecystitis. GI and surgery recommend ERCP vs ERCP + cholecystectomy 8/1.     Today:  - GI, Surgery following  - ERCP deferred to 8/1 (NPO at midnight, no VTE prophylaxis beforehand)  - Continue abx    #RUQ pain  Given elevated WBC and dilated CBD and intrahepatic bile ducts but lack of pericholecystic fluid, equivocal between acute cholecystitis vs choledocholithiasis. MRCP may give clearer picture. Agree with GI recommendation for possible ERCP pending MRCP results. Will also continue CTX and BYRNES started in ED given c/f cholecystitis and intra-abdominal infection for at least 48 hours or pending blood culture results. Low c/f sepsis given reassuring vital signs. 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    - Consult to General Surgery, appreciate recs  - Consult to GI, appreciate recs              - ERCP deferred to 8/1              - NPO at midnight prior to ERCP 8/1              - Hold anti/coagulation/VTE prophylaxis  -  Bcx NGTD  - Ceftriaxone 2 g IV daily  - Metronidazole 500 mg IV q8h     #Cystic fibrosis (S556aek/621+1G->T)  #Moderate obstructive lung disease  #Prior infections with pseudomonas, nocardia  Patient [facility] with [facility]. Doing well on current regimen at last visit 6/10/25, with decreased cough and moving more air at apices. Will continue regimen with exception of daily vest therapy given may exacerbate patient's upper abdominal pain. Patient prefers to have providers wearing masks to prevent respiratory infection so have ordered protective precautions (instead of previous contact precautions).   - PTA Alyftrek 2 tablets po daily  - PTA Xopenex 1.25 mg nebulization QID  - PTA azithromycin 250 mg po daily  - PTA Xopenex inhaler 1-2 puffs q6h prn  - Hold vest therapy daily  - Protective precautions     #T2DM  Patient on low dose of insulin glargine at home so feel comfortable transitioning him to sliding scale insulin alone while inpatient with decreased appetite and NPO for possible procedure.   - Hold PTA insulin glargine 9 units in AM  - Hold PTA insulin aspart with meals  - Low dose sliding scale insulin TID with meals     #Pancreatic exocrine insufficiency  #History of malnutrition (prior G tube)  Will plan to continue home supplements. Will continue home Creon as patient able to eat meals, when not NPO for possible procedure.   - PTA Creon 8-10 capsules with meals, 2-4 with snacks  - PTA elemental iron 65 mg/vitamin C 125 mg po daily  - PTA lactobacillus rhamnosus capsule po daily     #Osteoporosis  Patient follows with Endocrinology and has received prior Reclast infusions.   - PTA calcium carbonate 500 mg po BID        Diet: NPO for Procedure/Surgery per Anesthesia Guidelines Except for: Meds, Ice Chips; Clear liquids before procedure/surgery: ADULT (Age GREATER than or Equal to 18 years) - Clear liquids 2 hours before procedure/surgery    DVT Prophylaxis: Low Risk/Ambulatory with no VTE  prophylaxis indicated  Marroquin Catheter: Not present  Fluids: None  Lines: None     Cardiac Monitoring: None  Code Status: Full Code      Clinically Significant Risk Factors Present on Admission          # Hypochloremia: Lowest Cl = 95 mmol/L in last 2 days, will monitor as appropriate   # Hypercalcemia: Highest Ca = 11.1 mg/dL in last 2 days, will monitor as appropriate        # Hypertension: Home medication list includes antihypertensive(s)          # DMII: A1C = 6.5 % (Ref range: <5.7 %) within past 6 months              Social Drivers of Health          Disposition Plan     Medically Ready for Discharge: Anticipated in 2-4 Days     The patient's care was discussed with the Attending Physician, Dr. Lopez.    [facility]  Medical Student  Medicine Service, [address]  Securely message with [employer] (more info)  Text page via [employer] Paging/Directory   See signed in provider for up to date coverage information  ______________________________________________________________________    Interval History   BG dropped to 63 last night, but BG went up to 114 following 2 cups of orange juice, chicken noodle soup and nica crackers. Otherwise he was mildly uncomfortable in the bed this morning. Denies any new/changing abdominal pain. He was requesting Tums this morning, but denies any new concerns. He is in agreement with the plan for today.    Physical Exam   Vital Signs: Temp: 98.8  F (37.1  C) Temp src: Oral BP: (!) 142/88 Pulse: 92   Resp: 16 SpO2: 98 % O2 Device: None (Room air)    Weight: 0 lbs 0 oz    General Appearance:  Uncomfortable appearing man in NAD  Respiratory: CTAB, normal WOB on room air  Cardiovascular: RRR, no r/g/m  GI: Soft, non-distended, RUQ tenderness to palpation, normoactive BS present  Skin: No rashes or lesions on exposed skin  Other:  MMM, no peripheral edema    Medical Decision Making       Please see A&P for additional details of  medical decision making.      Data     I have personally reviewed the following data over the past 24 hrs:    14.3 (H)  \   15.0   / 267     137 98 19.4 /  107 (H)   4.0 24 0.76 \     ALT: 401 (H) AST: 246 (H) AP: 367 (H) TBILI: 4.0 (H)   ALB: 3.7 TOT PROTEIN: 6.9 LIPASE: 8 (L)     TSH: N/A T4: N/A A1C: 6.5 (H)       Imaging results reviewed over the past 24 hrs:   Recent Results (from the past 24 hours)   US Abdomen Limited   Result Value    Radiologist flags Acute cholecystitis.    Radiologist flags Possible acute cholecystitis. (Urgent)    Narrative    EXAMINATION: Limited Abdominal Ultrasound, 7/30/2025 3:22 PM     COMPARISON: HIDA scan 5/27/2025. Right upper quadrant ultrasound  2/18/2025    HISTORY: Right upper quadrant pain. Assess stone versus cholecystitis.    TECHNIQUE: The abdomen was scanned in standard fashion with  specialized ultrasound transducer(s) using both gray-scale and limited  color Doppler techniques.    FINDINGS:     Liver: The liver demonstrates normal echotexture, measuring 14.2 cm in  craniocaudal dimension. There is no focal mass.     Gallbladder: There is gallbladder wall thickening measuring up to  approximately 5.3 mm. Large mobile peripherally echogenic tumefactive  sludge ball versus developing stone. The gallbladder is filled with  sludge. There is no pericholecystic fluid or positive sonographic  Mullen's sign.    Bile Ducts: Diffuse intrahepatic biliary dilatation.  The common bile  duct is dilated and measures 7 mm in diameter.    Pancreas: Visualized portions of the head and body of the pancreas  appear normal.     Kidney: The right kidney measures 11.1 cm long. There is no  hydronephrosis or hydroureter, no shadowing renal calculi, cystic  lesion or mass.     Fluid: No evidence of ascites or pleural effusions.      Impression    IMPRESSION:   1.  Findings equivocal for acute cholecystitis. Gallbladder wall  thickening and stones are unchanged from 2/18/25 study. Absence  of  pericholecystic fluid and negative sonographic Mullen's sign do not  support the diagnosis of acute cholecystitis.   2.  There is dilatation of the CBD and intrahepatic biliary ducts  which is new since prior study. Consider MRCP for evaluation of distal  CBD obstruction.      [Urgent Result: Possible acute cholecystitis.]    Finding was identified on 7/30/2025 3:23 PM.     Arturo Simpson contacted by Dr. Trey Encinas 30/2025 3:27 PM and  verbalized understanding of the urgent finding.     I have personally reviewed the examination and initial interpretation  and I agree with the findings.    SCOTTIE PORRAS MD         SYSTEM ID:  [id number]

## 2025-07-31 NOTE — CONSULTS
Pulmonary Medicine  Cystic Fibrosis - Lung Transplant Team  Initial Consultation  2025      Patient: Nico Morales  MRN: [id number]  : 1975 (age 49 year old)  Admission date: 2025  Primary Care Provider: aJye Machado    Assessment & Plan:     Nico Morales is a 49 year old male with a history of CF with severe obstructive lung disease, CFRD, and pancreatic insufficiency. The patient was admitted on 25 for acute epigastric abdominal pain with US concerning for acute cholecystitis vs choledocholithiasis.    Abdominal pain:  Concern for cholecystitis vs choledocholithiasis: Initial concern for gall bladder dysfunction date back to , US at that time only revealing for possible gall stones and biliary sludge. Admitted with acute onset abdominal pain  with intermittent N/V. No fever or chills. Leukocytosis noted on admission. LFTs and bilirubin notable elevated. US with dilated CBD and intrahepatic bile ducts concerning for acute cholecystitis vs choledocholithiasis.   - Management per primary with assistance from GI and general surgery    CF pulmonary disease: On RA without complaint of increased cough or sputum. Vests TID with Xopenex at baseline, mucolytics weaned off by OP pulmonary team d/t lack of efficacy when well. 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 Home regimen for levofloxacin or doxycycline with exacerbations (has not used for several months). Most recent PFTs remain stable with FEV1 1.72L, 49%.  - Vesting QID with nebs: Xopenex QID, will add Mucomyst BID and HTS BID (alternating therapies) for additional clearance with reduced vesting and suspension of modulator  - PFTs deferred at this time    CFTR modulation: Doing well on Alyftrek with minimal cough and sputum. Transitioned from Trikafta in March given persistently elevated LFTs on Trikafta requiring dose reduction.  LFTs markedly elevated on admission as above.  - Daily LFTs  - Alyftrek on hold pending improvement in LFTs    Exocrine pancreatic insufficiency: No signs of malabsorption. H/o G tube, not presently in place. Intermittent GI issues historically, prior HIDA scan suggesting gallbladder kinetics (see above for acute issues).  - High kcal / high protein diet  - PTA enzymes and vitamins per CF RD    We appreciate the excellent care provided by the [place] [id number] team. Recommendations communicated via Vocera and this note. Will continue to follow along closely, please do not hesitate to call with any questions or concerns.    Patient discussed with Dr. Mullen. Also discussed with Dr. Machado, pt's primary OP pulmonary provider.    Leia Brown, DNP, APRN, CNP  Inpatient Nurse Practitioner  Pulmonary CF/Transplant     Chief Complaint:     Abdominal pain    History of Present Illness:     History obtained from patient and chart.    Admitted with acute onset abdominal pain 7/29 with intermittent N/V. Pain is epigastric, generally tight in nature. Says pain is somewhat similar in nature to prior episodes, perhaps more severe and acute this episode. No fever or chills. LBM 2 days ago, baseline 2-3 stools daily.    Remains on RA, infrequent cough and sputum. Typically vests TID at full MN settings with only bronchodilator, last treatment 7/29 (suspended by pt. d/t abdominal pain). Also with intermittent HA, treated with APAP. Denies sinus symptoms.     Review of Systems:     Complete ROS negative except as noted in HPI.    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;  Surgeon: Margarito Bowman MD;  Location: [facility]    COLONOSCOPY N/A 6/27/2022    Procedure: COLONOSCOPY, WITH POLYPECTOMY AND BIOPSY;  Surgeon: Margarito Bowman MD;  Location: [facility]    COLONOSCOPY N/A 7/14/2025    Procedure: COLONOSCOPY, FLEXIBLE, WITH LESION REMOVAL USING SNARE;  Surgeon: Margarito Bowman MD;  Location: [facility]    ESOPHAGOSCOPY, GASTROSCOPY, DUODENOSCOPY (EGD), COMBINED N/A 7/14/2025    Procedure: ESOPHAGOGASTRODUODENOSCOPY, WITH BIOPSY;  Surgeon: Margarito Bowman MD;  Location: [facility]    GASTROSTOMY TUBE  2002    IR PULMONARY EMBOLIZATION  7/20/2001    PICC INSERTION Left 01/22/2018    4Fr SL BioFlo PICC, 46cm (5cm external) in the L basilic vein w/ tip in the low SVC     Social and Family History:     Social History     Socioeconomic History    Marital status:      Spouse name: Not on file    Number of children: 0    Years of education: Not on file    Highest education level: Not on file   Occupational History    Occupation: [place]     Employer: [employer]   Tobacco Use    Smoking status: Never    Smokeless tobacco: Never   Vaping Use    Vaping status: Never Used   Substance and Sexual Activity    Alcohol use: No     Alcohol/week: 0.0 standard drinks of alcohol    Drug use: No    Sexual activity: Yes     Partners: Female     Birth control/protection: Pill   Other Topics Concern    Parent/sibling w/ CABG, MI or angioplasty before 65F 55M? Not Asked     Service Not Asked    Blood Transfusions No    Caffeine Concern Not Asked    Occupational Exposure Not Asked    Hobby Hazards Not Asked    Sleep Concern Not Asked    Stress Concern Not Asked    Weight Concern Not Asked    Special Diet Not Asked    Back Care Not Asked    Exercise No    Bike Helmet Not Asked    Seat Belt Not Asked    Self-Exams Not Asked   Social History Narrative    8/15/2019 - .  Lives with his English Bulldog (Jefry) in a house that he designed [place].  No children.     Social Drivers of Health     Financial Resource Strain: Not on file   Food Insecurity: Not on file   Transportation Needs: Not on file   Physical Activity: Not on file   Stress: Not on file   Social Connections: Not on file   Interpersonal Safety: Low Risk  (7/14/2025)    Interpersonal Safety     Do you feel physically and emotionally safe where you currently live?: Yes     Within the past 12 months, have you been hit, slapped, kicked or otherwise physically hurt by someone?: No     Within the past 12 months, have you been humiliated or emotionally abused in other ways by your partner or ex-partner?: No   Housing Stability: Not on file     Family History   Problem Relation Age of Onset    Heart Disease Maternal Grandmother     Heart Disease Maternal Grandfather     Heart Disease Paternal Grandmother     Diabetes No family hx of      Allergies and Home Medications:     Allergies   Allergen Reactions    Piperacillin Sod-Tazobactam So Hives     Delayed type reaction in intradermal tests to Zosyn (Piperacillin/Tazobactam). Might be specific reaction to the Zosyn without cross-reactions to Penicillins (beta-Lactam core). Avoid in future Zosyn. However, I would avoid the Penicillins and use instead Penems or Cephalosporines. If Penicillins are the best option, then they can be used under observation.    Pulmozyme [Dornase Trever] Other (See Comments)     Chest pain and fever     (Not in a hospital admission)    Current Scheduled Meds  Current Facility-Administered Medications   Medication Dose Route Frequency Provider Last Rate Last Admin    azithromycin (ZITHROMAX) tablet 250 mg  250 mg Oral Daily Tonia Wilson MD PhD        budesonide-formoterol (SYMBICORT/BREYNA) 160-4.5 MCG/ACT inhaler 2 puff  2 puff Inhalation 2 times daily Tonia Wilson MD PhD   2 puff at 07/30/25 2212    calcium carbonate 500 mg (elemental) (OSCAL) tablet 500 mg  500 mg Oral BID Tonia Wilson MD PhD   500 mg at 07/30/25  2206    cefTRIAXone (ROCEPHIN) 2 g vial to attach to  ml bag for ADULTS or NS 50 ml bag for PEDS  2 g Intravenous Q24H Tonia Wilson MD PhD        Elemental iron 65 mg Vitamin C 125 mg (VITRON C) tablet 1 tablet  1 tablet Oral Daily Tonia Wilson MD PhD        insulin aspart (NovoLOG) injection (RAPID ACTING)  1-4 Units Subcutaneous TID w/meals Tonia Wilson MD PhD        lactobacillus rhamnosus (GG) (CULTURELL) capsule 1 capsule  1 capsule Oral Daily Tonia Wilson MD PhD        levalbuterol (XOPENEX) neb solution 1.25 mg  1.25 mg Nebulization 4x Daily Tonia Wilson MD PhD        lipase-protease-amylase (CREON 24) [id number] units per EC capsule 9 capsule  9 capsule Oral TID w/meals Tonia Wilson MD PhD        metroNIDAZOLE (FLAGYL) infusion 500 mg  500 mg Intravenous Q12H Tonia Wilson MD PhD   500 mg at 07/31/25 0546    sodium chloride (PF) 0.9% PF flush 3 mL  3 mL Intracatheter Q8H THAD Tonia Wilson MD PhD   3 mL at 07/31/25 0546    [Held by provider] vanzacaftor-tezacaftor-deutivacaftor (ALYFTREK) 10- MG per tablet 2 tablet  2 tablet Oral Daily Tonia Wilson MD PhD          Current PRN Meds  Current Facility-Administered Medications   Medication Dose Route Frequency Provider Last Rate Last Admin    acetaminophen (TYLENOL) tablet 650 mg  650 mg Oral Q8H PRN Kolton Jenkins MD   650 mg at 07/31/25 0559    Or    acetaminophen (TYLENOL) Suppository 650 mg  650 mg Rectal Q8H PRN Kolton Jenkins MD        calcium carbonate (TUMS) chewable tablet 1,000 mg  1,000 mg Oral 4x Daily PRN Tonia Wilson MD PhD        glucose gel 15-30 g  15-30 g Oral Q15 Min PRN Tonia Wilson MD PhD        Or    dextrose 50 % injection 25-50 mL  25-50 mL Intravenous Q15 Min PRN Tonia Wilson MD PhD        Or    glucagon injection 1 mg  1 mg Subcutaneous Q15 Min PRN Tonia Wilson MD PhD        ibuprofen (ADVIL/MOTRIN) tablet 600 mg  600 mg Oral Q6H PRN Tonia Wilson MD PhD        levalbuterol  (XOPENEX HFA) 45 MCG/ACT Inhaler 1-2 puff  1-2 puff Inhalation Q6H PRN Tonia Wilson MD PhD        lidocaine (LMX4) cream   Topical Q1H PRN Tonia Wilson MD PhD        lidocaine 1 % 0.1-1 mL  0.1-1 mL Other Q1H PRN Tonia Wilson MD PhD        melatonin tablet 5 mg  5 mg Oral At Bedtime PRN Tonia Wilson MD PhD        ondansetron (ZOFRAN ODT) ODT tab 4 mg  4 mg Oral Q6H PRN Tonia Wilson MD PhD        Or    ondansetron (ZOFRAN) injection 4 mg  4 mg Intravenous Q6H PRN Tonia Wilson MD PhD        polyethylene glycol (MIRALAX) Packet 17 g  17 g Oral BID PRN Tonia Wilson MD PhD        senna-docusate (SENOKOT-S/PERICOLACE) 8.6-50 MG per tablet 1 tablet  1 tablet Oral BID PRN Tonia Wilson MD PhD        Or    senna-docusate (SENOKOT-S/PERICOLACE) 8.6-50 MG per tablet 2 tablet  2 tablet Oral BID PRN Tonia Wilson MD PhD        sodium chloride (PF) 0.9% PF flush 3 mL  3 mL Intracatheter q1 min prn Tonia Wilson MD PhD   3 mL at 07/30/25 2115        Physical Exam:     All notes, images, and labs from past 24 hours (at minimum) were reviewed.    Vital signs:  Temp: 99  F (37.2  C) Temp src: Oral BP: (!) 144/91 Pulse: 84   Resp: 16 SpO2: 94 % O2 Device: None (Room air)        I/O:   Intake/Output Summary (Last 24 hours) at 7/31/2025 0649  Last data filed at 7/30/2025 2027  Gross per 24 hour   Intake 234 ml   Output --   Net 234 ml     Constitutional: Sitting up in bed, in no apparent distress.   HEENT: Eyes with pink conjunctivae, anicteric. Oral mucosa moist without lesions.   PULM: Good air flow bilaterally. No crackles, no rhonchi, no wheezes. Non-labored breathing on RA.  CV: Normal S1 and S2. RRR. No murmur, gallop, or rub. No peripheral edema.   ABD: NABS, soft, mildly tender, nondistended.    MSK: Moves all extremities. No apparent muscle wasting.   NEURO: Alert and conversant.   SKIN: Warm, dry. No rash on limited exam.   PSYCH: Mood stable.     Data:     LABS    CMP:   Recent Labs   Lab  "07/31/25  0642 07/31/25 0232 07/30/25 2145 07/30/25 2119 07/30/25  1415   NA  --   --   --   --  138   POTASSIUM  --   --   --   --  4.7   CHLORIDE  --   --   --   --  95*   CO2  --   --   --   --  28   ANIONGAP  --   --   --   --  15   * 115* 114* 63* 133*   BUN  --   --   --   --  18.8   CR  --   --   --   --  0.92   GFRESTIMATED  --   --   --   --  >90   CLAUDIA  --   --   --   --  11.1*   PROTTOTAL  --   --   --   --  8.0   ALBUMIN  --   --   --   --  4.3   BILITOTAL  --   --   --   --  3.8*   ALKPHOS  --   --   --   --  375*   AST  --   --   --   --  460*   ALT  --   --   --   --  588*     CBC:   Recent Labs   Lab 07/31/25  0553 07/30/25  1415   WBC 14.3* 15.6*   RBC 5.24 5.90   HGB 15.0 17.1   HCT 45.8 52.7   MCV 87 89   MCH 28.6 29.0   MCHC 32.8 32.4   RDW 13.8 13.5    314       INR: No lab results found in last 7 days.    Glucose:   Recent Labs   Lab 07/31/25 0642 07/31/25 0232 07/30/25 2145 07/30/25 2119 07/30/25  1415   * 115* 114* 63* 133*       Blood Gas: No lab results found in last 7 days.    Culture Data No results for input(s): \"CULT\" in the last 168 hours.    Virology Data:   Lab Results   Component Value Date    FLUAH1 Negative 01/21/2018    FLUAH3 Positive (A) 01/21/2018    TO9936 Negative 01/21/2018    IFLUB Negative 01/21/2018    RSVA Negative 01/21/2018    RSVB Negative 01/21/2018    PIV1 Negative 01/21/2018    PIV2 Negative 01/21/2018    PIV3 Negative 01/21/2018    HMPV Negative 01/21/2018    HRVS Negative 01/21/2018    ADVBE Negative 01/21/2018    ADVC Negative 01/21/2018       Most Recent [place] Pulmonary Function Testing (FVC/FEV1 only)  FVC-Pre   Date Value Ref Range Status   06/10/2025 3.79 L    02/11/2025 3.38 L    09/10/2024 3.55 L    05/21/2024 3.65 L      FVC-%Pred-Pre   Date Value Ref Range Status   06/10/2025 86 %    02/11/2025 76 %    09/10/2024 80 %    05/21/2024 82 %      FEV1-Pre   Date Value Ref Range Status   06/10/2025 1.72 L    02/11/2025 1.67 L  "   09/10/2024 1.77 L    05/21/2024 1.75 L      FEV1-%Pred-Pre   Date Value Ref Range Status   06/10/2025 49 %    02/11/2025 47 %    09/10/2024 49 %    05/21/2024 49 %        IMAGING    Recent Results (from the past 48 hours)   US Abdomen Limited   Result Value    Radiologist flags Acute cholecystitis.    Radiologist flags Possible acute cholecystitis. (Urgent)    Narrative    EXAMINATION: Limited Abdominal Ultrasound, 7/30/2025 3:22 PM     COMPARISON: HIDA scan 5/27/2025. Right upper quadrant ultrasound  2/18/2025    HISTORY: Right upper quadrant pain. Assess stone versus cholecystitis.    TECHNIQUE: The abdomen was scanned in standard fashion with  specialized ultrasound transducer(s) using both gray-scale and limited  color Doppler techniques.    FINDINGS:     Liver: The liver demonstrates normal echotexture, measuring 14.2 cm in  craniocaudal dimension. There is no focal mass.     Gallbladder: There is gallbladder wall thickening measuring up to  approximately 5.3 mm. Large mobile peripherally echogenic tumefactive  sludge ball versus developing stone. The gallbladder is filled with  sludge. There is no pericholecystic fluid or positive sonographic  Mullen's sign.    Bile Ducts: Diffuse intrahepatic biliary dilatation.  The common bile  duct is dilated and measures 7 mm in diameter.    Pancreas: Visualized portions of the head and body of the pancreas  appear normal.     Kidney: The right kidney measures 11.1 cm long. There is no  hydronephrosis or hydroureter, no shadowing renal calculi, cystic  lesion or mass.     Fluid: No evidence of ascites or pleural effusions.      Impression    IMPRESSION:   1.  Findings equivocal for acute cholecystitis. Gallbladder wall  thickening and stones are unchanged from 2/18/25 study. Absence of  pericholecystic fluid and negative sonographic Mullen's sign do not  support the diagnosis of acute cholecystitis.   2.  There is dilatation of the CBD and intrahepatic biliary  ducts  which is new since prior study. Consider MRCP for evaluation of distal  CBD obstruction.      [Urgent Result: Possible acute cholecystitis.]    Finding was identified on 7/30/2025 3:23 PM.     Arturo Simpson contacted by Dr. Trey Encinas 30/2025 3:27 PM and  verbalized understanding of the urgent finding.     I have personally reviewed the examination and initial interpretation  and I agree with the findings.    SCOTTIE PORRAS MD         SYSTEM ID:  [id number]

## 2025-07-31 NOTE — H&P
[facility]    History and Physical - Medicine Service, MAROJ TEAM        Date of Admission:  7/30/2025    Assessment & Plan      Nico Morales is a 49 year old male who has a history of cystic fibrosis (Z921nwb/621+1G->T) c/b moderate obstructive lung disease, T2DM (on insulin), pancreatic exocrine insufficiency, malnutrition (previously s/p G tube, now removed), and osteoporosis, and who is admitted for RUQ abdominal pain.    #RUQ pain  Given elevated WBC and dilated CBD and intrahepatic bile ducts but lack of pericholecystic fluid, equivocal between acute cholecystitis vs choledocholithiasis. MRCP may give clearer picture. Agree with GI recommendation for possible ERCP pending MRCP results. Will also continue CTX and BYRNES started in ED given c/f cholecystitis and intra-abdominal infection for at least 48 hours or pending blood culture results. Low c/f sepsis given reassuring vital signs. 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    - Consult to General Surgery, appreciate recs  - Consult to GI, appreciate recs   - Possible ERCP 7/31 pending clinical evaluation   - NPO at midnight   - Hold anti/coagulation/VTE prophylaxis  - Follow up blood cultures 7/30  - MRCP ordered  - Ceftriaxone 2 g IV daily  - Metronidazole 500 mg IV q8h    #Cystic fibrosis (P868uai/621+1G->T)  #Moderate obstructive lung disease  #Prior infections with pseudomonas, nocardia  Patient follows with [facility] clinic. Doing well on current regimen at last visit 6/10/25, with decreased cough and moving more air at apices. Will continue regimen with exception of daily vest therapy given may exacerbate patient's upper abdominal pain. Patient prefers to have providers wearing masks to prevent respiratory infection so have ordered protective precautions (instead of previous contact precautions).   - PTA Alyftrek 2  tablets po daily  - PTA Xopenex 1.25 mg nebulization QID  - PTA azithromycin 250 mg po daily  - PTA Xopenex inhaler 1-2 puffs q6h prn  - Hold vest therapy daily  - Protective precautions    #T2DM  Patient on low dose of insulin glargine at home so feel comfortable transitioning him to sliding scale insulin alone while inpatient with decreased appetite and NPO for possible procedure.   - Hold PTA insulin glargine 9 units in AM  - Hold PTA insulin aspart with meals  - Low dose sliding scale insulin TID with meals    #Pancreatic exocrine insufficiency  #History of malnutrition (prior G tube)  Will plan to continue home supplements. Will continue home Creon as patient able to eat meals, when not NPO for possible procedure.   - PTA Creon 8-10 capsules with meals, 2-4 with snacks  - PTA elemental iron 65 mg/vitamin C 125 mg po daily  - PTA lactobacillus rhamnosus capsule po daily    #Osteoporosis  Patient follows with Endocrinology and has received prior Reclast infusions.   - PTA calcium carbonate 500 mg po BID        Diet: Regular Diet Adult  NPO for Procedure/Surgery per Anesthesia Guidelines Except for: Meds, Ice Chips; Clear liquids before procedure/surgery: ADULT (Age GREATER than or Equal to 18 years) - Clear liquids 2 hours before procedure/surgery    DVT Prophylaxis: Low Risk/Ambulatory with no VTE prophylaxis indicated  Marroquin Catheter: Not present  Fluids: None  Lines: None     Cardiac Monitoring: None  Code Status: Full Code    Clinically Significant Risk Factors Present on Admission          # Hypochloremia: Lowest Cl = 95 mmol/L in last 2 days, will monitor as appropriate   # Hypercalcemia: Highest Ca = 11.1 mg/dL in last 2 days, will monitor as appropriate        # Hypertension: Home medication list includes antihypertensive(s)          # DMII: A1C = N/A within past 6 months            Disposition Plan      Expected Discharge Date: 08/01/2025                The patient's care was discussed with the Attending  Physician, Dr. Jenkins.    Tonia Wilson MD PhD  Medicine Service, [facility]  Securely message with [employer] (more info)  Text page via [employer] Paging/Directory   See signed in provider for up to date coverage information  ______________________________________________________________________    Chief Complaint   RUQ abdominal pain    History of Present Illness   Nico Morales is a 49 year old male who has a history of cystic fibrosis (U228pbm/621+1G->T) c/b moderate obstructive lung disease, T2DM (on insulin), pancreatic exocrine insufficiency, malnutrition (previously s/p G tube, now removed), and osteoporosis, and who is admitted for RUQ abdominal pain. History obtained via discussion with patient and wife (Leia) and via chart review.     Starting yesterday morning (7/29), patient reports he felt a RUQ or upper abdominal pain, which he describes as intermittent, sharp, non-radiating. He endorses associated appetite loss and nausea (without spontaneous vomiting but pt reports using his hand to induce vomiting for pain relief). Pt denies fever, chills, sweats, chest pain, shortness of breath, hematemesis, diarrhea, hematochezia, or melena. Pain relieved by heating pad, elevating the head of the bed, and self-induced vomiting. Has not tried Tylenol or ibuprofen. Patient associates start of pain with drinking [employer] Shake (similar to Ensure).     Patient reports that this pain feels similar to prior right upper quadrant pain episodes, previously diagnosed as gallstone related. Pt reports these episodes started about 5 years ago and have occurred approximately twice per year (most recently February 2025). Previous HIDA scan 5/27/25 showed abnormal gallbladder kinetics and possible chronic cholecystitis. Patient offered referral to Surgery for elective cholecystectomy but declined, preferring non-surgical interventions first if possible.    Upon  arrival to the emergency department, patient was afebrile with normal vital signs.  CBC notable for WBC 15.6 with ANC 11.3. CMP notable for alkaline phosphatase 375, , , and total bilirubin 3.8. (LFTs previously within normal limits on 7/11/2025). Lipase 8. RUQ US equivocal for acute cholecystitis, showing gallbladder wall thickening/stones (stable from 2/18/2025) and no pericholecystic fluid but new dilation of CBD and intrahepatic biliary ducts. MRCP ordered. General Surgery consulted and recommended against emergent cholecystectomy. GI consulted and recommended possible ERCP on 7/31. Patient treated with ceftriaxone 2 g IV x 1, metronidazole 500 mg IV x 1 and 1 L NS. Admitted to Medicine for acute cholecystitis.     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 He also has a history of LFT elevation associated with CFTR modulator therapy (Trikafta, Alyftrek) but last LFTs 7/11/25 were within normal limits as above.    Past Medical History    Past Medical History:   Diagnosis Date    CF (cystic fibrosis) (H)     Exocrine pancreatic insufficiency     Nocardia infection     Pseudomonas infection     S/P gastrostomy (H) 2002     Past Surgical History   Past Surgical History:   Procedure Laterality Date    COLONOSCOPY N/A 5/21/2018    Procedure: COMBINED COLONOSCOPY, SINGLE OR MULTIPLE BIOPSY/POLYPECTOMY BY BIOPSY;  Cystic fibrosis, 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;  Surgeon: Margarito Bowman MD;  Location: [facility]    COLONOSCOPY N/A 6/27/2022    Procedure: COLONOSCOPY, WITH POLYPECTOMY AND BIOPSY;  Surgeon: Margarito Bowman MD;  Location: [facility]    COLONOSCOPY N/A 7/14/2025    Procedure: COLONOSCOPY, FLEXIBLE, WITH LESION REMOVAL USING SNARE;  Surgeon: Margarito Bowman MD;  Location: [facility]    ESOPHAGOSCOPY, GASTROSCOPY, DUODENOSCOPY (EGD), COMBINED N/A 7/14/2025    Procedure: ESOPHAGOGASTRODUODENOSCOPY, WITH BIOPSY;  Surgeon: Margarito Bowman MD;  Location: [facility]     "GASTROSTOMY TUBE  2002    IR PULMONARY EMBOLIZATION  2001    PICC INSERTION Left 2018    4Fr SL BioFlo PICC, 46cm (5cm external) in the L basilic vein w/ tip in the low SVC     Prior to Admission Medications   Prior to Admission Medications   Prescriptions Last Dose Informant Patient Reported? Taking?   ACE NOT PRESCRIBED, INTENTIONAL,   No No   Si each continuous prn. ACE Inhibitor not prescribed due to Risk for drug interaction   CALCIUM PO  Self Yes No   Sig: Take 500 mg by mouth 2 times daily Strength unknown.    Continuous Blood Gluc Sensor (FREESTYLE SUKHWINDER 14 DAY SENSOR) [facility]   No No   Sig: Change every 14 days.   [place] [facility]NAGED PATIENT   Yes No   Sig: Contact [facility] for patient specific medication information at [phone] on admission and discharge from the hospital.  Phones are answered 24 hours a day 7 days a week 365 days a year.    Providers - Choose \"CONTINUE HOME MED (no script)\" at discharge if patient treatment with home infusion will continue.   Glucagon (BAQSIMI) 3 MG/DOSE nasal powder   No No   Sig: Spray 1 spray (3 mg) in nostril as needed (in emergency for hypoglycic reaction) in the event of unconscious hypoglycemia or hypoglycemic seizure. May repeat dose if no response after 15 minutes.   Glucagon (GVOKE HYPOPEN) 1 MG/0.2ML pen   No No   Sig: Inject the contents of 1 device under the skin into lower abdomen, outer thigh, or outer upper arm as needed for hypoglycemia. If no response after 15 minutes, additional 1 mg dose from a new device may be injected while waiting for emergency assistance.   Insulin Aspart FlexPen 100 UNIT/ML SOPN   No No   Sig: Inject 1 Units Subcutaneous 3 times daily With these instructions :INJECT 1 UNIT PER 20G CARB AT LUNCH AND SUPPER; INJECT 3 UNITS AT NIGHT WITH TUBE FEEDINGS. MAX 20 UNITS PER Day   MULTIVITAMIN OR  Self Yes No   Sig: Take 1 tablet by mouth daily. Includes 5,000 units vitamin D and 400 units " vitamin E. Per pt, formulated for CF pts.   MVW complete (PROBIOTIC) capsule   No No   Sig: Take 1 capsule by mouth daily.   azithromycin (ZITHROMAX) 250 MG tablet   No No   Sig: TAKE 1 TABLET (250 MG) BY MOUTH DAILY   bisacodyl (DULCOLAX) 5 MG EC tablet   No No   Sig: Two days prior to exam take two (2) tablets at 4pm. One day prior to exam take two (2) tablets at 4pm   blood glucose (NO BRAND SPECIFIED) test strip   No No   Sig: Use to test blood sugar 6 times daily. Any covered brand. Pt now using Contour Next, if covered.   blood glucose (NO BRAND SPECIFIED) test strip   No No   Sig: Use to test blood sugar 6 times daily or as directed. To accompany: Blood Glucose Monitor Brands: per insurance.   blood glucose monitoring (NO BRAND SPECIFIED) meter device kit   No No   Sig: Use to test blood sugar 6 times daily or as directed.whatever is covered   blood glucose monitoring (NO BRAND SPECIFIED) meter device kit   No No   Sig: Use to test blood sugar 6 times daily or as directed. Preferred blood glucose meter OR supplies to accompany: Blood Glucose Monitor Brands: per insurance.   blood glucose monitoring (ULTRA THIN 30G) lancets   No No   Sig: Use to test blood sugar 6 times daily or as directed.whatever is covered   budesonide-formoterol (SYMBICORT/BREYNA) 160-4.5 MCG/ACT Inhaler   No No   Sig: INHALE 2 PUFFS BY MOUTH TWICE DAILY   ferrous fumarate 65 mg, [place]. FE,-Vitamin C 125 mg (VITRON-C)  MG TABS tablet   Yes No   Sig: Take 1 tablet by mouth daily   insulin glargine (LANTUS SOLOSTAR) 100 UNIT/ML pen   No No   Sig: INJECT 8 UNITS UNDER THE SKIN ONCE DAILY   insulin lispro (HUMALOG KWIKPEN) 100 UNIT/ML (1 unit dial) KWIKPEN   No No   Sig: Inject 1 Units Subcutaneous 3 times daily With these instructions :INJECT 1 UNIT PER 20G CARB AT LUNCH AND SUPPER; INJECT 3 UNITS AT NIGHT WITH TUBE FEEDINGS. MAX 20 UNITS PER Day, Disp-15 mL, R-3, E-Prescribe   insulin pen needle (BD OMI U/F) 32G X 4 MM miscellaneous    No No   Sig: Use 4 pen needles daily or as directed. Follow up visit needed. Call  to schedule.   levalbuterol (XOPENEX HFA) 45 MCG/ACT inhaler   No No   Sig: INHALE 2 PUFFS INTO THE LUNGS EVERY 6 HOURS AS NEEDED FOR SHORTNESS OF BREATH/DYSPNEA   levalbuterol (XOPENEX) 1.25 MG/3ML neb solution   No No   Sig: INHALE 1 VIAL BY MOUTH INTO THE LUNGS VIA NEBULIZATION FOUR TIMES DAILY   lipase-protease-amylase (CREON) 89757-72330-928404 units CPEP per EC capsule   No No   Sig: TAKE 9-10 CAPSULES BY MOUTH WITH MEALS (3 MEALS/DAY) AND 2-4 CAPS WITH SNACKS (3 SNACKS/DAY)   phytonadione (MEPHYTON) 5 MG tablet   No No   Sig: Take one tablet daily for 3 days as needed for hemoptysis.   Patient not taking: Reported on 6/10/2025   polyethylene glycol (GOLYTELY) 236 g suspension   No No   Sig: Two days before procedure at 5PM fill first container with water. Mix and drink an 8 oz glass every 15 minutes until HALF of the container is gone. Place the remainder in the refrigerator. One day before procedure at 5PM drink second half of bowel prep. Drink an 8 oz glass every 15 minutes until it is gone. Day of procedure 6 hours before arrival time fill the 2nd container with water. Mix and drink an 8 oz glass every 15 minutes until HALF of the container is gone. Discard the remaining solution.   polyethylene glycol (MIRALAX) 17 GM/Dose powder   Yes No   Sig: Take 17 g by mouth daily as needed for constipation   thin (NO BRAND SPECIFIED) lancets   No No   Sig: Use with lanceting device 6x daily. To accompany: Blood Glucose Monitor Brands: per insurance.   vanzacaftor-tezacaftor-deutivacaftor (ALYFTREK) 10- MG tablet   No No   Sig: Take 2 tablets by mouth daily. Take with fat-containing food.      Facility-Administered Medications: None      Social History   I have reviewed this patient's social history and updated it with pertinent information if needed.  Social History     Tobacco Use    Smoking status: Never     Smokeless tobacco: Never   Vaping Use    Vaping status: Never Used   Substance Use Topics    Alcohol use: No     Alcohol/week: 0.0 standard drinks of alcohol    Drug use: No     Allergies   Allergies   Allergen Reactions    Piperacillin Sod-Tazobactam So Hives     Delayed type reaction in intradermal tests to Zosyn (Piperacillin/Tazobactam). Might be specific reaction to the Zosyn without cross-reactions to Penicillins (beta-Lactam core). Avoid in future Zosyn. However, I would avoid the Penicillins and use instead Penems or Cephalosporines. If Penicillins are the best option, then they can be used under observation.    Pulmozyme [Dornase Trever] Other (See Comments)     Chest pain and fever        Physical Exam   Vital Signs: Temp: 99.2  F (37.3  C) Temp src: Oral BP: 138/86 Pulse: 65   Resp: 16 SpO2: 97 % O2 Device: None (Room air)    Weight: 0 lbs 0 oz    General Appearance: Uncomfortable appearing man in NAD  Respiratory: CTAB, normal WOB on Ra  Cardiovascular: RRR, no r/g/m  GI: Soft, non-distended, RUQ tenderness to palpation, normoactive BS present  Skin: No rashes or lesions on exposed skin  Other: MMM, no peripheral edema    Medical Decision Making       Please see A&P for additional details of medical decision making.      Data     I have personally reviewed the following data over the past 24 hrs:    15.6 (H)  \   17.1   / 314     138 95 (L) 18.8 /  114 (H)   4.7 28 0.92 \     ALT: 588 (HH) AST: 460 (H) AP: 375 (H) TBILI: 3.8 (H)   ALB: 4.3 TOT PROTEIN: 8.0 LIPASE: 8 (L)     TSH: N/A T4: N/A A1C: 6.5 (H)       Imaging results reviewed over the past 24 hrs:   Recent Results (from the past 24 hours)   US Abdomen Limited   Result Value    Radiologist flags Acute cholecystitis.    Radiologist flags Possible acute cholecystitis. (Urgent)    Narrative    EXAMINATION: Limited Abdominal Ultrasound, 7/30/2025 3:22 PM     COMPARISON: HIDA scan 5/27/2025. Right upper quadrant ultrasound  2/18/2025    HISTORY: Right  upper quadrant pain. Assess stone versus cholecystitis.    TECHNIQUE: The abdomen was scanned in standard fashion with  specialized ultrasound transducer(s) using both gray-scale and limited  color Doppler techniques.    FINDINGS:     Liver: The liver demonstrates normal echotexture, measuring 14.2 cm in  craniocaudal dimension. There is no focal mass.     Gallbladder: There is gallbladder wall thickening measuring up to  approximately 5.3 mm. Large mobile peripherally echogenic tumefactive  sludge ball versus developing stone. The gallbladder is filled with  sludge. There is no pericholecystic fluid or positive sonographic  Mullen's sign.    Bile Ducts: Diffuse intrahepatic biliary dilatation.  The common bile  duct is dilated and measures 7 mm in diameter.    Pancreas: Visualized portions of the head and body of the pancreas  appear normal.     Kidney: The right kidney measures 11.1 cm long. There is no  hydronephrosis or hydroureter, no shadowing renal calculi, cystic  lesion or mass.     Fluid: No evidence of ascites or pleural effusions.      Impression    IMPRESSION:   1.  Findings equivocal for acute cholecystitis. Gallbladder wall  thickening and stones are unchanged from 2/18/25 study. Absence of  pericholecystic fluid and negative sonographic Mullen's sign do not  support the diagnosis of acute cholecystitis.   2.  There is dilatation of the CBD and intrahepatic biliary ducts  which is new since prior study. Consider MRCP for evaluation of distal  CBD obstruction.      [Urgent Result: Possible acute cholecystitis.]    Finding was identified on 7/30/2025 3:23 PM.     Arturo Simpson contacted by Dr. Trey Encinas 30/2025 3:27 PM and  verbalized understanding of the urgent finding.     I have personally reviewed the examination and initial interpretation  and I agree with the findings.    SCOTTIE PORRSA MD         SYSTEM ID:  [id number]

## 2025-07-31 NOTE — PROGRESS NOTES
Surgery Progress Note  07/31/2025       Subjective:  - NAEON. Patient states he is experiencing some intermittent nausea but endorses feeling better overall.     Objective:  Temp:  [98.6  F (37  C)-99.2  F (37.3  C)] 98.8  F (37.1  C)  Pulse:  [84-92] 92  Resp:  [16-18] 16  BP: (135-144)/(80-91) 142/88  SpO2:  [93 %-98 %] 98 %    I/O last 3 completed shifts:  In: 234 [P.O.:234]  Out: -       Gen:                Lying in bed in NAD, A&OX3  HEENT:Normocephalic and atraumatic  CV:                  RRR  Pulm:               Non-labored breathing  Abd:                 Soft, non-tender, non-distended  Ext:                  Warm and well perfused, no obvious deformities     Labs:  Recent Labs   Lab 07/31/25  0553 07/30/25  1415   WBC 14.3* 15.6*   HGB 15.0 17.1    314       Recent Labs   Lab 07/31/25  1155 07/31/25  0642 07/31/25  0553 07/30/25  2119 07/30/25  1415   NA  --   --  137  --  138   POTASSIUM  --   --  4.0  --  4.7   CHLORIDE  --   --  98  --  95*   CO2  --   --  24  --  28   BUN  --   --  19.4  --  18.8   CR  --   --  0.76  --  0.92   * 105* 107*   < > 133*   CLAUDIA  --   --  9.3  --  11.1*    < > = values in this interval not displayed.       Imaging:  No new imaging     Assessment/Plan:   49-year-old male with cystic fibrosis, pancreatic insufficiency, and prior gastrostomy tube, presenting with acute right upper quadrant pain, nausea, leukocytosis, and cholestatic liver enzyme elevation. Imaging reveals gallbladder wall thickening, cholelithiasis, and new dilation of the common bile duct and intrahepatic ducts, raising concern for choledocholithiasis in the setting of symptomatic gallbladder and biliary tract disease. No urgent cholecystectomy is indicated at this time. Our team will reach out directly to the patient to arrange outpatient elective cholecystectomy.    Our team will be signing off at this time.     Seen, examined, and discussed with chief resident, who will discuss with staff.  - -  - - - - - - - - - - - - - - - -  Alphonse Brothers MD  General Surgery PGY-1

## 2025-07-31 NOTE — PLAN OF CARE
Neuro: A&Ox4.   Cardiac: Not on tele, afebrile. VSS.   Respiratory: Sating above 92% on RA.  GI/: Adequate urine output. BM X1  Diet/appetite: NPO for procedure  Activity:  Up independently   Pain: At acceptable level on current regimen.   Skin: See flowsheet for assessment and interventions.   LDA's: PIV with LR infusing at 100 mL/hr   Plan: Continue with POC. Notify primary team with changes.  Goal Outcome Evaluation:      Plan of Care Reviewed With: patient

## 2025-07-31 NOTE — MEDICATION SCRIBE - ADMISSION MEDICATION HISTORY
Medication Scribe Admission Medication History    Admission medication history is complete. The information provided in this note is only as accurate as the sources available at the time of the update.    Information Source(s): Patient and [facility] via in-person    Pertinent Information: per pt++[facility], pt reported taking medications on PTA medication list as directed.    Changes made to PTA medication list:  Added: None  Deleted: None  Changed: None    Allergies reviewed with patient and updates made in EHR: yes    Medication History Completed By: Graciela Tracy 7/31/2025 7:22 AM    PTA Med List   Medication Sig Note Last Dose/Taking    azithromycin (ZITHROMAX) 250 MG tablet TAKE 1 TABLET (250 MG) BY MOUTH DAILY  7/28/2025    budesonide-formoterol (SYMBICORT/BREYNA) 160-4.5 MCG/ACT Inhaler INHALE 2 PUFFS BY MOUTH TWICE DAILY  7/30/2025 Morning    CALCIUM PO Take 500 mg by mouth 2 times daily Strength unknown.   7/29/2025    Continuous Blood Gluc Sensor ([employer] SUKHWINDER 14 DAY SENSOR) MISC Change every 14 days.  Taking    ferrous fumarate 65 mg, [place]. FE,-Vitamin C 125 mg (VITRON-C)  MG TABS tablet Take 1 tablet by mouth daily  7/29/2025    Glucagon (BAQSIMI) 3 MG/DOSE nasal powder Spray 1 spray (3 mg) in nostril as needed (in emergency for hypoglycic reaction) in the event of unconscious hypoglycemia or hypoglycemic seizure. May repeat dose if no response after 15 minutes. 7/31/2025: Has not used  Taking As Needed    Glucagon (GVOKE HYPOPEN) 1 MG/0.2ML pen Inject the contents of 1 device under the skin into lower abdomen, outer thigh, or outer upper arm as needed for hypoglycemia. If no response after 15 minutes, additional 1 mg dose from a new device may be injected while waiting for emergency assistance. 7/31/2025: Has not used  Taking    Insulin Aspart FlexPen 100 UNIT/ML SOPN Inject 1 Units Subcutaneous 3 times daily With these instructions :INJECT 1 UNIT PER 20G CARB AT LUNCH AND SUPPER; INJECT 3 UNITS AT  NIGHT WITH TUBE FEEDINGS. MAX 20 UNITS PER Day  7/28/2025    insulin glargine (LANTUS SOLOSTAR) 100 UNIT/ML pen INJECT 8 UNITS UNDER THE SKIN ONCE DAILY  7/29/2025 Morning    insulin lispro (HUMALOG KWIKPEN) 100 UNIT/ML (1 unit dial) KWIKPEN Inject 1 Units Subcutaneous 3 times daily With these instructions :INJECT 1 UNIT PER 20G CARB AT LUNCH AND SUPPER; INJECT 3 UNITS AT NIGHT WITH TUBE FEEDINGS. MAX 20 UNITS PER Day, Disp-15 mL, R-3, E-Prescribe  7/29/2025 Morning    levalbuterol (XOPENEX HFA) 45 MCG/ACT inhaler INHALE 2 PUFFS INTO THE LUNGS EVERY 6 HOURS AS NEEDED FOR SHORTNESS OF BREATH/DYSPNEA  7/29/2025 Morning    levalbuterol (XOPENEX) 1.25 MG/3ML neb solution INHALE 1 VIAL BY MOUTH INTO THE LUNGS VIA NEBULIZATION FOUR TIMES DAILY  7/29/2025 Morning    lipase-protease-amylase (CREON) [id number] units CPEP per EC capsule TAKE 9-10 CAPSULES BY MOUTH WITH MEALS (3 MEALS/DAY) AND 2-4 CAPS WITH SNACKS (3 SNACKS/DAY)  7/29/2025 Noon    MULTIVITAMIN OR Take 1 tablet by mouth daily. Includes 5,000 units vitamin D and 400 units vitamin E. Per pt, formulated for CF pts.  7/29/2025 Morning    MVW complete (PROBIOTIC) capsule Take 1 capsule by mouth daily.  Past Week    polyethylene glycol (MIRALAX) 17 GM/Dose powder Take 17 g by mouth daily as needed for constipation  Past Week    vanzacaftor-tezacaftor-deutivacaftor (ALYFTREK) 10- MG tablet Take 2 tablets by mouth daily. Take with fat-containing food.  7/29/2025 Morning

## 2025-07-31 NOTE — CONSULTS
GASTROENTEROLOGY CONSULTATION      ------------------------------------------------------------------------------------------------------------------       Reason for Consultation:   Concern for choledocholithiasis         ASSESSMENT AND RECOMMENDATIONS:   Assessment:  Nico Morales is a 49 year old male with past medical history of cystic fibrosis, pancreatic insufficiency with prior G-tube that is now removed, and recurrent subacute right upper quadrant pain with HIDA showing abnormal gallbladder kinetics who presented to [facility] ED 7/30/25 with RUQ abdominal pain and nausea. GI was consulted with c/f choledocholithiasis.    #Cholelithiasis with concern for choledocholithiasis, 7 mm CBD  #Elevated LFTs  #Cystic fibrosis c/b exocrine pancreatic insufficiency on Trikafta  Patient has known gallstones and is now presenting with signs and symptoms of biliary obstruction. Pre-test probability is high for choledocholithiasis therefore will plan directly for ERCP instead of obtaining further imaging. We have discussed the case with our surgical colleagues who will try to complete a joint ERCP/cholecystectomy case today.      Recommendations:  - Keep NPO.  - Plan for ERCP vs ERCP + cholecystectomy today.  - Hold all AC/DVT ppx.      Thank you for this consultation.  It was a pleasure to participate in the care of this patient; please contact us with any further questions.      Danae Millan MD MPH  GI Fellow  Division of Gastroenterology, Hepatology, and Nutrition  [facility]    This patient was staffed with attending: Dr. Barr             History of Present Illness:     Nico Morales is a 49 year old male with past medical history of cystic fibrosis, pancreatic insufficiency with prior G-tube that is now removed, and recurrent subacute right upper quadrant pain with HIDA showing abnormal gallbladder kinetics who presented to [facility] ED 7/30/25 with RUQ abdominal pain and nausea. GI was consulted with c/f  choledocholithiasis.    The patient states he has been dealing with RUQ for the past year. He patient had an US in February with findings equivocal for acute cholecystitis but he did not follow up for cholecystectomy. He presented to the ED yesterday because he started to have a new episode of RUQ and nausea. He states that he had one vomiting episode yesterday night which he attributed to taking antibiotics on an empty stomach. He states that he has not eaten anything since Tuesday.     On admission, labs were notable for new elevation in hepatic panel: WBC 15.6, , , , tbili 3.8. These labs were all normal when last checked 7/11/25. Ultrasound with findings are below and are similar to US performed in February- findings equivocal for acute cholecystitis, gallbladder wall thickening and stones. Surgery was consulted for evaluation of cholecystectomy.      Previous Procedures:  EGD 7/14/25  Indications:           Abdominal pain in the right upper quadrant   Impression:            - Normal esophagus.                          - Gastroesophageal flap valve classified as Hill Grade                          I (prominent fold, tight to endoscope).                          - Normal stomach.                          - Normal duodenal bulb and second portion of the                          duodenum. Biopsied.   Recommendation:        - Perform a colonoscopy today.     Colonoscopy 7/14/25  Indications:           High risk colon cancer surveillance: Personal history                          of colonic polyps   Impression:            - The examined portion of the ileum was normal.                          - One 3 mm polyp in the transverse colon, removed with                          a jumbo cold forceps. Resected and retrieved.                          - One 3 mm polyp in the descending colon, removed with                          a cold snare. Resected and retrieved.   Recommendation:        - Repeat  colonoscopy in 3-5 years for surveillance.               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;  Surgeon: Margarito Bowman MD;  Location: [facility]    COLONOSCOPY N/A 6/27/2022    Procedure: COLONOSCOPY, WITH POLYPECTOMY AND BIOPSY;  Surgeon: Margarito Bowman MD;  Location: [facility]    COLONOSCOPY N/A 7/14/2025    Procedure: COLONOSCOPY, FLEXIBLE, WITH LESION REMOVAL USING SNARE;  Surgeon: Margarito Bowman MD;  Location: [facility]    ESOPHAGOSCOPY, GASTROSCOPY, DUODENOSCOPY (EGD), COMBINED N/A 7/14/2025    Procedure: ESOPHAGOGASTRODUODENOSCOPY, WITH BIOPSY;  Surgeon: Margarito Bowman MD;  Location: [facility]    GASTROSTOMY TUBE  2002    IR PULMONARY EMBOLIZATION  7/20/2001    PICC INSERTION Left 01/22/2018    4Fr SL BioFlo PICC, 46cm (5cm external) in the L basilic vein w/ tip in the low SVC              Social History:   The patient lives with wife.         Family History:   Reviewed and edited as appropriate  Family History   Problem Relation Age of Onset    Heart Disease Maternal Grandmother     Heart Disease Maternal Grandfather     Heart Disease Paternal Grandmother     Diabetes No family hx of              Allergies:   Reviewed and edited as appropriate     Allergies   Allergen Reactions    Piperacillin Sod-Tazobactam So Hives     Delayed type reaction in intradermal tests to Zosyn (Piperacillin/Tazobactam). Might be specific reaction to the Zosyn without cross-reactions to Penicillins (beta-Lactam core). Avoid in future Zosyn.  However, I would avoid the Penicillins and use instead Penems or Cephalosporines. If Penicillins are the best option, then they can be used under observation.    Pulmozyme [Dornase Trever] Other (See Comments)     Chest pain and fever            Medications:     Current Facility-Administered Medications   Medication Dose Route Frequency Provider Last Rate Last Admin    acetaminophen (TYLENOL) tablet 650 mg  650 mg Oral Q8H PRN Kolton Jenkins MD   650 mg at 07/31/25 0559    Or    acetaminophen (TYLENOL) Suppository 650 mg  650 mg Rectal Q8H PRN Kolton Jenkins MD        acetylcysteine (MUCOMYST) 20 % nebulizer solution 2 mL  2 mL Nebulization 4x Daily Marilin Brown, ADRIANE        azithromycin (ZITHROMAX) tablet 250 mg  250 mg Oral Daily Tonia Wilson MD PhD        budesonide-formoterol (SYMBICORT/BREYNA) 160-4.5 MCG/ACT inhaler 2 puff  2 puff Inhalation 2 times daily Tonia Wilson MD PhD   2 puff at 07/31/25 0822    calcium carbonate (TUMS) chewable tablet 1,000 mg  1,000 mg Oral 4x Daily PRN Tonia Wilson MD PhD   1,000 mg at 07/31/25 0821    calcium carbonate 500 mg (elemental) (OSCAL) tablet 500 mg  500 mg Oral BID Tonia Wilson MD PhD   500 mg at 07/30/25 2206    cefTRIAXone (ROCEPHIN) 2 g vial to attach to  ml bag for ADULTS or NS 50 ml bag for PEDS  2 g Intravenous Q24H Tonia Wilson MD PhD        glucose gel 15-30 g  15-30 g Oral Q15 Min PRN Tonia Wilson MD PhD        Or    dextrose 50 % injection 25-50 mL  25-50 mL Intravenous Q15 Min PRN Tonia Wilson MD PhD        Or    glucagon injection 1 mg  1 mg Subcutaneous Q15 Min PRN Tonia Wilson MD PhD        Elemental iron 65 mg Vitamin C 125 mg (VITRON C) tablet 1 tablet  1 tablet Oral Daily Tonia Wilson MD PhD        ibuprofen (ADVIL/MOTRIN) tablet 600 mg  600 mg Oral Q6H PRN Tonia Wilson MD PhD        insulin aspart (NovoLOG) injection (RAPID ACTING)  1-4 Units Subcutaneous TID w/meals Tonia Wilson MD PhD        lactobacillus  rhamnosus (GG) (CULTURELL) capsule 1 capsule  1 capsule Oral Daily Tonia Wilson MD PhD        levalbuterol (XOPENEX HFA) 45 MCG/ACT Inhaler 1-2 puff  1-2 puff Inhalation Q6H PRN Tonia Wilson MD PhD        levalbuterol (XOPENEX) neb solution 1.25 mg  1.25 mg Nebulization 4x Daily Tonia Wilson MD PhD   1.25 mg at 07/31/25 0759    lidocaine (LMX4) cream   Topical Q1H PRN Tonia Wilson MD PhD        lidocaine 1 % 0.1-1 mL  0.1-1 mL Other Q1H PRN Tonia Wilson MD PhD        lipase-protease-amylase (CREON 24) 37489-99738-822504 units per EC capsule 9 capsule  9 capsule Oral TID w/meals Tonia Wilson MD PhD        melatonin tablet 5 mg  5 mg Oral At Bedtime PRN Tonia Wilson MD PhD        metroNIDAZOLE (FLAGYL) infusion 500 mg  500 mg Intravenous Q12H Tonia Wilson MD PhD   Stopped at 07/31/25 0646    ondansetron (ZOFRAN ODT) ODT tab 4 mg  4 mg Oral Q6H PRN Tonia Wilson MD PhD        Or    ondansetron (ZOFRAN) injection 4 mg  4 mg Intravenous Q6H PRN Tonia Wilson MD PhD        polyethylene glycol (MIRALAX) Packet 17 g  17 g Oral BID PRN Tonia Wilson MD PhD        senna-docusate (SENOKOT-S/PERICOLACE) 8.6-50 MG per tablet 1 tablet  1 tablet Oral BID PRN Tonia Wilson MD PhD        Or    senna-docusate (SENOKOT-S/PERICOLACE) 8.6-50 MG per tablet 2 tablet  2 tablet Oral BID PRN Tonia Wilson MD PhD        sodium chloride (PF) 0.9% PF flush 3 mL  3 mL Intracatheter Q8H [facility] Tonia Wilson MD PhD   3 mL at 07/31/25 0546    sodium chloride (PF) 0.9% PF flush 3 mL  3 mL Intracatheter q1 min prn Tonia Wilson MD PhD   3 mL at 07/30/25 2115    sodium chloride inhalant 7 % neb solution 4 mL  4 mL Nebulization 2 times daily Marilin Brown, CNP        [Held by provider] vanzacaftor-tezacaftor-deutivacaftor (ALYFTREK) 10- MG per tablet 2 tablet  2 tablet Oral Daily Tonia Wilson MD PhD         Current Outpatient Medications   Medication Sig Dispense Refill    azithromycin (ZITHROMAX)  250 MG tablet TAKE 1 TABLET (250 MG) BY MOUTH DAILY 30 tablet 11    budesonide-formoterol (SYMBICORT/BREYNA) 160-4.5 MCG/ACT Inhaler INHALE 2 PUFFS BY MOUTH TWICE DAILY 30.6 g 3    CALCIUM PO Take 500 mg by mouth 2 times daily Strength unknown.       Continuous Blood Gluc Sensor (FREESTYLE SUKHWINDER 14 DAY SENSOR) MISC Change every 14 days. 1 each 3    ferrous fumarate 65 mg, [place]. FE,-Vitamin C 125 mg (VITRON-C)  MG TABS tablet Take 1 tablet by mouth daily      Glucagon (BAQSIMI) 3 MG/DOSE nasal powder Spray 1 spray (3 mg) in nostril as needed (in emergency for hypoglycic reaction) in the event of unconscious hypoglycemia or hypoglycemic seizure. May repeat dose if no response after 15 minutes. 1 each 1    Glucagon (GVOKE HYPOPEN) 1 MG/0.2ML pen Inject the contents of 1 device under the skin into lower abdomen, outer thigh, or outer upper arm as needed for hypoglycemia. If no response after 15 minutes, additional 1 mg dose from a new device may be injected while waiting for emergency assistance. 0.4 mL 1    Insulin Aspart FlexPen 100 UNIT/ML SOPN Inject 1 Units Subcutaneous 3 times daily With these instructions :INJECT 1 UNIT PER 20G CARB AT LUNCH AND SUPPER; INJECT 3 UNITS AT NIGHT WITH TUBE FEEDINGS. MAX 20 UNITS PER Day 15 mL 3    insulin glargine (LANTUS SOLOSTAR) 100 UNIT/ML pen INJECT 8 UNITS UNDER THE SKIN ONCE DAILY 15 mL 1    insulin lispro (HUMALOG KWIKPEN) 100 UNIT/ML (1 unit dial) KWIKPEN Inject 1 Units Subcutaneous 3 times daily With these instructions :INJECT 1 UNIT PER 20G CARB AT LUNCH AND SUPPER; INJECT 3 UNITS AT NIGHT WITH TUBE FEEDINGS. MAX 20 UNITS PER Day, Disp-15 mL, R-3, E-Prescribe 15 mL 3    levalbuterol (XOPENEX HFA) 45 MCG/ACT inhaler INHALE 2 PUFFS INTO THE LUNGS EVERY 6 HOURS AS NEEDED FOR SHORTNESS OF BREATH/DYSPNEA 75 g 3    levalbuterol (XOPENEX) 1.25 MG/3ML neb solution INHALE 1 VIAL BY MOUTH INTO THE LUNGS VIA NEBULIZATION FOUR TIMES DAILY 360 mL 11    lipase-protease-amylase  (CREON) [id number] units CPEP per EC capsule TAKE 9-10 CAPSULES BY MOUTH WITH MEALS (3 MEALS/DAY) AND 2-4 CAPS WITH SNACKS (3 SNACKS/DAY) 3780 capsule 3    MULTIVITAMIN OR Take 1 tablet by mouth daily. Includes 5,000 units vitamin D and 400 units vitamin E. Per pt, formulated for CF pts.      MVW complete (PROBIOTIC) capsule Take 1 capsule by mouth daily. 30 capsule 11    polyethylene glycol (MIRALAX) 17 GM/Dose powder Take 17 g by mouth daily as needed for constipation      vanzacaftor-tezacaftor-deutivacaftor (ALYFTREK) 10- MG tablet Take 2 tablets by mouth daily. Take with fat-containing food. 56 tablet 1    ACE NOT PRESCRIBED, INTENTIONAL, 1 each continuous prn. ACE Inhibitor not prescribed due to Risk for drug interaction 0 each 0    bisacodyl (DULCOLAX) 5 MG EC tablet Two days prior to exam take two (2) tablets at 4pm. One day prior to exam take two (2) tablets at 4pm 4 tablet 0    blood glucose (NO BRAND SPECIFIED) test strip Use to test blood sugar 6 times daily or as directed. To accompany: Blood Glucose Monitor Brands: per insurance. 600 strip 3    blood glucose (NO BRAND SPECIFIED) test strip Use to test blood sugar 6 times daily. Any covered brand. Pt now using Contour Next, if covered. 600 strip 3    blood glucose monitoring (NO BRAND SPECIFIED) meter device kit Use to test blood sugar 6 times daily or as directed. Preferred blood glucose meter OR supplies to accompany: Blood Glucose Monitor Brands: per insurance. 1 kit 0    blood glucose monitoring (NO BRAND SPECIFIED) meter device kit Use to test blood sugar 6 times daily or as directed.whatever is covered 1 kit 1    blood glucose monitoring (ULTRA THIN 30G) lancets Use to test blood sugar 6 times daily or as directed.whatever is covered 540 each 3    [place] [facility]NAGED PATIENT Contact [facility] for patient specific medication information at [phone] on admission and discharge from the hospital.  Phones are  "answered 24 hours a day 7 days a week 365 days a year.    Providers - Choose \"CONTINUE HOME MED (no script)\" at discharge if patient treatment with home infusion will continue.      insulin pen needle (BD OMI U/F) 32G X 4 MM miscellaneous Use 4 pen needles daily or as directed. Follow up visit needed. Call  to schedule. 400 each 3    phytonadione (MEPHYTON) 5 MG tablet Take one tablet daily for 3 days as needed for hemoptysis. (Patient not taking: No sig reported) 6 tablet 4    polyethylene glycol (GOLYTELY) 236 g suspension Two days before procedure at 5PM fill first container with water. Mix and drink an 8 oz glass every 15 minutes until HALF of the container is gone. Place the remainder in the refrigerator. One day before procedure at 5PM drink second half of bowel prep. Drink an 8 oz glass every 15 minutes until it is gone. Day of procedure 6 hours before arrival time fill the 2nd container with water. Mix and drink an 8 oz glass every 15 minutes until HALF of the container is gone. Discard the remaining solution. 8000 mL 0    thin (NO BRAND SPECIFIED) lancets Use with lanceting device 6x daily. To accompany: Blood Glucose Monitor Brands: per insurance. 600 each 3             Review of Systems:     A complete review of systems was performed and is negative except as noted in the HPI             Physical Exam:   Temp: 99  F (37.2  C) Temp src: Oral BP: (!) 143/90 Pulse: 84   Resp: 18 SpO2: 96 % O2 Device: None (Room air)    Wt:   Wt Readings from Last 2 Encounters:   06/10/25 61.3 kg (135 lb 2.3 oz)   05/08/25 62.2 kg (137 lb 1.6 oz)        Gen: A/O, NAD, interactive and cooperative on exam  HEENT: NC/AT, sclerae anicteric, EOMI  Resp: Non labored respirations  GI: Abd soft, nontender, nondistended, no masses palpated  Skin: Warm, dry, no jaundice  Neuro: alert, oriented, answers questions appropriately, no focal deficits             Data:   Labs and imaging below were independently reviewed and " interpreted    LAB WORK:    Results for orders placed or performed during the hospital encounter of 07/30/25   US Abdomen Limited     Status: Abnormal   Result Value Ref Range    Radiologist flags Acute cholecystitis.     Radiologist flags Possible acute cholecystitis. (Urgent)     Narrative    EXAMINATION: Limited Abdominal Ultrasound, 7/30/2025 3:22 PM     COMPARISON: HIDA scan 5/27/2025. Right upper quadrant ultrasound  2/18/2025    HISTORY: Right upper quadrant pain. Assess stone versus cholecystitis.    TECHNIQUE: The abdomen was scanned in standard fashion with  specialized ultrasound transducer(s) using both gray-scale and limited  color Doppler techniques.    FINDINGS:     Liver: The liver demonstrates normal echotexture, measuring 14.2 cm in  craniocaudal dimension. There is no focal mass.     Gallbladder: There is gallbladder wall thickening measuring up to  approximately 5.3 mm. Large mobile peripherally echogenic tumefactive  sludge ball versus developing stone. The gallbladder is filled with  sludge. There is no pericholecystic fluid or positive sonographic  Mullen's sign.    Bile Ducts: Diffuse intrahepatic biliary dilatation.  The common bile  duct is dilated and measures 7 mm in diameter.    Pancreas: Visualized portions of the head and body of the pancreas  appear normal.     Kidney: The right kidney measures 11.1 cm long. There is no  hydronephrosis or hydroureter, no shadowing renal calculi, cystic  lesion or mass.     Fluid: No evidence of ascites or pleural effusions.      Impression    IMPRESSION:   1.  Findings equivocal for acute cholecystitis. Gallbladder wall  thickening and stones are unchanged from 2/18/25 study. Absence of  pericholecystic fluid and negative sonographic Mullen's sign do not  support the diagnosis of acute cholecystitis.   2.  There is dilatation of the CBD and intrahepatic biliary ducts  which is new since prior study. Consider MRCP for evaluation of distal  CBD  obstruction.      [Urgent Result: Possible acute cholecystitis.]    Finding was identified on 7/30/2025 3:23 PM.     Arturo Simpson contacted by Dr. Trey Encinas 30/2025 3:27 PM and  verbalized understanding of the urgent finding.     I have personally reviewed the examination and initial interpretation  and I agree with the findings.    SCOTTIE PORRAS MD         SYSTEM ID:  [id number]   Lipase     Status: Abnormal   Result Value Ref Range    Lipase 8 (L) 13 - 60 U/L   Comprehensive Metabolic Panel (Limited Occurrences)     Status: Abnormal   Result Value Ref Range    Sodium 138 135 - 145 mmol/L    Potassium 4.7 3.4 - 5.3 mmol/L    Carbon Dioxide (CO2) 28 22 - 29 mmol/L    Anion Gap 15 7 - 15 mmol/L    Urea Nitrogen 18.8 6.0 - 20.0 mg/dL    Creatinine 0.92 0.67 - 1.17 mg/dL    GFR Estimate >90 >60 mL/min/1.73m2    Calcium 11.1 (H) 8.8 - 10.4 mg/dL    Chloride 95 (L) 98 - 107 mmol/L    Glucose 133 (H) 70 - 99 mg/dL    Alkaline Phosphatase 375 (H) 40 - 150 U/L     (H) 0 - 45 U/L     (HH) 0 - 70 U/L    Protein Total 8.0 6.4 - 8.3 g/dL    Albumin 4.3 3.5 - 5.2 g/dL    Bilirubin Total 3.8 (H) <=1.2 mg/dL   CBC with platelets and differential     Status: Abnormal   Result Value Ref Range    WBC Count 15.6 (H) 4.0 - 11.0 10e3/uL    RBC Count 5.90 4.40 - 5.90 10e6/uL    Hemoglobin 17.1 13.3 - 17.7 g/dL    Hematocrit 52.7 40.0 - 53.0 %    MCV 89 78 - 100 fL    MCH 29.0 26.5 - 33.0 pg    MCHC 32.4 31.5 - 36.5 g/dL    RDW 13.5 10.0 - 15.0 %    Platelet Count 314 150 - 450 10e3/uL    % Neutrophils 73 %    % Lymphocytes 15 %    % Monocytes 12 %    % Eosinophils 0 %    % Basophils 0 %    % Immature Granulocytes 0 %    NRBCs per 100 WBC 0 <1 /100    Absolute Neutrophils 11.3 (H) 1.6 - 8.3 10e3/uL    Absolute Lymphocytes 2.3 0.8 - 5.3 10e3/uL    Absolute Monocytes 1.9 (H) 0.0 - 1.3 10e3/uL    Absolute Eosinophils 0.0 0.0 - 0.7 10e3/uL    Absolute Basophils 0.0 0.0 - 0.2 10e3/uL    Absolute Immature Granulocytes 0.1  <=0.4 10e3/uL    Absolute NRBCs 0.0 10e3/uL   RBC and Platelet Morphology     Status: Abnormal   Result Value Ref Range    RBC Morphology Confirmed RBC Indices     Platelet Assessment  Automated Count Confirmed. Platelet morphology is normal.     Automated Count Confirmed. Platelet morphology is normal.    Reactive Lymphocytes Present (A) None Seen   Hemoglobin A1c     Status: Abnormal   Result Value Ref Range    Estimated Average Glucose 140 (H) <117 mg/dL    Hemoglobin A1C 6.5 (H) <5.7 %   Glucose by meter     Status: Abnormal   Result Value Ref Range    GLUCOSE BY METER POCT 63 (L) 70 - 99 mg/dL   Glucose by meter     Status: Abnormal   Result Value Ref Range    GLUCOSE BY METER POCT 114 (H) 70 - 99 mg/dL   Glucose by meter     Status: Abnormal   Result Value Ref Range    GLUCOSE BY METER POCT 115 (H) 70 - 99 mg/dL   Comprehensive Metabolic Panel (Limited Occurrences)     Status: Abnormal   Result Value Ref Range    Sodium 137 135 - 145 mmol/L    Potassium 4.0 3.4 - 5.3 mmol/L    Carbon Dioxide (CO2) 24 22 - 29 mmol/L    Anion Gap 15 7 - 15 mmol/L    Urea Nitrogen 19.4 6.0 - 20.0 mg/dL    Creatinine 0.76 0.67 - 1.17 mg/dL    GFR Estimate >90 >60 mL/min/1.73m2    Calcium 9.3 8.8 - 10.4 mg/dL    Chloride 98 98 - 107 mmol/L    Glucose 107 (H) 70 - 99 mg/dL    Alkaline Phosphatase 367 (H) 40 - 150 U/L     (H) 0 - 45 U/L     (H) 0 - 70 U/L    Protein Total 6.9 6.4 - 8.3 g/dL    Albumin 3.7 3.5 - 5.2 g/dL    Bilirubin Total 4.0 (H) <=1.2 mg/dL   CBC with platelets and differential     Status: Abnormal   Result Value Ref Range    WBC Count 14.3 (H) 4.0 - 11.0 10e3/uL    RBC Count 5.24 4.40 - 5.90 10e6/uL    Hemoglobin 15.0 13.3 - 17.7 g/dL    Hematocrit 45.8 40.0 - 53.0 %    MCV 87 78 - 100 fL    MCH 28.6 26.5 - 33.0 pg    MCHC 32.8 31.5 - 36.5 g/dL    RDW 13.8 10.0 - 15.0 %    Platelet Count 267 150 - 450 10e3/uL    % Neutrophils 80 %    % Lymphocytes 11 %    % Monocytes 9 %    % Eosinophils 0 %    %  Basophils 0 %    % Immature Granulocytes 0 %    NRBCs per 100 WBC 0 <1 /100    Absolute Neutrophils 11.4 (H) 1.6 - 8.3 10e3/uL    Absolute Lymphocytes 1.6 0.8 - 5.3 10e3/uL    Absolute Monocytes 1.3 0.0 - 1.3 10e3/uL    Absolute Eosinophils 0.0 0.0 - 0.7 10e3/uL    Absolute Basophils 0.0 0.0 - 0.2 10e3/uL    Absolute Immature Granulocytes 0.1 <=0.4 10e3/uL    Absolute NRBCs 0.0 10e3/uL   Glucose by meter     Status: Abnormal   Result Value Ref Range    GLUCOSE BY METER POCT 105 (H) 70 - 99 mg/dL   Blood Culture Peripheral blood (BC) Arm, Right     Status: Normal (Preliminary result)    Specimen: Arm, Right; Peripheral blood (BC)   Result Value Ref Range    Culture No growth after 12 hours    Blood Culture Peripheral blood (BC) Arm, Left     Status: Normal (Preliminary result)    Specimen: Arm, Left; Peripheral blood (BC)   Result Value Ref Range    Culture No growth after 12 hours    CBC with Platelets and Differential (Limited Occurrences)     Status: Abnormal    Narrative    The following orders were created for panel order CBC with Platelets and Differential (Limited Occurrences).  Procedure                               Abnormality         Status                     ---------                               -----------         ------                     CBC with platelets and ...[[id number]]  Abnormal            Final result               RBC and Platelet Morpho...[[id number]]  Abnormal            Final result                 Please view results for these tests on the individual orders.   CBC with Platelets & Differential     Status: Abnormal    Narrative    The following orders were created for panel order CBC with Platelets & Differential.  Procedure                               Abnormality         Status                     ---------                               -----------         ------                     CBC with platelets and ...[[id number]]  Abnormal            Final result                 Please view  results for these tests on the individual orders.         PROCEDURES:  None.    IMAGING:  US Abdomen 7/30/25  IMPRESSION:   1.  Findings equivocal for acute cholecystitis. Gallbladder wall  thickening and stones are unchanged from 2/18/25 study. Absence of  pericholecystic fluid and negative sonographic Mullen's sign do not  support the diagnosis of acute cholecystitis.   2.  There is dilatation of the CBD and intrahepatic biliary ducts  which is new since prior study. Consider MRCP for evaluation of distal  CBD obstruction.        ===================================================================

## 2025-07-31 NOTE — PROGRESS NOTES
BG 63 pt given 2 cups orange juice, chicken noodle soup and nica crackers. BG tested 15 minutes later 114. Will continue to monitor pt's BG.

## 2025-08-01 ENCOUNTER — ANESTHESIA EVENT (OUTPATIENT)
Dept: SURGERY | Facility: CLINIC | Age: 50
End: 2025-08-01

## 2025-08-01 ENCOUNTER — APPOINTMENT (OUTPATIENT)
Dept: GENERAL RADIOLOGY | Facility: CLINIC | Age: 50
End: 2025-08-01
Attending: INTERNAL MEDICINE
Payer: COMMERCIAL

## 2025-08-01 LAB
ALBUMIN SERPL BCG-MCNC: 3.5 G/DL (ref 3.5–5.2)
ALP SERPL-CCNC: 372 U/L (ref 40–150)
ALT SERPL W P-5'-P-CCNC: 286 U/L (ref 0–70)
ANION GAP SERPL CALCULATED.3IONS-SCNC: 15 MMOL/L (ref 7–15)
AST SERPL W P-5'-P-CCNC: 149 U/L (ref 0–45)
BASOPHILS # BLD AUTO: 0 10E3/UL (ref 0–0.2)
BASOPHILS NFR BLD AUTO: 0 %
BILIRUB SERPL-MCNC: 4.7 MG/DL
BUN SERPL-MCNC: 12.1 MG/DL (ref 6–20)
CALCIUM SERPL-MCNC: 8.9 MG/DL (ref 8.8–10.4)
CHLORIDE SERPL-SCNC: 97 MMOL/L (ref 98–107)
CREAT SERPL-MCNC: 0.6 MG/DL (ref 0.67–1.17)
EGFRCR SERPLBLD CKD-EPI 2021: >90 ML/MIN/1.73M2
EOSINOPHIL # BLD AUTO: 0 10E3/UL (ref 0–0.7)
EOSINOPHIL NFR BLD AUTO: 0 %
ERCP: NORMAL
ERYTHROCYTE [DISTWIDTH] IN BLOOD BY AUTOMATED COUNT: 13.8 % (ref 10–15)
GLUCOSE BLDC GLUCOMTR-MCNC: 117 MG/DL (ref 70–99)
GLUCOSE BLDC GLUCOMTR-MCNC: 122 MG/DL (ref 70–99)
GLUCOSE BLDC GLUCOMTR-MCNC: 145 MG/DL (ref 70–99)
GLUCOSE BLDC GLUCOMTR-MCNC: 95 MG/DL (ref 70–99)
GLUCOSE SERPL-MCNC: 118 MG/DL (ref 70–99)
HCO3 SERPL-SCNC: 24 MMOL/L (ref 22–29)
HCT VFR BLD AUTO: 45 % (ref 40–53)
HGB BLD-MCNC: 14.7 G/DL (ref 13.3–17.7)
IMM GRANULOCYTES # BLD: 0.1 10E3/UL
IMM GRANULOCYTES NFR BLD: 1 %
LYMPHOCYTES # BLD AUTO: 1.6 10E3/UL (ref 0.8–5.3)
LYMPHOCYTES NFR BLD AUTO: 12 %
MCH RBC QN AUTO: 28.9 PG (ref 26.5–33)
MCHC RBC AUTO-ENTMCNC: 32.7 G/DL (ref 31.5–36.5)
MCV RBC AUTO: 89 FL (ref 78–100)
MONOCYTES # BLD AUTO: 1.4 10E3/UL (ref 0–1.3)
MONOCYTES NFR BLD AUTO: 10 %
NEUTROPHILS # BLD AUTO: 10.9 10E3/UL (ref 1.6–8.3)
NEUTROPHILS NFR BLD AUTO: 78 %
NRBC # BLD AUTO: 0 10E3/UL
NRBC BLD AUTO-RTO: 0 /100
PLATELET # BLD AUTO: 235 10E3/UL (ref 150–450)
POTASSIUM SERPL-SCNC: 3.9 MMOL/L (ref 3.4–5.3)
PROT SERPL-MCNC: 6.4 G/DL (ref 6.4–8.3)
RBC # BLD AUTO: 5.08 10E6/UL (ref 4.4–5.9)
SODIUM SERPL-SCNC: 136 MMOL/L (ref 135–145)
WBC # BLD AUTO: 14 10E3/UL (ref 4–11)

## 2025-08-01 PROCEDURE — C1726 CATH, BAL DIL, NON-VASCULAR: HCPCS | Performed by: INTERNAL MEDICINE

## 2025-08-01 PROCEDURE — 94640 AIRWAY INHALATION TREATMENT: CPT

## 2025-08-01 PROCEDURE — 999N000141 HC STATISTIC PRE-PROCEDURE NURSING ASSESSMENT: Performed by: INTERNAL MEDICINE

## 2025-08-01 PROCEDURE — 999N000157 HC STATISTIC RCP TIME EA 10 MIN

## 2025-08-01 PROCEDURE — 99232 SBSQ HOSP IP/OBS MODERATE 35: CPT | Mod: GC | Performed by: STUDENT IN AN ORGANIZED HEALTH CARE EDUCATION/TRAINING PROGRAM

## 2025-08-01 PROCEDURE — 360N000083 HC SURGERY LEVEL 3 W/ FLUORO, PER MIN: Performed by: INTERNAL MEDICINE

## 2025-08-01 PROCEDURE — 94640 AIRWAY INHALATION TREATMENT: CPT | Mod: 76

## 2025-08-01 PROCEDURE — 36415 COLL VENOUS BLD VENIPUNCTURE: CPT

## 2025-08-01 PROCEDURE — 250N000009 HC RX 250: Performed by: STUDENT IN AN ORGANIZED HEALTH CARE EDUCATION/TRAINING PROGRAM

## 2025-08-01 PROCEDURE — 999N000179 XR SURGERY CARM FLUORO LESS THAN 5 MIN W STILLS

## 2025-08-01 PROCEDURE — 258N000003 HC RX IP 258 OP 636

## 2025-08-01 PROCEDURE — 255N000002 HC RX 255 OP 636: Performed by: INTERNAL MEDICINE

## 2025-08-01 PROCEDURE — 250N000013 HC RX MED GY IP 250 OP 250 PS 637

## 2025-08-01 PROCEDURE — C1769 GUIDE WIRE: HCPCS | Performed by: INTERNAL MEDICINE

## 2025-08-01 PROCEDURE — 85025 COMPLETE CBC W/AUTO DIFF WBC: CPT

## 2025-08-01 PROCEDURE — 999N000248 HC STATISTIC IV INSERT WITH US BY RN

## 2025-08-01 PROCEDURE — 82962 GLUCOSE BLOOD TEST: CPT

## 2025-08-01 PROCEDURE — 120N000002 HC R&B MED SURG/OB UMMC

## 2025-08-01 PROCEDURE — 710N000009 HC RECOVERY PHASE 1, LEVEL 1, PER MIN: Performed by: INTERNAL MEDICINE

## 2025-08-01 PROCEDURE — 370N000017 HC ANESTHESIA TECHNICAL FEE, PER MIN: Performed by: INTERNAL MEDICINE

## 2025-08-01 PROCEDURE — 250N000011 HC RX IP 250 OP 636

## 2025-08-01 PROCEDURE — 82310 ASSAY OF CALCIUM: CPT

## 2025-08-01 PROCEDURE — 0FC98ZZ EXTIRPATION OF MATTER FROM COMMON BILE DUCT, VIA NATURAL OR ARTIFICIAL OPENING ENDOSCOPIC: ICD-10-PCS | Performed by: INTERNAL MEDICINE

## 2025-08-01 PROCEDURE — 272N000001 HC OR GENERAL SUPPLY STERILE: Performed by: INTERNAL MEDICINE

## 2025-08-01 PROCEDURE — 250N000025 HC SEVOFLURANE, PER MIN: Performed by: INTERNAL MEDICINE

## 2025-08-01 PROCEDURE — 99232 SBSQ HOSP IP/OBS MODERATE 35: CPT | Performed by: NURSE PRACTITIONER

## 2025-08-01 PROCEDURE — 94669 MECHANICAL CHEST WALL OSCILL: CPT

## 2025-08-01 RX ORDER — FENTANYL CITRATE 50 UG/ML
25 INJECTION, SOLUTION INTRAMUSCULAR; INTRAVENOUS EVERY 5 MIN PRN
Status: DISCONTINUED | OUTPATIENT
Start: 2025-08-01 | End: 2025-08-01 | Stop reason: HOSPADM

## 2025-08-01 RX ORDER — NALOXONE HYDROCHLORIDE 0.4 MG/ML
0.1 INJECTION, SOLUTION INTRAMUSCULAR; INTRAVENOUS; SUBCUTANEOUS
Status: DISCONTINUED | OUTPATIENT
Start: 2025-08-01 | End: 2025-08-01 | Stop reason: HOSPADM

## 2025-08-01 RX ORDER — ONDANSETRON 4 MG/1
4 TABLET, ORALLY DISINTEGRATING ORAL EVERY 30 MIN PRN
Status: DISCONTINUED | OUTPATIENT
Start: 2025-08-01 | End: 2025-08-01 | Stop reason: HOSPADM

## 2025-08-01 RX ORDER — HYDROMORPHONE HCL IN WATER/PF 6 MG/30 ML
0.4 PATIENT CONTROLLED ANALGESIA SYRINGE INTRAVENOUS EVERY 5 MIN PRN
Status: DISCONTINUED | OUTPATIENT
Start: 2025-08-01 | End: 2025-08-01 | Stop reason: HOSPADM

## 2025-08-01 RX ORDER — NALOXONE HYDROCHLORIDE 0.4 MG/ML
0.4 INJECTION, SOLUTION INTRAMUSCULAR; INTRAVENOUS; SUBCUTANEOUS
Status: CANCELLED | OUTPATIENT
Start: 2025-08-01

## 2025-08-01 RX ORDER — HYDROMORPHONE HCL IN WATER/PF 6 MG/30 ML
0.2 PATIENT CONTROLLED ANALGESIA SYRINGE INTRAVENOUS EVERY 5 MIN PRN
Status: DISCONTINUED | OUTPATIENT
Start: 2025-08-01 | End: 2025-08-01 | Stop reason: HOSPADM

## 2025-08-01 RX ORDER — NALOXONE HYDROCHLORIDE 0.4 MG/ML
0.2 INJECTION, SOLUTION INTRAMUSCULAR; INTRAVENOUS; SUBCUTANEOUS
Status: CANCELLED | OUTPATIENT
Start: 2025-08-01

## 2025-08-01 RX ORDER — LABETALOL HYDROCHLORIDE 5 MG/ML
10 INJECTION, SOLUTION INTRAVENOUS
Status: DISCONTINUED | OUTPATIENT
Start: 2025-08-01 | End: 2025-08-01 | Stop reason: HOSPADM

## 2025-08-01 RX ORDER — IOPAMIDOL 510 MG/ML
INJECTION, SOLUTION INTRAVASCULAR PRN
Status: DISCONTINUED | OUTPATIENT
Start: 2025-08-01 | End: 2025-08-01 | Stop reason: HOSPADM

## 2025-08-01 RX ORDER — DEXAMETHASONE SODIUM PHOSPHATE 4 MG/ML
4 INJECTION, SOLUTION INTRA-ARTICULAR; INTRALESIONAL; INTRAMUSCULAR; INTRAVENOUS; SOFT TISSUE
Status: DISCONTINUED | OUTPATIENT
Start: 2025-08-01 | End: 2025-08-01 | Stop reason: HOSPADM

## 2025-08-01 RX ORDER — FENTANYL CITRATE 50 UG/ML
50 INJECTION, SOLUTION INTRAMUSCULAR; INTRAVENOUS EVERY 5 MIN PRN
Status: DISCONTINUED | OUTPATIENT
Start: 2025-08-01 | End: 2025-08-01 | Stop reason: HOSPADM

## 2025-08-01 RX ORDER — LIDOCAINE 40 MG/G
CREAM TOPICAL
Status: DISCONTINUED | OUTPATIENT
Start: 2025-08-01 | End: 2025-08-01 | Stop reason: HOSPADM

## 2025-08-01 RX ORDER — PANTOPRAZOLE SODIUM 40 MG/1
40 TABLET, DELAYED RELEASE ORAL 2 TIMES DAILY
Status: DISCONTINUED | OUTPATIENT
Start: 2025-08-01 | End: 2025-08-02 | Stop reason: HOSPADM

## 2025-08-01 RX ORDER — SODIUM CHLORIDE, SODIUM LACTATE, POTASSIUM CHLORIDE, CALCIUM CHLORIDE 600; 310; 30; 20 MG/100ML; MG/100ML; MG/100ML; MG/100ML
INJECTION, SOLUTION INTRAVENOUS CONTINUOUS
Status: DISCONTINUED | OUTPATIENT
Start: 2025-08-01 | End: 2025-08-01 | Stop reason: HOSPADM

## 2025-08-01 RX ORDER — ONDANSETRON 2 MG/ML
4 INJECTION INTRAMUSCULAR; INTRAVENOUS EVERY 30 MIN PRN
Status: DISCONTINUED | OUTPATIENT
Start: 2025-08-01 | End: 2025-08-01 | Stop reason: HOSPADM

## 2025-08-01 RX ORDER — FLUMAZENIL 0.1 MG/ML
0.2 INJECTION, SOLUTION INTRAVENOUS
Status: CANCELLED | OUTPATIENT
Start: 2025-08-01 | End: 2025-08-02

## 2025-08-01 RX ADMIN — Medication 1 TABLET: at 09:47

## 2025-08-01 RX ADMIN — BUDESONIDE AND FORMOTEROL FUMARATE DIHYDRATE 2 PUFF: 160; 4.5 AEROSOL RESPIRATORY (INHALATION) at 09:45

## 2025-08-01 RX ADMIN — ACETYLCYSTEINE 2 ML: 200 SOLUTION ORAL; RESPIRATORY (INHALATION) at 20:54

## 2025-08-01 RX ADMIN — SODIUM CHLORIDE SOLN NEBU 7% 4 ML: 7 NEBU SOLN at 09:17

## 2025-08-01 RX ADMIN — CALCIUM CARBONATE (ANTACID) CHEW TAB 500 MG 1000 MG: 500 CHEW TAB at 04:08

## 2025-08-01 RX ADMIN — SODIUM CHLORIDE, SODIUM LACTATE, POTASSIUM CHLORIDE, AND CALCIUM CHLORIDE 100 ML/HR: .6; .31; .03; .02 INJECTION, SOLUTION INTRAVENOUS at 00:21

## 2025-08-01 RX ADMIN — LEVALBUTEROL HYDROCHLORIDE 1.25 MG: 1.25 SOLUTION RESPIRATORY (INHALATION) at 20:54

## 2025-08-01 RX ADMIN — AZITHROMYCIN DIHYDRATE 250 MG: 250 TABLET, FILM COATED ORAL at 09:46

## 2025-08-01 RX ADMIN — METRONIDAZOLE 500 MG: 500 INJECTION, SOLUTION INTRAVENOUS at 06:28

## 2025-08-01 RX ADMIN — LEVALBUTEROL HYDROCHLORIDE 1.25 MG: 1.25 SOLUTION RESPIRATORY (INHALATION) at 09:16

## 2025-08-01 RX ADMIN — CALCIUM 500 MG: 500 TABLET ORAL at 09:46

## 2025-08-01 RX ADMIN — Medication 1 CAPSULE: at 09:46

## 2025-08-01 RX ADMIN — PANTOPRAZOLE SODIUM 40 MG: 40 TABLET, DELAYED RELEASE ORAL at 23:45

## 2025-08-01 RX ADMIN — CALCIUM 500 MG: 500 TABLET ORAL at 23:45

## 2025-08-01 ASSESSMENT — ACTIVITIES OF DAILY LIVING (ADL)
ADLS_ACUITY_SCORE: 56

## 2025-08-01 ASSESSMENT — COPD QUESTIONNAIRES
CAT_SEVERITY: MODERATE
COPD: 1

## 2025-08-01 NOTE — PROGRESS NOTES
Nico Morales is a 49 year old male with past medical history of cystic fibrosis, pancreatic insufficiency with prior G-tube that is now removed, and recurrent subacute right upper quadrant pain with HIDA showing abnormal gallbladder kinetics who presented to [facility] ED 7/30/25 with RUQ abdominal pain and nausea.     Patient has known gallstones and is now presenting with signs and symptoms of biliary obstruction. Pre-test probability is high for choledocholithiasis therefore plan directly for ERCP +/- cholecystectomy.

## 2025-08-01 NOTE — PROGRESS NOTES
Physician Attestation   I, Loni Lopez DO, was present with the medical/ABDOULAYE student who participated in the service and in the documentation of the note.  I have verified the history and personally performed the physical exam and medical decision making.  I agree with the assessment and plan of care as documented in the note.      Key findings: 48 yo with CF c/b moderate obstructive lung disease, CFRD on insulin, malnutrition with previous G tube now removed who presents for acute cholecystits. LFTs elevated and remains on ceftriaxone, metronidazole. Planning for ERCP this afternoon with GI. NPO currently. Airway clearance per pulmonary medicine. Will plan for outpatient cholecystectomy pending course with surgery, currently have signed off.     -------------------------- BILLING ON TIME ------------------------------------------------------------------------------------------------------------  45 MINUTES SPENT BY ME on the date of service doing chart review, history, exam, documentation & further activities per the note.    I have personally reviewed the following data over the past 24 hrs:    14.0 (H)  \   14.7   / 235     136 97 (L) 12.1 /  118 (H); 117 (H)   3.9 24 0.60 (L) \     ALT: 286 (H) AST: 149 (H) AP: 372 (H) TBILI: 4.7 (H)   ALB: 3.5 TOT PROTEIN: 6.4 LIPASE: N/A         Loni Lopez DO  Date of Service (when I saw the patient): 08/01/25    [facility]    Progress Note - Medicine Service, MUK[facility] TEAM 2       Date of Admission:  7/30/2025    Assessment & Plan   Nico Morales is a 49 year old male who has a history of cystic fibrosis (Q034psx/621+1G->T) c/b moderate obstructive lung disease, T2DM (on insulin), pancreatic exocrine insufficiency, malnutrition (previously s/p G tube, now removed), and osteoporosis, and who is admitted for RUQ abdominal pain. US 7/30 concerning for acute cholecystitis. GI recommend ERCP 8/1.     Today:  - GI recommends  ERCP 8/1, NPO at midnight, no VTE prophylaxis beforehand  - Surgery signed off, surgery will arrange outpatient elective cholecystectomy  - Continue abx  - Continue airway clearance per pulm recs     #RUQ pain  Given elevated WBC and dilated CBD and intrahepatic bile ducts but lack of pericholecystic fluid, equivocal between acute cholecystitis vs choledocholithiasis. Agree with GI recommendation for ERCP. Will also continue CTX and BYRNES started in ED given c/f cholecystitis and intra-abdominal infection for at least 48 hours or pending blood culture results. Low c/f sepsis given reassuring vital signs. 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    - Consult to General Surgery, appreciate recs   - Signed off 7/31, surgery will arrange outpatient elective cholecystectomy  - Consult to GI, appreciate recs              - ERCP planned for 8/1              - NPO at midnight prior to ERCP 8/1              - Hold anti/coagulation/VTE prophylaxis  - Bcx NGTD  - Ceftriaxone 2 g IV daily  - Metronidazole 500 mg IV q8h     #Cystic fibrosis (U017smf/621+1G->T)  #Moderate obstructive lung disease  #Prior infections with pseudomonas, nocardia  Patient follows with [facility] clinic. Doing well on current regimen at last visit 6/10/25, with decreased cough and moving more air at apices. Will continue regimen with exception of daily vest therapy given may exacerbate patient's upper abdominal pain. Patient prefers to have providers wearing masks to prevent respiratory infection so have ordered protective precautions (instead of previous contact precautions).   - Hold PTA Alyftrek 2 tablets po daily (elevated LFTs)   - PTA Xopenex 1.25 mg nebulization QID  - PTA azithromycin 250 mg po daily  - PTA Xopenex inhaler 1-2 puffs q6h prn  - Vesting therapy QID with nebs  - Protective precautions     #T2DM  Patient on low dose of insulin glargine at home so  feel comfortable transitioning him to sliding scale insulin alone while inpatient with decreased appetite and NPO for possible procedure.   - Hold PTA insulin glargine 9 units  - Insulin aspart 4u with meals  - Low dose sliding scale insulin TID with meals     #Pancreatic exocrine insufficiency  #History of malnutrition (prior G tube)  Will plan to continue home supplements. Will continue home Creon as patient able to eat meals, when not NPO for possible procedure.   - PTA Creon 8-10 capsules with meals, 2-4 with snacks  - PTA elemental iron 65 mg/vitamin C 125 mg po daily  - PTA lactobacillus rhamnosus capsule po daily     #Osteoporosis  Patient follows with Endocrinology and has received prior Reclast infusions.   - PTA calcium carbonate 500 mg po BID        Diet: NPO for Procedure/Surgery per Anesthesia Guidelines Except for: Meds; Clear liquids before procedure/surgery: ADULT (Age GREATER than or Equal to 18 years) - Clear liquids 2 hours before procedure/surgery    DVT Prophylaxis: Low Risk/Ambulatory with no VTE prophylaxis indicated  Marroquin Catheter: Not present  Fluids: None  Lines: None     Cardiac Monitoring: None  Code Status: Full Code      Clinically Significant Risk Factors          # Hypochloremia: Lowest Cl = 95 mmol/L in last 2 days, will monitor as appropriate   # Hypercalcemia: Highest Ca = 11.1 mg/dL in last 2 days, will monitor as appropriate                  # DMII: A1C = 6.5 % (Ref range: <5.7 %) within past 6 months              Social Drivers of Health          Disposition Plan     Medically Ready for Discharge: Anticipated in 2-4 Days     The patient's care was discussed with the Attending Physician, Dr. Lopez.    Juan [facility]  Medical Student  Medicine Service, MUKESH TEAM [address] [place] [address]  Securely message with [employer] (more info)  Text page via [facility] Paging/Directory   See signed in provider for up to date coverage  information  ______________________________________________________________________    Interval History   NAEO. Denies any new/changing abdominal pain. He feels increasingly fatigued which he attributes to not eating for the past 3 days and being on the antibiotics. He had questions about his vesting treatments in the setting of the GI procedure today.     Physical Exam   Vital Signs: Temp: 99.4  F (37.4  C) Temp src: Oral BP: (!) 127/90 Pulse: 68   Resp: 20 SpO2: 99 % O2 Device: None (Room air)    Weight: 0 lbs 0 oz    General Appearance:  Uncomfortable appearing man, but able to speak in full sentences   Respiratory: CTAB, normal WOB on room air  Cardiovascular: RRR, no r/g/m  GI: Soft, non-distended, RUQ tenderness to palpation is slightly improved since day prior, normoactive BS present  Skin: No rashes or lesions on exposed skin  Other:  MMM, no peripheral edema    Medical Decision Making       Please see A&P for additional details of medical decision making.      Data     I have personally reviewed the following data over the past 24 hrs:    14.0 (H)  \   14.7   / 235     136 97 (L) 12.1 /  118 (H); 117 (H)   3.9 24 0.60 (L) \     ALT: 286 (H) AST: 149 (H) AP: 372 (H) TBILI: 4.7 (H)   ALB: 3.5 TOT PROTEIN: 6.4 LIPASE: N/A       Imaging results reviewed over the past 24 hrs:   No results found for this or any previous visit (from the past 24 hours).

## 2025-08-01 NOTE — PROGRESS NOTES
Pulmonary Medicine  Cystic Fibrosis - Lung Transplant Team  Progress Note  2025     Patient: Nico Morales  MRN: [id number]  : 1975 (age 49 year old)  Admission date: 2025    Assessment & Plan:     Nico Morales is a 49 year old male with a history of CF with severe obstructive lung disease, CFRD, and pancreatic insufficiency. The patient was admitted on 25 for acute epigastric abdominal pain with US concerning for acute cholecystitis vs choledocholithiasis. Awaiting OR time for ERCP with possible cholecystectomy, then discharge pending tolerance of therapeutic airway clearance post-procedure.     Abdominal pain:  Concern for cholecystitis vs choledocholithiasis: Initial concern for gall bladder dysfunction date back to , US at that time only revealing for possible gall stones and biliary sludge. Admitted with acute onset abdominal pain  with intermittent N/V. No fever or chills. Leukocytosis noted on admission. LFTs and bilirubin notable elevated. US with dilated CBD and intrahepatic bile ducts concerning for acute cholecystitis vs choledocholithiasis. GI and generally surgery consulted.  - IVF per primary  - Ceftriaxone () and Flagyl IV ()  per primary/GI  - Tentative plan for OR today for ERCP +/- cholecystectomy     CF pulmonary disease: On RA without complaint of increased cough or sputum. Vests TID with Xopenex at baseline, mucolytics weaned off by OP pulmonary team d/t lack of efficacy when well. 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 Home regimen for levofloxacin or doxycycline with exacerbations (has not used for several months, no recent exacerbations). Most recent PFTs remain stable with FEV1 1.72L, 49%.  - PTA azithromycin for chronic inflammation  - PTA Symbicort inhaler BID  - Vesting QID with nebs: Xopenex QID, will add Mucomyst BID and HTS BID (alternating therapies) for  additional clearance with reduced vesting and suspension of modulator  - Recommend to discharge only after pt. is able to tolerate therapeutic airway clearance post-procedure  - PFTs deferred this admission     CFTR modulation: Doing well on Alyftrek with minimal cough and sputum. Transitioned from Trikafta in March given persistently elevated LFTs on Trikafta requiring dose reduction. LFTs markedly elevated on admission as above.  - Daily LFTs  - Alyftrek held on admission pending improvement in LFTs, will consider resuming when LFTs are <3x ULN     Exocrine pancreatic insufficiency: No signs of malabsorption. H/o G tube, not presently in place. Intermittent GI issues historically, prior HIDA scan suggesting gallbladder kinetics (see above for acute issues).  - High kcal / high protein diet  - PTA enzymes and vitamins per CF RD     We appreciate the excellent care provided by the Medicine [facility] team. Recommendations communicated via [employer] and this note. Will continue to follow along closely, please do not hesitate to call with any questions or concerns.     Patient discussed with Dr. Mullen.     Leia Brown, DNP, APRN, CNP  Inpatient Nurse Practitioner  Pulmonary CF/Transplant     Subjective & Interval History:     Remains on RA with no new cough or sputum. Tolerated vesting at full settings yesterday x2, declined hs treatment d/t nausea. Awaiting OR time today for ERCP with possible cholecystectomy. Occasional nausea but no emesis. Abdominal pain mild, mostly hunger pains today per pt. Regular BM. Poor sleep last night d/t loud ED environment.    Review of Systems:     C: No fever, no chills  INTEGUMENTARY/SKIN: No rash or obvious new lesions  ENT/MOUTH: No sore throat, no sinus pain, no nasal congestion or drainage  RESP: See interval history  CV: No chest pain, no palpitations, no peripheral edema, no orthopnea  GI: No reflux symptoms  : No dysuria  MUSCULOSKELETAL: No myalgias, no arthralgias  ENDOCRINE:  Blood sugars with adequate control  NEURO: No headache, no numbness or tingling  PSYCHIATRIC: Mood stable    Physical Exam:     All notes, images, and labs from past 24 hours (at minimum) were reviewed.    Vital signs:  Temp: 98.6  F (37  C) Temp src: Oral BP: (!) 141/90 Pulse: 68   Resp: 16 SpO2: 95 % O2 Device: None (Room air)        I/O:   Intake/Output Summary (Last 24 hours) at 8/1/2025 0806  Last data filed at 7/31/2025 2000  Gross per 24 hour   Intake 160 ml   Output --   Net 160 ml     Constitutional: Sitting up in bed in ED, in no apparent distress.   HEENT: Eyes with pink conjunctivae, anicteric. Oral mucosa moist without lesions.   PULM: Good air flow bilaterally. No crackles, no rhonchi, no wheezes. Non-labored breathing on RA.  CV: Normal S1 and S2. RRR. No murmur, gallop, or rub. No peripheral edema.   ABD: NABS, soft, mildly tender, nondistended.    MSK: Moves all extremities. No apparent muscle wasting.   NEURO: Alert and conversant.   SKIN: Warm, dry. No rash on limited exam.   PSYCH: Mood stable.     Data:     LABS    CMP:   Recent Labs   Lab 08/01/25  0359 08/01/25  0009 07/31/25  1832 07/31/25  1155 07/31/25  0642 07/31/25  0553 07/30/25  2119 07/30/25  1415   NA  --   --   --   --   --  137  --  138   POTASSIUM  --   --   --   --   --  4.0  --  4.7   CHLORIDE  --   --   --   --   --  98  --  95*   CO2  --   --   --   --   --  24  --  28   ANIONGAP  --   --   --   --   --  15  --  15   * 145* 141* 107*   < > 107*   < > 133*   BUN  --   --   --   --   --  19.4  --  18.8   CR  --   --   --   --   --  0.76  --  0.92   GFRESTIMATED  --   --   --   --   --  >90  --  >90   CLAUDIA  --   --   --   --   --  9.3  --  11.1*   PROTTOTAL  --   --   --   --   --  6.9  --  8.0   ALBUMIN  --   --   --   --   --  3.7  --  4.3   BILITOTAL  --   --   --   --   --  4.0*  --  3.8*   ALKPHOS  --   --   --   --   --  367*  --  375*   AST  --   --   --   --   --  246*  --  460*   ALT  --   --   --   --   --  401*   "--  588*    < > = values in this interval not displayed.     CBC:   Recent Labs   Lab 07/31/25  0553 07/30/25  1415   WBC 14.3* 15.6*   RBC 5.24 5.90   HGB 15.0 17.1   HCT 45.8 52.7   MCV 87 89   MCH 28.6 29.0   MCHC 32.8 32.4   RDW 13.8 13.5    314       INR: No lab results found in last 7 days.    Glucose:   Recent Labs   Lab 08/01/25  0359 08/01/25  0009 07/31/25  1832 07/31/25  1155 07/31/25  0642 07/31/25  0553   * 145* 141* 107* 105* 107*       Blood Gas: No lab results found in last 7 days.    Culture Data No results for input(s): \"CULT\" in the last 168 hours.    Virology Data:   Lab Results   Component Value Date    FLUAH1 Negative 01/21/2018    FLUAH3 Positive (A) 01/21/2018    WD8595 Negative 01/21/2018    IFLUB Negative 01/21/2018    RSVA Negative 01/21/2018    RSVB Negative 01/21/2018    PIV1 Negative 01/21/2018    PIV2 Negative 01/21/2018    PIV3 Negative 01/21/2018    HMPV Negative 01/21/2018    HRVS Negative 01/21/2018    ADVBE Negative 01/21/2018    ADVC Negative 01/21/2018       Historical CMV results (last 3 of prior testing):  Lab Results   Component Value Date    CMVQNT CMV DNA Not Detected 05/07/2020     Lab Results   Component Value Date    CMVLOG Not Calculated 05/07/2020       Urine Studies    Recent Labs   Lab Test 02/11/25  0905 01/16/24  0832   URINEPH 6.0 5.0   NITRITE Negative Negative   LEUKEST Negative Negative   WBCU 0 <1       Most Recent [place] Pulmonary Function Testing (FVC/FEV1 only)  FVC-Pre   Date Value Ref Range Status   06/10/2025 3.79 L    02/11/2025 3.38 L    09/10/2024 3.55 L    05/21/2024 3.65 L      FVC-%Pred-Pre   Date Value Ref Range Status   06/10/2025 86 %    02/11/2025 76 %    09/10/2024 80 %    05/21/2024 82 %      FEV1-Pre   Date Value Ref Range Status   06/10/2025 1.72 L    02/11/2025 1.67 L    09/10/2024 1.77 L    05/21/2024 1.75 L      FEV1-%Pred-Pre   Date Value Ref Range Status   06/10/2025 49 %    02/11/2025 47 %    09/10/2024 49 %  "   05/21/2024 49 %        IMAGING    Recent Results (from the past 48 hours)   US Abdomen Limited   Result Value    Radiologist flags Acute cholecystitis.    Radiologist flags Possible acute cholecystitis. (Urgent)    Narrative    EXAMINATION: Limited Abdominal Ultrasound, 7/30/2025 3:22 PM     COMPARISON: HIDA scan 5/27/2025. Right upper quadrant ultrasound  2/18/2025    HISTORY: Right upper quadrant pain. Assess stone versus cholecystitis.    TECHNIQUE: The abdomen was scanned in standard fashion with  specialized ultrasound transducer(s) using both gray-scale and limited  color Doppler techniques.    FINDINGS:     Liver: The liver demonstrates normal echotexture, measuring 14.2 cm in  craniocaudal dimension. There is no focal mass.     Gallbladder: There is gallbladder wall thickening measuring up to  approximately 5.3 mm. Large mobile peripherally echogenic tumefactive  sludge ball versus developing stone. The gallbladder is filled with  sludge. There is no pericholecystic fluid or positive sonographic  Mullen's sign.    Bile Ducts: Diffuse intrahepatic biliary dilatation.  The common bile  duct is dilated and measures 7 mm in diameter.    Pancreas: Visualized portions of the head and body of the pancreas  appear normal.     Kidney: The right kidney measures 11.1 cm long. There is no  hydronephrosis or hydroureter, no shadowing renal calculi, cystic  lesion or mass.     Fluid: No evidence of ascites or pleural effusions.      Impression    IMPRESSION:   1.  Findings equivocal for acute cholecystitis. Gallbladder wall  thickening and stones are unchanged from 2/18/25 study. Absence of  pericholecystic fluid and negative sonographic Mullen's sign do not  support the diagnosis of acute cholecystitis.   2.  There is dilatation of the CBD and intrahepatic biliary ducts  which is new since prior study. Consider MRCP for evaluation of distal  CBD obstruction.      [Urgent Result: Possible acute  cholecystitis.]    Finding was identified on 7/30/2025 3:23 PM.     Arturo Simpson contacted by Dr. Trey Encinas 30/2025 3:27 PM and  verbalized understanding of the urgent finding.     I have personally reviewed the examination and initial interpretation  and I agree with the findings.    SCOTTIE PORRAS MD         SYSTEM ID:  [id number]

## 2025-08-01 NOTE — PLAN OF CARE
Goal Outcome Evaluation:                    Neuro: A&Ox4.   Cardiac: Not on tele, afebrile. VSS.     Respiratory: Sating above 92% on RA.  GI/: Adequate urine output. LBM 7/31  Diet/appetite: NPO  Activity:  Up independently   Pain: At acceptable level on current regimen.   Skin: See flowsheet for assessment and interventions.   LDA's: PIV with LR infusing at 100 mL/hr   Plan: Continue with POC. Notify primary team with changes.  Goal Outcome Evaluation:       Plan of Care Reviewed With: patient

## 2025-08-01 NOTE — PROGRESS NOTES
Neuro: A&Ox4.   Cardiac: Not on tele, afebrile. VSS.     Respiratory: Sating above 92% on RA.  GI/: Adequate urine output. BM 7/31  Diet/appetite: regular  Activity:  Up independently   Pain: At acceptable level on current regimen.   Skin: See flowsheet for assessment and interventions.   LDA's: PIV with LR infusing at 100 mL/hr   Plan: Continue with POC. Notify primary team with changes.  Goal Outcome Evaluation:       Plan of Care Reviewed With: patient

## 2025-08-01 NOTE — PLAN OF CARE
Goal Outcome Evaluation:      Plan of Care Reviewed With: patient    Overall Patient Progress: improvingOverall Patient Progress: improving    Outcome Evaluation: Patient alert and oriented x 4, denied pain    Patient alert and oriented x 4. Denied pain, hx cystic fibrosis, no reports of SOB, was NPO for ERCP[. /88   Pulse 68   Temp 99  F (37.2  C) (Oral)   Resp 16   SpO2 93%

## 2025-08-02 ENCOUNTER — ANESTHESIA (OUTPATIENT)
Dept: SURGERY | Facility: CLINIC | Age: 50
End: 2025-08-02

## 2025-08-02 VITALS
RESPIRATION RATE: 18 BRPM | OXYGEN SATURATION: 98 % | HEART RATE: 85 BPM | TEMPERATURE: 98.7 F | SYSTOLIC BLOOD PRESSURE: 113 MMHG | DIASTOLIC BLOOD PRESSURE: 76 MMHG

## 2025-08-02 PROBLEM — K26.9 DUODENAL ULCER WITHOUT HEMORRHAGE OR PERFORATION AND WITHOUT OBSTRUCTION: Status: ACTIVE | Noted: 2025-08-02

## 2025-08-02 LAB
ALBUMIN SERPL BCG-MCNC: 3.3 G/DL (ref 3.5–5.2)
ALP SERPL-CCNC: 339 U/L (ref 40–150)
ALT SERPL W P-5'-P-CCNC: 217 U/L (ref 0–70)
ANION GAP SERPL CALCULATED.3IONS-SCNC: 13 MMOL/L (ref 7–15)
AST SERPL W P-5'-P-CCNC: 91 U/L (ref 0–45)
BASOPHILS # BLD AUTO: 0 10E3/UL (ref 0–0.2)
BASOPHILS NFR BLD AUTO: 0 %
BILIRUB SERPL-MCNC: 1.6 MG/DL
BUN SERPL-MCNC: 17.2 MG/DL (ref 6–20)
CALCIUM SERPL-MCNC: 8.5 MG/DL (ref 8.8–10.4)
CHLORIDE SERPL-SCNC: 101 MMOL/L (ref 98–107)
CREAT SERPL-MCNC: 0.65 MG/DL (ref 0.67–1.17)
EGFRCR SERPLBLD CKD-EPI 2021: >90 ML/MIN/1.73M2
EOSINOPHIL # BLD AUTO: 0 10E3/UL (ref 0–0.7)
EOSINOPHIL NFR BLD AUTO: 0 %
ERYTHROCYTE [DISTWIDTH] IN BLOOD BY AUTOMATED COUNT: 14 % (ref 10–15)
GLUCOSE BLDC GLUCOMTR-MCNC: 102 MG/DL (ref 70–99)
GLUCOSE BLDC GLUCOMTR-MCNC: 181 MG/DL (ref 70–99)
GLUCOSE BLDC GLUCOMTR-MCNC: 198 MG/DL (ref 70–99)
GLUCOSE SERPL-MCNC: 120 MG/DL (ref 70–99)
HCO3 SERPL-SCNC: 25 MMOL/L (ref 22–29)
HCT VFR BLD AUTO: 43.3 % (ref 40–53)
HGB BLD-MCNC: 14.1 G/DL (ref 13.3–17.7)
IMM GRANULOCYTES # BLD: 0.1 10E3/UL
IMM GRANULOCYTES NFR BLD: 1 %
LIPASE SERPL-CCNC: 7 U/L (ref 13–60)
LYMPHOCYTES # BLD AUTO: 1.9 10E3/UL (ref 0.8–5.3)
LYMPHOCYTES NFR BLD AUTO: 16 %
MCH RBC QN AUTO: 28.8 PG (ref 26.5–33)
MCHC RBC AUTO-ENTMCNC: 32.6 G/DL (ref 31.5–36.5)
MCV RBC AUTO: 89 FL (ref 78–100)
MONOCYTES # BLD AUTO: 1.3 10E3/UL (ref 0–1.3)
MONOCYTES NFR BLD AUTO: 10 %
NEUTROPHILS # BLD AUTO: 8.7 10E3/UL (ref 1.6–8.3)
NEUTROPHILS NFR BLD AUTO: 73 %
NRBC # BLD AUTO: 0 10E3/UL
NRBC BLD AUTO-RTO: 0 /100
PLATELET # BLD AUTO: 212 10E3/UL (ref 150–450)
POTASSIUM SERPL-SCNC: 4 MMOL/L (ref 3.4–5.3)
PROT SERPL-MCNC: 6.2 G/DL (ref 6.4–8.3)
RBC # BLD AUTO: 4.89 10E6/UL (ref 4.4–5.9)
SODIUM SERPL-SCNC: 139 MMOL/L (ref 135–145)
WBC # BLD AUTO: 12 10E3/UL (ref 4–11)

## 2025-08-02 PROCEDURE — 99239 HOSP IP/OBS DSCHRG MGMT >30: CPT | Mod: GC | Performed by: STUDENT IN AN ORGANIZED HEALTH CARE EDUCATION/TRAINING PROGRAM

## 2025-08-02 PROCEDURE — 85014 HEMATOCRIT: CPT | Performed by: STUDENT IN AN ORGANIZED HEALTH CARE EDUCATION/TRAINING PROGRAM

## 2025-08-02 PROCEDURE — 250N000013 HC RX MED GY IP 250 OP 250 PS 637

## 2025-08-02 PROCEDURE — 36415 COLL VENOUS BLD VENIPUNCTURE: CPT | Performed by: STUDENT IN AN ORGANIZED HEALTH CARE EDUCATION/TRAINING PROGRAM

## 2025-08-02 PROCEDURE — 84155 ASSAY OF PROTEIN SERUM: CPT | Performed by: STUDENT IN AN ORGANIZED HEALTH CARE EDUCATION/TRAINING PROGRAM

## 2025-08-02 PROCEDURE — 94640 AIRWAY INHALATION TREATMENT: CPT

## 2025-08-02 PROCEDURE — 83690 ASSAY OF LIPASE: CPT | Performed by: STUDENT IN AN ORGANIZED HEALTH CARE EDUCATION/TRAINING PROGRAM

## 2025-08-02 PROCEDURE — 99232 SBSQ HOSP IP/OBS MODERATE 35: CPT | Performed by: PHYSICIAN ASSISTANT

## 2025-08-02 PROCEDURE — 250N000011 HC RX IP 250 OP 636

## 2025-08-02 PROCEDURE — 999N000157 HC STATISTIC RCP TIME EA 10 MIN

## 2025-08-02 PROCEDURE — 250N000009 HC RX 250: Performed by: STUDENT IN AN ORGANIZED HEALTH CARE EDUCATION/TRAINING PROGRAM

## 2025-08-02 RX ORDER — METRONIDAZOLE 500 MG/1
500 TABLET ORAL 3 TIMES DAILY
Qty: 21 TABLET | Refills: 0 | Status: SHIPPED | OUTPATIENT
Start: 2025-08-02 | End: 2025-08-02

## 2025-08-02 RX ORDER — CEFPODOXIME PROXETIL 200 MG/1
200 TABLET, FILM COATED ORAL 2 TIMES DAILY
Qty: 14 TABLET | Refills: 0 | Status: SHIPPED | OUTPATIENT
Start: 2025-08-02 | End: 2025-08-02

## 2025-08-02 RX ORDER — CEFPODOXIME PROXETIL 200 MG/1
200 TABLET, FILM COATED ORAL 2 TIMES DAILY
Qty: 14 TABLET | Refills: 0 | Status: ACTIVE | OUTPATIENT
Start: 2025-08-02

## 2025-08-02 RX ORDER — METRONIDAZOLE 500 MG/1
500 TABLET ORAL 3 TIMES DAILY
Qty: 21 TABLET | Refills: 0 | Status: ACTIVE | OUTPATIENT
Start: 2025-08-02

## 2025-08-02 RX ORDER — OMEPRAZOLE 20 MG/1
40 CAPSULE, DELAYED RELEASE ORAL 2 TIMES DAILY
Qty: 120 CAPSULE | Refills: 0 | Status: SHIPPED | OUTPATIENT
Start: 2025-08-02

## 2025-08-02 RX ORDER — OMEPRAZOLE 20 MG/1
40 CAPSULE, DELAYED RELEASE ORAL 2 TIMES DAILY
Qty: 120 CAPSULE | Refills: 0 | Status: SHIPPED | OUTPATIENT
Start: 2025-08-02 | End: 2025-08-02

## 2025-08-02 RX ADMIN — Medication 1 TABLET: at 08:35

## 2025-08-02 RX ADMIN — CALCIUM 500 MG: 500 TABLET ORAL at 08:35

## 2025-08-02 RX ADMIN — METRONIDAZOLE 500 MG: 500 INJECTION, SOLUTION INTRAVENOUS at 06:29

## 2025-08-02 RX ADMIN — LEVALBUTEROL HYDROCHLORIDE 1.25 MG: 1.25 SOLUTION RESPIRATORY (INHALATION) at 09:06

## 2025-08-02 RX ADMIN — AZITHROMYCIN DIHYDRATE 250 MG: 250 TABLET, FILM COATED ORAL at 08:35

## 2025-08-02 RX ADMIN — BUDESONIDE AND FORMOTEROL FUMARATE DIHYDRATE 2 PUFF: 160; 4.5 AEROSOL RESPIRATORY (INHALATION) at 09:23

## 2025-08-02 RX ADMIN — PANCRELIPASE 9 CAPSULE: 120000; 24000; 76000 CAPSULE, DELAYED RELEASE PELLETS ORAL at 11:00

## 2025-08-02 RX ADMIN — PANTOPRAZOLE SODIUM 40 MG: 40 TABLET, DELAYED RELEASE ORAL at 08:35

## 2025-08-02 RX ADMIN — SODIUM CHLORIDE SOLN NEBU 7% 4 ML: 7 NEBU SOLN at 09:06

## 2025-08-02 ASSESSMENT — ACTIVITIES OF DAILY LIVING (ADL)
ADLS_ACUITY_SCORE: 56

## 2025-08-02 NOTE — PLAN OF CARE
Goal Outcome Evaluation:  A/Ox4, denied pain, requesting to eat. MD notified and regular diet advanced from clear liquid. Well tolerated. S/b RT. Neb Rx initiated. Discharge instructions given with printed copy, well understood. wife will pick pt up. IV removed. Instructed pt to pick-up discharge medication ns from the hospital pharmacy.   /76 (BP Location: Left arm)   Pulse 85   Temp 98.7  F (37.1  C) (Oral)   Resp 18   SpO2 98%

## 2025-08-02 NOTE — PROGRESS NOTES
Pulmonary Medicine  Cystic Fibrosis - Lung Transplant Team  Progress Note  2025     Patient: Nico Morales  MRN: [id number]  : 1975 (age 49 year old)  Admission date: 2025    Assessment & Plan:     Nico Morales is a 49 year old male with a history of CF with severe obstructive lung disease, CFRD, and pancreatic insufficiency. The patient was admitted on 25 for acute epigastric abdominal pain with US concerning for acute cholecystitis vs choledocholithiasis. S/p ERCP on  with biliary sphincterotomy and clearance of biliary sludge. Doing well, requesting advancement of diet. Tolerating full vest, would like to discharge later today.     Recommendations:  - Okay to discharge from pulm perspective  - Antibiotic plan and labs per primary/GI--likely to need hepatic panel tomorrow prior to his trip to [place]  - Continue to hold Alyftrek until hepatic panel improves; CF team will follow as OP once he returns from his trip     Abdominal pain:  Concern for cholecystitis vs choledocholithiasis: Initial concern for gall bladder dysfunction date back to , US at that time only revealing for possible gall stones and biliary sludge. Admitted with acute onset abdominal pain  with intermittent N/V. No fever or chills. Leukocytosis noted on admission. LFTs and bilirubin notable elevated. US with dilated CBD and intrahepatic bile ducts concerning for acute cholecystitis vs choledocholithiasis. GI and generally surgery consulted. S/p ERCP yesterday s/p sphicterotomy and clearance of biliary sludge.  - PPI BID x GI  - Ceftriaxone () and Flagyl IV () per primary/GI  - Will have follow up as OP re: cholecystectomy     CF pulmonary disease: On RA without complaint of increased cough or sputum. Vests TID with Xopenex at baseline, mucolytics weaned off by OP pulmonary team d/t lack of efficacy when well. 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 Home regimen for levofloxacin or doxycycline with exacerbations (has not used for several months, no recent exacerbations). Most recent PFTs remain stable with FEV1 1.72L, 49%.  - PTA azithromycin for chronic inflammation  - PTA Symbicort inhaler BID  - Vesting QID with nebs: Xopenex QID, will add Mucomyst BID and HTS BID (alternating therapies) for additional clearance with reduced vesting and suspension of modulator  - PFTs deferred this admission     CFTR modulation:   Transaminitis: Doing well on Alyftrek with minimal cough and sputum. Transitioned from Trikafta in March given persistently elevated LFTs on Trikafta requiring dose reduction. LFTs markedly elevated on admission as above.  - Daily LFTs  - Alyftrek held on admission; will consider resuming when LFTs are <3x ULN     Exocrine pancreatic insufficiency: No signs of malabsorption. H/o G tube, not presently in place. Intermittent GI issues historically, prior HIDA scan suggesting gallbladder kinetics.  - High kcal / high protein diet  - PTA enzymes and vitamins per CF RD     We appreciate the excellent care provided by the [facility] team. Recommendations communicated via [employer] and this note. Will continue to follow along closely, please do not hesitate to call with any questions or concerns.     Patient discussed with Dr. Mullen.     Regina Herman  Pulmonary CF/Transplant     Subjective & Interval History:     Feeling really well, frustrates about his lack of food choices/diet. No nausea or vomiting, no pain. Having normal BMs, not fatty or floating. Denies any pulmonary complaints. Tolerating full nebs/vesting.     Review of Systems:     Please see interval history, otherwise 10 point review is negative.     Physical Exam:     All notes, images, and labs from past 24 hours (at minimum) were reviewed.    Vital signs:  Temp: 97.8  F (36.6  C) Temp src: Oral BP: 118/78 Pulse: 68   Resp: 18 SpO2: 96 % O2 Device:  None (Room air) Oxygen Delivery: 2 LPM      I/O:   Intake/Output Summary (Last 24 hours) at 8/1/2025 0806  Last data filed at 7/31/2025 2000  Gross per 24 hour   Intake 160 ml   Output --   Net 160 ml     Constitutional: Sitting up in bed, no distress  HEENT: Eyes with pink conjunctivae, anicteric. Oral mucosa moist without lesions  PULM: Good air flow bilaterally, clear  CV: Normal S1 and S2. RRR. No murmur or edema  ABD: NABS, soft, non tender  MSK: Moves all extremities. No apparent muscle wasting   NEURO: Alert and conversant   SKIN: Warm, dry. No rash on limited exam  PSYCH: Mood stable    Data:     LABS    CMP:   Recent Labs   Lab 08/02/25  0627 08/02/25  0014 08/01/25  1816 08/01/25  0800 07/31/25  0642 07/31/25  0553 07/30/25  2119 07/30/25  1415     --   --  136  --  137  --  138   POTASSIUM 4.0  --   --  3.9  --  4.0  --  4.7   CHLORIDE 101  --   --  97*  --  98  --  95*   CO2 25  --   --  24  --  24  --  28   ANIONGAP 13  --   --  15  --  15  --  15   * 181* 95 117*  118*   < > 107*   < > 133*   BUN 17.2  --   --  12.1  --  19.4  --  18.8   CR 0.65*  --   --  0.60*  --  0.76  --  0.92   GFRESTIMATED >90  --   --  >90  --  >90  --  >90   CLAUDIA 8.5*  --   --  8.9  --  9.3  --  11.1*   PROTTOTAL 6.2*  --   --  6.4  --  6.9  --  8.0   ALBUMIN 3.3*  --   --  3.5  --  3.7  --  4.3   BILITOTAL 1.6*  --   --  4.7*  --  4.0*  --  3.8*   ALKPHOS 339*  --   --  372*  --  367*  --  375*   AST 91*  --   --  149*  --  246*  --  460*   *  --   --  286*  --  401*  --  588*    < > = values in this interval not displayed.     CBC:   Recent Labs   Lab 08/02/25  0627 08/01/25  0800 07/31/25  0553 07/30/25  1415   WBC 12.0* 14.0* 14.3* 15.6*   RBC 4.89 5.08 5.24 5.90   HGB 14.1 14.7 15.0 17.1   HCT 43.3 45.0 45.8 52.7   MCV 89 89 87 89   MCH 28.8 28.9 28.6 29.0   MCHC 32.6 32.7 32.8 32.4   RDW 14.0 13.8 13.8 13.5    235 267 314       INR: No lab results found in last 7 days.    Glucose:   Recent Labs  "  Lab 08/02/25  0627 08/02/25  0014 08/01/25  1816 08/01/25  0800 08/01/25  0359   * 181* 95 117*  118* 122*       Blood Gas: No lab results found in last 7 days.    Culture Data No results for input(s): \"CULT\" in the last 168 hours.    Virology Data:   Lab Results   Component Value Date    FLUAH1 Negative 01/21/2018    FLUAH3 Positive (A) 01/21/2018    MD8491 Negative 01/21/2018    IFLUB Negative 01/21/2018    RSVA Negative 01/21/2018    RSVB Negative 01/21/2018    PIV1 Negative 01/21/2018    PIV2 Negative 01/21/2018    PIV3 Negative 01/21/2018    HMPV Negative 01/21/2018    HRVS Negative 01/21/2018    ADVBE Negative 01/21/2018    ADVC Negative 01/21/2018       Historical CMV results (last 3 of prior testing):  Lab Results   Component Value Date    CMVQNT CMV DNA Not Detected 05/07/2020     Lab Results   Component Value Date    CMVLOG Not Calculated 05/07/2020       Urine Studies    Recent Labs   Lab Test 02/11/25  0905 01/16/24  0832   URINEPH 6.0 5.0   NITRITE Negative Negative   LEUKEST Negative Negative   WBCU 0 <1       Most Recent [facility] Pulmonary Function Testing (FVC/FEV1 only)  FVC-Pre   Date Value Ref Range Status   06/10/2025 3.79 L    02/11/2025 3.38 L    09/10/2024 3.55 L    05/21/2024 3.65 L      FVC-%Pred-Pre   Date Value Ref Range Status   06/10/2025 86 %    02/11/2025 76 %    09/10/2024 80 %    05/21/2024 82 %      FEV1-Pre   Date Value Ref Range Status   06/10/2025 1.72 L    02/11/2025 1.67 L    09/10/2024 1.77 L    05/21/2024 1.75 L      FEV1-%Pred-Pre   Date Value Ref Range Status   06/10/2025 49 %    02/11/2025 47 %    09/10/2024 49 %    05/21/2024 49 %        IMAGING    Recent Results (from the past 48 hours)   US Abdomen Limited   Result Value    Radiologist flags Acute cholecystitis.    Radiologist flags Possible acute cholecystitis. (Urgent)    Narrative    EXAMINATION: Limited Abdominal Ultrasound, 7/30/2025 3:22 PM     COMPARISON: HIDA scan 5/27/2025. Right upper quadrant " ultrasound  2/18/2025    HISTORY: Right upper quadrant pain. Assess stone versus cholecystitis.    TECHNIQUE: The abdomen was scanned in standard fashion with  specialized ultrasound transducer(s) using both gray-scale and limited  color Doppler techniques.    FINDINGS:     Liver: The liver demonstrates normal echotexture, measuring 14.2 cm in  craniocaudal dimension. There is no focal mass.     Gallbladder: There is gallbladder wall thickening measuring up to  approximately 5.3 mm. Large mobile peripherally echogenic tumefactive  sludge ball versus developing stone. The gallbladder is filled with  sludge. There is no pericholecystic fluid or positive sonographic  Mullen's sign.    Bile Ducts: Diffuse intrahepatic biliary dilatation.  The common bile  duct is dilated and measures 7 mm in diameter.    Pancreas: Visualized portions of the head and body of the pancreas  appear normal.     Kidney: The right kidney measures 11.1 cm long. There is no  hydronephrosis or hydroureter, no shadowing renal calculi, cystic  lesion or mass.     Fluid: No evidence of ascites or pleural effusions.      Impression    IMPRESSION:   1.  Findings equivocal for acute cholecystitis. Gallbladder wall  thickening and stones are unchanged from 2/18/25 study. Absence of  pericholecystic fluid and negative sonographic Mullen's sign do not  support the diagnosis of acute cholecystitis.   2.  There is dilatation of the CBD and intrahepatic biliary ducts  which is new since prior study. Consider MRCP for evaluation of distal  CBD obstruction.      [Urgent Result: Possible acute cholecystitis.]    Finding was identified on 7/30/2025 3:23 PM.     Arturo Simpson contacted by Dr. Trey Encinas 30/2025 3:27 PM and  verbalized understanding of the urgent finding.     I have personally reviewed the examination and initial interpretation  and I agree with the findings.    SCOTTIE PORRAS MD         SYSTEM ID:  [id number]

## 2025-08-02 NOTE — DISCHARGE SUMMARY
[facility]  Discharge Summary - Medicine & Pediatrics       Date of Admission:  7/30/2025  Date of Discharge:  8/2/2025  2:03 PM  Discharging Provider: Gallito Lomax MD    Discharge Service: Medicine Service, MUK[facility] TEAM 2    Discharge Diagnoses   #RUQ pain  #Choledocholithiasis  #Cystic fibrosis (C158tsf/621+1G->T)  #Moderate obstructive lung disease  #Prior infections with pseudomonas, nocardia  #T2DM on insulin  #Pancreatic exocrine insufficiency  #History of malnutrition (prior G tube)  #Osteoporosis    Clinically Significant Risk Factors     # DMII: A1C = 6.5 % (Ref range: <5.7 %) within past 6 months       Follow-ups Needed After Discharge   Follow-up Appointments       Hospital Follow-up with Existing Primary Care Provider (PCP)          Schedule Primary Care visit within: 7 Days           - Hold PTA Alyftrek 2 tablets po daily (elevated LFTs) -- to be resumed as outpt with CF team vs. PCP once LFTs normalized     Unresulted Labs Ordered in the Past 30 Days of this Admission       Date and Time Order Name Status Description    7/30/2025  5:53 PM Blood Culture Peripheral blood (BC) Arm, Left Preliminary     7/30/2025  5:08 PM Blood Culture Peripheral blood (BC) Arm, Right Preliminary         These results will be followed up by PCP    Discharge Disposition   Discharged to home  Condition at discharge: Stable    Hospital Course   Nico Morales with history of cystic fibrosis (V241wmo/621+1G->T) c/b moderate obstructive lung disease, T2DM (on insulin), pancreatic exocrine insufficiency, malnutrition (previously s/p G tube, now removed), and osteoporosis was admitted on 7/30/2025 for RUQ pain 2/2 choledocholithiasis.  The following problems were addressed during his hospitalization:    #RUQ pain  #Choledocholithiasis  #Duodenal ulcers  Given elevated WBC and dilated CBD and intrahepatic bile ducts but lack of pericholecystic fluid, equivocal between acute  cholecystitis vs choledocholithiasis. Agree with GI recommendation for ERCP. Will also continue CTX and BYRNES started in ED given c/f cholecystitis and intra-abdominal infection for at least 48 hours or pending blood culture results. Low c/f sepsis given reassuring vital signs. 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    - Consult to General Surgery, appreciate recs              - Signed off 7/31, surgery will arrange outpatient elective cholecystectomy  - Consult to GI, appreciate recs              - ERCP 8/1   - Mucin/sludge was seen draining out of the major papilla.   - Cholangiogram showed filling defect throughout the common bile duct biliary sphincterotomy was performed.   - Multiple superficial ulders throughout the duodenum   without active bleeding but pigmented spot including   on the cephalad portion of the major papilla   - started omeprazole 40 mg BID for next 1 month for duodenal ulcerations   - Bcx NGTD  - Ceftriaxone 2 g IV daily+ Metronidazole 500 mg IV q8h switched to po cefpodoxime and flagyl for 7 days  - recommend pt get LFTs on Tues Aug 5th to trend as this has downtrended but not yet normalized on discharge     #Cystic fibrosis (H278xkt/621+1G->T)  #Moderate obstructive lung disease  #Prior infections with pseudomonas, nocardia  Patient follows with [facility] clinic. Doing well on current regimen at last visit 6/10/25, with decreased cough and moving more air at apices.   - Hold PTA Alyftrek 2 tablets po daily (elevated LFTs) -- to be resumed as outpt with CF team vs. PCP once LFTs normalized  - PTA Xopenex 1.25 mg nebulization QID  - PTA azithromycin 250 mg po daily  - PTA Xopenex inhaler 1-2 puffs q6h prn  - Vesting therapy QID with nebs  - Protective precautions     #T2DM  - resumed PTA insulin     #Pancreatic exocrine insufficiency  #History of malnutrition (prior G tube)  Continue home supplements.  Will continue home Creon as patient able to eat meals, when not NPO for possible procedure.   - PTA Creon 8-10 capsules with meals, 2-4 with snacks  - PTA elemental iron 65 mg/vitamin C 125 mg po daily  - PTA lactobacillus rhamnosus capsule po daily     #Osteoporosis  Patient follows with Endocrinology and has received prior Reclast infusions.   - PTA calcium carbonate 500 mg po BID          Consultations This Hospital Stay   SURGERY GENERAL ADULT IP CONSULT  PULMONARY CF/TRANSPLANT ADULT IP CONSULT  GI PANCREATICOBILIARY ADULT IP CONSULT  CONSULT FOR INPATIENT VASCULAR ACCESS CARE    Code Status   Full Code       The patient was discussed with MD Bhakti Barillas 2 Service  [facility] [address] [facility] B[facility]  [address] [address]  Phone: [phone]  ______________________________________________________________________    Physical Exam   Vital Signs: Temp: 98.7  F (37.1  C) Temp src: Oral BP: 113/76 Pulse: 85   Resp: 18 SpO2: 98 % O2 Device: None (Room air) Oxygen Delivery: 2 LPM  Weight: 0 lbs 0 oz  GENERAL: alert and no distress, comfortable, oriented, conversant  RESP: on RA, no increased WOB  CV: well perfused skin  MS: no gross musculoskeletal defects noted, no edema   ABD: soft, not tender      Primary Care Physician   Jaye Machado    Discharge Orders      Activity    Your activity upon discharge: activity as tolerated     Reason for your hospital stay    You were admitted due to biliary obstruction secondary to bile sludge and stones. You have gotten an endoscopy procedure to clear out this out. Your liver labs are showing improvement, however they're not normal yet.  We recommend you getting repeat liver labs around Tuesday August 5.  If your liver labs increase, or you develop jaundice, fever, or chills, please present to the hospital for further evaluation as you may need a repeat endoscopic procedure.  We are holding your CF modulator until your  liver labs are normal.  Please follow-up closely with your CF team.  Please take your antibiotics for 1 week -these are called cefpodoxime and metronidazole.  You were also found to have ulcers in your duodenum, and have been started on acid suppression medication (omeprazole) twice a day for 1 month.     Diet    Follow this diet upon discharge: Current Diet:Orders Placed This Encounter      Regular Diet Adult     Hospital Follow-up with Existing Primary Care Provider (PCP)            Significant Results and Procedures   Most Recent 3 CBC's:  Recent Labs   Lab Test 08/02/25 0627 08/01/25  0800 07/31/25  0553   WBC 12.0* 14.0* 14.3*   HGB 14.1 14.7 15.0   MCV 89 89 87    235 267     Most Recent 3 BMP's:  Recent Labs   Lab Test 08/02/25  1202 08/02/25  0851 08/02/25  0627 08/01/25  1816 08/01/25  0800 07/31/25  0642 07/31/25  0553   NA  --   --  139  --  136  --  137   POTASSIUM  --   --  4.0  --  3.9  --  4.0   CHLORIDE  --   --  101  --  97*  --  98   CO2  --   --  25  --  24  --  24   BUN  --   --  17.2  --  12.1  --  19.4   CR  --   --  0.65*  --  0.60*  --  0.76   ANIONGAP  --   --  13  --  15  --  15   CLAUDIA  --   --  8.5*  --  8.9  --  9.3   * 102* 120*   < > 117*  118*   < > 107*    < > = values in this interval not displayed.     Most Recent 2 LFT's:  Recent Labs   Lab Test 08/02/25 0627 08/01/25  0800   AST 91* 149*   * 286*   ALKPHOS 339* 372*   BILITOTAL 1.6* 4.7*       Discharge Medications      Review of your medicines        PAUSE taking these medications        Dose / Directions   Alyftrek 10- MG tablet  Wait to take this until your doctor or other care provider tells you to start again.  Used for: Cystic fibrosis with pulmonary manifestations (H)  Generic drug: vanzacaftor-tezacaftor-deutivacaftor      Dose: 2 tablet  Take 2 tablets by mouth daily. Take with fat-containing food.  Quantity: 56 tablet  Refills: 1            START taking        Dose / Directions   cefpodoxime  200 MG tablet  Commonly known as: VANTIN  Used for: Acute cholecystitis      Dose: 200 mg  Take 1 tablet (200 mg) by mouth 2 times daily.  Quantity: 14 tablet  Refills: 0     metroNIDAZOLE 500 MG tablet  Commonly known as: FLAGYL  Used for: Acute cholecystitis      Dose: 500 mg  Take 1 tablet (500 mg) by mouth 3 times daily.  Quantity: 21 tablet  Refills: 0     omeprazole 20 MG DR capsule  Commonly known as: PriLOSEC  Used for: Duodenal ulcer without hemorrhage or perforation and without obstruction      Dose: 40 mg  Take 2 capsules (40 mg) by mouth 2 times daily.  Quantity: 120 capsule  Refills: 0            CONTINUE these medicines which have NOT CHANGED        Dose / Directions   ACE NOT PRESCRIBED (INTENTIONAL)  Used for: Type I diabetes, secondary to CFRD      Dose: 1 each  1 each continuous prn. ACE Inhibitor not prescribed due to Risk for drug interaction  Quantity: 0 each  Refills: 0     azithromycin 250 MG tablet  Commonly known as: ZITHROMAX  Used for: Cystic fibrosis with pulmonary manifestations (H)      Dose: 250 mg  TAKE 1 TABLET (250 MG) BY MOUTH DAILY  Quantity: 30 tablet  Refills: 11     BD OMI U/F 32G X 4 MM insulin pen needle  Used for: Diabetes mellitus related to CF (cystic fibrosis) (H)  Generic drug: insulin pen needle      Use 4 pen needles daily or as directed. Follow up visit needed. Call  to schedule.  Quantity: 400 each  Refills: 3     bisacodyl 5 MG EC tablet  Commonly known as: DULCOLAX  Used for: Cystic fibrosis (H)      Two days prior to exam take two (2) tablets at 4pm. One day prior to exam take two (2) tablets at 4pm  Quantity: 4 tablet  Refills: 0     * blood glucose monitoring lancets  Used for: Type 1 diabetes mellitus (H)      Use to test blood sugar 6 times daily or as directed.whatever is covered  Quantity: 540 each  Refills: 3     * thin lancets  Commonly known as: NO BRAND SPECIFIED  Used for: 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)      Use with lanceting  "device 6x daily. To accompany: Blood Glucose Monitor Brands: per insurance.  Quantity: 600 each  Refills: 3     * blood glucose monitoring meter device kit  Commonly known as: NO BRAND SPECIFIED  Used for: Type 1 diabetes mellitus (H)      Use to test blood sugar 6 times daily or as directed.whatever is covered  Quantity: 1 kit  Refills: 1     * blood glucose monitoring meter device kit  Commonly known as: NO BRAND SPECIFIED  Used for: 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)      Use to test blood sugar 6 times daily or as directed. Preferred blood glucose meter OR supplies to accompany: Blood Glucose Monitor Brands: per insurance.  Quantity: 1 kit  Refills: 0     * blood glucose test strip  Commonly known as: NO BRAND SPECIFIED  Used for: 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)      Use to test blood sugar 6 times daily. Any covered brand. Pt now using Contour Next, if covered.  Quantity: 600 strip  Refills: 3     * blood glucose test strip  Commonly known as: NO BRAND SPECIFIED  Used for: 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)      Use to test blood sugar 6 times daily or as directed. To accompany: Blood Glucose Monitor Brands: per insurance.  Quantity: 600 strip  Refills: 3     budesonide-formoterol 160-4.5 MCG/ACT inhaler  Commonly known as: SYMBICORT/BREYNA  Used for: Cystic fibrosis (H), Cystic fibrosis of the lung (H)      INHALE 2 PUFFS BY MOUTH TWICE DAILY  Quantity: 30.6 g  Refills: 3     CALCIUM PO      Dose: 500 mg  Take 500 mg by mouth 2 times daily Strength unknown.  Refills: 0     [place] [facility] INFUSION MANAGED PATIENT      Contact [facility] for patient specific medication information at [phone] on admission and discharge from the hospital.  Phones are answered 24 hours a day 7 days a week 365 days a year.    Providers - Choose \"CONTINUE HOME MED (no script)\" at discharge if patient treatment with home infusion will continue.  Refills: 0     FreeStyle Agustin 14 Day " Sensor Misc  Used for: 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)      Change every 14 days.  Quantity: 1 each  Refills: 3     * Glucagon 1 MG/0.2ML pen  Commonly known as: GVOKE HYPOPEN  Used for: Diabetes mellitus related to CF (cystic fibrosis) (H)      Inject the contents of 1 device under the skin into lower abdomen, outer thigh, or outer upper arm as needed for hypoglycemia. If no response after 15 minutes, additional 1 mg dose from a new device may be injected while waiting for emergency assistance.  Quantity: 0.4 mL  Refills: 1     * Glucagon 3 MG/DOSE nasal powder  Commonly known as: BAQSIMI  Used for: Diabetes mellitus related to CF (cystic fibrosis) (H)      Dose: 1 spray  Spray 1 spray (3 mg) in nostril as needed (in emergency for hypoglycic reaction) in the event of unconscious hypoglycemia or hypoglycemic seizure. May repeat dose if no response after 15 minutes.  Quantity: 1 each  Refills: 1     Insulin Aspart FlexPen 100 UNIT/ML Sopn  Used for: Type 1 diabetes mellitus (H)      Dose: 1 Units  Inject 1 Units Subcutaneous 3 times daily With these instructions :INJECT 1 UNIT PER 20G CARB AT LUNCH AND SUPPER; INJECT 3 UNITS AT NIGHT WITH TUBE FEEDINGS. MAX 20 UNITS PER Day  Quantity: 15 mL  Refills: 3     insulin lispro 100 UNIT/ML (1 unit dial) KWIKPEN  Commonly known as: HumaLOG KWIKPEN  Used for: Type 1 diabetes mellitus (H)      Inject 1 Units Subcutaneous 3 times daily With these instructions :INJECT 1 UNIT PER 20G CARB AT LUNCH AND SUPPER; INJECT 3 UNITS AT NIGHT WITH TUBE FEEDINGS. MAX 20 UNITS PER Day, Disp-15 mL, R-3, E-Prescribe  Quantity: 15 mL  Refills: 3     Lantus SoloStar 100 UNIT/ML soln  Used for: Diabetes mellitus related to CF (cystic fibrosis) (H)  Generic drug: insulin glargine      INJECT 8 UNITS UNDER THE SKIN ONCE DAILY  Quantity: 15 mL  Refills: 1     levalbuterol 1.25 MG/3ML neb solution  Commonly known as: XOPENEX  Used for: Cystic fibrosis with pulmonary manifestations (H),  Cystic fibrosis (H), CF (cystic fibrosis) (H)      INHALE 1 VIAL BY MOUTH INTO THE LUNGS VIA NEBULIZATION FOUR TIMES DAILY  Quantity: 360 mL  Refills: 11     levalbuterol 45 MCG/ACT inhaler  Commonly known as: Xopenex HFA  Used for: Cystic fibrosis with pulmonary manifestations (H)      INHALE 2 PUFFS INTO THE LUNGS EVERY 6 HOURS AS NEEDED FOR SHORTNESS OF BREATH/DYSPNEA  Quantity: 75 g  Refills: 3     lipase-protease-amylase [id number] units Cpep per EC capsule  Commonly known as: Creon  Used for: Cystic fibrosis with pulmonary manifestations (H)      TAKE 9-10 CAPSULES BY MOUTH WITH MEALS (3 MEALS/DAY) AND 2-4 CAPS WITH SNACKS (3 SNACKS/DAY)  Quantity: 3780 capsule  Refills: 3     MULTIVITAMIN PO      Take 1 tablet by mouth daily. Includes 5,000 units vitamin D and 400 units vitamin E. Per pt, formulated for CF pts.  Refills: 0     MVW complete capsule  Used for: Cystic fibrosis with pulmonary manifestations (H)      Dose: 1 capsule  Take 1 capsule by mouth daily.  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 Mix and drink an 8 oz glass every 15 minutes until HALF of the container is gone. Place the remainder in the refrigerator. One day before procedure at 5PM drink second half of bowel prep. Drink an 8 oz glass every 15 minutes until it is gone. Day of procedure 6 hours before arrival time fill the 2nd container with water. Mix and drink an 8 oz glass every 15 minutes until HALF of the container is gone. Discard the remaining solution.  Quantity: 8000 mL  Refills: 0     Vitron-C  MG Tabs tablet  Generic drug: Elemental iron 65 mg Vitamin C  125 mg      Dose: 1 tablet  Take 1 tablet by mouth daily  Refills: 0           * This list has 8 medication(s) that are the same as other medications prescribed for you. Read the directions carefully, and ask your doctor or other care provider to review them with you.                STOP taking      phytonadione 5 MG tablet  Commonly known as: MEPHYTON                  Where to get your medicines        These medications were sent to [facility] [place], [place] - [address]  [address] [address]      Phone: [phone]   cefpodoxime 200 MG tablet  metroNIDAZOLE 500 MG tablet  omeprazole 20 MG DR capsule       Allergies   Allergies   Allergen Reactions    Piperacillin Sod-Tazobactam So Hives     Delayed type reaction in intradermal tests to Zosyn (Piperacillin/Tazobactam). Might be specific reaction to the Zosyn without cross-reactions to Penicillins (beta-Lactam core). Avoid in future Zosyn. However, I would avoid the Penicillins and use instead Penems or Cephalosporines. If Penicillins are the best option, then they can be used under observation.    Pulmozyme [Dornase Trever] Other (See Comments)     Chest pain and fever

## 2025-08-02 NOTE — PLAN OF CARE
Goal Outcome Evaluation:      Plan of Care Reviewed With: patient    Overall Patient Progress: no changeOverall Patient Progress: no change    Status: POD #1 ERCP, biliary sphincterectomy with sludge removal d/t biliary obstruction.   Neuros: A&O x4  Cardiac: WDL.   Respiratory: WDL on RA. Denies SOB.  GI/: Voiding large amounts spontaneously. +BS, passing flatus, LBM 8/1  Diet: Clear liquid  Lines: Right PIV SL.  Pain/nausea: Denies.  Activity: Up ind  Plan: continue cares

## 2025-08-02 NOTE — PLAN OF CARE
Pt s/p ERCP, biliary sphincterectomy with sludge removal d/t biliary obstruction. VSS on RA. Pt was having abdominal pain prior to admission, currently denying any pain or nausea post op. Pt steady post op, ambulated to bathroom SBA and did very well, denies any dizziness when up.   2 RN skin check completed with Emma SUÁREZ. No skin issues noted. Pt had a G tube that was removed in 2021, left upper abdominal G tube insertion site is healed over.   Vest therapy ordered four times daily, pt prefers to sit up in chair during vesting. Will continue pt's plan of care.

## 2025-08-04 ENCOUNTER — TELEPHONE (OUTPATIENT)
Dept: PHARMACY | Facility: CLINIC | Age: 50
End: 2025-08-04
Payer: COMMERCIAL

## 2025-08-04 ENCOUNTER — PATIENT OUTREACH (OUTPATIENT)
Dept: CARE COORDINATION | Facility: CLINIC | Age: 50
End: 2025-08-04
Payer: COMMERCIAL

## 2025-08-04 ENCOUNTER — TELEPHONE (OUTPATIENT)
Dept: SURGERY | Facility: CLINIC | Age: 50
End: 2025-08-04
Payer: COMMERCIAL

## 2025-08-04 LAB
BACTERIA SPEC CULT: NO GROWTH
BACTERIA SPEC CULT: NO GROWTH

## 2025-08-06 ENCOUNTER — CLINICAL UPDATE (OUTPATIENT)
Dept: PHARMACY | Facility: CLINIC | Age: 50
End: 2025-08-06
Payer: COMMERCIAL

## 2025-08-06 DIAGNOSIS — E08.9 DIABETES MELLITUS RELATED TO CF (CYSTIC FIBROSIS) (H): ICD-10-CM

## 2025-08-06 DIAGNOSIS — E84.9 CYSTIC FIBROSIS (H): Primary | ICD-10-CM

## 2025-08-06 DIAGNOSIS — E84.8 DIABETES MELLITUS RELATED TO CF (CYSTIC FIBROSIS) (H): ICD-10-CM

## 2025-08-06 PROCEDURE — 99207 PR NO CHARGE LOS: CPT | Performed by: PHARMACIST

## 2025-08-06 RX ORDER — INSULIN GLARGINE 100 [IU]/ML
INJECTION, SOLUTION SUBCUTANEOUS
Qty: 15 ML | Refills: 1 | Status: SHIPPED | OUTPATIENT
Start: 2025-08-06

## 2025-08-11 ENCOUNTER — TELEPHONE (OUTPATIENT)
Dept: ENDOCRINOLOGY | Facility: CLINIC | Age: 50
End: 2025-08-11
Payer: COMMERCIAL

## 2025-08-11 DIAGNOSIS — E08.9 DIABETES MELLITUS DUE TO CYSTIC FIBROSIS (H): Primary | ICD-10-CM

## 2025-08-11 DIAGNOSIS — E84.9 DIABETES MELLITUS DUE TO CYSTIC FIBROSIS (H): Primary | ICD-10-CM

## 2025-08-11 RX ORDER — INSULIN GLARGINE-YFGN 100 [IU]/ML
8 INJECTION, SOLUTION SUBCUTANEOUS DAILY
Qty: 15 ML | Refills: 1 | Status: SHIPPED | OUTPATIENT
Start: 2025-08-11

## 2025-08-12 ENCOUNTER — CLINICAL UPDATE (OUTPATIENT)
Dept: PHARMACY | Facility: CLINIC | Age: 50
End: 2025-08-12
Payer: COMMERCIAL

## 2025-08-12 DIAGNOSIS — E84.9 CYSTIC FIBROSIS (H): Primary | ICD-10-CM

## 2025-08-12 PROCEDURE — 99207 PR NO CHARGE LOS: CPT | Performed by: PHARMACIST

## 2025-08-14 ENCOUNTER — ANESTHESIA EVENT (OUTPATIENT)
Dept: SURGERY | Facility: CLINIC | Age: 50
End: 2025-08-14
Payer: COMMERCIAL

## 2025-08-14 ENCOUNTER — PRE VISIT (OUTPATIENT)
Dept: SURGERY | Facility: CLINIC | Age: 50
End: 2025-08-14

## 2025-08-14 ENCOUNTER — VIRTUAL VISIT (OUTPATIENT)
Dept: SURGERY | Facility: CLINIC | Age: 50
End: 2025-08-14
Payer: COMMERCIAL

## 2025-08-14 VITALS — HEIGHT: 68 IN | WEIGHT: 135 LBS | BODY MASS INDEX: 20.46 KG/M2

## 2025-08-14 DIAGNOSIS — Z01.818 PREOP EXAMINATION: Primary | ICD-10-CM

## 2025-08-14 DIAGNOSIS — K81.0 ACUTE CHOLECYSTITIS: ICD-10-CM

## 2025-08-14 ASSESSMENT — ENCOUNTER SYMPTOMS
DYSRHYTHMIAS: 0
SEIZURES: 0

## 2025-08-14 ASSESSMENT — LIFESTYLE VARIABLES: TOBACCO_USE: 0

## 2025-08-19 ENCOUNTER — LAB (OUTPATIENT)
Dept: LAB | Facility: OTHER | Age: 50
End: 2025-08-19
Payer: COMMERCIAL

## 2025-08-19 ENCOUNTER — CLINICAL UPDATE (OUTPATIENT)
Dept: PHARMACY | Facility: CLINIC | Age: 50
End: 2025-08-19
Payer: COMMERCIAL

## 2025-08-19 ENCOUNTER — TELEPHONE (OUTPATIENT)
Dept: PULMONOLOGY | Facility: CLINIC | Age: 50
End: 2025-08-19

## 2025-08-19 DIAGNOSIS — E84.9 CYSTIC FIBROSIS (H): Primary | ICD-10-CM

## 2025-08-19 DIAGNOSIS — E08.9 DIABETES MELLITUS DUE TO CYSTIC FIBROSIS (H): ICD-10-CM

## 2025-08-19 DIAGNOSIS — E84.0 CYSTIC FIBROSIS WITH PULMONARY MANIFESTATIONS (H): ICD-10-CM

## 2025-08-19 DIAGNOSIS — K86.81 EXOCRINE PANCREATIC INSUFFICIENCY: ICD-10-CM

## 2025-08-19 DIAGNOSIS — E84.9 DIABETES MELLITUS DUE TO CYSTIC FIBROSIS (H): ICD-10-CM

## 2025-08-19 LAB
ALBUMIN SERPL BCG-MCNC: 3.8 G/DL (ref 3.5–5.2)
ALP SERPL-CCNC: 118 U/L (ref 40–150)
ALT SERPL W P-5'-P-CCNC: 24 U/L (ref 0–70)
AST SERPL W P-5'-P-CCNC: 27 U/L (ref 0–45)
BILIRUB DIRECT SERPL-MCNC: 0.23 MG/DL (ref 0–0.3)
BILIRUB SERPL-MCNC: 0.4 MG/DL
PROT SERPL-MCNC: 7.4 G/DL (ref 6.4–8.3)

## 2025-08-19 PROCEDURE — 80076 HEPATIC FUNCTION PANEL: CPT

## 2025-08-19 PROCEDURE — 99207 PR NO CHARGE LOS: CPT | Performed by: PHARMACIST

## 2025-08-19 PROCEDURE — 36415 COLL VENOUS BLD VENIPUNCTURE: CPT

## 2025-08-25 ENCOUNTER — PHARMACY VISIT (OUTPATIENT)
Dept: ADMINISTRATIVE | Facility: CLINIC | Age: 50
End: 2025-08-25
Payer: COMMERCIAL

## 2025-08-25 ASSESSMENT — ENCOUNTER SYMPTOMS
SEIZURES: 0
DYSRHYTHMIAS: 0

## 2025-08-25 ASSESSMENT — LIFESTYLE VARIABLES: TOBACCO_USE: 0

## 2025-08-26 ENCOUNTER — ANESTHESIA (OUTPATIENT)
Dept: SURGERY | Facility: CLINIC | Age: 50
End: 2025-08-26
Payer: COMMERCIAL

## 2025-08-26 ENCOUNTER — HOSPITAL ENCOUNTER (OUTPATIENT)
Facility: CLINIC | Age: 50
Discharge: HOME | End: 2025-08-26
Attending: SURGERY | Admitting: SURGERY
Payer: COMMERCIAL

## 2025-08-26 PROCEDURE — 250N000009 HC RX 250

## 2025-08-26 PROCEDURE — 250N000011 HC RX IP 250 OP 636: Performed by: SURGERY

## 2025-08-26 PROCEDURE — 250N000011 HC RX IP 250 OP 636

## 2025-08-26 RX ORDER — PROPOFOL 10 MG/ML
INJECTION, EMULSION INTRAVENOUS PRN
Status: DISCONTINUED | OUTPATIENT
Start: 2025-08-26 | End: 2025-08-26

## 2025-08-26 RX ORDER — DEXAMETHASONE SODIUM PHOSPHATE 4 MG/ML
INJECTION, SOLUTION INTRA-ARTICULAR; INTRALESIONAL; INTRAMUSCULAR; INTRAVENOUS; SOFT TISSUE PRN
Status: DISCONTINUED | OUTPATIENT
Start: 2025-08-26 | End: 2025-08-26

## 2025-08-26 RX ORDER — ONDANSETRON 2 MG/ML
INJECTION INTRAMUSCULAR; INTRAVENOUS PRN
Status: DISCONTINUED | OUTPATIENT
Start: 2025-08-26 | End: 2025-08-26

## 2025-08-26 RX ORDER — LIDOCAINE HYDROCHLORIDE 20 MG/ML
INJECTION, SOLUTION INFILTRATION; PERINEURAL PRN
Status: DISCONTINUED | OUTPATIENT
Start: 2025-08-26 | End: 2025-08-26

## 2025-08-26 RX ORDER — KETOROLAC TROMETHAMINE 30 MG/ML
INJECTION, SOLUTION INTRAMUSCULAR; INTRAVENOUS PRN
Status: DISCONTINUED | OUTPATIENT
Start: 2025-08-26 | End: 2025-08-26

## 2025-08-26 RX ORDER — FENTANYL CITRATE 50 UG/ML
INJECTION, SOLUTION INTRAMUSCULAR; INTRAVENOUS PRN
Status: DISCONTINUED | OUTPATIENT
Start: 2025-08-26 | End: 2025-08-26

## 2025-08-26 RX ADMIN — ONDANSETRON 4 MG: 2 INJECTION INTRAMUSCULAR; INTRAVENOUS at 10:25

## 2025-08-26 RX ADMIN — KETOROLAC TROMETHAMINE 15 MG: 30 INJECTION, SOLUTION INTRAMUSCULAR at 10:28

## 2025-08-26 RX ADMIN — FENTANYL CITRATE 100 MCG: 50 INJECTION INTRAMUSCULAR; INTRAVENOUS at 08:54

## 2025-08-26 RX ADMIN — Medication 200 MG: at 10:43

## 2025-08-26 RX ADMIN — LIDOCAINE HYDROCHLORIDE 60 MG: 20 INJECTION, SOLUTION INFILTRATION; PERINEURAL at 08:54

## 2025-08-26 RX ADMIN — Medication 10 MG: at 09:28

## 2025-08-26 RX ADMIN — Medication 2 G: at 08:25

## 2025-08-26 RX ADMIN — HYDROMORPHONE HYDROCHLORIDE 0.5 MG: 1 INJECTION, SOLUTION INTRAMUSCULAR; INTRAVENOUS; SUBCUTANEOUS at 10:21

## 2025-08-26 RX ADMIN — Medication 50 MG: at 08:54

## 2025-08-26 RX ADMIN — Medication 10 MG: at 08:50

## 2025-08-26 RX ADMIN — HYDROMORPHONE HYDROCHLORIDE 0.5 MG: 1 INJECTION, SOLUTION INTRAMUSCULAR; INTRAVENOUS; SUBCUTANEOUS at 08:55

## 2025-08-26 RX ADMIN — DEXAMETHASONE SODIUM PHOSPHATE 4 MG: 4 INJECTION, SOLUTION INTRAMUSCULAR; INTRAVENOUS at 08:54

## 2025-08-26 RX ADMIN — PROPOFOL 150 MG: 10 INJECTION, EMULSION INTRAVENOUS at 08:54

## 2025-08-26 RX ADMIN — FENTANYL CITRATE 50 MCG: 50 INJECTION INTRAMUSCULAR; INTRAVENOUS at 10:01

## 2025-08-26 RX ADMIN — Medication 20 MG: at 09:58

## 2025-08-26 ASSESSMENT — ACTIVITIES OF DAILY LIVING (ADL)
ADLS_ACUITY_SCORE: 47

## 2025-08-28 ENCOUNTER — PATIENT OUTREACH (OUTPATIENT)
Dept: SURGERY | Facility: CLINIC | Age: 50
End: 2025-08-28
Payer: COMMERCIAL

## 2025-09-03 ENCOUNTER — MYC MEDICAL ADVICE (OUTPATIENT)
Dept: PULMONOLOGY | Facility: CLINIC | Age: 50
End: 2025-09-03
Payer: COMMERCIAL

## 2025-09-03 DIAGNOSIS — E08.9 DIABETES MELLITUS DUE TO CYSTIC FIBROSIS (H): ICD-10-CM

## 2025-09-03 DIAGNOSIS — E84.9 CYSTIC FIBROSIS (H): Primary | ICD-10-CM

## 2025-09-03 DIAGNOSIS — E84.9 DIABETES MELLITUS DUE TO CYSTIC FIBROSIS (H): ICD-10-CM

## 2025-09-04 ENCOUNTER — PATIENT OUTREACH (OUTPATIENT)
Dept: SURGERY | Facility: CLINIC | Age: 50
End: 2025-09-04
Payer: COMMERCIAL

## 2025-09-04 RX ORDER — OMEPRAZOLE 40 MG/1
40 CAPSULE, DELAYED RELEASE ORAL 2 TIMES DAILY
Qty: 180 CAPSULE | Refills: 0 | Status: SHIPPED | OUTPATIENT
Start: 2025-09-04

## 2025-09-04 RX ORDER — INSULIN GLARGINE-YFGN 100 [IU]/ML
8 INJECTION, SOLUTION SUBCUTANEOUS DAILY
Qty: 15 ML | Refills: 1 | Status: SHIPPED | OUTPATIENT
Start: 2025-09-04

## (undated) DEVICE — ENDO FUSION OMNI-TOME 21 FS-OMNI-21 G48675

## (undated) DEVICE — KIT CONNECTOR FOR OLYMPUS ENDOSCOPES DEFENDO 100310

## (undated) DEVICE — ENDO ENDO DISTAL MAJ-2315

## (undated) DEVICE — GUIDEWIRE NOVAGOLD .018X260CM STR TIP M00552000

## (undated) DEVICE — GLOVE 7.5 PROTEXIS PI CLASSIC 2D72PL75X

## (undated) DEVICE — CATH RETRIEVAL BALLOON EXTRACTOR PRO RX-S INJ ABOVE 9-12MM

## (undated) DEVICE — KIT ENDO FIRST STEP DISINFECTANT 200ML W/POUCH EP-4

## (undated) DEVICE — PACK ENDOSCOPY GI CUSTOM UMMC

## (undated) DEVICE — SOL WATER IRRIG 1000ML BOTTLE 2F7114

## (undated) DEVICE — ESU GROUND PAD ADULT W/CORD E7507

## (undated) DEVICE — BIOPSY VALVE BIOSHIELD 00711135

## (undated) DEVICE — ENDO DEVICE LOCKING AND BIOPSY CAP M00545261

## (undated) DEVICE — ENDO TUBING CO2 SMARTCAP STERILE DISP 100145CO2EXT

## (undated) DEVICE — SUCTION MANIFOLD NEPTUNE 2 SYS 4 PORT 0702-020-000

## (undated) DEVICE — BITE BLOCK ENDO W/DENTAL RIM 54FR SBT-114-100

## (undated) RX ORDER — DEXTROSE MONOHYDRATE 25 G/50ML
INJECTION, SOLUTION INTRAVENOUS
Status: DISPENSED
Start: 2022-06-27

## (undated) RX ORDER — LIDOCAINE HYDROCHLORIDE 10 MG/ML
INJECTION, SOLUTION EPIDURAL; INFILTRATION; INTRACAUDAL; PERINEURAL
Status: DISPENSED
Start: 2018-01-22

## (undated) RX ORDER — ONDANSETRON 2 MG/ML
INJECTION INTRAMUSCULAR; INTRAVENOUS
Status: DISPENSED
Start: 2025-08-01

## (undated) RX ORDER — TOBRAMYCIN 40 MG/ML
INJECTION INTRAMUSCULAR; INTRAVENOUS
Status: DISPENSED
Start: 2022-05-10

## (undated) RX ORDER — FENTANYL CITRATE 50 UG/ML
INJECTION, SOLUTION INTRAMUSCULAR; INTRAVENOUS
Status: DISPENSED
Start: 2025-07-14

## (undated) RX ORDER — SULFAMETHOXAZOLE AND TRIMETHOPRIM 80; 16 MG/ML; MG/ML
INJECTION INTRAVENOUS
Status: DISPENSED
Start: 2022-05-10

## (undated) RX ORDER — DIPHENHYDRAMINE HYDROCHLORIDE 50 MG/ML
INJECTION INTRAMUSCULAR; INTRAVENOUS
Status: DISPENSED
Start: 2018-05-21

## (undated) RX ORDER — PROPOFOL 10 MG/ML
INJECTION, EMULSION INTRAVENOUS
Status: DISPENSED
Start: 2025-08-01

## (undated) RX ORDER — SIMETHICONE 20 MG/.3ML
EMULSION ORAL
Status: DISPENSED
Start: 2018-05-21

## (undated) RX ORDER — FENTANYL CITRATE 50 UG/ML
INJECTION, SOLUTION INTRAMUSCULAR; INTRAVENOUS
Status: DISPENSED
Start: 2022-06-27

## (undated) RX ORDER — PENICILLIN G POTASSIUM 5000000 [IU]/1
INJECTION, POWDER, FOR SOLUTION INTRAMUSCULAR; INTRAVENOUS
Status: DISPENSED
Start: 2022-05-10

## (undated) RX ORDER — CEFTAZIDIME 1 G/1
INJECTION, POWDER, FOR SOLUTION INTRAMUSCULAR; INTRAVENOUS
Status: DISPENSED
Start: 2022-05-10

## (undated) RX ORDER — CEFTRIAXONE SODIUM 250 MG/1
INJECTION, POWDER, FOR SOLUTION INTRAMUSCULAR; INTRAVENOUS
Status: DISPENSED
Start: 2022-05-10

## (undated) RX ORDER — FENTANYL CITRATE 50 UG/ML
INJECTION, SOLUTION INTRAMUSCULAR; INTRAVENOUS
Status: DISPENSED
Start: 2025-08-01

## (undated) RX ORDER — FENTANYL CITRATE 50 UG/ML
INJECTION, SOLUTION INTRAMUSCULAR; INTRAVENOUS
Status: DISPENSED
Start: 2018-05-21

## (undated) RX ORDER — CEFAZOLIN SODIUM 500 MG/2.2ML
INJECTION, POWDER, FOR SOLUTION INTRAMUSCULAR; INTRAVENOUS
Status: DISPENSED
Start: 2022-05-10

## (undated) RX ORDER — DEXAMETHASONE SODIUM PHOSPHATE 4 MG/ML
INJECTION, SOLUTION INTRA-ARTICULAR; INTRALESIONAL; INTRAMUSCULAR; INTRAVENOUS; SOFT TISSUE
Status: DISPENSED
Start: 2025-08-01